# Patient Record
Sex: MALE | Race: WHITE | Employment: OTHER | URBAN - METROPOLITAN AREA
[De-identification: names, ages, dates, MRNs, and addresses within clinical notes are randomized per-mention and may not be internally consistent; named-entity substitution may affect disease eponyms.]

---

## 2017-01-23 ENCOUNTER — MYC REFILL (OUTPATIENT)
Dept: FAMILY MEDICINE | Facility: CLINIC | Age: 82
End: 2017-01-23

## 2017-01-23 DIAGNOSIS — E78.5 HYPERLIPIDEMIA LDL GOAL <100: ICD-10-CM

## 2017-01-24 RX ORDER — SIMVASTATIN 20 MG
20 TABLET ORAL AT BEDTIME
Qty: 90 TABLET | Refills: 0 | Status: SHIPPED | OUTPATIENT
Start: 2017-01-24 | End: 2017-04-20

## 2017-01-24 NOTE — TELEPHONE ENCOUNTER
SIMVASTATIN  Last Written Prescription Date: 12/28/2016  Last Fill Quantity: 30, # refills: 0  Last Office Visit with [facility], [facility] or [facility] prescribing provider: 8/24/2016       CHOL      136   8/18/2015  HDL       53   8/18/2015  LDL       46   8/18/2015  TRIG      186   8/18/2015  CHOLHDLRATIO      2.6   8/18/2015

## 2017-01-24 NOTE — TELEPHONE ENCOUNTER
Message from MyChart:  Original authorizing provider: MD Ivan Novoa would like a refill of the following medications:  simvastatin (ZOCOR) 20 MG tablet [Evaristo Magaña MD]    Preferred pharmacy: [facility] [address] [place] [address] [id number] AT [facility] [address]    Comment:  Dr Magaña Requesting a 90 day supply of Simvastatin 20 mg Tablets. Thank you, Shahriar Gomez

## 2017-01-25 DIAGNOSIS — M48.061 LUMBAR SPINAL STENOSIS: Primary | ICD-10-CM

## 2017-01-25 NOTE — TELEPHONE ENCOUNTER
Controlled Substance Refill Request for Hydrocodone-acetaminopheg n (norco)7.5-325 mg Problem List Complete:  Yes   checked in past 6 months?  Yes 11/28/16       Medication(s): norco 7.5/325.    Maximum quantity per month: 120  Clinic visit frequency required: Q 4 months     Controlled substance agreement on file: 9/3/14    Pain Clinic evaluation in the past: Yes       Date/Location:  spine clinic    DIRE Total Score(s):  No flowsheet data found.    Last [facility] website verification: 11/28/16    HYDROCODONE-ACETAMINOPHEN ( [place]) 7.5-325 Last Written Prescription Date:  12/29/16  Last Fill Quantity: 120,   # refills: 0  Last Office Visit with [facility] or  [facility] prescribing provider: 9/13/16  Future Office visit:       Routing refill request to provider for review/approval because:  Drug not on the [facility] or [facility] refill protocol or controlled substance

## 2017-01-26 RX ORDER — HYDROCODONE BITARTRATE AND ACETAMINOPHEN 7.5; 325 MG/1; MG/1
1 TABLET ORAL EVERY 6 HOURS PRN
Qty: 120 TABLET | Refills: 0 | Status: SHIPPED | OUTPATIENT
Start: 2017-01-26 | End: 2017-02-27

## 2017-01-26 NOTE — TELEPHONE ENCOUNTER
Patient was notified that script was ready for  at [facility].

## 2017-02-09 ENCOUNTER — MYC REFILL (OUTPATIENT)
Dept: FAMILY MEDICINE | Facility: CLINIC | Age: 82
End: 2017-02-09

## 2017-02-09 DIAGNOSIS — F11.90 CHRONIC NARCOTIC USE: ICD-10-CM

## 2017-02-09 DIAGNOSIS — K59.09 CHRONIC CONSTIPATION: ICD-10-CM

## 2017-02-14 RX ORDER — SENNOSIDES 8.6 MG
1 TABLET ORAL 2 TIMES DAILY
Qty: 180 EACH | Refills: 0 | Status: ON HOLD | OUTPATIENT
Start: 2017-02-14 | End: 2017-06-02

## 2017-02-14 NOTE — TELEPHONE ENCOUNTER
Message from MyChart:  Original authorizing provider: Evaristo Magaña MD    Ivan Gomez would like a refill of the following medications:  sennosides (SENOKOT) 8.6 MG tablet [Evaristo Magaña MD]    Preferred pharmacy: [facility] [id number] [place] [address] [id number] [facility] [place] [facility] [address]    Comment:

## 2017-02-14 NOTE — TELEPHONE ENCOUNTER
sennosides (SENOKOT) 8.6 MG tablet    Last Written Prescription Date: 6/2/2016  Last Fill Quantity: 180,  # refills: 3   Last Office Visit with [facility] prescribing provider: 9/13/2016                                           Prescription approved per [facility] Refill Protocol.

## 2017-02-27 ENCOUNTER — MYC REFILL (OUTPATIENT)
Dept: FAMILY MEDICINE | Facility: CLINIC | Age: 82
End: 2017-02-27

## 2017-02-27 DIAGNOSIS — M48.061 LUMBAR SPINAL STENOSIS: ICD-10-CM

## 2017-02-28 RX ORDER — HYDROCODONE BITARTRATE AND ACETAMINOPHEN 7.5; 325 MG/1; MG/1
1 TABLET ORAL EVERY 6 HOURS PRN
Qty: 120 TABLET | Refills: 0 | Status: SHIPPED | OUTPATIENT
Start: 2017-02-28 | End: 2017-03-08

## 2017-02-28 NOTE — TELEPHONE ENCOUNTER
Controlled Substance Refill Request for NORCO  Problem List Complete:  Yes  Per Dr. Bell Note Oct 2013:You don't want to use a long acting narcotic for pain. At this time, you are using #120 hydro/apap 7.5/325 monthly./   I told you today and you agreed to follow up to see Dr. Evaristo Magaña in about 2 months before the present refills of narcotic are due to be refilled   I also told you that it would be my recommendation that you then be given an increased monthly prescription of #150 per month to better control the pain.   You do not want any further surgery on your back at this time so want to focus on better pain control.     Patient is followed by ANTHONY Murphy for ongoing prescription of pain medication. All refills should be approved by this provider, or covering partner.     Medication(s): norco 7.5/325.   Maximum quantity per month: 120  Clinic visit frequency required: Q 4 months      Controlled substance agreement on file: 9/3/14     Pain Clinic evaluation in the past: Yes  Date/Location: spine clinic     DIRE Total Score(s):  No flowsheet data found.     Last [facility] website verification: 11/28/16  [email or web address]    Routing refill request to provider for review/approval because:  Drug not on the [facility], [facility] or  Health refill protocol or controlled substance    Charmaine Estrada RN - [employer]riage [employer]

## 2017-02-28 NOTE — TELEPHONE ENCOUNTER
Message from [place]:  Original authorizing provider: MD Ivan Shepard would like a refill of the following medications:  HYDROcodone-acetaminophen (NORCO) 7.5-325 MG per tablet [Jay Yoon MD]    Preferred pharmacy: Other - Prescription pickup at [facility]    Comment:  Dr Magaña: I would appreciate a new prescription for Hydrocodone-Acetaminophen 7.5-325 tablets with pickup at [facility] after approval. Thank you, Shahriar Gomez

## 2017-03-08 ENCOUNTER — ONCOLOGY VISIT (OUTPATIENT)
Dept: ONCOLOGY | Facility: CLINIC | Age: 82
End: 2017-03-08
Attending: UROLOGY
Payer: MEDICARE

## 2017-03-08 VITALS
OXYGEN SATURATION: 95 % | WEIGHT: 166 LBS | BODY MASS INDEX: 26.06 KG/M2 | TEMPERATURE: 97.8 F | RESPIRATION RATE: 16 BRPM | HEIGHT: 67 IN | HEART RATE: 83 BPM | DIASTOLIC BLOOD PRESSURE: 77 MMHG | SYSTOLIC BLOOD PRESSURE: 148 MMHG

## 2017-03-08 DIAGNOSIS — C67.4 MALIGNANT NEOPLASM OF POSTERIOR WALL OF URINARY BLADDER (H): Primary | Chronic | ICD-10-CM

## 2017-03-08 PROCEDURE — 99213 OFFICE O/P EST LOW 20 MIN: CPT | Performed by: UROLOGY

## 2017-03-08 PROCEDURE — 99211 OFF/OP EST MAY X REQ PHY/QHP: CPT

## 2017-03-08 ASSESSMENT — PAIN SCALES - GENERAL: PAINLEVEL: MODERATE PAIN (4)

## 2017-03-08 NOTE — PATIENT INSTRUCTIONS
Return to see me 6-9 months    Renal US at [facility]     UA/Fish today  -will drop off specimen at [facility]

## 2017-03-08 NOTE — NURSING NOTE
"Ivan Gomez is a 89 year old male who presents for:  Chief Complaint   Patient presents with     Oncology Clinic Visit     Follow up        Initial Vitals:  /77  Pulse 83  Temp 97.8  F (36.6  C) (Tympanic)  Resp 16  Ht 1.702 m (5' 7.01\")  Wt 75.3 kg (166 lb)  SpO2 95%  BMI 25.99 kg/m2 Estimated body mass index is 25.99 kg/(m^2) as calculated from the following:    Height as of this encounter: 1.702 m (5' 7.01\").    Weight as of this encounter: 75.3 kg (166 lb).. Body surface area is 1.89 meters squared. BP completed using cuff size: regular  Moderate Pain (4) No LMP for male patient. Allergies and medications reviewed.     Medications: Medication refills not needed today.  Pharmacy name entered into [employer]: [facility] [address] [place] [address] [address] [facility] [address]    Comments: Follow up    8 minutes for nursing intake (face to face time)   Meghana Malik CMA     DISCHARGE PLAN:  Next appointments: See patient instruction section  Departure Mode: Ambulatory  Accompanied by: wife  0 minutes for nursing discharge (face to face time)   Meghana Malik CMA            "

## 2017-03-08 NOTE — PROGRESS NOTES
"89 year old male with a history of urothelial carcinoma here for follow up     Had a complicated post operative course after original TURBT(Trans Urethral Resection of Bladder Tumor)  including the need for a nephrostomy tube, this was internalized for a double J.  And a similar reaction slow recovery after TURBT    Here for follow up cysto   Only got one cycle of Mytomycin-C  Did not complete the remaining courses because he did not like having the cath placed    Date of Initial Diagnosis: 2015  Stage of Initial Diagnosis: Ta, but large volume with a couple of tumors >3 cms near right UO requiring neph tube and stent  Grade at Initial Diagnosis: Low with focal high grade  Site of First Diagnosis: bladder  Any Recurrence? 2016 low grade Ta  Intravesical Treatments: TURBT(  TURBT(   Date of Last Cysto:   Date of Last Upper Tract Imagin/15/15  Patient was pretreated with antibiotics.    Patient is a 89 year old  male   Vitals: Blood pressure 148/77, pulse 83, temperature 97.8  F (36.6  C), temperature source Tympanic, resp. rate 16, height 1.702 m (5' 7.01\"), weight 75.3 kg (166 lb), SpO2 95 %.  General Appearance Adult: Alert, no acute distress, oriented    Having some right side flank pain      A/P History of recurrent low grade bladder cancer Ta, possible T1    Patient is resistant to any further treatment, certainly any radical treatment like cystectomy, chemotherapy, radiation etc.  Even resistant to any further TURBT(Trans Urethral Resection of Bladder Tumor)     After discussion we will have him follow up in 6 months with a urine cytology    He wants to refuse all subsequent TURBTs unless he is terribly symptomatic, so will not do any cystos    Will get renal US for his flank pain    Dennis Hilton MD  Department of Urology Staff  [facility]            "

## 2017-03-08 NOTE — MR AVS SNAPSHOT
After Visit Summary   3/8/2017    Ivan Gomez    MRN: [id number]           Patient Information     Date Of Birth          10/12/1926        Visit Information        Provider Department      3/8/2017 1:00 PM Dennis Hilton MD [facility] Oncology [facility]      Today's Diagnoses     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     -  1      Care Instructions        Return to see me 6-9 months    Renal US at [facility]     UA/Fish today  -will drop off specimen at [facility]         Follow-ups after your visit        Your next 10 appointments already scheduled     Sep 13, 2017  1:00 PM CDT   Return Visit with Dennis Hilton MD   [facility] ([facility])    [facility]  [address] [address]  [facility] [address]  [facility] [id number] [phone]              Future tests that were ordered for you today     Open Future Orders        Priority Expected Expires Ordered    Cytology non gyn Routine 3/8/2017 4/8/2017 3/8/2017            Who to contact     If you have questions or need follow up information about today's clinic visit or your schedule please contact [facility] directly at [phone].  Normal or non-critical lab and imaging results will be communicated to you by MyChart, letter or phone within 4 business days after the clinic has received the results. If you do not hear from us within 7 days, please contact the clinic through [employer] or phone. If you have a critical or abnormal lab result, we will notify you by phone as soon as possible.  Submit refill requests through Glass or call your pharmacy and they will forward the refill request to us. Please allow 3 business days for your refill to be completed.          Additional Information About Your Visit        [employer] MIND Information     Gl[employer] gives you  "secure access to your electronic health record. If you see a primary care provider, you can also send messages to your care team and make appointments. If you have questions, please call your primary care clinic.  If you do not have a primary care provider, please call [phone] and they will assist you.        Care EveryWhere ID     This is your Care EveryWhere ID. This could be used by other organizations to access your [place] medical records  [id number]        Your Vitals Were     Pulse Temperature Respirations Height Pulse Oximetry BMI (Body Mass Index)    83 97.8  F (36.6  C) (Tympanic) 16 1.702 m (5' 7.01\") 95% 25.99 kg/m2       Blood Pressure from Last 3 Encounters:   03/08/17 148/77   09/16/16 143/77   09/13/16 120/60    Weight from Last 3 Encounters:   03/08/17 75.3 kg (166 lb)   09/16/16 73.2 kg (161 lb 6.4 oz)   09/13/16 75.8 kg (167 lb)              We Performed the Following     US Renal Complete          Today's Medication Changes          These changes are accurate as of: 3/8/17  1:38 PM.  If you have any questions, ask your nurse or doctor.               These medicines have changed or have updated prescriptions.        Dose/Directions    HYDROcodone-acetaminophen 7.5-325 MG per tablet   Commonly known as:  NORCO   This may have changed:  Another medication with the same name was removed. Continue taking this medication, and follow the directions you see here.   Used for:  Chronic narcotic dependence (H), Bladder tumor        Dose:  1 tablet   Take 1 tablet by mouth every 6 hours as needed for moderate to severe pain   Quantity:  20 tablet   Refills:  0         Stop taking these medicines if you haven't already. Please contact your care team if you have questions.     furosemide 20 MG tablet   Commonly known as:  LASIX           hydrochlorothiazide 25 MG tablet   Commonly known as:  HYDRODIURIL                    Primary Care Provider Office Phone # Fax #    Evaristo Magaña MD " [phone]       [facility] [address]  [facility] [address]        Thank you!     Thank you for choosing [facility]  for your care. Our goal is always to provide you with excellent care. Hearing back from our patients is one way we can continue to improve our services. Please take a few minutes to complete the written survey that you may receive in the mail after your visit with us. Thank you!             Your Updated Medication List - Protect others around you: Learn how to safely use, store and throw away your medicines at www.disposemymeds.org.          This list is accurate as of: 3/8/17  1:38 PM.  Always use your most recent med list.                   Brand Name Dispense Instructions for use    amLODIPine 5 MG tablet    NORVASC    90 tablet    TAKE 1 TABLET BY MOUTH EVERY DAY       aspirin 81 MG tablet     30 tablet    Take 1 tablet (81 mg) by mouth once a week Takes 1 tab every Saturday       colchicine 0.6 MG tablet    COLCRYS    30 tablet    Take 2 tablets at the first sign of flare, take 1 additional tablet one hour later.       finasteride 5 MG tablet    PROSCAR    90 tablet    TAKE 1 TABLET BY MOUTH EVERY DAY       gabapentin 300 MG capsule    NEURONTIN    270 capsule    TAKE 1 CAPSULE BY MOUTH THREE TIMES DAILY       HYDROcodone-acetaminophen 7.5-325 MG per tablet    NORCO    20 tablet    Take 1 tablet by mouth every 6 hours as needed for moderate to severe pain       metoprolol 25 MG 24 hr tablet    TOPROL-XL    90 tablet    TAKE 1 TABLET BY MOUTH DAILY       OMEGA-3 FISH OIL PO      Take 1 g by mouth daily       sennosides 8.6 MG tablet    SENOKOT    180 each    Take 1 tablet by mouth 2 times daily       simvastatin 20 MG tablet    ZOCOR    90 tablet    Take 1 tablet (20 mg) by mouth At Bedtime       tamsulosin 0.4 MG capsule    FLOMAX    90 capsule    Take 1 capsule (0.4 mg) by mouth daily       vitamin D 2000 UNITS Caps      Take 1  capsule by mouth every morning

## 2017-03-09 ENCOUNTER — HOSPITAL ENCOUNTER (OUTPATIENT)
Dept: LAB | Facility: CLINIC | Age: 82
End: 2017-03-09
Attending: UROLOGY
Payer: MEDICARE

## 2017-03-09 ENCOUNTER — HOSPITAL ENCOUNTER (OUTPATIENT)
Dept: ULTRASOUND IMAGING | Facility: CLINIC | Age: 82
Discharge: HOME | End: 2017-03-09
Attending: UROLOGY | Admitting: UROLOGY
Payer: MEDICARE

## 2017-03-09 DIAGNOSIS — C67.4 MALIGNANT NEOPLASM OF POSTERIOR WALL OF URINARY BLADDER (H): Chronic | ICD-10-CM

## 2017-03-09 PROCEDURE — 88120 CYTP URNE 3-5 PROBES EA SPEC: CPT | Performed by: UROLOGY

## 2017-03-09 PROCEDURE — 88112 CYTOPATH CELL ENHANCE TECH: CPT | Performed by: UROLOGY

## 2017-03-09 PROCEDURE — 76770 US EXAM ABDO BACK WALL COMP: CPT

## 2017-03-09 PROCEDURE — 40000485: Performed by: UROLOGY

## 2017-03-09 PROCEDURE — 88112 CYTOPATH CELL ENHANCE TECH: CPT | Mod: 26 | Performed by: UROLOGY

## 2017-03-16 LAB — COPATH REPORT: NORMAL

## 2017-03-17 LAB — COPATH REPORT: NORMAL

## 2017-03-22 ENCOUNTER — TELEPHONE (OUTPATIENT)
Dept: FAMILY MEDICINE | Facility: CLINIC | Age: 82
End: 2017-03-22

## 2017-03-22 NOTE — TELEPHONE ENCOUNTER
Patient called reporting right ankle pain since this morning. Has Hx of gout. Took colchicine (COLCRYS) 0.6 MG tablet this morning as directed. Pain is described as sharp rating  9/10 and unrelieved with medication. Pt asking if he should continue taking or stop taking colchicine (COLCRYS) 0.6 MG tablet.  Advised he schedule appt with provider for further evaluation and tx. Appt was scheduled 03/23/2017. Advised pt take medication as directed until appt unless further directed by provider.   Please advise if agree he take colchicine (COLCRYS) 0.6 MG tablet before appt or any other any directions? No need to call pt if ok with writers plan.

## 2017-03-22 NOTE — TELEPHONE ENCOUNTER
Reason for call: Patient reporting a symptom    Symptom or request: Gout Flare Up     Duration (how long have symptoms been present):     Have you been treated for this before? Yes    Additional comments: Patient calling with questions about taking medication for a gout flare up. He states that the pain is very intense and after taking then medication the symptoms have gotten worse.    Phone Number patient can be reached at:  Home number on file [phone] (home)    Best Time:  Anytime    Can we leave a detailed message on this number:  YES    Call taken on 3/22/2017 at 1:53 PM by Louie German

## 2017-03-23 ENCOUNTER — OFFICE VISIT (OUTPATIENT)
Dept: FAMILY MEDICINE | Facility: CLINIC | Age: 82
End: 2017-03-23
Payer: MEDICARE

## 2017-03-23 VITALS
SYSTOLIC BLOOD PRESSURE: 150 MMHG | WEIGHT: 168 LBS | OXYGEN SATURATION: 96 % | HEART RATE: 72 BPM | BODY MASS INDEX: 26.37 KG/M2 | TEMPERATURE: 98.4 F | HEIGHT: 67 IN | RESPIRATION RATE: 14 BRPM | DIASTOLIC BLOOD PRESSURE: 70 MMHG

## 2017-03-23 DIAGNOSIS — M10.071 ACUTE IDIOPATHIC GOUT OF RIGHT FOOT: Primary | ICD-10-CM

## 2017-03-23 DIAGNOSIS — M25.571 PAIN IN JOINT INVOLVING ANKLE AND FOOT, RIGHT: ICD-10-CM

## 2017-03-23 DIAGNOSIS — M48.061 LUMBAR SPINAL STENOSIS: ICD-10-CM

## 2017-03-23 LAB — ERYTHROCYTE [SEDIMENTATION RATE] IN BLOOD BY WESTERGREN METHOD: 6 MM/H (ref 0–20)

## 2017-03-23 PROCEDURE — 99213 OFFICE O/P EST LOW 20 MIN: CPT | Performed by: FAMILY MEDICINE

## 2017-03-23 PROCEDURE — 36415 COLL VENOUS BLD VENIPUNCTURE: CPT | Performed by: FAMILY MEDICINE

## 2017-03-23 PROCEDURE — 85652 RBC SED RATE AUTOMATED: CPT | Performed by: FAMILY MEDICINE

## 2017-03-23 PROCEDURE — 84550 ASSAY OF BLOOD/URIC ACID: CPT | Performed by: FAMILY MEDICINE

## 2017-03-23 RX ORDER — PREDNISONE 10 MG/1
10 TABLET ORAL DAILY
Qty: 7 TABLET | Refills: 0 | Status: SHIPPED | OUTPATIENT
Start: 2017-03-23 | End: 2017-05-09

## 2017-03-23 RX ORDER — HYDROCODONE BITARTRATE AND ACETAMINOPHEN 7.5; 325 MG/1; MG/1
1 TABLET ORAL EVERY 6 HOURS PRN
Qty: 120 TABLET | Refills: 0 | Status: SHIPPED | OUTPATIENT
Start: 2017-03-28 | End: 2017-04-26

## 2017-03-23 RX ORDER — COLCHICINE 0.6 MG/1
TABLET ORAL
Qty: 30 TABLET | Refills: 0 | Status: ON HOLD | OUTPATIENT
Start: 2017-03-23 | End: 2017-10-13

## 2017-03-23 ASSESSMENT — ANXIETY QUESTIONNAIRES
6. BECOMING EASILY ANNOYED OR IRRITABLE: NOT AT ALL
5. BEING SO RESTLESS THAT IT IS HARD TO SIT STILL: NOT AT ALL
7. FEELING AFRAID AS IF SOMETHING AWFUL MIGHT HAPPEN: NOT AT ALL
IF YOU CHECKED OFF ANY PROBLEMS ON THIS QUESTIONNAIRE, HOW DIFFICULT HAVE THESE PROBLEMS MADE IT FOR YOU TO DO YOUR WORK, TAKE CARE OF THINGS AT HOME, OR GET ALONG WITH OTHER PEOPLE: NOT DIFFICULT AT ALL
1. FEELING NERVOUS, ANXIOUS, OR ON EDGE: NOT AT ALL
2. NOT BEING ABLE TO STOP OR CONTROL WORRYING: NOT AT ALL
GAD7 TOTAL SCORE: 0
3. WORRYING TOO MUCH ABOUT DIFFERENT THINGS: NOT AT ALL

## 2017-03-23 ASSESSMENT — PATIENT HEALTH QUESTIONNAIRE - PHQ9: 5. POOR APPETITE OR OVEREATING: NOT AT ALL

## 2017-03-23 NOTE — PROGRESS NOTES
"  SUBJECTIVE:                                                    Ivan Gomez is a 89 year old male who presents to clinic today for the following health issues:      Pt here with his wife  Gout  Duration: Yesterday  Description   Location: foot - right ankle  Joint Swelling: YES  Redness: YES  Pain intensity:  moderate  Accompanying signs and symptoms: None  History  Previous history of gout: YES   Trauma to the area: no   Precipitating factors:   Alcohol usage: none  Diuretic use: no   Recent illness: no   Therapies tried and outcome: On medication, Colchcine---causes loose stools   Pt had old Rx left from 2 years ago, he took 2 first dose then 1 tab later.  Pain has improved        Problem list and histories reviewed & adjusted, as indicated.  Additional history: as documented    Labs reviewed in EPIC    Reviewed and updated as needed this visit by clinical staff  Tobacco  Allergies  Meds  Med Hx  Surg Hx  Fam Hx  Soc Hx      Reviewed and updated as needed this visit by Provider         ROS:  CONSTITUTIONAL:NEGATIVE for fever, chills, change in weight  MUSCULOSKELETAL: POSITIVE  for arthralgias Rt ankle and Hx gout    OBJECTIVE:                                                    /70 (BP Location: Left leg, Patient Position: Chair, Cuff Size: Adult Regular)  Pulse 72  Temp 98.4  F (36.9  C) (Tympanic)  Resp 14  Ht 5' 7\" (1.702 m)  Wt 168 lb (76.2 kg)  SpO2 96%  BMI 26.31 kg/m2  Body mass index is 26.31 kg/(m^2).  GENERAL APPEARANCE: healthy, alert and no distress  MS: extremities normal- no gross deformities noted  ORTHO: Ankle Exam:   Knee:not done  Lower leg:not done    ANKLE  Inspection:Swelling:diffuse   Tender:lateral malleolus  Non-tender:medial malleolus  Range of Motion:dorsiflexion:  full, painful, plantarflexion:  full, painful, inversion:  full, eversion:  full  Strength:dorsiflexion:  5/5, plantarflexion:  5/5, inversion: 5/5, eversion:5/5  Special tests:negative anterior drawer, " negative talar tilt, negative valgus stress, negative forced external rotation/eversion  Stance: nomal          Diagnostic test results:  Lab: see below, results pending     ASSESSMENT/PLAN:                                                        ICD-10-CM    1. Acute idiopathic gout of right foot M10.071 ESR: Erythrocyte sedimentation rate     Uric acid     colchicine (COLCRYS) 0.6 MG tablet     predniSONE (DELTASONE) 10 MG tablet   2. Pain in joint involving ankle and foot, right M25.571    3. Lumbar spinal stenosis M48.06 HYDROcodone-acetaminophen (NORCO) 7.5-325 MG per tablet       Follow up with Provider - as needed  If uric acid level has gone up will need to discuss starting Allopurinol   Patient Instructions   Ice packs to ankle as needed      Jay Yoon MD  [facility]

## 2017-03-23 NOTE — NURSING NOTE
"Chief Complaint   Patient presents with     Musculoskeletal Problem     Right ankle pain/swelling       Initial /70 (BP Location: Left leg, Patient Position: Chair, Cuff Size: Adult Regular)  Pulse 72  Temp 98.4  F (36.9  C) (Tympanic)  Resp 14  Ht 5' 7\" (1.702 m)  Wt 168 lb (76.2 kg)  SpO2 96%  BMI 26.31 kg/m2 Estimated body mass index is 26.31 kg/(m^2) as calculated from the following:    Height as of this encounter: 5' 7\" (1.702 m).    Weight as of this encounter: 168 lb (76.2 kg).  Medication Reconciliation: complete     Betsy Wang LPN  "

## 2017-03-23 NOTE — MR AVS SNAPSHOT
After Visit Summary   3/23/2017    Ivan Gomez    MRN: [id number]           Patient Information     Date Of Birth          10/12/1926        Visit Information        Provider Department      3/23/2017 3:00 PM Jay Yoon MD [facility]        Today's Diagnoses     Acute idiopathic gout of right foot    -  1    Pain in joint involving ankle and foot, right        Lumbar spinal stenosis          Care Instructions    Ice packs to ankle as needed        Follow-ups after your visit        Your next 10 appointments already scheduled     Sep 13, 2017  1:00 PM CDT   Return Visit with Dennis Hilton MD   [facility] ([facility])    [facility]  [address]  [facility] [address]  [facility] [address] [phone]              Who to contact     If you have questions or need follow up information about today's clinic visit or your schedule please contact [facility] directly at [phone].  Normal or non-critical lab and imaging results will be communicated to you by Biocepthart, letter or phone within 4 business days after the clinic has received the results. If you do not hear from us within 7 days, please contact the clinic through Wattaget or phone. If you have a critical or abnormal lab result, we will notify you by phone as soon as possible.  Submit refill requests through LiB or call your pharmacy and they will forward the refill request to us. Please allow 3 business days for your refill to be completed.          Additional Information About Your Visit        Biocepthart Information     LiB gives you secure access to your electronic health record. If you see a primary care provider, you can also send messages to your care team and make appointments. If you have questions, please call your primary care clinic.  If you do not have a primary care provider, please call  "[phone] and they will assist you.        Care EveryWhere ID     This is your Care EveryWhere ID. This could be used by other organizations to access your [place] medical records  [id number]        Your Vitals Were     Pulse Temperature Respirations Height Pulse Oximetry BMI (Body Mass Index)    72 98.4  F (36.9  C) (Tympanic) 14 5' 7\" (1.702 m) 96% 26.31 kg/m2       Blood Pressure from Last 3 Encounters:   03/23/17 150/70   03/08/17 148/77   09/16/16 143/77    Weight from Last 3 Encounters:   03/23/17 168 lb (76.2 kg)   03/08/17 166 lb (75.3 kg)   09/16/16 161 lb 6.4 oz (73.2 kg)              We Performed the Following     ESR: Erythrocyte sedimentation rate     Uric acid          Today's Medication Changes          These changes are accurate as of: 3/23/17  3:26 PM.  If you have any questions, ask your nurse or doctor.               Start taking these medicines.        Dose/Directions    HYDROcodone-acetaminophen 7.5-325 MG per tablet   Commonly known as:  NORCO   Used for:  Lumbar spinal stenosis   Started by:  Jay Yoon MD        Dose:  1 tablet   Start taking on:  3/28/2017   Take 1 tablet by mouth every 6 hours as needed for moderate to severe pain   Quantity:  120 tablet   Refills:  0       predniSONE 10 MG tablet   Commonly known as:  DELTASONE   Used for:  Acute idiopathic gout of right foot   Started by:  Jay Yoon MD        Dose:  10 mg   Take 1 tablet (10 mg) by mouth daily   Quantity:  7 tablet   Refills:  0            Where to get your medicines      These medications were sent to [id number]  [address] [address] - [address] AT [facility] [address] [address]     Phone:  [phone]     colchicine 0.6 MG tablet    predniSONE 10 MG tablet         Some of these will need a paper prescription and others can be bought over the counter.  Ask your nurse if you have questions.     Bring a paper prescription for each of these medications "     HYDROcodone-acetaminophen 7.5-325 MG per tablet                Primary Care Provider Office Phone # Fax #    Evaristo Magaña [phone]       [facility] [address]  [facility] [address]        Thank you!     Thank you for choosing [facility]  for your care. Our goal is always to provide you with excellent care. Hearing back from our patients is one way we can continue to improve our services. Please take a few minutes to complete the written survey that you may receive in the mail after your visit with us. Thank you!             Your Updated Medication List - Protect others around you: Learn how to safely use, store and throw away your medicines at www.disposemymeds.org.          This list is accurate as of: 3/23/17  3:26 PM.  Always use your most recent med list.                   Brand Name Dispense Instructions for use    amLODIPine 5 MG tablet    NORVASC    90 tablet    TAKE 1 TABLET BY MOUTH EVERY DAY       aspirin 81 MG tablet     30 tablet    Take 1 tablet (81 mg) by mouth once a week Takes 1 tab every Saturday       colchicine 0.6 MG tablet    COLCRYS    30 tablet    Take 2 tablets at the first sign of flare, take 1 additional tablet one hour later.       finasteride 5 MG tablet    PROSCAR    90 tablet    TAKE 1 TABLET BY MOUTH EVERY DAY       gabapentin 300 MG capsule    NEURONTIN    270 capsule    TAKE 1 CAPSULE BY MOUTH THREE TIMES DAILY       HYDROcodone-acetaminophen 7.5-325 MG per tablet   Start taking on:  3/28/2017    NORCO    120 tablet    Take 1 tablet by mouth every 6 hours as needed for moderate to severe pain       metoprolol 25 MG 24 hr tablet    TOPROL-XL    90 tablet    TAKE 1 TABLET BY MOUTH DAILY       OMEGA-3 FISH OIL PO      Take 1 g by mouth daily       predniSONE 10 MG tablet    DELTASONE    7 tablet    Take 1 tablet (10 mg) by mouth daily       sennosides 8.6 MG tablet    SENOKOT    180 each    Take 1  tablet by mouth 2 times daily       simvastatin 20 MG tablet    ZOCOR    90 tablet    Take 1 tablet (20 mg) by mouth At Bedtime       tamsulosin 0.4 MG capsule    FLOMAX    90 capsule    Take 1 capsule (0.4 mg) by mouth daily       vitamin D 2000 UNITS Caps      Take 1 capsule by mouth every morning

## 2017-03-24 LAB — URATE SERPL-MCNC: 6.2 MG/DL (ref 3.5–7.2)

## 2017-03-24 ASSESSMENT — PATIENT HEALTH QUESTIONNAIRE - PHQ9: SUM OF ALL RESPONSES TO PHQ QUESTIONS 1-9: 4

## 2017-03-24 ASSESSMENT — ANXIETY QUESTIONNAIRES: GAD7 TOTAL SCORE: 0

## 2017-04-12 ENCOUNTER — TELEPHONE (OUTPATIENT)
Dept: ONCOLOGY | Facility: CLINIC | Age: 82
End: 2017-04-12

## 2017-04-12 NOTE — TELEPHONE ENCOUNTER
Pt Called stating he had an ultrasound on 3/9/17 and would like someone to call him with those results.      Nicolasa Wilson  CMA

## 2017-04-13 NOTE — TELEPHONE ENCOUNTER
Called patient and gave him US results. Explained Simple Cyst on Right side was slightly larger.  Patient reports having more pain on that side and states pain med help some.  Suggested seeing [facility] for possible assistance with pain control measures.  Patient in agreement, so will pass on info to MD and wait for response.  Patient to call with any additional concerns or questions.  Patient verbalized understanding.  Marlon Gorman, RN, BSN, OCN  [facility]  Patient Care Coordinator

## 2017-04-14 ENCOUNTER — CARE COORDINATION (OUTPATIENT)
Dept: ONCOLOGY | Facility: CLINIC | Age: 82
End: 2017-04-14

## 2017-04-14 DIAGNOSIS — C67.9 BLADDER CANCER (H): Primary | ICD-10-CM

## 2017-04-14 DIAGNOSIS — R10.9 FLANK PAIN: ICD-10-CM

## 2017-04-14 NOTE — PROGRESS NOTES
Called Dr. Hilton to follow up with him regarding patient's renal ultrasound.  His renal ultrasound shows bilateral cysts, with the one on the right being larger.  Pt is complaining or right sided flank pain that is somewhat controlled by [place].  Dr. Hilton said that he doubts the right sided flank pain is being caused by the cysts.  He recommends palliative care for pain management.  No further recommendations at this time.  Will put in order for palliative care and have our schedulers call patient.  Called patient and let him know that plan.  Pt is interested in setting up a palliative care appointment for pain control.  No further questions or concerns at this time.

## 2017-04-20 DIAGNOSIS — E78.5 HYPERLIPIDEMIA LDL GOAL <100: ICD-10-CM

## 2017-04-20 RX ORDER — SIMVASTATIN 20 MG
TABLET ORAL
Qty: 90 TABLET | Refills: 1 | Status: SHIPPED | OUTPATIENT
Start: 2017-04-20 | End: 2017-11-21

## 2017-04-20 NOTE — TELEPHONE ENCOUNTER
Simvastatin 20 mg     Last Written Prescription Date: 1/24/17  Last Fill Quantity: 90, # refills: 0  Last Office Visit with [facility] or [facility] prescribing provider: 3/23/17       Lab Results   Component Value Date    CHOL 136 08/18/2015     Lab Results   Component Value Date    HDL 53 08/18/2015     Lab Results   Component Value Date    LDL 46 08/18/2015     Lab Results   Component Value Date    TRIG 186 08/18/2015     Lab Results   Component Value Date    CHOLHDLRATIO 2.6 08/18/2015

## 2017-04-26 ENCOUNTER — MYC REFILL (OUTPATIENT)
Dept: FAMILY MEDICINE | Facility: CLINIC | Age: 82
End: 2017-04-26

## 2017-04-26 DIAGNOSIS — M48.061 LUMBAR SPINAL STENOSIS: ICD-10-CM

## 2017-04-27 RX ORDER — HYDROCODONE BITARTRATE AND ACETAMINOPHEN 7.5; 325 MG/1; MG/1
1 TABLET ORAL EVERY 6 HOURS PRN
Qty: 120 TABLET | Refills: 0 | Status: SHIPPED | OUTPATIENT
Start: 2017-04-27 | End: 2017-05-29

## 2017-04-27 NOTE — TELEPHONE ENCOUNTER
Message from MD.Voicet:  Original authorizing provider: MD Ivan Shepard would like a refill of the following medications:  HYDROcodone-acetaminophen (NORCO) 7.5-325 MG per tablet [Jay Yoon MD]    Preferred pharmacy: Other - [facility]    Comment:  Dr Magaña 04/26/2017 I would appreciate a new prescription for Hydrocodone/Acetaminophen 7.5-325 and upon approval with pickup at [facility]. Thank you, Shahriar Gomez .

## 2017-04-27 NOTE — TELEPHONE ENCOUNTER
Controlled Substance Refill Request for HYDROcodone-acetaminophen (NORCO) 7.5-325 MG per tablet  Problem List Complete:  Yes  Per Dr. Bell Note Oct 2013:You don't want to use a long acting narcotic for pain. At this time, you are using #120 hydro/apap 7.5/325 monthly./   I told you today and you agreed to follow up to see Dr. Evaristo Magaña in about 2 months before the present refills of narcotic are due to be refilled   I also told you that it would be my recommendation that you then be given an increased monthly prescription of #150 per month to better control the pain.   You do not want any further surgery on your back at this time so want to focus on better pain control.    Patient is followed by ANTHONY Murphy for ongoing prescription of pain medication.  All refills should be approved by this provider, or covering partner.    Medication(s): norco 7.5/325.   Maximum quantity per month: 120  Clinic visit frequency required: Q 4 months     Controlled substance agreement on file: 9/3/14    Pain Clinic evaluation in the past: Yes       Date/Location:  spine clinic    DIRE Total Score(s):  No flowsheet data found.    Last [facility] website verification: 11/28/16     checked in past 6 months?  Yes 11/28/2016      Last Written Prescription Date: 03/28/2017  Last Fill Quantity: 120,  # refills: 0   Last Office Visit with [facility], P or [facility] prescribing provider: 03/23/2017

## 2017-05-09 ENCOUNTER — OFFICE VISIT (OUTPATIENT)
Dept: FAMILY MEDICINE | Facility: CLINIC | Age: 82
End: 2017-05-09
Payer: MEDICARE

## 2017-05-09 VITALS
RESPIRATION RATE: 16 BRPM | DIASTOLIC BLOOD PRESSURE: 70 MMHG | BODY MASS INDEX: 25.74 KG/M2 | SYSTOLIC BLOOD PRESSURE: 146 MMHG | HEIGHT: 67 IN | HEART RATE: 64 BPM | WEIGHT: 164 LBS | TEMPERATURE: 98 F

## 2017-05-09 DIAGNOSIS — F11.20 CHRONIC NARCOTIC DEPENDENCE (H): Chronic | ICD-10-CM

## 2017-05-09 DIAGNOSIS — K59.03 DRUG-INDUCED CONSTIPATION: ICD-10-CM

## 2017-05-09 DIAGNOSIS — M48.061 LUMBAR SPINAL STENOSIS: Primary | ICD-10-CM

## 2017-05-09 DIAGNOSIS — K41.90 FEMORAL HERNIA OF RIGHT SIDE: ICD-10-CM

## 2017-05-09 PROCEDURE — 99214 OFFICE O/P EST MOD 30 MIN: CPT | Performed by: FAMILY MEDICINE

## 2017-05-09 NOTE — MR AVS SNAPSHOT
After Visit Summary   5/9/2017    Ivan Gomez    MRN: [id number]           Patient Information     Date Of Birth          10/12/1926        Visit Information        Provider Department      5/9/2017 1:30 PM Evaristo Magaña MD [facility]        Today's Diagnoses     Lumbar spinal stenosis    -  1    Femoral hernia of right side        Chronic narcotic dependence (HCC)        Drug-induced constipation          Care Instructions    We will get a fresh MRI of his lumbar spine done.  I referred him to the spine surgeons.  He is getting to the point where he can hardly walk.  His last MRI was a number of years ago.    His femoral hernia on the right was easily reduced with him lying in the supine position.  When he did get up from that it did stay reduced.  We did talk about the fact that it starts to bother him more we will have to send him to discuss with one of the general surgeons repairing the hernia.    With respect to his narcotic use and constipation secondary to that, he will add Benefiber 1 tablespoon daily.  He will increase that every for 5 days until he has regular, soft and no straining stools.    Further plan will be after review of his tests.        Follow-ups after your visit        Additional Services     SPINE SURGERY REFERRAL       Please choose Medical Spine Specialist (unless patient was seen by a Medical Spine Specialist within the past 6 months).  Surgical Evaluation is advised if the patient presents with one or more of the following red flags:     **Cauda Equina Syndrome  **Evidence of Spinal Tumor  **Fracture  **Infection  **Loss of Bowel or Bladder Control  **Sudden or Progressive Weakness  **Any other documented emergent neurological condition resulting from a Lumbar Spinal Condition.    You have been referred to: Spine Lumbar: Spine Surgeon: [facility]: [facility] [phone]    [email or web address]    Please be aware that coverage of these services is subject to the terms and limitations of your health insurance plan.  Call member services at your health plan with any benefit or coverage questions.      Please bring the following to your appointment:    **Any x-rays, CTs or MRIs which have been performed.  Contact the facility where they were done to arrange for  prior to your scheduled appointment.    **List of current medications   **This referral request   **Any documents/labs given to you regarding this referral                  Your next 10 appointments already scheduled     Sep 13, 2017  1:00 PM CDT   Return Visit with Dennis Hilton MD   [facility] ([facility])    [facility]  [address]  [address]  [facility] [id number] [phone]              Future tests that were ordered for you today     Open Future Orders        Priority Expected Expires Ordered    MR Lumbar Spine w/o Contrast Routine  5/9/2018 5/9/2017            Who to contact     If you have questions or need follow up information about today's clinic visit or your schedule please contact [facility] directly at [phone].  Normal or non-critical lab and imaging results will be communicated to you by MyChart, letter or phone within 4 business days after the clinic has received the results. If you do not hear from us within 7 days, please contact the clinic through BigTeamshart or phone. If you have a critical or abnormal lab result, we will notify you by phone as soon as possible.  Submit refill requests through [employer] or call your pharmacy and they will forward the refill request to us. Please allow 3 business days for your refill to be completed.          Additional Information About Your Visit        [employer] Information     [employer] gives you secure access to  "your electronic health record. If you see a primary care provider, you can also send messages to your care team and make appointments. If you have questions, please call your primary care clinic.  If you do not have a primary care provider, please call [phone] and they will assist you.        Care EveryWhere ID     This is your Care EveryWhere ID. This could be used by other organizations to access your [place] medical records  [id number]        Your Vitals Were     Pulse Temperature Respirations Height BMI (Body Mass Index)       64 98  F (36.7  C) (Tympanic) 16 5' 7\" (1.702 m) 25.69 kg/m2        Blood Pressure from Last 3 Encounters:   05/09/17 146/70   03/23/17 150/70   03/08/17 148/77    Weight from Last 3 Encounters:   05/09/17 164 lb (74.4 kg)   03/23/17 168 lb (76.2 kg)   03/08/17 166 lb (75.3 kg)              We Performed the Following     SPINE SURGERY REFERRAL        Primary Care Provider Office Phone # Fax #    Evaristo Magaña [phone]       [facility] [address] [facility] [address]        Thank you!     Thank you for choosing [facility]  for your care. Our goal is always to provide you with excellent care. Hearing back from our patients is one way we can continue to improve our services. Please take a few minutes to complete the written survey that you may receive in the mail after your visit with us. Thank you!             Your Updated Medication List - Protect others around you: Learn how to safely use, store and throw away your medicines at www.disposemymeds.org.          This list is accurate as of: 5/9/17  2:12 PM.  Always use your most recent med list.                   Brand Name Dispense Instructions for use    amLODIPine 5 MG tablet    NORVASC    90 tablet    TAKE 1 TABLET BY MOUTH EVERY DAY       aspirin 81 MG tablet     30 tablet    Take 1 tablet (81 mg) by mouth once a week Takes 1 tab every Saturday       " colchicine 0.6 MG tablet    COLCRYS    30 tablet    Take 2 tablets at the first sign of flare, take 1 additional tablet one hour later.       finasteride 5 MG tablet    PROSCAR    90 tablet    TAKE 1 TABLET BY MOUTH EVERY DAY       gabapentin 300 MG capsule    NEURONTIN    270 capsule    TAKE 1 CAPSULE BY MOUTH THREE TIMES DAILY       HYDROcodone-acetaminophen 7.5-325 MG per tablet    NORCO    120 tablet    Take 1 tablet by mouth every 6 hours as needed for moderate to severe pain       metoprolol 25 MG 24 hr tablet    TOPROL-XL    90 tablet    TAKE 1 TABLET BY MOUTH DAILY       OMEGA-3 FISH OIL PO      Take 1 g by mouth daily       sennosides 8.6 MG tablet    SENOKOT    180 each    Take 1 tablet by mouth 2 times daily       simvastatin 20 MG tablet    ZOCOR    90 tablet    TAKE 1 TABLET BY MOUTH AT BEDTIME       tamsulosin 0.4 MG capsule    FLOMAX    90 capsule    Take 1 capsule (0.4 mg) by mouth daily       vitamin D 2000 UNITS Caps      Take 1 capsule by mouth every morning

## 2017-05-09 NOTE — NURSING NOTE
"Chief Complaint   Patient presents with     Groin Swelling       Initial /70  Pulse 64  Temp 98  F (36.7  C) (Tympanic)  Resp 16  Ht 5' 7\" (1.702 m)  Wt 164 lb (74.4 kg)  BMI 25.69 kg/m2 Estimated body mass index is 25.69 kg/(m^2) as calculated from the following:    Height as of this encounter: 5' 7\" (1.702 m).    Weight as of this encounter: 164 lb (74.4 kg).  Medication Reconciliation: complete     Yara Jim CMA      "

## 2017-05-09 NOTE — PATIENT INSTRUCTIONS
We will get a fresh MRI of his lumbar spine done.  I referred him to the spine surgeons.  He is getting to the point where he can hardly walk.  His last MRI was a number of years ago.    His femoral hernia on the right was easily reduced with him lying in the supine position.  When he did get up from that it did stay reduced.  We did talk about the fact that it starts to bother him more we will have to send him to discuss with one of the general surgeons repairing the hernia.    With respect to his narcotic use and constipation secondary to that, he will add Benefiber 1 tablespoon daily.  He will increase that every for 5 days until he has regular, soft and no straining stools.    Further plan will be after review of his tests.

## 2017-05-09 NOTE — PROGRESS NOTES
SUBJECTIVE:                                                    Ivan Gomez is a 89 year old male who presents to clinic today for the following health issues:      Genitourinary symptoms      Duration: 4 days    Description:  Groin swelling on rt side    Intensity:  moderate    Accompanying signs and symptoms (fever/discharge/nausea/vomiting/back or abdominal pain):  No pain just a lump    History (frequent UTI's/kidney stones/prostate problems): history of bladder cancer  Sexually active: no     Precipitating or alleviating factors: None    Therapies tried and outcome: none           Back Pain Follow Up       Description:   Location of pain:  bilateral  Character of pain: constant  Pain radiation: Does not radiate  Since last visit, pain is:  unchanged  New numbness or weakness in legs, not attributed to pain:  no     Intensity: moderate to severe    History:   Pain interferes with job: Not applicable  Therapies tried without relief: opioids  Therapies tried with relief: opioids        Accompanying Signs & Symptoms:  Risk of Fracture:  None  Risk of Cauda Equina:  None  Risk of Infection:  None  Risk of Cancer:  None           Problem list and histories reviewed & adjusted, as indicated.  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      BPH (benign prostatic hypertrophy)     Bladder tumor     Benign essential hypertension     Hyperlipidemia LDL goal <100     Acute idiopathic gout of right foot      Femoral hernia of right side     Drug-induced constipation     Past Surgical History:   Procedure Laterality Date     BACK SURGERY       BIOPSY      face, skin cancer     BIOPSY  8/2016    shoulder lesion     CYSTOSCOPY, TRANSURETHRAL RESECTION (TUR) PROSTATE, COMBINED N/A 8/21/2015    Procedure: COMBINED CYSTOSCOPY, TRANSURETHRAL RESECTION (TUR) PROSTATE;  Surgeon: Dennis Hilton MD;  Location: [facility] OR     CYSTOSCOPY, TRANSURETHRAL RESECTION (TUR) TUMOR BLADDER, COMBINED N/A 9/2/2016    Procedure: COMBINED CYSTOSCOPY, TRANSURETHRAL RESECTION (TUR) TUMOR BLADDER;  Surgeon: Dennis Hilton MD;  Location: [facility] OR     ORTHOPEDIC SURGERY      lumbar and cervical surgery, Sep, 2008, knee surgery       Social History   Substance Use Topics     Smoking status: Never Smoker     Smokeless tobacco: Never Used     Alcohol use No      Comment: doesnt use anymore     Family History   Problem Relation Age of Onset     CANCER Son 64     leukemia ? due to agent orange     Family History Negative Son      DIABETES No family hx of      Coronary Artery Disease No family hx of      Hypertension No family hx of      Hyperlipidemia No family hx of      CEREBROVASCULAR DISEASE No family hx of      Breast Cancer No family hx of      Colon Cancer No family hx of      Prostate Cancer No family hx of      Other Cancer No family hx of      Depression No family hx of      Anxiety Disorder No family hx of      MENTAL ILLNESS No family hx of      Substance Abuse No family hx of      Anesthesia Reaction No family hx of      Asthma No family hx of      OSTEOPOROSIS No family hx of      Genetic Disorder No family hx of      Thyroid Disease No family hx of      Obesity No family hx of      Unknown/Adopted No family hx of            Reviewed and updated as needed this visit by clinical staff  Tobacco  Allergies  Med Hx  Surg Hx  Fam Hx  Soc Hx      Reviewed and updated as needed this visit by Provider      "    ROS:  CONSTITUTIONAL:NEGATIVE for fever, chills, change in weight  GI: POSITIVE for constipation related to his chronic narcotic use  MUSCULOSKELETAL: POSITIVE  for back pain   NEURO: NEGATIVE for weakness, dizziness or paresthesias    OBJECTIVE:                                                    /70  Pulse 64  Temp 98  F (36.7  C) (Tympanic)  Resp 16  Ht 5' 7\" (1.702 m)  Wt 164 lb (74.4 kg)  BMI 25.69 kg/m2  Body mass index is 25.69 kg/(m^2).  GENERAL APPEARANCE: healthy, alert and moderate distress  ABDOMEN: soft, non-tender   (male): hernia , femoral, on the right that was easily reduced with the patient supine.  MS: extremities normal- no gross deformities noted  ORTHO: patient moves extraordinarily slowly due to back pain         ASSESSMENT/PLAN:                                                        ICD-10-CM    1. Lumbar spinal stenosis M48.06    2. Femoral hernia of right side K41.90    3. Chronic narcotic dependence (HCC) F11.20    4. Drug-induced constipation K59.03        Patient Instructions   We will get a fresh MRI of his lumbar spine done.  I referred him to the spine surgeons.  He is getting to the point where he can hardly walk.  His last MRI was a number of years ago.    His femoral hernia on the right was easily reduced with him lying in the supine position.  When he did get up from that it did stay reduced.  We did talk about the fact that it starts to bother him more we will have to send him to discuss with one of the general surgeons repairing the hernia.    With respect to his narcotic use and constipation secondary to that, he will add Benefiber 1 tablespoon daily.  He will increase that every for 5 days until he has regular, soft and no straining stools.    Further plan will be after review of his tests.      Evaristo Magaña MD  [facility]    "

## 2017-05-11 ENCOUNTER — TRANSFERRED RECORDS (OUTPATIENT)
Dept: HEALTH INFORMATION MANAGEMENT | Facility: CLINIC | Age: 82
End: 2017-05-11

## 2017-05-22 ENCOUNTER — OFFICE VISIT (OUTPATIENT)
Dept: NEUROSURGERY | Facility: CLINIC | Age: 82
End: 2017-05-22
Attending: NURSE PRACTITIONER
Payer: MEDICARE

## 2017-05-22 VITALS
TEMPERATURE: 96.7 F | HEART RATE: 73 BPM | WEIGHT: 168 LBS | DIASTOLIC BLOOD PRESSURE: 76 MMHG | SYSTOLIC BLOOD PRESSURE: 169 MMHG | HEIGHT: 67 IN | OXYGEN SATURATION: 94 % | BODY MASS INDEX: 26.37 KG/M2

## 2017-05-22 DIAGNOSIS — M51.369 LUMBAR DEGENERATIVE DISC DISEASE: Primary | ICD-10-CM

## 2017-05-22 PROCEDURE — 99211 OFF/OP EST MAY X REQ PHY/QHP: CPT | Performed by: NURSE PRACTITIONER

## 2017-05-22 PROCEDURE — 99203 OFFICE O/P NEW LOW 30 MIN: CPT | Performed by: NURSE PRACTITIONER

## 2017-05-22 RX ORDER — GUAR GUM
1 PACKET (EA) ORAL DAILY
COMMUNITY
End: 2018-03-02

## 2017-05-22 NOTE — PATIENT INSTRUCTIONS
Schedule lumbar epidural steroid injection (someone will contact you)  Please contact the clinic if pain persists at [phone].

## 2017-05-22 NOTE — PROGRESS NOTES
"Dr. Sukhwinder Chan  [facility]  Neurosurgery Clinic Visit      CC: Low back pain    Primary care Provider: Evaristo Magaña      Reason For Visit:   I was asked by Dr. Magaña to consult on the patient for low back and BLE.      HPI: Ivan Gomez is a 89 year old male with a long-standing history of low back pain. His history is significant for previous L3-S1 decompression with Dr Lindo in 2008 as well as C4-5 ACDF.  He states his low back pain did not improve following surgery.   He states it is a constant dull pain, \"I tolerate it.\"  He states the pain radiates along the posterior BLE, \"It moves a lot.\"  He states he has gout and attributes any foot pain to his gout.  His low back pain is more prominent when compared to his leg pain and he states it is aggravated with walking, standing, bending and lifting.  He does take hydrocodone up to 4 tabs/day and he states this manages his pain.  He denies changes to bowel or bladder, has h/o bladder cancer with surgery in past 2 years.  He has had previous injections but none since his surgery in 2008.     Pain at its worst 10  Pain right now:  5    Past Medical History:   Diagnosis Date     Arthritis     Spinal Stenosis     Hemorrhoids      Hypercholesteremia      Hypertension      Malignant neoplasm (H)     skin CA, Basal cell     Other chronic pain      Renal disease        Past Medical History reviewed with patient during visit.    Past Surgical History:   Procedure Laterality Date     BACK SURGERY       BIOPSY      face, skin cancer     BIOPSY  8/2016    shoulder lesion     CYSTOSCOPY, TRANSURETHRAL RESECTION (TUR) PROSTATE, COMBINED N/A 8/21/2015    Procedure: COMBINED CYSTOSCOPY, TRANSURETHRAL RESECTION (TUR) PROSTATE;  Surgeon: Dennis Hilton MD;  Location: [facility] OR     CYSTOSCOPY, TRANSURETHRAL RESECTION (TUR) TUMOR BLADDER, COMBINED N/A 9/2/2016    Procedure: COMBINED CYSTOSCOPY, TRANSURETHRAL RESECTION (TUR) TUMOR BLADDER;  " Surgeon: Dennis Hilton MD;  Location: [facility] OR     ORTHOPEDIC SURGERY      lumbar and cervical surgery, Sep, 2008, knee surgery     Past Surgical History reviewed with patient during visit.    Current Outpatient Prescriptions   Medication     fiber modular (NUTRISOURCE FIBER) packet     HYDROcodone-acetaminophen (NORCO) 7.5-325 MG per tablet     simvastatin (ZOCOR) 20 MG tablet     colchicine (COLCRYS) 0.6 MG tablet     finasteride (PROSCAR) 5 MG tablet     metoprolol (TOPROL-XL) 25 MG 24 hr tablet     amLODIPine (NORVASC) 5 MG tablet     tamsulosin (FLOMAX) 0.4 MG 24 hr capsule     Omega-3 Fatty Acids (OMEGA-3 FISH OIL PO)     gabapentin (NEURONTIN) 300 MG capsule     aspirin 81 MG tablet     Cholecalciferol (VITAMIN D) 2000 UNIT CAPS     sennosides (SENOKOT) 8.6 MG tablet     No current facility-administered medications for this visit.        Allergies   Allergen Reactions     Celebrex [Celecoxib] Hives     Penicillins Hives       Social History     Social History     Marital status: Single     Spouse name: N/A     Number of children: N/A     Years of education: N/A     Social History Main Topics     Smoking status: Former Smoker     Smokeless tobacco: Never Used     Alcohol use No      Comment: doesnt use anymore     Drug use: No     Sexual activity: No     Other Topics Concern     Parent/Sibling W/ Cabg, Mi Or Angioplasty Before 65f 55m? No     Social History Narrative       Family History   Problem Relation Age of Onset     CANCER Son 64     leukemia ? due to agent orange     Family History Negative Son      DIABETES No family hx of      Coronary Artery Disease No family hx of      Hypertension No family hx of      Hyperlipidemia No family hx of      CEREBROVASCULAR DISEASE No family hx of      Breast Cancer No family hx of      Colon Cancer No family hx of      Prostate Cancer No family hx of      Other Cancer No family hx of      Depression No family hx of      Anxiety Disorder No family hx of       "MENTAL ILLNESS No family hx of      Substance Abuse No family hx of      Anesthesia Reaction No family hx of      Asthma No family hx of      OSTEOPOROSIS No family hx of      Genetic Disorder No family hx of      Thyroid Disease No family hx of      Obesity No family hx of      Unknown/Adopted No family hx of          ROS: 10 point ROS neg other than the symptoms noted above in the HPI.    Vital Signs: /76 (BP Location: Left arm, Cuff Size: Adult Regular)  Pulse 73  Temp 96.7  F (35.9  C) (Oral)  Ht 5' 7\" (1.702 m)  Wt 168 lb (76.2 kg)  SpO2 94%  BMI 26.31 kg/m2    Examination:  Constitutional:  Alert, well nourished, NAD.  HEENT: Normocephalic, atraumatic.   Pulm:  Without shortness of breath   CV:  No pitting edema of BLE.    Neurological:  Awake  Alert  Oriented x 3  Speech clear  Cranial nerves II - XII intact    Motor exam   Shoulder Abduction:  Right:  5/5   Left:  5/5  Biceps:                      Right:  5/5   Left:  5/5  Triceps:                     Right:  5/5   Left:  5/5  Wrist Extensors:       Right:  5/5   Left:  5/5  Wrist Flexors:           Right:  5/5   Left:  5/5  Intrinsics:                   Right:  5/5   Left:  5/5  Hip Flexor:                Right: 5/5  Left:  5/5  Hip Adductor:             Right:  5/5  Left:  5/5  Hip Abductor:             Right:  5/5  Left:  5/5  Gastroc Soleus:        Right:  5/5  Left:  5/5  Tib/Ant:                      Right:  5/5  Left:  5/5  EHL:                          Right:  5/5  Left:  5/5     Sensation normal to bilateral upper and lower extremities  DTRs 2+ symmetrical  Clonus negative    Gait: Able to stand from a seated position, slowly.  Has walker, but also ambulates independently.     Cervical examination reveals good range of motion.  No tenderness to palpation of the cervical spine or paraspinous muscles bilaterally.    Lumbar examination reveals tenderness of the spine midline and bilateral paraspinous muscles.  Limited ROM.  Straight leg " "raise is negative bilaterally.      Imaging: Lumbar MRI 5/11/17:  Multilevel foraminal stenosis greates on the right at L5-S1 and L4-5, L3-4 and on the left at L3-4.    Assessment/Plan:   Lumbar DDD with multilevel stensosis  Lumbar Radiculopathy    Ivan Gomez is a 89 year old male with a long-standing history of low back pain. His history is significant for previous L3-S1 decompression with Dr Lindo in 2008 as well as C4-5 ACDF.  He states his low back pain did not improve following surgery.   He states it is a constant dull pain, \"I tolerate it.\"  He states the pain radiates along the posterior BLE, \"It moves a lot.\"  He states he has gout and attributes any foot pain to his gout.  His low back pain is more prominent when compared to his leg pain.  We reviewed MRI results today and discussed findings of multilevel stenosis.  We are not recommending surgery at this time considering the risks significantly outweigh benefit. We discussed PT, however, the patient is not interested.  He would like to trial a lumbar KETURAH which was ordered. He will contact us with any further questions or increased pain.    Patient Instructions   Schedule lumbar epidural steroid injection (someone will contact you)  Please contact the clinic if pain persists at [phone].        [facility]  [facility]  [address] [address]    Tel [phone]  Pager [phone]    "

## 2017-05-22 NOTE — MR AVS SNAPSHOT
After Visit Summary   5/22/2017    Ivan Gomez    MRN: [id number]           Patient Information     Date Of Birth          10/12/1926        Visit Information        Provider Department      5/22/2017 12:50 PM Neha Jade NP [facility]        Today's Diagnoses     Lumbar degenerative disc disease    -  1      Care Instructions    Schedule lumbar epidural steroid injection (someone will contact you)  Please contact the clinic if pain persists at [phone].          Follow-ups after your visit        Your next 10 appointments already scheduled     Sep 13, 2017  1:00 PM CDT   Return Visit with Dennis Hilton MD   [facility]    [facility]  [address] [address]  [facility] [address]  [facility] [id number] [phone]              Future tests that were ordered for you today     Open Future Orders        Priority Expected Expires Ordered    XR Lumbar Translaminar Inj Incl Imaging Routine 5/22/2017 5/22/2018 5/22/2017            Who to contact     If you have questions or need follow up information about today's clinic visit or your schedule please contact [facility] directly at [phone].  Normal or non-critical lab and imaging results will be communicated to you by MyChart, letter or phone within 4 business days after the clinic has received the results. If you do not hear from us within 7 days, please contact the clinic through [employer] or phone. If you have a critical or abnormal lab result, we will notify you by phone as soon as possible.  Submit refill requests through [employer] or call your pharmacy and they will forward the refill request to us. Please allow 3 business days for your refill to be completed.          Additional Information About Your Visit        MyChart Information     [employer] gives you secure access to your electronic health record. If you  "see a primary care provider, you can also send messages to your care team and make appointments. If you have questions, please call your primary care clinic.  If you do not have a primary care provider, please call [phone] and they will assist you.        Care EveryWhere ID     This is your Care EveryWhere ID. This could be used by other organizations to access your [place] medical records  [id number]        Your Vitals Were     Pulse Temperature Height Pulse Oximetry BMI (Body Mass Index)       73 96.7  F (35.9  C) (Oral) 5' 7\" (1.702 m) 94% 26.31 kg/m2        Blood Pressure from Last 3 Encounters:   05/22/17 169/76   05/09/17 146/70   03/23/17 150/70    Weight from Last 3 Encounters:   05/22/17 168 lb (76.2 kg)   05/09/17 164 lb (74.4 kg)   03/23/17 168 lb (76.2 kg)               Primary Care Provider Office Phone # Fax #    Evaristo Magaña [phone]       [facility] [address] [facility] [address]        Thank you!     Thank you for choosing [facility]  for your care. Our goal is always to provide you with excellent care. Hearing back from our patients is one way we can continue to improve our services. Please take a few minutes to complete the written survey that you may receive in the mail after your visit with us. Thank you!             Your Updated Medication List - Protect others around you: Learn how to safely use, store and throw away your medicines at www.disposemymeds.org.          This list is accurate as of: 5/22/17  1:41 PM.  Always use your most recent med list.                   Brand Name Dispense Instructions for use    amLODIPine 5 MG tablet    NORVASC    90 tablet    TAKE 1 TABLET BY MOUTH EVERY DAY       aspirin 81 MG tablet     30 tablet    Take 1 tablet (81 mg) by mouth once a week Takes 1 tab every Saturday       colchicine 0.6 MG tablet    COLCRYS    30 tablet    Take 2 tablets at the first sign of flare, " take 1 additional tablet one hour later.       fiber modular packet      1 packet daily       finasteride 5 MG tablet    PROSCAR    90 tablet    TAKE 1 TABLET BY MOUTH EVERY DAY       gabapentin 300 MG capsule    NEURONTIN    270 capsule    TAKE 1 CAPSULE BY MOUTH THREE TIMES DAILY       HYDROcodone-acetaminophen 7.5-325 MG per tablet    NORCO    120 tablet    Take 1 tablet by mouth every 6 hours as needed for moderate to severe pain       metoprolol 25 MG 24 hr tablet    TOPROL-XL    90 tablet    TAKE 1 TABLET BY MOUTH DAILY       OMEGA-3 FISH OIL PO      Take 1 g by mouth daily       sennosides 8.6 MG tablet    SENOKOT    180 each    Take 1 tablet by mouth 2 times daily       simvastatin 20 MG tablet    ZOCOR    90 tablet    TAKE 1 TABLET BY MOUTH AT BEDTIME       tamsulosin 0.4 MG capsule    FLOMAX    90 capsule    Take 1 capsule (0.4 mg) by mouth daily       vitamin D 2000 UNITS Caps      Take 1 capsule by mouth every morning

## 2017-05-22 NOTE — NURSING NOTE
"Ivan Gomez is a 89 year old male who presents for:  Chief Complaint   Patient presents with     Neurologic Problem     referral from Dr. Magaña for lumbar spinal stenosis        Initial Vitals:  There were no vitals taken for this visit. Estimated body mass index is 25.69 kg/(m^2) as calculated from the following:    Height as of 5/9/17: 5' 7\" (1.702 m).    Weight as of 5/9/17: 164 lb (74.4 kg).. There is no height or weight on file to calculate BSA. BP completed using cuff size: regular  Data Unavailable    Do you feel safe in your environment?  Yes  Do you need any refills today? No    Nursing Comments: referral from Dr. Magaña for lumbar spinal stenosis.  Patient rates his pain today as 5 on medication.  intermittent bilateral leg pain, cold feet, sharp and tingling in back      5 min. nursing intake time  Malia Harding CMA, AAS      Discharge plan:   See providers dictation   2 min. nursing discharge time  Malia Harding CMA, AAS       "

## 2017-05-29 ENCOUNTER — MYC REFILL (OUTPATIENT)
Dept: FAMILY MEDICINE | Facility: CLINIC | Age: 82
End: 2017-05-29

## 2017-05-29 DIAGNOSIS — M48.061 LUMBAR SPINAL STENOSIS: ICD-10-CM

## 2017-05-29 DIAGNOSIS — G89.4 CHRONIC PAIN SYNDROME: ICD-10-CM

## 2017-05-30 NOTE — TELEPHONE ENCOUNTER
Message from [employer]:  Original authorizing provider: MD Ivan Shepard would like a refill of the following medications:  HYDROcodone-acetaminophen (NORCO) 7.5-325 MG per tablet [Jay Yoon MD]    Preferred pharmacy: Other - [facility]     Comment:  Dr Magaña: May 29, 2017 I would like a new prescription for Hydrocodone-Acetaminophen 7.5-325mg per tablet. When approved [place] [facility] [place] Thank you, Ivan Gomez

## 2017-05-31 RX ORDER — HYDROCODONE BITARTRATE AND ACETAMINOPHEN 7.5; 325 MG/1; MG/1
1 TABLET ORAL EVERY 6 HOURS PRN
Qty: 120 TABLET | Refills: 0 | Status: SHIPPED | OUTPATIENT
Start: 2017-05-31 | End: 2017-06-26

## 2017-06-02 ENCOUNTER — HOSPITAL ENCOUNTER (OUTPATIENT)
Dept: GENERAL RADIOLOGY | Facility: CLINIC | Age: 82
End: 2017-06-02
Attending: NURSE PRACTITIONER | Admitting: COLON & RECTAL SURGERY
Payer: MEDICARE

## 2017-06-02 ENCOUNTER — HOSPITAL ENCOUNTER (OUTPATIENT)
Facility: CLINIC | Age: 82
Discharge: HOME | End: 2017-06-02
Admitting: PHYSICIAN ASSISTANT
Payer: MEDICARE

## 2017-06-02 VITALS
HEIGHT: 67 IN | SYSTOLIC BLOOD PRESSURE: 133 MMHG | HEART RATE: 65 BPM | TEMPERATURE: 98 F | RESPIRATION RATE: 16 BRPM | OXYGEN SATURATION: 93 % | BODY MASS INDEX: 25.9 KG/M2 | DIASTOLIC BLOOD PRESSURE: 61 MMHG | WEIGHT: 165 LBS

## 2017-06-02 DIAGNOSIS — M51.369 LUMBAR DEGENERATIVE DISC DISEASE: ICD-10-CM

## 2017-06-02 PROCEDURE — 40000863 ZZH STATISTIC RADIOLOGY XRAY, US, CT, MAR, NM

## 2017-06-02 PROCEDURE — 25500064 ZZH RX 255 OP 636: Performed by: PHYSICIAN ASSISTANT

## 2017-06-02 PROCEDURE — 25000125 ZZHC RX 250: Performed by: PHYSICIAN ASSISTANT

## 2017-06-02 PROCEDURE — 25000128 H RX IP 250 OP 636: Performed by: PHYSICIAN ASSISTANT

## 2017-06-02 PROCEDURE — 62323 NJX INTERLAMINAR LMBR/SAC: CPT

## 2017-06-02 RX ORDER — IOPAMIDOL 408 MG/ML
10 INJECTION, SOLUTION INTRATHECAL ONCE
Status: COMPLETED | OUTPATIENT
Start: 2017-06-02 | End: 2017-06-02

## 2017-06-02 RX ORDER — LIDOCAINE HYDROCHLORIDE 10 MG/ML
30 INJECTION, SOLUTION EPIDURAL; INFILTRATION; INTRACAUDAL; PERINEURAL ONCE
Status: COMPLETED | OUTPATIENT
Start: 2017-06-02 | End: 2017-06-02

## 2017-06-02 RX ORDER — DEXAMETHASONE SODIUM PHOSPHATE 10 MG/ML
20 INJECTION, SOLUTION INTRAMUSCULAR; INTRAVENOUS ONCE
Status: COMPLETED | OUTPATIENT
Start: 2017-06-02 | End: 2017-06-02

## 2017-06-02 RX ORDER — LIDOCAINE HYDROCHLORIDE 10 MG/ML
30 INJECTION, SOLUTION EPIDURAL; INFILTRATION; INTRACAUDAL; PERINEURAL ONCE
Status: DISCONTINUED | OUTPATIENT
Start: 2017-06-02 | End: 2017-06-02

## 2017-06-02 RX ORDER — DEXAMETHASONE SODIUM PHOSPHATE 10 MG/ML
10 INJECTION, SOLUTION INTRAMUSCULAR; INTRAVENOUS ONCE
Status: DISCONTINUED | OUTPATIENT
Start: 2017-06-02 | End: 2017-06-02

## 2017-06-02 RX ORDER — IOPAMIDOL 408 MG/ML
10 INJECTION, SOLUTION INTRATHECAL ONCE
Status: DISCONTINUED | OUTPATIENT
Start: 2017-06-02 | End: 2017-06-02

## 2017-06-02 RX ADMIN — LIDOCAINE HYDROCHLORIDE 3 ML: 10 INJECTION, SOLUTION EPIDURAL; INFILTRATION; INTRACAUDAL; PERINEURAL at 14:52

## 2017-06-02 RX ADMIN — DEXAMETHASONE SODIUM PHOSPHATE 20 MG: 10 INJECTION, SOLUTION INTRAMUSCULAR; INTRAVENOUS at 14:54

## 2017-06-02 RX ADMIN — IOPAMIDOL 5 ML: 408 INJECTION, SOLUTION INTRATHECAL at 14:53

## 2017-06-02 NOTE — IP AVS SNAPSHOT
[place] [address]    [place] [address]    Phone:  [phone]                                       After Visit Summary   6/2/2017    Ivan Gomez    MRN: [id number]           After Visit Summary Signature Page     I have received my discharge instructions, and my questions have been answered. I have discussed any challenges I see with this plan with the nurse or doctor.    ..........................................................................................................................................  Patient/Patient Representative Signature      ..........................................................................................................................................  Patient Representative Print Name and Relationship to Patient    ..................................................               ................................................  Date                                            Time    ..........................................................................................................................................  Reviewed by Signature/Title    ...................................................              ..............................................  Date                                                            Time

## 2017-06-02 NOTE — PROGRESS NOTES
"A/Ox4.  States \"back a little sore\".  States is not new since procedure.  Denies need for analgesic.  Denies SOB.  Tolerating sandwich and fluids.  Voided without difficulty.  Ambulated SBA with walker to BR without difficulty.  Epidural site with bandaid CDI.  Denies N/T.  VSS.  Pleasant, cooperative.  D/C instructions reviewed with pt per FRANCISCO Kowalski.  Questions/concerns answered.  Pt d/c'd via walker/ambulation.  States will be ambulating to car and driving self home.  Driving home was approved per Dr. العراقي, per Meghana, FRANCISCO.  Belongings sent.  Wife present.  "

## 2017-06-02 NOTE — PROGRESS NOTES
Care Suites Arrival    Reason for Visit: epidural injection  Arrival interventions:none    Pt resting in bed/recyliner, denies additional needs at this time, call light in reach.  Waiting for procedure.

## 2017-06-02 NOTE — IP AVS SNAPSHOT
MRN:[id number]                      After Visit Summary   6/2/2017    Ivan Gomez    MRN: [id number]           Visit Information        Department      6/2/2017 12:38 PM [facility]          Review of your medicines      UNREVIEWED medicines. 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   Quantity:  30 tablet   Refills:  0       fiber modular packet        Dose:  1 packet   1 packet daily   Refills:  0       finasteride 5 MG tablet   Commonly known as:  PROSCAR   Used for:  Benign enlargement of prostate        TAKE 1 TABLET BY MOUTH EVERY DAY   Quantity:  90 tablet   Refills:  3       gabapentin 300 MG capsule   Commonly known as:  NEURONTIN   Used for:  Lumbar spinal stenosis        TAKE 1 CAPSULE BY MOUTH THREE TIMES DAILY   Quantity:  270 capsule   Refills:  1       HYDROcodone-acetaminophen 7.5-325 MG per tablet   Commonly known as:  NORCO   Used for:  Lumbar spinal stenosis        Dose:  1 tablet   Take 1 tablet by mouth every 6 hours as needed for moderate to severe pain   Quantity:  120 tablet   Refills:  0       metoprolol 25 MG 24 hr tablet   Commonly known as:  TOPROL-XL   Used for:  Benign essential hypertension        TAKE 1 TABLET BY MOUTH DAILY   Quantity:  90 tablet   Refills:  3       OMEGA-3 FISH OIL PO        Dose:  1 g   Take  1 g by mouth daily   Refills:  0       simvastatin 20 MG tablet   Commonly known as:  ZOCOR   Used for:  Hyperlipidemia LDL goal <100        TAKE 1 TABLET BY MOUTH AT BEDTIME   Quantity:  90 tablet   Refills:  1       tamsulosin 0.4 MG capsule   Commonly known as:  FLOMAX   Used for:  BPH (benign prostatic hyperplasia)        Dose:  0.4 mg   Take 1 capsule (0.4 mg) by mouth daily   Quantity:  90 capsule   Refills:  3       vitamin D 2000 UNITS Caps        Dose:  1 capsule   Take 1 capsule by mouth every morning   Refills:  0                Protect others around you: Learn how to safely use, store and throw away your medicines at www.disposemymeds.org.         Follow-ups after your visit        Your next 10 appointments already scheduled     Sep 13, 2017  1:00 PM CDT   Return Visit with Dennis Hilton MD   [facility] ([facility])    [facility]  [address]  [address]  [facility] [address]   [phone]               Care Instructions        Further instructions from your care team       Steroid Injection Discharge Instructions     After you go home:      You may resume your normal diet.    Care of Puncture Site:      If you have a bandaid on your puncture site, you may remove it the next morning    You may shower tomorrow    No bath tubs, whirlpools or swimming for at least 3 days     Activity:      You may go back to normal activity in 24 hours    You should let pain be your guide as to the extent of your activities    Maintain any activity limitations as ordered by your provider    Do NOT drive a vehicle until tomorrow    Medicines:      You may resume all medications      Resume your Aspirin tomorrow at your regular dose    For minor pain, you may take Acetaminophen (Tylenol) or Ibuprofen (Advil)    Pain:       You may experience increased or different pain over the next 24-48 hours    For the next 48 hrs - you may use ice packs for  "discomfort     Call your primary care doctor if:      You have severe pain that does not improve with pain medication    You have chills or a fever greater than 101 F (38 C)    The site is red, swollen, hot or tender    New problems with your bowel or bladder    Any questions or concerns.    For Your Information:      A steroid was injected to help decrease swelling and may help to reduce pain. It may take up to 7-10 days to obtain full results.    Some patients will get lasting relief from a single injection. Others may require up to 3 injections to get results. If you have more than one steroid injection, they should be given 2 weeks apart.    Side effects of your steroid injection are mild and will go away in 2-3 days  - Insomnia  - Heartburn  - Flushed face  - Water retention  - Increased appetite  - Increased blood sugar      If you have questions call:        [facility] Dept @ [phone]               Additional Information About Your Visit        [employer] Information     [employer] gives you secure access to your electronic health record. If you see a primary care provider, you can also send messages to your care team and make appointments. If you have questions, please call your primary care clinic.  If you do not have a primary care provider, please call [phone] and they will assist you.        Care EveryWhere ID     This is your Care EveryWhere ID. This could be used by other organizations to access your [facility] medical records  [id number]        Your Vitals Were     Blood Pressure Pulse Temperature Respirations Height Weight    154/94 (BP Location: Left arm) 94 98.4  F (36.9  C) (Oral) 16 1.702 m (5' 7\") 74.8 kg (165 lb)    Pulse Oximetry BMI (Body Mass Index)                93% 25.84 kg/m2           Primary Care Provider Office Phone # Fax #    Evaristo Magaña [phone]      Thank you!     Thank you for choosing [facility] for your care. Our goal is always " to provide you with excellent care. Hearing back from our patients is one way we can continue to improve our services. Please take a few minutes to complete the written survey that you may receive in the mail after you visit with us. Thank you!             Medication List: This is a list of all your medications and when to take them. Check marks below indicate your daily home schedule. Keep this list as a reference.      Medications           Morning Afternoon Evening Bedtime As Needed    amLODIPine 5 MG tablet   Commonly known as:  NORVASC   TAKE 1 TABLET BY MOUTH EVERY DAY                                aspirin 81 MG tablet   Take 1 tablet (81 mg) by mouth once a week Takes 1 tab every Saturday                                colchicine 0.6 MG tablet   Commonly known as:  COLCRYS   Take 2 tablets at the first sign of flare, take 1 additional tablet one hour later.                                fiber modular packet   1 packet daily                                finasteride 5 MG tablet   Commonly known as:  PROSCAR   TAKE 1 TABLET BY MOUTH EVERY DAY                                gabapentin 300 MG capsule   Commonly known as:  NEURONTIN   TAKE 1 CAPSULE BY MOUTH THREE TIMES DAILY                                HYDROcodone-acetaminophen 7.5-325 MG per tablet   Commonly known as:  NORCO   Take 1 tablet by mouth every 6 hours as needed for moderate to severe pain                                metoprolol 25 MG 24 hr tablet   Commonly known as:  TOPROL-XL   TAKE 1 TABLET BY MOUTH DAILY                                OMEGA-3 FISH OIL PO   Take 1 g by mouth daily                                simvastatin 20 MG tablet   Commonly known as:  ZOCOR   TAKE 1 TABLET BY MOUTH AT BEDTIME                                tamsulosin 0.4 MG capsule   Commonly known as:  FLOMAX   Take 1 capsule (0.4 mg) by mouth daily                                vitamin D 2000 UNITS Caps   Take 1 capsule by mouth every morning

## 2017-06-02 NOTE — PROGRESS NOTES
Dr. الرعاقي made aware that pt has no  and intends to drive home.  Pt decided to have injection with no lidocaine so he is able to drive home. Dr. العراقي gave order that then pt may drive.

## 2017-06-02 NOTE — DISCHARGE INSTRUCTIONS
Steroid Injection Discharge Instructions     After you go home:      You may resume your normal diet.    Care of Puncture Site:      If you have a bandaid on your puncture site, you may remove it the next morning    You may shower tomorrow    No bath tubs, whirlpools or swimming for at least 3 days     Activity:      You may go back to normal activity in 24 hours    You should let pain be your guide as to the extent of your activities    Maintain any activity limitations as ordered by your provider    Do NOT drive a vehicle until tomorrow    Medicines:      You may resume all medications      Resume your Aspirin tomorrow at your regular dose    For minor pain, you may take Acetaminophen (Tylenol) or Ibuprofen (Advil)    Pain:       You may experience increased or different pain over the next 24-48 hours    For the next 48 hrs - you may use ice packs for discomfort     Call your primary care doctor if:      You have severe pain that does not improve with pain medication    You have chills or a fever greater than 101 F (38 C)    The site is red, swollen, hot or tender    New problems with your bowel or bladder    Any questions or concerns.    For Your Information:      A steroid was injected to help decrease swelling and may help to reduce pain. It may take up to 7-10 days to obtain full results.    Some patients will get lasting relief from a single injection. Others may require up to 3 injections to get results. If you have more than one steroid injection, they should be given 2 weeks apart.    Side effects of your steroid injection are mild and will go away in 2-3 days  - Insomnia  - Heartburn  - Flushed face  - Water retention  - Increased appetite  - Increased blood sugar      If you have questions call:        [facility] @ [phone]

## 2017-06-02 NOTE — PROGRESS NOTES
Reviewed D/C instructions with pt and wife prior to injection.  All questions answered and pt appears to accept and understand.  Pt aware of plan of care to withhold lidocaine mixed with steroid so that he is able to drive himself home today.

## 2017-06-21 ENCOUNTER — TELEPHONE (OUTPATIENT)
Dept: FAMILY MEDICINE | Facility: CLINIC | Age: 82
End: 2017-06-21

## 2017-06-21 DIAGNOSIS — K41.90 FEMORAL HERNIA OF RIGHT SIDE: Primary | ICD-10-CM

## 2017-06-21 NOTE — TELEPHONE ENCOUNTER
Reason for Call: Request for an order or referral:    Order or referral being requested: Referral for surgeon    Date needed: as soon as possible    Has the patient been seen by the PCP for this problem? YES    Additional comments: Pt saw Dr Magaña and was dx with hernia.  Told to call back if symptoms  worsened.  They have been worse since yesterday and he would like a referral now.    Phone number Patient can be reached at:  Home number on file [phone] (home)    Best Time:  any    Can we leave a detailed message on this number?  YES    Call taken on 6/21/2017 at 3:51 PM by KEYLA MAYERS

## 2017-06-22 NOTE — TELEPHONE ENCOUNTER
Left detailed voice message informing patient referral was sent by provider. [facility] Melinda Christopher [phone] phone number left on message.

## 2017-06-26 ENCOUNTER — MYC REFILL (OUTPATIENT)
Dept: FAMILY MEDICINE | Facility: CLINIC | Age: 82
End: 2017-06-26

## 2017-06-26 DIAGNOSIS — M48.061 LUMBAR SPINAL STENOSIS: ICD-10-CM

## 2017-06-26 DIAGNOSIS — G89.4 CHRONIC PAIN SYNDROME: ICD-10-CM

## 2017-06-26 NOTE — TELEPHONE ENCOUNTER
Message from [employer]:  Original authorizing provider: MD Ivan Novoa would like a refill of the following medications:  HYDROcodone-acetaminophen (NORCO) 7.5-325 MG per tablet [Evaristo Magaañ MD]    Preferred pharmacy: Other - [facility] @ [address]    Comment:  Dr Magaña 06/26/2017 I would appreciate a new prescription for Hydrocodone-Acetaminophen 7.5-325 T. Upon Approval please mail to [facility] @[address] ,[place] [address]. I understand they will notify me when it arrives. Thank you, Shahriar Gomez

## 2017-06-27 RX ORDER — HYDROCODONE BITARTRATE AND ACETAMINOPHEN 7.5; 325 MG/1; MG/1
1 TABLET ORAL EVERY 6 HOURS PRN
Qty: 120 TABLET | Refills: 0 | Status: SHIPPED | OUTPATIENT
Start: 2017-06-27 | End: 2017-07-25

## 2017-07-25 DIAGNOSIS — M48.061 LUMBAR SPINAL STENOSIS: ICD-10-CM

## 2017-07-25 RX ORDER — HYDROCODONE BITARTRATE AND ACETAMINOPHEN 7.5; 325 MG/1; MG/1
1 TABLET ORAL EVERY 6 HOURS PRN
Qty: 120 TABLET | Refills: 0 | Status: SHIPPED | OUTPATIENT
Start: 2017-07-27 | End: 2017-07-25

## 2017-07-25 RX ORDER — HYDROCODONE BITARTRATE AND ACETAMINOPHEN 7.5; 325 MG/1; MG/1
1 TABLET ORAL EVERY 6 HOURS PRN
Qty: 120 TABLET | Refills: 0 | Status: SHIPPED | OUTPATIENT
Start: 2017-08-26 | End: 2017-07-25

## 2017-07-25 RX ORDER — HYDROCODONE BITARTRATE AND ACETAMINOPHEN 7.5; 325 MG/1; MG/1
1 TABLET ORAL EVERY 6 HOURS PRN
Qty: 120 TABLET | Refills: 0 | Status: SHIPPED | OUTPATIENT
Start: 2017-09-25 | End: 2017-10-19

## 2017-08-19 DIAGNOSIS — N40.0 BPH (BENIGN PROSTATIC HYPERPLASIA): ICD-10-CM

## 2017-08-21 RX ORDER — TAMSULOSIN HYDROCHLORIDE 0.4 MG/1
CAPSULE ORAL
Qty: 90 CAPSULE | Refills: 0 | OUTPATIENT
Start: 2017-08-21

## 2017-08-24 ENCOUNTER — MYC REFILL (OUTPATIENT)
Dept: ONCOLOGY | Facility: CLINIC | Age: 82
End: 2017-08-24

## 2017-08-24 DIAGNOSIS — N40.0 BPH (BENIGN PROSTATIC HYPERPLASIA): ICD-10-CM

## 2017-08-24 RX ORDER — TAMSULOSIN HYDROCHLORIDE 0.4 MG/1
0.4 CAPSULE ORAL DAILY
Qty: 90 CAPSULE | Refills: 0 | Status: SHIPPED | OUTPATIENT
Start: 2017-08-24 | End: 2017-12-01

## 2017-08-24 NOTE — TELEPHONE ENCOUNTER
Message from MyChart:  Original authorizing provider: MD Ivan Rowan would like a refill of the following medications:  tamsulosin (FLOMAX) 0.4 MG 24 hr capsule [Dennis Hilton MD]    Preferred pharmacy: [facility] [id number] [place] [address] [address] AT [facility] [address]    Comment:

## 2017-09-19 ENCOUNTER — ALLIED HEALTH/NURSE VISIT (OUTPATIENT)
Dept: NURSING | Facility: CLINIC | Age: 82
End: 2017-09-19
Payer: MEDICARE

## 2017-09-19 DIAGNOSIS — Z23 NEED FOR PROPHYLACTIC VACCINATION AND INOCULATION AGAINST INFLUENZA: Primary | ICD-10-CM

## 2017-09-19 PROCEDURE — 90662 IIV NO PRSV INCREASED AG IM: CPT

## 2017-09-19 PROCEDURE — 99207 ZZC NO CHARGE NURSE ONLY: CPT

## 2017-09-19 PROCEDURE — G0008 ADMIN INFLUENZA VIRUS VAC: HCPCS

## 2017-09-19 NOTE — PROGRESS NOTES
Injectable Influenza Immunization Documentation    1.  Is the person to be vaccinated sick today?   No    2. Does the person to be vaccinated have an allergy to a component   of the vaccine?   No    3. Has the person to be vaccinated ever had a serious reaction   to influenza vaccine in the past?   No    4. Has the person to be vaccinated ever had Guillain-Barré syndrome?   No    Form completed by Radha Fleming CMA

## 2017-09-19 NOTE — MR AVS SNAPSHOT
After Visit Summary   9/19/2017    Ivan Gomez    MRN: [id number]           Patient Information     Date Of Birth          10/12/1926        Visit Information        Provider Department      9/19/2017 2:30 PM [facility]        Today's Diagnoses     Need for prophylactic vaccination and inoculation against influenza    -  1       Follow-ups after your visit        Your next 10 appointments already scheduled     Sep 27, 2017  1:30 PM CDT   Return Visit with Dennis Hilton MD   [facility] ([facility])    [facility]  [address]  [address]  [facility] [id number] [phone]              Who to contact     If you have questions or need follow up information about today's clinic visit or your schedule please contact [facility] directly at [phone].  Normal or non-critical lab and imaging results will be communicated to you by MyChart, letter or phone within 4 business days after the clinic has received the results. If you do not hear from us within 7 days, please contact the clinic through MyChart or phone. If you have a critical or abnormal lab result, we will notify you by phone as soon as possible.  Submit refill requests through [employer] or call your pharmacy and they will forward the refill request to us. Please allow 3 business days for your refill to be completed.          Additional Information About Your Visit        MyChart Information     [employer] gives you secure access to your electronic health record. If you see a primary care provider, you can also send messages to your care team and make appointments. If you have questions, please call your primary care clinic.  If you do not have a primary care provider, please call [phone] and they will assist you.        Care EveryWhere ID     This is your Care EveryWhere ID. This could be used by  other organizations to access your [facility] medical records  [id number]         Blood Pressure from Last 3 Encounters:   06/02/17 133/61   05/22/17 169/76   05/09/17 146/70    Weight from Last 3 Encounters:   06/02/17 165 lb (74.8 kg)   05/22/17 168 lb (76.2 kg)   05/09/17 164 lb (74.4 kg)              We Performed the Following     ADMIN INFLUENZA (For MEDICARE Patients ONLY) []     FLU VACCINE, INCREASED ANTIGEN, PRESV FREE, AGE 65+ [34871]        Primary Care Provider Office Phone # Fax #    Evaristo Magaña [phone]       [address] [facility] [address]        Equal Access to Services     FARZANEH WILDER : Hadii ramez zarate hadasho Soomaali, waaxda luqadaha, qaybta kaalmada adeegyada, perri cruz . So [facility] [phone].    ATENCIÓN: Si habla español, tiene a rubio disposición servicios gratuitos de asistencia lingüística. Llame al [phone].    We comply with applicable federal civil rights laws and [place] laws. We do not discriminate on the basis of race, color, national origin, age, disability sex, sexual orientation or gender identity.            Thank you!     Thank you for choosing [facility]  for your care. Our goal is always to provide you with excellent care. Hearing back from our patients is one way we can continue to improve our services. Please take a few minutes to complete the written survey that you may receive in the mail after your visit with us. Thank you!             Your Updated Medication List - Protect others around you: Learn how to safely use, store and throw away your medicines at www.disposemymeds.org.          This list is accurate as of: 9/19/17  2:33 PM.  Always use your most recent med list.                   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    Acute idiopathic gout of right foot       fiber modular packet      1 packet daily        finasteride 5 MG tablet    PROSCAR    90 tablet    TAKE 1 TABLET BY MOUTH EVERY DAY    Benign enlargement of prostate       gabapentin 300 MG capsule    NEURONTIN    270 capsule    TAKE 1 CAPSULE BY MOUTH THREE TIMES DAILY    Lumbar spinal stenosis       HYDROcodone-acetaminophen 7.5-325 MG per tablet   Start taking on:  9/25/2017    NORCO    120 tablet    Take 1 tablet by mouth every 6 hours as needed for moderate to severe pain    Lumbar spinal stenosis       metoprolol 25 MG 24 hr tablet    TOPROL-XL    90 tablet    TAKE 1 TABLET BY MOUTH DAILY    Benign essential hypertension       OMEGA-3 FISH OIL PO      Take 1 g by mouth daily        simvastatin 20 MG tablet    ZOCOR    90 tablet    TAKE 1 TABLET BY MOUTH AT BEDTIME    Hyperlipidemia LDL goal <100       tamsulosin 0.4 MG capsule    FLOMAX    90 capsule    Take 1 capsule (0.4 mg) by mouth daily    BPH (benign prostatic hyperplasia)       vitamin D 2000 UNITS Caps      Take 1 capsule by mouth every morning

## 2017-09-27 ENCOUNTER — ONCOLOGY VISIT (OUTPATIENT)
Dept: ONCOLOGY | Facility: CLINIC | Age: 82
End: 2017-09-27
Attending: UROLOGY
Payer: MEDICARE

## 2017-09-27 VITALS
OXYGEN SATURATION: 94 % | RESPIRATION RATE: 16 BRPM | SYSTOLIC BLOOD PRESSURE: 160 MMHG | DIASTOLIC BLOOD PRESSURE: 77 MMHG | HEIGHT: 67 IN | WEIGHT: 163.38 LBS | HEART RATE: 73 BPM | BODY MASS INDEX: 25.64 KG/M2 | TEMPERATURE: 97.9 F

## 2017-09-27 DIAGNOSIS — C67.4 MALIGNANT NEOPLASM OF POSTERIOR WALL OF URINARY BLADDER (H): Primary | ICD-10-CM

## 2017-09-27 PROCEDURE — 99211 OFF/OP EST MAY X REQ PHY/QHP: CPT

## 2017-09-27 PROCEDURE — 99213 OFFICE O/P EST LOW 20 MIN: CPT | Performed by: UROLOGY

## 2017-09-27 ASSESSMENT — PAIN SCALES - GENERAL: PAINLEVEL: NO PAIN (0)

## 2017-09-27 NOTE — MR AVS SNAPSHOT
After Visit Summary   9/27/2017    Ivan Gomez    MRN: [id number]           Patient Information     Date Of Birth          10/12/1926        Visit Information        Provider Department      9/27/2017 1:30 PM Dennis Hilton MD [facility]        Care Instructions        Follow up appointment with Dr. Hilton in 1 year Scheduled  [place] STEARNS given to patient            Follow-ups after your visit        Your next 10 appointments already scheduled     Sep 26, 2018  1:30 PM CDT   Return Visit with Dennis Hilton MD   [facility] ([facility])    [facility]  [address]  [facility] [address] [phone]              Who to contact     If you have questions or need follow up information about today's clinic visit or your schedule please contact [facility] directly at [phone].  Normal or non-critical lab and imaging results will be communicated to you by MyChart, letter or phone within 4 business days after the clinic has received the results. If you do not hear from us within 7 days, please contact the clinic through [employer] or phone. If you have a critical or abnormal lab result, we will notify you by phone as soon as possible.  Submit refill requests through [employer] or call your pharmacy and they will forward the refill request to us. Please allow 3 business days for your refill to be completed.          Additional Information About Your Visit        MyChart Information     [employer] gives you secure access to your electronic health record. If you see a primary care provider, you can also send messages to your care team and make appointments. If you have questions, please call your primary care clinic.  If you do not have a primary care provider, please call [phone] and they will assist you.        Care EveryWhere ID     This is your Care  "EveryWhere ID. This could be used by other organizations to access your [place] medical records  [id number]        Your Vitals Were     Pulse Temperature Respirations Height Pulse Oximetry BMI (Body Mass Index)    73 97.9  F (36.6  C) (Oral) 16 1.702 m (5' 7\") 94% 25.59 kg/m2       Blood Pressure from Last 3 Encounters:   09/27/17 160/77   06/02/17 133/61   05/22/17 169/76    Weight from Last 3 Encounters:   09/27/17 74.1 kg (163 lb 6 oz)   06/02/17 74.8 kg (165 lb)   05/22/17 76.2 kg (168 lb)              Today, you had the following     No orders found for display       Primary Care Provider Office Phone # Fax #    Evaristo Magaña [phone] [address] [facility] [address]        Equal Access to Services     FARZANEH WILDER : Hadii aad ku hadasho Soomaali, waaxda luqadaha, qaybta kaalmada adeegyada, perri cruz . So [facility] [phone].    ATENCIÓN: Si gladysla espanant, tiene a rubio disposición servicios gratuitos de asistencia lingüística. Llame al [phone].    We comply with applicable federal civil rights laws and [place] laws. We do not discriminate on the basis of race, color, national origin, age, disability sex, sexual orientation or gender identity.            Thank you!     Thank you for choosing [facility]  for your care. Our goal is always to provide you with excellent care. Hearing back from our patients is one way we can continue to improve our services. Please take a few minutes to complete the written survey that you may receive in the mail after your visit with us. Thank you!             Your Updated Medication List - Protect others around you: Learn how to safely use, store and throw away your medicines at www.disposemymeds.org.          This list is accurate as of: 9/27/17  2:05 PM.  Always use your most recent med list.                   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    Acute idiopathic gout of right foot       fiber modular packet      1 packet daily        finasteride 5 MG tablet    PROSCAR    90 tablet    TAKE 1 TABLET BY MOUTH EVERY DAY    Benign enlargement of prostate       gabapentin 300 MG capsule    NEURONTIN    270 capsule    TAKE 1 CAPSULE BY MOUTH THREE TIMES DAILY    Lumbar spinal stenosis       HYDROcodone-acetaminophen 7.5-325 MG per tablet    NORCO    120 tablet    Take 1 tablet by mouth every 6 hours as needed for moderate to severe pain    Lumbar spinal stenosis       metoprolol 25 MG 24 hr tablet    TOPROL-XL    90 tablet    TAKE 1 TABLET BY MOUTH DAILY    Benign essential hypertension       OMEGA-3 FISH OIL PO      Take 1 g by mouth daily        simvastatin 20 MG tablet    ZOCOR    90 tablet    TAKE 1 TABLET BY MOUTH AT BEDTIME    Hyperlipidemia LDL goal <100       tamsulosin 0.4 MG capsule    FLOMAX    90 capsule    Take 1 capsule (0.4 mg) by mouth daily    BPH (benign prostatic hyperplasia)       vitamin D 2000 UNITS Caps      Take 1 capsule by mouth every morning

## 2017-09-27 NOTE — NURSING NOTE
"Oncology Rooming Note    September 27, 2017 1:29 PM   Ivan Gomez is a 89 year old male who presents for:    Chief Complaint   Patient presents with     Oncology Clinic Visit     Follow up     Initial Vitals: /77  Pulse 73  Temp 97.9  F (36.6  C) (Oral)  Resp 16  Ht 1.702 m (5' 7\")  Wt 74.1 kg (163 lb 6 oz)  SpO2 94%  BMI 25.59 kg/m2 Estimated body mass index is 25.59 kg/(m^2) as calculated from the following:    Height as of this encounter: 1.702 m (5' 7\").    Weight as of this encounter: 74.1 kg (163 lb 6 oz). Body surface area is 1.87 meters squared.  No Pain (0) Comment: Data Unavailable   No LMP for male patient.  Allergies reviewed: Yes  Medications reviewed: Yes    Medications: Medication refills not needed today.  Pharmacy [facility] entered into [employer]: [facility] [address] [place] [address] [address] AT [facility]    Clinical concerns: Follow up     8 minutes for nursing intake (face to face time)     Meghana Malik CMA     DISCHARGE PLAN:  Next appointments: See patient instruction section  Departure Mode: Ambulatory  Accompanied by: wife  0 minutes for nursing discharge (face to face time)   Meghana Malik CMA                  "

## 2017-10-03 DIAGNOSIS — I10 HYPERTENSION GOAL BP (BLOOD PRESSURE) < 140/90: ICD-10-CM

## 2017-10-04 RX ORDER — AMLODIPINE BESYLATE 5 MG/1
TABLET ORAL
Qty: 90 TABLET | Refills: 1 | Status: ON HOLD | OUTPATIENT
Start: 2017-10-04 | End: 2017-10-13

## 2017-10-04 NOTE — PROGRESS NOTES
"89 year old male with a history of low urothelial carcinoma here for follow up     Had a complicated post operative course after original TURBT(Trans Urethral Resection of Bladder Tumor)  including the need for a nephrostomy tube, this was internalized for a double J.  Had a recurrence and another TURBT(Trans Urethral Resection of Bladder Tumor) a year later and did better, but still had quite a slow recovery.    We are now not planning any further interventions unless he becomes symptomatic.    Date of Initial Diagnosis: 2015  Stage of Initial Diagnosis: Ta, but large volume with a couple of tumors >3 cms near right UO requiring neph tube and stent  Grade at Initial Diagnosis: Low with focal high grade  Site of First Diagnosis: bladder  Any Recurrence? 2016 low grade Ta  Intravesical Treatments: TURBT( ) TURBT( )  Date of Last Cysto:   Date of Last Upper Tract Imagin/15/15  Patient was pretreated with antibiotics.    Lab Results   Component Value Date    CR 1.13 2016    CR 1.50 2016    CR 1.20 2016    CR 1.30 2016    CR 1.40 2015    CR 1.69 2015    CR 1.70 2015    CR 1.71 2015    CR 1.95 2015    CR 2.13 2015         Patient is a 89 year old  male   Vitals: Blood pressure 160/77, pulse 73, temperature 97.9  F (36.6  C), temperature source Oral, resp. rate 16, height 1.702 m (5' 7\"), weight 74.1 kg (163 lb 6 oz), SpO2 94 %.  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  Even resistant to any further TURBT(Trans Urethral Resection of Bladder Tumor)     After discussion we will have him follow up in 12 months unless symptoms such as flank pain, hematuria, clot " retention etc.    He wants to refuse all subsequent TURBTs unless he is terribly symptomatic, so will not do any cystos        Dennis Hilton MD  Department of Urology Staff  [facility]

## 2017-10-04 NOTE — TELEPHONE ENCOUNTER
AMLODIPINE BESYLATE 5MG TABLETS      Last Written Prescription Date: 10/19/16  Last Fill Quantity: 90, # refills: 3  Last Office Visit with G, P or [facility] prescribing provider: 05/22/17       Potassium   Date Value Ref Range Status   09/16/2016 3.7 3.4 - 5.3 mmol/L Final     Creatinine   Date Value Ref Range Status   09/16/2016 1.13 0.66 - 1.25 mg/dL Final     BP Readings from Last 3 Encounters:   09/27/17 160/77   06/02/17 133/61   05/22/17 169/76

## 2017-10-10 ENCOUNTER — HOSPITAL ENCOUNTER (INPATIENT)
Facility: CLINIC | Age: 82
LOS: 2 days | Discharge: ANOTHER HOSPITAL | DRG: 306 | End: 2017-10-13
Attending: EMERGENCY MEDICINE | Admitting: INTERNAL MEDICINE
Payer: MEDICARE

## 2017-10-10 ENCOUNTER — APPOINTMENT (OUTPATIENT)
Dept: GENERAL RADIOLOGY | Facility: CLINIC | Age: 82
DRG: 306 | End: 2017-10-10
Attending: EMERGENCY MEDICINE
Payer: MEDICARE

## 2017-10-10 ENCOUNTER — OFFICE VISIT (OUTPATIENT)
Dept: FAMILY MEDICINE | Facility: CLINIC | Age: 82
End: 2017-10-10
Payer: MEDICARE

## 2017-10-10 VITALS
WEIGHT: 165 LBS | DIASTOLIC BLOOD PRESSURE: 64 MMHG | HEART RATE: 72 BPM | BODY MASS INDEX: 25.84 KG/M2 | RESPIRATION RATE: 16 BRPM | SYSTOLIC BLOOD PRESSURE: 120 MMHG | TEMPERATURE: 98.4 F

## 2017-10-10 DIAGNOSIS — K21.9 GASTROESOPHAGEAL REFLUX DISEASE, ESOPHAGITIS PRESENCE NOT SPECIFIED: Primary | ICD-10-CM

## 2017-10-10 DIAGNOSIS — I50.9 ACUTE CONGESTIVE HEART FAILURE, UNSPECIFIED CONGESTIVE HEART FAILURE TYPE: ICD-10-CM

## 2017-10-10 DIAGNOSIS — M48.061 SPINAL STENOSIS OF LUMBAR REGION, UNSPECIFIED WHETHER NEUROGENIC CLAUDICATION PRESENT: ICD-10-CM

## 2017-10-10 DIAGNOSIS — G89.4 CHRONIC PAIN SYNDROME: ICD-10-CM

## 2017-10-10 LAB — INTERPRETATION ECG - MUSE: NORMAL

## 2017-10-10 PROCEDURE — 25000125 ZZHC RX 250

## 2017-10-10 PROCEDURE — 87040 BLOOD CULTURE FOR BACTERIA: CPT | Performed by: EMERGENCY MEDICINE

## 2017-10-10 PROCEDURE — 80048 BASIC METABOLIC PNL TOTAL CA: CPT | Performed by: EMERGENCY MEDICINE

## 2017-10-10 PROCEDURE — 36415 COLL VENOUS BLD VENIPUNCTURE: CPT

## 2017-10-10 PROCEDURE — 94640 AIRWAY INHALATION TREATMENT: CPT

## 2017-10-10 PROCEDURE — 99214 OFFICE O/P EST MOD 30 MIN: CPT | Performed by: FAMILY MEDICINE

## 2017-10-10 PROCEDURE — 84145 PROCALCITONIN (PCT): CPT | Performed by: EMERGENCY MEDICINE

## 2017-10-10 PROCEDURE — 83880 ASSAY OF NATRIURETIC PEPTIDE: CPT | Performed by: EMERGENCY MEDICINE

## 2017-10-10 PROCEDURE — 83605 ASSAY OF LACTIC ACID: CPT | Performed by: EMERGENCY MEDICINE

## 2017-10-10 PROCEDURE — 96374 THER/PROPH/DIAG INJ IV PUSH: CPT

## 2017-10-10 PROCEDURE — 93005 ELECTROCARDIOGRAM TRACING: CPT

## 2017-10-10 PROCEDURE — 84484 ASSAY OF TROPONIN QUANT: CPT | Performed by: EMERGENCY MEDICINE

## 2017-10-10 PROCEDURE — 99285 EMERGENCY DEPT VISIT HI MDM: CPT | Mod: 25

## 2017-10-10 PROCEDURE — 83735 ASSAY OF MAGNESIUM: CPT | Performed by: EMERGENCY MEDICINE

## 2017-10-10 PROCEDURE — 85025 COMPLETE CBC W/AUTO DIFF WBC: CPT | Performed by: EMERGENCY MEDICINE

## 2017-10-10 PROCEDURE — 82805 BLOOD GASES W/O2 SATURATION: CPT | Performed by: EMERGENCY MEDICINE

## 2017-10-10 PROCEDURE — 71020 XR CHEST 2 VW: CPT

## 2017-10-10 RX ORDER — PANTOPRAZOLE SODIUM 40 MG/1
40 TABLET, DELAYED RELEASE ORAL DAILY
Qty: 30 TABLET | Refills: 1 | Status: SHIPPED | OUTPATIENT
Start: 2017-10-10 | End: 2017-10-24

## 2017-10-10 RX ORDER — IPRATROPIUM BROMIDE AND ALBUTEROL SULFATE 2.5; .5 MG/3ML; MG/3ML
SOLUTION RESPIRATORY (INHALATION)
Status: COMPLETED
Start: 2017-10-10 | End: 2017-10-10

## 2017-10-10 RX ORDER — IPRATROPIUM BROMIDE AND ALBUTEROL SULFATE 2.5; .5 MG/3ML; MG/3ML
3 SOLUTION RESPIRATORY (INHALATION) ONCE
Status: COMPLETED | OUTPATIENT
Start: 2017-10-10 | End: 2017-10-10

## 2017-10-10 RX ADMIN — IPRATROPIUM BROMIDE AND ALBUTEROL SULFATE 3 ML: 2.5; .5 SOLUTION RESPIRATORY (INHALATION) at 23:46

## 2017-10-10 RX ADMIN — IPRATROPIUM BROMIDE AND ALBUTEROL SULFATE 3 ML: .5; 3 SOLUTION RESPIRATORY (INHALATION) at 23:46

## 2017-10-10 ASSESSMENT — ENCOUNTER SYMPTOMS
FEVER: 1
SHORTNESS OF BREATH: 1
DIAPHORESIS: 0
CHILLS: 1
COUGH: 1

## 2017-10-10 NOTE — PATIENT INSTRUCTIONS
"  GERD (Adult)    The esophagus is a tube that carries food from the mouth to the stomach. A valve at the lower end of the esophagus prevents stomach acid from flowing upward. When this valve doesn't work properly, stomach contents may repeatedly flow back up (reflux) into the esophagus. This is called gastroesophageal reflux disease (GERD). GERD can irritate the esophagus. It can cause problems with swallowing or breathing. In severe cases, GERD can cause recurrent pneumonia or other serious problems.  Symptoms of reflux include burning, pressure or sharp pain in the upper abdomen or mid to lower chest. The pain can spread to the neck, back, or shoulder. There may be belching, an acid taste in the back of the throat, chronic cough, or sore throat or hoarseness. GERD symptoms often occur during the day after a big meal. They can also occur at night when lying down.   Home care  Lifestyle changes can help reduce symptoms. If needed, medicines may be prescribed. Symptoms often improve with treatment, but if treatment is stopped, the symptoms often return after a few months. So most persons with GERD will need to continue treatment.  Lifestyle changes    Limit or avoid fatty, fried, and spicy foods, as well as coffee, chocolate, mint, and foods with high acid content such as tomatoes and citrus fruit and juices (orange, grapefruit, lemon).    Don t eat large meals, especially at night. Frequent, smaller meals are best. Do not lie down right after eating. And don t eat anything 3 hours before going to bed.    Avoid drinking alcohol and smoking. As much as possible, stay away from second hand smoke.    If you are overweight, losing weight will reduce symptoms.     Avoid wearing tight clothing around your stomach area.    If your symptoms occur during sleep, use a foam wedge to elevate your upper body (not just your head.) Or, place 4\" blocks under the head of your bed.  Medicines  If needed, medicines can help relieve " the symptoms of GERD and prevent damage to the esophagus. Discuss a medicine plan with your healthcare provider. This may include one or more of the following medicines:    Antacids to help neutralize the normal acids in your stomach.    Acid blockers (H2 blockers) to decrease acid production.    Acid inhibitors (PPIs) to decrease acid production in a different way than the blockers. They may work better, but can take a little longer to take effect.  Take an antacid 30-60 minutes after eating and at bedtime, but not at the same time as an acid blocker.  Try not to take medicines such as ibuprofen and aspirin. If you are taking aspirin for your heart or other medical reasons, talk to your healthcare provider about stopping it.  Follow-up care  Follow up with your healthcare provider or as advised by our staff.  When to seek medical advice  Call your healthcare provider if any of the following occur:    Stomach pain gets worse or moves to the lower right abdomen (appendix area)    Chest pain appears or gets worse, or spreads to the back, neck, shoulder, or arm    Frequent vomiting (can t keep down liquids)    Blood in the stool or vomit (red or black in color)    Feeling weak or dizzy    Fever of 100.4 F (38 C) or higher, or as directed by your healthcare provider  Date Last Reviewed: 6/23/2015 2000-2017 [employer] [address] [place] [address], [place] [address]. All rights reserved. This information is not intended as a substitute for professional medical care. Always follow your healthcare professional's instructions.

## 2017-10-10 NOTE — MR AVS SNAPSHOT
After Visit Summary   10/10/2017    Ivan Gomez    MRN: [id number]           Patient Information     Date Of Birth          10/12/1926        Visit Information        Provider Department      10/10/2017 9:45 AM Evaristo Magaña MD [facility]        Today's Diagnoses     Gastroesophageal reflux disease, esophagitis presence not specified    -  1      Care Instructions      GERD (Adult)    The esophagus is a tube that carries food from the mouth to the stomach. A valve at the lower end of the esophagus prevents stomach acid from flowing upward. When this valve doesn't work properly, stomach contents may repeatedly flow back up (reflux) into the esophagus. This is called gastroesophageal reflux disease (GERD). GERD can irritate the esophagus. It can cause problems with swallowing or breathing. In severe cases, GERD can cause recurrent pneumonia or other serious problems.  Symptoms of reflux include burning, pressure or sharp pain in the upper abdomen or mid to lower chest. The pain can spread to the neck, back, or shoulder. There may be belching, an acid taste in the back of the throat, chronic cough, or sore throat or hoarseness. GERD symptoms often occur during the day after a big meal. They can also occur at night when lying down.   Home care  Lifestyle changes can help reduce symptoms. If needed, medicines may be prescribed. Symptoms often improve with treatment, but if treatment is stopped, the symptoms often return after a few months. So most persons with GERD will need to continue treatment.  Lifestyle changes    Limit or avoid fatty, fried, and spicy foods, as well as coffee, chocolate, mint, and foods with high acid content such as tomatoes and citrus fruit and juices (orange, grapefruit, lemon).    Don t eat large meals, especially at night. Frequent, smaller meals are best. Do not lie down right after eating. And don t eat anything 3 hours before going  "to bed.    Avoid drinking alcohol and smoking. As much as possible, stay away from second hand smoke.    If you are overweight, losing weight will reduce symptoms.     Avoid wearing tight clothing around your stomach area.    If your symptoms occur during sleep, use a foam wedge to elevate your upper body (not just your head.) Or, place 4\" blocks under the head of your bed.  Medicines  If needed, medicines can help relieve the symptoms of GERD and prevent damage to the esophagus. Discuss a medicine plan with your healthcare provider. This may include one or more of the following medicines:    Antacids to help neutralize the normal acids in your stomach.    Acid blockers (H2 blockers) to decrease acid production.    Acid inhibitors (PPIs) to decrease acid production in a different way than the blockers. They may work better, but can take a little longer to take effect.  Take an antacid 30-60 minutes after eating and at bedtime, but not at the same time as an acid blocker.  Try not to take medicines such as ibuprofen and aspirin. If you are taking aspirin for your heart or other medical reasons, talk to your healthcare provider about stopping it.  Follow-up care  Follow up with your healthcare provider or as advised by our staff.  When to seek medical advice  Call your healthcare provider if any of the following occur:    Stomach pain gets worse or moves to the lower right abdomen (appendix area)    Chest pain appears or gets worse, or spreads to the back, neck, shoulder, or arm    Frequent vomiting (can t keep down liquids)    Blood in the stool or vomit (red or black in color)    Feeling weak or dizzy    Fever of 100.4 F (38 C) or higher, or as directed by your healthcare provider  Date Last Reviewed: 6/23/2015 2000-2017 [employer] [address] [place] [address]. All rights reserved. This information is not intended as a substitute for professional medical care. Always follow your " healthcare professional's instructions.                Follow-ups after your visit        Your next 10 appointments already scheduled     Sep 26, 2018  1:30 PM CDT   Return Visit with Dennis Hilton MD   [facility] ([facility])    [facility]  [address]  [address]  [facility] [id number] [phone]              Who to contact     If you have questions or need follow up information about today's clinic visit or your schedule please contact [facility] directly at [phone].  Normal or non-critical lab and imaging results will be communicated to you by [employer], letter or phone within 4 business days after the clinic has received the results. If you do not hear from us within 7 days, please contact the clinic through [employer] or phone. If you have a critical or abnormal lab result, we will notify you by phone as soon as possible.  Submit refill requests through [employer] or call your pharmacy and they will forward the refill request to us. Please allow 3 business days for your refill to be completed.          Additional Information About Your Visit        MyChart Information     [employer] gives you secure access to your electronic health record. If you see a primary care provider, you can also send messages to your care team and make appointments. If you have questions, please call your primary care clinic.  If you do not have a primary care provider, please call [phone] and they will assist you.        Care EveryWhere ID     This is your Care EveryWhere ID. This could be used by other organizations to access your [place] medical records  [id number]        Your Vitals Were     Pulse Temperature Respirations BMI (Body Mass Index)          72 98.4  F (36.9  C) (Tympanic) 16 25.84 kg/m2         Blood Pressure from Last 3 Encounters:   10/10/17 120/64   09/27/17 160/77   06/02/17 133/61    Weight from Last 3  Encounters:   10/10/17 165 lb (74.8 kg)   09/27/17 163 lb 6 oz (74.1 kg)   06/02/17 165 lb (74.8 kg)              Today, you had the following     No orders found for display         Today's Medication Changes          These changes are accurate as of: 10/10/17 10:15 AM.  If you have any questions, ask your nurse or doctor.               Start taking these medicines.        Dose/Directions    pantoprazole 40 MG EC tablet   Commonly known as:  PROTONIX   Used for:  Gastroesophageal reflux disease, esophagitis presence not specified   Started by:  Evaristo Magaña MD        Dose:  40 mg   Take 1 tablet (40 mg) by mouth daily Take 30-60 minutes before a meal.   Quantity:  30 tablet   Refills:  1            Where to get your medicines      These medications were sent to [facility] [address] [place] [address], [place] [address] [address] AT [facility] [address]  [facility] [place] [address]     Phone:  [phone]     pantoprazole 40 MG EC tablet                Primary Care Provider Office Phone # Fax #    Evaristo Magaña [phone] [address] [facility] [address]        Equal Access to Services     FARZANEH WILDER AH: Hadii ramez ku hadasho Soomaali, waaxda luqadaha, qaybta kaalmada adeegyada, waxay brianin haycarltonn parul escobedo. So [facility] [phone].    ATENCIÓN: Si habla español, tiene a rubio disposición servicios gratuitos de asistencia lingüística. Llame al [phone].    We comply with applicable federal civil rights laws and [place] laws. We do not discriminate on the basis of race, color, national origin, age, disability, sex, sexual orientation, or gender identity.            Thank you!     Thank you for choosing [facility]  for your care. Our goal is always to provide you with excellent care. Hearing back from our patients is one way we can continue to improve our services. Please take a few minutes to complete the written  survey that you may receive in the mail after your visit with us. Thank you!             Your Updated Medication List - Protect others around you: Learn how to safely use, store and throw away your medicines at www.disposemymeds.org.          This list is accurate as of: 10/10/17 10:15 AM.  Always use your most recent med list.                   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    Acute idiopathic gout of right foot       fiber modular packet      1 packet daily        finasteride 5 MG tablet    PROSCAR    90 tablet    TAKE 1 TABLET BY MOUTH EVERY DAY    Benign enlargement of prostate       gabapentin 300 MG capsule    NEURONTIN    270 capsule    TAKE 1 CAPSULE BY MOUTH THREE TIMES DAILY    Lumbar spinal stenosis       HYDROcodone-acetaminophen 7.5-325 MG per tablet    NORCO    120 tablet    Take 1 tablet by mouth every 6 hours as needed for moderate to severe pain    Lumbar spinal stenosis       metoprolol 25 MG 24 hr tablet    TOPROL-XL    90 tablet    TAKE 1 TABLET BY MOUTH DAILY    Benign essential hypertension       OMEGA-3 FISH OIL PO      Take 1 g by mouth daily        pantoprazole 40 MG EC tablet    PROTONIX    30 tablet    Take 1 tablet (40 mg) by mouth daily Take 30-60 minutes before a meal.    Gastroesophageal reflux disease, esophagitis presence not specified       simvastatin 20 MG tablet    ZOCOR    90 tablet    TAKE 1 TABLET BY MOUTH AT BEDTIME    Hyperlipidemia LDL goal <100       tamsulosin 0.4 MG capsule    FLOMAX    90 capsule     Take 1 capsule (0.4 mg) by mouth daily    BPH (benign prostatic hyperplasia)       vitamin D 2000 UNITS Caps      Take 1 capsule by mouth every morning

## 2017-10-10 NOTE — NURSING NOTE
"Chief Complaint   Patient presents with     Throat Problem     swallowing     Abdominal Pain     epigastric       Initial /64  Pulse 72  Temp 98.4  F (36.9  C) (Tympanic)  Resp 16  Wt 165 lb (74.8 kg)  BMI 25.84 kg/m2 Estimated body mass index is 25.84 kg/(m^2) as calculated from the following:    Height as of 9/27/17: 5' 7\" (1.702 m).    Weight as of this encounter: 165 lb (74.8 kg).  Medication Reconciliation: complete     Yara Jim CMA      "

## 2017-10-10 NOTE — PROGRESS NOTES
SUBJECTIVE:   Ivan Gomez is a 89 year old male who presents to clinic today for the following health issues:      Abdominal Pain      Duration: off and on past month    Description (location/character/radiation): epigastric       Associated flank pain: None    Intensity:  mild    Accompanying signs and symptoms:        Fever/Chills: no        Gas/Bloating: YES       Nausea/vomitting: YES- nausea       Diarrhea: no        Dysuria or Hematuria: no     History (previous similar pain/trauma/previous testing): feels like phlegm is stuck in throat while lying down so having trouble sleeping    Precipitating or alleviating factors:       Pain worse with eating/BM/urination: no       Pain relieved by BM: no     Therapies tried and outcome: drinking water, sleeping upright    LMP:  not applicable            Problem list and histories reviewed & adjusted, as indicated.  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      BPH (benign prostatic hypertrophy)     Bladder tumor     Benign essential hypertension     Hyperlipidemia LDL goal <100     Acute idiopathic gout of right foot     Femoral hernia of right side     Drug-induced constipation     Past Surgical History:   Procedure Laterality Date     BACK SURGERY       BIOPSY      face, skin cancer     BIOPSY  8/2016    shoulder lesion     CYSTOSCOPY, TRANSURETHRAL RESECTION (TUR) PROSTATE, COMBINED N/A 8/21/2015    Procedure: COMBINED CYSTOSCOPY, TRANSURETHRAL  RESECTION (TUR) PROSTATE;  Surgeon: Dennis Hilton MD;  Location: [facility] OR     CYSTOSCOPY, TRANSURETHRAL RESECTION (TUR) TUMOR BLADDER, COMBINED N/A 9/2/2016    Procedure: COMBINED CYSTOSCOPY, TRANSURETHRAL RESECTION (TUR) TUMOR BLADDER;  Surgeon: Dennis Hilton MD;  Location: [facility] OR     ORTHOPEDIC SURGERY      lumbar and cervical surgery, Sep, 2008, knee surgery       Social History   Substance Use Topics     Smoking status: Former Smoker     Smokeless tobacco: Never Used     Alcohol use No      Comment: doesnt use anymore     Family History   Problem Relation Age of Onset     CANCER Son 64     leukemia ? due to agent orange     Family History Negative Son      DIABETES No family hx of      Coronary Artery Disease No family hx of      Hypertension No family hx of      Hyperlipidemia No family hx of      CEREBROVASCULAR DISEASE No family hx of      Breast Cancer No family hx of      Colon Cancer No family hx of      Prostate Cancer No family hx of      Other Cancer No family hx of      Depression No family hx of      Anxiety Disorder No family hx of      MENTAL ILLNESS No family hx of      Substance Abuse No family hx of      Anesthesia Reaction No family hx of      Asthma No family hx of      OSTEOPOROSIS No family hx of      Genetic Disorder No family hx of      Thyroid Disease No family hx of      Obesity No family hx of      Unknown/Adopted No family hx of              Reviewed and updated as needed this visit by clinical staffMeds       Reviewed and updated as needed this visit by Provider         ROS:  Constitutional, HEENT, cardiovascular, pulmonary, gi and gu systems are negative, except as otherwise noted.  CONSTITUTIONAL:NEGATIVE for fever, chills, change in weight  GI: POSITIVE for dyspepsia, heartburn or reflux and nausea  MUSCULOSKELETAL: POSITIVE  for back pain       OBJECTIVE:                                                    /64  Pulse 72  Temp 98.4  F (36.9  C) (Tympanic)   Resp 16  Wt 165 lb (74.8 kg)  BMI 25.84 kg/m2  Body mass index is 25.84 kg/(m^2).  GENERAL APPEARANCE: healthy, alert and no distress  ABDOMEN: bowel sounds normal, no hepatosplenomegaly or masses and tender in the midepigastrium  NEURO: mentation intact, speech normal and gait abnormal as the patient walks with a rolling walker         ASSESSMENT/PLAN:                                                        ICD-10-CM    1. Gastroesophageal reflux disease, esophagitis presence not specified K21.9 pantoprazole (PROTONIX) 40 MG EC tablet       Patient Instructions     GERD (Adult)    The esophagus is a tube that carries food from the mouth to the stomach. A valve at the lower end of the esophagus prevents stomach acid from flowing upward. When this valve doesn't work properly, stomach contents may repeatedly flow back up (reflux) into the esophagus. This is called gastroesophageal reflux disease (GERD). GERD can irritate the esophagus. It can cause problems with swallowing or breathing. In severe cases, GERD can cause recurrent pneumonia or other serious problems.  Symptoms of reflux include burning, pressure or sharp pain in the upper abdomen or mid to lower chest. The pain can spread to the neck, back, or shoulder. There may be belching, an acid taste in the back of the throat, chronic cough, or sore throat or hoarseness. GERD symptoms often occur during the day after a big meal. They can also occur at night when lying down.   Home care  Lifestyle changes can help reduce symptoms. If needed, medicines may be prescribed. Symptoms often improve with treatment, but if treatment is stopped, the symptoms often return after a few months. So most persons with GERD will need to continue treatment.  Lifestyle changes    Limit or avoid fatty, fried, and spicy foods, as well as coffee, chocolate, mint, and foods with high acid content such as tomatoes and citrus fruit and juices (orange, grapefruit, lemon).    Don t eat  "large meals, especially at night. Frequent, smaller meals are best. Do not lie down right after eating. And don t eat anything 3 hours before going to bed.    Avoid drinking alcohol and smoking. As much as possible, stay away from second hand smoke.    If you are overweight, losing weight will reduce symptoms.     Avoid wearing tight clothing around your stomach area.    If your symptoms occur during sleep, use a foam wedge to elevate your upper body (not just your head.) Or, place 4\" blocks under the head of your bed.  Medicines  If needed, medicines can help relieve the symptoms of GERD and prevent damage to the esophagus. Discuss a medicine plan with your healthcare provider. This may include one or more of the following medicines:    Antacids to help neutralize the normal acids in your stomach.    Acid blockers (H2 blockers) to decrease acid production.    Acid inhibitors (PPIs) to decrease acid production in a different way than the blockers. They may work better, but can take a little longer to take effect.  Take an antacid 30-60 minutes after eating and at bedtime, but not at the same time as an acid blocker.  Try not to take medicines such as ibuprofen and aspirin. If you are taking aspirin for your heart or other medical reasons, talk to your healthcare provider about stopping it.  Follow-up care  Follow up with your healthcare provider or as advised by our staff.  When to seek medical advice  Call your healthcare provider if any of the following occur:    Stomach pain gets worse or moves to the lower right abdomen (appendix area)    Chest pain appears or gets worse, or spreads to the back, neck, shoulder, or arm    Frequent vomiting (can t keep down liquids)    Blood in the stool or vomit (red or black in color)    Feeling weak or dizzy    Fever of 100.4 F (38 C) or higher, or as directed by your healthcare provider  Date Last Reviewed: 6/23/2015 2000-2017 [employer]. [address], [place] [address]. All rights reserved. This information is not intended as a substitute for professional medical care. Always follow your healthcare professional's instructions.         I will place the patient on Protonix 40 mg daily.  He will follow-up if not improving.  Information about reflux given as noted above.  Dietary discretion discussed.  No overeating and no eating within 2 hours of going to bed.  Patient will continue with his pain medications.  He is certain that his 81 mg aspirin is enteric-coated so he will continue that.  He will also continue with his pain medications for his spinal stenosis.    Evaristo Magaña MD  [facility]

## 2017-10-11 ENCOUNTER — APPOINTMENT (OUTPATIENT)
Dept: CARDIOLOGY | Facility: CLINIC | Age: 82
DRG: 306 | End: 2017-10-11
Attending: INTERNAL MEDICINE
Payer: MEDICARE

## 2017-10-11 PROBLEM — J96.01 ACUTE RESPIRATORY FAILURE WITH HYPOXIA (H): Status: ACTIVE | Noted: 2017-10-11

## 2017-10-11 LAB
ANION GAP SERPL CALCULATED.3IONS-SCNC: 8 MMOL/L (ref 3–14)
ANION GAP SERPL CALCULATED.3IONS-SCNC: 9 MMOL/L (ref 3–14)
BASE DEFICIT BLDA-SCNC: 3.7 MMOL/L
BASE DEFICIT BLDV-SCNC: 1.4 MMOL/L
BASOPHILS # BLD AUTO: 0 10E9/L (ref 0–0.2)
BASOPHILS # BLD AUTO: 0 10E9/L (ref 0–0.2)
BASOPHILS NFR BLD AUTO: 0.1 %
BASOPHILS NFR BLD AUTO: 0.2 %
BUN SERPL-MCNC: 24 MG/DL (ref 7–30)
BUN SERPL-MCNC: 25 MG/DL (ref 7–30)
CALCIUM SERPL-MCNC: 8.6 MG/DL (ref 8.5–10.1)
CALCIUM SERPL-MCNC: 9 MG/DL (ref 8.5–10.1)
CHLORIDE SERPL-SCNC: 106 MMOL/L (ref 94–109)
CHLORIDE SERPL-SCNC: 107 MMOL/L (ref 94–109)
CO2 SERPL-SCNC: 23 MMOL/L (ref 20–32)
CO2 SERPL-SCNC: 25 MMOL/L (ref 20–32)
CREAT SERPL-MCNC: 1.38 MG/DL (ref 0.66–1.25)
CREAT SERPL-MCNC: 1.42 MG/DL (ref 0.66–1.25)
DIFFERENTIAL METHOD BLD: ABNORMAL
DIFFERENTIAL METHOD BLD: ABNORMAL
EOSINOPHIL # BLD AUTO: 0 10E9/L (ref 0–0.7)
EOSINOPHIL # BLD AUTO: 0 10E9/L (ref 0–0.7)
EOSINOPHIL NFR BLD AUTO: 0 %
EOSINOPHIL NFR BLD AUTO: 0.1 %
ERYTHROCYTE [DISTWIDTH] IN BLOOD BY AUTOMATED COUNT: 13.5 % (ref 10–15)
ERYTHROCYTE [DISTWIDTH] IN BLOOD BY AUTOMATED COUNT: 13.5 % (ref 10–15)
GFR SERPL CREATININE-BSD FRML MDRD: 47 ML/MIN/1.7M2
GFR SERPL CREATININE-BSD FRML MDRD: 48 ML/MIN/1.7M2
GLUCOSE SERPL-MCNC: 131 MG/DL (ref 70–99)
GLUCOSE SERPL-MCNC: 137 MG/DL (ref 70–99)
HCO3 BLD-SCNC: 20 MMOL/L (ref 21–28)
HCO3 BLDV-SCNC: 25 MMOL/L (ref 21–28)
HCT VFR BLD AUTO: 38.2 % (ref 40–53)
HCT VFR BLD AUTO: 42.9 % (ref 40–53)
HGB BLD-MCNC: 13.2 G/DL (ref 13.3–17.7)
HGB BLD-MCNC: 14.8 G/DL (ref 13.3–17.7)
IMM GRANULOCYTES # BLD: 0 10E9/L (ref 0–0.4)
IMM GRANULOCYTES # BLD: 0 10E9/L (ref 0–0.4)
IMM GRANULOCYTES NFR BLD: 0.3 %
IMM GRANULOCYTES NFR BLD: 0.3 %
LACTATE BLD-SCNC: 2.7 MMOL/L (ref 0.7–2)
LYMPHOCYTES # BLD AUTO: 0.3 10E9/L (ref 0.8–5.3)
LYMPHOCYTES # BLD AUTO: 1.1 10E9/L (ref 0.8–5.3)
LYMPHOCYTES NFR BLD AUTO: 3.7 %
LYMPHOCYTES NFR BLD AUTO: 9.8 %
MAGNESIUM SERPL-MCNC: 2.3 MG/DL (ref 1.6–2.3)
MCH RBC QN AUTO: 32 PG (ref 26.5–33)
MCH RBC QN AUTO: 32 PG (ref 26.5–33)
MCHC RBC AUTO-ENTMCNC: 34.5 G/DL (ref 31.5–36.5)
MCHC RBC AUTO-ENTMCNC: 34.6 G/DL (ref 31.5–36.5)
MCV RBC AUTO: 93 FL (ref 78–100)
MCV RBC AUTO: 93 FL (ref 78–100)
MONOCYTES # BLD AUTO: 0.7 10E9/L (ref 0–1.3)
MONOCYTES # BLD AUTO: 0.9 10E9/L (ref 0–1.3)
MONOCYTES NFR BLD AUTO: 7.7 %
MONOCYTES NFR BLD AUTO: 7.8 %
NEUTROPHILS # BLD AUTO: 8.1 10E9/L (ref 1.6–8.3)
NEUTROPHILS # BLD AUTO: 9.3 10E9/L (ref 1.6–8.3)
NEUTROPHILS NFR BLD AUTO: 81.9 %
NEUTROPHILS NFR BLD AUTO: 88.1 %
NRBC # BLD AUTO: 0 10*3/UL
NRBC # BLD AUTO: 0 10*3/UL
NRBC BLD AUTO-RTO: 0 /100
NRBC BLD AUTO-RTO: 0 /100
NT-PROBNP SERPL-MCNC: 9149 PG/ML (ref 0–1800)
O2/TOTAL GAS SETTING VFR VENT: 60 %
OXYHGB MFR BLD: 94 % (ref 92–100)
OXYHGB MFR BLDV: 30 %
PCO2 BLD: 34 MM HG (ref 35–45)
PCO2 BLDV: 44 MM HG (ref 40–50)
PH BLD: 7.39 PH (ref 7.35–7.45)
PH BLDV: 7.35 PH (ref 7.32–7.43)
PLATELET # BLD AUTO: 63 10E9/L (ref 150–450)
PLATELET # BLD AUTO: 74 10E9/L (ref 150–450)
PO2 BLD: 78 MM HG (ref 80–105)
PO2 BLDV: 21 MM HG (ref 25–47)
POTASSIUM SERPL-SCNC: 3.6 MMOL/L (ref 3.4–5.3)
POTASSIUM SERPL-SCNC: 3.9 MMOL/L (ref 3.4–5.3)
PROCALCITONIN SERPL-MCNC: <0.05 NG/ML
RBC # BLD AUTO: 4.12 10E12/L (ref 4.4–5.9)
RBC # BLD AUTO: 4.63 10E12/L (ref 4.4–5.9)
SODIUM SERPL-SCNC: 139 MMOL/L (ref 133–144)
SODIUM SERPL-SCNC: 139 MMOL/L (ref 133–144)
TROPONIN I SERPL-MCNC: 0.03 UG/L (ref 0–0.04)
TROPONIN I SERPL-MCNC: 0.05 UG/L (ref 0–0.04)
TROPONIN I SERPL-MCNC: 0.06 UG/L (ref 0–0.04)
WBC # BLD AUTO: 11.3 10E9/L (ref 4–11)
WBC # BLD AUTO: 9.2 10E9/L (ref 4–11)

## 2017-10-11 PROCEDURE — 96374 THER/PROPH/DIAG INJ IV PUSH: CPT

## 2017-10-11 PROCEDURE — 85025 COMPLETE CBC W/AUTO DIFF WBC: CPT | Performed by: INTERNAL MEDICINE

## 2017-10-11 PROCEDURE — 84484 ASSAY OF TROPONIN QUANT: CPT | Performed by: INTERNAL MEDICINE

## 2017-10-11 PROCEDURE — 25000132 ZZH RX MED GY IP 250 OP 250 PS 637: Mod: GY | Performed by: HOSPITALIST

## 2017-10-11 PROCEDURE — 40000275 ZZH STATISTIC RCP TIME EA 10 MIN

## 2017-10-11 PROCEDURE — 25000125 ZZHC RX 250: Performed by: INTERNAL MEDICINE

## 2017-10-11 PROCEDURE — 99223 1ST HOSP IP/OBS HIGH 75: CPT | Mod: AI | Performed by: INTERNAL MEDICINE

## 2017-10-11 PROCEDURE — 94660 CPAP INITIATION&MGMT: CPT

## 2017-10-11 PROCEDURE — 25000128 H RX IP 250 OP 636: Performed by: INTERNAL MEDICINE

## 2017-10-11 PROCEDURE — 80048 BASIC METABOLIC PNL TOTAL CA: CPT | Performed by: INTERNAL MEDICINE

## 2017-10-11 PROCEDURE — 25000128 H RX IP 250 OP 636: Performed by: EMERGENCY MEDICINE

## 2017-10-11 PROCEDURE — 87040 BLOOD CULTURE FOR BACTERIA: CPT | Performed by: EMERGENCY MEDICINE

## 2017-10-11 PROCEDURE — 40000886 ZZH STATISTIC STEP DOWN HRS DAY

## 2017-10-11 PROCEDURE — 99223 1ST HOSP IP/OBS HIGH 75: CPT | Performed by: INTERNAL MEDICINE

## 2017-10-11 PROCEDURE — 21400002 ZZH R&B CCU CICU CRITICAL

## 2017-10-11 PROCEDURE — 93306 TTE W/DOPPLER COMPLETE: CPT | Mod: 26 | Performed by: INTERNAL MEDICINE

## 2017-10-11 PROCEDURE — 25000128 H RX IP 250 OP 636: Performed by: HOSPITALIST

## 2017-10-11 PROCEDURE — S0028 INJECTION, FAMOTIDINE, 20 MG: HCPCS | Performed by: INTERNAL MEDICINE

## 2017-10-11 PROCEDURE — 40000884 ZZH STATISTIC STEP DOWN HRS NIGHT

## 2017-10-11 PROCEDURE — 99207 ZZC APP CREDIT; MD BILLING SHARED VISIT: CPT | Performed by: HOSPITALIST

## 2017-10-11 PROCEDURE — 36415 COLL VENOUS BLD VENIPUNCTURE: CPT | Performed by: INTERNAL MEDICINE

## 2017-10-11 PROCEDURE — A9270 NON-COVERED ITEM OR SERVICE: HCPCS | Mod: GY | Performed by: HOSPITALIST

## 2017-10-11 PROCEDURE — 93306 TTE W/DOPPLER COMPLETE: CPT

## 2017-10-11 PROCEDURE — 82805 BLOOD GASES W/O2 SATURATION: CPT | Performed by: INTERNAL MEDICINE

## 2017-10-11 RX ORDER — POTASSIUM CHLORIDE 29.8 MG/ML
20 INJECTION INTRAVENOUS
Status: DISCONTINUED | OUTPATIENT
Start: 2017-10-11 | End: 2017-10-13 | Stop reason: HOSPADM

## 2017-10-11 RX ORDER — ONDANSETRON 4 MG/1
4 TABLET, ORALLY DISINTEGRATING ORAL EVERY 6 HOURS PRN
Status: DISCONTINUED | OUTPATIENT
Start: 2017-10-11 | End: 2017-10-13 | Stop reason: HOSPADM

## 2017-10-11 RX ORDER — FUROSEMIDE 10 MG/ML
INJECTION INTRAMUSCULAR; INTRAVENOUS
Status: DISPENSED
Start: 2017-10-11 | End: 2017-10-12

## 2017-10-11 RX ORDER — NITROGLYCERIN 0.4 MG/1
0.4 TABLET SUBLINGUAL EVERY 5 MIN PRN
Status: DISCONTINUED | OUTPATIENT
Start: 2017-10-11 | End: 2017-10-11

## 2017-10-11 RX ORDER — LIDOCAINE 40 MG/G
CREAM TOPICAL
Status: DISCONTINUED | OUTPATIENT
Start: 2017-10-11 | End: 2017-10-11

## 2017-10-11 RX ORDER — FUROSEMIDE 10 MG/ML
10 INJECTION INTRAMUSCULAR; INTRAVENOUS ONCE
Status: COMPLETED | OUTPATIENT
Start: 2017-10-11 | End: 2017-10-11

## 2017-10-11 RX ORDER — ACETAMINOPHEN 325 MG/1
650 TABLET ORAL EVERY 4 HOURS PRN
Status: DISCONTINUED | OUTPATIENT
Start: 2017-10-11 | End: 2017-10-13 | Stop reason: HOSPADM

## 2017-10-11 RX ORDER — POTASSIUM CL/LIDO/0.9 % NACL 10MEQ/0.1L
10 INTRAVENOUS SOLUTION, PIGGYBACK (ML) INTRAVENOUS
Status: DISCONTINUED | OUTPATIENT
Start: 2017-10-11 | End: 2017-10-13 | Stop reason: HOSPADM

## 2017-10-11 RX ORDER — ACETAMINOPHEN 650 MG/1
650 SUPPOSITORY RECTAL EVERY 4 HOURS PRN
Status: DISCONTINUED | OUTPATIENT
Start: 2017-10-11 | End: 2017-10-13 | Stop reason: HOSPADM

## 2017-10-11 RX ORDER — FUROSEMIDE 10 MG/ML
40 INJECTION INTRAMUSCULAR; INTRAVENOUS ONCE
Status: COMPLETED | OUTPATIENT
Start: 2017-10-11 | End: 2017-10-11

## 2017-10-11 RX ORDER — POLYETHYLENE GLYCOL 3350 17 G/17G
17 POWDER, FOR SOLUTION ORAL DAILY PRN
Status: DISCONTINUED | OUTPATIENT
Start: 2017-10-11 | End: 2017-10-13 | Stop reason: HOSPADM

## 2017-10-11 RX ORDER — TAMSULOSIN HYDROCHLORIDE 0.4 MG/1
0.4 CAPSULE ORAL DAILY
Status: DISCONTINUED | OUTPATIENT
Start: 2017-10-11 | End: 2017-10-13 | Stop reason: HOSPADM

## 2017-10-11 RX ORDER — POTASSIUM CHLORIDE 1.5 G/1.58G
20-40 POWDER, FOR SOLUTION ORAL
Status: DISCONTINUED | OUTPATIENT
Start: 2017-10-11 | End: 2017-10-13 | Stop reason: HOSPADM

## 2017-10-11 RX ORDER — ONDANSETRON 2 MG/ML
4 INJECTION INTRAMUSCULAR; INTRAVENOUS EVERY 6 HOURS PRN
Status: DISCONTINUED | OUTPATIENT
Start: 2017-10-11 | End: 2017-10-13 | Stop reason: HOSPADM

## 2017-10-11 RX ORDER — CALCIUM CARBONATE 500 MG/1
500-1000 TABLET, CHEWABLE ORAL
Status: DISCONTINUED | OUTPATIENT
Start: 2017-10-11 | End: 2017-10-13 | Stop reason: HOSPADM

## 2017-10-11 RX ORDER — AMOXICILLIN 250 MG
1-2 CAPSULE ORAL 2 TIMES DAILY PRN
Status: DISCONTINUED | OUTPATIENT
Start: 2017-10-11 | End: 2017-10-13 | Stop reason: HOSPADM

## 2017-10-11 RX ORDER — HYDROCODONE BITARTRATE AND ACETAMINOPHEN 7.5; 325 MG/1; MG/1
1 TABLET ORAL EVERY 6 HOURS PRN
Status: DISCONTINUED | OUTPATIENT
Start: 2017-10-11 | End: 2017-10-13 | Stop reason: HOSPADM

## 2017-10-11 RX ORDER — NALOXONE HYDROCHLORIDE 0.4 MG/ML
.1-.4 INJECTION, SOLUTION INTRAMUSCULAR; INTRAVENOUS; SUBCUTANEOUS
Status: DISCONTINUED | OUTPATIENT
Start: 2017-10-11 | End: 2017-10-13 | Stop reason: HOSPADM

## 2017-10-11 RX ORDER — MAGNESIUM SULFATE HEPTAHYDRATE 40 MG/ML
4 INJECTION, SOLUTION INTRAVENOUS EVERY 4 HOURS PRN
Status: DISCONTINUED | OUTPATIENT
Start: 2017-10-11 | End: 2017-10-13 | Stop reason: HOSPADM

## 2017-10-11 RX ORDER — GABAPENTIN 300 MG/1
300 CAPSULE ORAL 3 TIMES DAILY
Status: DISCONTINUED | OUTPATIENT
Start: 2017-10-11 | End: 2017-10-13 | Stop reason: HOSPADM

## 2017-10-11 RX ORDER — POTASSIUM CHLORIDE 1500 MG/1
20-40 TABLET, EXTENDED RELEASE ORAL
Status: DISCONTINUED | OUTPATIENT
Start: 2017-10-11 | End: 2017-10-13 | Stop reason: HOSPADM

## 2017-10-11 RX ORDER — POTASSIUM CHLORIDE 7.45 MG/ML
10 INJECTION INTRAVENOUS
Status: DISCONTINUED | OUTPATIENT
Start: 2017-10-11 | End: 2017-10-13 | Stop reason: HOSPADM

## 2017-10-11 RX ORDER — FINASTERIDE 5 MG/1
5 TABLET, FILM COATED ORAL DAILY
Status: DISCONTINUED | OUTPATIENT
Start: 2017-10-11 | End: 2017-10-13 | Stop reason: HOSPADM

## 2017-10-11 RX ADMIN — FINASTERIDE 5 MG: 5 TABLET, FILM COATED ORAL at 15:37

## 2017-10-11 RX ADMIN — GABAPENTIN 300 MG: 300 CAPSULE ORAL at 23:15

## 2017-10-11 RX ADMIN — FUROSEMIDE 40 MG: 10 INJECTION, SOLUTION INTRAVENOUS at 08:17

## 2017-10-11 RX ADMIN — TAMSULOSIN HYDROCHLORIDE 0.4 MG: 0.4 CAPSULE ORAL at 13:43

## 2017-10-11 RX ADMIN — GABAPENTIN 300 MG: 300 CAPSULE ORAL at 15:37

## 2017-10-11 RX ADMIN — HYDROCODONE BITARTRATE AND ACETAMINOPHEN 1 TABLET: 7.5; 325 TABLET ORAL at 18:30

## 2017-10-11 RX ADMIN — FUROSEMIDE 10 MG: 10 INJECTION, SOLUTION INTRAVENOUS at 18:37

## 2017-10-11 RX ADMIN — HYDROCODONE BITARTRATE AND ACETAMINOPHEN 1 TABLET: 7.5; 325 TABLET ORAL at 10:50

## 2017-10-11 RX ADMIN — FUROSEMIDE 40 MG: 10 INJECTION, SOLUTION INTRAVENOUS at 00:37

## 2017-10-11 RX ADMIN — FAMOTIDINE 20 MG: 10 INJECTION, SOLUTION INTRAVENOUS at 05:46

## 2017-10-11 NOTE — CONSULTS
[facility]    Cardiology Consultation     Date of Admission:  10/10/2017  Date of Consult (When I saw the patient): 10/11/17    Assessment & Plan   Ivan Gomez is a 89 year old male who was admitted on 10/10/2017 with abdominal pain, dyspnea and orthopnea and was found to be hypoxic without improvement on high flow oxygen requiring BiPap. Most recent echocardiogram was in 2012 that showed LVEF 60-65% without significant WMA or valvular abnormalities. Patient's wife see's Dr. Yao, and he requests to follow up with him as well.     1) Dyspnea and orthopnea: CXR showed likely pulmonary edema, but infectious and inflammatory process was also considered. Troponin: flat at 0.06-0.54.   -Received IV Lasix 40 mg x 2 doses  -Net negative 1.3L and down 3 lbs since admission  -Echo pending    2) Hypertension: [PTA: Amlodipine 5 mg daily, Metoprolol XL 25 mg daily  -PO antihypertensives on hold as is on BiPap. Received IV Metoprolol    3) Hyperlipidemia [PTA: Simvastatin 20 mg daily]    4) CKD, Stage 3: Creatine (1.1-1.5 at baseline).   -1.38->1.42    5) History of bladder cancer: s/p TURP x 2, nephrostomy tube and 1 round of chemotherapy    Active Problems:    Acute respiratory failure with hypoxia (H)      MECHELLE ADEN, FRANK, CNP    Code Status    Full Code    Reason for Consult   Reason for consult: Heart failure exacerbation    Primary Care Physician   Evaristo Magaña    Chief Complaint   Shortness of breath    History is obtained from the patient and electronic health record    History of Present Illness   Ivan Gomez is a 89 year old male who presents with dyspnea and orthopnea and was found to be hypoxic at 76% on RA in the ED he was unable to improve O2Sats on high low O2 and was placed on BiPap. Creatine was was 9,000 and mild leukocytosis. CXR shows probable diffuse pulmonary edema and treated with IV Lasix. He has a past medical history of hypertension, dyslipidemia, CKD  Stage 3, BPH, thrombocytopenia, gout, right femoral hernia, lumbar Stenosis and knee pain on chronic narcotics. He also has a history of bladder cancer and has undergone resection and reconstruction x 2, nephrostomy tube placement and internalization and a single round of chemotherapy (did not wish to purse further rounds).      Past Medical History   I have reviewed this patient's medical history and updated it with pertinent information if needed.   Past Medical History:   Diagnosis Date     Arthritis     Spinal Stenosis     Hemorrhoids      Hypercholesteremia      Hypertension      Malignant neoplasm (H)     skin CA, Basal cell     Other chronic pain      Renal disease        Past Surgical History   I have reviewed this patient's surgical history and updated it with pertinent information if needed.  Past Surgical History:   Procedure Laterality Date     BACK SURGERY       BIOPSY      face, skin cancer     BIOPSY  8/2016    shoulder lesion     CYSTOSCOPY, TRANSURETHRAL RESECTION (TUR) PROSTATE, COMBINED N/A 8/21/2015    Procedure: COMBINED CYSTOSCOPY, TRANSURETHRAL RESECTION (TUR) PROSTATE;  Surgeon: Dennis Hilton MD;  Location:  OR     CYSTOSCOPY, TRANSURETHRAL RESECTION (TUR) TUMOR BLADDER, COMBINED N/A 9/2/2016    Procedure: COMBINED CYSTOSCOPY, TRANSURETHRAL RESECTION (TUR) TUMOR BLADDER;  Surgeon: Dennis Hilton MD;  Location:  OR     ORTHOPEDIC SURGERY      lumbar and cervical surgery, Sep, 2008, knee surgery       Prior to Admission Medications   Prior to Admission Medications   Prescriptions Last Dose Informant Patient Reported? Taking?   Cholecalciferol (VITAMIN D) 2000 UNIT CAPS 10/10/2017 at am Self Yes Yes   Sig: Take 1 capsule by mouth every morning    HYDROcodone-acetaminophen (NORCO) 7.5-325 MG per tablet 10/10/2017 at Unknown time Self No Yes   Sig: Take 1 tablet by mouth every 6 hours as needed for moderate to severe pain   Omega-3 Fatty Acids (OMEGA-3 FISH OIL PO)  10/10/2017 at am Self Yes Yes   Sig: Take 1 g by mouth daily   amLODIPine (NORVASC) 5 MG tablet 10/10/2017 at am Self No Yes   Sig: TAKE 1 TABLET BY MOUTH EVERY DAY   aspirin 81 MG tablet 10/7/2017 at am Self No Yes   Sig: Take 1 tablet (81 mg) by mouth once a week Takes 1 tab every Saturday   colchicine (COLCRYS) 0.6 MG tablet prn Self No Yes   Sig: Take 2 tablets at the first sign of flare, take 1 additional tablet one hour later.   fiber modular (NUTRISOURCE FIBER) packet 10/10/2017 at am Self Yes Yes   Si packet daily   finasteride (PROSCAR) 5 MG tablet 10/10/2017 at am Self No Yes   Sig: TAKE 1 TABLET BY MOUTH EVERY DAY   gabapentin (NEURONTIN) 300 MG capsule 10/10/2017 at x2 Self No Yes   Sig: TAKE 1 CAPSULE BY MOUTH THREE TIMES DAILY   metoprolol (TOPROL-XL) 25 MG 24 hr tablet 10/10/2017 at hs Self No Yes   Sig: TAKE 1 TABLET BY MOUTH DAILY   pantoprazole (PROTONIX) 40 MG EC tablet 10/10/2017 at 1200 Self No Yes   Sig: Take 1 tablet (40 mg) by mouth daily Take 30-60 minutes before a meal.   simvastatin (ZOCOR) 20 MG tablet 10/10/2017 at hs Self No Yes   Sig: TAKE 1 TABLET BY MOUTH AT BEDTIME   tamsulosin (FLOMAX) 0.4 MG capsule 10/10/2017 at am Self No Yes   Sig: Take 1 capsule (0.4 mg) by mouth daily      Facility-Administered Medications: None     Allergies   Allergies   Allergen Reactions     Celebrex [Celecoxib] Hives     Penicillins Hives       Social History   I have reviewed this patient's social history and updated it with pertinent information if needed. Ivan Gomez  reports that he has quit smoking. He has never used smokeless tobacco. He reports that he does not drink alcohol or use illicit drugs.    Family History   I have reviewed this patient's family history and updated it with pertinent information if needed.   Family History   Problem Relation Age of Onset     CANCER Son 64     leukemia ? due to agent orange     Family History Negative Son      DIABETES No family hx of      Coronary  Artery Disease No family hx of      Hypertension No family hx of      Hyperlipidemia No family hx of      CEREBROVASCULAR DISEASE No family hx of      Breast Cancer No family hx of      Colon Cancer No family hx of      Prostate Cancer No family hx of      Other Cancer No family hx of      Depression No family hx of      Anxiety Disorder No family hx of      MENTAL ILLNESS No family hx of      Substance Abuse No family hx of      Anesthesia Reaction No family hx of      Asthma No family hx of      OSTEOPOROSIS No family hx of      Genetic Disorder No family hx of      Thyroid Disease No family hx of      Obesity No family hx of      Unknown/Adopted No family hx of        Review of Systems   The 10 point Review of Systems is negative other than noted in the HPI or here.     Physical Exam   Temp: 99.2  F (37.3  C) Temp src: Oral BP: 118/62 Pulse: 104 Heart Rate: 75 Resp: 23 SpO2: 91 % O2 Device: Oxymizer cannula Oxygen Delivery: 5 LPM  Vital Signs with Ranges  Temp:  [97.4  F (36.3  C)-99.2  F (37.3  C)] 99.2  F (37.3  C)  Pulse:  [104] 104  Heart Rate:  [] 75  Resp:  [12-32] 23  BP: ()/(51-88) 118/62  SpO2:  [76 %-99 %] 91 %  162 lbs 14.72 oz    Constitutional:   NAD   Skin:   Warm and dry   Head:   Nontraumatic   Neck:   elevated JVP   Lungs:   scattered wheezes   Cardiovascular:   regular rate and rhythm and normal S1 and S2   Abdomen:   Benign   Extremities and Back:   No edema   Neurological:   Grossly nonfocal

## 2017-10-11 NOTE — ED PROVIDER NOTES
History     Chief Complaint:  Shortness of breath    HPI   Ivan Gomez is a 89 year old male who presents with shortness of breath. The patient states that he has been experienced intermittent lower chest, upper abdominal pain with some associated shortness of breath. He was seen by his primary doctor this evening and diagnosed with GERD and given a prescription for protonix. He reports that they did not address his associated shortness of breath. This evening his shortness of breath worsened and he had difficulty breathing, so he called 911. The patient was given 1 neb en route via EMS. He notes that he has had a cough this evening but that this is new. He has felt febrile and had chills but did not record any temperatures and has reported experiencing this for the past month. He denies having any recent weight gain or loss or any night sweats. The patient does not take any blood thinners. Of note the patient does have a history of bladder cancer.    Cardiac/PE/DVT Risk Factors:  History of hypertension - Yes  History of hyperlipidemia - Yes  History of diabetes - No  History of smoking - Yes  Personal history of PE/DVT - No  Family history of PE/DVT - No  Family history of heart complications - No  Recent travel - No  Recent surgery - No  Other immobilizations - No  Cancer - Yes     Allergies:  Celebrex  Penicillins    Medications:    Protonix  Amlodipine  Flomax  Gabapentin  Simvastatin  Finasteride  Colchicine  Metoprolol    Past Medical History:    Arthritis  Hemorrhoids  Hypercholesterolemia  Hypertension  Malignant neoplasm  Chronic pain  Renal disease    Past Surgical History:    Back surgery  Biopsy  Cystoscopy  Cystoscopy, transurethral resection  Lumbar and cervical surgery    Family History:    Cancer    Social History:  The patient was accompanied to the ED by his wife.  Smoking Status: Former  Smokeless Tobacco: No  Alcohol Use: No  Marital Status:  Single [1]    Review of Systems  "  Constitutional: Positive for chills and fever. Negative for diaphoresis.   Respiratory: Positive for cough and shortness of breath.    All other systems reviewed and are negative.    Physical Exam   Vitals:  Patient Vitals for the past 24 hrs:   BP Temp Temp src Pulse Heart Rate Resp SpO2 Height Weight   10/11/17 0100 125/62 - - - 83 17 93 % - -   10/11/17 0045 121/61 - - - 89 18 95 % - -   10/11/17 0030 129/62 - - - 86 18 95 % - -   10/11/17 0015 132/64 - - - 94 25 96 % - -   10/11/17 0010 - - - - - - 98 % - -   10/11/17 0005 137/73 - - - - - 90 % - -   10/10/17 2340 137/88 98.4  F (36.9  C) Oral - 103 24 (!) 87 % - -   10/10/17 2334 144/74 - - 104 - 24 (!) 76 % 1.702 m (5' 7\") 74.8 kg (165 lb)      Physical Exam  GENERAL: well developed, pleasant  HEAD: atraumatic  EYES: pupils reactive, extraocular muscles intact, conjunctivae normal  ENT:  mucus membranes moist  NECK:  trachea midline, normal range of motion  RESPIRATORY: no tachypnea. Wheezing throughout. Course sounds in the bases, right worse than left  CVS: normal S1/S2, no murmurs, intact distal pulses  ABDOMEN: soft, nontender, nondistention  MUSCULOSKELETAL: no deformities  SKIN: warm and dry, no acute rashes or ulceration  NEURO: GCS 15, cranial nerves intact, alert and oriented x3  PSYCH:  Mood/affect normal     Emergency Department Course     ECG:  ECG taken at 2337, ECG read at 2338  Sinus tachycardia with occasional premature ventricular complexes and fusion complexes. Septal infarct, age undetermined. Abnormal ECG  Rate 104 bpm. CA interval 186. QRS duration 108. QT/QTc 358/470. P-R-T axes 29, 9, 76.     Imaging:  Radiology findings were communicated with the patient who voiced understanding of the findings.  XR chest 2 views:  IMPRESSION:   1. Moderate to markedly increased interstitial opacities in both  lungs. These are most likely related to interstitial pulmonary edema;  however, an infectious or inflammatory process could have a " similar  appearance.  2. Hyperinflated lungs, suggestive of chronic obstructive pulmonary  Disease.  Reading per radiology.     Laboratory:  Laboratory findings were communicated with the patient who voiced understanding of the findings.  Blood culture: pending  CBC: WNC: 11.3(H), PLT: 74(L), Neutrophil: 9.3(H), o/w WNL (HGB 14.8)   BMP: Glucose: 131(H), Creatinine: 1.38H), GFR: 48(L), o/w WNL    Troponin (Collected 2345): 0.032   BNP: 9149 (H)  Procalcitonin: <0.05  Lactic acid: 2.7(H)  Blood culture: pending  Blood gas arterial: pH: 7.39, PCO2: 34(L), PO2: 78(L), FIO2: 60, Oxyhemoglobin: 94, Base deficit: 3.7   Blood gas venous 2345: pH: 7.35, PCO2: 44, PO2: 21(L), Bicarbonate: 25, Oxyhemoglobin: 30, Base deficit: 1.4  Magnesium: 2.3    Interventions:  2346 Duoneb, 3 mL, nebulization    0037 Lasix 40 mg IV    Emergency Department Course:  Nursing notes and vitals reviewed.  I performed an exam of the patient as documented above.   IV was inserted and blood was drawn for laboratory testing, results above.  The patient was sent for a XR while in the emergency department, results above.      I discussed the treatment plan with the patient. They expressed understanding of this plan and consented to admission. I discussed the patient with Dr. Verde, who will admit the patient to a monitored bed for further evaluation and treatment.     I personally reviewed the laboratory results with the Patient and answered all related questions prior to admission.    Impression & Plan      Medical Decision Making:  Ivan Gomez is a 89 year old male who presents to the emergency department today with shortness of breath and hypoxia. He was given nebs en route and has some wheezing bilaterally. Patient notes epigastric symptoms for the past two to three weeks with some cough. Certainly pneumonia, CHF, acute coronary syndrome, PE, malignancy, and others are considered. Chest x ray shops interstitial edema. BNP is elevated in the  9000 range. ECG did not show any ischemic changes. Troponin is measurable but not significantly elevated. Patient is given IV lasix. He was initially on oxygen and transferred over to [facility]. He appears to be stable. I spoke to the hospitalist about admission.  Diagnosis 1. Acute CHF  Plan: we will admit with diuresis and ongoing pulmonary support    Diagnosis:    ICD-10-CM    1. Acute congestive heart failure, unspecified congestive heart failure type (H) I50.9 Procalcitonin        Disposition:   Admitted    CMS Diagnoses: None     Scribe Disclosure:  Dilshad RAYA, am serving as a scribe at 11:34 PM on 10/10/2017 to document services personally performed by Meng Hicks MD, based on my observations and the provider's statements to me.    EMERGENCY DEPARTMENT       Meng Hicks MD  10/11/17 0195

## 2017-10-11 NOTE — ED NOTES
Bed: ED26  Expected date: 10/10/17  Expected time: 11:10 PM  Means of arrival: Ambulance  Comments:  Candi DEL REAL 89M SOB

## 2017-10-11 NOTE — PROGRESS NOTES
Pt was placed on BIPAP 12/5 60% for hypoxia and SOB. SpO2 of 85% on 15L oxymask. Gel pad and mepilex was placed under the Bipap mask. Will continue to follow.  10/11/2017  Bronwyn Cintron

## 2017-10-11 NOTE — PHARMACY-ADMISSION MEDICATION HISTORY
Admission medication history interview status for the 10/10/2017  admission is complete. See EPIC admission navigator for prior to admission medications     Medication history source reliability:Good    Actions taken by pharmacist (provider contacted, etc): interview with patient.     Additional medication history information not noted on PTA med list : 1st dose of protonix was yesterday.      Medication reconciliation/reorder completed by provider prior to medication history? No    Time spent in this activity: 15 min    Prior to Admission medications    Medication Sig Last Dose Taking? Auth Provider   pantoprazole (PROTONIX) 40 MG EC tablet Take 1 tablet (40 mg) by mouth daily Take 30-60 minutes before a meal. 10/10/2017 at 1200 Yes Evaristo Maagña MD   amLODIPine (NORVASC) 5 MG tablet TAKE 1 TABLET BY MOUTH EVERY DAY 10/10/2017 at am Yes Evaristo Magaña MD   tamsulosin (FLOMAX) 0.4 MG capsule Take 1 capsule (0.4 mg) by mouth daily 10/10/2017 at am Yes Dennis Hilton MD   HYDROcodone-acetaminophen (NORCO) 7.5-325 MG per tablet Take 1 tablet by mouth every 6 hours as needed for moderate to severe pain 10/10/2017 at Unknown time Yes Evaristo Magaña MD   gabapentin (NEURONTIN) 300 MG capsule TAKE 1 CAPSULE BY MOUTH THREE TIMES DAILY 10/10/2017 at x2 Yes Evaristo Magaña MD   fiber modular (NUTRISOURCE FIBER) packet 1 packet daily 10/10/2017 at am Yes Reported, Patient   simvastatin (ZOCOR) 20 MG tablet TAKE 1 TABLET BY MOUTH AT BEDTIME 10/10/2017 at hs Yes Evaristo Magaña MD   colchicine (COLCRYS) 0.6 MG tablet Take 2 tablets at the first sign of flare, take 1 additional tablet one hour later. prn Yes Jay Yoon MD   finasteride (PROSCAR) 5 MG tablet TAKE 1 TABLET BY MOUTH EVERY DAY 10/10/2017 at am Yes Kiana Mcginnis MD   metoprolol (TOPROL-XL) 25 MG 24 hr tablet TAKE 1 TABLET BY MOUTH DAILY 10/10/2017 at hs Yes Evaristo Magaña MD   Omega-3 Fatty  Acids (OMEGA-3 FISH OIL PO) Take 1 g by mouth daily 10/10/2017 at am Yes Reported, Patient   aspirin 81 MG tablet Take 1 tablet (81 mg) by mouth once a week Takes 1 tab every Saturday 10/7/2017 at am Yes Evaristo King MD   Cholecalciferol (VITAMIN D) 2000 UNIT CAPS Take 1 capsule by mouth every morning  10/10/2017 at am Yes Reported, Patient

## 2017-10-11 NOTE — PROGRESS NOTES
Brief update afternoon rounds seen and examined with Dr. 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     We discussed his goals of care and he says that, since CPR or intubation would be unlikely to meaningfully prolong his life at this point, he wants to be DNR DNI going forward. This is ordered.    Otherwise we plan to consider procedures such as mitral valve clip (surgery not being a viable option for him at this point in his life), pending further testing such as Lexiscan, IFEANYI, or catheterization.

## 2017-10-11 NOTE — PLAN OF CARE
Problem: Patient Care Overview  Goal: Plan of Care/Patient Progress Review  Outcome: No Change  A/O. VSS on bi-pap. C/o epigastric discomfort 3-4/10 improved with repositioning. Tele SR. LS coarse/crackles diminished in bases. BLE trace edema. NPO. Adequate UOP.

## 2017-10-11 NOTE — H&P
[facility]    History and Physical  Hospitalist       Date of Admission:  10/10/2017    Assessment & Plan   Ivan Gomez is a 89 year old male who presents with dyspnea and abdominal discomfort.    He has a PMH of Hypertension, Dyslipidemia, CKD Stage 3, BPH, Thrombocytopenia, Gout, Right femoral hernia, Lumbar Stenosis and knee pain on chronic narcotics. He also has a history of bladder cancer and has undergone TURBT twice, nephrostomy tube placement and internalization and a single session of chemotherapy. He says he did not like it and does not want any further chemotherapy or major surgical interventions, and would only revisit further therapy for the cancer if he had significant symptom burden. He lives at home with his wife.     Earlier today he was evaluated by his PCP for about one month of on and off upper abdominal discomfort. He was given a trial of protonix for possible GERD and discharged home. However according to patient he also has had some chest discomfort and dyspnea during this time and did not feel it was addressed (Unclear if it was actually discussed). When he got home he felt worse and called 911.    He reports dyspnea which is worse when he lays flat and has been sleeping sitting up as a result. He says it feels like something caught in his throat. He has occasional cough which is chronic but is not producing phlegm. He denies lower extremity swelling or weight gain. His description of the chest pain is more nebulous and his main concern is the breathing.    In the ED he was hypoxic to 76% on room air and only improved to 87% on 15 liters prompting initiation of BIPAP with improvement to the 90's. Initial labs showed a creatinine better than baseline at 1.13, BNP of 9k and a mild leukocytosis of 11k. His initial troponin was negative. His CXR shows probable diffuse pulmonary edema. He was given a single dose of lasix 40mg IV and admitted to medicine.    Acute Hypoxic  Respiratory Failure  - Admit to [facility] on BIPAP, wean as tolerated  - Last echo was 2012 and is unremarkable, mild valvulopathy only  - Denies LE edema but he does have it  - Orthopnea, BNP and pulmonary edema concerning for new onset CHF. May have had silent MI in recent past  - Telemetry, trend troponin  - Strict I's and O's, daily weights, K>4 and Mg>2  - Check complete echo, cardiology consultation  - Will plan additional dose of lasix in AM, further dosing per primary team  - NPO while on BIPAP, resume home meds when able  - I do not believe this is an infectious process at present, he was cultured in the ED but I would not initiate antibiotics    Stage 3 CKD  - Creatinine at or better than baseline (ranges from 1.1 - 1.5 on outpatient labs)    Hypertension  - Hold PTA Norvasc, Toprol-XL while on BIPAP  - PRN IV Metoprolol    Dyslipidemia  - On BIPAP, Hold PTA Zocor    BPH  History of Bladder Cancer  - Hold Flomax, Finasteride until off BIPAP  - No further intervention for bladder CA at present    Lumbar Stenosis, Knee Pain, Chronic Pain Syndrome  - Resume oral narcotic analgesia when off BIPAP      DVT Prophylaxis: Pneumatic Compression Devices given chronic thrombocytopenia  Code Status: Full Code    Disposition: Expected discharge in 3-5 days once hypoxia resolved    Sean Verde III, MD    Primary Care Physician   Evaristo Magaña    Chief Complaint   dyspnea    History is obtained from the patient, ED physician and review of records    History of Present Illness   Ivan Gomez is a 89 year old male who presents with dyspnea and abdominal discomfort.    He has a PMH of Hypertension, Dyslipidemia, CKD Stage 3, BPH, Thrombocytopenia, Gout, Right femoral hernia, Lumbar Stenosis and knee pain on chronic narcotics. He also has a history of bladder cancer and has undergone TURBT twice, nephrostomy tube placement and internalization and a single session of chemotherapy. He says he did not like it and  does not want any further chemotherapy or major surgical interventions, and would only revisit further therapy for the cancer if he had significant symptom burden. He lives at home with his wife.     Earlier today he was evaluated by his PCP for about one month of on and off upper abdominal discomfort. He was given a trial of protonix for possible GERD and discharged home. However according to patient he also has had some chest discomfort and dyspnea during this time and did not feel it was addressed (Unclear if it was actually discussed). When he got home he felt worse and called 911.    He reports dyspnea which is worse when he lays flat and has been sleeping sitting up as a result. He says it feels like something caught in his throat. He has occasional cough which is chronic but is not producing phlegm. He denies lower extremity swelling or weight gain. His description of the chest pain is more nebulous and his main concern is the breathing.    In the ED he was hypoxic to 76% on room air and only improved to 87% on 15 liters prompting initiation of BIPAP with improvement to the 90's. Initial labs showed a creatinine better than baseline at 1.13, BNP of 9k and a mild leukocytosis of 11k. His initial troponin was negative. His CXR shows probable diffuse pulmonary edema. He was given a single dose of lasix 40mg IV and admitted to medicine.    Past Medical History    I have reviewed this patient's medical history and updated it with pertinent information if needed.   Past Medical History:   Diagnosis Date     Arthritis     Spinal Stenosis     Hemorrhoids      Hypercholesteremia      Hypertension      Malignant neoplasm (H)     skin CA, Basal cell     Other chronic pain      Renal disease        Past Surgical History   I have reviewed this patient's surgical history and updated it with pertinent information if needed.  Past Surgical History:   Procedure Laterality Date     BACK SURGERY       BIOPSY      face, skin  cancer     BIOPSY  2016    shoulder lesion     CYSTOSCOPY, TRANSURETHRAL RESECTION (TUR) PROSTATE, COMBINED N/A 2015    Procedure: COMBINED CYSTOSCOPY, TRANSURETHRAL RESECTION (TUR) PROSTATE;  Surgeon: Dennis Hilton MD;  Location: [facility] OR     CYSTOSCOPY, TRANSURETHRAL RESECTION (TUR) TUMOR BLADDER, COMBINED N/A 2016    Procedure: COMBINED CYSTOSCOPY, TRANSURETHRAL RESECTION (TUR) TUMOR BLADDER;  Surgeon: Dennis Hilton MD;  Location: [facility] OR     ORTHOPEDIC SURGERY      lumbar and cervical surgery, Sep, 2008, knee surgery       Prior to Admission Medications   Prior to Admission Medications   Prescriptions Last Dose Informant Patient Reported? Taking?   Cholecalciferol (VITAMIN D) 2000 UNIT CAPS   Yes No   Sig: Take 1 capsule by mouth every morning    HYDROcodone-acetaminophen (NORCO) 7.5-325 MG per tablet   No No   Sig: Take 1 tablet by mouth every 6 hours as needed for moderate to severe pain   Omega-3 Fatty Acids (OMEGA-3 FISH OIL PO)   Yes No   Sig: Take 1 g by mouth daily   amLODIPine (NORVASC) 5 MG tablet   No No   Sig: TAKE 1 TABLET BY MOUTH EVERY DAY   aspirin 81 MG tablet   No No   Sig: Take 1 tablet (81 mg) by mouth once a week Takes 1 tab every Saturday   colchicine (COLCRYS) 0.6 MG tablet   No No   Sig: Take 2 tablets at the first sign of flare, take 1 additional tablet one hour later.   fiber modular (NUTRISOURCE FIBER) packet   Yes No   Si packet daily   finasteride (PROSCAR) 5 MG tablet   No No   Sig: TAKE 1 TABLET BY MOUTH EVERY DAY   gabapentin (NEURONTIN) 300 MG capsule   No No   Sig: TAKE 1 CAPSULE BY MOUTH THREE TIMES DAILY   metoprolol (TOPROL-XL) 25 MG 24 hr tablet   No No   Sig: TAKE 1 TABLET BY MOUTH DAILY   pantoprazole (PROTONIX) 40 MG EC tablet   No No   Sig: Take 1 tablet (40 mg) by mouth daily Take 30-60 minutes before a meal.   simvastatin (ZOCOR) 20 MG tablet   No No   Sig: TAKE 1 TABLET BY MOUTH AT BEDTIME   tamsulosin (FLOMAX) 0.4 MG capsule   No No    Sig: Take 1 capsule (0.4 mg) by mouth daily      Facility-Administered Medications: None     Allergies   Allergies   Allergen Reactions     Celebrex [Celecoxib] Hives     Penicillins Hives       Social History   I have reviewed this patient's social history and updated it with pertinent information if needed. Ivan Gomez  reports that he has quit smoking. He has never used smokeless tobacco. He reports that he does not drink alcohol or use illicit drugs.    Family History   I have reviewed this patient's family history and updated it with pertinent information if needed.   Family History   Problem Relation Age of Onset     CANCER Son 64     leukemia ? due to agent orange     Family History Negative Son      DIABETES No family hx of      Coronary Artery Disease No family hx of      Hypertension No family hx of      Hyperlipidemia No family hx of      CEREBROVASCULAR DISEASE No family hx of      Breast Cancer No family hx of      Colon Cancer No family hx of      Prostate Cancer No family hx of      Other Cancer No family hx of      Depression No family hx of      Anxiety Disorder No family hx of      MENTAL ILLNESS No family hx of      Substance Abuse No family hx of      Anesthesia Reaction No family hx of      Asthma No family hx of      OSTEOPOROSIS No family hx of      Genetic Disorder No family hx of      Thyroid Disease No family hx of      Obesity No family hx of      Unknown/Adopted No family hx of        Review of Systems   The 10 point Review of Systems is negative other than noted in the HPI or here.   Does have some subjective fever/chills over months    Physical Exam   Temp: 98.4  F (36.9  C) Temp src: Oral BP: 125/62 Pulse: 104 Heart Rate: 83 Resp: 17 SpO2: 93 % O2 Device: BiPAP/CPAP Oxygen Delivery: 15 LPM  Vital Signs with Ranges  Temp:  [98.4  F (36.9  C)] 98.4  F (36.9  C)  Pulse:  [] 104  Heart Rate:  [] 83  Resp:  [16-25] 17  BP: (120-144)/(61-88) 125/62  SpO2:  [76 %-98 %] 93  %  165 lbs 0 oz    Constitutional: NAD. Comfortable, well-developed   Eyes: Anicteric, no conjunctival injection  ENT: Atraumatic, membranes moist   Neck: Supple, trachea midline  Respiratory: No wheeze or crackles. Breathing comfortably on BIPAP  Cardiovascular: S1S2. Bilateral symmetric LE edema, 1+, significant indentation from socks  GI: Abdomen soft, non-tender  Skin: Warm, pink, dry  Neurologic: Alert and oriented. CN II - XII grossly intact  Psychiatric: Pleasant, cooperative    Data   Data reviewed today:  I personally reviewed no images or EKG's today.    Recent Labs  Lab 10/10/17  2345   WBC 11.3*   HGB 14.8   MCV 93   PLT 74*      POTASSIUM 3.6   CHLORIDE 106   CO2 25   BUN 24   CR 1.38*   ANIONGAP 8   GIUSEPPE 9.0   *   TROPI 0.032       Imaging:  Recent Results (from the past 24 hour(s))   XR Chest 2 Views    Narrative    CHEST 2 VIEWS  10/11/2017 12:01 AM     HISTORY: Cough, shortness of breath, and hypoxia.    COMPARISON: 8/23/2015.    FINDINGS: Hyperinflated lungs. Moderate to markedly increased  interstitial opacities in both lungs. Borderline cardiomegaly. Fusion  hardware in the lower cervical spine.      Impression    IMPRESSION:   1. Moderate to markedly increased interstitial opacities in both  lungs. These are most likely related to interstitial pulmonary edema;  however, an infectious or inflammatory process could have a similar  appearance.  2. Hyperinflated lungs, suggestive of chronic obstructive pulmonary  disease.    LUCAS BRANDT MD

## 2017-10-11 NOTE — PROGRESS NOTES
Hospitalist Progress Note brief update; seen and examined; feels a lot better. Breathing more easily. Has some minimal non-reproducible pain in the epigastrium. Will try off BIPAP this morning now that things seem to be better stabilized. Overall, we remain concerned for acute pulmonary edema possibly due to ACS or another cause of acute heart failure. He will get a dose of IV Lasix this morning and Cardiology is consulted. TTE is pending.

## 2017-10-11 NOTE — ED NOTES
"[facility]  ED Nurse Handoff Report    ED Chief complaint: Shortness of Breath (SOB worsening over the last 2 days, most noticably tonight. no hx of pulmonary problems.)      ED Diagnosis:   Final diagnoses:   Acute congestive heart failure, unspecified congestive heart failure type (H)       Code Status: Full Code    Allergies:   Allergies   Allergen Reactions     Celebrex [Celecoxib] Hives     Penicillins Hives       Activity level - Baseline/Home:  Independent    Activity Level - Current:   Stand with Assist     Needed?: No    Isolation: No  Infection: Not Applicable    Bariatric?: No    Vital Signs:   Vitals:    10/10/17 2340 10/11/17 0005 10/11/17 0010 10/11/17 0015   BP: 137/88 137/73  132/64   Pulse:       Resp: 24   25   Temp: 98.4  F (36.9  C)      TempSrc: Oral      SpO2: (!) 87% 90% 98% 96%   Weight:       Height:           Cardiac Rhythm: ,   Cardiac  Cardiac Rhythm: Sinus tachycardia    Pain level:      Is this patient confused?: No    Patient Report: Initial Complaint: Shortness of Breath  Focused Assessment: SOB over the last 2 nights, worsening tonight. Pt states that his chest began to feel \"congested\" and states that he was having a hard time taking a deep breath. Pt arrived to ED with RA o2 sats of 76%. Pt brought up to 88% with 15L oxymask, then placed on bi-pap and responding well. O2 sats now 94-97%. Pt denies chest pain at this time.   Tests Performed: chest x-ray, labs.  Abnormal Results: BNP 9149, Lactic 2.7, WBC 11.3  Treatments provided: IV lasix 40mg, bi-pap, duoneb x1    Family Comments: Wife at bedside.    OBS brochure/video discussed/provided to patient: N/A    ED Medications:   Medications   ipratropium - albuterol 0.5 mg/2.5 mg/3 mL (DUONEB) neb solution 3 mL (3 mLs Nebulization Given 10/10/17 2346)   furosemide (LASIX) injection 40 mg (40 mg Intravenous Given 10/11/17 0037)       Drips infusing?:  No      ED NURSE PHONE NUMBER: *[id number]         "

## 2017-10-12 ENCOUNTER — APPOINTMENT (OUTPATIENT)
Dept: NUCLEAR MEDICINE | Facility: CLINIC | Age: 82
DRG: 306 | End: 2017-10-12
Attending: INTERNAL MEDICINE
Payer: MEDICARE

## 2017-10-12 ENCOUNTER — APPOINTMENT (OUTPATIENT)
Dept: CARDIOLOGY | Facility: CLINIC | Age: 82
DRG: 306 | End: 2017-10-12
Attending: INTERNAL MEDICINE
Payer: MEDICARE

## 2017-10-12 LAB
ANION GAP SERPL CALCULATED.3IONS-SCNC: 9 MMOL/L (ref 3–14)
BASOPHILS # BLD AUTO: 0 10E9/L (ref 0–0.2)
BASOPHILS NFR BLD AUTO: 0.1 %
BUN SERPL-MCNC: 32 MG/DL (ref 7–30)
CALCIUM SERPL-MCNC: 8.9 MG/DL (ref 8.5–10.1)
CHLORIDE SERPL-SCNC: 106 MMOL/L (ref 94–109)
CHOLEST SERPL-MCNC: 108 MG/DL
CO2 SERPL-SCNC: 25 MMOL/L (ref 20–32)
CREAT SERPL-MCNC: 1.34 MG/DL (ref 0.66–1.25)
DIFFERENTIAL METHOD BLD: ABNORMAL
EOSINOPHIL # BLD AUTO: 0 10E9/L (ref 0–0.7)
EOSINOPHIL NFR BLD AUTO: 0 %
ERYTHROCYTE [DISTWIDTH] IN BLOOD BY AUTOMATED COUNT: 13.5 % (ref 10–15)
GFR SERPL CREATININE-BSD FRML MDRD: 50 ML/MIN/1.7M2
GLUCOSE SERPL-MCNC: 117 MG/DL (ref 70–99)
HCT VFR BLD AUTO: 37.2 % (ref 40–53)
HDLC SERPL-MCNC: 63 MG/DL
HGB BLD-MCNC: 12.6 G/DL (ref 13.3–17.7)
IMM GRANULOCYTES # BLD: 0 10E9/L (ref 0–0.4)
IMM GRANULOCYTES NFR BLD: 0.1 %
LDLC SERPL CALC-MCNC: 29 MG/DL
LYMPHOCYTES # BLD AUTO: 0.7 10E9/L (ref 0.8–5.3)
LYMPHOCYTES NFR BLD AUTO: 7.3 %
MCH RBC QN AUTO: 31.4 PG (ref 26.5–33)
MCHC RBC AUTO-ENTMCNC: 33.9 G/DL (ref 31.5–36.5)
MCV RBC AUTO: 93 FL (ref 78–100)
MONOCYTES # BLD AUTO: 0.7 10E9/L (ref 0–1.3)
MONOCYTES NFR BLD AUTO: 7.6 %
NEUTROPHILS # BLD AUTO: 7.6 10E9/L (ref 1.6–8.3)
NEUTROPHILS NFR BLD AUTO: 84.9 %
NONHDLC SERPL-MCNC: 45 MG/DL
NRBC # BLD AUTO: 0 10*3/UL
NRBC BLD AUTO-RTO: 0 /100
PLATELET # BLD AUTO: 68 10E9/L (ref 150–450)
POTASSIUM SERPL-SCNC: 3.8 MMOL/L (ref 3.4–5.3)
RBC # BLD AUTO: 4.01 10E12/L (ref 4.4–5.9)
SODIUM SERPL-SCNC: 140 MMOL/L (ref 133–144)
TRIGL SERPL-MCNC: 81 MG/DL
WBC # BLD AUTO: 8.9 10E9/L (ref 4–11)

## 2017-10-12 PROCEDURE — 93017 CV STRESS TEST TRACING ONLY: CPT

## 2017-10-12 PROCEDURE — 25000128 H RX IP 250 OP 636: Performed by: INTERNAL MEDICINE

## 2017-10-12 PROCEDURE — 93010 ELECTROCARDIOGRAM REPORT: CPT | Performed by: INTERNAL MEDICINE

## 2017-10-12 PROCEDURE — 78452 HT MUSCLE IMAGE SPECT MULT: CPT

## 2017-10-12 PROCEDURE — 80048 BASIC METABOLIC PNL TOTAL CA: CPT | Performed by: HOSPITALIST

## 2017-10-12 PROCEDURE — 78452 HT MUSCLE IMAGE SPECT MULT: CPT | Mod: 26 | Performed by: INTERNAL MEDICINE

## 2017-10-12 PROCEDURE — 21400002 ZZH R&B CCU CICU CRITICAL

## 2017-10-12 PROCEDURE — 25000132 ZZH RX MED GY IP 250 OP 250 PS 637: Mod: GY | Performed by: INTERNAL MEDICINE

## 2017-10-12 PROCEDURE — A9502 TC99M TETROFOSMIN: HCPCS | Performed by: INTERNAL MEDICINE

## 2017-10-12 PROCEDURE — 34300033 ZZH RX 343: Performed by: INTERNAL MEDICINE

## 2017-10-12 PROCEDURE — A9270 NON-COVERED ITEM OR SERVICE: HCPCS | Mod: GY | Performed by: INTERNAL MEDICINE

## 2017-10-12 PROCEDURE — 25000132 ZZH RX MED GY IP 250 OP 250 PS 637: Mod: GY | Performed by: NURSE PRACTITIONER

## 2017-10-12 PROCEDURE — A9270 NON-COVERED ITEM OR SERVICE: HCPCS | Mod: GY | Performed by: NURSE PRACTITIONER

## 2017-10-12 PROCEDURE — 40000863 ZZH STATISTIC RADIOLOGY XRAY, US, CT, MAR, NM

## 2017-10-12 PROCEDURE — 93016 CV STRESS TEST SUPVJ ONLY: CPT | Performed by: INTERNAL MEDICINE

## 2017-10-12 PROCEDURE — 85025 COMPLETE CBC W/AUTO DIFF WBC: CPT | Performed by: HOSPITALIST

## 2017-10-12 PROCEDURE — 93005 ELECTROCARDIOGRAM TRACING: CPT

## 2017-10-12 PROCEDURE — 99233 SBSQ HOSP IP/OBS HIGH 50: CPT | Performed by: HOSPITALIST

## 2017-10-12 PROCEDURE — 99233 SBSQ HOSP IP/OBS HIGH 50: CPT | Mod: 25 | Performed by: INTERNAL MEDICINE

## 2017-10-12 PROCEDURE — 93018 CV STRESS TEST I&R ONLY: CPT | Performed by: INTERNAL MEDICINE

## 2017-10-12 PROCEDURE — 25000128 H RX IP 250 OP 636: Performed by: HOSPITALIST

## 2017-10-12 PROCEDURE — 80061 LIPID PANEL: CPT | Performed by: HOSPITALIST

## 2017-10-12 PROCEDURE — 25000132 ZZH RX MED GY IP 250 OP 250 PS 637: Mod: GY | Performed by: HOSPITALIST

## 2017-10-12 PROCEDURE — 36415 COLL VENOUS BLD VENIPUNCTURE: CPT | Performed by: HOSPITALIST

## 2017-10-12 PROCEDURE — 40000275 ZZH STATISTIC RCP TIME EA 10 MIN

## 2017-10-12 PROCEDURE — A9270 NON-COVERED ITEM OR SERVICE: HCPCS | Mod: GY | Performed by: HOSPITALIST

## 2017-10-12 RX ORDER — METOPROLOL SUCCINATE 25 MG/1
25 TABLET, EXTENDED RELEASE ORAL DAILY
Status: DISCONTINUED | OUTPATIENT
Start: 2017-10-12 | End: 2017-10-13 | Stop reason: HOSPADM

## 2017-10-12 RX ORDER — ALBUTEROL SULFATE 90 UG/1
2 AEROSOL, METERED RESPIRATORY (INHALATION) EVERY 5 MIN PRN
Status: DISCONTINUED | OUTPATIENT
Start: 2017-10-12 | End: 2017-10-12

## 2017-10-12 RX ORDER — FUROSEMIDE 10 MG/ML
20 INJECTION INTRAMUSCULAR; INTRAVENOUS ONCE
Status: COMPLETED | OUTPATIENT
Start: 2017-10-12 | End: 2017-10-12

## 2017-10-12 RX ORDER — ACYCLOVIR 200 MG/1
0-1 CAPSULE ORAL
Status: DISCONTINUED | OUTPATIENT
Start: 2017-10-12 | End: 2017-10-12

## 2017-10-12 RX ORDER — FUROSEMIDE 10 MG/ML
20 INJECTION INTRAMUSCULAR; INTRAVENOUS
Status: DISCONTINUED | OUTPATIENT
Start: 2017-10-13 | End: 2017-10-13 | Stop reason: HOSPADM

## 2017-10-12 RX ORDER — GUAR GUM
1 PACKET (EA) ORAL DAILY
Status: DISCONTINUED | OUTPATIENT
Start: 2017-10-12 | End: 2017-10-13 | Stop reason: HOSPADM

## 2017-10-12 RX ORDER — REGADENOSON 0.08 MG/ML
0.4 INJECTION, SOLUTION INTRAVENOUS ONCE
Status: COMPLETED | OUTPATIENT
Start: 2017-10-12 | End: 2017-10-12

## 2017-10-12 RX ORDER — AMINOPHYLLINE 25 MG/ML
50-100 INJECTION, SOLUTION INTRAVENOUS
Status: DISCONTINUED | OUTPATIENT
Start: 2017-10-12 | End: 2017-10-12

## 2017-10-12 RX ADMIN — GABAPENTIN 300 MG: 300 CAPSULE ORAL at 23:48

## 2017-10-12 RX ADMIN — HYDROCODONE BITARTRATE AND ACETAMINOPHEN 1 TABLET: 7.5; 325 TABLET ORAL at 16:46

## 2017-10-12 RX ADMIN — HYDROCODONE BITARTRATE AND ACETAMINOPHEN 1 TABLET: 7.5; 325 TABLET ORAL at 10:15

## 2017-10-12 RX ADMIN — GABAPENTIN 300 MG: 300 CAPSULE ORAL at 08:58

## 2017-10-12 RX ADMIN — METOPROLOL SUCCINATE 25 MG: 25 TABLET, EXTENDED RELEASE ORAL at 14:45

## 2017-10-12 RX ADMIN — FUROSEMIDE 20 MG: 10 INJECTION, SOLUTION INTRAVENOUS at 18:39

## 2017-10-12 RX ADMIN — Medication 1 PACKET: at 17:32

## 2017-10-12 RX ADMIN — GABAPENTIN 300 MG: 300 CAPSULE ORAL at 16:46

## 2017-10-12 RX ADMIN — TETROFOSMIN 10.5 MCI.: 1.38 INJECTION, POWDER, LYOPHILIZED, FOR SOLUTION INTRAVENOUS at 08:00

## 2017-10-12 RX ADMIN — FINASTERIDE 5 MG: 5 TABLET, FILM COATED ORAL at 08:58

## 2017-10-12 RX ADMIN — TAMSULOSIN HYDROCHLORIDE 0.4 MG: 0.4 CAPSULE ORAL at 08:58

## 2017-10-12 RX ADMIN — ONDANSETRON 4 MG: 2 SOLUTION INTRAMUSCULAR; INTRAVENOUS at 16:31

## 2017-10-12 RX ADMIN — OMEPRAZOLE 20 MG: 20 CAPSULE, DELAYED RELEASE ORAL at 14:45

## 2017-10-12 RX ADMIN — HYDROCODONE BITARTRATE AND ACETAMINOPHEN 1 TABLET: 7.5; 325 TABLET ORAL at 23:55

## 2017-10-12 RX ADMIN — TETROFOSMIN 27.6 MCI.: 1.38 INJECTION, POWDER, LYOPHILIZED, FOR SOLUTION INTRAVENOUS at 13:50

## 2017-10-12 RX ADMIN — REGADENOSON 0.4 MG: 0.08 INJECTION, SOLUTION INTRAVENOUS at 13:45

## 2017-10-12 RX ADMIN — HYDROCODONE BITARTRATE AND ACETAMINOPHEN 1 TABLET: 7.5; 325 TABLET ORAL at 02:23

## 2017-10-12 NOTE — PLAN OF CARE
"Problem: Patient Care Overview  Goal: Plan of Care/Patient Progress Review  Outcome: No Change  VSS, c/o \"sacral\" pain (pts baseline), relieved somewhat with po pain meds.  A&OX4.  Up with 1.  In chair all shift \"easier to breath\".  Lungs diminished, crackles in bases.  Using IS to 750.  Voiding in urinal.  NPO all shift for stress test.       "

## 2017-10-12 NOTE — PROGRESS NOTES
[facility]    Hospitalist Progress Note    Date of Service (when I saw the patient): 10/12/2017    Assessment & Plan   Mr. Gomez is a markedly pleasant 89 year old gentleman former smoker with prior recurrent urothelial carcinoma as well as CKD Stage 3, chronic thrombocytopenia, hypertension, dyslipidemia, and chronic degenerative arthritis who was admitted for acute hypoxemic respiratory failure and found to have acute diastolic CHF exacerbation due to progressive, severe acute on chronic mitral regurgitation.    1) Acute diastolic CHF exacerbation due to progressive, severe acute on chronic mitral regurgitation causing acute pulmonary edema and myonecrosis: Mr. Gomez has developed progressively more severe dyspnea and orthopnea. A dobutamine stress echo in 2012 showed mild to moderate MR. He has also had mild epigastric pain. He is not known to have CAD. On this admission Troponins have peaked at 0.06. EKG shows NSR with ectopy. CXR showed bilateral pulmonary edema. Labs have been notable for mild leukocytosis, CKD and chronic thrombocytopenia. Blood cultures show no growth to date.       On admission he was in marked respiratory distress and required BIPAP and received several doses of IV Lasix with significant improvement. TTE showed a possible ruptured mitral valve chord with severe MR. Cardiology was consulted    Overall we suspect acute on chronic MR. He certainly could have had a precipitating ischemic event. He is a poor surgical candidate.    -- Lexiscan today    -- Consideration for IFEANYI and/or catheterization, baering in mind multiple co-morbidities and ZAK that has developed since admission    -- He could possibly be a candidate for a mitral clip    -- He now wishes to be DNR DNI    -- Pulmonary edema seems to have resolved; we are holding further Lasix for now    -- Norvasc and Toprol held at admission; Metoprolol started today at low dose    -- He continues statin    -- He has had  episodes of ectopy and palpitations; we continue telemetry    2) ZAK on CKD 3: After admission Creatinine narda to 1.4 from 1; likely pre-renal. Improved today to 1.3.    -- Closely monitor    3) Mr. Gomez was diagnosed in the past with low grade urothelial carcinoma and had TURBT with nephrostomy tube which was internalized; followed by recurrence and other TURBT. He now declines any further treatments at this point for his cancer unless he develops severe symptoms.    -- He continues Flomax and Proscar    4) Thrombocytopenia: Chronic, seems stable but we must monitor closely. Around 60 k presently.    5) Chronic degenerative arthritis: He continues Neurontin for chronic neuropathic pain as well as prn Norco    DVT Prophylaxis: Pneumatic Compression Devices  Code Status: DNR/DNI    Disposition: Expected discharge in several days.    Josr Harrell MD    Interval History   Had to sleep in a chair overnight due to dyspnea. This morning he says he continues to feel better than at admission. He has intermittent left sided chest pain now that seems to constitute palpitations. This as well as dyspnea are both improved with low dose of narcotics. No other new complaints at this time.    -Data reviewed today: I reviewed all new labs and imaging results over the last 24 hours. I personally reviewed the TTE image(s) showing severe MR (images reviewed with Dr. Means).    Physical Exam   Temp: 99.1  F (37.3  C) Temp src: Oral BP: 117/64 Pulse: 88 Heart Rate: 84 Resp: 22 SpO2: 93 % O2 Device: Oxymizer cannula Oxygen Delivery: 8 LPM  Vitals:    10/10/17 2334 10/11/17 0700 10/12/17 0628   Weight: 74.8 kg (165 lb) 73.9 kg (162 lb 14.7 oz) 70.6 kg (155 lb 10.3 oz)     Vital Signs with Ranges  Temp:  [98.5  F (36.9  C)-99.2  F (37.3  C)] 99.1  F (37.3  C)  Pulse:  [78-88] 88  Heart Rate:  [] 84  Resp:  [20-32] 22  BP: (102-126)/(57-71) 117/64  SpO2:  [88 %-99 %] 93 %  I/O last 3 completed shifts:  In: -   Out:  1275 [Urine:1275]    Constitutional: Alert and oriented to person, place and time; no apparent distress  HEENT: normocephalic moist mucus membranes  Respiratory: lungs clear to auscultation bilaterally this morning  Cardiovascular: Irregular S1 S2 and several loud murmurs on auscultation  GI: abdomen soft non tender non distended bowel sounds positive  Skin: no rash, good turgor  Musculoskeletal: no clubbing, cyanosis or edema  Neuro: EOMI; moves all four extremities  Psych: Appropriate affect, insight and judgment      Medications        fiber modular  1 packet Oral Daily     famotidine  20 mg Intravenous Q24H     finasteride  5 mg Oral Daily     gabapentin  300 mg Oral TID     tamsulosin  0.4 mg Oral Daily       Data     Recent Labs  Lab 10/12/17  0729 10/11/17  1007 10/11/17  0410 10/10/17  2345   WBC 8.9  --  9.2 11.3*   HGB 12.6*  --  13.2* 14.8   MCV 93  --  93 93   PLT 68*  --  63* 74*     --  139 139   POTASSIUM 3.8  --  3.9 3.6   CHLORIDE 106  --  107 106   CO2 25  --  23 25   BUN 32*  --  25 24   CR 1.34*  --  1.42* 1.38*   ANIONGAP 9  --  9 8   GIUSEPPE 8.9  --  8.6 9.0   *  --  137* 131*   TROPI  --  0.054* 0.060* 0.032       No results found for this or any previous visit (from the past 24 hour(s)).

## 2017-10-12 NOTE — PROGRESS NOTES
IV Lexiscan given per protocol. o2 sats low to start. VSS. After injection. Pt states some abdominal pain rated at 4/10.   1412 Pt states abd. Pain is subsiding. Report called to Sj SINGH on floor. Pt to transfer back to room on cart.

## 2017-10-12 NOTE — PROGRESS NOTES
Brief note, full note to follow. I called Mr. Gomez's partner and his daughter this morning at his request and updated them regarding new findings of severe MR, his likely non-candidacy for major heart surgery, his request to be DNR DNI, and our tentative plans today for further cardiac evaluation less invasively with a Lexiscan. I briefly discussed risks and benefits of the Lexiscan. His partner's daughter Nicolasa expressed understanding and agreement with this plan of care and they will come up this afternoon to discuss things further.    Josr Harrell

## 2017-10-12 NOTE — PLAN OF CARE
Problem: Patient Care Overview  Goal: Plan of Care/Patient Progress Review  Outcome: No Change  VSS.Pt. Alert and oriented, up with SBA. Pt did not sleep much throughout the night, he sat up in chair to facilitate ease of breathing. Continues on oximyzer at 8LPM. Tele strip at 0330 showed A-fib, EKG shortly after read NS with PAC's (MD aware). Norco given x1  And heat packs for lower back pain. Pt has remained NPO since midnight for stress test this AM

## 2017-10-12 NOTE — PROGRESS NOTES
[facility]    Cardiology Progress Note    Date of Service: 10/12/2017     Assessment & Plan   Ivan Gomez is a 89 year old male with known MVP,Hypertension, Dyslipidemia, CKD Stage 3, BPH, Thrombocytopenia, Gout, Right femoral hernia, Lumbar Stenosis and knee pain on chronic narcotics. He also has a history of bladder cancer and has undergone TURBT twice, nephrostomy tube placement and internalization and a single session of chemotherapy. He says he did not like it and does not want any further chemotherapy or major surgical interventions     He was admitted on 10/10/2017 with abdominal pain, dyspnea and orthopnea over one month; found to be hypoxic without improvement on high flow oxygen requiring BiPap.      Patient's wife see's Dr. Yao, and he requests to follow up with him as well    1) CHF with preserved EF and progression of MR  Moderate MR on echo 2012-known prolapse  This has progressed with possible cord rupture. Ischemic workup in progress with resting Lexiscan pictures completed. Depending an ischemic workup he will be evaluated for mitraclip  At increased risk for contrast nephropathy should coronary and gram need to be done due to chronic kidney disease/age    CXR showed likely pulmonary edema, but infectious and inflammatory process was also considered. Troponin: flat at 0.06-0.54.   -Received IV Lasix 40 mg x 2 doses and 10mg X1--none scheduled now  Creat up to 1.41--down to 1.34 today  co2 up at 32 (25)  -wt down 10 pounds  I/O -2 liters  Appears euvolemic    2) Hypertension: [PTA: Amlodipine 5 mg daily, Metoprolol XL 25 mg daily)  -initially on hold to due Bi-PAP  Restart metoprolol low dose    3) Hyperlipidemia [PTA: Simvastatin 20 mg daily]  Check LDL  Amlodipine 5mg pta and simvastatin---may benefit from switch to lipitor--lower risk combo    4) CKD, Stage 3: Creatine (1.1-1.5 at baseline).   -1.38->1.42->1.34    5) History of bladder cancer: s/p TURP x 2,  nephrostomy tube and 1 round of chemotherapy    6) question of pafib on tele--EKG showed SR with PAC's--I do not appreciate afib    Shannon Mcintyre, MSN, APRN, CNP  [facility]    Interval History   Less sob  On oximizer, not bipap  Review of Systems:  The Review of Systems is negative other than noted in the HPI    Physical Exam   Temp: 98.3  F (36.8  C) Temp src: Oral BP: 114/66 Pulse: 91 Heart Rate: 83 Resp: 16 SpO2: 95 % O2 Device: Oxymizer cannula Oxygen Delivery: 8 LPM  Vitals:    10/10/17 2334 10/11/17 0700 10/12/17 0628   Weight: 74.8 kg (165 lb) 73.9 kg (162 lb 14.7 oz) 70.6 kg (155 lb 10.3 oz)     Vital Signs with Ranges  Temp:  [98.3  F (36.8  C)-99.1  F (37.3  C)] 98.3  F (36.8  C)  Pulse:  [78-91] 91  Heart Rate:  [78-84] 83  Resp:  [16-31] 16  BP: (102-126)/(57-71) 114/66  SpO2:  [89 %-98 %] 95 %  I/O last 3 completed shifts:  In: -   Out: 1275 [Urine:1275]    Constitutional     alert and oriented, in no acute distress.     Skin     warm and dry to touch    ENT     no pallor or cyanosis    Neck    Supple, JVP normal--difficult to assess    Chest     no tenderness to palpation     Lungs  clear to auscultation     Cardiac  regular rhythm, S1 normal, S2 normal, No S3 or S4, III/VI pansystolic murmur apex to axilla, no rubs    Abdomen     abdomen soft, bowel sounds normoactive, no hepatosplenomegaly    Extremities and Back     no clubbing, cyanosis. No edema observed.        Neurological     no gross motor deficits noted, affect appropriate, oriented to time, person and place.        Medications        fiber modular  1 packet Oral Daily     technetium Tc 99m tetrofosmin 2UD study  3-42 mCi Intravenous every 2 hours     omeprazole  20 mg Oral QAM AC     famotidine  20 mg Intravenous Q24H     finasteride  5 mg Oral Daily     gabapentin  300 mg Oral TID     tamsulosin  0.4 mg Oral Daily          Data:     ROUTINE IP LABS (Last four results)  BMP  Recent Labs  Lab 10/12/17  0729 10/11/17  0415  10/10/17  2345    139 139   POTASSIUM 3.8 3.9 3.6   CHLORIDE 106 107 106   GIUSEPPE 8.9 8.6 9.0   CO2 25 23 25   BUN 32* 25 24   CR 1.34* 1.42* 1.38*   * 137* 131*     CHOLESTEROL/HEPATICNo lab results found in last 7 days.  CBC  Recent Labs  Lab 10/12/17  0729 10/11/17  0410 10/10/17  2345   WBC 8.9 9.2 11.3*   RBC 4.01* 4.12* 4.63   HGB 12.6* 13.2* 14.8   HCT 37.2* 38.2* 42.9   MCV 93 93 93   MCH 31.4 32.0 32.0   MCHC 33.9 34.6 34.5   RDW 13.5 13.5 13.5   PLT 68* 63* 74*     TROP:   Recent Labs  Lab 10/11/17  1007 10/11/17  0410 10/10/17  2345   TROPI 0.054* 0.060* 0.032      BNP:    Recent Labs  Lab 10/10/17  2345   NTBNPI 9149*     INRNo lab results found in last 7 days.  TSH   Date Value Ref Range Status   08/04/2016 1.14 0.40 - 5.00 mU/L Final       EKG results:  Reviewed if available     Imaging:  No results found for this or any previous visit (from the past 24 hour(s)).  Telemetry:    Sinus with pac's

## 2017-10-13 ENCOUNTER — TELEPHONE (OUTPATIENT)
Dept: CARDIOLOGY | Facility: CLINIC | Age: 82
End: 2017-10-13

## 2017-10-13 ENCOUNTER — HOSPITAL ENCOUNTER (INPATIENT)
Facility: CLINIC | Age: 82
LOS: 5 days | Discharge: NURSING, REHAB OR LONG-TERM CARE | DRG: 228 | End: 2017-10-18
Attending: INTERNAL MEDICINE | Admitting: INTERNAL MEDICINE
Payer: MEDICARE

## 2017-10-13 ENCOUNTER — APPOINTMENT (OUTPATIENT)
Dept: SPEECH THERAPY | Facility: CLINIC | Age: 82
DRG: 306 | End: 2017-10-13
Attending: HOSPITALIST
Payer: MEDICARE

## 2017-10-13 ENCOUNTER — APPOINTMENT (OUTPATIENT)
Dept: GENERAL RADIOLOGY | Facility: CLINIC | Age: 82
DRG: 306 | End: 2017-10-13
Attending: HOSPITALIST
Payer: MEDICARE

## 2017-10-13 VITALS
HEIGHT: 67 IN | DIASTOLIC BLOOD PRESSURE: 66 MMHG | SYSTOLIC BLOOD PRESSURE: 111 MMHG | WEIGHT: 155.2 LBS | RESPIRATION RATE: 18 BRPM | OXYGEN SATURATION: 91 % | TEMPERATURE: 98 F | BODY MASS INDEX: 24.36 KG/M2 | HEART RATE: 72 BPM

## 2017-10-13 DIAGNOSIS — I50.9 ACUTE ON CHRONIC HEART FAILURE, UNSPECIFIED HEART FAILURE TYPE (H): ICD-10-CM

## 2017-10-13 DIAGNOSIS — Z98.890 S/P MITRAL VALVE REPAIR: Primary | ICD-10-CM

## 2017-10-13 DIAGNOSIS — I34.0 MITRAL REGURGITATION: Primary | ICD-10-CM

## 2017-10-13 DIAGNOSIS — I25.10 CORONARY ARTERY DISEASE INVOLVING NATIVE CORONARY ARTERY OF NATIVE HEART WITHOUT ANGINA PECTORIS: ICD-10-CM

## 2017-10-13 DIAGNOSIS — I34.0 MITRAL VALVE INSUFFICIENCY, UNSPECIFIED ETIOLOGY: ICD-10-CM

## 2017-10-13 DIAGNOSIS — I34.0 MITRAL REGURGITATION: ICD-10-CM

## 2017-10-13 DIAGNOSIS — I34.0 MITRAL VALVE INSUFFICIENCY, UNSPECIFIED ETIOLOGY: Primary | ICD-10-CM

## 2017-10-13 LAB
ANION GAP SERPL CALCULATED.3IONS-SCNC: 9 MMOL/L (ref 3–14)
BASOPHILS # BLD AUTO: 0 10E9/L (ref 0–0.2)
BASOPHILS NFR BLD AUTO: 0.1 %
BUN SERPL-MCNC: 50 MG/DL (ref 7–30)
CALCIUM SERPL-MCNC: 9 MG/DL (ref 8.5–10.1)
CHLORIDE SERPL-SCNC: 105 MMOL/L (ref 94–109)
CO2 SERPL-SCNC: 26 MMOL/L (ref 20–32)
CREAT SERPL-MCNC: 1.23 MG/DL (ref 0.66–1.25)
DIFFERENTIAL METHOD BLD: ABNORMAL
EOSINOPHIL # BLD AUTO: 0 10E9/L (ref 0–0.7)
EOSINOPHIL NFR BLD AUTO: 0.2 %
ERYTHROCYTE [DISTWIDTH] IN BLOOD BY AUTOMATED COUNT: 13.4 % (ref 10–15)
GFR SERPL CREATININE-BSD FRML MDRD: 55 ML/MIN/1.7M2
GLUCOSE SERPL-MCNC: 115 MG/DL (ref 70–99)
HCT VFR BLD AUTO: 37.9 % (ref 40–53)
HGB BLD-MCNC: 12.7 G/DL (ref 13.3–17.7)
IMM GRANULOCYTES # BLD: 0 10E9/L (ref 0–0.4)
IMM GRANULOCYTES NFR BLD: 0.2 %
LYMPHOCYTES # BLD AUTO: 0.7 10E9/L (ref 0.8–5.3)
LYMPHOCYTES NFR BLD AUTO: 6.5 %
MCH RBC QN AUTO: 31.6 PG (ref 26.5–33)
MCHC RBC AUTO-ENTMCNC: 33.5 G/DL (ref 31.5–36.5)
MCV RBC AUTO: 94 FL (ref 78–100)
MONOCYTES # BLD AUTO: 0.8 10E9/L (ref 0–1.3)
MONOCYTES NFR BLD AUTO: 7.5 %
NEUTROPHILS # BLD AUTO: 8.8 10E9/L (ref 1.6–8.3)
NEUTROPHILS NFR BLD AUTO: 85.5 %
NRBC # BLD AUTO: 0 10*3/UL
NRBC BLD AUTO-RTO: 0 /100
PLATELET # BLD AUTO: 86 10E9/L (ref 150–450)
POTASSIUM SERPL-SCNC: 4 MMOL/L (ref 3.4–5.3)
RBC # BLD AUTO: 4.02 10E12/L (ref 4.4–5.9)
SODIUM SERPL-SCNC: 140 MMOL/L (ref 133–144)
WBC # BLD AUTO: 10.3 10E9/L (ref 4–11)

## 2017-10-13 PROCEDURE — 25000128 H RX IP 250 OP 636: Performed by: INTERNAL MEDICINE

## 2017-10-13 PROCEDURE — 80048 BASIC METABOLIC PNL TOTAL CA: CPT | Performed by: HOSPITALIST

## 2017-10-13 PROCEDURE — A9270 NON-COVERED ITEM OR SERVICE: HCPCS | Mod: GY | Performed by: INTERNAL MEDICINE

## 2017-10-13 PROCEDURE — 99223 1ST HOSP IP/OBS HIGH 75: CPT | Performed by: INTERNAL MEDICINE

## 2017-10-13 PROCEDURE — 25000132 ZZH RX MED GY IP 250 OP 250 PS 637: Mod: GY | Performed by: HOSPITALIST

## 2017-10-13 PROCEDURE — A9270 NON-COVERED ITEM OR SERVICE: HCPCS | Mod: GY | Performed by: HOSPITALIST

## 2017-10-13 PROCEDURE — S0028 INJECTION, FAMOTIDINE, 20 MG: HCPCS | Performed by: INTERNAL MEDICINE

## 2017-10-13 PROCEDURE — 99239 HOSP IP/OBS DSCHRG MGMT >30: CPT | Performed by: HOSPITALIST

## 2017-10-13 PROCEDURE — 25000132 ZZH RX MED GY IP 250 OP 250 PS 637: Mod: GY | Performed by: INTERNAL MEDICINE

## 2017-10-13 PROCEDURE — 85025 COMPLETE CBC W/AUTO DIFF WBC: CPT | Performed by: HOSPITALIST

## 2017-10-13 PROCEDURE — 25000125 ZZHC RX 250: Performed by: INTERNAL MEDICINE

## 2017-10-13 PROCEDURE — 40000275 ZZH STATISTIC RCP TIME EA 10 MIN

## 2017-10-13 PROCEDURE — A9270 NON-COVERED ITEM OR SERVICE: HCPCS | Mod: GY | Performed by: NURSE PRACTITIONER

## 2017-10-13 PROCEDURE — 99233 SBSQ HOSP IP/OBS HIGH 50: CPT | Performed by: INTERNAL MEDICINE

## 2017-10-13 PROCEDURE — 40000225 ZZH STATISTIC SLP WARD VISIT: Performed by: SPEECH-LANGUAGE PATHOLOGIST

## 2017-10-13 PROCEDURE — 21400006 ZZH R&B CCU INTERMEDIATE UMMC

## 2017-10-13 PROCEDURE — 92526 ORAL FUNCTION THERAPY: CPT | Mod: GN | Performed by: SPEECH-LANGUAGE PATHOLOGIST

## 2017-10-13 PROCEDURE — 71020 XR CHEST 2 VW: CPT

## 2017-10-13 PROCEDURE — 36415 COLL VENOUS BLD VENIPUNCTURE: CPT | Performed by: HOSPITALIST

## 2017-10-13 PROCEDURE — 92610 EVALUATE SWALLOWING FUNCTION: CPT | Mod: GN | Performed by: SPEECH-LANGUAGE PATHOLOGIST

## 2017-10-13 PROCEDURE — 25000132 ZZH RX MED GY IP 250 OP 250 PS 637: Mod: GY | Performed by: NURSE PRACTITIONER

## 2017-10-13 RX ORDER — GABAPENTIN 300 MG/1
300 CAPSULE ORAL 3 TIMES DAILY
Status: DISCONTINUED | OUTPATIENT
Start: 2017-10-14 | End: 2017-10-18 | Stop reason: HOSPADM

## 2017-10-13 RX ORDER — SODIUM CHLORIDE 9 MG/ML
INJECTION, SOLUTION INTRAVENOUS CONTINUOUS
Status: CANCELLED | OUTPATIENT
Start: 2017-10-13

## 2017-10-13 RX ORDER — HEPARIN SODIUM 5000 [USP'U]/.5ML
5000 INJECTION, SOLUTION INTRAVENOUS; SUBCUTANEOUS EVERY 12 HOURS
Status: DISCONTINUED | OUTPATIENT
Start: 2017-10-14 | End: 2017-10-18 | Stop reason: HOSPADM

## 2017-10-13 RX ORDER — FUROSEMIDE 10 MG/ML
20 INJECTION INTRAMUSCULAR; INTRAVENOUS ONCE
Status: COMPLETED | OUTPATIENT
Start: 2017-10-13 | End: 2017-10-13

## 2017-10-13 RX ORDER — AMOXICILLIN 250 MG
1-2 CAPSULE ORAL 2 TIMES DAILY
Status: DISCONTINUED | OUTPATIENT
Start: 2017-10-13 | End: 2017-10-18 | Stop reason: HOSPADM

## 2017-10-13 RX ORDER — NALOXONE HYDROCHLORIDE 0.4 MG/ML
.1-.4 INJECTION, SOLUTION INTRAMUSCULAR; INTRAVENOUS; SUBCUTANEOUS
Status: DISCONTINUED | OUTPATIENT
Start: 2017-10-13 | End: 2017-10-16

## 2017-10-13 RX ORDER — LIDOCAINE 40 MG/G
CREAM TOPICAL
Status: DISCONTINUED | OUTPATIENT
Start: 2017-10-13 | End: 2017-10-15

## 2017-10-13 RX ORDER — METOPROLOL SUCCINATE 25 MG/1
25 TABLET, EXTENDED RELEASE ORAL DAILY
Status: DISCONTINUED | OUTPATIENT
Start: 2017-10-14 | End: 2017-10-18 | Stop reason: HOSPADM

## 2017-10-13 RX ORDER — SIMVASTATIN 20 MG
20 TABLET ORAL AT BEDTIME
Status: DISCONTINUED | OUTPATIENT
Start: 2017-10-13 | End: 2017-10-18 | Stop reason: HOSPADM

## 2017-10-13 RX ORDER — CLINDAMYCIN PHOSPHATE 900 MG/50ML
900 INJECTION, SOLUTION INTRAVENOUS
Status: CANCELLED | OUTPATIENT
Start: 2017-10-13

## 2017-10-13 RX ORDER — FUROSEMIDE 10 MG/ML
20 INJECTION INTRAMUSCULAR; INTRAVENOUS
Qty: 60 ML | Status: ON HOLD | DISCHARGE
Start: 2017-10-13 | End: 2017-10-18

## 2017-10-13 RX ORDER — FINASTERIDE 5 MG/1
5 TABLET, FILM COATED ORAL DAILY
Status: DISCONTINUED | OUTPATIENT
Start: 2017-10-14 | End: 2017-10-18 | Stop reason: HOSPADM

## 2017-10-13 RX ORDER — PROCHLORPERAZINE 25 MG
12.5 SUPPOSITORY, RECTAL RECTAL EVERY 12 HOURS PRN
Status: DISCONTINUED | OUTPATIENT
Start: 2017-10-13 | End: 2017-10-18 | Stop reason: HOSPADM

## 2017-10-13 RX ORDER — PANTOPRAZOLE SODIUM 40 MG/1
40 TABLET, DELAYED RELEASE ORAL DAILY
Status: DISCONTINUED | OUTPATIENT
Start: 2017-10-14 | End: 2017-10-18 | Stop reason: HOSPADM

## 2017-10-13 RX ORDER — LIDOCAINE 40 MG/G
CREAM TOPICAL
Status: CANCELLED | OUTPATIENT
Start: 2017-10-13

## 2017-10-13 RX ORDER — PROCHLORPERAZINE MALEATE 5 MG
5 TABLET ORAL EVERY 6 HOURS PRN
Status: DISCONTINUED | OUTPATIENT
Start: 2017-10-13 | End: 2017-10-18 | Stop reason: HOSPADM

## 2017-10-13 RX ORDER — AMOXICILLIN 250 MG
2 CAPSULE ORAL 2 TIMES DAILY
Status: DISCONTINUED | OUTPATIENT
Start: 2017-10-13 | End: 2017-10-13 | Stop reason: HOSPADM

## 2017-10-13 RX ORDER — TAMSULOSIN HYDROCHLORIDE 0.4 MG/1
0.4 CAPSULE ORAL DAILY
Status: DISCONTINUED | OUTPATIENT
Start: 2017-10-14 | End: 2017-10-18 | Stop reason: HOSPADM

## 2017-10-13 RX ORDER — BISACODYL 10 MG
10 SUPPOSITORY, RECTAL RECTAL DAILY PRN
Status: DISCONTINUED | OUTPATIENT
Start: 2017-10-13 | End: 2017-10-13 | Stop reason: HOSPADM

## 2017-10-13 RX ORDER — BISACODYL 10 MG
10 SUPPOSITORY, RECTAL RECTAL DAILY PRN
Status: DISCONTINUED | OUTPATIENT
Start: 2017-10-13 | End: 2017-10-18 | Stop reason: HOSPADM

## 2017-10-13 RX ORDER — POLYETHYLENE GLYCOL 3350 17 G/17G
17 POWDER, FOR SOLUTION ORAL DAILY PRN
Status: DISCONTINUED | OUTPATIENT
Start: 2017-10-13 | End: 2017-10-18 | Stop reason: HOSPADM

## 2017-10-13 RX ORDER — FUROSEMIDE 10 MG/ML
20 INJECTION INTRAMUSCULAR; INTRAVENOUS ONCE
Status: COMPLETED | OUTPATIENT
Start: 2017-10-13 | End: 2017-10-14

## 2017-10-13 RX ORDER — ASPIRIN 325 MG
325 TABLET ORAL ONCE
Status: CANCELLED | OUTPATIENT
Start: 2017-10-13

## 2017-10-13 RX ORDER — HYDROCODONE BITARTRATE AND ACETAMINOPHEN 7.5; 325 MG/1; MG/1
1 TABLET ORAL EVERY 6 HOURS PRN
Status: DISCONTINUED | OUTPATIENT
Start: 2017-10-13 | End: 2017-10-18 | Stop reason: HOSPADM

## 2017-10-13 RX ORDER — ONDANSETRON 2 MG/ML
4 INJECTION INTRAMUSCULAR; INTRAVENOUS EVERY 6 HOURS PRN
Status: DISCONTINUED | OUTPATIENT
Start: 2017-10-13 | End: 2017-10-18 | Stop reason: HOSPADM

## 2017-10-13 RX ORDER — ONDANSETRON 4 MG/1
4 TABLET, ORALLY DISINTEGRATING ORAL EVERY 6 HOURS PRN
Status: DISCONTINUED | OUTPATIENT
Start: 2017-10-13 | End: 2017-10-18 | Stop reason: HOSPADM

## 2017-10-13 RX ORDER — SIMVASTATIN 20 MG
20 TABLET ORAL AT BEDTIME
Status: DISCONTINUED | OUTPATIENT
Start: 2017-10-13 | End: 2017-10-13 | Stop reason: HOSPADM

## 2017-10-13 RX ADMIN — OMEPRAZOLE 20 MG: 20 CAPSULE, DELAYED RELEASE ORAL at 09:17

## 2017-10-13 RX ADMIN — TAMSULOSIN HYDROCHLORIDE 0.4 MG: 0.4 CAPSULE ORAL at 09:18

## 2017-10-13 RX ADMIN — GABAPENTIN 300 MG: 300 CAPSULE ORAL at 09:17

## 2017-10-13 RX ADMIN — HYDROCODONE BITARTRATE AND ACETAMINOPHEN 1 TABLET: 7.5; 325 TABLET ORAL at 20:58

## 2017-10-13 RX ADMIN — FINASTERIDE 5 MG: 5 TABLET, FILM COATED ORAL at 09:16

## 2017-10-13 RX ADMIN — HYDROCODONE BITARTRATE AND ACETAMINOPHEN 1 TABLET: 7.5; 325 TABLET ORAL at 06:11

## 2017-10-13 RX ADMIN — HYDROCODONE BITARTRATE AND ACETAMINOPHEN 1 TABLET: 7.5; 325 TABLET ORAL at 13:02

## 2017-10-13 RX ADMIN — FUROSEMIDE 20 MG: 10 INJECTION, SOLUTION INTRAVENOUS at 17:02

## 2017-10-13 RX ADMIN — GABAPENTIN 300 MG: 300 CAPSULE ORAL at 17:02

## 2017-10-13 RX ADMIN — FUROSEMIDE 20 MG: 10 INJECTION, SOLUTION INTRAVENOUS at 09:18

## 2017-10-13 RX ADMIN — POTASSIUM CHLORIDE 20 MEQ: 1500 TABLET, EXTENDED RELEASE ORAL at 17:02

## 2017-10-13 RX ADMIN — FUROSEMIDE 20 MG: 10 INJECTION, SOLUTION INTRAVENOUS at 13:02

## 2017-10-13 RX ADMIN — METOPROLOL SUCCINATE 25 MG: 25 TABLET, EXTENDED RELEASE ORAL at 09:18

## 2017-10-13 RX ADMIN — FAMOTIDINE 20 MG: 10 INJECTION, SOLUTION INTRAVENOUS at 05:22

## 2017-10-13 NOTE — IP AVS SNAPSHOT
"    UNIT [address] [facility]: [phone]                                              INTERAGENCY TRANSFER FORM - PHYSICIAN ORDERS   10/13/2017                    Hospital Admission Date: 10/13/2017  MALLORIE NOVA   : 10/12/1926  Sex: Male        Attending Provider: Tevin Meza MD     Allergies:  Celebrex [Celecoxib], Penicillins    Infection:  MRSA   Service:  CARDIOLOGY    Ht:  1.702 m (5' 7\")   Wt:  67.3 kg (148 lb 5.9 oz)   Admission Wt:  70.6 kg (155 lb 9.6 oz)    BMI:  23.24 kg/m 2   BSA:  1.78 m 2            Patient PCP Information     Provider PCP Type    Evaristo Magaña MD General      ED Clinical Impression     Diagnosis Description Comment Added By Time Added    Mitral valve insufficiency, unspecified etiology [I34.0] Mitral valve insufficiency, unspecified etiology [I34.0]  Karel Boles RN 10/15/2017 12:04 PM    Mitral regurgitation [I34.0] Mitral regurgitation [I34.0]  Epic, User[employer] 10/16/2017  7:45 AM    S/P mitral valve repair [Z98.890] S/P mitral valve repair [Z98.890]  Mirela Coleman, APRN CNP 10/18/2017 11:52 AM    Acute on chronic heart failure, unspecified heart failure type (H) [I50.9] Acute on chronic heart failure, unspecified heart failure type (H) [I50.9]  Mirela Coleman, APRN CNP 10/18/2017 12:00 PM    Coronary artery disease involving native coronary artery of native heart without angina pectoris [I25.10] Coronary artery disease involving native coronary artery of native heart without angina pectoris [I25.10]  Mirela Coleman, APRN CNP 10/18/2017 12:01 PM      Hospital Problems as of 10/18/2017              Priority Class Noted POA    Heart failure (H) Medium  10/13/2017 Yes    Mitral regurgitation Medium  10/16/2017 Yes      Non-Hospital Problems as of 10/18/2017              Priority Class Noted    Arthritis Medium  Unknown    Lumbar spinal stenosis Medium  2012    Chronic narcotic dependence (HCC) Medium  10/10/2013    Thrombocytopenia (H) Medium  " 7/28/2014    CKD (chronic kidney disease) stage 3, GFR 30-59 ml/min Medium  7/28/2014    Knee pain Medium  9/3/2014    Advanced directives, counseling/discussion Medium  12/23/2014    Glucose intolerance (impaired glucose tolerance) Medium  8/18/2015    Chronic pain syndrome Medium  9/24/2015    Mitral valvular regurgitation -aortic sclerosis - systolic murmur Medium  9/29/2015    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  Medium  10/26/2015    BPH (benign prostatic hypertrophy) Medium  10/26/2015    Bladder tumor Medium  9/2/2016    Benign essential hypertension Medium  9/14/2016    Hyperlipidemia LDL goal <100 Medium  11/30/2016    Acute idiopathic gout of right foot Medium  3/23/2017    Femoral hernia of right side Medium  5/9/2017    Drug-induced constipation Medium  5/9/2017    Acute respiratory failure with hypoxia (H) Medium  10/11/2017      Code Status History     Date Active Date Inactive Code Status Order ID Comments User Context    10/13/2017  3:31 PM 10/13/2017 10:32 PM DNR/DNI [id number]  Josr Harrell MD Outpatient    10/11/2017  5:47 PM 10/13/2017  3:31 PM DNR/DNI [id number]  Josr Harrell MD Inpatient    10/11/2017  2:00 AM 10/11/2017  5:47 PM Full Code [id number]  Sean Verde III, MD Inpatient    9/2/2016  1:25 PM 9/8/2016  3:25 PM Full Code [id number]  Irina Brown MD Inpatient    8/22/2015  8:30 AM 9/2/2016  1:25 PM Full Code [id number]  Evaristo King MD Outpatient    8/21/2015  2:36 PM 8/22/2015  8:30 AM Full Code [id number]  Magen Solis MD Inpatient    8/21/2015  2:36 PM 8/21/2015  2:36 PM Full Code [id number]  Dennis Hilton MD Inpatient    1/4/2012  4:13 PM 8/21/2015  2:36 PM Full Code [id number]  Cecil Iqbal MD Outpatient    1/3/2012  1:49 PM 1/4/2012  4:13 PM Full Code [id number]  Cecil Iqbal MD Inpatient         Medication Review      START taking        Dose /  Directions Comments    aspirin 81 MG EC tablet   Used for:  S/P mitral valve repair        Dose:  81 mg   Take 1 tablet (81 mg) by mouth daily   Refills:  0        furosemide 20 MG tablet   Commonly known as:  LASIX   Replaces:  furosemide 10 MG/ML injection        Dose:  20 mg   Take 1 tablet (20 mg) by mouth 2 times daily   Quantity:  30 tablet   Refills:  0        hydrALAZINE 25 MG tablet   Commonly known as:  APRESOLINE        Dose:  37.5 mg   Take 1.5 tablets (37.5 mg) by mouth every 8 hours   Quantity:  60 tablet   Refills:  0        isosorbide dinitrate 20 MG tablet   Commonly known as:  ISORDIL   Used for:  Coronary artery disease involving native coronary artery of native heart without angina pectoris        Dose:  20 mg   Take 1 tablet (20 mg) by mouth 3 times daily   Refills:  0        order for DME        O2 by nasal cannula continuous 3 L/min to maintain O2 saturations > 92%   Quantity:  1 Can   Refills:  1          CONTINUE these medications which have NOT CHANGED        Dose / Directions Comments    fiber modular packet        Dose:  1 packet   1 packet daily   Refills:  0        finasteride 5 MG tablet   Commonly known as:  PROSCAR   Used for:  Benign enlargement of prostate        TAKE 1 TABLET BY MOUTH EVERY DAY   Quantity:  90 tablet   Refills:  3        gabapentin 300 MG capsule   Commonly known as:  NEURONTIN   Used for:  Lumbar spinal stenosis        TAKE 1 CAPSULE BY MOUTH THREE TIMES DAILY   Quantity:  270 capsule   Refills:  1        HYDROcodone-acetaminophen 7.5-325 MG per tablet   Commonly known as:  NORCO   Used for:  Lumbar spinal stenosis        Dose:  1 tablet   Take 1 tablet by mouth every 6 hours as needed for moderate to severe pain   Quantity:  120 tablet   Refills:  0        metoprolol 25 MG 24 hr tablet   Commonly known as:  TOPROL-XL   Used for:  Benign essential hypertension        TAKE 1 TABLET BY MOUTH DAILY   Quantity:  90 tablet   Refills:  3        pantoprazole 40 MG EC  tablet   Commonly known as:  PROTONIX   Used for:  Gastroesophageal reflux disease, esophagitis presence not specified        Dose:  40 mg   Take 1 tablet (40 mg) by mouth daily Take 30-60 minutes before a meal.   Quantity:  30 tablet   Refills:  1        simvastatin 20 MG tablet   Commonly known as:  ZOCOR   Used for:  Hyperlipidemia LDL goal <100        TAKE 1 TABLET BY MOUTH AT BEDTIME   Quantity:  90 tablet   Refills:  1        tamsulosin 0.4 MG capsule   Commonly known as:  FLOMAX   Used for:  BPH (benign prostatic hyperplasia)        Dose:  0.4 mg   Take 1 capsule (0.4 mg) by mouth daily   Quantity:  90 capsule   Refills:  0          STOP taking     furosemide 10 MG/ML injection   Commonly known as:  LASIX   Replaced by:  furosemide 20 MG tablet                   Summary of Visit     Reason for your hospital stay       Ivan Gomez is a 89 y.o. male with a history of heart failure with preserved EF, CKD III, bladder cancer s/p TURBT x2, GERD, DJD, HTN, and HLD. The patient presented to the hospital for consideration of Mitral-Clip procedure in the setting of acute hypoxic respiratory failure secondary to new MR and HFpEF. The patient underwent successful MitraClip procedure with significant reduction in mitral regurgitation.             After Care     Activity - Up ad marcy           Advance Diet as Tolerated       Follow this diet upon discharge: Regular       Daily weights       Call Provider for weight gain of more than 2 pounds per day or 5 pounds per week.       Fall precautions           General info for SNF       Length of Stay Estimate: Short Term Care: Estimated # of Days <30  Condition at Discharge: Improving/Stable  Level of care: Skilled   Rehabilitation Potential: Good  Admission H&P remains valid and up-to-date: Yes  Recent Chemotherapy: N/A  Use Nursing Home Standing Orders: Yes       Intake and output       Every shift       Mantoux instructions       Give two-step Mantoux (PPD) Per Facility  Policy Yes       Wound care (specify)       Site: Incision sites.  Instructions:  Monitor for any s/s infection.             Procedures     Oxygen - Nasal cannula       O2 as needed by nasal cannula to keep O2 sats 92% or greater.             Referrals     Occupational Therapy Adult Consult       Evaluate and treat as clinically indicated.    Reason:  Deconditioning/Frailty.       Physical Therapy Adult Consult       Evaluate and treat as clinically indicated.    Reason:  Deconditioning/Frailty.             Other Orders     Contact Isolation                 Supplies     Pneumatic Compression Device        Bilateral calf. Remove 30 mins BID.             Your next 10 appointments already scheduled     Sep 26, 2018  1:30 PM CDT   Return Visit with Dennis Hilton MD   [facility] ([facility])    [facility]  [address]  [address]  [facility] [address] [phone]              Follow-Up Appointment Instructions     Future Labs/Procedures    Follow Up and recommended labs and tests     Comments:    Follow up with Nursing home physician.  Follow-up labs/tests as needed.      Follow-Up Appointment Instructions     Follow Up and recommended labs and tests       Follow up with Nursing home physician.  Follow-up labs/tests as needed.             Statement of Approval     Ordered          10/18/17 1304  I have reviewed and agree with all the recommendations and orders detailed in this document.  EFFECTIVE NOW     Approved and electronically signed by:  Mirela Coleman APRN CNP

## 2017-10-13 NOTE — PLAN OF CARE
Problem: Patient Care Overview  Goal: Plan of Care/Patient Progress Review  Outcome: Improving  A/Ox4, AVSS. 10-15L oxymizer canula present to maintain sats above 90%. tolerating mod cho diet, A-1 with walker. LS diminished with crackles in lower bases. BS hypoactive, passing flatus, no BM. abd distended, activity encouraged. Pulses +1, CMS intact. Pain controlled with norco. meplex to coccyx as preventative measure. Plan for [facility] to [facility] the [facility].

## 2017-10-13 NOTE — PROGRESS NOTES
Date: 10/13/2017    Time of Call: 2:42 PM     Diagnosis:  Severe mitral regurgitation     [ TORB ] Ordering provider: Eber Monroe MD  Order:   MitraClip pre procedure orders  IFEANYI, interventional     Order received by: Kelsey Forbes RN     Follow-up/additional notes:   Orders entered for MitraClip on Monday Oct. 16.

## 2017-10-13 NOTE — IP AVS SNAPSHOT
MRN:[id number]                      After Visit Summary   10/13/2017    Ivan Gomez    MRN: [id number]           Thank you!     Thank you for choosing [facility] for your care. Our goal is always to provide you with excellent care. Hearing back from our patients is one way we can continue to improve our services. Please take a few minutes to complete the written survey that you may receive in the mail after you visit with us. Thank you!        Patient Information     Date Of Birth          10/12/1926        About your hospital stay     You were admitted on:  October 13, 2017 You last received care in the:  [address] [facility]    You were discharged on:  October 18, 2017        Reason for your hospital stay       Ivan Gomez is a 89 y.o. male with a history of heart failure with preserved EF, CKD III, bladder cancer s/p TURBT x2, GERD, DJD, HTN, and HLD. The patient presented to the hospital for consideration of Mitral-Clip procedure in the setting of acute hypoxic respiratory failure secondary to new MR and HFpEF. The patient underwent successful MitraClip procedure with significant reduction in mitral regurgitation.                  Who to Call     For medical emergencies, please call 911.  For non-urgent questions about your medical care, please call your primary care provider or clinic, [phone]  For questions related to your surgery, please call your surgery clinic        Attending Provider     Provider Specialty    Tevin Meaz MD Cardiology       Primary Care Provider Office Phone # Fax #    Evaristo Magaña [phone]      After Care Instructions     Activity - Up ad marcy           Advance Diet as Tolerated       Follow this diet upon discharge: Regular            Daily weights       Call Provider for weight gain of more than 2 pounds per day or 5 pounds per week.            Fall precautions           General info for SNF       Length of Stay  Estimate: Short Term Care: Estimated # of Days <30  Condition at Discharge: Improving/Stable  Level of care: Skilled   Rehabilitation Potential: Good  Admission H&P remains valid and up-to-date: Yes  Recent Chemotherapy: N/A  Use Nursing Home Standing Orders: Yes            Intake and output       Every shift            Mantoux instructions       Give two-step Mantoux (PPD) Per Facility Policy Yes            Oxygen - Nasal cannula       O2 as needed by nasal cannula to keep O2 sats 92% or greater.            Wound care (specify)       Site: Incision sites.  Instructions:  Monitor for any s/s infection.                  Follow-up Appointments     Follow Up and recommended labs and tests       Follow up with Nursing home physician.  Follow-up labs/tests as needed.                  Your next 10 appointments already scheduled     Sep 26, 2018  1:30 PM CDT   Return Visit with Dennis Hilton MD   [facility]    [facility]  [address]  [address]  [facility] [address]   [phone]              Additional Services     Occupational Therapy Adult Consult       Evaluate and treat as clinically indicated.    Reason:  Deconditioning/Frailty.            Physical Therapy Adult Consult       Evaluate and treat as clinically indicated.    Reason:  Deconditioning/Frailty.                  Future tests that were ordered for you     Contact Isolation           Pneumatic Compression Device        Bilateral calf. Remove 30 mins BID.                  Pending Results     No orders found from 10/11/2017 to 10/14/2017.            Statement of Approval     Ordered          10/18/17 1304  I have reviewed and agree with all the recommendations and orders detailed in this document.  EFFECTIVE NOW     Approved and electronically signed by:  Mirela Coleman APRN CNP             Admission Information     Date & Time Provider Department Dept. Phone     "10/13/2017 Tevin Meza MD [address] [facility] [phone]      Your Vitals Were     Blood Pressure Temperature Respirations Height Weight Pulse Oximetry    111/62 (BP Location: Left arm) 98.1  F (36.7  C) (Oral) 16 1.702 m (5' 7\") 67.3 kg (148 lb 5.9 oz) 92%    BMI (Body Mass Index)                   23.24 kg/m2           [employer] Information     [employer] gives you secure access to your electronic health record. If you see a primary care provider, you can also send messages to your care team and make appointments. If you have questions, please call your primary care clinic.  If you do not have a primary care provider, please call [phone] and they will assist you.        Care EveryWhere ID     This is your Care EveryWhere ID. This could be used by other organizations to access your [place] medical records  [id number]        Equal Access to Services     FARZANEH WILDER : Marga Urbina, brett dobson, abiel amador, perri escobedo. So [facility] [phone].    ATENCIÓN: Si habla español, tiene a rubio disposición servicios gratuitos de asistencia lingüística. Llame al [phone].    We comply with applicable federal civil rights laws and [place] laws. We do not discriminate on the basis of race, color, national origin, age, disability, sex, sexual orientation, or gender identity.               Review of your medicines      START taking        Dose / Directions    aspirin 81 MG EC tablet   Used for:  S/P mitral valve repair        Dose:  81 mg   Take 1 tablet (81 mg) by mouth daily   Refills:  0       furosemide 20 MG tablet   Commonly known as:  LASIX   Replaces:  furosemide 10 MG/ML injection        Dose:  20 mg   Take 1 tablet (20 mg) by mouth 2 times daily   Quantity:  30 tablet   Refills:  0       hydrALAZINE 25 MG tablet   Commonly known as:  APRESOLINE        Dose:  37.5 mg   Take 1.5 tablets (37.5 mg) by mouth every 8 hours   Quantity:  60 " tablet   Refills:  0       isosorbide dinitrate 20 MG tablet   Commonly known as:  ISORDIL   Used for:  Coronary artery disease involving native coronary artery of native heart without angina pectoris        Dose:  20 mg   Take 1 tablet (20 mg) by mouth 3 times daily   Refills:  0       order for DME        O2 by nasal cannula continuous 3 L/min to maintain O2 saturations > 92%   Quantity:  1 Can   Refills:  1         CONTINUE these medicines which have NOT CHANGED        Dose / Directions    fiber modular packet        Dose:  1 packet   1 packet daily   Refills:  0       finasteride 5 MG tablet   Commonly known as:  PROSCAR   Used for:  Benign enlargement of prostate        TAKE 1 TABLET BY MOUTH EVERY DAY   Quantity:  90 tablet   Refills:  3       gabapentin 300 MG capsule   Commonly known as:  NEURONTIN   Used for:  Lumbar spinal stenosis        TAKE 1 CAPSULE BY MOUTH THREE TIMES DAILY   Quantity:  270 capsule   Refills:  1       HYDROcodone-acetaminophen 7.5-325 MG per tablet   Commonly known as:  NORCO   Used for:  Lumbar spinal stenosis        Dose:  1 tablet   Take 1 tablet by mouth every 6 hours as needed for moderate to severe pain   Quantity:  120 tablet   Refills:  0       metoprolol 25 MG 24 hr tablet   Commonly known as:  TOPROL-XL   Used for:  Benign essential hypertension        TAKE 1 TABLET BY MOUTH DAILY   Quantity:  90 tablet   Refills:  3       pantoprazole 40 MG EC tablet   Commonly known as:  PROTONIX   Used for:  Gastroesophageal reflux disease, esophagitis presence not specified        Dose:  40 mg   Take 1 tablet (40 mg) by mouth daily Take 30-60 minutes before a meal.   Quantity:  30 tablet   Refills:  1       simvastatin 20 MG tablet   Commonly known as:  ZOCOR   Used for:  Hyperlipidemia LDL goal <100        TAKE 1 TABLET BY MOUTH AT BEDTIME   Quantity:  90 tablet   Refills:  1       tamsulosin 0.4 MG capsule   Commonly known as:  FLOMAX   Used for:  BPH (benign prostatic hyperplasia)         Dose:  0.4 mg   Take 1 capsule (0.4 mg) by mouth daily   Quantity:  90 capsule   Refills:  0         STOP taking     furosemide 10 MG/ML injection   Commonly known as:  LASIX   Replaced by:  furosemide 20 MG tablet                Where to get your medicines      Some of these will need a paper prescription and others can be bought over the counter. Ask your nurse if you have questions.     Bring a paper prescription for each of these medications     order for DME       You don't need a prescription for these medications     aspirin 81 MG EC tablet    furosemide 20 MG tablet    hydrALAZINE 25 MG tablet    isosorbide dinitrate 20 MG tablet                Protect others around you: Learn how to safely use, store and throw away your medicines at www.disposemymeds.org.             Medication List: This is a list of all your medications and when to take them. Check marks below indicate your daily home schedule. Keep this list as a reference.      Medications           Morning Afternoon Evening Bedtime As Needed    aspirin 81 MG EC tablet   Take 1 tablet (81 mg) by mouth daily   Last time this was given:  81 mg on 10/18/2017  7:57 AM                                fiber modular packet   1 packet daily                                finasteride 5 MG tablet   Commonly known as:  PROSCAR   TAKE 1 TABLET BY MOUTH EVERY DAY   Last time this was given:  5 mg on 10/18/2017  9:18 AM                                furosemide 20 MG tablet   Commonly known as:  LASIX   Take 1 tablet (20 mg) by mouth 2 times daily   Last time this was given:  20 mg on 10/18/2017  7:56 AM                                gabapentin 300 MG capsule   Commonly known as:  NEURONTIN   TAKE 1 CAPSULE BY MOUTH THREE TIMES DAILY   Last time this was given:  300 mg on 10/18/2017  2:07 PM                                hydrALAZINE 25 MG tablet   Commonly known as:  APRESOLINE   Take 1.5 tablets (37.5 mg) by mouth every 8 hours   Last time this was given:  37.5  mg on 10/18/2017  7:57 AM                                HYDROcodone-acetaminophen 7.5-325 MG per tablet   Commonly known as:  NORCO   Take 1 tablet by mouth every 6 hours as needed for moderate to severe pain   Last time this was given:  1 tablet on 10/18/2017  2:50 PM                                isosorbide dinitrate 20 MG tablet   Commonly known as:  ISORDIL   Take 1 tablet (20 mg) by mouth 3 times daily   Last time this was given:  20 mg on 10/18/2017  2:07 PM                                metoprolol 25 MG 24 hr tablet   Commonly known as:  TOPROL-XL   TAKE 1 TABLET BY MOUTH DAILY   Last time this was given:  25 mg on 10/18/2017  7:58 AM                                order for DME   O2 by nasal cannula continuous 3 L/min to maintain O2 saturations > 92%                                pantoprazole 40 MG EC tablet   Commonly known as:  PROTONIX   Take 1 tablet (40 mg) by mouth daily Take 30-60 minutes before a meal.   Last time this was given:  40 mg on 10/18/2017  7:58 AM                                simvastatin 20 MG tablet   Commonly known as:  ZOCOR   TAKE 1 TABLET BY MOUTH AT BEDTIME   Last time this was given:  20 mg on 10/17/2017  8:11 PM                                tamsulosin 0.4 MG capsule   Commonly known as:  FLOMAX   Take 1 capsule (0.4 mg) by mouth daily   Last time this was given:  0.4 mg on 10/18/2017  7:57 AM

## 2017-10-13 NOTE — PROGRESS NOTES
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 Admitted with CHF. Net negative 2.6L with continued pulmonary congestion and prerenal azotemia.    Atypical epigastric discomfort more likely GI in etiology than ischemic. Discomfort improved with food/worse with empty stomach rather than exertional however also had similar discomfort with lexiscan (which can also cause GI distress, however). Prilosec started. Checking hemoccult with increasing BUN/normalizing Cr though most likely on the basis of prerenal azotemia.    Nuc stress with small anteroapical ischemia- would manage medically.  On beta-blocker, could add statin. No aspirin dt poss GI etiol of discomfort.    Plan likely mitraclip- discussed with Dr. Monroe. Consideration would be for transfer to [place] for possible procedure on Monday. Will d/w Mechelle Morales. Discussed with patient.                Discomfort improved with food/worse with empty stomach rather than exertional however also had similar discomfort with lexiscan (which can also  "cause GI distress, however). Prilosec started.    He is still requiring oxygen. +orthopnea. Net -~2L. Cr stable but with increasing prerenal pattern (BUN 32).  Discussed IFEANYI/mitraclip at length with patient and family who verbalized understanding and would like to pursue this option           Review of Systems:   The Review of Systems is negative other than noted in the HPI.           Medications:       senna-docusate  2 tablet Oral BID     fiber modular  1 packet Oral Daily     omeprazole  20 mg Oral QAM AC     metoprolol  25 mg Oral Daily     furosemide  20 mg Intravenous BID     famotidine  20 mg Intravenous Q24H     finasteride  5 mg Oral Daily     gabapentin  300 mg Oral TID     tamsulosin  0.4 mg Oral Daily             Physical Exam:   Most recent vitals:   Blood pressure 115/65, pulse 72, temperature 98.3  F (36.8  C), temperature source Oral, resp. rate 16, height 1.702 m (5' 7\"), weight 70.4 kg (155 lb 3.3 oz), SpO2 94 %.      Vital Sign Ranges  Temperature Temp  Av.3  F (36.8  C)  Min: 97.8  F (36.6  C)  Max: 98.9  F (37.2  C)   Blood pressure Systolic (24hrs), Av , Min:104 , Max:127        Diastolic (24hrs), Av, Min:55, Max:72      Pulse Pulse  Av  Min: 72  Max: 102   Respirations Resp  Av.8  Min: 16  Max: 24   Pulse oximetry SpO2  Av.8 %  Min: 85 %  Max: 97 %     Last 4 weights:  Wt Readings from Last 4 Encounters:   10/13/17 70.4 kg (155 lb 3.3 oz)   10/10/17 74.8 kg (165 lb)   17 74.1 kg (163 lb 6 oz)   17 74.8 kg (165 lb)         I&O:    Intake/Output Summary (Last 24 hours) at 10/13/17 1119  Last data filed at 10/13/17 1011   Gross per 24 hour   Intake              360 ml   Output              925 ml   Net             -565 ml       Telemetry:    Nsr, PACs    Physical Exam:  Constitutional:   Frail, fatigued, no apparent distress   Skin:   normal, no bruising or bleeding, no lesions and no jaundice   Head:   Normocephalic, atramatic   Eyes:   pupils equal, " round and reactive to light, extra ocular muscles intact, sclera clear, conjunctiva normal   Neck:   Supple, symmetrical, trachea midline, no adenopathy, thyroid symmetric, not enlarged and no tenderness, skin normal and +JVD   Chest:   Normal Symmetry and no tenderness   Lungs:   Coarse rales bilat bases-1/3   Cardiovascular:   S1, S2 regular with 3/6 HSM apex and 3/6 ESM base   Abdomen:   Normal, normal bowel sounds and non-tender   Hematologic/Lymphatic:   not performed    Extremities and Back:   symmetric, no curvature, no joint swelling or tenderness, no cyanosis or clubbing and trace Edema   Neurological:   Awake, alert, oriented to name, place and time.  .  No gross or focal neurologic abnormalities  Moving all extremities   Genitourinary:   not performed                  Data:   Lab Results   Component Value Date    WBC 10.3 10/13/2017    HGB 12.7 (L) 10/13/2017    HCT 37.9 (L) 10/13/2017    PLT 86 (L) 10/13/2017    ALT 27 08/04/2016    AST 20 08/04/2016     10/13/2017    BUN 50 (H) 10/13/2017    CO2 26 10/13/2017    TSH 1.14 08/04/2016    INR 1.11 08/24/2015     No results found for: CKTOTAL, CKMB  Lab Results   Component Value Date    HDL 63 10/12/2017    LDL 29 10/12/2017    CHOL 108 10/12/2017    TRIG 81 10/12/2017        EKG results:      Imaging:      Imaging:         Assessment and Plan:     89 yo man with severe MR on basis of chordal rupture in setting of longstanding prolapse as outlined above. Congestive failure and high O2 requirements. Net 2.6L out, stable Cr/rising BUN.  Will repeat furosemide 20 x 1 IV now.    Small defect on nuc stress- on beta-blockade, no aspirin dt poss gastritis/PUD and no statin with baseline LDL in 20's (h/o bladder CA reportedly stable).     Had discussed mitraclip with family and patient who would like to proceed with evaluation/procedure. D/w Dr. Monroe. Unlikely to be able to wait to do as outpatient.     Contacted family- phone number for Nicolasa scruggs  [step?]daughter is [phone]. (Partner Jerri has dementia). They agree with plan. Note secondary decisionmaker after partner is nephew in [place] however patient is able to make his own decisions.      Attestation:    Total time: 30 minutes/ 25 spent in coordination of care and counselling.         Meghana Mckenna MD 10/13/2017  11:19 AM

## 2017-10-13 NOTE — PROGRESS NOTES
"   10/13/17 1427   General Information   Onset Date 10/11/17   Start of Care Date 10/13/17   Referring Physician Josr Harrell MD    Patient Profile Review/OT: Additional Occupational Profile Info See Profile for full history and prior level of function   Patient/Family Goals Statement To eat/drink   Swallowing Evaluation Bedside swallow evaluation   Behaviorial Observations WFL (within functional limits)   Mode of current nutrition NPO   Respiratory Status O2 Supply   Type of O2 supply Nasal cannula   Comments \"Mr. Gomez is a markedly pleasant 89 year old gentleman former smoker with prior recurrent urothelial carcinoma as well as CKD Stage 3, chronic thrombocytopenia, hypertension, dyslipidemia, and chronic degenerative arthritis who was admitted for acute hypoxemic respiratory failure and found to have acute diastolic CHF exacerbation due to progressive, severe acute on chronic mitral regurgitation.\" Pt currently on 15 LPM NC with stats in low 90s.    Clinical Swallow Evaluation   Oral Musculature generally intact   Structural Abnormalities none present   Dentition present and adequate   Mucosal Quality dry;sticky   Mandibular Strength and Mobility intact   Oral Labial Strength and Mobility WFL   Lingual Strength and Mobility WFL   Velar Elevation intact   Buccal Strength and Mobility intact   Laryngeal Function Cough;Swallow;Voicing initiated;Dry swallow palpated   Oral Musculature Comments WFL   General Therapy Interventions   Planned Therapy Interventions Dysphagia Treatment   Dysphagia treatment Instruction of safe swallow strategies   Swallow Eval: Clinical Impressions   Skilled Criteria for Therapy Intervention Skilled criteria met.  Treatment indicated.   Functional Assessment Scale (FAS) 6   Treatment Diagnosis Minimal dysphagia   Diet texture recommendations Regular diet;Thin liquids   Recommended Feeding/Eating Techniques alternate between small bites and sips of food/liquid;check mouth " frequently for oral residue/pocketing;hard swallow w/ each bite or sip;maintain upright posture during/after eating for 30 mins;small sips/bites   Therapy Frequency (1 session)   Predicted Duration of Therapy Intervention (days/wks) 1 day   Anticipated Discharge Disposition home w/ assist   Risks and Benefits of Treatment have been explained. Yes   Patient, family and/or staff in agreement with Plan of Care Yes   Clinical Impression Comments SLP: Pt presents with minimal dysphagia on bedside swallow evaluation characterized by prolonged oral management and mild residue with regular textures. Oral motor exam WFL with adequate strength and ROM. Pt tolerated thin liquids via straw with possible premature spillage with cough x1 noted. Pt reported initial swallow was difficult due to dry mouth and being NPO today. No difficulty noted on subsequent swallows. Mild oral deficits with regular cracker improving with cues for alternating solids and liquids. Pt and family educated on recommendations: continued regular textures with thin liquids, straws ok; pt upright for all intake, small bites and sips, slow rate, alternate solids and liquids. Education provided on overt s/sx of aspiration and self-monitoring. Pt in agreement with no further ST at this time. Please re-consult if changes or difficulty noted.    Total Evaluation Time   Total Evaluation Time (Minutes) 15

## 2017-10-13 NOTE — IP AVS SNAPSHOT
"` `     [address] [facility]: [phone]                                              INTERAGENCY TRANSFER FORM - NURSING   10/13/2017                    Hospital Admission Date: 10/13/2017  MALLORIE NOVA   : 10/12/1926  Sex: Male        Attending Provider: Tevin Meza MD     Allergies:  Celebrex [Celecoxib], Penicillins    Infection:  MRSA   Service:  CARDIOLOGY    Ht:  1.702 m (5' 7\")   Wt:  67.3 kg (148 lb 5.9 oz)   Admission Wt:  70.6 kg (155 lb 9.6 oz)    BMI:  23.24 kg/m 2   BSA:  1.78 m 2            Patient PCP Information     Provider PCP Type    Evaristo Magaña MD General      Current Code Status     Date Active Code Status Order ID Comments User Context       10/13/2017 10:32 PM DNR/DNI [id number]  Bobby Fuchs MD Inpatient       Code Status History     Date Active Date Inactive Code Status Order ID Comments User Context    10/13/2017  3:31 PM 10/13/2017 10:32 PM DNR/DNI [id number]  Josr Harrell MD Outpatient    10/11/2017  5:47 PM 10/13/2017  3:31 PM DNR/DNI [id number]  Josr Harrell MD Inpatient    10/11/2017  2:00 AM 10/11/2017  5:47 PM Full Code [id number]  Sean Verde III, MD Inpatient    2016  1:25 PM 2016  3:25 PM Full Code [id number]  Irina Brown MD Inpatient    2015  8:30 AM 2016  1:25 PM Full Code [id number]  Evaristo King MD Outpatient    2015  2:36 PM 2015  8:30 AM Full Code [id number]  Magen Solis MD Inpatient    2015  2:36 PM 2015  2:36 PM Full Code [id number]  Dennis Hilton MD Inpatient    2012  4:13 PM 2015  2:36 PM Full Code [id number]  Cecil Iqbal MD Outpatient    1/3/2012  1:49 PM 2012  4:13 PM Full Code [id number]  Cecil Iqbal MD Inpatient      Advance Directives        Does patient have a scanned Advance Directive/ACP document in EPIC?           Yes        Hospital Problems as of 10/18/2017              Priority Class Noted POA    Heart " failure (H) Medium  10/13/2017 Yes    Mitral regurgitation Medium  10/16/2017 Yes      Non-Hospital Problems as of 10/18/2017              Priority Class Noted    Arthritis Medium  Unknown    Lumbar spinal stenosis Medium  1/9/2012    Chronic narcotic dependence (HCC) Medium  10/10/2013    Thrombocytopenia (H) Medium  7/28/2014    CKD (chronic kidney disease) stage 3, GFR 30-59 ml/min Medium  7/28/2014    Knee pain Medium  9/3/2014    Advanced directives, counseling/discussion Medium  12/23/2014    Glucose intolerance (impaired glucose tolerance) Medium  8/18/2015    Chronic pain syndrome Medium  9/24/2015    Mitral valvular regurgitation -aortic sclerosis - systolic murmur Medium  9/29/2015    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  Medium  10/26/2015    BPH (benign prostatic hypertrophy) Medium  10/26/2015    Bladder tumor Medium  9/2/2016    Benign essential hypertension Medium  9/14/2016    Hyperlipidemia LDL goal <100 Medium  11/30/2016    Acute idiopathic gout of right foot Medium  3/23/2017    Femoral hernia of right side Medium  5/9/2017    Drug-induced constipation Medium  5/9/2017    Acute respiratory failure with hypoxia (H) Medium  10/11/2017      Immunizations     Name Date      Influenza (High Dose) 3 valent vaccine 09/19/17     Influenza (High Dose) 3 valent vaccine 09/13/16     Influenza (High Dose) 3 valent vaccine 09/29/15     Influenza (High Dose) 3 valent vaccine 10/11/14     Pneumococcal (PCV 13) 08/18/15     Pneumococcal 23 valent 06/13/13     TDAP Vaccine (Adacel) 06/13/13          END      ASSESSMENT     Discharge Profile Flowsheet     DISCHARGE NEEDS ASSESSMENT     Inspection of bony prominences  Full 10/18/17 0838    Equipment Currently Used at Home  -- (4WW) 10/18/17 1139   Inspection under devices  Full 10/18/17 0256    Transportation Available  car;family or friend will provide 10/18/17 1139   Skin WDL  ex 10/18/17 0855  "   # of Referrals Placed by [facility]  Homecare 08/25/15 1459   Skin Temperature  warm 10/18/17 0256    Equipment Used at Home  other (see comments) (carvalho catheter) 08/28/15 1259   Skin Moisture  dry 10/18/17 0256    GASTROINTESTINAL (ADULT,PEDIATRIC,OB)     Skin Integrity  body piercing(s);incision(s);scar(s);scab(s);skin tear(s);drain/device(s) 10/18/17 0838    GI WDL  ex 10/18/17 0838   SAFETY      Last Bowel Movement  10/15/17 10/18/17 0838   Safety WDL  WDL 10/18/17 0838    Passing flatus  yes 10/18/17 0838   Safety Factors  bed in low position;wheels locked;call light in reach;upper side rails raised x 2;ID band on 10/18/17 0838    COMMUNICATION ASSESSMENT     Safety Equipment  suction regulator;suction equipment 10/18/17 0256    Patient's communication style  spoken language (English or Bilingual) 10/10/17 2332   All Alarms  alarm(s) activated and audible 10/17/17 1111    SKIN                        Assessment WDL (Within Defined Limits) Definitions           Safety WDL     Effective: 09/28/15    Row Information: <b>WDL Definition:</b> Bed in low position, wheels locked; call light in reach; upper side rails up x 2; ID band on<br> <font color=\"gray\"><i>Item=AS safety wdl>>List=AS safety wdl>>Version=F14</i></font>      Skin WDL     Effective: 09/28/15    Row Information: <b>WDL Definition:</b> Warm; dry; intact; elastic; without discoloration; pressure points without redness<br> <font color=\"gray\"><i>Item=AS skin wdl>>List=AS skin wdl>>Version=F14</i></font>      Vitals     Vital Signs Flowsheet     VITAL SIGNS     HEIGHT AND WEIGHT      Temp  98.1  F (36.7  C) 10/18/17 1108   Height  1.702 m (5' 7\") 10/13/17 2245    Temp src  Oral 10/18/17 1108   Height Method  Stated 10/13/17 2245    Resp  16 10/18/17 1108   Weight  67.3 kg (148 lb 5.9 oz) 10/18/17 0141    Heart Rate  92 10/18/17 1108   Weight Method  Bed scale 10/16/17 0554    Pulse/Heart Rate Source  Monitor 10/15/17 5741   BSA (Calculated - sq m)  1.83 " 10/13/17 2245    BP  111/62 10/18/17 1108   BMI (Calculated)  24.42 10/13/17 2245    BP Location  Left arm 10/18/17 1108   POSITIONING      Patient Position  Lying 10/16/17 1415   Body Position  supine, head elevated 10/18/17 0838    OXYGEN THERAPY     Head of Bed (HOB)  HOB at 30-45 degrees 10/18/17 0838    SpO2  92 % 10/18/17 1108   Chair  Upright in chair 10/17/17 1111    O2 Device  Nasal cannula with humidification 10/18/17 1108   Positioning/Transfer Devices  pillows;in use 10/17/17 0537    FiO2 (%)  40 % 10/16/17 1748   DAILY CARE      Oxygen Delivery  3 LPM 10/18/17 0115   Activity Management  activity encouraged 10/18/17 0838    PAIN/COMFORT     Activity Assistance Provided  assistance, 1 person 10/18/17 0838    Patient Currently in Pain  yes 10/18/17 0814   ECG      Preferred Pain Scale  CAPA (Clinically Aligned Pain Assessment) ([facility] Adults Only) 10/18/17 0814   Equipment  electrodes changed 10/15/17 2356    Pain Location  Head 10/18/17 0814   ECG Rhythm  Sinus rhythm 10/18/17 0115    Pain Orientation  Lower 10/18/17 0115   Ectopy  PAC 10/17/17 1624    Pain Descriptors  Headache 10/18/17 0814   Ectopy Frequency  Occasional 10/17/17 1624    Pain Management Interventions  analgesia administered 10/18/17 0814   Lead Monitored  Lead II;V 1 10/17/17 1624    Pain Intervention(s)  Medication (See eMAR) 10/18/17 0814   JAVAD COMA SCALE      Response to Interventions  Decrease in pain 10/17/17 0140   Best Eye Response  4-->(E4) spontaneous 10/17/17 1625    CLINICALLY ALIGNED PAIN ASSESSMENT (CAPA) ([facility] ADULTS ONLY)     Best Motor Response  6-->(M6) obeys commands 10/17/17 1625    Comfort  negligible pain 10/18/17 0306   Best Verbal Response  5-->(V5) oriented 10/17/17 1625    Change in Pain  getting worse 10/18/17 0115   Javad Coma Scale Score  15 10/17/17 1625    Pain Control  partially effective 10/17/17 2029   [REMOVED] LEFT GROIN INTERVENTIONAL PROCEDURE ACCESS       Functioning  can do most things, but pain gets in the way of some 10/17/17 2029   Site Assessment  WDL 10/18/17 0246    Sleep  normal sleep 10/16/17 0633   Hemostasis management  Unchanged 10/17/17 1624    PAIN ASSESSMENT/NONVERBAL ICU (ADULT)     Femoral Bruit  not present 10/17/17 1624    Presence of Pain  No nonverbal indicators of pain present 10/16/17 1507   CMS Left Extremity  WDL 10/18/17 0246    Assessment Indicator  PRN assessment 10/16/17 1507   Dorsalis Pulse - Left Leg  Normal 10/18/17 0246    CRITICAL-CARE PAIN OBSERVATION TOOL (CPOT)     Posterior Tibial Pulse - Left Leg  Normal 10/18/17 0246    Facial Expression  0 10/16/17 1748   [REMOVED] RIGHT GROIN INTERVENTIONAL PROCEDURE ACCESS      Body Movements  0 10/16/17 1748   Site Assessment  WDL 10/18/17 0246    Compliance w/ventilator (intubated patients)  0 10/16/17 1748   Hemostasis management  Unchanged 10/17/17 1624    Vocalization (extubated patients)  Intubated 10/16/17 1748   Femoral Bruit  not present 10/17/17 1624    Muscle Tension  0 10/16/17 1748   CMS Right Extremity  WDL 10/18/17 0246    Total  0 10/16/17 1748   Dorsalis Pulse - Right Leg  Normal 10/18/17 0246    ANALGESIA SIDE EFFECTS MONITORING     Posterior Tibial Pulse - Right Leg  Normal 10/18/17 0246    Side Effects Monitoring: Respiratory Quality  R 10/18/17 0306   RESPIRATORY MONITORING      Side Effects Monitoring: Respiratory Depth  N 10/18/17 0306   Respiratory Monitoring (EtCO2)  0 mmHg 10/17/17 0133    Side Effects Monitoring: Sedation Level  S 10/18/17 0306                 Patient Lines/Drains/Airways Status    Active LINES/DRAINS/AIRWAYS     Name: Placement date: Placement time: Site: Days: Last dressing change:    Urethral Catheter Coude 16 fr 08/24/15   1146   Coude   786     Wound 09/02/16 Left;Upper Back Other (comment) 09/02/16   1604   Back   410     Incision/Surgical Site 09/02/16 Penis 09/02/16   1016    411             Patient Lines/Drains/Airways Status    Active  PICC/CVC     Name: Placement date: Placement time: Site: Days: Additional Info Last dressing change:    PICC Double Lumen 10/14/17 Right Basilic 10/14/17   1513   Basilic   3 External Cath Length (cm): 1 cm   10/14/17 1513 (94.52 hrs)         Size (Fr): 5 Fr            Orientation: Right            Extremity Circumference (cm): 27 cm            Catheter Brand: Bard            Dressing Intervention: Chlorhexidine patch;Transparent;Securing device;New dressing            Description: Non - valved (open ended);Power PICC            Total Catheter Length (cm) Trimmed: 40 cm            Site Prep: Chlorhexidine;Alcohol            Local Anesthetic: Injectable            Inserted by: ARTSI FlynnN, RN [facility]            Insertion attempts with ultrasound: 1            Patient Tolerance: Tolerated well            Placement Verification: Blood Return;Xray            Difficulty with threading line: No            Tip location: SVC            Full barrier precautions done: Yes            Consent Signed: Yes            Time Out performed: Yes            Lot #: [id number]            Use for : Access. Hima, 4E RN, notified that PICC is okay to use.               Intake/Output Detail Report     Date Intake     Output   Net    Shift P.O. I.V. IV Piggyback Total Urine Blood Total       Day 10/17/17 0000 - 10/17/17 0659 120 80 -- 200 1050 -- 1050 -850    Fatemeh 10/17/17 0700 - 10/17/17 1459 400 29.17 -- 429.17 275 -- 275 154.17    Noc 10/17/17 1500 - 10/17/17 2359 500 -- -- 500 150 -- 150 350    Day 10/18/17 0000 - 10/18/17 0659 -- -- -- -- -- -- -- 0    Fatemeh 10/18/17 0700 - 10/18/17 1459 -- -- -- -- -- -- -- 0      Last Void/BM       Most Recent Value    Urine Occurrence 1 at 10/18/2017 0100    Stool Occurrence 1 at 10/18/2017 0100      Case Management/Discharge Planning     Case Management/Discharge Planning Flowsheet     REFERRAL INFORMATION     FINAL RESOURCES      Arrived From  home 08/25/15 1459   Equipment Currently Used at Home  --  (4WW) 10/18/17 1139    # of Referrals Placed by [facility]  Homecare 08/25/15 1459   MH/BH CAREGIVER      Primary Care Clinic Name  [facility] 08/25/15 1459   Filed Complexity Screen Score  5 10/17/17 1627    Primary Care MD Name  lilibeth 08/25/15 1459   ABUSE RISK SCREEN      LIVING ENVIRONMENT     QUESTION TO PATIENT:  Has a member of your family or a partner(now or in the past) intimidated, hurt, manipulated, or controlled you in any way?  no 10/13/17 2218    Lives With  significant other 10/18/17 1139   QUESTION TO PATIENT: Do you feel safe going back to the place where you are living?  yes 10/13/17 2218    Living Arrangements  condominium (Third floor, elevator, 3 steps to get indoors ) 10/18/17 1139   OBSERVATION: Is there reason to believe there has been maltreatment of a vulnerable adult (ie. Physical/Sexual/Emotional abuse, self neglect, lack of adequate food, shelter, medical care, or financial exploitation)?  no 10/13/17 2218    COPING/STRESS     (R) MENTAL HEALTH SUICIDE RISK      Major Change/Loss/Stressor  none 10/13/17 2218   Are you depressed or being treated for depression?  No 10/13/17 2218    DISCHARGE PLANNING     HOMICIDE RISK      Transportation Available  car;family or friend will provide 10/18/17 1139   Feels Like Hurting Others  no 10/13/17 2218    Equipment Used at Home  other (see comments) (carvalho catheter) 08/28/15 1256

## 2017-10-13 NOTE — LETTER
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]  Admit Date/Time: 10/13/2017  9:43 PM  Discharge Date: 10/18/17  Payor Source: Payor: MEDICARE / Plan: MEDICARE / Product Type: Medicare /     Readmission Assessment Measure (HILLARY) Risk Score/category: Elevated    Reason for Communication Hand-off Referral: Fragility  Other Discharge to TCU    Discharge Plan:  Sharif Longo  PH: [phone]  F: [phone]     Concern for non-adherence with plan of care:   Y/N N  Discharge Needs Assessment:  Needs       Most Recent Value    Equipment Currently Used at Home -- [4WW]    Transportation Available car, family or friend will provide          Already enrolled in Tele-monitoring program and name of program:  Unknown  Follow-up specialty is recommended: Yes    Follow-up plan:  Future Appointments  Date Time Provider Department Center   9/26/2018 1:30 PM Weight, Dennis Loera MD [facility]       Any outstanding tests or procedures:    Procedures     Future Labs/Procedures    Oxygen - Nasal cannula     Comments:    O2 as needed by nasal cannula to keep O2 sats 92% or greater.          Referrals     Future Labs/Procedures    Occupational Therapy Adult Consult     Comments:    Evaluate and treat as clinically indicated.    Reason:  Deconditioning/Frailty.    Physical Therapy Adult Consult     Comments:    Evaluate and treat as clinically indicated.    Reason:  Deconditioning/Frailty.        Supplies     Future Labs/Procedures    Pneumatic Compression Device      Comments:    Bilateral calf. Remove 30 mins BID.          Key Recommendations:  None for today.    YOAV Fox, APSW  [address] Unit   Phone:  [phone]  Pager: [phone]  Unit: [phone]      AVS/Discharge Summary is the source of truth; this is a helpful guide for improved communication of patient story

## 2017-10-13 NOTE — IP AVS SNAPSHOT
` `     [address] [facility] BANK: [phone]            Medication Administration Report for Ivan Gomez as of 10/18/17 1343   Legend:    Given Hold Not Given Due Canceled Entry Other Actions    Time Time (Time) Time  Time-Action       Inactive    Active    Linked        Medications 10/12/17 10/13/17 10/14/17 10/15/17 10/16/17 10/17/17 10/18/17    aspirin EC EC tablet 81 mg  Dose: 81 mg Freq: DAILY Route: PO  Start: 10/17/17 0800   Admin Instructions: Starting tomorrow.          0906 (81 mg)-Given        0757 (81 mg)-Given           bisacodyl (DULCOLAX) Suppository 10 mg  Dose: 10 mg Freq: DAILY PRN Route: RE  PRN Reason: constipation  Start: 10/13/17 2232   Admin Instructions: Hold for loose stools.  This is the third step of a three step constipation treatment protocol.         0731-Auto Hold       1007-Unhold       1008-Auto Hold       1531-Unhold             finasteride (PROSCAR) tablet 5 mg  Dose: 5 mg Freq: DAILY Route: PO  Start: 10/14/17 0800   Admin Instructions: *Do not handle tablets if you are pregnant*       0808 (5 mg)-Given        1015 (5 mg)-Given        0731-Auto Hold       0800-Automatically Held       1007-Unhold       1008-Auto Hold       1531-Unhold        0907 (5 mg)-Given        0918 (5 mg)-Given           furosemide (LASIX) tablet 20 mg  Dose: 20 mg Freq: 2 TIMES DAILY. Route: PO  Start: 10/17/17 1600         1622 (20 mg)-Given        0756 (20 mg)-Given       [ ] 1600           gabapentin (NEURONTIN) capsule 300 mg  Dose: 300 mg Freq: 3 TIMES DAILY Route: PO  Start: 10/14/17 0800      0807 (300 mg)-Given       1329 (300 mg)-Given       2025 (300 mg)-Given        0843 (300 mg)-Given       1425 (300 mg)-Given       2023 (300 mg)-Given        0731-Auto Hold       0800-Automatically Held       1007-Unhold       1008-Auto Hold       1400-Automatically Held       1531-Unhold       2052 (300 mg)-Given        0905 (300 mg)-Given       1502 (300 mg)-Given       2010 (300 mg)-Given        0750  (300 mg)-Given       [ ] 1400       [ ] 2000           heparin lock flush 10 UNIT/ML injection 2-5 mL  Dose: 2-5 mL Freq: ONCE PRN Route: IK  PRN Reason: line flush  PRN Comment: for locking each dormant lumen with line placement  Start: 10/14/17 1223   Admin Instructions: May repeat x 1         0731-Auto Hold       1007-Unhold       1008-Auto Hold       1531-Unhold             heparin sodium PF injection 5,000 Units  Dose: 5,000 Units Freq: EVERY 12 HOURS Route: SC  Start: 10/14/17 0900   Admin Instructions: High concentration HEParin. Not for line flush or cath care.       0917 (5,000 Units)-Given       2025 (5,000 Units)-Given        0844 (5,000 Units)-Given       2033 (5,000 Units)-Given        0731-Auto Hold       0800-Automatically Held       1007-Unhold       1008-Auto Hold       1531-Unhold       2053 (5,000 Units)-Given        0907 (5,000 Units)-Given       2009 (5,000 Units)-Given        0804 (5,000 Units)-Given       [ ] 2000           HOLD:  Metformin and metformin containing medications if patient received IV contrast  Freq: HOLD Route: XX  Start: 10/16/17 1533   End: 10/18/17 1532   Admin Instructions: Hold metformin (GLUCOPHAGE, GLUMETZA, FORTAMET, RIOMET) and metformin containing medications: alogliptin/metformin (KAZANO), glipizide/metformin (METAGLIP), glyburide/metformin (GLUCOVANCE), rosiglitazone/metformin (AVANDAMET), dapagliflozin/metformin (XIGDUO XR), sitagliptin/metformin (JANUMET, JANUMET XR), linagliptin/metformin (JENTADUETO), repaglinide/metformin (PRANDIMET), saxagliptin/metformin (KOMBIGLYZE XR), canagliflozin/metformin (INVOKAMET), and pioglitazone/metformin (ACTOPLUS MET, ACTOPLUS MET XR) on day of the procedure and for 48 hours after IV contrast given. If patient was on one of these medications pre-procedure,  continue to HOLD for 48 hours post-procedure.           1532-Med Discontinued       hydrALAZINE (APRESOLINE) half-tab 37.5 mg  Dose: 37.5 mg Freq: EVERY 8 HOURS SCHEDULED  "Route: PO  Start: 10/17/17 1400         1459 (37.5 mg)-Given        0116 (37.5 mg)-Given       0757 (37.5 mg)-Given       [ ] 1600           HYDROcodone-acetaminophen (NORCO) 7.5-325 MG per tablet 1 tablet  Dose: 1 tablet Freq: EVERY 6 HOURS PRN Route: PO  PRN Reason: moderate to severe pain  Start: 10/13/17 2234   Admin Instructions: Maximum acetaminophen dose from all sources= 75 mg/kg/day not to exceed 4 grams       0443 (1 tablet)-Given       1123 (1 tablet)-Given       1746 (1 tablet)-Given       2356 (1 tablet)-Given        1425 (1 tablet)-Given       2118 (1 tablet)-Given        0501 (1 tablet)-Given       0731-Auto Hold       1007-Unhold       1008-Auto Hold       1531-Unhold       1820 (1 tablet)-Given       2353 (1 tablet)-Given        1019 (1 tablet)-Given       1458 (1 tablet)-Given       2021 (1 tablet)-Given        0138 (1 tablet)-Given       0757 (1 tablet)-Given           isosorbide dinitrate (ISORDIL) tablet 20 mg  Dose: 20 mg Freq: 3 TIMES DAILY Route: PO  Start: 10/16/17 1845   Admin Instructions: Recommended to take on empty stomach.         2053 (20 mg)-Given        0905 (20 mg)-Given       1502 (20 mg)-Given       2011 (20 mg)-Given        0757 (20 mg)-Given       [ ] 1400       [ ] 2000           lidocaine (LMX4) kit  Freq: EVERY 1 HOUR PRN Route: Top  PRN Reason: pain  PRN Comment: with VAD insertion or accessing implanted port.  Start: 10/16/17 1533   Admin Instructions: Do NOT give if patient has a history of allergy to any local anesthetic or any \"roland\" product.   Apply 30 minutes prior to VAD insertion or port access. MAX Dose: 2.5 g (  of 5 g tube)               lidocaine 1 % 1 mL  Dose: 1 mL Freq: EVERY 1 HOUR PRN Route: OTHER  PRN Comment: mild pain with VAD insertion or accessing implanted port  Start: 10/16/17 1533   Admin Instructions: Do NOT give if patient has a history of allergy to any local anesthetic or any \"roland\" product. MAX dose 1 mL subcutaneous OR intradermal in divided " doses.               magnesium sulfate 2 g in NS intermittent infusion ([facility]ar[employer] or [facility]mp[facility])  Dose: 2 g Freq: DAILY PRN Route: IV  PRN Reason: magnesium supplementation  Start: 10/14/17 1224   Admin Instructions: For Serum Mg++ 1.6 - 2 mg/dL  Give 2 g and recheck magnesium level next AM.         0731-Auto Hold       1007-Unhold       1008-Auto Hold       1531-Unhold             magnesium sulfate 4 g in 100 mL sterile water (premade)  Dose: 4 g Freq: EVERY 4 HOURS PRN Route: IV  PRN Reason: magnesium supplementation  Start: 10/14/17 1224   Admin Instructions: For serum Mg++ less than 1.6 mg/dL  Give 4 g and recheck magnesium level 2 hours after dose, and next AM.         0731-Auto Hold       1007-Unhold       1008-Auto Hold       1531-Unhold             metoprolol (TOPROL-XL) 24 hr tablet 25 mg  Dose: 25 mg Freq: DAILY Route: PO  Start: 10/14/17 0800   Admin Instructions: DO NOT CRUSH. Tablet may be split in half along score line.       0807 (25 mg)-Given        0844 (25 mg)-Given        0731-Auto Hold       0800-Automatically Held       1007-Unhold       1008-Auto Hold       1531-Unhold       1820 (25 mg)-Given [C]        0904 (25 mg)-Given        0758 (25 mg)-Given           nitroGLYcerin (NITROSTAT) sublingual tablet 0.4 mg  Dose: 0.4 mg Freq: EVERY 5 MIN PRN Route: SL  PRN Reason: chest pain  Start: 10/16/17 1533   Admin Instructions: Administer every 5 minutes as needed. Maximum 3 doses in 15 minutes. Notify provider if no relief after 3 doses.  3 doses per episode               ondansetron (ZOFRAN-ODT) ODT tab 4 mg  Dose: 4 mg Freq: EVERY 6 HOURS PRN Route: PO  PRN Reasons: nausea,vomiting  Start: 10/13/17 2232   Admin Instructions: This is Step 1 of nausea and vomiting management.  If nausea not resolved in 15 minutes, go to Step 2 prochlorperazine (COMPAZINE). Do not push through foil backing. Peel back foil and gently remove. Place on tongue immediately. Administration with liquid unnecessary          0731-Auto Hold       1007-Unhold       1008-Auto Hold       1531-Unhold            Or  ondansetron (ZOFRAN) injection 4 mg  Dose: 4 mg Freq: EVERY 6 HOURS PRN Route: IV  PRN Reasons: nausea,vomiting  Start: 10/13/17 2232   Admin Instructions: This is Step 1 of nausea and vomiting management.  If nausea not resolved in 15 minutes, go to Step 2 prochlorperazine (COMPAZINE).  Irritant.         0731-Auto Hold       1007-Unhold       1008-Auto Hold       1531-Unhold             pantoprazole (PROTONIX) EC tablet 40 mg  Dose: 40 mg Freq: DAILY Route: PO  Start: 10/14/17 0800   Admin Instructions: DO NOT CRUSH.       0807 (40 mg)-Given        0844 (40 mg)-Given        0731-Auto Hold       0800-Automatically Held       1007-Unhold       1008-Auto Hold       1531-Unhold        0906 (40 mg)-Given        0758 (40 mg)-Given           polyethylene glycol (MIRALAX/GLYCOLAX) Packet 17 g  Dose: 17 g Freq: DAILY PRN Route: PO  PRN Reason: constipation  Start: 10/13/17 2232   Admin Instructions: Give in 8oz of  water, juice, or soda. Hold for loose stools.  This is the second step of a three step constipation treatment protocol.  1 Packet = 17 grams. Mixed prescribed dose in 8 ounces of water. Follow with 8 oz. of water.         0731-Auto Hold       1007-Unhold       1008-Auto Hold       1531-Unhold         0756 (17 g)-Given           potassium chloride (KLOR-CON) Packet 20-40 mEq  Dose: 20-40 mEq Freq: EVERY 2 HOURS PRN Route: ORAL OR FEED  PRN Reason: potassium supplementation  Start: 10/14/17 1224   Admin Instructions: Use if unable to tolerate tablets.    If Serum K+ 3.4-4.0, dose = 20 mEq x1. Recheck K+ level the next AM.  If Serum K+ 3.0-3.3, dose = 60 mEq po total dose (40 mEq x 1 followed in 2 hours by 20 mEq X1). Recheck K+ level 4 hours after dose and the next AM.  If Serum K+ 2.5-2.9, dose = 80 mEq po total dose (40 mEq Q2H x2). Recheck K+ level 4 hours after dose and the next AM.  If Serum K+ less than 2.5, See IV  order.  Dissolve packet contents in 4-8 ounces of cold water or juice.         0731-Auto Hold       1007-Unhold       1008-Auto Hold       1531-Unhold         1254 (20 mEq)-Given           potassium chloride 10 mEq in 100 mL intermittent infusion  Dose: 10 mEq Freq: EVERY 1 HOUR PRN Route: IV  PRN Reason: potassium supplementation  Last Dose: 10 mEq (10/16/17 1719)  Start: 10/14/17 1224   Admin Instructions: Infuse via PERIPHERAL LINE or CENTRAL LINE. Use for central line replacement if patient weight less than 65 kg, if patient is on TPN with high potassium content or if unit does not stock 20 mEq bags.  If Serum K+ 3.4-4.0, dose = 10 mEq/hr x2 doses. Recheck K+ level the next AM.  If Serum K+ 3.0-3.3, dose = 10 mEq/hr x4 doses (40 mEq IV total dose). Recheck K+ level 2 hours after dose and the next AM.  If Serum K+ less than 3.0, dose = 10 mEq/hr x6 doses (60 mEq IV total dose). Recheck K+ level 2 hours after dose and the next AM.        2313 (10 mEq)-New Bag [C]        0017 (10 mEq)-New Bag       0731-Auto Hold       1007-Unhold       1008-Auto Hold       1531-Unhold       1618 (10 mEq)-New Bag       1719 (10 mEq)-New Bag             potassium chloride 10 mEq in 100 mL intermittent infusion with 10 mg lidocaine  Dose: 10 mEq Freq: EVERY 1 HOUR PRN Route: IV  PRN Reason: potassium supplementation  Start: 10/14/17 1224   Admin Instructions: Infuse via PERIPHERAL LINE. Use potassium with lidocaine for pain with peripheral administration.  If Serum K+ 3.4-4.0, dose = 10 mEq/hr x2 doses. Recheck K+ level the next AM.  If Serum K+ 3.0-3.3, dose = 10 mEq/hr x4 doses (40 mEq IV total dose). Recheck K+ level 2 hours after dose and the next AM.  If Serum K+ less than 3.0, dose = 10 mEq/hr x6 doses (60 mEq IV total dose). Recheck K+ level 2 hours after dose and the next AM.         0731-Auto Hold       1007-Unhold       1008-Auto Hold       1531-Unhold             potassium chloride SA (K-DUR/KLOR-CON M) CR tablet 20-40  mEq  Dose: 20-40 mEq Freq: EVERY 2 HOURS PRN Route: PO  PRN Reason: potassium supplementation  Start: 10/14/17 1224   Admin Instructions: Use if able to take PO.   If Serum K+ 3.4-4.0, dose = 20 mEq x1. Recheck K+ level the next AM.  If Serum K+ 3.0-3.3, dose = 60 mEq po total dose (40 mEq x1 followed in 2 hours by 20 mEq x1). Recheck K+ level 4 hours after dose and the next AM.  If Serum K+ 2.5-2.9, dose = 80 mEq po total dose (40 mEq Q2H x2). Recheck K+ level 4 hours after dose and the next AM.  If Serum K+ less than 2.5, See IV order.  DO NOT CRUSH.       1243 (20 mEq)-Given       2356 (20 mEq)-Given [C]        0619 (20 mEq)-Given [C]       1121 (20 mEq)-Given       1746 (20 mEq)-Given       2248 (20 mEq)-Given [C]        0731-Auto Hold       1007-Unhold       1008-Auto Hold       1531-Unhold       2353 (20 mEq)-Given             prochlorperazine (COMPAZINE) injection 5 mg  Dose: 5 mg Freq: EVERY 6 HOURS PRN Route: IV  PRN Reasons: nausea,vomiting  Start: 10/13/17 2232   Admin Instructions: This is Step 2 of nausea and vomiting management.   If nausea not resolved in 15 minutes, give metoclopramide (REGLAN) if ordered (step 3 of nausea and vomiting management)         0731-Auto Hold       1007-Unhold       1008-Auto Hold       1531-Unhold            Or  prochlorperazine (COMPAZINE) tablet 5 mg  Dose: 5 mg Freq: EVERY 6 HOURS PRN Route: PO  PRN Reason: vomiting  Start: 10/13/17 2232   Admin Instructions: This is Step 2 of nausea and vomiting management.   If nausea not resolved in 15 minutes, give metoclopramide (REGLAN) if ordered (step 3 of nausea and vomiting management)         0731-Auto Hold       1007-Unhold       1008-Auto Hold       1531-Unhold            Or  prochlorperazine (COMPAZINE) Suppository 12.5 mg  Dose: 12.5 mg Freq: EVERY 12 HOURS PRN Route: RE  PRN Reasons: nausea,vomiting  Start: 10/13/17 2232   Admin Instructions: This is Step 2 of nausea and vomiting management.   If nausea not resolved in  15 minutes, give metoclopramide (REGLAN) if ordered (step 3 of nausea and vomiting management)         0731-Auto Hold       1007-Unhold       1008-Auto Hold       1531-Unhold             senna-docusate (SENOKOT-S;PERICOLACE) 8.6-50 MG per tablet 1-2 tablet  Dose: 1-2 tablet Freq: 2 TIMES DAILY Route: PO  Start: 10/13/17 2245   Admin Instructions: Start with 1 tablet PO BID, If no bowel movement in 24 hours, increase to 2 tablets PO BID.  Hold for loose stools.       0057 (2 tablet)-Given       0807 (2 tablet)-Given       2025 (1 tablet)-Given        0844 (2 tablet)-Given       2023 (2 tablet)-Given        0731-Auto Hold       0800-Automatically Held       1007-Unhold       1008-Auto Hold       1531-Unhold       2052 (1 tablet)-Given        0904 (2 tablet)-Given       2012 (1 tablet)-Given        0757 (1 tablet)-Given       [ ] 2000           simvastatin (ZOCOR) tablet 20 mg  Dose: 20 mg Freq: AT BEDTIME Route: PO  Start: 10/13/17 2245      0057 (20 mg)-Given       2051 (20 mg)-Given        2039 (20 mg)-Given        0731-Auto Hold       1007-Unhold       1008-Auto Hold       1531-Unhold       2052 (20 mg)-Given        2011 (20 mg)-Given        [ ] 2000           sodium chloride (PF) 0.9% PF flush 3 mL  Dose: 3 mL Freq: EVERY 8 HOURS Route: IK  Start: 10/17/17 0000   Admin Instructions: And Q1H PRN, to lock peripheral IV dormant line.         (2322)-Not Given        0907 (3 mL)-Given       1503 (3 mL)-Given        0116 (3 mL)-Given       0758 (3 mL)-Given       [ ] 1600           sodium chloride (PF) 0.9% PF flush 3 mL  Dose: 3 mL Freq: EVERY 1 HOUR PRN Route: IK  PRN Reason: line flush  Start: 10/16/17 1533   Admin Instructions: for peripheral IV flush post IV meds               tamsulosin (FLOMAX) capsule 0.4 mg  Dose: 0.4 mg Freq: DAILY Route: PO  Start: 10/14/17 0800   Admin Instructions: Administer 30 minutes after the same meal each day.  Capsules should be swallowed whole; do not crush chew or open.       0983  (0.4 mg)-Given        0844 (0.4 mg)-Given        0731-Auto Hold       0800-Automatically Held       1007-Unhold       1008-Auto Hold       1531-Unhold        0906 (0.4 mg)-Given        0757 (0.4 mg)-Given          Completed Medications  Medications 10/12/17 10/13/17 10/14/17 10/15/17 10/16/17 10/17/17 10/18/17         Dose: 325 mg Freq: ONCE Route: PO  Start: 10/16/17 0500   End: 10/16/17 0502   Admin Instructions: Administer prior to the procedure.         0502 (325 mg)-Given               Dose: 900 mg Freq: PRE-OP/PRE-PROCEDURE Route: IV  Indications of Use: PERIOPERATIVE PHARMACOPROPHYLAXIS  Start: 10/16/17 0000   End: 10/16/17 0915   Admin Instructions: Give within 1 hour PRIOR to procedure in pre-procedure area or in Cath Lab for inpatients.         0815 (900 mg)-Given               Dose: 25 mL Freq: ONCE Route: IV  Start: 10/16/17 1200   End: 10/16/17 1200        1200 (25 mL)-Given               Dose: 20 g Freq: ONCE Route: PO  Start: 10/15/17 1500   End: 10/15/17 1507       1507 (20 g)-Given                Dose: 20 mEq Freq: ONCE Route: IV  Start: 10/15/17 2315   End: 10/16/17 0559   Admin Instructions: Infuse through Central Line over 1 hour.         0559 (20 mEq)-Given [C]            Discontinued Medications  Medications 10/12/17 10/13/17 10/14/17 10/15/17 10/16/17 10/17/17 10/18/17         Dose: 300 mL Freq: ONCE PRN Route: IV  PRN Reason: other  PRN Comment: for symptomatic hypotension with femoral sheath removal  Start: 10/16/17 1533   End: 10/16/17 2322        2322-Med Discontinued           Rate: 50 mL/hr Freq: CONTINUOUS Route: IV  Start: 10/15/17 1215   End: 10/15/17 2158   Admin Instructions: Prior to cath lab procedure.        2158-Med Discontinued            Dose: 325 mg Freq: EVERY 24 HOURS Route: PO  Start: 10/16/17 0500   End: 10/16/17 1600        (0556)-Not Given [C]       0731-Auto Hold       1007-Unhold       1008-Auto Hold       1531-Unhold       1600-Med Discontinued           Dose: 0.5 mg  Freq: EVERY 5 MIN PRN Route: IV  PRN Reason: other  PRN Comment: symptomatic bradycardia associated with FEMORAL sheath pulls  Start: 10/16/17 1533   End: 10/16/17 2322   Admin Instructions: May repeat x1.         2322-Med Discontinued           Rate: 6.1-61.1 mL/hr Freq: CONTINUOUS Route: IV  Start: 10/16/17 0730   End: 10/18/17 0738   Admin Instructions: For range orders: start at lowest dose ordered. Titrate by 0.02 to 0.05 mcg/kg/min every 5 minutes to keep MAP greater than 65 mmHg.  Notify prescriber if higher doses are required to achieve blood pressure goals.  Protect from light. Vesicant.                         0738-Med Discontinued         Dose: 25-50 mcg Freq: EVERY 15 MIN PRN Route: IV  PRN Reason: other  PRN Comment: severe pain related to pulling the FEMORAL sheath out ONLY  Start: 10/16/17 1533   End: 10/16/17 2322   Admin Instructions: May repeat x1 after 15 minutes. For a Max of 2 doses.         2322-Med Discontinued           Dose: 0.2 mg Freq: EVERY 1 MIN PRN Route: IV  PRN Reason: benzodiazepine reversal  Start: 10/16/17 1533   End: 10/16/17 2322   Admin Instructions: May repeat x 3.  Notify provider [Notify Interventional Fellow/Cardiologist ([facility])  Irritant.         2322-Med Discontinued           Rate: 10 mL/hr Freq: CONTINUOUS Route: IV  Last Dose: Stopped (10/17/17 0855)  Start: 10/14/17 1830   End: 10/17/17 0750      2017 (5 mg/hr)-New Bag       2100 (5 mg/hr)-Rate/Dose Verify       2200 (5 mg/hr)-Rate/Dose Verify       2300 (5 mg/hr)-Rate/Dose Verify        0000 (5 mg/hr)-Rate/Dose Verify       0100 (5 mg/hr)-Rate/Dose Verify       0200 (5 mg/hr)-Rate/Dose Verify       0300 (5 mg/hr)-Rate/Dose Verify       0400 (5 mg/hr)-Rate/Dose Verify       0500 (5 mg/hr)-Rate/Dose Verify       0600 (5 mg/hr)-Rate/Dose Verify       0700 (5 mg/hr)-Rate/Dose Verify       0800 (7.5 mg/hr)-Rate/Dose Change       0900 (7.5 mg/hr)-Rate/Dose Verify       1000 (7.5 mg/hr)-Rate/Dose Verify       1100 (7.5  mg/hr)-Rate/Dose Verify       1145 (7.5 mg/hr)-New Bag       1200 (7.5 mg/hr)-Rate/Dose Verify       1300 (7.5 mg/hr)-Rate/Dose Verify       1400 (7.5 mg/hr)-Rate/Dose Verify       1454 (10 mg/hr)-Rate/Dose Change       1500 (10 mg/hr)-Rate/Dose Verify       1600 (10 mg/hr)-Rate/Dose Verify       1700 (10 mg/hr)-Rate/Dose Verify       1800 (10 mg/hr)-Rate/Dose Verify       1900 (10 mg/hr)-Rate/Dose Verify       2000 (10 mg/hr)-Rate/Dose Verify       2100 (10 mg/hr)-Rate/Dose Verify       2200 (10 mg/hr)-Rate/Dose Verify       2300 (10 mg/hr)-Rate/Dose Verify       2329 (10 mg/hr)-New Bag        0000 (10 mg/hr)-Rate/Dose Verify       0100 (10 mg/hr)-Rate/Dose Verify       0200 (10 mg/hr)-Rate/Dose Verify       0300 (10 mg/hr)-Rate/Dose Verify       0400 (10 mg/hr)-Rate/Dose Verify       0500 (10 mg/hr)-Rate/Dose Verify       0600 (10 mg/hr)-Rate/Dose Verify       0700 (10 mg/hr)-Rate/Dose Verify       1605 (10 mg/hr)-New Bag       1700 (10 mg/hr)-Rate/Dose Verify       1800 (10 mg/hr)-Rate/Dose Verify       1900 (10 mg/hr)-Rate/Dose Verify       2000 (10 mg/hr)-Rate/Dose Verify       2100 (10 mg/hr)-Rate/Dose Verify       2200 (10 mg/hr)-Rate/Dose Verify       2300 (10 mg/hr)-Rate/Dose Verify        0000 (10 mg/hr)-Rate/Dose Verify       0004 (10 mg/hr)-New Bag       0100 (10 mg/hr)-Rate/Dose Verify       0200 (10 mg/hr)-Rate/Dose Verify       0300 (10 mg/hr)-Rate/Dose Verify       0400 (10 mg/hr)-Rate/Dose Verify       0500 (10 mg/hr)-Rate/Dose Verify       0600 (10 mg/hr)-Rate/Dose Verify       0700 (10 mg/hr)-Rate/Dose Verify       0750-Med Discontinued  0855-Stopped              Freq: CONTINUOUS PRN Route: XX  Start: 10/16/17 0800   End: 10/16/17 1007   Admin Instructions: IF diabetic, Give   of usual dose of LONG ACTING insulin AM of IFEANYI procedure.           1007-Med Discontinued           Freq: HOLD Route: XX  Start: 10/16/17 0800   End: 10/16/17 1007   Admin Instructions: IF diabetic, Hold prior to IFEANYI  "procedure.         1007-Med Discontinued           Freq: HOLD Route: XX  Start: 10/16/17 0800   End: 10/16/17 1007   Admin Instructions: IF diabetic, Hold prior to IFEANYI procedure.         1007-Med Discontinued           Freq: HOLD Route: XX  Start: 10/16/17 0800   End: 10/16/17 1007   Admin Instructions: IF diabetic, Hold prior to IFEANYI procedure.         1007-Med Discontinued           Dose: 25 mg Freq: EVERY 8 HOURS SCHEDULED Route: PO  Start: 10/16/17 1845   End: 10/17/17 1024        1901 (25 mg)-Given               0653 (25 mg)-Given       1024-Med Discontinued          Freq: EVERY 1 HOUR PRN Route: Top  PRN Reason: pain  PRN Comment: with VAD insertion or accessing implanted port.  Start: 10/15/17 1204   End: 10/16/17 1007   Admin Instructions: Do NOT give if patient has a history of allergy to any local anesthetic or any \"roland\" product.   Apply 30 minutes prior to VAD insertion or port access. MAX Dose: 2.5 g (  of 5 g tube)         1007-Med Discontinued           Dose: 1 mL Freq: EVERY 1 HOUR PRN Route: OTHER  PRN Comment: mild pain with VAD insertion or accessing implanted port  Start: 10/15/17 1204   End: 10/16/17 1007   Admin Instructions: Do NOT give if patient has a history of allergy to any local anesthetic or any \"roland\" product. MAX dose 1 mL subcutaneous OR intradermal in divided doses.         1007-Med Discontinued           Freq: CONTINUOUS PRN Route: XX  PRN Comment: May take oral meds with a sip of water, the morning of IFEANYI procedure.  Start: 10/16/17 0800   End: 10/16/17 1007        1007-Med Discontinued           Dose: 0.5-1 mg Freq: EVERY 5 MIN PRN Route: IV  PRN Reason: other  PRN Comment: anxiety associated with pulling the FEMORAL sheath out ONLY  Start: 10/16/17 1533   End: 10/16/17 2322   Admin Instructions: May repeat x1 after 5 minutes, if needed. For a MAX of 2 doses.         2322-Med Discontinued           Dose: 0.1-0.4 mg Freq: EVERY 2 MIN PRN Route: IV  PRN Reason: opioid " reversal  Start: 10/16/17 1533   End: 10/16/17 1552   Admin Instructions: For respiratory rate LESS than or EQUAL to 8.  Partial reversal dose:  0.1 mg titrated q 2 minutes for Analgesia Side Effects Monitoring Sedation Level of 3 (frequently drowsy, arousable, drifts to sleep during conversation).Full reversal dose:  0.4 mg bolus for Analgesia Side Effects Monitoring Sedation Level of 4 (somnolent, minimal or no response to stimulation).         1552-Med Discontinued           Dose: 0.1-0.4 mg Freq: EVERY 2 MIN PRN Route: IV  PRN Reason: opioid reversal  Start: 10/13/17 2228   End: 10/16/17 1552   Admin Instructions: For respiratory rate LESS than or EQUAL to 8.  Partial reversal dose:  0.1 mg titrated q 2 minutes for Analgesia Side Effects Monitoring Sedation Level of 3 (frequently drowsy, arousable, drifts to sleep during conversation).Full reversal dose:  0.4 mg bolus for Analgesia Side Effects Monitoring Sedation Level of 4 (somnolent, minimal or no response to stimulation).         0731-Auto Hold       1007-Unhold       1008-Auto Hold       1531-Unhold       1552-Med Discontinued           Dose: 0.2-0.4 mg Freq: EVERY 2 MIN PRN Route: IV  PRN Reasons: opioid reversal,other  PRN Comment: respiratory depression  Start: 10/16/17 1533   End: 10/16/17 2322   Admin Instructions: Notify provider [Notify Interventional Fellow/Cardiologist ([facility]).  For respiratory rate less than or equal to 8.  Partial reversal dose:  0.2 mg titrated q 2 minutes for Sedation Level of 3 (frequently drowsy, arousable, drifts to sleep during conversation).Full reversal dose:  0.4 mg bolus for Sedation Level of 4 (somnolent, minimal or no response to stimulation).         2322-Med Discontinued           Rate: 2.6-52.4 mL/hr Freq: CONTINUOUS Route: IV  Last Dose: Stopped (10/16/17 1800)  Start: 10/14/17 1600   End: 10/18/17 0797   Admin Instructions: For range orders: start at lowest dose ordered. Titrate by 0.25 to 0.5 mcg/kg/min every 5  minutes to keep MAP 65. Ok to titrate up.  Conc: 0.4 mg/mL. Protect from light. Irritant.       1606 (0.25 mcg/kg/min)-New Bag       1645 (0.5 mcg/kg/min)-Rate/Dose Change       1700 (0.5 mcg/kg/min)-Rate/Dose Verify       1800 (0.75 mcg/kg/min)-Rate/Dose Change       1900 (0.75 mcg/kg/min)-Rate/Dose Verify       2000 (0.75 mcg/kg/min)-Rate/Dose Verify       2045 (1 mcg/kg/min)-Rate/Dose Change       2100 (1 mcg/kg/min)-Rate/Dose Verify       2200 (1 mcg/kg/min)-Rate/Dose Verify       2300 (1 mcg/kg/min)-Rate/Dose Verify        0000 (1 mcg/kg/min)-Rate/Dose Verify       0015 (0.75 mcg/kg/min)-Rate/Dose Change       0030 (0.5 mcg/kg/min)-Rate/Dose Change       0045 (0.25 mcg/kg/min)-Rate/Dose Change       0100 (0.25 mcg/kg/min)-Rate/Dose Verify       0115-Stopped       0615 (0.25 mcg/kg/min)-Restarted       0630 (0.5 mcg/kg/min)-Rate/Dose Change       0700 (0.5 mcg/kg/min)-Rate/Dose Verify       0715 (0.25 mcg/kg/min)-Rate/Dose Change       0800 (0.25 mcg/kg/min)-Rate/Dose Verify       0900 (0.25 mcg/kg/min)-Rate/Dose Verify       1000 (0.25 mcg/kg/min)-Rate/Dose Verify       1100 (0.25 mcg/kg/min)-Rate/Dose Verify       1124 (0.25 mcg/kg/min)-New Bag       1200 (0.25 mcg/kg/min)-Rate/Dose Verify       1300 (0.25 mcg/kg/min)-Rate/Dose Verify       1400 (0.25 mcg/kg/min)-Rate/Dose Verify       1500 (0.25 mcg/kg/min)-Rate/Dose Verify       1600 (0.25 mcg/kg/min)-Rate/Dose Verify       1700 (0.25 mcg/kg/min)-Rate/Dose Verify       1800 (0.5 mcg/kg/min)-Rate/Dose Change       1900 (0.5 mcg/kg/min)-Rate/Dose Verify       2000 (0.5 mcg/kg/min)-Rate/Dose Verify       2100 (0.5 mcg/kg/min)-Rate/Dose Verify       2200 (0.25 mcg/kg/min)-Rate/Dose Change       2300 (0.25 mcg/kg/min)-Rate/Dose Verify       2315 (0.5 mcg/kg/min)-Rate/Dose Change        0000 (0.5 mcg/kg/min)-Rate/Dose Verify       0045 (0.25 mcg/kg/min)-Rate/Dose Change       0100 (0.25 mcg/kg/min)-Rate/Dose Verify       0200 (0.25 mcg/kg/min)-Rate/Dose Verify        0300 (0.25 mcg/kg/min)-Rate/Dose Verify       0400 (0.25 mcg/kg/min)-Rate/Dose Verify       0500 (0.5 mcg/kg/min)-Rate/Dose Change       0545 (0.25 mcg/kg/min)-Rate/Dose Change       0600 (0.25 mcg/kg/min)-Rate/Dose Verify       0700 (0.25 mcg/kg/min)-Rate/Dose Verify       1625 (0.25 mcg/kg/min)-New Bag       1700 (0.25 mcg/kg/min)-Rate/Dose Verify       1800-Stopped                0738-Med Discontinued         Rate: 1.9-25.5 mL/hr Freq: CONTINUOUS Route: IV  Last Dose: Stopped (10/16/17 1536)  Start: 10/16/17 0730   End: 10/18/17 0738   Admin Instructions: For range orders: start at lowest dose ordered. Titrate by 0.03 to 0.1 mcg/kg/min every 5 minutes to keep MAP greater than 65 mmHg.  Notify prescriber if higher doses are required to achieve blood pressure goals.  Protect from light. Vesicant.         0758 (0.06 mcg/kg/min)-New Bag       0834 (0.08 mcg/kg/min)-Rate/Dose Change       0842 (0.04 mcg/kg/min)-Rate/Dose Change       1112-Stopped       1130 (0.04 mcg/kg/min)-Restarted       1144-Stopped       1224 (0.03 mcg/kg/min)-Restarted       1241 (0.02 mcg/kg/min)-Rate/Dose Change       1244-Stopped       1300 (0.02 mcg/kg/min)-Restarted       1536-Stopped                0738-Med Discontinued         Dose: 3 mL Freq: EVERY 8 HOURS Route: IV  Start: 10/15/17 1400   End: 10/16/17 1007   Admin Instructions: And Q1H PRN, to lock peripheral IV dormant line.        (1452)-Not Given       2201 (3 mL)-Given        0401 (3 mL)-Given       1007-Med Discontinued           Dose: 3 mL Freq: EVERY 1 HOUR PRN Route: IV  PRN Reason: line flush  PRN Comment: for peripheral IV flush post IV meds  Start: 10/15/17 1204   End: 10/16/17 1007        [address] [place] [address] Discontinued      Medications 10/12/17 10/13/17 10/14/17 10/15/17 10/16/17 10/17/17 10/18/17

## 2017-10-13 NOTE — DISCHARGE SUMMARY
[facility]    Discharge Summary  Hospitalist    Date of Admission:  10/10/2017  Date of Discharge:  10/13/2017  Discharging Provider: Josr Harrell MD  Date of Service (when I saw the patient): 10/13/17    Discharge Diagnoses   1) Acute diastolic CHF exacerbation due to progressive, severe acute on chronic mitral regurgitation causing acute pulmonary edema and myonecrosis  2) ZAK on CKD 3  3) Bladder cancer  4) Thrombocytopenia  5) Chronic degenerative arthritis  6) GERD    History of Present Illness   Mr. Gomez is a markedly pleasant 89 year old gentleman former smoker with prior recurrent urothelial carcinoma as well as CKD Stage 3, chronic thrombocytopenia, hypertension, dyslipidemia, and chronic degenerative arthritis who was admitted for acute hypoxemic respiratory failure and found to have acute diastolic CHF exacerbation due to progressive, severe acute on chronic mitral regurgitation. Please see the H and P for complete details of the initial presentation.     Hospital Course   Ivan Gomez was admitted on 10/10/2017.  The following problems were addressed during his hospitalization:    1) Acute diastolic CHF exacerbation due to progressive, severe acute on chronic mitral regurgitation causing acute pulmonary edema and myonecrosis: Mr. Gomez has developed progressively more severe dyspnea and orthopnea. He has also had mild epigastric pain. He is not known to have CAD. A dobutamine stress echo in 2012 had shown mild to moderate MR.  On this admission, Troponins have peaked at 0.06. EKG shows NSR with ectopy. CXR showed bilateral pulmonary edema. Labs have been notable for mild leukocytosis, CKD and chronic thrombocytopenia. Blood cultures show no growth to date.       On admission he was initially in marked respiratory distress and required BIPAP and received several doses of IV Lasix with significant improvement. TTE showed a possible ruptured mitral valve chord with severe  [facility] was consulted      Overall we suspect acute on chronic MR. He certainly could have had a precipitating ischemic event. Cardiovascular Surgery was consulted. He is a poor surgical candidate overall due to age and medical co-morbidities. He may be a candidate for mitral valve clip, though he remains dyspneic and orthopneic and thus would require further optimization if possible prior to such a procedure.    -- Lexiscan on 10/12 was done for evaluation of ischemia and showed a small area of inducible ischemia in the anterior wall    -- Pulmonary edema seems to have resolved, but he remains on an oxymizer and last night had a lot of orthopnea. We are diuresing him as per Cardiology guidance, very cautiously in the setting of elevated Creatinine. Last night, he got 20 mg IV Lasix and today a repeat CXR shows unchanged bilateral opacifications. He is currently ordered to receive 20 mg IV Lasix BID.    -- Consideration for IFEANYI and/or catheterization today, but we have been unable to do this procedure safely as he can not lay flat. We are therefore attempting to transfer him to the [facility] where he can get a IFEANYI on Monday, 10/16/2017, under general anesthesia.    -- Norvasc and Toprol held at admission; Toprol now resumed, Norvasc continues to be held for slightly low ongoing blood pressures    -- Statin resumed (Zocor 20 mg QHS)    -- Aspirin held at discretion of Cardiology due to ongoing epigastric discomfort    -- He has had episodes of ectopy and palpitations without clear evidence for arrhythmia thus far; we continue telemetry    -- He now wishes to be DNR DNI     -- Extensive care discussions have been held with him, his wife, and his partner and they agree with this careful plan of care      2) ZAK on CKD 3: After admission Creatinine narda to 1.4 from 1; likely pre-renal. Improved since then to 1.2.    -- Closely monitor at least daily as we diurese     3) Bladder cancer: Mr. Gomez was diagnosed in  "the past with low grade urothelial carcinoma and had TURBT with nephrostomy tube which was internalized; followed by recurrence and other TURBT. He now declines any further treatments at this point for his cancer unless he develops severe symptoms.    -- He continues Flomax and Proscar     4) Thrombocytopenia: Chronic, seems stable but we must monitor closely. Around 60 k presently.     5) Chronic degenerative arthritis: He continues Neurontin for chronic neuropathic pain as well as prn Norco; we will increase his bowel regimen today     6) GERD: IV PPI and Famotidine ordered; speech pathology consulted to evaluate for possible aspiration and modified diet ordered    Josr Harrell MD    Pending Results   These results will be followed up by [facility] Inpatient team    Unresulted Labs Ordered in the Past 30 Days of this Admission     Date and Time Order Name Status Description    10/10/2017 2340 Blood culture Preliminary     10/10/2017 2340 Blood culture Preliminary           Code Status   DNR / DNI       Primary Care Physician   Evaristo Magaña    Blood pressure 117/66, pulse 72, temperature 97.6  F (36.4  C), temperature source Oral, resp. rate 16, height 1.702 m (5' 7\"), weight 70.4 kg (155 lb 3.3 oz), SpO2 91 %.    Constitutional: Alert and oriented to person, place and time; no apparent distress  HEENT: normocephalic moist mucus membranes  Respiratory: lungs remain clear to auscultation bilaterally this morning  Cardiovascular: Irregular S1 S2 and several loud murmurs on auscultation  GI: abdomen soft non tender non distended bowel sounds positive  Skin: no rash, good turgor  Musculoskeletal: no clubbing, cyanosis or edema  Neuro: EOMI; moves all four extremities  Psych: Appropriate affect, insight and judgment    Discharge Disposition   Discharged to [facility] Inpatient [facility]  Condition at discharge: Serious    Consultations This Hospital Stay   CARDIOLOGY IP CONSULT  SPEECH LANGUAGE PATH ADULT IP " CONSULT  CARDIOTHORACIC SURGERY IP CONSULT    Time Spent on this Encounter   I, Josr Harrell, personally saw the patient today and spent greater than 30 minutes discharging this patient.    Discharge Orders     General info for SNF   Length of Stay Estimate: Short Term Care: Estimated # of Days <30  Condition at Discharge: Stable  Level of care:Hospital  Admission H&P remains valid and up-to-date: Yes  Recent Chemotherapy: N/A     Reason for your hospital stay   For evaluation of severe mitral regurgitation.     Follow Up and recommended labs and tests   Follow up with Cardiology on the Inpatient chávez at the [facility] - Up with nursing assistance     DNR/DNI     Oxygen - Nasal cannula   8-15 Lpm by oxymizer to keep O2 sats 88% or greater.     Advance Diet as Tolerated   Follow this diet upon discharge: Orders Placed This Encounter     Moderate Consistent CHO Diet       Discharge Medications   Current Discharge Medication List      START taking these medications    Details   furosemide (LASIX) 10 MG/ML injection Inject 2 mLs (20 mg) into the vein 2 times daily  Qty: 60 mL    Associated Diagnoses: Acute congestive heart failure, unspecified congestive heart failure type (H)         CONTINUE these medications which have NOT CHANGED    Details   pantoprazole (PROTONIX) 40 MG EC tablet Take 1 tablet (40 mg) by mouth daily Take 30-60 minutes before a meal.  Qty: 30 tablet, Refills: 1    Associated Diagnoses: Gastroesophageal reflux disease, esophagitis presence not specified      tamsulosin (FLOMAX) 0.4 MG capsule Take 1 capsule (0.4 mg) by mouth daily  Qty: 90 capsule, Refills: 0    Associated Diagnoses: BPH (benign prostatic hyperplasia)      HYDROcodone-acetaminophen (NORCO) 7.5-325 MG per tablet Take 1 tablet by mouth every 6 hours as needed for moderate to severe pain  Qty: 120 tablet, Refills: 0    Associated Diagnoses: Lumbar spinal stenosis      gabapentin (NEURONTIN) 300 MG capsule TAKE 1  CAPSULE BY MOUTH THREE TIMES DAILY  Qty: 270 capsule, Refills: 1    Associated Diagnoses: Lumbar spinal stenosis      fiber modular (NUTRISOURCE FIBER) packet 1 packet daily      simvastatin (ZOCOR) 20 MG tablet TAKE 1 TABLET BY MOUTH AT BEDTIME  Qty: 90 tablet, Refills: 1    Associated Diagnoses: Hyperlipidemia LDL goal <100      finasteride (PROSCAR) 5 MG tablet TAKE 1 TABLET BY MOUTH EVERY DAY  Qty: 90 tablet, Refills: 3    Associated Diagnoses: Benign enlargement of prostate      metoprolol (TOPROL-XL) 25 MG 24 hr tablet TAKE 1 TABLET BY MOUTH DAILY  Qty: 90 tablet, Refills: 3    Associated Diagnoses: Benign essential hypertension         STOP taking these medications       amLODIPine (NORVASC) 5 MG tablet Comments:   Reason for Stopping:         colchicine (COLCRYS) 0.6 MG tablet Comments:   Reason for Stopping:         Omega-3 Fatty Acids (OMEGA-3 FISH OIL PO) Comments:   Reason for Stopping:         aspirin 81 MG tablet Comments:   Reason for Stopping:         Cholecalciferol (VITAMIN D) 2000 UNIT CAPS Comments:   Reason for Stopping:             Allergies   Allergies   Allergen Reactions     Celebrex [Celecoxib] Hives     Penicillins Hives     Data   Most Recent 3 CBC's:  Recent Labs   Lab Test  10/13/17   0740  10/12/17   0729  10/11/17   0410   WBC  10.3  8.9  9.2   HGB  12.7*  12.6*  13.2*   MCV  94  93  93   PLT  86*  68*  63*      Most Recent 3 BMP's:  Recent Labs   Lab Test  10/13/17   0740  10/12/17   0729  10/11/17   0410   NA  140  140  139   POTASSIUM  4.0  3.8  3.9   CHLORIDE  105  106  107   CO2  26  25  23   BUN  50*  32*  25   CR  1.23  1.34*  1.42*   ANIONGAP  9  9  9   GIUSEPPE  9.0  8.9  8.6   GLC  115*  117*  137*     Most Recent 2 LFT's:  Recent Labs   Lab Test  08/04/16   1502  03/22/16   0958   AST  20  18   ALT  27  25   ALKPHOS  76  75   BILITOTAL  0.8  0.8     Most Recent INR's and Anticoagulation Dosing History:  Anticoagulation Dose History     Recent Dosing and Labs Latest Ref Rng &  Units 8/24/2015    INR 0.86 - 1.14 1.11        Most Recent 3 Troponin's:  Recent Labs   Lab Test  10/11/17   1007  10/11/17   0410  10/10/17   2345   TROPI  0.054*  0.060*  0.032     Most Recent Cholesterol Panel:  Recent Labs   Lab Test  10/12/17   0729   CHOL  108   LDL  29   HDL  63   TRIG  81     Most Recent 6 Bacteria Isolates From Any Culture (See EPIC Reports for Culture Details):  Recent Labs   Lab Test  10/11/17   0011  10/10/17   2345  11/13/15   1218  08/15/15   1310  08/15/15   1039  03/23/15   1523   CULT  No growth after 2 days  No growth after 2 days  No growth after 3 days  No growth  No growth  No Beta Streptococcus isolated     Most Recent TSH, T4 and A1c Labs:  Recent Labs   Lab Test  08/04/16   1502   08/18/15   1146   TSH  1.14   < >   --    A1C   --    --   5.3    < > = values in this interval not displayed.     Results for orders placed or performed during the hospital encounter of 10/10/17   XR Chest 2 Views    Narrative    CHEST 2 VIEWS  10/11/2017 12:01 AM     HISTORY: Cough, shortness of breath, and hypoxia.    COMPARISON: 8/23/2015.    FINDINGS: Hyperinflated lungs. Moderate to markedly increased  interstitial opacities in both lungs. Borderline cardiomegaly. Fusion  hardware in the lower cervical spine.      Impression    IMPRESSION:   1. Moderate to markedly increased interstitial opacities in both  lungs. These are most likely related to interstitial pulmonary edema;  however, an infectious or inflammatory process could have a similar  appearance.  2. Hyperinflated lungs, suggestive of chronic obstructive pulmonary  disease.    LUCAS BRANDT MD   NM Lexiscan stress test (nuc card)    Narrative    GATED MYOCARDIAL PERFUSION SCINTIGRAPHY WITH INTRAVENOUS PHARMACOLOGIC  VASODILATATION LEXISCAN -ONE DAY STUDY     10/12/2017 3:29 PM  MALLORIE PRYOR CROOKER  89 years  Male  10/12/1926.    Indication/Clinical History: Troponin elevation    Impression  1.  Myocardial perfusion imaging using single  isotope technique  demonstrated a small reversible anteroapical defect consistent with a  small area of ischemia.   2. Gated images demonstrated normal wall motion.  The left ventricular  systolic function is normal.    Procedure  Pharmacologic stress testing was performed with Lexiscan at a rate of  0.08 mg/ml rapid bolus injection, for 15 seconds, 0.4 mg/5ml  intravenously. Low-level exercise was not performed along with the  vasodilator infusion.  The heart rate was 98 at baseline and narda to  108 beats per minute during the Lexiscan infusion. The rest blood  pressure was 119/72 mmHg and was 112/67 mm Hg during Lexiscan  infusion. The patient experienced chest pressure and shortness of  breath  during the test.    Myocardial perfusion imaging was performed at rest, approximately 45  minutes after the injection intravenously of 10.5 mCi of Tc-99m  Myoview. At peak pharmacologic effect, 10-20 seconds after Lexiscan,   the patient was injected intravenously with 27.6 mCi of  Tc-99m  Myoview. The post-stress tomographic imaging was performed  approximately 60 minutes after stress.      EKG Findings  The resting EKG demonstrated sinus rhythm with multiple APCs and  nonspecific ST segment changes. The stress EKG demonstrated no  significant ST segment changes.    Tomographic Findings  Overall, the study quality is adequate . On the stress images, there  is a small anteroapical defect with a mild reduction in radiotracer  uptake. On the rest images, reversibility was seen in the small  anteroapical defect . Gated images demonstrated normal wall motion.  The left ventricular ejection fraction was calculated to be normal.  TID was absent.    BLANE GRACIA MD   XR Chest 2 Views    Narrative    CHEST TWO VIEWS   10/13/2017 8:44 AM     HISTORY: Congestive heart failure and hypoxia with dyspnea.    COMPARISON: 10/11/2017.     FINDINGS. Cardiac and mediastinal silhouettes are within normal  limits. No change in bilateral  interstitial and airspace opacities.  Tiny bilateral pleural effusions are mildly increased when compared to  the prior study. No pneumothorax.      Impression    IMPRESSION: No change in diffuse mixed interstitial and airspace  opacities throughout both lungs.

## 2017-10-13 NOTE — TELEPHONE ENCOUNTER
Inpatient with severe mitral regurgitation - visited with the patient - explained what a mitaclip was and the patient is agreeable to have the procedure in the near future.    In review with the Structural heart team and the rounding cardiologist it is preferred that this patient get transferred to the [facility] and have a mitraclip procedure on Monday 10/16/2017.  Orders placed in epic for the patient to have a IFEANYI today - spoke with the echo supervisor who acknowledged that they would be able to get this done today.  Orders placed for a cardiothoracic surgeon to see the patient for assessment of mitral valve repair vs. Mitaclip. Also placed orders for the mitaclip procedure to be scheduled.  Orders for the actual procedure to be placed by the other structural heart care coordinator.. No other tasks to be done at this time. Sherrill Iyer

## 2017-10-13 NOTE — PLAN OF CARE
Problem: Patient Care Overview  Goal: Plan of Care/Patient Progress Review  Outcome: No Change  A&O.  VSS, on 15 L oxymizer mask.  On Bi-Pap half of the shift.  LS diminished with fine crackles on LL. NPO ex meds and ice chips for IFEANYI, no schedule yet.  Urinates in urinal.  BS active, passing gas.  C/O abd discomfort.  Pain managed with [facility].  Tele SDR with PAC and PVC.  Continue to monitor.

## 2017-10-13 NOTE — IP AVS SNAPSHOT
` `     UNIT [address] [facility]: [phone]                 INTERAGENCY TRANSFER FORM - NOTES (H&P, Discharge Summary, Consults, Procedures, Therapies)   10/13/2017                    Hospital Admission Date: 10/13/2017  IVAN NOVA   : 10/12/1926  Sex: Male        Patient PCP Information     Provider PCP Type    Evaristo Magaña MD General         History & Physicals      H&P by Bobby Fuchs MD at 10/13/2017 10:38 PM     Author:  Bobby Fuchs MD Service:  Cardiology Author Type:  Resident    Filed:  10/14/2017  3:33 AM Date of Service:  10/13/2017 10:38 PM Creation Time:  10/13/2017 10:38 PM    Status:  Cosign Needed :  Bobby Fuchs MD (Resident)    Cosign Required:  Yes               [facility]    H & P  Cardiology       Patient Name: Ivan Nova   Date of Admission: 10/13/2017            Summary:     Ivan Nova is a 89 y.o. Male with a history of HFpEF, CKD III, bladder cancer s/p TURBT x2, GERD, DJD, HTN, and HLD. He is a transfer from [facility] for evaluation of Mitral-Clip procedure in the setting of acute hypoxic respiratory failure 2/2 new MR and HFpEF.           Assessment and Plan:     #Acute Hypoxemic Respiratory Failure  #Acute on Chronic HFpEF  #Severe 4+ Mitral Regurgitation 2/2 flail posterior leaflet  Echo on 10/11/17: EF >70%; hyperdynamic; severe (4+) MR; torn mitral valve chordae tendinae with flail posterior MV leaflet; grade III diastolic dysfunction. The MR is new since stress Echo in .   Lexiscan on 10/12/17: small area of anterior inducible ischemia.   Nt-BNP elevated to 9149.  No 02 at home. Needing increasing O2 requirements; up to 15L O2 on admission. Likely secondary to pulmonary edema and decreased CO from severe mitral regurgitation. Had been diuresing at [facility]. Chest Xray from 10/13 with small bilateral pleural effusions and evidence of interstitial pulmonary edema.   -Gave 20mg IV furosemide on admission  -No diuretics  ordered for AM; primary team to address tomorrow  -Telemetry  -O2 as needed; BiPAP as next step; patient is DNI  -BMP in AM  -Electrolyte goal: K >4, Mg >2  -Plan for IFEANYI on 10/16  -patient is on schedule for mitral clip on 10/16    #New Area of Inducible Ischemia (anteroapical)  Troponins peaked at [facility] to 0.06. ECG with NSR. Found on Lexiscan from 10/12/17. ASA held on admission to [facility] due to epigastric pain.  -Plan to restart ASA 81mg in AM; he will likely need this post mitral clip  -Unclear what plan is regarding catheterization    CKD III  Baseline Cr ~1.2-1.3. Increased to 1.42 on 10/11, but has normalized. His BUN however, has been increasing with diuresis; 50 on 10/13. No melena nor history of GI bleed.   -Monitor BMP    Bladder Cancer s/p TURBT x2  Low grade urothelial cell carcinoma. No longer wants treatment. Palliative approach.   -Continue PTA tamsulosin and finasteride    HTN    BP soft on admission.   -Will hold home amlodipine    HLD  -Continue PTA simvastatin      Code Status: DNR/DNI  FEN: Cardiac Diet; Sodium restriction  PPx: Padua score : 8; heparin ppx  Dispo: General Medicine    Bobby Fuchs MD  Internal Medicine Resident, PGY2  [phone]           HPI:     Ivan Gomez is a 89 y.o. Male with a history of HFpEF, CKD III, bladder cancer s/p TURBT x2, GERD, DJD, HTN, and HLD.     The patient initially started feeling a pressure in the middle of his chest and/or upper abdomen 4 weeks prior to admission. He was assessed by his PCP who placed him on a PPI. The discomfort did not get better. Then he developed shortness of breath later that evening that included orthopnea, so he called 911. He was subsequently taken to [facility].     At [facility] the patient was found to be hypoxic to 76% in the ED, so he placed on BiPAP. He was subsequently diuresed. During his evaluation, he was found to have new severe mitral regurgitation that was a likely contributor to his symptoms. He was  evaluated by cardiothoracic surgery there, and it was decided that he was not a surgical candidate. There was a plan to undergo IFEANYI, but he was too tenuous to undergo the procedure without general anesthesia. [facility].     On 10/13, when I met the patient he is comfortable in his room on 15L O2. He denies shortness of breath at the current time. He sits up in bed, because he says laying down flat makes his breathing worse. He has not noticed and chest pain today. He denies any recent weight gain. He has not noticed any increased swelling in his legs. No fevers, cough. He has not been up walking around because that exacerbates the dyspnea.           Past Medical History:     Past Medical History:   Diagnosis Date     Arthritis     Spinal Stenosis     Former smoker      Hemorrhoids      Hypercholesteremia      Hypertension      Malignant neoplasm (H)     skin CA, Basal cell     Other chronic pain      Renal disease               Past Surgical History:        Past Surgical History:   Procedure Laterality Date     BACK SURGERY       BIOPSY      face, skin cancer     BIOPSY  8/2016    shoulder lesion     CYSTOSCOPY, TRANSURETHRAL RESECTION (TUR) PROSTATE, COMBINED N/A 8/21/2015    Procedure: COMBINED CYSTOSCOPY, TRANSURETHRAL RESECTION (TUR) PROSTATE;  Surgeon: Dennis Hilton MD;  Location:  OR     CYSTOSCOPY, TRANSURETHRAL RESECTION (TUR) TUMOR BLADDER, COMBINED N/A 9/2/2016    Procedure: COMBINED CYSTOSCOPY, TRANSURETHRAL RESECTION (TUR) TUMOR BLADDER;  Surgeon: Dennis Hliton MD;  Location:  OR     ORTHOPEDIC SURGERY      lumbar and cervical surgery, Sep, 2008, knee surgery              Social History:     Social History   Substance Use Topics     Smoking status: Former Smoker     Smokeless tobacco: Never Used     Alcohol use No      Comment: doesnt use anymore              Family History:     Family History   Problem Relation Age of Onset      CANCER Son 64     leukemia ? due to agent orange     Family History Negative Son      DIABETES No family hx of      Coronary Artery Disease No family hx of      Hypertension No family hx of      Hyperlipidemia No family hx of      CEREBROVASCULAR DISEASE No family hx of      Breast Cancer No family hx of      Colon Cancer No family hx of      Prostate Cancer No family hx of      Other Cancer No family hx of      Depression No family hx of      Anxiety Disorder No family hx of      MENTAL ILLNESS No family hx of      Substance Abuse No family hx of      Anesthesia Reaction No family hx of      Asthma No family hx of      OSTEOPOROSIS No family hx of      Genetic Disorder No family hx of      Thyroid Disease No family hx of      Obesity No family hx of      Unknown/Adopted No family hx of               Immunizations:     Immunization History   Administered Date(s) Administered     Influenza (High Dose) 3 valent vaccine 10/11/2014, 09/29/2015, 09/13/2016, 09/19/2017     Pneumococcal (PCV 13) 08/18/2015     Pneumococcal 23 valent 06/13/2013     TDAP Vaccine (Adacel) 06/13/2013              Allergies:     Allergies   Allergen Reactions     Celebrex [Celecoxib] Hives     Penicillins Hives              Medications:       sodium chloride (PF)  3 mL Intracatheter Q8H     senna-docusate  1-2 tablet Oral BID     [START ON 10/14/2017] finasteride  5 mg Oral Daily     [START ON 10/14/2017] gabapentin  300 mg Oral TID     [START ON 10/14/2017] metoprolol  25 mg Oral Daily     [START ON 10/14/2017] pantoprazole  40 mg Oral Daily     simvastatin  20 mg Oral At Bedtime     [START ON 10/14/2017] tamsulosin  0.4 mg Oral Daily              Review of Systems:     10 point Review of Systems is negative except as noted in the HPI          Physical Exam:     There were no vitals taken for this visit.    General: Sitting up in bed. Alert and oriented completely. No acute distress   HEENT: Oxymizer on. PERRL. Senilus arcus. EOM intact.  Sclera non-icteric. Oral mucosa tacky.  Neck/Thyroid: Trachea midline. No lymphadenopathy  Resp: Inspiratory crackles throughout. No wheezing.   CV: Regular rate and rhythm. Grade 3/6 holosystolic murmur heard best at the apex.    Abdominal: Soft, nondistended. Nontender to palpation. No peritoneal signs. BS+  MSK: Frail appearing.   Extremities: Radial, and pedal pulses present and bilaterally equal. Trace LE edema.   Neurological: CN's II-XII grossly intact. A&O completely            Data:     Results for orders placed or performed during the hospital encounter of 10/10/17 (from the past 24 hour(s))   Basic metabolic panel   Result Value Ref Range    Sodium 140 133 - 144 mmol/L    Potassium 4.0 3.4 - 5.3 mmol/L    Chloride 105 94 - 109 mmol/L    Carbon Dioxide 26 20 - 32 mmol/L    Anion Gap 9 3 - 14 mmol/L    Glucose 115 (H) 70 - 99 mg/dL    Urea Nitrogen 50 (H) 7 - 30 mg/dL    Creatinine 1.23 0.66 - 1.25 mg/dL    GFR Estimate 55 (L) >60 mL/min/1.7m2    GFR Estimate If Black 67 >60 mL/min/1.7m2    Calcium 9.0 8.5 - 10.1 mg/dL   CBC with platelets differential   Result Value Ref Range    WBC 10.3 4.0 - 11.0 10e9/L    RBC Count 4.02 (L) 4.4 - 5.9 10e12/L    Hemoglobin 12.7 (L) 13.3 - 17.7 g/dL    Hematocrit 37.9 (L) 40.0 - 53.0 %    MCV 94 78 - 100 fl    MCH 31.6 26.5 - 33.0 pg    MCHC 33.5 31.5 - 36.5 g/dL    RDW 13.4 10.0 - 15.0 %    Platelet Count 86 (L) 150 - 450 10e9/L    Diff Method Automated Method     % Neutrophils 85.5 %    % Lymphocytes 6.5 %    % Monocytes 7.5 %    % Eosinophils 0.2 %    % Basophils 0.1 %    % Immature Granulocytes 0.2 %    Nucleated RBCs 0 0 /100    Absolute Neutrophil 8.8 (H) 1.6 - 8.3 10e9/L    Absolute Lymphocytes 0.7 (L) 0.8 - 5.3 10e9/L    Absolute Monocytes 0.8 0.0 - 1.3 10e9/L    Absolute Eosinophils 0.0 0.0 - 0.7 10e9/L    Absolute Basophils 0.0 0.0 - 0.2 10e9/L    Abs Immature Granulocytes 0.0 0 - 0.4 10e9/L    Absolute Nucleated RBC 0.0    XR Chest 2 Views    Narrative    CHEST  TWO VIEWS   10/13/2017 8:44 AM     HISTORY: Congestive heart failure and hypoxia with dyspnea.    COMPARISON: 10/11/2017.     FINDINGS. Cardiac and mediastinal silhouettes are within normal  limits. No change in bilateral interstitial and airspace opacities.  Tiny bilateral pleural effusions are mildly increased when compared to  the prior study. No pneumothorax.      Impression    IMPRESSION: No change in diffuse mixed interstitial and airspace  opacities throughout both lungs.    HELEN HUMPHREYS DO   Cardiothoracic Surgery IP Consult: Patient to be seen: Routine - within 24 hours; mitral valve; Consultant may enter orders: Yes    Narrative    Michel Galloway MD     10/13/2017  4:46 PM  Cardiothoracic Surgery Consult    89M with symptomatic acute on chronic mitral regurgitation.   Patient feels improved since arrival . He is comfortable at   present, sitting in a chair, on supplemental oxygen. No chest   pain.   The patient has a history of bladder cancer, s/p TURBT x2,   nephrostomy, and chemotherapy He does not want further   chemotherapy or major surgery. He is DNR, DNI.    PMH: CHF, MR, ZAK, CKD stage 3, bladder cancer, thrombocytopenia,   arthritis, GERD, HTN, HLD, basal cell skin cancer    PSH: urinary procedures as above, skin biopsies    Allergies: penicillin, celebrex    SH: former smoker, no EtOH    FH: son- cancer possible leukemia    Home Meds: protonix, flomax, norco, neurontin, zocor, proscar,   metoprolol XL, norvasc, colchicine, ASA 81mg<   vitamins/supplements    ROS: all other systems negative    PE  T 98, HR 84, /67, sat 91% on 15LPM  70kg, 170cm, BMI 24  NAD  Unlabored respirations  NSR  Creatinine 1.23, platelets 95383, Hb 12.7  Lexiscan: small reversible anteroapical defect consistent with   ischemia  ECHO: EF greater than 70%, severe 4+ MR with flail posterior   leaflet from torn chordae, grade III advanced diastolic   dysfunction  Mild MR on 2012 ECHO    A/P 89M with multiple  medical comorbidities, who is DNR/DNI, with   acute on chronic severe 4+ mitral regurgitation secondary to a   flail posterior leaflet.    Agree with cardiology assessment that the patient is poor   surgical candidate. He would be high risk for a surgical mitral   valve repair or replacement. Furthermore the patient is   uninterested in surgery.   Agree with plan for transfer and MitraClip    Patient seen with Dr. Nelly Galloway[EM1.1]                 Revision History        User Key Date/Time User Provider Type Action    > [N/A] 10/14/2017  3:33 AM Bobby Fuchs MD Resident Sign     EM1.1 10/14/2017 12:00 AM Bobby Fuchs MD Resident Sign                  Discharge Summaries     No notes of this type exist for this encounter.         Consult Notes      Consults by Lizy Cordoba MSW at 10/18/2017 11:28 AM     Author:  Lizy Cordoba MSW Service:  Social Work Author Type:      Filed:  10/18/2017 12:23 PM Date of Service:  10/18/2017 11:28 AM Creation Time:  10/18/2017 11:22 AM    Status:  Addendum :  Lizy Cordoba MSW ()     Consult Orders:    1. Social Work IP Consult [[id number]] ordered by Tevin Meza MD at 10/17/17 1923                Social Work: Assessment with Discharge Plan    Patient Name:  Ivan Gomez  :  10/12/1926  Age:  89 year old  MRN:  [id number]  Risk/Complexity Score:  Filed Complexity Screen Score: 5  Completed assessment with:  Pt    Presenting Information   Reason for Referral:  Discharge plan  Date of Intake:  2017  Referral Source:  Chart Review  Decision Maker:  Pt when able   Alternate Decision Maker:  Jerri Gomez, Pt's SO[LH1.1], Addendum: SO has advanced dementia.  NOK policy would be daughter.[LH1.2]  Health Care Directive:  Copy in Chart  Living Situation:  Assisted Living:  Senior apartments.  Previous Functional Status:  Independent  Patient and family understanding of  hospitalization:  Pt states he wants to be in rehab with his SO at [facility].  Pt does not want to be in another facility.  Cultural/Language/Spiritual Considerations: Pt identifies as Bahai  Adjustment to Illness:  Pt stating he is agreeable to TCU and wanting to know if he can share a room with his SO Jerri.    Physical Health  Reason for Admission:    1. Mitral valve insufficiency, unspecified etiology    2. Mitral regurgitation      Services Needed/Recommended:  TCU    Mental Health/Chemical Dependency  Diagnosis:  None reported  Support/Services in Place:  None  Services Needed/Recommended:  None    Support System  Significant relationship at present time:  ZACH Guthrie - currently at [facility] for a hip fracture sustained Sunday.  Pt states he would like to be in a room with her if possible.  Family of origin is available for support:  Pt did not disclose any other family.  Other support available:  Yes through apartment complex.  Gaps in support system:  Pt stating he will only do rehab at [facility] to stay close to his SO/partner.  Patient is caregiver to:  None[LH1.1]  - Addendum - SO has advanced dementia and pt has been a caregiver for her.[LH1.2]    Provider Information   Primary Care Physician:  Evaristo Magaña   [phone]   Clinic:  [address] [place] [id number]      :  None    Financial   Income Source:  Retired  Financial Concerns:  None reported  Insurance:    Payor/Plan Subscriber Name Rel Member # Group #   MEDICARE - MEDICARE MALLORIE NOVA  [id number]       ATT[employer] CLAIMS, [address]   COMMERCIAL - AAR MALLORIE NOVA  [id number]       [facility] CLAIM DIV, [address]       Discharge Plan   Patient and family discharge goal:  TCU   Provided education on discharge plan:  YES  Patient agreeable to discharge plan:  YES  A list of Medicare Certified Facilities was provided to the patient and/or family to encourage patient choice. Patient's choices for  facility are:    [facility]  PH: [phone]  F: [phone]    Will NH provide Skilled rehabilitation or complex medical:  YES  General information regarding anticipated insurance coverage and possible out of pocket cost was discussed. Patient and patient's family are aware patient may incur the cost of transportation to the facility, pending insurance payment: YES  Barriers to discharge:  Medical stability, bed availability    Discharge Recommendations   Anticipated Disposition:  Facility:  TCU  Transportation Needs:  Medical:  Wheelchair  Name of Transportation Company and Phone: [employer] PH: [phone].    Additional comments   SW met with pt to introduce self and role in consult.  Pt states that he would like to be at [facility] so that he can be with his SO Jerri.  Per pt, Jerri broke her hip on Sunday and pt states he has been very worried about her health and well being.  Pt reports that he spends all of his time with her and being away this long has been difficult.  SW stated they would send a referral to [facility] and would ask about pt's ability to be in the same room as his SO.  SW following for discharge planning to TCU.    YOAV Fox APSW  6C Unit   Phone: [phone]  Pager: [phone]  Unit: [phone][LH1.1]          Revision History        User Key Date/Time User Provider Type Action    > LH1.2 10/18/2017 12:23 PM Lizy Cordoba MSW  Addend     LH1.1 10/18/2017 11:28 AM Lizy Cordoba MSW  Sign                     Progress Notes - Physician (Notes from 10/15/17 through 10/18/17)      Progress Notes by Lizy Cordoba MSW at 10/18/2017 12:37 PM     Author:  Lizy Cordoba MSW Service:  Social Work Author Type:      Filed:  10/18/2017 12:55 PM Date of Service:  10/18/2017 12:37 PM Creation Time:  10/18/2017 12:37 PM    Status:  Addendum :  Lizy Cordoba MSW ()          Social Work Services Discharge Note      Patient Name:  Ivan Gomez     Anticipated Discharge Date:  10/18/17    Discharge Disposition:   TCU:  [facility]  PH: [phone]  F: [phone]    Following MD:  Evaristo Magaña  [address] / [facility] [address]     Pre-Admission Screening (PAS) online form has been completed.  The Level of Care (LOC) is:  Determined  Confirmation Code is:  [id number]  Patient/caregiver informed of referral to [facility] for Pre-Admission Screening for skilled nursing facility (SNF) placement and to expect a phone call post discharge from SNF.     Additional Services/Equipment Arranged:    - [facility]PH: [phone]) wheelchair ride scheduled for 1430.  Pt and family are aware and in agreement that insurance may not cover wheelchair ride: YES[LH1.1]  - Oxygen: O2 ordered from [facility] [phone] for transport.  Ordered 3-4L by nasal cannula.[LH1.2]     Patient / Family response to discharge plan:  Pt in agreement with the plan.[LH1.1]  Pt states he does not have family to contact as they live in [place] and he does not have their phone numbers.  Jerri' daughter Nicolasa has been visiting and is aware of discharge plan from [facility] and [facility].[LH1.3]     Persons notified of above discharge plan:  Pt, bedside RN, CSI team, PCP, SNF admissions.    Staff Discharge Instructions:  Please fax discharge orders and signed hard scripts for any controlled substances.  Please print a packet and send with patient.     CTS Handoff completed:  YES    Medicare Notice of Rights provided to the patient/family:  YES    YOAV Fox APSW  6C Unit   Phone: [phone]  Pager: [phone]  Unit: [phone][LH1.1]           Revision History        User Key Date/Time User Provider Type Action    > LH1.3 10/18/2017 12:55 PM Lizy Cordoba MSW  Addend     LH1.2 10/18/2017 12:52 PM  "Lizy Cordoba MSW  Addend     LH1.1 10/18/2017 12:38 PM Lizy Cordoba MSW  Sign            Progress Notes by Koko Escobar, PT at 10/17/2017  4:00 PM     Author:  Koko Escobar, PT Service:  (none) Author Type:  Physical Therapist    Filed:  10/17/2017  6:59 PM Date of Service:  10/17/2017  4:00 PM Creation Time:  10/17/2017  6:55 PM    Status:  Addendum :  Koko Escobar, PT (Physical Therapist)          10/17/17 1600   Quick Adds   Type of Visit Initial PT Evaluation   Living Environment   Lives With significant other   Living Arrangements condominium   Number of Stairs to Enter Home (elevator, lives on 3rd floor)   Number of Stairs Within Home 1  (sunken living room)   Transportation Available car;family or friend will provide   Living Environment Comment Patient lives with his partner of 14 years, Jerri in [place]. Patient has one son who lives in [place] whom he does not regularly keep in touch with. Patient's oldest son passed away \"in the war\".[AS1.1] Patient's partner[AS1.2] Jerri fell this past Sunday[AS1.1],[AS1.2] broke her hip requiring surgery, is currently at [facility]. Jerri' adoptive daughter Nicolasa lives in [place][AS1.1] and has been involved in[AS1.2] both the patient and[AS1.1] Jerri's[AS1.2] cares. Patient is hoping he and Jerri will be able to discharge to the same rehab[AS1.1] as he understands they both won't be able to return home right away[AS1.2]. Patient[AS1.1] reports that Jerri[AS1.2] has \"memory problems\" and \"they think she has Parkinson's\". Patient states he currently drives[AS1.1] and has no issues. (O[AS1.2]f note, patient takes hydrocodone every 4-6 hours for his back pain 2/2 spinal stenosis, but states \"it doesn't affect me\" while driving).   Self-Care   Usual Activity Tolerance moderate   Current Activity Tolerance fair   Equipment Currently Used at Home (4WW)   Functional Level Prior   Ambulation 1-->assistive " equipment   Transferring 1-->assistive equipment   Toileting 1-->assistive equipment  (grab bars)   Bathing 0-->independent   Dressing 0-->independent   Eating 0-->independent   Communication 0-->understands/communicates without difficulty   Swallowing 0-->swallows foods/liquids without difficulty   Cognition 0 - no cognition issues reported   Fall history within last six months no   Which of the above functional risks had a recent onset or change? ambulation;transferring   Prior Functional Level Comment Patient reports he was previously independent with mobility, more recently has been using 4WW for ambulation. Reports he was completing ADLs independently, does not own shower bench, denies any recent falls. Patient does med set up for both him and his partner, no cognition issues reported. Patient has groceries delivered from [employer]   General Information   Onset of Illness/Injury or Date of Surgery - Date 10/13/17   Referring Physician Toan Cobian MD   Patient/Family Goals Statement Ultimately patient would like to return home, but first he would like to be able to go to rehab at the same place as his partner Jerri.   Pertinent History of Current Problem (include personal factors and/or comorbidities that impact the POC) Mr. Ivan Gomez is a 89-year old male with a PMHx of CKD 3, HFpEF, and hypertension who originally presented to [facility] for evaluation of acute hypoxic respiratory failure and volume overload.  He was transferred to [facility] for evaluation for MitraClip after he was found to have severe mitral valve regurgitation due to a flail posterior leaflet. Patient is now POD1 transcatheter MVR with MitralClip. PMH also significant for spinal stenosis for which he has had surgery in the past and pain currently being treated with Norco.   Precautions/Limitations fall precautions;oxygen therapy device and L/min  (3L NC)   Heart Disease Risk Factors Lack of physical activity;Medical  "history;Gender;Age   General Observations Patient greeted up in chair, agreeable to PT, RN ok'd session.   General Info Comments Activity: Up with Assist   Cognitive Status Examination   Orientation orientation to person, place and time   Level of Consciousness alert   Follows Commands and Answers Questions 100% of the time   Personal Safety and Judgment at risk behaviors demonstrated   Pain Assessment   Patient Currently in Pain Yes, see Vital Sign flowsheet  (chronic back pain 2/2 spinal stenosis)   Integumentary/Edema   Integumentary/Edema Comments Bilateral groin sites, not assessed during eval, nursing following for cares   Posture    Posture Forward head position;Kyphosis   Range of Motion (ROM)   ROM Comment BLE WFL   Strength   Strength Comments BLE WFL, generalized weakness and deconditioning   Bed Mobility   Bed Mobility Comments sit>supine with min A at LEs   Transfer Skills   Transfer Comments sit<>stand with min A   Gait   Gait Comments Ambulates with AD, kyphotic posture, forward head posture, decreased nataliya and step length   Balance   Balance Comments Decreased standing static and dynamic balance   Sensory Examination   Sensory Perception Comments Patient states \"sometimes\" he has numbness in fingers, denied any sensory changes in LEs.   General Therapy Interventions   Planned Therapy Interventions bed mobility training;balance training;gait training;strengthening;transfer training;risk factor education;home program guidelines;progressive activity/exercise   Clinical Impression   Criteria for Skilled Therapeutic Intervention yes, treatment indicated   PT Diagnosis impaired functional mobility   Influenced by the following impairments decreased strength, balance, and activity tolerance   Functional limitations due to impairments decreased safety and independence with bed mobility, transfers, and ambulation in order to return to [facility]   Clinical Presentation Stable/Uncomplicated   Clinical " "Presentation Rationale PMH and comorbidities, change in functional status from baseline, new cardiac procedure, clinical judgement   Clinical Decision Making (Complexity) Low complexity   Therapy Frequency` 5 times/week   Predicted Duration of Therapy Intervention (days/wks) 1 week   Anticipated Discharge Disposition Transitional Care Facility   Risk & Benefits of therapy have been explained Yes   Patient, Family & other staff in agreement with plan of care Yes   [employer]-PAC TM \"6 Clicks\"   2016, Trustees of [facility], under license to [employer].  All rights reserved.   6 Clicks Short Forms Basic Mobility Inpatient Short Form   [facility] AM-PAC  \"6 Clicks\" V.2 Basic Mobility Inpatient Short Form   1. Turning from your back to your side while in a flat bed without using bedrails? 4 - None   2. Moving from lying on your back to sitting on the side of a flat bed without using bedrails? 3 - A Little   3. Moving to and from a bed to a chair (including a wheelchair)? 3 - A Little   4. Standing up from a chair using your arms (e.g., wheelchair, or bedside chair)? 3 - A Little   5. To walk in hospital room? 3 - A Little   6. Climbing 3-5 steps with a railing? 3 - A Little   Basic Mobility Raw Score (Score out of 24.Lower scores equate to lower levels of function) 19   Total Evaluation Time   Total Evaluation Time (Minutes) 8[AS1.1]        Revision History        User Key Date/Time User Provider Type Action    > [N/A] 10/17/2017  6:59 PM Koko Escobar, PT Physical Therapist Addend     AS1.2 10/17/2017  6:56 PM Koko Escobar, PT Physical Therapist Sign     AS1.1 10/17/2017  6:55 PM Koko Escobar, PT Physical Therapist             Progress Notes by Yane Walter MSW at 10/17/2017 11:43 AM     Author:  Yane Walter MSW Service:  (none) Author Type:      Filed:  10/17/2017 11:45 AM Date of Service:  10/17/2017 11:43 AM Creation Time:  10/17/2017 11:43 AM    Status:  " "Signed :  Yane Waltre MSW ()         Social Work Services Progress Note    Hospital Day: 5  Collaborated with:  Chart review    Data:  Pt is a 89-year-old male transferred to [facility] for evaluation for MitraClip after found to have severe mitral valve regurgitation d/t a flail posterior leaflet.     Intervention:  SW consult placed for \"discharge planning.\" PT/OT consults ordered but have not yet evaluated pt. SW to follow and discuss dc planning with pt after therapy recommendations are in.     Assessment:  Awaiting PT/OT evals    Plan:    Anticipated Disposition:  TBD    Barriers to d/c plan:  Medical stability, PT/OT evaluations    Follow Up:  SW to follow for discharge planning    NATASHA Darby, [facility]  5A Unit   Pager [phone]  Phone [phone][LP1.1]           Revision History        User Key Date/Time User Provider Type Action    > LP1.1 10/17/2017 11:45 AM Yane Walter MSW  Sign            Progress Notes by Gus Anaya MD at 10/17/2017 10:24 AM     Author:  Gus Anaya MD Service:  Cardiology Author Type:  Fellow    Filed:  10/17/2017 10:29 AM Date of Service:  10/17/2017 10:24 AM Creation Time:  10/17/2017 10:24 AM    Status:  Signed :  Gus Anaya MD (Fellow)         CARDIOLOGY PROGRESS NOTE    SUBJECTIVE:  - No acute events overnight  - Feels that dyspnea is resolving. No chest pain or pre-syncope.  - Net neg 2.6L last 24 hrs  - SCr slightly higher than yesterday (1.50 from 1.33)    ROS otherwise negative.    OBJECTIVE:  Vital signs:  MAP from 78-94  HR 80s-100s  RR 17   SpO2 98 on 4L/min  T 97.8 (Tm 98.4)  Vitals:    10/15/17 0200 10/16/17 0200 10/17/17 0600   Weight: 66.6 kg (146 lb 13.2 oz) 67.9 kg (149 lb 11.1 oz) 62 kg (136 lb 11 oz)[UM1.1]       Intake/Output Summary (Last 24 hours) at 10/17/17 1026  Last data filed at 10/17/17 0855   Gross per 24 hour   Intake           668.02 ml   Output             " 3625 ml   Net         -2956.98 ml[UM1.2]       PHYSICAL EXAM:  Gen: Looks well, in good spirits  HEENT: MMM, NC at 4L/min  Resp: No signs of resp distress, chest ausc with fine insp creps diffusely  CVS: JVP to 8 cm, S1+S2 without added sounds or murmurs  Abdo: ND, S, NT, +BS  Extremities: Warm, well-perfused, no edema  Neuro: GCS 15/15, AAOx3    Lab Test  10/16/17   0402   HGB  11.4* (12.1)   HCT  34.4* (37.2)   WBC  6.7 (7.2)   MCV  94   MCH  31.0   MCHC  33.1   RDW  13.2   PLT  116* (108)   NA  140 (141)   POTASSIUM  3.7 (3.7)   CHLORIDE  102 (104)   CO2  28 (29)   BUN  55* (62)   CR  1.33* (1.38)    GLC  117*   GIUSEPPE  8.3*   ALBUMIN   --    BILITOTAL   --    ALKPHOS   --    AST   --    ALT   --    ABG 0400: 7.51/38/81/30 on 5L/min with SpO2 95%   INR 1.15     Micro:  10/10 BCx left arm: NG x 5d  10/11 BCx left arm: NG x 5d     Imaging: No new imaging today     Cardiac meds: ASA 81 mg q24h, metoprolol succinate 25 mg q24h, simvastatin 20 mg q24h, Hydral 25 q8H, Isordil 20 q8H   Non-cardiac meds: Finasteride, gabapentin 300 mg TID, pantoprazole 40 mg q24h, senna-docusate, tamsulosin 0.4 mg q24h   Drips: Furosemide at 10    ASSESSMENT/PLAN:  Mr. Ivan Gomez is a 89-year old male with a PMHx of CKD 3, HFpEF, and hypertension who originally presented to [facility] for evaluation of acute hypoxic respiratory failure and volume overload.  He was transferred to [facility] for evaluation for MitraClip after he was found to have severe mitral valve regurgitation due to a flail posterior leaflet.      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; hypoxic respiratory failure; Hx of CKD 3; Hx of HTN  acute-on-chronic diastolic heart failure/HFpEF   - MitraClip 10/16   - Holding Lasix inf for now, given I/Os for the last 24 hrs, slight bump in SCr and no significant hypervolemia on exam   - Uptitrating PO afterload reduction (Isordil and Hydral)                  - Dispo   - Transferring to  today   -  PT/OT consult   - Will likely need TCU given significant other recently had a hip fracture and there is no one to take care of him at home     Chronic/inactive issues:  - Bladder cancer s/p TURBT: Continue tamsulosin and finasteride   - Chronic thrombocytopenia, normocytic anemia: monitor for signs of bleeding  - Dyslipidemia: Continue simvastatin (lipid panel 10/12: T cholesterol 108, HDL 63, LDL 29, Tg 81)    Seen and staffed with Dr. Reed.    Gus Anaya MD  Cardiovascular Disease Fellow  Pager: [phone][UM1.1]                   Revision History        User Key Date/Time User Provider Type Action    > UM1.2 10/17/2017 10:29 AM Gus Anaya MD Fellow Sign     UM1.1 10/17/2017 10:24 AM Gus Anaya MD Fellow             Progress Notes by Halie Weldon RN at 10/16/2017 12:57 PM     Author:  Halie Weldon RN Service:  (none) Author Type:  Registered Nurse    Filed:  10/16/2017 12:57 PM Date of Service:  10/16/2017 12:57 PM Creation Time:  10/16/2017 12:57 PM    Status:  Signed :  Halie Weldon RN (Registered Nurse)         Gave report to Dottie Fleming CRNA. Transferred pt care.[KP1.1]      Revision History        User Key Date/Time User Provider Type Action    > KP1.1 10/16/2017 12:57 PM Halie Weldon RN Registered Nurse Sign            Progress Notes by Halie Weldon RN at 10/16/2017 12:36 PM     Author:  Halie Weldon RN Service:  (none) Author Type:  Registered Nurse    Filed:  10/16/2017 12:37 PM Date of Service:  10/16/2017 12:36 PM Creation Time:  10/16/2017 12:36 PM    Status:  Signed :  Halie Weldon, RN (Registered Nurse)         Attempted to call family, no answer at cell phone [phone] and no family in waiting room .[KP1.1]     Revision History        User Key Date/Time User Provider Type Action    > KP1.1 10/16/2017 12:37 PM Halie Weldon, RN Registered Nurse Sign            Progress Notes by Halie Weldon RN at 10/16/2017  12:20 PM     Author:  Halie Weldon RN Service:  (none) Author Type:  Registered Nurse    Filed:  10/16/2017 12:20 PM Date of Service:  10/16/2017 12:20 PM Creation Time:  10/16/2017 12:20 PM    Status:  Signed :  Halie Weldon RN (Registered Nurse)         Dr Cobian at bedside, checking on patient.[KP1.1]     Revision History        User Key Date/Time User Provider Type Action    > KP1.1 10/16/2017 12:20 PM Halie Weldon RN Registered Nurse Sign            Progress Notes by Halie Weldon RN at 10/16/2017 12:00 PM     Author:  Halie Weldon RN Service:  (none) Author Type:  Registered Nurse    Filed:  10/16/2017 12:11 PM Date of Service:  10/16/2017 12:00 PM Creation Time:  10/16/2017 12:10 PM    Status:  Signed :  Halie Weldon RN (Registered Nurse)         Dr temple at bedside. The plan is to keep pt intubated in PACU and then send pt back to PCU 4E. Will continue to monitor and assess.[KP1.1]      Revision History        User Key Date/Time User Provider Type Action    > KP1.1 10/16/2017 12:11 PM Halie Weldon RN Registered Nurse Sign            Progress Notes by Toan Cobian MD at 10/15/2017  4:21 PM     Author:  Toan Cobian MD Service:  Cardiology Author Type:  Fellow    Filed:  10/15/2017  4:25 PM Date of Service:  10/15/2017  4:21 PM Creation Time:  10/15/2017  4:21 PM    Status:  Signed :  Toan Cobian MD (Fellow)         Brief Cardiology Note    Met with Mr Gomez to go over procedure. He had just gotten back from long walk with nursing staff around unit.  We were unable to reach significant other by phone.  Answered all questions and clarified that he'd be ok with intubation for procedure tomorrow and shocking for any iatrogenic VT/VF.  He still does not want any sort of open chest intervention, and we agreed that if he were to need emergency surgery, we would stop.[GF1.1]    Toan Cobian[GF1.2]  Cardiology fellow  Pager  [phone][GF1.1]     Revision History        User Key Date/Time User Provider Type Action    > GF1.2 10/15/2017  4:25 PM Toan Cobian MD Fellow Sign     GF1.1 10/15/2017  4:21 PM Toan Cobian MD Fellow                      Procedure Notes      Procedures by Eber Monroe MD at 10/16/2017 11:26 AM     Author:  Eber Monroe MD Service:  Cardiology Author Type:  Physician    Filed:  10/18/2017 12:23 PM Date of Service:  10/16/2017 11:26 AM Creation Time:  10/16/2017 11:25 AM    Status:  Signed :  Eber Monroe MD (Physician)     Pre-procedure Diagnoses:    1. Severe mitral regurgitation [I34.0]           Procedures:    1. PERCUTANEOUS MITRAL VALVE REPAIR [[id number] (Custom)]               MITRACLIP PROCEDURE REPORT:     PROCEDURES PERFORMED:   1. Successful transcatheter mitral valve repair with clip x2 using the MitraClip device.  2. Veinotomy closure.    PHYSICIANS:  1. Attending Interventional Cardiology Staff: Eber Monroe MD and Arely Reed MD  2. 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    DESCRIPTION:  1. Consent obtained with discussion of risks.  All questions were answered.  2. Sterile prep and procedure per OR protocol.  3. Location: right and left common femoral vein.  4. Access: Local anesthetic with lidocaine.  A standard (18 g)needle with ultrasound guidance was used to establish vascular access using a modified Seldinger technique.  5. Sheath:  24Fr MitraClip delivery system sheath in the RCFV  6. Catheters: 4Fr pigtail, SL-1.  7. Estimated blood loss of <5 mL.  8. See below for procedure  details.    MEDICATIONS:  1. Contrast 25 mL IV.  2. Sedation per anesthesia.  3. Heparin administered to achieve a goal ACT > 300 sec per anesthesia.[GF1.1]     PROCEDURE[GF1.2] SUMMARY:  1. Access was obtained in the right and left common femoral vein.  The venous site used for MitraClip delivery was pre-closed with two Perclose Proglide percutaneous suture devices.   2. Access to the left atrium was achieved using a standard transseptal puncture technique with fluoroscopic and transesophageal echocardiographic guidance using a [facility]  RF transseptal guiding sheath and needle and with cautery.  A pigtail wire was advanced into the left atrium and secured in place. Following transseptal puncture intravenous heparin was administered to achieve and maintain an activated clotting time (ACT) of >300.   3. Pre-dilation of the femoral access site was performed with ascending size dilators to allow for insertion of a 24 Burmese steerable transvenous sheath.  The delivery sheath was advanced across the interatrium septum without difficulty.  The left atrial pressure was measured as 18/47/19 mmHg (a,v,mean). A pigtail catheter was positioned in the left atrium for continuous LA pressure monitoring.  4. The MitraClip Clip Delivery System was advanced into the left atrium.  Under multiplane IFEANYI guidance, the MitraClip was oriented appropriately over the mitral valve. The clip was then opened and the arms were positioned perpendicularly to the leaflets using the en face 3D IFEANYI projection. Once properly oriented, the clip was advanced to the left ventricle, and the clip delivery system was then pulled back and the leaflets were grasped by dropping the grippers. After confirmation of adequate grasping of the leaflets, the arms were closed and reduction of mitral regurgitation was assessed and the possibility of iatrogenic mitral stenosis was evaluated. Once an adequate reduction in mitral regurgitation was confirmed  with evaluation of gradients and direct echocardiographic visualization, the clip was successfully deployed.  The clip was placed in the medial portion of A2-P2.  LA pressure was reduced significantly to 16/14/12 and there appeared to be still severe MR with partial systolic flow reversal in the pulmonary veins. The trans-mitral valve gradient was low at 3.7mmHg.  It was decided to place a 2nd clip in the A2-P2 position.  The V wave went away and LA pressure was significantly improved.[GF1.1]  The second clip was placed just lateral to the initial clip.  There was adequate tissue grasp and further reduction in MR to moderate.[GH1.1] Final left atrial pressure was measured as 12/11/9 mmHg (a,v,mean).    5. The delivery system and sheath were removed and optimal hemostasis was achieved with deployment of the Perclose sutures.   6. Mitral regurgitation was confirmed to be moderate following the procedure and the mean gradient was 5 mmHg and there was no evidence of significant valve stenosis.     NOTABLE EVENTS:  1. None    COMPLICATIONS:  1. None    SUMMARY:    1. Severe symptomatic mitral regurgitation secondary to mitral valve degenerative disease in a poor surgical candidate.  2. Successful transcatheter repair of the mitral valve with the MitraClip device using a total of 2 clips.  3. Mitral regurgitation severity improved to moderate.  4. Right common femoral veinotomy closed with a suture closure device and manual pressure was applied over left femoral vein..    PLAN:    1. Aspirin 81 mg po daily lifelong.  2. Bedrest per protocol (6 hours).  3. Return to the primary inpatient team for further evaluation and management.  4. Echocardiogram tomorrow.       The attending interventional cardiologists, Keyla Monroe and Derek, were present and participated in the entire procedure.[GF1.1]      Toan Cobian[GF1.3]  Cardiology fellow  Pager [phone][GF1.1]         Revision History        User Key Date/Time User Provider  Type Action    > GH1.1 10/18/2017 12:23 PM Eber Monroe MD Physician Sign     GF1.2 10/16/2017  5:45 PM Toan Cobian MD Fellow Sign     GF1.3 10/16/2017 11:44 AM Toan Cobian MD Fellow Sign     GF1.1 10/16/2017 11:25 AM Toan Cobian MD Fellow             Procedures by Gama Peres MD at 10/14/2017  8:02 PM     Author:  Gama Peres MD Service:  Cardiology Author Type:  Fellow    Filed:  10/14/2017  8:02 PM Date of Service:  10/14/2017  8:02 PM Creation Time:  10/14/2017  7:58 PM    Status:  Cosign Needed :  Gama Peres MD (Fellow)    Cosign Required:  Yes         Pre-procedure Diagnoses:    1. Non-rheumatic mitral regurgitation [I34.0]           Post-procedure Diagnoses:    1. Non-rheumatic mitral regurgitation [I34.0]           Procedures:    1. ARTERIAL LINE CATHETER [[id number] (Custom)]               Procedure Note    PROCEDURES PERFORMED:   1. Radial arterial line placement     PHYSICIANS:  1. Attending Cardiology Staff: Dr. MILDRED Meza MD  2. Cardiology Fellow: Gama Peres [facility]  3. Resident: Dr. FAUSTINO Quevedo MD, PhD    Consent obtained with discussion of risks.  All questions were answered. Sterile prep and procedure.    INDICATION:  Ivan Gomez is a 89 year old male with known severe MR due to flail mitral valve regurgitation who presented for medical optimization prior to consideration for MitraClip placement.     DESCRIPTION:  1. Location: Right radial artery   2. Access: Local anesthetic with lidocaine.  A micropuncture (21 g) needle with ultrasound guidance was used to establish vascular access using a modified Seldinger technique.  3. Catheters: Standard arterial line catheter   4. Estimated blood loss of 20 mL.    MEDICATIONS:  1. Local anesthesia with lidocaine.     COMPLICATIONS:  1. None.    PLAN:   1. Continue diuresis and hemodynamic optimization per plan.     Gama Peres [facility]   General Cardiology Fellow  Pager [phone][RK1.1]             Revision History      "   User Key Date/Time User Provider Type Action    > RK1.1 10/14/2017  8:02 PM Gama Peres MD Fellow Sign                     Progress Notes - Therapies (Notes from 10/15/17 through 10/18/17)      Progress Notes by Koko Escobar, PT at 10/17/2017  4:00 PM     Author:  Koko Escobar PT Service:  (none) Author Type:  Physical Therapist    Filed:  10/17/2017  6:59 PM Date of Service:  10/17/2017  4:00 PM Creation Time:  10/17/2017  6:55 PM    Status:  Addendum :  Koko Escobar, PT (Physical Therapist)          10/17/17 1600   Quick Adds   Type of Visit Initial PT Evaluation   Living Environment   Lives With significant other   Living Arrangements condominium   Number of Stairs to Enter Home (elevator, lives on 3rd floor)   Number of Stairs Within Home 1  (sunken living room)   Transportation Available car;family or friend will provide   Living Environment Comment Patient lives with his partner of 14 years, Jerri in [place]. Patient has one son who lives in [place] whom he does not regularly keep in touch with. Patient's oldest son passed away \"in the war\".[AS1.1] Patient's partner[AS1.2] Jerri fell this past Sunday[AS1.1],[AS1.2] broke her hip requiring surgery, is currently at [facility]. Jerri' adoptive daughter Nicolasa lives in [place][AS1.1] and has been involved in[AS1.2] both the patient and[AS1.1] Jerri's[AS1.2] cares. Patient is hoping he and Jerri will be able to discharge to the same rehab[AS1.1] as he understands they both won't be able to return home right away[AS1.2]. Patient[AS1.1] reports that Jerri[AS1.2] has \"memory problems\" and \"they think she has Parkinson's\". Patient states he currently drives[AS1.1] and has no issues. (O[AS1.2]f note, patient takes hydrocodone every 4-6 hours for his back pain 2/2 spinal stenosis, but states \"it doesn't affect me\" while driving).   Self-Care   Usual Activity Tolerance moderate   Current Activity Tolerance fair   Equipment Currently " Used at Home (4WW)   Functional Level Prior   Ambulation 1-->assistive equipment   Transferring 1-->assistive equipment   Toileting 1-->assistive equipment  (grab bars)   Bathing 0-->independent   Dressing 0-->independent   Eating 0-->independent   Communication 0-->understands/communicates without difficulty   Swallowing 0-->swallows foods/liquids without difficulty   Cognition 0 - no cognition issues reported   Fall history within last six months no   Which of the above functional risks had a recent onset or change? ambulation;transferring   Prior Functional Level Comment Patient reports he was previously independent with mobility, more recently has been using 4WW for ambulation. Reports he was completing ADLs independently, does not own shower bench, denies any recent falls. Patient does med set up for both him and his partner, no cognition issues reported. Patient has groceries delivered from [employer].   General Information   Onset of Illness/Injury or Date of Surgery - Date 10/13/17   Referring Physician Toan Cobian MD   Patient/Family Goals Statement Ultimately patient would like to return home, but first he would like to be able to go to rehab at the same place as his partner Jerri.   Pertinent History of Current Problem (include personal factors and/or comorbidities that impact the POC) Mr. Ivan Gomez is a 89-year old male with a PMHx of CKD 3, HFpEF, and hypertension who originally presented to [facility] for evaluation of acute hypoxic respiratory failure and volume overload.  He was transferred to [facility] for evaluation for MitraClip after he was found to have severe mitral valve regurgitation due to a flail posterior leaflet. Patient is now POD1 transcatheter MVR with MitralClip. PMH also significant for spinal stenosis for which he has had surgery in the past and pain currently being treated with Norco.   Precautions/Limitations fall precautions;oxygen therapy device and L/min  (3L NC)  "  Heart Disease Risk Factors Lack of physical activity;Medical history;Gender;Age   General Observations Patient greeted up in chair, agreeable to PT, RN ok'd session.   General Info Comments Activity: Up with Assist   Cognitive Status Examination   Orientation orientation to person, place and time   Level of Consciousness alert   Follows Commands and Answers Questions 100% of the time   Personal Safety and Judgment at risk behaviors demonstrated   Pain Assessment   Patient Currently in Pain Yes, see Vital Sign flowsheet  (chronic back pain 2/2 spinal stenosis)   Integumentary/Edema   Integumentary/Edema Comments Bilateral groin sites, not assessed during eval, nursing following for cares   Posture    Posture Forward head position;Kyphosis   Range of Motion (ROM)   ROM Comment BLE WFL   Strength   Strength Comments BLE WFL, generalized weakness and deconditioning   Bed Mobility   Bed Mobility Comments sit>supine with min A at LEs   Transfer Skills   Transfer Comments sit<>stand with min A   Gait   Gait Comments Ambulates with AD, kyphotic posture, forward head posture, decreased nataliya and step length   Balance   Balance Comments Decreased standing static and dynamic balance   Sensory Examination   Sensory Perception Comments Patient states \"sometimes\" he has numbness in fingers, denied any sensory changes in LEs.   General Therapy Interventions   Planned Therapy Interventions bed mobility training;balance training;gait training;strengthening;transfer training;risk factor education;home program guidelines;progressive activity/exercise   Clinical Impression   Criteria for Skilled Therapeutic Intervention yes, treatment indicated   PT Diagnosis impaired functional mobility   Influenced by the following impairments decreased strength, balance, and activity tolerance   Functional limitations due to impairments decreased safety and independence with bed mobility, transfers, and ambulation in order to return to PLOF " "  Clinical Presentation Stable/Uncomplicated   Clinical Presentation Rationale PMH and comorbidities, change in functional status from baseline, new cardiac procedure, clinical judgement   Clinical Decision Making (Complexity) Low complexity   Therapy Frequency` 5 times/week   Predicted Duration of Therapy Intervention (days/wks) 1 week   Anticipated Discharge Disposition Transitional Care Facility   Risk & Benefits of therapy have been explained Yes   Patient, Family & other staff in agreement with plan of care Yes   [facility] AM-[facility] TM \"6 Clicks\"   2016, Trustees of [facility], under license to [employer].  All rights reserved.   6 Clicks Short Forms Basic Mobility Inpatient Short Form   [facility] AM-[facility]  \"6 Clicks\" V.2 Basic Mobility Inpatient Short Form   1. Turning from your back to your side while in a flat bed without using bedrails? 4 - None   2. Moving from lying on your back to sitting on the side of a flat bed without using bedrails? 3 - A Little   3. Moving to and from a bed to a chair (including a wheelchair)? 3 - A Little   4. Standing up from a chair using your arms (e.g., wheelchair, or bedside chair)? 3 - A Little   5. To walk in hospital room? 3 - A Little   6. Climbing 3-5 steps with a railing? 3 - A Little   Basic Mobility Raw Score (Score out of 24.Lower scores equate to lower levels of function) 19   Total Evaluation Time   Total Evaluation Time (Minutes) 8[AS1.1]        Revision History        User Key Date/Time User Provider Type Action    > [N/A] 10/17/2017  6:59 PM Koko Escobar, PT Physical Therapist Addend     AS1.2 10/17/2017  6:56 PM Koko Escobar, PT Physical Therapist Sign     AS1.1 10/17/2017  6:55 PM Koko Escobar, PT Physical Therapist             "

## 2017-10-13 NOTE — PROGRESS NOTES
Patient was scheduled for a IFEANYI today - after review of the patients status the patient would not be able to tolerate conscious sedation for a IFEANYI and would need general anesthesia.  Reviewed with the patient who requested this test to wait until Monday with the procedure.  Spoke with Dr. Monroe who was ok with this plan and instructed me to get the patient transferred to the [facility]. IFEANYI order canceled. Patients NPO status lifted - spoke with the patients nurse Jennifer who agreed to change the orders for his diet.  Working on transfer at this time. No other tasks to be done at this time. Sherrill Iyer

## 2017-10-13 NOTE — PROGRESS NOTES
[facility]    Hospitalist Progress Note    Date of Service (when I saw the patient): 10/13/2017    Assessment & Plan   Mr. Gomez is a markedly pleasant 89 year old gentleman former smoker with prior recurrent urothelial carcinoma as well as CKD Stage 3, chronic thrombocytopenia, hypertension, dyslipidemia, and chronic degenerative arthritis who was admitted for acute hypoxemic respiratory failure and found to have acute diastolic CHF exacerbation due to progressive, severe acute on chronic mitral regurgitation.    1) Acute diastolic CHF exacerbation due to progressive, severe acute on chronic mitral regurgitation causing acute pulmonary edema and myonecrosis: Mr. Gomez has developed progressively more severe dyspnea and orthopnea. He has also had mild epigastric pain. He is not known to have CAD. A dobutamine stress echo in 2012 had shown mild to moderate MR.  On this admission, Troponins have peaked at 0.06. EKG shows NSR with ectopy. CXR showed bilateral pulmonary edema. Labs have been notable for mild leukocytosis, CKD and chronic thrombocytopenia. Blood cultures show no growth to date.       On admission he was initially in marked respiratory distress and required BIPAP and received several doses of IV Lasix with significant improvement. TTE showed a possible ruptured mitral valve chord with severe MR. Cardiology was consulted    Overall we suspect acute on chronic MR. He certainly could have had a precipitating ischemic event. He is a poor surgical candidate overall due to age and medical co-morbidities. He may be a candidate for mitral valve clip, though he remains dyspneic and orthopneic and thus would require further optimization if possible prior to such a procedure.    -- Lexiscan on 10/12 was done for evaluation of ischemia and showed a small area of inducible ischemia in the anterior wall    -- Consideration for IFEANYI and/or catheterization today, bearing in mind that he hs had  multiple co-morbidities as well as ZAK that has developed since admission    -- Pulmonary edema seems to have resolved, but he remains on an oxymizer and last night had a lot of orthopnea. We are diuresing him as per Cardiology guidance, very cautiously in the setting of elevated Creatinine. Last night, he got 20 mg IV Lasix and today a repeat CXR is pending prior to any procedures. He is currently ordered to receive 20 mg IV Lasix BID.    -- Norvasc and Toprol held at admission; Metoprolol started 10/12 at low dose; this is converted today to 25 mg PO Toprol XL.    -- Statin held as per Cardiology    -- He has had episodes of ectopy and palpitations; we continue telemetry    -- He now wishes to be DNR DNI     -- Extensive care discussions have been held with him, his wife, and his partner and they agree with this careful plan of care     2) ZAK on CKD 3: After admission Creatinine narda to 1.4 from 1; likely pre-renal. Improved since then to 1.2.    -- Closely monitor    3) Bladder cancer: Mr. Gomez was diagnosed in the past with low grade urothelial carcinoma and had TURBT with nephrostomy tube which was internalized; followed by recurrence and other TURBT. He now declines any further treatments at this point for his cancer unless he develops severe symptoms.    -- He continues Flomax and Proscar    4) Thrombocytopenia: Chronic, seems stable but we must monitor closely. Around 60 k presently.    5) Chronic degenerative arthritis: He continues Neurontin for chronic neuropathic pain as well as prn Norco; we will increase his bowel regimen today    6) GERD: IV PPI and Famotidine ordered; speech pathology consulted to evaluate for possible aspiration    DVT Prophylaxis: Pneumatic Compression Devices  Code Status: DNR/DNI    Disposition: Expected discharge in several days.    Josr Harrell MD    Interval History   Had to sleep in a chair overnight again. He is having ongoing constipation. Overall condition not  much changed from yesterday.    -Data reviewed today: I reviewed all new labs and imaging results over the last 24 hours. I personally reviewed the chest x-ray image(s) showing pending.    Physical Exam   Temp: 98.3  F (36.8  C) Temp src: Oral BP: 115/65 Pulse: 72 Heart Rate: 79 Resp: 16 SpO2: 94 % O2 Device: Oxymizer cannula Oxygen Delivery: 15 LPM  Vitals:    10/11/17 0700 10/12/17 0628 10/13/17 0611   Weight: 73.9 kg (162 lb 14.7 oz) 70.6 kg (155 lb 10.3 oz) 70.4 kg (155 lb 3.3 oz)     Vital Signs with Ranges  Temp:  [97.8  F (36.6  C)-98.9  F (37.2  C)] 98.3  F (36.8  C)  Pulse:  [] 72  Heart Rate:  [77-93] 79  Resp:  [16-24] 16  BP: (104-127)/(55-72) 115/65  FiO2 (%):  [60 %] 60 %  SpO2:  [85 %-98 %] 94 %  I/O last 3 completed shifts:  In: 360 [P.O.:360]  Out: 975 [Urine:975]    Constitutional: Alert and oriented to person, place and time; no apparent distress  HEENT: normocephalic moist mucus membranes  Respiratory: lungs remain clear to auscultation bilaterally this morning  Cardiovascular: Irregular S1 S2 and several loud murmurs on auscultation  GI: abdomen soft non tender non distended bowel sounds positive  Skin: no rash, good turgor  Musculoskeletal: no clubbing, cyanosis or edema  Neuro: EOMI; moves all four extremities  Psych: Appropriate affect, insight and judgment      Medications        fiber modular  1 packet Oral Daily     omeprazole  20 mg Oral QAM AC     metoprolol  25 mg Oral Daily     furosemide  20 mg Intravenous BID     famotidine  20 mg Intravenous Q24H     finasteride  5 mg Oral Daily     gabapentin  300 mg Oral TID     tamsulosin  0.4 mg Oral Daily       Data     Recent Labs  Lab 10/13/17  0740 10/12/17  0729 10/11/17  1007 10/11/17  0410 10/10/17  2345   WBC 10.3 8.9  --  9.2 11.3*   HGB 12.7* 12.6*  --  13.2* 14.8   MCV 94 93  --  93 93   PLT 86* 68*  --  63* 74*    140  --  139 139   POTASSIUM 4.0 3.8  --  3.9 3.6   CHLORIDE 105 106  --  107 106   CO2 26 25  --  23 25    BUN 50* 32*  --  25 24   CR 1.23 1.34*  --  1.42* 1.38*   ANIONGAP 9 9  --  9 8   GIUSEPPE 9.0 8.9  --  8.6 9.0   * 117*  --  137* 131*   TROPI  --   --  0.054* 0.060* 0.032       Recent Results (from the past 24 hour(s))   NM Lexiscan stress test (nuc card)    Narrative    GATED MYOCARDIAL PERFUSION SCINTIGRAPHY WITH INTRAVENOUS PHARMACOLOGIC  VASODILATATION LEXISCAN -ONE DAY STUDY     10/12/2017 3:29 PM  MALLORIE NOVA  89 years  Male  10/12/1926.    Indication/Clinical History: Troponin elevation    Impression  1.  Myocardial perfusion imaging using single isotope technique  demonstrated a small reversible anteroapical defect consistent with a  small area of ischemia.   2. Gated images demonstrated normal wall motion.  The left ventricular  systolic function is normal.    Procedure  Pharmacologic stress testing was performed with Lexiscan at a rate of  0.08 mg/ml rapid bolus injection, for 15 seconds, 0.4 mg/5ml  intravenously. Low-level exercise was not performed along with the  vasodilator infusion.  The heart rate was 98 at baseline and narda to  108 beats per minute during the Lexiscan infusion. The rest blood  pressure was 119/72 mmHg and was 112/67 mm Hg during Lexiscan  infusion. The patient experienced chest pressure and shortness of  breath  during the test.    Myocardial perfusion imaging was performed at rest, approximately 45  minutes after the injection intravenously of 10.5 mCi of Tc-99m  Myoview. At peak pharmacologic effect, 10-20 seconds after Lexiscan,   the patient was injected intravenously with 27.6 mCi of  Tc-99m  Myoview. The post-stress tomographic imaging was performed  approximately 60 minutes after stress.      EKG Findings  The resting EKG demonstrated sinus rhythm with multiple APCs and  nonspecific ST segment changes. The stress EKG demonstrated no  significant ST segment changes.    Tomographic Findings  Overall, the study quality is adequate . On the stress images, there  is a  small anteroapical defect with a mild reduction in radiotracer  uptake. On the rest images, reversibility was seen in the small  anteroapical defect . Gated images demonstrated normal wall motion.  The left ventricular ejection fraction was calculated to be normal.  TID was absent.    BLANE GRACIA MD

## 2017-10-13 NOTE — CONSULTS
Cardiothoracic Surgery Consult    89M with symptomatic acute on chronic mitral regurgitation. Patient feels improved since arrival . He is comfortable at present, sitting in a chair, on supplemental oxygen. No chest pain.   The patient has a history of bladder cancer, s/p TURBT x2, nephrostomy, and chemotherapy He does not want further chemotherapy or major surgery. He is DNR, DNI.    PMH: CHF, MR, ZAK, CKD stage 3, bladder cancer, thrombocytopenia, arthritis, GERD, HTN, HLD, basal cell skin cancer    PSH: urinary procedures as above, skin biopsies    Allergies: penicillin, celebrex    SH: former smoker, no EtOH    FH: son- cancer possible leukemia    Home Meds: protonix, flomax, norco, neurontin, zocor, proscar, metoprolol XL, norvasc, colchicine, ASA 81mg< vitamins/supplements    ROS: all other systems negative    PE  T 98, HR 84, /67, sat 91% on 15LPM  70kg, 170cm, BMI 24  NAD  Unlabored respirations  NSR  Creatinine 1.23, platelets 23192, Hb 12.7  Lexiscan: small reversible anteroapical defect consistent with ischemia  ECHO: EF greater than 70%, severe 4+ MR with flail posterior leaflet from torn chordae, grade III advanced diastolic dysfunction  Mild MR on 2012 ECHO    A/P 89M with multiple medical comorbidities, who is DNR/DNI, with acute on chronic severe 4+ mitral regurgitation secondary to a flail posterior leaflet.    Agree with cardiology assessment that the patient is poor surgical candidate. He would be high risk for a surgical mitral valve repair or replacement. Furthermore the patient is uninterested in surgery.   Agree with plan for transfer and MitraClip    Patient seen with Dr. Nelly Galloway

## 2017-10-13 NOTE — PROGRESS NOTES
Awaiting bed at [facility] for patient transfer - patient transfer form completed - Dr. Mckenna to give report to accepting MD - Dr. Meza.  Cardiothoracic surgery consult completed this afternoon. Patient is scheduled for mitraclip procedure on Monday 10/16/2017 at 7:30 am.  No other tasks to be done at this time. Sherrill Iyer

## 2017-10-13 NOTE — PLAN OF CARE
Problem: Patient Care Overview  Goal: Plan of Care/Patient Progress Review  SLP-  Pt still NPO for IFEANYI today, nurse reports it is not scheduled yet.  Pt to remain NPO.  Will check back this pm or tomorrow am.

## 2017-10-13 NOTE — IP AVS SNAPSHOT
"          [address] [facility] BANK: [phone]                                              INTERAGENCY TRANSFER FORM - LAB / IMAGING / EKG / EMG RESULTS   10/13/2017                    Hospital Admission Date: 10/13/2017  MALLORIE NOVA   : 10/12/1926  Sex: Male        Attending Provider: Tevin Meza MD     Allergies:  Celebrex [Celecoxib], Penicillins    Infection:  MRSA   Service:  CARDIOLOGY    Ht:  1.702 m (5' 7\")   Wt:  67.3 kg (148 lb 5.9 oz)   Admission Wt:  70.6 kg (155 lb 9.6 oz)    BMI:  23.24 kg/m 2   BSA:  1.78 m 2            Patient PCP Information     Provider PCP Type    Evaristo Magaña MD General         Lab Results - 3 Days      Comprehensive metabolic panel [[id number]] (Abnormal)  Resulted: 10/18/17 08, Result status: Final result    Ordering provider: Toan Cobian MD  10/17/17 2201 Resulting lab: [facility]    Specimen Information    Type Source Collected On   Blood  10/18/17 0721          Components       Value Reference Range Flag Lab   Sodium 138 133 - 144 mmol/L  51   Potassium 4.0 3.4 - 5.3 mmol/L  51   Chloride 98 94 - 109 mmol/L  51   Carbon Dioxide 33 20 - 32 mmol/L H 51   Anion Gap 8 3 - 14 mmol/L  51   Glucose 116 70 - 99 mg/dL H 51   Urea Nitrogen 50 7 - 30 mg/dL H 51   Creatinine 1.54 0.66 - 1.25 mg/dL H 51   GFR Estimate 43 >60 mL/min/1.7m2 L 51   Comment:  Non  GFR Calc   GFR Estimate If Black 51 >60 mL/min/1.7m2 L 51   Comment:  African American GFR Calc   Calcium 8.9 8.5 - 10.1 mg/dL  51   Bilirubin Total 1.1 0.2 - 1.3 mg/dL  51   Albumin 2.9 3.4 - 5.0 g/dL L 51   Protein Total 6.4 6.8 - 8.8 g/dL L 51   Alkaline Phosphatase 60 40 - 150 U/L  51   ALT 16 0 - 70 U/L  51   AST 14 0 - 45 U/L  51            Referral sensitivity [[id number]] (Abnormal)  Resulted: 10/17/17 2119, Result status: Final result    Ordering provider: Tevin Meza MD  10/15/17 1012 Resulting lab: [facility] LABORAT[facility]    Specimen Information    Type Source Collected On   Nares  10/15/17 1012          Components       Value Reference Range Flag Lab   Specimen Description Nares      Culture Micro --  A 225   Result:         Light growth  Methicillin resistant Staphylococcus aureus (MRSA)  This isolate is presumed to be clindamycin resistant based on detection of inducible   clindamycin resistance. Erythromycin and clindamycin are resistant, therefore, they are   not recommended for use.              Magnesium [[id number]] (Abnormal)  Resulted: 10/17/17 0421, Result status: Final result    Ordering provider: Toan Cobian MD  10/16/17 2200 Resulting lab: [facility]    Specimen Information    Type Source Collected On   Blood  10/17/17 0335          Components       Value Reference Range Flag Lab   Magnesium 2.4 1.6 - 2.3 mg/dL H 51            Phosphorus [[id number]]  Resulted: 10/17/17 0421, Result status: Final result    Ordering provider: Toan Cobian MD  10/16/17 2200 Resulting lab: [facility]    Specimen Information    Type Source Collected On   Blood  10/17/17 0335          Components       Value Reference Range Flag Lab   Phosphorus 4.3 2.5 - 4.5 mg/dL  51            Comprehensive metabolic panel [[id number]] (Abnormal)  Resulted: 10/17/17 0421, Result status: Final result    Ordering provider: Toan Cobian MD  10/16/17 2200 Resulting lab: [facility]    Specimen Information    Type Source Collected On   Blood  10/17/17 0335          Components       Value Reference Range Flag Lab   Sodium 142 133 - 144 mmol/L  51   Potassium 4.3 3.4 - 5.3 mmol/L  51   Chloride 101 94 - 109 mmol/L  51   Carbon Dioxide 30 20 - 32 mmol/L  51   Anion Gap 11 3 - 14 mmol/L  51   Glucose 112 70 - 99 mg/dL H 51   Urea Nitrogen 50 7 - 30 mg/dL H 51   Creatinine 1.50 0.66 - 1.25 mg/dL H 51   GFR Estimate 44 >60 mL/min/1.7m2 L 51    Comment:  Non  GFR Calc   GFR Estimate If Black 53 >60 mL/min/1.7m2 L 51   Comment:  African American GFR Calc   Calcium 8.7 8.5 - 10.1 mg/dL  51   Bilirubin Total 1.4 0.2 - 1.3 mg/dL H 51   Albumin 3.0 3.4 - 5.0 g/dL L 51   Protein Total 6.9 6.8 - 8.8 g/dL  51   Alkaline Phosphatase 62 40 - 150 U/L  51   ALT 18 0 - 70 U/L  51   AST 19 0 - 45 U/L  51            CBC with platelets differential [[id number]] (Abnormal)  Resulted: 10/17/17 0358, Result status: Final result    Ordering provider: Toan Cobian MD  10/16/17 6371 Resulting lab: [facility]    Specimen Information    Type Source Collected On   Blood  10/17/17 0335          Components       Value Reference Range Flag Lab   WBC 10.4 4.0 - 11.0 10e9/L  51   RBC Count 3.96 4.4 - 5.9 10e12/L L 51   Hemoglobin 12.2 13.3 - 17.7 g/dL L 51   Hematocrit 38.3 40.0 - 53.0 % L 51   MCV 97 78 - 100 fl  51   MCH 30.8 26.5 - 33.0 pg  51   MCHC 31.9 31.5 - 36.5 g/dL  51   RDW 13.3 10.0 - 15.0 %  51   Platelet Count 114 150 - 450 10e9/L L 51   Diff Method Automated Method   51   % Neutrophils 81.1 %  51   % Lymphocytes 7.3 %  51   % Monocytes 10.5 %  51   % Eosinophils 0.8 %  51   % Basophils 0.1 %  51   % Immature Granulocytes 0.2 %  51   Nucleated RBCs 0 0 /100  51   Absolute Neutrophil 8.4 1.6 - 8.3 10e9/L H 51   Absolute Lymphocytes 0.8 0.8 - 5.3 10e9/L  51   Absolute Monocytes 1.1 0.0 - 1.3 10e9/L  51   Absolute Eosinophils 0.1 0.0 - 0.7 10e9/L  51   Absolute Basophils 0.0 0.0 - 0.2 10e9/L  51   Abs Immature Granulocytes 0.0 0 - 0.4 10e9/L  51   Absolute Nucleated RBC 0.0   51            Potassium [[id number]]  Resulted: 10/16/17 2300, Result status: Final result    Ordering provider: Gama Peres MD  10/16/17 2222 Resulting lab: [facility]    Specimen Information    Type Source Collected On   Blood  10/16/17 2222          Components       Value Reference Range Flag Lab   Potassium 3.8  3.4 - 5.3 mmol/L  51            Magnesium [[id number]]  Resulted: 10/16/17 2300, Result status: Final result    Ordering provider: Gama Peres MD  10/16/17 2222 Resulting lab: [facility]    Specimen Information    Type Source Collected On   Blood  10/16/17 2222          Components       Value Reference Range Flag Lab   Magnesium 2.2 1.6 - 2.3 mg/dL  51            Blood gas arterial with oxyhemoglobin (1200) [[id number]] (Abnormal)  Resulted: 10/16/17 1639, Result status: Final result    Ordering provider: Tevin Meza MD  10/16/17 1627 Resulting lab: [facility]    Specimen Information    Type Source Collected On   Blood  10/16/17 1627          Components       Value Reference Range Flag Lab   pH Arterial 7.48 7.35 - 7.45 pH H 51   pCO2 Arterial 42 35 - 45 mm Hg  51   pO2 Arterial 84 80 - 105 mm Hg  51   Bicarbonate Arterial 31 21 - 28 mmol/L H 51   FIO2 40.0   51   Oxyhemoglobin Arterial 94 92 - 100 %  51   Base Excess Art 6.8 mmol/L  51   Comment:  Abnormal Result, Ref range: -9.0 to 1.8            Phosphorus [[id number]]  Resulted: 10/16/17 1308, Result status: Final result    Ordering provider: Toan Cobian MD  10/16/17 1206 Resulting lab: [facility]    Specimen Information    Type Source Collected On   Blood  10/16/17 1215          Components       Value Reference Range Flag Lab   Phosphorus 3.5 2.5 - 4.5 mg/dL  51            CK total [[id number]]  Resulted: 10/16/17 1308, Result status: Final result    Ordering provider: Toan Cobian MD  10/16/17 1206 Resulting lab: [facility]    Specimen Information    Type Source Collected On   Blood  10/16/17 1215          Components       Value Reference Range Flag Lab   CK Total 58 30 - 300 U/L  51            Magnesium [[id number]]  Resulted: 10/16/17 1308, Result status: Final result    Ordering provider: Toan Cobian MD   10/16/17 1206 Resulting lab: [facility]    Specimen Information    Type Source Collected On   Blood  10/16/17 1215          Components       Value Reference Range Flag Lab   Magnesium 2.2 1.6 - 2.3 mg/dL  51            Comprehensive metabolic panel [[id number]] (Abnormal)  Resulted: 10/16/17 1308, Result status: Final result    Ordering provider: Toan Cobian MD  10/16/17 1206 Resulting lab: [facility]    Specimen Information    Type Source Collected On   Blood  10/16/17 1215          Components       Value Reference Range Flag Lab   Sodium 143 133 - 144 mmol/L  51   Potassium 3.5 3.4 - 5.3 mmol/L  51   Chloride 105 94 - 109 mmol/L  51   Carbon Dioxide 29 20 - 32 mmol/L  51   Anion Gap 9 3 - 14 mmol/L  51   Glucose 117 70 - 99 mg/dL H 51   Urea Nitrogen 50 7 - 30 mg/dL H 51   Creatinine 1.36 0.66 - 1.25 mg/dL H 51   GFR Estimate 49 >60 mL/min/1.7m2 L 51   Comment:  Non  GFR Calc   GFR Estimate If Black 59 >60 mL/min/1.7m2 L 51   Comment:  African American GFR Calc   Calcium 8.2 8.5 - 10.1 mg/dL L 51   Bilirubin Total 1.2 0.2 - 1.3 mg/dL  51   Albumin 2.8 3.4 - 5.0 g/dL L 51   Protein Total 6.3 6.8 - 8.8 g/dL L 51   Alkaline Phosphatase 56 40 - 150 U/L  51   ALT 15 0 - 70 U/L  51   AST 14 0 - 45 U/L  51            Blood gas arterial and oxyhgb [[id number]] (Abnormal)  Resulted: 10/16/17 1243, Result status: Final result    Ordering provider: Toan Cobian MD  10/16/17 1206 Resulting lab: [facility]    Specimen Information    Type Source Collected On   Blood  10/16/17 1215          Components       Value Reference Range Flag Lab   pH Arterial 7.43 7.35 - 7.45 pH  51   pCO2 Arterial 45 35 - 45 mm Hg  51   pO2 Arterial 85 80 - 105 mm Hg  51   Bicarbonate Arterial 30 21 - 28 mmol/L H 51   FIO2 60.0   51   Oxyhemoglobin Arterial 95 92 - 100 %  51   Base Excess Art 4.6 mmol/L  51   Comment:  Abnormal  Result, Ref range: -9.0 to 1.8            CBC with platelets [[id number]] (Abnormal)  Resulted: 10/16/17 1242, Result status: Final result    Ordering provider: Toan Cobian MD  10/16/17 1206 Resulting lab: [facility]    Specimen Information    Type Source Collected On   Blood  10/16/17 1215          Components       Value Reference Range Flag Lab   WBC 6.3 4.0 - 11.0 10e9/L  51   RBC Count 3.56 4.4 - 5.9 10e12/L L 51   Hemoglobin 11.1 13.3 - 17.7 g/dL L 51   Hematocrit 34.2 40.0 - 53.0 % L 51   MCV 96 78 - 100 fl  51   MCH 31.2 26.5 - 33.0 pg  51   MCHC 32.5 31.5 - 36.5 g/dL  51   RDW 13.2 10.0 - 15.0 %  51   Platelet Count 97 150 - 450 10e9/L L 51            Arterial Panel [[id number]] (Abnormal)  Resulted: 10/16/17 1048, Result status: Final result    Ordering provider: Tevin Meza MD  10/16/17 1037 Resulting lab: [facility]    Specimen Information    Type Source Collected On     10/16/17 1037          Components       Value Reference Range Flag Lab   pH Arterial 7.44 7.35 - 7.45 pH  51   pCO2 Arterial 41 35 - 45 mm Hg  51   pO2 Arterial 142 80 - 105 mm Hg H 51   Bicarbonate Arterial 28 21 - 28 mmol/L  51   Base Excess Art 3.2 mmol/L  51   Comment:  Abnormal Result, Ref range: -9.0 to 1.8   FIO2 55   51   Sodium 141 133 - 144 mmol/L  51   Potassium 3.6 3.4 - 5.3 mmol/L  51   Hemoglobin 11.5 13.3 - 17.7 g/dL L 51   Glucose 123 70 - 99 mg/dL H 51   Calcium Ionized Whole Blood 4.5 4.4 - 5.2 mg/dL  51            Magnesium [[id number]]  Resulted: 10/16/17 0516, Result status: Final result    Ordering provider: Gama Peres MD  10/16/17 0345 Resulting lab: [facility]    Specimen Information    Type Source Collected On   Blood  10/16/17 0402          Components       Value Reference Range Flag Lab   Magnesium 2.3 1.6 - 2.3 mg/dL  51            Phosphorus (AM Draw) [[id number]]  Resulted: 10/16/17 0516, Result  status: Final result    Ordering provider: Ernie Quevedo MD  10/16/17 0346 Resulting lab: [facility]    Specimen Information    Type Source Collected On   Blood  10/16/17 0402          Components       Value Reference Range Flag Lab   Phosphorus 2.7 2.5 - 4.5 mg/dL  51            Basic metabolic panel (AM Draw) [[id number]] (Abnormal)  Resulted: 10/16/17 0516, Result status: Final result    Ordering provider: Ernie Quevedo MD  10/16/17 0346 Resulting lab: [facility]    Specimen Information    Type Source Collected On   Blood  10/16/17 0402          Components       Value Reference Range Flag Lab   Sodium 140 133 - 144 mmol/L  51   Potassium 3.7 3.4 - 5.3 mmol/L  51   Chloride 102 94 - 109 mmol/L  51   Carbon Dioxide 28 20 - 32 mmol/L  51   Anion Gap 10 3 - 14 mmol/L  51   Glucose 117 70 - 99 mg/dL H 51   Urea Nitrogen 55 7 - 30 mg/dL H 51   Creatinine 1.33 0.66 - 1.25 mg/dL H 51   GFR Estimate 50 >60 mL/min/1.7m2 L 51   Comment:  Non  GFR Calc   GFR Estimate If Black 61 >60 mL/min/1.7m2  51   Comment:  African American GFR Calc   Calcium 8.3 8.5 - 10.1 mg/dL L 51            INR (AM Draw) [[id number]] (Abnormal)  Resulted: 10/16/17 0508, Result status: Final result    Ordering provider: Ernie Quevedo MD  10/16/17 0346 Resulting lab: [facility]    Specimen Information    Type Source Collected On   Blood  10/16/17 0402          Components       Value Reference Range Flag Lab   INR 1.15 0.86 - 1.14 H 51            CBC (AM Draw) [[id number]] (Abnormal)  Resulted: 10/16/17 0503, Result status: Final result    Ordering provider: Ernie Quevedo MD  10/16/17 0346 Resulting lab: [facility]    Specimen Information    Type Source Collected On   Blood  10/16/17 0402          Components       Value Reference Range Flag Lab   WBC 6.7 4.0  - 11.0 10e9/L  51   RBC Count 3.68 4.4 - 5.9 10e12/L L 51   Hemoglobin 11.4 13.3 - 17.7 g/dL L 51   Hematocrit 34.4 40.0 - 53.0 % L 51   MCV 94 78 - 100 fl  51   MCH 31.0 26.5 - 33.0 pg  51   MCHC 33.1 31.5 - 36.5 g/dL  51   RDW 13.2 10.0 - 15.0 %  51   Platelet Count 116 150 - 450 10e9/L L 51            Blood gas arterial with oxyhemoglobin (PCU Collect TBD) [[id number]] (Abnormal)  Resulted: 10/16/17 0439, Result status: Final result    Ordering provider: Erine Quevedo MD  10/16/17 0346 Resulting lab: [facility]    Specimen Information    Type Source Collected On   Blood  10/16/17 0423          Components       Value Reference Range Flag Lab   pH Arterial 7.51 7.35 - 7.45 pH H [facility]   pCO2 Arterial 38 35 - 45 mm Hg  [facility]   pO2 Arterial 81 80 - 105 mm Hg  [facility]   Bicarbonate Arterial 30 21 - 28 mmol/L H [facility]   FIO2 5L   51   Oxyhemoglobin Arterial 95 92 - 100 %  [facility]   Base Excess Art 6.7 mmol/L  [facility]   Comment:  Abnormal Result, Ref range: -9.0 to 1.8            Potassium [[id number]]  Resulted: 10/15/17 2150, Result status: Final result    Ordering provider: Gama Peres MD  10/15/17 2112 Resulting lab: [facility]    Specimen Information    Type Source Collected On   Blood  10/15/17 2114          Components       Value Reference Range Flag Lab   Potassium 3.6 3.4 - 5.3 mmol/L  51            Magnesium [[id number]]  Resulted: 10/15/17 2150, Result status: Final result    Ordering provider: Gama Peres MD  10/15/17 2112 Resulting lab: [facility]    Specimen Information    Type Source Collected On   Blood  10/15/17 2114          Components       Value Reference Range Flag Lab   Magnesium 2.2 1.6 - 2.3 mg/dL  51            Phosphorus [[id number]]  Resulted: 10/15/17 2150, Result status: Final result    Ordering provider: Tevin Meza MD  10/15/17 9765 Resulting lab: [facility]  [facility]    Specimen Information    Type Source Collected On   Blood  10/15/17 2114          Components       Value Reference Range Flag Lab   Phosphorus 2.9 2.5 - 4.5 mg/dL  51            Potassium [[id number]]  Resulted: 10/15/17 1728, Result status: Final result    Ordering provider: Gama Peres MD  10/15/17 1644 Resulting lab: [facility]    Specimen Information    Type Source Collected On   Blood  10/15/17 1645          Components       Value Reference Range Flag Lab   Potassium 3.6 3.4 - 5.3 mmol/L  51            Magnesium [[id number]]  Resulted: 10/15/17 1728, Result status: Final result    Ordering provider: Gama Peres MD  10/15/17 1644 Resulting lab: [facility]    Specimen Information    Type Source Collected On   Blood  10/15/17 1645          Components       Value Reference Range Flag Lab   Magnesium 2.2 1.6 - 2.3 mg/dL  51            Methicillin Resistant Staph Aureus PCR [[id number]] (Abnormal)  Resulted: 10/15/17 1306, Result status: Edited Result - FINAL    Ordering provider: Tevin Meza MD  10/15/17 1010 Resulting lab: [facility]    Specimen Information    Type Source Collected On   Nares  10/15/17 1012          Components       Value Reference Range Flag Lab   Specimen Description Nares   51   S Aur Meth Resis PCR Positive NEG^Negative A 75   Comment:         MRSA Positive: SA Positive  MRSA and Staphylococcus aureus target DNA   detected, 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 A positive test does   not necessarily indicate the presence of viable organisms. It is,however,   presumptive for the presence of MRSA or SA. FDA approved assay performed using   [employer] GeneXpert(R) real-time PCR.  Critical Value/Significant Value called to and read back by  CANDELARIO HUMPHREYS RN (4E) ON 10.15.17 @1:03PM BY BRET.              Magnesium (AM Draw) [[id number]] (Abnormal)  Resulted:  10/15/17 1103, Result status: Final result    Ordering provider: Tevin Meza MD  10/15/17 1016 Resulting lab: [facility]    Specimen Information    Type Source Collected On   Blood  10/15/17 1013          Components       Value Reference Range Flag Lab   Magnesium 2.4 1.6 - 2.3 mg/dL H 51            Phosphorus (AM Draw) [[id number]]  Resulted: 10/15/17 1103, Result status: Final result    Ordering provider: Tevin Meza MD  10/15/17 1016 Resulting lab: [facility]    Specimen Information    Type Source Collected On   Blood  10/15/17 1013          Components       Value Reference Range Flag Lab   Phosphorus 2.6 2.5 - 4.5 mg/dL  51            Potassium whole blood (AM Draw) [[id number]]  Resulted: 10/15/17 1032, Result status: Final result    Ordering provider: Tevin Meza MD  10/15/17 1016 Resulting lab: [facility]    Specimen Information    Type Source Collected On   Blood  10/15/17 1013          Components       Value Reference Range Flag Lab   Potassium 3.6 3.4 - 5.3 mmol/L  51            Phosphorus [[id number]]  Resulted: 10/15/17 0600, Result status: Final result    Ordering provider: Gama Peres MD  10/15/17 0420 Resulting lab: [facility]    Specimen Information    Type Source Collected On     10/15/17 0420          Components       Value Reference Range Flag Lab   Phosphorus 3.4 2.5 - 4.5 mg/dL  51            Basic metabolic panel (AM Draw) [[id number]] (Abnormal)  Resulted: 10/15/17 0450, Result status: Final result    Ordering provider: Gama Peres MD  10/14/17 2200 Resulting lab: [facility]    Specimen Information    Type Source Collected On   Blood  10/15/17 0420          Components       Value Reference Range Flag Lab   Sodium 141 133 - 144 mmol/L  51   Potassium 3.7 3.4 - 5.3 mmol/L  51   Chloride 104 94 - 109 mmol/L  51    Carbon Dioxide 29 20 - 32 mmol/L  51   Anion Gap 8 3 - 14 mmol/L  51   Glucose 126 70 - 99 mg/dL H 51   Urea Nitrogen 62 7 - 30 mg/dL H 51   Creatinine 1.38 0.66 - 1.25 mg/dL H 51   GFR Estimate 48 >60 mL/min/1.7m2 L 51   Comment:  Non  GFR Calc   GFR Estimate If Black 58 >60 mL/min/1.7m2 L 51   Comment:  African American GFR Calc   Calcium 8.6 8.5 - 10.1 mg/dL  51            Magnesium [[id number]] (Abnormal)  Resulted: 10/15/17 0450, Result status: Final result    Ordering provider: Gama Peres MD  10/15/17 0306 Resulting lab: [facility]    Specimen Information    Type Source Collected On   Blood  10/15/17 0420          Components       Value Reference Range Flag Lab   Magnesium 2.4 1.6 - 2.3 mg/dL H 51            INR [[id number]] (Abnormal)  Resulted: 10/15/17 0439, Result status: Final result    Ordering provider: Toan Cobian MD  10/14/17 2300 Resulting lab: [facility]    Specimen Information    Type Source Collected On   Blood  10/15/17 0420          Components       Value Reference Range Flag Lab   INR 1.15 0.86 - 1.14 H 51            CBC Differential (AM Draw) [[id number]] (Abnormal)  Resulted: 10/15/17 0430, Result status: Final result    Ordering provider: Gama Peres MD  10/14/17 2200 Resulting lab: [facility]    Specimen Information    Type Source Collected On   Blood  10/15/17 0420          Components       Value Reference Range Flag Lab   WBC 7.2 4.0 - 11.0 10e9/L  51   RBC Count 3.87 4.4 - 5.9 10e12/L L 51   Hemoglobin 12.1 13.3 - 17.7 g/dL L 51   Hematocrit 37.2 40.0 - 53.0 % L 51   MCV 96 78 - 100 fl  51   MCH 31.3 26.5 - 33.0 pg  51   MCHC 32.5 31.5 - 36.5 g/dL  51   RDW 13.3 10.0 - 15.0 %  51   Platelet Count 108 150 - 450 10e9/L L 51   Diff Method Automated Method   51   % Neutrophils 80.2 %  51   % Lymphocytes 9.9 %  51   % Monocytes 8.9 %  51   % Eosinophils 0.6 %  51   %  Basophils 0.1 %  51   % Immature Granulocytes 0.3 %  51   Nucleated RBCs 0 0 /100  51   Absolute Neutrophil 5.8 1.6 - 8.3 10e9/L  51   Absolute Lymphocytes 0.7 0.8 - 5.3 10e9/L L 51   Absolute Monocytes 0.6 0.0 - 1.3 10e9/L  51   Absolute Eosinophils 0.0 0.0 - 0.7 10e9/L  51   Absolute Basophils 0.0 0.0 - 0.2 10e9/L  51   Abs Immature Granulocytes 0.0 0 - 0.4 10e9/L  51   Absolute Nucleated RBC 0.0   51            Blood gas arterial with oxyhemoglobin (AM Draw) [[id number]] (Abnormal)  Resulted: 10/15/17 0428, Result status: Final result    Ordering provider: Ernie Quevedo MD  10/15/17 0307 Resulting lab: [facility]    Specimen Information    Type Source Collected On   Blood  10/15/17 0348          Components       Value Reference Range Flag Lab   pH Arterial 7.46 7.35 - 7.45 pH H 51   pCO2 Arterial 41 35 - 45 mm Hg  51   pO2 Arterial 78 80 - 105 mm Hg L 51   Bicarbonate Arterial 30 21 - 28 mmol/L H 51   FIO2 12L   51   Oxyhemoglobin Arterial 94 92 - 100 %  51   Base Excess Art 5.2 mmol/L  51   Comment:  Abnormal Result, Ref range: -9.0 to 1.8            Testing Performed By     Lab - Abbreviation Name Director Address Valid Date Range    22 - [facility] Unknown [address] [place] [address] 05/08/15 1057 - Present    51 - Unknown [facility] Unknown [address] [address] 12/31/14 1010 - Present    75 - Unknown [facility] Unknown [address] [address] 01/15/15 1019 - Present    225 - Unknown [facility] Unknown [address] [address] 12/19/14 0954 - Present            Unresulted Labs (24h ago through future)    Start       Ordered    10/17/17 0400  CBC with platelets differential  DAILY,   Routine     Comments:  Last Lab Result: Hemoglobin (g/dL)       Date                     Value                 " 10/16/2017               11.5 (L)         ----------    10/16/17 1533    10/17/17 0400  Comprehensive metabolic panel  DAILY,   Routine      10/16/17 1533    10/17/17 0400  Magnesium  DAILY,   Routine      10/16/17 1533    10/17/17 0400  Phosphorus  DAILY,   Routine      10/16/17 1533    10/17/17 0400  Basic metabolic panel  EVERY 12 HOURS,   Timed      10/16/17 2252    Unscheduled  Potassium  (Potassium Replacement - \"High\" - Replacement for all levels less than 4.1 mmol/L - UU,UR,UA,RH,SH,PH,WY )  CONDITIONAL (SPECIFY),   Routine     Comments:  Obtain Potassium Level for these conditions:  *IF no potassium result within 24 hrs before initiation of order set, draw potassium level with next lab collect.    *2 HOURS AFTER last IV potassium replacement dose and 4 hours after an oral replacement dose when potassium replacement given for level less than 3.4.  *Next morning after potassium dose.     Repeat Potassium Replacement if necessary.    10/14/17 1224    Unscheduled  Magnesium  (Magnesium Replacement - Adult - \"High\" - Replacement for all levels less than or equal to 2 mg/dL)  CONDITIONAL (SPECIFY),   Routine     Comments:  Obtain Magnesium Level for these conditions:  *IF no magnesium result within 24 hrs before initiation of order set, draw magnesium level with next lab collect.    *2 HOURS AFTER last magnesium replacement dose when magnesium replacement given for level less than 1.6  *Next morning after magnesium dose.     Repeat Magnesium Replacement if necessary.    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 Right sided PICC tip  projects over the SVC. Partially visualized cervical spinal hardware.  The heart size is within normal limits. The vasculature is mildly  prominent. Improved diffuse bilateral mixed airspace and interstitial  opacities. Blunting of both costophrenic angles is stable to slightly  improved on the right. No new pulmonary opacities.      Impression:       Impression:   1. Improved bilateral opacities with mild residual interstitial  opacities, likely improved pulmonary edema.  2. Small bilateral effusions, slightly improved on the right.  3. Mild central pulmonary vascular congestion.    I have personally reviewed the examination and initial interpretation  and I agree with the findings.    ALBA ROJAS MD      Testing Performed By     Lab - Abbreviation Name Director Address Valid Date Range    104 - Rad Rslts RADIOLOGY RESULTS Unknown Unknown 05 1553 - Present               ECG/EMG Results      ECHO IFEANYI Interventional [[id number]]  Resulted: 10/16/17 1311, Result status: In process    Ordering provider: Eber Monroe MD  10/16/17 0707 Performed: 10/16/17 1311 - 10/16/17 1311    Resulting lab: RADIANT             Echocardiogram [[id number]]  Resulted: 10/17/17 0839, Result status: Edited Result - FINAL    Ordering provider: Booker Cobian MD  10/16/17 1533 Resulted by: Isabel Stehpens MD    Performed: 10/17/17 0948 - 10/17/17 0948 Resulting lab: RADIOLOGY RESULTS    Narrative:       [id number]  [id number]  [id number]  [id number]^DANIELLE^BOOKER^           [facility]  Echocardiography Laboratory  [address] [place] [id number]     Name: MALLORIE NOVA  MRN: [id number]  : 10/12/1926  Study Date: 10/17/2017 08:39 AM  Age: 89 yrs  Gender: Male  Patient Location: [facility]  Reason For Study: Aortic Valve Replacement  Ordering Physician:  BOOKER SANTOS  Referring Physician: FRANCINE MCGEE  Performed By: Asael Wilkerson RDCS     BSA: 1.8 m2  Height: 67 in  Weight: 149 lb  BP: 120/83 mmHg  _____________________________________________________________________________  __        Procedure  Complete Portable Echo Adult.  _____________________________________________________________________________  __        Interpretation Summary  Normal biventricular function.  S/p trans-catheter [id number] mitral valve repair with residual moderate  mitral regurgitation that is wall hugging and anteriorly directed. RV is 39  mL.  The inferior vena cava was normal in size with preserved respiratory  variability.  No pericardial effusion is present.     _____________________________________________________________________________  __        Left Ventricle  Left ventricular function is normal.The EF is 55-60%.     Right Ventricle  Global right ventricular function is normal.     Atria  Interatrial septum shows a predominantly L to R shunt. Severe left atrial  enlargement is present.     Mitral Valve  S/p trans-catheter [id number] mitral valve repair with 2 mitraclipis in  place. There is residual moderate mitral regurgitation that is wall hugging  and anteriorly directed. RV is 39 mL. The mean gradient across the mitral  valve is 8 mmHg at a HR of 85 bpm.        Vessels  The inferior vena cava was normal in size with preserved respiratory  variability.     Pericardium  No pericardial effusion is present.     Compared to Previous Study  This study was compared with the study from 10.16.17, MR is the same, MV  gradient is higher .  _____________________________________________________________________________  __     MMode/2D Measurements & Calculations  LA Volume (BP): 159.7 ml        Doppler Measurements & Calculations  MV max P.8 mmHg  MV mean P.5 mmHg  MV V2 VTI: 45.3 cm  TV max P.3 mmHg  TR max reina: 284.3 cm/sec  TR max P.3 mmHg         _____________________________________________________________________________  __        Report approved by: Duncan Shetty 10/17/2017 12:36 PM       1    Type Source Collected On     10/17/17 0839          View Image (below)              Encounter-Level Documents:     There are no encounter-level documents.      Order-Level Documents:     There are no order-level documents.

## 2017-10-13 NOTE — PLAN OF CARE
Problem: Patient Care Overview  Goal: Plan of Care/Patient Progress Review  Discharge Planner SLP   Patient plan for discharge: Transfer to  for procedure  Current status: SLP: Pt presents with minimal dysphagia on bedside swallow evaluation characterized by prolonged oral management and mild residue with regular textures. Oral motor exam WFL with adequate strength and ROM. Pt tolerated thin liquids via straw with possible premature spillage with cough x1 noted. Pt reported initial swallow was difficult due to dry mouth and being NPO today. No difficulty noted on subsequent swallows. Mild oral deficits with regular cracker improving with cues for alternating solids and liquids. Pt and family educated on recommendations: continued regular textures with thin liquids, straws ok; pt upright for all intake, small bites and sips, slow rate, alternate solids and liquids. Education provided on overt s/sx of aspiration and self-monitoring. Pt in agreement with no further ST at this time. Please re-consult if changes or difficulty noted.   Barriers to return to prior living situation: N/A  Recommendations for discharge: Pending procedure/medical recommendations  Rationale for recommendations: No further ST recommended at this time       Entered by: Linda Montgomery 10/13/2017 2:31 PM

## 2017-10-13 NOTE — PROGRESS NOTES
Plan to transfer to [facility]; all orders done and discharge summary completed.

## 2017-10-14 ENCOUNTER — ANESTHESIA EVENT (OUTPATIENT)
Dept: SURGERY | Facility: CLINIC | Age: 82
DRG: 228 | End: 2017-10-14
Payer: MEDICARE

## 2017-10-14 ENCOUNTER — APPOINTMENT (OUTPATIENT)
Dept: GENERAL RADIOLOGY | Facility: CLINIC | Age: 82
DRG: 228 | End: 2017-10-14
Attending: STUDENT IN AN ORGANIZED HEALTH CARE EDUCATION/TRAINING PROGRAM
Payer: MEDICARE

## 2017-10-14 LAB
ANION GAP SERPL CALCULATED.3IONS-SCNC: 8 MMOL/L (ref 3–14)
ANION GAP SERPL CALCULATED.3IONS-SCNC: 8 MMOL/L (ref 3–14)
BASE EXCESS BLDA CALC-SCNC: 5 MMOL/L
BUN SERPL-MCNC: 61 MG/DL (ref 7–30)
BUN SERPL-MCNC: 62 MG/DL (ref 7–30)
CALCIUM SERPL-MCNC: 8.6 MG/DL (ref 8.5–10.1)
CALCIUM SERPL-MCNC: 8.9 MG/DL (ref 8.5–10.1)
CHLORIDE SERPL-SCNC: 101 MMOL/L (ref 94–109)
CHLORIDE SERPL-SCNC: 103 MMOL/L (ref 94–109)
CO2 SERPL-SCNC: 29 MMOL/L (ref 20–32)
CO2 SERPL-SCNC: 29 MMOL/L (ref 20–32)
CREAT SERPL-MCNC: 1.33 MG/DL (ref 0.66–1.25)
CREAT SERPL-MCNC: 1.5 MG/DL (ref 0.66–1.25)
ERYTHROCYTE [DISTWIDTH] IN BLOOD BY AUTOMATED COUNT: 13.5 % (ref 10–15)
GFR SERPL CREATININE-BSD FRML MDRD: 44 ML/MIN/1.7M2
GFR SERPL CREATININE-BSD FRML MDRD: 50 ML/MIN/1.7M2
GLUCOSE SERPL-MCNC: 117 MG/DL (ref 70–99)
GLUCOSE SERPL-MCNC: 130 MG/DL (ref 70–99)
HCO3 BLD-SCNC: 29 MMOL/L (ref 21–28)
HCT VFR BLD AUTO: 41.5 % (ref 40–53)
HGB BLD-MCNC: 13.3 G/DL (ref 13.3–17.7)
INR PPP: 1.17 (ref 0.86–1.14)
MAGNESIUM SERPL-MCNC: 2.3 MG/DL (ref 1.6–2.3)
MCH RBC QN AUTO: 31.3 PG (ref 26.5–33)
MCHC RBC AUTO-ENTMCNC: 32 G/DL (ref 31.5–36.5)
MCV RBC AUTO: 98 FL (ref 78–100)
O2/TOTAL GAS SETTING VFR VENT: ABNORMAL %
OXYHGB MFR BLD: 93 % (ref 92–100)
PCO2 BLD: 42 MM HG (ref 35–45)
PH BLD: 7.46 PH (ref 7.35–7.45)
PLATELET # BLD AUTO: 111 10E9/L (ref 150–450)
PO2 BLD: 77 MM HG (ref 80–105)
POTASSIUM SERPL-SCNC: 3.6 MMOL/L (ref 3.4–5.3)
POTASSIUM SERPL-SCNC: 3.7 MMOL/L (ref 3.4–5.3)
RBC # BLD AUTO: 4.25 10E12/L (ref 4.4–5.9)
SODIUM SERPL-SCNC: 138 MMOL/L (ref 133–144)
SODIUM SERPL-SCNC: 140 MMOL/L (ref 133–144)
WBC # BLD AUTO: 10.4 10E9/L (ref 4–11)

## 2017-10-14 PROCEDURE — 25000128 H RX IP 250 OP 636: Performed by: INTERNAL MEDICINE

## 2017-10-14 PROCEDURE — 85610 PROTHROMBIN TIME: CPT | Performed by: INTERNAL MEDICINE

## 2017-10-14 PROCEDURE — 80048 BASIC METABOLIC PNL TOTAL CA: CPT | Performed by: STUDENT IN AN ORGANIZED HEALTH CARE EDUCATION/TRAINING PROGRAM

## 2017-10-14 PROCEDURE — 27210195 ZZH KIT POWER PICC DOUBLE LUMEN

## 2017-10-14 PROCEDURE — 25000132 ZZH RX MED GY IP 250 OP 250 PS 637: Mod: GY | Performed by: STUDENT IN AN ORGANIZED HEALTH CARE EDUCATION/TRAINING PROGRAM

## 2017-10-14 PROCEDURE — 84100 ASSAY OF PHOSPHORUS: CPT | Performed by: STUDENT IN AN ORGANIZED HEALTH CARE EDUCATION/TRAINING PROGRAM

## 2017-10-14 PROCEDURE — 27210577 ZZ H INTRODUCER MICRO SET

## 2017-10-14 PROCEDURE — 25000125 ZZHC RX 250: Performed by: STUDENT IN AN ORGANIZED HEALTH CARE EDUCATION/TRAINING PROGRAM

## 2017-10-14 PROCEDURE — 40000986 XR CHEST PORT 1 VW

## 2017-10-14 PROCEDURE — 80048 BASIC METABOLIC PNL TOTAL CA: CPT | Performed by: INTERNAL MEDICINE

## 2017-10-14 PROCEDURE — 20000004 ZZH R&B ICU UMMC

## 2017-10-14 PROCEDURE — 36415 COLL VENOUS BLD VENIPUNCTURE: CPT | Performed by: INTERNAL MEDICINE

## 2017-10-14 PROCEDURE — 85027 COMPLETE CBC AUTOMATED: CPT | Performed by: INTERNAL MEDICINE

## 2017-10-14 PROCEDURE — 25000132 ZZH RX MED GY IP 250 OP 250 PS 637: Mod: GY

## 2017-10-14 PROCEDURE — 36569 INSJ PICC 5 YR+ W/O IMAGING: CPT

## 2017-10-14 PROCEDURE — A9270 NON-COVERED ITEM OR SERVICE: HCPCS | Mod: GY | Performed by: INTERNAL MEDICINE

## 2017-10-14 PROCEDURE — 25000132 ZZH RX MED GY IP 250 OP 250 PS 637: Mod: GY | Performed by: INTERNAL MEDICINE

## 2017-10-14 PROCEDURE — A9270 NON-COVERED ITEM OR SERVICE: HCPCS | Mod: GY

## 2017-10-14 PROCEDURE — 99233 SBSQ HOSP IP/OBS HIGH 50: CPT | Mod: 25 | Performed by: INTERNAL MEDICINE

## 2017-10-14 PROCEDURE — 82805 BLOOD GASES W/O2 SATURATION: CPT | Performed by: STUDENT IN AN ORGANIZED HEALTH CARE EDUCATION/TRAINING PROGRAM

## 2017-10-14 PROCEDURE — A9270 NON-COVERED ITEM OR SERVICE: HCPCS | Mod: GY | Performed by: STUDENT IN AN ORGANIZED HEALTH CARE EDUCATION/TRAINING PROGRAM

## 2017-10-14 PROCEDURE — 83735 ASSAY OF MAGNESIUM: CPT | Performed by: STUDENT IN AN ORGANIZED HEALTH CARE EDUCATION/TRAINING PROGRAM

## 2017-10-14 PROCEDURE — 25000128 H RX IP 250 OP 636: Performed by: STUDENT IN AN ORGANIZED HEALTH CARE EDUCATION/TRAINING PROGRAM

## 2017-10-14 RX ORDER — ASPIRIN 325 MG
325 TABLET ORAL EVERY 24 HOURS
Status: DISCONTINUED | OUTPATIENT
Start: 2017-10-16 | End: 2017-10-16

## 2017-10-14 RX ORDER — POTASSIUM CHLORIDE 7.45 MG/ML
10 INJECTION INTRAVENOUS
Status: DISCONTINUED | OUTPATIENT
Start: 2017-10-14 | End: 2017-10-18 | Stop reason: HOSPADM

## 2017-10-14 RX ORDER — FUROSEMIDE 10 MG/ML
60 INJECTION INTRAMUSCULAR; INTRAVENOUS ONCE
Status: COMPLETED | OUTPATIENT
Start: 2017-10-14 | End: 2017-10-14

## 2017-10-14 RX ORDER — MAGNESIUM SULFATE HEPTAHYDRATE 40 MG/ML
4 INJECTION, SOLUTION INTRAVENOUS EVERY 4 HOURS PRN
Status: DISCONTINUED | OUTPATIENT
Start: 2017-10-14 | End: 2017-10-18 | Stop reason: HOSPADM

## 2017-10-14 RX ORDER — NITROGLYCERIN 0.4 MG/1
TABLET SUBLINGUAL
Status: COMPLETED
Start: 2017-10-14 | End: 2017-10-14

## 2017-10-14 RX ORDER — HEPARIN SODIUM,PORCINE 10 UNIT/ML
2-5 VIAL (ML) INTRAVENOUS
Status: DISCONTINUED | OUTPATIENT
Start: 2017-10-14 | End: 2017-10-18 | Stop reason: HOSPADM

## 2017-10-14 RX ORDER — POTASSIUM CHLORIDE 750 MG/1
20-40 TABLET, EXTENDED RELEASE ORAL
Status: DISCONTINUED | OUTPATIENT
Start: 2017-10-14 | End: 2017-10-18 | Stop reason: HOSPADM

## 2017-10-14 RX ORDER — FUROSEMIDE 10 MG/ML
80 INJECTION INTRAMUSCULAR; INTRAVENOUS ONCE
Status: COMPLETED | OUTPATIENT
Start: 2017-10-14 | End: 2017-10-14

## 2017-10-14 RX ORDER — LIDOCAINE 40 MG/G
CREAM TOPICAL
Status: DISCONTINUED | OUTPATIENT
Start: 2017-10-14 | End: 2017-10-15

## 2017-10-14 RX ORDER — NITROGLYCERIN 0.4 MG/1
0.4 TABLET SUBLINGUAL ONCE
Status: DISCONTINUED | OUTPATIENT
Start: 2017-10-14 | End: 2017-10-14

## 2017-10-14 RX ORDER — POTASSIUM CL/LIDO/0.9 % NACL 10MEQ/0.1L
10 INTRAVENOUS SOLUTION, PIGGYBACK (ML) INTRAVENOUS
Status: DISCONTINUED | OUTPATIENT
Start: 2017-10-14 | End: 2017-10-18 | Stop reason: HOSPADM

## 2017-10-14 RX ORDER — POTASSIUM CHLORIDE 1.5 G/1.58G
20-40 POWDER, FOR SOLUTION ORAL
Status: DISCONTINUED | OUTPATIENT
Start: 2017-10-14 | End: 2017-10-18 | Stop reason: HOSPADM

## 2017-10-14 RX ORDER — POTASSIUM CHLORIDE 29.8 MG/ML
20 INJECTION INTRAVENOUS
Status: DISCONTINUED | OUTPATIENT
Start: 2017-10-14 | End: 2017-10-14 | Stop reason: ALTCHOICE

## 2017-10-14 RX ADMIN — GABAPENTIN 300 MG: 300 CAPSULE ORAL at 08:07

## 2017-10-14 RX ADMIN — SENNOSIDES AND DOCUSATE SODIUM 1 TABLET: 8.6; 5 TABLET ORAL at 20:25

## 2017-10-14 RX ADMIN — SIMVASTATIN 20 MG: 20 TABLET, FILM COATED ORAL at 00:57

## 2017-10-14 RX ADMIN — SODIUM NITROPRUSSIDE 0.25 MCG/KG/MIN: 25 INJECTION, SOLUTION, CONCENTRATE INTRAVENOUS at 16:06

## 2017-10-14 RX ADMIN — NITROGLYCERIN 0.4 MG: 0.4 TABLET SUBLINGUAL at 07:33

## 2017-10-14 RX ADMIN — HEPARIN SODIUM 5000 UNITS: 5000 INJECTION, SOLUTION INTRAVENOUS; SUBCUTANEOUS at 09:17

## 2017-10-14 RX ADMIN — TAMSULOSIN HYDROCHLORIDE 0.4 MG: 0.4 CAPSULE ORAL at 08:07

## 2017-10-14 RX ADMIN — GABAPENTIN 300 MG: 300 CAPSULE ORAL at 20:25

## 2017-10-14 RX ADMIN — SIMVASTATIN 20 MG: 20 TABLET, FILM COATED ORAL at 20:51

## 2017-10-14 RX ADMIN — HEPARIN SODIUM 5000 UNITS: 5000 INJECTION, SOLUTION INTRAVENOUS; SUBCUTANEOUS at 20:25

## 2017-10-14 RX ADMIN — FUROSEMIDE 20 MG: 10 INJECTION, SOLUTION INTRAVENOUS at 00:57

## 2017-10-14 RX ADMIN — FUROSEMIDE 60 MG: 10 INJECTION, SOLUTION INTRAVENOUS at 07:49

## 2017-10-14 RX ADMIN — FUROSEMIDE 80 MG: 10 INJECTION, SOLUTION INTRAVENOUS at 20:13

## 2017-10-14 RX ADMIN — LIDOCAINE HYDROCHLORIDE 2 ML: 10 INJECTION, SOLUTION INFILTRATION; PERINEURAL at 15:13

## 2017-10-14 RX ADMIN — FINASTERIDE 5 MG: 5 TABLET, FILM COATED ORAL at 08:08

## 2017-10-14 RX ADMIN — SENNOSIDES AND DOCUSATE SODIUM 2 TABLET: 8.6; 5 TABLET ORAL at 08:07

## 2017-10-14 RX ADMIN — SENNOSIDES AND DOCUSATE SODIUM 2 TABLET: 8.6; 5 TABLET ORAL at 00:57

## 2017-10-14 RX ADMIN — HYDROCODONE BITARTRATE AND ACETAMINOPHEN 1 TABLET: 7.5; 325 TABLET ORAL at 04:43

## 2017-10-14 RX ADMIN — FUROSEMIDE 5 MG/HR: 10 INJECTION, SOLUTION INTRAVENOUS at 20:17

## 2017-10-14 RX ADMIN — POTASSIUM CHLORIDE 20 MEQ: 750 TABLET, EXTENDED RELEASE ORAL at 23:56

## 2017-10-14 RX ADMIN — POTASSIUM CHLORIDE 20 MEQ: 750 TABLET, EXTENDED RELEASE ORAL at 12:43

## 2017-10-14 RX ADMIN — HYDROCODONE BITARTRATE AND ACETAMINOPHEN 1 TABLET: 7.5; 325 TABLET ORAL at 23:56

## 2017-10-14 RX ADMIN — GABAPENTIN 300 MG: 300 CAPSULE ORAL at 13:29

## 2017-10-14 RX ADMIN — METOPROLOL SUCCINATE 25 MG: 25 TABLET, EXTENDED RELEASE ORAL at 08:07

## 2017-10-14 RX ADMIN — PANTOPRAZOLE SODIUM 40 MG: 40 TABLET, DELAYED RELEASE ORAL at 08:07

## 2017-10-14 RX ADMIN — NITROGLYCERIN 0.4 MG: 0.4 TABLET SUBLINGUAL at 07:48

## 2017-10-14 RX ADMIN — HYDROCODONE BITARTRATE AND ACETAMINOPHEN 1 TABLET: 7.5; 325 TABLET ORAL at 11:23

## 2017-10-14 RX ADMIN — HYDROCODONE BITARTRATE AND ACETAMINOPHEN 1 TABLET: 7.5; 325 TABLET ORAL at 17:46

## 2017-10-14 ASSESSMENT — PAIN DESCRIPTION - DESCRIPTORS
DESCRIPTORS: ACHING
DESCRIPTORS: ACHING;SORE

## 2017-10-14 NOTE — PLAN OF CARE
Problem: Patient Care Overview  Goal: Plan of Care/Patient Progress Review  Pt transferred to the [facility] this evening by EMS at 2100. Oxygen level is 90-91% on 15L.

## 2017-10-14 NOTE — PROGRESS NOTES
CARDIOLOGY PROGRESS NOTE    SUBJECTIVE:  Feels that dyspnea is stable compared to yesterday.  Had an acute rise in dyspnea and drop in SpO2 this morning prompting more IV furosemide administration. No chest pain, pre-syncope, or syncope.     IV furosemide 60 mg IV x1 administered with little response. Hence transferred to the CCU for nitroprusside and furosemide administration via IV infusion.    ROS otherwise negative.    OBJECTIVE:  Vital signs:  /70 (Range 111-117/66-85)  HR 80-90  RR 16 (16-18)  SpO2 90-98% on 13-15 L/min   T 98.1 (Tm 36.7)  Vitals:    10/13/17 2239 10/14/17 0630   Weight: 70.6 kg (155 lb 9.6 oz) 69.8 kg (153 lb 14.4 oz)     I/O: +90 mL thus far   Intake: 540 mL intake  Output:  450 mL output     PHYSICAL EXAM:  Gen: Mild respiratory distress  HEENT: MMM, no carotid bruits  Resp: Tachypneic with ACM usage, chest ausc with coarse crackles diffusely  CVS: JVP to the angle of the mandible, no thrillsor heaves, pulse regular, S1+S2+3/6 PSM loudest at apex but heard all over the precordium an in axilla  Abdo: ND, S, NT, +BS  Extremities: Warm, well-perfused, mild edema   Neuro: GCS 15/15, AAOx3, pleasant male     Lab Test  10/13/17   0740   HGB  12.7*   HCT  37.9*   WBC  10.3   MCV  94   MCH  31.6   MCHC  33.5   RDW  13.4   PLT  86*   NA  140   POTASSIUM  4.0   CHLORIDE  105   CO2  26   BUN  50*   CR  1.23   GLC  115*   GIUSEPPE  9.0   ALBUMIN   --    BILITOTAL   --    ALKPHOS   --    AST   --    ALT   --        Micro:  none    Imaging:  10/13 CXR: significant alveolar infiltrates, vascular cephalization, and small pleural effusions bilaterally     Cardiac meds: Metoprolol succinate 25 mg q24h, simvastatin 20 mg q24h  Non-cardiac meds: Gabapentin 300 mg q24h, pantoprazole 40 mg q24h, senna-docusate, tamsulosin 0.4 mg q24h     ASSESSMENT/PLAN:  Mr. Ivan Gomez is a 89-year old male with a PMHx of CKD 3, HFpEF, and hypertension who originally presented to [facility] for evaluation of acute  hypoxic respiratory failure and volume overload.  He was transferred to [facility] for evaluation for MitraClip after he was found to have severe mitral valve regurgitation due to a flail posterior leaflet.     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; hypoxic respiratory failure; Hx of CKD 3; Hx of HTN  acute-on-chronic diastolic heart failure/HFpEF   - Nitroprusside infusion with target MAp 65-75   - Furosemide infusion 5 mg/hr titrated upwards at 2.5 mg/hr increments to achieve output of at least -2L daily and amelioration of oxygenation requirement    - Target SpO2 >90    - IFEANYI once volume status and respiratory status stabilized (ideally on 10/16)    - Dyslipidemia   - Continue simvastatin for now    - Lipid panel 10/15 AM     - Code status   DNR/DNI based on OP provider's notes but will be reversed for the procedures    Chronic/inactive issues:  - Bladder cancer s/p TURBT: Continue tamsulosin and finasteride       Seen and staffed with Dr. Meza.    Gama Peres  Cardiology Fellow  Pager [phone]            I have seen and examined the patient and agree with the finding and plan.       Tevin Meza MD  Cardiology-[facility]  [phone]

## 2017-10-14 NOTE — PROGRESS NOTES
Brief Cardiology Note    We were made aware of Mr Patricia's pending transfer to [facility] for possible MitraClip Monday 10/16/17.  TTE and chart reviewed.    Placed orders for:  Labs including Type and screen and INR tomorrow am  IFEANYI for Monday  Hibicleanse showers tomorrow evening and Monday am  NPO after midnight tomorrow  Full aspirin Monday am    Toan Cobian  Cardiology fellow  Pager [phone]

## 2017-10-14 NOTE — PLAN OF CARE
Problem: Patient Care Overview  Goal: Plan of Care/Patient Progress Review  Transfer  Transferred to: unit 4E at 13:00   Via: wheel chair  Reason for transfer: Will Start on Nitroprusside drip and get a PICC. Pt inappropriate for 6C- worsening patient condition. Patient is on 15 liters of Oxygen via an oxyplus mask with oxygen saturations 90-94%  Except with activity or drinking/eatting his saturations drop to 84%.  Family: Aware of transfer. His step daughter Nicolasa was called and is aware of his transfer  Belongings: Sent with pt  Chart: Sent with pt  Medications: Meds from bin sent with pt  Report called to:  Hima charge RN on unit 4E

## 2017-10-14 NOTE — PROGRESS NOTES
DANNY  D: Charge nurse paged SW for transport.  I: DANNY spoke with Charge Cande, who says pt is transferring to the [facility] , unit 6C, with transport needed ASAP with ALS.  DANNY called [facility] and spoke with Silas.  Gave info as above, but Silas [facility], will call station [phone] to discuss with pt nurse exactly what type of equipment pt needs.  A: Deferred.  P: DANNY will assist if needed, charge nurse Cande will page.  Jennifer HERNÁNDEZ

## 2017-10-14 NOTE — PLAN OF CARE
Problem: Patient Care Overview  Goal: Plan of Care/Patient Progress Review  Patient is alert and oriented x 4.  Arrived to 4E from floor to have PICC placed and Nipride gtt started.  Oxiplus mask @ 13 liters.  Norco for pain management.  Lasix bolus and gtt ordered, MD placing A-line at this time.  Continue to monitor.

## 2017-10-14 NOTE — PLAN OF CARE
Problem: Goal/Outcome  Goal: Goal Outcome Summary  Outcome: No Change  Pt here for mitral valve clipping possibly Monday AM. 15LPM oxymizer cannula. AOVSS. A&O x4. Pleasant and able to make needs known. Calls appropriately. Chronic back pain treated with 7.5/325 Norco. SR/Stach. L PIV saline locked. Slept well between cares. No other issues noted overnight. Will continue to monitor and address as needed.

## 2017-10-15 LAB
ABO + RH BLD: NORMAL
ABO + RH BLD: NORMAL
ANION GAP SERPL CALCULATED.3IONS-SCNC: 8 MMOL/L (ref 3–14)
BASE EXCESS BLDA CALC-SCNC: 5.2 MMOL/L
BASOPHILS # BLD AUTO: 0 10E9/L (ref 0–0.2)
BASOPHILS NFR BLD AUTO: 0.1 %
BLD GP AB SCN SERPL QL: NORMAL
BLOOD BANK CMNT PATIENT-IMP: NORMAL
BUN SERPL-MCNC: 62 MG/DL (ref 7–30)
CALCIUM SERPL-MCNC: 8.6 MG/DL (ref 8.5–10.1)
CHLORIDE SERPL-SCNC: 104 MMOL/L (ref 94–109)
CO2 SERPL-SCNC: 29 MMOL/L (ref 20–32)
CREAT SERPL-MCNC: 1.38 MG/DL (ref 0.66–1.25)
DIFFERENTIAL METHOD BLD: ABNORMAL
EOSINOPHIL # BLD AUTO: 0 10E9/L (ref 0–0.7)
EOSINOPHIL NFR BLD AUTO: 0.6 %
ERYTHROCYTE [DISTWIDTH] IN BLOOD BY AUTOMATED COUNT: 13.3 % (ref 10–15)
GFR SERPL CREATININE-BSD FRML MDRD: 48 ML/MIN/1.7M2
GLUCOSE SERPL-MCNC: 126 MG/DL (ref 70–99)
HCO3 BLD-SCNC: 30 MMOL/L (ref 21–28)
HCT VFR BLD AUTO: 37.2 % (ref 40–53)
HGB BLD-MCNC: 12.1 G/DL (ref 13.3–17.7)
IMM GRANULOCYTES # BLD: 0 10E9/L (ref 0–0.4)
IMM GRANULOCYTES NFR BLD: 0.3 %
INR PPP: 1.15 (ref 0.86–1.14)
LYMPHOCYTES # BLD AUTO: 0.7 10E9/L (ref 0.8–5.3)
LYMPHOCYTES NFR BLD AUTO: 9.9 %
MAGNESIUM SERPL-MCNC: 2.2 MG/DL (ref 1.6–2.3)
MAGNESIUM SERPL-MCNC: 2.2 MG/DL (ref 1.6–2.3)
MAGNESIUM SERPL-MCNC: 2.4 MG/DL (ref 1.6–2.3)
MAGNESIUM SERPL-MCNC: 2.4 MG/DL (ref 1.6–2.3)
MCH RBC QN AUTO: 31.3 PG (ref 26.5–33)
MCHC RBC AUTO-ENTMCNC: 32.5 G/DL (ref 31.5–36.5)
MCV RBC AUTO: 96 FL (ref 78–100)
MONOCYTES # BLD AUTO: 0.6 10E9/L (ref 0–1.3)
MONOCYTES NFR BLD AUTO: 8.9 %
MRSA DNA SPEC QL NAA+PROBE: POSITIVE
NEUTROPHILS # BLD AUTO: 5.8 10E9/L (ref 1.6–8.3)
NEUTROPHILS NFR BLD AUTO: 80.2 %
NRBC # BLD AUTO: 0 10*3/UL
NRBC BLD AUTO-RTO: 0 /100
O2/TOTAL GAS SETTING VFR VENT: ABNORMAL %
OXYHGB MFR BLD: 94 % (ref 92–100)
PCO2 BLD: 41 MM HG (ref 35–45)
PH BLD: 7.46 PH (ref 7.35–7.45)
PHOSPHATE SERPL-MCNC: 2.6 MG/DL (ref 2.5–4.5)
PHOSPHATE SERPL-MCNC: 2.9 MG/DL (ref 2.5–4.5)
PHOSPHATE SERPL-MCNC: 3.1 MG/DL (ref 2.5–4.5)
PHOSPHATE SERPL-MCNC: 3.4 MG/DL (ref 2.5–4.5)
PLATELET # BLD AUTO: 108 10E9/L (ref 150–450)
PO2 BLD: 78 MM HG (ref 80–105)
POTASSIUM BLD-SCNC: 3.6 MMOL/L (ref 3.4–5.3)
POTASSIUM SERPL-SCNC: 3.6 MMOL/L (ref 3.4–5.3)
POTASSIUM SERPL-SCNC: 3.6 MMOL/L (ref 3.4–5.3)
POTASSIUM SERPL-SCNC: 3.7 MMOL/L (ref 3.4–5.3)
RBC # BLD AUTO: 3.87 10E12/L (ref 4.4–5.9)
SODIUM SERPL-SCNC: 141 MMOL/L (ref 133–144)
SPECIMEN EXP DATE BLD: NORMAL
SPECIMEN SOURCE: ABNORMAL
WBC # BLD AUTO: 7.2 10E9/L (ref 4–11)

## 2017-10-15 PROCEDURE — A9270 NON-COVERED ITEM OR SERVICE: HCPCS | Mod: GY | Performed by: INTERNAL MEDICINE

## 2017-10-15 PROCEDURE — 84132 ASSAY OF SERUM POTASSIUM: CPT | Performed by: INTERNAL MEDICINE

## 2017-10-15 PROCEDURE — 40000275 ZZH STATISTIC RCP TIME EA 10 MIN

## 2017-10-15 PROCEDURE — 86850 RBC ANTIBODY SCREEN: CPT | Performed by: STUDENT IN AN ORGANIZED HEALTH CARE EDUCATION/TRAINING PROGRAM

## 2017-10-15 PROCEDURE — 87641 MR-STAPH DNA AMP PROBE: CPT | Performed by: INTERNAL MEDICINE

## 2017-10-15 PROCEDURE — 84100 ASSAY OF PHOSPHORUS: CPT | Performed by: STUDENT IN AN ORGANIZED HEALTH CARE EDUCATION/TRAINING PROGRAM

## 2017-10-15 PROCEDURE — 25000132 ZZH RX MED GY IP 250 OP 250 PS 637: Mod: GY | Performed by: INTERNAL MEDICINE

## 2017-10-15 PROCEDURE — 84100 ASSAY OF PHOSPHORUS: CPT | Performed by: INTERNAL MEDICINE

## 2017-10-15 PROCEDURE — 25000132 ZZH RX MED GY IP 250 OP 250 PS 637: Mod: GY | Performed by: STUDENT IN AN ORGANIZED HEALTH CARE EDUCATION/TRAINING PROGRAM

## 2017-10-15 PROCEDURE — 83735 ASSAY OF MAGNESIUM: CPT | Performed by: INTERNAL MEDICINE

## 2017-10-15 PROCEDURE — 40000014 ZZH STATISTIC ARTERIAL MONITORING DAILY

## 2017-10-15 PROCEDURE — 80048 BASIC METABOLIC PNL TOTAL CA: CPT | Performed by: STUDENT IN AN ORGANIZED HEALTH CARE EDUCATION/TRAINING PROGRAM

## 2017-10-15 PROCEDURE — 86850 RBC ANTIBODY SCREEN: CPT | Performed by: INTERNAL MEDICINE

## 2017-10-15 PROCEDURE — 87640 STAPH A DNA AMP PROBE: CPT | Performed by: INTERNAL MEDICINE

## 2017-10-15 PROCEDURE — 25000128 H RX IP 250 OP 636: Performed by: STUDENT IN AN ORGANIZED HEALTH CARE EDUCATION/TRAINING PROGRAM

## 2017-10-15 PROCEDURE — 86900 BLOOD TYPING SEROLOGIC ABO: CPT | Performed by: STUDENT IN AN ORGANIZED HEALTH CARE EDUCATION/TRAINING PROGRAM

## 2017-10-15 PROCEDURE — 25000128 H RX IP 250 OP 636: Performed by: INTERNAL MEDICINE

## 2017-10-15 PROCEDURE — 20000004 ZZH R&B ICU UMMC

## 2017-10-15 PROCEDURE — 87186 SC STD MICRODIL/AGAR DIL: CPT | Performed by: INTERNAL MEDICINE

## 2017-10-15 PROCEDURE — 83735 ASSAY OF MAGNESIUM: CPT | Performed by: STUDENT IN AN ORGANIZED HEALTH CARE EDUCATION/TRAINING PROGRAM

## 2017-10-15 PROCEDURE — 85025 COMPLETE CBC W/AUTO DIFF WBC: CPT | Performed by: STUDENT IN AN ORGANIZED HEALTH CARE EDUCATION/TRAINING PROGRAM

## 2017-10-15 PROCEDURE — 99233 SBSQ HOSP IP/OBS HIGH 50: CPT | Mod: GC | Performed by: INTERNAL MEDICINE

## 2017-10-15 PROCEDURE — 86900 BLOOD TYPING SEROLOGIC ABO: CPT | Performed by: INTERNAL MEDICINE

## 2017-10-15 PROCEDURE — 85610 PROTHROMBIN TIME: CPT | Performed by: STUDENT IN AN ORGANIZED HEALTH CARE EDUCATION/TRAINING PROGRAM

## 2017-10-15 PROCEDURE — 86901 BLOOD TYPING SEROLOGIC RH(D): CPT | Performed by: STUDENT IN AN ORGANIZED HEALTH CARE EDUCATION/TRAINING PROGRAM

## 2017-10-15 PROCEDURE — A9270 NON-COVERED ITEM OR SERVICE: HCPCS | Mod: GY | Performed by: STUDENT IN AN ORGANIZED HEALTH CARE EDUCATION/TRAINING PROGRAM

## 2017-10-15 PROCEDURE — 86901 BLOOD TYPING SEROLOGIC RH(D): CPT | Performed by: INTERNAL MEDICINE

## 2017-10-15 PROCEDURE — 82805 BLOOD GASES W/O2 SATURATION: CPT | Performed by: STUDENT IN AN ORGANIZED HEALTH CARE EDUCATION/TRAINING PROGRAM

## 2017-10-15 RX ORDER — CLINDAMYCIN PHOSPHATE 900 MG/50ML
900 INJECTION, SOLUTION INTRAVENOUS
Status: COMPLETED | OUTPATIENT
Start: 2017-10-16 | End: 2017-10-16

## 2017-10-15 RX ORDER — LIDOCAINE 40 MG/G
CREAM TOPICAL
Status: DISCONTINUED | OUTPATIENT
Start: 2017-10-15 | End: 2017-10-16 | Stop reason: HOSPADM

## 2017-10-15 RX ORDER — POTASSIUM CHLORIDE 14.9 MG/ML
20 INJECTION INTRAVENOUS ONCE
Status: COMPLETED | OUTPATIENT
Start: 2017-10-15 | End: 2017-10-16

## 2017-10-15 RX ORDER — LACTULOSE 10 G/15ML
20 SOLUTION ORAL ONCE
Status: COMPLETED | OUTPATIENT
Start: 2017-10-15 | End: 2017-10-15

## 2017-10-15 RX ORDER — ASPIRIN 325 MG
325 TABLET ORAL ONCE
Status: COMPLETED | OUTPATIENT
Start: 2017-10-16 | End: 2017-10-16

## 2017-10-15 RX ORDER — SODIUM CHLORIDE 9 MG/ML
INJECTION, SOLUTION INTRAVENOUS CONTINUOUS
Status: DISCONTINUED | OUTPATIENT
Start: 2017-10-15 | End: 2017-10-15

## 2017-10-15 RX ADMIN — PANTOPRAZOLE SODIUM 40 MG: 40 TABLET, DELAYED RELEASE ORAL at 08:44

## 2017-10-15 RX ADMIN — HEPARIN SODIUM 5000 UNITS: 5000 INJECTION, SOLUTION INTRAVENOUS; SUBCUTANEOUS at 08:44

## 2017-10-15 RX ADMIN — HYDROCODONE BITARTRATE AND ACETAMINOPHEN 1 TABLET: 7.5; 325 TABLET ORAL at 14:25

## 2017-10-15 RX ADMIN — SENNOSIDES AND DOCUSATE SODIUM 2 TABLET: 8.6; 5 TABLET ORAL at 20:23

## 2017-10-15 RX ADMIN — TAMSULOSIN HYDROCHLORIDE 0.4 MG: 0.4 CAPSULE ORAL at 08:44

## 2017-10-15 RX ADMIN — GABAPENTIN 300 MG: 300 CAPSULE ORAL at 14:25

## 2017-10-15 RX ADMIN — FUROSEMIDE 7.5 MG/HR: 10 INJECTION, SOLUTION INTRAVENOUS at 11:45

## 2017-10-15 RX ADMIN — FINASTERIDE 5 MG: 5 TABLET, FILM COATED ORAL at 10:15

## 2017-10-15 RX ADMIN — POTASSIUM CHLORIDE 20 MEQ: 750 TABLET, EXTENDED RELEASE ORAL at 11:21

## 2017-10-15 RX ADMIN — SIMVASTATIN 20 MG: 20 TABLET, FILM COATED ORAL at 20:39

## 2017-10-15 RX ADMIN — POTASSIUM CHLORIDE 20 MEQ: 750 TABLET, EXTENDED RELEASE ORAL at 17:46

## 2017-10-15 RX ADMIN — SENNOSIDES AND DOCUSATE SODIUM 2 TABLET: 8.6; 5 TABLET ORAL at 08:44

## 2017-10-15 RX ADMIN — POTASSIUM CHLORIDE 20 MEQ: 750 TABLET, EXTENDED RELEASE ORAL at 22:48

## 2017-10-15 RX ADMIN — LACTULOSE 20 G: 20 SOLUTION ORAL at 15:07

## 2017-10-15 RX ADMIN — POTASSIUM CHLORIDE 10 MEQ: 10 INJECTION, SOLUTION INTRAVENOUS at 23:13

## 2017-10-15 RX ADMIN — GABAPENTIN 300 MG: 300 CAPSULE ORAL at 08:43

## 2017-10-15 RX ADMIN — POTASSIUM CHLORIDE 20 MEQ: 750 TABLET, EXTENDED RELEASE ORAL at 06:19

## 2017-10-15 RX ADMIN — HEPARIN SODIUM 5000 UNITS: 5000 INJECTION, SOLUTION INTRAVENOUS; SUBCUTANEOUS at 20:33

## 2017-10-15 RX ADMIN — METOPROLOL SUCCINATE 25 MG: 25 TABLET, EXTENDED RELEASE ORAL at 08:44

## 2017-10-15 RX ADMIN — FUROSEMIDE 10 MG/HR: 10 INJECTION, SOLUTION INTRAVENOUS at 23:29

## 2017-10-15 RX ADMIN — SODIUM NITROPRUSSIDE 0.25 MCG/KG/MIN: 25 INJECTION, SOLUTION, CONCENTRATE INTRAVENOUS at 11:24

## 2017-10-15 RX ADMIN — HYDROCODONE BITARTRATE AND ACETAMINOPHEN 1 TABLET: 7.5; 325 TABLET ORAL at 21:18

## 2017-10-15 RX ADMIN — GABAPENTIN 300 MG: 300 CAPSULE ORAL at 20:23

## 2017-10-15 ASSESSMENT — PAIN DESCRIPTION - DESCRIPTORS
DESCRIPTORS: ACHING;SORE
DESCRIPTORS: ACHING;SORE

## 2017-10-15 NOTE — PROCEDURES
Procedure Note    PROCEDURES PERFORMED:   1. Radial arterial line placement     PHYSICIANS:  1. Attending Cardiology Staff: Dr. MILDRED Meza MD  2. Cardiology Fellow: Edwina Morales  3. Resident: Dr. FAUSTINO Quevedo MD, PhD    Consent obtained with discussion of risks.  All questions were answered. Sterile prep and procedure.    INDICATION:  Ivan Gomez is a 89 year old male with known severe MR due to flail mitral valve regurgitation who presented for medical optimization prior to consideration for MitraClip placement.     DESCRIPTION:  1. Location: Right radial artery   2. Access: Local anesthetic with lidocaine.  A micropuncture (21 g) needle with ultrasound guidance was used to establish vascular access using a modified Seldinger technique.  3. Catheters: Standard arterial line catheter   4. Estimated blood loss of 20 mL.    MEDICATIONS:  1. Local anesthesia with lidocaine.     COMPLICATIONS:  1. None.    PLAN:   1. Continue diuresis and hemodynamic optimization per plan.     Edwina Morales   General Cardiology Fellow  Pager [phone]

## 2017-10-15 NOTE — PROGRESS NOTES
Brief Cardiology Note    Met with Mr Gomez to go over procedure. He had just gotten back from long walk with nursing staff around unit.  We were unable to reach significant other by phone.  Answered all questions and clarified that he'd be ok with intubation for procedure tomorrow and shocking for any iatrogenic VT/VF.  He still does not want any sort of open chest intervention, and we agreed that if he were to need emergency surgery, we would stop.    Toan Cobian  Cardiology fellow  Pager [phone]

## 2017-10-15 NOTE — PLAN OF CARE
Problem: Patient Care Overview  Goal: Plan of Care/Patient Progress Review  Alert and oriented x 4.  Up with minimal assist.  Lasix increased to 10 and Nipride 0.25-0.5.  Lactulose given x 1 with small results.  Potassium replaced x 2.  Continue to monitor.

## 2017-10-15 NOTE — PLAN OF CARE
Problem: Goal/Outcome  Goal: Goal Outcome Summary  Outcome: Improving  D/I: Nipride Gtt titrated to keep MAP between 65 - 75. Nipride as high as 1 mcg/kg/min when Pt awake and off when Pt sleeping soundly to keep MAP in goal range. A-line placed @ start of shift. At start of shift Lasix bolus 80 mg IV and Gtt started @ 5 mg/hr. Potassium levels replaced twice this shift per ordered replacement protocol. Pt refusing Velasquez and standing @ bedside Q 1 - 2 hour with assistance to void in urinal. Crackles in upper lobes before lasix started now has clear lung sounds in upper lobes. Pt continues to have episodes of frequent harsh nonproductive coughing. Pt continues on 12 Liters  Oxi-Plus Mask. Pt refuses to wear nasal cannula when eating or taking pills.  Bed Alarm put on as Pt is a fall risk and does not call to stand @ bedside to use urinal. Pt complaining of constipation this AM and refused Norco this AM for his back pain because he believes it has caused his constipation. Pt will take ordered senna this AM. Pt turned side to side in bed to help relieve his chronic back pain.      A/P: Keep MAP between 65 -75 using Nipride Gtt as ordered. Continue lasix gtt and recheck K+ level at least every 6 hours to replace potassium levels to ordered protocol levels. Pt scheduled for Rhona Clip surgery tomorrow Monday 10/16/2017.

## 2017-10-15 NOTE — PROGRESS NOTES
[facility] Cardiology Progress Note           Assessment and Plan:     Mr. Ivan Gomez is a 89-year old male with a PMHx of CKD 3, HFpEF, and hypertension who originally presented to [facility] for evaluation of acute hypoxic respiratory failure and volume overload.  He was transferred to [facility] for evaluation for MitraClip after he was found to have severe mitral valve regurgitation due to a flail posterior leaflet.      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 Currently on 7.5.                         - Target SpO2 >90                         - IFEANYI once volume status and respiratory status stabilized (ideally on 10/16) - ordered for 10/16.                         - Type and screen, AM INR.      - Dyslipidemia                        - Continue simvastatin for now                         - Lipid panel 10/15 AM      - Code status                        DNR/DNI based on OP provider's notes but will be reversed for the procedures     Chronic/inactive issues:  - Bladder cancer s/p TURBT: Continue tamsulosin and finasteride     Nakul Parks MD   Cardiology Fellow  [phone]    Patient was seen by and the above plan discussed with Dr. Meza.     Subjective:     Patient denies current chest pain, dyspnea on exertion, orthopnea, PND, lightheadedness, or palpitations. Diuresing well, was net negative 1 L yesterday.           Review of Systems:   A comprehensive review of systems was performed and found to be negative except as described in this note          Medications:     Current Facility-Administered Medications   Medication      "nitroPRUsside (NIPRIDE) 50 mg, sodium thiosulfate 500 mg in D5W 125 mL IV infusion (conc: 0.4mg/mL)     [START ON 10/16/2017] aspirin tablet 325 mg     lidocaine (LMX4) kit     heparin lock flush 10 UNIT/ML injection 2-5 mL     potassium chloride SA (K-DUR/KLOR-CON M) CR tablet 20-40 mEq     potassium chloride (KLOR-CON) Packet 20-40 mEq     potassium chloride 10 mEq in 100 mL intermittent infusion     potassium chloride 10 mEq in 100 mL intermittent infusion with 10 mg lidocaine     magnesium sulfate 2 g in NS intermittent infusion (PharMEDium or FV Cmpd)     magnesium sulfate 4 g in 100 mL sterile water (premade)     furosemide (LASIX) 100 mg in NaCl 0.9 % 100 mL infusion     naloxone (NARCAN) injection 0.1-0.4 mg     lidocaine 1 % 1 mL     lidocaine (LMX4) kit     sodium chloride (PF) 0.9% PF flush 3 mL     sodium chloride (PF) 0.9% PF flush 3 mL     senna-docusate (SENOKOT-S;PERICOLACE) 8.6-50 MG per tablet 1-2 tablet     polyethylene glycol (MIRALAX/GLYCOLAX) Packet 17 g     bisacodyl (DULCOLAX) Suppository 10 mg     ondansetron (ZOFRAN-ODT) ODT tab 4 mg    Or     ondansetron (ZOFRAN) injection 4 mg     prochlorperazine (COMPAZINE) injection 5 mg    Or     prochlorperazine (COMPAZINE) tablet 5 mg    Or     prochlorperazine (COMPAZINE) Suppository 12.5 mg     finasteride (PROSCAR) tablet 5 mg     gabapentin (NEURONTIN) capsule 300 mg     HYDROcodone-acetaminophen (NORCO) 7.5-325 MG per tablet 1 tablet     metoprolol (TOPROL-XL) 24 hr tablet 25 mg     pantoprazole (PROTONIX) EC tablet 40 mg     simvastatin (ZOCOR) tablet 20 mg     tamsulosin (FLOMAX) capsule 0.4 mg     heparin sodium PF injection 5,000 Units               Objective:     Vital signs:  Temp: 97.7  F (36.5  C) Temp src: Oral BP: (!) 85/62   Heart Rate: 84 Resp: 20 SpO2: 96 % O2 Device: Oxi Plus Oxygen Delivery: 12 LPM Height: 170.2 cm (5' 7\") Weight: 66.6 kg (146 lb 13.2 oz) (Weight is DOWN 3.2 kg from yesterday.)  Estimated body mass index is 23 " "kg/(m^2) as calculated from the following:    Height as of this encounter: 1.702 m (5' 7\").    Weight as of this encounter: 66.6 kg (146 lb 13.2 oz).    Gen: Mild respiratory distress  HEENT: MMM, no carotid bruits  Resp: Tachypneic with ACM usage, chest ausc with coarse crackles diffusely  CVS: JVP to the angle of the mandible, no thrillsor heaves, pulse regular, S1+S2+3/6 PSM loudest at apex but heard all over the precordium an in axilla  Abdo: ND, S, NT, +BS  Extremities: Warm, well-perfused, mild edema   Neuro: GCS 15/15, AAOx3, pleasant male           Data:   All laboratory data reviewed       All cardiac studies reviewed  All imaging studies reviewed by me.          I have seen and examined the patient and agree with the finding and plan.       Tevin Meza MD  Cardiology-[facility]  [phone]    "

## 2017-10-16 ENCOUNTER — HOSPITAL ENCOUNTER (INPATIENT)
Dept: CARDIOLOGY | Facility: CLINIC | Age: 82
DRG: 228 | End: 2017-10-16
Attending: INTERNAL MEDICINE | Admitting: INTERNAL MEDICINE
Payer: MEDICARE

## 2017-10-16 ENCOUNTER — APPOINTMENT (OUTPATIENT)
Dept: CARDIOLOGY | Facility: CLINIC | Age: 82
DRG: 228 | End: 2017-10-16
Attending: INTERNAL MEDICINE
Payer: MEDICARE

## 2017-10-16 ENCOUNTER — ANESTHESIA (OUTPATIENT)
Dept: SURGERY | Facility: CLINIC | Age: 82
DRG: 228 | End: 2017-10-16
Payer: MEDICARE

## 2017-10-16 DIAGNOSIS — I34.0 MITRAL VALVE INSUFFICIENCY, UNSPECIFIED ETIOLOGY: ICD-10-CM

## 2017-10-16 LAB
ALBUMIN SERPL-MCNC: 2.8 G/DL (ref 3.4–5)
ALP SERPL-CCNC: 56 U/L (ref 40–150)
ALT SERPL W P-5'-P-CCNC: 15 U/L (ref 0–70)
ANION GAP SERPL CALCULATED.3IONS-SCNC: 10 MMOL/L (ref 3–14)
ANION GAP SERPL CALCULATED.3IONS-SCNC: 9 MMOL/L (ref 3–14)
AST SERPL W P-5'-P-CCNC: 14 U/L (ref 0–45)
BASE EXCESS BLDA CALC-SCNC: 3.2 MMOL/L
BASE EXCESS BLDA CALC-SCNC: 4.6 MMOL/L
BASE EXCESS BLDA CALC-SCNC: 6.7 MMOL/L
BASE EXCESS BLDA CALC-SCNC: 6.8 MMOL/L
BILIRUB SERPL-MCNC: 1.2 MG/DL (ref 0.2–1.3)
BUN SERPL-MCNC: 50 MG/DL (ref 7–30)
BUN SERPL-MCNC: 55 MG/DL (ref 7–30)
CA-I BLD-MCNC: 4.5 MG/DL (ref 4.4–5.2)
CALCIUM SERPL-MCNC: 8.2 MG/DL (ref 8.5–10.1)
CALCIUM SERPL-MCNC: 8.3 MG/DL (ref 8.5–10.1)
CHLORIDE SERPL-SCNC: 102 MMOL/L (ref 94–109)
CHLORIDE SERPL-SCNC: 105 MMOL/L (ref 94–109)
CK SERPL-CCNC: 58 U/L (ref 30–300)
CO2 SERPL-SCNC: 28 MMOL/L (ref 20–32)
CO2 SERPL-SCNC: 29 MMOL/L (ref 20–32)
CREAT SERPL-MCNC: 1.33 MG/DL (ref 0.66–1.25)
CREAT SERPL-MCNC: 1.36 MG/DL (ref 0.66–1.25)
ERYTHROCYTE [DISTWIDTH] IN BLOOD BY AUTOMATED COUNT: 13.2 % (ref 10–15)
ERYTHROCYTE [DISTWIDTH] IN BLOOD BY AUTOMATED COUNT: 13.2 % (ref 10–15)
GFR SERPL CREATININE-BSD FRML MDRD: 49 ML/MIN/1.7M2
GFR SERPL CREATININE-BSD FRML MDRD: 50 ML/MIN/1.7M2
GLUCOSE BLD-MCNC: 123 MG/DL (ref 70–99)
GLUCOSE SERPL-MCNC: 117 MG/DL (ref 70–99)
GLUCOSE SERPL-MCNC: 117 MG/DL (ref 70–99)
HCO3 BLD-SCNC: 28 MMOL/L (ref 21–28)
HCO3 BLD-SCNC: 30 MMOL/L (ref 21–28)
HCO3 BLD-SCNC: 30 MMOL/L (ref 21–28)
HCO3 BLD-SCNC: 31 MMOL/L (ref 21–28)
HCT VFR BLD AUTO: 34.2 % (ref 40–53)
HCT VFR BLD AUTO: 34.4 % (ref 40–53)
HGB BLD-MCNC: 11.1 G/DL (ref 13.3–17.7)
HGB BLD-MCNC: 11.4 G/DL (ref 13.3–17.7)
HGB BLD-MCNC: 11.5 G/DL (ref 13.3–17.7)
INR PPP: 1.15 (ref 0.86–1.14)
KCT BLD-ACNC: 140 SEC (ref 105–167)
KCT BLD-ACNC: 213 SEC (ref 105–167)
KCT BLD-ACNC: 290 SEC (ref 105–167)
KCT BLD-ACNC: 294 SEC (ref 105–167)
KCT BLD-ACNC: 298 SEC (ref 105–167)
KCT BLD-ACNC: 306 SEC (ref 105–167)
KCT BLD-ACNC: 322 SEC (ref 105–167)
KCT BLD-ACNC: 399 SEC (ref 105–167)
MAGNESIUM SERPL-MCNC: 2.2 MG/DL (ref 1.6–2.3)
MAGNESIUM SERPL-MCNC: 2.2 MG/DL (ref 1.6–2.3)
MAGNESIUM SERPL-MCNC: 2.3 MG/DL (ref 1.6–2.3)
MCH RBC QN AUTO: 31 PG (ref 26.5–33)
MCH RBC QN AUTO: 31.2 PG (ref 26.5–33)
MCHC RBC AUTO-ENTMCNC: 32.5 G/DL (ref 31.5–36.5)
MCHC RBC AUTO-ENTMCNC: 33.1 G/DL (ref 31.5–36.5)
MCV RBC AUTO: 94 FL (ref 78–100)
MCV RBC AUTO: 96 FL (ref 78–100)
O2/TOTAL GAS SETTING VFR VENT: 40 %
O2/TOTAL GAS SETTING VFR VENT: 55 %
O2/TOTAL GAS SETTING VFR VENT: 60 %
O2/TOTAL GAS SETTING VFR VENT: ABNORMAL %
OXYHGB MFR BLD: 94 % (ref 92–100)
OXYHGB MFR BLD: 95 % (ref 92–100)
OXYHGB MFR BLD: 95 % (ref 92–100)
PCO2 BLD: 38 MM HG (ref 35–45)
PCO2 BLD: 41 MM HG (ref 35–45)
PCO2 BLD: 42 MM HG (ref 35–45)
PCO2 BLD: 45 MM HG (ref 35–45)
PH BLD: 7.43 PH (ref 7.35–7.45)
PH BLD: 7.44 PH (ref 7.35–7.45)
PH BLD: 7.48 PH (ref 7.35–7.45)
PH BLD: 7.51 PH (ref 7.35–7.45)
PHOSPHATE SERPL-MCNC: 2.7 MG/DL (ref 2.5–4.5)
PHOSPHATE SERPL-MCNC: 3.5 MG/DL (ref 2.5–4.5)
PLATELET # BLD AUTO: 116 10E9/L (ref 150–450)
PLATELET # BLD AUTO: 97 10E9/L (ref 150–450)
PO2 BLD: 142 MM HG (ref 80–105)
PO2 BLD: 81 MM HG (ref 80–105)
PO2 BLD: 84 MM HG (ref 80–105)
PO2 BLD: 85 MM HG (ref 80–105)
POTASSIUM BLD-SCNC: 3.6 MMOL/L (ref 3.4–5.3)
POTASSIUM SERPL-SCNC: 3.5 MMOL/L (ref 3.4–5.3)
POTASSIUM SERPL-SCNC: 3.7 MMOL/L (ref 3.4–5.3)
POTASSIUM SERPL-SCNC: 3.8 MMOL/L (ref 3.4–5.3)
PROT SERPL-MCNC: 6.3 G/DL (ref 6.8–8.8)
RBC # BLD AUTO: 3.56 10E12/L (ref 4.4–5.9)
RBC # BLD AUTO: 3.68 10E12/L (ref 4.4–5.9)
SODIUM BLD-SCNC: 141 MMOL/L (ref 133–144)
SODIUM SERPL-SCNC: 140 MMOL/L (ref 133–144)
SODIUM SERPL-SCNC: 143 MMOL/L (ref 133–144)
WBC # BLD AUTO: 6.3 10E9/L (ref 4–11)
WBC # BLD AUTO: 6.7 10E9/L (ref 4–11)

## 2017-10-16 PROCEDURE — 99232 SBSQ HOSP IP/OBS MODERATE 35: CPT | Mod: GC | Performed by: INTERNAL MEDICINE

## 2017-10-16 PROCEDURE — 84132 ASSAY OF SERUM POTASSIUM: CPT | Performed by: INTERNAL MEDICINE

## 2017-10-16 PROCEDURE — 25000125 ZZHC RX 250: Performed by: INTERNAL MEDICINE

## 2017-10-16 PROCEDURE — 25000125 ZZHC RX 250: Performed by: NURSE ANESTHETIST, CERTIFIED REGISTERED

## 2017-10-16 PROCEDURE — 80048 BASIC METABOLIC PNL TOTAL CA: CPT | Performed by: INTERNAL MEDICINE

## 2017-10-16 PROCEDURE — 25000128 H RX IP 250 OP 636: Performed by: INTERNAL MEDICINE

## 2017-10-16 PROCEDURE — C1769 GUIDE WIRE: HCPCS

## 2017-10-16 PROCEDURE — 93005 ELECTROCARDIOGRAM TRACING: CPT

## 2017-10-16 PROCEDURE — 33418 REPAIR TCAT MITRAL VALVE: CPT | Mod: 62 | Performed by: INTERNAL MEDICINE

## 2017-10-16 PROCEDURE — 83735 ASSAY OF MAGNESIUM: CPT | Performed by: INTERNAL MEDICINE

## 2017-10-16 PROCEDURE — 93355 ECHO TRANSESOPHAGEAL (TEE): CPT | Mod: GC | Performed by: INTERNAL MEDICINE

## 2017-10-16 PROCEDURE — 71000016 ZZH RECOVERY PHASE 1 LEVEL 3 FIRST HR: Performed by: INTERNAL MEDICINE

## 2017-10-16 PROCEDURE — 84100 ASSAY OF PHOSPHORUS: CPT | Performed by: INTERNAL MEDICINE

## 2017-10-16 PROCEDURE — 82805 BLOOD GASES W/O2 SATURATION: CPT | Performed by: INTERNAL MEDICINE

## 2017-10-16 PROCEDURE — 27211089 ZZH KIT ACIST INJECTOR CR3

## 2017-10-16 PROCEDURE — 25000132 ZZH RX MED GY IP 250 OP 250 PS 637: Mod: GY | Performed by: INTERNAL MEDICINE

## 2017-10-16 PROCEDURE — 40000275 ZZH STATISTIC RCP TIME EA 10 MIN

## 2017-10-16 PROCEDURE — 27810378 ZZH KIT, MITRACLIP & DELIVERY SYSTEM

## 2017-10-16 PROCEDURE — 27210787 ZZH MANIFOLD CR2

## 2017-10-16 PROCEDURE — C1894 INTRO/SHEATH, NON-LASER: HCPCS

## 2017-10-16 PROCEDURE — 02UG3JZ SUPPLEMENT MITRAL VALVE WITH SYNTHETIC SUBSTITUTE, PERCUTANEOUS APPROACH: ICD-10-PCS | Performed by: INTERNAL MEDICINE

## 2017-10-16 PROCEDURE — 25000565 ZZH ISOFLURANE, EA 15 MIN: Performed by: INTERNAL MEDICINE

## 2017-10-16 PROCEDURE — 27210742 ZZH CATH CR1

## 2017-10-16 PROCEDURE — A9270 NON-COVERED ITEM OR SERVICE: HCPCS | Mod: GY | Performed by: INTERNAL MEDICINE

## 2017-10-16 PROCEDURE — 85610 PROTHROMBIN TIME: CPT | Performed by: INTERNAL MEDICINE

## 2017-10-16 PROCEDURE — 37000008 ZZH ANESTHESIA TECHNICAL FEE, 1ST 30 MIN: Performed by: INTERNAL MEDICINE

## 2017-10-16 PROCEDURE — 25000128 H RX IP 250 OP 636: Performed by: STUDENT IN AN ORGANIZED HEALTH CARE EDUCATION/TRAINING PROGRAM

## 2017-10-16 PROCEDURE — 82330 ASSAY OF CALCIUM: CPT | Performed by: INTERNAL MEDICINE

## 2017-10-16 PROCEDURE — 93355 ECHO TRANSESOPHAGEAL (TEE): CPT

## 2017-10-16 PROCEDURE — 27210989 ZZH ACCESS EP PROC CR11

## 2017-10-16 PROCEDURE — 94002 VENT MGMT INPAT INIT DAY: CPT

## 2017-10-16 PROCEDURE — 76376 3D RENDER W/INTRP POSTPROCES: CPT | Mod: 26 | Performed by: INTERNAL MEDICINE

## 2017-10-16 PROCEDURE — 25000128 H RX IP 250 OP 636: Performed by: NURSE ANESTHETIST, CERTIFIED REGISTERED

## 2017-10-16 PROCEDURE — 82805 BLOOD GASES W/O2 SATURATION: CPT | Performed by: STUDENT IN AN ORGANIZED HEALTH CARE EDUCATION/TRAINING PROGRAM

## 2017-10-16 PROCEDURE — 27210946 ZZH KIT HC TOTES DISP CR8

## 2017-10-16 PROCEDURE — 33418 REPAIR TCAT MITRAL VALVE: CPT | Mod: Q0

## 2017-10-16 PROCEDURE — 84295 ASSAY OF SERUM SODIUM: CPT | Performed by: INTERNAL MEDICINE

## 2017-10-16 PROCEDURE — 85027 COMPLETE CBC AUTOMATED: CPT | Performed by: INTERNAL MEDICINE

## 2017-10-16 PROCEDURE — 82803 BLOOD GASES ANY COMBINATION: CPT | Performed by: INTERNAL MEDICINE

## 2017-10-16 PROCEDURE — C1760 CLOSURE DEV, VASC: HCPCS

## 2017-10-16 PROCEDURE — S0077 INJECTION, CLINDAMYCIN PHOSP: HCPCS | Performed by: INTERNAL MEDICINE

## 2017-10-16 PROCEDURE — A9270 NON-COVERED ITEM OR SERVICE: HCPCS | Mod: GY | Performed by: STUDENT IN AN ORGANIZED HEALTH CARE EDUCATION/TRAINING PROGRAM

## 2017-10-16 PROCEDURE — 40000281 ZZH STATISTIC TRANSPORT TIME EA 15 MIN

## 2017-10-16 PROCEDURE — 20000004 ZZH R&B ICU UMMC

## 2017-10-16 PROCEDURE — 82947 ASSAY GLUCOSE BLOOD QUANT: CPT | Performed by: INTERNAL MEDICINE

## 2017-10-16 PROCEDURE — 25000132 ZZH RX MED GY IP 250 OP 250 PS 637: Mod: GY | Performed by: STUDENT IN AN ORGANIZED HEALTH CARE EDUCATION/TRAINING PROGRAM

## 2017-10-16 PROCEDURE — 80053 COMPREHEN METABOLIC PANEL: CPT | Performed by: INTERNAL MEDICINE

## 2017-10-16 PROCEDURE — 85347 COAGULATION TIME ACTIVATED: CPT

## 2017-10-16 PROCEDURE — 40000922 ZZH STATISTIC RCP TIME PACU VENT EA 10 MIN

## 2017-10-16 PROCEDURE — 37000009 ZZH ANESTHESIA TECHNICAL FEE, EACH ADDTL 15 MIN: Performed by: INTERNAL MEDICINE

## 2017-10-16 PROCEDURE — 82550 ASSAY OF CK (CPK): CPT | Performed by: INTERNAL MEDICINE

## 2017-10-16 PROCEDURE — 93010 ELECTROCARDIOGRAM REPORT: CPT | Performed by: INTERNAL MEDICINE

## 2017-10-16 PROCEDURE — 40000014 ZZH STATISTIC ARTERIAL MONITORING DAILY

## 2017-10-16 PROCEDURE — 27211157 ZZH NEEDLE BRF TRANSEPTAL CR15

## 2017-10-16 RX ORDER — ASPIRIN 81 MG/1
81 TABLET ORAL DAILY
Status: DISCONTINUED | OUTPATIENT
Start: 2017-10-17 | End: 2017-10-18 | Stop reason: HOSPADM

## 2017-10-16 RX ORDER — FUROSEMIDE 10 MG/ML
INJECTION INTRAMUSCULAR; INTRAVENOUS PRN
Status: DISCONTINUED | OUTPATIENT
Start: 2017-10-16 | End: 2017-10-16

## 2017-10-16 RX ORDER — HYDRALAZINE HYDROCHLORIDE 25 MG/1
25 TABLET, FILM COATED ORAL EVERY 8 HOURS SCHEDULED
Status: DISCONTINUED | OUTPATIENT
Start: 2017-10-16 | End: 2017-10-17

## 2017-10-16 RX ORDER — HEPARIN SODIUM 1000 [USP'U]/ML
INJECTION, SOLUTION INTRAVENOUS; SUBCUTANEOUS PRN
Status: DISCONTINUED | OUTPATIENT
Start: 2017-10-16 | End: 2017-10-16

## 2017-10-16 RX ORDER — NALOXONE HYDROCHLORIDE 0.4 MG/ML
.1-.4 INJECTION, SOLUTION INTRAMUSCULAR; INTRAVENOUS; SUBCUTANEOUS
Status: DISCONTINUED | OUTPATIENT
Start: 2017-10-16 | End: 2017-10-16

## 2017-10-16 RX ORDER — ATROPINE SULFATE 0.1 MG/ML
0.5 INJECTION INTRAVENOUS EVERY 5 MIN PRN
Status: ACTIVE | OUTPATIENT
Start: 2017-10-16 | End: 2017-10-16

## 2017-10-16 RX ORDER — NITROGLYCERIN 0.4 MG/1
0.4 TABLET SUBLINGUAL EVERY 5 MIN PRN
Status: DISCONTINUED | OUTPATIENT
Start: 2017-10-16 | End: 2017-10-18 | Stop reason: HOSPADM

## 2017-10-16 RX ORDER — NALOXONE HYDROCHLORIDE 0.4 MG/ML
.2-.4 INJECTION, SOLUTION INTRAMUSCULAR; INTRAVENOUS; SUBCUTANEOUS
Status: ACTIVE | OUTPATIENT
Start: 2017-10-16 | End: 2017-10-16

## 2017-10-16 RX ORDER — SODIUM CHLORIDE 9 MG/ML
INJECTION, SOLUTION INTRAVENOUS CONTINUOUS PRN
Status: DISCONTINUED | OUTPATIENT
Start: 2017-10-16 | End: 2017-10-16

## 2017-10-16 RX ORDER — LIDOCAINE 40 MG/G
CREAM TOPICAL
Status: DISCONTINUED | OUTPATIENT
Start: 2017-10-16 | End: 2017-10-18 | Stop reason: HOSPADM

## 2017-10-16 RX ORDER — ETOMIDATE 2 MG/ML
INJECTION INTRAVENOUS PRN
Status: DISCONTINUED | OUTPATIENT
Start: 2017-10-16 | End: 2017-10-16

## 2017-10-16 RX ORDER — FENTANYL CITRATE 50 UG/ML
INJECTION, SOLUTION INTRAMUSCULAR; INTRAVENOUS PRN
Status: DISCONTINUED | OUTPATIENT
Start: 2017-10-16 | End: 2017-10-16

## 2017-10-16 RX ORDER — PROPOFOL 10 MG/ML
INJECTION, EMULSION INTRAVENOUS CONTINUOUS PRN
Status: DISCONTINUED | OUTPATIENT
Start: 2017-10-16 | End: 2017-10-16

## 2017-10-16 RX ORDER — LIDOCAINE HYDROCHLORIDE 20 MG/ML
INJECTION, SOLUTION INFILTRATION; PERINEURAL PRN
Status: DISCONTINUED | OUTPATIENT
Start: 2017-10-16 | End: 2017-10-16

## 2017-10-16 RX ORDER — FENTANYL CITRATE 50 UG/ML
25-50 INJECTION, SOLUTION INTRAMUSCULAR; INTRAVENOUS
Status: ACTIVE | OUTPATIENT
Start: 2017-10-16 | End: 2017-10-16

## 2017-10-16 RX ORDER — PROTAMINE SULFATE 10 MG/ML
INJECTION, SOLUTION INTRAVENOUS PRN
Status: DISCONTINUED | OUTPATIENT
Start: 2017-10-16 | End: 2017-10-16

## 2017-10-16 RX ORDER — ISOSORBIDE DINITRATE 20 MG/1
20 TABLET ORAL 3 TIMES DAILY
Status: DISCONTINUED | OUTPATIENT
Start: 2017-10-16 | End: 2017-10-18 | Stop reason: HOSPADM

## 2017-10-16 RX ORDER — SODIUM CHLORIDE, SODIUM LACTATE, POTASSIUM CHLORIDE, CALCIUM CHLORIDE 600; 310; 30; 20 MG/100ML; MG/100ML; MG/100ML; MG/100ML
INJECTION, SOLUTION INTRAVENOUS CONTINUOUS PRN
Status: DISCONTINUED | OUTPATIENT
Start: 2017-10-16 | End: 2017-10-16

## 2017-10-16 RX ORDER — FLUMAZENIL 0.1 MG/ML
0.2 INJECTION, SOLUTION INTRAVENOUS
Status: ACTIVE | OUTPATIENT
Start: 2017-10-16 | End: 2017-10-16

## 2017-10-16 RX ORDER — IOPAMIDOL 755 MG/ML
25 INJECTION, SOLUTION INTRAVASCULAR ONCE
Status: COMPLETED | OUTPATIENT
Start: 2017-10-16 | End: 2017-10-16

## 2017-10-16 RX ADMIN — HEPARIN SODIUM 5000 UNITS: 1000 INJECTION, SOLUTION INTRAVENOUS; SUBCUTANEOUS at 08:44

## 2017-10-16 RX ADMIN — PROTAMINE SULFATE 50 MG: 10 INJECTION, SOLUTION INTRAVENOUS at 11:13

## 2017-10-16 RX ADMIN — ASPIRIN 325 MG ORAL TABLET 325 MG: 325 PILL ORAL at 05:02

## 2017-10-16 RX ADMIN — HEPARIN SODIUM 2500 UNITS: 1000 INJECTION, SOLUTION INTRAVENOUS; SUBCUTANEOUS at 10:25

## 2017-10-16 RX ADMIN — POTASSIUM CHLORIDE 20 MEQ: 200 INJECTION, SOLUTION INTRAVENOUS at 05:59

## 2017-10-16 RX ADMIN — HYDRALAZINE HYDROCHLORIDE 25 MG: 25 TABLET ORAL at 19:01

## 2017-10-16 RX ADMIN — HEPARIN SODIUM 2000 UNITS: 1000 INJECTION, SOLUTION INTRAVENOUS; SUBCUTANEOUS at 09:37

## 2017-10-16 RX ADMIN — FUROSEMIDE 40 MG: 10 INJECTION, SOLUTION INTRAVENOUS at 10:27

## 2017-10-16 RX ADMIN — NOREPINEPHRINE BITARTRATE 6.4 MCG: 1 INJECTION INTRAVENOUS at 08:03

## 2017-10-16 RX ADMIN — GABAPENTIN 300 MG: 300 CAPSULE ORAL at 20:52

## 2017-10-16 RX ADMIN — METOPROLOL SUCCINATE 25 MG: 25 TABLET, EXTENDED RELEASE ORAL at 18:20

## 2017-10-16 RX ADMIN — SENNOSIDES AND DOCUSATE SODIUM 1 TABLET: 8.6; 5 TABLET ORAL at 20:52

## 2017-10-16 RX ADMIN — POTASSIUM CHLORIDE 10 MEQ: 10 INJECTION, SOLUTION INTRAVENOUS at 16:18

## 2017-10-16 RX ADMIN — CLINDAMYCIN PHOSPHATE 900 MG: 18 INJECTION, SOLUTION INTRAVENOUS at 08:15

## 2017-10-16 RX ADMIN — HEPARIN SODIUM 5000 UNITS: 5000 INJECTION, SOLUTION INTRAVENOUS; SUBCUTANEOUS at 20:53

## 2017-10-16 RX ADMIN — LIDOCAINE HYDROCHLORIDE 100 MG: 20 INJECTION, SOLUTION INFILTRATION; PERINEURAL at 07:56

## 2017-10-16 RX ADMIN — HEPARIN SODIUM 5000 UNITS: 1000 INJECTION, SOLUTION INTRAVENOUS; SUBCUTANEOUS at 09:22

## 2017-10-16 RX ADMIN — FUROSEMIDE 10 MG/HR: 10 INJECTION, SOLUTION INTRAVENOUS at 16:05

## 2017-10-16 RX ADMIN — SODIUM CHLORIDE: 9 INJECTION, SOLUTION INTRAVENOUS at 08:45

## 2017-10-16 RX ADMIN — NOREPINEPHRINE BITARTRATE 6.4 MCG: 1 INJECTION INTRAVENOUS at 08:06

## 2017-10-16 RX ADMIN — HYDROCODONE BITARTRATE AND ACETAMINOPHEN 1 TABLET: 7.5; 325 TABLET ORAL at 23:53

## 2017-10-16 RX ADMIN — ETOMIDATE 12 MG: 2 INJECTION INTRAVENOUS at 07:56

## 2017-10-16 RX ADMIN — HYDROCODONE BITARTRATE AND ACETAMINOPHEN 1 TABLET: 7.5; 325 TABLET ORAL at 05:01

## 2017-10-16 RX ADMIN — ISOSORBIDE DINITRATE 20 MG: 10 TABLET ORAL at 20:53

## 2017-10-16 RX ADMIN — SODIUM NITROPRUSSIDE 0.25 MCG/KG/MIN: 25 INJECTION, SOLUTION, CONCENTRATE INTRAVENOUS at 16:25

## 2017-10-16 RX ADMIN — ROCURONIUM BROMIDE 30 MG: 10 INJECTION INTRAVENOUS at 08:41

## 2017-10-16 RX ADMIN — SODIUM CHLORIDE: 9 INJECTION, SOLUTION INTRAVENOUS at 07:30

## 2017-10-16 RX ADMIN — ROCURONIUM BROMIDE 20 MG: 10 INJECTION INTRAVENOUS at 10:50

## 2017-10-16 RX ADMIN — POTASSIUM CHLORIDE 20 MEQ: 750 TABLET, EXTENDED RELEASE ORAL at 23:53

## 2017-10-16 RX ADMIN — POTASSIUM CHLORIDE 10 MEQ: 10 INJECTION, SOLUTION INTRAVENOUS at 17:19

## 2017-10-16 RX ADMIN — NOREPINEPHRINE BITARTRATE 0.06 MCG/KG/MIN: 1 INJECTION INTRAVENOUS at 07:58

## 2017-10-16 RX ADMIN — MIDAZOLAM HYDROCHLORIDE 2 MG: 1 INJECTION, SOLUTION INTRAMUSCULAR; INTRAVENOUS at 07:56

## 2017-10-16 RX ADMIN — EPINEPHRINE 20 MCG: 1 INJECTION INTRAMUSCULAR; INTRAVENOUS; SUBCUTANEOUS at 07:56

## 2017-10-16 RX ADMIN — FENTANYL CITRATE 100 MCG: 50 INJECTION, SOLUTION INTRAMUSCULAR; INTRAVENOUS at 07:56

## 2017-10-16 RX ADMIN — SODIUM CHLORIDE, POTASSIUM CHLORIDE, SODIUM LACTATE AND CALCIUM CHLORIDE: 600; 310; 30; 20 INJECTION, SOLUTION INTRAVENOUS at 08:05

## 2017-10-16 RX ADMIN — ROCURONIUM BROMIDE 20 MG: 10 INJECTION INTRAVENOUS at 09:50

## 2017-10-16 RX ADMIN — IOPAMIDOL 25 ML: 755 INJECTION, SOLUTION INTRAVASCULAR at 12:00

## 2017-10-16 RX ADMIN — ROCURONIUM BROMIDE 50 MG: 10 INJECTION INTRAVENOUS at 07:56

## 2017-10-16 RX ADMIN — NOREPINEPHRINE BITARTRATE 6.4 MCG: 1 INJECTION INTRAVENOUS at 07:57

## 2017-10-16 RX ADMIN — EPINEPHRINE 10 MCG: 1 INJECTION INTRAMUSCULAR; INTRAVENOUS; SUBCUTANEOUS at 09:26

## 2017-10-16 RX ADMIN — NOREPINEPHRINE BITARTRATE 6.4 MCG: 1 INJECTION INTRAVENOUS at 07:56

## 2017-10-16 RX ADMIN — POTASSIUM CHLORIDE 10 MEQ: 10 INJECTION, SOLUTION INTRAVENOUS at 00:17

## 2017-10-16 RX ADMIN — HYDROCODONE BITARTRATE AND ACETAMINOPHEN 1 TABLET: 7.5; 325 TABLET ORAL at 18:20

## 2017-10-16 RX ADMIN — SIMVASTATIN 20 MG: 20 TABLET, FILM COATED ORAL at 20:52

## 2017-10-16 RX ADMIN — PROPOFOL 100 MCG/KG/MIN: 10 INJECTION, EMULSION INTRAVENOUS at 11:20

## 2017-10-16 ASSESSMENT — PAIN DESCRIPTION - DESCRIPTORS
DESCRIPTORS: ACHING;SORE
DESCRIPTORS: ACHING

## 2017-10-16 NOTE — PROGRESS NOTES
Dr temple at bedside. The plan is to keep pt intubated in PACU and then send pt back to PCU 4E. Will continue to monitor and assess.

## 2017-10-16 NOTE — ANESTHESIA PREPROCEDURE EVALUATION
Anesthesia Plan      History & Physical Review  History and physical reviewed and following examination; no interval change.    ASA Status:  4 .    NPO Status:  > 8 hours    Plan for General with Intravenous induction. Maintenance will be Balanced.           Postoperative Care      Consents             Allergies   Allergen Reactions     Celebrex [Celecoxib] Hives     Penicillins Hives     Prescription Medications as of 10/16/2017             furosemide (LASIX) 10 MG/ML injection Inject 2 mLs (20 mg) into the vein 2 times daily    pantoprazole (PROTONIX) 40 MG EC tablet Take 1 tablet (40 mg) by mouth daily Take 30-60 minutes before a meal.    tamsulosin (FLOMAX) 0.4 MG capsule Take 1 capsule (0.4 mg) by mouth daily    HYDROcodone-acetaminophen (NORCO) 7.5-325 MG per tablet Take 1 tablet by mouth every 6 hours as needed for moderate to severe pain    gabapentin (NEURONTIN) 300 MG capsule TAKE 1 CAPSULE BY MOUTH THREE TIMES DAILY    fiber modular (NUTRISOURCE FIBER) packet 1 packet daily    simvastatin (ZOCOR) 20 MG tablet TAKE 1 TABLET BY MOUTH AT BEDTIME    finasteride (PROSCAR) 5 MG tablet TAKE 1 TABLET BY MOUTH EVERY DAY    metoprolol (TOPROL-XL) 25 MG 24 hr tablet TAKE 1 TABLET BY MOUTH DAILY      Facility Administered Medications as of 10/16/2017             EPINEPHrine (ADRENALIN) 5 mg in NaCl 0.9 % 250 mL infusion Inject 2.037-20.37 mcg/min into the vein continuous    norepinephrine (LEVOPHED) 16 mg in D5W 250 mL infusion Inject 2.037-27.16 mcg/min into the vein continuous    iopamidol (ISOVUE-370) solution 25 mL Inject 25 mLs into the vein once    norepinephrine (LEVOPHED) drip 16 mg (Discontinued) 16 mg continuous prn    0.9% sodium chloride infusion (Discontinued) continuous prn    fentaNYL (PF) (SUBLIMAZE) injection (Discontinued) Inject into the vein as needed for moderate to severe pain    midazolam (VERSED) injection (Discontinued) Inject into the vein as needed for anxiety     lidocaine injection 2% (MDV) (Discontinued) as needed    heparin (porcine) injection (Discontinued) as needed for line flush    rocuronium (ZEMURON) injection (Discontinued) Inject into the vein as needed    etomidate (AMIDATE) injection (Discontinued) as needed    EPINEPHrine 100 mcg/10 mL (1:100,000) in NS (Pharmacy Prepared for OR) (Discontinued) 100 mcg continuous prn    lactated ringers infusion (Discontinued) Inject into the vein continuous prn    furosemide (LASIX) injection (Discontinued) as needed    protamine injection (Discontinued) as needed    propofol (DIPRIVAN) infusion (Discontinued) Inject into the vein continuous prn    aspirin tablet 325 mg Take 1 tablet (325 mg) by mouth once    clindamycin (CLEOCIN) infusion 900 mg Inject 50 mLs (900 mg) into the vein Pre-Op/Pre-procedure x 1 dose    lactulose (CHRONULAC) solution 20 g Take 30 mLs (20 g) by mouth once    potassium chloride 20 mEq in 100 mL NON-STANDARD CONC intermittent infusion Inject 100 mLs (20 mEq) into the vein once    lidocaine (LMX4) kit (Discontinued) Apply topically every hour as needed for pain (with VAD insertion or accessing implanted port.)    lidocaine 1 % 1 mL (Discontinued) 1 mL by Other route every hour as needed (mild pain with VAD insertion or accessing implanted port)    0.9% sodium chloride infusion (Discontinued) Inject into the vein continuous    Give   of usual dose of LONG ACTING insulin AM of procedure IF diabetic (Discontinued) continuous prn    HOLD: Insulin - RAPID/SHORT acting AM of procedure IF diabetic (Discontinued) HOLD    HOLD: Insulin - REGULAR AM of procedure IF diabetic (Discontinued) HOLD    HOLD: Oral hypoglycemics AM of procedure IF diabetic (Discontinued) HOLD    May take oral meds with a sip of water, the morning of IFEANYI procedure. (Discontinued) continuous prn (May take oral meds with a sip of water, the morning of IFEANYI procedure.)    sodium chloride (PF) 0.9% PF flush 3 mL (Discontinued) Inject 3 mLs  into the vein every hour as needed for line flush (for peripheral IV flush post IV meds)    sodium chloride (PF) 0.9% PF flush 3 mL (Discontinued) Inject 3 mLs into the vein every 8 hours    nitroPRUsside (NIPRIDE) 50 mg, sodium thiosulfate 500 mg in D5W 125 mL IV infusion (conc: 0.4mg/mL) Inject 17. mcg/min into the vein continuous    aspirin tablet 325 mg (Auto Hold) ((Auto Hold) since 10/16/2017 10:08 AM) Take 1 tablet (325 mg) by mouth every 24 hours    heparin lock flush 10 UNIT/ML injection 2-5 mL (Auto Hold) ((Auto Hold) since 10/16/2017 10:08 AM) 2-5 mLs by Intracatheter route once as needed for line flush (for locking each dormant lumen with line placement)    potassium chloride SA (K-DUR/KLOR-CON M) CR tablet 20-40 mEq (Auto Hold) ((Auto Hold) since 10/16/2017 10:08 AM) Take 2-4 tablets (20-40 mEq) by mouth every 2 hours as needed for potassium supplementation    potassium chloride (KLOR-CON) Packet 20-40 mEq (Auto Hold) ((Auto Hold) since 10/16/2017 10:08 AM) 20-40 mEq by Oral or Feeding Tube route every 2 hours as needed for potassium supplementation    potassium chloride 10 mEq in 100 mL intermittent infusion (Auto Hold) ((Auto Hold) since 10/16/2017 10:08 AM) Inject 100 mLs (10 mEq) into the vein every hour as needed for potassium supplementation    potassium chloride 10 mEq in 100 mL intermittent infusion with 10 mg lidocaine (Auto Hold) ((Auto Hold) since 10/16/2017 10:08 AM) Inject 100 mLs (10 mEq) into the vein every hour as needed for potassium supplementation    magnesium sulfate 2 g in NS intermittent infusion (PharMEDium or FV Cmpd) (Auto Hold) ((Auto Hold) since 10/16/2017 10:08 AM) Inject 50 mLs (2 g) into the vein daily as needed for magnesium supplementation    magnesium sulfate 4 g in 100 mL sterile water (premade) (Auto Hold) ((Auto Hold) since 10/16/2017 10:08 AM) Inject 100 mLs (4 g) into the vein every 4 hours as needed for magnesium supplementation    furosemide (LASIX) 100 mg  "in NaCl 0.9 % 100 mL infusion Inject 10 mg/hr into the vein continuous    naloxone (NARCAN) injection 0.1-0.4 mg (Auto Hold) ((Auto Hold) since 10/16/2017 10:08 AM) Inject 0.25-1 mLs (0.1-0.4 mg) into the vein every 2 minutes as needed for opioid reversal    senna-docusate (SENOKOT-S;PERICOLACE) 8.6-50 MG per tablet 1-2 tablet (Auto Hold) ((Auto Hold) since 10/16/2017 10:08 AM) Take 1-2 tablets by mouth 2 times daily    polyethylene glycol (MIRALAX/GLYCOLAX) Packet 17 g (Auto Hold) ((Auto Hold) since 10/16/2017 10:08 AM) Take 17 g by mouth daily as needed for constipation    bisacodyl (DULCOLAX) Suppository 10 mg (Auto Hold) ((Auto Hold) since 10/16/2017 10:08 AM) Place 1 suppository (10 mg) rectally daily as needed for constipation    ondansetron (ZOFRAN-ODT) ODT tab 4 mg (Auto Hold) ((Auto Hold) since 10/16/2017 10:08 AM) Take 1 tablet (4 mg) by mouth every 6 hours as needed for nausea or vomiting    Linked Group 1:  \"Or\" Linked Group Details     ondansetron (ZOFRAN) injection 4 mg (Auto Hold) ((Auto Hold) since 10/16/2017 10:08 AM) Inject 2 mLs (4 mg) into the vein every 6 hours as needed for nausea or vomiting    Linked Group 1:  \"Or\" Linked Group Details     prochlorperazine (COMPAZINE) injection 5 mg (Auto Hold) ((Auto Hold) since 10/16/2017 10:08 AM) Inject 1 mL (5 mg) into the vein every 6 hours as needed for nausea or vomiting    Linked Group 2:  \"Or\" Linked Group Details     prochlorperazine (COMPAZINE) tablet 5 mg (Auto Hold) ((Auto Hold) since 10/16/2017 10:08 AM) Take 1 tablet (5 mg) by mouth every 6 hours as needed for vomiting    Linked Group 2:  \"Or\" Linked Group Details     prochlorperazine (COMPAZINE) Suppository 12.5 mg (Auto Hold) ((Auto Hold) since 10/16/2017 10:08 AM) Place 0.5 suppositories (12.5 mg) rectally every 12 hours as needed for nausea or vomiting    Linked Group 2:  \"Or\" Linked Group Details     finasteride (PROSCAR) tablet 5 mg (Auto Hold) ((Auto Hold) since 10/16/2017 10:08 AM) " Take 1 tablet (5 mg) by mouth daily    gabapentin (NEURONTIN) capsule 300 mg (Auto Hold) ((Auto Hold) since 10/16/2017 10:08 AM) Take 1 capsule (300 mg) by mouth 3 times daily    HYDROcodone-acetaminophen (NORCO) 7.5-325 MG per tablet 1 tablet (Auto Hold) ((Auto Hold) since 10/16/2017 10:08 AM) Take 1 tablet by mouth every 6 hours as needed for moderate to severe pain    metoprolol (TOPROL-XL) 24 hr tablet 25 mg (Auto Hold) ((Auto Hold) since 10/16/2017 10:08 AM) Take 1 tablet (25 mg) by mouth daily    pantoprazole (PROTONIX) EC tablet 40 mg (Auto Hold) ((Auto Hold) since 10/16/2017 10:08 AM) Take 1 tablet (40 mg) by mouth daily    simvastatin (ZOCOR) tablet 20 mg (Auto Hold) ((Auto Hold) since 10/16/2017 10:08 AM) Take 1 tablet (20 mg) by mouth At Bedtime    tamsulosin (FLOMAX) capsule 0.4 mg (Auto Hold) ((Auto Hold) since 10/16/2017 10:08 AM) Take 1 capsule (0.4 mg) by mouth daily    heparin sodium PF injection 5,000 Units (Auto Hold) ((Auto Hold) since 10/16/2017 10:08 AM) Inject 0.5 mLs (5,000 Units) Subcutaneous every 12 hours        Recent Results (from the past 4320 hour(s))   Echocardiogram Complete    Narrative    [id number]  [id number]  [id number]  [id number]^SUSANNA BRUNO^NICK^[facility]  Echocardiography Laboratory  [address] [place], [place] [id number]        Name: MALLORIE NOVA  MRN: [id number]  : 10/12/1926  Study Date: 10/11/2017 02:23 PM  Age: 89 yrs  Gender: Male  Patient Location: [facility]  Reason For Study: Dyspnea  Ordering Physician: NICK MULLINS III  Referring Physician: Evaristo Magaña  Performed By: Johnnie Garcia RDCS     BSA: 1.9 m2  Height: 67 in  Weight: 165 lb  HR: 73  BP: 118/62 mmHg  _____________________________________________________________________________  __        Procedure  Complete Portable Echo Adult.  _____________________________________________________________________________  __        Interpretation Summary     The left ventricle is  normal in size.  Hyperdynamic left ventricular function  The visual ejection fraction is estimated at >70%.  There is severe (4+) mitral regurgitation.  Torn mitral valve chordae tendinae with flail posterior MV leaflet.  Grade III or advanced diastolic dysfunction.     In 2012, the patient had mild MR on a stress echo. Severe MR is new.  _____________________________________________________________________________  __        Left Ventricle  The left ventricle is normal in size. There is normal left ventricular wall  thickness. Hyperdynamic left ventricular function. The visual ejection  fraction is estimated at >70%. Grade III or advanced diastolic dysfunction. E  by E prime ratio is greater than 15, that likely suggests increased left  ventricular filling pressures. No regional wall motion abnormalities noted.     Right Ventricle  The right ventricle is normal in structure, function and size.     Atria  The left atrium is severely dilated. Right atrial size is normal. Left to  right atrial shunt, trivial. Doppler suggests left to right interatrial shunt.     Mitral Valve  There is mild mitral annular calcification. Torn mitral valve chordae tendinae  with flail posterior MV leaflet. There is severe (4+) mitral regurgitation.        Tricuspid Valve  The tricuspid valve is normal in structure and function. There is mild (1+)  tricuspid regurgitation. 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     Aortic Valve  There is moderate trileaflet aortic sclerosis. No aortic regurgitation is  present. No hemodynamically significant valvular aortic stenosis.     Pulmonic Valve  The pulmonic valve is not well seen, but is grossly normal. There is trace  pulmonic valvular regurgitation.     Vessels  Normal size aorta. The IVC is dilated and fails to change with respiration,  suggesting elevated central venous pressure.     Pericardium  There is no pericardial  effusion.        Rhythm  Sinus rhythm was noted.  _____________________________________________________________________________  __  MMode/2D Measurements & Calculations  IVSd: 1.5 cm     LVIDd: 4.9 cm  LVIDs: 2.2 cm  LVPWd: 1.0 cm  FS: 55.1 %  EDV(Teich): 112.6 ml  ESV(Teich): 16.2 ml  LV mass(C)d: 240.7 grams  LV mass(C)dI: 129.2 grams/m2  Ao root diam: 3.9 cm  LA dimension: 4.7 cm  asc Aorta Diam: 4.0 cm  LA/Ao: 1.2  LVOT diam: 2.4 cm  LVOT area: 4.5 cm2  LA Volume (BP): 93.4 ml  LA Volume Index (BP): 50.2 ml/m2           Doppler Measurements & Calculations  MV E max reina: 197.0 cm/sec  MV A max reina: 78.8 cm/sec  MV E/A: 2.5  MV dec time: 0.16 sec  MR ERO: 1.4 cm2  MR volume: 151.6 ml  Lateral E/e': 27.9  Med E to E': 32.4  Medial E/e': 32.4           _____________________________________________________________________________  __           Report approved by: Duncan Hook 10/11/2017 03:21 PM        PAST MEDICAL HISTORY:   Past Medical History:   Diagnosis Date     Arthritis     Spinal Stenosis     Former smoker      Hemorrhoids      Hypercholesteremia      Hypertension      Malignant neoplasm (H)     skin CA, Basal cell     Other chronic pain      Renal disease        PAST SURGICAL HISTORY:   Past Surgical History:   Procedure Laterality Date     BACK SURGERY       BIOPSY      face, skin cancer     BIOPSY  8/2016    shoulder lesion     CYSTOSCOPY, TRANSURETHRAL RESECTION (TUR) PROSTATE, COMBINED N/A 8/21/2015    Procedure: COMBINED CYSTOSCOPY, TRANSURETHRAL RESECTION (TUR) PROSTATE;  Surgeon: Dennis Hilton MD;  Location: [facility] OR     CYSTOSCOPY, TRANSURETHRAL RESECTION (TUR) TUMOR BLADDER, COMBINED N/A 9/2/2016    Procedure: COMBINED CYSTOSCOPY, TRANSURETHRAL RESECTION (TUR) TUMOR BLADDER;  Surgeon: Dennis Hilton MD;  Location:  OR     ORTHOPEDIC SURGERY      lumbar and cervical surgery, Sep, 2008, knee surgery     PICC INSERTION Right 10/14/2017    5fr DL Bard PICC, 40cm (1cm  external) in the R basilic vein w/ tip in the mid SVC.       FAMILY HISTORY:   Family History   Problem Relation Age of Onset     CANCER Son 64     leukemia ? due to agent orange     Family History Negative Son      DIABETES No family hx of      Coronary Artery Disease No family hx of      Hypertension No family hx of      Hyperlipidemia No family hx of      CEREBROVASCULAR DISEASE No family hx of      Breast Cancer No family hx of      Colon Cancer No family hx of      Prostate Cancer No family hx of      Other Cancer No family hx of      Depression No family hx of      Anxiety Disorder No family hx of      MENTAL ILLNESS No family hx of      Substance Abuse No family hx of      Anesthesia Reaction No family hx of      Asthma No family hx of      OSTEOPOROSIS No family hx of      Genetic Disorder No family hx of      Thyroid Disease No family hx of      Obesity No family hx of      Unknown/Adopted No family hx of        SOCIAL HISTORY:   Social History   Substance Use Topics     Smoking status: Former Smoker     Smokeless tobacco: Never Used     Alcohol use No      Comment: doesnt use anymore     Lab Results   Component Value Date    WBC 6.3 10/16/2017    HGB 11.1 (L) 10/16/2017    HCT 34.2 (L) 10/16/2017    PLT 97 (L) 10/16/2017    CHOL 108 10/12/2017    TRIG 81 10/12/2017    HDL 63 10/12/2017    ALT 15 10/16/2017    AST 14 10/16/2017     10/16/2017    BUN 50 (H) 10/16/2017    CO2 29 10/16/2017    TSH 1.14 08/04/2016    PSA 1.39 08/15/2015    INR 1.15 (H) 10/16/2017     Prescriptions Prior to Admission   Medication Sig Dispense Refill Last Dose     furosemide (LASIX) 10 MG/ML injection Inject 2 mLs (20 mg) into the vein 2 times daily 60 mL       pantoprazole (PROTONIX) 40 MG EC tablet Take 1 tablet (40 mg) by mouth daily Take 30-60 minutes before a meal. 30 tablet 1 10/10/2017 at 1200     tamsulosin (FLOMAX) 0.4 MG capsule Take 1 capsule (0.4 mg) by mouth daily 90 capsule 0 10/10/2017 at am      HYDROcodone-acetaminophen (NORCO) 7.5-325 MG per tablet Take 1 tablet by mouth every 6 hours as needed for moderate to severe pain 120 tablet 0 10/10/2017 at Unknown time     gabapentin (NEURONTIN) 300 MG capsule TAKE 1 CAPSULE BY MOUTH THREE TIMES DAILY 270 capsule 1 10/10/2017 at x2     fiber modular (NUTRISOURCE FIBER) packet 1 packet daily   10/10/2017 at am     simvastatin (ZOCOR) 20 MG tablet TAKE 1 TABLET BY MOUTH AT BEDTIME 90 tablet 1 10/10/2017 at hs     finasteride (PROSCAR) 5 MG tablet TAKE 1 TABLET BY MOUTH EVERY DAY 90 tablet 3 10/10/2017 at am     metoprolol (TOPROL-XL) 25 MG 24 hr tablet TAKE 1 TABLET BY MOUTH DAILY 90 tablet 3 10/10/2017 at hs                   .

## 2017-10-16 NOTE — ANESTHESIA PROCEDURE NOTES
IFEANYI Probe Insertion Note  Probe Inserted by: WELLINGTON PEDRO   Insertion method: IFEANYI probe easily inserted   Bite block used:   Yes      Probe type:  Adult 3D   Insertion complications: No complications.      Billing for IFEANYI report is being done by Cardiology

## 2017-10-16 NOTE — PROCEDURES
MITRACLIP PROCEDURE REPORT:     PROCEDURES PERFORMED:   1. Successful transcatheter mitral valve repair with clip x2 using the MitraClip device.  2. Veinotomy closure.    PHYSICIANS:  1. Attending Interventional Cardiology Staff: Eber Monroe MD and Arely Reed MD  2. 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    DESCRIPTION:  1. Consent obtained with discussion of risks.  All questions were answered.  2. Sterile prep and procedure per OR protocol.  3. Location: right and left common femoral vein.  4. Access: Local anesthetic with lidocaine.  A standard (18 g)needle with ultrasound guidance was used to establish vascular access using a modified Seldinger technique.  5. Sheath:  24Fr MitraClip delivery system sheath in the RCFV  6. Catheters: 4Fr pigtail, SL-1.  7. Estimated blood loss of <5 mL.  8. See below for procedure details.    MEDICATIONS:  1. Contrast 25 mL IV.  2. Sedation per anesthesia.  3. Heparin administered to achieve a goal ACT > 300 sec per anesthesia.     PROCEDURE SUMMARY:  1. Access was obtained in the right and left common femoral vein.  The venous site used for MitraClip delivery was pre-closed with two Perclose Proglide percutaneous suture devices.   2. Access to the left atrium was achieved using a standard transseptal puncture technique with fluoroscopic and transesophageal echocardiographic guidance using a [facility]  RF transseptal guiding sheath and needle and with cautery.  A pigtail wire was advanced into the left atrium and secured in place. Following transseptal puncture intravenous heparin was administered to achieve and  maintain an activated clotting time (ACT) of >300.   3. Pre-dilation of the femoral access site was performed with ascending size dilators to allow for insertion of a 24 Chinese steerable transvenous sheath.  The delivery sheath was advanced across the interatrium septum without difficulty.  The left atrial pressure was measured as 18/47/19 mmHg (a,v,mean). A pigtail catheter was positioned in the left atrium for continuous LA pressure monitoring.  4. The MitraClip Clip Delivery System was advanced into the left atrium.  Under multiplane IFEANYI guidance, the MitraClip was oriented appropriately over the mitral valve. The clip was then opened and the arms were positioned perpendicularly to the leaflets using the en face 3D IFEANYI projection. Once properly oriented, the clip was advanced to the left ventricle, and the clip delivery system was then pulled back and the leaflets were grasped by dropping the grippers. After confirmation of adequate grasping of the leaflets, the arms were closed and reduction of mitral regurgitation was assessed and the possibility of iatrogenic mitral stenosis was evaluated. Once an adequate reduction in mitral regurgitation was confirmed with evaluation of gradients and direct echocardiographic visualization, the clip was successfully deployed.  The clip was placed in the medial portion of A2-P2.  LA pressure was reduced significantly to 16/14/12 and there appeared to be still severe MR with partial systolic flow reversal in the pulmonary veins. The trans-mitral valve gradient was low at 3.7mmHg.  It was decided to place a 2nd clip in the A2-P2 position.  The V wave went away and LA pressure was significantly improved.  The second clip was placed just lateral to the initial clip.  There was adequate tissue grasp and further reduction in MR to moderate. Final left atrial pressure was measured as 12/11/9 mmHg (a,v,mean).    5. The delivery system and sheath were removed and optimal hemostasis was  achieved with deployment of the Perclose sutures.   6. Mitral regurgitation was confirmed to be moderate following the procedure and the mean gradient was 5 mmHg and there was no evidence of significant valve stenosis.     NOTABLE EVENTS:  1. None    COMPLICATIONS:  1. None    SUMMARY:    1. Severe symptomatic mitral regurgitation secondary to mitral valve degenerative disease in a poor surgical candidate.  2. Successful transcatheter repair of the mitral valve with the MitraClip device using a total of 2 clips.  3. Mitral regurgitation severity improved to moderate.  4. Right common femoral veinotomy closed with a suture closure device and manual pressure was applied over left femoral vein..    PLAN:    1. Aspirin 81 mg po daily lifelong.  2. Bedrest per protocol (6 hours).  3. Return to the primary inpatient team for further evaluation and management.  4. Echocardiogram tomorrow.       The attending interventional cardiologists, Keyla Monroe and Derek, were present and participated in the entire procedure.      Toan Cobian  Cardiology fellow  Pager [phone]

## 2017-10-16 NOTE — ANESTHESIA POSTPROCEDURE EVALUATION
Patient: Ivan PRYOR Crooker    Procedure(s):  Percutaneous MitraClip  - Wound Class: I-Clean    Diagnosis:Mitral Regurge   Diagnosis Additional Information: No value filed.    Anesthesia Type:  No value filed.    Note:  Anesthesia Post Evaluation    Patient location during evaluation: ICU  Patient participation: Unable to participate in evaluation secondary to underlying medical condition  Level of consciousness: obtunded/minimal responses  Pain management: unable to assess  Airway patency: patent  Cardiovascular status: acceptable  Respiratory status: acceptable and ETT  Hydration status: acceptable  PONV: unable to assess     Anesthetic complications: None          Last vitals:  Vitals:    10/16/17 1430 10/16/17 1445 10/16/17 1500   BP: 125/75 (!) 114/95 99/71   Resp: 16 16 16   Temp:      SpO2: 100% 100% 99%         Electronically Signed By: Neville Molina MD  October 16, 2017  3:27 PM

## 2017-10-16 NOTE — PROGRESS NOTES
CARDIOLOGY PROGRESS NOTE    SUBJECTIVE:  Feels that dyspnea is improving. No chest pain or pre-syncope.    ROS otherwise negative.    OBJECTIVE:  Vital signs:  MAP from 61-75  HR   RR 17 (9-40)  SpO2 98 () on 5L/min  T 97.8 (Tm 98.4)  Vitals:    10/14/17 0630 10/15/17 0200 10/16/17 0200   Weight: 69.8 kg (153 lb 14.4 oz) 66.6 kg (146 lb 13.2 oz) 67.9 kg (149 lb 11.1 oz)     I/O: -1619 mL   Intake: 1570 mL (1200 PO, 370 IV)  Output: 3190 urine     PHYSICAL EXAM:  Gen: Looks well, in good spirits  HEENT: MMM, NC at 5L/min  Resp: No signs of resp distress, chest ausc with fine insp creps diffusely  CVS: JVP to 12 cm, S1+S2 without added sounds or murmurs  Abdo: ND, S, NT, +BS  Extremities: Warm, well-perfused, no edema  Neuro: GCS 15/15, AAOx3    Lab Test  10/16/17   0402   HGB  11.4* (12.1)   HCT  34.4* (37.2)   WBC  6.7 (7.2)   MCV  94   MCH  31.0   MCHC  33.1   RDW  13.2   PLT  116* (108)   NA  140 (141)   POTASSIUM  3.7 (3.7)   CHLORIDE  102 (104)   CO2  28 (29)   BUN  55* (62)   CR  1.33* (1.38)    GLC  117*   GIUSEPPE  8.3*   ALBUMIN   --    BILITOTAL   --    ALKPHOS   --    AST   --    ALT   --    ABG 0400: 7.51/38/81/30 on 5L/min with SpO2 95%   INR 1.15     Micro:  10/10 BCx left arm: NG x 5d  10/11 BCx left arm: NG x 5d     Imaging: No new imaging today     Cardiac meds:  mg q24h, metoprolol succinate 25 mg q24h, simvastatin 20 mg q24h   Non-cardiac meds: Finasteride, gabapentin 300 mg TID, pantoprazole 40 mg q24h, senna-docusate, tamsulosin 0.4 mg q24h   Drips: Furosemide at 10, nitroprusside at 0.25     ASSESSMENT/PLAN:  Mr. Ivan Gomez is a 89-year old male with a PMHx of CKD 3, HFpEF, and hypertension who originally presented to [facility] for evaluation of acute hypoxic respiratory failure and volume overload.  He was transferred to [facility] for evaluation for MitraClip after he was found to have severe mitral valve regurgitation due to a flail posterior leaflet.      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 Reed.    Gama Peres  Cardiology Fellow  Pager [phone]        DOS 10/16/17    Patient examined, chart reviewed and the above reflects our joint assessment and plans.      Arely Reed MD

## 2017-10-16 NOTE — ANESTHESIA CARE TRANSFER NOTE
Patient: Ivan PRYOR Crooker    Procedure(s):  Percutaneous MitraClip  - Wound Class: I-Clean    Diagnosis: Mitral Regurge   Diagnosis Additional Information: No value filed.    Anesthesia Type:   No value filed.     Note:  Airway :ETT  Patient transferred to:PACU  Comments: Pt placed on vent in PACU, uneventful transport to PACU and transfer of care. Pt appears to be comfortable, VSS, and on vent with propofol vent.Handoff Report: Identifed the Patient, Identified the Reponsible Provider, Reviewed the pertinent medical history, Discussed the surgical course, Reviewed Intra-OP anesthesia mangement and issues during anesthesia, Set expectations for post-procedure period and Allowed opportunity for questions and acknowledgement of understanding      Vitals: (Last set prior to Anesthesia Care Transfer)    CRNA VITALS  10/16/2017 1102 - 10/16/2017 1147      10/16/2017             EKG: Sinus rhythm;PVC's                Electronically Signed By: FRANK Paul CRNA  October 16, 2017  11:47 AM

## 2017-10-16 NOTE — PLAN OF CARE
Problem: Patient Care Overview  Goal: Plan of Care/Patient Progress Review  Outcome: No Change  D/I: Lasix Gtt continues @ 10 mg/hr. Voiding about 250 ml every 2 hours in urinal. Nipride Gtt currently @0.5 mcg/kg/min to keep MAP between 65 -75. Pt NPO since midnight. Shampoo and Hibiclens isaura cloth pre-op bat done last evening @ HS . Repeat isaura cloth bath done @ 0600 this AM.  Potassium level replaced per protocol twice with an extra 20 mEq given @ midnight since protocol amount not raising K+ level up to goal levels. Norco given x 2 this shift.      A/P: Pt 1st case OR for percutaneous mitral clip. MD and anesthesia here presently ( @ 4330 ) getting ready to take pt directly to OR.

## 2017-10-17 ENCOUNTER — APPOINTMENT (OUTPATIENT)
Dept: PHYSICAL THERAPY | Facility: CLINIC | Age: 82
DRG: 228 | End: 2017-10-17
Payer: MEDICARE

## 2017-10-17 ENCOUNTER — APPOINTMENT (OUTPATIENT)
Dept: CARDIOLOGY | Facility: CLINIC | Age: 82
DRG: 228 | End: 2017-10-17
Attending: INTERNAL MEDICINE
Payer: MEDICARE

## 2017-10-17 ENCOUNTER — APPOINTMENT (OUTPATIENT)
Dept: GENERAL RADIOLOGY | Facility: CLINIC | Age: 82
DRG: 228 | End: 2017-10-17
Attending: INTERNAL MEDICINE
Payer: MEDICARE

## 2017-10-17 LAB
ALBUMIN SERPL-MCNC: 3 G/DL (ref 3.4–5)
ALP SERPL-CCNC: 62 U/L (ref 40–150)
ALT SERPL W P-5'-P-CCNC: 18 U/L (ref 0–70)
ANION GAP SERPL CALCULATED.3IONS-SCNC: 11 MMOL/L (ref 3–14)
AST SERPL W P-5'-P-CCNC: 19 U/L (ref 0–45)
BACTERIA SPEC CULT: ABNORMAL
BACTERIA SPEC CULT: NO GROWTH
BACTERIA SPEC CULT: NO GROWTH
BASOPHILS # BLD AUTO: 0 10E9/L (ref 0–0.2)
BASOPHILS NFR BLD AUTO: 0.1 %
BILIRUB SERPL-MCNC: 1.4 MG/DL (ref 0.2–1.3)
BUN SERPL-MCNC: 50 MG/DL (ref 7–30)
CALCIUM SERPL-MCNC: 8.7 MG/DL (ref 8.5–10.1)
CHLORIDE SERPL-SCNC: 101 MMOL/L (ref 94–109)
CO2 SERPL-SCNC: 30 MMOL/L (ref 20–32)
CREAT SERPL-MCNC: 1.5 MG/DL (ref 0.66–1.25)
DIFFERENTIAL METHOD BLD: ABNORMAL
EOSINOPHIL # BLD AUTO: 0.1 10E9/L (ref 0–0.7)
EOSINOPHIL NFR BLD AUTO: 0.8 %
ERYTHROCYTE [DISTWIDTH] IN BLOOD BY AUTOMATED COUNT: 13.3 % (ref 10–15)
GFR SERPL CREATININE-BSD FRML MDRD: 44 ML/MIN/1.7M2
GLUCOSE SERPL-MCNC: 112 MG/DL (ref 70–99)
HCT VFR BLD AUTO: 38.3 % (ref 40–53)
HGB BLD-MCNC: 12.2 G/DL (ref 13.3–17.7)
IMM GRANULOCYTES # BLD: 0 10E9/L (ref 0–0.4)
IMM GRANULOCYTES NFR BLD: 0.2 %
INTERPRETATION ECG - MUSE: NORMAL
INTERPRETATION ECG - MUSE: NORMAL
LYMPHOCYTES # BLD AUTO: 0.8 10E9/L (ref 0.8–5.3)
LYMPHOCYTES NFR BLD AUTO: 7.3 %
Lab: NORMAL
Lab: NORMAL
MAGNESIUM SERPL-MCNC: 2.4 MG/DL (ref 1.6–2.3)
MCH RBC QN AUTO: 30.8 PG (ref 26.5–33)
MCHC RBC AUTO-ENTMCNC: 31.9 G/DL (ref 31.5–36.5)
MCV RBC AUTO: 97 FL (ref 78–100)
MONOCYTES # BLD AUTO: 1.1 10E9/L (ref 0–1.3)
MONOCYTES NFR BLD AUTO: 10.5 %
NEUTROPHILS # BLD AUTO: 8.4 10E9/L (ref 1.6–8.3)
NEUTROPHILS NFR BLD AUTO: 81.1 %
NRBC # BLD AUTO: 0 10*3/UL
NRBC BLD AUTO-RTO: 0 /100
PHOSPHATE SERPL-MCNC: 4.3 MG/DL (ref 2.5–4.5)
PLATELET # BLD AUTO: 114 10E9/L (ref 150–450)
POTASSIUM SERPL-SCNC: 4.3 MMOL/L (ref 3.4–5.3)
PROT SERPL-MCNC: 6.9 G/DL (ref 6.8–8.8)
RBC # BLD AUTO: 3.96 10E12/L (ref 4.4–5.9)
SODIUM SERPL-SCNC: 142 MMOL/L (ref 133–144)
SPECIMEN SOURCE: ABNORMAL
SPECIMEN SOURCE: NORMAL
SPECIMEN SOURCE: NORMAL
WBC # BLD AUTO: 10.4 10E9/L (ref 4–11)

## 2017-10-17 PROCEDURE — 25000132 ZZH RX MED GY IP 250 OP 250 PS 637: Mod: GY | Performed by: INTERNAL MEDICINE

## 2017-10-17 PROCEDURE — 93010 ELECTROCARDIOGRAM REPORT: CPT | Performed by: INTERNAL MEDICINE

## 2017-10-17 PROCEDURE — 25000128 H RX IP 250 OP 636: Performed by: INTERNAL MEDICINE

## 2017-10-17 PROCEDURE — 85025 COMPLETE CBC W/AUTO DIFF WBC: CPT | Performed by: INTERNAL MEDICINE

## 2017-10-17 PROCEDURE — 97530 THERAPEUTIC ACTIVITIES: CPT | Mod: GP

## 2017-10-17 PROCEDURE — 93005 ELECTROCARDIOGRAM TRACING: CPT

## 2017-10-17 PROCEDURE — 25000132 ZZH RX MED GY IP 250 OP 250 PS 637: Mod: GY

## 2017-10-17 PROCEDURE — 97116 GAIT TRAINING THERAPY: CPT | Mod: GP

## 2017-10-17 PROCEDURE — 80053 COMPREHEN METABOLIC PANEL: CPT | Performed by: INTERNAL MEDICINE

## 2017-10-17 PROCEDURE — A9270 NON-COVERED ITEM OR SERVICE: HCPCS | Mod: GY | Performed by: INTERNAL MEDICINE

## 2017-10-17 PROCEDURE — 25000132 ZZH RX MED GY IP 250 OP 250 PS 637: Mod: GY | Performed by: STUDENT IN AN ORGANIZED HEALTH CARE EDUCATION/TRAINING PROGRAM

## 2017-10-17 PROCEDURE — 97161 PT EVAL LOW COMPLEX 20 MIN: CPT | Mod: GP

## 2017-10-17 PROCEDURE — 84100 ASSAY OF PHOSPHORUS: CPT | Performed by: INTERNAL MEDICINE

## 2017-10-17 PROCEDURE — A9270 NON-COVERED ITEM OR SERVICE: HCPCS | Mod: GY | Performed by: STUDENT IN AN ORGANIZED HEALTH CARE EDUCATION/TRAINING PROGRAM

## 2017-10-17 PROCEDURE — 93306 TTE W/DOPPLER COMPLETE: CPT | Mod: 26 | Performed by: INTERNAL MEDICINE

## 2017-10-17 PROCEDURE — A9270 NON-COVERED ITEM OR SERVICE: HCPCS | Mod: GY

## 2017-10-17 PROCEDURE — 21400006 ZZH R&B CCU INTERMEDIATE UMMC

## 2017-10-17 PROCEDURE — 71010 XR CHEST PORT 1 VW: CPT

## 2017-10-17 PROCEDURE — 40000193 ZZH STATISTIC PT WARD VISIT

## 2017-10-17 PROCEDURE — 93306 TTE W/DOPPLER COMPLETE: CPT

## 2017-10-17 PROCEDURE — 83735 ASSAY OF MAGNESIUM: CPT | Performed by: INTERNAL MEDICINE

## 2017-10-17 RX ORDER — FUROSEMIDE 20 MG
20 TABLET ORAL
Status: DISCONTINUED | OUTPATIENT
Start: 2017-10-17 | End: 2017-10-18 | Stop reason: HOSPADM

## 2017-10-17 RX ADMIN — ISOSORBIDE DINITRATE 20 MG: 10 TABLET ORAL at 15:02

## 2017-10-17 RX ADMIN — ISOSORBIDE DINITRATE 20 MG: 10 TABLET ORAL at 09:05

## 2017-10-17 RX ADMIN — GABAPENTIN 300 MG: 300 CAPSULE ORAL at 15:02

## 2017-10-17 RX ADMIN — FUROSEMIDE 10 MG/HR: 10 INJECTION, SOLUTION INTRAVENOUS at 00:04

## 2017-10-17 RX ADMIN — HYDRALAZINE HYDROCHLORIDE 25 MG: 25 TABLET ORAL at 06:53

## 2017-10-17 RX ADMIN — ASPIRIN 81 MG: 81 TABLET, COATED ORAL at 09:06

## 2017-10-17 RX ADMIN — SENNOSIDES AND DOCUSATE SODIUM 1 TABLET: 8.6; 5 TABLET ORAL at 20:12

## 2017-10-17 RX ADMIN — FINASTERIDE 5 MG: 5 TABLET, FILM COATED ORAL at 09:07

## 2017-10-17 RX ADMIN — HYDROCODONE BITARTRATE AND ACETAMINOPHEN 1 TABLET: 7.5; 325 TABLET ORAL at 14:58

## 2017-10-17 RX ADMIN — SENNOSIDES AND DOCUSATE SODIUM 2 TABLET: 8.6; 5 TABLET ORAL at 09:04

## 2017-10-17 RX ADMIN — HYDROCODONE BITARTRATE AND ACETAMINOPHEN 1 TABLET: 7.5; 325 TABLET ORAL at 20:21

## 2017-10-17 RX ADMIN — ISOSORBIDE DINITRATE 20 MG: 10 TABLET ORAL at 20:11

## 2017-10-17 RX ADMIN — TAMSULOSIN HYDROCHLORIDE 0.4 MG: 0.4 CAPSULE ORAL at 09:06

## 2017-10-17 RX ADMIN — Medication 37.5 MG: at 14:59

## 2017-10-17 RX ADMIN — METOPROLOL SUCCINATE 25 MG: 25 TABLET, EXTENDED RELEASE ORAL at 09:04

## 2017-10-17 RX ADMIN — SIMVASTATIN 20 MG: 20 TABLET, FILM COATED ORAL at 20:11

## 2017-10-17 RX ADMIN — HYDROCODONE BITARTRATE AND ACETAMINOPHEN 1 TABLET: 7.5; 325 TABLET ORAL at 10:19

## 2017-10-17 RX ADMIN — PANTOPRAZOLE SODIUM 40 MG: 40 TABLET, DELAYED RELEASE ORAL at 09:06

## 2017-10-17 RX ADMIN — HEPARIN SODIUM 5000 UNITS: 5000 INJECTION, SOLUTION INTRAVENOUS; SUBCUTANEOUS at 20:09

## 2017-10-17 RX ADMIN — FUROSEMIDE 20 MG: 20 TABLET ORAL at 16:22

## 2017-10-17 RX ADMIN — GABAPENTIN 300 MG: 300 CAPSULE ORAL at 09:05

## 2017-10-17 RX ADMIN — HEPARIN SODIUM 5000 UNITS: 5000 INJECTION, SOLUTION INTRAVENOUS; SUBCUTANEOUS at 09:07

## 2017-10-17 RX ADMIN — GABAPENTIN 300 MG: 300 CAPSULE ORAL at 20:10

## 2017-10-17 ASSESSMENT — PAIN DESCRIPTION - DESCRIPTORS
DESCRIPTORS: ACHING

## 2017-10-17 NOTE — PROGRESS NOTES
" 10/17/17 1600   Quick Adds   Type of Visit Initial PT Evaluation   Living Environment   Lives With significant other   Living Arrangements condominium   Number of Stairs to Enter Home (elevator, lives on 3rd floor)   Number of Stairs Within Home 1  (sunken living room)   Transportation Available car;family or friend will provide   Living Environment Comment Patient lives with his partner of 14 years, Jerri in [place]. Patient has one son who lives in [place] whom he does not regularly keep in touch with. Patient's oldest son passed away \"in the war\". Patient's partner Jerri fell this past Sunday, broke her hip requiring surgery, is currently at [facility]. Jerri' adoptive daughter Nicolasa lives in [place] and has been involved in both the patient and Jerri's cares. Patient is hoping he and Jerri will be able to discharge to the same rehab as he understands they both won't be able to return home right away. Patient reports that Jerri has \"memory problems\" and \"they think she has Parkinson's\". Patient states he currently drives and has no issues. (Of note, patient takes hydrocodone every 4-6 hours for his back pain 2/2 spinal stenosis, but states \"it doesn't affect me\" while driving).   Self-Care   Usual Activity Tolerance moderate   Current Activity Tolerance fair   Equipment Currently Used at Home (4WW)   Functional Level Prior   Ambulation 1-->assistive equipment   Transferring 1-->assistive equipment   Toileting 1-->assistive equipment  (grab bars)   Bathing 0-->independent   Dressing 0-->independent   Eating 0-->independent   Communication 0-->understands/communicates without difficulty   Swallowing 0-->swallows foods/liquids without difficulty   Cognition 0 - no cognition issues reported   Fall history within last six months no   Which of the above functional risks had a recent onset or change? ambulation;transferring   Prior Functional Level Comment Patient reports he was previously " independent with mobility, more recently has been using 4WW for ambulation. Reports he was completing ADLs independently, does not own shower bench, denies any recent falls. Patient does med set up for both him and his partner, no cognition issues reported. Patient has groceries delivered from [employer].   General Information   Onset of Illness/Injury or Date of Surgery - Date 10/13/17   Referring Physician Toan Cobian MD   Patient/Family Goals Statement Ultimately patient would like to return home, but first he would like to be able to go to rehab at the same place as his partner Jerri.   Pertinent History of Current Problem (include personal factors and/or comorbidities that impact the POC) Mr. Ivan Gomez is a 89-year old male with a PMHx of CKD 3, HFpEF, and hypertension who originally presented to [facility] for evaluation of acute hypoxic respiratory failure and volume overload.  He was transferred to [facility] for evaluation for MitraClip after he was found to have severe mitral valve regurgitation due to a flail posterior leaflet. Patient is now POD1 transcatheter MVR with MitralClip. PMH also significant for spinal stenosis for which he has had surgery in the past and pain currently being treated with Norco.   Precautions/Limitations fall precautions;oxygen therapy device and L/min  (3L NC)   Heart Disease Risk Factors Lack of physical activity;Medical history;Gender;Age   General Observations Patient greeted up in chair, agreeable to PT, RN ok'd session.   General Info Comments Activity: Up with Assist   Cognitive Status Examination   Orientation orientation to person, place and time   Level of Consciousness alert   Follows Commands and Answers Questions 100% of the time   Personal Safety and Judgment at risk behaviors demonstrated   Pain Assessment   Patient Currently in Pain Yes, see Vital Sign flowsheet  (chronic back pain 2/2 spinal stenosis)   Integumentary/Edema   Integumentary/Edema  "Comments Bilateral groin sites, not assessed during eval, nursing following for cares   Posture    Posture Forward head position;Kyphosis   Range of Motion (ROM)   ROM Comment BLE WFL   Strength   Strength Comments BLE WFL, generalized weakness and deconditioning   Bed Mobility   Bed Mobility Comments sit>supine with min A at LEs   Transfer Skills   Transfer Comments sit<>stand with min A   Gait   Gait Comments Ambulates with AD, kyphotic posture, forward head posture, decreased nataliya and step length   Balance   Balance Comments Decreased standing static and dynamic balance   Sensory Examination   Sensory Perception Comments Patient states \"sometimes\" he has numbness in fingers, denied any sensory changes in LEs.   General Therapy Interventions   Planned Therapy Interventions bed mobility training;balance training;gait training;strengthening;transfer training;risk factor education;home program guidelines;progressive activity/exercise   Clinical Impression   Criteria for Skilled Therapeutic Intervention yes, treatment indicated   PT Diagnosis impaired functional mobility   Influenced by the following impairments decreased strength, balance, and activity tolerance   Functional limitations due to impairments decreased safety and independence with bed mobility, transfers, and ambulation in order to return to OF   Clinical Presentation Stable/Uncomplicated   Clinical Presentation Rationale PMH and comorbidities, change in functional status from baseline, new cardiac procedure, clinical judgement   Clinical Decision Making (Complexity) Low complexity   Therapy Frequency` 5 times/week   Predicted Duration of Therapy Intervention (days/wks) 1 week   Anticipated Discharge Disposition Transitional Care Facility   Risk & Benefits of therapy have been explained Yes   Patient, Family & other staff in agreement with plan of care Yes   [facility] AM-PAC TM \"6 Clicks\"   2016, Trustees of [facility], under license " "to [employer].  All rights reserved.   6 Clicks Short Forms Basic Mobility Inpatient Short Form   [facility] AM-PAC  \"6 Clicks\" V.2 Basic Mobility Inpatient Short Form   1. Turning from your back to your side while in a flat bed without using bedrails? 4 - None   2. Moving from lying on your back to sitting on the side of a flat bed without using bedrails? 3 - A Little   3. Moving to and from a bed to a chair (including a wheelchair)? 3 - A Little   4. Standing up from a chair using your arms (e.g., wheelchair, or bedside chair)? 3 - A Little   5. To walk in hospital room? 3 - A Little   6. Climbing 3-5 steps with a railing? 3 - A Little   Basic Mobility Raw Score (Score out of 24.Lower scores equate to lower levels of function) 19   Total Evaluation Time   Total Evaluation Time (Minutes) 8     "

## 2017-10-17 NOTE — PROGRESS NOTES
"Social Work Services Progress Note    Hospital Day: 5  Collaborated with:  Chart review    Data:  Pt is a 89-year-old male transferred to [facility] for evaluation for MitraClip after found to have severe mitral valve regurgitation d/t a flail posterior leaflet.     Intervention:  SW consult placed for \"discharge planning.\" PT/OT consults ordered but have not yet evaluated pt. SW to follow and discuss dc planning with pt after therapy recommendations are in.     Assessment:  Awaiting PT/OT evals    Plan:    Anticipated Disposition:  TBD    Barriers to d/c plan:  Medical stability, PT/OT evaluations    Follow Up:  SW to follow for discharge planning    NATASHA Darby, LGSW  5A Unit   Pager [phone]  Phone [phone]        "

## 2017-10-17 NOTE — PROGRESS NOTES
CARDIOLOGY PROGRESS NOTE    SUBJECTIVE:  - No acute events overnight  - Feels that dyspnea is resolving. No chest pain or pre-syncope.  - Net neg 2.6L last 24 hrs  - SCr slightly higher than yesterday (1.50 from 1.33)    ROS otherwise negative.    OBJECTIVE:  Vital signs:  MAP from 78-94  HR 80s-100s  RR 17   SpO2 98 on 4L/min  T 97.8 (Tm 98.4)  Vitals:    10/15/17 0200 10/16/17 0200 10/17/17 0600   Weight: 66.6 kg (146 lb 13.2 oz) 67.9 kg (149 lb 11.1 oz) 62 kg (136 lb 11 oz)       Intake/Output Summary (Last 24 hours) at 10/17/17 1026  Last data filed at 10/17/17 0855   Gross per 24 hour   Intake           668.02 ml   Output             3625 ml   Net         -2956.98 ml       PHYSICAL EXAM:  Gen: Looks well, in good spirits  HEENT: MMM, NC at 4L/min  Resp: No signs of resp distress, chest ausc with fine insp creps diffusely  CVS: JVP to 8 cm, S1+S2 without added sounds or murmurs  Abdo: ND, S, NT, +BS  Extremities: Warm, well-perfused, no edema  Neuro: GCS 15/15, AAOx3    Lab Test  10/16/17   0402   HGB  11.4* (12.1)   HCT  34.4* (37.2)   WBC  6.7 (7.2)   MCV  94   MCH  31.0   MCHC  33.1   RDW  13.2   PLT  116* (108)   NA  140 (141)   POTASSIUM  3.7 (3.7)   CHLORIDE  102 (104)   CO2  28 (29)   BUN  55* (62)   CR  1.33* (1.38)    GLC  117*   GIUSEPPE  8.3*   ALBUMIN   --    BILITOTAL   --    ALKPHOS   --    AST   --    ALT   --    ABG 0400: 7.51/38/81/30 on 5L/min with SpO2 95%   INR 1.15     Micro:  10/10 BCx left arm: NG x 5d  10/11 BCx left arm: NG x 5d     Imaging: No new imaging today     Cardiac meds: ASA 81 mg q24h, metoprolol succinate 25 mg q24h, simvastatin 20 mg q24h, Hydral 25 q8H, Isordil 20 q8H   Non-cardiac meds: Finasteride, gabapentin 300 mg TID, pantoprazole 40 mg q24h, senna-docusate, tamsulosin 0.4 mg q24h   Drips: Furosemide at 10    ASSESSMENT/PLAN:  Mr. Ivan Gomez is a 89-year old male with a PMHx of CKD 3, HFpEF, and hypertension who originally presented to [facility] for evaluation  of acute hypoxic respiratory failure and volume overload.  He was transferred to [facility] for evaluation for MitraClip after he was found to have severe mitral valve regurgitation due to a flail posterior leaflet.      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; hypoxic respiratory failure; Hx of CKD 3; Hx of HTN  acute-on-chronic diastolic heart failure/HFpEF   - MitraClip 10/16   - Holding Lasix inf for now, given I/Os for the last 24 hrs, slight bump in SCr and no significant hypervolemia on exam   - Uptitrating PO afterload reduction (Isordil and Hydral)                  - Dispo   - Transferring to  today   - PT/OT consult   - Will likely need TCU given significant other recently had a hip fracture and there is no one to take care of him at home     Chronic/inactive issues:  - Bladder cancer s/p TURBT: Continue tamsulosin and finasteride   - Chronic thrombocytopenia, normocytic anemia: monitor for signs of bleeding  - Dyslipidemia: Continue simvastatin (lipid panel 10/12: T cholesterol 108, HDL 63, LDL 29, Tg 81)    Seen and staffed with Dr. Reed.    Gus Anaya MD  Cardiovascular Disease Fellow  Pager: [phone]

## 2017-10-17 NOTE — PLAN OF CARE
Problem: Patient Care Overview  Goal: Plan of Care/Patient Progress Review  Patient returned to unit from PACU at 1530.  Extubated at 1645.  Alert and oriented x 4.  Palpable pulses. Bi-lateral groin sites soft.  No new drainage from R groin site.  Lasix gtt restarted.  Nipride off, oral meds started.  Continue to monitor.

## 2017-10-17 NOTE — PLAN OF CARE
Problem: Goal/Outcome  Goal: Goal Outcome Summary  Outcome: Improving  S/P pod #1 from mitul-clip procedure. Maps in the 80s, ST with occasional PACs. No Pain other than chronic back pain for which the patient gets [place]. Bilateral groin sites flat/covered/dry/intact. A febrile. Patient ready for next level of care. Two bags of belongings sent with patient. Report called to FRANCISCO Min on 4E

## 2017-10-17 NOTE — PLAN OF CARE
Problem: Patient Care Overview  Goal: Individualization & Mutuality  Outcome: Improving  Norco x1 for pain. K replaced x1. Up to chair this AM. Groin sites unchanged. Sinus rhythm. 4lpm O2. Briefly nauseous. Good UOP. Lasix 10mg/hr gtt.      Ovi Amanda RN 10/17/17 5:43 AM     Hours of cares [phone]

## 2017-10-18 ENCOUNTER — APPOINTMENT (OUTPATIENT)
Dept: OCCUPATIONAL THERAPY | Facility: CLINIC | Age: 82
DRG: 228 | End: 2017-10-18
Attending: INTERNAL MEDICINE
Payer: MEDICARE

## 2017-10-18 VITALS
HEIGHT: 67 IN | DIASTOLIC BLOOD PRESSURE: 62 MMHG | WEIGHT: 148.37 LBS | OXYGEN SATURATION: 92 % | BODY MASS INDEX: 23.29 KG/M2 | TEMPERATURE: 98.1 F | RESPIRATION RATE: 16 BRPM | SYSTOLIC BLOOD PRESSURE: 111 MMHG

## 2017-10-18 LAB
ALBUMIN SERPL-MCNC: 2.9 G/DL (ref 3.4–5)
ALP SERPL-CCNC: 60 U/L (ref 40–150)
ALT SERPL W P-5'-P-CCNC: 16 U/L (ref 0–70)
ANION GAP SERPL CALCULATED.3IONS-SCNC: 8 MMOL/L (ref 3–14)
AST SERPL W P-5'-P-CCNC: 14 U/L (ref 0–45)
BILIRUB SERPL-MCNC: 1.1 MG/DL (ref 0.2–1.3)
BUN SERPL-MCNC: 50 MG/DL (ref 7–30)
CALCIUM SERPL-MCNC: 8.9 MG/DL (ref 8.5–10.1)
CHLORIDE SERPL-SCNC: 98 MMOL/L (ref 94–109)
CO2 SERPL-SCNC: 33 MMOL/L (ref 20–32)
CREAT SERPL-MCNC: 1.54 MG/DL (ref 0.66–1.25)
GFR SERPL CREATININE-BSD FRML MDRD: 43 ML/MIN/1.7M2
GLUCOSE SERPL-MCNC: 116 MG/DL (ref 70–99)
POTASSIUM SERPL-SCNC: 4 MMOL/L (ref 3.4–5.3)
PROT SERPL-MCNC: 6.4 G/DL (ref 6.8–8.8)
SODIUM SERPL-SCNC: 138 MMOL/L (ref 133–144)

## 2017-10-18 PROCEDURE — 36415 COLL VENOUS BLD VENIPUNCTURE: CPT | Performed by: INTERNAL MEDICINE

## 2017-10-18 PROCEDURE — A9270 NON-COVERED ITEM OR SERVICE: HCPCS | Mod: GY | Performed by: STUDENT IN AN ORGANIZED HEALTH CARE EDUCATION/TRAINING PROGRAM

## 2017-10-18 PROCEDURE — 25000132 ZZH RX MED GY IP 250 OP 250 PS 637: Mod: GY | Performed by: INTERNAL MEDICINE

## 2017-10-18 PROCEDURE — A9270 NON-COVERED ITEM OR SERVICE: HCPCS | Mod: GY

## 2017-10-18 PROCEDURE — 40000133 ZZH STATISTIC OT WARD VISIT

## 2017-10-18 PROCEDURE — 25000132 ZZH RX MED GY IP 250 OP 250 PS 637: Mod: GY | Performed by: STUDENT IN AN ORGANIZED HEALTH CARE EDUCATION/TRAINING PROGRAM

## 2017-10-18 PROCEDURE — A9270 NON-COVERED ITEM OR SERVICE: HCPCS | Mod: GY | Performed by: INTERNAL MEDICINE

## 2017-10-18 PROCEDURE — 97535 SELF CARE MNGMENT TRAINING: CPT | Mod: GO

## 2017-10-18 PROCEDURE — 40000802 ZZH SITE CHECK

## 2017-10-18 PROCEDURE — 80053 COMPREHEN METABOLIC PANEL: CPT | Performed by: INTERNAL MEDICINE

## 2017-10-18 PROCEDURE — 99238 HOSP IP/OBS DSCHRG MGMT 30/<: CPT | Performed by: INTERNAL MEDICINE

## 2017-10-18 PROCEDURE — 97530 THERAPEUTIC ACTIVITIES: CPT | Mod: GO

## 2017-10-18 PROCEDURE — 25000128 H RX IP 250 OP 636: Performed by: INTERNAL MEDICINE

## 2017-10-18 PROCEDURE — 25000132 ZZH RX MED GY IP 250 OP 250 PS 637: Mod: GY

## 2017-10-18 PROCEDURE — 97165 OT EVAL LOW COMPLEX 30 MIN: CPT | Mod: GO

## 2017-10-18 RX ORDER — FUROSEMIDE 20 MG
20 TABLET ORAL
Qty: 30 TABLET | DISCHARGE
Start: 2017-10-18 | End: 2017-11-29

## 2017-10-18 RX ORDER — ISOSORBIDE DINITRATE 20 MG/1
20 TABLET ORAL 3 TIMES DAILY
DISCHARGE
Start: 2017-10-18 | End: 2017-11-17

## 2017-10-18 RX ORDER — HYDRALAZINE HYDROCHLORIDE 25 MG/1
37.5 TABLET, FILM COATED ORAL EVERY 8 HOURS
Qty: 60 TABLET | DISCHARGE
Start: 2017-10-18 | End: 2017-11-29

## 2017-10-18 RX ORDER — HYDROCODONE BITARTRATE AND ACETAMINOPHEN 5; 325 MG/1; MG/1
1 TABLET ORAL EVERY 6 HOURS PRN
Qty: 20 TABLET | Refills: 0 | Status: SHIPPED | OUTPATIENT
Start: 2017-10-18 | End: 2017-10-24

## 2017-10-18 RX ADMIN — HYDROCODONE BITARTRATE AND ACETAMINOPHEN 1 TABLET: 7.5; 325 TABLET ORAL at 01:38

## 2017-10-18 RX ADMIN — HYDROCODONE BITARTRATE AND ACETAMINOPHEN 1 TABLET: 7.5; 325 TABLET ORAL at 14:50

## 2017-10-18 RX ADMIN — ISOSORBIDE DINITRATE 20 MG: 10 TABLET ORAL at 14:07

## 2017-10-18 RX ADMIN — HEPARIN SODIUM 5000 UNITS: 5000 INJECTION, SOLUTION INTRAVENOUS; SUBCUTANEOUS at 08:04

## 2017-10-18 RX ADMIN — HYDROCODONE BITARTRATE AND ACETAMINOPHEN 1 TABLET: 7.5; 325 TABLET ORAL at 07:57

## 2017-10-18 RX ADMIN — GABAPENTIN 300 MG: 300 CAPSULE ORAL at 07:57

## 2017-10-18 RX ADMIN — ISOSORBIDE DINITRATE 20 MG: 10 TABLET ORAL at 07:57

## 2017-10-18 RX ADMIN — Medication 37.5 MG: at 07:57

## 2017-10-18 RX ADMIN — Medication 37.5 MG: at 01:16

## 2017-10-18 RX ADMIN — POLYETHYLENE GLYCOL 3350 17 G: 17 POWDER, FOR SOLUTION ORAL at 07:56

## 2017-10-18 RX ADMIN — FUROSEMIDE 20 MG: 20 TABLET ORAL at 07:56

## 2017-10-18 RX ADMIN — GABAPENTIN 300 MG: 300 CAPSULE ORAL at 14:07

## 2017-10-18 RX ADMIN — POTASSIUM CHLORIDE 20 MEQ: 1.5 POWDER, FOR SOLUTION ORAL at 12:54

## 2017-10-18 RX ADMIN — FINASTERIDE 5 MG: 5 TABLET, FILM COATED ORAL at 09:18

## 2017-10-18 RX ADMIN — SENNOSIDES AND DOCUSATE SODIUM 1 TABLET: 8.6; 5 TABLET ORAL at 07:57

## 2017-10-18 RX ADMIN — ASPIRIN 81 MG: 81 TABLET, COATED ORAL at 07:57

## 2017-10-18 RX ADMIN — METOPROLOL SUCCINATE 25 MG: 25 TABLET, EXTENDED RELEASE ORAL at 07:58

## 2017-10-18 RX ADMIN — PANTOPRAZOLE SODIUM 40 MG: 40 TABLET, DELAYED RELEASE ORAL at 07:58

## 2017-10-18 RX ADMIN — TAMSULOSIN HYDROCHLORIDE 0.4 MG: 0.4 CAPSULE ORAL at 07:57

## 2017-10-18 ASSESSMENT — PAIN DESCRIPTION - DESCRIPTORS
DESCRIPTORS: HEADACHE
DESCRIPTORS: DISCOMFORT;DULL

## 2017-10-18 ASSESSMENT — ACTIVITIES OF DAILY LIVING (ADL): PREVIOUS_RESPONSIBILITIES: MEAL PREP;LAUNDRY;MEDICATION MANAGEMENT;FINANCES;DRIVING

## 2017-10-18 NOTE — PLAN OF CARE
Problem: Patient Care Overview  Goal: Plan of Care/Patient Progress Review  Discharge Planner OT   Patient plan for discharge: TCU  Current status: Pt CGA sit<>stand, in home distances pt ambulates close CGA ~10, Pt ambulated in hallway ~165' with 4WW CGA, needing one seated rest break 2/2 SpO2% dropping to 84%, pt recovered to 94% within 10 seconds after TH educated pt on PLB. Pt able to use pathfinding skills to navigate way back to room CGA, pt tolerated standing at sink ~6 minutes to complete g/h and oral cares CGA. Seated in chair pt CGA with VCs to don/doff bilateral socks, using figure 4 technique.  Pt on 3L of O2 via nc throughout entire session.  Barriers to return to prior living situation: decreased activity tolerance, insight into deficits, incisional precautions, instability   Recommendations for discharge: TCU (if possible same one as partner)  Rationale for recommendations: Continued therapy to facilitate IND and safety with I/ADLs and return to PLOF.        Entered by: Anette Rosales 10/18/2017 12:00 PM   Occupational Therapy Discharge Summary    Reason for therapy discharge:    Discharged to transitional care facility.    Progress towards therapy goal(s). See goals on Care Plan in [facility] electronic health record for goal details.  Goals partially met.  Barriers to achieving goals:   discharge from facility.    Therapy recommendation(s):    Continued therapy is recommended.  Rationale/Recommendations:  TCU - for increased IND and safety in I/ADLs .

## 2017-10-18 NOTE — PLAN OF CARE
Problem: Patient Care Overview  Goal: Plan of Care/Patient Progress Review  Outcome: Adequate for Discharge Date Met:  10/18/17  Pt A/O, VSS on 3L NC. C/o headache, managed w/Norco x1. PICC removed prior to discharge. Bilat groin sites soft, WNL. Primapore dressings changed. Denies nausea, tolerating low fat/low NA diet. Up Ax1. No bm this shift, given miralax PRN this AM. Plan for discharge this afternoon to [facility] in wheelchair w/transport. Belongings packed up and sent w/patient.         Awaiting transport.

## 2017-10-18 NOTE — CONSULTS
Social Work: Assessment with Discharge Plan    Patient Name:  Ivan Gomez  :  10/12/1926  Age:  89 year old  MRN:  [id number]  Risk/Complexity Score:  Filed Complexity Screen Score: 5  Completed assessment with:  Pt    Presenting Information   Reason for Referral:  Discharge plan  Date of Intake:  2017  Referral Source:  Chart Review  Decision Maker:  Pt when able   Alternate Decision Maker:  Jerri Olson Melinda Gomez, Pt's SO, Addendum: SO has advanced dementia.  NOK policy would be daughter.  Health Care Directive:  Copy in Chart  Living Situation:  Assisted Living:  Senior apartments.  Previous Functional Status:  Independent  Patient and family understanding of hospitalization:  Pt states he wants to be in rehab with his SO at [facility].  Pt does not want to be in another facility.  Cultural/Language/Spiritual Considerations: Pt identifies as Shinto  Adjustment to Illness:  Pt stating he is agreeable to TCU and wanting to know if he can share a room with his SO Jerri.    Physical Health  Reason for Admission:    1. Mitral valve insufficiency, unspecified etiology    2. Mitral regurgitation      Services Needed/Recommended:  TCU    Mental Health/Chemical Dependency  Diagnosis:  None reported  Support/Services in Place:  None  Services Needed/Recommended:  None    Support System  Significant relationship at present time:  ZACH Guthrie - currently at [facility] for a hip fracture sustained .  Pt states he would like to be in a room with her if possible.  Family of origin is available for support:  Pt did not disclose any other family.  Other support available:  Yes through apartment complex.  Gaps in support system:  Pt stating he will only do rehab at [facility] to stay close to his SO/partner.  Patient is caregiver to:  None  - Addendum - SO has advanced dementia and pt has been a caregiver for her.    Provider Information   Primary Care Physician:  Evaristo Magaña   [phone]    Clinic:  [address] [facility] [address]      :  None    Financial   Income Source:  Retired  Financial Concerns:  None reported  Insurance:    Payor/Plan Subscriber Name Rel Member # Group #   MEDICARE - MEDICARE [place]IE [place]  [id number]       ATTN CLAIMS, [address]   COMMERCIAL - AARP [place]IE [place]  [id number]       [facility] CLAIM DIV, [address]       Discharge Plan   Patient and family discharge goal:  TCU   Provided education on discharge plan:  YES  Patient agreeable to discharge plan:  YES  A list of Medicare Certified Facilities was provided to the patient and/or family to encourage patient choice. Patient's choices for facility are:    [facility]  PH: [phone]  F: [phone]    Will NH provide Skilled rehabilitation or complex medical:  YES  General information regarding anticipated insurance coverage and possible out of pocket cost was discussed. Patient and patient's family are aware patient may incur the cost of transportation to the facility, pending insurance payment: YES  Barriers to discharge:  Medical stability, bed availability    Discharge Recommendations   Anticipated Disposition:  Facility:  TCU  Transportation Needs:  Medical:  Wheelchair  Name of Transportation Company and Phone: [employer] PH: [phone].    Additional comments   SW met with pt to introduce self and role in consult.  Pt states that he would like to be at [place] so that he can be with his SO Jerri.  Per pt, Jerri broke her hip on Sunday and pt states he has been very worried about her health and well being.  Pt reports that he spends all of his time with her and being away this long has been difficult.  SW stated they would send a referral to [facility] [place] and would ask about pt's ability to be in the same room as his SO.  SW following for discharge planning to TCU.    YOAV Fox, KELSEYW  6C Unit   Phone: [phone]  Pager: [phone]  Unit:  [phone]

## 2017-10-18 NOTE — PLAN OF CARE
Problem: Patient Care Overview  Goal: Plan of Care/Patient Progress Review  Physical Therapy Discharge Summary     Reason for therapy discharge:    Discharged to transitional care facility.     Progress towards therapy goal(s). See goals on Care Plan in [facility] electronic health record for goal details.  Goals partially met.  Barriers to achieving goals:   discharge from facility.     Therapy recommendation(s):    Continued therapy is recommended.  Rationale/Recommendations:  To progress strength, balance, and activity tolerance for increased safety and independence with functional mobility..

## 2017-10-18 NOTE — PLAN OF CARE
Problem: Cardiac Catheterization (Diagnostic/Interventional) (Adult)  Goal: Signs and Symptoms of Listed Potential Problems Will be Absent, Minimized or Managed (Cardiac Catheterization)  Signs and symptoms of listed potential problems will be absent, minimized or managed by discharge/transition of care (reference Cardiac Catheterization (Diagnostic/Interventional) (Adult) CPG).  Outcome: Improving  S/P Mitraclip repair 10/16  D/History of CRF, bladder CA-TURP, GERD, HTN, rt inguinal hernia. Was admitted on the 14th for SOB, HTN-diuresed and Nipride drip. Surgery 10/16. Takes Norco for chronic back pain. Bilateral groin dressings, Eating okay, no BM post op yet. Voids small amounts q 2 hours. Wife reportedly in at [facility] for broken hip on Sunday  I/settled by others upon arrival. Consult for SW to look at TCU's, and probably they would like to be at the same TCU. So, she can begin to coordinate this for them.   I/told him that I sent request for SW to look at places for both of you. Saw MD on unit and informed him about the wife's hospitalization, and that I requested placement for both of them. I told him that our d/c planner will be talking to you about places for you and your wife.   D/He smiled and told me that sounded good  P/monitor for changes, keep team updated

## 2017-10-18 NOTE — PLAN OF CARE
Problem: Goal/Outcome  Goal: Goal Outcome Summary  PT 4E: PT Eval completed and treatment initiated. Patient s/p MitraClip RepairPatient requires min A for bed mobility and transfers, ambulated ~150x2 while pushing w/c with 1 seated rest. Ambulates with FWW at baseline, kyphotic and forward head posture due to spinal stenosis and previous back surgery, has chronic back pain for which he regularly takes Norco. VSS throughout, SpO2>90% on 3-4L NC, MAP 80s, HR 90s-100s.     Discharge Planner PT   Patient plan for discharge: Would like to go to rehab facility at same place as patient's partner Jerri who broke hip requiring surgery after a fall on Sunday. Jerri is currently at [facility]  Current status: See above.  Barriers to return to prior living situation: Limited support as patient's partner currently in hospital and will require rehab stay, below baseline level of function, generalized deconditioning and weakness. See living environment and PLOF sections in PT Evaluation on 10/17 for more details.  Recommendations for discharge: TCU  Rationale for recommendations: To progress strength, balance, and activity tolerance for increased safety and independence with functional mobility.

## 2017-10-18 NOTE — PLAN OF CARE
Problem: Goal/Outcome  Goal: Goal Outcome Summary  Outcome: No Change  Patient is alert and oriented X4 c/o back pain and took Norco PRN X1 PO with relief.  S/P pod #2 from mitral-clip procedure and bilateral groin sites flat/covered/dry/intact.  SR/ST with occasional PACs.  Up with assist X1 to the bathroom to the bathroom and voided X2, passing flatus but not BM (getting senna and miralax).  Patient ready for next level of care when available.

## 2017-10-18 NOTE — PROGRESS NOTES
Social Work Services Discharge Note      Patient Name:  Ivan Gomez     Anticipated Discharge Date:  10/18/17    Discharge Disposition:   TCU:  [facility]  PH: [phone]  F: [phone]    Following MD:  Evaristo Magaña  [address] [facility] [address]     Pre-Admission Screening (PAS) online form has been completed.  The Level of Care (LOC) is:  Determined  Confirmation Code is:  [id number]  Patient/caregiver informed of referral to [facility] for Pre-Admission Screening for skilled nursing facility (SNF) placement and to expect a phone call post discharge from SNF.     Additional Services/Equipment Arranged:    - Transportation: [employer] (PH: [phone]) wheelchair ride scheduled for 1430.  Pt and family are aware and in agreement that insurance may not cover wheelchair ride: YES  - Oxygen: O2 ordered from [facility] [phone] for transport.  Ordered 3-4L by nasal cannula.     Patient / Family response to discharge plan:  Pt in agreement with the plan.  Pt states he does not have family to contact as they live in [place] and he does not have their phone numbers.  Jerri' daughter Nicolasa has been visiting and is aware of discharge plan from [facility] and [facility].     Persons notified of above discharge plan:  Pt, bedside RN, CSI team, PCP, SNF admissions.    Staff Discharge Instructions:  Please fax discharge orders and signed hard scripts for any controlled substances.  Please print a packet and send with patient.     CTS Handoff completed:  YES    Medicare Notice of Rights provided to the patient/family:  YES    YOAV Fox, KELSEYW  6C Unit   Phone: [phone]  Pager: [phone]  Unit: [phone]

## 2017-10-19 ENCOUNTER — NURSING HOME VISIT (OUTPATIENT)
Dept: GERIATRICS | Facility: CLINIC | Age: 82
End: 2017-10-19
Payer: MEDICARE

## 2017-10-19 ENCOUNTER — CARE COORDINATION (OUTPATIENT)
Dept: CARE COORDINATION | Facility: CLINIC | Age: 82
End: 2017-10-19

## 2017-10-19 VITALS
BODY MASS INDEX: 23.92 KG/M2 | HEIGHT: 67 IN | TEMPERATURE: 98.4 F | WEIGHT: 152.4 LBS | DIASTOLIC BLOOD PRESSURE: 70 MMHG | OXYGEN SATURATION: 95 % | SYSTOLIC BLOOD PRESSURE: 123 MMHG | RESPIRATION RATE: 18 BRPM | HEART RATE: 90 BPM

## 2017-10-19 DIAGNOSIS — I34.0 MITRAL VALVE INSUFFICIENCY, UNSPECIFIED ETIOLOGY: Primary | Chronic | ICD-10-CM

## 2017-10-19 DIAGNOSIS — I10 BENIGN ESSENTIAL HYPERTENSION: ICD-10-CM

## 2017-10-19 DIAGNOSIS — N18.30 CKD (CHRONIC KIDNEY DISEASE) STAGE 3, GFR 30-59 ML/MIN (H): ICD-10-CM

## 2017-10-19 DIAGNOSIS — E78.2 MIXED HYPERLIPIDEMIA: ICD-10-CM

## 2017-10-19 DIAGNOSIS — R53.81 PHYSICAL DECONDITIONING: ICD-10-CM

## 2017-10-19 DIAGNOSIS — C67.9 MALIGNANT NEOPLASM OF URINARY BLADDER, UNSPECIFIED SITE (H): ICD-10-CM

## 2017-10-19 DIAGNOSIS — I25.10 CORONARY ARTERY DISEASE INVOLVING NATIVE HEART WITHOUT ANGINA PECTORIS, UNSPECIFIED VESSEL OR LESION TYPE: ICD-10-CM

## 2017-10-19 DIAGNOSIS — Z98.890 S/P MITRAL VALVE REPAIR: ICD-10-CM

## 2017-10-19 DIAGNOSIS — K59.00 CONSTIPATION, UNSPECIFIED CONSTIPATION TYPE: ICD-10-CM

## 2017-10-19 DIAGNOSIS — I50.33 ACUTE ON CHRONIC DIASTOLIC HEART FAILURE (H): ICD-10-CM

## 2017-10-19 DIAGNOSIS — G89.4 CHRONIC PAIN SYNDROME: ICD-10-CM

## 2017-10-19 DIAGNOSIS — N40.0 BENIGN PROSTATIC HYPERPLASIA WITHOUT LOWER URINARY TRACT SYMPTOMS: ICD-10-CM

## 2017-10-19 DIAGNOSIS — Z98.890 S/P MITRAL VALVE REPAIR: Primary | ICD-10-CM

## 2017-10-19 DIAGNOSIS — Z71.89 ADVANCED DIRECTIVES, COUNSELING/DISCUSSION: ICD-10-CM

## 2017-10-19 DIAGNOSIS — D69.6 THROMBOCYTOPENIA (H): ICD-10-CM

## 2017-10-19 DIAGNOSIS — M48.061 SPINAL STENOSIS OF LUMBAR REGION, UNSPECIFIED WHETHER NEUROGENIC CLAUDICATION PRESENT: ICD-10-CM

## 2017-10-19 LAB — INTERPRETATION ECG - MUSE: NORMAL

## 2017-10-19 PROCEDURE — 99309 SBSQ NF CARE MODERATE MDM 30: CPT | Performed by: NURSE PRACTITIONER

## 2017-10-19 NOTE — DISCHARGE SUMMARY
"    [facility]  Discharge Summary     Ivan Gomez MRN# [id number]   YOB: 1926 Age: 89 year old       Admission Date: 10/13/2017  Discharge Date: 10/18/2017    Discharge Diagnoses:  1. Severe mitral regurgitation due to flail posterior leaflet  2. Decompensated HFpEF (C, III)  3. Hypoxemic respiratory failure  4. Acute kidney injury     Imaging:  Transthoracic Echocardiogram 10/17/2017:  Normal biventricular function.  S/p trans-catheter [id number] mitral valve repair with residual moderate mitral regurgitation that is wall hugging and anteriorly directed. RV is 39 mL.  The inferior vena cava was normal in size with preserved respiratory variability.  No pericardial effusion is present.    Other imaging studies:  Intra-operative trans-esophageal echocardiogram 10/16/2017     Procedures:  1. Bedside right radial artery arterial line placement 10/14/2017  2. MitraClip placement 10/16/17     Consults:  None    HPI (adapted from admission H&P)  \"Ivan Gomez is a 89 y.o. Male with a history of HFpEF, CKD III, bladder cancer s/p TURBT x2, GERD, DJD, HTN, and HLD.      The patient initially started feeling a pressure in the middle of his chest and/or upper abdomen 4 weeks prior to admission. He was assessed by his PCP who placed him on a PPI. The discomfort did not get better. Then he developed shortness of breath later that evening that included orthopnea, so he called 911. He was subsequently taken to [facility].      At [facility] the patient was found to be hypoxic to 76% in the ED, so he placed on BiPAP. He was subsequently diuresed. During his evaluation, he was found to have new severe mitral regurgitation that was a likely contributor to his symptoms. He was evaluated by cardiothoracic surgery there, and it was decided that he was not a surgical candidate. There was a plan to undergo IFEANYI, but he was too tenuous to undergo the procedure without general " "anesthesia. He was therefore transferred to the [facility] for further cares.      On 10/13, when I met the patient he is comfortable in his room on 15L O2. He denies shortness of breath at the current time. He sits up in bed, because he says laying down flat makes his breathing worse. He has not noticed and chest pain today. He denies any recent weight gain. He has not noticed any increased swelling in his legs. No fevers, cough. He has not been up walking around because that exacerbates the dyspnea.\"    Brief Hospital Course  Severe mitral valve regurgitation due to flail posterior valve leaflet with resultant decompensated HFpEF, ZAK, and hypoxemic respiratory failure  Mr. Gomez [facility] on 10/13/2017 for evaluation of semi-urgent MitraClip placement.  He was initially medically optimized with IV diuresis and IV nitroprusside infusion. He had roughly 3L of diuresis prior to MitraClip placement on 10/16/2017.  This led to a marked improvement in his respiratory failure and renal function. The procedure was uncomplicated and notable for a decrease in the left atrial pressure from 18/47/19 (a, v, mean) to 12/11/9 after two clips had been placed on the A2-P2 leaflets.  Intra-procedural transesophageal echocardiography showed that the MR had decreased to moderate severity.     Mr. Gomez's post-operative course was notable for a transition to PO afterload reduction with hydralazine and ISDN, as below.  The intention is to start lisinopril therapy as an outpatient as his renal function stabilizes from the acute insult of decompensated HFpEF.  He will also be discharged with regular diuretic therapy.  No further therapeutic anticoagulation is required for the MitraClip. Renal function noted to improve over the course of the admission with Cr 1.34 on the day of discharge. Mr. Gomez required small doses of potassium supplementation in the midst of large volume diuresis.  Total net diuresis for " admission was 5.1 L. Discharge weight 148 lbs.     Discharge Information  Discharge diet: Low fat, low salt (less than 2000 mg of salt daily)  Discharge activity:  Activity as tolerated  Disposition:  Discharged to nursing home    Medication Changes  Addition of ASA 81 mg q24h, ISDN 20 mg TID, hydralazine 37.5 mg TID, and furosemide 20 mg BID     Discharge Medications  Discharge Medication List as of 10/18/2017  3:14 PM      START taking these medications    Details   aspirin EC 81 MG EC tablet Take 1 tablet (81 mg) by mouth daily, Transitional      isosorbide dinitrate (ISORDIL) 20 MG tablet Take 1 tablet (20 mg) by mouth 3 times daily, Transitional      hydrALAZINE (APRESOLINE) 25 MG tablet Take 1.5 tablets (37.5 mg) by mouth every 8 hours, Disp-60 tablet, Transitional      furosemide (LASIX) 20 MG tablet Take 1 tablet (20 mg) by mouth 2 times daily, Disp-30 tablet, Transitional      order for DME O2 by nasal cannula continuous 3 L/min to maintain O2 saturations > 92%Disp-1 Can, R-1, Local Print         CONTINUE these medications which have NOT CHANGED    Details   pantoprazole (PROTONIX) 40 MG EC tablet Take 1 tablet (40 mg) by mouth daily Take 30-60 minutes before a meal., Disp-30 tablet, R-1, E-Prescribe      tamsulosin (FLOMAX) 0.4 MG capsule Take 1 capsule (0.4 mg) by mouth daily, Disp-90 capsule, R-0, E-Prescribe      HYDROcodone-acetaminophen (NORCO) 7.5-325 MG per tablet Take 1 tablet by mouth every 6 hours as needed for moderate to severe pain, Disp-120 tablet, R-0, Local Print      gabapentin (NEURONTIN) 300 MG capsule TAKE 1 CAPSULE BY MOUTH THREE TIMES DAILY, Disp-270 capsule, R-1, E-Prescribe      fiber modular (NUTRISOURCE FIBER) packet 1 packet daily, Historical      simvastatin (ZOCOR) 20 MG tablet TAKE 1 TABLET BY MOUTH AT BEDTIME, Disp-90 tablet, R-1, E-Prescribe      finasteride (PROSCAR) 5 MG tablet TAKE 1 TABLET BY MOUTH EVERY DAY, Disp-90 tablet, R-3, E-Prescribe      metoprolol (TOPROL-XL)  25 MG 24 hr tablet TAKE 1 TABLET BY MOUTH DAILY, Disp-90 tablet, R-3, E-Prescribe         STOP taking these medications       furosemide (LASIX) 10 MG/ML injection Comments:   Reason for Stopping:               Discharge Follow-up  Follow-up with Structural Heart Team in one month's time with TTE  Follow-up with BMP in one week    Code Status  FULL     CC  Patient Care Team:  Evaristo Magaña MD as PCP - General (Family Practice)  Lida, Dennis Loera MD as MD (Urology)  Evaristo Magaña MD as Referring Physician (Family Practice)  Cecil Iqbal MD as PCP (Internal Medicine)  FRANCINE MCGEE      10/18/17    Patient examined, chart reviewed and the above reflects our joint assessment and plans.  30 minutes spent on discharge planning, documentation and education.    Arely Reed MD

## 2017-10-19 NOTE — PROGRESS NOTES
Patient has clinic visit within 24-48 hours of Discharge so no post DC follow up call is needed            Date: 10/19/2017 Status: Arrived   Time: 11:30 AM Length: 30       ADIN:     Provider: Ayse Tanner APRN CNP Department: [facility]   Special Needs:   Comments:     Referral Number:   Referral Status:     Enc Form Number: [id number] CSN: [id number]       Arrival Time: 8:24 AM     Made On:  Checked In:   10/19/2017 8:13 AM  10/19/2017 8:24 AM By:  By:   STEVE RIZZO [[id number]] (ES)  STEVE RIZZO [[id number]] (ES)

## 2017-10-19 NOTE — NURSING NOTE
Called patient's daughter, Nicolasa to see how patient is doing.  He is doing well.  Went to [facility].  Will place orders for 1 month f/u.

## 2017-10-19 NOTE — PROGRESS NOTES
CARDIOLOGY PROGRESS NOTE    SUBJECTIVE:  Feels well. Less dyspnea. Ambulating. No chest pain or bleeding.     ROS otherwise negative.    OBJECTIVE:  Vital signs:  Temp: 98.1  F (36.7  C) Temp  Min: 98.1  F (36.7  C)  Max: 98.1  F (36.7  C)  Resp: 16 Resp  Min: 16  Max: 16  SpO2: 92 % SpO2  Min: 92 %  Max: 96 %  Heart Rate: 92 Heart Rate  Min: 80  Max: 94  BP: 111/62 Systolic (24hrs), Av , Min:111 , Max:117   Diastolic (24hrs), Av, Min:62, Max:76    Tele with intermittent sinus tach    I/O: -345 mL   Intake: 1129 mL (1020 PO, 109 IV)  Output: 1475 ml UO     PHYSICAL EXAM:  Gen: Looks well  HEENT: MMM, NC   Resp: No signs of resp distress, chest ausc with fine insp creps bilaterally  CVS: JVP to approx 10 cm, S1+S2 with soft 3/6 PSM noted  Abdo: ND, S, NT, +BS  Extremities: Warm, well-perfused, bilateral venous access sites with minor bruising but without thrill or bruit. Good DP/PT/popliteal pulses bilaterally  Neuro: GCS 15/15, AAOx3     Recent Labs   Lab Test  10/18/17   0721  10/17/17   0335   HGB   --   12.2*   HCT   --   38.3*   WBC   --   10.4   MCV   --   97   MCH   --   30.8   MCHC   --   31.9   RDW   --   13.3   PLT   --   114*   NA  138  142   POTASSIUM  4.0  4.3   CHLORIDE  98  101   CO2  33*  30   BUN  50*  50*   CR  1.54*  1.50*   GLC  116*  112*   GIUSEPPE  8.9  8.7   ALBUMIN  2.9*  3.0*   BILITOTAL  1.1  1.4*   ALKPHOS  60  62   AST  14  19   ALT  16  18       Micro:    Imaging:  10/17/17 TTE:      Cardiac meds: ASA 81 q24h, ISDN 20 mg TID, hydarlazsine 37.5 mg q24h, furosemide 20 mg BID, metoprolol succinate 25 mg q24h, simvastatin 20 mg q24h  Non-cardiac meds: tamsulosin  Drips:    ASSESSMENT/PLAN:  Mr. Ivan Gomez is a 89-year old male with a PMHx of CKD 3, HFpEF, and hypertension who originally presented to [facility] for evaluation of acute hypoxic respiratory failure and volume overload.  He was transferred to [facility] for evaluation for MitraClip after he was found to have severe  mitral valve regurgitation due to a flail posterior leaflet.       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; hypoxic respiratory failure; Hx of CKD 3; Hx of HTN  acute-on-chronic diastolic heart failure/HFpEF   - Continue ASA 81 q24h, ISDN 20 mg TID, hydralazine 37.5 mg q24h, furosemide 20 mg BID, metoprolol succinate 25 mg q24h, simvastatin 20 mg q24h   - D/c to TCU on 10/18         Chronic/inactive issues:  - Bladder cancer s/p TURBT: Continue tamsulosin and finasteride   - Chronic thrombocytopenia, normocytic anemia: monitor for signs of bleeding  - Dyslipidemia: Continue simvastatin (lipid panel 10/12: T cholesterol 108, HDL 63, LDL 29, Tg 81)       Seen and staffed with Dr. Reed.    Gama Peres  Cardiology Fellow  Pager [phone]        DOS 10/18/17    Patient examined, chart reviewed and the above reflects our joint assessment and plans.      Arely Reed MD

## 2017-10-19 NOTE — PROGRESS NOTES
[facility]  PRIMARY CARE PROVIDER AND CLINIC:  GómezEvaristo Josr [address] [facility] [address]  Chief Complaint   Patient presents with     Hospital F/U       HPI:    Ivan Gomez is a 89 year old  (10/12/1926),admitted to the [facility] TCU from [facility].  Hospital stay 10/13/2017 through 10/18/2017.  Admitted to this facility for  rehab, medical management and nursing care.  HPI information obtained from: facility chart records.         HPI - hosp course per hosp record:    Mr. Ivan Gomez is a 89-year old male with a PMHx of CKD 3, HFpEF, and hypertension who originally presented to [facility] for evaluation of acute hypoxic respiratory failure and volume overload.  He was transferred to [facility] for evaluation for MitraClip after he was found to have severe mitral valve regurgitation due to a flail posterior leaflet.       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; hypoxic respiratory failure; Hx of CKD 3; Hx of HTN  acute-on-chronic diastolic heart failure/HFpEF                        - MitraClip 10/16                        - Holding Lasix inf for now, given I/Os for the last 24 hrs, slight bump in SCr and no significant hypervolemia on exam                        - Uptitrating PO afterload reduction (Isordil and Hydral)                   - Dispo                        - Transferring to  today                        - PT/OT consult                        - Will likely need TCU given significant other recently had a hip fracture and there is no one to take care of him at home      Chronic/inactive issues:  - Bladder cancer s/p TURBT: Continue tamsulosin and finasteride   - Chronic thrombocytopenia, normocytic anemia: monitor for signs of bleeding  - Dyslipidemia: Continue simvastatin (lipid panel 10/12: T cholesterol 108, HDL 63, LDL 29, Tg 81)    ----------------------    Today is found sitting up in  "room on bed. Is alert, calm, NAD- easily and intermittently tearful (per family moreso postop). Reports pain to lower lumbar area and states this is chronic. States has been on Norco for some time and it is helpful in relieving pain. Denies chest pain, SOB, dizziness. States is using IS \"once in a while.\" Reports constipation, no BM \"since surgery.\" States is passing gas, is eating and denies abd pain, n/v. Per therapist has been on and off O2, mostly off this a.m. And O2 sats have stayed > 90%    POLST discussed and completed with patient in presence of his partner Jerri and her daughter Nicolasa. Wishes for DNR/DNI, but full treatment.     CODE STATUS/ADVANCE DIRECTIVES DISCUSSION:   DNR / DNI  Patient's living condition: lives with significant other in a condominium    ALLERGIES:Celebrex [celecoxib] and Penicillins  PAST MEDICAL HISTORY:  has a past medical history of Arthritis; Former smoker; Hemorrhoids; Hypercholesteremia; Hypertension; Malignant neoplasm (H); Other chronic pain; and Renal disease. He also has no past medical history of Chronic infection; History of blood transfusion; Malignant hyperthermia; Numbness and tingling; PONV (postoperative nausea and vomiting); Sleep apnea; or Thrombosis of leg.  PAST SURGICAL HISTORY:  has a past surgical history that includes back surgery; orthopedic surgery; Cystoscopy, transurethral resection (TUR) prostate, combined (N/A, 8/21/2015); biopsy; biopsy (8/2016); Cystoscopy, transurethral resection (TUR) tumor bladder, combined (N/A, 9/2/2016); picc insertion (Right, 10/14/2017); and Percutaneous Mitral Valve Repair (N/A, 10/16/2017).  FAMILY HISTORY: family history includes CANCER (age of onset: 64) in his son; Family History Negative in his son. There is no history of DIABETES, Coronary Artery Disease, Hypertension, Hyperlipidemia, CEREBROVASCULAR DISEASE, Breast Cancer, Colon Cancer, Prostate Cancer, Other Cancer, Depression, Anxiety Disorder, MENTAL ILLNESS, " "Substance Abuse, Anesthesia Reaction, Asthma, OSTEOPOROSIS, Genetic Disorder, Thyroid Disease, Obesity, or Unknown/Adopted.  SOCIAL HISTORY:  reports that he has quit smoking. He has never used smokeless tobacco. He reports that he does not drink alcohol or use illicit drugs.    Post Discharge Medication Reconciliation Status: discharge medications reconciled and changed, per note/orders (see AVS).  Current Outpatient Prescriptions   Medication Sig Dispense Refill     aspirin EC 81 MG EC tablet Take 1 tablet (81 mg) by mouth daily       isosorbide dinitrate (ISORDIL) 20 MG tablet Take 1 tablet (20 mg) by mouth 3 times daily       hydrALAZINE (APRESOLINE) 25 MG tablet Take 1.5 tablets (37.5 mg) by mouth every 8 hours 60 tablet      furosemide (LASIX) 20 MG tablet Take 1 tablet (20 mg) by mouth 2 times daily 30 tablet      order for DME O2 by nasal cannula continuous 3 L/min to maintain O2 saturations > 92% 1 Can 1     HYDROcodone-acetaminophen (NORCO) 5-325 MG per tablet Take 1 tablet by mouth every 6 hours as needed for moderate to severe pain 20 tablet 0     pantoprazole (PROTONIX) 40 MG EC tablet Take 1 tablet (40 mg) by mouth daily Take 30-60 minutes before a meal. 30 tablet 1     tamsulosin (FLOMAX) 0.4 MG capsule Take 1 capsule (0.4 mg) by mouth daily 90 capsule 0     gabapentin (NEURONTIN) 300 MG capsule TAKE 1 CAPSULE BY MOUTH THREE TIMES DAILY 270 capsule 1     simvastatin (ZOCOR) 20 MG tablet TAKE 1 TABLET BY MOUTH AT BEDTIME 90 tablet 1     finasteride (PROSCAR) 5 MG tablet TAKE 1 TABLET BY MOUTH EVERY DAY 90 tablet 3     metoprolol (TOPROL-XL) 25 MG 24 hr tablet TAKE 1 TABLET BY MOUTH DAILY 90 tablet 3     fiber modular (NUTRISOURCE FIBER) packet 1 packet daily         ROS:  4 point ROS including Respiratory, CV, GI and , other than that noted in the HPI,  is negative    Exam:  /70  Pulse 90  Temp 98.4  F (36.9  C)  Resp 18  Ht 5' 7\" (1.702 m)  Wt 152 lb 6.4 oz (69.1 kg)  SpO2 95%  BMI " 23.87 kg/m2    GENERAL APPEARANCE: Alert, in no distress   ENT: Mouth and posterior oropharynx normal, moist mucous membranes   EYES: EOM, conjunctivae, lids, pupils and irises normal   NECK: No adenopathy,masses or thyromegaly   RESP: respiratory effort and palpation of chest normal, Lung sounds decreased throughout  CV: Palpation and auscultation of heart done , regular rate and rhythm, no murmur, rub, or gallop, peripheral edema - none noted  ABDOMEN: normal bowel sounds, soft, nontender, no hepatosplenomegaly or other masses   : palpation of bladder WNL   M/S: Gait and station normal   Digits and nails normal - [place]  SKIN: Inspection of skin and subcutaneous tissue baseline, Palpation of skin and subcutaneous tissue baseline- (patient states groin site clear and healing/writer did not assess at this time)  NEURO: Cranial nerves 2-12 are normal tested and grossly at patient's baseline, Examination of sensation by touch normal   PSYCH: oriented X 3, affect and mood normal             BP Range: 105-123/55-80     Lab/Diagnostic data:  CBC RESULTS:   Recent Labs   Lab Test  10/17/17   0335  10/16/17   1215   WBC  10.4  6.3   RBC  3.96*  3.56*   HGB  12.2*  11.1*   HCT  38.3*  34.2*   MCV  97  96   MCH  30.8  31.2   MCHC  31.9  32.5   RDW  13.3  13.2   PLT  114*  97*       Last Basic Metabolic Panel:  Recent Labs   Lab Test  10/18/17   0721  10/17/17   0335   NA  138  142   POTASSIUM  4.0  4.3   CHLORIDE  98  101   GIUSEPPE  8.9  8.7   CO2  33*  30   BUN  50*  50*   CR  1.54*  1.50*   GLC  116*  112*       Liver Function Studies -   Recent Labs   Lab Test  10/18/17   0721  10/17/17   0335   PROTTOTAL  6.4*  6.9   ALBUMIN  2.9*  3.0*   BILITOTAL  1.1  1.4*   ALKPHOS  60  62   AST  14  19   ALT  16  18       TSH   Date Value Ref Range Status   08/04/2016 1.14 0.40 - 5.00 mU/L Final   03/22/2016 1.77 0.40 - 5.00 mU/L Final     Lab Results   Component Value Date    INR 1.15 10/16/2017    INR 1.15 10/15/2017    INR 1.17  10/14/2017    INR 1.11 08/24/2015       ASSESSMENT/PLAN:  Mitral valve insufficiency, unspecified etiology  S/P mitral valve repair (mitraclip 10/16 per LEOBARDO Monroe)  - observe groin site daily until healed  - PT/OT, IS, wean O2  - VS per unit protocol  - follow daily weights- per patient and family- dry weight approx 155-150 and is in this range now. BMP repeat 10/20  - f/w Dr. Monroe in one month as directed      Acute on chronic diastolic heart failure (H)  - resolving, weaning O2 without issue, no edema noted  - continue lasix for now but follow daily weights and BMP closely    CKD (chronic kidney disease) stage 3, GFR 30-59 ml/min  - baseline Cr 3 - 1.5- in range, BUN up- lasix held inpatient then resumed prior to d/c- ? intent  - weights from 152 - 156 today with decreased LS- so will continue lasix, follow weights but recheck BMP 10/20    Coronary artery disease involving native heart without angina pectoris, unspecified vessel or lesion type  Mixed hyperlipidemia  GATED MYOCARDIAL PERFUSION SCINTIGRAPHY WITH INTRAVENOUS PHARMACOLOGIC  VASODILATATION LEXISCAN -ONE DAY STUDY      10/12/2017 3:29 PM  MALLORIE NOVA  89 years  Male  10/12/1926.     Indication/Clinical History: Troponin elevation     Impression  1.  Myocardial perfusion imaging using single isotope technique  demonstrated a small reversible anteroapical defect consistent with a  small area of ischemia.   2. Gated images demonstrated normal wall motion.  The left ventricular  systolic function is normal.  -------------    - no active s/s   - continue on ASA, Simva, Toprol XL, new on hydralazine and Imdur  - VS per unit protocol  - f/w cardiology as directed      Benign essential hypertension  - limited data, currently controlled as above  - continue on previously outlined antihypertensives with hold parameters.     Malignant neoplasm of urinary bladder, unspecified site (H)  Benign prostatic hyperplasia without lower urinary tract symptoms  - s/p  TURBT  - continue tamsulosin and finasteride  - monitor voiding pattern    Thrombocytopenia (H)  - chronic, stable as above  - observe for s/s bleeding  - f/w Dr. Hilton as directed/scheduled    Spinal stenosis of lumbar region, unspecified whether neurogenic claudication present  Chronic pain syndrome  - chronic- stable  - continues on chronic Norco and gabapentin per home regimen  - PT/OT, position for comfort.     Constipation, unspecified constipation type  -postop  - Dulcolax supp UT x 1 tonight if no BM today  - add Senna S 1 BID and 1 BID prn  - encourage adequate intake/hydration  - follow clinically    Physical deconditioning  - 2/2 above  - lives with partner  in condo  - PT/OT  -ongoing discharge planning, SW follow and care conferences per unit protocol      Advanced care planning  - POLST completed as above per conversation with patient, his partner and her daughter Nicolasa.         Electronically signed by:  FRANK Contreras CNP

## 2017-10-19 NOTE — PROGRESS NOTES
Clinic Care Coordination Contact  Care Coordination Transition Communication    Referral Source: CTS    Clinical Data: Patient was hospitalized at [facility] from 10/10/17 to 10/18/17 with diagnosis of respiratory failure due to volume overload.  Patient originally presented to [facility] ED and was transferred to [facility] fir MitraClip due to sever mitral valve regurgitation due to flail posterior leaflet. Patient has MitraClip on 10/16/17.  Patient has medical history of bladder cancer.     Transition to Facility:              Facility Name: [facility]              Contact name and phone number/fax: [facility] at [phone]    Plan: RN Care Coordinator will await notification from facility staff informing RN Care Coordinator of patient's discharge plans/needs. RN Care Coordinator will review chart and outreach to facility staff every 4 weeks and as needed.     Suad Gutierrez RN, CCM - Care Coordinator     10/19/2017    8:25 AM  [phone]

## 2017-10-20 ENCOUNTER — HOSPITAL LABORATORY (OUTPATIENT)
Dept: OTHER | Facility: CLINIC | Age: 82
End: 2017-10-20

## 2017-10-20 ENCOUNTER — NURSING HOME VISIT (OUTPATIENT)
Dept: GERIATRICS | Facility: CLINIC | Age: 82
End: 2017-10-20
Payer: MEDICARE

## 2017-10-20 VITALS
DIASTOLIC BLOOD PRESSURE: 57 MMHG | SYSTOLIC BLOOD PRESSURE: 123 MMHG | WEIGHT: 156 LBS | HEART RATE: 92 BPM | OXYGEN SATURATION: 91 % | HEIGHT: 67 IN | BODY MASS INDEX: 24.48 KG/M2 | RESPIRATION RATE: 18 BRPM | TEMPERATURE: 97.7 F

## 2017-10-20 DIAGNOSIS — N18.30 CKD (CHRONIC KIDNEY DISEASE) STAGE 3, GFR 30-59 ML/MIN (H): ICD-10-CM

## 2017-10-20 DIAGNOSIS — R53.81 PHYSICAL DECONDITIONING: Primary | ICD-10-CM

## 2017-10-20 DIAGNOSIS — I34.0 MITRAL VALVE INSUFFICIENCY, UNSPECIFIED ETIOLOGY: ICD-10-CM

## 2017-10-20 DIAGNOSIS — I50.32 CHRONIC DIASTOLIC CONGESTIVE HEART FAILURE (H): ICD-10-CM

## 2017-10-20 DIAGNOSIS — G89.4 CHRONIC PAIN SYNDROME: ICD-10-CM

## 2017-10-20 DIAGNOSIS — I10 BENIGN ESSENTIAL HYPERTENSION: ICD-10-CM

## 2017-10-20 LAB
ANION GAP SERPL CALCULATED.3IONS-SCNC: 8 MMOL/L (ref 3–14)
BUN SERPL-MCNC: 41 MG/DL (ref 7–30)
CALCIUM SERPL-MCNC: 8.9 MG/DL (ref 8.5–10.1)
CHLORIDE SERPL-SCNC: 97 MMOL/L (ref 94–109)
CO2 SERPL-SCNC: 32 MMOL/L (ref 20–32)
CREAT SERPL-MCNC: 1.34 MG/DL (ref 0.66–1.25)
GFR SERPL CREATININE-BSD FRML MDRD: 50 ML/MIN/1.7M2
GLUCOSE SERPL-MCNC: 80 MG/DL (ref 70–99)
POTASSIUM SERPL-SCNC: 3.4 MMOL/L (ref 3.4–5.3)
SODIUM SERPL-SCNC: 137 MMOL/L (ref 133–144)

## 2017-10-20 PROCEDURE — 99306 1ST NF CARE HIGH MDM 50: CPT | Performed by: INTERNAL MEDICINE

## 2017-10-20 PROCEDURE — 99207 ZZC CDG-CORRECTLY CODED, REVIEWED AND AGREE: CPT | Performed by: INTERNAL MEDICINE

## 2017-10-20 RX ORDER — AMOXICILLIN 250 MG
1 CAPSULE ORAL 2 TIMES DAILY
COMMUNITY
End: 2018-01-05

## 2017-10-20 NOTE — PROGRESS NOTES
PRIMARY CARE PROVIDER AND CLINIC RESPONSIBLE:  Evaristo Magaña, [address] / [facility] [address]        ADMISSION HISTORY AND PHYSICAL EXAMINATION     Chief Complaint   Patient presents with     Fatigue         HISTORY OF PRESENT ILLNESS:  89 year old male, (10/12/1926), admitted to the [facility] for continuation of medical care and rehab.    Ivan Gomez is a 89 year old male with history of CKD 3, chronic diastolic heart failure, HFpEF, hypertension,bladder cancer s/p TUPB who was admitted at [facility] from 10/10/17 to 10/13/17 due to hypoxia with O2 sat 76% and dyspnea and was diagnosed acute diastolic CHF with acute pulmonary edema with acute hypoxia secondary to acute mitral regurgitation. He was started BPAP and diuresis and was transferred to [facility] on 10/13/17 and admitted through 10/18/17 for evaluation pf MitraClip placement. He was initially medically optimized with IV diuresis and IV nitroprusside infusion. He had roughly 3L of diuresis prior to MitraClip placement on 10/16/2017. This led to a marked improvement in his respiratory failure and renal function. The procedure was uncomplicated and notable for a decrease in the left atrial pressure from 18/47/19 to 12/11/9 after two clips had been placed on the A2-P2 leaflets.  Intra-procedural transesophageal echocardiography showed that the MR had decreased to moderate severity. post-operative course was ok,ay and notable for a transition to PO afterload reduction with hydralazine and nitrate and intention to resume Lisinopril as outpatient. No further therapeutic anticoagulation is required for the MitraClip. He feels much better, dyspnea decreased, able to get out of bed to bathroom without dyspnea. Patient denies chest pain, dyspnea, abdominal pain, n&v, fever, chills, dysuria, leg pain or swelling. The rest of ROS is unremarkable. Patient is seen and examined by me with VALENTIN Jurado NP. Please see VALENTIN  Adrien's admit noted dated 10/19/17 for details of admission, past medical history, family history, allergies, medication list, social history and other details pertinent with this admission. Hospital admission and dc summary reviewed.    Past Medical History:   Diagnosis Date     Arthritis     Spinal Stenosis     Former smoker      Hemorrhoids      Hypercholesteremia      Hypertension      Malignant neoplasm (H)     skin CA, Basal cell     Other chronic pain      Renal disease        Past Surgical History:   Procedure Laterality Date     BACK SURGERY       BIOPSY      face, skin cancer     BIOPSY  8/2016    shoulder lesion     CYSTOSCOPY, TRANSURETHRAL RESECTION (TUR) PROSTATE, COMBINED N/A 8/21/2015    Procedure: COMBINED CYSTOSCOPY, TRANSURETHRAL RESECTION (TUR) PROSTATE;  Surgeon: Dennis Hilton MD;  Location: [facility] OR     CYSTOSCOPY, TRANSURETHRAL RESECTION (TUR) TUMOR BLADDER, COMBINED N/A 9/2/2016    Procedure: COMBINED CYSTOSCOPY, TRANSURETHRAL RESECTION (TUR) TUMOR BLADDER;  Surgeon: Dennis Hilton MD;  Location: [facility] OR     ORTHOPEDIC SURGERY      lumbar and cervical surgery, Sep, 2008, knee surgery     PERCUTANEOUS MITRAL VALVE REPAIR N/A 10/16/2017    Procedure: PERCUTANEOUS MITRAL VALVE REPAIR ANESTHESIA;  Percutaneous MitraClip ;  Surgeon: Eber Monroe MD;  Location: [facility] OR     PICC INSERTION Right 10/14/2017    5fr DL Bard PICC, 40cm (1cm external) in the R basilic vein w/ tip in the mid SVC.       Current Outpatient Prescriptions   Medication Sig     senna-docusate (SENOKOT-S;PERICOLACE) 8.6-50 MG per tablet Take 1 tablet by mouth 2 times daily And 1 tablet by mouth two times daily as needed.     aspirin EC 81 MG EC tablet Take 1 tablet (81 mg) by mouth daily     isosorbide dinitrate (ISORDIL) 20 MG tablet Take 1 tablet (20 mg) by mouth 3 times daily     hydrALAZINE (APRESOLINE) 25 MG tablet Take 1.5 tablets (37.5 mg) by mouth every 8 hours     furosemide (LASIX) 20 MG  "tablet Take 1 tablet (20 mg) by mouth 2 times daily     order for DME O2 by nasal cannula continuous 3 L/min to maintain O2 saturations > 92%     HYDROcodone-acetaminophen (NORCO) 5-325 MG per tablet Take 1 tablet by mouth every 6 hours as needed for moderate to severe pain     pantoprazole (PROTONIX) 40 MG EC tablet Take 1 tablet (40 mg) by mouth daily Take 30-60 minutes before a meal.     tamsulosin (FLOMAX) 0.4 MG capsule Take 1 capsule (0.4 mg) by mouth daily     gabapentin (NEURONTIN) 300 MG capsule TAKE 1 CAPSULE BY MOUTH THREE TIMES DAILY     fiber modular (NUTRISOURCE FIBER) packet 1 packet daily     simvastatin (ZOCOR) 20 MG tablet TAKE 1 TABLET BY MOUTH AT BEDTIME     finasteride (PROSCAR) 5 MG tablet TAKE 1 TABLET BY MOUTH EVERY DAY     metoprolol (TOPROL-XL) 25 MG 24 hr tablet TAKE 1 TABLET BY MOUTH DAILY     No current facility-administered medications for this visit.        Allergies   Allergen Reactions     Celebrex [Celecoxib] Hives     Penicillins Hives       Social History     Social History     Marital status: Single     Spouse name: N/A     Number of children: N/A     Years of education: N/A     Occupational History     Not on file.     Social History Main Topics     Smoking status: Former Smoker     Smokeless tobacco: Never Used     Alcohol use No      Comment: doesnt use anymore     Drug use: No     Sexual activity: No     Other Topics Concern     Parent/Sibling W/ Cabg, Mi Or Angioplasty Before 65f 55m? No     Social History Narrative          Information reviewed:  Medications, vital signs, orders, nursing notes, problem list, hospital information.     ROS: All 10 point review of system completed, those pertinent positive, please see H&P, the remaining ROS is negative.    /57  Pulse 92  Temp 97.7  F (36.5  C)  Resp 18  Ht 5' 7\" (1.702 m)  Wt 156 lb (70.8 kg)  SpO2 91%  BMI 24.43 kg/m2    PHYSICAL EXAMINATION:   GENERAL:  No acute distress.  SKIN:  Dry and warm.  There is no rash " at area of skin examined.  HEENT:  Head without trauma.  Pupils round, reactive. Exam of conjunctiva and lids are normal. Sclera without icterus. There is no oral thrush.  NECK:  Supple. No jugular venous distension.  CHEST: No reproducible chest tenderness.   LUNGS:  Normal respiratory effort. Lungs reveal decreased breath sounds at bases. No rales or wheezes.  HEART:  Regular rate and rhythm.  No murmur, gallops or rubs auscultated.  ABDOMEN:  Soft, bowel sounds positive.  There is no tenderness or guarding.   EXTREMITIES: trace edema.   NEUROLOGIC:  Alert and oriented x3.  Moving all ext. Gait not tested.  PSYCH: Recent and remote memory is normal. Mood and affect are normal.    Lab/Diagnostic data:  Reviewed    CBC Lab Results (Last 5 results in 365 days)       WBC RBC Hgb Hct MCV MCH MCHC RDW Plt Neut # Lymph # Eos # Baso # Immat. Gran #   10/17/17 0335 10.4 3.96 12.2 38.3 97 30.8 31.9 13.3 114 8.4 0.8 0.1 0.0 0.0   10/16/17 1215 6.3 3.56 11.1 34.2 96 31.2 32.5 13.2 97 -- -- -- -- --   10/16/17 1037 -- -- 11.5 -- -- -- -- -- -- -- -- -- -- --   10/16/17 0402 6.7 3.68 11.4 34.4 94 31.0 33.1 13.2 116 -- -- -- -- --   10/15/17 0420 7.2 3.87 12.1 37.2 96 31.3 32.5 13.3 108 5.8 0.7 0.0 0.0 0.0   CMP Labs (Last 5 results in 365 days)       Na+ K+ Cl CO2 ANION GAP Glc BUN Creat GFR GFR-Black Calcium Mg ALT AST A1C TSH LDL HIV   10/18/17 0721 138 4.0 98 33 8 116 50 1.54 43 51 8.9 -- 16 14 -- -- -- --   10/17/17 0335 142 4.3 101 30 11 112 50 1.50 44 53 8.7 -- 18 19 -- -- -- --   10/17/17 0335 -- -- -- -- -- -- -- -- -- -- -- 2.4 -- -- -- -- -- --   10/16/17 2222 -- 3.8 -- -- -- -- -- -- -- -- -- -- -- -- -- -- -- --   10/16/17 2222 -- -- -- -- -- -- -- -- -- -- -- 2.2 -- -- -- --       Results for orders placed or performed during the hospital encounter of 10/13/17   XR Chest Port 1 View    Narrative    Exam:  XR CHEST PORT 1 VW, 10/14/2017 4:14 PM    History: RN placed PICC - verify tip placement    Comparison:  Chest  x-ray 10/13/2017    Findings:  Single portable radiograph of the chest. Right arm PICC tip  terminates in the mid to low SVC. Cardiac silhouette is within normal  limits. The vasculature is indistinct. Diffuse bilateral interstitial  opacities are not significantly changed. Blunting of both costophrenic  angles, not significantly changed. No new pulmonary opacities. Upper  abdomen unremarkable.      Impression    Impression:  New right arm PICC tip terminates in the mid to low SVC.  Stable bilateral mixed interstitial/airspace opacities.    I have personally reviewed the examination and initial interpretation  and I agree with the findings.    RAIZA KHAN MD   XR Chest Port 1 View    Narrative    XR CHEST PORT 1 VW 10/17/2017 2:10 AM    History: POD #1 S/P Mitral valve repair (Mitraclip) - Degeneration of  Mitral Valve    Comparison: 10/14/2017    Findings: Single AP view of the chest, upright. Right sided PICC tip  projects over the SVC. Partially visualized cervical spinal hardware.  The heart size is within normal limits. The vasculature is mildly  prominent. Improved diffuse bilateral mixed airspace and interstitial  opacities. Blunting of both costophrenic angles is stable to slightly  improved on the right. No new pulmonary opacities.      Impression    Impression:   1. Improved bilateral opacities with mild residual interstitial  opacities, likely improved pulmonary edema.  2. Small bilateral effusions, slightly improved on the right.  3. Mild central pulmonary vascular congestion.    I have personally reviewed the examination and initial interpretation  and I agree with the findings.    ALBA ROJAS MD     ECHO IFEANYI interventional procedure 10/16/17  Interpretation Summary  PROCEDURE  Intraprocedural IFEANYI for monitoring of trans-catheter [id number] mitral valve  repair (TMVR). Extensive 3D image acquisition, reconstruction, and real-time  interpretation.      ASSESSMENT / PLAN:     Physical  deconditioning  Plan: PT/OT with increase independence, self-care, strength and endurance and other cares that help return to home/facility of origin, fall precautions. Care conference with patient and family for the progress of rehab and disposition issues will be discussed as planned. Rehab evaluation and other evaluations including CPT are at rehab logs, to be reviewed separately.  Fall risk assessment as well as cognitive evaluation will be formed during rehab stay if indicated.    Chronic diastolic congestive heart failure (H) an dMitral valve insufficiency, unspecified etiology  Plan: continue current medications lasix, metoprolol, hydralazine, Isordil and possibly lisinopril at the clinic, monitor I&O and daily weighs and labs if indicated. Wean O2 as able.    Benign essential hypertension  Plan: Continue metoprolol and hydralazine    CKD (chronic kidney disease) stage 3, GFR 30-59 ml/min  Plan: Continue to monitor BMP.    Chronic pain syndrome  Plan: Continue current Norco and gabapentin. Pain controlled.    Other problems with same care. Primary care doctor and other specialists to address those chronic problems in next clinic appointment to be scheduled upon discharge from the TCU.      Total time spent with patient visit was 35 min including patient visit, review of past records, patients counseling and coordinating care.        Jamison Santos MD

## 2017-10-23 ENCOUNTER — HOSPITAL LABORATORY (OUTPATIENT)
Dept: OTHER | Facility: CLINIC | Age: 82
End: 2017-10-23

## 2017-10-23 LAB
ANION GAP SERPL CALCULATED.3IONS-SCNC: 7 MMOL/L (ref 3–14)
BUN SERPL-MCNC: 29 MG/DL (ref 7–30)
CALCIUM SERPL-MCNC: 8.5 MG/DL (ref 8.5–10.1)
CHLORIDE SERPL-SCNC: 102 MMOL/L (ref 94–109)
CO2 SERPL-SCNC: 29 MMOL/L (ref 20–32)
CREAT SERPL-MCNC: 1.32 MG/DL (ref 0.66–1.25)
ERYTHROCYTE [DISTWIDTH] IN BLOOD BY AUTOMATED COUNT: 14 % (ref 10–15)
GFR SERPL CREATININE-BSD FRML MDRD: 51 ML/MIN/1.7M2
GLUCOSE SERPL-MCNC: 134 MG/DL (ref 70–99)
HCT VFR BLD AUTO: 34.4 % (ref 40–53)
HGB BLD-MCNC: 11.4 G/DL (ref 13.3–17.7)
MCH RBC QN AUTO: 31.6 PG (ref 26.5–33)
MCHC RBC AUTO-ENTMCNC: 33.1 G/DL (ref 31.5–36.5)
MCV RBC AUTO: 95 FL (ref 78–100)
PLATELET # BLD AUTO: 133 10E9/L (ref 150–450)
POTASSIUM SERPL-SCNC: 3.5 MMOL/L (ref 3.4–5.3)
RBC # BLD AUTO: 3.61 10E12/L (ref 4.4–5.9)
SODIUM SERPL-SCNC: 138 MMOL/L (ref 133–144)
WBC # BLD AUTO: 7.9 10E9/L (ref 4–11)

## 2017-10-24 ENCOUNTER — NURSING HOME VISIT (OUTPATIENT)
Dept: GERIATRICS | Facility: CLINIC | Age: 82
End: 2017-10-24
Payer: MEDICARE

## 2017-10-24 VITALS
SYSTOLIC BLOOD PRESSURE: 129 MMHG | TEMPERATURE: 97.1 F | RESPIRATION RATE: 18 BRPM | OXYGEN SATURATION: 98 % | HEIGHT: 67 IN | WEIGHT: 150 LBS | DIASTOLIC BLOOD PRESSURE: 83 MMHG | BODY MASS INDEX: 23.54 KG/M2 | HEART RATE: 98 BPM

## 2017-10-24 DIAGNOSIS — Z98.890 S/P MITRAL VALVE CLIP IMPLANTATION: ICD-10-CM

## 2017-10-24 DIAGNOSIS — Z95.818 S/P MITRAL VALVE CLIP IMPLANTATION: ICD-10-CM

## 2017-10-24 DIAGNOSIS — K21.9 GASTROESOPHAGEAL REFLUX DISEASE, ESOPHAGITIS PRESENCE NOT SPECIFIED: ICD-10-CM

## 2017-10-24 DIAGNOSIS — I34.0 NON-RHEUMATIC MITRAL REGURGITATION: ICD-10-CM

## 2017-10-24 DIAGNOSIS — I34.0 MITRAL VALVE INSUFFICIENCY, UNSPECIFIED ETIOLOGY: ICD-10-CM

## 2017-10-24 DIAGNOSIS — Z98.890 S/P MITRAL VALVE REPAIR: ICD-10-CM

## 2017-10-24 DIAGNOSIS — I50.30 DIASTOLIC HEART FAILURE, UNSPECIFIED HEART FAILURE CHRONICITY: ICD-10-CM

## 2017-10-24 DIAGNOSIS — I10 BENIGN ESSENTIAL HYPERTENSION: ICD-10-CM

## 2017-10-24 DIAGNOSIS — M48.061 SPINAL STENOSIS OF LUMBAR REGION, UNSPECIFIED WHETHER NEUROGENIC CLAUDICATION PRESENT: ICD-10-CM

## 2017-10-24 DIAGNOSIS — N18.30 CKD (CHRONIC KIDNEY DISEASE) STAGE 3, GFR 30-59 ML/MIN (H): ICD-10-CM

## 2017-10-24 DIAGNOSIS — I25.10 CORONARY ARTERY DISEASE INVOLVING NATIVE CORONARY ARTERY OF NATIVE HEART WITHOUT ANGINA PECTORIS: ICD-10-CM

## 2017-10-24 DIAGNOSIS — C67.4 MALIGNANT NEOPLASM OF POSTERIOR WALL OF URINARY BLADDER (H): Primary | Chronic | ICD-10-CM

## 2017-10-24 PROCEDURE — 99309 SBSQ NF CARE MODERATE MDM 30: CPT | Performed by: NURSE PRACTITIONER

## 2017-10-24 RX ORDER — HYDROCODONE BITARTRATE AND ACETAMINOPHEN 7.5; 325 MG/1; MG/1
1 TABLET ORAL EVERY 6 HOURS PRN
Qty: 18 TABLET | Refills: 0
Start: 2017-10-24 | End: 2018-01-24

## 2017-10-24 RX ORDER — PANTOPRAZOLE SODIUM 20 MG/1
20 TABLET, DELAYED RELEASE ORAL DAILY
Refills: 0
Start: 2017-10-24 | End: 2017-10-27

## 2017-10-24 NOTE — PROGRESS NOTES
[place] [facility]    Chief Complaint   Patient presents with     RECHECK       HPI:    Ivan Gomez is a 89 year old  (10/12/1926), who is being seen today for an episodic care visit at [place] on [facility] TCU.  HPI information obtained from: facility chart records, facility staff, patient report and [facility] chart review.Today's concern is:  S/P mitral valve clip implantation  Non-rheumatic mitral regurgitation  Patient transferred to TCU following hospital stay due to hypoxic respiratory failure and volume overload. He was found to have severd mitral valve regurgitation due to flail posterior leaflet. He was transferred to [facility] for Pamela clip, which was done on 10/16.   Post operatively was signficant for starting after load reduction with hydralazine and imdur.   He has been working with physical therapy in TCU. He is walking back and forth to therapy gym with now shortness of breath.   Diastolic heart failure, unspecified heart failure chronicity (H)  Prior to pamela clip repair he was treated for acute hypoxic respiratory failure and volume overload. He was diuresed about 5.1L with IV lasix.   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 Now seeking palliative treatment.     CKD (chronic kidney disease) stage 3, GFR 30-59 ml/min  Baseline creat 1.2-1.3   Creat did bump with IV lasix.   Creat upon transfer to TCU 1.34    Benign essential hypertension  BP's: 129/83, 120/86, 118/65    Spinal stenosis of lumbar region, unspecified whether neurogenic claudication present  Patient reports ongoing pain in lower back. He has been taking norco 7.5/325 q 6 h prn, usually at night. He does walk with a walker.            ALLERGIES: Celebrex [celecoxib] and Penicillins  Past Medical, Surgical, Family and Social History reviewed and updated in [facility].    Current  Outpatient Prescriptions   Medication Sig Dispense Refill     senna-docusate (SENOKOT-S;PERICOLACE) 8.6-50 MG per tablet Take 1 tablet by mouth 2 times daily And 1 tablet by mouth two times daily as needed.       aspirin EC 81 MG EC tablet Take 1 tablet (81 mg) by mouth daily       isosorbide dinitrate (ISORDIL) 20 MG tablet Take 1 tablet (20 mg) by mouth 3 times daily       hydrALAZINE (APRESOLINE) 25 MG tablet Take 1.5 tablets (37.5 mg) by mouth every 8 hours 60 tablet      furosemide (LASIX) 20 MG tablet Take 1 tablet (20 mg) by mouth 2 times daily 30 tablet      order for DME O2 by nasal cannula continuous 3 L/min to maintain O2 saturations > 92% 1 Can 1     HYDROcodone-acetaminophen (NORCO) 5-325 MG per tablet Take 1 tablet by mouth every 6 hours as needed for moderate to severe pain 20 tablet 0     pantoprazole (PROTONIX) 40 MG EC tablet Take 1 tablet (40 mg) by mouth daily Take 30-60 minutes before a meal. 30 tablet 1     tamsulosin (FLOMAX) 0.4 MG capsule Take 1 capsule (0.4 mg) by mouth daily 90 capsule 0     gabapentin (NEURONTIN) 300 MG capsule TAKE 1 CAPSULE BY MOUTH THREE TIMES DAILY 270 capsule 1     fiber modular (NUTRISOURCE FIBER) packet 1 packet daily       simvastatin (ZOCOR) 20 MG tablet TAKE 1 TABLET BY MOUTH AT BEDTIME 90 tablet 1     finasteride (PROSCAR) 5 MG tablet TAKE 1 TABLET BY MOUTH EVERY DAY 90 tablet 3     metoprolol (TOPROL-XL) 25 MG 24 hr tablet TAKE 1 TABLET BY MOUTH DAILY 90 tablet 3     Medications reviewed:  Medications reconciled to facility chart and changes were made to reflect current medications as identified as above med list. Below are the changes that were made:   Medications stopped since last EPIC medication reconciliation:   There are no discontinued medications.    Medications started since last [facility] medication reconciliation:  No orders of the defined types were placed in this encounter.        REVIEW OF SYSTEMS:  4 point ROS including Respiratory, CV, GI and ,  "other than that noted in the HPI,  is negative    Physical Exam:  /83  Pulse 98  Temp 97.1  F (36.2  C)  Resp 18  Ht 5' 7\" (1.702 m)  Wt 150 lb (68 kg)  SpO2 98%  BMI 23.49 kg/m2  GENERAL APPEARANCE:  Alert, in no distress  ENT:  Mouth and posterior oropharynx normal, moist mucous membranes, hearing acuity adequate   EYES:  EOM, conjunctivae, lids, pupils and irises normal  NECK:  No adenopathy,masses or thyromegaly  RESP:  respiratory effort and palpation of chest normal, no respiratory distress, Lung sounds clear  CV:  Palpation and auscultation of heart done , rate and rhythm reg, no murmur, no rub or gallop, Edema none  ABDOMEN:  normal bowel sounds, soft, nontender, no hepatosplenomegaly or other masses  M/S:   Gait and station steady with walker, Digits and nails nl  SKIN:  Inspection/Palpation of skin and subcutaneous tissue  No rash  NEURO: 2-12 in normal limits and at patient's baseline  PSYCH:  insight and judgement, memory intact , affect and mood normal      Recent Labs:    CBC RESULTS:   Recent Labs   Lab Test  10/23/17   0600  10/17/17   0335   WBC  7.9  10.4   RBC  3.61*  3.96*   HGB  11.4*  12.2*   HCT  34.4*  38.3*   MCV  95  97   MCH  31.6  30.8   MCHC  33.1  31.9   RDW  14.0  13.3   PLT  133*  114*       Last Basic Metabolic Panel:  Recent Labs   Lab Test  10/23/17   0600  10/20/17   1210   NA  138  137   POTASSIUM  3.5  3.4   CHLORIDE  102  97   GIUSEPPE  8.5  8.9   CO2  29  32   BUN  29  41*   CR  1.32*  1.34*   GLC  134*  80       Liver Function Studies -   Recent Labs   Lab Test  10/18/17   0721  10/17/17   0335   PROTTOTAL  6.4*  6.9   ALBUMIN  2.9*  3.0*   BILITOTAL  1.1  1.4*   ALKPHOS  60  62   AST  14  19   ALT  16  18       TSH   Date Value Ref Range Status   08/04/2016 1.14 0.40 - 5.00 mU/L Final   03/22/2016 1.77 0.40 - 5.00 mU/L Final       Lab Results   Component Value Date    A1C 5.3 08/18/2015       Assessment/Plan:      (Z98.890,  Z95.818) S/P mitral valve clip " implantation  (I34.0) Non-rheumatic mitral regurgitation  Comment: patient transferred to TCU after mitul clip procedure. Patient tolerated procedure well. Post op period was significant for starting after load reducing meds, which he seems to be tolerating.   Plan: follow up with cardiology as planned    (I50.30) Diastolic heart failure, unspecified heart failure chronicity (H)  Comment: prior to procedure patient was in hypoxic respiratory failure with volume overload. He was diuresed about 5l during hospital stay. wts in TCU are stable at about 151-152lbs  Plan: continue daily wts. 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   Plan: no change    (N18.3) CKD (chronic kidney disease) stage 3, GFR 30-59 ml/min  Comment: creat did bump during hospital stay due to diuresis with lasix. Now back at baseline  Plan: follow BMP, avoid nephrotoxines    (I10) Benign essential hypertension  Comment: adequate control on new meds.   Plan: monitor BPs    (M48.061) Spinal stenosis of lumbar region, unspecified whether neurogenic claudication present  Comment: chronic pain. Would like his former dose of norco restored   Plan: norco 7.5/325 q 6 h prn        Electronically signed by  FRANK Menchaca CNP

## 2017-10-25 ENCOUNTER — NURSING HOME VISIT (OUTPATIENT)
Dept: GERIATRICS | Facility: CLINIC | Age: 82
End: 2017-10-25
Payer: MEDICARE

## 2017-10-25 VITALS
RESPIRATION RATE: 17 BRPM | SYSTOLIC BLOOD PRESSURE: 122 MMHG | OXYGEN SATURATION: 96 % | TEMPERATURE: 97.8 F | DIASTOLIC BLOOD PRESSURE: 75 MMHG | WEIGHT: 150 LBS | BODY MASS INDEX: 23.54 KG/M2 | HEART RATE: 95 BPM | HEIGHT: 67 IN

## 2017-10-25 DIAGNOSIS — H04.123 DRY EYES: ICD-10-CM

## 2017-10-25 DIAGNOSIS — C67.4 MALIGNANT NEOPLASM OF POSTERIOR WALL OF URINARY BLADDER (H): Chronic | ICD-10-CM

## 2017-10-25 DIAGNOSIS — I50.30 DIASTOLIC CONGESTIVE HEART FAILURE, UNSPECIFIED CONGESTIVE HEART FAILURE CHRONICITY: ICD-10-CM

## 2017-10-25 DIAGNOSIS — N18.30 CKD (CHRONIC KIDNEY DISEASE) STAGE 3, GFR 30-59 ML/MIN (H): ICD-10-CM

## 2017-10-25 DIAGNOSIS — M48.061 SPINAL STENOSIS OF LUMBAR REGION, UNSPECIFIED WHETHER NEUROGENIC CLAUDICATION PRESENT: ICD-10-CM

## 2017-10-25 DIAGNOSIS — K41.90 FEMORAL HERNIA OF RIGHT SIDE: ICD-10-CM

## 2017-10-25 DIAGNOSIS — I34.0 NON-RHEUMATIC MITRAL REGURGITATION: ICD-10-CM

## 2017-10-25 DIAGNOSIS — I10 BENIGN ESSENTIAL HYPERTENSION: ICD-10-CM

## 2017-10-25 DIAGNOSIS — Z98.890 S/P MITRAL VALVE CLIP IMPLANTATION: Primary | ICD-10-CM

## 2017-10-25 DIAGNOSIS — Z95.818 S/P MITRAL VALVE CLIP IMPLANTATION: Primary | ICD-10-CM

## 2017-10-25 PROCEDURE — 99309 SBSQ NF CARE MODERATE MDM 30: CPT | Performed by: NURSE PRACTITIONER

## 2017-10-25 NOTE — PROGRESS NOTES
[place] [facility]    Chief Complaint   Patient presents with     RECHECK       HPI:    Ivan Gomez is a 89 year old  (10/12/1926), who is being seen today for an episodic care visit at [place] on [facility] T[facility].  HPI information obtained from: facility chart records, facility staff, patient report and [facility] chart review.Today's concern is:  S/P mitral valve clip implantation  Non-rheumatic mitral regurgitation  Patient transferred to TCU following hospital stay due to hypoxic respiratory failure and volume overload. He was found to have severd mitral valve regurgitation due to flail posterior leaflet. He was transferred to [facility] for Pamela clip, which was done on 10/16.   Post operatively was signficant for starting after load reduction with hydralazine and imdur.   He has been working with physical therapy in TCU. He is walking back and forth to therapy gym with now shortness of breath.   Diastolic heart failure, unspecified heart failure chronicity (H)  Prior to pamela clip repair he was treated for acute hypoxic respiratory failure and volume overload. He was diuresed about 5.1L with IV lasix.   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 Now seeking palliative treatment.     CKD (chronic kidney disease) stage 3, GFR 30-59 ml/min  Baseline creat 1.2-1.3   Creat did bump with IV lasix.   Creat upon transfer to TCU 1.34    Benign essential hypertension  BP's: 122/ 75, 127/68, 127/68    Spinal stenosis of lumbar region, unspecified whether neurogenic claudication present  Patient reports ongoing pain in lower back. He has been taking norco 7.5/325 q 6 h prn, usually at night. He does walk with a walker.      Femoral hernia- he reports hernia is a bit painful. He plans of meeting with a surgeon once things settle down medically    Dry eyes- patient reports  left eye is sore. He uses systane eye drops at home.          ALLERGIES: Celebrex [celecoxib] and Penicillins  Past Medical, Surgical, Family and Social History reviewed and updated in [facility].    Current Outpatient Prescriptions   Medication Sig Dispense Refill     Skin Protectants, Misc. (EUCERIN) cream Apply topically At Bedtime Apply to hands       pantoprazole (PROTONIX) 20 MG EC tablet Take 1 tablet (20 mg) by mouth daily Take 30-60 minutes before a meal.  0     HYDROcodone-acetaminophen (NORCO) 7.5-325 MG per tablet Take 1 tablet by mouth every 6 hours as needed for pain maximum 4 tablet(s) per day 18 tablet 0     senna-docusate (SENOKOT-S;PERICOLACE) 8.6-50 MG per tablet Take 1 tablet by mouth 2 times daily And 1 tablet by mouth two times daily as needed.       aspirin EC 81 MG EC tablet Take 1 tablet (81 mg) by mouth daily       isosorbide dinitrate (ISORDIL) 20 MG tablet Take 1 tablet (20 mg) by mouth 3 times daily       hydrALAZINE (APRESOLINE) 25 MG tablet Take 1.5 tablets (37.5 mg) by mouth every 8 hours 60 tablet      furosemide (LASIX) 20 MG tablet Take 1 tablet (20 mg) by mouth 2 times daily 30 tablet      order for DME O2 by nasal cannula continuous 3 L/min to maintain O2 saturations > 92% 1 Can 1     tamsulosin (FLOMAX) 0.4 MG capsule Take 1 capsule (0.4 mg) by mouth daily 90 capsule 0     gabapentin (NEURONTIN) 300 MG capsule TAKE 1 CAPSULE BY MOUTH THREE TIMES DAILY 270 capsule 1     fiber modular (NUTRISOURCE FIBER) packet 1 packet daily       simvastatin (ZOCOR) 20 MG tablet TAKE 1 TABLET BY MOUTH AT BEDTIME 90 tablet 1     finasteride (PROSCAR) 5 MG tablet TAKE 1 TABLET BY MOUTH EVERY DAY 90 tablet 3     metoprolol (TOPROL-XL) 25 MG 24 hr tablet TAKE 1 TABLET BY MOUTH DAILY 90 tablet 3     Medications reviewed:  Medications reconciled to facility chart and changes were made to reflect current medications as identified as above med list. Below are the changes that were made:   Medications stopped  "since last [facility] medication reconciliation:   There are no discontinued medications.    Medications started since last [facility] medication reconciliation:  No orders of the defined types were placed in this encounter.        REVIEW OF SYSTEMS:  4 point ROS including Respiratory, CV, GI and , other than that noted in the HPI,  is negative    Physical Exam:  /75  Pulse 95  Temp 97.8  F (36.6  C)  Resp 17  Ht 5' 7\" (1.702 m)  Wt 150 lb (68 kg)  SpO2 96%  BMI 23.49 kg/m2  GENERAL APPEARANCE:  Alert, in no distress  ENT:  Mouth and posterior oropharynx normal, moist mucous membranes, hearing acuity adequate   EYES:  EOM, conjunctivae, lids, pupils and irises normal  RESP:  respiratory effort and palpation of chest normal, no respiratory distress  CV:   Edema none  ABDOMEN:  normal bowel sounds, soft, nontender, large bulge at suprapubic area.   M/S:   Gait and station steady with walker, Digits and nails nl  SKIN:  Inspection/Palpation of skin and subcutaneous tissue  No rash  NEURO: 2-12 in normal limits and at patient's baseline  PSYCH:  insight and judgement, memory intact , affect and mood normal      Recent Labs:    CBC RESULTS:   Recent Labs   Lab Test  10/23/17   0600  10/17/17   0335   WBC  7.9  10.4   RBC  3.61*  3.96*   HGB  11.4*  12.2*   HCT  34.4*  38.3*   MCV  95  97   MCH  31.6  30.8   MCHC  33.1  31.9   RDW  14.0  13.3   PLT  133*  114*       Last Basic Metabolic Panel:  Recent Labs   Lab Test  10/23/17   0600  10/20/17   1210   NA  138  137   POTASSIUM  3.5  3.4   CHLORIDE  102  97   GIUSEPPE  8.5  8.9   CO2  29  32   BUN  29  41*   CR  1.32*  1.34*   GLC  134*  80       Liver Function Studies -   Recent Labs   Lab Test  10/18/17   0721  10/17/17   0335   PROTTOTAL  6.4*  6.9   ALBUMIN  2.9*  3.0*   BILITOTAL  1.1  1.4*   ALKPHOS  60  62   AST  14  19   ALT  16  18       Assessment/Plan:      (Z98.890,  Z95.818) S/P mitral valve clip implantation  (I34.0) Non-rheumatic mitral regurgitation  Comment: " patient transferred to TCU after mitul clip procedure. Patient tolerated procedure well. Post op period was significant for starting after load reducing meds, which he seems to be tolerating.   Plan: follow up with cardiology as planned    (I50.30) Diastolic heart failure, unspecified heart failure chronicity (H)  Comment: prior to procedure patient was in hypoxic respiratory failure with volume overload. He was diuresed about 5l during hospital stay. wts in TCU are stable at about 151-152lbs  Plan: continue daily wts. 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   Plan: no change    (N18.3) CKD (chronic kidney disease) stage 3, GFR 30-59 ml/min  Comment: creat did bump during hospital stay due to diuresis with lasix. Now back at baseline  Plan: follow BMP, avoid nephrotoxins    (I10) Benign essential hypertension  Comment: adequate control on new meds.   Plan: monitor BPs    (M48.061) Spinal stenosis of lumbar region, unspecified whether neurogenic claudication present  Comment: chronic pain. Would like his former dose of norco restored   Plan: norco 7.5/325 q 6 h prn    Dry eye  Comment: eyes are not red. No drainage. Vision is at baseline.   Plan: artificial tears prn    Femoral hernia  Comment: present for past 3 or 4 mos. Plans on meeting with surgeon when recovered from present cardiac issue  Plan: as above    Electronically signed by  FRANK Menchaca CNP

## 2017-10-26 ENCOUNTER — NURSING HOME VISIT (OUTPATIENT)
Dept: GERIATRICS | Facility: CLINIC | Age: 82
End: 2017-10-26
Payer: MEDICARE

## 2017-10-26 VITALS
TEMPERATURE: 97.7 F | RESPIRATION RATE: 17 BRPM | SYSTOLIC BLOOD PRESSURE: 132 MMHG | HEART RATE: 95 BPM | HEIGHT: 67 IN | BODY MASS INDEX: 23.54 KG/M2 | DIASTOLIC BLOOD PRESSURE: 77 MMHG | WEIGHT: 150 LBS | OXYGEN SATURATION: 96 %

## 2017-10-26 DIAGNOSIS — M48.061 SPINAL STENOSIS OF LUMBAR REGION, UNSPECIFIED WHETHER NEUROGENIC CLAUDICATION PRESENT: ICD-10-CM

## 2017-10-26 DIAGNOSIS — Z95.818 S/P MITRAL VALVE CLIP IMPLANTATION: ICD-10-CM

## 2017-10-26 DIAGNOSIS — Z98.890 S/P MITRAL VALVE CLIP IMPLANTATION: ICD-10-CM

## 2017-10-26 DIAGNOSIS — K41.90 FEMORAL HERNIA OF RIGHT SIDE: ICD-10-CM

## 2017-10-26 DIAGNOSIS — I50.30 DIASTOLIC HEART FAILURE, UNSPECIFIED HEART FAILURE CHRONICITY: ICD-10-CM

## 2017-10-26 DIAGNOSIS — I34.0 NON-RHEUMATIC MITRAL REGURGITATION: ICD-10-CM

## 2017-10-26 DIAGNOSIS — C67.4 MALIGNANT NEOPLASM OF POSTERIOR WALL OF URINARY BLADDER (H): Chronic | ICD-10-CM

## 2017-10-26 DIAGNOSIS — N18.30 CKD (CHRONIC KIDNEY DISEASE) STAGE 3, GFR 30-59 ML/MIN (H): ICD-10-CM

## 2017-10-26 DIAGNOSIS — R10.13 ABDOMINAL PAIN, EPIGASTRIC: Primary | ICD-10-CM

## 2017-10-26 DIAGNOSIS — I10 BENIGN ESSENTIAL HYPERTENSION: ICD-10-CM

## 2017-10-26 PROCEDURE — 99310 SBSQ NF CARE HIGH MDM 45: CPT | Performed by: NURSE PRACTITIONER

## 2017-10-26 NOTE — PROGRESS NOTES
[place] [facility]    Chief Complaint   Patient presents with     RECHECK       HPI:    Ivan Gomez is a 89 year old  (10/12/1926), who is being seen today for an episodic care visit at [place] on [facility] TCU.  HPI information obtained from: facility chart records, facility staff, patient report and [facility] chart review.Today's concern is:  Abdominal pain- patient reports severe epigastric pain this morning after taking a pill (hydralazine) on empty stomach. Pain was similar to that experienced two weeks ago. At that time he was prescribed protonix. The next day he went to ED and was treated for HF and eventually had a pamela clip.   Upon exam he reports having several bowel movements this am. He did take protonix and norco and now pain has subsided. He is not short of breath. He does not endorse CP or back pain.   Abd xray done one year ago showed signs of gastroenteritis.   S/P mitral valve clip implantation  Non-rheumatic mitral regurgitation  Patient transferred to TCU following hospital stay due to hypoxic respiratory failure and volume overload. He was found to have severd mitral valve regurgitation due to flail posterior leaflet. He was transferred to [facility] for Pamela clip, which was done on 10/16.   Post operatively was signficant for starting after load reduction with hydralazine and imdur.   He has been working with physical therapy in TCU. He has been walking back and forth to therapy gym with now shortness of breath.   Diastolic heart failure, unspecified heart failure chronicity (H)  Prior to pamela clip repair he was treated for acute hypoxic respiratory failure and volume overload. He was diuresed about 5.1L with IV lasix.   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 Now seeking palliative treatment.      CKD (chronic kidney disease) stage 3, GFR 30-59 ml/min  Baseline creat 1.2-1.3   Creat did bump with IV lasix.   Creat upon transfer to TCU 1.34    Benign essential hypertension  BP's: 132/77, 136/80, 125/69    Spinal stenosis of lumbar region, unspecified whether neurogenic claudication present  Patient reports ongoing pain in lower back. He has been taking norco 7.5/325 q 6 h prn, usually at night. He does walk with a walker.      Femoral hernia- he reports hernia is a bit painful. He plans of meeting with a surgeon once things settle down medically. Today he reports he would like to meet with surgeon soon.         ALLERGIES: Celebrex [celecoxib] and Penicillins  Past Medical, Surgical, Family and Social History reviewed and updated in [facility].    Current Outpatient Prescriptions   Medication Sig Dispense Refill     hypromellose (ARTIFICIAL TEARS) 0.4 % SOLN ophthalmic solution Place 2 drops Into the left eye 4 times daily       Skin Protectants, Misc. (EUCERIN) cream Apply topically At Bedtime Apply to hands       pantoprazole (PROTONIX) 20 MG EC tablet Take 1 tablet (20 mg) by mouth daily Take 30-60 minutes before a meal.  0     HYDROcodone-acetaminophen (NORCO) 7.5-325 MG per tablet Take 1 tablet by mouth every 6 hours as needed for pain maximum 4 tablet(s) per day 18 tablet 0     senna-docusate (SENOKOT-S;PERICOLACE) 8.6-50 MG per tablet Take 1 tablet by mouth 2 times daily And 1 tablet by mouth two times daily as needed.       aspirin EC 81 MG EC tablet Take 1 tablet (81 mg) by mouth daily       isosorbide dinitrate (ISORDIL) 20 MG tablet Take 1 tablet (20 mg) by mouth 3 times daily       hydrALAZINE (APRESOLINE) 25 MG tablet Take 1.5 tablets (37.5 mg) by mouth every 8 hours 60 tablet      furosemide (LASIX) 20 MG tablet Take 1 tablet (20 mg) by mouth 2 times daily 30 tablet      order for DME O2 by nasal cannula continuous 3 L/min to maintain O2 saturations > 92% 1 Can 1     tamsulosin (FLOMAX) 0.4 MG  "capsule Take 1 capsule (0.4 mg) by mouth daily 90 capsule 0     gabapentin (NEURONTIN) 300 MG capsule TAKE 1 CAPSULE BY MOUTH THREE TIMES DAILY 270 capsule 1     fiber modular (NUTRISOURCE FIBER) packet 1 packet daily       simvastatin (ZOCOR) 20 MG tablet TAKE 1 TABLET BY MOUTH AT BEDTIME 90 tablet 1     finasteride (PROSCAR) 5 MG tablet TAKE 1 TABLET BY MOUTH EVERY DAY 90 tablet 3     metoprolol (TOPROL-XL) 25 MG 24 hr tablet TAKE 1 TABLET BY MOUTH DAILY 90 tablet 3     Medications reviewed:  Medications reconciled to facility chart and changes were made to reflect current medications as identified as above med list. Below are the changes that were made:   Medications stopped since last EPIC medication reconciliation:   There are no discontinued medications.    Medications started since last [facility] medication reconciliation:  No orders of the defined types were placed in this encounter.        REVIEW OF SYSTEMS:  4 point ROS including Respiratory, CV, GI and , other than that noted in the HPI,  is negative    Physical Exam:  /77  Pulse 95  Temp 97.7  F (36.5  C)  Resp 17  Ht 5' 7\" (1.702 m)  Wt 150 lb (68 kg)  SpO2 96%  BMI 23.49 kg/m2  GENERAL APPEARANCE:  Alert, in no distress  ENT:  Mouth and posterior oropharynx normal, moist mucous membranes, hearing acuity adequate   EYES:  EOM, conjunctivae, lids, pupils and irises normal  RESP:  respiratory effort and palpation of chest normal, no respiratory distress, lungs sound clear  CV:  HRR,  Edema none  ABDOMEN:  normal bowel sounds, soft, nontender, large bulge at suprapubic area.   M/S:   Gait and station steady with walker, Digits and nails nl  SKIN:  Inspection/Palpation of skin and subcutaneous tissue  No rash  NEURO: 2-12 in normal limits and at patient's baseline  PSYCH:  insight and judgement, memory intact , affect and mood normal      Recent Labs:    CBC RESULTS:   Recent Labs   Lab Test  10/23/17   0600  10/17/17   0335   WBC  7.9  10.4   RBC "  3.61*  3.96*   HGB  11.4*  12.2*   HCT  34.4*  38.3*   MCV  95  97   MCH  31.6  30.8   MCHC  33.1  31.9   RDW  14.0  13.3   PLT  133*  114*       Last Basic Metabolic Panel:  Recent Labs   Lab Test  10/23/17   0600  10/20/17   1210   NA  138  137   POTASSIUM  3.5  3.4   CHLORIDE  102  97   GIUSEPPE  8.5  8.9   CO2  29  32   BUN  29  41*   CR  1.32*  1.34*   GLC  134*  80       Liver Function Studies -   Recent Labs   Lab Test  10/18/17   0721  10/17/17   0335   PROTTOTAL  6.4*  6.9   ALBUMIN  2.9*  3.0*   BILITOTAL  1.1  1.4*   ALKPHOS  60  62   AST  14  19   ALT  16  18       TSH   Date Value Ref Range Status   08/04/2016 1.14 0.40 - 5.00 mU/L Final   03/22/2016 1.77 0.40 - 5.00 mU/L Final       Lab Results   Component Value Date    A1C 5.3 08/18/2015         Assessment/Plan:  Abdominal pain  Comment; per patient similar to pain he has had before. Gastritis/ obstructon vs hernia vs cardiac.   Plan:continue protonix.  abd flat plat, CBC w Diff, BMP. Appointment with surgeon to discuss repair of hernia.  IF pain returns may need to send to ED for evaluation. We did discuss seeing a gastroenterologist but he wasn't very interested in that.   Follow up: abd flat plate showed some stool in colon otherwise normal.  He is feeling better and had some lunch.     (Z98.890,  Z95.818) S/P mitral valve clip implantation  (I34.0) Non-rheumatic mitral regurgitation  Comment: patient transferred to TCU after mitul clip procedure. Patient tolerated procedure well. Post op period was significant for starting after load reducing meds, which he seems to be tolerating.   Plan: follow up with cardiology as planned- one month    (I50.30) Diastolic heart failure, unspecified heart failure chronicity (H)  Comment: prior to procedure patient was in hypoxic respiratory failure with volume overload. He was diuresed about 5l during hospital stay. wts in TCU are stable at about 151-152lbs  Plan: continue daily wts. Continue lasix  (C67.4) Malignant  neoplasm of posterior wall of urinary bladder (H) s/po resection and bladder reconstruction x 2   (primary encounter diagnosis)  Comment: palliative treatment.   Plan: no change    (N18.3) CKD (chronic kidney disease) stage 3, GFR 30-59 ml/min  Comment: creat did bump during hospital stay due to diuresis with lasix. Now back at baseline  Plan: follow BMP, avoid nephrotoxins    (I10) Benign essential hypertension  Comment: adequate control on new meds.   Plan: monitor BPs    (M48.061) Spinal stenosis of lumbar region, unspecified whether neurogenic claudication present  Comment: chronic pain. Would like his former dose of norco restored   Plan: norco 7.5/325 q 6 h prn    Femoral hernia  Comment: present for past 3 or 4 mos. Plans on meeting with surgeon when recovered from present cardiac issue  Plan: as above    Electronically signed by  FRANK Menchaca CNP

## 2017-10-27 ENCOUNTER — HOSPITAL LABORATORY (OUTPATIENT)
Dept: OTHER | Facility: CLINIC | Age: 82
End: 2017-10-27

## 2017-10-27 ENCOUNTER — NURSING HOME VISIT (OUTPATIENT)
Dept: GERIATRICS | Facility: CLINIC | Age: 82
End: 2017-10-27
Payer: MEDICARE

## 2017-10-27 VITALS
WEIGHT: 149.8 LBS | BODY MASS INDEX: 23.51 KG/M2 | HEART RATE: 91 BPM | DIASTOLIC BLOOD PRESSURE: 78 MMHG | HEIGHT: 67 IN | RESPIRATION RATE: 16 BRPM | OXYGEN SATURATION: 94 % | SYSTOLIC BLOOD PRESSURE: 125 MMHG | TEMPERATURE: 97.5 F

## 2017-10-27 DIAGNOSIS — I34.0 NON-RHEUMATIC MITRAL REGURGITATION: ICD-10-CM

## 2017-10-27 DIAGNOSIS — M48.061 SPINAL STENOSIS OF LUMBAR REGION, UNSPECIFIED WHETHER NEUROGENIC CLAUDICATION PRESENT: ICD-10-CM

## 2017-10-27 DIAGNOSIS — E87.6 HYPOKALEMIA: ICD-10-CM

## 2017-10-27 DIAGNOSIS — I50.30 DIASTOLIC HEART FAILURE, UNSPECIFIED HEART FAILURE CHRONICITY: ICD-10-CM

## 2017-10-27 DIAGNOSIS — I10 BENIGN ESSENTIAL HYPERTENSION: ICD-10-CM

## 2017-10-27 DIAGNOSIS — R10.13 ABDOMINAL PAIN, EPIGASTRIC: Primary | ICD-10-CM

## 2017-10-27 DIAGNOSIS — C67.4 MALIGNANT NEOPLASM OF POSTERIOR WALL OF URINARY BLADDER (H): Chronic | ICD-10-CM

## 2017-10-27 DIAGNOSIS — Z95.818 S/P MITRAL VALVE CLIP IMPLANTATION: ICD-10-CM

## 2017-10-27 DIAGNOSIS — K41.90 FEMORAL HERNIA OF RIGHT SIDE: ICD-10-CM

## 2017-10-27 DIAGNOSIS — N18.30 CKD (CHRONIC KIDNEY DISEASE) STAGE 3, GFR 30-59 ML/MIN (H): ICD-10-CM

## 2017-10-27 DIAGNOSIS — Z98.890 S/P MITRAL VALVE CLIP IMPLANTATION: ICD-10-CM

## 2017-10-27 LAB
ANION GAP SERPL CALCULATED.3IONS-SCNC: 7 MMOL/L (ref 3–14)
BASOPHILS # BLD AUTO: 0 10E9/L (ref 0–0.2)
BASOPHILS NFR BLD AUTO: 0.3 %
BUN SERPL-MCNC: 22 MG/DL (ref 7–30)
CALCIUM SERPL-MCNC: 8.7 MG/DL (ref 8.5–10.1)
CHLORIDE SERPL-SCNC: 102 MMOL/L (ref 94–109)
CO2 SERPL-SCNC: 28 MMOL/L (ref 20–32)
CREAT SERPL-MCNC: 1.47 MG/DL (ref 0.66–1.25)
DIFFERENTIAL METHOD BLD: ABNORMAL
EOSINOPHIL # BLD AUTO: 0.1 10E9/L (ref 0–0.7)
EOSINOPHIL NFR BLD AUTO: 2.4 %
ERYTHROCYTE [DISTWIDTH] IN BLOOD BY AUTOMATED COUNT: 14.4 % (ref 10–15)
GFR SERPL CREATININE-BSD FRML MDRD: 45 ML/MIN/1.7M2
GLUCOSE SERPL-MCNC: 97 MG/DL (ref 70–99)
HCT VFR BLD AUTO: 34.5 % (ref 40–53)
HGB BLD-MCNC: 11.4 G/DL (ref 13.3–17.7)
IMM GRANULOCYTES # BLD: 0 10E9/L (ref 0–0.4)
IMM GRANULOCYTES NFR BLD: 0.3 %
LYMPHOCYTES # BLD AUTO: 0.8 10E9/L (ref 0.8–5.3)
LYMPHOCYTES NFR BLD AUTO: 13.8 %
MCH RBC QN AUTO: 31.8 PG (ref 26.5–33)
MCHC RBC AUTO-ENTMCNC: 33 G/DL (ref 31.5–36.5)
MCV RBC AUTO: 96 FL (ref 78–100)
MONOCYTES # BLD AUTO: 0.3 10E9/L (ref 0–1.3)
MONOCYTES NFR BLD AUTO: 5.2 %
NEUTROPHILS # BLD AUTO: 4.6 10E9/L (ref 1.6–8.3)
NEUTROPHILS NFR BLD AUTO: 78 %
NRBC # BLD AUTO: 0 10*3/UL
NRBC BLD AUTO-RTO: 0 /100
PLATELET # BLD AUTO: 113 10E9/L (ref 150–450)
POTASSIUM SERPL-SCNC: 3.3 MMOL/L (ref 3.4–5.3)
RBC # BLD AUTO: 3.58 10E12/L (ref 4.4–5.9)
SODIUM SERPL-SCNC: 137 MMOL/L (ref 133–144)
WBC # BLD AUTO: 5.9 10E9/L (ref 4–11)

## 2017-10-27 PROCEDURE — 99309 SBSQ NF CARE MODERATE MDM 30: CPT | Performed by: NURSE PRACTITIONER

## 2017-10-27 NOTE — PROGRESS NOTES
[facility]    Chief Complaint   Patient presents with     RECHECK       HPI:    Ivan Gomez is a 89 year old  (10/12/1926), who is being seen today for an episodic care visit at [facility].  HPI information obtained from: facility chart records, facility staff, patient report and [facility] chart review.Today's concern is:  Abdominal pain-Yesterday patient reported severe epigastric pain after taking a pill (hydralazine) on empty stomach. Pain was similar to that experienced two weeks ago. At that time he was prescribed protonix. The next day he went to ED and was treated for HF and eventually had a pamela clip.   Upon exam he reported having several bowel movements this am. He did take protonix and norco and  pain subsided. He was not short of breath. He did not endorse CP or back pain.   Abd xray done one year ago showed signs of gastroenteritis.   Today no further report of abd pain.   Hypokalemia. K 3.3.He is on lasix.   S/P mitral valve clip implantation  Non-rheumatic mitral regurgitation  Patient transferred to TCU following hospital stay due to hypoxic respiratory failure and volume overload. He was found to have severd mitral valve regurgitation due to flail posterior leaflet. He was transferred to [facility] for Pamela clip, which was done on 10/16.   Post operatively was signficant for starting after load reduction with hydralazine and imdur.   He has been working with physical therapy in TCU. He has been walking back and forth to therapy gym with now shortness of breath.   Diastolic heart failure, unspecified heart failure chronicity (H)  Prior to pamela clip repair he was treated for acute hypoxic respiratory failure and volume overload. He was diuresed about 5.1L with IV lasix.   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 Now seeking palliative treatment.     CKD (chronic kidney disease) stage 3, GFR 30-59 ml/min  Baseline creat 1.2-1.3   Creat did bump with IV lasix.   Creat upon transfer to TCU 1.34  Creat 1.47 today  Benign essential hypertension  BP's: 125/78, 125/72, 114/81    Spinal stenosis of lumbar region, unspecified whether neurogenic claudication present  Patient reports ongoing pain in lower back. He has been taking norco 7.5/325 q 6 h prn, usually at night. He does walk with a walker.      Femoral hernia- he reports hernia is a bit painful. He plans of meeting with a surgeon once things settle down medically. Today he reports he would like to meet with surgeon soon.         ALLERGIES: Celebrex [celecoxib] and Penicillins  Past Medical, Surgical, Family and Social History reviewed and updated in [facility].    Current Outpatient Prescriptions   Medication Sig Dispense Refill     PANTOPRAZOLE SODIUM PO Take 40 mg by mouth daily Take 1/2hr -1hr before meal       hypromellose (ARTIFICIAL TEARS) 0.4 % SOLN ophthalmic solution Place 2 drops Into the left eye 4 times daily       Skin Protectants, Misc. (EUCERIN) cream Apply topically At Bedtime Apply to hands       HYDROcodone-acetaminophen (NORCO) 7.5-325 MG per tablet Take 1 tablet by mouth every 6 hours as needed for pain maximum 4 tablet(s) per day 18 tablet 0     senna-docusate (SENOKOT-S;PERICOLACE) 8.6-50 MG per tablet Take 1 tablet by mouth 2 times daily And 1 tablet by mouth two times daily as needed.       aspirin EC 81 MG EC tablet Take 1 tablet (81 mg) by mouth daily       isosorbide dinitrate (ISORDIL) 20 MG tablet Take 1 tablet (20 mg) by mouth 3 times daily       hydrALAZINE (APRESOLINE) 25 MG tablet Take 1.5 tablets (37.5 mg) by mouth every 8 hours 60 tablet      furosemide (LASIX) 20 MG tablet Take 1 tablet (20 mg) by mouth 2 times daily 30 tablet      order for DME O2 by nasal cannula continuous 3 L/min to maintain O2 saturations >  "92% 1 Can 1     tamsulosin (FLOMAX) 0.4 MG capsule Take 1 capsule (0.4 mg) by mouth daily 90 capsule 0     gabapentin (NEURONTIN) 300 MG capsule TAKE 1 CAPSULE BY MOUTH THREE TIMES DAILY 270 capsule 1     fiber modular (NUTRISOURCE FIBER) packet 1 packet daily       simvastatin (ZOCOR) 20 MG tablet TAKE 1 TABLET BY MOUTH AT BEDTIME 90 tablet 1     finasteride (PROSCAR) 5 MG tablet TAKE 1 TABLET BY MOUTH EVERY DAY 90 tablet 3     metoprolol (TOPROL-XL) 25 MG 24 hr tablet TAKE 1 TABLET BY MOUTH DAILY 90 tablet 3     Medications reviewed:  Medications reconciled to facility chart and changes were made to reflect current medications as identified as above med list. Below are the changes that were made:   Medications stopped since last EPIC medication reconciliation:   There are no discontinued medications.    Medications started since last [facility] medication reconciliation:  No orders of the defined types were placed in this encounter.        REVIEW OF SYSTEMS:  4 point ROS including Respiratory, CV, GI and , other than that noted in the HPI,  is negative    Physical Exam:  /78  Pulse 91  Temp 97.5  F (36.4  C)  Resp 16  Ht 5' 7\" (1.702 m)  Wt 149 lb 12.8 oz (67.9 kg)  SpO2 94%  BMI 23.46 kg/m2  GENERAL APPEARANCE:  Alert, in no distress  ENT:  Mouth and posterior oropharynx normal, moist mucous membranes, hearing acuity adequate   EYES:  EOM, conjunctivae, lids, pupils and irises normal  RESP:  respiratory effort and palpation of chest normal, no respiratory distress,  CV:   Edema none  ABDOMEN:  normal bowel sounds, soft, nontender, large bulge at suprapubic area.   M/S:   Gait and station steady with walker, Digits and nails nl  SKIN:  Inspection/Palpation of skin and subcutaneous tissue  No rash  NEURO: 2-12 in normal limits and at patient's baseline  PSYCH:  insight and judgement, memory intact , affect and mood normal      Recent Labs:    CBC RESULTS:   Recent Labs   Lab Test  10/27/17   0905  " 10/23/17   0600   WBC  5.9  7.9   RBC  3.58*  3.61*   HGB  11.4*  11.4*   HCT  34.5*  34.4*   MCV  96  95   MCH  31.8  31.6   MCHC  33.0  33.1   RDW  14.4  14.0   PLT  113*  133*       Last Basic Metabolic Panel:  Recent Labs   Lab Test  10/27/17   0905  10/23/17   0600   NA  137  138   POTASSIUM  3.3*  3.5   CHLORIDE  102  102   GIUSEPPE  8.7  8.5   CO2  28  29   BUN  22  29   CR  1.47*  1.32*   GLC  97  134*       Liver Function Studies -   Recent Labs   Lab Test  10/18/17   0721  10/17/17   0335   PROTTOTAL  6.4*  6.9   ALBUMIN  2.9*  3.0*   BILITOTAL  1.1  1.4*   ALKPHOS  60  62   AST  14  19   ALT  16  18       Assessment/Plan:  Abdominal pain  Comment; no further complaints of pain.   Plan:continue protonix.  If pain returns may need to send to ED for evaluation. We did discuss seeing a gastroenterologist but he wasn't very interested in that.     Hypokalemia  Comment: on daily lasix  Plan: kcl 20meq po q day for three days, BMP 10/30    (Z98.890,  Z95.818) S/P mitral valve clip implantation  (I34.0) Non-rheumatic mitral regurgitation  Comment: patient transferred to TCU after mitul clip procedure. Patient tolerated procedure well. Post op period was significant for starting after load reducing meds, which he seems to be tolerating.   Plan: follow up with cardiology as planned- one month    (I50.30) Diastolic heart failure, unspecified heart failure chronicity (H)  Comment: prior to procedure patient was in hypoxic respiratory failure with volume overload. He was diuresed about 5l during hospital stay. wts in TCU are stable at about 151-152lbs  Plan: continue daily wts. 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   Plan: no change    (N18.3) CKD (chronic kidney disease) stage 3, GFR 30-59 ml/min  Comment: creat did bump during hospital stay due to diuresis with lasix.  Now back at baseline  Plan: follow BMP, avoid nephrotoxins    (I10) Benign essential hypertension  Comment: adequate control on new meds.   Plan: monitor BPs    (M48.061) Spinal stenosis of lumbar region, unspecified whether neurogenic claudication present  Comment: chronic pain. Would like his former dose of norco restored   Plan: norco 7.5/325 q 6 h prn    Femoral hernia  Comment: present for past 3 or 4 mos. Plans on meeting with surgeon when recovered from present cardiac issue  Plan: appointment made with Dr. Holbrook    Electronically signed by  FRANK Menchaca CNP

## 2017-10-30 ENCOUNTER — HOSPITAL LABORATORY (OUTPATIENT)
Dept: OTHER | Facility: CLINIC | Age: 82
End: 2017-10-30

## 2017-10-30 LAB
ANION GAP SERPL CALCULATED.3IONS-SCNC: 7 MMOL/L (ref 3–14)
BUN SERPL-MCNC: 23 MG/DL (ref 7–30)
CALCIUM SERPL-MCNC: 8.7 MG/DL (ref 8.5–10.1)
CHLORIDE SERPL-SCNC: 103 MMOL/L (ref 94–109)
CO2 SERPL-SCNC: 29 MMOL/L (ref 20–32)
CREAT SERPL-MCNC: 1.36 MG/DL (ref 0.66–1.25)
GFR SERPL CREATININE-BSD FRML MDRD: 49 ML/MIN/1.7M2
GLUCOSE SERPL-MCNC: 104 MG/DL (ref 70–99)
POTASSIUM SERPL-SCNC: 3.4 MMOL/L (ref 3.4–5.3)
SODIUM SERPL-SCNC: 139 MMOL/L (ref 133–144)

## 2017-11-01 ENCOUNTER — DISCHARGE SUMMARY NURSING HOME (OUTPATIENT)
Dept: GERIATRICS | Facility: CLINIC | Age: 82
End: 2017-11-01
Payer: MEDICARE

## 2017-11-01 VITALS
OXYGEN SATURATION: 96 % | RESPIRATION RATE: 17 BRPM | DIASTOLIC BLOOD PRESSURE: 84 MMHG | HEART RATE: 97 BPM | SYSTOLIC BLOOD PRESSURE: 145 MMHG | TEMPERATURE: 97.8 F

## 2017-11-01 DIAGNOSIS — Z95.818 S/P MITRAL VALVE CLIP IMPLANTATION: Primary | ICD-10-CM

## 2017-11-01 DIAGNOSIS — N18.30 CKD (CHRONIC KIDNEY DISEASE) STAGE 3, GFR 30-59 ML/MIN (H): ICD-10-CM

## 2017-11-01 DIAGNOSIS — M48.061 SPINAL STENOSIS OF LUMBAR REGION, UNSPECIFIED WHETHER NEUROGENIC CLAUDICATION PRESENT: ICD-10-CM

## 2017-11-01 DIAGNOSIS — I50.30 DIASTOLIC HEART FAILURE, UNSPECIFIED HEART FAILURE CHRONICITY: ICD-10-CM

## 2017-11-01 DIAGNOSIS — C67.4 MALIGNANT NEOPLASM OF POSTERIOR WALL OF URINARY BLADDER (H): Chronic | ICD-10-CM

## 2017-11-01 DIAGNOSIS — K41.90 FEMORAL HERNIA WITHOUT OBSTRUCTION OR GANGRENE, RECURRENCE NOT SPECIFIED, UNSPECIFIED LATERALITY: ICD-10-CM

## 2017-11-01 DIAGNOSIS — I10 BENIGN ESSENTIAL HYPERTENSION: ICD-10-CM

## 2017-11-01 DIAGNOSIS — Z98.890 S/P MITRAL VALVE CLIP IMPLANTATION: Primary | ICD-10-CM

## 2017-11-01 DIAGNOSIS — I34.0 NON-RHEUMATIC MITRAL REGURGITATION: ICD-10-CM

## 2017-11-01 PROCEDURE — 99316 NF DSCHRG MGMT 30 MIN+: CPT | Performed by: NURSE PRACTITIONER

## 2017-11-01 NOTE — PROGRESS NOTES
[facility] DISCHARGE SUMMARY    PATIENT'S NAME: Ivan Gomez  YOB: 1926  MEDICAL RECORD NUMBER:  [id number]    PRIMARY CARE PROVIDER AND CLINIC RESPONSIBLE AFTER TRANSFER: Evaristo Magaña [address] / [facility] [address]     CODE STATUS/ADVANCE DIRECTIVES DISCUSSION:   DNR only       Allergies   Allergen Reactions     Celebrex [Celecoxib] Hives     Penicillins Hives       TRANSFERRING PROVIDERS: FRANK Menchaca CNP, Jamison Santos MD  DATE OF SNF ADMISSION:  October / 18 / 2017  DATE OF SNF (anticipated) DISCHARGE: November / 03 / 2017  DISCHARGE DISPOSITION: [facility] Provider   Nursing Facility: [facility] stay 10/13/2017 to 10/18/2017.     Condition on Discharge:  Improving.  Function:  Needs some assist with ADLs  Cognitive Scores: not available    Equipment: walker    DISCHARGE DIAGNOSIS:   1. Abdominal pain, epigastric    2. Hypokalemia    3. S/P mitral valve clip implantation    4. Non-rheumatic mitral regurgitation    5. Diastolic heart failure, unspecified heart failure chronicity (H)    6. 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 CKD (chronic kidney disease) stage 3, GFR 30-59 ml/min    8. Benign essential hypertension    9. Spinal stenosis of lumbar region, unspecified whether neurogenic claudication present    10. Femoral hernia without obstruction or gangrene, recurrence not specified, unspecified laterality        HPI Nursing Facility Course:  HPI information obtained from: facility chart records, facility staff, patient report and [facility] chart review.    S/P mitral valve clip implantation  Non-rheumatic mitral regurgitation  Patient transferred to TCU following hospital stay due to hypoxic respiratory failure and volume overload. He was found to have severe mitral valve regurgitation due to flail posterior  leaflet. He was transferred to [facility] for Pamela clip, which was done on 10/16.   Post operatively was signficant for starting after load reduction with hydralazine and imdur.   He has been working with physical therapy in TCU. He has been walking back and forth to therapy gym with no shortness of breath.   Diastolic heart failure, unspecified heart failure chronicity (H)  Prior to pamela clip repair he was treated for acute hypoxic respiratory failure and volume overload. He was diuresed about 5.1L with IV lasix.   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 Now seeking palliative treatment.   CKD (chronic kidney disease) stage 3, GFR 30-59 ml/min  Baseline creat 1.2-1.3   Creat did bump with IV lasix.   Creat upon transfer to TCU 1.34  Creat 1.47 10/27  Benign essential hypertension  BP's: 145/84, 131/71, 140/78  Spinal stenosis of lumbar region, unspecified whether neurogenic claudication present  Patient reports ongoing pain in lower back. He has been taking norco 7.5/325 q 6 h prn, usually at night. He does walk with a walker.   Femoral hernia  He reports hernia is a bit painful. He plans of meeting with a surgeon once things settle down medically. Today he reports he would like to meet with surgeon soon.     PAST MEDICAL HISTORY:  has a past medical history of Arthritis; Former smoker; Hemorrhoids; Hypercholesteremia; Hypertension; Malignant neoplasm (H); Other chronic pain; and Renal disease. He also has no past medical history of Chronic infection; History of blood transfusion; Malignant hyperthermia; Numbness and tingling; PONV (postoperative nausea and vomiting); Sleep apnea; or Thrombosis of leg.    DISCHARGE MEDICATIONS:  Current Outpatient Prescriptions   Medication Sig Dispense Refill     PANTOPRAZOLE SODIUM PO Take 40 mg by  mouth daily Take 1/2hr -1hr before meal       hypromellose (ARTIFICIAL TEARS) 0.4 % SOLN ophthalmic solution Place 2 drops Into the left eye 4 times daily       Skin Protectants, Misc. (EUCERIN) cream Apply topically At Bedtime Apply to hands       HYDROcodone-acetaminophen (NORCO) 7.5-325 MG per tablet Take 1 tablet by mouth every 6 hours as needed for pain maximum 4 tablet(s) per day 18 tablet 0     senna-docusate (SENOKOT-S;PERICOLACE) 8.6-50 MG per tablet Take 1 tablet by mouth 2 times daily And 1 tablet by mouth two times daily as needed.       aspirin EC 81 MG EC tablet Take 1 tablet (81 mg) by mouth daily       isosorbide dinitrate (ISORDIL) 20 MG tablet Take 1 tablet (20 mg) by mouth 3 times daily       hydrALAZINE (APRESOLINE) 25 MG tablet Take 1.5 tablets (37.5 mg) by mouth every 8 hours 60 tablet      furosemide (LASIX) 20 MG tablet Take 1 tablet (20 mg) by mouth 2 times daily 30 tablet      order for DME O2 by nasal cannula continuous 3 L/min to maintain O2 saturations > 92% 1 Can 1     tamsulosin (FLOMAX) 0.4 MG capsule Take 1 capsule (0.4 mg) by mouth daily 90 capsule 0     gabapentin (NEURONTIN) 300 MG capsule TAKE 1 CAPSULE BY MOUTH THREE TIMES DAILY 270 capsule 1     fiber modular (NUTRISOURCE FIBER) packet 1 packet daily       simvastatin (ZOCOR) 20 MG tablet TAKE 1 TABLET BY MOUTH AT BEDTIME 90 tablet 1     finasteride (PROSCAR) 5 MG tablet TAKE 1 TABLET BY MOUTH EVERY DAY 90 tablet 3     metoprolol (TOPROL-XL) 25 MG 24 hr tablet TAKE 1 TABLET BY MOUTH DAILY 90 tablet 3       MEDICATION CHANGES/RATIONALE:   10/19/17 senna s I po BID and BID prn  10/24/17 increase norco 7.5/325 I po QID prn  10/27/17 KCL 20 meq q day for 3 d  Controlled medications sent with patient: Medication: norco 7.5/325 , 20 tabs given to patient at the time of discharge to take home     ROS:    4 point ROS including Respiratory, CV, GI and , other than that noted in the HPI,  is negative    Physical Exam:   Vitals: BP  145/84  Pulse 97  Temp 97.8  F (36.6  C)  Resp 17  SpO2 96%  BMI= There is no height or weight on file to calculate BMI.    GENERAL APPEARANCE:  Alert, in no distress  ENT:  Mouth and posterior oropharynx normal, moist mucous membranes, hearing acuity adequate   EYES:  EOM, conjunctivae, lids, pupils and irises normal  RESP:  respiratory effort and palpation of chest normal, no respiratory distress,  CV:   Edema none  ABDOMEN:  normal bowel sounds, soft, nontender, large bulge at suprapubic area.   M/S:   Gait and station steady with walker, Digits and nails nl  SKIN:  Inspection/Palpation of skin and subcutaneous tissue  No rash  NEURO: 2-12 in normal limits and at patient's baseline  PSYCH:  insight and judgement, memory intact , affect and mood normal    DISCHARGE PLAN:  Occupational Therapy, Physical Therapy, Registered Nurse and From:  [facility]  Patient instructed to follow-up with:  PCP in 7-10 days.       Current [facility] scheduled appointments:  Future Appointments  Date Time Provider Department Center   11/8/2017 10:00 AM Joseluis Kuhn MD SHSUR SXSH   11/15/2017 12:30 PM CHAVIRA LAB SUL[facility] PSA CLIN   11/15/2017 1:00 PM [id number] SHCV[facility] CVIMG   11/17/2017 2:30 [facility]   9/26/2018 1:30 PM Dennis Hilton MD [facility] RID       MTM referral needed and placed by this provider: No    Pending labs: none  SNF labs   CBC RESULTS:   Recent Labs   Lab Test  10/27/17   0905  10/23/17   0600   WBC  5.9  7.9   RBC  3.58*  3.61*   HGB  11.4*  11.4*   HCT  34.5*  34.4*   MCV  96  95   MCH  31.8  31.6   MCHC  33.0  33.1   RDW  14.4  14.0   PLT  113*  133*       Last Basic Metabolic Panel:  Recent Labs   Lab Test  10/30/17   0720  10/27/17   0905   NA  139  137   POTASSIUM  3.4  3.3*   CHLORIDE  103  102   GIUSEPPE  8.7  8.7   CO2  29  28   BUN  23  22   CR  1.36*  1.47*   GLC  104*  97       Liver Function Studies -   Recent Labs   Lab Test   10/18/17   0721  10/17/17   0335   PROTTOTAL  6.4*  6.9   ALBUMIN  2.9*  3.0*   BILITOTAL  1.1  1.4*   ALKPHOS  60  62   AST  14  19   ALT  16  18     Discharge Treatments:none    TOTAL DISCHARGE TIME:   Greater than 30 minutes  Electronically signed by:  FRANK Menchaca CNP

## 2017-11-05 ENCOUNTER — DOCUMENTATION ONLY (OUTPATIENT)
Dept: CARE COORDINATION | Facility: CLINIC | Age: 82
End: 2017-11-05

## 2017-11-06 ENCOUNTER — TELEPHONE (OUTPATIENT)
Dept: FAMILY MEDICINE | Facility: CLINIC | Age: 82
End: 2017-11-06

## 2017-11-06 ENCOUNTER — CARE COORDINATION (OUTPATIENT)
Dept: CARE COORDINATION | Facility: CLINIC | Age: 82
End: 2017-11-06

## 2017-11-06 NOTE — PROGRESS NOTES
[facility] now requests orders and shares plan of care/discharge summaries for some patients through [employer].  Please REPLY TO THIS MESSAGE in order to give authorization for orders when needed.  This is considered a verbal order, you will still receive a faxed copy of orders for signature.  Thank you for your assistance in improving collaboration for our patients.    ORDER    Requesting SOC orders for home care.   Skilled Nursing 3 week x 1, 2 week x 1, 1 week x 2, 3 prn visits for medication management, fall prevention, CP assessment, pain management.     PT 1 day 1 to eval and treat for fall prevention, and strengthening.   OT 1 day 1 to eval and treat for home safety.     Thank you,   Jennifer Blanc, RN  [phone]

## 2017-11-06 NOTE — LETTER
Health Care Home - Access Care Plan    About Me  Patient Name:  Ivan Gomez    YOB: 1926  Age:                            89 year old   Jenny MRN:         [id number] Telephone Information:     Home Phone [phone]   Mobile [phone]       Address:    [address] [place] [address] Email address:  [email or web address]      Emergency Contact(s)  Name Relationship Lgl Grd Work Phone Home Phone Mobile Phone   1. BERNICE RODRIGUEZ Significant ot*   [phone]    2. RAIZA Daughter   [phone]              Health Maintenance:      My Access Plan  Medical Emergency 916   Questions or concerns during clinic hours Primary Clinic Line, I will call the clinic directly: Primary Clinic: [facility] [phone]   24 Hour Appointment Line [phone] or  [phone])  (toll free)   24 Hour Nurse Line [phone] (toll free)   Questions or concerns outside clinic hours 24 Hour Appointment Line, I will call the after-hours on-call line:   [facility] [phone] or [phone] ([phone]) (toll-free)   Preferred Urgent Care     Preferred Hospital Preferred Hospital: [facility]  [phone]   Preferred Pharmacy [facility] [place] [address] [address] [place] [facility]     Behavioral Health Crisis Line The National Suicide Prevention Lifeline at 1-643.897.6497 or 911     My Care Team Members  Patient Care Team       Relationship Specialty Notifications Start End    Evaristo Magaña MD PCP - General Family Practice  6/6/13     Phone: [phone] Fax: [phone]         [address] [facility] [address]    Lida, Dennis Loera MD MD Urology  9/3/15     Phone: [phone] Fax: [phone]         [address] [facility] [address]    Cecil Iqbal MD PCP Internal Medicine  9/15/15     Phone: [phone] Fax: [phone]         [facility]  [address] [address]    Suad Gutierrez, RN Clinic Care Coordinator  Admissions 10/19/17     Phone: [phone] Fax: [phone]        Eber Monroe MD MD Cardiology  10/19/17     Phone: [phone] Fax: [phone] [address] [place] [address]        My Medical and Care Information  Problem List   Patient Active Problem List   Diagnosis     Arthritis     Lumbar spinal stenosis     Chronic narcotic dependence (HCC)     Thrombocytopenia (H)     CKD (chronic kidney disease) stage 3, GFR 30-59 ml/min     Knee pain     Advanced directives, counseling/discussion     Glucose intolerance (impaired glucose tolerance)     Chronic pain syndrome     Mitral valvular regurgitation -aortic sclerosis - systolic murmur     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

## 2017-11-06 NOTE — TELEPHONE ENCOUNTER
Requesting SOC orders for home care.   Skilled Nursing 3 week x 1, 2 week x 1, 1 week x 2, 3 prn visits for medication management, fall prevention, CP assessment, pain management.      PT 1 day 1 to eval and treat for fall prevention, and strengthening.   OT 1 day 1 to eval and treat for home safety.      Thank you,   Jennifer Blanc, RN  [phone]    Verbal okay given for the above orders  Felisha Garcia RN  11/06/17  2:42 PM

## 2017-11-06 NOTE — PROGRESS NOTES
Clinic Care Coordination Contact  Care Coordination Communication    Referral Source: CTS    Clinical Data: Patient was hospitalized at [facility] from 10/13/17 to 10/18/17 with diagnosis of mitral valve clip implantation.     Home Care Contact:              Home Care Agency: [facility]              Contact name () and phone number: Jennifer Blanc [phone]              Care Coordination contacted home care: Yes              Anticipated start of care date: 11/5/17    Patient Contact:               Introduced self and role of care coordination.               Discharge instructions were reviewed with patient/caregiver.               Do you have any questions about your medications? No.  He states the home care nurse told him they would bring a medications box for him to make it easier to keep track of his meds               Follow up appointment is scheduled for 11/13/17.              Provided 24 Hour Nurse Line and/or 24 Hour Appointment Scheduling: Yes              Home care has contacted patient: Yes              Patient questions/concerns: Patient states that his significant other, Jerri, feel and broke her hip while he was in the hospital. She is still [facility]. He is concerned about her recovery. He states he is doing ok at home on his own. He is able to get around the condo without his walker but uses it when he goes out. Patient is still driving. He feels he is doing well.     Patient has a hernia that has been bothering him. He has an appointment with the surgeon on 11/8/17. He states he wants to know what all the repair will involve so he can make a decision of when and if he is going to have this fixed. He is a little concerned with all of his recent health conditions plus Jerri's problems.     Plan: RN Care Coordinator will await notification from home care staff informing RN Care Coordinator of patients discharge plans/needs. RN Care Coordinator will review chart and  outreach to home care every 4 weeks and as needed.      Suad Gutierrez RN, CCM - Care Coordinator     11/6/2017    11:03 AM  [phone]

## 2017-11-08 ENCOUNTER — OFFICE VISIT (OUTPATIENT)
Dept: SURGERY | Facility: CLINIC | Age: 82
End: 2017-11-08
Payer: MEDICARE

## 2017-11-08 VITALS
HEART RATE: 100 BPM | HEIGHT: 67 IN | SYSTOLIC BLOOD PRESSURE: 120 MMHG | DIASTOLIC BLOOD PRESSURE: 60 MMHG | WEIGHT: 149 LBS | BODY MASS INDEX: 23.39 KG/M2 | OXYGEN SATURATION: 92 %

## 2017-11-08 DIAGNOSIS — K41.90 FEMORAL HERNIA OF RIGHT SIDE: Primary | ICD-10-CM

## 2017-11-08 PROCEDURE — 99204 OFFICE O/P NEW MOD 45 MIN: CPT | Performed by: SURGERY

## 2017-11-08 NOTE — MR AVS SNAPSHOT
After Visit Summary   11/8/2017    Ivan Gomez    MRN: [id number]           Patient Information     Date Of Birth          10/12/1926        Visit Information        Provider Department      11/8/2017 10:00 AM Joseluis Kuhn MD [facility]       Follow-ups after your visit        Your next 10 appointments already scheduled     Nov 13, 2017  1:00 PM CST   [facility] with Evaristo [facility]aña MD   [facility]    [address]  [place] [id number] [phone]           Bring a current list of meds and any records pertaining to this visit. For Physicals, please bring immunization records and any forms needing to be filled out. Please arrive 10 minutes early to complete paperwork.            Nov 15, 2017 12:30 PM CST   LAB with [facility]   [facility] [place] ([facility])    [address]  [place] [address] [phone]           Please do not eat 10-12 hours before your appointment if you are coming in fasting for labs on lipids, cholesterol, or glucose (sugar). This does not apply to pregnant women. Water, hot tea and black coffee (with nothing added) are okay. Do not drink other fluids, diet soda or chew gum.            Nov 15, 2017  1:00 PM CST   Ech Complete with [id number]   [facility] CV Echocardiography ([facility] at [facility])    [address] [place] [address]  [address]  [place] [address] [phone]           1. Please bring or wear a comfortable two-piece outfit. 2. You may eat, drink and take your normal medicines. 3. For any questions that cannot be answered, please contact the ordering physician            Nov 17, 2017  2:30 PM CST   Return Visit with Duane Brown PA-C   [facility]    "[place] ([facility])    [address]  [place] [address]   [phone]            Sep 26, 2018  1:30 PM CDT   Return Visit with Dennis Hilton MD   [facility] ([facility])    [facility]  [address]  [place] [address]   [phone]              Who to contact     If you have questions or need follow up information about today's clinic visit or your schedule please contact [facility] directly at [phone].  Normal or non-critical lab and imaging results will be communicated to you by [employer], letter or phone within 4 business days after the clinic has received the results. If you do not hear from us within 7 days, please contact the clinic through E[employer] or phone. If you have a critical or abnormal lab result, we will notify you by phone as soon as possible.  Submit refill requests through [employer] or call your pharmacy and they will forward the refill request to us. Please allow 3 business days for your refill to be completed.          Additional Information About Your Visit        Echometrixhart Information     [employer] gives you secure access to your electronic health record. If you see a primary care provider, you can also send messages to your care team and make appointments. If you have questions, please call your primary care clinic.  If you do not have a primary care provider, please call [phone] and they will assist you.        Care EveryWhere ID     This is your Care EveryWhere ID. This could be used by other organizations to access your [place] medical records  [id number]        Your Vitals Were     Pulse Height Pulse Oximetry BMI (Body Mass Index)          100 5' 7\" (1.702 m) 92% 23.34 kg/m2         Blood Pressure from Last 3 Encounters:   11/08/17 120/60   11/01/17 145/84   10/27/17 125/78    Weight from Last 3 Encounters:   11/08/17 149 lb (67.6 kg)   10/27/17 149 lb " 12.8 oz (67.9 kg)   10/26/17 150 lb (68 kg)              Today, you had the following     No orders found for display       Primary Care Provider Office Phone # Fax #    Evaristo Magaña [phone] [address]  [facility] [address]        Equal Access to Services     [facility]LAY[facility] : Hadii aad ku hadasho Soomaali, waaxda luqadaha, qaybta kaalmada adeegyada, waxay idiin hayaan adeeg kharash la'aan . So [facility] [phone].    ATENCIÓN: Si habla español, tiene a rubio disposición servicios gratuitos de asistencia lingüística. Llame al [phone].    We comply with applicable federal civil rights laws and [place] laws. We do not discriminate on the basis of race, color, national origin, age, disability, sex, sexual orientation, or gender identity.            Thank you!     Thank you for choosing [facility]  for your care. Our goal is always to provide you with excellent care. Hearing back from our patients is one way we can continue to improve our services. Please take a few minutes to complete the written survey that you may receive in the mail after your visit with us. Thank you!             Your Updated Medication List - Protect others around you: Learn how to safely use, store and throw away your medicines at www.disposemymeds.org.          This list is accurate as of: 11/8/17 10:32 AM.  Always use your most recent med list.                   Brand Name Dispense Instructions for use Diagnosis    aspirin 81 MG EC tablet      Take 1 tablet (81 mg) by mouth daily    S/P mitral valve repair       eucerin cream      Apply topically At Bedtime Apply to hands        fiber modular packet      1 packet daily        finasteride 5 MG tablet    PROSCAR    90 tablet    TAKE 1 TABLET BY MOUTH EVERY DAY    Benign enlargement of prostate       furosemide 20 MG tablet    LASIX    30 tablet    Take 1 tablet (20 mg) by mouth 2 times daily    Acute on chronic heart failure, unspecified  heart failure type (H)       gabapentin 300 MG capsule    NEURONTIN    270 capsule    TAKE 1 CAPSULE BY MOUTH THREE TIMES DAILY    Lumbar spinal stenosis       hydrALAZINE 25 MG tablet    APRESOLINE    60 tablet    Take 1.5 tablets (37.5 mg) by mouth every 8 hours    Acute on chronic heart failure, unspecified heart failure type (H)       HYDROcodone-acetaminophen 7.5-325 MG per tablet    NORCO    18 tablet    Take 1 tablet by mouth every 6 hours as needed for pain maximum 4 tablet(s) per day    Spinal stenosis of lumbar region, unspecified whether neurogenic claudication present       hypromellose 0.4 % Soln ophthalmic solution    ARTIFICIAL TEARS     Place 2 drops Into the left eye 4 times daily        isosorbide dinitrate 20 MG tablet    ISORDIL     Take 1 tablet (20 mg) by mouth 3 times daily    Acute on chronic heart failure, unspecified heart failure type (H), Coronary artery disease involving native coronary artery of native heart without angina pectoris       metoprolol 25 MG 24 hr tablet    TOPROL-XL    90 tablet    TAKE 1 TABLET BY MOUTH DAILY    Benign essential hypertension       order for DME     1 Can    O2 by nasal cannula continuous 3 L/min to maintain O2 saturations > 92%    Acute on chronic heart failure, unspecified heart failure type (H)       PANTOPRAZOLE SODIUM PO      Take 40 mg by mouth daily Take 1/2hr -1hr before meal        senna-docusate 8.6-50 MG per tablet    SENOKOT-S;PERICOLACE     Take 1 tablet by mouth 2 times daily And 1 tablet by mouth two times daily as needed.        simvastatin 20 MG tablet    ZOCOR    90 tablet    TAKE 1 TABLET BY MOUTH AT BEDTIME    Hyperlipidemia LDL goal <100       tamsulosin 0.4 MG capsule    FLOMAX    90 capsule    Take 1 capsule (0.4 mg) by mouth daily    BPH (benign prostatic hyperplasia)

## 2017-11-09 NOTE — PROGRESS NOTES
Surgery Consultation, [facility], BELLO Kuhn MD    Ivan Gomez MRN# [id number]   YOB: 1926 Age: 89 year old     PCP:  Evaristo Magaña [phone]    Chief Complaint:  Inguinal hernia, right    History of Present Illness:  Ivan Gomez is a 89 year old male who presented with a an intermittent bulge near the right groin. The bulge comes and goes but has gotten larger over time. It is uncomfortable when the patient is engaging in exertional activity.  Has never had evidence of incarceration but it is tender when the patient is straining to have a bowel movement.  The patient is here to discuss possible surgical repair of the right inguinal hernia.    PMH:  Ivan Gomez  has a past medical history of Arthritis; Former smoker; Hemorrhoids; Hypercholesteremia; Hypertension; Malignant neoplasm (H); Other chronic pain; and Renal disease.  PSH:  Ivan Gomez  has a past surgical history that includes back surgery; orthopedic surgery; Cystoscopy, transurethral resection (TUR) prostate, combined (N/A, 8/21/2015); biopsy; biopsy (8/2016); Cystoscopy, transurethral resection (TUR) tumor bladder, combined (N/A, 9/2/2016); picc insertion (Right, 10/14/2017); and Percutaneous Mitral Valve Repair (N/A, 10/16/2017).    Home medications and allergies reviewed.    Social History:  Ivan Gomez  reports that he has quit smoking. He has never used smokeless tobacco. He reports that he does not drink alcohol or use illicit drugs.  Family History:  Ivan Gomez family history includes CANCER (age of onset: 64) in his son; Family History Negative in his son. There is no history of DIABETES, Coronary Artery Disease, Hypertension, Hyperlipidemia, CEREBROVASCULAR DISEASE, Breast Cancer, Colon Cancer, Prostate Cancer, Other Cancer, Depression, Anxiety Disorder, MENTAL ILLNESS, Substance Abuse, Anesthesia Reaction, Asthma, OSTEOPOROSIS, Genetic Disorder, Thyroid  "Disease, Obesity, or Unknown/Adopted.    ROS:  The 10 point Review of Systems is negative other than noted in the HPI.    Physical Exam:  Blood pressure 120/60, pulse 100, height 1.702 m (5' 7\"), weight 67.6 kg (149 lb), SpO2 92 %.  149 lbs 0 oz  Elderly lucid gentleman in no distress.  Pupils equal round and reactive to light.   No cervical lymphadenopathy or thyromegaly.   Lung fields clear, breathing comfortably.   Heart normal sinus rhythm.  No murmurs rubs or gallops.  Abdomen soft, nontender, nondistended.  Large bulge in the right groin extending into the scrotum.  Not fully reducible.       Assessment/plan:  Pleasant healthy young male with what appears to be an inguinal or femoral hernia. I explained that these are usually not a cause for small bowel incarceration or obstruction, but do become larger over time. They can certainly be uncomfortable and repair is warranted. I recommended an open inguinal hernia repair with mesh. This would normally be done under a general anesthetic or MAC. Risks of surgery were explained to the patient, including hernia recurrence, wound infection, or mesh removal.  Patient was going to notify the office when they were ready to schedule surgery.    Chuck Kuhn M.D.  [facility]  [phone]    Please route or send letter to:  Primary Care Provider (PCP) and Referring Provider    "

## 2017-11-13 ENCOUNTER — OFFICE VISIT (OUTPATIENT)
Dept: FAMILY MEDICINE | Facility: CLINIC | Age: 82
End: 2017-11-13
Payer: MEDICARE

## 2017-11-13 VITALS
DIASTOLIC BLOOD PRESSURE: 70 MMHG | RESPIRATION RATE: 16 BRPM | HEIGHT: 67 IN | HEART RATE: 72 BPM | BODY MASS INDEX: 24.96 KG/M2 | TEMPERATURE: 98 F | WEIGHT: 159 LBS | SYSTOLIC BLOOD PRESSURE: 120 MMHG

## 2017-11-13 DIAGNOSIS — I49.9 IRREGULAR HEART BEAT: Primary | ICD-10-CM

## 2017-11-13 PROCEDURE — 93005 ELECTROCARDIOGRAM TRACING: CPT | Performed by: FAMILY MEDICINE

## 2017-11-13 PROCEDURE — 99495 TRANSJ CARE MGMT MOD F2F 14D: CPT | Performed by: FAMILY MEDICINE

## 2017-11-13 NOTE — PROGRESS NOTES
SUBJECTIVE:   Ivan Gomez is a 89 year old male who presents to clinic today for the following health issues:          Hospital Follow-up Visit:    Hospital/Nursing Home/IP Rehab Facility: [facility]  Date of Admission: 10/10/17 then to [facility] 10/13/17  Date of Discharge: 11/3/17  Reason(s) for Admission: CHF            Problems taking medications regularly:  None       Medication changes since discharge: None       Problems adhering to non-medication therapy:  None      Summary of hospitalization:  [facility] discharge summary reviewed  Diagnostic Tests/Treatments reviewed.  Follow up needed: none  Other Healthcare Providers Involved in Patient s Care:         Specialist appointment - cardiology  Update since discharge: improved.     Post Discharge Medication Reconciliation: done  Plan of care communicated with patient     Coding guidelines for this visit:  Type of Medical   Decision Making Face-to-Face Visit       within 7 Days of discharge Face-to-Face Visit        within 14 days of discharge   Moderate Complexity [id number]   High Complexity [id number]                  Problem list and histories reviewed & adjusted, as indicated.  Additional history: the patient has early seen general surgery about his hernia and possible repair.  It does cause him some discomfort from time to time.  He has an appointment with cardiology coming up and will discuss the risk of having his hernia surgery with cardiology at that time.  He is currently not having any symptoms related to breathing or chest pain.    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      BPH (benign prostatic hypertrophy)     Bladder tumor     Benign essential hypertension     Hyperlipidemia LDL goal <100     Acute idiopathic gout of right foot     Femoral hernia of right side     Drug-induced constipation     Acute respiratory failure with hypoxia (H)     Heart failure (H)     Mitral regurgitation     S/P mitral valve clip implantation     Dry eyes     Abdominal pain, epigastric     Hypokalemia     Past Surgical History:   Procedure Laterality Date     BACK SURGERY       BIOPSY      face, skin cancer     BIOPSY  8/2016    shoulder lesion     CYSTOSCOPY, TRANSURETHRAL RESECTION (TUR) PROSTATE, COMBINED N/A 8/21/2015    Procedure: COMBINED CYSTOSCOPY, TRANSURETHRAL RESECTION (TUR) PROSTATE;  Surgeon: Dennis Hilton MD;  Location: [facility] OR     CYSTOSCOPY, TRANSURETHRAL RESECTION (TUR) TUMOR BLADDER, COMBINED N/A 9/2/2016    Procedure: COMBINED CYSTOSCOPY, TRANSURETHRAL RESECTION (TUR) TUMOR BLADDER;  Surgeon: Dnenis Hilton MD;  Location: [facility] OR     ORTHOPEDIC SURGERY      lumbar and cervical surgery, Sep, 2008, knee surgery     PERCUTANEOUS MITRAL VALVE REPAIR N/A 10/16/2017    Procedure: PERCUTANEOUS MITRAL VALVE REPAIR ANESTHESIA;  Percutaneous MitraClip ;  Surgeon: Eber Monroe MD;  Location: [facility] OR     PICC INSERTION Right 10/14/2017    5fr DL Bard PICC, 40cm (1cm external) in the R basilic vein w/ tip in the mid SVC.       Social History   Substance Use Topics     Smoking status: Former Smoker     Smokeless tobacco: Never Used     Alcohol use No      Comment: doesnt use anymore     Family History   Problem Relation Age of Onset     CANCER Son 64     leukemia ? due to agent orange     Family History Negative Son      DIABETES No family hx of      Coronary Artery Disease No family hx of      Hypertension No family hx of      Hyperlipidemia No  "family hx of      CEREBROVASCULAR DISEASE No family hx of      Breast Cancer No family hx of      Colon Cancer No family hx of      Prostate Cancer No family hx of      Other Cancer No family hx of      Depression No family hx of      Anxiety Disorder No family hx of      MENTAL ILLNESS No family hx of      Substance Abuse No family hx of      Anesthesia Reaction No family hx of      Asthma No family hx of      OSTEOPOROSIS No family hx of      Genetic Disorder No family hx of      Thyroid Disease No family hx of      Obesity No family hx of      Unknown/Adopted No family hx of              Reviewed and updated as needed this visit by clinical staff  Tobacco  Allergies  Meds  Med Hx  Surg Hx  Fam Hx  Soc Hx      Reviewed and updated as needed this visit by Provider         ROS:  Constitutional, neuro, ENT, endocrine, pulmonary, cardiac, gastrointestinal, genitourinary, musculoskeletal, integument and psychiatric systems are negative, except as otherwise noted.      OBJECTIVE:                                                    /70  Pulse 72  Temp 98  F (36.7  C) (Tympanic)  Resp 16  Ht 5' 7\" (1.702 m)  Wt 159 lb (72.1 kg)  BMI 24.9 kg/m2  Body mass index is 24.9 kg/(m^2).  GENERAL APPEARANCE: healthy, alert and no distress  RESP: lungs clear to auscultation - no rales, rhonchi or wheezes  CV: normal S1 S2, no S3 or S4, grade 2/6 systolic murmur heard best over the LLSB and irregular rhythm  MS: pitting 1+ lower extremity edema bilaterally         ASSESSMENT/PLAN:                                                        ICD-10-CM    1. Irregular heart beat I49.9 EKG 12-lead complete w/read - Clinics       Patient Instructions   The patient is ambulatory around his condo without difficulty.  His [facility].  Right now the patient is leaning towards getting his hernia fixed in January.  Will request clearance by cardiology.  He has an " appointment with cardiology this week. He is going to go back to his condo and count the number of pain pills he has left.  He has one full bottle which would be a month but then he also got sent home with some from [facility].  Will try and sync up his medications so that they'll get refills at the appropriate times.      Evaristo Magaña MD  [facility]

## 2017-11-13 NOTE — NURSING NOTE
"Chief Complaint   Patient presents with     Hospital F/U       Initial /70  Pulse 72  Temp 98  F (36.7  C) (Tympanic)  Resp 16  Ht 5' 7\" (1.702 m)  Wt 159 lb (72.1 kg)  BMI 24.9 kg/m2 Estimated body mass index is 24.9 kg/(m^2) as calculated from the following:    Height as of this encounter: 5' 7\" (1.702 m).    Weight as of this encounter: 159 lb (72.1 kg).  Medication Reconciliation: complete     Yara Jim CMA      "

## 2017-11-13 NOTE — PATIENT INSTRUCTIONS
The patient is ambulatory around his condo without difficulty.  His wife, Jerri, is still in [facility] after having a hip fracture repaired.  Right now the patient is leaning towards getting his hernia fixed in January.  Will request clearance by cardiology.  He has an appointment with cardiology this week. He is going to go back to his condo and count the number of pain pills he has left.  He has one full bottle which would be a month but then he also got sent home with some from TCU.  Will try and sync up his medications so that they'll get refills at the appropriate times.

## 2017-11-13 NOTE — MR AVS SNAPSHOT
After Visit Summary   11/13/2017    Ivan Gomez    MRN: [id number]           Patient Information     Date Of Birth          10/12/1926        Visit Information        Provider Department      11/13/2017 1:00 PM Evaristo Magaña MD [facility]        Today's Diagnoses     Irregular heart beat    -  1      Care Instructions    The patient is ambulatory around his condo without difficulty.  His [facility], Jerri, [facility].  Right now the patient is leaning towards getting his hernia fixed in January.  Will request clearance by cardiology.  He has an appointment with cardiology this week. He is going to go back to his condo and count the number of pain pills he has left.  He has one full bottle which would be a month but then he also got sent home with some from [facility].  Will try and sync up his medications so that they'll get refills at the appropriate times.          Follow-ups after your visit        Your next 10 appointments already scheduled     Nov 15, 2017 12:30 PM CST   LAB with [facility]   [facility] [place] ([facility])    [address] [place] [address] [address]  [facility] [id number] [phone]           Please do not eat 10-12 hours before your appointment if you are coming in fasting for labs on lipids, cholesterol, or glucose (sugar). This does not apply to pregnant women. Water, hot tea and black coffee (with nothing added) are okay. Do not drink other fluids, diet soda or chew gum.            Nov 15, 2017  1:00 PM CST   Ech Complete with [id number]   [facility] CV Echocardiography ([facility]ovascular [facility] at [facility])    [address] [place] [address]  [address] [place] [id number] [phone]           1. Please bring or wear a comfortable two-piece outfit. 2. You may eat, drink and take your normal medicines. 3. For any questions that cannot be  answered, please contact the ordering physician            Nov 17, 2017  2:30 PM CST   Return Visit with Duane Brown PA-C   [facility] ([facility])    [address]  [place] [id number] [phone]            Jan 09, 2018  1:00 PM CST   [facility] Same Day Surgery with Joseluis Kuhn MD   [facility] ([facility])    [facility] ([facility])   [phone]            Jan 09, 2018   Procedure with Joseluis Kuhn MD   [facility] PeriOP Services (--)    [address]  [place] [id number] [phone]            Sep 26, 2018  1:30 PM CDT   Return Visit with Dennis Hilton MD   [facility] ([facility])    [facility]  [address]  [facility] [id number] [phone]              Who to contact     If you have questions or need follow up information about today's clinic visit or your schedule please contact [facility] directly at [phone].  Normal or non-critical lab and imaging results will be communicated to you by M[employer]onhart, letter or phone within 4 business days after the clinic has received the results. If you do not hear from us within 7 days, please contact the clinic through Mobeonhart or phone. If you have a critical or abnormal lab result, we will notify you by phone as soon as possible.  Submit refill requests through [employer] or call your pharmacy and they will forward the refill request to us. Please allow 3 business days for your refill to be completed.          Additional Information About Your Visit        [employer] Information     [employer] gives you secure access to your electronic health record. If you see a primary care provider, you can also send messages to your care team and make  "appointments. If you have questions, please call your primary care clinic.  If you do not have a primary care provider, please call [phone] and they will assist you.        Care EveryWhere ID     This is your Care EveryWhere ID. This could be used by other organizations to access your [facility] medical records  [id number]        Your Vitals Were     Pulse Temperature Respirations Height BMI (Body Mass Index)       72 98  F (36.7  C) (Tympanic) 16 5' 7\" (1.702 m) 24.9 kg/m2        Blood Pressure from Last 3 Encounters:   11/13/17 120/70   11/08/17 120/60   11/01/17 145/84    Weight from Last 3 Encounters:   11/13/17 159 lb (72.1 kg)   11/08/17 149 lb (67.6 kg)   10/27/17 149 lb 12.8 oz (67.9 kg)              We Performed the Following     EKG 12-lead complete w/read - Clinics        Primary Care Provider Office Phone # Fax #    Evaristo Magaña [phone]       [address] [facility] [address]        Equal Access to Services     [facility]: Hadii aad ku hadasho Soomaali, waaxda luqadaha, qaybta kaalmada adeegyada, waxay patricia haycarltonn parul cruz . So [facility] [phone].    ATENCIÓN: Si habla español, tiene a rubio disposición servicios gratuitos de asistencia lingüística. Llame al [phone].    We comply with applicable federal civil rights laws and [place] laws. We do not discriminate on the basis of race, color, national origin, age, disability, sex, sexual orientation, or gender identity.            Thank you!     Thank you for choosing [facility]  for your care. Our goal is always to provide you with excellent care. Hearing back from our patients is one way we can continue to improve our services. Please take a few minutes to complete the written survey that you may receive in the mail after your visit with us. Thank you!             Your Updated Medication List - Protect others around you: Learn how to safely use, store and throw " away your medicines at www.disposemymeds.org.          This list is accurate as of: 11/13/17  5:31 PM.  Always use your most recent med list.                   Brand Name Dispense Instructions for use Diagnosis    aspirin 81 MG EC tablet      Take 1 tablet (81 mg) by mouth daily    S/P mitral valve repair       eucerin cream      Apply topically At Bedtime Apply to hands        fiber modular packet      1 packet daily        finasteride 5 MG tablet    PROSCAR    90 tablet    TAKE 1 TABLET BY MOUTH EVERY DAY    Benign enlargement of prostate       furosemide 20 MG tablet    LASIX    30 tablet    Take 1 tablet (20 mg) by mouth 2 times daily    Acute on chronic heart failure, unspecified heart failure type (H)       gabapentin 300 MG capsule    NEURONTIN    270 capsule    TAKE 1 CAPSULE BY MOUTH THREE TIMES DAILY    Lumbar spinal stenosis       hydrALAZINE 25 MG tablet    APRESOLINE    60 tablet    Take 1.5 tablets (37.5 mg) by mouth every 8 hours    Acute on chronic heart failure, unspecified heart failure type (H)       HYDROcodone-acetaminophen 7.5-325 MG per tablet    NORCO    18 tablet    Take 1 tablet by mouth every 6 hours as needed for pain maximum 4 tablet(s) per day    Spinal stenosis of lumbar region, unspecified whether neurogenic claudication present       hypromellose 0.4 % Soln ophthalmic solution    ARTIFICIAL TEARS     Place 2 drops Into the left eye 4 times daily        isosorbide dinitrate 20 MG tablet    ISORDIL     Take 1 tablet (20 mg) by mouth 3 times daily    Acute on chronic heart failure, unspecified heart failure type (H), Coronary artery disease involving native coronary artery of native heart without angina pectoris       metoprolol 25 MG 24 hr tablet    TOPROL-XL    90 tablet    TAKE 1 TABLET BY MOUTH DAILY    Benign essential hypertension       order for DME     1 Can    O2 by nasal cannula continuous 3 L/min to maintain O2 saturations > 92%    Acute on chronic heart failure, unspecified  heart failure type (H)       PANTOPRAZOLE SODIUM PO      Take 40 mg by mouth daily Take 1/2hr -1hr before meal        senna-docusate 8.6-50 MG per tablet    SENOKOT-S;PERICOLACE     Take 1 tablet by mouth 2 times daily And 1 tablet by mouth two times daily as needed.        simvastatin 20 MG tablet    ZOCOR    90 tablet    TAKE 1 TABLET BY MOUTH AT BEDTIME    Hyperlipidemia LDL goal <100       tamsulosin 0.4 MG capsule    FLOMAX    90 capsule    Take 1 capsule (0.4 mg) by mouth daily    BPH (benign prostatic hyperplasia)

## 2017-11-15 ENCOUNTER — HOSPITAL ENCOUNTER (OUTPATIENT)
Dept: CARDIOLOGY | Facility: CLINIC | Age: 82
Discharge: HOME | End: 2017-11-15
Attending: INTERNAL MEDICINE | Admitting: INTERNAL MEDICINE
Payer: MEDICARE

## 2017-11-15 DIAGNOSIS — Z98.890 S/P MITRAL VALVE REPAIR: ICD-10-CM

## 2017-11-15 LAB
ANION GAP SERPL CALCULATED.3IONS-SCNC: 11.3 MMOL/L (ref 6–17)
BUN SERPL-MCNC: 16 MG/DL (ref 7–30)
CALCIUM SERPL-MCNC: 9.6 MG/DL (ref 8.5–10.5)
CHLORIDE SERPL-SCNC: 103 MMOL/L (ref 98–107)
CO2 SERPL-SCNC: 28 MMOL/L (ref 23–29)
CREAT SERPL-MCNC: 1.41 MG/DL (ref 0.7–1.3)
ERYTHROCYTE [DISTWIDTH] IN BLOOD BY AUTOMATED COUNT: 15.6 % (ref 10–15)
GFR SERPL CREATININE-BSD FRML MDRD: 47 ML/MIN/1.7M2
GLUCOSE SERPL-MCNC: 97 MG/DL (ref 70–105)
HCT VFR BLD AUTO: 37.1 % (ref 40–53)
HGB BLD-MCNC: 11.8 G/DL (ref 13.3–17.7)
MCH RBC QN AUTO: 31.4 PG (ref 26.5–33)
MCHC RBC AUTO-ENTMCNC: 31.8 G/DL (ref 31.5–36.5)
MCV RBC AUTO: 99 FL (ref 78–100)
PLATELET # BLD AUTO: 120 10E9/L (ref 150–450)
POTASSIUM SERPL-SCNC: 3.3 MMOL/L (ref 3.5–5.1)
RBC # BLD AUTO: 3.76 10E12/L (ref 4.4–5.9)
SODIUM SERPL-SCNC: 139 MMOL/L (ref 136–145)
WBC # BLD AUTO: 7.6 10E9/L (ref 4–11)

## 2017-11-15 PROCEDURE — 93308 TTE F-UP OR LMTD: CPT

## 2017-11-15 PROCEDURE — 36415 COLL VENOUS BLD VENIPUNCTURE: CPT | Mod: 24 | Performed by: INTERNAL MEDICINE

## 2017-11-15 PROCEDURE — 93321 DOPPLER ECHO F-UP/LMTD STD: CPT | Mod: 26 | Performed by: INTERNAL MEDICINE

## 2017-11-15 PROCEDURE — 85027 COMPLETE CBC AUTOMATED: CPT | Performed by: INTERNAL MEDICINE

## 2017-11-15 PROCEDURE — 93308 TTE F-UP OR LMTD: CPT | Mod: 26 | Performed by: INTERNAL MEDICINE

## 2017-11-15 PROCEDURE — 80048 BASIC METABOLIC PNL TOTAL CA: CPT | Performed by: INTERNAL MEDICINE

## 2017-11-15 PROCEDURE — 93325 DOPPLER ECHO COLOR FLOW MAPG: CPT | Mod: 26 | Performed by: INTERNAL MEDICINE

## 2017-11-17 ENCOUNTER — OFFICE VISIT (OUTPATIENT)
Dept: CARDIOLOGY | Facility: CLINIC | Age: 82
End: 2017-11-17
Attending: INTERNAL MEDICINE
Payer: MEDICARE

## 2017-11-17 VITALS
SYSTOLIC BLOOD PRESSURE: 121 MMHG | WEIGHT: 158 LBS | HEIGHT: 67 IN | DIASTOLIC BLOOD PRESSURE: 82 MMHG | BODY MASS INDEX: 24.8 KG/M2 | HEART RATE: 88 BPM

## 2017-11-17 DIAGNOSIS — I50.32 CHRONIC DIASTOLIC HEART FAILURE (H): ICD-10-CM

## 2017-11-17 DIAGNOSIS — Z95.818 S/P MITRAL VALVE CLIP IMPLANTATION: Primary | ICD-10-CM

## 2017-11-17 DIAGNOSIS — E78.5 HYPERLIPIDEMIA LDL GOAL <100: ICD-10-CM

## 2017-11-17 DIAGNOSIS — Z98.890 S/P MITRAL VALVE CLIP IMPLANTATION: Primary | ICD-10-CM

## 2017-11-17 DIAGNOSIS — Z98.890 S/P MITRAL VALVE REPAIR: ICD-10-CM

## 2017-11-17 DIAGNOSIS — I10 BENIGN ESSENTIAL HYPERTENSION: ICD-10-CM

## 2017-11-17 DIAGNOSIS — I25.10 CORONARY ARTERY DISEASE INVOLVING NATIVE CORONARY ARTERY OF NATIVE HEART WITHOUT ANGINA PECTORIS: ICD-10-CM

## 2017-11-17 PROCEDURE — 93000 ELECTROCARDIOGRAM COMPLETE: CPT | Performed by: PHYSICIAN ASSISTANT

## 2017-11-17 PROCEDURE — 99214 OFFICE O/P EST MOD 30 MIN: CPT | Performed by: PHYSICIAN ASSISTANT

## 2017-11-17 RX ORDER — POTASSIUM CHLORIDE 750 MG/1
TABLET, EXTENDED RELEASE ORAL
Qty: 90 TABLET | Refills: 3 | Status: SHIPPED | OUTPATIENT
Start: 2017-11-17 | End: 2018-12-21

## 2017-11-17 RX ORDER — ISOSORBIDE DINITRATE 20 MG/1
20 TABLET ORAL 3 TIMES DAILY
Qty: 90 TABLET | Refills: 3 | Status: SHIPPED | OUTPATIENT
Start: 2017-11-17 | End: 2017-11-29

## 2017-11-17 NOTE — PROGRESS NOTES
History of Present Illness:   This is a very pleasant 89-year-old gentleman who presents today for 1 month post-mitul clip follow-up.  He was admitted early October with one month history of dyspnea on exertion which became acutely worse 2 days prior to his admission.  He had an echocardiogram performed which showed severe (4+) mitral regurgitation with ruptured mitral valve cord.  He was transferred to [facility] for consideration mitul clip.  He underwent mitul clip procedure on 10/16, receiving 2 clips.  His postprocedure echocardiogram showed moderate mitral regurgitation with a gradient of 5 mmHg.  He was started on beta blocker, hydralazine, and Imdur.  He was recommended to continue with aspirin.  He was IV diuresis and discharge to TCU with furosemide 20 mg b.i.d.  Of note he also underwent Lexiscan nuclear stress study which showed apical ischemia.  Medical management was recommended.    His cardiac history includes chronic diastolic heart failure, hypertension, hyperlipidemia, CK D (stage III), GERD, chronic thrombocytopenia, and history of bladder cancer (TURBT ×2).    He returns to clinic today stating that his been back in his condo for the past one week.  He feels much better.  He denies any shortness of breath, PND, orthopnea,, distention, peripheral edema, or significant weight gain.  He checks his weight daily and infiltrate between 122 pounds but no greater.  He continues to have slight swelling around the ankles.  Significantly better compared to the time when he was admitted.  His spouse unfortunately continues to reside at [facility].  She sustained a fall during this admission and at a hip fracture.    He denies any bleeding issues with aspirin.  He continues to take his first MI 2 times daily.  He tells me that he takes his second tablet later in the evening and he stopped most of the night urinating.  He has no other concerns or questions at this time.    Physical examination:  General-NAD  Neck-no  JVD in upright position  Respiratory-clear to auscultation bilaterally  Abdomen-soft nontender  Extremities-trace to 1+ pitting edema lower extremities bilaterally with left greater than right    Assessment and plan:  This is a delightful 89-year-old gentleman who presents to structural heart clinic for one month post mitral clip follow-up.  He was admitted in October due to progressive shortness of breath and was found to have severe mitral regurgitation with ruptured mitral valve cord.  He was IV diuresis and was transferred to [facility] for mitral clip.  He underwent this procedure on 10/16/17 without any complications.  He received 2 clips in the mitral position.  Post procedure IFEANYI showed moderate mitral regurgitation with mean gradient of 5 mmHg.  The following day repeat echocardiogram showed gradient to be 8 mmHg.  He was IV diuresis with marked improvement in his symptoms.  He was started on beta blocker, hydralazine, nitrates, and aspirin.  He was discharge to TCU.    He appears to be doing much better from a cardiac standpoint.  He denies any shortness of breath, chest discomfort, palpitations, or any other symptoms to suggest decompensated diastolic heart failure.  He continues to take his medications without any interruptions.  His hemoglobin is stable.  His platelet count is stable as well.  His basic metabolic panel shows stable kidney function but he has borderline hypokalemia.  He is not on any potassium supplements.  I instructed him to take one 10 mEq daily.  I would like him to come back in 1 to 2 weeks for repeat basic metabolic panel.    The patient would like to follow-up with either Dr. Monroe or Dr. Yao.  We will arrange him to see one of these cardiologists in the next one to 2 months with repeat laboratory work.    His NYHA class is consistent with I.    He will follow-up in structural her clinic in 1 year with repeat echocardiogram.    I reiterated the importance of low sodium diet as  well as importance of checking his weights daily.  He seems eager to comply with these recommendations.    I provided her clinic contact information should he have any questions or concerns.    Thank you for allowing me to participate in the care of this patient today.       This note was completed in part using Dragon voice recognition software. Although reviewed after completion, some word and grammatical errors may occur.    Orders this Visit:  Orders Placed This Encounter   Procedures     Basic metabolic panel     Follow-Up with Cardiologist     Orders Placed This Encounter   Medications     isosorbide dinitrate (ISORDIL) 20 MG tablet     Sig: Take 1 tablet (20 mg) by mouth 3 times daily     Dispense:  90 tablet     Refill:  3     potassium chloride SA (K-DUR/KLOR-CON M) 10 MEQ CR tablet     Sig: Take half tablet one time daily.     Dispense:  90 tablet     Refill:  3     Medications Discontinued During This Encounter   Medication Reason     isosorbide dinitrate (ISORDIL) 20 MG tablet Reorder         Encounter Diagnoses   Name Primary?     S/P mitral valve repair      Benign essential hypertension      Hyperlipidemia LDL goal <100      S/P mitral valve clip implantation Yes     Chronic diastolic heart failure (H)      Coronary artery disease involving native coronary artery of native heart without angina pectoris        CURRENT MEDICATIONS:  Current Outpatient Prescriptions   Medication Sig Dispense Refill     isosorbide dinitrate (ISORDIL) 20 MG tablet Take 1 tablet (20 mg) by mouth 3 times daily 90 tablet 3     potassium chloride SA (K-DUR/KLOR-CON M) 10 MEQ CR tablet Take half tablet one time daily. 90 tablet 3     PANTOPRAZOLE SODIUM PO Take 40 mg by mouth daily Take 1/2hr -1hr before meal       hypromellose (ARTIFICIAL TEARS) 0.4 % SOLN ophthalmic solution Place 2 drops Into the left eye 4 times daily       senna-docusate (SENOKOT-S;PERICOLACE) 8.6-50 MG per tablet Take 1 tablet by mouth 2 times daily And 1  tablet by mouth two times daily as needed.       aspirin EC 81 MG EC tablet Take 1 tablet (81 mg) by mouth daily       hydrALAZINE (APRESOLINE) 25 MG tablet Take 1.5 tablets (37.5 mg) by mouth every 8 hours 60 tablet      furosemide (LASIX) 20 MG tablet Take 1 tablet (20 mg) by mouth 2 times daily 30 tablet      tamsulosin (FLOMAX) 0.4 MG capsule Take 1 capsule (0.4 mg) by mouth daily 90 capsule 0     gabapentin (NEURONTIN) 300 MG capsule TAKE 1 CAPSULE BY MOUTH THREE TIMES DAILY 270 capsule 1     fiber modular (NUTRISOURCE FIBER) packet 1 packet daily       simvastatin (ZOCOR) 20 MG tablet TAKE 1 TABLET BY MOUTH AT BEDTIME 90 tablet 1     finasteride (PROSCAR) 5 MG tablet TAKE 1 TABLET BY MOUTH EVERY DAY 90 tablet 3     metoprolol (TOPROL-XL) 25 MG 24 hr tablet TAKE 1 TABLET BY MOUTH DAILY 90 tablet 3     Skin Protectants, Misc. (EUCERIN) cream Apply topically At Bedtime Apply to hands       HYDROcodone-acetaminophen (NORCO) 7.5-325 MG per tablet Take 1 tablet by mouth every 6 hours as needed for pain maximum 4 tablet(s) per day 18 tablet 0     order for DME O2 by nasal cannula continuous 3 L/min to maintain O2 saturations > 92% 1 Can 1     [DISCONTINUED] isosorbide dinitrate (ISORDIL) 20 MG tablet Take 1 tablet (20 mg) by mouth 3 times daily         ALLERGIES     Allergies   Allergen Reactions     Celebrex [Celecoxib] Hives     Penicillins Hives       PAST MEDICAL HISTORY:  Past Medical History:   Diagnosis Date     Arthritis     Spinal Stenosis     Former smoker      Hemorrhoids      Hypercholesteremia      Hypertension      Malignant neoplasm (H)     skin CA, Basal cell     Other chronic pain      Renal disease        PAST SURGICAL HISTORY:  Past Surgical History:   Procedure Laterality Date     BACK SURGERY       BIOPSY      face, skin cancer     BIOPSY  8/2016    shoulder lesion     CYSTOSCOPY, TRANSURETHRAL RESECTION (TUR) PROSTATE, COMBINED N/A 8/21/2015    Procedure: COMBINED CYSTOSCOPY, TRANSURETHRAL  RESECTION (TUR) PROSTATE;  Surgeon: Dennis Hilton MD;  Location: [facility] OR     CYSTOSCOPY, TRANSURETHRAL RESECTION (TUR) TUMOR BLADDER, COMBINED N/A 9/2/2016    Procedure: COMBINED CYSTOSCOPY, TRANSURETHRAL RESECTION (TUR) TUMOR BLADDER;  Surgeon: Dennis Hilton MD;  Location: [facility] OR     ORTHOPEDIC SURGERY      lumbar and cervical surgery, Sep, 2008, knee surgery     PERCUTANEOUS MITRAL VALVE REPAIR N/A 10/16/2017    Procedure: PERCUTANEOUS MITRAL VALVE REPAIR ANESTHESIA;  Percutaneous MitraClip ;  Surgeon: Eber Monroe MD;  Location: [facility] OR     PICC INSERTION Right 10/14/2017    5fr DL Bard PICC, 40cm (1cm external) in the R basilic vein w/ tip in the mid SVC.       FAMILY HISTORY:  Family History   Problem Relation Age of Onset     CANCER Son 64     leukemia ? due to agent orange     Family History Negative Son      DIABETES No family hx of      Coronary Artery Disease No family hx of      Hypertension No family hx of      Hyperlipidemia No family hx of      CEREBROVASCULAR DISEASE No family hx of      Breast Cancer No family hx of      Colon Cancer No family hx of      Prostate Cancer No family hx of      Other Cancer No family hx of      Depression No family hx of      Anxiety Disorder No family hx of      MENTAL ILLNESS No family hx of      Substance Abuse No family hx of      Anesthesia Reaction No family hx of      Asthma No family hx of      OSTEOPOROSIS No family hx of      Genetic Disorder No family hx of      Thyroid Disease No family hx of      Obesity No family hx of      Unknown/Adopted No family hx of        SOCIAL HISTORY:  Social History     Social History     Marital status: Single     Spouse name: N/A     Number of children: N/A     Years of education: N/A     Social History Main Topics     Smoking status: Former Smoker     Smokeless tobacco: Never Used     Alcohol use No      Comment: doesnt use anymore     Drug use: No     Sexual activity: No     Other Topics Concern  "    Parent/Sibling W/ Cabg, Mi Or Angioplasty Before 65f 55m? No     Social History Narrative       Review of Systems:  Skin:  Negative     Eyes:  Negative    ENT:  Negative    Respiratory:  Negative    Cardiovascular:  Negative    Gastroenterology: Negative    Genitourinary:  not assessed    Musculoskeletal:  Positive for arthritis  Neurologic:  Negative    Psychiatric:  Negative    Heme/Lymph/Imm:  Negative    Endocrine:  Negative      Physical Exam:  Vitals: /82  Pulse 88  Ht 1.702 m (5' 7\")  Wt 71.7 kg (158 lb)  BMI 24.75 kg/m2   Please refer to dictation for physical exam    Recent Lab Results:  LIPID RESULTS:  Lab Results   Component Value Date    CHOL 108 10/12/2017    HDL 63 10/12/2017    LDL 29 10/12/2017    TRIG 81 10/12/2017    CHOLHDLRATIO 2.6 08/18/2015       LIVER ENZYME RESULTS:  Lab Results   Component Value Date    AST 14 10/18/2017    ALT 16 10/18/2017       CBC RESULTS:  Lab Results   Component Value Date    WBC 7.6 11/15/2017    RBC 3.76 (L) 11/15/2017    HGB 11.8 (L) 11/15/2017    HCT 37.1 (L) 11/15/2017    MCV 99 11/15/2017    MCH 31.4 11/15/2017    MCHC 31.8 11/15/2017    RDW 15.6 (H) 11/15/2017     (L) 11/15/2017       BMP RESULTS:  Lab Results   Component Value Date     11/15/2017    POTASSIUM 3.3 (L) 11/15/2017    CHLORIDE 103 11/15/2017    CO2 28 11/15/2017    ANIONGAP 11.3 11/15/2017    GLC 97 11/15/2017    BUN 16 11/15/2017    CR 1.41 (H) 11/15/2017    GFRESTIMATED 47 (L) 11/15/2017    GFRESTBLACK 57 (L) 11/15/2017    GIUSEPPE 9.6 11/15/2017        A1C RESULTS:  Lab Results   Component Value Date    A1C 5.3 08/18/2015       INR RESULTS:  Lab Results   Component Value Date    INR 1.15 (H) 10/16/2017    INR 1.15 (H) 10/15/2017           Duane Brown PA-C   November 17, 2017     "

## 2017-11-17 NOTE — MR AVS SNAPSHOT
After Visit Summary   11/17/2017    Ivan Gomez    MRN: [id number]           Patient Information     Date Of Birth          10/12/1926        Visit Information        Provider Department      11/17/2017 2:30 PM Duane Brown PA-C [facility]   [place]        Today's Diagnoses     S/P mitral valve clip implantation    -  1    S/P mitral valve repair        Benign essential hypertension        Hyperlipidemia LDL goal <100        Chronic diastolic heart failure (H)        Coronary artery disease involving native coronary artery of native heart without angina pectoris          Care Instructions    CARDIOLOGY CLINIC INSTRUCTIONS:   -- Your lab shows that your potassium is a bit low. I recommend taking potassium tablet.   -- Continue with Furosemide. Move your second tablet to earlier time in the afternoon.   -- Come back for repeat lab in 2 weeks.   -- Follow up with Dr. Yao or Dr. Monroe in 1-2 month with repeat lab work.     IMPORTANT PHONE NUMBERS:  Cardiology Scheduling~[phone]  Cardiology Clinic Nurse Mechelle ~[phone]  Cardiology Clinic Nurse Sherrill ~[phone]                Follow-ups after your visit        Additional Services     Follow-Up with Cardiologist                 Your next 10 appointments already scheduled     Jan 09, 2018   Procedure with Joseluis Kuhn MD   [facility] (--)    [address]  [facility] [address]   [phone]            Jan 09, 2018  1:00 PM CST   [facility] Same Day Surgery with Joseluis Kuhn MD   [facility] Scheduling ([facility])    [facility] Scheduling ([facility])   [phone]            Sep 26, 2018  1:30 PM CDT   Return Visit with Dennis Hilton MD   [facility]    [facility] [address]  [address]  [facility] [address]   [phone]              Future tests that were ordered for you today     Open Future Orders        Priority Expected Expires Ordered    Basic metabolic panel Routine 12/17/2017 11/17/2018 11/17/2017    Follow-Up with Cardiologist Routine 12/17/2017 11/17/2018 11/17/2017            Who to contact     If you have questions or need follow up information about today's clinic visit or your schedule please contact [facility] directly at [phone].  Normal or non-critical lab and imaging results will be communicated to you by [employer], letter or phone within 4 business days after the clinic has received the results. If you do not hear from us within 7 days, please contact the clinic through [employer] or phone. If you have a critical or abnormal lab result, we will notify you by phone as soon as possible.  Submit refill requests through [employer] or call your pharmacy and they will forward the refill request to us. Please allow 3 business days for your refill to be completed.          Additional Information About Your Visit        [employer] Information     [employer] gives you secure access to your electronic health record. If you see a primary care provider, you can also send messages to your care team and make appointments. If you have questions, please call your primary care clinic.  If you do not have a primary care provider, please call [phone] and they will assist you.        Care EveryWhere ID     This is your Care EveryWhere ID. This could be used by other organizations to access your [place] medical records  [id number]        Your Vitals Were     Pulse Height BMI (Body Mass Index)             88 1.702 m (5' 7\") 24.75 kg/m2          Blood Pressure from Last 3 Encounters:   11/17/17 121/82   11/13/17 120/70   11/08/17 120/60    Weight from Last 3 Encounters:   11/17/17 71.7 kg (158 lb)   11/13/17 72.1 kg (159 lb)   11/08/17 67.6 kg (149 lb)    "           We Performed the Following     EKG 12-lead complete w/read - Clinics (to be scheduled)     Follow-Up with Cardiac Advanced Practice Provider          Today's Medication Changes          These changes are accurate as of: 11/17/17  2:54 PM.  If you have any questions, ask your nurse or doctor.               Start taking these medicines.        Dose/Directions    potassium chloride SA 10 MEQ CR tablet   Commonly known as:  K-DUR/KLOR-CON M   Used for:  Chronic diastolic heart failure (H)   Started by:  Duane Brown PA-C        Take half tablet one time daily.   Quantity:  90 tablet   Refills:  3            Where to get your medicines      These medications were sent to [facility], [place] - [address] [facility] [address]  [address], [place] [address]     Phone:  [phone]     isosorbide dinitrate 20 MG tablet    potassium chloride SA 10 MEQ CR tablet                Primary Care Provider Office Phone # Fax #    Evaristo Magaña [phone] [address]  [facility] [address]        Equal Access to Services     [facility] Hospital[facility] : Hadii aad ku hadasho Soomaali, waaxda luqadaha, qaybta kaalmada adeegyada, perri cruz . So [facility] [phone].    ATENCIÓN: Si habla español, tiene a rubio disposición servicios gratbudos de asistencia lingüística. [facility] [phone].    We comply with applicable federal civil rights laws and [place] laws. We do not discriminate on the basis of race, color, national origin, age, disability, sex, sexual orientation, or gender identity.            Thank you!     Thank you for choosing [facility]  for your care. Our goal is always to provide you with excellent care. Hearing back from our patients is one way we can continue to improve our services. Please take a few minutes to complete the written survey that you may receive in the  mail after your visit with us. Thank you!             Your Updated Medication List - Protect others around you: Learn how to safely use, store and throw away your medicines at www.disposemymeds.org.          This list is accurate as of: 11/17/17  2:54 PM.  Always use your most recent med list.                   Brand Name Dispense Instructions for use Diagnosis    aspirin 81 MG EC tablet      Take 1 tablet (81 mg) by mouth daily    S/P mitral valve repair       eucerin cream      Apply topically At Bedtime Apply to hands        fiber modular packet      1 packet daily        finasteride 5 MG tablet    PROSCAR    90 tablet    TAKE 1 TABLET BY MOUTH EVERY DAY    Benign enlargement of prostate       furosemide 20 MG tablet    LASIX    30 tablet    Take 1 tablet (20 mg) by mouth 2 times daily    Acute on chronic heart failure, unspecified heart failure type (H)       gabapentin 300 MG capsule    NEURONTIN    270 capsule    TAKE 1 CAPSULE BY MOUTH THREE TIMES DAILY    Lumbar spinal stenosis       hydrALAZINE 25 MG tablet    APRESOLINE    60 tablet    Take 1.5 tablets (37.5 mg) by mouth every 8 hours    Acute on chronic heart failure, unspecified heart failure type (H)       HYDROcodone-acetaminophen 7.5-325 MG per tablet    NORCO    18 tablet    Take 1 tablet by mouth every 6 hours as needed for pain maximum 4 tablet(s) per day    Spinal stenosis of lumbar region, unspecified whether neurogenic claudication present       hypromellose 0.4 % Soln ophthalmic solution    ARTIFICIAL TEARS     Place 2 drops Into the left eye 4 times daily        isosorbide dinitrate 20 MG tablet    ISORDIL    90 tablet    Take 1 tablet (20 mg) by mouth 3 times daily    Chronic diastolic heart failure (H), Coronary artery disease involving native coronary artery of native heart without angina pectoris       metoprolol 25 MG 24 hr tablet    TOPROL-XL    90 tablet    TAKE 1 TABLET BY MOUTH DAILY    Benign essential hypertension       order for  DME     1 Can    O2 by nasal cannula continuous 3 L/min to maintain O2 saturations > 92%    Acute on chronic heart failure, unspecified heart failure type (H)       PANTOPRAZOLE SODIUM PO      Take 40 mg by mouth daily Take 1/2hr -1hr before meal        potassium chloride SA 10 MEQ CR tablet    K-DUR/KLOR-CON M    90 tablet    Take half tablet one time daily.    Chronic diastolic heart failure (H)       senna-docusate 8.6-50 MG per tablet    SENOKOT-S;PERICOLACE     Take 1 tablet by mouth 2 times daily And 1 tablet by mouth two times daily as needed.        simvastatin 20 MG tablet    ZOCOR    90 tablet    TAKE 1 TABLET BY MOUTH AT BEDTIME    Hyperlipidemia LDL goal <100       tamsulosin 0.4 MG capsule    FLOMAX    90 capsule    Take 1 capsule (0.4 mg) by mouth daily    BPH (benign prostatic hyperplasia)

## 2017-11-17 NOTE — LETTER
11/17/2017    Evaristo Magaña MD  [address]  [facility] [address]    RE: Ivan Gomez       Dear Colleague,    I had the pleasure of seeing Ivan PRYOR Patricia in the [facility].    History of Present Illness:   This is a very pleasant 89-year-old gentleman who presents today for 1 month post-mitul clip follow-up.  He was admitted early October with one month history of dyspnea on exertion which became acutely worse 2 days prior to his admission.  He had an echocardiogram performed which showed severe (4+) mitral regurgitation with ruptured mitral valve cord.  He was transferred to [facility] for consideration mitul clip.  He underwent mitul clip procedure on 10/16, receiving 2 clips.  His postprocedure echocardiogram showed moderate mitral regurgitation with a gradient of 5 mmHg.  He was started on beta blocker, hydralazine, and Imdur.  He was recommended to continue with aspirin.  He was IV diuresis and discharge to TCU with furosemide 20 mg b.i.d.  Of note he also underwent Lexiscan nuclear stress study which showed apical ischemia.  Medical management was recommended.    His cardiac history includes chronic diastolic heart failure, hypertension, hyperlipidemia, CK D (stage III), GERD, chronic thrombocytopenia, and history of bladder cancer (TURBT ×2).    He returns to clinic today stating that his been back in his condo for the past one week.  He feels much better.  He denies any shortness of breath, PND, orthopnea,, distention, peripheral edema, or significant weight gain.  He checks his weight daily and infiltrate between 122 pounds but no greater.  He continues to have slight swelling around the ankles.  Significantly better compared to the time when he was admitted.  His spouse unfortunately continues to reside at [facility].  She sustained a fall during this admission and at a hip fracture.    He denies any bleeding issues with aspirin.  He continues to take his  first MI 2 times daily.  He tells me that he takes his second tablet later in the evening and he stopped most of the night urinating.  He has no other concerns or questions at this time.    Physical examination:  General-NAD  Neck-no JVD in upright position  Respiratory-clear to auscultation bilaterally  Abdomen-soft nontender  Extremities-trace to 1+ pitting edema lower extremities bilaterally with left greater than right    Assessment and plan:  This is a delightful 89-year-old gentleman who presents to structural heart clinic for one month post mitral clip follow-up.  He was admitted in October due to progressive shortness of breath and was found to have severe mitral regurgitation with ruptured mitral valve cord.  He was IV diuresis and was transferred to [facility] for mitral clip.  He underwent this procedure on 10/16/17 without any complications.  He received 2 clips in the mitral position.  Post procedure IFEANYI showed moderate mitral regurgitation with mean gradient of 5 mmHg.  The following day repeat echocardiogram showed gradient to be 8 mmHg.  He was IV diuresis with marked improvement in his symptoms.  He was started on beta blocker, hydralazine, nitrates, and aspirin.  He was discharge to TCU.    He appears to be doing much better from a cardiac standpoint.  He denies any shortness of breath, chest discomfort, palpitations, or any other symptoms to suggest decompensated diastolic heart failure.  He continues to take his medications without any interruptions.  His hemoglobin is stable.  His platelet count is stable as well.  His basic metabolic panel shows stable kidney function but he has borderline hypokalemia.  He is not on any potassium supplements.  I instructed him to take one 10 mEq daily.  I would like him to come back in 1 to 2 weeks for repeat basic metabolic panel.    The patient would like to follow-up with either Dr. Monroe or Dr. Yao.  We will arrange him to see one of these cardiologists  in the next one to 2 months with repeat laboratory work.    He will follow-up in structural her clinic in 1 year with repeat echocardiogram.    I reiterated the importance of low sodium diet as well as importance of checking his weights daily.  He seems eager to comply with these recommendations.    I provided her clinic contact information should he have any questions or concerns.    Thank you for allowing me to participate in the care of this patient today.       This note was completed in part using Dragon voice recognition software. Although reviewed after completion, some word and grammatical errors may occur.    Orders this Visit:  Orders Placed This Encounter   Procedures     Basic metabolic panel     Follow-Up with Cardiologist     Orders Placed This Encounter   Medications     isosorbide dinitrate (ISORDIL) 20 MG tablet     Sig: Take 1 tablet (20 mg) by mouth 3 times daily     Dispense:  90 tablet     Refill:  3     potassium chloride SA (K-DUR/KLOR-CON M) 10 MEQ CR tablet     Sig: Take half tablet one time daily.     Dispense:  90 tablet     Refill:  3     Medications Discontinued During This Encounter   Medication Reason     isosorbide dinitrate (ISORDIL) 20 MG tablet Reorder         Encounter Diagnoses   Name Primary?     S/P mitral valve repair      Benign essential hypertension      Hyperlipidemia LDL goal <100      S/P mitral valve clip implantation Yes     Chronic diastolic heart failure (H)      Coronary artery disease involving native coronary artery of native heart without angina pectoris        CURRENT MEDICATIONS:  Current Outpatient Prescriptions   Medication Sig Dispense Refill     isosorbide dinitrate (ISORDIL) 20 MG tablet Take 1 tablet (20 mg) by mouth 3 times daily 90 tablet 3     potassium chloride SA (K-DUR/KLOR-CON M) 10 MEQ CR tablet Take half tablet one time daily. 90 tablet 3     PANTOPRAZOLE SODIUM PO Take 40 mg by mouth daily Take 1/2hr -1hr before meal       hypromellose  (ARTIFICIAL TEARS) 0.4 % SOLN ophthalmic solution Place 2 drops Into the left eye 4 times daily       senna-docusate (SENOKOT-S;PERICOLACE) 8.6-50 MG per tablet Take 1 tablet by mouth 2 times daily And 1 tablet by mouth two times daily as needed.       aspirin EC 81 MG EC tablet Take 1 tablet (81 mg) by mouth daily       hydrALAZINE (APRESOLINE) 25 MG tablet Take 1.5 tablets (37.5 mg) by mouth every 8 hours 60 tablet      furosemide (LASIX) 20 MG tablet Take 1 tablet (20 mg) by mouth 2 times daily 30 tablet      tamsulosin (FLOMAX) 0.4 MG capsule Take 1 capsule (0.4 mg) by mouth daily 90 capsule 0     gabapentin (NEURONTIN) 300 MG capsule TAKE 1 CAPSULE BY MOUTH THREE TIMES DAILY 270 capsule 1     fiber modular (NUTRISOURCE FIBER) packet 1 packet daily       simvastatin (ZOCOR) 20 MG tablet TAKE 1 TABLET BY MOUTH AT BEDTIME 90 tablet 1     finasteride (PROSCAR) 5 MG tablet TAKE 1 TABLET BY MOUTH EVERY DAY 90 tablet 3     metoprolol (TOPROL-XL) 25 MG 24 hr tablet TAKE 1 TABLET BY MOUTH DAILY 90 tablet 3     Skin Protectants, Misc. (EUCERIN) cream Apply topically At Bedtime Apply to hands       HYDROcodone-acetaminophen (NORCO) 7.5-325 MG per tablet Take 1 tablet by mouth every 6 hours as needed for pain maximum 4 tablet(s) per day 18 tablet 0     order for DME O2 by nasal cannula continuous 3 L/min to maintain O2 saturations > 92% 1 Can 1     [DISCONTINUED] isosorbide dinitrate (ISORDIL) 20 MG tablet Take 1 tablet (20 mg) by mouth 3 times daily         ALLERGIES     Allergies   Allergen Reactions     Celebrex [Celecoxib] Hives     Penicillins Hives       PAST MEDICAL HISTORY:  Past Medical History:   Diagnosis Date     Arthritis     Spinal Stenosis     Former smoker      Hemorrhoids      Hypercholesteremia      Hypertension      Malignant neoplasm (H)     skin CA, Basal cell     Other chronic pain      Renal disease        PAST SURGICAL HISTORY:  Past Surgical History:   Procedure Laterality Date     BACK SURGERY        BIOPSY      face, skin cancer     BIOPSY  8/2016    shoulder lesion     CYSTOSCOPY, TRANSURETHRAL RESECTION (TUR) PROSTATE, COMBINED N/A 8/21/2015    Procedure: COMBINED CYSTOSCOPY, TRANSURETHRAL RESECTION (TUR) PROSTATE;  Surgeon: Dennis Hilton MD;  Location: [facility] OR     CYSTOSCOPY, TRANSURETHRAL RESECTION (TUR) TUMOR BLADDER, COMBINED N/A 9/2/2016    Procedure: COMBINED CYSTOSCOPY, TRANSURETHRAL RESECTION (TUR) TUMOR BLADDER;  Surgeon: Dennis Hilton MD;  Location: [facility] OR     ORTHOPEDIC SURGERY      lumbar and cervical surgery, Sep, 2008, knee surgery     PERCUTANEOUS MITRAL VALVE REPAIR N/A 10/16/2017    Procedure: PERCUTANEOUS MITRAL VALVE REPAIR ANESTHESIA;  Percutaneous MitraClip ;  Surgeon: Eber Monroe MD;  Location: [facility] OR     PICC INSERTION Right 10/14/2017    5fr DL Bard PICC, 40cm (1cm external) in the R basilic vein w/ tip in the mid SVC.       FAMILY HISTORY:  Family History   Problem Relation Age of Onset     CANCER Son 64     leukemia ? due to agent orange     Family History Negative Son      DIABETES No family hx of      Coronary Artery Disease No family hx of      Hypertension No family hx of      Hyperlipidemia No family hx of      CEREBROVASCULAR DISEASE No family hx of      Breast Cancer No family hx of      Colon Cancer No family hx of      Prostate Cancer No family hx of      Other Cancer No family hx of      Depression No family hx of      Anxiety Disorder No family hx of      MENTAL ILLNESS No family hx of      Substance Abuse No family hx of      Anesthesia Reaction No family hx of      Asthma No family hx of      OSTEOPOROSIS No family hx of      Genetic Disorder No family hx of      Thyroid Disease No family hx of      Obesity No family hx of      Unknown/Adopted No family hx of        SOCIAL HISTORY:  Social History     Social History     Marital status: Single     Spouse name: N/A     Number of children: N/A     Years of education: N/A     Social History  "Main Topics     Smoking status: Former Smoker     Smokeless tobacco: Never Used     Alcohol use No      Comment: doesnt use anymore     Drug use: No     Sexual activity: No     Other Topics Concern     Parent/Sibling W/ Cabg, Mi Or Angioplasty Before 65f 55m? No     Social History Narrative       Review of Systems:  Skin:  Negative     Eyes:  Negative    ENT:  Negative    Respiratory:  Negative    Cardiovascular:  Negative    Gastroenterology: Negative    Genitourinary:  not assessed    Musculoskeletal:  Positive for arthritis  Neurologic:  Negative    Psychiatric:  Negative    Heme/Lymph/Imm:  Negative    Endocrine:  Negative      Physical Exam:  Vitals: /82  Pulse 88  Ht 1.702 m (5' 7\")  Wt 71.7 kg (158 lb)  BMI 24.75 kg/m2   Please refer to dictation for physical exam    Recent Lab Results:  LIPID RESULTS:  Lab Results   Component Value Date    CHOL 108 10/12/2017    HDL 63 10/12/2017    LDL 29 10/12/2017    TRIG 81 10/12/2017    CHOLHDLRATIO 2.6 08/18/2015       LIVER ENZYME RESULTS:  Lab Results   Component Value Date    AST 14 10/18/2017    ALT 16 10/18/2017       CBC RESULTS:  Lab Results   Component Value Date    WBC 7.6 11/15/2017    RBC 3.76 (L) 11/15/2017    HGB 11.8 (L) 11/15/2017    HCT 37.1 (L) 11/15/2017    MCV 99 11/15/2017    MCH 31.4 11/15/2017    MCHC 31.8 11/15/2017    RDW 15.6 (H) 11/15/2017     (L) 11/15/2017       BMP RESULTS:  Lab Results   Component Value Date     11/15/2017    POTASSIUM 3.3 (L) 11/15/2017    CHLORIDE 103 11/15/2017    CO2 28 11/15/2017    ANIONGAP 11.3 11/15/2017    GLC 97 11/15/2017    BUN 16 11/15/2017    CR 1.41 (H) 11/15/2017    GFRESTIMATED 47 (L) 11/15/2017    GFRESTBLACK 57 (L) 11/15/2017    GIUSEPPE 9.6 11/15/2017        A1C RESULTS:  Lab Results   Component Value Date    A1C 5.3 08/18/2015       INR RESULTS:  Lab Results   Component Value Date    INR 1.15 (H) 10/16/2017    INR 1.15 (H) 10/15/2017     Thank you for allowing me to participate in the " care of your patient.    Sincerely,     Duane Brown PA-C     [facility]

## 2017-11-17 NOTE — PATIENT INSTRUCTIONS
CARDIOLOGY CLINIC INSTRUCTIONS:   -- Your lab shows that your potassium is a bit low. I recommend taking potassium tablet.   -- Continue with Furosemide. Move your second tablet to earlier time in the afternoon.   -- Come back for repeat lab in 1-2 weeks.   -- Follow up with Dr. Yao or Dr. Monroe in 1-2 month with repeat lab work.     IMPORTANT PHONE NUMBERS:  Cardiology Scheduling~[phone]  Cardiology Clinic Nurse Mechelle ~[phone]  Cardiology Clinic Nurse Sherrill ~[phone]

## 2017-11-18 ENCOUNTER — HOSPITAL ENCOUNTER (EMERGENCY)
Facility: CLINIC | Age: 82
Discharge: HOME | End: 2017-11-18
Attending: EMERGENCY MEDICINE | Admitting: EMERGENCY MEDICINE
Payer: MEDICARE

## 2017-11-18 VITALS
BODY MASS INDEX: 23.34 KG/M2 | RESPIRATION RATE: 16 BRPM | HEART RATE: 82 BPM | TEMPERATURE: 97.7 F | HEIGHT: 68 IN | WEIGHT: 154 LBS | OXYGEN SATURATION: 92 % | DIASTOLIC BLOOD PRESSURE: 67 MMHG | SYSTOLIC BLOOD PRESSURE: 116 MMHG

## 2017-11-18 DIAGNOSIS — N18.9 CHRONIC RENAL FAILURE, UNSPECIFIED CKD STAGE: ICD-10-CM

## 2017-11-18 DIAGNOSIS — R10.13 ABDOMINAL PAIN, EPIGASTRIC: ICD-10-CM

## 2017-11-18 LAB
ALBUMIN SERPL-MCNC: 3 G/DL (ref 3.4–5)
ALP SERPL-CCNC: 61 U/L (ref 40–150)
ALT SERPL W P-5'-P-CCNC: 17 U/L (ref 0–70)
ANION GAP SERPL CALCULATED.3IONS-SCNC: 4 MMOL/L (ref 3–14)
AST SERPL W P-5'-P-CCNC: 13 U/L (ref 0–45)
BASOPHILS # BLD AUTO: 0 10E9/L (ref 0–0.2)
BASOPHILS NFR BLD AUTO: 0.2 %
BILIRUB SERPL-MCNC: 0.5 MG/DL (ref 0.2–1.3)
BUN SERPL-MCNC: 15 MG/DL (ref 7–30)
CALCIUM SERPL-MCNC: 8.5 MG/DL (ref 8.5–10.1)
CHLORIDE SERPL-SCNC: 107 MMOL/L (ref 94–109)
CO2 SERPL-SCNC: 33 MMOL/L (ref 20–32)
CREAT SERPL-MCNC: 1.28 MG/DL (ref 0.66–1.25)
DIFFERENTIAL METHOD BLD: ABNORMAL
EOSINOPHIL # BLD AUTO: 0 10E9/L (ref 0–0.7)
EOSINOPHIL NFR BLD AUTO: 0.4 %
ERYTHROCYTE [DISTWIDTH] IN BLOOD BY AUTOMATED COUNT: 15.7 % (ref 10–15)
GFR SERPL CREATININE-BSD FRML MDRD: 53 ML/MIN/1.7M2
GLUCOSE SERPL-MCNC: 134 MG/DL (ref 70–99)
HCT VFR BLD AUTO: 33.4 % (ref 40–53)
HGB BLD-MCNC: 10.6 G/DL (ref 13.3–17.7)
IMM GRANULOCYTES # BLD: 0 10E9/L (ref 0–0.4)
IMM GRANULOCYTES NFR BLD: 0 %
LIPASE SERPL-CCNC: 100 U/L (ref 73–393)
LYMPHOCYTES # BLD AUTO: 0.5 10E9/L (ref 0.8–5.3)
LYMPHOCYTES NFR BLD AUTO: 9 %
MCH RBC QN AUTO: 31.4 PG (ref 26.5–33)
MCHC RBC AUTO-ENTMCNC: 31.7 G/DL (ref 31.5–36.5)
MCV RBC AUTO: 99 FL (ref 78–100)
MONOCYTES # BLD AUTO: 0.3 10E9/L (ref 0–1.3)
MONOCYTES NFR BLD AUTO: 6 %
NEUTROPHILS # BLD AUTO: 4.7 10E9/L (ref 1.6–8.3)
NEUTROPHILS NFR BLD AUTO: 84.4 %
NRBC # BLD AUTO: 0 10*3/UL
NRBC BLD AUTO-RTO: 0 /100
PLATELET # BLD AUTO: 90 10E9/L (ref 150–450)
POTASSIUM SERPL-SCNC: 3.5 MMOL/L (ref 3.4–5.3)
PROT SERPL-MCNC: 6.1 G/DL (ref 6.8–8.8)
RBC # BLD AUTO: 3.38 10E12/L (ref 4.4–5.9)
SODIUM SERPL-SCNC: 144 MMOL/L (ref 133–144)
WBC # BLD AUTO: 5.5 10E9/L (ref 4–11)

## 2017-11-18 PROCEDURE — A9270 NON-COVERED ITEM OR SERVICE: HCPCS | Mod: GY | Performed by: EMERGENCY MEDICINE

## 2017-11-18 PROCEDURE — 83690 ASSAY OF LIPASE: CPT | Performed by: EMERGENCY MEDICINE

## 2017-11-18 PROCEDURE — 85025 COMPLETE CBC W/AUTO DIFF WBC: CPT | Performed by: EMERGENCY MEDICINE

## 2017-11-18 PROCEDURE — 80053 COMPREHEN METABOLIC PANEL: CPT | Performed by: EMERGENCY MEDICINE

## 2017-11-18 PROCEDURE — 25000132 ZZH RX MED GY IP 250 OP 250 PS 637: Mod: GY | Performed by: EMERGENCY MEDICINE

## 2017-11-18 PROCEDURE — 99283 EMERGENCY DEPT VISIT LOW MDM: CPT

## 2017-11-18 PROCEDURE — 25000125 ZZHC RX 250: Performed by: EMERGENCY MEDICINE

## 2017-11-18 RX ADMIN — LIDOCAINE HYDROCHLORIDE 15 ML: 20 SOLUTION ORAL; TOPICAL at 11:23

## 2017-11-18 ASSESSMENT — ENCOUNTER SYMPTOMS
VOMITING: 0
FEVER: 0
DIARRHEA: 1
BLOOD IN STOOL: 0
NAUSEA: 0
CONSTIPATION: 0
ABDOMINAL PAIN: 1

## 2017-11-18 NOTE — ED AVS SNAPSHOT
Emergency Department    [address] [facility] [address]    Phone:  [phone]    Fax:  [phone]                                       Ivan Gomez   MRN: [id number]    Department:   Emergency Department   Date of Visit:  11/18/2017           Patient Information     Date Of Birth          10/12/1926        Your diagnoses for this visit were:     Abdominal pain, epigastric     Chronic renal failure, unspecified CKD stage        You were seen by Odette Borja MD.      Follow-up Information     Schedule an appointment as soon as possible for a visit with Evaristo Magaña MD.    Specialty:  Family Practice    Contact information:    [address]  [facility] [id number] [phone]          Discharge Instructions       Push fluids, increase your Protonix to 2 times a day until your stomach pain gets better, liquid Maalox 3-4 times a day as needed. Recheck in the clinic on Monday or Tuesday if you continue to have pain. If you get worse before then with fevers and worsening pain, especially if you are vomiting and week, return to the emergency room.        Discharge References/Attachments     GASTRITIS OR ULCER (NO ANTIBIOTIC TREATMENT) (ENGLISH)      Future Appointments        Provider Department Dept Phone Center    12/1/2017 11:00 AM DANA Mancuso P Heart Lab [facility] [place] [phone] [facility] PSA CLIN    1/9/2018 1:00 PM Joseluis Kuhn MD, MD [facility] Scheduling [phone] [facility]    2/23/2018 1:45 PM Lance Yao MD, MD [facility] [phone] [facility] PSA CLIN    9/26/2018 1:30 PM Dennis Hilton MD [facility] [phone] [facility]      24 Hour Appointment Hotline       To make an appointment at any [facility], call [phone] ([phone]). If you don't have a family doctor or clinic, we will help you find  one. [facility] are conveniently located to serve the needs of you and your family.             Review of your medicines      Our records show that you are taking the medicines listed below. If these are incorrect, please call your family doctor or clinic.        Dose / Directions Last dose taken    aspirin 81 MG EC tablet   Dose:  81 mg        Take 1 tablet (81 mg) by mouth daily   Refills:  0        eucerin cream        Apply topically At Bedtime Apply to hands   Refills:  0        fiber modular packet   Dose:  1 packet        1 packet daily   Refills:  0        finasteride 5 MG tablet   Commonly known as:  PROSCAR   Quantity:  90 tablet        TAKE 1 TABLET BY MOUTH EVERY DAY   Refills:  3        furosemide 20 MG tablet   Commonly known as:  LASIX   Dose:  20 mg   Quantity:  30 tablet        Take 1 tablet (20 mg) by mouth 2 times daily   Refills:  0        gabapentin 300 MG capsule   Commonly known as:  NEURONTIN   Quantity:  270 capsule        TAKE 1 CAPSULE BY MOUTH THREE TIMES DAILY   Refills:  1        hydrALAZINE 25 MG tablet   Commonly known as:  APRESOLINE   Dose:  37.5 mg   Quantity:  60 tablet        Take 1.5 tablets (37.5 mg) by mouth every 8 hours   Refills:  0        HYDROcodone-acetaminophen 7.5-325 MG per tablet   Commonly known as:  NORCO   Dose:  1 tablet   Quantity:  18 tablet        Take 1 tablet by mouth every 6 hours as needed for pain maximum 4 tablet(s) per day   Refills:  0        hypromellose 0.4 % Soln ophthalmic solution   Commonly known as:  ARTIFICIAL TEARS   Dose:  2 drop        Place 2 drops Into the left eye 4 times daily   Refills:  0        isosorbide dinitrate 20 MG tablet   Commonly known as:  ISORDIL   Dose:  20 mg   Quantity:  90 tablet        Take 1 tablet (20 mg) by mouth 3 times daily   Refills:  3        metoprolol 25 MG 24 hr tablet   Commonly known as:  TOPROL-XL   Quantity:  90 tablet        TAKE 1 TABLET BY MOUTH DAILY   Refills:  3        order for DME    Quantity:  1 Can        O2 by nasal cannula continuous 3 L/min to maintain O2 saturations > 92%   Refills:  1        PANTOPRAZOLE SODIUM PO   Dose:  40 mg        Take 40 mg by mouth daily Take 1/2hr -1hr before meal   Refills:  0        potassium chloride SA 10 MEQ CR tablet   Commonly known as:  K-DUR/KLOR-CON M   Quantity:  90 tablet        Take half tablet one time daily.   Refills:  3        senna-docusate 8.6-50 MG per tablet   Commonly known as:  SENOKOT-S;PERICOLACE   Dose:  1 tablet        Take 1 tablet by mouth 2 times daily And 1 tablet by mouth two times daily as needed.   Refills:  0        simvastatin 20 MG tablet   Commonly known as:  ZOCOR   Quantity:  90 tablet        TAKE 1 TABLET BY MOUTH AT BEDTIME   Refills:  1        tamsulosin 0.4 MG capsule   Commonly known as:  FLOMAX   Dose:  0.4 mg   Quantity:  90 capsule        Take 1 capsule (0.4 mg) by mouth daily   Refills:  0                Procedures and tests performed during your visit     Assess heart rate    CBC with platelets + differential    Comprehensive metabolic panel    Lipase      Orders Needing Specimen Collection     None      Pending Results     No orders found from 11/16/2017 to 11/19/2017.            Pending Culture Results     No orders found from 11/16/2017 to 11/19/2017.            Pending Results Instructions     If you had any lab results that were not finalized at the time of your Discharge, you can call the ED Lab Result RN at [phone]. You will be contacted by this team for any positive Lab results or changes in treatment. The nurses are available 7 days a week from 10A to 6:30P.  You can leave a message 24 hours per day and they will return your call.        Test Results From Your Hospital Stay        11/18/2017  1:23 PM      Component Results     Component Value Ref Range & Units Status    WBC 5.5 4.0 - 11.0 10e9/L Final    RBC Count 3.38 (L) 4.4 - 5.9 10e12/L Final    Hemoglobin 10.6 (L) 13.3 - 17.7 g/dL Final     Hematocrit 33.4 (L) 40.0 - 53.0 % Final    MCV 99 78 - 100 fl Final    MCH 31.4 26.5 - 33.0 pg Final    MCHC 31.7 31.5 - 36.5 g/dL Final    RDW 15.7 (H) 10.0 - 15.0 % Final    Platelet Count 90 (L) 150 - 450 10e9/L Final    Diff Method Automated Method  Final    % Neutrophils 84.4 % Final    % Lymphocytes 9.0 % Final    % Monocytes 6.0 % Final    % Eosinophils 0.4 % Final    % Basophils 0.2 % Final    % Immature Granulocytes 0.0 % Final    Nucleated RBCs 0 0 /100 Final    Absolute Neutrophil 4.7 1.6 - 8.3 10e9/L Final    Absolute Lymphocytes 0.5 (L) 0.8 - 5.3 10e9/L Final    Absolute Monocytes 0.3 0.0 - 1.3 10e9/L Final    Absolute Eosinophils 0.0 0.0 - 0.7 10e9/L Final    Absolute Basophils 0.0 0.0 - 0.2 10e9/L Final    Abs Immature Granulocytes 0.0 0 - 0.4 10e9/L Final    Absolute Nucleated RBC 0.0  Final         11/18/2017  1:58 PM      Component Results     Component Value Ref Range & Units Status    Lipase 100 73 - 393 U/L Final         11/18/2017  1:58 PM      Component Results     Component Value Ref Range & Units Status    Sodium 144 133 - 144 mmol/L Final    Potassium 3.5 3.4 - 5.3 mmol/L Final    Chloride 107 94 - 109 mmol/L Final    Carbon Dioxide 33 (H) 20 - 32 mmol/L Final    Anion Gap 4 3 - 14 mmol/L Final    Glucose 134 (H) 70 - 99 mg/dL Final    Urea Nitrogen 15 7 - 30 mg/dL Final    Creatinine 1.28 (H) 0.66 - 1.25 mg/dL Final    GFR Estimate 53 (L) >60 mL/min/1.7m2 Final    Non  GFR Calc    GFR Estimate If Black 64 >60 mL/min/1.7m2 Final    African American GFR Calc    Calcium 8.5 8.5 - 10.1 mg/dL Final    Bilirubin Total 0.5 0.2 - 1.3 mg/dL Final    Albumin 3.0 (L) 3.4 - 5.0 g/dL Final    Protein Total 6.1 (L) 6.8 - 8.8 g/dL Final    Alkaline Phosphatase 61 40 - 150 U/L Final    ALT 17 0 - 70 U/L Final    AST 13 0 - 45 U/L Final                Clinical Quality Measure: Blood Pressure Screening     Your blood pressure was checked while you were in the emergency department today. The  last reading we obtained was  BP: 116/67 . Please read the guidelines below about what these numbers mean and what you should do about them.  If your systolic blood pressure (the top number) is less than 120 and your diastolic blood pressure (the bottom number) is less than 80, then your blood pressure is normal. There is nothing more that you need to do about it.  If your systolic blood pressure (the top number) is 120-139 or your diastolic blood pressure (the bottom number) is 80-89, your blood pressure may be higher than it should be. You should have your blood pressure rechecked within a year by a primary care provider.  If your systolic blood pressure (the top number) is 140 or greater or your diastolic blood pressure (the bottom number) is 90 or greater, you may have high blood pressure. High blood pressure is treatable, but if left untreated over time it can put you at risk for heart attack, stroke, or kidney failure. You should have your blood pressure rechecked by a primary care provider within the next 4 weeks.  If your provider in the emergency department today gave you specific instructions to follow-up with your doctor or provider even sooner than that, you should follow that instruction and not wait for up to 4 weeks for your follow-up visit.        Thank you for choosing [facility]       Thank you for choosing [facility] for your care. Our goal is always to provide you with excellent care. Hearing back from our patients is one way we can continue to improve our services. Please take a few minutes to complete the written survey that you may receive in the mail after you visit with us. Thank you!        [facility] Information     [facility] gives you secure access to your electronic health record. If you see a primary care provider, you can also send messages to your care team and make appointments. If you have questions, please call your primary care clinic.  If you do not have a primary care provider, please  call [phone] and they will assist you.        Care EveryWhere ID     This is your Care EveryWhere ID. This could be used by other organizations to access your [place] medical records  [id number]        Equal Access to Services     FARZANEH WILDER : Marga Urbina, walia dobson, qaarianna kaalmakeesha amador, perri escobedo. So [facility] [phone].    ATENCIÓN: Si habla español, tiene a rubio disposición servicios gratuitos de asistencia lingüística. Llame al [phone].    We comply with applicable federal civil rights laws and [place] laws. We do not discriminate on the basis of race, color, national origin, age, disability, sex, sexual orientation, or gender identity.            After Visit Summary       This is your record. Keep this with you and show to your community pharmacist(s) and doctor(s) at your next visit.

## 2017-11-18 NOTE — DISCHARGE INSTRUCTIONS
Push fluids, increase your Protonix to 2 times a day until your stomach pain gets better, liquid Maalox 3-4 times a day as needed. Recheck in the clinic on Monday or Tuesday if you continue to have pain. If you get worse before then with fevers and worsening pain, especially if you are vomiting and week, return to the emergency room.

## 2017-11-18 NOTE — ED PROVIDER NOTES
History     Chief Complaint:  Abdominal Pain     HPI   Ivan Gomez is a 89 year old male who presents with abdominal pain. The patient reports that this morning he developed epigastric abdominal pain. He states that he does have a history of GERD and takes Protonix for this. The patient states that he took his morning dose of this medication but did not have improvement in his symptoms. This prompted him to come to the ED for evaluation. Here, the patient notes that he did have mild improvement in his pain after eating. He additionally endorses frequent loose stools but denies melena or hematochezia. Mr. Gomez also denies chest pain, nausea, or vomiting as well as a fever or any significant diet changes.    Allergies:  Celebrex  Penicillins     Medications:    Isordil  Potassium Chloride  Pantoprazole  Artificial Tears  Eucerin  Norco  Senna-Docusate  Aspirin  Hydralazine  Lasix  Flomax  Gabapentin  Fiber  Simvastatin  Proscar  Metoprolol     Past Medical History:    Arthritis  Hypokalemia  Dry eyes  Mitral regurgitation  Heart Failure  Acute respiratory failure with hypoxia  Drug-induced constipation  Femoral hernia   Gout of right foot  Bladder tumor  BPH  Thrombocytopenia  Former smoker  Hemorrhoids  Hypercholesteremia  Hypertension  Malignant neoplasm  Chronic Pain  Renal disease    Past Surgical History:    Back Surgery   Facial biopsy  Shoulder lesion biopsy  TURP  Percutaneous mitral valve repair  PICC Insertion     Family History:    The patient denies any relevant family history.     Social History:  The patient is . He presents from a TCU. The patient reports former tobacco use and denies alcohol use.     Review of Systems   Constitutional: Negative for fever.   Gastrointestinal: Positive for abdominal pain (epigastrium) and diarrhea. Negative for blood in stool, constipation, nausea and vomiting.   All other systems reviewed and are negative.    Physical Exam   First Vitals:  BP: (!)  "163/102  Pulse: 113  Temp: 97.7  F (36.5  C)  Resp: 16  Height: 173.7 cm (5' 8.4\")  Weight: 69.9 kg (154 lb)  SpO2: 95 %    Physical Exam  Nursing note and vitals reviewed.  Constitutional:  Appears well-developed and well-nourished, comfortable.   Cardiovascular:  Normal rate, regular rhythm.      Intact distal pulses.       Grade 2-3 harsh, systolic murmur  Pulmonary/Chest:  Effort normal and breath sounds normal. No respiratory distress.     No wheezes. No rales. No chest wall tenderness. No stridor.   Abdominal:   Soft. No distension and no mass.      Mild, diffuse, generalized abdominal tenderness, maximum in the epigastrium.      No rebound and no guarding. No flank pain.  Musculoskeletal:  Normal range of motion.      No tenderness. Trace pretibial edema bilaterally.                                      Neck supple, no midline cervical tenderness.   Neurological:   Alert and oriented to person, place, and year.      No cranial nerve deficit.      Exhibits normal muscle tone. Coordination normal.      GCS eye subscore is 4. GCS verbal subscore is 5.      GCS motor subscore is 6.   Skin:    Skin is warm and dry. No rash noted. No diaphoresis.      No erythema. No pallor.   Psychiatric:   Behavior is normal. Judgment and thought content normal.     Emergency Department Course   Laboratory:  CBC: HGB 10.6 (low), PLT 90 (low), o/w WNL (WBC 5.5)  Lipase: 100  CMP: Glucose 134 (high), Creatinine 1.28 (high), GFR 53 (low), CO2 33 (high), Albumin 3.0 (low), Protein total 6.1 (low), o/w WNL.    Interventions:  GI Cocktail 30 mL PO    Emergency Department Course:  Nursing notes and vitals reviewed. I performed an exam of the patient as documented above.   11:23 The patient was given a GI cocktail PO, dosage as noted above.    12:58 Recheck. Will proceed with lab work.   14:03 Recheck. Discussed lab results with the patient. Reviewed plans for discharge.   Findings and plan explained to the Patient. Patient discharged " home with instructions regarding supportive care, medications, and reasons to return. The importance of close follow-up was reviewed.     Impression & Plan    Medical Decision Making:  Patient comes in with some epigastric pain. History of gastritis and he said this feels the same. He was given a GI cocktail and his symptoms improved significantly. He still has some mild diffuse tenderness in his abdomen without rebound or guarding. His abdomen is soft. White count is normal. He has chronic low hemoglobin and platelets. He has chronic renal failure, which is stable. I don't feel he needs a CT or further workup at this time. He's had some loose stools recently, and I suspect he may have a virus. I'm going to have him increase his Protonix and use a liquid antacid as needed. If he is not improving by early in the week, he needs recheck. He should come back sooner if he gets worse.      Diagnosis:    ICD-10-CM    1. Abdominal pain, epigastric R10.13    2. Chronic renal failure, unspecified CKD stage N18.9        Disposition:  Push fluids, increase your Protonix to 2 times a day until your stomach pain gets better, liquid Maalox 3-4 times a day as needed. Recheck in the clinic on Monday or Tuesday if you continue to have pain. If you get worse before then with fevers and worsening pain, especially if you are vomiting and week, return to the emergency room.      I, Benji Miller, am serving as a scribe at 11:13 AM on 11/18/2017 to document services personally performed by Odette Borja MD, based on my observations and the provider's statements to me.        Odette Borja MD  11/18/17 [id number]

## 2017-11-18 NOTE — ED AVS SNAPSHOT
Emergency Department    [address] [place] [address]    Phone:  [phone]    Fax:  [phone]                                       Ivan Gomez   MRN: [id number]    Department:   Emergency Department   Date of Visit:  11/18/2017           After Visit Summary Signature Page     I have received my discharge instructions, and my questions have been answered. I have discussed any challenges I see with this plan with the nurse or doctor.    ..........................................................................................................................................  Patient/Patient Representative Signature      ..........................................................................................................................................  Patient Representative Print Name and Relationship to Patient    ..................................................               ................................................  Date                                            Time    ..........................................................................................................................................  Reviewed by Signature/Title    ...................................................              ..............................................  Date                                                            Time

## 2017-11-20 ENCOUNTER — CARE COORDINATION (OUTPATIENT)
Dept: CARE COORDINATION | Facility: CLINIC | Age: 82
End: 2017-11-20

## 2017-11-21 ENCOUNTER — MYC REFILL (OUTPATIENT)
Dept: FAMILY MEDICINE | Facility: CLINIC | Age: 82
End: 2017-11-21

## 2017-11-21 DIAGNOSIS — E78.5 HYPERLIPIDEMIA LDL GOAL <100: ICD-10-CM

## 2017-11-21 NOTE — TELEPHONE ENCOUNTER
[facility]:  Original authorizing provider: MD Shahriar Novoa would like a refill of the following medications:  simvastatin (ZOCOR) 20 MG tablet [Evaristo Magaña MD]    Preferred pharmacy: [facility] [address] [place] [address] [id number] AT [facility] [address]    Comment:  Please order for90 days. Thank you SOHAIL Gomez    Medication renewals requested in this message routed to other providers:  furosemide (LASIX) 20 MG tablet [Mirela Coleman, APRN CNP]

## 2017-11-22 RX ORDER — SIMVASTATIN 20 MG
20 TABLET ORAL AT BEDTIME
Qty: 90 TABLET | Refills: 2 | Status: SHIPPED | OUTPATIENT
Start: 2017-11-22 | End: 2018-07-16

## 2017-11-22 NOTE — TELEPHONE ENCOUNTER
Last OV 11/13/17.  Prescription approved per [facility] Refill Protocol.    Requested Prescriptions   Pending Prescriptions Disp Refills     simvastatin (ZOCOR) 20 MG tablet 90 tablet 1     Sig: Take 1 tablet (20 mg) by mouth At Bedtime    [facility] Protocol Passed    11/21/2017  2:53 PM       Passed - LDL on file in past 12 months    Recent Labs   Lab Test  10/12/17   0729   LDL  29            Passed - No abnormal creatine kinase in past 12 months    No lab results found.         Passed - Recent or future visit with authorizing provider    Patient had office visit in the last year or has a visit in the next 30 days with authorizing provider.  See chart review.              Passed - Patient is age 18 or older

## 2017-11-29 ENCOUNTER — CARE COORDINATION (OUTPATIENT)
Dept: CARE COORDINATION | Facility: CLINIC | Age: 82
End: 2017-11-29

## 2017-11-29 NOTE — PROGRESS NOTES
"Clinic Care Coordination Contact  Care Team Conversations    Patient calling clinic care coordinator.     Patient is requesting a PCP appointment as he has not followed up with PCP following his ED visit.     Patient states he is having diarrhea. He states he \"has an urge almost hourly and when he goes it is only mucous.\"  He states his stomach is better but still bothers him off and on. He states he is not eating much. His weight is down to 150 pounds.  We discussed diet at length.  He states he is afraid to go out of his home because he worries about an accident.    His significant other is [facility] at [place] on [address]. He states they came to the house yesterday for a home safety evaluation. They made recommendations of grab bars and rails. He states they are planning to have Jerri come home the middle of next week. He states she will need to use a walker (fractured hip).  He has a lot of things going on right now and is a bit anxious.     Patient has [facility] still seeing him. He states he doesn't think they are doing much other than checking his blood pressure.      Patient is having groceries delivered to the house today.     Made appointment for patient to see PCP on Monday 12/4/17.     Clinic care coordinator will continue to follow.    Suad Gutierrez RN, CCM - Care Coordinator     11/29/2017    8:59 AM  [phone]       "

## 2017-11-29 NOTE — LETTER
[facility]  Complex Care Plan  About Me  Patient Name:  Ivan Gomez    YOB: 1926  Age:   89 year old   [place] MRN: [id number] Telephone Information:     Home Phone [phone]   Mobile [phone]       Address:    [facility] [address] [place] [address] Email address:  [email or web address]      Emergency Contact(s)  Name Relationship Lgl Grd Work Phone Home Phone Mobile Phone   1. BERNICE RODRIGUEZ Significant ot*   [phone]    2. RAIZA Daughter   [phone]            Primary language:  English     needed? No   [place] [facility]:  [phone] op. 1  Other communication barriers: No  Preferred Method of Communication:  Phone  Current living arrangement: I live in a private home with spouse  Mobility Status/ Medical Equipment: Independent w/Device  Other information to know about me:    Health Maintenance  Health Maintenance Reviewed:      My Access Plan  Medical Emergency 911   Primary Clinic Line [facility] [phone]   24 Hour Appointment Line [phone] or  [phone]) (toll-free)   24 Hour Nurse Line [phone] (toll-free)   Preferred Urgent Care     Preferred Hospital [facility]  [phone]   Preferred Pharmacy [facility] [address] [place] [address] [facility] PHELPS AT [facility] [address]     Behavioral Health Crisis Line The National Suicide Prevention Lifeline at 1-785.214.3760 or 911     My Care Team Members  Patient Care Team       Relationship Specialty Notifications Start End    Evaristo Magaña MD PCP - General Family Practice  6/6/13     Phone: [phone] Fax: [phone]         [address] [facility] [address]    Lida, Dennis Loera MD MD Urology  9/3/15     Phone: [phone] Fax: [phone]         [address] [facility] [address]    Cecil Iqbal MD PCP Internal Medicine  9/15/15     Phone: [phone]  Fax: [phone]         [address] [address]    Suad Gutierrez, RN Clinic Care Coordinator  Admissions 10/19/17     Phone: [phone] Fax: [phone]        Eber Monroe MD MD Cardiology  10/19/17     Phone: [phone] Fax: [phone] [address] [place] [address]         My Care Plans  Self Management and Treatment Plan  Goals and (Comments)  Goal #1: I want to feel safe to leave my house.       10% of goal reached    Action Plans on File:  none  Advance Care Plans/Directives Type:   Type Advanced Care Plans/Directives: Advanced Directive - On File    My Medical and Care Information  Problem List   Patient Active Problem List   Diagnosis     Arthritis     Lumbar spinal stenosis     Chronic narcotic dependence (HCC)     Thrombocytopenia (H)     CKD (chronic kidney disease) stage 3, GFR 30-59 ml/min     Knee pain     Advanced directives, counseling/discussion     Glucose intolerance (impaired glucose tolerance)     Chronic pain syndrome     Mitral valvular regurgitation -aortic sclerosis - systolic murmur     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    Care Coordination Start Date:  11/6/17   Frequency of Care Coordination: Monthly   Form Last Updated: 11/29/2017

## 2017-11-30 ENCOUNTER — CARE COORDINATION (OUTPATIENT)
Dept: CARDIOLOGY | Facility: CLINIC | Age: 82
End: 2017-11-30

## 2017-11-30 DIAGNOSIS — R00.2 PALPITATIONS: Primary | ICD-10-CM

## 2017-11-30 NOTE — PROGRESS NOTES
Received call from FRANCISCO Duran w/ [facility] stating that when she listened t pt's heart today it was irregular. Pt denies any symptoms of lightheadedness or dizziness. Discussed with Duane Brown PA-C, recommends we get a 24 hour Holter monitor & see Dr. Yao next week. Called pt with recommendations. Orders placed & pt transferred to scheduling to arrange. [place] RN

## 2017-12-04 ENCOUNTER — OFFICE VISIT (OUTPATIENT)
Dept: FAMILY MEDICINE | Facility: CLINIC | Age: 82
End: 2017-12-04
Payer: MEDICARE

## 2017-12-04 ENCOUNTER — TELEPHONE (OUTPATIENT)
Dept: FAMILY MEDICINE | Facility: CLINIC | Age: 82
End: 2017-12-04

## 2017-12-04 ENCOUNTER — CARE COORDINATION (OUTPATIENT)
Dept: CARE COORDINATION | Facility: CLINIC | Age: 82
End: 2017-12-04

## 2017-12-04 VITALS
TEMPERATURE: 98.5 F | HEART RATE: 88 BPM | SYSTOLIC BLOOD PRESSURE: 120 MMHG | DIASTOLIC BLOOD PRESSURE: 60 MMHG | RESPIRATION RATE: 16 BRPM | WEIGHT: 154 LBS | BODY MASS INDEX: 23.14 KG/M2

## 2017-12-04 DIAGNOSIS — R10.13 ABDOMINAL PAIN, EPIGASTRIC: ICD-10-CM

## 2017-12-04 DIAGNOSIS — K58.0 IRRITABLE BOWEL SYNDROME WITH DIARRHEA: Primary | ICD-10-CM

## 2017-12-04 DIAGNOSIS — K41.90 FEMORAL HERNIA OF RIGHT SIDE: ICD-10-CM

## 2017-12-04 PROCEDURE — 99214 OFFICE O/P EST MOD 30 MIN: CPT | Performed by: FAMILY MEDICINE

## 2017-12-04 NOTE — TELEPHONE ENCOUNTER
FYI only to Dr. Evaristo Magaña :  verbal ok given on behalf of Dr. Evaristo Magaña for skilled nursing 1x4 for med set up and education, diab teaching.

## 2017-12-04 NOTE — PROGRESS NOTES
Clinic Care Coordination Contact  Care Team Conversations    Met with patient face to face in PCP office.     Patient states that he saw PCP and he told him to use immodium for his diarrhea. He states everything else looks good. Patient has been afraid to leave his home because of uncontrolled diarrhea. He states it is better today.     He states that Jerri, his significant other, is coming home from the TCU tomorrow. He is getting everything ready for her. He needed to put up some grab bars in the bathroom.  He states he has not been able to go see her due to his diarrhea.  He is glad that she is coming home. They both will have [facility].     Clinic care coordinator will continue to follow.     Suad Gutierrez RN, CCM - Care Coordinator     12/4/2017    2:06 PM  [phone]

## 2017-12-04 NOTE — NURSING NOTE
"Chief Complaint   Patient presents with     Diarrhea     Gastrointestinal Problem       Initial /60  Pulse 88  Temp 98.5  F (36.9  C) (Tympanic)  Resp 16  Wt 154 lb (69.9 kg)  BMI 23.14 kg/m2 Estimated body mass index is 23.14 kg/(m^2) as calculated from the following:    Height as of 11/18/17: 5' 8.4\" (1.737 m).    Weight as of this encounter: 154 lb (69.9 kg).  Medication Reconciliation: complete     Yara Jim CMA      "

## 2017-12-04 NOTE — PROGRESS NOTES
SUBJECTIVE:   Ivan Gomez is a 89 year old male who presents to clinic today for the following health issues:      Diarrhea      Duration: 1 month    Description:       Consistency of stool: watery, loose and mucousy       Blood in stool: no        Number of loose stools past 24 hours: na    Intensity:  moderate    Accompanying signs and symptoms:       Fever: no        Nausea/vomitting: no        Abdominal pain: no        Weight loss: yes     History (recent antibiotics or travel/ill contacts/med changes/testing done):     Precipitating or alleviating factors: None    Therapies tried and outcome: none            Problem list and histories reviewed & adjusted, as indicated.  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      BPH (benign prostatic hypertrophy)     Bladder tumor     Benign essential hypertension     Hyperlipidemia LDL goal <100     Acute idiopathic gout of right foot     Femoral hernia of right side     Drug-induced constipation     Acute respiratory failure with hypoxia (H)     Heart failure (H)     Mitral regurgitation     S/P mitral valve clip implantation     Dry eyes     Abdominal pain, epigastric     Hypokalemia     Past Surgical History:   Procedure Laterality Date     BACK SURGERY       BIOPSY      face, skin cancer     BIOPSY  8/2016    shoulder lesion     CYSTOSCOPY, TRANSURETHRAL RESECTION (TUR) PROSTATE, COMBINED N/A 8/21/2015    Procedure: COMBINED CYSTOSCOPY, TRANSURETHRAL  RESECTION (TUR) PROSTATE;  Surgeon: Dennis Hilton MD;  Location: [facility] OR     CYSTOSCOPY, TRANSURETHRAL RESECTION (TUR) TUMOR BLADDER, COMBINED N/A 9/2/2016    Procedure: COMBINED CYSTOSCOPY, TRANSURETHRAL RESECTION (TUR) TUMOR BLADDER;  Surgeon: Dennis Hilton MD;  Location: [facility] OR     ORTHOPEDIC SURGERY      lumbar and cervical surgery, Sep, 2008, knee surgery     PERCUTANEOUS MITRAL VALVE REPAIR N/A 10/16/2017    Procedure: PERCUTANEOUS MITRAL VALVE REPAIR ANESTHESIA;  Percutaneous MitraClip ;  Surgeon: Eber Monroe MD;  Location: [facility] OR     PICC INSERTION Right 10/14/2017    5fr DL Bard PICC, 40cm (1cm external) in the R basilic vein w/ tip in the mid SVC.       Social History   Substance Use Topics     Smoking status: Former Smoker     Smokeless tobacco: Never Used     Alcohol use No      Comment: doesnt use anymore     Family History   Problem Relation Age of Onset     CANCER Son 64     leukemia ? due to agent orange     Family History Negative Son      DIABETES No family hx of      Coronary Artery Disease No family hx of      Hypertension No family hx of      Hyperlipidemia No family hx of      CEREBROVASCULAR DISEASE No family hx of      Breast Cancer No family hx of      Colon Cancer No family hx of      Prostate Cancer No family hx of      Other Cancer No family hx of      Depression No family hx of      Anxiety Disorder No family hx of      MENTAL ILLNESS No family hx of      Substance Abuse No family hx of      Anesthesia Reaction No family hx of      Asthma No family hx of      OSTEOPOROSIS No family hx of      Genetic Disorder No family hx of      Thyroid Disease No family hx of      Obesity No family hx of      Unknown/Adopted No family hx of              Reviewed and updated as needed this visit by clinical staffTobacco  Allergies  Meds  Med Hx  Surg Hx  Fam Hx  Soc Hx      Reviewed and updated as needed this visit by Provider         ROS:  Constitutional, neuro,  ENT, endocrine, pulmonary, cardiac, gastrointestinal, genitourinary, musculoskeletal, integument and psychiatric systems are negative, except as otherwise noted.      OBJECTIVE:                                                    /60  Pulse 88  Temp 98.5  F (36.9  C) (Tympanic)  Resp 16  Wt 154 lb (69.9 kg)  BMI 23.14 kg/m2  Body mass index is 23.14 kg/(m^2).  GENERAL APPEARANCE: healthy, alert and no distress  ABDOMEN: soft, nontender, without hepatosplenomegaly or masses and bowel sounds normal         ASSESSMENT/PLAN:                                                        ICD-10-CM    1. Irritable bowel syndrome with diarrhea K58.0     and urgency   2. Abdominal pain, epigastric R10.13    3. Femoral hernia of right side K41.90        Patient Instructions   The patient is mostly worried about his urgency and losing control of his bowels.  He has had a lot more gas of late.  His wife is coming home from the hospital/TCU tomorrow and he does not want to be incapacitated and assisting with her cares.  He has had some diarrhea.  Mostly it is the rectal urgency.  He has not had a lot of discomfort from his hernia on the right side.  He is debating whether or not he should get that repaired which is scheduled for 1/9/18.  We had a discussion about the decision of whether or not he has that done, is his decision depending on his discomfort.  At present time it is not a hernia that is going to cause him any acute injuries that we know of although he has been counseled on the signs and symptoms of medical urgency.  Medications are as noted.  I did stress to good start taking Imodium on a daily to see if that would decrease his urgency.  His diet is pretty well rounded.  He will contact us within 1 week if this is successful in terms of treating his urgency.  If not we will have to come up with a secondary plan.  He is already increased his fiber intake.      Evaristo Magaña MD  [facility]  [facility]

## 2017-12-06 NOTE — PATIENT INSTRUCTIONS
The patient is mostly worried about his urgency and losing control of his bowels.  He has had a lot more gas of late.  His wife is coming home from the hospital/TCU tomorrow and he does not want to be incapacitated and assisting with her cares.  He has had some diarrhea.  Mostly it is the rectal urgency.  He has not had a lot of discomfort from his hernia on the right side.  He is debating whether or not he should get that repaired which is scheduled for 1/9/18.  We had a discussion about the decision of whether or not he has that done, is his decision depending on his discomfort.  At present time it is not a hernia that is going to cause him any acute injuries that we know of although he has been counseled on the signs and symptoms of medical urgency.  Medications are as noted.  I did stress to good start taking Imodium on a daily to see if that would decrease his urgency.  His diet is pretty well rounded.  He will contact us within 1 week if this is successful in terms of treating his urgency.  If not we will have to come up with a secondary plan.  He is already increased his fiber intake.

## 2017-12-07 ENCOUNTER — TELEPHONE (OUTPATIENT)
Dept: FAMILY MEDICINE | Facility: CLINIC | Age: 82
End: 2017-12-07

## 2017-12-07 NOTE — TELEPHONE ENCOUNTER
Reason for Call:  Form, our goal is to have forms completed with 72 hours, however, some forms may require a visit or additional information.    Type of letter, form or note:  medical    Who is the form from?: Home care    Where did the form come from: form was faxed in    What clinic location was the form placed at?: [facility]    Where the form was placed: 's Box: Evaristo Magaña MD    What number is listed as a contact on the form?: [phone]       Additional comments: [facility] SN 1 w 4, 3 as needed    Call taken on 12/7/2017 at 3:14 PM by Alexandrea Yun

## 2017-12-11 DIAGNOSIS — I10 HYPERTENSION GOAL BP (BLOOD PRESSURE) < 140/90: Chronic | ICD-10-CM

## 2017-12-12 ENCOUNTER — TELEPHONE (OUTPATIENT)
Dept: FAMILY MEDICINE | Facility: CLINIC | Age: 82
End: 2017-12-12

## 2017-12-12 DIAGNOSIS — Z53.9 DIAGNOSIS NOT YET DEFINED: Primary | ICD-10-CM

## 2017-12-12 PROCEDURE — G0180 MD CERTIFICATION HHA PATIENT: HCPCS | Performed by: FAMILY MEDICINE

## 2017-12-12 RX ORDER — METOPROLOL SUCCINATE 25 MG/1
TABLET, EXTENDED RELEASE ORAL
Qty: 90 TABLET | Refills: 3 | Status: SHIPPED | OUTPATIENT
Start: 2017-12-12 | End: 2017-12-21

## 2017-12-12 NOTE — TELEPHONE ENCOUNTER
OV 12-4-17  Requested Prescriptions   Pending Prescriptions Disp Refills     metoprolol (TOPROL-XL) 25 MG 24 hr tablet [Pharmacy Med Name: METOPROLOL ER SUCCINATE 25MG TABS] 90 tablet 0     Sig: TAKE 1 TABLET BY MOUTH DAILY    Beta-Blockers Protocol Passed    12/11/2017  8:11 AM       Passed - Blood pressure under 140/90    BP Readings from Last 3 Encounters:   12/04/17 120/60   11/18/17 116/67   11/17/17 121/82                Passed - Patient is age 6 or older       Passed - Recent or future visit with authorizing provider's specialty    Patient had office visit in the last year or has a visit in the next 30 days with authorizing provider.  See chart review.               Prescription approved per [facility] Refill Protocol.

## 2017-12-18 ENCOUNTER — HOSPITAL ENCOUNTER (OUTPATIENT)
Dept: CARDIOLOGY | Facility: CLINIC | Age: 82
Discharge: HOME | End: 2017-12-18
Attending: PHYSICIAN ASSISTANT | Admitting: PHYSICIAN ASSISTANT
Payer: MEDICARE

## 2017-12-18 DIAGNOSIS — Z98.890 S/P MITRAL VALVE CLIP IMPLANTATION: ICD-10-CM

## 2017-12-18 DIAGNOSIS — I10 BENIGN ESSENTIAL HYPERTENSION: ICD-10-CM

## 2017-12-18 DIAGNOSIS — Z95.818 S/P MITRAL VALVE CLIP IMPLANTATION: ICD-10-CM

## 2017-12-18 DIAGNOSIS — R00.2 PALPITATIONS: ICD-10-CM

## 2017-12-18 DIAGNOSIS — E78.5 HYPERLIPIDEMIA LDL GOAL <100: ICD-10-CM

## 2017-12-18 DIAGNOSIS — Z98.890 S/P MITRAL VALVE REPAIR: ICD-10-CM

## 2017-12-18 DIAGNOSIS — I25.10 CORONARY ARTERY DISEASE INVOLVING NATIVE CORONARY ARTERY OF NATIVE HEART WITHOUT ANGINA PECTORIS: ICD-10-CM

## 2017-12-18 DIAGNOSIS — I50.32 CHRONIC DIASTOLIC HEART FAILURE (H): ICD-10-CM

## 2017-12-18 LAB
ANION GAP SERPL CALCULATED.3IONS-SCNC: 10.6 MMOL/L (ref 6–17)
BUN SERPL-MCNC: 23 MG/DL (ref 7–30)
CALCIUM SERPL-MCNC: 9.3 MG/DL (ref 8.5–10.5)
CHLORIDE SERPL-SCNC: 104 MMOL/L (ref 98–107)
CO2 SERPL-SCNC: 31 MMOL/L (ref 23–29)
CREAT SERPL-MCNC: 1.75 MG/DL (ref 0.7–1.3)
GFR SERPL CREATININE-BSD FRML MDRD: 37 ML/MIN/1.7M2
GLUCOSE SERPL-MCNC: 134 MG/DL (ref 70–105)
POTASSIUM SERPL-SCNC: 3.6 MMOL/L (ref 3.5–5.1)
SODIUM SERPL-SCNC: 142 MMOL/L (ref 136–145)

## 2017-12-18 PROCEDURE — 80048 BASIC METABOLIC PNL TOTAL CA: CPT | Performed by: PHYSICIAN ASSISTANT

## 2017-12-18 PROCEDURE — 93225 XTRNL ECG REC<48 HRS REC: CPT | Performed by: PHYSICIAN ASSISTANT

## 2017-12-18 PROCEDURE — 93227 XTRNL ECG REC<48 HR R&I: CPT | Performed by: INTERNAL MEDICINE

## 2017-12-18 PROCEDURE — 36415 COLL VENOUS BLD VENIPUNCTURE: CPT | Performed by: PHYSICIAN ASSISTANT

## 2017-12-21 ENCOUNTER — OFFICE VISIT (OUTPATIENT)
Dept: CARDIOLOGY | Facility: CLINIC | Age: 82
End: 2017-12-21
Payer: MEDICARE

## 2017-12-21 VITALS
HEIGHT: 67 IN | BODY MASS INDEX: 23.7 KG/M2 | HEART RATE: 107 BPM | WEIGHT: 151 LBS | SYSTOLIC BLOOD PRESSURE: 134 MMHG | DIASTOLIC BLOOD PRESSURE: 70 MMHG

## 2017-12-21 DIAGNOSIS — I34.0 MITRAL VALVE INSUFFICIENCY, UNSPECIFIED ETIOLOGY: Primary | ICD-10-CM

## 2017-12-21 DIAGNOSIS — Z95.818 S/P MITRAL VALVE CLIP IMPLANTATION: ICD-10-CM

## 2017-12-21 DIAGNOSIS — I50.9 ACUTE ON CHRONIC HEART FAILURE, UNSPECIFIED HEART FAILURE TYPE (H): ICD-10-CM

## 2017-12-21 DIAGNOSIS — I50.32 CHRONIC DIASTOLIC HEART FAILURE (H): ICD-10-CM

## 2017-12-21 DIAGNOSIS — I25.10 CORONARY ARTERY DISEASE INVOLVING NATIVE CORONARY ARTERY OF NATIVE HEART WITHOUT ANGINA PECTORIS: ICD-10-CM

## 2017-12-21 DIAGNOSIS — I10 HYPERTENSION GOAL BP (BLOOD PRESSURE) < 140/90: Chronic | ICD-10-CM

## 2017-12-21 DIAGNOSIS — Z98.890 S/P MITRAL VALVE CLIP IMPLANTATION: ICD-10-CM

## 2017-12-21 DIAGNOSIS — I10 BENIGN ESSENTIAL HYPERTENSION: ICD-10-CM

## 2017-12-21 DIAGNOSIS — R00.2 PALPITATIONS: ICD-10-CM

## 2017-12-21 PROCEDURE — 99214 OFFICE O/P EST MOD 30 MIN: CPT | Mod: 24 | Performed by: INTERNAL MEDICINE

## 2017-12-21 RX ORDER — FUROSEMIDE 40 MG
60 TABLET ORAL DAILY
Qty: 135 TABLET | Refills: 3 | Status: SHIPPED | OUTPATIENT
Start: 2017-12-21 | End: 2018-01-05 | Stop reason: DRUGHIGH

## 2017-12-21 RX ORDER — METOPROLOL SUCCINATE 50 MG/1
50 TABLET, EXTENDED RELEASE ORAL DAILY
Qty: 90 TABLET | Refills: 3 | Status: SHIPPED | OUTPATIENT
Start: 2017-12-21 | End: 2018-12-14

## 2017-12-21 RX ORDER — METOPROLOL SUCCINATE 25 MG/1
50 TABLET, EXTENDED RELEASE ORAL DAILY
Qty: 90 TABLET | Refills: 3 | Status: SHIPPED | OUTPATIENT
Start: 2017-12-21 | End: 2017-12-21

## 2017-12-21 NOTE — MR AVS SNAPSHOT
After Visit Summary   12/21/2017    Ivan Gomez    MRN: [id number]           Patient Information     Date Of Birth          10/12/1926        Visit Information        Provider Department      12/21/2017 11:15 AM Lance Yao MD [facility]        Today's Diagnoses     Mitral valve insufficiency, unspecified etiology    -  1    Benign essential hypertension        S/P mitral valve clip implantation        Coronary artery disease involving native coronary artery of native heart without angina pectoris        Palpitations        Hypertension goal BP (blood pressure) < 140/90        Acute on chronic heart failure, unspecified heart failure type (H)        Chronic diastolic heart failure (H)           Follow-ups after your visit        Additional Services     Follow-Up with Cardiac Advanced Practice Provider           Follow-Up with Cardiologist                 Your next 10 appointments already scheduled     Jan 05, 2018  2:30 PM CST   LAB with [facility]   [facility] [place] ([facility])    [address]  [place] [address]   [phone]           Please do not eat 10-12 hours before your appointment if you are coming in fasting for labs on lipids, cholesterol, or glucose (sugar). This does not apply to pregnant women. Water, hot tea and black coffee (with nothing added) are okay. Do not drink other fluids, diet soda or chew gum.            Jan 05, 2018  3:30 PM CST   Return Visit with Duane Brown PA-C   [facility] ([facility])    [address]  [place] [address]   [phone]            Jan 09, 2018   Procedure with Joseluis Kuhn MD   [facility] (--)    [address]  [place] [address]   [phone]            Jan 09, 2018  1:00 PM CST   [facility]  [facility] Same Day Surgery with Joseluis Kuhn MD   [facility] ([facility])    [facility] ([facility])   [phone]            Feb 23, 2018  1:45 PM CST   Return Visit with Lance Yao MD   [facility]   [place] ([facility])    [address]  [place] [address]   [phone]            Sep 26, 2018  1:30 PM CDT   Return Visit with Dennis Hilton MD   [facility] ([facility])    [facility]  [address]  [facility] [address]   [phone]              Future tests that were ordered for you today     Open Future Orders        Priority Expected Expires Ordered    Basic metabolic panel Routine 3/21/2018 12/21/2018 12/21/2017    Echocardiogram Routine 3/21/2018 12/21/2018 12/21/2017    Follow-Up with Cardiologist Routine 3/21/2018 12/21/2018 12/21/2017    Basic metabolic panel Routine 1/4/2018 12/21/2018 12/21/2017    Follow-Up with Cardiac Advanced Practice Provider Routine 1/4/2018 12/21/2018 12/21/2017            Who to contact     If you have questions or need follow up information about today's clinic visit or your schedule please contact [facility]   [place] directly at [phone].  Normal or non-critical lab and imaging results will be communicated to you by MyChart, letter or phone within 4 business days after the clinic has received the results. If you do not hear from us within 7 days, please contact the clinic through MyChart or phone. If you have a critical or abnormal lab result, we will notify you by phone as soon as possible.  Submit refill requests through [employer] (acquired by [employer] in 2014) or call your pharmacy and they will forward the refill request to us. Please allow 3 business days for your refill to be completed.          Additional Information About  "Your Visit        [email or web address] Information     [employer] gives you secure access to your electronic health record. If you see a primary care provider, you can also send messages to your care team and make appointments. If you have questions, please call your primary care clinic.  If you do not have a primary care provider, please call [phone] and they will assist you.        Care EveryWhere ID     This is your Care EveryWhere ID. This could be used by other organizations to access your [facility] medical records  [id number]        Your Vitals Were     Pulse Height BMI (Body Mass Index)             107 1.702 m (5' 7\") 23.65 kg/m2          Blood Pressure from Last 3 Encounters:   12/21/17 134/70   12/04/17 120/60   11/18/17 116/67    Weight from Last 3 Encounters:   12/21/17 68.5 kg (151 lb)   12/04/17 69.9 kg (154 lb)   11/18/17 69.9 kg (154 lb)                 Today's Medication Changes          These changes are accurate as of: 12/21/17 11:53 AM.  If you have any questions, ask your nurse or doctor.               These medicines have changed or have updated prescriptions.        Dose/Directions    furosemide 40 MG tablet   Commonly known as:  LASIX   This may have changed:  See the new instructions.   Used for:  Acute on chronic heart failure, unspecified heart failure type (H)   Changed by:  Lance Yao MD        Dose:  60 mg   Take 1.5 tablets (60 mg) by mouth daily   Quantity:  135 tablet   Refills:  3       * metoprolol 25 MG 24 hr tablet   Commonly known as:  TOPROL-XL   This may have changed:  See the new instructions.   Used for:  Benign essential hypertension   Changed by:  Lance Yao MD        Dose:  50 mg   Take 2 tablets (50 mg) by mouth daily   Quantity:  90 tablet   Refills:  3       * metoprolol 50 MG 24 hr tablet   Commonly known as:  TOPROL XL   This may have changed:  See the new instructions.   Used for:  Hypertension goal BP (blood pressure) < 140/90   Changed by:  " Lance Yao MD        Dose:  50 mg   Take 1 tablet (50 mg) by mouth daily   Quantity:  90 tablet   Refills:  3       * Notice:  This list has 2 medication(s) that are the same as other medications prescribed for you. Read the directions carefully, and ask your doctor or other care provider to review them with you.         Where to get your medicines      These medications were sent to [facility] [id number] [place] [address] [address] [facility]  [address]  [facility] [place] [address]     Phone:  [phone]     furosemide 40 MG tablet    metoprolol 25 MG 24 hr tablet    metoprolol 50 MG 24 hr tablet                Primary Care Provider Office Phone # Fax #    Evaristo Magaña [phone] [address]  [facility] [address]        Equal Access to Services     [facility]: Hadii aad ku hadasho Soomaali, waaxda luqadaha, qaybta kaalmada adeegyada, waxay idiin hayaan parul cruz . So [facility] [phone].    ATENCIÓN: Si habla español, tiene a rubio disposición servicios gratuitos de asistencia lingüística. [place] al [phone].    We comply with applicable federal civil rights laws and [place] laws. We do not discriminate on the basis of race, color, national origin, age, disability, sex, sexual orientation, or gender identity.            Thank you!     Thank you for choosing [facility]  for your care. Our goal is always to provide you with excellent care. Hearing back from our patients is one way we can continue to improve our services. Please take a few minutes to complete the written survey that you may receive in the mail after your visit with us. Thank you!             Your Updated Medication List - Protect others around you: Learn how to safely use, store and throw away your medicines at www.disposemymeds.org.          This list is accurate as of: 12/21/17 11:53 AM.  Always use your most  recent med list.                   Brand Name Dispense Instructions for use Diagnosis    aspirin 81 MG EC tablet      Take 1 tablet (81 mg) by mouth daily    S/P mitral valve repair       eucerin cream      Apply topically At Bedtime Apply to hands        fiber modular packet      1 packet daily        finasteride 5 MG tablet    PROSCAR    90 tablet    TAKE 1 TABLET BY MOUTH EVERY DAY    Benign enlargement of prostate       furosemide 40 MG tablet    LASIX    135 tablet    Take 1.5 tablets (60 mg) by mouth daily    Acute on chronic heart failure, unspecified heart failure type (H)       gabapentin 300 MG capsule    NEURONTIN    270 capsule    TAKE 1 CAPSULE BY MOUTH THREE TIMES DAILY    Spinal stenosis of lumbar region with neurogenic claudication       hydrALAZINE 25 MG tablet    APRESOLINE    135 tablet    TAKE 1.5 TABLETS BY MOUTH EVERY 8 HOURS AS DIRECTED. HOLD IF SYSTOLIC BLOOD PRESSURE IS LESS THAN 100    Acute on chronic heart failure, unspecified heart failure type (H)       HYDROcodone-acetaminophen 7.5-325 MG per tablet    NORCO    18 tablet    Take 1 tablet by mouth every 6 hours as needed for pain maximum 4 tablet(s) per day    Spinal stenosis of lumbar region, unspecified whether neurogenic claudication present       hypromellose 0.4 % Soln ophthalmic solution    ARTIFICIAL TEARS     Place 2 drops Into the left eye 4 times daily        isosorbide dinitrate 20 MG tablet    ISORDIL    270 tablet    TAKE 1 TABLET BY MOUTH THREE TIMES DAILY    Chronic diastolic heart failure (H), Coronary artery disease involving native coronary artery of native heart without angina pectoris       * metoprolol 25 MG 24 hr tablet    TOPROL-XL    90 tablet    Take 2 tablets (50 mg) by mouth daily    Benign essential hypertension       * metoprolol 50 MG 24 hr tablet    TOPROL XL    90 tablet    Take 1 tablet (50 mg) by mouth daily    Hypertension goal BP (blood pressure) < 140/90       order for DME     1 Can    O2 by nasal  cannula continuous 3 L/min to maintain O2 saturations > 92%    Acute on chronic heart failure, unspecified heart failure type (H)       pantoprazole 40 MG EC tablet    PROTONIX    90 tablet    TAKE 1 TABLET BY MOUTH DAILY 30 MINUTES TO 1 HOUR BEFORE A MEAL    Abdominal pain, epigastric       potassium chloride SA 10 MEQ CR tablet    K-DUR/KLOR-CON M    90 tablet    Take half tablet one time daily.    Chronic diastolic heart failure (H)       senna-docusate 8.6-50 MG per tablet    SENOKOT-S;PERICOLACE     Take 1 tablet by mouth 2 times daily And 1 tablet by mouth two times daily as needed.        simvastatin 20 MG tablet    ZOCOR    90 tablet    Take 1 tablet (20 mg) by mouth At Bedtime    Hyperlipidemia LDL goal <100       tamsulosin 0.4 MG capsule    FLOMAX    90 capsule    TAKE ONE CAPSULE BY MOUTH DAILY    Benign prostatic hyperplasia without lower urinary tract symptoms       * Notice:  This list has 2 medication(s) that are the same as other medications prescribed for you. Read the directions carefully, and ask your doctor or other care provider to review them with you.

## 2017-12-21 NOTE — LETTER
12/21/2017      Evaristo Magaña MD  [address]  [facility] [address]      RE: Ivan Gomez       Dear Colleague,    I had the pleasure of seeing Ivan Gomez in the [facility].    HISTORY OF PRESENT ILLNESS:  It was my pleasure to see your patient, Ivan Gomez, who I have not seen before.  I have taken care of his wife for many years.  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  Eventually he had successful transcatheter mitral valve clip using the MitraClip device.  This was in 10/13/2017 and was performed by Dr. Monroe.        The patient was last seen by Dr. Duane Ledezma PA-C 11/17 and the patient had significantly improved.  He did have a Holter monitor performed which I am reviewing today.  The Holter monitor showed that the patient is in sinus rhythm with a wandering atrial pacemaker.  He has very frequent PACs with 32,692 PACs in a 24-hour period, and this is a 26.62% burden of PACs.  He had 2225 runs, the longest run was 35 beats.  The patient was asymptomatic.        Today, he is predominantly in sinus rhythm, but then gets short runs of SVT.  The patient's main complaint is that he feels tired.  He does hot have near as much energy as he had in the past.  His weight is not up.  He is at 151 pounds.  He was 158 pounds on 11/17.  However, as you will see on the physical examination, his jugular venous pulse is raised and he does have peripheral edema and he has some crackles at his bases.  His renal function also is not as good as it was previously.  This was when the BMP was drawn on the 12/18, 3 days ago.  His creatinine has risen to 1.75 and BUN is 23.  He is not complaining of any chest pains or chest pressure.      IMPRESSION:   1.  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  The patient's symptoms are significantly better than in October before he had the clip placed, but he does complain of tiredness.  He has evidence of volume overload with peripheral edema and raised jugular venous pulse and some crackles at his bases.  So it is likely that congestive heart failure is the source of his tiredness.   2.  Frequent PACs and runs of supraventricular tachycardia.   3.  Normotensive.   4.  Renal insufficiency, possibly due to congestive heart failure.      PLAN:  I will increase his furosemide from 40 mg per day to 60 mg per day.  I will also increase his metoprolol from 25 mg per day to 50 mg per day.  I will have the patient return in 2 weeks' time with a basic metabolic profile.  I am going to see the patient myself in 3 months' time.  I will repeat his echo at that stage.  I may repeat the echo earlier if his symptoms have not improved or if he has not responded to the diuretics.      It has been my pleasure to be involved in the care of this very nice patient.      Outpatient Encounter Prescriptions as of 12/21/2017   Medication Sig Dispense Refill     metoprolol (TOPROL XL) 50 MG 24 hr tablet Take 1 tablet (50 mg) by mouth daily 90 tablet 3     furosemide (LASIX) 40 MG tablet Take 1.5 tablets (60 mg) by mouth daily 135 tablet 3     tamsulosin (FLOMAX) 0.4 MG capsule TAKE ONE CAPSULE BY MOUTH DAILY 90 capsule 3     pantoprazole (PROTONIX) 40 MG EC tablet TAKE 1 TABLET BY MOUTH DAILY 30 MINUTES TO 1 HOUR BEFORE A MEAL 90 tablet 0     hydrALAZINE (APRESOLINE) 25 MG tablet TAKE 1.5 TABLETS BY MOUTH EVERY 8 HOURS AS DIRECTED. HOLD IF SYSTOLIC BLOOD PRESSURE IS LESS THAN 100 135 tablet 3     isosorbide dinitrate (ISORDIL) 20 MG tablet TAKE 1 TABLET BY MOUTH THREE TIMES DAILY 270 tablet 2     simvastatin (ZOCOR) 20 MG tablet Take 1 tablet (20 mg) by mouth At Bedtime 90 tablet 2     gabapentin (NEURONTIN) 300 MG capsule TAKE 1 CAPSULE BY MOUTH THREE TIMES DAILY 270 capsule 3      potassium chloride SA (K-DUR/KLOR-CON M) 10 MEQ CR tablet Take half tablet one time daily. 90 tablet 3     hypromellose (ARTIFICIAL TEARS) 0.4 % SOLN ophthalmic solution Place 2 drops Into the left eye 4 times daily       Skin Protectants, Misc. (EUCERIN) cream Apply topically At Bedtime Apply to hands       HYDROcodone-acetaminophen (NORCO) 7.5-325 MG per tablet Take 1 tablet by mouth every 6 hours as needed for pain maximum 4 tablet(s) per day 18 tablet 0     senna-docusate (SENOKOT-S;PERICOLACE) 8.6-50 MG per tablet Take 1 tablet by mouth 2 times daily And 1 tablet by mouth two times daily as needed.       aspirin EC 81 MG EC tablet Take 1 tablet (81 mg) by mouth daily       order for DME O2 by nasal cannula continuous 3 L/min to maintain O2 saturations > 92% 1 Can 1     fiber modular (NUTRISOURCE FIBER) packet 1 packet daily       finasteride (PROSCAR) 5 MG tablet TAKE 1 TABLET BY MOUTH EVERY DAY 90 tablet 3     [DISCONTINUED] metoprolol (TOPROL-XL) 25 MG 24 hr tablet Take 2 tablets (50 mg) by mouth daily 90 tablet 3     [DISCONTINUED] metoprolol (TOPROL-XL) 25 MG 24 hr tablet TAKE 1 TABLET BY MOUTH DAILY 90 tablet 3     [DISCONTINUED] furosemide (LASIX) 20 MG tablet TAKE 1 TABLET BY MOUTH TWICE DAILY 180 tablet 2     [DISCONTINUED] metoprolol (TOPROL-XL) 25 MG 24 hr tablet TAKE 1 TABLET BY MOUTH DAILY 90 tablet 3     No facility-administered encounter medications on file as of 12/21/2017.        Again, thank you for allowing me to participate in the care of your patient.      Sincerely,    Lance Yao MD    [facility]

## 2017-12-21 NOTE — PROGRESS NOTES
HISTORY OF PRESENT ILLNESS:  It was my pleasure to see your patient, Ivan Gomez, who I have not seen before.  I have taken care of his wife for many years.  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  Eventually he had successful transcatheter mitral valve clip using the MitraClip device.  This was in 10/13/2017 and was performed by Dr. Monroe.        The patient was last seen by Dr. Duane Ledezma PA-C 11/17 and the patient had significantly improved.  He did have a Holter monitor performed which I am reviewing today.  The Holter monitor showed that the patient is in sinus rhythm with a wandering atrial pacemaker.  He has very frequent PACs with 32,692 PACs in a 24-hour period, and this is a 26.62% burden of PACs.  He had 2225 runs, the longest run was 35 beats.  The patient was asymptomatic.        Today, he is predominantly in sinus rhythm, but then gets short runs of SVT.  The patient's main complaint is that he feels tired.  He does hot have near as much energy as he had in the past.  His weight is not up.  He is at 151 pounds.  He was 158 pounds on 11/17.  However, as you will see on the physical examination, his jugular venous pulse is raised and he does have peripheral edema and he has some crackles at his bases.  His renal function also is not as good as it was previously.  This was when the BMP was drawn on the 12/18, 3 days ago.  His creatinine has risen to 1.75 and BUN is 23.  He is not complaining of any chest pains or chest pressure.      IMPRESSION:   1.  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  The patient's symptoms are significantly better than in October before he had the clip placed, but he does complain of tiredness.  He has evidence of volume overload with peripheral edema and raised jugular venous pulse  and some crackles at his bases.  So it is likely that congestive heart failure is the source of his tiredness.   2.  Frequent PACs and runs of supraventricular tachycardia.   3.  Normotensive.   4.  Renal insufficiency, possibly due to congestive heart failure.      PLAN:  I will increase his furosemide from 40 mg per day to 60 mg per day.  I will also increase his metoprolol from 25 mg per day to 50 mg per day.  I will have the patient return in 2 weeks' time with a basic metabolic profile.  I am going to see the patient myself in 3 months' time.  I will repeat his echo at that stage.  I may repeat the echo earlier if his symptoms have not improved or if he has not responded to the diuretics.      It has been my pleasure to be involved in the care of this very nice patient.      cc:   Evaristo Magaña MD    [facility]   [address] [place] [address]         SERENITY CORONA MD, [facility]             D: 2017 11:57   T: 2017 12:30   MT: [id number]      Name:     MALLORIE NOVA   MRN:      [id number]        Account:      [id number]   :      10/12/1926           Service Date: 2017      Document: [id number]

## 2017-12-21 NOTE — PROGRESS NOTES
HPI and Plan:   See dictation    Orders Placed This Encounter   Procedures     Basic metabolic panel     Basic metabolic panel     Follow-Up with Cardiac Advanced Practice Provider     Follow-Up with Cardiologist     Echocardiogram       Orders Placed This Encounter   Medications     DISCONTD: metoprolol (TOPROL-XL) 25 MG 24 hr tablet     Sig: Take 2 tablets (50 mg) by mouth daily     Dispense:  90 tablet     Refill:  3     metoprolol (TOPROL XL) 50 MG 24 hr tablet     Sig: Take 1 tablet (50 mg) by mouth daily     Dispense:  90 tablet     Refill:  3     This is the correct prescription ; ignore the previous prescription     furosemide (LASIX) 40 MG tablet     Sig: Take 1.5 tablets (60 mg) by mouth daily     Dispense:  135 tablet     Refill:  3       Medications Discontinued During This Encounter   Medication Reason     metoprolol (TOPROL-XL) 25 MG 24 hr tablet Reorder     metoprolol (TOPROL-XL) 25 MG 24 hr tablet Reorder     metoprolol (TOPROL-XL) 25 MG 24 hr tablet      furosemide (LASIX) 20 MG tablet      metoprolol (TOPROL-XL) 25 MG 24 hr tablet          Encounter Diagnoses   Name Primary?     Benign essential hypertension      Mitral valve insufficiency, unspecified etiology Yes     S/P mitral valve clip implantation      Coronary artery disease involving native coronary artery of native heart without angina pectoris      Palpitations      Hypertension goal BP (blood pressure) < 140/90      Acute on chronic heart failure, unspecified heart failure type (H)      Chronic diastolic heart failure (H)        CURRENT MEDICATIONS:  Current Outpatient Prescriptions   Medication Sig Dispense Refill     metoprolol (TOPROL XL) 50 MG 24 hr tablet Take 1 tablet (50 mg) by mouth daily 90 tablet 3     furosemide (LASIX) 40 MG tablet Take 1.5 tablets (60 mg) by mouth daily 135 tablet 3     tamsulosin (FLOMAX) 0.4 MG capsule TAKE ONE CAPSULE BY MOUTH DAILY 90 capsule 3     pantoprazole (PROTONIX) 40 MG EC tablet TAKE 1 TABLET BY  MOUTH DAILY 30 MINUTES TO 1 HOUR BEFORE A MEAL 90 tablet 0     hydrALAZINE (APRESOLINE) 25 MG tablet TAKE 1.5 TABLETS BY MOUTH EVERY 8 HOURS AS DIRECTED. HOLD IF SYSTOLIC BLOOD PRESSURE IS LESS THAN 100 135 tablet 3     isosorbide dinitrate (ISORDIL) 20 MG tablet TAKE 1 TABLET BY MOUTH THREE TIMES DAILY 270 tablet 2     simvastatin (ZOCOR) 20 MG tablet Take 1 tablet (20 mg) by mouth At Bedtime 90 tablet 2     gabapentin (NEURONTIN) 300 MG capsule TAKE 1 CAPSULE BY MOUTH THREE TIMES DAILY 270 capsule 3     potassium chloride SA (K-DUR/KLOR-CON M) 10 MEQ CR tablet Take half tablet one time daily. 90 tablet 3     hypromellose (ARTIFICIAL TEARS) 0.4 % SOLN ophthalmic solution Place 2 drops Into the left eye 4 times daily       Skin Protectants, Misc. (EUCERIN) cream Apply topically At Bedtime Apply to hands       HYDROcodone-acetaminophen (NORCO) 7.5-325 MG per tablet Take 1 tablet by mouth every 6 hours as needed for pain maximum 4 tablet(s) per day 18 tablet 0     senna-docusate (SENOKOT-S;PERICOLACE) 8.6-50 MG per tablet Take 1 tablet by mouth 2 times daily And 1 tablet by mouth two times daily as needed.       aspirin EC 81 MG EC tablet Take 1 tablet (81 mg) by mouth daily       order for DME O2 by nasal cannula continuous 3 L/min to maintain O2 saturations > 92% 1 Can 1     fiber modular (NUTRISOURCE FIBER) packet 1 packet daily       finasteride (PROSCAR) 5 MG tablet TAKE 1 TABLET BY MOUTH EVERY DAY 90 tablet 3     [DISCONTINUED] metoprolol (TOPROL-XL) 25 MG 24 hr tablet Take 2 tablets (50 mg) by mouth daily 90 tablet 3     [DISCONTINUED] metoprolol (TOPROL-XL) 25 MG 24 hr tablet TAKE 1 TABLET BY MOUTH DAILY 90 tablet 3     [DISCONTINUED] furosemide (LASIX) 20 MG tablet TAKE 1 TABLET BY MOUTH TWICE DAILY 180 tablet 2     [DISCONTINUED] metoprolol (TOPROL-XL) 25 MG 24 hr tablet TAKE 1 TABLET BY MOUTH DAILY 90 tablet 3       ALLERGIES     Allergies   Allergen Reactions     Celebrex [Celecoxib] Hives     Penicillins  Hives       PAST MEDICAL HISTORY:  Past Medical History:   Diagnosis Date     Arthritis     Spinal Stenosis     Former smoker      Hemorrhoids      Hypercholesteremia      Hypertension      Malignant neoplasm (H)     skin CA, Basal cell     Other chronic pain      Renal disease        PAST SURGICAL HISTORY:  Past Surgical History:   Procedure Laterality Date     BACK SURGERY       BIOPSY      face, skin cancer     BIOPSY  8/2016    shoulder lesion     CYSTOSCOPY, TRANSURETHRAL RESECTION (TUR) PROSTATE, COMBINED N/A 8/21/2015    Procedure: COMBINED CYSTOSCOPY, TRANSURETHRAL RESECTION (TUR) PROSTATE;  Surgeon: Dennis Hilton MD;  Location: [facility] OR     CYSTOSCOPY, TRANSURETHRAL RESECTION (TUR) TUMOR BLADDER, COMBINED N/A 9/2/2016    Procedure: COMBINED CYSTOSCOPY, TRANSURETHRAL RESECTION (TUR) TUMOR BLADDER;  Surgeon: Dennis Hilton MD;  Location: [facility] OR     ORTHOPEDIC SURGERY      lumbar and cervical surgery, Sep, 2008, knee surgery     PERCUTANEOUS MITRAL VALVE REPAIR N/A 10/16/2017    Procedure: PERCUTANEOUS MITRAL VALVE REPAIR ANESTHESIA;  Percutaneous MitraClip ;  Surgeon: Eber Monroe MD;  Location: [facility] OR     PICC INSERTION Right 10/14/2017    5fr DL Bard PICC, 40cm (1cm external) in the R basilic vein w/ tip in the mid SVC.       FAMILY HISTORY:  Family History   Problem Relation Age of Onset     CANCER Son 64     leukemia ? due to agent orange     Family History Negative Son      DIABETES No family hx of      Coronary Artery Disease No family hx of      Hypertension No family hx of      Hyperlipidemia No family hx of      CEREBROVASCULAR DISEASE No family hx of      Breast Cancer No family hx of      Colon Cancer No family hx of      Prostate Cancer No family hx of      Other Cancer No family hx of      Depression No family hx of      Anxiety Disorder No family hx of      MENTAL ILLNESS No family hx of      Substance Abuse No family hx of      Anesthesia Reaction No family hx of   "    Asthma No family hx of      OSTEOPOROSIS No family hx of      Genetic Disorder No family hx of      Thyroid Disease No family hx of      Obesity No family hx of      Unknown/Adopted No family hx of        SOCIAL HISTORY:  Social History     Social History     Marital status: Single     Spouse name: N/A     Number of children: N/A     Years of education: N/A     Social History Main Topics     Smoking status: Former Smoker     Smokeless tobacco: Never Used     Alcohol use No      Comment: doesnt use anymore     Drug use: No     Sexual activity: No     Other Topics Concern     Parent/Sibling W/ Cabg, Mi Or Angioplasty Before 65f 55m? No     Social History Narrative       Review of Systems:  Skin:  Negative       Eyes:  Negative      ENT:  Negative      Respiratory:  Positive for dyspnea on exertion     Cardiovascular:  Negative      Gastroenterology: Negative      Genitourinary:  not assessed      Musculoskeletal:  Positive for arthritis    Neurologic:  Negative      Psychiatric:  Negative      Heme/Lymph/Imm:  Negative      Endocrine:  Negative        Physical Exam:  Vitals: /70  Pulse 107  Ht 1.702 m (5' 7\")  Wt 68.5 kg (151 lb)  BMI 23.65 kg/m2    Constitutional:  cooperative, alert and oriented, well developed, well nourished, in no acute distress thin      Skin:  warm and dry to the touch, no apparent skin lesions or masses noted          Head:  normocephalic        Eyes:  pupils equal and round        Lymph:No Cervical lymphadenopathy present     ENT:  no pallor or cyanosis, dentition good        Neck:  carotid pulses are full and equal bilaterally JVP elevated;JVP 8-10      Respiratory:    basal rales       Cardiac: regular rhythm;normal S1 and S2 frequent premature beats     systolic murmur;grade 3                                                 GI:  not assessed this visit        Extremities and Muscular Skeletal:  no deformities, clubbing, cyanosis, erythema observed   bilateral LE " edema;1+;pitting          Neurological:  no gross motor deficits;affect appropriate        Psych:  Alert and Oriented x 3        CC  Evaristo Magaña MD  [address] [place] [address]

## 2018-01-03 ENCOUNTER — CARE COORDINATION (OUTPATIENT)
Dept: CARE COORDINATION | Facility: CLINIC | Age: 83
End: 2018-01-03

## 2018-01-03 NOTE — PROGRESS NOTES
"Clinic Care Coordination Contact  OUTREACH    Referral Information:  Referral Source: CTS  Reason for Contact: Routine follow up   Care Conference: No     Universal Utilization:   ED Visits in last year: 2  Hospital visits in last year: 3  Last PCP appointment: 11/13/17  Missed Appointments: 0  Concerns: Frail  Multiple Providers or Specialists: yes    Clinical Concerns:    Current Medical Concerns: Per note from home care , [facility], patient was discharged from home care services on 12/28/17.    Patient states he is doing \"ok\". He says his inquinal hernia is aching today and he has GERD that does not want to go away today. Patient states he wants to cancel his hernia repair that is scheduled for 1/9/18. He states he called and left a message with Dr. Kuhn's office yesterday but has not heard back from them. He states that Dr. Chidi Blackburn, cardiology, told him that he should not have the hernia repair.  He is requesting clinic care coordinator call and verify that the surgery is cancelled per his request.     Patient states that he and significant other, Jerri, are doing ok. Her daughter is coming from [place] this weekend to help Jerri shower. Jerri still has home care services as well.      Current Behavioral Concerns: Denies concerns      Clinical Pathway Name: None      Medication Management:  Patient manages his medications.  He states he is on too many pills, but he does take them as directed.       Functional Status:  Mobility Status: Independent w/Device     Transportation: Drives        Psychosocial:  Current living arrangement:: I live in a private home with spouse    Both patient and significant other are frail and vulnerable. Patient is alert and orientated.      Resources and Interventions:  Current Resources: None        Advanced Care Plans/Directives on file:: Yes        Goals:   Goal 1 Statement: I want to feel safe to leave my house.   Goal 1 Supportive Steps: I will see my PCP " for advice and medications for diarrhea  Goal 1 Progression Percent: 50%  Goal 1 Progression Date: 01/03/18      Barriers: general weakness   Strengths: Very determined to maintain his independence    Patient/Caregiver understanding: Expresses understanding    Frequency of Care Coordination: Monthly        Plan: Clinic care coordinator called Dr. Holbrook' scheduling ([phone]) and spoke with Veda. Requested cancellation for patient.  Clinic care coordinator will continue to follow with patient. He has a cardiology appointment on 1/5/18.    Suad Gutierrez RN, [facility] - Care Coordinator     1/3/2018    11:32 AM  [phone]

## 2018-01-05 ENCOUNTER — OFFICE VISIT (OUTPATIENT)
Dept: CARDIOLOGY | Facility: CLINIC | Age: 83
End: 2018-01-05
Attending: INTERNAL MEDICINE
Payer: MEDICARE

## 2018-01-05 VITALS
SYSTOLIC BLOOD PRESSURE: 124 MMHG | HEART RATE: 90 BPM | HEIGHT: 67 IN | DIASTOLIC BLOOD PRESSURE: 65 MMHG | WEIGHT: 145.2 LBS | BODY MASS INDEX: 22.79 KG/M2

## 2018-01-05 DIAGNOSIS — I50.32 CHRONIC DIASTOLIC HEART FAILURE (H): Primary | ICD-10-CM

## 2018-01-05 DIAGNOSIS — I10 HYPERTENSION GOAL BP (BLOOD PRESSURE) < 140/90: Chronic | ICD-10-CM

## 2018-01-05 DIAGNOSIS — Z95.818 S/P MITRAL VALVE CLIP IMPLANTATION: ICD-10-CM

## 2018-01-05 DIAGNOSIS — I10 BENIGN ESSENTIAL HYPERTENSION: ICD-10-CM

## 2018-01-05 DIAGNOSIS — Z98.890 S/P MITRAL VALVE CLIP IMPLANTATION: ICD-10-CM

## 2018-01-05 DIAGNOSIS — I34.0 MITRAL VALVE INSUFFICIENCY, UNSPECIFIED ETIOLOGY: ICD-10-CM

## 2018-01-05 DIAGNOSIS — I50.32 CHRONIC DIASTOLIC HEART FAILURE (H): ICD-10-CM

## 2018-01-05 DIAGNOSIS — R00.2 PALPITATIONS: ICD-10-CM

## 2018-01-05 DIAGNOSIS — I25.10 CORONARY ARTERY DISEASE INVOLVING NATIVE CORONARY ARTERY OF NATIVE HEART WITHOUT ANGINA PECTORIS: ICD-10-CM

## 2018-01-05 LAB
ANION GAP SERPL CALCULATED.3IONS-SCNC: 10.9 MMOL/L (ref 6–17)
BUN SERPL-MCNC: 29 MG/DL (ref 7–30)
CALCIUM SERPL-MCNC: 9.7 MG/DL (ref 8.5–10.5)
CHLORIDE SERPL-SCNC: 100 MMOL/L (ref 98–107)
CO2 SERPL-SCNC: 33 MMOL/L (ref 23–29)
CREAT SERPL-MCNC: 1.73 MG/DL (ref 0.7–1.3)
GFR SERPL CREATININE-BSD FRML MDRD: 37 ML/MIN/1.7M2
GLUCOSE SERPL-MCNC: 111 MG/DL (ref 70–105)
POTASSIUM SERPL-SCNC: 3.9 MMOL/L (ref 3.5–5.1)
SODIUM SERPL-SCNC: 140 MMOL/L (ref 136–145)

## 2018-01-05 PROCEDURE — 80048 BASIC METABOLIC PNL TOTAL CA: CPT | Performed by: INTERNAL MEDICINE

## 2018-01-05 PROCEDURE — 36415 COLL VENOUS BLD VENIPUNCTURE: CPT | Performed by: INTERNAL MEDICINE

## 2018-01-05 PROCEDURE — 99214 OFFICE O/P EST MOD 30 MIN: CPT | Performed by: PHYSICIAN ASSISTANT

## 2018-01-05 RX ORDER — FUROSEMIDE 20 MG
40 TABLET ORAL DAILY
COMMUNITY
End: 2018-12-21

## 2018-01-05 NOTE — PATIENT INSTRUCTIONS
Today's Plan:   1) Schedule heart ultrasound to be done in the next 2 weeks. We will have you do lab at the time you get your heart ultrasound.   2) Check your weight daily. I will have my nurse call you in 1-2 weeks to assess this.   3) Continue with current dose of Lasix for now.     If you have questions or concerns please call my nurse at [phone].     Scheduling phone number: [phone]  Reminder: Please bring in all current medications, over the counter supplements and vitamin bottles to your next appointment.    It was a pleasure seeing you today!     Duane Brown  1/5/2018

## 2018-01-05 NOTE — MR AVS SNAPSHOT
After Visit Summary   1/5/2018    Ivan Gomez    MRN: [id number]           Patient Information     Date Of Birth          10/12/1926        Visit Information        Provider Department      1/5/2018 3:30 PM Duane Brown PA-C [facility]        Today's Diagnoses     Chronic diastolic heart failure (H)    -  1    Benign essential hypertension        Mitral valve insufficiency, unspecified etiology        S/P mitral valve clip implantation        Coronary artery disease involving native coronary artery of native heart without angina pectoris        Palpitations        Hypertension goal BP (blood pressure) < 140/90          Care Instructions    Today's Plan:   1) Schedule heart ultrasound to be done in the next 2 weeks. We will have you do lab at the time you get your heart ultrasound.   2) Check your weight daily. I will have my nurse call you in 1-2 weeks to assess this.   3) Continue with current dose of Lasix for now.     If you have questions or concerns please call my nurse at [phone].     Scheduling phone number: [phone]  Reminder: Please bring in all current medications, over the counter supplements and vitamin bottles to your next appointment.    It was a pleasure seeing you today!     Duane Brown  1/5/2018              Follow-ups after your visit        Your next 10 appointments already scheduled     Feb 23, 2018  1:45 PM CST   Return Visit with Lance Yao MD   [facility] ([facility])    [address] [place] [address]   [phone]            Sep 26, 2018  1:30 PM CDT   Return Visit with Dennis Hilton MD   [facility] ([facility])    [facility]  [address]  [address]  [facility] [address]   [phone]              Future tests that were ordered for you today   "   Open Future Orders        Priority Expected Expires Ordered    TSH Routine 1/22/2018 1/5/2019 1/5/2018    Comprehensive metabolic panel Routine 1/22/2018 1/5/2019 1/5/2018    CBC with platelets Routine 1/22/2018 1/5/2019 1/5/2018    N terminal pro BNP outpatient Routine 1/22/2018 1/5/2019 1/5/2018    Echocardiogram Routine 1/22/2018 1/5/2019 1/5/2018            Who to contact     If you have questions or need follow up information about today's clinic visit or your schedule please contact [facility] directly at [phone].  Normal or non-critical lab and imaging results will be communicated to you by MyChart, letter or phone within 4 business days after the clinic has received the results. If you do not hear from us within 7 days, please contact the clinic through [employer] or phone. If you have a critical or abnormal lab result, we will notify you by phone as soon as possible.  Submit refill requests through [employer] or call your pharmacy and they will forward the refill request to us. Please allow 3 business days for your refill to be completed.          Additional Information About Your Visit        MyChart Information     [employer] gives you secure access to your electronic health record. If you see a primary care provider, you can also send messages to your care team and make appointments. If you have questions, please call your primary care clinic.  If you do not have a primary care provider, please call [phone] and they will assist you.        Care EveryWhere ID     This is your Care EveryWhere ID. This could be used by other organizations to access your [place] medical records  [id number]        Your Vitals Were     Pulse Height BMI (Body Mass Index)             90 1.702 m (5' 7\") 22.74 kg/m2          Blood Pressure from Last 3 Encounters:   01/05/18 124/65   12/21/17 134/70   12/04/17 120/60    Weight from Last 3 Encounters:   01/05/18 65.9 kg (145 lb 3.2 oz) "   12/21/17 68.5 kg (151 lb)   12/04/17 69.9 kg (154 lb)              We Performed the Following     Follow-Up with Cardiac Advanced Practice Provider          Today's Medication Changes          These changes are accurate as of: 1/5/18  4:13 PM.  If you have any questions, ask your nurse or doctor.               These medicines have changed or have updated prescriptions.        Dose/Directions    furosemide 20 MG tablet   Commonly known as:  LASIX   This may have changed:  Another medication with the same name was removed. Continue taking this medication, and follow the directions you see here.   Changed by:  Duane Brown PA-C        Dose:  60 mg   Take 60 mg by mouth daily   Refills:  0       pantoprazole 40 MG EC tablet   Commonly known as:  PROTONIX   This may have changed:  See the new instructions.   Used for:  Abdominal pain, epigastric        TAKE 1 TABLET BY MOUTH DAILY 30 MINUTES TO 1 HOUR BEFORE A MEAL   Quantity:  90 tablet   Refills:  0                Primary Care Provider Office Phone # Fax #    Evaristo Magaña [phone] [address] [facility] [address]        Equal Access to Services     [facility]: Hadii ramez ku hadasho Soomaali, waaxda luqadaha, qaybta kaalmada adeluanne, perri cruz . So [facility] [phone].    ATENCIÓN: Si habla español, tiene a rubio disposición servicios gratuitos de asistencia lingüística. Ralph al [phone].    We comply with applicable federal civil rights laws and [place] laws. We do not discriminate on the basis of race, color, national origin, age, disability, sex, sexual orientation, or gender identity.            Thank you!     Thank you for choosing [facility]  for your care. Our goal is always to provide you with excellent care. Hearing back from our patients is one way we can continue to improve our services. Please take a few minutes to complete the  written survey that you may receive in the mail after your visit with us. Thank you!             Your Updated Medication List - Protect others around you: Learn how to safely use, store and throw away your medicines at [email or web address].          This list is accurate as of: 1/5/18  4:13 PM.  Always use your most recent med list.                   Brand Name Dispense Instructions for use Diagnosis    aspirin 81 MG EC tablet      Take 1 tablet (81 mg) by mouth daily    S/P mitral valve repair       eucerin cream      Apply topically At Bedtime Apply to hands        fiber modular packet      1 packet daily        finasteride 5 MG tablet    PROSCAR    90 tablet    Take 1 tablet (5 mg) by mouth daily    Benign enlargement of prostate       furosemide 20 MG tablet    LASIX     Take 60 mg by mouth daily        gabapentin 300 MG capsule    NEURONTIN    270 capsule    TAKE 1 CAPSULE BY MOUTH THREE TIMES DAILY    Spinal stenosis of lumbar region with neurogenic claudication       hydrALAZINE 25 MG tablet    APRESOLINE    135 tablet    TAKE 1.5 TABLETS BY MOUTH EVERY 8 HOURS AS DIRECTED. HOLD IF SYSTOLIC BLOOD PRESSURE IS LESS THAN 100    Acute on chronic heart failure, unspecified heart failure type (H)       HYDROcodone-acetaminophen 7.5-325 MG per tablet    NORCO    18 tablet    Take 1 tablet by mouth every 6 hours as needed for pain maximum 4 tablet(s) per day    Spinal stenosis of lumbar region, unspecified whether neurogenic claudication present       hypromellose 0.4 % Soln ophthalmic solution    ARTIFICIAL TEARS     Place 2 drops Into the left eye 4 times daily        isosorbide dinitrate 20 MG tablet    ISORDIL    270 tablet    TAKE 1 TABLET BY MOUTH THREE TIMES DAILY    Chronic diastolic heart failure (H), Coronary artery disease involving native coronary artery of native heart without angina pectoris       metoprolol 50 MG 24 hr tablet    TOPROL XL    90 tablet    Take 1 tablet (50 mg) by mouth daily     Hypertension goal BP (blood pressure) < 140/90       pantoprazole 40 MG EC tablet    PROTONIX    90 tablet    TAKE 1 TABLET BY MOUTH DAILY 30 MINUTES TO 1 HOUR BEFORE A MEAL    Abdominal pain, epigastric       potassium chloride SA 10 MEQ CR tablet    K-DUR/KLOR-CON M    90 tablet    Take half tablet one time daily.    Chronic diastolic heart failure (H)       simvastatin 20 MG tablet    ZOCOR    90 tablet    Take 1 tablet (20 mg) by mouth At Bedtime    Hyperlipidemia LDL goal <100       tamsulosin 0.4 MG capsule    FLOMAX    90 capsule    TAKE ONE CAPSULE BY MOUTH DAILY    Benign prostatic hyperplasia without lower urinary tract symptoms

## 2018-01-05 NOTE — LETTER
1/5/2018    Evaristo Magaña MD  [address]  [facility] [address]    RE: Ivan Gomez       Dear Colleague,    I had the pleasure of seeing Ivan KONSTANTIN Gomez in the [facility].    History of Present Illness:   This is a very pleasant 89-year-old gentleman who presents to cardiology clinic today for 2 week follow-up.     He was admitted earlier this fall with acute heart failure and was found to have severe mitral regurgitation. 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 He was transferred to [facility] for urgent Mitraclip procedure.     His past medical history includes HFpEF, HLD, frequent PAC's and intermittent episodes of SVT, CKD, and anemia.     He returns to clinic with his spouse and daughter today. Despite the increase in Furosemide and Metoprolol he continues to have fatigue. His SOB is about the same. His peripheral edema is better. He denies PND or orthopnea. He believes his weight has come down but not sure as he does not check his weight daily. He is frustrated that he does not have his energy back. Overall he feels that medication changes have not made a difference. Compared to his admission he feels better but does not think he is at baseline.     He denies lightheadedness, dizziness, or palpitations.     His daughter worries that patient is not eating enough. He states that he does not have much appetite.     He is not aware of his anemia.      Physical Examination:   Gen- NAD, frail, thin  Neck- JVD in upright position  Resp- CTA, fili  Card- Regular rhythm with frequent premature beats, there is 3/6 SHIRLEY along LSB   Abd- Soft, nontender  Ext- Trace edema fili     Assessment and Plan:   This is a very pleasant 89-year-old gentleman who presents to cardiology clinic today for 2 week follow-up.     1) HFpEF- Weight is down by 6 Ibs since 2 weeks ago. With the exception of JVD, he appears fairly euvolemic. We will continue with Furosemide 60 mg daily at this  time as his kidney function is stable. I asked him to check his weight daily. I will have my nurse team contact him next week to find out what his weight is.     2) Mitral Regurgitation (S/p Mitraclip 2 months ago)- Given he continues to have fatigue and no significant improvement in symptomatology with recent medication changes, we will obtain repeat echocardiogram.     3) Fatigue- I think this is multifactorial (age, deconditioning, poor appetite, recent admission, MR, and anemia). I would like to recheck his HGB and TSH. We will also re-check CMP (albumin to assess for malnutrition). Increase in diuretic did not improve his symptoms. His weight is down. On exam he appears to be stable. He continues to have JVD in upright position. Since his kidney function is stable, I will continue with current Lasix dose.     4) Frequent PAC's/Intemittent episodes of SVT- On exam he had frequent PAC's. His pulse is on the higher end today (90 bpm). He states that sometimes he decreases his BB dose to 25 mg if his pulse is below 60. Given this I will hold off on further increasing his BB.     As stated above, we'll touch base again next week and later when I receive his echocardiogram results.     I will also discuss his case with Dr. Yao.     Thank you for allowing me to participate in the care of this pleasant patient today.        This note was completed in part using Dragon voice recognition software. Although reviewed after completion, some word and grammatical errors may occur.    Orders this Visit:  Orders Placed This Encounter   Procedures     Comprehensive metabolic panel     CBC with platelets     N terminal pro BNP outpatient     TSH     Echocardiogram     Orders Placed This Encounter   Medications     furosemide (LASIX) 20 MG tablet     Sig: Take 60 mg by mouth daily     Medications Discontinued During This Encounter   Medication Reason     furosemide (LASIX) 40 MG tablet Dose adjustment     order for DME  Stopped by Patient     senna-docusate (SENOKOT-S;PERICOLACE) 8.6-50 MG per tablet Stopped by Patient         Encounter Diagnoses   Name Primary?     Benign essential hypertension      Mitral valve insufficiency, unspecified etiology      S/P mitral valve clip implantation      Coronary artery disease involving native coronary artery of native heart without angina pectoris      Palpitations      Hypertension goal BP (blood pressure) < 140/90      Chronic diastolic heart failure (H) Yes       CURRENT MEDICATIONS:  Current Outpatient Prescriptions   Medication Sig Dispense Refill     furosemide (LASIX) 20 MG tablet Take 60 mg by mouth daily       finasteride (PROSCAR) 5 MG tablet Take 1 tablet (5 mg) by mouth daily 90 tablet 3     metoprolol (TOPROL XL) 50 MG 24 hr tablet Take 1 tablet (50 mg) by mouth daily 90 tablet 3     tamsulosin (FLOMAX) 0.4 MG capsule TAKE ONE CAPSULE BY MOUTH DAILY 90 capsule 3     pantoprazole (PROTONIX) 40 MG EC tablet TAKE 1 TABLET BY MOUTH DAILY 30 MINUTES TO 1 HOUR BEFORE A MEAL (Patient taking differently: , 2 times daily) 90 tablet 0     hydrALAZINE (APRESOLINE) 25 MG tablet TAKE 1.5 TABLETS BY MOUTH EVERY 8 HOURS AS DIRECTED. HOLD IF SYSTOLIC BLOOD PRESSURE IS LESS THAN 100 135 tablet 3     isosorbide dinitrate (ISORDIL) 20 MG tablet TAKE 1 TABLET BY MOUTH THREE TIMES DAILY 270 tablet 2     simvastatin (ZOCOR) 20 MG tablet Take 1 tablet (20 mg) by mouth At Bedtime 90 tablet 2     gabapentin (NEURONTIN) 300 MG capsule TAKE 1 CAPSULE BY MOUTH THREE TIMES DAILY 270 capsule 3     potassium chloride SA (K-DUR/KLOR-CON M) 10 MEQ CR tablet Take half tablet one time daily. 90 tablet 3     hypromellose (ARTIFICIAL TEARS) 0.4 % SOLN ophthalmic solution Place 2 drops Into the left eye 4 times daily       Skin Protectants, Misc. (EUCERIN) cream Apply topically At Bedtime Apply to hands       HYDROcodone-acetaminophen (NORCO) 7.5-325 MG per tablet Take 1 tablet by mouth every 6 hours as needed for  pain maximum 4 tablet(s) per day 18 tablet 0     aspirin EC 81 MG EC tablet Take 1 tablet (81 mg) by mouth daily       fiber modular (NUTRISOURCE FIBER) packet 1 packet daily       [DISCONTINUED] furosemide (LASIX) 40 MG tablet Take 1.5 tablets (60 mg) by mouth daily 135 tablet 3       ALLERGIES     Allergies   Allergen Reactions     Celebrex [Celecoxib] Hives     Penicillins Hives       PAST MEDICAL HISTORY:  Past Medical History:   Diagnosis Date     Arthritis     Spinal Stenosis     Former smoker      Hemorrhoids      Hypercholesteremia      Hypertension      Malignant neoplasm (H)     skin CA, Basal cell     Other chronic pain      Renal disease        PAST SURGICAL HISTORY:  Past Surgical History:   Procedure Laterality Date     BACK SURGERY       BIOPSY      face, skin cancer     BIOPSY  8/2016    shoulder lesion     CYSTOSCOPY, TRANSURETHRAL RESECTION (TUR) PROSTATE, COMBINED N/A 8/21/2015    Procedure: COMBINED CYSTOSCOPY, TRANSURETHRAL RESECTION (TUR) PROSTATE;  Surgeon: Dennis Hilton MD;  Location:  OR     CYSTOSCOPY, TRANSURETHRAL RESECTION (TUR) TUMOR BLADDER, COMBINED N/A 9/2/2016    Procedure: COMBINED CYSTOSCOPY, TRANSURETHRAL RESECTION (TUR) TUMOR BLADDER;  Surgeon: Dennis Hilton MD;  Location: [facility] OR     ORTHOPEDIC SURGERY      lumbar and cervical surgery, Sep, 2008, knee surgery     PERCUTANEOUS MITRAL VALVE REPAIR N/A 10/16/2017    Procedure: PERCUTANEOUS MITRAL VALVE REPAIR ANESTHESIA;  Percutaneous MitraClip ;  Surgeon: Eber Monroe MD;  Location: [facility] OR     PICC INSERTION Right 10/14/2017    5fr DL Bard PICC, 40cm (1cm external) in the R basilic vein w/ tip in the mid SVC.       FAMILY HISTORY:  Family History   Problem Relation Age of Onset     CANCER Son 64     leukemia ? due to agent orange     Family History Negative Son      DIABETES No family hx of      Coronary Artery Disease No family hx of      Hypertension No family hx of      Hyperlipidemia No family  "hx of      CEREBROVASCULAR DISEASE No family hx of      Breast Cancer No family hx of      Colon Cancer No family hx of      Prostate Cancer No family hx of      Other Cancer No family hx of      Depression No family hx of      Anxiety Disorder No family hx of      MENTAL ILLNESS No family hx of      Substance Abuse No family hx of      Anesthesia Reaction No family hx of      Asthma No family hx of      OSTEOPOROSIS No family hx of      Genetic Disorder No family hx of      Thyroid Disease No family hx of      Obesity No family hx of      Unknown/Adopted No family hx of        SOCIAL HISTORY:  Social History     Social History     Marital status: Single     Spouse name: N/A     Number of children: N/A     Years of education: N/A     Social History Main Topics     Smoking status: Former Smoker     Smokeless tobacco: Never Used     Alcohol use No      Comment: doesnt use anymore     Drug use: No     Sexual activity: No     Other Topics Concern     Parent/Sibling W/ Cabg, Mi Or Angioplasty Before 65f 55m? No     Social History Narrative       Review of Systems:  Skin:  Positive for bruising   Eyes:  Negative    ENT:  Positive for    Respiratory:  Positive for dyspnea on exertion  Cardiovascular:  Negative;palpitations;syncope or near-syncope;cyanosis;lightheadedness;dizziness;edema Positive for  Gastroenterology: Negative    Genitourinary:  Positive for urinary frequency;prostate problem  Musculoskeletal:  Positive for arthritis  Neurologic:  Negative    Psychiatric:  Negative    Heme/Lymph/Imm:  Positive for weight loss;easy bruising  Endocrine:  Negative      Physical Exam:  Vitals: /65 (BP Location: Left arm, Cuff Size: Adult Regular)  Pulse 90  Ht 1.702 m (5' 7\")  Wt 65.9 kg (145 lb 3.2 oz)  BMI 22.74 kg/m2   Please refer to dictation for physical exam    Recent Lab Results:  LIPID RESULTS:  Lab Results   Component Value Date    CHOL 108 10/12/2017    HDL 63 10/12/2017    LDL 29 10/12/2017    TRIG 81 " 10/12/2017    CHOLHDLRATIO 2.6 08/18/2015       LIVER ENZYME RESULTS:  Lab Results   Component Value Date    AST 13 11/18/2017    ALT 17 11/18/2017       CBC RESULTS:  Lab Results   Component Value Date    WBC 5.5 11/18/2017    RBC 3.38 (L) 11/18/2017    HGB 10.6 (L) 11/18/2017    HCT 33.4 (L) 11/18/2017    MCV 99 11/18/2017    MCH 31.4 11/18/2017    MCHC 31.7 11/18/2017    RDW 15.7 (H) 11/18/2017    PLT 90 (L) 11/18/2017       BMP RESULTS:  Lab Results   Component Value Date     01/05/2018    POTASSIUM 3.9 01/05/2018    CHLORIDE 100 01/05/2018    CO2 33 (H) 01/05/2018    ANIONGAP 10.9 01/05/2018     (H) 01/05/2018    BUN 29 01/05/2018    CR 1.73 (H) 01/05/2018    GFRESTIMATED 37 (L) 01/05/2018    GFRESTBLACK 45 (L) 01/05/2018    GIUSEPPE 9.7 01/05/2018        A1C RESULTS:  Lab Results   Component Value Date    A1C 5.3 08/18/2015       INR RESULTS:  Lab Results   Component Value Date    INR 1.15 (H) 10/16/2017    INR 1.15 (H) 10/15/2017     Thank you for allowing me to participate in the care of your patient.    Sincerely,     Duane Brown PA-C     [facility]

## 2018-01-05 NOTE — PROGRESS NOTES
History of Present Illness:   This is a very pleasant 89-year-old gentleman who presents to cardiology clinic today for 2 week follow-up.     He was admitted earlier this fall with acute heart failure and was found to have severe mitral regurgitation. 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 He was transferred to [facility] for urgent Mitraclip procedure.     His past medical history includes HFpEF, HLD, frequent PAC's and intermittent episodes of SVT, CKD, and anemia.     He returns to clinic with his spouse and daughter today. Despite the increase in Furosemide and Metoprolol he continues to have fatigue. His SOB is about the same. His peripheral edema is better. He denies PND or orthopnea. He believes his weight has come down but not sure as he does not check his weight daily. He is frustrated that he does not have his energy back. Overall he feels that medication changes have not made a difference. Compared to his admission he feels better but does not think he is at baseline.     He denies lightheadedness, dizziness, or palpitations.     His daughter worries that patient is not eating enough. He states that he does not have much appetite.     He is not aware of his anemia.      Physical Examination:   Gen- NAD, frail, thin  Neck- JVD in upright position  Resp- CTA, fili  Card- Regular rhythm with frequent premature beats, there is 3/6 SHIRLEY along LSB   Abd- Soft, nontender  Ext- Trace edema fili     Assessment and Plan:   This is a very pleasant 89-year-old gentleman who presents to cardiology clinic today for 2 week follow-up.     1) HFpEF- Weight is down by 6 Ibs since 2 weeks ago. With the exception of JVD, he appears fairly euvolemic. We will continue with Furosemide 60 mg daily at this time as his kidney function is stable. I asked him to check his weight daily. I will have my nurse team contact him next week to find out what his weight is.     2) Mitral Regurgitation (S/p Mitraclip 2 months ago)- Given he  continues to have fatigue and no significant improvement in symptomatology with recent medication changes, we will obtain repeat echocardiogram.     3) Fatigue- I think this is multifactorial (age, deconditioning, poor appetite, recent admission, MR, and anemia). I would like to recheck his HGB and TSH. We will also re-check CMP (albumin to assess for malnutrition). Increase in diuretic did not improve his symptoms. His weight is down. On exam he appears to be stable. He continues to have JVD in upright position. Since his kidney function is stable, I will continue with current Lasix dose.     4) Frequent PAC's/Intemittent episodes of SVT- On exam he had frequent PAC's. His pulse is on the higher end today (90 bpm). His BB was increased to 50 mg but he tells me that he sometimes decreases his BB dose to 25 mg when his pulse is below 60. He denies lightheadedness or nearsyncope when his HR is in the 60's. I recommended we further increase his BB to control his rates better but he was not interested in this.       I will also discuss his case with Dr. Yao.     Thank you for allowing me to participate in the care of this pleasant patient today.        This note was completed in part using Dragon voice recognition software. Although reviewed after completion, some word and grammatical errors may occur.    Orders this Visit:  Orders Placed This Encounter   Procedures     Comprehensive metabolic panel     CBC with platelets     N terminal pro BNP outpatient     TSH     Echocardiogram     Orders Placed This Encounter   Medications     furosemide (LASIX) 20 MG tablet     Sig: Take 60 mg by mouth daily     Medications Discontinued During This Encounter   Medication Reason     furosemide (LASIX) 40 MG tablet Dose adjustment     order for DME Stopped by Patient     senna-docusate (SENOKOT-S;PERICOLACE) 8.6-50 MG per tablet Stopped by Patient         Encounter Diagnoses   Name Primary?     Benign essential hypertension       Mitral valve insufficiency, unspecified etiology      S/P mitral valve clip implantation      Coronary artery disease involving native coronary artery of native heart without angina pectoris      Palpitations      Hypertension goal BP (blood pressure) < 140/90      Chronic diastolic heart failure (H) Yes       CURRENT MEDICATIONS:  Current Outpatient Prescriptions   Medication Sig Dispense Refill     furosemide (LASIX) 20 MG tablet Take 60 mg by mouth daily       finasteride (PROSCAR) 5 MG tablet Take 1 tablet (5 mg) by mouth daily 90 tablet 3     metoprolol (TOPROL XL) 50 MG 24 hr tablet Take 1 tablet (50 mg) by mouth daily 90 tablet 3     tamsulosin (FLOMAX) 0.4 MG capsule TAKE ONE CAPSULE BY MOUTH DAILY 90 capsule 3     pantoprazole (PROTONIX) 40 MG EC tablet TAKE 1 TABLET BY MOUTH DAILY 30 MINUTES TO 1 HOUR BEFORE A MEAL (Patient taking differently: , 2 times daily) 90 tablet 0     hydrALAZINE (APRESOLINE) 25 MG tablet TAKE 1.5 TABLETS BY MOUTH EVERY 8 HOURS AS DIRECTED. HOLD IF SYSTOLIC BLOOD PRESSURE IS LESS THAN 100 135 tablet 3     isosorbide dinitrate (ISORDIL) 20 MG tablet TAKE 1 TABLET BY MOUTH THREE TIMES DAILY 270 tablet 2     simvastatin (ZOCOR) 20 MG tablet Take 1 tablet (20 mg) by mouth At Bedtime 90 tablet 2     gabapentin (NEURONTIN) 300 MG capsule TAKE 1 CAPSULE BY MOUTH THREE TIMES DAILY 270 capsule 3     potassium chloride SA (K-DUR/KLOR-CON M) 10 MEQ CR tablet Take half tablet one time daily. 90 tablet 3     hypromellose (ARTIFICIAL TEARS) 0.4 % SOLN ophthalmic solution Place 2 drops Into the left eye 4 times daily       Skin Protectants, Misc. (EUCERIN) cream Apply topically At Bedtime Apply to hands       HYDROcodone-acetaminophen (NORCO) 7.5-325 MG per tablet Take 1 tablet by mouth every 6 hours as needed for pain maximum 4 tablet(s) per day 18 tablet 0     aspirin EC 81 MG EC tablet Take 1 tablet (81 mg) by mouth daily       fiber modular (NUTRISOURCE FIBER) packet 1 packet daily        [DISCONTINUED] furosemide (LASIX) 40 MG tablet Take 1.5 tablets (60 mg) by mouth daily 135 tablet 3       ALLERGIES     Allergies   Allergen Reactions     Celebrex [Celecoxib] Hives     Penicillins Hives       PAST MEDICAL HISTORY:  Past Medical History:   Diagnosis Date     Arthritis     Spinal Stenosis     Former smoker      Hemorrhoids      Hypercholesteremia      Hypertension      Malignant neoplasm (H)     skin CA, Basal cell     Other chronic pain      Renal disease        PAST SURGICAL HISTORY:  Past Surgical History:   Procedure Laterality Date     BACK SURGERY       BIOPSY      face, skin cancer     BIOPSY  8/2016    shoulder lesion     CYSTOSCOPY, TRANSURETHRAL RESECTION (TUR) PROSTATE, COMBINED N/A 8/21/2015    Procedure: COMBINED CYSTOSCOPY, TRANSURETHRAL RESECTION (TUR) PROSTATE;  Surgeon: Dennis Hilton MD;  Location: [facility] OR     CYSTOSCOPY, TRANSURETHRAL RESECTION (TUR) TUMOR BLADDER, COMBINED N/A 9/2/2016    Procedure: COMBINED CYSTOSCOPY, TRANSURETHRAL RESECTION (TUR) TUMOR BLADDER;  Surgeon: Dennis Hilton MD;  Location: [facility] OR     ORTHOPEDIC SURGERY      lumbar and cervical surgery, Sep, 2008, knee surgery     PERCUTANEOUS MITRAL VALVE REPAIR N/A 10/16/2017    Procedure: PERCUTANEOUS MITRAL VALVE REPAIR ANESTHESIA;  Percutaneous MitraClip ;  Surgeon: Eber Monroe MD;  Location: [facility] OR     PICC INSERTION Right 10/14/2017    5fr DL Bard PICC, 40cm (1cm external) in the R basilic vein w/ tip in the mid SVC.       FAMILY HISTORY:  Family History   Problem Relation Age of Onset     CANCER Son 64     leukemia ? due to agent orange     Family History Negative Son      DIABETES No family hx of      Coronary Artery Disease No family hx of      Hypertension No family hx of      Hyperlipidemia No family hx of      CEREBROVASCULAR DISEASE No family hx of      Breast Cancer No family hx of      Colon Cancer No family hx of      Prostate Cancer No family hx of      Other Cancer No  "family hx of      Depression No family hx of      Anxiety Disorder No family hx of      MENTAL ILLNESS No family hx of      Substance Abuse No family hx of      Anesthesia Reaction No family hx of      Asthma No family hx of      OSTEOPOROSIS No family hx of      Genetic Disorder No family hx of      Thyroid Disease No family hx of      Obesity No family hx of      Unknown/Adopted No family hx of        SOCIAL HISTORY:  Social History     Social History     Marital status: Single     Spouse name: N/A     Number of children: N/A     Years of education: N/A     Social History Main Topics     Smoking status: Former Smoker     Smokeless tobacco: Never Used     Alcohol use No      Comment: doesnt use anymore     Drug use: No     Sexual activity: No     Other Topics Concern     Parent/Sibling W/ Cabg, Mi Or Angioplasty Before 65f 55m? No     Social History Narrative       Review of Systems:  Skin:  Positive for bruising   Eyes:  Negative    ENT:  Positive for    Respiratory:  Positive for dyspnea on exertion  Cardiovascular:  Negative;palpitations;syncope or near-syncope;cyanosis;lightheadedness;dizziness;edema Positive for  Gastroenterology: Negative    Genitourinary:  Positive for urinary frequency;prostate problem  Musculoskeletal:  Positive for arthritis  Neurologic:  Negative    Psychiatric:  Negative    Heme/Lymph/Imm:  Positive for weight loss;easy bruising  Endocrine:  Negative      Physical Exam:  Vitals: /65 (BP Location: Left arm, Cuff Size: Adult Regular)  Pulse 90  Ht 1.702 m (5' 7\")  Wt 65.9 kg (145 lb 3.2 oz)  BMI 22.74 kg/m2   Please refer to dictation for physical exam    Recent Lab Results:  LIPID RESULTS:  Lab Results   Component Value Date    CHOL 108 10/12/2017    HDL 63 10/12/2017    LDL 29 10/12/2017    TRIG 81 10/12/2017    CHOLHDLRATIO 2.6 08/18/2015       LIVER ENZYME RESULTS:  Lab Results   Component Value Date    AST 13 11/18/2017    ALT 17 11/18/2017       CBC RESULTS:  Lab Results "   Component Value Date    WBC 5.5 11/18/2017    RBC 3.38 (L) 11/18/2017    HGB 10.6 (L) 11/18/2017    HCT 33.4 (L) 11/18/2017    MCV 99 11/18/2017    MCH 31.4 11/18/2017    MCHC 31.7 11/18/2017    RDW 15.7 (H) 11/18/2017    PLT 90 (L) 11/18/2017       BMP RESULTS:  Lab Results   Component Value Date     01/05/2018    POTASSIUM 3.9 01/05/2018    CHLORIDE 100 01/05/2018    CO2 33 (H) 01/05/2018    ANIONGAP 10.9 01/05/2018     (H) 01/05/2018    BUN 29 01/05/2018    CR 1.73 (H) 01/05/2018    GFRESTIMATED 37 (L) 01/05/2018    GFRESTBLACK 45 (L) 01/05/2018    GIUSEPPE 9.7 01/05/2018        A1C RESULTS:  Lab Results   Component Value Date    A1C 5.3 08/18/2015       INR RESULTS:  Lab Results   Component Value Date    INR 1.15 (H) 10/16/2017    INR 1.15 (H) 10/15/2017           Duane Brown PA-C   January 5, 2018

## 2018-01-17 ENCOUNTER — HOSPITAL ENCOUNTER (EMERGENCY)
Facility: CLINIC | Age: 83
Discharge: HOME | End: 2018-01-17
Attending: EMERGENCY MEDICINE | Admitting: EMERGENCY MEDICINE
Payer: MEDICARE

## 2018-01-17 VITALS
TEMPERATURE: 98.5 F | DIASTOLIC BLOOD PRESSURE: 73 MMHG | SYSTOLIC BLOOD PRESSURE: 138 MMHG | OXYGEN SATURATION: 90 % | BODY MASS INDEX: 22.13 KG/M2 | RESPIRATION RATE: 16 BRPM | HEIGHT: 67 IN | WEIGHT: 141 LBS | HEART RATE: 61 BPM

## 2018-01-17 DIAGNOSIS — K40.30 INCARCERATED RIGHT INGUINAL HERNIA: ICD-10-CM

## 2018-01-17 LAB
BASOPHILS # BLD AUTO: 0 10E9/L (ref 0–0.2)
BASOPHILS NFR BLD AUTO: 0.6 %
DIFFERENTIAL METHOD BLD: ABNORMAL
EOSINOPHIL # BLD AUTO: 0.1 10E9/L (ref 0–0.7)
EOSINOPHIL NFR BLD AUTO: 1.8 %
ERYTHROCYTE [DISTWIDTH] IN BLOOD BY AUTOMATED COUNT: 15.6 % (ref 10–15)
HCT VFR BLD AUTO: 35.1 % (ref 40–53)
HGB BLD-MCNC: 11 G/DL (ref 13.3–17.7)
IMM GRANULOCYTES # BLD: 0 10E9/L (ref 0–0.4)
IMM GRANULOCYTES NFR BLD: 0.4 %
LACTATE BLD-SCNC: 1.4 MMOL/L (ref 0.7–2)
LYMPHOCYTES # BLD AUTO: 1.3 10E9/L (ref 0.8–5.3)
LYMPHOCYTES NFR BLD AUTO: 25.7 %
MCH RBC QN AUTO: 29.2 PG (ref 26.5–33)
MCHC RBC AUTO-ENTMCNC: 31.3 G/DL (ref 31.5–36.5)
MCV RBC AUTO: 93 FL (ref 78–100)
MONOCYTES # BLD AUTO: 0.4 10E9/L (ref 0–1.3)
MONOCYTES NFR BLD AUTO: 7.5 %
NEUTROPHILS # BLD AUTO: 3.2 10E9/L (ref 1.6–8.3)
NEUTROPHILS NFR BLD AUTO: 64 %
NRBC # BLD AUTO: 0 10*3/UL
NRBC BLD AUTO-RTO: 0 /100
PLATELET # BLD AUTO: 126 10E9/L (ref 150–450)
RBC # BLD AUTO: 3.77 10E12/L (ref 4.4–5.9)
WBC # BLD AUTO: 5.1 10E9/L (ref 4–11)

## 2018-01-17 PROCEDURE — 99283 EMERGENCY DEPT VISIT LOW MDM: CPT

## 2018-01-17 PROCEDURE — 83605 ASSAY OF LACTIC ACID: CPT | Performed by: EMERGENCY MEDICINE

## 2018-01-17 PROCEDURE — 85025 COMPLETE CBC W/AUTO DIFF WBC: CPT | Performed by: EMERGENCY MEDICINE

## 2018-01-17 PROCEDURE — 25000128 H RX IP 250 OP 636: Performed by: EMERGENCY MEDICINE

## 2018-01-17 ASSESSMENT — ENCOUNTER SYMPTOMS
NAUSEA: 0
ABDOMINAL PAIN: 1
VOMITING: 0
DIARRHEA: 0

## 2018-01-17 NOTE — ED AVS SNAPSHOT
Emergency Department    [address] [place] [address]    Phone:  [phone]    Fax:  [phone]                                       Ivan Gomez   MRN: [id number]    Department:   Emergency Department   Date of Visit:  1/17/2018           After Visit Summary Signature Page     I have received my discharge instructions, and my questions have been answered. I have discussed any challenges I see with this plan with the nurse or doctor.    ..........................................................................................................................................  Patient/Patient Representative Signature      ..........................................................................................................................................  Patient Representative Print Name and Relationship to Patient    ..................................................               ................................................  Date                                            Time    ..........................................................................................................................................  Reviewed by Signature/Title    ...................................................              ..............................................  Date                                                            Time

## 2018-01-17 NOTE — ED AVS SNAPSHOT
Emergency Department    [address] [facility] [address]    Phone:  [phone]    Fax:  [phone]                                       Ivan Gomez   MRN: [id number]    Department:   Emergency Department   Date of Visit:  1/17/2018           Patient Information     Date Of Birth          10/12/1926        Your diagnoses for this visit were:     Incarcerated right inguinal hernia        You were seen by Oly La MD.      Follow-up Information     Follow up with Joseluis Kuhn MD.    Specialty:  Surgery    Why:  for follow-up    Contact information:    [address]  [facility] [id number] [phone]          Follow up with  Emergency Department.    Specialty:  EMERGENCY MEDICINE    Why:  As needed, if pain worsens again and you cannot push your hernia back in.    Contact information:    [address] [facility] [address] [phone]        Discharge Instructions       Try to push your hernia back in if it pops out again.    Try using a Hernia belt from [employer]    Discharge References/Attachments     HERNIA (ADULT) (ENGLISH)      Future Appointments        Provider Department Dept Phone Center    1/22/2018 1:40 PM [facility] Heart Lab [facility] PHYSICIANS HEART AT [place] [phone] [facility] PSA CLIN    1/22/2018 2:00 PM [facility] HEART [facility] ECHO ROOM 3 [facility] CV Echocardiography [phone] CVIMG    2/23/2018 1:45 PM Lance Yao MD, MD [facility] Heart Care   [place] [phone] [facility] PSA CLIN    9/26/2018 1:30 PM Dennis Hilton MD [facility] Cancer Care [phone] [place] RID      24 Hour Appointment Hotline       To make an appointment at any [facility], call [phone] ([phone]). If you don't have a family doctor or clinic, we will help you find one. [facility] are conveniently located to serve the needs of you and your  family.             Review of your medicines      Our records show that you are taking the medicines listed below. If these are incorrect, please call your family doctor or clinic.        Dose / Directions Last dose taken    aspirin 81 MG EC tablet   Dose:  81 mg        Take 1 tablet (81 mg) by mouth daily   Refills:  0        eucerin cream        Apply topically At Bedtime Apply to hands   Refills:  0        fiber modular packet   Dose:  1 packet        1 packet daily   Refills:  0        finasteride 5 MG tablet   Commonly known as:  PROSCAR   Dose:  1 tablet   Quantity:  90 tablet        Take 1 tablet (5 mg) by mouth daily   Refills:  3        furosemide 20 MG tablet   Commonly known as:  LASIX   Dose:  60 mg        Take 60 mg by mouth daily   Refills:  0        gabapentin 300 MG capsule   Commonly known as:  NEURONTIN   Quantity:  270 capsule        TAKE 1 CAPSULE BY MOUTH THREE TIMES DAILY   Refills:  3        hydrALAZINE 25 MG tablet   Commonly known as:  APRESOLINE   Quantity:  135 tablet        TAKE 1.5 TABLETS BY MOUTH EVERY 8 HOURS AS DIRECTED. HOLD IF SYSTOLIC BLOOD PRESSURE IS LESS THAN 100   Refills:  3        HYDROcodone-acetaminophen 7.5-325 MG per tablet   Commonly known as:  NORCO   Dose:  1 tablet   Quantity:  18 tablet        Take 1 tablet by mouth every 6 hours as needed for pain maximum 4 tablet(s) per day   Refills:  0        hypromellose 0.4 % Soln ophthalmic solution   Commonly known as:  ARTIFICIAL TEARS   Dose:  2 drop        Place 2 drops Into the left eye 4 times daily   Refills:  0        isosorbide dinitrate 20 MG tablet   Commonly known as:  ISORDIL   Quantity:  270 tablet        TAKE 1 TABLET BY MOUTH THREE TIMES DAILY   Refills:  2        metoprolol succinate 50 MG 24 hr tablet   Commonly known as:  TOPROL XL   Dose:  50 mg   Quantity:  90 tablet        Take 1 tablet (50 mg) by mouth daily   Refills:  3        pantoprazole 40 MG EC tablet   Commonly known as:  PROTONIX   Quantity:  90  tablet        TAKE 1 TABLET BY MOUTH DAILY 30 MINUTES TO 1 HOUR BEFORE A MEAL   Refills:  0        potassium chloride SA 10 MEQ CR tablet   Commonly known as:  K-DUR/KLOR-CON M   Quantity:  90 tablet        Take half tablet one time daily.   Refills:  3        simvastatin 20 MG tablet   Commonly known as:  ZOCOR   Dose:  20 mg   Quantity:  90 tablet        Take 1 tablet (20 mg) by mouth At Bedtime   Refills:  2        tamsulosin 0.4 MG capsule   Commonly known as:  FLOMAX   Quantity:  90 capsule        TAKE ONE CAPSULE BY MOUTH DAILY   Refills:  3                Procedures and tests performed during your visit     CBC with platelets + differential    Lactic acid      Orders Needing Specimen Collection     None      Pending Results     No orders found from 1/15/2018 to 1/18/2018.            Pending Culture Results     No orders found from 1/15/2018 to 1/18/2018.            Pending Results Instructions     If you had any lab results that were not finalized at the time of your Discharge, you can call the ED Lab Result RN at [phone]. You will be contacted by this team for any positive Lab results or changes in treatment. The nurses are available 7 days a week from 10A to 6:30P.  You can leave a message 24 hours per day and they will return your call.        Test Results From Your Hospital Stay        1/17/2018  7:28 PM      Component Results     Component Value Ref Range & Units Status    WBC 5.1 4.0 - 11.0 10e9/L Final    RBC Count 3.77 (L) 4.4 - 5.9 10e12/L Final    Hemoglobin 11.0 (L) 13.3 - 17.7 g/dL Final    Hematocrit 35.1 (L) 40.0 - 53.0 % Final    MCV 93 78 - 100 fl Final    MCH 29.2 26.5 - 33.0 pg Final    MCHC 31.3 (L) 31.5 - 36.5 g/dL Final    RDW 15.6 (H) 10.0 - 15.0 % Final    Platelet Count 126 (L) 150 - 450 10e9/L Final    Diff Method Automated Method  Final    % Neutrophils 64.0 % Final    % Lymphocytes 25.7 % Final    % Monocytes 7.5 % Final    % Eosinophils 1.8 % Final    % Basophils 0.6 % Final     % Immature Granulocytes 0.4 % Final    Nucleated RBCs 0 0 /100 Final    Absolute Neutrophil 3.2 1.6 - 8.3 10e9/L Final    Absolute Lymphocytes 1.3 0.8 - 5.3 10e9/L Final    Absolute Monocytes 0.4 0.0 - 1.3 10e9/L Final    Absolute Eosinophils 0.1 0.0 - 0.7 10e9/L Final    Absolute Basophils 0.0 0.0 - 0.2 10e9/L Final    Abs Immature Granulocytes 0.0 0 - 0.4 10e9/L Final    Absolute Nucleated RBC 0.0  Final         1/17/2018  7:37 PM      Component Results     Component Value Ref Range & Units Status    Lactic Acid 1.4 0.7 - 2.0 mmol/L Final                Clinical Quality Measure: Blood Pressure Screening     Your blood pressure was checked while you were in the emergency department today. The last reading we obtained was  BP: 138/73 . Please read the guidelines below about what these numbers mean and what you should do about them.  If your systolic blood pressure (the top number) is less than 120 and your diastolic blood pressure (the bottom number) is less than 80, then your blood pressure is normal. There is nothing more that you need to do about it.  If your systolic blood pressure (the top number) is 120-139 or your diastolic blood pressure (the bottom number) is 80-89, your blood pressure may be higher than it should be. You should have your blood pressure rechecked within a year by a primary care provider.  If your systolic blood pressure (the top number) is 140 or greater or your diastolic blood pressure (the bottom number) is 90 or greater, you may have high blood pressure. High blood pressure is treatable, but if left untreated over time it can put you at risk for heart attack, stroke, or kidney failure. You should have your blood pressure rechecked by a primary care provider within the next 4 weeks.  If your provider in the emergency department today gave you specific instructions to follow-up with your doctor or provider even sooner than that, you should follow that instruction and not wait for up to  4 weeks for your follow-up visit.        Thank you for choosing [facility]       Thank you for choosing [facility] for your care. Our goal is always to provide you with excellent care. Hearing back from our patients is one way we can continue to improve our services. Please take a few minutes to complete the written survey that you may receive in the mail after you visit with us. Thank you!        [facility] Information     [facility] gives you secure access to your electronic health record. If you see a primary care provider, you can also send messages to your care team and make appointments. If you have questions, please call your primary care clinic.  If you do not have a primary care provider, please call [phone] and they will assist you.        Care EveryWhere ID     This is your Care EveryWhere ID. This could be used by other organizations to access your [facility] medical records  [id number]        Equal Access to Services     FARZANEH WILDER : Marga Urbina, brett dobson, perri wilkes. So [facility] [phone].    ATENCIÓN: Si habla español, tiene a rubio disposición servicios gratuitos de asistencia lingüística. Ralph al [phone].    We comply with applicable federal civil rights laws and [place] laws. We do not discriminate on the basis of race, color, national origin, age, disability, sex, sexual orientation, or gender identity.            After Visit Summary       This is your record. Keep this with you and show to your community pharmacist(s) and doctor(s) at your next visit.

## 2018-01-18 ENCOUNTER — CARE COORDINATION (OUTPATIENT)
Dept: CARE COORDINATION | Facility: CLINIC | Age: 83
End: 2018-01-18

## 2018-01-18 DIAGNOSIS — R10.13 ABDOMINAL PAIN, EPIGASTRIC: ICD-10-CM

## 2018-01-18 NOTE — PROGRESS NOTES
"Clinic Care Coordination Contact      Patient was seen in [facility] ED on 1/17/18 for an incarcerated inguinal hernia. ED physician was able to reduce the hernia and recommended that patient use a hernia belt.      It was recommended that patient have the hernia repaired but cardiology did not recommend patient have surgery.      Patient states he is still very sore today, but hernia is in place.  Nicolasa, step daughter, is going to the pharmacy to get him the hernia belt. He states he \"feels kind of blah\" today. Tired with a poor appetite  He states he has appointments on Monday and he is \"kind of blah about going to his appointments as well.\"  Clinic care coordinator encouraged him to attend the appointments (labs and echo) on Monday.  He agrees to do so.     Patient will call clinic care coordinator if he has questions or concerns. Clinic care coordinator will continue to follow.    Suad Gutierrez RN, CCM - Care Coordinator     1/18/2018    1:33 PM  [phone]        "

## 2018-01-18 NOTE — ED PROVIDER NOTES
"  History     Chief Complaint:  Hernia Pain     HPI   Ivan Gomez is a 89 year old male, with a history of hypertension, hyperlipidemia, and renal disease, who presents with hernia pain via EMS. En route, the patient was given 1.4 mg of Dilaudid. The patient states that he had an inguinal hernia that presented in May 2017 that his primary doctor had evaluated. He states that he has intermittent pain over the last few months, but this morning developed severe pain with his inguinal hernia, prompting his ED visit. Here, the patient states that he had followed with is cardiologist, Dr. Blackburn, for the upcoming hernia repair. The patient states that his cardiologist had told him that he should not undergo the surgery due to the state of his heart. While here, the patient denies vomiting or diarrhea.       Allergies:  Celebrex [Celecoxib]  Penicillins      Medications:    Lasix  Proscar  Toprol  Flomax  Protonix  Apresoline  Isordil  Zocor  Neurontin  Aspirin    Past Medical History:    Arthritis  Hemorrhoids  Hyperlipidemia  Hypertension  Malignant neoplasm  Renal disease    Past Surgical History:    Back surgery  Biopsy  Cystoscopy, transurethral resection, prostate, combined   Cystoscopy, transurethral resection, tumor bladder, combined  Orthopedic surgery  Percutaneous mitral valve repair  PICC insertion    Family History:    Leukemia    Social History:  Presents with his wife via EMS  Former Smoker   Negative for alcohol use.   Marital Status:  Single [1]     Review of Systems   Gastrointestinal: Positive for abdominal pain. Negative for diarrhea, nausea and vomiting.   All other systems reviewed and are negative.      Physical Exam   First Vitals:  BP: 138/73  Height: 170.2 cm (5' 7\")  Weight: 64 kg (141 lb)  SpO2: 90 %    Physical Exam  Nursing note and vitals reviewed.  Constitutional:  Appears well-developed and well-nourished.   HENT:   Head:    Atraumatic.   Mouth/Throat:   Oropharynx is clear and moist. " No oropharyngeal exudate.   Eyes:    Pupils are equal, round, and reactive to light.   Neck:    Normal range of motion. Neck supple.      No tracheal deviation present. No thyromegaly present.   Cardiovascular:  Normal rate, regular rhythm, no murmur   Pulmonary/Chest: Breath sounds are clear and equal without wheezes or crackles.  Abdominal:   Tender palpable incarcerated Right inguinal hernia, which I was able to easily reduce.  No overlying skin discoloration.  Bowel sounds are normal. Exhibits no distension and      no mass. There is no tenderness.      There is no rebound and no guarding.   Musculoskeletal:  Exhibits no edema.   Lymphadenopathy:  No cervical adenopathy.   Neurological:   Alert and oriented to person, place, and time.   Skin:    Skin is warm and dry. No rash noted. No pallor.        Emergency Department Course     Laboratory:  CBC: WBC: 5.1, HGB: 11.0 (L), PLT: 126 (L)  Lactic acid: 1.4    Interventions:   1902 0.9% Sodium Chloride Bolus, 1L, IV     Emergency Department Course:  Nursing notes and vitals reviewed.     1829  I performed an exam of the patient as documented above.     IV inserted. Blood drawn. This was sent to the lab for further testing, results above.    2011 I consulted with general surgery regarding the patient's history and presentation here in the emergency department.     2040 I rechecked the patient and discussed the results of his workup thus far.     Findings and plan explained to the Patient. Patient discharged home with instructions regarding supportive care, medications, and reasons to return. The importance of close follow-up was reviewed.     I personally reviewed the laboratory results with the Patient and answered all related questions prior to discharge.       Impression & Plan      Medical Decision Making:  Ivan Gomez is a 89 year old male who I found to have the patient have an incarcerated right inguinal hernia which I was able to easily reduce. There  were no signs of ischemic bowel changes since his pain improved and resolved, and he has a normal lactic acid and white blood count. The patient was determined to be too poor of a surgical candidate and his cardiologist, Dr. Chidi Blackburn, did not recommend he has the surgery. I discussed the risk and benefits with the patient and he does not want to take on the risk of potential death during the surgery and does not want elective surgical repair at this time. I spoke with Dr. Perez to make general surgery aware that the patient was here. I taught the patient to push his hernia back in if it pops out again and he was told to return as needed for recurrent pain and follow up with his surgeon. He as told he could get a hernia belt at [facility] and try using that.     Diagnosis:    ICD-10-CM   1. Incarcerated right inguinal hernia K40.30       Disposition:  discharged to home    ILexii, am serving as a scribe on 1/17/2018 at 6:29 PM to personally document services performed by Oly La MD based on my observations and the provider's statements to me.      Lexii Nguyen  1/17/2018    EMERGENCY DEPARTMENT       Oly La MD  01/17/18 [id number]

## 2018-01-19 RX ORDER — PANTOPRAZOLE SODIUM 40 MG/1
TABLET, DELAYED RELEASE ORAL
Qty: 90 TABLET | Refills: 1 | Status: SHIPPED | OUTPATIENT
Start: 2018-01-19 | End: 2018-01-26

## 2018-01-19 NOTE — TELEPHONE ENCOUNTER
"Requested Prescriptions   Pending Prescriptions Disp Refills     pantoprazole (PROTONIX) 40 MG EC tablet [Pharmacy Med Name: PANTOPRAZOLE 40MG TABLETS] 90 tablet 0     Sig: TAKE 1 TABLET BY MOUTH DAILY 30 MINUTES TO 1 HOUR BEFORE A MEAL    PPI Protocol Passed    1/18/2018  2:37 PM       Passed - Not on Clopidogrel (unless Pantoprazole ordered)       Passed - No diagnosis of osteoporosis on record       Passed - Recent or future visit with authorizing provider's specialty    Patient had office visit in the last year or has a visit in the next 30 days with authorizing provider.  See \"Patient Info\" tab in inbasket, or \"Choose Columns\" in Meds & Orders section of the refill encounter.            Passed - Patient is age 18 or older        Prescription approved per [facility] Refill Protocol.    "

## 2018-01-24 ENCOUNTER — MYC REFILL (OUTPATIENT)
Dept: GERIATRICS | Facility: CLINIC | Age: 83
End: 2018-01-24

## 2018-01-24 DIAGNOSIS — M48.061 SPINAL STENOSIS OF LUMBAR REGION, UNSPECIFIED WHETHER NEUROGENIC CLAUDICATION PRESENT: ICD-10-CM

## 2018-01-26 ENCOUNTER — CARE COORDINATION (OUTPATIENT)
Dept: CARE COORDINATION | Facility: CLINIC | Age: 83
End: 2018-01-26

## 2018-01-26 ENCOUNTER — MYC REFILL (OUTPATIENT)
Dept: FAMILY MEDICINE | Facility: CLINIC | Age: 83
End: 2018-01-26

## 2018-01-26 ENCOUNTER — HOSPITAL ENCOUNTER (OUTPATIENT)
Dept: CARDIOLOGY | Facility: CLINIC | Age: 83
Discharge: HOME | End: 2018-01-26
Attending: PHYSICIAN ASSISTANT | Admitting: PHYSICIAN ASSISTANT
Payer: MEDICARE

## 2018-01-26 DIAGNOSIS — I25.10 CORONARY ARTERY DISEASE INVOLVING NATIVE CORONARY ARTERY OF NATIVE HEART WITHOUT ANGINA PECTORIS: ICD-10-CM

## 2018-01-26 DIAGNOSIS — Z95.818 S/P MITRAL VALVE CLIP IMPLANTATION: ICD-10-CM

## 2018-01-26 DIAGNOSIS — R00.2 PALPITATIONS: ICD-10-CM

## 2018-01-26 DIAGNOSIS — I50.32 CHRONIC DIASTOLIC HEART FAILURE (H): ICD-10-CM

## 2018-01-26 DIAGNOSIS — I34.0 MITRAL VALVE INSUFFICIENCY, UNSPECIFIED ETIOLOGY: ICD-10-CM

## 2018-01-26 DIAGNOSIS — R10.13 ABDOMINAL PAIN, EPIGASTRIC: ICD-10-CM

## 2018-01-26 DIAGNOSIS — I10 BENIGN ESSENTIAL HYPERTENSION: ICD-10-CM

## 2018-01-26 DIAGNOSIS — I10 HYPERTENSION GOAL BP (BLOOD PRESSURE) < 140/90: Chronic | ICD-10-CM

## 2018-01-26 DIAGNOSIS — Z98.890 S/P MITRAL VALVE CLIP IMPLANTATION: ICD-10-CM

## 2018-01-26 LAB
ALBUMIN SERPL-MCNC: 3.4 G/DL (ref 3.4–5)
ALP SERPL-CCNC: 79 U/L (ref 40–150)
ALT SERPL W P-5'-P-CCNC: 19 U/L (ref 0–70)
ANION GAP SERPL CALCULATED.3IONS-SCNC: 7 MMOL/L (ref 3–14)
AST SERPL W P-5'-P-CCNC: 17 U/L (ref 0–45)
BILIRUB SERPL-MCNC: 0.7 MG/DL (ref 0.2–1.3)
BUN SERPL-MCNC: 27 MG/DL (ref 7–30)
CALCIUM SERPL-MCNC: 8.5 MG/DL (ref 8.5–10.1)
CHLORIDE SERPL-SCNC: 102 MMOL/L (ref 94–109)
CO2 SERPL-SCNC: 30 MMOL/L (ref 20–32)
CREAT SERPL-MCNC: 1.56 MG/DL (ref 0.66–1.25)
ERYTHROCYTE [DISTWIDTH] IN BLOOD BY AUTOMATED COUNT: 16.6 % (ref 10–15)
GFR SERPL CREATININE-BSD FRML MDRD: 42 ML/MIN/1.7M2
GLUCOSE SERPL-MCNC: 114 MG/DL (ref 70–99)
HCT VFR BLD AUTO: 34 % (ref 40–53)
HGB BLD-MCNC: 10.5 G/DL (ref 13.3–17.7)
MCH RBC QN AUTO: 29.2 PG (ref 26.5–33)
MCHC RBC AUTO-ENTMCNC: 30.9 G/DL (ref 31.5–36.5)
MCV RBC AUTO: 94 FL (ref 78–100)
NT-PROBNP SERPL-MCNC: 3845 PG/ML (ref 0–450)
PLATELET # BLD AUTO: 90 10E9/L (ref 150–450)
POTASSIUM SERPL-SCNC: 3.7 MMOL/L (ref 3.4–5.3)
PROT SERPL-MCNC: 7.3 G/DL (ref 6.8–8.8)
RBC # BLD AUTO: 3.6 10E12/L (ref 4.4–5.9)
SODIUM SERPL-SCNC: 139 MMOL/L (ref 133–144)
TSH SERPL DL<=0.005 MIU/L-ACNC: 1.04 MU/L (ref 0.4–4)
WBC # BLD AUTO: 5.3 10E9/L (ref 4–11)

## 2018-01-26 PROCEDURE — 85027 COMPLETE CBC AUTOMATED: CPT | Performed by: PHYSICIAN ASSISTANT

## 2018-01-26 PROCEDURE — 36415 COLL VENOUS BLD VENIPUNCTURE: CPT | Performed by: PHYSICIAN ASSISTANT

## 2018-01-26 PROCEDURE — 80053 COMPREHEN METABOLIC PANEL: CPT | Performed by: PHYSICIAN ASSISTANT

## 2018-01-26 PROCEDURE — 93308 TTE F-UP OR LMTD: CPT

## 2018-01-26 PROCEDURE — 93325 DOPPLER ECHO COLOR FLOW MAPG: CPT | Mod: 26 | Performed by: INTERNAL MEDICINE

## 2018-01-26 PROCEDURE — 93321 DOPPLER ECHO F-UP/LMTD STD: CPT | Mod: 26 | Performed by: INTERNAL MEDICINE

## 2018-01-26 PROCEDURE — 93308 TTE F-UP OR LMTD: CPT | Mod: 26 | Performed by: INTERNAL MEDICINE

## 2018-01-26 PROCEDURE — 84443 ASSAY THYROID STIM HORMONE: CPT | Performed by: PHYSICIAN ASSISTANT

## 2018-01-26 PROCEDURE — 83880 ASSAY OF NATRIURETIC PEPTIDE: CPT | Performed by: PHYSICIAN ASSISTANT

## 2018-01-26 RX ORDER — HYDROCODONE BITARTRATE AND ACETAMINOPHEN 7.5; 325 MG/1; MG/1
1 TABLET ORAL EVERY 6 HOURS PRN
Qty: 18 TABLET | Refills: 0 | Status: SHIPPED | OUTPATIENT
Start: 2018-01-26 | End: 2018-01-29

## 2018-01-26 NOTE — TELEPHONE ENCOUNTER
Message from MyChart:  Original authorizing provider: MD Shahriar Novoa would like a refill of the following medications:  pantoprazole (PROTONIX) 40 MG EC tablet [Evaristo Magaña MD]    Preferred pharmacy: [facility] [address] [facility] [address] AT [facility] [address]    Comment:  Dr Magaña Please note that in Emergencym [facility] Department report by Dr Odette Borja M.D on visit of 11/18/2017. Discharge instructions increase Pantoprazole 40 mg to two tmes daily each morning and evening. I would appreciate a prescription for 90 x2. Thank you, Shahriar Gomez    Requested Prescriptions   Pending Prescriptions Disp Refills     pantoprazole (PROTONIX) 40 MG EC tablet 90 tablet 1    There is no refill protocol information for this order

## 2018-01-26 NOTE — TELEPHONE ENCOUNTER
HYDROcodone-acetaminophen (NORCO) 7.5-325 MG per tablet  Last Written Prescription Date:  10/24/17  Last Fill Quantity: 18,   # refills: 0  Last Office Visit: 12/4/17  Future Office visit:    Next 5 appointments (look out 90 days)     Feb 23, 2018  1:45 PM CST   Return Visit with Lance Yao MD   [facility] ([facility])    [address] [place] [id number] [phone]                   Routing refill request to provider for review/approval because:  Drug not on the [facility], [facility] or [facility] refill protocol or controlled substance    Controlled Substance Refill Request for HYDROcodone-acetaminophen (NORCO) 7.5-325 MG per tablet  Problem List Complete:  Yes  Per Dr. Bell Note Oct 2013:You don't want to use a long acting narcotic for pain. At this time, you are using #120 hydro/apap 7.5/325 monthly./   I told you today and you agreed to follow up to see Dr. Evaristo Magaña in about 2 months before the present refills of narcotic are due to be refilled   I also told you that it would be my recommendation that you then be given an increased monthly prescription of #150 per month to better control the pain.   You do not want any further surgery on your back at this time so want to focus on better pain control.    Patient is followed by ANTHONY Murphy for ongoing prescription of pain medication.  All refills should be approved by this provider, or covering partner.    Medication(s): norco 7.5/325.   Maximum quantity per month: 120  Clinic visit frequency required: Q 4 months     Controlled substance agreement on file: 9/3/14    Pain Clinic evaluation in the past: Yes       Date/Location:  spine clinic    DIRE Total Score(s):  No flowsheet data found.    Last [facility] website verification: 5/30/17  [email or web address]     checked in past 6 months?  Yes 1/26/18 concerns, worth review. Multiple providers

## 2018-01-26 NOTE — PROGRESS NOTES
Clinic Care Coordination Contact  Care Team Conversations    Patient calling clinic care coordinator.  Patient states he needs a refill of his Hydrocondone acetaminophen.  He sent in a request several days ago and has had no response. He states he is getting low. He takes four tablets per day.     Request routed to Triage.     Suad Gutierrez RN, CCM - Care Coordinator     1/26/2018    1:17 PM  [phone]

## 2018-01-26 NOTE — TELEPHONE ENCOUNTER
Patient's daughter in law is calling to see if she can  his prescription while she is in town from [facility] today because her parents are home bound.

## 2018-01-29 ENCOUNTER — MYC REFILL (OUTPATIENT)
Dept: FAMILY MEDICINE | Facility: CLINIC | Age: 83
End: 2018-01-29

## 2018-01-29 DIAGNOSIS — M48.061 SPINAL STENOSIS OF LUMBAR REGION, UNSPECIFIED WHETHER NEUROGENIC CLAUDICATION PRESENT: ICD-10-CM

## 2018-01-29 DIAGNOSIS — G89.4 CHRONIC PAIN SYNDROME: ICD-10-CM

## 2018-01-29 RX ORDER — HYDROCODONE BITARTRATE AND ACETAMINOPHEN 7.5; 325 MG/1; MG/1
1 TABLET ORAL EVERY 6 HOURS PRN
Qty: 120 TABLET | Refills: 0 | Status: SHIPPED | OUTPATIENT
Start: 2018-01-29 | End: 2018-03-07

## 2018-01-29 RX ORDER — PANTOPRAZOLE SODIUM 40 MG/1
40 TABLET, DELAYED RELEASE ORAL
Qty: 180 TABLET | Refills: 1 | Status: SHIPPED | OUTPATIENT
Start: 2018-01-29 | End: 2018-07-16

## 2018-01-29 NOTE — TELEPHONE ENCOUNTER
Controlled Substance Refill Request for HYDROcodone-acetaminophen (NORCO) 7.5-325 MG per tablet  Problem List Complete:  Yes    Per Dr. Bell Note Oct 2013:You don't want to use a long acting narcotic for pain. At this time, you are using #120 hydro/apap 7.5/325 monthly./   I told you today and you agreed to follow up to see Dr. Evaristo Magaña in about 2 months before the present refills of narcotic are due to be refilled   I also told you that it would be my recommendation that you then be given an increased monthly prescription of #150 per month to better control the pain.   You do not want any further surgery on your back at this time so want to focus on better pain control.  Patient is followed by ANTHONY Murphy for ongoing prescription of pain medication.  All refills should be approved by this provider, or covering partner.    Medication(s): norco 7.5/325.   Maximum quantity per month: 120  Clinic visit frequency required: Q 4 months     Controlled substance agreement on file: 9/3/14    Pain Clinic evaluation in the past: Yes       Date/Location:  spine clinic    DIRE Total Score(s):  No flowsheet data found.    Last [facility] website verification: : 5/30/17  [email or web address]     checked in past 6 months?  No, route to RN     [place] reviewed:  reviewed- provider to review.     [place] profile:  [email or web address]

## 2018-01-29 NOTE — TELEPHONE ENCOUNTER
Dr. Magaña asked me to call Don to see if he wants us to mail this Rx to his pharmacy for him. Left . Also sent [employer] message.

## 2018-01-29 NOTE — TELEPHONE ENCOUNTER
Patient wants prescription mailed to his pharmacy     checked in past 6 months?  Yes 1-29-18 provider to review

## 2018-02-07 ENCOUNTER — CARE COORDINATION (OUTPATIENT)
Dept: CARE COORDINATION | Facility: CLINIC | Age: 83
End: 2018-02-07

## 2018-02-07 NOTE — PROGRESS NOTES
Clinic Care Coordination Contact  OUTREACH    Referral Information:  Referral Source: CTS  Reason for Contact: Routine follow up  Care Conference: No     Universal Utilization:   ED Visits in last year: 3  Hospital visits in last year: 3  Last PCP appointment: 11/13/17  Missed Appointments: 0  Concerns: Frail  Multiple Providers or Specialists: yes    Clinical Concerns:    Current Medical Concerns: Patient states her and significant other are doing well. He states he finds that they go to bed earlier and are sleeping later (to 9 am).  He states he does not want to be out in the cold and snow if he doesn't need to.    Patient denies any trouble with hernia. He states he sees Dr. Mora, cardiologist, in two weeks.  He denied patient for hernia surgery in the past as a poor surgical risk.       Current Behavioral Concerns: Denies concerns        Clinical Pathway Name: None      Medication Management:  Patient is independent in medication management     Functional Status:  Mobility Status: Independent w/Device     Transportation: Drives      Psychosocial:  Current living arrangement:: I live in a private home with spouse    Spouses daughter, Nicolasa comes once or twice a week to help them.  She lives in [place].      Resources and Interventions:  Current Resources: None - home care is finished        Advanced Care Plans/Directives on file:: Yes        Goals:   Goal 1 Statement: I want to feel safe to leave my house.   Goal 1 Supportive Steps: I will see my PCP for advice and medications for diarrhea  Goal 1 Progression Percent: 50%  Goal 1 Progression Date: 01/03/18      Barriers: Age  Strengths: Is knowledgeable    Patient/Caregiver understanding: Expresses understanding    Frequency of Care Coordination: Monthly        Plan: Clinic care coordinator will continue to follow.     Suad Gutierrez RN, CCM - Care Coordinator     2/7/2018    10:50 AM  [phone]

## 2018-02-08 ENCOUNTER — TELEPHONE (OUTPATIENT)
Dept: CARDIOLOGY | Facility: CLINIC | Age: 83
End: 2018-02-08

## 2018-02-08 NOTE — TELEPHONE ENCOUNTER
I discussed Don's recent labs and also echocardiogram from last month. His BNP is lower and his SOB is better since the increase in furosemide. He checks his BP and HR almost daily and states that his SBP ranges between 100-120's and his HR is 60-70 bpm. He denies palpitations, lightheadedness, dizziness, nearsyncope, or syncope. 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     His main concern is weight loss. He states that he continues to lose weight and this is frustrating for him. He claims that he eats 3 meals a day but when I ask for examples of things he eats he tells me he has a banana for breakfast. It appears that he snacks throughout the day and does not eat full meals. His CMP shows low albumin suggesting malnutrition. We discussed how he can consume more calories and also protein.     His echocardiogram shows similar finding. He has moderate to moderately severe MR (+3) with mean gradient of 9.2 mmHg (HR was in the 90's). He claims that his HR at home is around 70 and sometimes dips down to the 60's. He denies palpitations. We decided to continue him on current dose of metoprolol. I instructed him to not decrease metoprolol on his own and he understood.     His HGB and Plt continues to be low. No bleeding issues. If it falls below 9, this will need to be worked up by his PCP. Looks like he has normocytic normochromic anemia.     He is encouraged to contact our clinic with any questions or concerns.     He has previously arranged follow up with Dr. Yao.     Duane Brown PA-C   2/8/2018  Pager: [phone]

## 2018-02-15 ENCOUNTER — TELEPHONE (OUTPATIENT)
Dept: CARDIOLOGY | Facility: CLINIC | Age: 83
End: 2018-02-15

## 2018-02-15 DIAGNOSIS — I50.9 CHF (CONGESTIVE HEART FAILURE) (H): Primary | ICD-10-CM

## 2018-02-15 NOTE — TELEPHONE ENCOUNTER
Pt calling in he is unable to make 2/23/18 OV with [facility]. Pt says he is doing well, but continues to loose weight and now down to 138 LB. Offered Pt numerous other appointments that he is unable to come to he needs a friday time slot.  Pt is on lasix 60 mg at present last CMP in January 2018. Will message Team nurse for assistance.  Pt did take a 3/2/18 visit ramon Zepeda. ANDRE Molina RN

## 2018-03-02 ENCOUNTER — OFFICE VISIT (OUTPATIENT)
Dept: CARDIOLOGY | Facility: CLINIC | Age: 83
End: 2018-03-02
Payer: MEDICARE

## 2018-03-02 VITALS
BODY MASS INDEX: 22.6 KG/M2 | WEIGHT: 144 LBS | HEIGHT: 67 IN | HEART RATE: 82 BPM | SYSTOLIC BLOOD PRESSURE: 135 MMHG | DIASTOLIC BLOOD PRESSURE: 69 MMHG

## 2018-03-02 DIAGNOSIS — I34.0 MITRAL VALVE INSUFFICIENCY, UNSPECIFIED ETIOLOGY: ICD-10-CM

## 2018-03-02 DIAGNOSIS — I10 BENIGN ESSENTIAL HYPERTENSION: ICD-10-CM

## 2018-03-02 DIAGNOSIS — E78.5 HYPERLIPIDEMIA LDL GOAL <100: ICD-10-CM

## 2018-03-02 DIAGNOSIS — R31.9 HEMATURIA, UNSPECIFIED TYPE: ICD-10-CM

## 2018-03-02 DIAGNOSIS — Z95.818 S/P MITRAL VALVE CLIP IMPLANTATION: ICD-10-CM

## 2018-03-02 DIAGNOSIS — Z98.890 S/P MITRAL VALVE CLIP IMPLANTATION: ICD-10-CM

## 2018-03-02 DIAGNOSIS — I50.9 CHF (CONGESTIVE HEART FAILURE) (H): ICD-10-CM

## 2018-03-02 DIAGNOSIS — I50.22 CHRONIC SYSTOLIC CONGESTIVE HEART FAILURE (H): Primary | ICD-10-CM

## 2018-03-02 LAB
ANION GAP SERPL CALCULATED.3IONS-SCNC: 12.6 MMOL/L (ref 6–17)
BUN SERPL-MCNC: 24 MG/DL (ref 7–30)
CALCIUM SERPL-MCNC: 9.3 MG/DL (ref 8.5–10.5)
CHLORIDE SERPL-SCNC: 100 MMOL/L (ref 98–107)
CO2 SERPL-SCNC: 30 MMOL/L (ref 23–29)
CREAT SERPL-MCNC: 1.7 MG/DL (ref 0.7–1.3)
GFR SERPL CREATININE-BSD FRML MDRD: 38 ML/MIN/1.7M2
GLUCOSE SERPL-MCNC: 106 MG/DL (ref 70–105)
POTASSIUM SERPL-SCNC: 3.6 MMOL/L (ref 3.5–5.1)
SODIUM SERPL-SCNC: 139 MMOL/L (ref 136–145)

## 2018-03-02 PROCEDURE — 36415 COLL VENOUS BLD VENIPUNCTURE: CPT | Performed by: PHYSICIAN ASSISTANT

## 2018-03-02 PROCEDURE — 80048 BASIC METABOLIC PNL TOTAL CA: CPT | Performed by: PHYSICIAN ASSISTANT

## 2018-03-02 PROCEDURE — 99214 OFFICE O/P EST MOD 30 MIN: CPT | Performed by: PHYSICIAN ASSISTANT

## 2018-03-02 RX ORDER — HYDRALAZINE HYDROCHLORIDE 25 MG/1
25 TABLET, FILM COATED ORAL 3 TIMES DAILY
Qty: 135 TABLET | Refills: 3 | COMMUNITY
Start: 2018-03-02 | End: 2018-04-25

## 2018-03-02 NOTE — LETTER
3/2/2018    Evaristo Magaña MD  [address]  [facility] [address]    RE: Ivan Gomez       Dear Colleague,    I had the pleasure of seeing Ivan Gomez in the [facility].    Primary Cardiologist: Dr. Yao    History of Present Illness:   This is a very pleasant 89-year-old gentleman who presents to cardiology clinic today for routine follow-up.    He was admitted earlier this fall with acute heart failure and was found to have severe mitral regurgitation. 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 He was transferred to [facility] for urgent Mitraclip procedure.      His past medical history includes HFpEF, HLD, frequent PAC's and intermittent episodes of SVT, CKD, and anemia.     He presents to clinic today feeling frustrated about his medical condition.  He feels that he is taking a lot of medications.  He is also urinating a lot throughout the day and this is very bothersome for him.  He denies any chest discomfort, shortness of breath, orthopnea, PND, lightheadedness, or dizziness.  He states that he tries to eat throughout the day but he continues to lose weight.  He also has noticed blood clots in his urine and does have history of bladder cancer.  He has not discussed this with his primary care provider or his urologist.    Assessment and plan:  This is a pleasant 89-year-old gentleman who presents to cardiology clinic for routine follow-up.    1) HFpEF- weight continues to decline. No overt signs of hf exacerbation on exam. In order to improve his QOL, we will decrease furosemide to 40 mg daily. He is however instructed to pay close attention to symptoms of SOB, PND, orthopnea, peripheral edema, or dizziness.     2) MR- Most recent echo shows no new changes. He unfortunately continues to have mod-severe MR. Will continue with medical therapy. We will decrease his hydralazine to 25 mg TID as cutting pills in half is very difficult for him. We will  continue to monitor his BP.     3) Hematuria- I instructed him to get in touch with his PCP or urologist regarding this as he does have history of bladder CA. He verbalized understanding of this.     Thank you for allowing me to participate in the care of this pleasant patient today.        This note was completed in part using Dragon voice recognition software. Although reviewed after completion, some word and grammatical errors may occur.    Orders this Visit:  No orders of the defined types were placed in this encounter.    Orders Placed This Encounter   Medications     hydrALAZINE (APRESOLINE) 25 MG tablet     Sig: Take 1 tablet (25 mg) by mouth 3 times daily     Dispense:  135 tablet     Refill:  3     Medications Discontinued During This Encounter   Medication Reason     fiber modular (NUTRISOURCE FIBER) packet Stopped by Patient     hydrALAZINE (APRESOLINE) 25 MG tablet Reorder         Encounter Diagnoses   Name Primary?     Chronic systolic congestive heart failure (H) Yes     Benign essential hypertension      Mitral valve insufficiency, unspecified etiology      S/P mitral valve clip implantation      Hyperlipidemia LDL goal <100        CURRENT MEDICATIONS:  Current Outpatient Prescriptions   Medication Sig Dispense Refill     hydrALAZINE (APRESOLINE) 25 MG tablet Take 1 tablet (25 mg) by mouth 3 times daily 135 tablet 3     pantoprazole (PROTONIX) 40 MG EC tablet Take 1 tablet (40 mg) by mouth 2 times daily 180 tablet 1     HYDROcodone-acetaminophen (NORCO) 7.5-325 MG per tablet Take 1 tablet by mouth every 6 hours as needed for pain maximum 4 tablet(s) per day 120 tablet 0     furosemide (LASIX) 20 MG tablet Take 40 mg by mouth daily       finasteride (PROSCAR) 5 MG tablet Take 1 tablet (5 mg) by mouth daily 90 tablet 3     metoprolol (TOPROL XL) 50 MG 24 hr tablet Take 1 tablet (50 mg) by mouth daily 90 tablet 3     tamsulosin (FLOMAX) 0.4 MG capsule TAKE ONE CAPSULE BY MOUTH DAILY 90 capsule 3      isosorbide dinitrate (ISORDIL) 20 MG tablet TAKE 1 TABLET BY MOUTH THREE TIMES DAILY 270 tablet 2     simvastatin (ZOCOR) 20 MG tablet Take 1 tablet (20 mg) by mouth At Bedtime 90 tablet 2     gabapentin (NEURONTIN) 300 MG capsule TAKE 1 CAPSULE BY MOUTH THREE TIMES DAILY 270 capsule 3     potassium chloride SA (K-DUR/KLOR-CON M) 10 MEQ CR tablet Take half tablet one time daily. 90 tablet 3     hypromellose (ARTIFICIAL TEARS) 0.4 % SOLN ophthalmic solution Place 2 drops Into the left eye 4 times daily       Skin Protectants, Misc. (EUCERIN) cream Apply topically At Bedtime Apply to hands       aspirin EC 81 MG EC tablet Take 1 tablet (81 mg) by mouth daily       [DISCONTINUED] hydrALAZINE (APRESOLINE) 25 MG tablet TAKE 1.5 TABLETS BY MOUTH EVERY 8 HOURS AS DIRECTED. HOLD IF SYSTOLIC BLOOD PRESSURE IS LESS THAN 100 135 tablet 3       ALLERGIES     Allergies   Allergen Reactions     Celebrex [Celecoxib] Hives     Penicillins Hives       PAST MEDICAL HISTORY:  Past Medical History:   Diagnosis Date     Arthritis     Spinal Stenosis     Former smoker      Hemorrhoids      Hypercholesteremia      Hypertension      Malignant neoplasm (H)     skin CA, Basal cell     Other chronic pain      Renal disease        PAST SURGICAL HISTORY:  Past Surgical History:   Procedure Laterality Date     BACK SURGERY       BIOPSY      face, skin cancer     BIOPSY  8/2016    shoulder lesion     CYSTOSCOPY, TRANSURETHRAL RESECTION (TUR) PROSTATE, COMBINED N/A 8/21/2015    Procedure: COMBINED CYSTOSCOPY, TRANSURETHRAL RESECTION (TUR) PROSTATE;  Surgeon: Dennis Hilton MD;  Location:  OR     CYSTOSCOPY, TRANSURETHRAL RESECTION (TUR) TUMOR BLADDER, COMBINED N/A 9/2/2016    Procedure: COMBINED CYSTOSCOPY, TRANSURETHRAL RESECTION (TUR) TUMOR BLADDER;  Surgeon: Dennis Hilton MD;  Location:  OR     ORTHOPEDIC SURGERY      lumbar and cervical surgery, Sep, 2008, knee surgery     PERCUTANEOUS MITRAL VALVE REPAIR N/A  10/16/2017    Procedure: PERCUTANEOUS MITRAL VALVE REPAIR ANESTHESIA;  Percutaneous MitraClip ;  Surgeon: Eber Monroe MD;  Location: [facility] OR     PICC INSERTION Right 10/14/2017    5fr DL Bard PICC, 40cm (1cm external) in the R basilic vein w/ tip in the mid SVC.       FAMILY HISTORY:  Family History   Problem Relation Age of Onset     CANCER Son 64     leukemia ? due to agent orange     Family History Negative Son      DIABETES No family hx of      Coronary Artery Disease No family hx of      Hypertension No family hx of      Hyperlipidemia No family hx of      CEREBROVASCULAR DISEASE No family hx of      Breast Cancer No family hx of      Colon Cancer No family hx of      Prostate Cancer No family hx of      Other Cancer No family hx of      Depression No family hx of      Anxiety Disorder No family hx of      MENTAL ILLNESS No family hx of      Substance Abuse No family hx of      Anesthesia Reaction No family hx of      Asthma No family hx of      OSTEOPOROSIS No family hx of      Genetic Disorder No family hx of      Thyroid Disease No family hx of      Obesity No family hx of      Unknown/Adopted No family hx of        SOCIAL HISTORY:  Social History     Social History     Marital status: Single     Spouse name: N/A     Number of children: N/A     Years of education: N/A     Social History Main Topics     Smoking status: Former Smoker     Smokeless tobacco: Never Used     Alcohol use No      Comment: doesnt use anymore     Drug use: No     Sexual activity: No     Other Topics Concern     Parent/Sibling W/ Cabg, Mi Or Angioplasty Before 65f 55m? No     Social History Narrative       Review of Systems:  Skin:  Positive for bruising   Eyes:  Negative    ENT:  Positive for    Respiratory:  Positive for dyspnea on exertion  Cardiovascular:    Positive for;fatigue  Gastroenterology: Negative    Genitourinary:  Positive for urinary frequency;prostate problem  Musculoskeletal:  Positive for  "arthritis  Neurologic:  Positive for numbness or tingling of hands;numbness or tingling of feet  Psychiatric:  Negative    Heme/Lymph/Imm:  Positive for weight loss;easy bruising  Endocrine:  Negative      Physical Exam:  Vitals: /69  Pulse 82  Ht 1.702 m (5' 7.01\")  Wt 65.3 kg (144 lb)  BMI 22.55 kg/m2     GEN:  NAD, thin, frail  NECK: No JVD  C/V:  Regular rate and rhythm, no murmur, rub or gallop.  RESP: Clear to auscultation bilaterally without wheezing, rales, or rhonchi.  GI: Abdomen soft, nontender, nondistended. No HSM appreciated.   EXTREM: Trace LE edema.   NEURO: Alert and oriented, cooperative. No obvious focal deficits.   PSYCH: Normal affect.  SKIN: Warm and dry.       Recent Lab Results:  LIPID RESULTS:  Lab Results   Component Value Date    CHOL 108 10/12/2017    HDL 63 10/12/2017    LDL 29 10/12/2017    TRIG 81 10/12/2017    CHOLHDLRATIO 2.6 08/18/2015       LIVER ENZYME RESULTS:  Lab Results   Component Value Date    AST 17 01/26/2018    ALT 19 01/26/2018       CBC RESULTS:  Lab Results   Component Value Date    WBC 5.3 01/26/2018    RBC 3.60 (L) 01/26/2018    HGB 10.5 (L) 01/26/2018    HCT 34.0 (L) 01/26/2018    MCV 94 01/26/2018    MCH 29.2 01/26/2018    MCHC 30.9 (L) 01/26/2018    RDW 16.6 (H) 01/26/2018    PLT 90 (L) 01/26/2018       BMP RESULTS:  Lab Results   Component Value Date     03/02/2018    POTASSIUM 3.6 03/02/2018    CHLORIDE 100 03/02/2018    CO2 30 (H) 03/02/2018    ANIONGAP 12.6 03/02/2018     (H) 03/02/2018    BUN 24 03/02/2018    CR 1.70 (H) 03/02/2018    GFRESTIMATED 38 (L) 03/02/2018    GFRESTBLACK 46 (L) 03/02/2018    GIUSEPPE 9.3 03/02/2018        A1C RESULTS:  Lab Results   Component Value Date    A1C 5.3 08/18/2015       INR RESULTS:  Lab Results   Component Value Date    INR 1.15 (H) 10/16/2017    INR 1.15 (H) 10/15/2017       Thank you for allowing me to participate in the care of your patient.    Sincerely,     GELY Stark " [facility]

## 2018-03-02 NOTE — PROGRESS NOTES
Primary Cardiologist: Dr. Yao    History of Present Illness:   This is a very pleasant 89-year-old gentleman who presents to cardiology clinic today for routine follow-up.    He was admitted earlier this fall with acute heart failure and was found to have severe mitral regurgitation. 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 He was transferred to [facility] for urgent Mitraclip procedure.      His past medical history includes HFpEF, HLD, frequent PAC's and intermittent episodes of SVT, CKD, and anemia.     He presents to clinic today feeling frustrated about his medical condition.  He feels that he is taking a lot of medications.  He is also urinating a lot throughout the day and this is very bothersome for him.  He denies any chest discomfort, shortness of breath, orthopnea, PND, lightheadedness, or dizziness.  He states that he tries to eat throughout the day but he continues to lose weight.  He also has noticed blood clots in his urine and does have history of bladder cancer.  He has not discussed this with his primary care provider or his urologist.    Assessment and plan:  This is a pleasant 89-year-old gentleman who presents to cardiology clinic for routine follow-up.    1) HFpEF- weight continues to decline. No overt signs of hf exacerbation on exam. In order to improve his QOL, we will decrease furosemide to 40 mg daily. He is however instructed to pay close attention to symptoms of SOB, PND, orthopnea, peripheral edema, or dizziness.     2) MR- Most recent echo shows no new changes. He unfortunately continues to have mod-severe MR. Will continue with medical therapy. We will decrease his hydralazine to 25 mg TID as cutting pills in half is very difficult for him. We will continue to monitor his BP.     3) Hematuria- I instructed him to get in touch with his PCP or urologist regarding this as he does have history of bladder CA. He verbalized understanding of this.     Thank you for allowing me to  participate in the care of this pleasant patient today.        This note was completed in part using Dragon voice recognition software. Although reviewed after completion, some word and grammatical errors may occur.    Orders this Visit:  No orders of the defined types were placed in this encounter.    Orders Placed This Encounter   Medications     hydrALAZINE (APRESOLINE) 25 MG tablet     Sig: Take 1 tablet (25 mg) by mouth 3 times daily     Dispense:  135 tablet     Refill:  3     Medications Discontinued During This Encounter   Medication Reason     fiber modular (NUTRISOURCE FIBER) packet Stopped by Patient     hydrALAZINE (APRESOLINE) 25 MG tablet Reorder         Encounter Diagnoses   Name Primary?     Chronic systolic congestive heart failure (H) Yes     Benign essential hypertension      Mitral valve insufficiency, unspecified etiology      S/P mitral valve clip implantation      Hyperlipidemia LDL goal <100        CURRENT MEDICATIONS:  Current Outpatient Prescriptions   Medication Sig Dispense Refill     hydrALAZINE (APRESOLINE) 25 MG tablet Take 1 tablet (25 mg) by mouth 3 times daily 135 tablet 3     pantoprazole (PROTONIX) 40 MG EC tablet Take 1 tablet (40 mg) by mouth 2 times daily 180 tablet 1     HYDROcodone-acetaminophen (NORCO) 7.5-325 MG per tablet Take 1 tablet by mouth every 6 hours as needed for pain maximum 4 tablet(s) per day 120 tablet 0     furosemide (LASIX) 20 MG tablet Take 40 mg by mouth daily       finasteride (PROSCAR) 5 MG tablet Take 1 tablet (5 mg) by mouth daily 90 tablet 3     metoprolol (TOPROL XL) 50 MG 24 hr tablet Take 1 tablet (50 mg) by mouth daily 90 tablet 3     tamsulosin (FLOMAX) 0.4 MG capsule TAKE ONE CAPSULE BY MOUTH DAILY 90 capsule 3     isosorbide dinitrate (ISORDIL) 20 MG tablet TAKE 1 TABLET BY MOUTH THREE TIMES DAILY 270 tablet 2     simvastatin (ZOCOR) 20 MG tablet Take 1 tablet (20 mg) by mouth At Bedtime 90 tablet 2     gabapentin (NEURONTIN) 300 MG capsule  TAKE 1 CAPSULE BY MOUTH THREE TIMES DAILY 270 capsule 3     potassium chloride SA (K-DUR/KLOR-CON M) 10 MEQ CR tablet Take half tablet one time daily. 90 tablet 3     hypromellose (ARTIFICIAL TEARS) 0.4 % SOLN ophthalmic solution Place 2 drops Into the left eye 4 times daily       Skin Protectants, Misc. (EUCERIN) cream Apply topically At Bedtime Apply to hands       aspirin EC 81 MG EC tablet Take 1 tablet (81 mg) by mouth daily       [DISCONTINUED] hydrALAZINE (APRESOLINE) 25 MG tablet TAKE 1.5 TABLETS BY MOUTH EVERY 8 HOURS AS DIRECTED. HOLD IF SYSTOLIC BLOOD PRESSURE IS LESS THAN 100 135 tablet 3       ALLERGIES     Allergies   Allergen Reactions     Celebrex [Celecoxib] Hives     Penicillins Hives       PAST MEDICAL HISTORY:  Past Medical History:   Diagnosis Date     Arthritis     Spinal Stenosis     Former smoker      Hemorrhoids      Hypercholesteremia      Hypertension      Malignant neoplasm (H)     skin CA, Basal cell     Other chronic pain      Renal disease        PAST SURGICAL HISTORY:  Past Surgical History:   Procedure Laterality Date     BACK SURGERY       BIOPSY      face, skin cancer     BIOPSY  8/2016    shoulder lesion     CYSTOSCOPY, TRANSURETHRAL RESECTION (TUR) PROSTATE, COMBINED N/A 8/21/2015    Procedure: COMBINED CYSTOSCOPY, TRANSURETHRAL RESECTION (TUR) PROSTATE;  Surgeon: Dennis Hilton MD;  Location:  OR     CYSTOSCOPY, TRANSURETHRAL RESECTION (TUR) TUMOR BLADDER, COMBINED N/A 9/2/2016    Procedure: COMBINED CYSTOSCOPY, TRANSURETHRAL RESECTION (TUR) TUMOR BLADDER;  Surgeon: Dennis Hilton MD;  Location:  OR     ORTHOPEDIC SURGERY      lumbar and cervical surgery, Sep, 2008, knee surgery     PERCUTANEOUS MITRAL VALVE REPAIR N/A 10/16/2017    Procedure: PERCUTANEOUS MITRAL VALVE REPAIR ANESTHESIA;  Percutaneous MitraClip ;  Surgeon: Eber Monroe MD;  Location: [facility] OR     PICC INSERTION Right 10/14/2017    5fr DL Bard PICC, 40cm (1cm external) in the R  basilic vein w/ tip in the mid SVC.       FAMILY HISTORY:  Family History   Problem Relation Age of Onset     CANCER Son 64     leukemia ? due to agent orange     Family History Negative Son      DIABETES No family hx of      Coronary Artery Disease No family hx of      Hypertension No family hx of      Hyperlipidemia No family hx of      CEREBROVASCULAR DISEASE No family hx of      Breast Cancer No family hx of      Colon Cancer No family hx of      Prostate Cancer No family hx of      Other Cancer No family hx of      Depression No family hx of      Anxiety Disorder No family hx of      MENTAL ILLNESS No family hx of      Substance Abuse No family hx of      Anesthesia Reaction No family hx of      Asthma No family hx of      OSTEOPOROSIS No family hx of      Genetic Disorder No family hx of      Thyroid Disease No family hx of      Obesity No family hx of      Unknown/Adopted No family hx of        SOCIAL HISTORY:  Social History     Social History     Marital status: Single     Spouse name: N/A     Number of children: N/A     Years of education: N/A     Social History Main Topics     Smoking status: Former Smoker     Smokeless tobacco: Never Used     Alcohol use No      Comment: doesnt use anymore     Drug use: No     Sexual activity: No     Other Topics Concern     Parent/Sibling W/ Cabg, Mi Or Angioplasty Before 65f 55m? No     Social History Narrative       Review of Systems:  Skin:  Positive for bruising   Eyes:  Negative    ENT:  Positive for    Respiratory:  Positive for dyspnea on exertion  Cardiovascular:    Positive for;fatigue  Gastroenterology: Negative    Genitourinary:  Positive for urinary frequency;prostate problem  Musculoskeletal:  Positive for arthritis  Neurologic:  Positive for numbness or tingling of hands;numbness or tingling of feet  Psychiatric:  Negative    Heme/Lymph/Imm:  Positive for weight loss;easy bruising  Endocrine:  Negative      Physical Exam:  Vitals: /69  Pulse 82  Ht  "1.702 m (5' 7.01\")  Wt 65.3 kg (144 lb)  BMI 22.55 kg/m2     GEN:  NAD, thin, frail  NECK: No JVD  C/V:  Regular rate and rhythm, no murmur, rub or gallop.  RESP: Clear to auscultation bilaterally without wheezing, rales, or rhonchi.  GI: Abdomen soft, nontender, nondistended. No HSM appreciated.   EXTREM: Trace LE edema.   NEURO: Alert and oriented, cooperative. No obvious focal deficits.   PSYCH: Normal affect.  SKIN: Warm and dry.       Recent Lab Results:  LIPID RESULTS:  Lab Results   Component Value Date    CHOL 108 10/12/2017    HDL 63 10/12/2017    LDL 29 10/12/2017    TRIG 81 10/12/2017    CHOLHDLRATIO 2.6 08/18/2015       LIVER ENZYME RESULTS:  Lab Results   Component Value Date    AST 17 01/26/2018    ALT 19 01/26/2018       CBC RESULTS:  Lab Results   Component Value Date    WBC 5.3 01/26/2018    RBC 3.60 (L) 01/26/2018    HGB 10.5 (L) 01/26/2018    HCT 34.0 (L) 01/26/2018    MCV 94 01/26/2018    MCH 29.2 01/26/2018    MCHC 30.9 (L) 01/26/2018    RDW 16.6 (H) 01/26/2018    PLT 90 (L) 01/26/2018       BMP RESULTS:  Lab Results   Component Value Date     03/02/2018    POTASSIUM 3.6 03/02/2018    CHLORIDE 100 03/02/2018    CO2 30 (H) 03/02/2018    ANIONGAP 12.6 03/02/2018     (H) 03/02/2018    BUN 24 03/02/2018    CR 1.70 (H) 03/02/2018    GFRESTIMATED 38 (L) 03/02/2018    GFRESTBLACK 46 (L) 03/02/2018    GIUSEPPE 9.3 03/02/2018        A1C RESULTS:  Lab Results   Component Value Date    A1C 5.3 08/18/2015       INR RESULTS:  Lab Results   Component Value Date    INR 1.15 (H) 10/16/2017    INR 1.15 (H) 10/15/2017           Duane Brown PA-C   March 2, 2018     "

## 2018-03-02 NOTE — PATIENT INSTRUCTIONS
Today's Plan:   1) We can decrease Furosemide (Lasix) to one tablet (40 mg).   2) Decrease Hydralazine - one tablet (25 mg) three times daily.   3) Hopefully these medications will improve your quality of life.    If you have questions or concerns please call my nurse team at [phone].     Scheduling phone number: [phone]  Reminder: Please bring in all current medications, over the counter supplements and vitamin bottles to your next appointment.    It was a pleasure seeing you today!     Duane Brown  3/2/2018

## 2018-03-02 NOTE — MR AVS SNAPSHOT
After Visit Summary   3/2/2018    Ivan Gomez    MRN: [id number]           Patient Information     Date Of Birth          10/12/1926        Visit Information        Provider Department      3/2/2018 3:30 PM Duane Brown PA-C [facility]        Today's Diagnoses     Chronic systolic congestive heart failure (H)    -  1    Benign essential hypertension        Mitral valve insufficiency, unspecified etiology        S/P mitral valve clip implantation        Hyperlipidemia LDL goal <100          Care Instructions    Today's Plan:   1) We can decrease Furosemide (Lasix) to one tablet (40 mg).   2) Decrease Hydralazine - one tablet (25 mg) three times daily.   3) Hopefully these medications will improve your quality of life.    If you have questions or concerns please call my nurse team at [phone].     Scheduling phone number: [phone]  Reminder: Please bring in all current medications, over the counter supplements and vitamin bottles to your next appointment.    It was a pleasure seeing you today!     Duane Brown  3/2/2018              Follow-ups after your visit        Your next 10 appointments already scheduled     May 03, 2018  1:45 PM CDT   Return Visit with Lance Yao MD   [facility] ([facility])    [address] [place] [address] [address]  [facility] [id number] [phone]            Sep 26, 2018  1:30 PM CDT   Return Visit with Dennis Hilton MD   [facility] ([facility])    [facility]  [address]  [facility] [address]  [facility] [id number] [phone]              Who to contact     If you have questions or need follow up information about today's clinic visit or your schedule please contact [facility] directly at [phone].  Normal or non-critical lab and imaging  "results will be communicated to you by MyChart, letter or phone within 4 business days after the clinic has received the results. If you do not hear from us within 7 days, please contact the clinic through [facility] or phone. If you have a critical or abnormal lab result, we will notify you by phone as soon as possible.  Submit refill requests through [facility] or call your pharmacy and they will forward the refill request to us. Please allow 3 business days for your refill to be completed.          Additional Information About Your Visit        [facility] Information     [facility] gives you secure access to your electronic health record. If you see a primary care provider, you can also send messages to your care team and make appointments. If you have questions, please call your primary care clinic.  If you do not have a primary care provider, please call [phone] and they will assist you.        Care EveryWhere ID     This is your Care EveryWhere ID. This could be used by other organizations to access your [facility] medical records  [id number]        Your Vitals Were     Pulse Height BMI (Body Mass Index)             82 1.702 m (5' 7.01\") 22.55 kg/m2          Blood Pressure from Last 3 Encounters:   03/02/18 135/69   01/17/18 138/73   01/05/18 124/65    Weight from Last 3 Encounters:   03/02/18 65.3 kg (144 lb)   01/17/18 64 kg (141 lb)   01/05/18 65.9 kg (145 lb 3.2 oz)              Today, you had the following     No orders found for display         Today's Medication Changes          These changes are accurate as of 3/2/18  3:44 PM.  If you have any questions, ask your nurse or doctor.               These medicines have changed or have updated prescriptions.        Dose/Directions    hydrALAZINE 25 MG tablet   Commonly known as:  APRESOLINE   This may have changed:  See the new instructions.   Changed by:  Duane Brown PA-C        Dose:  25 mg   Take 1 tablet (25 mg) by mouth 3 times daily   Quantity:  " 135 tablet   Refills:  3                Primary Care Provider Office Phone # Fax #    Evaristo Magaña [phone] [address]  [facility] [address]        Equal Access to Services     FARZANEH WILDER : Hadii aad ku hadyaihro Soomaali, waaxda luqadaha, qaybta kaalmada adeegyada, waxlashawn khaliln adesadia campbell ladaniela escobedo. So [facility] [phone].    ATENCIÓN: Si habla español, tiene a rubio disposición servicios gratuitos de asistencia lingüística. Llame al [phone].    We comply with applicable federal civil rights laws and [place] laws. We do not discriminate on the basis of race, color, national origin, age, disability, sex, sexual orientation, or gender identity.            Thank you!     Thank you for choosing [facility]  for your care. Our goal is always to provide you with excellent care. Hearing back from our patients is one way we can continue to improve our services. Please take a few minutes to complete the written survey that you may receive in the mail after your visit with us. Thank you!             Your Updated Medication List - Protect others around you: Learn how to safely use, store and throw away your medicines at www.disposemymeds.org.          This list is accurate as of 3/2/18  3:44 PM.  Always use your most recent med list.                   Brand Name Dispense Instructions for use Diagnosis    aspirin 81 MG EC tablet      Take 1 tablet (81 mg) by mouth daily    S/P mitral valve repair       eucerin cream      Apply topically At Bedtime Apply to hands        finasteride 5 MG tablet    PROSCAR    90 tablet    Take 1 tablet (5 mg) by mouth daily    Benign enlargement of prostate       furosemide 20 MG tablet    LASIX     Take 40 mg by mouth daily        gabapentin 300 MG capsule    NEURONTIN    270 capsule    TAKE 1 CAPSULE BY MOUTH THREE TIMES DAILY    Spinal stenosis of lumbar region with neurogenic claudication        hydrALAZINE 25 MG tablet    APRESOLINE    135 tablet    Take 1 tablet (25 mg) by mouth 3 times daily        HYDROcodone-acetaminophen 7.5-325 MG per tablet    NORCO    120 tablet    Take 1 tablet by mouth every 6 hours as needed for pain maximum 4 tablet(s) per day    Spinal stenosis of lumbar region, unspecified whether neurogenic claudication present       hypromellose 0.4 % Soln ophthalmic solution    ARTIFICIAL TEARS     Place 2 drops Into the left eye 4 times daily        isosorbide dinitrate 20 MG tablet    ISORDIL    270 tablet    TAKE 1 TABLET BY MOUTH THREE TIMES DAILY    Chronic diastolic heart failure (H), Coronary artery disease involving native coronary artery of native heart without angina pectoris       metoprolol succinate 50 MG 24 hr tablet    TOPROL XL    90 tablet    Take 1 tablet (50 mg) by mouth daily    Hypertension goal BP (blood pressure) < 140/90       pantoprazole 40 MG EC tablet    PROTONIX    180 tablet    Take 1 tablet (40 mg) by mouth 2 times daily    Abdominal pain, epigastric       potassium chloride SA 10 MEQ CR tablet    K-DUR/KLOR-CON M    90 tablet    Take half tablet one time daily.    Chronic diastolic heart failure (H)       simvastatin 20 MG tablet    ZOCOR    90 tablet    Take 1 tablet (20 mg) by mouth At Bedtime    Hyperlipidemia LDL goal <100       tamsulosin 0.4 MG capsule    FLOMAX    90 capsule    TAKE ONE CAPSULE BY MOUTH DAILY    Benign prostatic hyperplasia without lower urinary tract symptoms

## 2018-03-07 ENCOUNTER — MYC REFILL (OUTPATIENT)
Dept: FAMILY MEDICINE | Facility: CLINIC | Age: 83
End: 2018-03-07

## 2018-03-07 DIAGNOSIS — M48.061 SPINAL STENOSIS OF LUMBAR REGION, UNSPECIFIED WHETHER NEUROGENIC CLAUDICATION PRESENT: ICD-10-CM

## 2018-03-07 NOTE — TELEPHONE ENCOUNTER
Controlled Substance Refill Request for Norco    Last office visit:  12/4/2017  Last refill  1-29-18  #120    Problem List Complete:  Yes    Per Dr. Bell Note Oct 2013:You don't want to use a long acting narcotic for pain. At this time, you are using #120 hydro/apap 7.5/325 monthly./   I told you today and you agreed to follow up to see Dr. Evaristo Magaña in about 2 months before the present refills of narcotic are due to be refilled   I also told you that it would be my recommendation that you then be given an increased monthly prescription of #150 per month to better control the pain.   You do not want any further surgery on your back at this time so want to focus on better pain control.    Patient is followed by ANTHONY Murphy for ongoing prescription of pain medication.  All refills should be approved by this provider, or covering partner.    Medication(s): norco 7.5/325.   Maximum quantity per month: 120  Clinic visit frequency required: Q 4 months     Controlled substance agreement on file: 9/3/14    Pain Clinic evaluation in the past: Yes       Date/Location:  spine clinic    DIRE Total Score(s):  No flowsheet data found.    Last [facility] website verification: 1-28-18 provider to review

## 2018-03-08 RX ORDER — HYDROCODONE BITARTRATE AND ACETAMINOPHEN 7.5; 325 MG/1; MG/1
1 TABLET ORAL EVERY 6 HOURS PRN
Qty: 120 TABLET | Refills: 0 | Status: SHIPPED | OUTPATIENT
Start: 2018-03-08 | End: 2018-04-25

## 2018-03-08 NOTE — TELEPHONE ENCOUNTER
Left message on voice mail for patient stating that script was ready to p/u at our [facility] location.    Betsy Wang LPN

## 2018-04-03 ENCOUNTER — PATIENT OUTREACH (OUTPATIENT)
Dept: CARE COORDINATION | Facility: CLINIC | Age: 83
End: 2018-04-03

## 2018-04-03 NOTE — PROGRESS NOTES
Clinic Care Coordination Contact    Situation: Patient chart reviewed by care coordinator.    Background: Patient has significant Mitral valve regurgitation even after mitraclip procedure.      Assessment: Patient was seen by cardiology on 3/2/18. Patient stated he was tired of taking so many medications and complained that he was going to the bathroom so frequently.      Plan/Recommendations: Patient has cardiology follow up visit scheduled for 5/3/18.      Will close to clinic care coordination at this time. Re-open as necessary.     Suad Gutierrez RN, CCM - Care Coordinator     4/3/2018    2:52 PM  [phone]

## 2018-04-24 DIAGNOSIS — I50.9 ACUTE ON CHRONIC HEART FAILURE, UNSPECIFIED HEART FAILURE TYPE (H): ICD-10-CM

## 2018-04-25 ENCOUNTER — TELEPHONE (OUTPATIENT)
Dept: FAMILY MEDICINE | Facility: CLINIC | Age: 83
End: 2018-04-25

## 2018-04-25 ENCOUNTER — OFFICE VISIT (OUTPATIENT)
Dept: FAMILY MEDICINE | Facility: CLINIC | Age: 83
End: 2018-04-25
Payer: MEDICARE

## 2018-04-25 VITALS
TEMPERATURE: 97 F | HEART RATE: 88 BPM | BODY MASS INDEX: 22.55 KG/M2 | WEIGHT: 144 LBS | DIASTOLIC BLOOD PRESSURE: 74 MMHG | SYSTOLIC BLOOD PRESSURE: 120 MMHG | RESPIRATION RATE: 16 BRPM

## 2018-04-25 DIAGNOSIS — M48.061 SPINAL STENOSIS OF LUMBAR REGION, UNSPECIFIED WHETHER NEUROGENIC CLAUDICATION PRESENT: ICD-10-CM

## 2018-04-25 PROCEDURE — 99214 OFFICE O/P EST MOD 30 MIN: CPT | Performed by: FAMILY MEDICINE

## 2018-04-25 RX ORDER — HYDROCODONE BITARTRATE AND ACETAMINOPHEN 7.5; 325 MG/1; MG/1
1 TABLET ORAL EVERY 6 HOURS PRN
Qty: 120 TABLET | Refills: 0 | Status: SHIPPED | OUTPATIENT
Start: 2018-04-25 | End: 2018-05-24

## 2018-04-25 ASSESSMENT — ANXIETY QUESTIONNAIRES
6. BECOMING EASILY ANNOYED OR IRRITABLE: NOT AT ALL
3. WORRYING TOO MUCH ABOUT DIFFERENT THINGS: SEVERAL DAYS
1. FEELING NERVOUS, ANXIOUS, OR ON EDGE: NOT AT ALL
5. BEING SO RESTLESS THAT IT IS HARD TO SIT STILL: NOT AT ALL
2. NOT BEING ABLE TO STOP OR CONTROL WORRYING: NOT AT ALL
7. FEELING AFRAID AS IF SOMETHING AWFUL MIGHT HAPPEN: NOT AT ALL
GAD7 TOTAL SCORE: 1
IF YOU CHECKED OFF ANY PROBLEMS ON THIS QUESTIONNAIRE, HOW DIFFICULT HAVE THESE PROBLEMS MADE IT FOR YOU TO DO YOUR WORK, TAKE CARE OF THINGS AT HOME, OR GET ALONG WITH OTHER PEOPLE: NOT DIFFICULT AT ALL

## 2018-04-25 ASSESSMENT — PATIENT HEALTH QUESTIONNAIRE - PHQ9: 5. POOR APPETITE OR OVEREATING: NOT AT ALL

## 2018-04-25 NOTE — TELEPHONE ENCOUNTER
Nicolasa Love step daughter called expressing her concern about patient driving alone and with passengers. At times driving too fast and taking hydrocodone. Patient's low weight is also of concern to Nicolasa. Patient has an OV today with Dr. Magaña and would like the doctor to address this issue. She is willing to take pt to a driving assessment if needed.

## 2018-04-25 NOTE — PROGRESS NOTES
SUBJECTIVE:   Ivan Gomez is a 89 year old male who presents to clinic today for the following health issues:      Back Pain Follow Up      Description:   Location of pain:  center  Character of pain: sharp and dull ache  Pain radiation: Does not radiate  Since last visit, pain is:  unchanged  New numbness or weakness in legs, not attributed to pain:  no     Intensity: moderate    History:   Pain interferes with job: no  Therapies tried without relief: none  Therapies tried with relief: opioids           Accompanying Signs & Symptoms:  Risk of Fracture:  None  Risk of Cauda Equina:  None  Risk of Infection:  None  Risk of Cancer:  None        Amount of exercise or physical activity: None    Problems taking medications regularly: No    Medication side effects: none    Diet: low salt and low fat/cholesterol            Problem list and histories reviewed & adjusted, as indicated.  Additional history: The patient continues to have issues with a right inguinal hernia.  He has been to his cardiologist and they have said no surgery.  He is not having any discomfort from the hernia.  He is also having issues with his wife, Jerri, who is now down in [place] visiting her daughter.  There is a assisted living spot that the rest of the family would like to have he and his wife look at down in [place].  A couple of his stepdaughters were here to visit on vacation recently and have called into question whether or not Ivan should be continuing to drive.  He thinks he should be able to they are wondering if that is a good idea.    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      BPH (benign prostatic hypertrophy)     Bladder tumor     Benign essential hypertension     Hyperlipidemia LDL goal <100     Acute idiopathic gout of right foot     Femoral hernia of right side     Drug-induced constipation     Acute respiratory failure with hypoxia (H)     Heart failure (H)     Mitral regurgitation     S/P mitral valve clip implantation     Dry eyes     Abdominal pain, epigastric     Hypokalemia     Past Surgical History:   Procedure Laterality Date     BACK SURGERY       BIOPSY      face, skin cancer     BIOPSY  8/2016    shoulder lesion     CYSTOSCOPY, TRANSURETHRAL RESECTION (TUR) PROSTATE, COMBINED N/A 8/21/2015    Procedure: COMBINED CYSTOSCOPY, TRANSURETHRAL RESECTION (TUR) PROSTATE;  Surgeon: Dennis Hilton MD;  Location: [facility] OR     CYSTOSCOPY, TRANSURETHRAL RESECTION (TUR) TUMOR BLADDER, COMBINED N/A 9/2/2016    Procedure: COMBINED CYSTOSCOPY, TRANSURETHRAL RESECTION (TUR) TUMOR BLADDER;  Surgeon: Dennis Hilton MD;  Location: [facility] OR     ORTHOPEDIC SURGERY      lumbar and cervical surgery, Sep, 2008, knee surgery     PERCUTANEOUS MITRAL VALVE REPAIR N/A 10/16/2017    Procedure: PERCUTANEOUS MITRAL VALVE REPAIR ANESTHESIA;  Percutaneous MitraClip ;  Surgeon: Eber Monroe MD;  Location: [facility] OR     PICC INSERTION Right 10/14/2017    5fr DL Bard PICC, 40cm (1cm external) in the R basilic vein w/ tip in the mid SVC.       Social History   Substance Use Topics     Smoking status: Former Smoker     Smokeless tobacco: Never Used     Alcohol use No      Comment: doesnt use anymore     Family History   Problem Relation Age of Onset     CANCER Son 64     leukemia ? due to agent orange     Family History Negative Son            Reviewed and updated as needed this visit by clinical staff  Tobacco  Allergies  Meds  Med Hx  Surg Hx   Fam Hx  Soc Hx      Reviewed and updated as needed this visit by Provider         ROS:  Constitutional, HEENT, cardiovascular, pulmonary, gi and gu systems are negative, except as otherwise noted.    OBJECTIVE:                                                    /74 (Cuff Size: Adult Regular)  Pulse 88  Temp 97  F (36.1  C) (Tympanic)  Resp 16  Wt 144 lb (65.3 kg)  BMI 22.55 kg/m2  Body mass index is 22.55 kg/(m^2).  GENERAL APPEARANCE: healthy, alert and no distress  PSYCH: mentation appears normal, affect normal/bright and patient appearance--Well-groomed       ASSESSMENT/PLAN:                                                        ICD-10-CM    1. Spinal stenosis of lumbar region, unspecified whether neurogenic claudication present M48.061 HYDROcodone-acetaminophen (NORCO) 7.5-325 MG per tablet       Patient Instructions   I had a discussion with the patient about driving capabilities.  He clearly is completely with that in terms of mentation.  However, I did suggest that since I have not had any opportunity to ride with him in a car I have no idea how good his driving ability is.  We discussed going to [employer] [facility] to get his driving skills evaluated.  That would give us an objective opinion of his driving abilities.  I did refill his Vardaman today.  I do not think he needs to be seen every 3 months as this is a very chronic use of a medication that is not increased or had any problems with over the past 7-8 years.  We will figure other creative ways to get him his medication that do not require him to come to the office.  Total length of the visit was 30 minutes.      Evaristo Magaña MD  [facility]

## 2018-04-25 NOTE — MR AVS SNAPSHOT
After Visit Summary   4/25/2018    Ivan Gomez    MRN: [id number]           Patient Information     Date Of Birth          10/12/1926        Visit Information        Provider Department      4/25/2018 4:15 PM Evaristo Magaña MD [facility]        Today's Diagnoses     Spinal stenosis of lumbar region, unspecified whether neurogenic claudication present          Care Instructions    I had a discussion with the patient about driving capabilities.  He clearly is completely with that in terms of mentation.  However, I did suggest that since I have not had any opportunity to ride with him in a car I have no idea how good his driving ability is.  We discussed going to [facility] to get his driving skills evaluated.  That would give us an objective opinion of his driving abilities.  I did refill his [place] today.  I do not think he needs to be seen every 3 months as this is a very chronic use of a medication that is not increased or had any problems with over the past 7-8 years.  We will figure other creative ways to get him his medication that do not require him to come to the office.  Total length of the visit was 30 minutes.          Follow-ups after your visit        Your next 10 appointments already scheduled     May 03, 2018  1:45 PM CDT   Return Visit with Lance Yao MD   [facility] ([facility])    [address] [place] [id number] [phone] OPT 2            Sep 26, 2018  1:30 PM CDT   Return Visit with Dennis Hilton MD   [facility] ([facility])    [facility]  [address] [facility] [address]  [facility] [address]   [phone]              Who to contact     If you have questions or need follow up information about today's clinic visit or your schedule please contact [facility]  [place] directly at [phone].  Normal or non-critical lab and imaging results will be communicated to you by MyChart, letter or phone within 4 business days after the clinic has received the results. If you do not hear from us within 7 days, please contact the clinic through [employer] or phone. If you have a critical or abnormal lab result, we will notify you by phone as soon as possible.  Submit refill requests through [employer] or call your pharmacy and they will forward the refill request to us. Please allow 3 business days for your refill to be completed.          Additional Information About Your Visit        [employer] Information     [employer] gives you secure access to your electronic health record. If you see a primary care provider, you can also send messages to your care team and make appointments. If you have questions, please call your primary care clinic.  If you do not have a primary care provider, please call [phone] and they will assist you.        Care EveryWhere ID     This is your Care EveryWhere ID. This could be used by other organizations to access your [place] medical records  [id number]        Your Vitals Were     Pulse Temperature Respirations BMI (Body Mass Index)          88 97  F (36.1  C) (Tympanic) 16 22.55 kg/m2         Blood Pressure from Last 3 Encounters:   04/25/18 120/74   03/02/18 135/69   01/17/18 138/73    Weight from Last 3 Encounters:   04/25/18 144 lb (65.3 kg)   03/02/18 144 lb (65.3 kg)   01/17/18 141 lb (64 kg)              Today, you had the following     No orders found for display         Where to get your medicines      Some of these will need a paper prescription and others can be bought over the counter.  Ask your nurse if you have questions.     Bring a paper prescription for each of these medications     HYDROcodone-acetaminophen 7.5-325 MG per tablet         Information about OPIOIDS     PRESCRIPTION OPIOIDS: WHAT YOU NEED TO KNOW   You have a prescription for  an opioid (narcotic) pain medicine. Opioids can cause addiction. If you have a history of chemical dependency of any type, you are at a higher risk of becoming addicted to opioids. Only take this medicine after all other options have been tried. Take it for as short a time and as few doses as possible.     Do not:    Drive. If you drive while taking these medicines, you could be arrested for driving under the influence (DUI).    Operate heavy machinery    Do any other dangerous activities while taking these medicines.     Drink any alcohol while taking these medicines.      Take with any other medicines that contain acetaminophen. Read all labels carefully. Look for the word  acetaminophen  or  Tylenol.  Ask your pharmacist if you have questions or are unsure.    Store your pills in a secure place, locked if possible. We will not replace any lost or stolen medicine. If you don t finish your medicine, please throw away (dispose) as directed by your pharmacist. The [place] Pollution Control Agency has more information about safe disposal: https://www.pca.[facility].[place].us/living-green/managing-unwanted-medications    All opioids tend to cause constipation. Drink plenty of water and eat foods that have a lot of fiber, such as fruits, vegetables, prune juice, apple juice and high-fiber cereal. Take a laxative (Miralax, milk of magnesia, Colace, Senna) if you don t move your bowels at least every other day.          Primary Care Provider Office Phone # Fax #    Evaristo Magaña [phone] [address] [facility] [address]        Equal Access to Services     FARZANEH WILDER AH: Hadii aad ku hadasho Soomaali, waaxda luqadaha, qaybta kaalmada adeegyada, perri cruz . So [facility] [phone].    ATENCIÓN: Si habla español, tiene a rubio disposición servicios gratuitos de asistencia lingüística. Llame al [phone].    We comply with applicable federal civil rights laws and  [place] laws. We do not discriminate on the basis of race, color, national origin, age, disability, sex, sexual orientation, or gender identity.            Thank you!     Thank you for choosing [facility]  for your care. Our goal is always to provide you with excellent care. Hearing back from our patients is one way we can continue to improve our services. Please take a few minutes to complete the written survey that you may receive in the mail after your visit with us. Thank you!             Your Updated Medication List - Protect others around you: Learn how to safely use, store and throw away your medicines at www.disposemymeds.org.          This list is accurate as of 4/25/18  5:45 PM.  Always use your most recent med list.                   Brand Name Dispense Instructions for use Diagnosis    aspirin 81 MG EC tablet      Take 1 tablet (81 mg) by mouth daily    S/P mitral valve repair       eucerin cream      Apply topically At Bedtime Apply to hands        finasteride 5 MG tablet    PROSCAR    90 tablet    Take 1 tablet (5 mg) by mouth daily    Benign enlargement of prostate       furosemide 20 MG tablet    LASIX     Take 40 mg by mouth daily        gabapentin 300 MG capsule    NEURONTIN    270 capsule    TAKE 1 CAPSULE BY MOUTH THREE TIMES DAILY    Spinal stenosis of lumbar region with neurogenic claudication       hydrALAZINE 25 MG tablet    APRESOLINE    135 tablet    TAKE ONE AND ONE-HALF TABLETS BY MOUTH EVERY 8 HOURS AS DIRECTED. HOLD IF SYSTOLIC BLOOD PRESSURE IS LESS THAN 100    Acute on chronic heart failure, unspecified heart failure type (H)       HYDROcodone-acetaminophen 7.5-325 MG per tablet    NORCO    120 tablet    Take 1 tablet by mouth every 6 hours as needed for pain maximum 4 tablet(s) per day    Spinal stenosis of lumbar region, unspecified whether neurogenic claudication present       hypromellose 0.4 % Soln ophthalmic solution    ARTIFICIAL TEARS     Place  2 drops Into the left eye 4 times daily        isosorbide dinitrate 20 MG tablet    ISORDIL    270 tablet    TAKE 1 TABLET BY MOUTH THREE TIMES DAILY    Chronic diastolic heart failure (H), Coronary artery disease involving native coronary artery of native heart without angina pectoris       metoprolol succinate 50 MG 24 hr tablet    TOPROL XL    90 tablet    Take 1 tablet (50 mg) by mouth daily    Hypertension goal BP (blood pressure) < 140/90       pantoprazole 40 MG EC tablet    PROTONIX    180 tablet    Take 1 tablet (40 mg) by mouth 2 times daily    Abdominal pain, epigastric       potassium chloride SA 10 MEQ CR tablet    K-DUR/KLOR-CON M    90 tablet    Take half tablet one time daily.    Chronic diastolic heart failure (H)       simvastatin 20 MG tablet    ZOCOR    90 tablet    Take 1 tablet (20 mg) by mouth At Bedtime    Hyperlipidemia LDL goal <100       tamsulosin 0.4 MG capsule    FLOMAX    90 capsule    TAKE ONE CAPSULE BY MOUTH DAILY    Benign prostatic hyperplasia without lower urinary tract symptoms

## 2018-04-25 NOTE — PATIENT INSTRUCTIONS
I had a discussion with the patient about driving capabilities.  He clearly is completely with that in terms of mentation.  However, I did suggest that since I have not had any opportunity to ride with him in a car I have no idea how good his driving ability is.  We discussed going to [facility] to get his driving skills evaluated.  That would give us an objective opinion of his driving abilities.  I did refill his [place] today.  I do not think he needs to be seen every 3 months as this is a very chronic use of a medication that is not increased or had any problems with over the past 7-8 years.  We will figure other creative ways to get him his medication that do not require him to come to the office.  Total length of the visit was 30 minutes.

## 2018-04-25 NOTE — NURSING NOTE
"Chief Complaint   Patient presents with     Back Pain       Initial /74 (Cuff Size: Adult Regular)  Pulse 88  Temp 97  F (36.1  C) (Tympanic)  Resp 16  Wt 144 lb (65.3 kg)  BMI 22.55 kg/m2 Estimated body mass index is 22.55 kg/(m^2) as calculated from the following:    Height as of 3/2/18: 5' 7.01\" (1.702 m).    Weight as of this encounter: 144 lb (65.3 kg).  Medication Reconciliation: complete     Yara Jim CMA      "

## 2018-04-26 DIAGNOSIS — I50.9 ACUTE ON CHRONIC HEART FAILURE, UNSPECIFIED HEART FAILURE TYPE (H): ICD-10-CM

## 2018-04-26 RX ORDER — HYDRALAZINE HYDROCHLORIDE 25 MG/1
TABLET, FILM COATED ORAL
Qty: 135 TABLET | Refills: 0 | Status: SHIPPED | OUTPATIENT
Start: 2018-04-26 | End: 2018-04-26

## 2018-04-26 ASSESSMENT — ANXIETY QUESTIONNAIRES: GAD7 TOTAL SCORE: 1

## 2018-04-26 ASSESSMENT — PATIENT HEALTH QUESTIONNAIRE - PHQ9: SUM OF ALL RESPONSES TO PHQ QUESTIONS 1-9: 1

## 2018-04-26 NOTE — TELEPHONE ENCOUNTER
Requested Prescriptions   Pending Prescriptions Disp Refills     hydrALAZINE (APRESOLINE) 25 MG tablet [Pharmacy Med Name: HYDRALAZINE 25MG TABLETS (ORANGE)]      Last Written Prescription Date:  3/27/18  Last Fill Quantity: 135,   # refills: 0  Last Office Visit: 4/25/18 [place]  Future Office visit:    Next 5 appointments (look out 90 days)     May 03, 2018  1:45 PM CDT   Return Visit with Lance Yao MD   [facility] ([facility])    [address] [place] [id number] [phone] OPT 2                   Routing refill request to provider for review/approval because:  Drug not on the [facility], [facility] or [facility] refill protocol or controlled substance   135 tablet 0     Sig: TAKE ONE AND ONE-HALF TABLETS BY MOUTH EVERY 8 HOURS AS DIRECTED. HOLD IF SYSTOLIC BLOOD PRESSURE IS LESS THAN 100    There is no refill protocol information for this order

## 2018-04-26 NOTE — TELEPHONE ENCOUNTER
hydrALAZINE (APRESOLINE) 25 MG tablet 135 tablet 0 4/26/2018  No      Sig: TAKE ONE AND ONE-HALF TABLETS BY MOUTH EVERY 8 HOURS AS DIRECTED. HOLD IF SYSTOLIC BLOOD PRESSURE IS LESS THAN 100     Class: E-Prescribe     Order: [id number]     E-Prescribing Status: Receipt confirmed by pharmacy (4/26/2018 10:04 AM CDT)     [facility] changed this to 1 tablet three times daily (every 8 hours).     The patient would also like a 90 day supply as well.     Routing to provider to see if okay to change directions, and if a 90 day supply is okay to send.

## 2018-04-27 RX ORDER — HYDRALAZINE HYDROCHLORIDE 25 MG/1
25 TABLET, FILM COATED ORAL 3 TIMES DAILY
Qty: 270 TABLET | Refills: 1 | Status: SHIPPED | OUTPATIENT
Start: 2018-04-27 | End: 2019-07-15

## 2018-05-03 ENCOUNTER — OFFICE VISIT (OUTPATIENT)
Dept: CARDIOLOGY | Facility: CLINIC | Age: 83
End: 2018-05-03
Attending: PHYSICIAN ASSISTANT
Payer: MEDICARE

## 2018-05-03 VITALS
BODY MASS INDEX: 22.76 KG/M2 | HEART RATE: 94 BPM | WEIGHT: 145 LBS | HEIGHT: 67 IN | SYSTOLIC BLOOD PRESSURE: 126 MMHG | DIASTOLIC BLOOD PRESSURE: 62 MMHG

## 2018-05-03 DIAGNOSIS — Z98.890 S/P MITRAL VALVE CLIP IMPLANTATION: ICD-10-CM

## 2018-05-03 DIAGNOSIS — I10 BENIGN ESSENTIAL HYPERTENSION: ICD-10-CM

## 2018-05-03 DIAGNOSIS — Z98.890 S/P MITRAL VALVE REPAIR: ICD-10-CM

## 2018-05-03 DIAGNOSIS — I25.10 CORONARY ARTERY DISEASE INVOLVING NATIVE CORONARY ARTERY OF NATIVE HEART WITHOUT ANGINA PECTORIS: ICD-10-CM

## 2018-05-03 DIAGNOSIS — I50.32 CHRONIC DIASTOLIC HEART FAILURE (H): ICD-10-CM

## 2018-05-03 DIAGNOSIS — I34.0 MITRAL VALVE INSUFFICIENCY, UNSPECIFIED ETIOLOGY: Primary | ICD-10-CM

## 2018-05-03 DIAGNOSIS — Z95.818 S/P MITRAL VALVE CLIP IMPLANTATION: ICD-10-CM

## 2018-05-03 DIAGNOSIS — E78.5 HYPERLIPIDEMIA LDL GOAL <100: ICD-10-CM

## 2018-05-03 PROCEDURE — 99214 OFFICE O/P EST MOD 30 MIN: CPT | Performed by: INTERNAL MEDICINE

## 2018-05-03 NOTE — LETTER
5/3/2018    Evaristo Magaña MD  [address]  [facility] [address]    RE: Ivan Gomez       Dear Colleague,    I had the pleasure of seeing Ivan Gomez in the [facility].    HPI and Plan:   See dictation    Orders Placed This Encounter   Procedures     Basic metabolic panel     Lipid Profile     ALT     Follow-Up with Cardiologist     Echocardiogram       No orders of the defined types were placed in this encounter.      There are no discontinued medications.      Encounter Diagnoses   Name Primary?     S/P mitral valve repair      Benign essential hypertension      Hyperlipidemia LDL goal <100      S/P mitral valve clip implantation      Chronic diastolic heart failure (H)      Coronary artery disease involving native coronary artery of native heart without angina pectoris      Mitral valve insufficiency, unspecified etiology Yes       CURRENT MEDICATIONS:  Current Outpatient Prescriptions   Medication Sig Dispense Refill     aspirin EC 81 MG EC tablet Take 1 tablet (81 mg) by mouth daily       finasteride (PROSCAR) 5 MG tablet Take 1 tablet (5 mg) by mouth daily 90 tablet 3     furosemide (LASIX) 20 MG tablet Take 40 mg by mouth daily       gabapentin (NEURONTIN) 300 MG capsule TAKE 1 CAPSULE BY MOUTH THREE TIMES DAILY 270 capsule 3     hydrALAZINE (APRESOLINE) 25 MG tablet Take 1 tablet (25 mg) by mouth 3 times daily 270 tablet 1     HYDROcodone-acetaminophen (NORCO) 7.5-325 MG per tablet Take 1 tablet by mouth every 6 hours as needed for pain maximum 4 tablet(s) per day 120 tablet 0     hypromellose (ARTIFICIAL TEARS) 0.4 % SOLN ophthalmic solution Place 2 drops Into the left eye 4 times daily       isosorbide dinitrate (ISORDIL) 20 MG tablet TAKE 1 TABLET BY MOUTH THREE TIMES DAILY 270 tablet 2     metoprolol (TOPROL XL) 50 MG 24 hr tablet Take 1 tablet (50 mg) by mouth daily 90 tablet 3     pantoprazole (PROTONIX) 40 MG EC tablet Take 1 tablet (40 mg) by  mouth 2 times daily 180 tablet 1     potassium chloride SA (K-DUR/KLOR-CON M) 10 MEQ CR tablet Take half tablet one time daily. 90 tablet 3     simvastatin (ZOCOR) 20 MG tablet Take 1 tablet (20 mg) by mouth At Bedtime 90 tablet 2     Skin Protectants, Misc. (EUCERIN) cream Apply topically At Bedtime Apply to hands       tamsulosin (FLOMAX) 0.4 MG capsule TAKE ONE CAPSULE BY MOUTH DAILY 90 capsule 3       ALLERGIES     Allergies   Allergen Reactions     Celebrex [Celecoxib] Hives     Penicillins Hives       PAST MEDICAL HISTORY:  Past Medical History:   Diagnosis Date     Arthritis     Spinal Stenosis     Former smoker      Hemorrhoids      Hypercholesteremia      Hypertension      Malignant neoplasm (H)     skin CA, Basal cell     Other chronic pain      Renal disease        PAST SURGICAL HISTORY:  Past Surgical History:   Procedure Laterality Date     BACK SURGERY       BIOPSY      face, skin cancer     BIOPSY  8/2016    shoulder lesion     CYSTOSCOPY, TRANSURETHRAL RESECTION (TUR) PROSTATE, COMBINED N/A 8/21/2015    Procedure: COMBINED CYSTOSCOPY, TRANSURETHRAL RESECTION (TUR) PROSTATE;  Surgeon: Dennis Hilton MD;  Location: [facility] OR     CYSTOSCOPY, TRANSURETHRAL RESECTION (TUR) TUMOR BLADDER, COMBINED N/A 9/2/2016    Procedure: COMBINED CYSTOSCOPY, TRANSURETHRAL RESECTION (TUR) TUMOR BLADDER;  Surgeon: Dennis Hilton MD;  Location: [facility] OR     ORTHOPEDIC SURGERY      lumbar and cervical surgery, Sep, 2008, knee surgery     PERCUTANEOUS MITRAL VALVE REPAIR N/A 10/16/2017    Procedure: PERCUTANEOUS MITRAL VALVE REPAIR ANESTHESIA;  Percutaneous MitraClip ;  Surgeon: Eber Monroe MD;  Location: [facility] OR     PICC INSERTION Right 10/14/2017    5fr DL Bard PICC, 40cm (1cm external) in the R basilic vein w/ tip in the mid SVC.       FAMILY HISTORY:  Family History   Problem Relation Age of Onset     CANCER Son 64     leukemia ? due to agent orange     Family History Negative Son        SOCIAL  "HISTORY:  Social History     Social History     Marital status: Single     Spouse name: N/A     Number of children: N/A     Years of education: N/A     Social History Main Topics     Smoking status: Former Smoker     Smokeless tobacco: Never Used     Alcohol use No      Comment: doesnt use anymore     Drug use: No     Sexual activity: No     Other Topics Concern     Parent/Sibling W/ Cabg, Mi Or Angioplasty Before 65f 55m? No     Social History Narrative       Review of Systems:  Skin:  not assessed       Eyes:  Negative      ENT:  not assessed      Respiratory:  Positive for dyspnea on exertion     Cardiovascular:    Positive for;fatigue    Gastroenterology: Negative      Genitourinary:  Positive for urinary frequency;prostate problem    Musculoskeletal:  Positive for arthritis    Neurologic:  Positive for numbness or tingling of feet    Psychiatric:  Negative      Heme/Lymph/Imm:  Positive for easy bruising    Endocrine:  Negative        Physical Exam:  Vitals: /62  Pulse 94  Ht 1.702 m (5' 7\")  Wt 65.8 kg (145 lb)  BMI 22.71 kg/m2    Constitutional:  cooperative, alert and oriented, well developed, well nourished, in no acute distress thin      Skin:  warm and dry to the touch, no apparent skin lesions or masses noted          Head:  normocephalic        Eyes:  pupils equal and round        Lymph:No Cervical lymphadenopathy present     ENT:  no pallor or cyanosis, dentition good        Neck:  carotid pulses are full and equal bilaterally JVP elevated;JVP 8-10      Respiratory:    basal rales       Cardiac: regular rhythm;normal S1 and S2 frequent premature beats     systolic murmur;grade 3                                                 GI:  not assessed this visit        Extremities and Muscular Skeletal:  no deformities, clubbing, cyanosis, erythema observed   bilateral LE edema;1+;pitting          Neurological:  no gross motor deficits;affect appropriate        Psych:  Alert and Oriented x 3  "       CC  Duane Brown PA-C  [address] [place] [address]                Thank you for allowing me to participate in the care of your patient.      Sincerely,     Lance Yao MD, MD     [facility]    cc:   Duane Brown PA-C  [address] [place] [address]

## 2018-05-03 NOTE — PROGRESS NOTES
HPI and Plan:   See dictation    Orders Placed This Encounter   Procedures     Basic metabolic panel     Lipid Profile     ALT     Follow-Up with Cardiologist     Echocardiogram       No orders of the defined types were placed in this encounter.      There are no discontinued medications.      Encounter Diagnoses   Name Primary?     S/P mitral valve repair      Benign essential hypertension      Hyperlipidemia LDL goal <100      S/P mitral valve clip implantation      Chronic diastolic heart failure (H)      Coronary artery disease involving native coronary artery of native heart without angina pectoris      Mitral valve insufficiency, unspecified etiology Yes       CURRENT MEDICATIONS:  Current Outpatient Prescriptions   Medication Sig Dispense Refill     aspirin EC 81 MG EC tablet Take 1 tablet (81 mg) by mouth daily       finasteride (PROSCAR) 5 MG tablet Take 1 tablet (5 mg) by mouth daily 90 tablet 3     furosemide (LASIX) 20 MG tablet Take 40 mg by mouth daily       gabapentin (NEURONTIN) 300 MG capsule TAKE 1 CAPSULE BY MOUTH THREE TIMES DAILY 270 capsule 3     hydrALAZINE (APRESOLINE) 25 MG tablet Take 1 tablet (25 mg) by mouth 3 times daily 270 tablet 1     HYDROcodone-acetaminophen (NORCO) 7.5-325 MG per tablet Take 1 tablet by mouth every 6 hours as needed for pain maximum 4 tablet(s) per day 120 tablet 0     hypromellose (ARTIFICIAL TEARS) 0.4 % SOLN ophthalmic solution Place 2 drops Into the left eye 4 times daily       isosorbide dinitrate (ISORDIL) 20 MG tablet TAKE 1 TABLET BY MOUTH THREE TIMES DAILY 270 tablet 2     metoprolol (TOPROL XL) 50 MG 24 hr tablet Take 1 tablet (50 mg) by mouth daily 90 tablet 3     pantoprazole (PROTONIX) 40 MG EC tablet Take 1 tablet (40 mg) by mouth 2 times daily 180 tablet 1     potassium chloride SA (K-DUR/KLOR-CON M) 10 MEQ CR tablet Take half tablet one time daily. 90 tablet 3     simvastatin (ZOCOR) 20 MG tablet Take 1 tablet (20 mg) by mouth At Bedtime 90 tablet 2      Skin Protectants, Misc. (EUCERIN) cream Apply topically At Bedtime Apply to hands       tamsulosin (FLOMAX) 0.4 MG capsule TAKE ONE CAPSULE BY MOUTH DAILY 90 capsule 3       ALLERGIES     Allergies   Allergen Reactions     Celebrex [Celecoxib] Hives     Penicillins Hives       PAST MEDICAL HISTORY:  Past Medical History:   Diagnosis Date     Arthritis     Spinal Stenosis     Former smoker      Hemorrhoids      Hypercholesteremia      Hypertension      Malignant neoplasm (H)     skin CA, Basal cell     Other chronic pain      Renal disease        PAST SURGICAL HISTORY:  Past Surgical History:   Procedure Laterality Date     BACK SURGERY       BIOPSY      face, skin cancer     BIOPSY  8/2016    shoulder lesion     CYSTOSCOPY, TRANSURETHRAL RESECTION (TUR) PROSTATE, COMBINED N/A 8/21/2015    Procedure: COMBINED CYSTOSCOPY, TRANSURETHRAL RESECTION (TUR) PROSTATE;  Surgeon: Dennis Hilton MD;  Location: [facility] OR     CYSTOSCOPY, TRANSURETHRAL RESECTION (TUR) TUMOR BLADDER, COMBINED N/A 9/2/2016    Procedure: COMBINED CYSTOSCOPY, TRANSURETHRAL RESECTION (TUR) TUMOR BLADDER;  Surgeon: Dennis Hilton MD;  Location: [facility] OR     ORTHOPEDIC SURGERY      lumbar and cervical surgery, Sep, 2008, knee surgery     PERCUTANEOUS MITRAL VALVE REPAIR N/A 10/16/2017    Procedure: PERCUTANEOUS MITRAL VALVE REPAIR ANESTHESIA;  Percutaneous MitraClip ;  Surgeon: Eber Monroe MD;  Location: [facility] OR     PICC INSERTION Right 10/14/2017    5fr DL Bard PICC, 40cm (1cm external) in the R basilic vein w/ tip in the mid SVC.       FAMILY HISTORY:  Family History   Problem Relation Age of Onset     CANCER Son 64     leukemia ? due to agent orange     Family History Negative Son        SOCIAL HISTORY:  Social History     Social History     Marital status: Single     Spouse name: N/A     Number of children: N/A     Years of education: N/A     Social History Main Topics     Smoking status: Former Smoker     Smokeless  "tobacco: Never Used     Alcohol use No      Comment: doesnt use anymore     Drug use: No     Sexual activity: No     Other Topics Concern     Parent/Sibling W/ Cabg, Mi Or Angioplasty Before 65f 55m? No     Social History Narrative       Review of Systems:  Skin:  not assessed       Eyes:  Negative      ENT:  not assessed      Respiratory:  Positive for dyspnea on exertion     Cardiovascular:    Positive for;fatigue    Gastroenterology: Negative      Genitourinary:  Positive for urinary frequency;prostate problem    Musculoskeletal:  Positive for arthritis    Neurologic:  Positive for numbness or tingling of feet    Psychiatric:  Negative      Heme/Lymph/Imm:  Positive for easy bruising    Endocrine:  Negative        Physical Exam:  Vitals: /62  Pulse 94  Ht 1.702 m (5' 7\")  Wt 65.8 kg (145 lb)  BMI 22.71 kg/m2    Constitutional:  cooperative, alert and oriented, well developed, well nourished, in no acute distress thin      Skin:  warm and dry to the touch, no apparent skin lesions or masses noted          Head:  normocephalic        Eyes:  pupils equal and round        Lymph:No Cervical lymphadenopathy present     ENT:  no pallor or cyanosis, dentition good        Neck:  carotid pulses are full and equal bilaterally JVP elevated;JVP 8-10      Respiratory:    basal rales       Cardiac: regular rhythm;normal S1 and S2 frequent premature beats     systolic murmur;grade 3                                                 GI:  not assessed this visit        Extremities and Muscular Skeletal:  no deformities, clubbing, cyanosis, erythema observed   bilateral LE edema;1+;pitting          Neurological:  no gross motor deficits;affect appropriate        Psych:  Alert and Oriented x 3        CC  Duane Brown PA-C  [address] [place] [address]              "

## 2018-05-03 NOTE — LETTER
5/3/2018      Evaristo Magaña MD  [address]  [facility] [address]      RE: Ivan Gomez       Dear Colleague,    I had the pleasure of seeing Ivan Gomez in the [facility].    Service Date: 05/03/2018      REFERRING PHYSICIAN:  Dr. Evaristo Magaña.      HISTORY OF PRESENT ILLNESS:  It is my pleasure to see your patient, Ivan Gomez, in followup.  He is a very pleasant 89-year-old patient who had severe symptomatic mitral regurgitation.  On the 13th of October he had a MitraClip device placed for the mitral regurgitation.  I was not involved in his care, nor did I see him in the hospital, but he followed up with me because I had been taking care of his partner for many years.  He was last seen by Duane Brown on the 2nd of March of this year.  His major complaints at that time were urinary problems.  He was having hematuria.  He was also having urinary frequency.  His dose of Lasix was reduced from 40 mg twice a day to 40 mg per day.  He was due to see the urologist at that time.  His dose of hydralazine was also reduced down to 25 mg 3 times a day.      With that background in mind, he appears to be doing relatively well, but again his main symptoms are urological.  He takes 40 mg of Lasix in the morning time, but he is still having urinary frequency by noon and even later into the afternoon.  This would be highly unusual for a single dose of 40 mg of Lasix early in the morning.  I do note that he is on tamsulosin, and he is also on finasteride.  This sounds very much like prostatism to me.  So I have advised him possibly to get advice from the urologist about some possible treatments for this.  He also had a past history of bladder cancer as well.  His blood pressure is well controlled here today at 126/62, his pulse rate is 94 beats per minute and regular with occasional extrasystoles.  He was asking me about driving because some of his family thinks  he should not be driving.  I have asked him to discuss that with you, and I believe he has already discussed that with you.  Certainly I do not want to get involved in that noncardiac issue.      IMPRESSION:   1.  Severe mitral regurgitation, status post MitraClip with the most recent echocardiogram in January showing 2 to 3+ mitral regurgitation.  He also has moderate pulmonary hypertension.   2.  Even with a modest dose of furosemide, he is having a lot of urinary frequency, more than one would expect with this dose of furosemide and later in the day than one would expect.  I suspect that this is related to his bladder issues or prostate issues.   3.  Normotensive.   4.  Excellent lipid profile in October.   5.  Renal insufficiency.      PLAN:  I will have the patient return to see me in 3 months' time.  At that stage I will repeat his echocardiogram.  I will repeat his basic metabolic profile.  I look forward to seeing the patient again, and as always, he has been told to contact me if he has any questions or any concerns.      cc:   [place] [address]         SERENITY CORONA MD, [facility]             D: 2018   T: 2018   MT: LEANDRA      Name:     MALLORIE NOVA   MRN:      [id number]        Account:      [id number]   :      10/12/1926           Service Date: 2018      Document: [id number]           Outpatient Encounter Prescriptions as of 5/3/2018   Medication Sig Dispense Refill     aspirin EC 81 MG EC tablet Take 1 tablet (81 mg) by mouth daily       finasteride (PROSCAR) 5 MG tablet Take 1 tablet (5 mg) by mouth daily 90 tablet 3     furosemide (LASIX) 20 MG tablet Take 40 mg by mouth daily       gabapentin (NEURONTIN) 300 MG capsule TAKE 1 CAPSULE BY MOUTH THREE TIMES DAILY 270 capsule 3     hydrALAZINE (APRESOLINE) 25 MG tablet Take 1 tablet (25 mg) by mouth 3 times daily 270 tablet 1      HYDROcodone-acetaminophen (NORCO) 7.5-325 MG per tablet Take 1 tablet by mouth every 6 hours as needed for pain maximum 4 tablet(s) per day 120 tablet 0     hypromellose (ARTIFICIAL TEARS) 0.4 % SOLN ophthalmic solution Place 2 drops Into the left eye 4 times daily       isosorbide dinitrate (ISORDIL) 20 MG tablet TAKE 1 TABLET BY MOUTH THREE TIMES DAILY 270 tablet 2     metoprolol (TOPROL XL) 50 MG 24 hr tablet Take 1 tablet (50 mg) by mouth daily 90 tablet 3     pantoprazole (PROTONIX) 40 MG EC tablet Take 1 tablet (40 mg) by mouth 2 times daily 180 tablet 1     potassium chloride SA (K-DUR/KLOR-CON M) 10 MEQ CR tablet Take half tablet one time daily. 90 tablet 3     simvastatin (ZOCOR) 20 MG tablet Take 1 tablet (20 mg) by mouth At Bedtime 90 tablet 2     Skin Protectants, Misc. (EUCERIN) cream Apply topically At Bedtime Apply to hands       tamsulosin (FLOMAX) 0.4 MG capsule TAKE ONE CAPSULE BY MOUTH DAILY 90 capsule 3     No facility-administered encounter medications on file as of 5/3/2018.        Again, thank you for allowing me to participate in the care of your patient.      Sincerely,    Lance Yao MD, MD     [facility]

## 2018-05-03 NOTE — MR AVS SNAPSHOT
After Visit Summary   5/3/2018    Ivan Gomez    MRN: [id number]           Patient Information     Date Of Birth          10/12/1926        Visit Information        Provider Department      5/3/2018 1:45 PM Lance Yao MD [facility]        Today's Diagnoses     Mitral valve insufficiency, unspecified etiology    -  1    S/P mitral valve repair        Benign essential hypertension        Hyperlipidemia LDL goal <100        S/P mitral valve clip implantation        Chronic diastolic heart failure (H)        Coronary artery disease involving native coronary artery of native heart without angina pectoris           Follow-ups after your visit        Additional Services     Follow-Up with Cardiologist                 Your next 10 appointments already scheduled     Sep 26, 2018  1:30 PM CDT   Return Visit with Dennis Hilton MD   [facility] ([facility])    [facility]  [address]  [facility] [address]  [facility] [id number] [phone]              Future tests that were ordered for you today     Open Future Orders        Priority Expected Expires Ordered    Basic metabolic panel Routine 8/1/2018 5/3/2019 5/3/2018    Lipid Profile Routine 8/1/2018 5/3/2019 5/3/2018    ALT Routine 8/1/2018 5/3/2019 5/3/2018    Echocardiogram Routine 8/1/2018 5/3/2019 5/3/2018    Follow-Up with Cardiologist Routine 8/1/2018 5/3/2019 5/3/2018            Who to contact     If you have questions or need follow up information about today's clinic visit or your schedule please contact [facility] directly at [phone].  Normal or non-critical lab and imaging results will be communicated to you by MyChart, letter or phone within 4 business days after the clinic has received the results. If you do not hear from us within 7 days, please contact the clinic through  "MyChart or phone. If you have a critical or abnormal lab result, we will notify you by phone as soon as possible.  Submit refill requests through [employer] or call your pharmacy and they will forward the refill request to us. Please allow 3 business days for your refill to be completed.          Additional Information About Your Visit        [employer] Information     [employer] gives you secure access to your electronic health record. If you see a primary care provider, you can also send messages to your care team and make appointments. If you have questions, please call your primary care clinic.  If you do not have a primary care provider, please call [phone] and they will assist you.        Care EveryWhere ID     This is your Care EveryWhere ID. This could be used by other organizations to access your [facility] medical records  [id number]        Your Vitals Were     Pulse Height BMI (Body Mass Index)             94 1.702 m (5' 7\") 22.71 kg/m2          Blood Pressure from Last 3 Encounters:   05/03/18 126/62   04/25/18 120/74   03/02/18 135/69    Weight from Last 3 Encounters:   05/03/18 65.8 kg (145 lb)   04/25/18 65.3 kg (144 lb)   03/02/18 65.3 kg (144 lb)              We Performed the Following     Follow-Up with Cardiologist        Primary Care Provider Office Phone # Fax #    Evaristo Magaña [phone] [address] [facility] [address]        Equal Access to Services     [facility]: Hadii aad ku hadasho Soomaali, waaxda luqadaha, qaybta kaalmada adeegyada, waxay patricia cruz . So [facility] [phone].    ATENCIÓN: Si habla español, tiene a rubio disposición servicios gratuitos de asistencia lingüística. Llame al [phone].    We comply with applicable federal civil rights laws and [place] laws. We do not discriminate on the basis of race, color, national origin, age, disability, sex, sexual orientation, or gender identity.            Thank you!     " Thank you for choosing [facility]  for your care. Our goal is always to provide you with excellent care. Hearing back from our patients is one way we can continue to improve our services. Please take a few minutes to complete the written survey that you may receive in the mail after your visit with us. Thank you!             Your Updated Medication List - Protect others around you: Learn how to safely use, store and throw away your medicines at www.disposemymeds.org.          This list is accurate as of 5/3/18  2:01 PM.  Always use your most recent med list.                   Brand Name Dispense Instructions for use Diagnosis    aspirin 81 MG EC tablet      Take 1 tablet (81 mg) by mouth daily    S/P mitral valve repair       eucerin cream      Apply topically At Bedtime Apply to hands        finasteride 5 MG tablet    PROSCAR    90 tablet    Take 1 tablet (5 mg) by mouth daily    Benign enlargement of prostate       furosemide 20 MG tablet    LASIX     Take 40 mg by mouth daily        gabapentin 300 MG capsule    NEURONTIN    270 capsule    TAKE 1 CAPSULE BY MOUTH THREE TIMES DAILY    Spinal stenosis of lumbar region with neurogenic claudication       hydrALAZINE 25 MG tablet    APRESOLINE    270 tablet    Take 1 tablet (25 mg) by mouth 3 times daily    Acute on chronic heart failure, unspecified heart failure type (H)       HYDROcodone-acetaminophen 7.5-325 MG per tablet    NORCO    120 tablet    Take 1 tablet by mouth every 6 hours as needed for pain maximum 4 tablet(s) per day    Spinal stenosis of lumbar region, unspecified whether neurogenic claudication present       hypromellose 0.4 % Soln ophthalmic solution    ARTIFICIAL TEARS     Place 2 drops Into the left eye 4 times daily        isosorbide dinitrate 20 MG tablet    ISORDIL    270 tablet    TAKE 1 TABLET BY MOUTH THREE TIMES DAILY    Chronic diastolic heart failure (H), Coronary artery disease involving native  coronary artery of native heart without angina pectoris       metoprolol succinate 50 MG 24 hr tablet    TOPROL XL    90 tablet    Take 1 tablet (50 mg) by mouth daily    Hypertension goal BP (blood pressure) < 140/90       pantoprazole 40 MG EC tablet    PROTONIX    180 tablet    Take 1 tablet (40 mg) by mouth 2 times daily    Abdominal pain, epigastric       potassium chloride SA 10 MEQ CR tablet    K-DUR/KLOR-CON M    90 tablet    Take half tablet one time daily.    Chronic diastolic heart failure (H)       simvastatin 20 MG tablet    ZOCOR    90 tablet    Take 1 tablet (20 mg) by mouth At Bedtime    Hyperlipidemia LDL goal <100       tamsulosin 0.4 MG capsule    FLOMAX    90 capsule    TAKE ONE CAPSULE BY MOUTH DAILY    Benign prostatic hyperplasia without lower urinary tract symptoms

## 2018-05-24 ENCOUNTER — MYC REFILL (OUTPATIENT)
Dept: FAMILY MEDICINE | Facility: CLINIC | Age: 83
End: 2018-05-24

## 2018-05-24 DIAGNOSIS — M48.061 SPINAL STENOSIS OF LUMBAR REGION, UNSPECIFIED WHETHER NEUROGENIC CLAUDICATION PRESENT: ICD-10-CM

## 2018-05-24 NOTE — TELEPHONE ENCOUNTER
Requested Prescriptions   Pending Prescriptions Disp Refills     HYDROcodone-acetaminophen (NORCO) 7.5-325 MG per tablet      Last Written Prescription Date:  4/25/18  Last Fill Quantity: 120,   # refills: 0  Last Office Visit: 4/25/18 Gómez  Future Office visit:    Next 5 appointments (look out 90 days)     May 30, 2018  2:30 PM CDT   Return Visit with Dennis Hilton MD   [facility] ([facility])    [facility]  [address]  [facility] [address]   [phone]            Aug 09, 2018 12:45 PM CDT   Return Visit with Lance Yao MD   [facility] ([facility])    [address] [place] [id number] [phone] OPT 2                   Routing refill request to provider for review/approval because:  Drug not on the [facility], [facility] or [facility] refill protocol or controlled substance   120 tablet 0     Sig: Take 1 tablet by mouth every 6 hours as needed for pain maximum 4 tablet(s) per day    There is no refill protocol information for this order

## 2018-05-24 NOTE — TELEPHONE ENCOUNTER
Controlled Substance Refill Request for Hydrocodone-acetaminophen (NORCO) 7.5-325 Problem List Complete:  Yes   checked in past 6 months?  Yes 5/24/18 NO CONCERNS

## 2018-05-24 NOTE — TELEPHONE ENCOUNTER
Message from [employer]:  Original authorizing provider: MD Shahriar Novoa would like a refill of the following medications:  HYDROcodone-acetaminophen (NORCO) 7.5-325 MG per tablet [Evaristo Magaña MD]    Preferred pharmacy: [facility] [address] [place] [address] [address] AT [facility] OF [address]    Comment:  Dr Magaña: I would appreciate a new prescription for Hydrocodone/Acetaminopen 7.5-325 tabs (120). Please advise as when ready for pick/up. Thank you, Shahriar Gomez  or  05/24/2018

## 2018-05-25 RX ORDER — HYDROCODONE BITARTRATE AND ACETAMINOPHEN 7.5; 325 MG/1; MG/1
1 TABLET ORAL EVERY 6 HOURS PRN
Qty: 120 TABLET | Refills: 0 | Status: SHIPPED | OUTPATIENT
Start: 2018-05-25 | End: 2018-06-11

## 2018-05-30 ENCOUNTER — ONCOLOGY VISIT (OUTPATIENT)
Dept: ONCOLOGY | Facility: CLINIC | Age: 83
End: 2018-05-30
Attending: UROLOGY
Payer: MEDICARE

## 2018-05-30 VITALS
DIASTOLIC BLOOD PRESSURE: 74 MMHG | OXYGEN SATURATION: 96 % | SYSTOLIC BLOOD PRESSURE: 137 MMHG | TEMPERATURE: 98 F | RESPIRATION RATE: 16 BRPM | HEIGHT: 67 IN | WEIGHT: 142 LBS | HEART RATE: 73 BPM | BODY MASS INDEX: 22.29 KG/M2

## 2018-05-30 DIAGNOSIS — C67.4 MALIGNANT NEOPLASM OF POSTERIOR WALL OF URINARY BLADDER (H): Primary | ICD-10-CM

## 2018-05-30 PROCEDURE — G0463 HOSPITAL OUTPT CLINIC VISIT: HCPCS

## 2018-05-30 PROCEDURE — 99213 OFFICE O/P EST LOW 20 MIN: CPT | Performed by: UROLOGY

## 2018-05-30 ASSESSMENT — PAIN SCALES - GENERAL: PAINLEVEL: NO PAIN (0)

## 2018-05-30 NOTE — LETTER
"    2018         RE: Ivan Gomez  [address]  [facility] [address]        Dear Colleague,    Thank you for referring your patient, Ivan Gomez, to the [facility]. Please see a copy of my visit note below.    89 year old male with a history of low urothelial carcinoma here for follow up     Had a complicated post operative course after original TURBT(Trans Urethral Resection of Bladder Tumor)  including the need for a nephrostomy tube, this was internalized for a double J.  Had a recurrence and another TURBT(Trans Urethral Resection of Bladder Tumor) a year later and did better, but still had quite a slow recovery.    We are now not planning any further interventions unless he becomes symptomatic.    Date of Initial Diagnosis: 2015  Stage of Initial Diagnosis: Ta, but large volume with a couple of tumors >3 cms near right UO requiring neph tube and stent  Grade at Initial Diagnosis: Low with focal high grade  Site of First Diagnosis: bladder  Any Recurrence? 2016 low grade Ta  Intravesical Treatments: TURBT( ) TURBT( )  Date of Last Cysto:   Date of Last Upper Tract Imagin/15/15  Patient was pretreated with antibiotics.    Lab Results   Component Value Date    CR 1.13 2016    CR 1.50 2016    CR 1.20 2016    CR 1.30 2016    CR 1.40 2015    CR 1.69 2015    CR 1.70 2015    CR 1.71 2015    CR 1.95 2015    CR 2.13 2015         Patient is a 89 year old  male   Vitals: Blood pressure 137/74, pulse 73, temperature 98  F (36.7  C), temperature source Tympanic, resp. rate 16, height 1.702 m (5' 7\"), weight 64.4 kg (142 lb), SpO2 96 %.  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  Even resistant to any further TURBT(Trans Urethral Resection of Bladder Tumor)     He wants to refuse all subsequent TURBTs unless he is terribly symptomatic, so will not do any cystos    He is moving to near  and I recommended he go to [facility].  I gave him some names.    He may become symptomatic in the near future with his growing UROTHELIAL CARCINOMA.        Dennis Hilton MD  Department of Urology [facility]              Again, thank you for allowing me to participate in the care of your patient.        Sincerely,        Dennis Hilton MD

## 2018-05-30 NOTE — PATIENT INSTRUCTIONS
[place] out of [place].  Will be following up with Dr. Phillip Edouard or Artie Taylor ([facility].) Pt took copies of path reports and last two notes with him

## 2018-05-30 NOTE — NURSING NOTE
"Oncology Rooming Note    May 30, 2018 2:35 PM   Ivan Gomez is a 89 year old male who presents for:    Chief Complaint   Patient presents with     Oncology Clinic Visit     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,      Initial Vitals: /74  Pulse 73  Temp 98  F (36.7  C) (Tympanic)  Resp 16  Ht 1.702 m (5' 7\")  Wt 64.4 kg (142 lb)  SpO2 96%  BMI 22.24 kg/m2 Estimated body mass index is 22.24 kg/(m^2) as calculated from the following:    Height as of this encounter: 1.702 m (5' 7\").    Weight as of this encounter: 64.4 kg (142 lb). Body surface area is 1.74 meters squared.  No Pain (0) Comment: Data Unavailable   No LMP for male patient.  Allergies reviewed: Yes  Medications reviewed: Yes    Medications: Medication refills not needed today.  Pharmacy name entered into [employer]: [facility] [address] [place] [address] [address]O [address] AT [facility] OF [address]    Clinical concerns: follow up     8 minutes for nursing intake (face to face time)     Meghana Malik CMA     DISCHARGE PLAN:  Next appointments: See patient instruction section  Departure Mode: Ambulatory  Accompanied by: self  0 minutes for nursing discharge (face to face time)   Meghana Malik CMA                  "

## 2018-06-01 NOTE — PROGRESS NOTES
"89 year old male with a history of low urothelial carcinoma here for follow up     Had a complicated post operative course after original TURBT(Trans Urethral Resection of Bladder Tumor)  including the need for a nephrostomy tube, this was internalized for a double J.  Had a recurrence and another TURBT(Trans Urethral Resection of Bladder Tumor) a year later and did better, but still had quite a slow recovery.    We are now not planning any further interventions unless he becomes symptomatic.    Date of Initial Diagnosis: 2015  Stage of Initial Diagnosis: Ta, but large volume with a couple of tumors >3 cms near right UO requiring neph tube and stent  Grade at Initial Diagnosis: Low with focal high grade  Site of First Diagnosis: bladder  Any Recurrence? 2016 low grade Ta  Intravesical Treatments: TURBT( ) TURBT( )  Date of Last Cysto:   Date of Last Upper Tract Imagin/15/15  Patient was pretreated with antibiotics.    Lab Results   Component Value Date    CR 1.13 2016    CR 1.50 2016    CR 1.20 2016    CR 1.30 2016    CR 1.40 2015    CR 1.69 2015    CR 1.70 2015    CR 1.71 2015    CR 1.95 2015    CR 2.13 2015         Patient is a 89 year old  male   Vitals: Blood pressure 137/74, pulse 73, temperature 98  F (36.7  C), temperature source Tympanic, resp. rate 16, height 1.702 m (5' 7\"), weight 64.4 kg (142 lb), SpO2 96 %.  General Appearance Adult: Alert, no acute distress, oriented    States that he feels great and gets some blood occasionally, more frequently of recent    A/P History of recurrent low grade bladder cancer Ta, possible T1    Patient is resistant to any further treatment, certainly any radical treatment like cystectomy, chemotherapy, radiation etc.  Even resistant to any further TURBT(Trans Urethral Resection of Bladder Tumor)     He wants to refuse all subsequent TURBTs unless he is terribly symptomatic, so will not do " any cystos    He is moving to near  and I recommended he go to [facility].  I gave him some names.    He may become symptomatic in the near future with his growing UROTHELIAL CARCINOMA.        Dennis Hilton MD  Department of Urology Staff  [facility]

## 2018-06-11 ENCOUNTER — OFFICE VISIT (OUTPATIENT)
Dept: FAMILY MEDICINE | Facility: CLINIC | Age: 83
End: 2018-06-11
Payer: MEDICARE

## 2018-06-11 VITALS
TEMPERATURE: 97 F | BODY MASS INDEX: 22.55 KG/M2 | HEART RATE: 88 BPM | DIASTOLIC BLOOD PRESSURE: 70 MMHG | WEIGHT: 144 LBS | RESPIRATION RATE: 16 BRPM | SYSTOLIC BLOOD PRESSURE: 128 MMHG

## 2018-06-11 DIAGNOSIS — D69.6 THROMBOCYTOPENIA (H): ICD-10-CM

## 2018-06-11 DIAGNOSIS — F11.20 CHRONIC NARCOTIC DEPENDENCE (H): Primary | Chronic | ICD-10-CM

## 2018-06-11 DIAGNOSIS — I10 BENIGN ESSENTIAL HYPERTENSION: ICD-10-CM

## 2018-06-11 DIAGNOSIS — N18.30 CKD (CHRONIC KIDNEY DISEASE) STAGE 3, GFR 30-59 ML/MIN (H): ICD-10-CM

## 2018-06-11 DIAGNOSIS — M48.061 SPINAL STENOSIS OF LUMBAR REGION, UNSPECIFIED WHETHER NEUROGENIC CLAUDICATION PRESENT: ICD-10-CM

## 2018-06-11 PROCEDURE — 99214 OFFICE O/P EST MOD 30 MIN: CPT | Performed by: FAMILY MEDICINE

## 2018-06-11 RX ORDER — HYDROCODONE BITARTRATE AND ACETAMINOPHEN 7.5; 325 MG/1; MG/1
1 TABLET ORAL EVERY 6 HOURS PRN
Qty: 120 TABLET | Refills: 0 | Status: ON HOLD | OUTPATIENT
Start: 2018-06-24 | End: 2019-05-28

## 2018-06-11 NOTE — MR AVS SNAPSHOT
After Visit Summary   6/11/2018    Ivan Gomez    MRN: [id number]           Patient Information     Date Of Birth          10/12/1926        Visit Information        Provider Department      6/11/2018 3:15 PM Evaristo Magaña MD [facility]        Today's Diagnoses     Chronic narcotic dependence (HCC)    -  1    Spinal stenosis of lumbar region, unspecified whether neurogenic claudication present        Thrombocytopenia (H)        CKD (chronic kidney disease) stage 3, GFR 30-59 ml/min        Benign essential hypertension          Care Instructions    The patient is moving to [place] to live with his older brother and nephew.  His partner, Jerri, is now down in the [place] where her family is.  She is in assisted living.  He will be leaving next week for [place].  I did predate a pain medication prescription for him with instructions for the pharmacy to go ahead and fill it early so he is not stuck with I what his pain medication when he gets to [place].  He has the names of a couple of urologists at [facility] that he will connect with.  I suggested he could ask 1 of them who a good general practitioner would be at [facility].  He will let us know what his decision is on general practitioners so that I can send information to them.  Total time of the visit today was 25 minutes over half in counseling on his upcoming move.  The move is pretty hard as he and Jerri were together for 15 years.          Follow-ups after your visit        Your next 10 appointments already scheduled     Aug 02, 2018 10:30 AM CDT   LAB with CHAVIRA LAB   [facility] PHYSICIANS [facility] AT [place] ([facility])    [address] [place] [id number] [phone]           Please do not eat 10-12 hours before your appointment if you are coming in fasting for labs on lipids, cholesterol, or glucose (sugar). This does not apply to pregnant women.  Water, hot tea and black coffee (with nothing added) are okay. Do not drink other fluids, diet soda or chew gum.            Aug 02, 2018 11:00 AM CDT   Ech Complete with [id number]   [facility] CV Echocardiography (Cardiovascular Imaging at [facility])    [address]  [place] [address] [phone]           1. Please bring or wear a comfortable two-piece outfit. 2. You may eat, drink and take your normal medicines. 3. For any questions that cannot be answered, please contact the ordering physician            Aug 09, 2018 12:45 PM CDT   Return Visit with Lance Yao MD   [facility] ([facility])    [address]  [facility] [id number] [phone] OPT 2            Sep 26, 2018  1:30 PM CDT   Return Visit with Dennis Hilton MD   [facility] ([facility])    [facility]  [address]  [address]  [facility] [phone] [phone]              Who to contact     If you have questions or need follow up information about today's clinic visit or your schedule please contact [facility] directly at [phone].  Normal or non-critical lab and imaging results will be communicated to you by Moreboatshart, letter or phone within 4 business days after the clinic has received the results. If you do not hear from us within 7 days, please contact the clinic through MyChart or phone. If you have a critical or abnormal lab result, we will notify you by phone as soon as possible.  Submit refill requests through [employer] or call your pharmacy and they will forward the refill request to us. Please allow 3 business days for your refill to be completed.          Additional Information About Your Visit        [employer] Information     [employer] gives you secure access to your electronic health record. If you see a  primary care provider, you can also send messages to your care team and make appointments. If you have questions, please call your primary care clinic.  If you do not have a primary care provider, please call [phone] and they will assist you.        Care EveryWhere ID     This is your Care EveryWhere ID. This could be used by other organizations to access your [facility] medical records  [id number]        Your Vitals Were     Pulse Temperature Respirations BMI (Body Mass Index)          88 97  F (36.1  C) (Tympanic) 16 22.55 kg/m2         Blood Pressure from Last 3 Encounters:   06/11/18 128/70   05/30/18 137/74   05/03/18 126/62    Weight from Last 3 Encounters:   06/11/18 144 lb (65.3 kg)   05/30/18 142 lb (64.4 kg)   05/03/18 145 lb (65.8 kg)              Today, you had the following     No orders found for display         Today's Medication Changes          These changes are accurate as of 6/11/18 11:59 PM.  If you have any questions, ask your nurse or doctor.               These medicines have changed or have updated prescriptions.        Dose/Directions    HYDROcodone-acetaminophen 7.5-325 MG per tablet   Commonly known as:  NORCO   This may have changed:  These instructions start on 6/24/2018. If you are unsure what to do until then, ask your doctor or other care provider.   Used for:  Spinal stenosis of lumbar region, unspecified whether neurogenic claudication present   Changed by:  Evaristo Magaña MD        Dose:  1 tablet   Start taking on:  6/24/2018   Take 1 tablet by mouth every 6 hours as needed for pain maximum 4 tablet(s) per day   Quantity:  120 tablet   Refills:  0            Where to get your medicines      Some of these will need a paper prescription and others can be bought over the counter.  Ask your nurse if you have questions.     Bring a paper prescription for each of these medications     HYDROcodone-acetaminophen 7.5-325 MG per tablet               Information about OPIOIDS      PRESCRIPTION OPIOIDS: WHAT YOU NEED TO KNOW   We gave you an opioid (narcotic) pain medicine. It is important to manage your pain, but opioids are not always the best choice. You should first try all the other options your care team gave you. Take this medicine for as short a time (and as few doses) as possible.     These medicines have risks:    DO NOT drive when on new or higher doses of pain medicine. These medicines can affect your alertness and reaction times, and you could be arrested for driving under the influence (DUI). If you need to use opioids long-term, talk to your care team about driving.    DO NOT operate heave machinery    DO NOT do any other dangerous activities while taking these medicines.     DO NOT drink any alcohol while taking these medicines.      If the opioid prescribed includes acetaminophen, DO NOT take with any other medicines that contain acetaminophen. Read all labels carefully. Look for the word  acetaminophen  or  Tylenol.  Ask your pharmacist if you have questions or are unsure.    You can get addicted to pain medicines, especially if you have a history of addiction (chemical, alcohol or substance dependence). Talk to your care team about ways to reduce this risk.    Store your pills in a secure place, locked if possible. We will not replace any lost or stolen medicine. If you don t finish your medicine, please throw away (dispose) as directed by your pharmacist. The [place] Pollution Control Agency has more information about safe disposal: https://www.pca.[facility].mn.us/living-green/managing-unwanted-medications.     All opioids tend to cause constipation. Drink plenty of water and eat foods that have a lot of fiber, such as fruits, vegetables, prune juice, apple juice and high-fiber cereal. Take a laxative (Miralax, milk of magnesia, Colace, Senna) if you don t move your bowels at least every other day.          Primary Care Provider Office Phone # Fax #    Evaristo Ovalles  MD Gómez [phone]       [address] [facility] [address]        Equal Access to Services     FARZANEH WILDER : Hadii ramez zarate scottiealber Solibiaali, wamaryda luqmoustapha, qaybta kaalmada perry, perri brianadrian campbell laGodwinvan escobedo. So [facility] [phone].    ATENCIÓN: Si habla español, tiene a rubio disposición servicios gratuitos de asistencia lingüística. Llame al [phone].    We comply with applicable federal civil rights laws and [place] laws. We do not discriminate on the basis of race, color, national origin, age, disability, sex, sexual orientation, or gender identity.            Thank you!     Thank you for choosing [facility]  for your care. Our goal is always to provide you with excellent care. Hearing back from our patients is one way we can continue to improve our services. Please take a few minutes to complete the written survey that you may receive in the mail after your visit with us. Thank you!             Your Updated Medication List - Protect others around you: Learn how to safely use, store and throw away your medicines at www.disposemymeds.org.          This list is accurate as of 6/11/18 11:59 PM.  Always use your most recent med list.                   Brand Name Dispense Instructions for use Diagnosis    aspirin 81 MG EC tablet      Take 1 tablet (81 mg) by mouth daily    S/P mitral valve repair       eucerin cream      Apply topically At Bedtime Apply to hands        finasteride 5 MG tablet    PROSCAR    90 tablet    Take 1 tablet (5 mg) by mouth daily    Benign enlargement of prostate       furosemide 20 MG tablet    LASIX     Take 40 mg by mouth daily        gabapentin 300 MG capsule    NEURONTIN    270 capsule    TAKE 1 CAPSULE BY MOUTH THREE TIMES DAILY    Spinal stenosis of lumbar region with neurogenic claudication       hydrALAZINE 25 MG tablet    APRESOLINE    270 tablet    Take 1 tablet (25 mg) by mouth 3 times daily    Acute on  chronic heart failure, unspecified heart failure type (H)       HYDROcodone-acetaminophen 7.5-325 MG per tablet   Start taking on:  6/24/2018    NORCO    120 tablet    Take 1 tablet by mouth every 6 hours as needed for pain maximum 4 tablet(s) per day    Spinal stenosis of lumbar region, unspecified whether neurogenic claudication present       hypromellose 0.4 % Soln ophthalmic solution    ARTIFICIAL TEARS     Place 2 drops Into the left eye 4 times daily        isosorbide dinitrate 20 MG tablet    ISORDIL    270 tablet    TAKE 1 TABLET BY MOUTH THREE TIMES DAILY    Chronic diastolic heart failure (H), Coronary artery disease involving native coronary artery of native heart without angina pectoris       metoprolol succinate 50 MG 24 hr tablet    TOPROL XL    90 tablet    Take 1 tablet (50 mg) by mouth daily    Hypertension goal BP (blood pressure) < 140/90       pantoprazole 40 MG EC tablet    PROTONIX    180 tablet    Take 1 tablet (40 mg) by mouth 2 times daily    Abdominal pain, epigastric       potassium chloride SA 10 MEQ CR tablet    K-DUR/KLOR-CON M    90 tablet    Take half tablet one time daily.    Chronic diastolic heart failure (H)       simvastatin 20 MG tablet    ZOCOR    90 tablet    Take 1 tablet (20 mg) by mouth At Bedtime    Hyperlipidemia LDL goal <100       tamsulosin 0.4 MG capsule    FLOMAX    90 capsule    TAKE ONE CAPSULE BY MOUTH DAILY    Benign prostatic hyperplasia without lower urinary tract symptoms

## 2018-06-11 NOTE — PROGRESS NOTES
SUBJECTIVE:   Ivan Gomez is a 89 year old male who presents to clinic today for the following health issues:      Consult      Duration: today    Description (location/character/radiation): pt moving to [place] next Tuesday to live with brother    Intensity:  moderate    Accompanying signs and symptoms: go over medications    History (similar episodes/previous evaluation): None    Precipitating or alleviating factors: None    Therapies tried and outcome: None       Back Pain Follow Up      Description:   Location of pain:  bilateral  Character of pain: gnawing  Pain radiation: radiates into the right buttocks and radiates into the left buttocks  Since last visit, pain is:  unchanged  New numbness or weakness in legs, not attributed to pain:  no     Intensity: moderate    History:   Pain interferes with job: Not applicable  Therapies tried without relief: Heat   therapies tried with relief: opioids           Accompanying Signs & Symptoms:  Risk of Fracture:  None  Risk of Cauda Equina:  None  Risk of Infection:  None  Risk of Cancer:  None        Problem list and histories reviewed & adjusted, as indicated.  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      BPH (benign prostatic hypertrophy)     Bladder tumor     Benign essential hypertension     Hyperlipidemia LDL goal <100     Acute idiopathic gout of right foot     Femoral hernia of right side     Drug-induced constipation     Acute respiratory  failure with hypoxia (H)     Heart failure (H)     Mitral regurgitation     S/P mitral valve clip implantation     Dry eyes     Abdominal pain, epigastric     Hypokalemia     Past Surgical History:   Procedure Laterality Date     BACK SURGERY       BIOPSY      face, skin cancer     BIOPSY  8/2016    shoulder lesion     CYSTOSCOPY, TRANSURETHRAL RESECTION (TUR) PROSTATE, COMBINED N/A 8/21/2015    Procedure: COMBINED CYSTOSCOPY, TRANSURETHRAL RESECTION (TUR) PROSTATE;  Surgeon: Dennis Hilton MD;  Location: [facility] OR     CYSTOSCOPY, TRANSURETHRAL RESECTION (TUR) TUMOR BLADDER, COMBINED N/A 9/2/2016    Procedure: COMBINED CYSTOSCOPY, TRANSURETHRAL RESECTION (TUR) TUMOR BLADDER;  Surgeon: Dennis Hilton MD;  Location: [facility] OR     ORTHOPEDIC SURGERY      lumbar and cervical surgery, Sep, 2008, knee surgery     PERCUTANEOUS MITRAL VALVE REPAIR N/A 10/16/2017    Procedure: PERCUTANEOUS MITRAL VALVE REPAIR ANESTHESIA;  Percutaneous MitraClip ;  Surgeon: Eber Monroe MD;  Location: [facility] OR     PICC INSERTION Right 10/14/2017    5fr DL Bard PICC, 40cm (1cm external) in the R basilic vein w/ tip in the mid SVC.       Social History   Substance Use Topics     Smoking status: Former Smoker     Smokeless tobacco: Never Used     Alcohol use No      Comment: doesnt use anymore     Family History   Problem Relation Age of Onset     CANCER Son 64     leukemia ? due to agent orange     Family History Negative Son            Reviewed and updated as needed this visit by clinical staff  Tobacco  Allergies  Meds  Med Hx  Surg Hx  Fam Hx  Soc Hx      Reviewed and updated as needed this visit by Provider         ROS:  Constitutional, HEENT, cardiovascular, pulmonary, gi and gu systems are negative, except as otherwise noted.    OBJECTIVE:                                                    /70 (Cuff Size: Adult Regular)  Pulse 88  Temp 97  F (36.1  C) (Tympanic)  Resp 16  Wt 144 lb (65.3 kg)  BMI  22.55 kg/m2  Body mass index is 22.55 kg/(m^2).  GENERAL APPEARANCE: healthy, alert and no distress         ASSESSMENT/PLAN:                                                        ICD-10-CM    1. Chronic narcotic dependence (HCC) F11.20    2. Spinal stenosis of lumbar region, unspecified whether neurogenic claudication present M48.061 HYDROcodone-acetaminophen (NORCO) 7.5-325 MG per tablet   3. Thrombocytopenia (H) D69.6    4. CKD (chronic kidney disease) stage 3, GFR 30-59 ml/min N18.3    5. Benign essential hypertension I10        Patient Instructions   The patient is moving to [place] to live with his older brother and nephew.  His partner, Jerri, is now down in the [place] where her family is.  She is in assisted living.  He will be leaving next week for [place].  I did predate a pain medication prescription for him with instructions for the pharmacy to go ahead and fill it early so he is not stuck with I what his pain medication when he gets to [place].  He has the names of a couple of urologists at [facility] that he will connect with.  I suggested he could ask 1 of them who a good general practitioner would be at [facility].  He will let us know what his decision is on general practitioners so that I can send information to them.  Total time of the visit today was 25 minutes over half in counseling on his upcoming move.  The move is pretty hard as he and Jerri were together for 15 years.      Evaristo Magaña MD  [facility]

## 2018-06-12 NOTE — PATIENT INSTRUCTIONS
The patient is moving to [place] to live with his older brother and nephew.  His partner, Jerri, is now down in the [place] where her family is.  She is in assisted living.  He will be leaving next week for [place].  I did predate a pain medication prescription for him with instructions for the pharmacy to go ahead and fill it early so he is not stuck with I what his pain medication when he gets to [place].  He has the names of a couple of urologists at [facility] that he will connect with.  I suggested he could ask 1 of them who a good general practitioner would be at [facility].  He will let us know what his decision is on general practitioners so that I can send information to them.  Total time of the visit today was 25 minutes over half in counseling on his upcoming move.  The move is pretty hard as he and Jerri were together for 15 years.

## 2018-07-16 DIAGNOSIS — R10.13 ABDOMINAL PAIN, EPIGASTRIC: ICD-10-CM

## 2018-07-16 DIAGNOSIS — E78.5 HYPERLIPIDEMIA LDL GOAL <100: ICD-10-CM

## 2018-07-16 NOTE — PROGRESS NOTES
"Clinic Care Coordination Contact  OUTREACH    Referral Information:  Referral Source: CTS  Reason for Contact: Follow up after ED visit.   Care Conference: No     Universal Utilization:   ED Visits in last year: 2  Hospital visits in last year: 3  Last PCP appointment: 11/13/17        Multiple Providers or Specialists: yes    Clinical Concerns:    Current Medical Concerns: Patient states I went to the ED on Saturday (11/18/17) because I was having trouble with my GERD. He states his medicine was not working.  Patient states they told him to increase his probiotic and to take maalox.  He has been doing this and the pain is gone. He states he is now having diarrhea and has had a \"couple of accidents over the weekend.\"       He states he has a conference meeting with the TCU for Jerri today. They will be discussing when she can come back home.     He states home care nurse will see him this morning at 11 am. Encouraged him to discuss his diarrhea with home care nurse.     Current Behavioral Concerns: Concerned about bowels      Education Provided to patient: Diet for diarrhea     Clinical Pathway Name: None      Medication Management:  Patient manages his medications with assistance from home care     Functional Status:  Mobility Status: Independent w/Device     Transportation: Drives      Psychosocial:  Current living arrangement:: I live in a private home with spouse    Significant other is in [facility] due to fractured hip.      Resources and Interventions:  Current Resources: Home Care;        Patient/Caregiver understanding: Patient expresses understanding    Frequency of Care Coordination: Monthly        Plan: Clinic care coordinator will continue to follow.    Suad Gutierrez RN, CCM - Care Coordinator     11/20/2017    11:05 AM  [phone]  "
2017

## 2018-07-17 RX ORDER — SIMVASTATIN 20 MG
20 TABLET ORAL AT BEDTIME
Qty: 90 TABLET | Refills: 0 | Status: SHIPPED | OUTPATIENT
Start: 2018-07-17 | End: 2018-10-24

## 2018-07-17 RX ORDER — PANTOPRAZOLE SODIUM 40 MG/1
40 TABLET, DELAYED RELEASE ORAL
Qty: 180 TABLET | Refills: 3 | Status: ON HOLD | OUTPATIENT
Start: 2018-07-17 | End: 2019-05-21

## 2018-07-17 NOTE — TELEPHONE ENCOUNTER
Prescription approved per [facility] Refill Protocol.  Lesley Betancourt RN- Triage [employer]

## 2018-07-17 NOTE — TELEPHONE ENCOUNTER
"Requested Prescriptions   Pending Prescriptions Disp Refills     pantoprazole (PROTONIX) 40 MG EC tablet 180 tablet 1      Last Written Prescription Date:  1/29/18  Last Fill Quantity: 180,  # refills: 1   Last office visit: 6/11/2018 with prescribing provider:     Future Office Visit:   Next 5 appointments (look out 90 days)     Aug 09, 2018 12:45 PM CDT   Return Visit with Lance Yao MD   [facility] ([facility])    [address] [place] [id number] [phone] OPT 2            Sep 26, 2018  1:30 PM CDT   Return Visit with Dennis Hilton MD   [facility] ([facility])    [facility]  [address]  [facility] [id number] [phone]                  Sig: Take 1 tablet (40 mg) by mouth 2 times daily    PPI Protocol Passed    7/17/2018  9:21 AM       Passed - Not on Clopidogrel (unless Pantoprazole ordered)       Passed - No diagnosis of osteoporosis on record       Passed - Recent (12 mo) or future (30 days) visit within the authorizing provider's specialty    Patient had office visit in the last 12 months or has a visit in the next 30 days with authorizing provider or within the authorizing provider's specialty.  See \"Patient Info\" tab in inbasket, or \"Choose Columns\" in Meds & Orders section of the refill encounter.           Passed - Patient is age 18 or older        simvastatin (ZOCOR) 20 MG tablet 90 tablet 2      Last Written Prescription Date:  11/22/17  Last Fill Quantity: 90,  # refills: 2   Last office visit: 6/11/2018 with prescribing provider:     Future Office Visit:   Next 5 appointments (look out 90 days)     Aug 09, 2018 12:45 PM CDT   Return Visit with Lance Yao MD   [facility] ([facility])    [address] [place] [id number] [phone] OPT 2    " "        Sep 26, 2018  1:30 PM CDT   Return Visit with Dennis Hilton MD   [facility] ([facility])    [facility]  [address]  [facility] [id number] [phone]                  Sig: Take 1 tablet (20 mg) by mouth At Bedtime    Statins Protocol Passed    7/17/2018  9:21 AM       Passed - LDL on file in past 12 months    Recent Labs   Lab Test  10/12/17   0729   LDL  29            Passed - No abnormal creatine kinase in past 12 months    Recent Labs   Lab Test  10/16/17   1215   CKT  58               Passed - Recent (12 mo) or future (30 days) visit within the authorizing provider's specialty    Patient had office visit in the last 12 months or has a visit in the next 30 days with authorizing provider or within the authorizing provider's specialty.  See \"Patient Info\" tab in inbasket, or \"Choose Columns\" in Meds & Orders section of the refill encounter.           Passed - Patient is age 18 or older          "

## 2018-07-17 NOTE — TELEPHONE ENCOUNTER
"Requested Prescriptions   Pending Prescriptions Disp Refills     pantoprazole (PROTONIX) 40 MG EC tablet  Last Written Prescription Date:  1/29/2018  Last Fill Quantity: 180 tablet,  # refills: 1   Last Office Visit  6/11/2018        with  [facility], [facility] or [facility] prescribing provider:     Future Office Visit:    Next 5 appointments (look out 90 days)     Aug 09, 2018 12:45 PM CDT   Return Visit with Lance Yao MD   [facility] ([facility])    [address]  [place] [facility] [address]   [phone] OPT 2            Sep 26, 2018  1:30 PM CDT   Return Visit with Dennis Hilton MD   [facility] ([facility])    [facility]  [address]  [address]  [facility] [address]   [phone]                180 tablet 1     Sig: Take 1 tablet (40 mg) by mouth 2 times daily    PPI Protocol Passed    7/17/2018  9:31 AM       Passed - Not on Clopidogrel (unless Pantoprazole ordered)       Passed - No diagnosis of osteoporosis on record       Passed - Recent (12 mo) or future (30 days) visit within the authorizing provider's specialty    Patient had office visit in the last 12 months or has a visit in the next 30 days with authorizing provider or within the authorizing provider's specialty.  See \"Patient Info\" tab in inbasket, or \"Choose Columns\" in Meds & Orders section of the refill encounter.           Passed - Patient is age 18 or older              simvastatin (ZOCOR) 20 MG tablet  Last Written Prescription Date:  11/22/2017  Last Fill Quantity: 90 tablet,  # refills: 2   Last Office Visit  6/11/2018        with  [facility], [facility] or  [facility] prescribing provider:     Future Office Visit:    Next 5 appointments (look out 90 days)     Aug 09, 2018 12:45 PM CDT   Return Visit with Lance Yao MD   [facility] ([facility])    [address]  [place] [address]   [phone] OPT 2            Sep 26, 2018  1:30 PM CDT   Return Visit with Dennis Hilton MD   [facility] ([facility])    [facility]  [address]  [address]  [place] [address]   [phone]                90 tablet 2     Sig: Take 1 tablet (20 mg) by mouth At Bedtime    Statins Protocol Passed    7/17/2018  9:31 AM       Passed - LDL on file in past 12 months    Recent Labs   Lab Test  10/12/17   0729   LDL  29          Passed - No abnormal creatine kinase in past 12 months    Recent Labs   Lab Test  10/16/17   1215   CKT  58           Passed - Recent (12 mo) or future (30 days) visit within the authorizing provider's specialty    Patient had office visit in the last 12 months or has a visit in the next 30 days with authorizing provider or within the authorizing provider's specialty.  See \"Patient Info\" tab in inbasket, or \"Choose Columns\" in Meds & Orders section of the refill encounter.           Passed - Patient is age 18 or older          "

## 2018-07-30 ENCOUNTER — TELEPHONE (OUTPATIENT)
Dept: CARDIOLOGY | Facility: CLINIC | Age: 83
End: 2018-07-30

## 2018-07-30 NOTE — TELEPHONE ENCOUNTER
----- Message from Mile Thompson sent at 7/30/2018  1:10 PM CDT -----  Regarding: RICARDA   pt called to cancel all future appts as he has moved to [place]

## 2018-08-13 DIAGNOSIS — E78.5 HYPERLIPIDEMIA LDL GOAL <100: ICD-10-CM

## 2018-08-13 NOTE — TELEPHONE ENCOUNTER
"Requested Prescriptions   Pending Prescriptions Disp Refills     simvastatin (ZOCOR) 20 MG tablet [Pharmacy Med Name: SIMVASTATIN 20MG TABLETS]  Last Written Prescription Date:  7/17/2018  Last Fill Quantity: 90,  # refills: 0   Last office visit: 6/11/2018 with prescribing provider:  Dr Magaña   Future Office Visit:     90 tablet 0     Sig: TAKE 1 TABLET(20 MG) BY MOUTH AT BEDTIME    Statins Protocol Passed    8/13/2018  3:40 AM       Passed - LDL on file in past 12 months    Recent Labs   Lab Test  10/12/17   0729   LDL  29            Passed - No abnormal creatine kinase in past 12 months    Recent Labs   Lab Test  10/16/17   1215   CKT  58               Passed - Recent (12 mo) or future (30 days) visit within the authorizing provider's specialty    Patient had office visit in the last 12 months or has a visit in the next 30 days with authorizing provider or within the authorizing provider's specialty.  See \"Patient Info\" tab in inbasket, or \"Choose Columns\" in Meds & Orders section of the refill encounter.           Passed - Patient is age 18 or older          "

## 2018-08-15 RX ORDER — SIMVASTATIN 20 MG
TABLET ORAL
Qty: 90 TABLET | Refills: 1 | Status: SHIPPED | OUTPATIENT
Start: 2018-08-15 | End: 2019-02-05

## 2018-08-18 DIAGNOSIS — I50.9 ACUTE ON CHRONIC HEART FAILURE, UNSPECIFIED HEART FAILURE TYPE (H): ICD-10-CM

## 2018-08-18 NOTE — TELEPHONE ENCOUNTER
"Requested Prescriptions   Pending Prescriptions Disp Refills     hydrALAZINE (APRESOLINE) 25 MG tablet [Pharmacy Med Name: HYDRALAZINE 25MG TABLETS (ORANGE)]  Last Written Prescription Date:  04/27/2018  Last Fill Quantity: 270,  # refills: 1   Last office visit: 6/11/2018 with prescribing provider:  HAIR   Future Office Visit:     135 tablet 0     Sig: TAKE ONE AND ONE-HALF TABLETS BY MOUTH EVERY 8 HOURS AS DIRECTED. HOLD IF SYSTOLIC BLOOD PRESSURE IS LESS THAN 100    Vasodilators Passed    8/18/2018  3:38 AM       Passed - Most recent BP less than 140/90 on record    BP Readings from Last 3 Encounters:   06/11/18 128/70   05/30/18 137/74   05/03/18 126/62                Passed - Most recent encounter is not a hospital encounter. Patient has recent (12 mos) or future (1 mos) visit with authorizing provider's specialty    Patient's most recent encounter is NOT a hospital encounter and has had an office visit in the last 12 months or has a visit in the next 30 days with authorizing provider or within the authorizing provider's specialty.      See \"Patient Info\" tab in inbasket, or \"Choose Columns\" in Meds & Orders section of the refill encounter.      If most recent encounter is a hospital encounter AND the patient does NOT have an appointment scheduled with the authorizing provider or authorizing provider's specialty within the next 30 days, forward refill to authorizing provider for medication review.         Passed - Patient is of age 18 years or older          "

## 2018-08-21 RX ORDER — HYDRALAZINE HYDROCHLORIDE 25 MG/1
TABLET, FILM COATED ORAL
Qty: 135 TABLET | Refills: 1 | Status: SHIPPED | OUTPATIENT
Start: 2018-08-21 | End: 2018-10-15

## 2018-09-14 ENCOUNTER — TELEPHONE (OUTPATIENT)
Dept: ONCOLOGY | Facility: CLINIC | Age: 83
End: 2018-09-14

## 2018-09-14 NOTE — TELEPHONE ENCOUNTER
Patient called and is requesting MD call him about his urology issues.  Would not discuss with writer specific questions.  Informed patient would route message to MD and his RN.  Patient vebalized understanding.  Marlon Gorman RN, BSN, OCN  [facility]  Patient Care Coordinator  '

## 2018-10-15 DIAGNOSIS — I50.9 ACUTE ON CHRONIC HEART FAILURE, UNSPECIFIED HEART FAILURE TYPE (H): ICD-10-CM

## 2018-10-15 NOTE — TELEPHONE ENCOUNTER
"Requested Prescriptions   Pending Prescriptions Disp Refills     hydrALAZINE (APRESOLINE) 25 MG tablet [Pharmacy Med Name: HYDRALAZINE  25MG TABLETS(ORANGE)]  Last Written Prescription Date:  8/21/18  Last Fill Quantity: 135,  # refills: 1   Last office visit: 6/11/2018 with prescribing provider:  Gómez   Future Office Visit:   Next 5 appointments (look out 90 days)     Oct 24, 2018 12:30 PM CDT   Return Visit with Dennis Hilton MD   [facility] ([facility])    [facility]  [address]  [address]  [facility] [address]   [phone]                  135 tablet 0     Sig: TAKE ONE AND ONE-HALF TABLETS BY MOUTH EVERY 8 HOURS AS DIRECTED. HOLD IF SYSTOLIC BLOOD PRESSURE IS LESS THAN 100    Vasodilators Passed    10/15/2018  6:28 AM       Passed - Most recent BP less than 140/90 on record    BP Readings from Last 3 Encounters:   06/11/18 128/70   05/30/18 137/74   05/03/18 126/62                Passed - Most recent encounter is not a hospital encounter. Patient has recent (12 mos) or future (1 mos) visit with authorizing provider's specialty    Patient's most recent encounter is NOT a hospital encounter and has had an office visit in the last 12 months or has a visit in the next 30 days with authorizing provider or within the authorizing provider's specialty.      See \"Patient Info\" tab in inbasket, or \"Choose Columns\" in Meds & Orders section of the refill encounter.      If most recent encounter is a hospital encounter AND the patient does NOT have an appointment scheduled with the authorizing provider or authorizing provider's specialty within the next 30 days, forward refill to authorizing provider for medication review.         Passed - Patient is of age 18 years or older          "

## 2018-10-16 RX ORDER — HYDRALAZINE HYDROCHLORIDE 25 MG/1
TABLET, FILM COATED ORAL
Qty: 135 TABLET | Refills: 1 | Status: SHIPPED | OUTPATIENT
Start: 2018-10-16 | End: 2018-10-29

## 2018-10-16 NOTE — TELEPHONE ENCOUNTER
Prescription approved per [facility] Refill Protocol.  Lesley Betancourt RN- Triage [employer]

## 2018-10-24 ENCOUNTER — ONCOLOGY VISIT (OUTPATIENT)
Dept: ONCOLOGY | Facility: CLINIC | Age: 83
End: 2018-10-24
Attending: UROLOGY
Payer: MEDICARE

## 2018-10-24 ENCOUNTER — HOSPITAL ENCOUNTER (OUTPATIENT)
Facility: CLINIC | Age: 83
Setting detail: SPECIMEN
Discharge: HOME | End: 2018-10-24
Attending: UROLOGY | Admitting: UROLOGY
Payer: MEDICARE

## 2018-10-24 VITALS
WEIGHT: 145.4 LBS | DIASTOLIC BLOOD PRESSURE: 77 MMHG | BODY MASS INDEX: 22.82 KG/M2 | HEIGHT: 67 IN | TEMPERATURE: 98.9 F | SYSTOLIC BLOOD PRESSURE: 140 MMHG | HEART RATE: 83 BPM | RESPIRATION RATE: 14 BRPM | OXYGEN SATURATION: 95 %

## 2018-10-24 DIAGNOSIS — C67.8 MALIGNANT NEOPLASM OF OVERLAPPING SITES OF BLADDER (H): Primary | ICD-10-CM

## 2018-10-24 LAB
ABO + RH BLD: NORMAL
ABO + RH BLD: NORMAL
BLD GP AB SCN SERPL QL: NORMAL
BLOOD BANK CMNT PATIENT-IMP: NORMAL
SPECIMEN EXP DATE BLD: NORMAL

## 2018-10-24 PROCEDURE — 99213 OFFICE O/P EST LOW 20 MIN: CPT | Performed by: UROLOGY

## 2018-10-24 PROCEDURE — 86850 RBC ANTIBODY SCREEN: CPT | Performed by: UROLOGY

## 2018-10-24 PROCEDURE — G0463 HOSPITAL OUTPT CLINIC VISIT: HCPCS

## 2018-10-24 PROCEDURE — 86900 BLOOD TYPING SEROLOGIC ABO: CPT | Performed by: UROLOGY

## 2018-10-24 PROCEDURE — 86901 BLOOD TYPING SEROLOGIC RH(D): CPT | Performed by: UROLOGY

## 2018-10-24 RX ORDER — CEFAZOLIN SODIUM 1 G
1 VIAL (EA) INJECTION SEE ADMIN INSTRUCTIONS
Status: CANCELLED | OUTPATIENT
Start: 2018-10-24

## 2018-10-24 ASSESSMENT — PAIN SCALES - GENERAL: PAINLEVEL: NO PAIN (0)

## 2018-10-24 NOTE — NURSING NOTE
Surgery education complete with patient for TURBT. See education section for further details.     FRANCISCO Stokes

## 2018-10-24 NOTE — LETTER
"    10/24/2018         RE: Ivan Gomez  [address] [address]        Dear Colleague,    Thank you for referring your patient, Ivan Gomez, to the [facility]. Please see a copy of my visit note below.    89 year old male with a history of low urothelial carcinoma here for follow up     Had a complicated post operative course after original TURBT(Trans Urethral Resection of Bladder Tumor)  including the need for a nephrostomy tube, this was internalized for a double J.  Had a recurrence and another TURBT(Trans Urethral Resection of Bladder Tumor) a year later and did better, but still had quite a slow recovery.    We are now not planning any further interventions unless he becomes symptomatic.    Date of Initial Diagnosis: 2015  Stage of Initial Diagnosis: Ta, but large volume with a couple of tumors >3 cms near right UO requiring neph tube and stent  Grade at Initial Diagnosis: Low with focal high grade  Site of First Diagnosis: bladder  Any Recurrence? 2016 low grade Ta, now symptomatic recurrence  Intravesical Treatments: TURBT( ) TURBT( )  Date of Last Cysto:   Date of Last Upper Tract Imagin/15/15  Patient was pretreated with antibiotics.    Lab Results   Component Value Date    CR 1.70 2018    CR 1.56 2018    CR 1.73 2018    CR 1.75 2017    CR 1.28 2017    CR 1.41 11/15/2017    CR 1.36 10/30/2017    CR 1.47 10/27/2017    CR 1.32 10/23/2017    CR 1.34 10/20/2017       Recent Echo 2018 EF 70%, moderate left ventrical hypertrophy will scan in result    EKG sinus rhythmn    CT scan shows no mets, no fat stranding, 4 cm tumor    Patient is a 89 year old  male   Vitals: Blood pressure 140/77, pulse 83, temperature 98.9  F (37.2  C), temperature source Tympanic, resp. rate 14, height 1.702 m (5' 7\"), weight 66 kg (145 lb 6.4 oz), SpO2 95 %.  General Appearance Adult: Alert, no acute distress, oriented    States " that he feels great and gets some blood occasionally, more frequently of recent    A/P History of recurrent low grade bladder cancer Ta    Patient is resistant to any further treatment, certainly any radical treatment like cystectomy, chemotherapy, radiation etc.  Even resistant to any further TURBT(Trans Urethral Resection of Bladder Tumor)  However, he is not bleeding nearly every day and passing clots    Also CT scan shows ~4-5 cm tumor    Will schedule TURBT(Trans Urethral Resection of Bladder Tumor), likely will need several day hospital stay, may need carvalho post op.  Last two TURBTs was in hospital for 4-6 days.    Dennis Hilton MD  Department of Urology Staff  [facility]              Again, thank you for allowing me to participate in the care of your patient.        Sincerely,        Dennis Hilton MD

## 2018-10-24 NOTE — NURSING NOTE
"Oncology Rooming Note    October 24, 2018 12:24 PM   Ivan Gomez is a 89 year old male who presents for:    Chief Complaint   Patient presents with     Oncology Clinic Visit     bladder cancer     Initial Vitals: /77  Pulse 83  Temp 98.9  F (37.2  C) (Tympanic)  Resp 14  Ht 1.702 m (5' 7\")  Wt 66 kg (145 lb 6.4 oz)  SpO2 95%  BMI 22.77 kg/m2 Estimated body mass index is 22.77 kg/(m^2) as calculated from the following:    Height as of this encounter: 1.702 m (5' 7\").    Weight as of this encounter: 66 kg (145 lb 6.4 oz). Body surface area is 1.77 meters squared.  No Pain (0) Comment: Data Unavailable   No LMP for male patient.  Allergies reviewed: Yes  Medications reviewed: Yes    Medications: Medication refills not needed today.  Pharmacy name entered into [employer]:    [facility] [id number] [facility] [address] AT [facility] [address] [facility] [place] [place] - [address] AT [id number] [place], [place] - [address] AT [facility] [address]    Clinical concerns: f/u     8 minutes for nursing intake (face to face time)     Ivelisse Wyatt CMA            DISCHARGE PLAN:  Next appointments: See patient instruction section  Departure Mode: Ambulatory  Accompanied by: self  0 minutes for nursing discharge (face to face time)   Ivelisse Wyatt CMA      "

## 2018-10-24 NOTE — MR AVS SNAPSHOT
After Visit Summary   10/24/2018    Ivan Gomez    MRN: [id number]           Patient Information     Date Of Birth          10/12/1926        Visit Information        Provider Department      10/24/2018 12:30 PM Dennis Hilton MD [facility] On[facility]      Today's Diagnoses     Malignant neoplasm of overlapping sites of bladder (H)    -  1      Care Instructions    TURBT surgery education today. - Done. Lauren    Schedule TURBT at [facility]. Message will be sent to [facility] surgery scheduler.     Schedule pre-op with primary in [place] before surgery     No need to schedule any future appointments at this time.  Haven SAUCEDA  AVS given to patient            Follow-ups after your visit        Your next 10 appointments already scheduled     Nov 08, 2018 12:40 PM CST   (Arrive by 12:25 PM)   Return Visit with Dennis Hilton MD   [facility] ([facility])    [address] [place] [address] [phone]              Who to contact     If you have questions or need follow up information about today's clinic visit or your schedule please contact [facility] directly at [phone].  Normal or non-critical lab and imaging results will be communicated to you by MyChart, letter or phone within 4 business days after the clinic has received the results. If you do not hear from us within 7 days, please contact the clinic through MyChart or phone. If you have a critical or abnormal lab result, we will notify you by phone as soon as possible.  Submit refill requests through [employer] or call your pharmacy and they will forward the refill request to us. Please allow 3 business days for your refill to be completed.          Additional Information About Your Visit        [employer] Information     [employer] gives you secure access to your electronic health  "record. If you see a primary care provider, you can also send messages to your care team and make appointments. If you have questions, please call your primary care clinic.  If you do not have a primary care provider, please call [phone] and they will assist you.        Care EveryWhere ID     This is your Care EveryWhere ID. This could be used by other organizations to access your [place] medical records  [id number]        Your Vitals Were     Pulse Temperature Respirations Height Pulse Oximetry BMI (Body Mass Index)    83 98.9  F (37.2  C) (Tympanic) 14 1.702 m (5' 7\") 95% 22.77 kg/m2       Blood Pressure from Last 3 Encounters:   10/24/18 140/77   06/11/18 128/70   05/30/18 137/74    Weight from Last 3 Encounters:   10/24/18 66 kg (145 lb 6.4 oz)   06/11/18 65.3 kg (144 lb)   05/30/18 64.4 kg (142 lb)              We Performed the Following     ABO/Rh type and screen     Sera-Operative Worksheet  (Urology General)        Primary Care Provider Office Phone # Fax #    Evaristo Magaña [phone]       [address] [facility] [address]        Equal Access to Services     FARZANEH WILDER : Hadii ramez ku hadasho Soomaali, waaxda luqadaha, qaybta kaalmada adeegyada, waxay idiin haycarltonn parul escobedo. So [facility] [phone].    ATENCIÓN: Si habla español, tiene a rubio disposición servicios gratuitos de asistencia lingüística. Llame al [phone].    We comply with applicable federal civil rights laws and [place] laws. We do not discriminate on the basis of race, color, national origin, age, disability, sex, sexual orientation, or gender identity.            Thank you!     Thank you for choosing [facility]  for your care. Our goal is always to provide you with excellent care. Hearing back from our patients is one way we can continue to improve our services. Please take a few minutes to complete the written survey that you may receive in the mail after your " visit with us. Thank you!             Your Updated Medication List - Protect others around you: Learn how to safely use, store and throw away your medicines at www.disposemymeds.org.          This list is accurate as of 10/24/18  2:06 PM.  Always use your most recent med list.                   Brand Name Dispense Instructions for use Diagnosis    aspirin 81 MG EC tablet      Take 1 tablet (81 mg) by mouth daily    S/P mitral valve repair       eucerin cream      Apply topically At Bedtime Apply to hands        finasteride 5 MG tablet    PROSCAR    90 tablet    Take 1 tablet (5 mg) by mouth daily    Benign enlargement of prostate       furosemide 20 MG tablet    LASIX     Take 40 mg by mouth daily        gabapentin 300 MG capsule    NEURONTIN    270 capsule    TAKE 1 CAPSULE BY MOUTH THREE TIMES DAILY    Spinal stenosis of lumbar region with neurogenic claudication       * hydrALAZINE 25 MG tablet    APRESOLINE    270 tablet    Take 1 tablet (25 mg) by mouth 3 times daily    Acute on chronic heart failure, unspecified heart failure type (H)       * hydrALAZINE 25 MG tablet    APRESOLINE    135 tablet    TAKE ONE AND ONE-HALF TABLETS BY MOUTH EVERY 8 HOURS AS DIRECTED. HOLD IF SYSTOLIC BLOOD PRESSURE IS LESS THAN 100    Acute on chronic heart failure, unspecified heart failure type (H)       HYDROcodone-acetaminophen 7.5-325 MG per tablet    NORCO    120 tablet    Take 1 tablet by mouth every 6 hours as needed for pain maximum 4 tablet(s) per day    Spinal stenosis of lumbar region, unspecified whether neurogenic claudication present       hypromellose 0.4 % Soln ophthalmic solution    ARTIFICIAL TEARS     Place 2 drops Into the left eye 4 times daily        isosorbide dinitrate 20 MG tablet    ISORDIL    270 tablet    TAKE 1 TABLET BY MOUTH THREE TIMES DAILY    Chronic diastolic heart failure (H), Coronary artery disease involving native coronary artery of native heart without angina pectoris       metoprolol  succinate 50 MG 24 hr tablet    TOPROL XL    90 tablet    Take 1 tablet (50 mg) by mouth daily    Hypertension goal BP (blood pressure) < 140/90       pantoprazole 40 MG EC tablet    PROTONIX    180 tablet    Take 1 tablet (40 mg) by mouth 2 times daily    Abdominal pain, epigastric       potassium chloride SA 10 MEQ CR tablet    K-DUR/KLOR-CON M    90 tablet    Take half tablet one time daily.    Chronic diastolic heart failure (H)       simvastatin 20 MG tablet    ZOCOR    90 tablet    TAKE 1 TABLET(20 MG) BY MOUTH AT BEDTIME    Hyperlipidemia LDL goal <100       tamsulosin 0.4 MG capsule    FLOMAX    90 capsule    TAKE ONE CAPSULE BY MOUTH DAILY    Benign prostatic hyperplasia without lower urinary tract symptoms       * Notice:  This list has 2 medication(s) that are the same as other medications prescribed for you. Read the directions carefully, and ask your doctor or other care provider to review them with you.

## 2018-10-24 NOTE — PROGRESS NOTES
"89 year old male with a history of low urothelial carcinoma here for follow up     Had a complicated post operative course after original TURBT(Trans Urethral Resection of Bladder Tumor)  including the need for a nephrostomy tube, this was internalized for a double J.  Had a recurrence and another TURBT(Trans Urethral Resection of Bladder Tumor) a year later and did better, but still had quite a slow recovery.    We are now not planning any further interventions unless he becomes symptomatic.    Date of Initial Diagnosis: 2015  Stage of Initial Diagnosis: Ta, but large volume with a couple of tumors >3 cms near right UO requiring neph tube and stent  Grade at Initial Diagnosis: Low with focal high grade  Site of First Diagnosis: bladder  Any Recurrence? 2016 low grade Ta, now symptomatic recurrence  Intravesical Treatments: TURBT( ) TURBT( )  Date of Last Cysto:   Date of Last Upper Tract Imagin/15/15  Patient was pretreated with antibiotics.    Lab Results   Component Value Date    CR 1.70 2018    CR 1.56 2018    CR 1.73 2018    CR 1.75 2017    CR 1.28 2017    CR 1.41 11/15/2017    CR 1.36 10/30/2017    CR 1.47 10/27/2017    CR 1.32 10/23/2017    CR 1.34 10/20/2017       Recent Echo 2018 EF 70%, moderate left ventrical hypertrophy will scan in result    EKG sinus rhythmn    CT scan shows no mets, no fat stranding, 4 cm tumor    Patient is a 89 year old  male   Vitals: Blood pressure 140/77, pulse 83, temperature 98.9  F (37.2  C), temperature source Tympanic, resp. rate 14, height 1.702 m (5' 7\"), weight 66 kg (145 lb 6.4 oz), SpO2 95 %.  General Appearance Adult: Alert, no acute distress, oriented    States that he feels great and gets some blood occasionally, more frequently of recent    A/P History of recurrent low grade bladder cancer Ta    Patient is resistant to any further treatment, certainly any radical treatment like cystectomy, chemotherapy, " radiation etc.  Even resistant to any further TURBT(Trans Urethral Resection of Bladder Tumor)  However, he is not bleeding nearly every day and passing clots    Also CT scan shows ~4-5 cm tumor    Will schedule TURBT(Trans Urethral Resection of Bladder Tumor), likely will need several day hospital stay, may need carvalho post op.  Last two TURBTs was in hospital for 4-6 days.    Dennis Hilton MD  Department of Urology Staff  [facility]

## 2018-10-24 NOTE — PATIENT INSTRUCTIONS
TURBT surgery education today. - Done. Lauren    Schedule TURBT at [facility]. Message will be sent to [facility] surgery scheduler.     Schedule pre-op with primary in [place] before surgery     No need to schedule any future appointments at this time.  Haven STEARNS given to patient

## 2018-10-29 ENCOUNTER — OFFICE VISIT (OUTPATIENT)
Dept: FAMILY MEDICINE | Facility: CLINIC | Age: 83
End: 2018-10-29
Payer: MEDICARE

## 2018-10-29 VITALS
WEIGHT: 150 LBS | DIASTOLIC BLOOD PRESSURE: 74 MMHG | OXYGEN SATURATION: 95 % | SYSTOLIC BLOOD PRESSURE: 136 MMHG | BODY MASS INDEX: 23.49 KG/M2 | TEMPERATURE: 98 F | HEART RATE: 85 BPM

## 2018-10-29 DIAGNOSIS — K41.90 FEMORAL HERNIA OF RIGHT SIDE: ICD-10-CM

## 2018-10-29 DIAGNOSIS — M48.061 SPINAL STENOSIS OF LUMBAR REGION, UNSPECIFIED WHETHER NEUROGENIC CLAUDICATION PRESENT: ICD-10-CM

## 2018-10-29 DIAGNOSIS — D49.4 BLADDER TUMOR: ICD-10-CM

## 2018-10-29 DIAGNOSIS — N18.30 CKD (CHRONIC KIDNEY DISEASE) STAGE 3, GFR 30-59 ML/MIN (H): ICD-10-CM

## 2018-10-29 DIAGNOSIS — I10 BENIGN ESSENTIAL HYPERTENSION: ICD-10-CM

## 2018-10-29 DIAGNOSIS — Z98.890 S/P MITRAL VALVE CLIP IMPLANTATION: ICD-10-CM

## 2018-10-29 DIAGNOSIS — Z95.818 S/P MITRAL VALVE CLIP IMPLANTATION: ICD-10-CM

## 2018-10-29 DIAGNOSIS — R10.13 ABDOMINAL PAIN, EPIGASTRIC: ICD-10-CM

## 2018-10-29 DIAGNOSIS — E78.5 HYPERLIPIDEMIA LDL GOAL <100: ICD-10-CM

## 2018-10-29 DIAGNOSIS — D69.6 THROMBOCYTOPENIA (H): ICD-10-CM

## 2018-10-29 DIAGNOSIS — I34.0 NON-RHEUMATIC MITRAL REGURGITATION: ICD-10-CM

## 2018-10-29 DIAGNOSIS — Z01.818 PREOP GENERAL PHYSICAL EXAM: Primary | ICD-10-CM

## 2018-10-29 LAB
BASOPHILS # BLD AUTO: 0 10E9/L (ref 0–0.2)
BASOPHILS NFR BLD AUTO: 0.5 %
DIFFERENTIAL METHOD BLD: ABNORMAL
EOSINOPHIL # BLD AUTO: 0.1 10E9/L (ref 0–0.7)
EOSINOPHIL NFR BLD AUTO: 1.6 %
ERYTHROCYTE [DISTWIDTH] IN BLOOD BY AUTOMATED COUNT: 16.4 % (ref 10–15)
HCT VFR BLD AUTO: 32.6 % (ref 40–53)
HGB BLD-MCNC: 9.7 G/DL (ref 13.3–17.7)
LYMPHOCYTES # BLD AUTO: 0.8 10E9/L (ref 0.8–5.3)
LYMPHOCYTES NFR BLD AUTO: 18.3 %
MCH RBC QN AUTO: 27.3 PG (ref 26.5–33)
MCHC RBC AUTO-ENTMCNC: 29.8 G/DL (ref 31.5–36.5)
MCV RBC AUTO: 92 FL (ref 78–100)
MONOCYTES # BLD AUTO: 0.4 10E9/L (ref 0–1.3)
MONOCYTES NFR BLD AUTO: 8.5 %
NEUTROPHILS # BLD AUTO: 3.1 10E9/L (ref 1.6–8.3)
NEUTROPHILS NFR BLD AUTO: 71.1 %
PLATELET # BLD AUTO: 91 10E9/L (ref 150–450)
RBC # BLD AUTO: 3.55 10E12/L (ref 4.4–5.9)
WBC # BLD AUTO: 4.4 10E9/L (ref 4–11)

## 2018-10-29 PROCEDURE — 99214 OFFICE O/P EST MOD 30 MIN: CPT | Performed by: FAMILY MEDICINE

## 2018-10-29 PROCEDURE — 84520 ASSAY OF UREA NITROGEN: CPT | Performed by: FAMILY MEDICINE

## 2018-10-29 PROCEDURE — 82565 ASSAY OF CREATININE: CPT | Performed by: FAMILY MEDICINE

## 2018-10-29 PROCEDURE — 85025 COMPLETE CBC W/AUTO DIFF WBC: CPT | Performed by: FAMILY MEDICINE

## 2018-10-29 PROCEDURE — 36415 COLL VENOUS BLD VENIPUNCTURE: CPT | Performed by: FAMILY MEDICINE

## 2018-10-29 PROCEDURE — 80051 ELECTROLYTE PANEL: CPT | Performed by: FAMILY MEDICINE

## 2018-10-29 NOTE — PROGRESS NOTES
[facility]  [address]  [facility] [address]  [phone]  Dept: [phone]    PRE-OP EVALUATION:  Today's date: 10/29/2018    Ivan Gomez (: 10/12/1926) presents for pre-operative evaluation assessment as requested by Dr. Dennis Hilton.  He requires evaluation and anesthesia risk assessment prior to undergoing surgery/procedure for treatment of turbt .     Proposed Surgery/ Procedure: TRANSURETHRAL RESECTION OF THE BLADDER TUMOR  Date of Surgery/ Procedure: 18  Time of Surgery/ Procedure: 2P  Hospital/Surgical Facility: [facility]  Fax number for surgical facility: N/A  Primary Physician: Evaristo Magaña  Type of Anesthesia Anticipated: to be determined    Patient has a Health Care Directive or Living Will:  YES    1. NO - Do you have a history of heart attack, stroke, stent, bypass or surgery on an artery in the head, neck, heart or legs?   2. NO - Do you ever have any pain or discomfort in your chest?  3. NO - Do you have a history of  Heart Failure?  4. NO - Are you troubled by shortness of breath when: walking on the level, up a slight hill or at night?  5. NO - Do you currently have a cold, bronchitis or other respiratory infection?  6. NO - Do you have a cough, shortness of breath or wheezing?  7. YES - Do you sometimes get pains in the calves of your legs when you walk? Periodically and treated with gabapentin and a walker when outside  8. NO - Do you or anyone in your family have previous history of blood clots?  9. NO - Do you or does anyone in your family have a serious bleeding problem such as prolonged bleeding following surgeries or cuts?  10. YES - Have you ever had problems with anemia or been told to take iron pills? Slight anemia  11. NO - Have you had any abnormal blood loss such as black, tarry or bloody stools, or abnormal vaginal bleeding?  12. NO - Have you ever had a blood transfusion?  13. NO -  Have you or any of your relatives ever had problems with anesthesia?  14. NO - Do you have sleep apnea, excessive snoring or daytime drowsiness?  15. NO - Do you have any prosthetic heart valves?  16. NO - Do you have prosthetic joints?  17. NO - Is there any chance that you may be pregnant?      HPI:     HPI related to upcoming procedure: recurrent bladder tumor      See problem list for active medical problems.  Problems all longstanding and stable, except as noted/documented.  See ROS for pertinent symptoms related to these conditions.                                                                                                                                                          .    MEDICAL HISTORY:     Patient Active Problem List    Diagnosis Date Noted     Hypokalemia 10/27/2017     Priority: Medium     Abdominal pain, epigastric 10/26/2017     Priority: Medium     Dry eyes 10/25/2017     Priority: Medium     S/P mitral valve clip implantation 10/24/2017     Priority: Medium     Mitral regurgitation 10/16/2017     Priority: Medium     Heart failure (H) 10/13/2017     Priority: Medium     Acute respiratory failure with hypoxia (H) 10/11/2017     Priority: Medium     Femoral hernia of right side 05/09/2017     Priority: Medium     Drug-induced constipation 05/09/2017     Priority: Medium     Acute idiopathic gout of right foot 03/23/2017     Priority: Medium     Hyperlipidemia LDL goal <100 11/30/2016     Priority: Medium     Benign essential hypertension 09/14/2016     Priority: Medium     Bladder tumor 09/02/2016     Priority: Medium     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 Bell Note Oct 2013:You don't want to use a long acting narcotic for pain. At this time, you are using #120 hydro/apap 7.5/325 monthly./   I told you today and you agreed to follow up to see Dr. Evaristo Magaña in about 2 months before the present refills of narcotic are due to be refilled   I also told you that it would be my recommendation that you then be given an increased monthly prescription of #150 per month to better control the pain.   You do not want any further surgery on your back at this time so want to focus on better pain control.    Patient is followed by ANTHONY Murphy for ongoing prescription of pain medication.  All refills should be approved by this provider, or covering partner.    Medication(s): norco 7.5/325.   Maximum quantity per month: 120  Clinic visit frequency required: Q 4 months     Controlled substance agreement on file: 9/3/14    Pain Clinic evaluation in the past: Yes       Date/Location:  spine clinic    DIRE Total Score(s):  No flowsheet data found.    Last [place] website verification: 1-28-18 provider to review       Glucose intolerance (impaired glucose tolerance) 08/18/2015     Priority: Medium     Advanced directives, counseling/discussion 12/23/2014     Priority: Medium     Advance Care Planning:   ACP Review and Resources Provided:  Reviewed chart for advance care plan.  Ivan Gomez has no plan or code status on file. Discussed available resources and provided with information.   Added by Mirela Shepard on 12/23/2014             Knee pain 09/03/2014     Priority: Medium     Thrombocytopenia (H) 07/28/2014     Priority: Medium     CKD (chronic kidney disease) stage 3, GFR 30-59 ml/min (H) 07/28/2014     Priority: Medium     Chronic narcotic dependence (HCC) 10/10/2013     Priority: Medium     Lumbar spinal stenosis 01/09/2012     Priority: Medium     Arthritis      Priority: Medium     Spinal Stenosis        Past Medical History:   Diagnosis Date     Arthritis      Spinal Stenosis     Former smoker      Hemorrhoids      Hypercholesteremia      Hypertension      Malignant neoplasm (H)     skin CA, Basal cell     Other chronic pain      Renal disease      Past Surgical History:   Procedure Laterality Date     BACK SURGERY       BIOPSY      face, skin cancer     BIOPSY  8/2016    shoulder lesion     CYSTOSCOPY, TRANSURETHRAL RESECTION (TUR) PROSTATE, COMBINED N/A 8/21/2015    Procedure: COMBINED CYSTOSCOPY, TRANSURETHRAL RESECTION (TUR) PROSTATE;  Surgeon: Dennis Hilton MD;  Location: [facility] OR     CYSTOSCOPY, TRANSURETHRAL RESECTION (TUR) TUMOR BLADDER, COMBINED N/A 9/2/2016    Procedure: COMBINED CYSTOSCOPY, TRANSURETHRAL RESECTION (TUR) TUMOR BLADDER;  Surgeon: Dennis Hilton MD;  Location: [facility] OR     ORTHOPEDIC SURGERY      lumbar and cervical surgery, Sep, 2008, knee surgery     PERCUTANEOUS MITRAL VALVE REPAIR N/A 10/16/2017    Procedure: PERCUTANEOUS MITRAL VALVE REPAIR ANESTHESIA;  Percutaneous MitraClip ;  Surgeon: Eber Monroe MD;  Location: [facility] OR     PICC INSERTION Right 10/14/2017    5fr DL Bard PICC, 40cm (1cm external) in the R basilic vein w/ tip in the mid SVC.     Current Outpatient Prescriptions   Medication Sig Dispense Refill     aspirin EC 81 MG EC tablet Take 1 tablet (81 mg) by mouth daily       finasteride (PROSCAR) 5 MG tablet Take 1 tablet (5 mg) by mouth daily 90 tablet 3     furosemide (LASIX) 20 MG tablet Take 40 mg by mouth daily       gabapentin (NEURONTIN) 300 MG capsule TAKE 1 CAPSULE BY MOUTH THREE TIMES DAILY 270 capsule 3     hydrALAZINE (APRESOLINE) 25 MG tablet Take 1 tablet (25 mg) by mouth 3 times daily 270 tablet 1     HYDROcodone-acetaminophen (NORCO) 7.5-325 MG per tablet Take 1 tablet by mouth every 6 hours as needed for pain maximum 4 tablet(s) per day 120 tablet 0     isosorbide dinitrate (ISORDIL) 20 MG tablet TAKE 1 TABLET BY MOUTH THREE TIMES DAILY 270 tablet 2     metoprolol (TOPROL XL) 50 MG 24 hr  tablet Take 1 tablet (50 mg) by mouth daily 90 tablet 3     pantoprazole (PROTONIX) 40 MG EC tablet Take 1 tablet (40 mg) by mouth 2 times daily 180 tablet 3     potassium chloride SA (K-DUR/KLOR-CON M) 10 MEQ CR tablet Take half tablet one time daily. 90 tablet 3     simvastatin (ZOCOR) 20 MG tablet TAKE 1 TABLET(20 MG) BY MOUTH AT BEDTIME 90 tablet 1     Skin Protectants, Misc. (EUCERIN) cream Apply topically At Bedtime Apply to hands       tamsulosin (FLOMAX) 0.4 MG capsule TAKE ONE CAPSULE BY MOUTH DAILY 90 capsule 3     hypromellose (ARTIFICIAL TEARS) 0.4 % SOLN ophthalmic solution Place 2 drops Into the left eye 4 times daily       [DISCONTINUED] hydrALAZINE (APRESOLINE) 25 MG tablet TAKE ONE AND ONE-HALF TABLETS BY MOUTH EVERY 8 HOURS AS DIRECTED. HOLD IF SYSTOLIC BLOOD PRESSURE IS LESS THAN 100 (Patient not taking: Reported on 10/29/2018) 135 tablet 1     OTC products: None, except as noted above    Allergies   Allergen Reactions     Celebrex [Celecoxib] Hives     Penicillins Hives      Latex Allergy: NO    Social History   Substance Use Topics     Smoking status: Former Smoker     Smokeless tobacco: Never Used     Alcohol use No      Comment: doesnt use anymore     History   Drug Use No       REVIEW OF SYSTEMS:   CONSTITUTIONAL: NEGATIVE for fever, chills, change in weight  INTEGUMENTARY/SKIN: NEGATIVE for worrisome rashes, moles or lesions  EYES: NEGATIVE for vision changes or irritation  ENT/MOUTH: NEGATIVE for ear, mouth and throat problems  RESP: NEGATIVE for significant cough or SOB  BREAST: NEGATIVE for masses, tenderness or discharge  CV: NEGATIVE for chest pain, palpitations or peripheral edema  GI: NEGATIVE for nausea, abdominal pain, heartburn, or change in bowel habits   male :positive for hematuria  MUSCULOSKELETAL: NEGATIVE for significant arthralgias or myalgia  NEURO: NEGATIVE for weakness, dizziness or paresthesias  ENDOCRINE: NEGATIVE for temperature intolerance, skin/hair changes  HEME:  NEGATIVE for bleeding problems  PSYCHIATRIC: NEGATIVE for changes in mood or affect    EXAM:   /74 (Cuff Size: Adult Regular)  Pulse 85  Temp 98  F (36.7  C) (Tympanic)  Wt 150 lb (68 kg)  SpO2 95%  BMI 23.49 kg/m2    GENERAL APPEARANCE: healthy, alert and no distress     EYES: EOMI,  PERRL     HENT: ear canals and TM's normal and nose and mouth without ulcers or lesions     NECK: no adenopathy, no asymmetry, masses, or scars and thyroid normal to palpation     RESP: lungs clear to auscultation - no rales, rhonchi or wheezes     CV: regular rates and rhythm, normal S1 S2, no S3 or S4 and no murmur, click or rub     ABDOMEN:  soft, nontender, no HSM or masses and bowel sounds normal     MS: extremities normal- no gross deformities noted, no evidence of inflammation in joints, FROM in all extremities.     SKIN: no suspicious lesions or rashes     NEURO: Normal strength and tone, sensory exam grossly normal, mentation intact and speech normal     PSYCH: mentation appears normal. and affect normal/bright     LYMPHATICS: No cervical adenopathy    DIAGNOSTICS:       Recent Labs   Lab Test  03/02/18   1412  01/26/18   1431  01/17/18   1815   10/16/17   0402   10/15/17   0420   08/18/15   1146   HGB   --   10.5*  11.0*   < >  11.4*   --   12.1*   < >   --    PLT   --   90*  126*   < >  116*   --   108*   < >   --    INR   --    --    --    --   1.15*   --   1.15*   < >   --    NA  139  139   --    < >  140   --   141   < >   --    POTASSIUM  3.6  3.7   --    < >  3.7   < >  3.7   < >   --    CR  1.70*  1.56*   --    < >  1.33*   --   1.38*   < >   --    A1C   --    --    --    --    --    --    --    --   5.3    < > = values in this interval not displayed.        IMPRESSION:   Reason for surgery/procedure: recurrent bladder tumor    The proposed surgical procedure is considered LOW risk.    REVISED CARDIAC RISK INDEX  The patient has the following serious cardiovascular risks for perioperative complications such  as (MI, PE, VFib and 3  AV Block):  No serious cardiac risks  INTERPRETATION: 0 risks: Class I (very low risk - 0.4% complication rate)    The patient has the following additional risks for perioperative complications:  No identified additional risks      ICD-10-CM    1. Preop general physical exam Z01.818 CBC with platelets differential     Electrolyte panel (Na, K, Cl, CO2, Anion gap)     Creatinine     Urea nitrogen   2. Bladder tumor D49.4    3. Benign essential hypertension I10    4. Hyperlipidemia LDL goal <100 E78.5    5. Femoral hernia of right side K41.90    6. Non-rheumatic mitral regurgitation I34.0    7. S/P mitral valve clip implantation Z98.890     Z95.818    8. Abdominal pain, epigastric R10.13    9. Thrombocytopenia (H) D69.6 CBC with platelets differential   10. CKD (chronic kidney disease) stage 3, GFR 30-59 ml/min (H) N18.3 Creatinine     Urea nitrogen   11. Spinal stenosis of lumbar region, unspecified whether neurogenic claudication present M48.061        RECOMMENDATIONS:     --Consult hospital rounder / IM to assist post-op medical management    --Patient is to take all scheduled medications on the day of surgery EXCEPT for modifications listed below.    APPROVAL GIVEN to proceed with proposed procedure, without further diagnostic evaluation       Signed Electronically by: Evaristo Magaña MD    Copy of this evaluation report is provided to requesting physician.    [facility] Preop Guidelines    Revised Cardiac Risk Index

## 2018-10-30 ENCOUNTER — ANESTHESIA EVENT (OUTPATIENT)
Dept: SURGERY | Facility: CLINIC | Age: 83
DRG: 669 | End: 2018-10-30
Payer: MEDICARE

## 2018-10-30 LAB
ANION GAP SERPL CALCULATED.3IONS-SCNC: 12 MMOL/L (ref 3–14)
BUN SERPL-MCNC: 40 MG/DL (ref 7–30)
CHLORIDE SERPL-SCNC: 107 MMOL/L (ref 94–109)
CO2 SERPL-SCNC: 26 MMOL/L (ref 20–32)
CREAT SERPL-MCNC: 1.57 MG/DL (ref 0.66–1.25)
GFR SERPL CREATININE-BSD FRML MDRD: 42 ML/MIN/1.7M2
POTASSIUM SERPL-SCNC: 3.8 MMOL/L (ref 3.4–5.3)
SODIUM SERPL-SCNC: 145 MMOL/L (ref 133–144)

## 2018-11-01 NOTE — H&P (VIEW-ONLY)
[facility]  [address]  [place] [address]  [phone]  Dept: [phone]    PRE-OP EVALUATION:  Today's date: 10/29/2018    Ivan Gomez (: 10/12/1926) presents for pre-operative evaluation assessment as requested by Dr. Dennis Hilton.  He requires evaluation and anesthesia risk assessment prior to undergoing surgery/procedure for treatment of turbt .     Proposed Surgery/ Procedure: TRANSURETHRAL RESECTION OF THE BLADDER TUMOR  Date of Surgery/ Procedure: 18  Time of Surgery/ Procedure: 2P  Hospital/Surgical Facility: [facility]  Fax number for surgical facility: N/A  Primary Physician: Evaristo Magaña  Type of Anesthesia Anticipated: to be determined    Patient has a Health Care Directive or Living Will:  YES    1. NO - Do you have a history of heart attack, stroke, stent, bypass or surgery on an artery in the head, neck, heart or legs?   2. NO - Do you ever have any pain or discomfort in your chest?  3. NO - Do you have a history of  Heart Failure?  4. NO - Are you troubled by shortness of breath when: walking on the level, up a slight hill or at night?  5. NO - Do you currently have a cold, bronchitis or other respiratory infection?  6. NO - Do you have a cough, shortness of breath or wheezing?  7. YES - Do you sometimes get pains in the calves of your legs when you walk? Periodically and treated with gabapentin and a walker when outside  8. NO - Do you or anyone in your family have previous history of blood clots?  9. NO - Do you or does anyone in your family have a serious bleeding problem such as prolonged bleeding following surgeries or cuts?  10. YES - Have you ever had problems with anemia or been told to take iron pills? Slight anemia  11. NO - Have you had any abnormal blood loss such as black, tarry or bloody stools, or abnormal vaginal bleeding?  12. NO - Have you ever had a blood transfusion?  13. NO -  Have you or any of your relatives ever had problems with anesthesia?  14. NO - Do you have sleep apnea, excessive snoring or daytime drowsiness?  15. NO - Do you have any prosthetic heart valves?  16. NO - Do you have prosthetic joints?  17. NO - Is there any chance that you may be pregnant?      HPI:     HPI related to upcoming procedure: recurrent bladder tumor      See problem list for active medical problems.  Problems all longstanding and stable, except as noted/documented.  See ROS for pertinent symptoms related to these conditions.                                                                                                                                                          .    MEDICAL HISTORY:     Patient Active Problem List    Diagnosis Date Noted     Hypokalemia 10/27/2017     Priority: Medium     Abdominal pain, epigastric 10/26/2017     Priority: Medium     Dry eyes 10/25/2017     Priority: Medium     S/P mitral valve clip implantation 10/24/2017     Priority: Medium     Mitral regurgitation 10/16/2017     Priority: Medium     Heart failure (H) 10/13/2017     Priority: Medium     Acute respiratory failure with hypoxia (H) 10/11/2017     Priority: Medium     Femoral hernia of right side 05/09/2017     Priority: Medium     Drug-induced constipation 05/09/2017     Priority: Medium     Acute idiopathic gout of right foot 03/23/2017     Priority: Medium     Hyperlipidemia LDL goal <100 11/30/2016     Priority: Medium     Benign essential hypertension 09/14/2016     Priority: Medium     Bladder tumor 09/02/2016     Priority: Medium     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 Bell Note Oct 2013:You don't want to use a long acting narcotic for pain. At this time, you are using #120 hydro/apap 7.5/325 monthly./   I told you today and you agreed to follow up to see Dr. Evaristo Magaña in about 2 months before the present refills of narcotic are due to be refilled   I also told you that it would be my recommendation that you then be given an increased monthly prescription of #150 per month to better control the pain.   You do not want any further surgery on your back at this time so want to focus on better pain control.    Patient is followed by ANTHONY Murphy for ongoing prescription of pain medication.  All refills should be approved by this provider, or covering partner.    Medication(s): norco 7.5/325.   Maximum quantity per month: 120  Clinic visit frequency required: Q 4 months     Controlled substance agreement on file: 9/3/14    Pain Clinic evaluation in the past: Yes       Date/Location:  spine clinic    DIRE Total Score(s):  No flowsheet data found.    Last [place] website verification: 1-28-18 provider to review       Glucose intolerance (impaired glucose tolerance) 08/18/2015     Priority: Medium     Advanced directives, counseling/discussion 12/23/2014     Priority: Medium     Advance Care Planning:   ACP Review and Resources Provided:  Reviewed chart for advance care plan.  Ivan Gomez has no plan or code status on file. Discussed available resources and provided with information.   Added by Mirela Shepard on 12/23/2014             Knee pain 09/03/2014     Priority: Medium     Thrombocytopenia (H) 07/28/2014     Priority: Medium     CKD (chronic kidney disease) stage 3, GFR 30-59 ml/min (H) 07/28/2014     Priority: Medium     Chronic narcotic dependence (HCC) 10/10/2013     Priority: Medium     Lumbar spinal stenosis 01/09/2012     Priority: Medium     Arthritis      Priority: Medium     Spinal Stenosis        Past Medical History:   Diagnosis Date     Arthritis      Spinal Stenosis     Former smoker      Hemorrhoids      Hypercholesteremia      Hypertension      Malignant neoplasm (H)     skin CA, Basal cell     Other chronic pain      Renal disease      Past Surgical History:   Procedure Laterality Date     BACK SURGERY       BIOPSY      face, skin cancer     BIOPSY  8/2016    shoulder lesion     CYSTOSCOPY, TRANSURETHRAL RESECTION (TUR) PROSTATE, COMBINED N/A 8/21/2015    Procedure: COMBINED CYSTOSCOPY, TRANSURETHRAL RESECTION (TUR) PROSTATE;  Surgeon: Dennis Hilton MD;  Location: [facility] OR     CYSTOSCOPY, TRANSURETHRAL RESECTION (TUR) TUMOR BLADDER, COMBINED N/A 9/2/2016    Procedure: COMBINED CYSTOSCOPY, TRANSURETHRAL RESECTION (TUR) TUMOR BLADDER;  Surgeon: Dennis Hilton MD;  Location: [facility] OR     ORTHOPEDIC SURGERY      lumbar and cervical surgery, Sep, 2008, knee surgery     PERCUTANEOUS MITRAL VALVE REPAIR N/A 10/16/2017    Procedure: PERCUTANEOUS MITRAL VALVE REPAIR ANESTHESIA;  Percutaneous MitraClip ;  Surgeon: Eber Monroe MD;  Location: [facility] OR     PICC INSERTION Right 10/14/2017    5fr DL Bard PICC, 40cm (1cm external) in the R basilic vein w/ tip in the mid SVC.     Current Outpatient Prescriptions   Medication Sig Dispense Refill     aspirin EC 81 MG EC tablet Take 1 tablet (81 mg) by mouth daily       finasteride (PROSCAR) 5 MG tablet Take 1 tablet (5 mg) by mouth daily 90 tablet 3     furosemide (LASIX) 20 MG tablet Take 40 mg by mouth daily       gabapentin (NEURONTIN) 300 MG capsule TAKE 1 CAPSULE BY MOUTH THREE TIMES DAILY 270 capsule 3     hydrALAZINE (APRESOLINE) 25 MG tablet Take 1 tablet (25 mg) by mouth 3 times daily 270 tablet 1     HYDROcodone-acetaminophen (NORCO) 7.5-325 MG per tablet Take 1 tablet by mouth every 6 hours as needed for pain maximum 4 tablet(s) per day 120 tablet 0     isosorbide dinitrate (ISORDIL) 20 MG tablet TAKE 1 TABLET BY MOUTH THREE TIMES DAILY 270 tablet 2     metoprolol (TOPROL XL) 50 MG 24 hr  tablet Take 1 tablet (50 mg) by mouth daily 90 tablet 3     pantoprazole (PROTONIX) 40 MG EC tablet Take 1 tablet (40 mg) by mouth 2 times daily 180 tablet 3     potassium chloride SA (K-DUR/KLOR-CON M) 10 MEQ CR tablet Take half tablet one time daily. 90 tablet 3     simvastatin (ZOCOR) 20 MG tablet TAKE 1 TABLET(20 MG) BY MOUTH AT BEDTIME 90 tablet 1     Skin Protectants, Misc. (EUCERIN) cream Apply topically At Bedtime Apply to hands       tamsulosin (FLOMAX) 0.4 MG capsule TAKE ONE CAPSULE BY MOUTH DAILY 90 capsule 3     hypromellose (ARTIFICIAL TEARS) 0.4 % SOLN ophthalmic solution Place 2 drops Into the left eye 4 times daily       [DISCONTINUED] hydrALAZINE (APRESOLINE) 25 MG tablet TAKE ONE AND ONE-HALF TABLETS BY MOUTH EVERY 8 HOURS AS DIRECTED. HOLD IF SYSTOLIC BLOOD PRESSURE IS LESS THAN 100 (Patient not taking: Reported on 10/29/2018) 135 tablet 1     OTC products: None, except as noted above    Allergies   Allergen Reactions     Celebrex [Celecoxib] Hives     Penicillins Hives      Latex Allergy: NO    Social History   Substance Use Topics     Smoking status: Former Smoker     Smokeless tobacco: Never Used     Alcohol use No      Comment: doesnt use anymore     History   Drug Use No       REVIEW OF SYSTEMS:   CONSTITUTIONAL: NEGATIVE for fever, chills, change in weight  INTEGUMENTARY/SKIN: NEGATIVE for worrisome rashes, moles or lesions  EYES: NEGATIVE for vision changes or irritation  ENT/MOUTH: NEGATIVE for ear, mouth and throat problems  RESP: NEGATIVE for significant cough or SOB  BREAST: NEGATIVE for masses, tenderness or discharge  CV: NEGATIVE for chest pain, palpitations or peripheral edema  GI: NEGATIVE for nausea, abdominal pain, heartburn, or change in bowel habits   male :positive for hematuria  MUSCULOSKELETAL: NEGATIVE for significant arthralgias or myalgia  NEURO: NEGATIVE for weakness, dizziness or paresthesias  ENDOCRINE: NEGATIVE for temperature intolerance, skin/hair changes  HEME:  NEGATIVE for bleeding problems  PSYCHIATRIC: NEGATIVE for changes in mood or affect    EXAM:   /74 (Cuff Size: Adult Regular)  Pulse 85  Temp 98  F (36.7  C) (Tympanic)  Wt 150 lb (68 kg)  SpO2 95%  BMI 23.49 kg/m2    GENERAL APPEARANCE: healthy, alert and no distress     EYES: EOMI,  PERRL     HENT: ear canals and TM's normal and nose and mouth without ulcers or lesions     NECK: no adenopathy, no asymmetry, masses, or scars and thyroid normal to palpation     RESP: lungs clear to auscultation - no rales, rhonchi or wheezes     CV: regular rates and rhythm, normal S1 S2, no S3 or S4 and no murmur, click or rub     ABDOMEN:  soft, nontender, no HSM or masses and bowel sounds normal     MS: extremities normal- no gross deformities noted, no evidence of inflammation in joints, FROM in all extremities.     SKIN: no suspicious lesions or rashes     NEURO: Normal strength and tone, sensory exam grossly normal, mentation intact and speech normal     PSYCH: mentation appears normal. and affect normal/bright     LYMPHATICS: No cervical adenopathy    DIAGNOSTICS:       Recent Labs   Lab Test  03/02/18   1412  01/26/18   1431  01/17/18   1815   10/16/17   0402   10/15/17   0420   08/18/15   1146   HGB   --   10.5*  11.0*   < >  11.4*   --   12.1*   < >   --    PLT   --   90*  126*   < >  116*   --   108*   < >   --    INR   --    --    --    --   1.15*   --   1.15*   < >   --    NA  139  139   --    < >  140   --   141   < >   --    POTASSIUM  3.6  3.7   --    < >  3.7   < >  3.7   < >   --    CR  1.70*  1.56*   --    < >  1.33*   --   1.38*   < >   --    A1C   --    --    --    --    --    --    --    --   5.3    < > = values in this interval not displayed.        IMPRESSION:   Reason for surgery/procedure: recurrent bladder tumor    The proposed surgical procedure is considered LOW risk.    REVISED CARDIAC RISK INDEX  The patient has the following serious cardiovascular risks for perioperative complications such  as (MI, PE, VFib and 3  AV Block):  No serious cardiac risks  INTERPRETATION: 0 risks: Class I (very low risk - 0.4% complication rate)    The patient has the following additional risks for perioperative complications:  No identified additional risks      ICD-10-CM    1. Preop general physical exam Z01.818 CBC with platelets differential     Electrolyte panel (Na, K, Cl, CO2, Anion gap)     Creatinine     Urea nitrogen   2. Bladder tumor D49.4    3. Benign essential hypertension I10    4. Hyperlipidemia LDL goal <100 E78.5    5. Femoral hernia of right side K41.90    6. Non-rheumatic mitral regurgitation I34.0    7. S/P mitral valve clip implantation Z98.890     Z95.818    8. Abdominal pain, epigastric R10.13    9. Thrombocytopenia (H) D69.6 CBC with platelets differential   10. CKD (chronic kidney disease) stage 3, GFR 30-59 ml/min (H) N18.3 Creatinine     Urea nitrogen   11. Spinal stenosis of lumbar region, unspecified whether neurogenic claudication present M48.061        RECOMMENDATIONS:     --Consult hospital rounder / IM to assist post-op medical management    --Patient is to take all scheduled medications on the day of surgery EXCEPT for modifications listed below.    APPROVAL GIVEN to proceed with proposed procedure, without further diagnostic evaluation       Signed Electronically by: Evaristo Magaña MD    Copy of this evaluation report is provided to requesting physician.    [facility] Preop Guidelines    Revised Cardiac Risk Index

## 2018-11-01 NOTE — PLAN OF CARE
"Called Dr. DUDLEY Hilton\"s at [phone] and spoke with Vane. Message left regarding wording of consent to be addressed.  OR staff states can not perform cysview instillation. Stated will inform Dr. Hilton.  [id number]  Dr. Arias office called and informed staff that pt is alone visiting from [place] and has no one to stay with him after surgery x 24 hours-  He is staying in a hotel.  Message left and asked for Dr. Hilton's office to return a call as to what the plan will be for this pt's care.  "

## 2018-11-02 ENCOUNTER — ANESTHESIA (OUTPATIENT)
Dept: SURGERY | Facility: CLINIC | Age: 83
DRG: 669 | End: 2018-11-02
Payer: MEDICARE

## 2018-11-02 ENCOUNTER — SURGERY (OUTPATIENT)
Age: 83
End: 2018-11-02
Payer: MEDICARE

## 2018-11-02 ENCOUNTER — HOSPITAL ENCOUNTER (INPATIENT)
Facility: CLINIC | Age: 83
LOS: 2 days | Discharge: HOME | DRG: 669 | End: 2018-11-05
Attending: UROLOGY | Admitting: INTERNAL MEDICINE
Payer: MEDICARE

## 2018-11-02 LAB
ANION GAP SERPL CALCULATED.3IONS-SCNC: 10 MMOL/L (ref 3–14)
BUN SERPL-MCNC: 33 MG/DL (ref 7–30)
CALCIUM SERPL-MCNC: 8.1 MG/DL (ref 8.5–10.1)
CHLORIDE SERPL-SCNC: 108 MMOL/L (ref 94–109)
CO2 SERPL-SCNC: 25 MMOL/L (ref 20–32)
CREAT SERPL-MCNC: 1.3 MG/DL (ref 0.66–1.25)
ERYTHROCYTE [DISTWIDTH] IN BLOOD BY AUTOMATED COUNT: 16.3 % (ref 10–15)
GFR SERPL CREATININE-BSD FRML MDRD: 52 ML/MIN/1.7M2
GLUCOSE SERPL-MCNC: 130 MG/DL (ref 70–99)
HCT VFR BLD AUTO: 31.6 % (ref 40–53)
HGB BLD-MCNC: 9.4 G/DL (ref 13.3–17.7)
INTERPRETATION ECG - MUSE: NORMAL
MCH RBC QN AUTO: 26.9 PG (ref 26.5–33)
MCHC RBC AUTO-ENTMCNC: 29.7 G/DL (ref 31.5–36.5)
MCV RBC AUTO: 91 FL (ref 78–100)
PLATELET # BLD AUTO: 83 10E9/L (ref 150–450)
POTASSIUM SERPL-SCNC: 3.8 MMOL/L (ref 3.4–5.3)
RBC # BLD AUTO: 3.49 10E12/L (ref 4.4–5.9)
SODIUM SERPL-SCNC: 143 MMOL/L (ref 133–144)
WBC # BLD AUTO: 4.1 10E9/L (ref 4–11)

## 2018-11-02 PROCEDURE — 88307 TISSUE EXAM BY PATHOLOGIST: CPT | Performed by: UROLOGY

## 2018-11-02 PROCEDURE — 25000566 ZZH SEVOFLURANE, EA 15 MIN: Performed by: UROLOGY

## 2018-11-02 PROCEDURE — 25000128 H RX IP 250 OP 636: Performed by: NURSE ANESTHETIST, CERTIFIED REGISTERED

## 2018-11-02 PROCEDURE — 88307 TISSUE EXAM BY PATHOLOGIST: CPT | Mod: 26 | Performed by: UROLOGY

## 2018-11-02 PROCEDURE — 25000132 ZZH RX MED GY IP 250 OP 250 PS 637: Mod: GY | Performed by: STUDENT IN AN ORGANIZED HEALTH CARE EDUCATION/TRAINING PROGRAM

## 2018-11-02 PROCEDURE — 85027 COMPLETE CBC AUTOMATED: CPT | Performed by: STUDENT IN AN ORGANIZED HEALTH CARE EDUCATION/TRAINING PROGRAM

## 2018-11-02 PROCEDURE — 93010 ELECTROCARDIOGRAM REPORT: CPT | Performed by: INTERNAL MEDICINE

## 2018-11-02 PROCEDURE — 36000056 ZZH SURGERY LEVEL 3 1ST 30 MIN: Performed by: UROLOGY

## 2018-11-02 PROCEDURE — 25000128 H RX IP 250 OP 636: Performed by: STUDENT IN AN ORGANIZED HEALTH CARE EDUCATION/TRAINING PROGRAM

## 2018-11-02 PROCEDURE — 52240 CYSTOSCOPY AND TREATMENT: CPT | Mod: GC | Performed by: UROLOGY

## 2018-11-02 PROCEDURE — 25000128 H RX IP 250 OP 636: Performed by: ANESTHESIOLOGY

## 2018-11-02 PROCEDURE — 37000009 ZZH ANESTHESIA TECHNICAL FEE, EACH ADDTL 15 MIN: Performed by: UROLOGY

## 2018-11-02 PROCEDURE — 80048 BASIC METABOLIC PNL TOTAL CA: CPT | Performed by: STUDENT IN AN ORGANIZED HEALTH CARE EDUCATION/TRAINING PROGRAM

## 2018-11-02 PROCEDURE — 40000936 ZZH STATISTIC OUTPATIENT (NON-OBS) NIGHT

## 2018-11-02 PROCEDURE — 40000935 ZZH STATISTIC OUTPATIENT (NON-OBS) EVE

## 2018-11-02 PROCEDURE — 40000306 ZZH STATISTIC PRE PROC ASSESS II: Performed by: UROLOGY

## 2018-11-02 PROCEDURE — A9270 NON-COVERED ITEM OR SERVICE: HCPCS | Mod: GY | Performed by: STUDENT IN AN ORGANIZED HEALTH CARE EDUCATION/TRAINING PROGRAM

## 2018-11-02 PROCEDURE — 71000014 ZZH RECOVERY PHASE 1 LEVEL 2 FIRST HR: Performed by: UROLOGY

## 2018-11-02 PROCEDURE — 27210794 ZZH OR GENERAL SUPPLY STERILE: Performed by: UROLOGY

## 2018-11-02 PROCEDURE — 25000125 ZZHC RX 250: Performed by: NURSE ANESTHETIST, CERTIFIED REGISTERED

## 2018-11-02 PROCEDURE — 25000128 H RX IP 250 OP 636: Performed by: UROLOGY

## 2018-11-02 PROCEDURE — 0TBB8ZZ EXCISION OF BLADDER, VIA NATURAL OR ARTIFICIAL OPENING ENDOSCOPIC: ICD-10-PCS | Performed by: UROLOGY

## 2018-11-02 PROCEDURE — 25800025 ZZH RX 258: Performed by: STUDENT IN AN ORGANIZED HEALTH CARE EDUCATION/TRAINING PROGRAM

## 2018-11-02 PROCEDURE — 25800025 ZZH RX 258: Performed by: UROLOGY

## 2018-11-02 PROCEDURE — 71000015 ZZH RECOVERY PHASE 1 LEVEL 2 EA ADDTL HR: Performed by: UROLOGY

## 2018-11-02 PROCEDURE — 36000058 ZZH SURGERY LEVEL 3 EA 15 ADDTL MIN: Performed by: UROLOGY

## 2018-11-02 PROCEDURE — 37000008 ZZH ANESTHESIA TECHNICAL FEE, 1ST 30 MIN: Performed by: UROLOGY

## 2018-11-02 PROCEDURE — 25000128 H RX IP 250 OP 636: Performed by: PHYSICIAN ASSISTANT

## 2018-11-02 PROCEDURE — 36415 COLL VENOUS BLD VENIPUNCTURE: CPT | Performed by: STUDENT IN AN ORGANIZED HEALTH CARE EDUCATION/TRAINING PROGRAM

## 2018-11-02 RX ORDER — FENTANYL CITRATE 50 UG/ML
INJECTION, SOLUTION INTRAMUSCULAR; INTRAVENOUS PRN
Status: DISCONTINUED | OUTPATIENT
Start: 2018-11-02 | End: 2018-11-02

## 2018-11-02 RX ORDER — TAMSULOSIN HYDROCHLORIDE 0.4 MG/1
0.4 CAPSULE ORAL DAILY
Status: DISCONTINUED | OUTPATIENT
Start: 2018-11-02 | End: 2018-11-05 | Stop reason: HOSPADM

## 2018-11-02 RX ORDER — ISOSORBIDE DINITRATE 20 MG/1
20 TABLET ORAL 3 TIMES DAILY
Status: DISCONTINUED | OUTPATIENT
Start: 2018-11-02 | End: 2018-11-05 | Stop reason: HOSPADM

## 2018-11-02 RX ORDER — HYDRALAZINE HYDROCHLORIDE 25 MG/1
25 TABLET, FILM COATED ORAL 3 TIMES DAILY
Status: DISCONTINUED | OUTPATIENT
Start: 2018-11-02 | End: 2018-11-04

## 2018-11-02 RX ORDER — GABAPENTIN 300 MG/1
300 CAPSULE ORAL 3 TIMES DAILY
Status: DISCONTINUED | OUTPATIENT
Start: 2018-11-02 | End: 2018-11-05 | Stop reason: HOSPADM

## 2018-11-02 RX ORDER — CEFAZOLIN SODIUM 1 G/3ML
1 INJECTION, POWDER, FOR SOLUTION INTRAMUSCULAR; INTRAVENOUS SEE ADMIN INSTRUCTIONS
Status: DISCONTINUED | OUTPATIENT
Start: 2018-11-02 | End: 2018-11-02 | Stop reason: HOSPADM

## 2018-11-02 RX ORDER — NEOSTIGMINE METHYLSULFATE 1 MG/ML
VIAL (ML) INJECTION PRN
Status: DISCONTINUED | OUTPATIENT
Start: 2018-11-02 | End: 2018-11-02

## 2018-11-02 RX ORDER — SODIUM CHLORIDE, SODIUM LACTATE, POTASSIUM CHLORIDE, CALCIUM CHLORIDE 600; 310; 30; 20 MG/100ML; MG/100ML; MG/100ML; MG/100ML
INJECTION, SOLUTION INTRAVENOUS CONTINUOUS
Status: DISCONTINUED | OUTPATIENT
Start: 2018-11-02 | End: 2018-11-02 | Stop reason: HOSPADM

## 2018-11-02 RX ORDER — LABETALOL HYDROCHLORIDE 5 MG/ML
10 INJECTION, SOLUTION INTRAVENOUS
Status: DISCONTINUED | OUTPATIENT
Start: 2018-11-02 | End: 2018-11-02 | Stop reason: HOSPADM

## 2018-11-02 RX ORDER — FINASTERIDE 5 MG/1
5 TABLET, FILM COATED ORAL DAILY
Status: DISCONTINUED | OUTPATIENT
Start: 2018-11-02 | End: 2018-11-05 | Stop reason: HOSPADM

## 2018-11-02 RX ORDER — MEPERIDINE HYDROCHLORIDE 25 MG/ML
12.5 INJECTION INTRAMUSCULAR; INTRAVENOUS; SUBCUTANEOUS
Status: DISCONTINUED | OUTPATIENT
Start: 2018-11-02 | End: 2018-11-02 | Stop reason: HOSPADM

## 2018-11-02 RX ORDER — PROCHLORPERAZINE MALEATE 5 MG
5 TABLET ORAL EVERY 6 HOURS PRN
Status: DISCONTINUED | OUTPATIENT
Start: 2018-11-02 | End: 2018-11-05 | Stop reason: HOSPADM

## 2018-11-02 RX ORDER — ONDANSETRON 4 MG/1
4 TABLET, ORALLY DISINTEGRATING ORAL EVERY 6 HOURS PRN
Status: DISCONTINUED | OUTPATIENT
Start: 2018-11-02 | End: 2018-11-05 | Stop reason: HOSPADM

## 2018-11-02 RX ORDER — PROPOFOL 10 MG/ML
INJECTION, EMULSION INTRAVENOUS PRN
Status: DISCONTINUED | OUTPATIENT
Start: 2018-11-02 | End: 2018-11-02

## 2018-11-02 RX ORDER — LIDOCAINE HYDROCHLORIDE 10 MG/ML
INJECTION, SOLUTION INFILTRATION; PERINEURAL PRN
Status: DISCONTINUED | OUTPATIENT
Start: 2018-11-02 | End: 2018-11-02

## 2018-11-02 RX ORDER — SIMVASTATIN 20 MG
20 TABLET ORAL AT BEDTIME
Status: DISCONTINUED | OUTPATIENT
Start: 2018-11-02 | End: 2018-11-05 | Stop reason: HOSPADM

## 2018-11-02 RX ORDER — ONDANSETRON 4 MG/1
4 TABLET, ORALLY DISINTEGRATING ORAL EVERY 30 MIN PRN
Status: DISCONTINUED | OUTPATIENT
Start: 2018-11-02 | End: 2018-11-02 | Stop reason: HOSPADM

## 2018-11-02 RX ORDER — LIDOCAINE 40 MG/G
CREAM TOPICAL
Status: DISCONTINUED | OUTPATIENT
Start: 2018-11-02 | End: 2018-11-05 | Stop reason: HOSPADM

## 2018-11-02 RX ORDER — METOPROLOL SUCCINATE 50 MG/1
50 TABLET, EXTENDED RELEASE ORAL DAILY
Status: DISCONTINUED | OUTPATIENT
Start: 2018-11-02 | End: 2018-11-04

## 2018-11-02 RX ORDER — METOCLOPRAMIDE HYDROCHLORIDE 5 MG/ML
5 INJECTION INTRAMUSCULAR; INTRAVENOUS EVERY 6 HOURS PRN
Status: DISCONTINUED | OUTPATIENT
Start: 2018-11-02 | End: 2018-11-05 | Stop reason: HOSPADM

## 2018-11-02 RX ORDER — HYDROCODONE BITARTRATE AND ACETAMINOPHEN 7.5; 325 MG/1; MG/1
1 TABLET ORAL EVERY 6 HOURS PRN
Status: DISCONTINUED | OUTPATIENT
Start: 2018-11-02 | End: 2018-11-05 | Stop reason: HOSPADM

## 2018-11-02 RX ORDER — DEXAMETHASONE SODIUM PHOSPHATE 4 MG/ML
INJECTION, SOLUTION INTRA-ARTICULAR; INTRALESIONAL; INTRAMUSCULAR; INTRAVENOUS; SOFT TISSUE PRN
Status: DISCONTINUED | OUTPATIENT
Start: 2018-11-02 | End: 2018-11-02

## 2018-11-02 RX ORDER — ONDANSETRON 2 MG/ML
INJECTION INTRAMUSCULAR; INTRAVENOUS PRN
Status: DISCONTINUED | OUTPATIENT
Start: 2018-11-02 | End: 2018-11-02

## 2018-11-02 RX ORDER — ONDANSETRON 2 MG/ML
4 INJECTION INTRAMUSCULAR; INTRAVENOUS EVERY 6 HOURS PRN
Status: DISCONTINUED | OUTPATIENT
Start: 2018-11-02 | End: 2018-11-05 | Stop reason: HOSPADM

## 2018-11-02 RX ORDER — HYDROMORPHONE HYDROCHLORIDE 1 MG/ML
.3-.5 INJECTION, SOLUTION INTRAMUSCULAR; INTRAVENOUS; SUBCUTANEOUS EVERY 10 MIN PRN
Status: DISCONTINUED | OUTPATIENT
Start: 2018-11-02 | End: 2018-11-02 | Stop reason: HOSPADM

## 2018-11-02 RX ORDER — FENTANYL CITRATE 50 UG/ML
25-50 INJECTION, SOLUTION INTRAMUSCULAR; INTRAVENOUS
Status: DISCONTINUED | OUTPATIENT
Start: 2018-11-02 | End: 2018-11-02 | Stop reason: HOSPADM

## 2018-11-02 RX ORDER — LIDOCAINE 40 MG/G
CREAM TOPICAL
Status: DISCONTINUED | OUTPATIENT
Start: 2018-11-02 | End: 2018-11-02 | Stop reason: HOSPADM

## 2018-11-02 RX ORDER — CEFAZOLIN SODIUM 2 G/100ML
2 INJECTION, SOLUTION INTRAVENOUS
Status: COMPLETED | OUTPATIENT
Start: 2018-11-02 | End: 2018-11-02

## 2018-11-02 RX ORDER — METOPROLOL TARTRATE 1 MG/ML
INJECTION, SOLUTION INTRAVENOUS PRN
Status: DISCONTINUED | OUTPATIENT
Start: 2018-11-02 | End: 2018-11-02

## 2018-11-02 RX ORDER — SODIUM CHLORIDE 9 MG/ML
INJECTION, SOLUTION INTRAVENOUS CONTINUOUS
Status: DISCONTINUED | OUTPATIENT
Start: 2018-11-02 | End: 2018-11-03

## 2018-11-02 RX ORDER — GLYCOPYRROLATE 0.2 MG/ML
INJECTION, SOLUTION INTRAMUSCULAR; INTRAVENOUS PRN
Status: DISCONTINUED | OUTPATIENT
Start: 2018-11-02 | End: 2018-11-02

## 2018-11-02 RX ORDER — NALOXONE HYDROCHLORIDE 0.4 MG/ML
.1-.4 INJECTION, SOLUTION INTRAMUSCULAR; INTRAVENOUS; SUBCUTANEOUS
Status: DISCONTINUED | OUTPATIENT
Start: 2018-11-02 | End: 2018-11-05 | Stop reason: HOSPADM

## 2018-11-02 RX ORDER — ONDANSETRON 2 MG/ML
4 INJECTION INTRAMUSCULAR; INTRAVENOUS EVERY 30 MIN PRN
Status: DISCONTINUED | OUTPATIENT
Start: 2018-11-02 | End: 2018-11-02 | Stop reason: HOSPADM

## 2018-11-02 RX ORDER — PANTOPRAZOLE SODIUM 40 MG/1
40 TABLET, DELAYED RELEASE ORAL
Status: DISCONTINUED | OUTPATIENT
Start: 2018-11-02 | End: 2018-11-05 | Stop reason: HOSPADM

## 2018-11-02 RX ORDER — NALOXONE HYDROCHLORIDE 0.4 MG/ML
.1-.4 INJECTION, SOLUTION INTRAMUSCULAR; INTRAVENOUS; SUBCUTANEOUS
Status: DISCONTINUED | OUTPATIENT
Start: 2018-11-02 | End: 2018-11-02 | Stop reason: HOSPADM

## 2018-11-02 RX ADMIN — GABAPENTIN 300 MG: 300 CAPSULE ORAL at 20:32

## 2018-11-02 RX ADMIN — ONDANSETRON 4 MG: 2 INJECTION INTRAMUSCULAR; INTRAVENOUS at 15:46

## 2018-11-02 RX ADMIN — PROCHLORPERAZINE EDISYLATE 5 MG: 5 INJECTION INTRAMUSCULAR; INTRAVENOUS at 17:36

## 2018-11-02 RX ADMIN — SIMVASTATIN 20 MG: 20 TABLET, FILM COATED ORAL at 21:35

## 2018-11-02 RX ADMIN — METOPROLOL SUCCINATE 50 MG: 50 TABLET, EXTENDED RELEASE ORAL at 20:32

## 2018-11-02 RX ADMIN — SODIUM CHLORIDE, POTASSIUM CHLORIDE, SODIUM LACTATE AND CALCIUM CHLORIDE: 600; 310; 30; 20 INJECTION, SOLUTION INTRAVENOUS at 16:30

## 2018-11-02 RX ADMIN — TAMSULOSIN HYDROCHLORIDE 0.4 MG: 0.4 CAPSULE ORAL at 20:32

## 2018-11-02 RX ADMIN — PROPOFOL 100 MG: 10 INJECTION, EMULSION INTRAVENOUS at 15:42

## 2018-11-02 RX ADMIN — PROPOFOL 30 MG: 10 INJECTION, EMULSION INTRAVENOUS at 16:01

## 2018-11-02 RX ADMIN — FENTANYL CITRATE 25 MCG: 50 INJECTION, SOLUTION INTRAMUSCULAR; INTRAVENOUS at 18:04

## 2018-11-02 RX ADMIN — GLYCOPYRROLATE 0.6 MG: 0.2 INJECTION, SOLUTION INTRAMUSCULAR; INTRAVENOUS at 16:58

## 2018-11-02 RX ADMIN — FENTANYL CITRATE 25 MCG: 50 INJECTION, SOLUTION INTRAMUSCULAR; INTRAVENOUS at 17:51

## 2018-11-02 RX ADMIN — HYDRALAZINE HYDROCHLORIDE 25 MG: 25 TABLET ORAL at 20:32

## 2018-11-02 RX ADMIN — FENTANYL CITRATE 25 MCG: 50 INJECTION, SOLUTION INTRAMUSCULAR; INTRAVENOUS at 17:27

## 2018-11-02 RX ADMIN — SODIUM CHLORIDE, POTASSIUM CHLORIDE, SODIUM LACTATE AND CALCIUM CHLORIDE: 600; 310; 30; 20 INJECTION, SOLUTION INTRAVENOUS at 15:33

## 2018-11-02 RX ADMIN — FINASTERIDE 5 MG: 5 TABLET, FILM COATED ORAL at 20:32

## 2018-11-02 RX ADMIN — CEFAZOLIN SODIUM 2 G: 2 INJECTION, SOLUTION INTRAVENOUS at 15:33

## 2018-11-02 RX ADMIN — HYDROCODONE BITARTRATE AND ACETAMINOPHEN 1 TABLET: 7.5; 325 TABLET ORAL at 21:34

## 2018-11-02 RX ADMIN — Medication 4 MG: at 16:58

## 2018-11-02 RX ADMIN — SODIUM CHLORIDE 3000 ML: 900 IRRIGANT IRRIGATION at 19:41

## 2018-11-02 RX ADMIN — PANTOPRAZOLE SODIUM 40 MG: 40 TABLET, DELAYED RELEASE ORAL at 20:32

## 2018-11-02 RX ADMIN — ROCURONIUM BROMIDE 20 MG: 10 INJECTION INTRAVENOUS at 16:17

## 2018-11-02 RX ADMIN — LIDOCAINE HYDROCHLORIDE 30 MG: 10 INJECTION, SOLUTION INFILTRATION; PERINEURAL at 15:42

## 2018-11-02 RX ADMIN — METOCLOPRAMIDE 5 MG: 5 INJECTION, SOLUTION INTRAMUSCULAR; INTRAVENOUS at 20:07

## 2018-11-02 RX ADMIN — ISOSORBIDE DINITRATE 20 MG: 20 TABLET ORAL at 21:35

## 2018-11-02 RX ADMIN — WATER 11000 ML: 100 IRRIGANT IRRIGATION at 17:00

## 2018-11-02 RX ADMIN — SODIUM CHLORIDE: 9 INJECTION, SOLUTION INTRAVENOUS at 19:40

## 2018-11-02 RX ADMIN — METOPROLOL TARTRATE 2 MG: 5 INJECTION INTRAVENOUS at 17:06

## 2018-11-02 RX ADMIN — ONDANSETRON 4 MG: 2 INJECTION INTRAMUSCULAR; INTRAVENOUS at 17:18

## 2018-11-02 RX ADMIN — FENTANYL CITRATE 50 MCG: 50 INJECTION, SOLUTION INTRAMUSCULAR; INTRAVENOUS at 15:38

## 2018-11-02 RX ADMIN — DEXAMETHASONE SODIUM PHOSPHATE 4 MG: 4 INJECTION, SOLUTION INTRA-ARTICULAR; INTRALESIONAL; INTRAMUSCULAR; INTRAVENOUS; SOFT TISSUE at 15:44

## 2018-11-02 RX ADMIN — ROCURONIUM BROMIDE 20 MG: 10 INJECTION INTRAVENOUS at 16:08

## 2018-11-02 NOTE — IP AVS SNAPSHOT
[facility] Observation Department    [address]    [place] [facility] [address]    Phone:  [phone]                                       After Visit Summary   11/2/2018    Ivan Gomez    MRN: [id number]           After Visit Summary Signature Page     I have received my discharge instructions, and my questions have been answered. I have discussed any challenges I see with this plan with the nurse or doctor.    ..........................................................................................................................................  Patient/Patient Representative Signature      ..........................................................................................................................................  Patient Representative Print Name and Relationship to Patient    ..................................................               ................................................  Date                                   Time    ..........................................................................................................................................  Reviewed by Signature/Title    ...................................................              ..............................................  Date                                               Time          22EPIC Rev 08/18

## 2018-11-02 NOTE — ANESTHESIA POSTPROCEDURE EVALUATION
Patient: Ivan Gomez    Procedure(s):  COMBINED CYSTOSCOPY, TRANSURETHRAL RESECTION (TUR) TUMOR BLADDER    Diagnosis:hematuria  Diagnosis Additional Information: Pre-operative diagnosis:                                   hematuria  Post-operative diagnosis                                   Same + bladder tumor  Procedure:                   Procedure(s):  COMBINED CYSTOSCOPY, TRANSURETHRAL RESECTION (TUR) TUMO, R BLADDER    Anesthesia Type:  General, LMA    Note:  Anesthesia Post Evaluation    Patient location during evaluation: PACU  Patient participation: Able to fully participate in evaluation  Level of consciousness: awake  Pain management: adequate  Airway patency: patent  Cardiovascular status: acceptable  Respiratory status: acceptable  Hydration status: acceptable  PONV: controlled     Anesthetic complications: None          Last vitals:  Vitals:    11/02/18 1745 11/02/18 1800 11/02/18 1804   BP: (!) 121/93 131/80    Resp: 12 11    Temp:      SpO2: 93% 91% 91%         Electronically Signed By: Doug Kim MD  November 2, 2018  6:15 PM

## 2018-11-02 NOTE — BRIEF OP NOTE
[facility]    Brief Operative Note    Pre-operative diagnosis:  hematuria  Post-operative diagnosis  Same + bladder tumor  Procedure: Procedure(s):  COMBINED CYSTOSCOPY, TRANSURETHRAL RESECTION (TUR) TUMOR BLADDER  Surgeon: Surgeon(s) and Role:     * Dennis Hilton MD - Primary     * Jeison Blackburn MD - Assistant   Anesthesia:  General   Estimated blood loss: Less than 50 ml  Drains:   20-Guine[place] 3-way Velasquez catheter with 20 mL in balloon   Specimens:   ID Type Source Tests Collected by Time Destination   A : BLADDER TUMOR Tissue Bladder SURGICAL PATHOLOGY EXAM Dennis Hilton MD 11/2/2018  4:56 PM      Findings:   Large bladder tumor along right lateral wall resected.  Small frondular tumor along left trigone and lateral to left UO..  Complications:  None.  Implants:  None.

## 2018-11-02 NOTE — ANESTHESIA PREPROCEDURE EVALUATION
PAC NOTE:       ANESTHESIA PRE EVALUATION:  Anesthesia Evaluation     . Pt has had prior anesthetic. Type: General    No history of anesthetic complications          ROS/MED HX    ENT/Pulmonary:  - neg pulmonary ROS     Neurologic:  - neg neurologic ROS     Cardiovascular:     (+) hypertension----. : . . . :. . Previous cardiac testing Echodate:1/18results:Interpretation Summary     Several mitral valve clips noted. There is still some prolapse of the  posterior leaflet. Very eccentric jet of MR wraps around the back of the  dialted LA, and into a PV.  There is moderate to moderately severe (3+) mitral regurgitation. Difficult to  grade given eccentric jet.  The left atrium is severely dilated.  Hyperdynamic left ventricular function  Moderate (46-55mmHg) pulmonary hypertension is present.  Compared to prior study, there is no significant change.date: results: date: results: date: results:          METS/Exercise Tolerance:     Hematologic: Comments: Lab Test        10/29/18     01/26/18     01/17/18      --          10/16/17     10/15/17     10/14/17                       1456          1431          1815           --           0402          0420          1018          WBC          4.4          5.3          5.1            < >        6.7          7.2          10.4          HGB          9.7*         10.5*        11.0*          < >        11.4*        12.1*        13.3          MCV          92           94           93             < >        94           96           98            PLT          91*          90*          126*           < >        116*         108*         111*          INR           --           --           --           --          1.15*        1.15*        1.17*          < > = values in this interval not displayed.                  Lab Test        10/29/18     03/02/18     01/26/18     01/05/18                       1456          1412          1431          1424          NA           145*         139           139          140           POTASSIUM    3.8          3.6          3.7          3.9           CHLORIDE     107          100          102          100           CO2          26           30*          30           33*           BUN          40*          24           27           29            CR           1.57*        1.70*        1.56*        1.73*         ANIONGAP     12           12.6         7            10.9          GIUSEPPE           --          9.3          8.5          9.7           GLC           --          106*         114*         111*                  Musculoskeletal:  - neg musculoskeletal ROS       GI/Hepatic:  - neg GI/hepatic ROS       Renal/Genitourinary:     (+) chronic renal disease, type: CRI, Nephrolithiasis ,       Endo:  - neg endo ROS       Psychiatric:  - neg psychiatric ROS       Infectious Disease:  - neg infectious disease ROS       Malignancy:         Other:    (+) H/O Chronic Pain,                   Physical Exam  Normal systems: cardiovascular, pulmonary and dental    Airway   Mallampati: II  TM distance: >3 FB  Neck ROM: full    Dental     Cardiovascular       Pulmonary              Anesthesia Plan      History & Physical Review  History and physical reviewed and following examination; no interval change.    ASA Status:  4 .    NPO Status:  > 8 hours    Plan for General and LMA with Intravenous induction. Maintenance will be Balanced.    PONV prophylaxis:  Ondansetron (or other 5HT-3) and Dexamethasone or Solumedrol       Postoperative Care  Postoperative pain management:  IV analgesics and Oral pain medications.      Consents  Anesthetic plan, risks, benefits and alternatives discussed with:  Patient..                            .

## 2018-11-02 NOTE — ANESTHESIA CARE TRANSFER NOTE
Patient: Ivan Gomez    Procedure(s):  COMBINED CYSTOSCOPY, TRANSURETHRAL RESECTION (TUR) TUMOR BLADDER    Diagnosis: hematuria  Diagnosis Additional Information: No value filed.    Anesthesia Type:   General, LMA     Note:  Airway :Face Mask  Patient transferred to:PACU  Handoff Report: Identifed the Patient, Identified the Reponsible Provider, Reviewed the pertinent medical history, Discussed the surgical course, Reviewed Intra-OP anesthesia mangement and issues during anesthesia, Set expectations for post-procedure period and Allowed opportunity for questions and acknowledgement of understanding      Vitals: (Last set prior to Anesthesia Care Transfer)    CRNA VITALS  11/2/2018 1645 - 11/2/2018 1716      11/2/2018             NIBP: 147/85    Pulse: 86    NIBP Mean: 105    SpO2: 90 %                Electronically Signed By: FRANK Guillaume CRNA  November 2, 2018  5:16 PM

## 2018-11-02 NOTE — IP AVS SNAPSHOT
MRN:[id number]                      After Visit Summary   11/2/2018    Ivan Gomez    MRN: [id number]           Thank you!     Thank you for choosing [facility] for your care. Our goal is always to provide you with excellent care. Hearing back from our patients is one way we can continue to improve our services. Please take a few minutes to complete the written survey that you may receive in the mail after you visit. If you would like to speak to someone directly about your visit please contact Patient Relations at [phone]. Thank you!          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  For non-urgent questions about your medical care, please call your primary care provider or clinic, [phone]  For questions related to your surgery, please call your surgery clinic        Attending Provider     Provider Specialty    Lida, Dennis Loera MD Urology    Rowdy Spencer,  Internal Medicine       Primary Care Provider Office Phone # Fax #    Evaristo Magaña [phone]      After Care Instructions     Discharge Instructions       Diet:  - Regular    Activity:   - No strenuous exercise for 4 weeks.   - No lifting, pushing, pulling more than 15 pounds for 4 weeks.   - Do not strain with bowel movements.   - Do not drive until you can press the brake pedal quickly and fully without pain.   - Do not operate a motor vehicle while taking narcotic pain medications.     Medications:   - PAIN: Continue your home narcotic regimen.  Narcotics will  cause sleepiness and constipation and can become addictive, therefore it is best to stop or reduce them as soon as you can.  Any left over narcotics should be disposed of with an Authorized  for unneeded medications.  Contact your [facility]'s or [facility] [employer]'s household trash and recycling service to learn about medication disposal options and guidelines for your area.  If you decide to store this medication at home it should be kept in a locked cabinet to prevent access to children or visitors.     To reduce your narcotic use, take both Tylenol (acetaminophen 625mg) and ibuprofen (600mg), alternating between these medications every 3 hours.   Do not take more than 4,000mg of Tylenol (acetaminophen/ APAP) from all sources in any 24 hour period since this can cause liver damage.  Do not take more than 2400mg of ibuprofen in any 24 hour period since this can cause kidney damage. Never drive, operate machinery or drink alcoholic beverages while you are taking narcotic pain medications.      - Narcotics can make you constipated. Take over the counter fiber (metamucil or benefiber) and stool softeners (miralax, docusate or senna) while using narcotic pain medications, but stop if you develop diarrhea.   - No driving or operating machinery while taking narcotic pain medications    Post-TURBT Instructions:   - You may shower 24 hours after surgery   - Drink 2-3L of water a day to keep your urine clear to light pink in color. Some blood in your urine can be expected but should clear with reducing your activity and increasing your water consumption  - Call or return sooner than your regularly scheduled visit if you develop any of the following:  Fever (greater than 101.3F), uncontrolled pain, uncontrolled nausea or vomiting, thickening red urine, large clots or inability to urinate.      Follow-Up:   - Call your primary care provider to touch base regarding your recent procedure and admission.    - Follow up with  "Dr. Hilton as scheduled.    Phone numbers:  - Monday through Friday 8am to 4:30pm: call [phone].    - Nights or weekends, call [phone] and ask the  to page [facility] urology on call.   - For emergencies, always call 801                  Follow-up Appointments     Follow-up and recommended labs and tests        Follow up with Dr. Hilton as scheduled                  Your next 10 appointments already scheduled     Nov 08, 2018 12:40 PM CST   (Arrive by 12:25 PM)   Return Visit with Dennis Hilton MD   [facility] ([facility])    [address]  4th Floor  [facility] [address] [phone]                         Pending Results     Date and Time Order Name Status Description    11/2/2018 1657 Surgical pathology exam In process             Statement of Approval     Ordered          11/05/18 1227  I have reviewed and agree with all the recommendations and orders detailed in this document.  EFFECTIVE NOW     Approved and electronically signed by:  Sergio Knox MD             Admission Information     Date & Time Provider Department Dept. Phone    11/2/2018 Rowdy Spencer, DO [facility] Observation Department [phone]      Your Vitals Were     Blood Pressure Temperature Respirations Height Weight Pulse Oximetry    125/66 (BP Location: Right arm) 97.2  F (36.2  C) (Oral) 20 1.702 m (5' 7\") 69.6 kg (153 lb 6.4 oz) 92%    BMI (Body Mass Index)                   24.03 kg/m2           [employer] Information     [employer] gives you secure access to your electronic health record. If you see a primary care provider, you can also send messages to your care team and make appointments. If you have questions, please call your primary care clinic.  If you do not have a primary care provider, please call [phone] and they will assist you.        Care EveryWhere ID     This is your Care EveryWhere " ID. This could be used by other organizations to access your [facility] medical records  [id number]        Equal Access to Services     FARZANEH WILDER : Marga Urbina, wamaryda bronson, chrissieleticia salasmukundkeesha amador, perri plunketttraciesun escobedoLucie So [facility] [phone].    ATENCIÓN: Si habla español, tiene a rubio disposición servicios gratuitos de asistencia lingüística. Llame al [phone].    We comply with applicable federal civil rights laws and [place] laws. We do not discriminate on the basis of race, color, national origin, age, disability, sex, sexual orientation, or gender identity.               Review of your medicines      CONTINUE these medicines which have NOT CHANGED        Dose / Directions    aspirin 81 MG EC tablet   Used for:  S/P mitral valve repair        Dose:  81 mg   Take 1 tablet (81 mg) by mouth daily   Refills:  0       eucerin cream        Apply topically At Bedtime Apply to hands   Refills:  0       finasteride 5 MG tablet   Commonly known as:  PROSCAR   Used for:  Benign enlargement of prostate        Dose:  1 tablet   Take 1 tablet (5 mg) by mouth daily   Quantity:  90 tablet   Refills:  3       furosemide 20 MG tablet   Commonly known as:  LASIX        Dose:  40 mg   Take 40 mg by mouth daily   Refills:  0       gabapentin 300 MG capsule   Commonly known as:  NEURONTIN   Used for:  Spinal stenosis of lumbar region with neurogenic claudication        TAKE 1 CAPSULE BY MOUTH THREE TIMES DAILY   Quantity:  270 capsule   Refills:  3       hydrALAZINE 25 MG tablet   Commonly known as:  APRESOLINE   Used for:  Acute on chronic heart failure, unspecified heart failure type (H)        Dose:  25 mg   Take 1 tablet (25 mg) by mouth 3 times daily   Quantity:  270 tablet   Refills:  1       HYDROcodone-acetaminophen 7.5-325 MG per tablet   Commonly known as:  NORCO   Used for:  Spinal stenosis of lumbar region, unspecified whether neurogenic claudication present        Dose:   1 tablet   Take 1 tablet by mouth every 6 hours as needed for pain maximum 4 tablet(s) per day   Quantity:  120 tablet   Refills:  0       hypromellose 0.4 % Soln ophthalmic solution   Commonly known as:  ARTIFICIAL TEARS        Dose:  2 drop   Place 2 drops Into the left eye 4 times daily   Refills:  0       isosorbide dinitrate 20 MG tablet   Commonly known as:  ISORDIL   Used for:  Chronic diastolic heart failure (H), Coronary artery disease involving native coronary artery of native heart without angina pectoris        TAKE 1 TABLET BY MOUTH THREE TIMES DAILY   Quantity:  270 tablet   Refills:  2       metoprolol succinate 50 MG 24 hr tablet   Commonly known as:  TOPROL XL   Used for:  Hypertension goal BP (blood pressure) < 140/90        Dose:  50 mg   Take 1 tablet (50 mg) by mouth daily   Quantity:  90 tablet   Refills:  3       pantoprazole 40 MG EC tablet   Commonly known as:  PROTONIX   Used for:  Abdominal pain, epigastric        Dose:  40 mg   Take 1 tablet (40 mg) by mouth 2 times daily   Quantity:  180 tablet   Refills:  3       potassium chloride SA 10 MEQ CR tablet   Commonly known as:  K-DUR/KLOR-CON M   Used for:  Chronic diastolic heart failure (H)        Take half tablet one time daily.   Quantity:  90 tablet   Refills:  3       simvastatin 20 MG tablet   Commonly known as:  ZOCOR   Used for:  Hyperlipidemia LDL goal <100        TAKE 1 TABLET(20 MG) BY MOUTH AT BEDTIME   Quantity:  90 tablet   Refills:  1       tamsulosin 0.4 MG capsule   Commonly known as:  FLOMAX   Used for:  Benign prostatic hyperplasia without lower urinary tract symptoms        TAKE ONE CAPSULE BY MOUTH DAILY   Quantity:  90 capsule   Refills:  3                Protect others around you: Learn how to safely use, store and throw away your medicines at www.disposemymeds.org.             Medication List: This is a list of all your medications and when to take them. Check marks below indicate your daily home schedule. Keep this  list as a reference.      Medications           Morning Afternoon Evening Bedtime As Needed    aspirin 81 MG EC tablet   Take 1 tablet (81 mg) by mouth daily                                eucerin cream   Apply topically At Bedtime Apply to hands                                finasteride 5 MG tablet   Commonly known as:  PROSCAR   Take 1 tablet (5 mg) by mouth daily   Last time this was given:  5 mg on 11/5/2018  8:32 AM                                furosemide 20 MG tablet   Commonly known as:  LASIX   Take 40 mg by mouth daily   Last time this was given:  40 mg on 11/3/2018 12:54 PM                                gabapentin 300 MG capsule   Commonly known as:  NEURONTIN   TAKE 1 CAPSULE BY MOUTH THREE TIMES DAILY   Last time this was given:  300 mg on 11/5/2018  2:55 PM                                hydrALAZINE 25 MG tablet   Commonly known as:  APRESOLINE   Take 1 tablet (25 mg) by mouth 3 times daily   Last time this was given:  25 mg on 11/2/2018  8:32 PM                                HYDROcodone-acetaminophen 7.5-325 MG per tablet   Commonly known as:  NORCO   Take 1 tablet by mouth every 6 hours as needed for pain maximum 4 tablet(s) per day   Last time this was given:  1 tablet on 11/5/2018 10:36 AM                                hypromellose 0.4 % Soln ophthalmic solution   Commonly known as:  ARTIFICIAL TEARS   Place 2 drops Into the left eye 4 times daily                                isosorbide dinitrate 20 MG tablet   Commonly known as:  ISORDIL   TAKE 1 TABLET BY MOUTH THREE TIMES DAILY   Last time this was given:  20 mg on 11/5/2018  2:55 PM                                metoprolol succinate 50 MG 24 hr tablet   Commonly known as:  TOPROL XL   Take 1 tablet (50 mg) by mouth daily   Last time this was given:  50 mg on 11/2/2018  8:32 PM                                pantoprazole 40 MG EC tablet   Commonly known as:  PROTONIX   Take 1 tablet (40 mg) by mouth 2 times daily   Last time this was  given:  40 mg on 11/5/2018  6:54 AM                                potassium chloride SA 10 MEQ CR tablet   Commonly known as:  K-DUR/KLOR-CON M   Take half tablet one time daily.                                simvastatin 20 MG tablet   Commonly known as:  ZOCOR   TAKE 1 TABLET(20 MG) BY MOUTH AT BEDTIME   Last time this was given:  20 mg on 11/4/2018 10:30 PM                                tamsulosin 0.4 MG capsule   Commonly known as:  FLOMAX   TAKE ONE CAPSULE BY MOUTH DAILY   Last time this was given:  0.4 mg on 11/5/2018  8:31 AM

## 2018-11-03 ENCOUNTER — APPOINTMENT (OUTPATIENT)
Dept: GENERAL RADIOLOGY | Facility: CLINIC | Age: 83
DRG: 669 | End: 2018-11-03
Attending: PHYSICIAN ASSISTANT
Payer: MEDICARE

## 2018-11-03 PROBLEM — R09.02 HYPOXIA: Status: ACTIVE | Noted: 2018-11-03

## 2018-11-03 LAB
ANION GAP SERPL CALCULATED.3IONS-SCNC: 8 MMOL/L (ref 3–14)
BUN SERPL-MCNC: 34 MG/DL (ref 7–30)
CALCIUM SERPL-MCNC: 8.1 MG/DL (ref 8.5–10.1)
CHLORIDE SERPL-SCNC: 109 MMOL/L (ref 94–109)
CO2 SERPL-SCNC: 27 MMOL/L (ref 20–32)
CREAT SERPL-MCNC: 1.4 MG/DL (ref 0.66–1.25)
ERYTHROCYTE [DISTWIDTH] IN BLOOD BY AUTOMATED COUNT: 16.2 % (ref 10–15)
GFR SERPL CREATININE-BSD FRML MDRD: 47 ML/MIN/1.7M2
GLUCOSE SERPL-MCNC: 118 MG/DL (ref 70–99)
HCT VFR BLD AUTO: 31.2 % (ref 40–53)
HGB BLD-MCNC: 9.1 G/DL (ref 13.3–17.7)
MCH RBC QN AUTO: 26.8 PG (ref 26.5–33)
MCHC RBC AUTO-ENTMCNC: 29.2 G/DL (ref 31.5–36.5)
MCV RBC AUTO: 92 FL (ref 78–100)
PLATELET # BLD AUTO: 80 10E9/L (ref 150–450)
POTASSIUM SERPL-SCNC: 4.3 MMOL/L (ref 3.4–5.3)
RBC # BLD AUTO: 3.39 10E12/L (ref 4.4–5.9)
SODIUM SERPL-SCNC: 144 MMOL/L (ref 133–144)
WBC # BLD AUTO: 4.3 10E9/L (ref 4–11)

## 2018-11-03 PROCEDURE — 25000132 ZZH RX MED GY IP 250 OP 250 PS 637: Mod: GY | Performed by: PHYSICIAN ASSISTANT

## 2018-11-03 PROCEDURE — 36415 COLL VENOUS BLD VENIPUNCTURE: CPT | Performed by: STUDENT IN AN ORGANIZED HEALTH CARE EDUCATION/TRAINING PROGRAM

## 2018-11-03 PROCEDURE — A9270 NON-COVERED ITEM OR SERVICE: HCPCS | Mod: GY | Performed by: PHYSICIAN ASSISTANT

## 2018-11-03 PROCEDURE — 85027 COMPLETE CBC AUTOMATED: CPT | Performed by: STUDENT IN AN ORGANIZED HEALTH CARE EDUCATION/TRAINING PROGRAM

## 2018-11-03 PROCEDURE — 40000934 ZZH STATISTIC OUTPATIENT (NON-OBS) DAY

## 2018-11-03 PROCEDURE — 71046 X-RAY EXAM CHEST 2 VIEWS: CPT

## 2018-11-03 PROCEDURE — 99207 ZZC CDG-CODE CATEGORY CHANGED: CPT | Performed by: PHYSICIAN ASSISTANT

## 2018-11-03 PROCEDURE — 12000000 ZZH R&B MED SURG/OB

## 2018-11-03 PROCEDURE — 80048 BASIC METABOLIC PNL TOTAL CA: CPT | Performed by: STUDENT IN AN ORGANIZED HEALTH CARE EDUCATION/TRAINING PROGRAM

## 2018-11-03 PROCEDURE — 99233 SBSQ HOSP IP/OBS HIGH 50: CPT | Performed by: PHYSICIAN ASSISTANT

## 2018-11-03 PROCEDURE — A9270 NON-COVERED ITEM OR SERVICE: HCPCS | Mod: GY | Performed by: STUDENT IN AN ORGANIZED HEALTH CARE EDUCATION/TRAINING PROGRAM

## 2018-11-03 PROCEDURE — 25000132 ZZH RX MED GY IP 250 OP 250 PS 637: Mod: GY | Performed by: STUDENT IN AN ORGANIZED HEALTH CARE EDUCATION/TRAINING PROGRAM

## 2018-11-03 RX ORDER — FUROSEMIDE 40 MG
40 TABLET ORAL DAILY
Status: DISCONTINUED | OUTPATIENT
Start: 2018-11-03 | End: 2018-11-04

## 2018-11-03 RX ADMIN — ISOSORBIDE DINITRATE 20 MG: 20 TABLET ORAL at 20:35

## 2018-11-03 RX ADMIN — TAMSULOSIN HYDROCHLORIDE 0.4 MG: 0.4 CAPSULE ORAL at 08:32

## 2018-11-03 RX ADMIN — GABAPENTIN 300 MG: 300 CAPSULE ORAL at 20:34

## 2018-11-03 RX ADMIN — SIMVASTATIN 20 MG: 20 TABLET, FILM COATED ORAL at 22:03

## 2018-11-03 RX ADMIN — HYDROCODONE BITARTRATE AND ACETAMINOPHEN 1 TABLET: 7.5; 325 TABLET ORAL at 23:23

## 2018-11-03 RX ADMIN — PANTOPRAZOLE SODIUM 40 MG: 40 TABLET, DELAYED RELEASE ORAL at 08:31

## 2018-11-03 RX ADMIN — GABAPENTIN 300 MG: 300 CAPSULE ORAL at 14:31

## 2018-11-03 RX ADMIN — GABAPENTIN 300 MG: 300 CAPSULE ORAL at 08:31

## 2018-11-03 RX ADMIN — FUROSEMIDE 40 MG: 40 TABLET ORAL at 12:54

## 2018-11-03 RX ADMIN — PANTOPRAZOLE SODIUM 40 MG: 40 TABLET, DELAYED RELEASE ORAL at 17:08

## 2018-11-03 RX ADMIN — ISOSORBIDE DINITRATE 20 MG: 20 TABLET ORAL at 14:31

## 2018-11-03 RX ADMIN — ISOSORBIDE DINITRATE 20 MG: 20 TABLET ORAL at 08:31

## 2018-11-03 RX ADMIN — HYDROCODONE BITARTRATE AND ACETAMINOPHEN 1 TABLET: 7.5; 325 TABLET ORAL at 04:41

## 2018-11-03 RX ADMIN — FINASTERIDE 5 MG: 5 TABLET, FILM COATED ORAL at 08:31

## 2018-11-03 NOTE — PHARMACY-ADMISSION MEDICATION HISTORY
Admission medication history interview status for this patient is complete. See [facility] admission navigator for allergy information, prior to admission medications and immunization status.     PTA meds completed by pre-admitting nurse Halie Soto and reviewed by pharmacy       Prior to Admission medications    Medication Sig Last Dose Taking? Auth Provider   aspirin EC 81 MG EC tablet Take 1 tablet (81 mg) by mouth daily  Yes Mirela Coleman APRN CNP   finasteride (PROSCAR) 5 MG tablet Take 1 tablet (5 mg) by mouth daily 11/1/2018 at Unknown time Yes Evaristo Magaña MD   furosemide (LASIX) 20 MG tablet Take 40 mg by mouth daily 11/1/2018 at Unknown time Yes Reported, Patient   gabapentin (NEURONTIN) 300 MG capsule TAKE 1 CAPSULE BY MOUTH THREE TIMES DAILY 11/1/2018 at Unknown time Yes Evaristo Magaña MD   hydrALAZINE (APRESOLINE) 25 MG tablet Take 1 tablet (25 mg) by mouth 3 times daily 11/2/2018 at Unknown time Yes Evaristo Magaña MD   HYDROcodone-acetaminophen (NORCO) 7.5-325 MG per tablet Take 1 tablet by mouth every 6 hours as needed for pain maximum 4 tablet(s) per day 11/1/2018 at Unknown time Yes Evaristo Magaña MD   isosorbide dinitrate (ISORDIL) 20 MG tablet TAKE 1 TABLET BY MOUTH THREE TIMES DAILY 11/2/2018 at Unknown time Yes Evaristo Magaña MD   metoprolol (TOPROL XL) 50 MG 24 hr tablet Take 1 tablet (50 mg) by mouth daily 11/1/2018 at Unknown time Yes Lance Yao MD   pantoprazole (PROTONIX) 40 MG EC tablet Take 1 tablet (40 mg) by mouth 2 times daily 11/2/2018 at Unknown time Yes Evaristo Magaña MD   potassium chloride SA (K-DUR/KLOR-CON M) 10 MEQ CR tablet Take half tablet one time daily. 11/1/2018 at Unknown time Yes Duane Brown PA-C   simvastatin (ZOCOR) 20 MG tablet TAKE 1 TABLET(20 MG) BY MOUTH AT BEDTIME 11/1/2018 at Unknown time Yes Evaristo Magaña MD   tamsulosin (FLOMAX) 0.4 MG capsule TAKE ONE CAPSULE BY MOUTH  DAILY 11/1/2018 at Unknown time Yes Evaristo Magaña MD   hypromellose (ARTIFICIAL TEARS) 0.4 % SOLN ophthalmic solution Place 2 drops Into the left eye 4 times daily   Reported, Patient   Skin Protectants, Misc. (EUCERIN) cream Apply topically At Bedtime Apply to hands   Reported, Patient

## 2018-11-03 NOTE — PLAN OF CARE
Problem: Patient Care Overview  Goal: Plan of Care/Patient Progress Review  PRIMARY DIAGNOSIS: CBI  OUTPATIENT/OBSERVATION GOALS TO BE MET BEFORE DISCHARGE:  1. Stable vital signs Yes  2. Tolerating diet:clear liquid diet  3. Pain controlled with oral pain medications:  Yes  4. Positive bowel sounds:  hypoactive  5. Voiding without difficulty:  carvalho in place  6. Able to ambulate:  has not been out  7. Provider specific discharge goals met:  No    Vitals are Temp: 97.1  F (36.2  C) Temp src: Axillary BP: 167/89   Heart Rate: 75 Resp: 14 SpO2: 94 %.    Patient is Alert and Oriented x4. They have not been out of bed yet but moves with Gait Belt and Walker.  Patient is on Contact precuations for MRSA.  Pt is a Clear Liquid diet and complaining of 5/10 pain in their back.  Norco given for pain.  Patient has Normal Saline 0.9% running at 50 mL per hour.  Patient has a CBI running at a medium rate with pale pink color with no clots present.    Discharge Planner Nurse   Safe discharge environment identified: Yes  Barriers to discharge: Yes          Please review provider order for any additional goals.   Nurse to notify provider when observation goals have been met and patient is ready for discharge.

## 2018-11-03 NOTE — PLAN OF CARE
Problem: Patient Care Overview  Goal: Plan of Care/Patient Progress Review  PRIMARY DIAGNOSIS: CBI  OUTPATIENT/OBSERVATION GOALS TO BE MET BEFORE DISCHARGE:  1. Stable vital signs Yes  2. Tolerating diet:clear liquid diet  3. Pain controlled with oral pain medications:  Yes  4. Positive bowel sounds:  hypoactive  5. Voiding without difficulty:  carvalho in place  6. Able to ambulate:  has not been out  7. Provider specific discharge goals met:  No     Vitals are Temp: 97.4  F (36.3  C) Temp src: Oral BP: 100/51   Heart Rate: 70 Resp: 14 SpO2: 95 %  Patient is Alert and Oriented x4. They are assist of 1 with gait belt and walker for long distances.  Patient is on Contact precuations for MRSA.  Pt is a Full Liquid diet and complaining of 5/10 pain in their back.  Norco given for pain.  Patient has Normal Saline 0.9% running at 50 mL per hour.  Patient has a CBI running at a moderate rate with pale pink color with no clots present.  Patient has a hospitalist consult for commodities.     Discharge Planner Nurse   Safe discharge environment identified: Yes  Barriers to discharge: Yes          Please review provider order for any additional goals.   Nurse to notify provider when observation goals have been met and patient is ready for discharge.

## 2018-11-03 NOTE — PLAN OF CARE
Problem: Patient Care Overview  Goal: Plan of Care/Patient Progress Review  Outcome: No Change  Patient is alert and oriented x4. Patient denies pain at this time. Velasquez was removed earlier today before evening shift. Patient voided 100 with a post void of 30 and again another 100 for a post void of 46 around 1700. IV in R hand began bleeding and causing patient pain, IV was removed. New IV placed in L hand. Echo planned for tomorrow.

## 2018-11-03 NOTE — PROGRESS NOTES
"/59 (BP Location: Left arm)  Temp 98.7  F (37.1  C) (Oral)  Resp 20  Ht 1.702 m (5' 7\")  Wt 64.4 kg (141 lb 14.4 oz)  SpO2 93%  BMI 22.22 kg/m2    Urine clear today after TUR prostate without significant irrigation.  We will discontinue Velasquez catheter today for trial of voiding.  He will be staying in the hospital because of other medical issues for the time being.    "

## 2018-11-03 NOTE — PLAN OF CARE
Problem: Patient Care Overview  Goal: Plan of Care/Patient Progress Review  Outcome: No Change  PRIMARY DIAGNOSIS: CBI  OUTPATIENT/OBSERVATION GOALS TO BE MET BEFORE DISCHARGE:  1. Stable vital signs: Yes on 2L  2. Tolerating diet: Full liquid diet ordered  3. Pain controlled with oral pain medications:  Yes  4. Positive bowel sounds:  Yes - has not passed gas yet  5. Voiding without difficulty: Velasquez/CBI in place; running at slow rate  6. Able to ambulate: Yes with SBA & Walker  7. Provider specific discharge goals met:  No      VSS on 2L. Did not tolerate wean to RA. Capnography in place. Reports 5/10 back pain, declines intervention at this time. Agreeable to calling for pain meds if needed. Denies nausea. BS active x 4. Has not passed gas. Ordered full liquid breakfast. CBI running at slow rate - output clear, yellow.       Discharge Planner Nurse   Safe discharge environment identified: Yes  Barriers to discharge: Yes - wean O2, advance diet      Please review provider order for any additional goals.   Nurse to notify provider when observation goals have been met and patient is ready for discharge.

## 2018-11-03 NOTE — PROGRESS NOTES
ROOM # 223    Living Situation (if not independent, order SW consult):lives with brother and nephew in [place]   Facility name:  :  [facility] [phone]         Activity level at baseline: Ind with walker for long distance  Activity level on admit: A1 with walker      Patient registered to observation; given Patient Bill of Rights; given the opportunity to ask questions about observation status and their plan of care.  Patient has been oriented to the observation room, bathroom and call light is in place.    Discussed discharge goals and expectations with patient/family.

## 2018-11-03 NOTE — PROGRESS NOTES
"[facility]  Hospitalist Progress Note  Shena Valdes PA-C, GELY 11/03/2018    Reason for Stay (Diagnosis): hypoxia, medical management          Assessment and Plan:      Summary of Stay: Ivan Gomez is a 89 year old male with a past medical history significant for neuropathy, anemia, MR s/p repair, HLD, HTN, gout, CKD, thrombocytopenia, chronic pain and chronic diastolic heart failure who was admitted on 11/2/2018 by Urology for treatment of his bladder cancer. We have been asked to consult on this patient for chronic medical management.    Problem List:   1. POD#1 s/p cystoscopy, transurethral resection of bladder tumor.   2. Hypoxia - patient has been unable to ween off of his oxygen since surgery. Currently sat-ing in the mid 90s on 2L oxygen. CXR ordered today and shows some mild fluid overload. He has 1+ BLE edema. I discontinued his IVF and restarted his home Lasix 40mg PO. Will monitor for now. May need an additional IV dose. Continue to encourage IS. Continue to monitor. Last ECHO was 1/2018 that showed normal EF of 65-70% with hyperdynamic LV function and normal LV wall motion, 3+ MR, moderate pulmonary HTN. Will repeat ECHO in AM.  3. HTN - resume PTA hydralazine, metoprolol  4. HLD - resume PTA Zocor  5. Chronic pain - resume PTA gabapentin  6. GERD - resume PTA Protonix   DVT Prophylaxis: Low Risk/Ambulatory with no VTE prophylaxis indicated  Code Status: Full Code  Discharge Dispo: to home  Estimated Disch Date / # of Days until Disch: 1-2 days        Interval History (Subjective):      Feeling ok. Worried about going home. Lives in [place] and is staying in [place] in a hotel for now.                   Physical Exam:      Last Vital Signs:  /64 (BP Location: Left arm)  Temp 98.6  F (37  C) (Oral)  Resp 20  Ht 1.702 m (5' 7\")  Wt 64.4 kg (141 lb 14.4 oz)  SpO2 93%  BMI 22.22 kg/m2    I/O last 3 completed shifts:  In: 2000 [I.V.:2000]  Out: -1495 " [Blood:5]    Constitutional: Awake, alert, cooperative, no apparent distress   Respiratory: Clear to auscultation bilaterally, no crackles or wheezing   Cardiovascular: Regular rate and rhythm, normal S1 and S2, and murmur noted   Abdomen: Normal bowel sounds, soft, non-distended, non-tender   Skin: No rashes, no cyanosis, dry to touch   Neuro: Alert and oriented x3, no weakness, numbness, memory loss   Extremities: 1+ edema, normal range of motion   Other(s):        All other systems: Negative          Medications:      All current medications were reviewed with changes reflected in problem list.         Data:      All new lab and imaging data was reviewed.   Labs:  No results for input(s): PH, PHARTERIAL, PO2, [id number], SAT, PCO2, HCO3, BASEEXCESS, DOMENICA, BEB in the last 168 hours.    Invalid input(s): [id number]  No results for input(s): NTBNPI, NTBNP in the last 168 hours.    Recent Labs  Lab 11/03/18 0631   WBC 4.3   HGB 9.1*   HCT 31.2*   MCV 92   PLT 80*       Recent Labs  Lab 11/03/18  0631 11/02/18  1743 10/29/18  1456   WBC 4.3 4.1 4.4   HGB 9.1* 9.4* 9.7*   HCT 31.2* 31.6* 32.6*   MCV 92 91 92   PLT 80* 83* 91*     No results for input(s): CKT in the last 168 hours.    Invalid input(s): CK, CK TOTAL  No results for input(s): DD in the last 168 hours.    Recent Labs  Lab 11/03/18  0631 11/02/18  1743 10/29/18  1456    143 145*   POTASSIUM 4.3 3.8 3.8   CHLORIDE 109 108 107   CO2 27 25 26   * 130*  --      No results for input(s): SED, CRP in the last 168 hours.  No results for input(s): INR in the last 168 hours.  No results for input(s): LIPASE in the last 168 hours.  No results for input(s): CHOL, HDL, LDL, TRIG, CHOLHDLRATIO in the last 168 hours.    Recent Labs  Lab 11/03/18  0631 11/02/18  1743 10/29/18  1456   PLT 80* 83* 91*     No results for input(s): TROPONIN, TROPI, TROPR in the last 168 hours.    Invalid input(s): TROP, TROPONINIES  No results for input(s): TSH in the last 168  hours.  No results for input(s): COLOR, APPEARANCE, URINEGLC, URINEBILI, URINEKETONE, SG, UBLD, URINEPH, PROTEIN, UROBILINOGEN, NITRITE, LEUKEST, RBCU, WBCU in the last 168 hours.   Imaging:   Recent Results (from the past 48 hour(s))   XR Chest 2 Views    Narrative    CHEST TWO VIEWS  11/3/2018 11:54 AM     HISTORY: hypoxia;     COMPARISON: 10/17/2017 chest x-ray      Impression    IMPRESSION:   1. Cardiomegaly, mild vascular congestion.  2. Very mild airspace opacities in the lungs bilaterally, nonspecific  but favor pulmonary edema.  3. Bilateral pleural effusions.  4. 2 similar-appearing 1.6 x 0.4 cm foreign bodies project over the  lower left cardiac silhouette redemonstrated. Clinical correlation.    DANY LOPEZ MD

## 2018-11-03 NOTE — PROGRESS NOTES
"Patient seen and examined.  Chart reviewed.  Case discussed with Shena Valdes PA-C.  Please see her note from today for details.    He is having some pain in groin.    Exam:  VSS:  BP 98/54 (BP Location: Left arm)  Temp 97.7  F (36.5  C) (Oral)  Resp 20  Ht 1.702 m (5' 7\")  Wt 64.4 kg (141 lb 14.4 oz)  SpO2 94%  BMI 22.22 kg/m2  Gen:  NAD, A&Ox3.  Eyes:  PERRL, sclera anicteric.  OP:  MMM, no lesions.  Neck:  Supple.  CV:  Fairly regular with occasional premature beat, +1/6 murmur.  Lung:  CTA b/l, normal effort.  Ab:  +BS, soft.  Skin:  Warm, dry to touch.  No rash.  Ext:  Mild non pitting edema LE b/l.      A/P:  89 yo male s/p TURP.  TURP.  Pain meds PRN.  Urology following.  Hypoxia.  Likely due to mild fluid overload.  Restart Lasix.  "

## 2018-11-03 NOTE — PLAN OF CARE
Problem: Patient Care Overview  Goal: Plan of Care/Patient Progress Review  PRIMARY DIAGNOSIS: CBI  OUTPATIENT/OBSERVATION GOALS TO BE MET BEFORE DISCHARGE:  1. Stable vital signs Yes  2. Tolerating diet:clear liquid diet  3. Pain controlled with oral pain medications:  Yes  4. Positive bowel sounds:  hypoactive  5. Voiding without difficulty:  carvalho in place  6. Able to ambulate:  has not been out  7. Provider specific discharge goals met:  No      Vitals are Temp: 97.6  F (36.4  C) Temp src: Oral BP: 117/57   Heart Rate: 73 Resp: 20 SpO2: 94 %  Patient is Alert and Oriented x4. They are assist of 1 with gait belt and walker for long distances.  Patient is on Contact precuations for MRSA.  Pt is a Full Liquid diet and complaining of 5/10 pain in their back.  Norco given for pain. Patient is denying nausea.  Patient has Normal Saline 0.9% running at 50 mL per hour.  Patient has a CBI running at a moderate slow rate withpale color with no clots present.  Patient has a hospitalist consult for commodities.      Discharge Planner Nurse   Safe discharge environment identified: Yes  Barriers to discharge: Yes     Please review provider order for any additional goals.   Nurse to notify provider when observation goals have been met and patient is ready for discharge.

## 2018-11-04 ENCOUNTER — APPOINTMENT (OUTPATIENT)
Dept: ULTRASOUND IMAGING | Facility: CLINIC | Age: 83
DRG: 669 | End: 2018-11-04
Attending: HOSPITALIST
Payer: MEDICARE

## 2018-11-04 ENCOUNTER — APPOINTMENT (OUTPATIENT)
Dept: CARDIOLOGY | Facility: CLINIC | Age: 83
DRG: 669 | End: 2018-11-04
Attending: PHYSICIAN ASSISTANT
Payer: MEDICARE

## 2018-11-04 LAB
ANION GAP SERPL CALCULATED.3IONS-SCNC: 6 MMOL/L (ref 3–14)
BUN SERPL-MCNC: 42 MG/DL (ref 7–30)
CALCIUM SERPL-MCNC: 8.2 MG/DL (ref 8.5–10.1)
CHLORIDE SERPL-SCNC: 107 MMOL/L (ref 94–109)
CO2 SERPL-SCNC: 28 MMOL/L (ref 20–32)
CREAT SERPL-MCNC: 1.62 MG/DL (ref 0.66–1.25)
ERYTHROCYTE [DISTWIDTH] IN BLOOD BY AUTOMATED COUNT: 16.3 % (ref 10–15)
GFR SERPL CREATININE-BSD FRML MDRD: 40 ML/MIN/1.7M2
GLUCOSE SERPL-MCNC: 91 MG/DL (ref 70–99)
HCT VFR BLD AUTO: 28.6 % (ref 40–53)
HGB BLD-MCNC: 8.7 G/DL (ref 13.3–17.7)
MCH RBC QN AUTO: 27.4 PG (ref 26.5–33)
MCHC RBC AUTO-ENTMCNC: 30.4 G/DL (ref 31.5–36.5)
MCV RBC AUTO: 90 FL (ref 78–100)
PLATELET # BLD AUTO: 74 10E9/L (ref 150–450)
POTASSIUM SERPL-SCNC: 4.1 MMOL/L (ref 3.4–5.3)
RBC # BLD AUTO: 3.17 10E12/L (ref 4.4–5.9)
SODIUM SERPL-SCNC: 141 MMOL/L (ref 133–144)
WBC # BLD AUTO: 4.5 10E9/L (ref 4–11)

## 2018-11-04 PROCEDURE — 85027 COMPLETE CBC AUTOMATED: CPT | Performed by: STUDENT IN AN ORGANIZED HEALTH CARE EDUCATION/TRAINING PROGRAM

## 2018-11-04 PROCEDURE — A9270 NON-COVERED ITEM OR SERVICE: HCPCS | Mod: GY | Performed by: STUDENT IN AN ORGANIZED HEALTH CARE EDUCATION/TRAINING PROGRAM

## 2018-11-04 PROCEDURE — A9270 NON-COVERED ITEM OR SERVICE: HCPCS | Mod: GY | Performed by: INTERNAL MEDICINE

## 2018-11-04 PROCEDURE — 25000132 ZZH RX MED GY IP 250 OP 250 PS 637: Mod: GY | Performed by: INTERNAL MEDICINE

## 2018-11-04 PROCEDURE — 80048 BASIC METABOLIC PNL TOTAL CA: CPT | Performed by: STUDENT IN AN ORGANIZED HEALTH CARE EDUCATION/TRAINING PROGRAM

## 2018-11-04 PROCEDURE — 93306 TTE W/DOPPLER COMPLETE: CPT

## 2018-11-04 PROCEDURE — 36415 COLL VENOUS BLD VENIPUNCTURE: CPT | Performed by: STUDENT IN AN ORGANIZED HEALTH CARE EDUCATION/TRAINING PROGRAM

## 2018-11-04 PROCEDURE — 12000000 ZZH R&B MED SURG/OB

## 2018-11-04 PROCEDURE — 93306 TTE W/DOPPLER COMPLETE: CPT | Mod: 26 | Performed by: INTERNAL MEDICINE

## 2018-11-04 PROCEDURE — 25000132 ZZH RX MED GY IP 250 OP 250 PS 637: Mod: GY | Performed by: STUDENT IN AN ORGANIZED HEALTH CARE EDUCATION/TRAINING PROGRAM

## 2018-11-04 PROCEDURE — 93971 EXTREMITY STUDY: CPT | Mod: LT

## 2018-11-04 PROCEDURE — 25000128 H RX IP 250 OP 636: Performed by: HOSPITALIST

## 2018-11-04 RX ORDER — FUROSEMIDE 10 MG/ML
40 INJECTION INTRAMUSCULAR; INTRAVENOUS ONCE
Status: COMPLETED | OUTPATIENT
Start: 2018-11-04 | End: 2018-11-04

## 2018-11-04 RX ORDER — ANALGESIC BALM 1.74; 4.06 G/29G; G/29G
OINTMENT TOPICAL EVERY 6 HOURS PRN
Status: DISCONTINUED | OUTPATIENT
Start: 2018-11-04 | End: 2018-11-04 | Stop reason: CLARIF

## 2018-11-04 RX ORDER — FENTANYL CITRATE 50 UG/ML
25-50 INJECTION, SOLUTION INTRAMUSCULAR; INTRAVENOUS
Status: DISCONTINUED | OUTPATIENT
Start: 2018-11-04 | End: 2018-11-04 | Stop reason: CLARIF

## 2018-11-04 RX ADMIN — PANTOPRAZOLE SODIUM 40 MG: 40 TABLET, DELAYED RELEASE ORAL at 16:14

## 2018-11-04 RX ADMIN — HYDROCODONE BITARTRATE AND ACETAMINOPHEN 1 TABLET: 7.5; 325 TABLET ORAL at 16:16

## 2018-11-04 RX ADMIN — SIMVASTATIN 20 MG: 20 TABLET, FILM COATED ORAL at 22:30

## 2018-11-04 RX ADMIN — GABAPENTIN 300 MG: 300 CAPSULE ORAL at 20:24

## 2018-11-04 RX ADMIN — ISOSORBIDE DINITRATE 20 MG: 20 TABLET ORAL at 20:24

## 2018-11-04 RX ADMIN — HYDROCODONE BITARTRATE AND ACETAMINOPHEN 1 TABLET: 7.5; 325 TABLET ORAL at 04:49

## 2018-11-04 RX ADMIN — Medication: at 17:08

## 2018-11-04 RX ADMIN — TAMSULOSIN HYDROCHLORIDE 0.4 MG: 0.4 CAPSULE ORAL at 08:33

## 2018-11-04 RX ADMIN — FUROSEMIDE 40 MG: 10 INJECTION, SOLUTION INTRAMUSCULAR; INTRAVENOUS at 10:49

## 2018-11-04 RX ADMIN — FINASTERIDE 5 MG: 5 TABLET, FILM COATED ORAL at 08:32

## 2018-11-04 RX ADMIN — PANTOPRAZOLE SODIUM 40 MG: 40 TABLET, DELAYED RELEASE ORAL at 06:48

## 2018-11-04 RX ADMIN — HYDROCODONE BITARTRATE AND ACETAMINOPHEN 1 TABLET: 7.5; 325 TABLET ORAL at 22:31

## 2018-11-04 RX ADMIN — ISOSORBIDE DINITRATE 20 MG: 20 TABLET ORAL at 08:32

## 2018-11-04 RX ADMIN — ISOSORBIDE DINITRATE 20 MG: 20 TABLET ORAL at 15:34

## 2018-11-04 RX ADMIN — GABAPENTIN 300 MG: 300 CAPSULE ORAL at 15:34

## 2018-11-04 RX ADMIN — HYDROCODONE BITARTRATE AND ACETAMINOPHEN 1 TABLET: 7.5; 325 TABLET ORAL at 10:48

## 2018-11-04 RX ADMIN — GABAPENTIN 300 MG: 300 CAPSULE ORAL at 08:33

## 2018-11-04 NOTE — PLAN OF CARE
Problem: Patient Care Overview  Goal: Plan of Care/Patient Progress Review  Vitals are Temp: 98.1  F (36.7  C) Temp src: Oral BP: 104/51   Heart Rate: 66 Resp: 16 SpO2: 94 %.    Patient is Alert and Oriented x4. They are 1 Assist with Gait Belt.  Patient is on Contact precuations for MRSA.  Pt is a Regular diet and complaining of 8/10 pain in their back.  norco given for pain.  Patient is Saline locked.  Patient has a new murmer present.  Plan of care is a echocardiogram this morning.

## 2018-11-04 NOTE — PLAN OF CARE
Problem: Patient Care Overview  Goal: Discharge Needs Assessment  PRIMARY DIAGNOSIS: P/o bladder tumor removal  OUTPATIENT/OBSERVATION GOALS TO BE MET BEFORE DISCHARGE:  1. ADLs back to baseline: Yes    2. Activity and level of assistance: Up with standby assistance.    3. Pain status: Improved-controlled with oral pain medications.    4. Return to near baseline physical activity: No     Discharge Planner Nurse   Safe discharge environment identified: Yes  Barriers to discharge: Yes       Entered by: Ignacia Spencer 11/04/2018 3:04 PM     Please review provider order for any additional goals.   Nurse to notify provider when observation goals have been met and patient is ready for discharge.    Pt is alert and oriented. Desatting to 89-90 on room air, encouraging IS, IV Lasix given this am. Pt is urinationg frequently, states burning with urination from TUR procedure. Velasuqez was removed yesterday. Up SBA. BPs have been soft. BP meds discontinued. Pt had some cramping in LLE this afternoon, LLE US negative for DVT. BenGay and calf massage ordered. ECHo is resulted. Pt takes Norco Q6 for chronic back pain.

## 2018-11-04 NOTE — OP NOTE
OPERATIVE REPORT  11/02/2018    PREOPERATIVE DIAGNOSIS:    1. Bladder tumor  2. Hematuria    POSTOPERATIVE DIAGNOSIS:    1. Bladder tumor  2. Hematuria    PROCEDURE PERFORMED:  1. Transurethral resection of bladder tumor > 5 cm in size    SURGEON:   Ron iHlton MD   RESIDENT:   Jeison Blackburn MD (Urology Resident)  FINDINGS:   Approximately 5 cm bladder tumor.  ANESTHESIA:  General.   IV FLUIDS:   Per anesthesia  BLOOD LOSS:  < 50 mL.   SPECIMENS:     ID Type Source Tests Collected by Time Destination   A : BLADDER TUMOR Tissue Bladder SURGICAL PATHOLOGY EXAM Weight, Dennis Loera MD 11/2/2018  4:56 PM      DRAINS:   A 20-Argentine 3-way catheter with 20 mL in balloon  INDICATIONS:  Ivan Gomez is a(n) 89 year old male with with a history of low-grade bladder cancer.  He is elected to defer surveillance and only wishes to undergo intervention when he has symptomatology (such as voiding symptoms or hematuria).  After discussion of the risks, benefits and alternatives of the procedure, the patient agreed to proceed with the above listed procedure(s).     DESCRIPTION OF PROCEDURE: After verifying informed consent, the patient was taken to the operating room. Adequate anesthesia was induced and the patient was placed in the dorsal lithotomy position where they were prepped and draped in the standard sterile fashion. A timeout was performed to verify correct patient and procedure. Pneumo boots and perioperative antibiotics were in place before the procedure commenced. We began the procedure by inserting a 22-Argentine rigid cystoscope into a well-lubricated urethra. The anterior urethra was unremarkable.  Upon gaining entrance into the bladder, full cystoscopy was performed.  We are able to visualize the left ureteral orifice.  There was low-grade appearing frondular bladder tumor surrounding the left ureteral orifice.  Along the right trigone and right lateral wall, there was a large, greater than 5 cm bladder  tumor.  The cystoscope was removed.    We then introduced the 26-Vietnamese monopolar resectoscope. The tumor was resected to the normal bladder contour.  Muscle fibers were visible at the bottom of the resection bed.  During resection along the right lateral wall, the patient did have an obturator reflex.  A deeper cut was taken due to the reflex but there was no noted perforation.   Once we had completed our dissection, we used the Ellik evacuator to remove the tumor chips. We then reintroduced the resectoscope to ensure meticulous hemostasis of the resection bed.  This was done with a rollerball.  After completion of the resection on the right side, we were able to see the right ureteric orifice.  It was uninjured.  We then turned our attention to the left side.    We used the rollerball to fulgurate all visible tumor taking care to leave a margin around the left ureteral orifice which did appear to be involved.  As this case was being done for symptomatology and not with an attempt to cure, we felt that further resection around the left ureteral orifice would not be in the best interest of the patient.  The bladder was then viewed under no flow and low pressure and hemostasis had been achieved.  The resectoscope was removed.    A 20-Vietnamese 3 catheter was placed, and 20 mL was instilled into the balloon. The catheter was hooked up to continuous irrigation which was clear;  the patient was awoken from anesthesia. They were then transferred to the recovery room in stable condition.     POSTOPERATIVE PLAN:   -Following recovery in PACU, the patient will be admitted to the wards.  We will wean continuous bladder irrigation as able.  His catheter can likely be removed prior to discharge.    --    Nikko Blackburn MD  Urology Chief Resident

## 2018-11-04 NOTE — PROGRESS NOTES
[facility]    Hospitalist Progress Note    Date of Service (when I saw the patient): 11/04/2018    Assessment & Plan   Ivan Gomez is a 89 year old male with neuropathy, anemia, MR s/p repair, HLP, HTN, gout, ckd, thrombocytopenia, chronic pain. Diastolic HF  who was admitted on 11/2/2018 by urology for cystoscopy/transurethral resection of bladder tumor. We are consulted for post-op hypoxia    Post-op hypoxia    - CXR and physical exam consistent with acute CHF (has known diastolic dysfunction)    - received home lasix dose. This am will give lasix 40mg IV x1    - ECHO pending    - cont to try and wean oxygen off    S/p cystoscopy and transurethral resection of bladder tumor    - urology following    - carvalho already discontinued    Has new left calf pain    - will assess with LLE US    - otherwise likely muscle cramping    HTN    - holding metoprolol and hydralazine due to lower BPs    HLP    - cont home meds    Chronic anemia and thrombocytopenia    - at baseline     Called step-daughter and updated her      DVT Prophylaxis: Ambulate every shift. Also has thrombocytopenia  Code Status: Full Code    Disposition: Expected discharge in unclear    Sergio Knox    Interval History   Patient seen multiple times. No chest pain or sob. Eating well. This am was fine with no complaints. Now has left calf pain/tightness    -Data reviewed today: I reviewed all new labs and imaging results over the last 24 hours. I personally reviewed no images or EKG's today.    Physical Exam   Temp: 97.6  F (36.4  C) Temp src: Oral BP: 128/72   Heart Rate: 79 Resp: 16 SpO2: 94 % O2 Device: None (Room air) Oxygen Delivery: 2 LPM  Vitals:    11/02/18 1249 11/02/18 1919   Weight: 67.6 kg (149 lb) 64.4 kg (141 lb 14.4 oz)     Vital Signs with Ranges  Temp:  [97.6  F (36.4  C)-98.2  F (36.8  C)] 97.6  F (36.4  C)  Heart Rate:  [66-79] 79  Resp:  [16-20] 16  BP: ()/(51-73) 128/72  SpO2:  [89 %-94 %] 94 %  I/O last 3  completed shifts:  In: 240 [P.O.:240]  Out: 900 [Urine:900]    Constitutional: Awake, alert, cooperative, no apparent distress   Respiratory: Has some basilar crackles bilat. Decreased BS r base   Cardiovascular: Regular rate and rhythm, normal S1 and S2, and no murmur noted   Abdomen: Normal bowel sounds, soft, non-distended, non-tender   Skin: No rashes, no cyanosis, dry to touch   Neuro: Alert and oriented x3, no weakness, numbness, memory loss   Extremities: No edema, normal range of motion   Other(s):        All other systems: Negative     Medications     sodium chloride 0.9% (bag) Stopped (11/03/18 1254)       finasteride  5 mg Oral Daily     gabapentin  300 mg Oral TID     isosorbide dinitrate  20 mg Oral TID     pantoprazole  40 mg Oral BID AC     simvastatin  20 mg Oral At Bedtime     sodium chloride (PF)  3 mL Intracatheter Q8H     tamsulosin  0.4 mg Oral Daily       Data     Recent Labs  Lab 11/04/18  0610 11/03/18  0631 11/02/18  1743   WBC 4.5 4.3 4.1   HGB 8.7* 9.1* 9.4*   MCV 90 92 91   PLT 74* 80* 83*    144 143   POTASSIUM 4.1 4.3 3.8   CHLORIDE 107 109 108   CO2 28 27 25   BUN 42* 34* 33*   CR 1.62* 1.40* 1.30*   ANIONGAP 6 8 10   GIUSEPPE 8.2* 8.1* 8.1*   GLC 91 118* 130*       No results found for this or any previous visit (from the past 24 hour(s)).

## 2018-11-04 NOTE — PLAN OF CARE
Problem: Patient Care Overview  Goal: Plan of Care/Patient Progress Review  Outcome: No Change  Patient resting comfortably. Vital signs stable. Alert/orineted. Voiding without difficulty. Up with assist of 1 and walker. Continues to need 1 LPM.

## 2018-11-05 VITALS
DIASTOLIC BLOOD PRESSURE: 64 MMHG | TEMPERATURE: 97.5 F | OXYGEN SATURATION: 93 % | SYSTOLIC BLOOD PRESSURE: 128 MMHG | HEIGHT: 67 IN | RESPIRATION RATE: 18 BRPM | BODY MASS INDEX: 24.08 KG/M2 | WEIGHT: 153.4 LBS

## 2018-11-05 LAB
ANION GAP SERPL CALCULATED.3IONS-SCNC: 5 MMOL/L (ref 3–14)
BASOPHILS # BLD AUTO: 0 10E9/L (ref 0–0.2)
BASOPHILS NFR BLD AUTO: 0.2 %
BUN SERPL-MCNC: 45 MG/DL (ref 7–30)
CALCIUM SERPL-MCNC: 8 MG/DL (ref 8.5–10.1)
CHLORIDE SERPL-SCNC: 106 MMOL/L (ref 94–109)
CO2 SERPL-SCNC: 30 MMOL/L (ref 20–32)
CREAT SERPL-MCNC: 1.66 MG/DL (ref 0.66–1.25)
DIFFERENTIAL METHOD BLD: ABNORMAL
EOSINOPHIL # BLD AUTO: 0.1 10E9/L (ref 0–0.7)
EOSINOPHIL NFR BLD AUTO: 1.2 %
ERYTHROCYTE [DISTWIDTH] IN BLOOD BY AUTOMATED COUNT: 16.1 % (ref 10–15)
GFR SERPL CREATININE-BSD FRML MDRD: 39 ML/MIN/1.7M2
GLUCOSE SERPL-MCNC: 89 MG/DL (ref 70–99)
HCT VFR BLD AUTO: 29.9 % (ref 40–53)
HGB BLD-MCNC: 8.8 G/DL (ref 13.3–17.7)
IMM GRANULOCYTES # BLD: 0 10E9/L (ref 0–0.4)
IMM GRANULOCYTES NFR BLD: 0.5 %
LYMPHOCYTES # BLD AUTO: 0.8 10E9/L (ref 0.8–5.3)
LYMPHOCYTES NFR BLD AUTO: 18.8 %
MCH RBC QN AUTO: 26.7 PG (ref 26.5–33)
MCHC RBC AUTO-ENTMCNC: 29.4 G/DL (ref 31.5–36.5)
MCV RBC AUTO: 91 FL (ref 78–100)
MONOCYTES # BLD AUTO: 0.4 10E9/L (ref 0–1.3)
MONOCYTES NFR BLD AUTO: 8.9 %
NEUTROPHILS # BLD AUTO: 2.9 10E9/L (ref 1.6–8.3)
NEUTROPHILS NFR BLD AUTO: 70.4 %
NRBC # BLD AUTO: 0 10*3/UL
NRBC BLD AUTO-RTO: 0 /100
PLATELET # BLD AUTO: 73 10E9/L (ref 150–450)
POTASSIUM SERPL-SCNC: 3.9 MMOL/L (ref 3.4–5.3)
RBC # BLD AUTO: 3.3 10E12/L (ref 4.4–5.9)
SODIUM SERPL-SCNC: 141 MMOL/L (ref 133–144)
WBC # BLD AUTO: 4.1 10E9/L (ref 4–11)

## 2018-11-05 PROCEDURE — 25000132 ZZH RX MED GY IP 250 OP 250 PS 637: Mod: GY | Performed by: STUDENT IN AN ORGANIZED HEALTH CARE EDUCATION/TRAINING PROGRAM

## 2018-11-05 PROCEDURE — 99239 HOSP IP/OBS DSCHRG MGMT >30: CPT | Performed by: HOSPITALIST

## 2018-11-05 PROCEDURE — 36415 COLL VENOUS BLD VENIPUNCTURE: CPT | Performed by: HOSPITALIST

## 2018-11-05 PROCEDURE — 85025 COMPLETE CBC W/AUTO DIFF WBC: CPT | Performed by: HOSPITALIST

## 2018-11-05 PROCEDURE — 80048 BASIC METABOLIC PNL TOTAL CA: CPT | Performed by: HOSPITALIST

## 2018-11-05 PROCEDURE — A9270 NON-COVERED ITEM OR SERVICE: HCPCS | Mod: GY | Performed by: STUDENT IN AN ORGANIZED HEALTH CARE EDUCATION/TRAINING PROGRAM

## 2018-11-05 RX ADMIN — HYDROCODONE BITARTRATE AND ACETAMINOPHEN 1 TABLET: 7.5; 325 TABLET ORAL at 16:20

## 2018-11-05 RX ADMIN — GABAPENTIN 300 MG: 300 CAPSULE ORAL at 08:32

## 2018-11-05 RX ADMIN — PANTOPRAZOLE SODIUM 40 MG: 40 TABLET, DELAYED RELEASE ORAL at 06:54

## 2018-11-05 RX ADMIN — FINASTERIDE 5 MG: 5 TABLET, FILM COATED ORAL at 08:32

## 2018-11-05 RX ADMIN — GABAPENTIN 300 MG: 300 CAPSULE ORAL at 14:55

## 2018-11-05 RX ADMIN — ISOSORBIDE DINITRATE 20 MG: 20 TABLET ORAL at 14:55

## 2018-11-05 RX ADMIN — TAMSULOSIN HYDROCHLORIDE 0.4 MG: 0.4 CAPSULE ORAL at 08:31

## 2018-11-05 RX ADMIN — HYDROCODONE BITARTRATE AND ACETAMINOPHEN 1 TABLET: 7.5; 325 TABLET ORAL at 04:32

## 2018-11-05 RX ADMIN — ISOSORBIDE DINITRATE 20 MG: 20 TABLET ORAL at 08:31

## 2018-11-05 RX ADMIN — HYDROCODONE BITARTRATE AND ACETAMINOPHEN 1 TABLET: 7.5; 325 TABLET ORAL at 10:36

## 2018-11-05 RX ADMIN — Medication: at 04:33

## 2018-11-05 RX ADMIN — PANTOPRAZOLE SODIUM 40 MG: 40 TABLET, DELAYED RELEASE ORAL at 16:20

## 2018-11-05 ASSESSMENT — PAIN DESCRIPTION - DESCRIPTORS
DESCRIPTORS: ACHING
DESCRIPTORS: ACHING

## 2018-11-05 NOTE — DISCHARGE SUMMARY
[facility]  Discharge Summary  Name: Ivan Gomez    MRN: [id number]  YOB: 1926    Age: 89 year old  Date of Discharge:  11/5/2018  Date of Admission: 11/2/2018  Primary Care Provider: Evaristo Magaña  Discharge Physician:  Sergio Knox MD  Discharging Service:  Hospitalist  Date of Service (when I saw the patient): 11/05/18    Discharge Diagnosis:  S/p Transurethral resection of bladder tumor > 5 cm in size, post operative hypoxia likely due to mild diastolic chf     Other Diagnosis:  neuropathy, anemia, MR s/p repair, HLP, HTN, gout, ckd, thrombocytopenia, chronic pain. Diastolic HF       Discharge Disposition:  Discharged to home     Allergies:  Allergies   Allergen Reactions     Celebrex [Celecoxib] Hives     Penicillins Hives        Discharge Medications:   Current Discharge Medication List      CONTINUE these medications which have NOT CHANGED    Details   aspirin EC 81 MG EC tablet Take 1 tablet (81 mg) by mouth daily    Associated Diagnoses: S/P mitral valve repair      finasteride (PROSCAR) 5 MG tablet Take 1 tablet (5 mg) by mouth daily  Qty: 90 tablet, Refills: 3    Associated Diagnoses: Benign enlargement of prostate      furosemide (LASIX) 20 MG tablet Take 40 mg by mouth daily      gabapentin (NEURONTIN) 300 MG capsule TAKE 1 CAPSULE BY MOUTH THREE TIMES DAILY  Qty: 270 capsule, Refills: 3    Associated Diagnoses: Spinal stenosis of lumbar region with neurogenic claudication      hydrALAZINE (APRESOLINE) 25 MG tablet Take 1 tablet (25 mg) by mouth 3 times daily  Qty: 270 tablet, Refills: 1    Associated Diagnoses: Acute on chronic heart failure, unspecified heart failure type (H)      HYDROcodone-acetaminophen (NORCO) 7.5-325 MG per tablet Take 1 tablet by mouth every 6 hours as needed for pain maximum 4 tablet(s) per day  Qty: 120 tablet, Refills: 0    Associated Diagnoses: Spinal stenosis of lumbar region, unspecified whether neurogenic claudication present     "  isosorbide dinitrate (ISORDIL) 20 MG tablet TAKE 1 TABLET BY MOUTH THREE TIMES DAILY  Qty: 270 tablet, Refills: 2    Associated Diagnoses: Chronic diastolic heart failure (H); Coronary artery disease involving native coronary artery of native heart without angina pectoris      metoprolol (TOPROL XL) 50 MG 24 hr tablet Take 1 tablet (50 mg) by mouth daily  Qty: 90 tablet, Refills: 3    Comments: This is the correct prescription ; ignore the previous prescription  Associated Diagnoses: Hypertension goal BP (blood pressure) < 140/90      pantoprazole (PROTONIX) 40 MG EC tablet Take 1 tablet (40 mg) by mouth 2 times daily  Qty: 180 tablet, Refills: 3    Associated Diagnoses: Abdominal pain, epigastric      potassium chloride SA (K-DUR/KLOR-CON M) 10 MEQ CR tablet Take half tablet one time daily.  Qty: 90 tablet, Refills: 3    Associated Diagnoses: Chronic diastolic heart failure (H)      simvastatin (ZOCOR) 20 MG tablet TAKE 1 TABLET(20 MG) BY MOUTH AT BEDTIME  Qty: 90 tablet, Refills: 1    Associated Diagnoses: Hyperlipidemia LDL goal <100      tamsulosin (FLOMAX) 0.4 MG capsule TAKE ONE CAPSULE BY MOUTH DAILY  Qty: 90 capsule, Refills: 3    Associated Diagnoses: Benign prostatic hyperplasia without lower urinary tract symptoms      hypromellose (ARTIFICIAL TEARS) 0.4 % SOLN ophthalmic solution Place 2 drops Into the left eye 4 times daily      Skin Protectants, Misc. (EUCERIN) cream Apply topically At Bedtime Apply to hands              Condition on Discharge:  Discharge condition: Stable   Discharge vitals: Blood pressure 125/66, temperature 97.2  F (36.2  C), temperature source Oral, resp. rate 20, height 1.702 m (5' 7\"), weight 69.6 kg (153 lb 6.4 oz), SpO2 92 %.   Code status on discharge: Full Code     History of Illness:  See detailed admission note for full details.    Admitted by Urology on their service. Please see Urology note    Significant Physical Exam Findings:  Today patient is in chair. No chest " pain/sob. States legs feel tight  s1s2 rrr, lungs clear, abdo +BS    Procedures:  Transurethral resection of bladder tumor > 5 cm in size,       Imaging:  Results for orders placed or performed during the hospital encounter of 11/02/18   XR Chest 2 Views    Narrative    CHEST TWO VIEWS  11/3/2018 11:54 AM     HISTORY: hypoxia;     COMPARISON: 10/17/2017 chest x-ray      Impression    IMPRESSION:   1. Cardiomegaly, mild vascular congestion.  2. Very mild airspace opacities in the lungs bilaterally, nonspecific  but favor pulmonary edema.  3. Bilateral pleural effusions.  4. 2 similar-appearing 1.6 x 0.4 cm foreign bodies project over the  lower left cardiac silhouette redemonstrated. Clinical correlation.    DANY LOPEZ MD   US Lower Extremity Venous Duplex Left    Narrative    VENOUS ULTRASOUND LEFT LEG  11/4/2018 1:31 PM     HISTORY: Calf pain, rule out deep venous thrombosis.    COMPARISON: None.    FINDINGS:  Examination of the deep veins with graded compression and  color flow Doppler with spectral wave form analysis shows no evidence  of thrombus in the common femoral vein, femoral vein, popliteal vein  or calf veins.  There is no venous insufficiency.      Impression    IMPRESSION: No evidence of deep venous thrombosis.    WILLIAM GREGG MD        Consultations:  Admitted by urology, medicine consult.     Significant Lab Results:    Recent Labs  Lab 11/05/18  0639 11/04/18  0610 11/03/18  0631   WBC 4.1 4.5 4.3   HGB 8.8* 8.7* 9.1*   HCT 29.9* 28.6* 31.2*   MCV 91 90 92   PLT 73* 74* 80*          Lab Results   Component Value Date     11/05/2018     11/04/2018     11/03/2018    Lab Results   Component Value Date    CHLORIDE 106 11/05/2018    CHLORIDE 107 11/04/2018    CHLORIDE 109 11/03/2018    Lab Results   Component Value Date    BUN 45 11/05/2018    BUN 42 11/04/2018    BUN 34 11/03/2018      Lab Results   Component Value Date    POTASSIUM 3.9 11/05/2018    POTASSIUM 4.1 11/04/2018     "POTASSIUM 4.3 2018    Lab Results   Component Value Date    CO2 30 2018    CO2 28 2018    CO2 27 2018    Lab Results   Component Value Date    CR 1.66 2018    CR 1.62 2018    CR 1.40 2018           Pending Results:    Unresulted Labs Ordered in the Past 30 Days of this Admission     Date and Time Order Name Status Description    2018 1657 Surgical pathology exam In process            Discharge Instructions and Follow-Up:   Discharge diet:   Active Diet Order      Advance Diet as Tolerated: Regular Diet Adult   Discharge activity: Activity as tolerated   Discharge follow-up: Follow up with primary care provider in 7 days   Outpatient therapy: None    Home Care agency: None    Other instructions: Follow-up with Dr. Hilton as previously scheduled in 3 days     Hospital Course:  Patient was admitted by Urology for Transurethral resection of bladder tumor > 5 cm in size. Post-operatively medicine was consulted for some hypoxia. It was felt patient was in mild HF. He was diuresed and weaned off oxygen. Urology stopped seeing patient 2 days ago. Patient has follow-up with Dr. Hilton in 3 days. Today patient has been resistant to discharge. When I try to identify his concerns/barriers he states the followin. He needs a rental care and the company cannot get him a car until tomorrow. When offered a tax, he states \"no. I'm not doing that\" and then asked our care coordinator for a limo. 2. He states his legs feel tired. He already ambulated in the halls (multiple laps) with his walker independently. Also when offered assistance in bathing stated \"I can do it myself\" and was able to bathe independently just fine. When I asked if he felt weak and offered a PT eval/possible TCU stay, he stated \"I'm not doing that\". 3. He states he is constipation, but then stated he would not try prune juice or any other medications for constipation. 4. I ask if he needs a visiting nurse to visit " "him at his hotel. He states \"no. Why would I want that?\". Patient is difficult. I explained I was not trying to rush him out of the hospital but I explained we could not keep him just to wait for his rental car company to provide a car. His discharge orders have already been completed by Urology. I will have our care coordinator help him get home.      Total time spent in face to face contact with the patient and coordinating discharge was:  40 Minutes.    Sergio Knox MD  Pager: [phone]  "

## 2018-11-05 NOTE — PLAN OF CARE
Problem: Patient Care Overview  Goal: Plan of Care/Patient Progress Review  Outcome: Improving  PRIMARY DIAGNOSIS: P/o bladder tumor removal  OUTPATIENT/OBSERVATION GOALS TO BE MET BEFORE DISCHARGE:  1. ADLs back to baseline: Yes     3. Activity and level of assistance: Up  In chair, independently ambulating to the Bathroom prn  4. Pain status: Improved-controlled with oral pain medications.     4. Return to near baseline physical activity: No    Temp: 97.5  F (36.4  C) Temp src: Oral BP: 129/63   Heart Rate: 71 Resp: 24 SpO2: 91 % O2 Device: None (Room air)     VSS. BP improved.  Anti hypertensive meds held during earlier shift.   Lung sounds clear, acceptable sats on room air.  Patient c/o of chronic low back pain,  Improved after Norco po given.  Patient continues to urinate frequently after lasix IV given for diuresis on previous shift.  Mitesh Capps applied to bilateral calves with some improvement per patient.  Plan:  Continue to provide supportive cares.      Discharge Planner Nurse   Safe discharge environment identified: Yes  Barriers to discharge: Yes       Entered by: Karen Lesch 11/04/2018 18:00 PM  Please review provider order for any additional goals.   Nurse to notify provider when observation goals have been met and patient is ready for discharge.

## 2018-11-05 NOTE — PLAN OF CARE
Problem: Patient Care Overview  Goal: Plan of Care/Patient Progress Review  PRIMARY DIAGNOSIS: P/o bladder tumor removal  OUTPATIENT/OBSERVATION GOALS TO BE MET BEFORE DISCHARGE:  1. ADLs back to baseline: Yes      3. Activity and level of assistance: Up  In chair, independently ambulating to the Bathroom prn  4. Pain status: Improved-controlled with oral pain medications.      4. Return to near baseline physical activity: No     Temp: 97.9  F (36.6  C) Temp src: Oral BP: 108/60   Heart Rate: 67 Resp: 20 SpO2: 91 % O2 Device: None (Room air)     BP stable.  LS clear, sats remain adequate.  Improved pain control with Norco given per order.  Patient states that discomfort has improved since  Administration of BenGay to bilateral calves.  Plan:  Continue to provide supportive cares.      Discharge Planner Nurse   Safe discharge environment identified: Yes  Barriers to discharge: Yes       Entered by: Karen Lesch 11/04/2018 21:30  PM  Please review provider order for any additional goals.   Nurse to notify provider when observation goals have been met and patient is ready for discharge.

## 2018-11-05 NOTE — PLAN OF CARE
"Problem: Patient Care Overview  Goal: Plan of Care/Patient Progress Review  Outcome: Improving  Pt is alert and orientated. Able to make needs known. Patient up independently in the room. Refusing a bed alarm or assistance. Patient voiding with no concerns. Tolerating reg diet and PO medications. Patient wanted to be set up in the shower. Refused assistance in helping. Patient will be discharged but stated \"i dont feel strong enough\" but patient ambulated in the hallway multiple times. Pain to LLE 4/10 treated with norco. Pt will [facility] to [facility].       "

## 2018-11-05 NOTE — PLAN OF CARE
Problem: Patient Care Overview  Goal: Plan of Care/Patient Progress Review  Outcome: Therapy, progress toward functional goals as expected  Orientation: Alert and oriented x4  VSS. 92% on RA. Afebrile.    LS: clear and equal bilaterally.   GI: Passing gas. no BM. Denies N/V.   : Adequate urine output. Clear yellow. Urinary frequency secondary to lasix.   Skin: abrasions and bruises throughout. Skin otherwise intact  Activity: Independent in room. Pt slept comfortably throughout shift.   Pain: 9/10. Chronic back pain. Well controlled with oral pain medication. Norco x1. Bengay applied to bilateral lower calves with massage for leg cramps.   Plan: Continue with current cares.

## 2018-11-05 NOTE — CONSULTS
"CTS consulted for discharge planning as pt resides in [place] w/ his brother and nephew and is staying in a hotel while in [place]. Pt reporting his step-daughter as his only support in [place] and this is very limited. Provider concerns are transportation to hotel upon discharge and to appointment W/ Urology on 11/8.     Met w/ pt at bedside and discussed this. Pt reports that he had a rental car but he returned it while he was hospitalized. Pt expressing some frustration w/ discussion of discharging today as he would like \"more time\" to \"gather (his) things\" and to coordinate a new rental car. Informed pt that if he does not have rental car in place, he could take a taxi back to his hotel and CTS could assist w/ this. Pt reports that generally he prefers a very comfortable vehicle like a Truzip, informed pt that if he would like CTS assistance w/ this, it would be arranged via medical transport or cab. Pt expresses understanding and would like CTS assistance w/ arranging a cab on discharge. Pt reports he has his own money to pay for this.     Offered resources for transport to his 11/8 appointment, pt declines stating he already has transport information and he will arrange this himself. He references using limousines, Lyft and rental cars. Pt seems knowledgeable of different transport resources. Pt also reports that he is staying in a suite from 10/18-11/15, he has a full kitchen and groceries as well as takeout options available. He denies having any other discharge needs.     Pt agreeable to use: [employer] ordered for him upon discharge [phone]. He is staying at [facility] in [address]. Writer or staff can call and arrange this at time of discharge.       Update 1330: Met w/ pt again, he is interested in  transport as they would bring both he and his belongings to his room. Reviewed out of pocket cost for [facility] transport, $70 for base rate and $4.50 per mile to the " destination. Pt expressed understanding and is agreeable to this. Transport arranged for 1730. Provider, bedside RN and charge RN aware.     CM will continue to follow patient for any additional discharge needs.     Mariana Riojas RN BSN CTS   [phone]  Care Transitions Team  [facility]

## 2018-11-05 NOTE — PROGRESS NOTES
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 Please contact Infection Prevention with any questions/concerns at *[phone].    Monica Swan, ICP

## 2018-11-06 ENCOUNTER — TELEPHONE (OUTPATIENT)
Dept: FAMILY MEDICINE | Facility: CLINIC | Age: 83
End: 2018-11-06

## 2018-11-06 LAB — COPATH REPORT: NORMAL

## 2018-11-06 NOTE — PLAN OF CARE
Problem: Patient Care Overview  Goal: Plan of Care/Patient Progress Review  Outcome: Adequate for Discharge Date Met: 11/05/18  Patient's After Visit Summary was reviewed with patient   Patient verbalized understanding of After Visit Summary, recommended follow up and was given an opportunity to ask questions.   Discharge medications sent home with patient/family: YES,  Patient taken to Main Pharmacy medication pickup and billing   Discharged with [facility] Transportation to [facility] hotel.

## 2018-11-06 NOTE — TELEPHONE ENCOUNTER
"ED/Discharge Protocol    \"Hi, my name is Dorothea Bass, a registered nurse, and I am calling on behalf of Dr. mcelroy's office at [place].  I am calling to follow up and see how things are going for you after your recent visit.\"    \"I see that you were in the (ER/UC/IP) on 11/5/2018.    How are you doing now that you are home?\" good.  Staying at a hotel.    Is patient experiencing symptoms that may require a hospital visit?  no    Discharge Instructions    \"Let's review your discharge instructions.  What is/are the follow-up recommendations?  Pt. Response: no questions    \"Were you instructed to make a follow-up appointment?\"  Pt. Response: Yes.  Has appointment been made?   Yes      \"When you see the provider, I would recommend that you bring your discharge instructions with you.    Medications    \"How many new medications are you on since your hospitalization/ED visit?\"    0-1  \"How many of your current medicines changed (dose, timing, name, etc.) while you were in the hospital/ED visit?\"   0-1  \"Do you have questions about your medications?\"   No  \"Were you newly diagnosed with heart failure, COPD, diabetes or did you have a heart attack?\"   No  For patients on insulin: \"Did you start on insulin in the hospital or did you have your insulin dose changed?\"   No    Medication reconciliation completed? Yes    Was MTM referral placed (*Make sure to put transitions as reason for referral)?   No    Call Summary    \"Do you have any questions or concerns about your condition or care plan at the moment?\"    No  Triage nurse advice given: Keep appt with Dr Hilton    Patient was in ER 1X in the past year (assess appropriateness of ER visits.)      \"If you have questions or things don't continue to improve, we encourage you contact us through the main clinic number,  [phone].  Even if the clinic is not open, triage nurses are available 24/7 to help you.     We would like you to know that our clinic has extended " "hours (provide information).  We also have urgent care (provide details on closest location and hours/contact info)\"      \"Thank you for your time and take care!\"      "

## 2018-11-08 ENCOUNTER — OFFICE VISIT (OUTPATIENT)
Dept: UROLOGY | Facility: CLINIC | Age: 83
End: 2018-11-08
Payer: MEDICARE

## 2018-11-08 VITALS
WEIGHT: 156.5 LBS | SYSTOLIC BLOOD PRESSURE: 131 MMHG | DIASTOLIC BLOOD PRESSURE: 76 MMHG | OXYGEN SATURATION: 95 % | BODY MASS INDEX: 24.51 KG/M2 | HEART RATE: 76 BPM

## 2018-11-08 DIAGNOSIS — C67.8 MALIGNANT NEOPLASM OF OVERLAPPING SITES OF BLADDER (H): Primary | ICD-10-CM

## 2018-11-08 ASSESSMENT — PAIN SCALES - GENERAL: PAINLEVEL: NO PAIN (0)

## 2018-11-08 NOTE — PROGRESS NOTES
89 year old male with a history of low urothelial carcinoma here for follow up     Had a complicated post operative course after original TURBT(Trans Urethral Resection of Bladder Tumor)  including the need for a nephrostomy tube, this was internalized for a double J.  Had a recurrence and another TURBT(Trans Urethral Resection of Bladder Tumor) a year later and did better, but still had quite a slow recovery. More recently he had a repeat TURBT on 18 and returns to clinic to review pathology.    Surgical Pathology 18:  FINAL DIAGNOSIS:   Bladder, transurethral resection of tumor-   - High-grade, invasive papillary urothelial (transitional cell) carcinoma.   - Tumor invades lamina propria with uninvolved muscularis propria.   - Urothelial (transitional cell) carcinoma in-situ: Not identified.   - Lymph/vascular invasion by tumor: Not identified.   - Sampling includes: Urothelium, lamina propria, and muscularis propria.     Date of Initial Diagnosis: 2015  Stage of Initial Diagnosis: Ta, but large volume with a couple of tumors >3 cms near right UO requiring neph tube and stent  Grade at Initial Diagnosis: Low with focal high grade  Site of First Diagnosis: bladder  Any Recurrence? 2016 low grade Ta, 10/2018 symptomatic recurrence T1 high grade  Intravesical Treatments: TURBT( ), TURBT( ), TURBT (18)  Date of Last Cysto: 18  Date of Last Upper Tract Imagin/15/15, Renal US 3/9/17  Patient was pretreated with antibiotics.    Lab Results   Component Value Date    CR 1.66 2018    CR 1.62 2018    CR 1.40 2018    CR 1.30 2018    CR 1.57 10/29/2018    CR 1.70 2018    CR 1.56 2018    CR 1.73 2018    CR 1.75 2017    CR 1.28 2017       Recent Echo 2018 EF 70%, moderate left ventrical hypertrophy will scan in result    EKG sinus rhythmn    CT scan shows no mets, no fat stranding, 4 cm tumor    Patient is a 89 year old male    Vitals: Blood pressure 131/76, pulse 76, weight 71 kg (156 lb 8 oz), SpO2 95 %.  General Appearance Adult: Alert, no acute distress, oriented    States that he feels great and gets some blood occasionally, more frequently of recent    A/P History of recurrent low grade bladder cancer Ta, now with HGT1 recurrence     Patient continues to be resistant to any further treatment, certainly any radical treatment like cystectomy, chemotherapy, radiation etc. Given the upstaging of his disease, discussed the option of BCG.    We discussed that BCG and Mytomycin-C are sometimes used to reduce the risk of progression and recurrence and have found favor with larger volume and or multifocal and recurrent tumors.    At this time he would like to hold off on further intervention for his bladder cancer. He will not pursue BCG, Mytomycin-C, Cx etc.  Therefore will just observe and wait for symptoms or recurrence of hematuria.      Scribe Disclosure:   I,Aris Swain, am serving as a scribe; to document services personally performed by Dennis Hilton MD based on data collection and the provider's statements to me.     Dennis Hilton MD  Department of Urology Staff  [facility]    Attestation:  This patient was seen and evaluated by me, with a scribe taking notes.  I have reviewed the note above and agree.  The physical exam and or any procedures were performed by me and the pertinant details are outlined below.     Patient is a 89 year old  male   Vitals: Blood pressure 131/76, pulse 76, weight 71 kg (156 lb 8 oz), SpO2 95 %.  General Appearance Adult: Alert, no acute distress, oriented    A/P History of recurrent low grade bladder cancer Ta, now with HGT1 recurrence     Patient continues to be resistant to any further treatment, certainly any radical treatment like cystectomy, chemotherapy, radiation etc. Given the upstaging of his disease, discussed the option of BCG.    We discussed that BCG and  Mytomycin-C are sometimes used to reduce the risk of progression and recurrence and have found favor with larger volume and or multifocal and recurrent tumors.    At this time he would like to hold off on further intervention for his bladder cancer. He will not pursue BCG, Mytomycin-C, Cx etc.  Therefore will just observe and wait for symptoms or recurrence of hematuria.    Dennis Hilton MD  Department of Urology  [facility]

## 2018-11-08 NOTE — MR AVS SNAPSHOT
After Visit Summary   11/8/2018    Ivan Gomez    MRN: [id number]           Patient Information     Date Of Birth          10/12/1926        Visit Information        Provider Department      11/8/2018 12:40 PM Weight, Dennis Loera MD [facility] and [facility] for Prostate and Urologic Cancers        Care Instructions    Follow-up as needed with Dr Hilton          Follow-ups after your visit        Who to contact     Please call your clinic at [phone] to:    Ask questions about your health    Make or cancel appointments    Discuss your medicines    Learn about your test results    Speak to your doctor            Additional Information About Your Visit        Casentrichart Information     [email or web address] gives you secure access to your electronic health record. If you see a primary care provider, you can also send messages to your care team and make appointments. If you have questions, please call your primary care clinic.  If you do not have a primary care provider, please call [phone] and they will assist you.      [email or web address] is an electronic gateway that provides easy, online access to your medical records. With [email or web address], you can request a clinic appointment, read your test results, renew a prescription or communicate with your care team.     To access your existing account, please contact your [facility] or call [phone] for assistance.        Care EveryWhere ID     This is your Care EveryWhere ID. This could be used by other organizations to access your [place] medical records  [id number]        Your Vitals Were     Pulse Pulse Oximetry BMI (Body Mass Index)             76 95% 24.51 kg/m2          Blood Pressure from Last 3 Encounters:   11/08/18 131/76   11/05/18 128/64   10/29/18 136/74    Weight from Last 3 Encounters:   11/08/18 71 kg (156 lb 8 oz)   11/05/18 69.6 kg (153 lb 6.4 oz)   10/29/18 68 kg (150 lb)              Today, you had the following     No  orders found for display       Primary Care Provider Office Phone # Fax #    Evaristo Magaña [phone] [address]  [facility] [address]        Equal Access to Services     FARZANEH WILDER : Hadpaddy ramez ku scottieo Solibiaali, waaxda luqadaha, qaybta kaalmada adeegyada, perri khaliln parul campbell anthony escobedo. So [facility] [phone].    ATENCIÓN: Si habla español, tiene a rubio disposición servicios gratuitos de asistencia lingüística. Llame al [phone].    We comply with applicable federal civil rights laws and [place] laws. We do not discriminate on the basis of race, color, national origin, age, disability, sex, sexual orientation, or gender identity.            Thank you!     Thank you for choosing [facility] AND [facility] FOR PROSTATE AND UROLOGIC CANCERS  for your care. Our goal is always to provide you with excellent care. Hearing back from our patients is one way we can continue to improve our services. Please take a few minutes to complete the written survey that you may receive in the mail after your visit with us. Thank you!             Your Updated Medication List - Protect others around you: Learn how to safely use, store and throw away your medicines at www.disposemymeds.org.          This list is accurate as of 11/8/18  2:21 PM.  Always use your most recent med list.                   Brand Name Dispense Instructions for use Diagnosis    aspirin 81 MG EC tablet      Take 1 tablet (81 mg) by mouth daily    S/P mitral valve repair       eucerin cream      Apply topically At Bedtime Apply to hands        finasteride 5 MG tablet    PROSCAR    90 tablet    Take 1 tablet (5 mg) by mouth daily    Benign enlargement of prostate       furosemide 20 MG tablet    LASIX     Take 40 mg by mouth daily        gabapentin 300 MG capsule    NEURONTIN    270 capsule    TAKE 1 CAPSULE BY MOUTH THREE TIMES DAILY    Spinal stenosis of lumbar region with neurogenic claudication        hydrALAZINE 25 MG tablet    APRESOLINE    270 tablet    Take 1 tablet (25 mg) by mouth 3 times daily    Acute on chronic heart failure, unspecified heart failure type (H)       HYDROcodone-acetaminophen 7.5-325 MG per tablet    NORCO    120 tablet    Take 1 tablet by mouth every 6 hours as needed for pain maximum 4 tablet(s) per day    Spinal stenosis of lumbar region, unspecified whether neurogenic claudication present       hypromellose 0.4 % Soln ophthalmic solution    ARTIFICIAL TEARS     Place 2 drops Into the left eye 4 times daily        isosorbide dinitrate 20 MG tablet    ISORDIL    270 tablet    TAKE 1 TABLET BY MOUTH THREE TIMES DAILY    Chronic diastolic heart failure (H), Coronary artery disease involving native coronary artery of native heart without angina pectoris       metoprolol succinate 50 MG 24 hr tablet    TOPROL XL    90 tablet    Take 1 tablet (50 mg) by mouth daily    Hypertension goal BP (blood pressure) < 140/90       pantoprazole 40 MG EC tablet    PROTONIX    180 tablet    Take 1 tablet (40 mg) by mouth 2 times daily    Abdominal pain, epigastric       potassium chloride SA 10 MEQ CR tablet    K-DUR/KLOR-CON M    90 tablet    Take half tablet one time daily.    Chronic diastolic heart failure (H)       simvastatin 20 MG tablet    ZOCOR    90 tablet    TAKE 1 TABLET(20 MG) BY MOUTH AT BEDTIME    Hyperlipidemia LDL goal <100       tamsulosin 0.4 MG capsule    FLOMAX    90 capsule    TAKE ONE CAPSULE BY MOUTH DAILY    Benign prostatic hyperplasia without lower urinary tract symptoms

## 2018-11-08 NOTE — LETTER
2018       RE: Ivan Gomez  [address] [address]     Dear Colleague,    Thank you for referring your patient, Ivan Gomez, to the [facility] UROLOGY AND INST FOR PROSTATE AND UROLOGIC CANCERS at [facility]. Please see a copy of my visit note below.    89 year old male with a history of low urothelial carcinoma here for follow up     Had a complicated post operative course after original TURBT(Trans Urethral Resection of Bladder Tumor)  including the need for a nephrostomy tube, this was internalized for a double J.  Had a recurrence and another TURBT(Trans Urethral Resection of Bladder Tumor) a year later and did better, but still had quite a slow recovery. More recently he had a repeat TURBT on 18 and returns to clinic to review pathology.    Surgical Pathology 18:  FINAL DIAGNOSIS:   Bladder, transurethral resection of tumor-   - High-grade, invasive papillary urothelial (transitional cell) carcinoma.   - Tumor invades lamina propria with uninvolved muscularis propria.   - Urothelial (transitional cell) carcinoma in-situ: Not identified.   - Lymph/vascular invasion by tumor: Not identified.   - Sampling includes: Urothelium, lamina propria, and muscularis propria.     Date of Initial Diagnosis: 2015  Stage of Initial Diagnosis: Ta, but large volume with a couple of tumors >3 cms near right UO requiring neph tube and stent  Grade at Initial Diagnosis: Low with focal high grade  Site of First Diagnosis: bladder  Any Recurrence? 2016 low grade Ta, 10/2018 symptomatic recurrence T1 high grade  Intravesical Treatments: TURBT( ), TURBT( ), TURBT (18)  Date of Last Cysto: 18  Date of Last Upper Tract Imagin/15/15, Renal US 3/9/17  Patient was pretreated with antibiotics.    Lab Results   Component Value Date    CR 1.66 2018    CR 1.62 2018    CR 1.40 2018    CR 1.30 2018    CR 1.57 10/29/2018     CR 1.70 03/02/2018    CR 1.56 01/26/2018    CR 1.73 01/05/2018    CR 1.75 12/18/2017    CR 1.28 11/18/2017       Recent Echo 9/25/2018 EF 70%, moderate left ventrical hypertrophy will scan in result    EKG sinus rhythmn    CT scan shows no mets, no fat stranding, 4 cm tumor    Patient is a 89 year old male   Vitals: Blood pressure 131/76, pulse 76, weight 71 kg (156 lb 8 oz), SpO2 95 %.  General Appearance Adult: Alert, no acute distress, oriented    States that he feels great and gets some blood occasionally, more frequently of recent    A/P History of recurrent low grade bladder cancer Ta, now with HGT1 recurrence     Patient continues to be resistant to any further treatment, certainly any radical treatment like cystectomy, chemotherapy, radiation etc. Given the upstaging of his disease, discussed the option of BCG.    We discussed that BCG and Mytomycin-C are sometimes used to reduce the risk of progression and recurrence and have found favor with larger volume and or multifocal and recurrent tumors.    At this time he would like to hold off on further intervention for his bladder cancer. He will not pursue BCG, Mytomycin-C, Cx etc.  Therefore will just observe and wait for symptoms or recurrence of hematuria.      Scribe Disclosure:   I,Aris Swain, am serving as a scribe; to document services personally performed by Dennis Hilton MD based on data collection and the provider's statements to me.     Dennis Hilton MD  Department of Urology Staff  [facility]    Attestation:  This patient was seen and evaluated by me, with a scribe taking notes.  I have reviewed the note above and agree.  The physical exam and or any procedures were performed by me and the pertinant details are outlined below.     Patient is a 89 year old  male   Vitals: Blood pressure 131/76, pulse 76, weight 71 kg (156 lb 8 oz), SpO2 95 %.  General Appearance Adult: Alert, no acute distress, oriented    A/P  History of recurrent low grade bladder cancer Ta, now with HGT1 recurrence     Patient continues to be resistant to any further treatment, certainly any radical treatment like cystectomy, chemotherapy, radiation etc. Given the upstaging of his disease, discussed the option of BCG.    We discussed that BCG and Mytomycin-C are sometimes used to reduce the risk of progression and recurrence and have found favor with larger volume and or multifocal and recurrent tumors.    At this time he would like to hold off on further intervention for his bladder cancer. He will not pursue BCG, Mytomycin-C, Cx etc.  Therefore will just observe and wait for symptoms or recurrence of hematuria.      Dennis Hilton MD  Department of Urology  [facility]

## 2018-11-27 DIAGNOSIS — N40.0 BENIGN ENLARGEMENT OF PROSTATE: ICD-10-CM

## 2018-11-27 DIAGNOSIS — M48.062 SPINAL STENOSIS OF LUMBAR REGION WITH NEUROGENIC CLAUDICATION: ICD-10-CM

## 2018-11-27 DIAGNOSIS — N40.0 BENIGN PROSTATIC HYPERPLASIA WITHOUT LOWER URINARY TRACT SYMPTOMS: ICD-10-CM

## 2018-11-28 RX ORDER — FINASTERIDE 5 MG/1
TABLET, FILM COATED ORAL
Qty: 90 TABLET | Refills: 3 | Status: SHIPPED | OUTPATIENT
Start: 2018-11-28

## 2018-11-28 RX ORDER — GABAPENTIN 300 MG/1
CAPSULE ORAL
Qty: 270 CAPSULE | Refills: 0 | Status: SHIPPED | OUTPATIENT
Start: 2018-11-28 | End: 2019-01-01

## 2018-11-28 RX ORDER — TAMSULOSIN HYDROCHLORIDE 0.4 MG/1
CAPSULE ORAL
Qty: 90 CAPSULE | Refills: 3 | Status: SHIPPED | OUTPATIENT
Start: 2018-11-28 | End: 2019-01-01

## 2018-11-28 NOTE — TELEPHONE ENCOUNTER
"Requested Prescriptions   Pending Prescriptions Disp Refills     gabapentin (NEURONTIN) 300 MG capsule [Pharmacy Med Name: GABAPENTIN 300MG CAPSULES]    Last Written Prescription Date:  11/22/2017  Last Fill Quantity: 270,   # refills: 3  Last Office Visit: 10/29/2018  Future Office visit:       Routing refill request to provider for review/approval because:  Drug not on the [facility] refill protocol or controlled substance   270 capsule 0     Sig: TAKE 1 CAPSULE BY MOUTH THREE TIMES DAILY    There is no refill protocol information for this order        tamsulosin (FLOMAX) 0.4 MG capsule [Pharmacy Med Name: TAMSULOSIN 0.4MG CAPSULES]  Last Written Prescription Date:  12/06/2017  Last Fill Quantity: 90,  # refills: 3   Last office visit: 10/29/2018 with prescribing provider:  HAIR   Future Office Visit:     90 capsule 0     Sig: TAKE ONE CAPSULE BY MOUTH DAILY    Alpha Blockers Passed    11/27/2018  5:43 PM       Passed - Blood pressure under 140/90 in past 12 months    BP Readings from Last 3 Encounters:   11/08/18 131/76   11/05/18 128/64   10/29/18 136/74                Passed - Recent (12 mo) or future (30 days) visit within the authorizing provider's specialty    Patient had office visit in the last 12 months or has a visit in the next 30 days with authorizing provider or within the authorizing provider's specialty.  See \"Patient Info\" tab in inbasket, or \"Choose Columns\" in Meds & Orders section of the refill encounter.             Passed - Patient does not have Tadalafil, Vardenafil, or Sildenafil on their medication list       Passed - Patient is 18 years of age or older        finasteride (PROSCAR) 5 MG tablet [Pharmacy Med Name: FINASTERIDE 5MG TABLETS]  Last Written Prescription Date:  12/27/2017  Last Fill Quantity: 90,  # refills: 3   Last office visit: 10/29/2018 with prescribing provider:  HAIR    Future Office Visit:     90 tablet 0     Sig: TAKE 1 TABLET(5 MG) BY MOUTH DAILY    " "Alpha Blockers Passed    11/27/2018  5:43 PM       Passed - Blood pressure under 140/90 in past 12 months    BP Readings from Last 3 Encounters:   11/08/18 131/76   11/05/18 128/64   10/29/18 136/74                Passed - Recent (12 mo) or future (30 days) visit within the authorizing provider's specialty    Patient had office visit in the last 12 months or has a visit in the next 30 days with authorizing provider or within the authorizing provider's specialty.  See \"Patient Info\" tab in inbasket, or \"Choose Columns\" in Meds & Orders section of the refill encounter.             Passed - Patient does not have Tadalafil, Vardenafil, or Sildenafil on their medication list       Passed - Patient is 18 years of age or older          "

## 2018-11-28 NOTE — TELEPHONE ENCOUNTER
Routing refill request to provider for review/approval because:  Drug not on the [facility] refill protocol         Prescription approved per [facility] Refill Protocol.  tamsulosin (FLOMAX) 0.4 MG capsule  finasteride (PROSCAR) 5 MG tablet

## 2018-12-13 DIAGNOSIS — I50.9 ACUTE ON CHRONIC HEART FAILURE, UNSPECIFIED HEART FAILURE TYPE (H): ICD-10-CM

## 2018-12-13 RX ORDER — HYDRALAZINE HYDROCHLORIDE 25 MG/1
TABLET, FILM COATED ORAL
Qty: 135 TABLET | Refills: 3 | Status: ON HOLD | OUTPATIENT
Start: 2018-12-13 | End: 2019-05-21

## 2018-12-13 NOTE — TELEPHONE ENCOUNTER
"Requested Prescriptions   Pending Prescriptions Disp Refills     hydrALAZINE (APRESOLINE) 25 MG tablet [Pharmacy Med Name: HYDRALAZINE  25MG TABLETS(ORANGE)]  Last Written Prescription Date:  4/27/2018  Last Fill Quantity: 270 tablet,  # refills: 1   Last office visit: 10/29/2018 with prescribing provider:  Gómez   Future Office Visit:     135 tablet 0     Sig: TAKE ONE AND ONE-HALF TABLETS BY MOUTH EVERY 8 HOURS AS DIRECTED. HOLD IF SYSTOLIC BLOOD PRESSURE IS LESS THAN 100    Vasodilators Passed - 12/13/2018  6:12 AM       Passed - Most recent BP less than 140/90 on record    BP Readings from Last 3 Encounters:   11/08/18 131/76   11/05/18 128/64   10/29/18 136/74                Passed - Most recent encounter is not a hospital encounter. Patient has recent (12 mos) or future (1 mos) visit with authorizing provider's specialty    Patient's most recent encounter is NOT a hospital encounter and has had an office visit in the last 12 months or has a visit in the next 30 days with authorizing provider or within the authorizing provider's specialty.      See \"Patient Info\" tab in inbasket, or \"Choose Columns\" in Meds & Orders section of the refill encounter.      If most recent encounter is a hospital encounter AND the patient does NOT have an appointment scheduled with the authorizing provider or authorizing provider's specialty within the next 30 days, forward refill to authorizing provider for medication review.         Passed - Patient is of age 18 years or older           "

## 2018-12-14 DIAGNOSIS — I10 HYPERTENSION GOAL BP (BLOOD PRESSURE) < 140/90: Chronic | ICD-10-CM

## 2018-12-14 RX ORDER — METOPROLOL SUCCINATE 50 MG/1
50 TABLET, EXTENDED RELEASE ORAL DAILY
Qty: 90 TABLET | Refills: 1 | Status: ON HOLD | OUTPATIENT
Start: 2018-12-14 | End: 2019-05-21

## 2018-12-21 DIAGNOSIS — I50.32 CHRONIC DIASTOLIC HEART FAILURE (H): ICD-10-CM

## 2018-12-21 RX ORDER — FUROSEMIDE 20 MG
40 TABLET ORAL DAILY
Qty: 180 TABLET | Refills: 0 | Status: ON HOLD | OUTPATIENT
Start: 2018-12-21 | End: 2019-05-28

## 2018-12-21 RX ORDER — POTASSIUM CHLORIDE 750 MG/1
TABLET, EXTENDED RELEASE ORAL
Qty: 45 TABLET | Refills: 0 | Status: ON HOLD | OUTPATIENT
Start: 2018-12-21 | End: 2019-05-21

## 2019-01-01 ENCOUNTER — HOSPITAL LABORATORY (OUTPATIENT)
Dept: OTHER | Facility: CLINIC | Age: 84
End: 2019-01-01

## 2019-01-01 ENCOUNTER — TELEPHONE (OUTPATIENT)
Dept: FAMILY MEDICINE | Facility: CLINIC | Age: 84
End: 2019-01-01

## 2019-01-01 ENCOUNTER — NURSING HOME VISIT (OUTPATIENT)
Dept: GERIATRICS | Facility: CLINIC | Age: 84
End: 2019-01-01
Payer: MEDICARE

## 2019-01-01 ENCOUNTER — PATIENT OUTREACH (OUTPATIENT)
Dept: CARE COORDINATION | Facility: CLINIC | Age: 84
End: 2019-01-01

## 2019-01-01 ENCOUNTER — CARE COORDINATION (OUTPATIENT)
Dept: CARDIOLOGY | Facility: CLINIC | Age: 84
End: 2019-01-01

## 2019-01-01 ENCOUNTER — OFFICE VISIT (OUTPATIENT)
Dept: CARDIOLOGY | Facility: CLINIC | Age: 84
End: 2019-01-01
Attending: PHYSICIAN ASSISTANT
Payer: MEDICARE

## 2019-01-01 ENCOUNTER — MEDICAL CORRESPONDENCE (OUTPATIENT)
Dept: HEALTH INFORMATION MANAGEMENT | Facility: CLINIC | Age: 84
End: 2019-01-01

## 2019-01-01 ENCOUNTER — HOSPITAL ENCOUNTER (OUTPATIENT)
Dept: CARDIOLOGY | Facility: CLINIC | Age: 84
Discharge: HOME | End: 2019-12-09
Attending: INTERNAL MEDICINE | Admitting: INTERNAL MEDICINE
Payer: MEDICARE

## 2019-01-01 ENCOUNTER — OFFICE VISIT (OUTPATIENT)
Dept: PALLIATIVE MEDICINE | Facility: CLINIC | Age: 84
End: 2019-01-01
Attending: NURSE PRACTITIONER
Payer: MEDICARE

## 2019-01-01 ENCOUNTER — DOCUMENTATION ONLY (OUTPATIENT)
Dept: OTHER | Facility: CLINIC | Age: 84
End: 2019-01-01

## 2019-01-01 ENCOUNTER — OFFICE VISIT (OUTPATIENT)
Dept: FAMILY MEDICINE | Facility: CLINIC | Age: 84
End: 2019-01-01
Payer: MEDICARE

## 2019-01-01 ENCOUNTER — TELEPHONE (OUTPATIENT)
Dept: CARDIOLOGY | Facility: CLINIC | Age: 84
End: 2019-01-01

## 2019-01-01 ENCOUNTER — DISCHARGE SUMMARY NURSING HOME (OUTPATIENT)
Dept: GERIATRICS | Facility: CLINIC | Age: 84
End: 2019-01-01
Payer: MEDICARE

## 2019-01-01 ENCOUNTER — OFFICE VISIT (OUTPATIENT)
Dept: CARDIOLOGY | Facility: CLINIC | Age: 84
End: 2019-01-01
Attending: NURSE PRACTITIONER
Payer: MEDICARE

## 2019-01-01 ENCOUNTER — HEALTH MAINTENANCE LETTER (OUTPATIENT)
Age: 84
End: 2019-01-01

## 2019-01-01 VITALS
SYSTOLIC BLOOD PRESSURE: 120 MMHG | DIASTOLIC BLOOD PRESSURE: 66 MMHG | HEIGHT: 68 IN | BODY MASS INDEX: 19.7 KG/M2 | HEART RATE: 78 BPM | OXYGEN SATURATION: 98 % | TEMPERATURE: 97.8 F | WEIGHT: 130 LBS | RESPIRATION RATE: 16 BRPM

## 2019-01-01 VITALS
HEART RATE: 68 BPM | WEIGHT: 127.9 LBS | SYSTOLIC BLOOD PRESSURE: 114 MMHG | OXYGEN SATURATION: 95 % | HEIGHT: 68 IN | DIASTOLIC BLOOD PRESSURE: 64 MMHG | BODY MASS INDEX: 19.38 KG/M2

## 2019-01-01 VITALS
BODY MASS INDEX: 19.93 KG/M2 | HEIGHT: 68 IN | DIASTOLIC BLOOD PRESSURE: 69 MMHG | RESPIRATION RATE: 18 BRPM | SYSTOLIC BLOOD PRESSURE: 114 MMHG | TEMPERATURE: 97.8 F | HEART RATE: 81 BPM | OXYGEN SATURATION: 97 % | WEIGHT: 131.5 LBS

## 2019-01-01 VITALS
OXYGEN SATURATION: 93 % | SYSTOLIC BLOOD PRESSURE: 121 MMHG | TEMPERATURE: 97.4 F | WEIGHT: 138.4 LBS | HEIGHT: 68 IN | DIASTOLIC BLOOD PRESSURE: 76 MMHG | HEART RATE: 68 BPM | RESPIRATION RATE: 18 BRPM | BODY MASS INDEX: 20.98 KG/M2

## 2019-01-01 VITALS
BODY MASS INDEX: 19.29 KG/M2 | HEART RATE: 72 BPM | WEIGHT: 127.3 LBS | RESPIRATION RATE: 18 BRPM | SYSTOLIC BLOOD PRESSURE: 114 MMHG | TEMPERATURE: 97.5 F | HEIGHT: 68 IN | DIASTOLIC BLOOD PRESSURE: 67 MMHG | OXYGEN SATURATION: 99 %

## 2019-01-01 VITALS
TEMPERATURE: 97.2 F | BODY MASS INDEX: 19.37 KG/M2 | WEIGHT: 127.8 LBS | OXYGEN SATURATION: 98 % | HEART RATE: 69 BPM | SYSTOLIC BLOOD PRESSURE: 106 MMHG | RESPIRATION RATE: 18 BRPM | HEIGHT: 68 IN | DIASTOLIC BLOOD PRESSURE: 65 MMHG

## 2019-01-01 VITALS
WEIGHT: 127.4 LBS | BODY MASS INDEX: 19.31 KG/M2 | SYSTOLIC BLOOD PRESSURE: 137 MMHG | HEART RATE: 77 BPM | DIASTOLIC BLOOD PRESSURE: 83 MMHG | HEIGHT: 68 IN

## 2019-01-01 VITALS
HEIGHT: 68 IN | OXYGEN SATURATION: 96 % | HEART RATE: 76 BPM | DIASTOLIC BLOOD PRESSURE: 78 MMHG | HEART RATE: 66 BPM | HEIGHT: 68 IN | BODY MASS INDEX: 19.85 KG/M2 | WEIGHT: 131 LBS | OXYGEN SATURATION: 96 % | BODY MASS INDEX: 21.22 KG/M2 | WEIGHT: 140 LBS | SYSTOLIC BLOOD PRESSURE: 114 MMHG | DIASTOLIC BLOOD PRESSURE: 70 MMHG | SYSTOLIC BLOOD PRESSURE: 128 MMHG

## 2019-01-01 VITALS
TEMPERATURE: 98.2 F | OXYGEN SATURATION: 97 % | HEIGHT: 68 IN | RESPIRATION RATE: 18 BRPM | WEIGHT: 126.2 LBS | BODY MASS INDEX: 19.13 KG/M2 | SYSTOLIC BLOOD PRESSURE: 103 MMHG | DIASTOLIC BLOOD PRESSURE: 65 MMHG | HEART RATE: 76 BPM

## 2019-01-01 VITALS
SYSTOLIC BLOOD PRESSURE: 104 MMHG | BODY MASS INDEX: 19.2 KG/M2 | HEART RATE: 71 BPM | DIASTOLIC BLOOD PRESSURE: 65 MMHG | OXYGEN SATURATION: 97 % | WEIGHT: 126.7 LBS | HEIGHT: 68 IN | TEMPERATURE: 98.1 F | RESPIRATION RATE: 18 BRPM

## 2019-01-01 VITALS
WEIGHT: 133 LBS | RESPIRATION RATE: 20 BRPM | HEIGHT: 68 IN | HEART RATE: 68 BPM | SYSTOLIC BLOOD PRESSURE: 119 MMHG | OXYGEN SATURATION: 98 % | TEMPERATURE: 98.5 F | BODY MASS INDEX: 20.16 KG/M2 | DIASTOLIC BLOOD PRESSURE: 76 MMHG

## 2019-01-01 VITALS
SYSTOLIC BLOOD PRESSURE: 108 MMHG | HEIGHT: 68 IN | BODY MASS INDEX: 19.73 KG/M2 | TEMPERATURE: 97.8 F | DIASTOLIC BLOOD PRESSURE: 68 MMHG | WEIGHT: 130.2 LBS | HEART RATE: 71 BPM | RESPIRATION RATE: 18 BRPM | OXYGEN SATURATION: 95 %

## 2019-01-01 DIAGNOSIS — F11.90 CHRONIC, CONTINUOUS USE OF OPIOIDS: ICD-10-CM

## 2019-01-01 DIAGNOSIS — I50.32 CHRONIC DIASTOLIC CONGESTIVE HEART FAILURE (H): Primary | ICD-10-CM

## 2019-01-01 DIAGNOSIS — I50.812 CHRONIC RIGHT-SIDED HEART FAILURE (H): Primary | ICD-10-CM

## 2019-01-01 DIAGNOSIS — N18.30 CKD (CHRONIC KIDNEY DISEASE) STAGE 3, GFR 30-59 ML/MIN (H): ICD-10-CM

## 2019-01-01 DIAGNOSIS — Z59.9 FINANCIAL PROBLEMS: ICD-10-CM

## 2019-01-01 DIAGNOSIS — I48.0 PAROXYSMAL ATRIAL FIBRILLATION (H): ICD-10-CM

## 2019-01-01 DIAGNOSIS — R53.81 PHYSICAL DECONDITIONING: ICD-10-CM

## 2019-01-01 DIAGNOSIS — N32.89 BLADDER MASS: ICD-10-CM

## 2019-01-01 DIAGNOSIS — R35.0 BENIGN PROSTATIC HYPERPLASIA WITH URINARY FREQUENCY: ICD-10-CM

## 2019-01-01 DIAGNOSIS — R51.9 FRONTAL HEADACHE: ICD-10-CM

## 2019-01-01 DIAGNOSIS — M48.061 SPINAL STENOSIS OF LUMBAR REGION, UNSPECIFIED WHETHER NEUROGENIC CLAUDICATION PRESENT: ICD-10-CM

## 2019-01-01 DIAGNOSIS — R63.4 WEIGHT LOSS: ICD-10-CM

## 2019-01-01 DIAGNOSIS — R60.0 BILATERAL LEG EDEMA: ICD-10-CM

## 2019-01-01 DIAGNOSIS — I50.812 CHRONIC RIGHT-SIDED HEART FAILURE (H): ICD-10-CM

## 2019-01-01 DIAGNOSIS — E87.6 HYPOKALEMIA: Primary | ICD-10-CM

## 2019-01-01 DIAGNOSIS — I10 BENIGN ESSENTIAL HYPERTENSION: ICD-10-CM

## 2019-01-01 DIAGNOSIS — I50.32 CHRONIC HEART FAILURE WITH PRESERVED EJECTION FRACTION (H): ICD-10-CM

## 2019-01-01 DIAGNOSIS — E78.5 HYPERLIPIDEMIA LDL GOAL <100: ICD-10-CM

## 2019-01-01 DIAGNOSIS — Z86.19 H/O CLOSTRIDIUM DIFFICILE INFECTION: ICD-10-CM

## 2019-01-01 DIAGNOSIS — N40.1 BENIGN PROSTATIC HYPERPLASIA WITH URINARY FREQUENCY: ICD-10-CM

## 2019-01-01 DIAGNOSIS — G89.4 CHRONIC PAIN SYNDROME: ICD-10-CM

## 2019-01-01 DIAGNOSIS — I50.23 ACUTE ON CHRONIC SYSTOLIC HEART FAILURE (H): ICD-10-CM

## 2019-01-01 DIAGNOSIS — K59.03 DRUG-INDUCED CONSTIPATION: Primary | ICD-10-CM

## 2019-01-01 DIAGNOSIS — M79.671 HEEL PAIN, BILATERAL: Primary | ICD-10-CM

## 2019-01-01 DIAGNOSIS — I34.0 MITRAL VALVE INSUFFICIENCY, UNSPECIFIED ETIOLOGY: ICD-10-CM

## 2019-01-01 DIAGNOSIS — Z79.899 POLYPHARMACY: ICD-10-CM

## 2019-01-01 DIAGNOSIS — F11.90 CHRONIC, CONTINUOUS USE OF OPIOIDS: Primary | ICD-10-CM

## 2019-01-01 DIAGNOSIS — Z95.818 S/P MITRAL VALVE CLIP IMPLANTATION: ICD-10-CM

## 2019-01-01 DIAGNOSIS — I50.32 CHRONIC DIASTOLIC CONGESTIVE HEART FAILURE (H): ICD-10-CM

## 2019-01-01 DIAGNOSIS — Z98.890 S/P MITRAL VALVE CLIP IMPLANTATION: ICD-10-CM

## 2019-01-01 DIAGNOSIS — R31.0 GROSS HEMATURIA: ICD-10-CM

## 2019-01-01 DIAGNOSIS — D63.8 ANEMIA IN OTHER CHRONIC DISEASES CLASSIFIED ELSEWHERE: ICD-10-CM

## 2019-01-01 DIAGNOSIS — H54.3 IMPAIRED VISION IN BOTH EYES: ICD-10-CM

## 2019-01-01 DIAGNOSIS — I34.0 MITRAL VALVE INSUFFICIENCY, UNSPECIFIED ETIOLOGY: Chronic | ICD-10-CM

## 2019-01-01 DIAGNOSIS — Z53.9 DIAGNOSIS NOT YET DEFINED: Primary | ICD-10-CM

## 2019-01-01 DIAGNOSIS — R31.0 GROSS HEMATURIA: Primary | ICD-10-CM

## 2019-01-01 DIAGNOSIS — M79.671 HEEL PAIN, BILATERAL: ICD-10-CM

## 2019-01-01 DIAGNOSIS — I27.20 PULMONARY HYPERTENSION (H): ICD-10-CM

## 2019-01-01 DIAGNOSIS — I07.1 TRICUSPID VALVE INSUFFICIENCY, UNSPECIFIED ETIOLOGY: ICD-10-CM

## 2019-01-01 DIAGNOSIS — Z85.51 PERSONAL HISTORY OF MALIGNANT NEOPLASM OF BLADDER: ICD-10-CM

## 2019-01-01 DIAGNOSIS — D69.6 THROMBOCYTOPENIA (H): ICD-10-CM

## 2019-01-01 DIAGNOSIS — M79.672 HEEL PAIN, BILATERAL: Primary | ICD-10-CM

## 2019-01-01 DIAGNOSIS — N40.0 BENIGN PROSTATIC HYPERPLASIA WITHOUT LOWER URINARY TRACT SYMPTOMS: ICD-10-CM

## 2019-01-01 DIAGNOSIS — R00.2 PALPITATIONS: ICD-10-CM

## 2019-01-01 DIAGNOSIS — M48.061 SPINAL STENOSIS OF LUMBAR REGION, UNSPECIFIED WHETHER NEUROGENIC CLAUDICATION PRESENT: Primary | ICD-10-CM

## 2019-01-01 DIAGNOSIS — R13.12 OROPHARYNGEAL DYSPHAGIA: ICD-10-CM

## 2019-01-01 DIAGNOSIS — G89.4 CHRONIC PAIN SYNDROME: Primary | ICD-10-CM

## 2019-01-01 DIAGNOSIS — M79.672 HEEL PAIN, BILATERAL: ICD-10-CM

## 2019-01-01 LAB
ALBUMIN UR-MCNC: 100 MG/DL
AMORPH CRY #/AREA URNS HPF: ABNORMAL /HPF
ANION GAP SERPL CALCULATED.3IONS-SCNC: 14.1 MMOL/L (ref 6–17)
ANION GAP SERPL CALCULATED.3IONS-SCNC: 4 MMOL/L (ref 3–14)
ANION GAP SERPL CALCULATED.3IONS-SCNC: 5 MMOL/L (ref 3–14)
ANION GAP SERPL CALCULATED.3IONS-SCNC: 6 MMOL/L (ref 3–14)
ANION GAP SERPL CALCULATED.3IONS-SCNC: 6 MMOL/L (ref 3–14)
ANION GAP SERPL CALCULATED.3IONS-SCNC: 6 MMOL/L (ref 6–17)
ANION GAP SERPL CALCULATED.3IONS-SCNC: 8 MMOL/L (ref 3–14)
APPEARANCE UR: ABNORMAL
BACTERIA #/AREA URNS HPF: ABNORMAL /HPF
BACTERIA SPEC CULT: NO GROWTH
BACTERIA SPEC CULT: NORMAL
BACTERIA SPEC CULT: NORMAL
BASOPHILS # BLD AUTO: 0 10E9/L (ref 0–0.2)
BASOPHILS NFR BLD AUTO: 0.6 %
BILIRUB UR QL STRIP: NEGATIVE
BUN SERPL-MCNC: 35 MG/DL (ref 7–30)
BUN SERPL-MCNC: 37 MG/DL (ref 7–30)
BUN SERPL-MCNC: 38 MG/DL (ref 7–30)
BUN SERPL-MCNC: 42 MG/DL (ref 7–30)
BUN SERPL-MCNC: 42 MG/DL (ref 7–30)
BUN SERPL-MCNC: 43 MG/DL (ref 7–30)
BUN SERPL-MCNC: 43 MG/DL (ref 7–30)
BUN SERPL-MCNC: 44 MG/DL (ref 7–30)
BUN SERPL-MCNC: 46 MG/DL (ref 7–30)
CALCIUM SERPL-MCNC: 8.5 MG/DL (ref 8.5–10.1)
CALCIUM SERPL-MCNC: 8.6 MG/DL (ref 8.5–10.1)
CALCIUM SERPL-MCNC: 8.7 MG/DL (ref 8.5–10.1)
CALCIUM SERPL-MCNC: 8.7 MG/DL (ref 8.5–10.1)
CALCIUM SERPL-MCNC: 8.8 MG/DL (ref 8.5–10.1)
CALCIUM SERPL-MCNC: 8.9 MG/DL (ref 8.5–10.1)
CALCIUM SERPL-MCNC: 9 MG/DL (ref 8.5–10.1)
CALCIUM SERPL-MCNC: 9 MG/DL (ref 8.5–10.5)
CALCIUM SERPL-MCNC: 9.6 MG/DL (ref 8.5–10.5)
CHLORIDE SERPL-SCNC: 101 MMOL/L (ref 98–107)
CHLORIDE SERPL-SCNC: 101 MMOL/L (ref 98–107)
CHLORIDE SERPL-SCNC: 103 MMOL/L (ref 94–109)
CHLORIDE SERPL-SCNC: 104 MMOL/L (ref 94–109)
CHLORIDE SERPL-SCNC: 104 MMOL/L (ref 94–109)
CHLORIDE SERPL-SCNC: 97 MMOL/L (ref 94–109)
CHLORIDE SERPL-SCNC: 99 MMOL/L (ref 94–109)
CO2 SERPL-SCNC: 30 MMOL/L (ref 20–32)
CO2 SERPL-SCNC: 30 MMOL/L (ref 23–29)
CO2 SERPL-SCNC: 32 MMOL/L (ref 20–32)
CO2 SERPL-SCNC: 33 MMOL/L (ref 20–32)
CO2 SERPL-SCNC: 35 MMOL/L (ref 20–32)
CO2 SERPL-SCNC: 35 MMOL/L (ref 23–29)
COLOR UR AUTO: ABNORMAL
CREAT SERPL-MCNC: 1.33 MG/DL (ref 0.66–1.25)
CREAT SERPL-MCNC: 1.36 MG/DL (ref 0.66–1.25)
CREAT SERPL-MCNC: 1.37 MG/DL (ref 0.66–1.25)
CREAT SERPL-MCNC: 1.4 MG/DL (ref 0.66–1.25)
CREAT SERPL-MCNC: 1.41 MG/DL (ref 0.66–1.25)
CREAT SERPL-MCNC: 1.42 MG/DL (ref 0.66–1.25)
CREAT SERPL-MCNC: 1.51 MG/DL (ref 0.66–1.25)
CREAT SERPL-MCNC: 1.52 MG/DL (ref 0.7–1.3)
CREAT SERPL-MCNC: 1.68 MG/DL (ref 0.7–1.3)
DIFFERENTIAL METHOD BLD: ABNORMAL
EOSINOPHIL # BLD AUTO: 0.1 10E9/L (ref 0–0.7)
EOSINOPHIL NFR BLD AUTO: 2.8 %
ERYTHROCYTE [DISTWIDTH] IN BLOOD BY AUTOMATED COUNT: 16.7 % (ref 10–15)
ERYTHROCYTE [DISTWIDTH] IN BLOOD BY AUTOMATED COUNT: 17.4 % (ref 10–15)
GFR SERPL CREATININE-BSD FRML MDRD: 38 ML/MIN/{1.73_M2}
GFR SERPL CREATININE-BSD FRML MDRD: 39 ML/MIN/{1.73_M2}
GFR SERPL CREATININE-BSD FRML MDRD: 42 ML/MIN/{1.73_M2}
GFR SERPL CREATININE-BSD FRML MDRD: 43 ML/MIN/{1.73_M2}
GFR SERPL CREATININE-BSD FRML MDRD: 44 ML/MIN/{1.73_M2}
GFR SERPL CREATININE-BSD FRML MDRD: 45 ML/MIN/{1.73_M2}
GFR SERPL CREATININE-BSD FRML MDRD: 46 ML/MIN/{1.73_M2}
GLUCOSE SERPL-MCNC: 134 MG/DL (ref 70–105)
GLUCOSE SERPL-MCNC: 137 MG/DL (ref 70–105)
GLUCOSE SERPL-MCNC: 54 MG/DL (ref 70–99)
GLUCOSE SERPL-MCNC: 63 MG/DL (ref 70–99)
GLUCOSE SERPL-MCNC: 66 MG/DL (ref 70–99)
GLUCOSE SERPL-MCNC: 68 MG/DL (ref 70–99)
GLUCOSE SERPL-MCNC: 74 MG/DL (ref 70–99)
GLUCOSE SERPL-MCNC: 76 MG/DL (ref 70–99)
GLUCOSE SERPL-MCNC: 77 MG/DL (ref 70–99)
GLUCOSE UR STRIP-MCNC: NEGATIVE MG/DL
HCT VFR BLD AUTO: 32.4 % (ref 40–53)
HCT VFR BLD AUTO: 35 % (ref 40–53)
HGB BLD-MCNC: 10 G/DL (ref 13.3–17.7)
HGB BLD-MCNC: 11 G/DL (ref 13.3–17.7)
HGB UR QL STRIP: ABNORMAL
IMM GRANULOCYTES # BLD: 0 10E9/L (ref 0–0.4)
IMM GRANULOCYTES NFR BLD: 0.3 %
KETONES UR STRIP-MCNC: NEGATIVE MG/DL
LEUKOCYTE ESTERASE UR QL STRIP: NEGATIVE
LYMPHOCYTES # BLD AUTO: 0.6 10E9/L (ref 0.8–5.3)
LYMPHOCYTES NFR BLD AUTO: 19.5 %
Lab: NORMAL
Lab: NORMAL
MCH RBC QN AUTO: 29.7 PG (ref 26.5–33)
MCH RBC QN AUTO: 30.7 PG (ref 26.5–33)
MCHC RBC AUTO-ENTMCNC: 30.9 G/DL (ref 31.5–36.5)
MCHC RBC AUTO-ENTMCNC: 31.4 G/DL (ref 31.5–36.5)
MCV RBC AUTO: 96 FL (ref 78–100)
MCV RBC AUTO: 98 FL (ref 78–100)
MONOCYTES # BLD AUTO: 0.2 10E9/L (ref 0–1.3)
MONOCYTES NFR BLD AUTO: 6.3 %
NEUTROPHILS # BLD AUTO: 2.2 10E9/L (ref 1.6–8.3)
NEUTROPHILS NFR BLD AUTO: 70.5 %
NITRATE UR QL: NEGATIVE
NRBC # BLD AUTO: 0 10*3/UL
NRBC BLD AUTO-RTO: 0 /100
NT-PROBNP SERPL-MCNC: 5904 PG/ML (ref 0–450)
NT-PROBNP SERPL-MCNC: 6283 PG/ML (ref 0–450)
NT-PROBNP SERPL-MCNC: ABNORMAL PG/ML (ref 0–450)
PH UR STRIP: 6 PH (ref 5–7)
PLATELET # BLD AUTO: 75 10E9/L (ref 150–450)
PLATELET # BLD AUTO: 84 10E9/L (ref 150–450)
POTASSIUM SERPL-SCNC: 3.3 MMOL/L (ref 3.4–5.3)
POTASSIUM SERPL-SCNC: 3.4 MMOL/L (ref 3.4–5.3)
POTASSIUM SERPL-SCNC: 3.4 MMOL/L (ref 3.4–5.3)
POTASSIUM SERPL-SCNC: 3.7 MMOL/L (ref 3.4–5.3)
POTASSIUM SERPL-SCNC: 3.7 MMOL/L (ref 3.4–5.3)
POTASSIUM SERPL-SCNC: 3.8 MMOL/L (ref 3.4–5.3)
POTASSIUM SERPL-SCNC: 3.9 MMOL/L (ref 3.4–5.3)
POTASSIUM SERPL-SCNC: 4 MMOL/L (ref 3.5–5.1)
POTASSIUM SERPL-SCNC: 5.1 MMOL/L (ref 3.5–5.1)
RBC # BLD AUTO: 3.37 10E12/L (ref 4.4–5.9)
RBC # BLD AUTO: 3.58 10E12/L (ref 4.4–5.9)
RBC #/AREA URNS AUTO: >182 /HPF (ref 0–2)
SODIUM SERPL-SCNC: 135 MMOL/L (ref 133–144)
SODIUM SERPL-SCNC: 138 MMOL/L (ref 133–144)
SODIUM SERPL-SCNC: 138 MMOL/L (ref 136–145)
SODIUM SERPL-SCNC: 140 MMOL/L (ref 133–144)
SODIUM SERPL-SCNC: 140 MMOL/L (ref 136–145)
SODIUM SERPL-SCNC: 141 MMOL/L (ref 133–144)
SODIUM SERPL-SCNC: 141 MMOL/L (ref 133–144)
SODIUM SERPL-SCNC: 142 MMOL/L (ref 133–144)
SODIUM SERPL-SCNC: 142 MMOL/L (ref 133–144)
SOURCE: ABNORMAL
SP GR UR STRIP: 1.01 (ref 1–1.03)
SPECIMEN SOURCE: NORMAL
SPECIMEN SOURCE: NORMAL
TRANS CELLS #/AREA URNS HPF: 8 /HPF (ref 0–1)
UROBILINOGEN UR STRIP-MCNC: NORMAL MG/DL (ref 0–2)
WBC # BLD AUTO: 3.2 10E9/L (ref 4–11)
WBC # BLD AUTO: 5.2 10E9/L (ref 4–11)
WBC #/AREA URNS AUTO: >182 /HPF (ref 0–5)

## 2019-01-01 PROCEDURE — 99309 SBSQ NF CARE MODERATE MDM 30: CPT | Performed by: NURSE PRACTITIONER

## 2019-01-01 PROCEDURE — 83880 ASSAY OF NATRIURETIC PEPTIDE: CPT | Performed by: PHYSICIAN ASSISTANT

## 2019-01-01 PROCEDURE — 0298T ZIO PATCH HOLTER ADULT PEDIATRIC GREATER THAN 48 HRS: CPT | Performed by: INTERNAL MEDICINE

## 2019-01-01 PROCEDURE — 99316 NF DSCHRG MGMT 30 MIN+: CPT | Performed by: NURSE PRACTITIONER

## 2019-01-01 PROCEDURE — 80048 BASIC METABOLIC PNL TOTAL CA: CPT | Performed by: PHYSICIAN ASSISTANT

## 2019-01-01 PROCEDURE — 36415 COLL VENOUS BLD VENIPUNCTURE: CPT | Performed by: PHYSICIAN ASSISTANT

## 2019-01-01 PROCEDURE — 99214 OFFICE O/P EST MOD 30 MIN: CPT | Performed by: INTERNAL MEDICINE

## 2019-01-01 PROCEDURE — 0296T ZIO PATCH HOLTER ADULT PEDIATRIC GREATER THAN 48 HRS: CPT

## 2019-01-01 PROCEDURE — G0180 MD CERTIFICATION HHA PATIENT: HCPCS | Performed by: FAMILY MEDICINE

## 2019-01-01 PROCEDURE — 83880 ASSAY OF NATRIURETIC PEPTIDE: CPT | Performed by: INTERNAL MEDICINE

## 2019-01-01 PROCEDURE — 99204 OFFICE O/P NEW MOD 45 MIN: CPT | Performed by: INTERNAL MEDICINE

## 2019-01-01 PROCEDURE — 99214 OFFICE O/P EST MOD 30 MIN: CPT | Performed by: NURSE PRACTITIONER

## 2019-01-01 PROCEDURE — 36415 COLL VENOUS BLD VENIPUNCTURE: CPT | Performed by: INTERNAL MEDICINE

## 2019-01-01 PROCEDURE — 80048 BASIC METABOLIC PNL TOTAL CA: CPT | Performed by: INTERNAL MEDICINE

## 2019-01-01 PROCEDURE — 99214 OFFICE O/P EST MOD 30 MIN: CPT | Performed by: FAMILY MEDICINE

## 2019-01-01 RX ORDER — SALIVA STIMULANT COMB. NO.3
1 SPRAY, NON-AEROSOL (ML) MUCOUS MEMBRANE 3 TIMES DAILY
COMMUNITY
End: 2019-01-01

## 2019-01-01 RX ORDER — HYDROCODONE BITARTRATE AND ACETAMINOPHEN 7.5; 325 MG/1; MG/1
TABLET ORAL
Qty: 60 TABLET | Refills: 0 | Status: SHIPPED | OUTPATIENT
Start: 2019-01-01 | End: 2019-01-01

## 2019-01-01 RX ORDER — CARBOXYMETHYLCELLULOSE SODIUM 5 MG/ML
2 SOLUTION/ DROPS OPHTHALMIC 2 TIMES DAILY
COMMUNITY

## 2019-01-01 RX ORDER — POTASSIUM CHLORIDE 750 MG/1
20 TABLET, EXTENDED RELEASE ORAL DAILY
Qty: 180 TABLET | Refills: 3 | Status: ON HOLD | OUTPATIENT
Start: 2019-01-01 | End: 2020-01-01

## 2019-01-01 RX ORDER — TAMSULOSIN HYDROCHLORIDE 0.4 MG/1
0.4 CAPSULE ORAL DAILY
Qty: 90 CAPSULE | Refills: 3 | Status: SHIPPED | OUTPATIENT
Start: 2019-01-01 | End: 2020-01-01

## 2019-01-01 RX ORDER — GABAPENTIN 300 MG/1
300 CAPSULE ORAL AT BEDTIME
Qty: 90 CAPSULE | Refills: 3 | Status: SHIPPED | OUTPATIENT
Start: 2019-01-01 | End: 2020-01-01

## 2019-01-01 RX ORDER — HYDROCODONE BITARTRATE AND ACETAMINOPHEN 7.5; 325 MG/1; MG/1
TABLET ORAL
Qty: 60 TABLET | Refills: 0 | OUTPATIENT
Start: 2019-01-01

## 2019-01-01 RX ORDER — SIMVASTATIN 20 MG
TABLET ORAL
Qty: 30 TABLET | Refills: 0 | Status: SHIPPED | OUTPATIENT
Start: 2019-01-01 | End: 2020-01-01

## 2019-01-01 RX ORDER — HYDROCODONE BITARTRATE AND ACETAMINOPHEN 7.5; 325 MG/1; MG/1
1 TABLET ORAL EVERY 6 HOURS PRN
Qty: 60 TABLET | Refills: 0 | Status: SHIPPED | OUTPATIENT
Start: 2019-01-01 | End: 2019-01-01

## 2019-01-01 RX ORDER — POTASSIUM CHLORIDE 750 MG/1
20 TABLET, EXTENDED RELEASE ORAL DAILY
COMMUNITY
End: 2019-01-01

## 2019-01-01 RX ORDER — SENNA AND DOCUSATE SODIUM 50; 8.6 MG/1; MG/1
1 TABLET, FILM COATED ORAL AT BEDTIME
Qty: 100 TABLET | Refills: 3 | Status: SHIPPED | OUTPATIENT
Start: 2019-01-01 | End: 2020-01-01

## 2019-01-01 RX ORDER — TORSEMIDE 20 MG/1
20 TABLET ORAL DAILY
Qty: 90 TABLET | Refills: 1 | Status: ON HOLD | OUTPATIENT
Start: 2019-01-01 | End: 2020-01-01

## 2019-01-01 SDOH — ECONOMIC STABILITY - INCOME SECURITY: PROBLEM RELATED TO HOUSING AND ECONOMIC CIRCUMSTANCES, UNSPECIFIED: Z59.9

## 2019-01-01 ASSESSMENT — MIFFLIN-ST. JEOR
SCORE: 1227.78
SCORE: 1218.71
SCORE: 1209.18
SCORE: 1204.65
SCORE: 1252.28
SCORE: 1191.94
SCORE: 1215.08
SCORE: 1204.2
SCORE: 1197.38
SCORE: 1199.21
SCORE: 1220.98
SCORE: 1259.54
SCORE: 1201.93

## 2019-01-01 ASSESSMENT — PATIENT HEALTH QUESTIONNAIRE - PHQ9
5. POOR APPETITE OR OVEREATING: SEVERAL DAYS
SUM OF ALL RESPONSES TO PHQ QUESTIONS 1-9: 3

## 2019-01-01 ASSESSMENT — ANXIETY QUESTIONNAIRES
6. BECOMING EASILY ANNOYED OR IRRITABLE: SEVERAL DAYS
GAD7 TOTAL SCORE: 5
5. BEING SO RESTLESS THAT IT IS HARD TO SIT STILL: NOT AT ALL
3. WORRYING TOO MUCH ABOUT DIFFERENT THINGS: SEVERAL DAYS
2. NOT BEING ABLE TO STOP OR CONTROL WORRYING: SEVERAL DAYS
GAD7 TOTAL SCORE: 5
7. FEELING AFRAID AS IF SOMETHING AWFUL MIGHT HAPPEN: NOT AT ALL
1. FEELING NERVOUS, ANXIOUS, OR ON EDGE: SEVERAL DAYS

## 2019-02-15 ENCOUNTER — TELEPHONE (OUTPATIENT)
Dept: CARDIOLOGY | Facility: CLINIC | Age: 84
End: 2019-02-15

## 2019-02-15 DIAGNOSIS — I25.10 CORONARY ARTERY DISEASE INVOLVING NATIVE CORONARY ARTERY OF NATIVE HEART WITHOUT ANGINA PECTORIS: ICD-10-CM

## 2019-02-15 DIAGNOSIS — I50.32 CHRONIC DIASTOLIC HEART FAILURE (H): ICD-10-CM

## 2019-02-15 RX ORDER — ISOSORBIDE DINITRATE 20 MG/1
20 TABLET ORAL 3 TIMES DAILY
Qty: 270 TABLET | Refills: 1 | Status: ON HOLD | OUTPATIENT
Start: 2019-02-15 | End: 2019-05-21

## 2019-02-15 RX ORDER — ISOSORBIDE DINITRATE 20 MG/1
TABLET ORAL
Qty: 270 TABLET | Refills: 2 | Status: CANCELLED | OUTPATIENT
Start: 2019-02-15

## 2019-03-11 DIAGNOSIS — E78.5 HYPERLIPIDEMIA LDL GOAL <100: ICD-10-CM

## 2019-03-12 NOTE — TELEPHONE ENCOUNTER
"SIMVASTATIN 20MG TABLETS  Last Written Prescription Date:  02/06/2019  Last Fill Quantity: 30,  # refills: 0   Last office visit: 10/29/2018 with prescribing provider:  10/29/2018   Future Office Visit:    Requested Prescriptions   Pending Prescriptions Disp Refills     simvastatin (ZOCOR) 20 MG tablet [Pharmacy Med Name: SIMVASTATIN 20MG TABLETS] 30 tablet 0     Sig: TAKE ONE TABLET BY MOUTH AT BEDTIME    Statins Protocol Failed - 3/11/2019  8:56 PM       Failed - LDL on file in past 12 months    Recent Labs   Lab Test 10/12/17  0729   LDL 29            Passed - No abnormal creatine kinase in past 12 months    Recent Labs   Lab Test 10/16/17  1215   CKT 58               Passed - Recent (12 mo) or future (30 days) visit within the authorizing provider's specialty    Patient had office visit in the last 12 months or has a visit in the next 30 days with authorizing provider or within the authorizing provider's specialty.  See \"Patient Info\" tab in inbasket, or \"Choose Columns\" in Meds & Orders section of the refill encounter.             Passed - Medication is active on med list       Passed - Patient is age 18 or older          "

## 2019-03-13 RX ORDER — SIMVASTATIN 20 MG
TABLET ORAL
Qty: 30 TABLET | Refills: 0 | Status: SHIPPED | OUTPATIENT
Start: 2019-03-13 | End: 2019-01-01

## 2019-05-21 ENCOUNTER — TRANSFERRED RECORDS (OUTPATIENT)
Dept: HEALTH INFORMATION MANAGEMENT | Facility: CLINIC | Age: 84
End: 2019-05-21

## 2019-05-21 ENCOUNTER — HOSPITAL ENCOUNTER (INPATIENT)
Facility: CLINIC | Age: 84
LOS: 7 days | Discharge: NURSING, REHAB OR LONG-TERM CARE | DRG: 417 | End: 2019-05-28
Attending: INTERNAL MEDICINE | Admitting: INTERNAL MEDICINE
Payer: MEDICARE

## 2019-05-21 DIAGNOSIS — A04.72 C. DIFFICILE COLITIS: Primary | ICD-10-CM

## 2019-05-21 DIAGNOSIS — I50.32 CHRONIC DIASTOLIC HEART FAILURE (H): ICD-10-CM

## 2019-05-21 DIAGNOSIS — M48.061 SPINAL STENOSIS OF LUMBAR REGION, UNSPECIFIED WHETHER NEUROGENIC CLAUDICATION PRESENT: ICD-10-CM

## 2019-05-21 PROBLEM — K81.0 ACUTE CHOLECYSTITIS: Status: ACTIVE | Noted: 2019-05-21

## 2019-05-21 PROBLEM — N32.89 BLADDER MASS: Status: ACTIVE | Noted: 2019-05-21

## 2019-05-21 PROBLEM — R15.9 FECAL INCONTINENCE: Status: ACTIVE | Noted: 2019-05-21

## 2019-05-21 LAB
ALBUMIN SERPL-MCNC: 2.7 G/DL (ref 3.4–5)
ALP SERPL-CCNC: 326 U/L (ref 40–150)
ALT SERPL W P-5'-P-CCNC: 72 U/L (ref 0–70)
ALT SERPL-CCNC: 81 IU/L (ref 0–41)
ANION GAP SERPL CALCULATED.3IONS-SCNC: 8 MMOL/L (ref 3–14)
AST SERPL W P-5'-P-CCNC: 32 U/L (ref 0–45)
AST SERPL-CCNC: 44 IU/L (ref 5–34)
BASOPHILS # BLD AUTO: 0 10E9/L (ref 0–0.2)
BASOPHILS NFR BLD AUTO: 0 %
BILIRUB DIRECT SERPL-MCNC: 2.2 MG/DL (ref 0–0.2)
BILIRUB SERPL-MCNC: 3.1 MG/DL (ref 0.2–1.3)
BUN SERPL-MCNC: 47 MG/DL (ref 7–30)
CALCIUM SERPL-MCNC: 8.3 MG/DL (ref 8.5–10.1)
CHLORIDE SERPL-SCNC: 116 MMOL/L (ref 94–109)
CO2 SERPL-SCNC: 22 MMOL/L (ref 20–32)
CREAT SERPL-MCNC: 1.53 MG/DL (ref 0.66–1.25)
CREAT SERPL-MCNC: 1.7 MG/DL (ref 0.5–1.3)
DIFFERENTIAL METHOD BLD: ABNORMAL
EOSINOPHIL # BLD AUTO: 0 10E9/L (ref 0–0.7)
EOSINOPHIL NFR BLD AUTO: 0 %
ERYTHROCYTE [DISTWIDTH] IN BLOOD BY AUTOMATED COUNT: 18.3 % (ref 10–15)
GFR SERPL CREATININE-BSD FRML MDRD: 38 ML/MIN/1.73M2
GFR SERPL CREATININE-BSD FRML MDRD: 39 ML/MIN/{1.73_M2}
GLUCOSE SERPL-MCNC: 81 MG/DL (ref 70–99)
GLUCOSE SERPL-MCNC: 83 MG/DL (ref 70–100)
HCT VFR BLD AUTO: 34.6 % (ref 40–53)
HGB BLD-MCNC: 10.7 G/DL (ref 13.3–17.7)
IMM GRANULOCYTES # BLD: 0 10E9/L (ref 0–0.4)
IMM GRANULOCYTES NFR BLD: 0.4 %
LYMPHOCYTES # BLD AUTO: 0.3 10E9/L (ref 0.8–5.3)
LYMPHOCYTES NFR BLD AUTO: 11.8 %
MCH RBC QN AUTO: 29 PG (ref 26.5–33)
MCHC RBC AUTO-ENTMCNC: 30.9 G/DL (ref 31.5–36.5)
MCV RBC AUTO: 94 FL (ref 78–100)
MONOCYTES # BLD AUTO: 0.2 10E9/L (ref 0–1.3)
MONOCYTES NFR BLD AUTO: 6.9 %
NEUTROPHILS # BLD AUTO: 2.1 10E9/L (ref 1.6–8.3)
NEUTROPHILS NFR BLD AUTO: 80.9 %
NRBC # BLD AUTO: 0 10*3/UL
NRBC BLD AUTO-RTO: 0 /100
PHOSPHATE SERPL-MCNC: 2.9 MG/DL (ref 2.5–4.5)
PLATELET # BLD AUTO: 62 10E9/L (ref 150–450)
POTASSIUM SERPL-SCNC: 4.4 MMOL/L (ref 3.4–5.3)
POTASSIUM SERPL-SCNC: 4.6 MMOL/L (ref 3.5–5.1)
PROT SERPL-MCNC: 6.2 G/DL (ref 6.8–8.8)
RBC # BLD AUTO: 3.69 10E12/L (ref 4.4–5.9)
SODIUM SERPL-SCNC: 146 MMOL/L (ref 133–144)
WBC # BLD AUTO: 2.6 10E9/L (ref 4–11)

## 2019-05-21 PROCEDURE — 80069 RENAL FUNCTION PANEL: CPT | Performed by: INTERNAL MEDICINE

## 2019-05-21 PROCEDURE — 84075 ASSAY ALKALINE PHOSPHATASE: CPT | Performed by: INTERNAL MEDICINE

## 2019-05-21 PROCEDURE — 82248 BILIRUBIN DIRECT: CPT | Performed by: INTERNAL MEDICINE

## 2019-05-21 PROCEDURE — A9270 NON-COVERED ITEM OR SERVICE: HCPCS | Performed by: INTERNAL MEDICINE

## 2019-05-21 PROCEDURE — 84460 ALANINE AMINO (ALT) (SGPT): CPT | Performed by: INTERNAL MEDICINE

## 2019-05-21 PROCEDURE — 25000128 H RX IP 250 OP 636: Performed by: INTERNAL MEDICINE

## 2019-05-21 PROCEDURE — 85025 COMPLETE CBC W/AUTO DIFF WBC: CPT | Performed by: INTERNAL MEDICINE

## 2019-05-21 PROCEDURE — 84155 ASSAY OF PROTEIN SERUM: CPT | Performed by: INTERNAL MEDICINE

## 2019-05-21 PROCEDURE — 99223 1ST HOSP IP/OBS HIGH 75: CPT | Mod: AI | Performed by: INTERNAL MEDICINE

## 2019-05-21 PROCEDURE — 36415 COLL VENOUS BLD VENIPUNCTURE: CPT | Performed by: INTERNAL MEDICINE

## 2019-05-21 PROCEDURE — 82247 BILIRUBIN TOTAL: CPT | Performed by: INTERNAL MEDICINE

## 2019-05-21 PROCEDURE — 84450 TRANSFERASE (AST) (SGOT): CPT | Performed by: INTERNAL MEDICINE

## 2019-05-21 PROCEDURE — 12000000 ZZH R&B MED SURG/OB

## 2019-05-21 PROCEDURE — 25000132 ZZH RX MED GY IP 250 OP 250 PS 637: Performed by: INTERNAL MEDICINE

## 2019-05-21 RX ORDER — MORPHINE SULFATE 2 MG/ML
2-4 INJECTION, SOLUTION INTRAMUSCULAR; INTRAVENOUS
Status: DISCONTINUED | OUTPATIENT
Start: 2019-05-21 | End: 2019-05-22

## 2019-05-21 RX ORDER — NALOXONE HYDROCHLORIDE 0.4 MG/ML
.1-.4 INJECTION, SOLUTION INTRAMUSCULAR; INTRAVENOUS; SUBCUTANEOUS
Status: DISCONTINUED | OUTPATIENT
Start: 2019-05-21 | End: 2019-05-28 | Stop reason: HOSPADM

## 2019-05-21 RX ORDER — GABAPENTIN 300 MG/1
300 CAPSULE ORAL 3 TIMES DAILY
Status: DISCONTINUED | OUTPATIENT
Start: 2019-05-21 | End: 2019-05-28 | Stop reason: HOSPADM

## 2019-05-21 RX ORDER — TAMSULOSIN HYDROCHLORIDE 0.4 MG/1
0.4 CAPSULE ORAL DAILY
Status: DISCONTINUED | OUTPATIENT
Start: 2019-05-22 | End: 2019-05-28 | Stop reason: HOSPADM

## 2019-05-21 RX ORDER — FUROSEMIDE 40 MG
40 TABLET ORAL DAILY
Status: DISCONTINUED | OUTPATIENT
Start: 2019-05-22 | End: 2019-05-22

## 2019-05-21 RX ORDER — CIPROFLOXACIN 2 MG/ML
400 INJECTION, SOLUTION INTRAVENOUS EVERY 24 HOURS
Status: DISCONTINUED | OUTPATIENT
Start: 2019-05-22 | End: 2019-05-22

## 2019-05-21 RX ORDER — ONDANSETRON 4 MG/1
4 TABLET, ORALLY DISINTEGRATING ORAL EVERY 6 HOURS PRN
Status: DISCONTINUED | OUTPATIENT
Start: 2019-05-21 | End: 2019-05-28 | Stop reason: HOSPADM

## 2019-05-21 RX ORDER — ISOSORBIDE DINITRATE 20 MG/1
20 TABLET ORAL
Status: DISCONTINUED | OUTPATIENT
Start: 2019-05-22 | End: 2019-05-28 | Stop reason: HOSPADM

## 2019-05-21 RX ORDER — PROCHLORPERAZINE MALEATE 5 MG
5 TABLET ORAL EVERY 6 HOURS PRN
Status: DISCONTINUED | OUTPATIENT
Start: 2019-05-21 | End: 2019-05-28 | Stop reason: HOSPADM

## 2019-05-21 RX ORDER — METOCLOPRAMIDE 5 MG/1
5 TABLET ORAL EVERY 6 HOURS PRN
Status: DISCONTINUED | OUTPATIENT
Start: 2019-05-21 | End: 2019-05-28 | Stop reason: HOSPADM

## 2019-05-21 RX ORDER — PANTOPRAZOLE SODIUM 40 MG/1
40 TABLET, DELAYED RELEASE ORAL DAILY
Status: DISCONTINUED | OUTPATIENT
Start: 2019-05-22 | End: 2019-05-28 | Stop reason: HOSPADM

## 2019-05-21 RX ORDER — POTASSIUM CHLORIDE 7.45 MG/ML
10 INJECTION INTRAVENOUS
Status: DISCONTINUED | OUTPATIENT
Start: 2019-05-21 | End: 2019-05-22

## 2019-05-21 RX ORDER — POTASSIUM CHLORIDE 29.8 MG/ML
20 INJECTION INTRAVENOUS
Status: DISCONTINUED | OUTPATIENT
Start: 2019-05-21 | End: 2019-05-22

## 2019-05-21 RX ORDER — POTASSIUM CL/LIDO/0.9 % NACL 10MEQ/0.1L
10 INTRAVENOUS SOLUTION, PIGGYBACK (ML) INTRAVENOUS
Status: DISCONTINUED | OUTPATIENT
Start: 2019-05-21 | End: 2019-05-22

## 2019-05-21 RX ORDER — POTASSIUM CHLORIDE 1.5 G/1.58G
20-40 POWDER, FOR SOLUTION ORAL
Status: DISCONTINUED | OUTPATIENT
Start: 2019-05-21 | End: 2019-05-22

## 2019-05-21 RX ORDER — SODIUM CHLORIDE 9 MG/ML
INJECTION, SOLUTION INTRAVENOUS CONTINUOUS
Status: DISCONTINUED | OUTPATIENT
Start: 2019-05-21 | End: 2019-05-22

## 2019-05-21 RX ORDER — ACETAMINOPHEN 325 MG/1
650 TABLET ORAL EVERY 4 HOURS PRN
Status: DISCONTINUED | OUTPATIENT
Start: 2019-05-21 | End: 2019-05-28 | Stop reason: HOSPADM

## 2019-05-21 RX ORDER — PANTOPRAZOLE SODIUM 40 MG/1
40 TABLET, DELAYED RELEASE ORAL DAILY
COMMUNITY
End: 2019-01-01

## 2019-05-21 RX ORDER — ONDANSETRON 2 MG/ML
4 INJECTION INTRAMUSCULAR; INTRAVENOUS EVERY 6 HOURS PRN
Status: DISCONTINUED | OUTPATIENT
Start: 2019-05-21 | End: 2019-05-28 | Stop reason: HOSPADM

## 2019-05-21 RX ORDER — POTASSIUM CHLORIDE 1500 MG/1
20-40 TABLET, EXTENDED RELEASE ORAL
Status: DISCONTINUED | OUTPATIENT
Start: 2019-05-21 | End: 2019-05-22

## 2019-05-21 RX ORDER — METOCLOPRAMIDE HYDROCHLORIDE 5 MG/ML
5 INJECTION INTRAMUSCULAR; INTRAVENOUS EVERY 6 HOURS PRN
Status: DISCONTINUED | OUTPATIENT
Start: 2019-05-21 | End: 2019-05-28 | Stop reason: HOSPADM

## 2019-05-21 RX ORDER — HYDRALAZINE HYDROCHLORIDE 25 MG/1
25 TABLET, FILM COATED ORAL 3 TIMES DAILY
Status: DISCONTINUED | OUTPATIENT
Start: 2019-05-21 | End: 2019-05-28 | Stop reason: HOSPADM

## 2019-05-21 RX ORDER — CIPROFLOXACIN 2 MG/ML
400 INJECTION, SOLUTION INTRAVENOUS EVERY 12 HOURS
Status: DISCONTINUED | OUTPATIENT
Start: 2019-05-21 | End: 2019-05-21

## 2019-05-21 RX ORDER — POTASSIUM CHLORIDE 750 MG/1
10 TABLET, EXTENDED RELEASE ORAL DAILY
Status: ON HOLD | COMMUNITY
End: 2019-07-13

## 2019-05-21 RX ORDER — CARVEDILOL 6.25 MG/1
6.25 TABLET ORAL 2 TIMES DAILY WITH MEALS
COMMUNITY
End: 2019-06-22

## 2019-05-21 RX ORDER — ISOSORBIDE DINITRATE 20 MG/1
20 TABLET ORAL 2 TIMES DAILY
Status: ON HOLD | COMMUNITY
End: 2019-07-13

## 2019-05-21 RX ORDER — FINASTERIDE 5 MG/1
5 TABLET, FILM COATED ORAL DAILY
Status: DISCONTINUED | OUTPATIENT
Start: 2019-05-22 | End: 2019-05-28 | Stop reason: HOSPADM

## 2019-05-21 RX ORDER — SIMVASTATIN 20 MG
20 TABLET ORAL AT BEDTIME
Status: DISCONTINUED | OUTPATIENT
Start: 2019-05-21 | End: 2019-05-28 | Stop reason: HOSPADM

## 2019-05-21 RX ORDER — PROCHLORPERAZINE 25 MG
12.5 SUPPOSITORY, RECTAL RECTAL EVERY 12 HOURS PRN
Status: DISCONTINUED | OUTPATIENT
Start: 2019-05-21 | End: 2019-05-28 | Stop reason: HOSPADM

## 2019-05-21 RX ORDER — MAGNESIUM SULFATE HEPTAHYDRATE 40 MG/ML
4 INJECTION, SOLUTION INTRAVENOUS EVERY 4 HOURS PRN
Status: DISCONTINUED | OUTPATIENT
Start: 2019-05-21 | End: 2019-05-22

## 2019-05-21 RX ORDER — OXYCODONE HYDROCHLORIDE 5 MG/1
5-10 TABLET ORAL
Status: DISCONTINUED | OUTPATIENT
Start: 2019-05-21 | End: 2019-05-28 | Stop reason: HOSPADM

## 2019-05-21 RX ORDER — METOPROLOL SUCCINATE 50 MG/1
50 TABLET, EXTENDED RELEASE ORAL DAILY
Status: DISCONTINUED | OUTPATIENT
Start: 2019-05-21 | End: 2019-05-21

## 2019-05-21 RX ADMIN — GABAPENTIN 300 MG: 300 CAPSULE ORAL at 21:36

## 2019-05-21 RX ADMIN — METRONIDAZOLE 500 MG: 500 INJECTION, SOLUTION INTRAVENOUS at 22:40

## 2019-05-21 RX ADMIN — SIMVASTATIN 20 MG: 20 TABLET, FILM COATED ORAL at 21:36

## 2019-05-21 RX ADMIN — HYDRALAZINE HYDROCHLORIDE 25 MG: 25 TABLET ORAL at 21:37

## 2019-05-21 RX ADMIN — DEXTRAN 70, AND HYPROMELLOSE 2910 2 DROP: 1; 3 SOLUTION/ DROPS OPHTHALMIC at 22:40

## 2019-05-21 RX ADMIN — OXYCODONE HYDROCHLORIDE 5 MG: 5 TABLET ORAL at 21:36

## 2019-05-21 ASSESSMENT — ACTIVITIES OF DAILY LIVING (ADL): ADLS_ACUITY_SCORE: 15

## 2019-05-21 ASSESSMENT — MIFFLIN-ST. JEOR: SCORE: 1267.5

## 2019-05-22 ENCOUNTER — ANESTHESIA EVENT (OUTPATIENT)
Dept: SURGERY | Facility: CLINIC | Age: 84
DRG: 417 | End: 2019-05-22
Payer: MEDICARE

## 2019-05-22 ENCOUNTER — APPOINTMENT (OUTPATIENT)
Dept: GENERAL RADIOLOGY | Facility: CLINIC | Age: 84
DRG: 417 | End: 2019-05-22
Attending: INTERNAL MEDICINE
Payer: MEDICARE

## 2019-05-22 ENCOUNTER — ANESTHESIA (OUTPATIENT)
Dept: SURGERY | Facility: CLINIC | Age: 84
DRG: 417 | End: 2019-05-22
Payer: MEDICARE

## 2019-05-22 LAB
ALBUMIN SERPL-MCNC: 2.6 G/DL (ref 3.4–5)
ALP SERPL-CCNC: 298 U/L (ref 40–150)
ALT SERPL W P-5'-P-CCNC: 58 U/L (ref 0–70)
ANION GAP SERPL CALCULATED.3IONS-SCNC: 8 MMOL/L (ref 3–14)
AST SERPL W P-5'-P-CCNC: 23 U/L (ref 0–45)
BASE DEFICIT BLDA-SCNC: 10.3 MMOL/L
BASE DEFICIT BLDA-SCNC: 9.1 MMOL/L
BASOPHILS # BLD AUTO: 0 10E9/L (ref 0–0.2)
BASOPHILS NFR BLD AUTO: 0.3 %
BILIRUB SERPL-MCNC: 2.3 MG/DL (ref 0.2–1.3)
BUN SERPL-MCNC: 49 MG/DL (ref 7–30)
CALCIUM SERPL-MCNC: 8.4 MG/DL (ref 8.5–10.1)
CHLORIDE SERPL-SCNC: 117 MMOL/L (ref 94–109)
CO2 SERPL-SCNC: 22 MMOL/L (ref 20–32)
CREAT SERPL-MCNC: 1.53 MG/DL (ref 0.66–1.25)
DIFFERENTIAL METHOD BLD: ABNORMAL
EOSINOPHIL # BLD AUTO: 0 10E9/L (ref 0–0.7)
EOSINOPHIL NFR BLD AUTO: 0.3 %
ERYTHROCYTE [DISTWIDTH] IN BLOOD BY AUTOMATED COUNT: 18.5 % (ref 10–15)
GFR SERPL CREATININE-BSD FRML MDRD: 39 ML/MIN/{1.73_M2}
GLUCOSE SERPL-MCNC: 83 MG/DL (ref 70–99)
HCO3 BLD-SCNC: 17 MMOL/L (ref 21–28)
HCO3 BLD-SCNC: 18 MMOL/L (ref 21–28)
HCT VFR BLD AUTO: 35.7 % (ref 40–53)
HGB BLD-MCNC: 10.9 G/DL (ref 13.3–17.7)
IMM GRANULOCYTES # BLD: 0 10E9/L (ref 0–0.4)
IMM GRANULOCYTES NFR BLD: 0.3 %
LYMPHOCYTES # BLD AUTO: 0.4 10E9/L (ref 0.8–5.3)
LYMPHOCYTES NFR BLD AUTO: 11.2 %
MAGNESIUM SERPL-MCNC: 1.8 MG/DL (ref 1.6–2.3)
MCH RBC QN AUTO: 29.1 PG (ref 26.5–33)
MCHC RBC AUTO-ENTMCNC: 30.5 G/DL (ref 31.5–36.5)
MCV RBC AUTO: 95 FL (ref 78–100)
MONOCYTES # BLD AUTO: 0.3 10E9/L (ref 0–1.3)
MONOCYTES NFR BLD AUTO: 8.3 %
NEUTROPHILS # BLD AUTO: 2.5 10E9/L (ref 1.6–8.3)
NEUTROPHILS NFR BLD AUTO: 79.6 %
NRBC # BLD AUTO: 0 10*3/UL
NRBC BLD AUTO-RTO: 0 /100
OXYHGB MFR BLD: 96 % (ref 92–100)
OXYHGB MFR BLD: 97 % (ref 92–100)
PCO2 BLD: 39 MM HG (ref 35–45)
PCO2 BLD: 49 MM HG (ref 35–45)
PH BLD: 7.18 PH (ref 7.35–7.45)
PH BLD: 7.26 PH (ref 7.35–7.45)
PHOSPHATE SERPL-MCNC: 2.9 MG/DL (ref 2.5–4.5)
PLATELET # BLD AUTO: 75 10E9/L (ref 150–450)
PO2 BLD: 123 MM HG (ref 80–105)
PO2 BLD: 126 MM HG (ref 80–105)
POTASSIUM SERPL-SCNC: 4.5 MMOL/L (ref 3.4–5.3)
PROT SERPL-MCNC: 6 G/DL (ref 6.8–8.8)
RBC # BLD AUTO: 3.75 10E12/L (ref 4.4–5.9)
SODIUM SERPL-SCNC: 147 MMOL/L (ref 133–144)
WBC # BLD AUTO: 3.1 10E9/L (ref 4–11)

## 2019-05-22 PROCEDURE — 71000013 ZZH RECOVERY PHASE 1 LEVEL 1 EA ADDTL HR: Performed by: SURGERY

## 2019-05-22 PROCEDURE — 40000275 ZZH STATISTIC RCP TIME EA 10 MIN

## 2019-05-22 PROCEDURE — 88304 TISSUE EXAM BY PATHOLOGIST: CPT | Mod: 26 | Performed by: SURGERY

## 2019-05-22 PROCEDURE — 25800030 ZZH RX IP 258 OP 636: Performed by: NURSE ANESTHETIST, CERTIFIED REGISTERED

## 2019-05-22 PROCEDURE — A9270 NON-COVERED ITEM OR SERVICE: HCPCS | Performed by: INTERNAL MEDICINE

## 2019-05-22 PROCEDURE — 25000566 ZZH SEVOFLURANE, EA 15 MIN: Performed by: SURGERY

## 2019-05-22 PROCEDURE — 25800030 ZZH RX IP 258 OP 636: Performed by: INTERNAL MEDICINE

## 2019-05-22 PROCEDURE — 99221 1ST HOSP IP/OBS SF/LOW 40: CPT | Performed by: UROLOGY

## 2019-05-22 PROCEDURE — 36415 COLL VENOUS BLD VENIPUNCTURE: CPT | Performed by: INTERNAL MEDICINE

## 2019-05-22 PROCEDURE — 25800029 ZZH RX IP 258 OP 250: Performed by: INTERNAL MEDICINE

## 2019-05-22 PROCEDURE — 47563 LAPARO CHOLECYSTECTOMY/GRAPH: CPT | Performed by: SURGERY

## 2019-05-22 PROCEDURE — 25000132 ZZH RX MED GY IP 250 OP 250 PS 637: Performed by: INTERNAL MEDICINE

## 2019-05-22 PROCEDURE — 47563 LAPARO CHOLECYSTECTOMY/GRAPH: CPT | Mod: AS | Performed by: PHYSICIAN ASSISTANT

## 2019-05-22 PROCEDURE — 21000003 ZZH R&B HEART CARE OVERFLOW

## 2019-05-22 PROCEDURE — 99233 SBSQ HOSP IP/OBS HIGH 50: CPT | Performed by: INTERNAL MEDICINE

## 2019-05-22 PROCEDURE — 25000128 H RX IP 250 OP 636: Performed by: PHYSICIAN ASSISTANT

## 2019-05-22 PROCEDURE — 36000093 ZZH SURGERY LEVEL 4 1ST 30 MIN: Performed by: SURGERY

## 2019-05-22 PROCEDURE — 80053 COMPREHEN METABOLIC PANEL: CPT | Performed by: INTERNAL MEDICINE

## 2019-05-22 PROCEDURE — 83735 ASSAY OF MAGNESIUM: CPT | Performed by: INTERNAL MEDICINE

## 2019-05-22 PROCEDURE — 40000170 ZZH STATISTIC PRE-PROCEDURE ASSESSMENT II: Performed by: SURGERY

## 2019-05-22 PROCEDURE — 25000128 H RX IP 250 OP 636: Performed by: INTERNAL MEDICINE

## 2019-05-22 PROCEDURE — 0FT44ZZ RESECTION OF GALLBLADDER, PERCUTANEOUS ENDOSCOPIC APPROACH: ICD-10-PCS | Performed by: SURGERY

## 2019-05-22 PROCEDURE — 25000125 ZZHC RX 250: Performed by: SURGERY

## 2019-05-22 PROCEDURE — 88304 TISSUE EXAM BY PATHOLOGIST: CPT | Performed by: SURGERY

## 2019-05-22 PROCEDURE — 27210338 ZZH CIRCUIT HUMID FACE/TRACH MSK

## 2019-05-22 PROCEDURE — 27210339 ZZH CIRCUIT HUMIDITY W/CPAP BIP

## 2019-05-22 PROCEDURE — 25000125 ZZHC RX 250: Performed by: NURSE ANESTHETIST, CERTIFIED REGISTERED

## 2019-05-22 PROCEDURE — 0FC94ZZ EXTIRPATION OF MATTER FROM COMMON BILE DUCT, PERCUTANEOUS ENDOSCOPIC APPROACH: ICD-10-PCS | Performed by: SURGERY

## 2019-05-22 PROCEDURE — 36000063 ZZH SURGERY LEVEL 4 EA 15 ADDTL MIN: Performed by: SURGERY

## 2019-05-22 PROCEDURE — 25000131 ZZH RX MED GY IP 250 OP 636 PS 637: Performed by: INTERNAL MEDICINE

## 2019-05-22 PROCEDURE — 99221 1ST HOSP IP/OBS SF/LOW 40: CPT | Mod: 57 | Performed by: SURGERY

## 2019-05-22 PROCEDURE — 12000000 ZZH R&B MED SURG/OB

## 2019-05-22 PROCEDURE — 85025 COMPLETE CBC W/AUTO DIFF WBC: CPT | Performed by: INTERNAL MEDICINE

## 2019-05-22 PROCEDURE — 37000009 ZZH ANESTHESIA TECHNICAL FEE, EACH ADDTL 15 MIN: Performed by: SURGERY

## 2019-05-22 PROCEDURE — 25000128 H RX IP 250 OP 636: Performed by: NURSE ANESTHETIST, CERTIFIED REGISTERED

## 2019-05-22 PROCEDURE — 40000277 XR SURGERY CARM FLUORO LESS THAN 5 MIN W STILLS

## 2019-05-22 PROCEDURE — 94660 CPAP INITIATION&MGMT: CPT

## 2019-05-22 PROCEDURE — 25000128 H RX IP 250 OP 636: Performed by: SURGERY

## 2019-05-22 PROCEDURE — 27210794 ZZH OR GENERAL SUPPLY STERILE: Performed by: SURGERY

## 2019-05-22 PROCEDURE — 37000008 ZZH ANESTHESIA TECHNICAL FEE, 1ST 30 MIN: Performed by: SURGERY

## 2019-05-22 PROCEDURE — 71000012 ZZH RECOVERY PHASE 1 LEVEL 1 FIRST HR: Performed by: SURGERY

## 2019-05-22 PROCEDURE — 84100 ASSAY OF PHOSPHORUS: CPT | Performed by: INTERNAL MEDICINE

## 2019-05-22 PROCEDURE — 36600 WITHDRAWAL OF ARTERIAL BLOOD: CPT

## 2019-05-22 PROCEDURE — 82805 BLOOD GASES W/O2 SATURATION: CPT | Performed by: ANESTHESIOLOGY

## 2019-05-22 RX ORDER — GLYCOPYRROLATE 0.2 MG/ML
INJECTION, SOLUTION INTRAMUSCULAR; INTRAVENOUS PRN
Status: DISCONTINUED | OUTPATIENT
Start: 2019-05-22 | End: 2019-05-22

## 2019-05-22 RX ORDER — LIDOCAINE 40 MG/G
CREAM TOPICAL
Status: DISCONTINUED | OUTPATIENT
Start: 2019-05-22 | End: 2019-05-23

## 2019-05-22 RX ORDER — NITROGLYCERIN 0.4 MG/1
0.4 TABLET SUBLINGUAL EVERY 5 MIN PRN
Status: DISCONTINUED | OUTPATIENT
Start: 2019-05-22 | End: 2019-05-23

## 2019-05-22 RX ORDER — LIDOCAINE HYDROCHLORIDE 20 MG/ML
INJECTION, SOLUTION INFILTRATION; PERINEURAL PRN
Status: DISCONTINUED | OUTPATIENT
Start: 2019-05-22 | End: 2019-05-22

## 2019-05-22 RX ORDER — IOPAMIDOL 612 MG/ML
INJECTION, SOLUTION INTRATHECAL PRN
Status: DISCONTINUED | OUTPATIENT
Start: 2019-05-22 | End: 2019-05-22

## 2019-05-22 RX ORDER — DEXAMETHASONE SODIUM PHOSPHATE 4 MG/ML
4 INJECTION, SOLUTION INTRA-ARTICULAR; INTRALESIONAL; INTRAMUSCULAR; INTRAVENOUS; SOFT TISSUE
Status: DISCONTINUED | OUTPATIENT
Start: 2019-05-22 | End: 2019-05-22

## 2019-05-22 RX ORDER — SODIUM CHLORIDE, SODIUM LACTATE, POTASSIUM CHLORIDE, CALCIUM CHLORIDE 600; 310; 30; 20 MG/100ML; MG/100ML; MG/100ML; MG/100ML
INJECTION, SOLUTION INTRAVENOUS CONTINUOUS PRN
Status: DISCONTINUED | OUTPATIENT
Start: 2019-05-22 | End: 2019-05-22

## 2019-05-22 RX ORDER — NEOSTIGMINE METHYLSULFATE 1 MG/ML
VIAL (ML) INJECTION PRN
Status: DISCONTINUED | OUTPATIENT
Start: 2019-05-22 | End: 2019-05-22

## 2019-05-22 RX ORDER — DEXAMETHASONE SODIUM PHOSPHATE 4 MG/ML
INJECTION, SOLUTION INTRA-ARTICULAR; INTRALESIONAL; INTRAMUSCULAR; INTRAVENOUS; SOFT TISSUE PRN
Status: DISCONTINUED | OUTPATIENT
Start: 2019-05-22 | End: 2019-05-22

## 2019-05-22 RX ORDER — POTASSIUM CHLORIDE 750 MG/1
10 TABLET, EXTENDED RELEASE ORAL DAILY
Status: DISCONTINUED | OUTPATIENT
Start: 2019-05-22 | End: 2019-05-22

## 2019-05-22 RX ORDER — ONDANSETRON 4 MG/1
4 TABLET, ORALLY DISINTEGRATING ORAL EVERY 30 MIN PRN
Status: DISCONTINUED | OUTPATIENT
Start: 2019-05-22 | End: 2019-05-22

## 2019-05-22 RX ORDER — HYDRALAZINE HYDROCHLORIDE 20 MG/ML
2.5-5 INJECTION INTRAMUSCULAR; INTRAVENOUS EVERY 10 MIN PRN
Status: DISCONTINUED | OUTPATIENT
Start: 2019-05-22 | End: 2019-05-22

## 2019-05-22 RX ORDER — ONDANSETRON 2 MG/ML
4 INJECTION INTRAMUSCULAR; INTRAVENOUS EVERY 30 MIN PRN
Status: DISCONTINUED | OUTPATIENT
Start: 2019-05-22 | End: 2019-05-22

## 2019-05-22 RX ORDER — CLINDAMYCIN PHOSPHATE 900 MG/50ML
900 INJECTION, SOLUTION INTRAVENOUS
Status: COMPLETED | OUTPATIENT
Start: 2019-05-22 | End: 2019-05-22

## 2019-05-22 RX ORDER — SODIUM CHLORIDE, SODIUM LACTATE, POTASSIUM CHLORIDE, CALCIUM CHLORIDE 600; 310; 30; 20 MG/100ML; MG/100ML; MG/100ML; MG/100ML
INJECTION, SOLUTION INTRAVENOUS CONTINUOUS
Status: DISCONTINUED | OUTPATIENT
Start: 2019-05-22 | End: 2019-05-22

## 2019-05-22 RX ORDER — CARVEDILOL 3.12 MG/1
6.25 TABLET ORAL 2 TIMES DAILY WITH MEALS
Status: DISCONTINUED | OUTPATIENT
Start: 2019-05-22 | End: 2019-05-28 | Stop reason: HOSPADM

## 2019-05-22 RX ORDER — LIDOCAINE 40 MG/G
CREAM TOPICAL
Status: CANCELLED | OUTPATIENT
Start: 2019-05-22

## 2019-05-22 RX ORDER — NALOXONE HYDROCHLORIDE 0.4 MG/ML
.1-.4 INJECTION, SOLUTION INTRAMUSCULAR; INTRAVENOUS; SUBCUTANEOUS
Status: DISCONTINUED | OUTPATIENT
Start: 2019-05-22 | End: 2019-05-22

## 2019-05-22 RX ORDER — CIPROFLOXACIN 2 MG/ML
400 INJECTION, SOLUTION INTRAVENOUS EVERY 24 HOURS
Status: COMPLETED | OUTPATIENT
Start: 2019-05-22 | End: 2019-05-22

## 2019-05-22 RX ORDER — ONDANSETRON 2 MG/ML
INJECTION INTRAMUSCULAR; INTRAVENOUS PRN
Status: DISCONTINUED | OUTPATIENT
Start: 2019-05-22 | End: 2019-05-22

## 2019-05-22 RX ORDER — MAGNESIUM HYDROXIDE 1200 MG/15ML
LIQUID ORAL PRN
Status: DISCONTINUED | OUTPATIENT
Start: 2019-05-22 | End: 2019-05-22

## 2019-05-22 RX ORDER — FENTANYL CITRATE 50 UG/ML
25-50 INJECTION, SOLUTION INTRAMUSCULAR; INTRAVENOUS
Status: DISCONTINUED | OUTPATIENT
Start: 2019-05-22 | End: 2019-05-22

## 2019-05-22 RX ORDER — FENTANYL CITRATE 50 UG/ML
INJECTION, SOLUTION INTRAMUSCULAR; INTRAVENOUS PRN
Status: DISCONTINUED | OUTPATIENT
Start: 2019-05-22 | End: 2019-05-22

## 2019-05-22 RX ORDER — HYDROMORPHONE HYDROCHLORIDE 1 MG/ML
0.2 INJECTION, SOLUTION INTRAMUSCULAR; INTRAVENOUS; SUBCUTANEOUS
Status: DISCONTINUED | OUTPATIENT
Start: 2019-05-22 | End: 2019-05-28 | Stop reason: HOSPADM

## 2019-05-22 RX ORDER — INDOMETHACIN 50 MG/1
100 SUPPOSITORY RECTAL
Status: CANCELLED | OUTPATIENT
Start: 2019-05-22

## 2019-05-22 RX ORDER — SODIUM CHLORIDE 450 MG/100ML
INJECTION, SOLUTION INTRAVENOUS CONTINUOUS
Status: DISCONTINUED | OUTPATIENT
Start: 2019-05-22 | End: 2019-05-23

## 2019-05-22 RX ORDER — LABETALOL 20 MG/4 ML (5 MG/ML) INTRAVENOUS SYRINGE
10
Status: DISCONTINUED | OUTPATIENT
Start: 2019-05-22 | End: 2019-05-22

## 2019-05-22 RX ORDER — HYDROMORPHONE HYDROCHLORIDE 1 MG/ML
.3-.5 INJECTION, SOLUTION INTRAMUSCULAR; INTRAVENOUS; SUBCUTANEOUS EVERY 5 MIN PRN
Status: DISCONTINUED | OUTPATIENT
Start: 2019-05-22 | End: 2019-05-22

## 2019-05-22 RX ORDER — PROPOFOL 10 MG/ML
INJECTION, EMULSION INTRAVENOUS PRN
Status: DISCONTINUED | OUTPATIENT
Start: 2019-05-22 | End: 2019-05-22

## 2019-05-22 RX ADMIN — LIDOCAINE HYDROCHLORIDE 80 MG: 20 INJECTION, SOLUTION INFILTRATION; PERINEURAL at 14:15

## 2019-05-22 RX ADMIN — TAMSULOSIN HYDROCHLORIDE 0.4 MG: 0.4 CAPSULE ORAL at 08:50

## 2019-05-22 RX ADMIN — FENTANYL CITRATE 25 MCG: 50 INJECTION, SOLUTION INTRAMUSCULAR; INTRAVENOUS at 14:53

## 2019-05-22 RX ADMIN — GABAPENTIN 300 MG: 300 CAPSULE ORAL at 08:50

## 2019-05-22 RX ADMIN — METRONIDAZOLE 500 MG: 500 INJECTION, SOLUTION INTRAVENOUS at 00:38

## 2019-05-22 RX ADMIN — CLINDAMYCIN PHOSPHATE 900 MG: 18 INJECTION, SOLUTION INTRAVENOUS at 14:29

## 2019-05-22 RX ADMIN — SODIUM CHLORIDE: 4.5 INJECTION, SOLUTION INTRAVENOUS at 20:04

## 2019-05-22 RX ADMIN — NEOSTIGMINE METHYLSULFATE 3 MG: 1 INJECTION, SOLUTION INTRAVENOUS at 15:26

## 2019-05-22 RX ADMIN — FINASTERIDE 5 MG: 5 TABLET, FILM COATED ORAL at 08:50

## 2019-05-22 RX ADMIN — ONDANSETRON 4 MG: 4 TABLET, ORALLY DISINTEGRATING ORAL at 05:42

## 2019-05-22 RX ADMIN — PHENYLEPHRINE HYDROCHLORIDE 100 MCG: 10 INJECTION INTRAVENOUS at 14:23

## 2019-05-22 RX ADMIN — FENTANYL CITRATE 25 MCG: 50 INJECTION, SOLUTION INTRAMUSCULAR; INTRAVENOUS at 14:49

## 2019-05-22 RX ADMIN — SODIUM CHLORIDE: 9 INJECTION, SOLUTION INTRAVENOUS at 02:15

## 2019-05-22 RX ADMIN — ISOSORBIDE DINITRATE 20 MG: 20 TABLET ORAL at 08:50

## 2019-05-22 RX ADMIN — SODIUM CHLORIDE, POTASSIUM CHLORIDE, SODIUM LACTATE AND CALCIUM CHLORIDE: 600; 310; 30; 20 INJECTION, SOLUTION INTRAVENOUS at 15:34

## 2019-05-22 RX ADMIN — SODIUM CHLORIDE, POTASSIUM CHLORIDE, SODIUM LACTATE AND CALCIUM CHLORIDE: 600; 310; 30; 20 INJECTION, SOLUTION INTRAVENOUS at 14:05

## 2019-05-22 RX ADMIN — FENTANYL CITRATE 25 MCG: 50 INJECTION, SOLUTION INTRAMUSCULAR; INTRAVENOUS at 14:38

## 2019-05-22 RX ADMIN — METRONIDAZOLE 500 MG: 500 INJECTION, SOLUTION INTRAVENOUS at 08:50

## 2019-05-22 RX ADMIN — ROCURONIUM BROMIDE 35 MG: 10 INJECTION INTRAVENOUS at 14:15

## 2019-05-22 RX ADMIN — FUROSEMIDE 40 MG: 40 TABLET ORAL at 08:50

## 2019-05-22 RX ADMIN — DEXTRAN 70, AND HYPROMELLOSE 2910 2 DROP: 1; 3 SOLUTION/ DROPS OPHTHALMIC at 23:11

## 2019-05-22 RX ADMIN — ROCURONIUM BROMIDE 5 MG: 10 INJECTION INTRAVENOUS at 15:16

## 2019-05-22 RX ADMIN — PHENYLEPHRINE HYDROCHLORIDE 100 MCG: 10 INJECTION INTRAVENOUS at 14:28

## 2019-05-22 RX ADMIN — PANTOPRAZOLE SODIUM 40 MG: 40 TABLET, DELAYED RELEASE ORAL at 08:50

## 2019-05-22 RX ADMIN — PHENYLEPHRINE HYDROCHLORIDE 100 MCG: 10 INJECTION INTRAVENOUS at 14:32

## 2019-05-22 RX ADMIN — DEXTRAN 70, AND HYPROMELLOSE 2910 2 DROP: 1; 3 SOLUTION/ DROPS OPHTHALMIC at 12:27

## 2019-05-22 RX ADMIN — HYDROMORPHONE HYDROCHLORIDE 0.2 MG: 1 INJECTION, SOLUTION INTRAMUSCULAR; INTRAVENOUS; SUBCUTANEOUS at 20:03

## 2019-05-22 RX ADMIN — ONDANSETRON 4 MG: 2 INJECTION INTRAMUSCULAR; INTRAVENOUS at 14:31

## 2019-05-22 RX ADMIN — GLYCOPYRROLATE 0.6 MG: 0.2 INJECTION, SOLUTION INTRAMUSCULAR; INTRAVENOUS at 15:26

## 2019-05-22 RX ADMIN — HYDRALAZINE HYDROCHLORIDE 25 MG: 25 TABLET ORAL at 08:50

## 2019-05-22 RX ADMIN — HYDROMORPHONE HYDROCHLORIDE 0.2 MG: 1 INJECTION, SOLUTION INTRAMUSCULAR; INTRAVENOUS; SUBCUTANEOUS at 23:25

## 2019-05-22 RX ADMIN — FENTANYL CITRATE 25 MCG: 50 INJECTION, SOLUTION INTRAMUSCULAR; INTRAVENOUS at 14:15

## 2019-05-22 RX ADMIN — ROCURONIUM BROMIDE 15 MG: 10 INJECTION INTRAVENOUS at 14:38

## 2019-05-22 RX ADMIN — CIPROFLOXACIN 400 MG: 2 INJECTION, SOLUTION INTRAVENOUS at 21:25

## 2019-05-22 RX ADMIN — DEXAMETHASONE SODIUM PHOSPHATE 4 MG: 4 INJECTION, SOLUTION INTRA-ARTICULAR; INTRALESIONAL; INTRAMUSCULAR; INTRAVENOUS; SOFT TISSUE at 14:29

## 2019-05-22 RX ADMIN — PROPOFOL 100 MG: 10 INJECTION, EMULSION INTRAVENOUS at 14:15

## 2019-05-22 ASSESSMENT — ACTIVITIES OF DAILY LIVING (ADL)
ADLS_ACUITY_SCORE: 15
ADLS_ACUITY_SCORE: 18
ADLS_ACUITY_SCORE: 15
ADLS_ACUITY_SCORE: 15
ADLS_ACUITY_SCORE: 19

## 2019-05-22 ASSESSMENT — MIFFLIN-ST. JEOR: SCORE: 1258.5

## 2019-05-22 ASSESSMENT — LIFESTYLE VARIABLES: TOBACCO_USE: 1

## 2019-05-22 NOTE — H&P
Admitted:     05/21/2019      HISTORY OF PRESENT ILLNESS:  This is a 89-year-old male with history of hypertension, hyperlipidemia, chronic kidney disease stage III, chronic pain syndrome, gout, history of bladder cancer, status post resection and bladder reconstruction x 2, narcotic dependence, mitral regurgitation, status post mitral valve clips, anemia, was transferred here from [facility] because of abdominal pain, nausea, and found to have acute cholecystitis.      According to the patient, he is currently living in [place] and visiting [place] and has been getting weaker and has been having some abdominal pain for sometimes.  The main concern that led him to the ER, was incontinence of stools which has been there for the last 1 month or so.  He has no appetite.  He has maybe losing some weight and had some abdominal pain today feeling nauseous.  The patient was taken to the ER.  In the ER, at [facility], he had extensive workup done including the labs which showed he has elevated liver enzymes.  Alkaline phosphatase was quite elevated at 340.  Total bilirubin was  3.8, platelets were 64 only.  CT scan of the abdomen done which shows cholelithiasis with mild gallbladder wall thickness and pericholecystic fluid consistent with cholecystitis.  He did have ultrasound of the right upper quadrant as well which shows numerous gallstones and gallbladder wall thickness consistent with acute cholecystitis.      Because of these concerns, the patient was transferred to [facility] for further management.      At this time, the patient said that he has been feeling weak and tired, but denies any chest pain, shortness of breath, orthopnea, PND, palpitations.  Some headache, some lightheadedness occasionally.  No fever, chills or cough.  No dysuria, hematuria or constipation.  He does have some loose stools with incontinence that is bothering him the most.  The rest of the review  of system is negative.      ASSESSMENT AND PLAN:   1.  Acute cholecystitis with possible choledocholithiasis:  This is a 89-year-old male with multiple comorbidities, comes in with abdominal pain and nausea, and some mild tenderness to right upper quadrant as well.  Liver enzymes elevated.  His bilirubin is elevated and have cholelithiasis.  At this time, I will keep him on Cipro and Flagyl as he is ALLERGIC TO PENICILLIN.  Consult General Surgery and GI to evaluate the patient.  The patient may need ERCP as he has elevated alkaline phosphatase, as well as bilirubin is elevated as well.  We will keep him on Cipro and Flagyl.  We will have the Surgery and GI evaluate in the morning.  We will recheck his labs in the morning as well.   2.  Incontinence of stool:  I am not certain the exact cause of his symptoms.  He has been there for 1 month and never sought any medical attention.  We will have General Surgery evaluate him in the morning as well.   3.  A 3.4 cm left bladder mass:  The patient has a history of bladder cancer.  He has a resection and bladder reconstruction x 2 in the past.  He told me that his last Surgery was in 11/2018 at the [facility].  We will have Urology evaluate the patient in the morning as well.   4.  Chronic kidney disease, stage III, baseline creatinine 1.4 to 1.6, slightly high 1.7 this morning.  We will keep him on gentle hydration at this time.   5.  Hypertension has been well controlled at this time.  We will keep him on metoprolol, hydralazine that he takes at home.   6.  Hyperlipidemia, on Zocor.  We will continue with that.  Moderate to severe mitral regurgitation, status post MitraClip placement in the past.  He has significant mitral and tricuspid regurgitation on his last echo. I will repeat the echocardiogram while he is here again.   7.  BPH:  On Proscar and Flomax will continue with that.   8.  Anemia:  History of B12 deficiency as he has been getting some B12 from  his oncologist.  Hemoglobin is 10.9 at this time.  No bleeding from anywhere.   9.  Gastrointestinal prophylaxis with Protonix.   10.  Deep venous thrombosis prophylaxis with SCDs.      CODE STATUS:  I had a detailed discussion with the patient regarding his code status and he wanted to be full code at this time.      Case discussed with referring physician, Dr. Razo from [facility] and the nursing staff taking care of the patient.         KENZIE RICHARDSON MD             D: 2019   T: 2019   MT: RAJINDER      Name:     MALLORIE NOVA   MRN:      [id number]        Account:      [id number]   :      10/12/1926        Admitted:     2019                   Document: [id number]       cc: Evaristo Magaña MD

## 2019-05-22 NOTE — CONSULTS
Consult received  Cholecystis with likely cholelithiasis and suspected cholengitis.  Agree with antibiotiucs  NPO  Full consult and possible EUS and ERCP tomorrow.    [facility]

## 2019-05-22 NOTE — ANESTHESIA CARE TRANSFER NOTE
Patient: Ivan Gomez    Procedure(s):  CHOLECYSTECTOMY, LAPAROSCOPIC, WITH CHOLANGIOGRAM    Diagnosis: UNKNOWN  Diagnosis Additional Information: No value filed.    Anesthesia Type:   General, ETT     Note:  Airway :Face Mask  Patient transferred to:PACU  Comments: Neuromuscular blockade reversed after TOF 4/4, spontaneous respirations, adequate tidal volumes, followed commands to voice, oropharynx suctioned with soft flexible catheter, extubated atraumatically, extubated with suction, airway patent after extubation.  Oxygen via facemask at 8 liters per minute to PACU. Oxygen tubing connected to wall O2 in PACU, SpO2, NiBP, and EKG monitors and alarms on and functioning, Matt Hugger warmer connected to patient gown, report on patient's clinical status given to PACU RN, RN questions answered. Handoff Report: Identifed the Patient, Identified the Reponsible Provider, Reviewed the pertinent medical history, Discussed the surgical course, Reviewed Intra-OP anesthesia mangement and issues during anesthesia, Set expectations for post-procedure period and Allowed opportunity for questions and acknowledgement of understanding      Vitals: (Last set prior to Anesthesia Care Transfer)    CRNA VITALS  5/22/2019 1519 - 5/22/2019 1556      5/22/2019             NIBP:  110/64    Pulse:  74    NIBP Mean:  77    SpO2:  98 %    Resp Rate (set):  10                Electronically Signed By: FRANK Horowitz CRNA  May 22, 2019  3:56 PM

## 2019-05-22 NOTE — PLAN OF CARE
OT: order received, chart reviewed. Pt currently having cholecystectomy procedure this PM. Will hold eval this date.

## 2019-05-22 NOTE — ANESTHESIA PREPROCEDURE EVALUATION
Anesthesia Pre-Procedure Evaluation    Patient: Ivan Gomez   MRN: [id number] : 10/12/1926          Preoperative Diagnosis: UNKNOWN    Procedure(s):  CHOLECYSTECTOMY, LAPAROSCOPIC, WITH CHOLANGIOGRAM    Past Medical History:   Diagnosis Date     Arthritis     Spinal Stenosis     Former smoker      Hemorrhoids      Hypercholesteremia      Hypertension      Malignant neoplasm (H)     skin CA, Basal cell     Other chronic pain      Renal disease      Past Surgical History:   Procedure Laterality Date     BACK SURGERY       BIOPSY      face, skin cancer     BIOPSY  2016    shoulder lesion     CYSTOSCOPY, TRANSURETHRAL RESECTION (TUR) PROSTATE, COMBINED N/A 2015    Procedure: COMBINED CYSTOSCOPY, TRANSURETHRAL RESECTION (TUR) PROSTATE;  Surgeon: Dennis Hilton MD;  Location: [facility] OR     CYSTOSCOPY, TRANSURETHRAL RESECTION (TUR) TUMOR BLADDER, COMBINED N/A 2016    Procedure: COMBINED CYSTOSCOPY, TRANSURETHRAL RESECTION (TUR) TUMOR BLADDER;  Surgeon: Dennis Hilton MD;  Location: [facility] OR     CYSTOSCOPY, TRANSURETHRAL RESECTION (TUR) TUMOR BLADDER, COMBINED N/A 2018    Procedure: Transurethral resection of bladder tumor > 5 cm in size;  Surgeon: Dennis Hilton MD;  Location: [facility] OR     ORTHOPEDIC SURGERY      lumbar and cervical surgery, Sep, 2008, knee surgery     PERCUTANEOUS MITRAL VALVE REPAIR N/A 10/16/2017    Procedure: PERCUTANEOUS MITRAL VALVE REPAIR ANESTHESIA;  Percutaneous MitraClip ;  Surgeon: Eber Monroe MD;  Location: [facility] OR     PICC INSERTION Right 10/14/2017    5fr DL Bard PICC, 40cm (1cm external) in the R basilic vein w/ tip in the mid SVC.       Anesthesia Evaluation     . Pt has had prior anesthetic. Type: General    No history of anesthetic complications          ROS/MED HX    ENT/Pulmonary:  - neg pulmonary ROS   (+)tobacco use, Past use , . .   (-) sleep apnea   Neurologic:  - neg neurologic ROS     Cardiovascular:     (+) Dyslipidemia,  hypertension----. : . . . :. valvular problems/murmurs type: MR s/p mitraclip. Continues to have MR:. Previous cardiac testing Echodate:1/18results:Several mitral valve clips noted. There is still some prolapse of the  posterior leaflet. Very eccentric jet of MR wraps around the back of the  dialted LA, and into a PV.  There is moderately severe (3+) mitral regurgitation that is eccentrically  directed.  There is mild to moderate mitral stenosis again noted.  There is moderate to mod-severe (2-3+) tricuspid regurgitation.  The right ventricular systolic pressure is approximated at 61mmHg plus the  right atrial pressure.  Right ventricular systolic pressure is elevated, consistent with severe  pulmonary hypertension.  The left atrium is severely dilated and the right atrium is moderately  dilated.     Compared to the echo dated 1-, the MR and TR are significant and  similar. The RVSP appears to be higher.     A patent foramen ovale is again present and a left to right atrial shunt that  is small.     The mean mitral valve gradient is 8.2 mmHg. The peak mitral valve gradient is  28.5 mmHg.  _____________________________________________________________________________  __        Left Ventricle  The left ventricle is normal in size. There is mild concentric left  ventricular hypertrophy. The left ventricular ejection fraction is normal. The  visual ejection fraction is estimated at 65-70%. Hyperdynamic left ventricular  function. Diastolic function not assessed due to significant valvular  regurgitation. No regional wall motion abnormalities noted. There is no  thrombus seen in the left ventricle.     Right Ventricle  Normal right ventricle structure and size. The right ventricular systolic  function is normal.     Atria  The left atrium is severely dilated. The right atrium is moderately dilated. A  patent foramen ovale is present. Left to right atrial shunt, small.     Mitral Valve  Several mitral valve clips  noted. There is still some prolapse of the  posterior leaflet. Very eccentric jet of MR wraps around the back of the  dialted LA, and into a PV. There is moderate mitral annular calcification.  There is no evidence of mitral valve prolapse. There is moderately severe (3+)  mitral regurgitation. The mitral regurgitant jet is eccentrically directed.  The mean mitral valve gradient is 8.2 mmHg. The peak mitral valve gradient is  28.5 mmHg. There is mild to moderate mitral stenosis.        Tricuspid Valve  The tricuspid valve is normal in structure and function. There is moderate to  mod-severe (2-3+) tricuspid regurgitation. The right ventricular systolic  pressure is approximated at 61.5 mmHg plus the right atrial pressure. Right  ventricular systolic pressure is elevated, consistent with severe pulmonary  hypertension. The right ventricular systolic pressure is approximated at  61mmHg plus the right atrial pressure.     Aortic Valve  The aortic valve is trileaflet. There is moderate trileaflet aortic sclerosis.  No aortic regurgitation is present. No hemodynamically significant valvular  aortic stenosis.     Pulmonic Valve  The pulmonic valve is not well seen, but is grossly normal. There is trace  pulmonic valvular regurgitation.     Vessels  Normal size aorta. Normal size ascending aorta. The inferior vena cava was  dilated at 2.6 cm without respiratory variability, consistent with increased  right atrial pressure.     Pericardium  There is no pericardial effusion. There is no pleural effusion.        Rhythm  The rhythm was sinus bradycardiadate: results: date: results: date: results:          METS/Exercise Tolerance:     Hematologic: Comments: Lab Test        10/29/18     01/26/18     01/17/18      --          10/16/17     10/15/17     10/14/17                       1456          1431          1815           --           0402          0420          1018          WBC          4.4          5.3          5.1             < >        6.7          7.2          10.4          HGB          9.7*         10.5*        11.0*          < >        11.4*        12.1*        13.3          MCV          92           94           93             < >        94           96           98            PLT          91*          90*          126*           < >        116*         108*         111*          INR           --           --           --           --          1.15*        1.15*        1.17*          < > = values in this interval not displayed.                  Lab Test        10/29/18     03/02/18     01/26/18     01/05/18                       1456          1412          1431          1424          NA           145*         139          139          140           POTASSIUM    3.8          3.6          3.7          3.9           CHLORIDE     107          100          102          100           CO2          26           30*          30           33*           BUN          40*          24           27           29            CR           1.57*        1.70*        1.56*        1.73*         ANIONGAP     12           12.6         7            10.9          GIUSEPPE           --          9.3          8.5          9.7           GLC           --          106*         114*         111*              (+) Anemia, -      Musculoskeletal:  - neg musculoskeletal ROS (+) arthritis,  -       GI/Hepatic:     (+) GERD Asymptomatic on medication, cholecystitis/cholelithiasis,       Renal/Genitourinary:     (+) chronic renal disease, type: CRI, Nephrolithiasis , Pt does not require dialysis, BPH, Other Renal/ Genitourinary (ladder), carcinoma bladder      Endo:      (-) Type II DM   Psychiatric:  - neg psychiatric ROS       Infectious Disease:  - neg infectious disease ROS       Malignancy:   (+) Malignancy History of Other          Other:    (+) H/O Chronic Pain,                        Physical Exam  Normal systems: pulmonary and dental    Airway   Mallampati: III  TM  "distance: >3 FB  Neck ROM: limited    Dental     Cardiovascular   Rhythm and rate: regular  (+) murmur       Pulmonary             Lab Results   Component Value Date    WBC 3.1 (L) 05/22/2019    HGB 10.9 (L) 05/22/2019    HCT 35.7 (L) 05/22/2019    PLT 75 (L) 05/22/2019    SED 6 03/23/2017     (H) 05/22/2019    POTASSIUM 4.5 05/22/2019    CHLORIDE 117 (H) 05/22/2019    CO2 22 05/22/2019    BUN 49 (H) 05/22/2019    CR 1.53 (H) 05/22/2019    GLC 83 05/22/2019    GIUSEPPE 8.4 (L) 05/22/2019    PHOS 2.9 05/22/2019    MAG 1.8 05/22/2019    ALBUMIN 2.6 (L) 05/22/2019    PROTTOTAL 6.0 (L) 05/22/2019    ALT 58 05/22/2019    AST 23 05/22/2019    ALKPHOS 298 (H) 05/22/2019    BILITOTAL 2.3 (H) 05/22/2019    LIPASE 100 11/18/2017    INR 1.15 (H) 10/16/2017    TSH 1.04 01/26/2018       Preop Vitals  BP Readings from Last 3 Encounters:   05/22/19 106/64   11/08/18 131/76   11/05/18 128/64    Pulse Readings from Last 3 Encounters:   11/08/18 76   10/29/18 85   10/24/18 83      Resp Readings from Last 3 Encounters:   05/22/19 18   11/05/18 18   10/24/18 14    SpO2 Readings from Last 3 Encounters:   05/22/19 93%   11/08/18 95%   11/05/18 93%      Temp Readings from Last 1 Encounters:   05/22/19 36.4  C (97.6  F) (Oral)    Ht Readings from Last 1 Encounters:   05/21/19 1.727 m (5' 8\")      Wt Readings from Last 1 Encounters:   05/22/19 63.4 kg (139 lb 12.4 oz)    Estimated body mass index is 21.25 kg/m  as calculated from the following:    Height as of this encounter: 1.727 m (5' 8\").    Weight as of this encounter: 63.4 kg (139 lb 12.4 oz).       Anesthesia Plan      History & Physical Review  History and physical reviewed and following examination; no interval change.    ASA Status:  4 .    NPO Status:  > 8 hours    Plan for General and ETT with Intravenous and Propofol induction. Maintenance will be Balanced.    PONV prophylaxis:  Ondansetron (or other 5HT-3)  Additional equipment: Videolaryngoscope      Postoperative " Care  Postoperative pain management:  Multi-modal analgesia.      Consents  Anesthetic plan, risks, benefits and alternatives discussed with:  Patient..                 Meagan Kline MD

## 2019-05-22 NOTE — CONSULTS
[facility] General Surgery Consultation    Ivan Gomez MRN# [id number]   YOB: 1926 Age: 89 year old      Date of Admission:  5/21/2019  Date of Consult: 5/22/2019         Assessment and Plan:   Ivan Gomez is a 89 year old male who we were asked to evaluate for upper abdominal pain, nausea and findings consistent with acute cholecystitis with cholelithiasis.  He presented to outside facility and transferred to [facility] for definitive care.  US at outside facility did show cholelithiasis with borderline to mild gallbladder wall thickening suggesting cholecystitis.  ABD CT also consistent with cholelithiasis with mild gallbladder wall thickening suggestive of cholecystitis. LFT were elevated - Alk Phos of 340, ALT 81, AST 44 and elevated total bili of 3.8    PLAN:  - continue NPO  - cholecystectomy vs ERCP - will discuss timing with Dr Romero         Requesting Physician:      Jc Parks MD          Chief Complaint:   Abdominal pain and nausea         History of Present Illness:   Ivan Gomez is a 89 year old male who was seen in consultation at the request of Dr. Jc Parks who presented with to outside facility with complaints of abdominal pain, nausea, decrease in appetite and diarrhea.  He reports that he has had loose sometimes uncontrolled bowel movements for the last month to month and a half.  He resided in [place] and has a PCP there.  He has been in [place] for the last several weeks visiting a close friend.  Over the last week he has developed upper abdominal pain and nausea.  He also reports difficulty with swallowing food and lack of appetite present even before he traveled to [place].  He also reports increase in fatigue and weakness over the last month.  He has not been evaluated for cholelithiasis in the past.  He has history of laparoscopic right inguinal hernia repair, no other abdominal surgeries.              Physical Exam:   Blood pressure  "(P) 103/61, temperature (P) 97.4  F (36.3  C), temperature source (P) Oral, resp. rate (P) 16, height 1.727 m (5' 8\"), weight 63.4 kg (139 lb 12.4 oz), SpO2 (P) 92 %.  139 lbs 12.35 oz  General: alert  Psych: Alert and Oriented.  Normal affect  Neurological: grossly intact  Eyes: Sclera clear  Respiratory:  nonlabored breathing  Cardiovascular:  Regular Rate and Rhythm   GI: Abd soft, mild tenderness RUQ with rebound tenderness.  Well healed incisions consistent with laparoscopic hernia repair   Lymphatic/Hematologic/Immune:  No femoral or cervical lymphadenopathy.  Integumentary:  No rashes         Past Medical History:     Past Medical History:   Diagnosis Date     Arthritis     Spinal Stenosis     Former smoker      Hemorrhoids      Hypercholesteremia      Hypertension      Malignant neoplasm (H)     skin CA, Basal cell     Other chronic pain      Renal disease             Past Surgical History:     Past Surgical History:   Procedure Laterality Date     BACK SURGERY       BIOPSY      face, skin cancer     BIOPSY  8/2016    shoulder lesion     CYSTOSCOPY, TRANSURETHRAL RESECTION (TUR) PROSTATE, COMBINED N/A 8/21/2015    Procedure: COMBINED CYSTOSCOPY, TRANSURETHRAL RESECTION (TUR) PROSTATE;  Surgeon: Dennis Hilton MD;  Location:  OR     CYSTOSCOPY, TRANSURETHRAL RESECTION (TUR) TUMOR BLADDER, COMBINED N/A 9/2/2016    Procedure: COMBINED CYSTOSCOPY, TRANSURETHRAL RESECTION (TUR) TUMOR BLADDER;  Surgeon: Dennis Hilton MD;  Location:  OR     CYSTOSCOPY, TRANSURETHRAL RESECTION (TUR) TUMOR BLADDER, COMBINED N/A 11/2/2018    Procedure: Transurethral resection of bladder tumor > 5 cm in size;  Surgeon: Dennis Hilton MD;  Location:  OR     ORTHOPEDIC SURGERY      lumbar and cervical surgery, Sep, 2008, knee surgery     PERCUTANEOUS MITRAL VALVE REPAIR N/A 10/16/2017    Procedure: PERCUTANEOUS MITRAL VALVE REPAIR ANESTHESIA;  Percutaneous MitraClip ;  Surgeon: Eber Monroe " MD Dylan;  Location: [facility] OR     PICC INSERTION Right 10/14/2017    5fr DL Bard PICC, 40cm (1cm external) in the R basilic vein w/ tip in the mid SVC.            Current Medications:           carvedilol  6.25 mg Oral BID w/meals     ciprofloxacin  400 mg Intravenous Q24H     finasteride  5 mg Oral Daily     furosemide  40 mg Oral Daily     gabapentin  300 mg Oral TID     hydrALAZINE  25 mg Oral TID     hypromellose-dextran  2 drop Left Eye 4x Daily     isosorbide dinitrate  20 mg Oral BID     metroNIDAZOLE  500 mg Intravenous Q8H     pantoprazole  40 mg Oral Daily     potassium chloride ER  10 mEq Oral Daily     simvastatin  20 mg Oral At Bedtime     tamsulosin  0.4 mg Oral Daily       acetaminophen, HYDROmorphone, magnesium sulfate, melatonin, metoclopramide **OR** metoclopramide, naloxone, ondansetron **OR** ondansetron, oxyCODONE, prochlorperazine **OR** prochlorperazine **OR** prochlorperazine         Home Medications:     Prior to Admission medications    Medication Sig Last Dose Taking? Auth Provider   aspirin EC 81 MG EC tablet Take 1 tablet (81 mg) by mouth daily 5/21/2019 at Unknown time Yes Mirela Coleman APRN CNP   carvedilol (COREG) 6.25 MG tablet Take 6.25 mg by mouth 2 times daily (with meals) 5/21/2019 at Unknown time Yes Unknown, Entered By History   finasteride (PROSCAR) 5 MG tablet TAKE 1 TABLET(5 MG) BY MOUTH DAILY 5/21/2019 at Unknown time Yes Evaristo Magaña MD   furosemide (LASIX) 20 MG tablet Take 2 tablets (40 mg) by mouth daily 5/21/2019 at Unknown time Yes Lance Yao MD   gabapentin (NEURONTIN) 300 MG capsule TAKE 1 CAPSULE BY MOUTH THREE TIMES DAILY 5/21/2019 at Unknown time Yes Evaristo Magaña MD   hydrALAZINE (APRESOLINE) 25 MG tablet Take 1 tablet (25 mg) by mouth 3 times daily 5/21/2019 at Unknown time Yes Evaristo Magaña MD   HYDROcodone-acetaminophen (NORCO) 7.5-325 MG per tablet Take 1 tablet by mouth every 6 hours as needed for pain maximum  4 tablet(s) per day 5/21/2019 at Unknown time Yes Evaristo Magaña MD   hypromellose (ARTIFICIAL TEARS) 0.4 % SOLN ophthalmic solution Place 2 drops Into the left eye 4 times daily 5/21/2019 at Unknown time Yes Reported, Patient   isosorbide dinitrate (ISORDIL) 20 MG tablet Take 20 mg by mouth 2 times daily 5/21/2019 at Unknown time Yes Unknown, Entered By History   pantoprazole (PROTONIX) 40 MG EC tablet Take 40 mg by mouth daily 5/21/2019 at Unknown time Yes Unknown, Entered By History   potassium chloride ER (K-DUR/KLOR-CON M) 10 MEQ CR tablet Take 10 mEq by mouth daily 5/21/2019 at Unknown time Yes Unknown, Entered By History   simvastatin (ZOCOR) 20 MG tablet TAKE ONE TABLET BY MOUTH AT BEDTIME 5/20/2019 at Unknown time Yes Evaristo Magaña MD   tamsulosin (FLOMAX) 0.4 MG capsule TAKE ONE CAPSULE BY MOUTH DAILY 5/21/2019 at Unknown time Yes Evaristo Magaña MD            Allergies:     Allergies   Allergen Reactions     Celebrex [Celecoxib] Hives     Penicillins Hives            Family History:     Family History   Problem Relation Age of Onset     Cancer Son 64        leukemia ? due to agent orange     Family History Negative Son            Social History:   Ivan Gomez  reports that he has quit smoking. He has never used smokeless tobacco. He reports that he does not drink alcohol or use drugs.          Review of Systems:   The 10 point Review of Systems is negative other than noted in the HPI.         Labs/Imaging   All new lab and imaging data was reviewed.     Milagro Craig PA-C

## 2019-05-22 NOTE — PROGRESS NOTES
[facility]    Medicine Progress Note - Hospitalist Service       Date of Admission:  5/21/2019  Assessment & Plan   Ivan Gomez is a 89-year-old male with history of hypertension, hyperlipidemia, chronic kidney disease stage III, chronic pain syndrome, gout, history of bladder cancer status post resection and bladder reconstruction x 2, narcotic dependence, mitral regurgitation status post mitral valve clips, and chronic anemia who was transferred here from outlying hospital because of abdominal pain and nausea secondary to acute cholecystitis.     Acute cholecystitis. Status post lap cholecystectomy with negative cholangiogram 5/22.  -appreciate management by GI and surgery  -continue Cipro and Flagyl  -continue Tylenol and IV dilaudid PRN for pain  -diet per surgery  -CBC in AM    Post operative hypoxia with acute hypoxic respiratory failure. No aspiration noted during surgery but in PACU developed shortness of breath and respiratory distress requiring BIPAP.  -will likely need to continue BIPAP in CICU overnight and wean as tolerated, I have placed orders for transfer to CICU with IMC status and BIPAP orders    Diarrhea with incontinence of stool:  Symptoms for 1 month and never sought any medical attention.  -appreciate GI assistance  -follow up stool studies for enteric pathogens and C. Diff which are ordered but not collected, also follow up O&P and fat stool qualitative random collection although I am not sure how useful those will be and would defer to GI  -if negative studies and does not improve GI says we will need to consider colonoscopy to eval for microscopic or ischemic colitis    Hypernatremia. Likely iatrogenic from saline.  -change to 0.45% normal saline  -repeat BMP in AM    3.4 cm left bladder mass:  The patient has a history of bladder cancer.  He has a resection and bladder reconstruction x 2 in the past. Last Surgery per patient was in 11/2018 at the [facility]  [place].    -urology evaluation here appreciated with recommendation to just follow up with his primary urologist and oncologist for ongoing management as outpatient    Chronic kidney disease, stage III. Baseline creatinine 1.4 to 1.6.  -continue IV fluids at 100 ml/hr, change to 0.45% NS  -BMP in AM    Hypertension.   -controlled  -continue home metoprolol and hydralazine    Hyperlipidemia.  -continue home Zocor     Moderate to severe mitral regurgitation, status post MitraClip placement in the past.  He has significant mitral and tricuspid regurgitation on his last echo.   -repeat Echo pending     BPH:    -continue home Proscar and Flomax     Chronic Anemia:  History of B12 deficiency as he has been getting some B12 from his oncologist.  Hemoglobin is 10.9 at admission.  No bleeding from anywhere.  -repeat CBC in AM       Diet: NPO per Anesthesia Guidelines for Procedure/Surgery Except for: Meds    DVT Prophylaxis: Pneumatic Compression Devices  Velasquez Catheter: not present  Code Status: Full Code      Disposition Plan   Expected discharge: 2 - 3 days, recommended to prior living arrangement once adequate pain management/ tolerating PO medications, O2 use less than 1 liters/minute and tolerating diet with clearance for discharge by surgery.  Entered: Nakul King MD 05/22/2019, 4:45 PM     The patient's care was discussed with the Bedside Nurse, Patient and PACU staff and Consultant.    Nakul King MD  Hospitalist Service  [facility]    ______________________________________________________________________    Interval History   Patient on BIPAP and not able to respond verbally but was following commands. Surgery went well but in PACU patient noted to have respiratory failure and be breathing rapidly. Ended up on BIPAP but after 10 minutes was looking better and respiratory rate had improved.  Discussed with PACU team and will need IMC status in CICU if continues to improve on BIPAP and  does not need reintubation.  The have an ABG pending and will continue to watch in PACU to make sure stable before going to CICU.    Data reviewed today: I reviewed all medications, new labs and imaging results over the last 24 hours. I personally reviewed no images or EKG's today.    Physical Exam   Vital Signs: Temp: 98.2  F (36.8  C) Temp src: Temporal BP: 111/62 Pulse: 78 Heart Rate: 79 Resp: 26 SpO2: 93 % O2 Device: Venturi mask Oxygen Delivery: 12 LPM  Weight: 139 lbs 12.35 oz  Constitutional: Arrousable, following commands, moderate respiratory distress, on BIPAP  Respiratory: Clear to auscultation bilaterally, no crackles or wheezing  Cardiovascular: Regular rate and rhythm, normal S1 and S2, and 3/6 systolic murmur noted throughout precordium  GI: Normal bowel sounds, soft, non-distended, mild right upper quadrant tenderness  Skin/Integumen: No rashes, no cyanosis, no edema  Other:    Data   Recent Labs   Lab 05/22/19  0935 05/21/19  1950   WBC 3.1* 2.6*   HGB 10.9* 10.7*   MCV 95 94   PLT 75* 62*   * 146*   POTASSIUM 4.5 4.4   CHLORIDE 117* 116*   CO2 22 22   BUN 49* 47*   CR 1.53* 1.53*   ANIONGAP 8 8   GIUSEPPE 8.4* 8.3*   GLC 83 81   ALBUMIN 2.6* 2.7*   PROTTOTAL 6.0* 6.2*   BILITOTAL 2.3* 3.1*   ALKPHOS 298* 326*   ALT 58 72*   AST 23 32     No results found for this or any previous visit (from the past 24 hour(s)).  Medications     NaCl         [Auto Hold] carvedilol  6.25 mg Oral BID w/meals     [Auto Hold] ciprofloxacin  400 mg Intravenous Q24H     [Auto Hold] finasteride  5 mg Oral Daily     [Auto Hold] gabapentin  300 mg Oral TID     [Auto Hold] hydrALAZINE  25 mg Oral TID     [Auto Hold] hypromellose-dextran  2 drop Left Eye 4x Daily     [Auto Hold] isosorbide dinitrate  20 mg Oral BID     [Auto Hold] metroNIDAZOLE  500 mg Intravenous Q8H     [Auto Hold] pantoprazole  40 mg Oral Daily     [Auto Hold] simvastatin  20 mg Oral At Bedtime     [Auto Hold] tamsulosin  0.4 mg Oral Daily

## 2019-05-22 NOTE — H&P
[facility]    History and Physical  Hospitalist       Date of Admission:  5/21/2019    Assessment & Plan     This is a 89-year-old male with history of hypertension, hyperlipidemia, chronic kidney disease stage III, chronic pain syndrome, gout, history of bladder cancer, status post resection and bladder reconstruction x 2, narcotic dependence, mitral regurgitation, status post mitral valve clips, anemia, was transferred here from outlying hospital because of abdominal pain, nausea, and found to have acute cholecystitis.      ASSESSMENT AND PLAN:   1.  Acute cholecystitis with possible choledocholithiasis:  This is a 89-year-old male with multiple comorbidities, comes in with abdominal pain and nausea, and some mild tenderness to right upper quadrant as well.  Liver enzymes elevated.  His bilirubin is elevated and have cholelithiasis.  At this time, I will keep him on Cipro and Flagyl as he is ALLERGIC TO PENICILLIN.  Consult General Surgery and GI to evaluate the patient.  The patient may need ERCP as he has elevated alkaline phosphatase, as well as bilirubin is elevated as well.  We will keep him on Cipro and Flagyl.  We will have the Surgery and GI evaluate in the morning.  We will recheck his labs in the morning as well.   2.  Incontinence of stool:  I am not certain the exact cause of his symptoms.  He has been there for 1 month and never sought any medical attention.  We will have General Surgery evaluate him in the morning as well.   3.  A 3.4 cm left bladder mass:  The patient has a history of bladder cancer.  He has a resection and bladder reconstruction x 2 in the past.  He told me that his last Surgery was in 11/2018 at the [facility].  We will have Urology evaluate the patient in the morning as well.   4.  Chronic kidney disease, stage III, baseline creatinine 1.4 to 1.6, slightly high 1.7 this morning.  We will keep him on gentle hydration at this time.   5.  Hypertension  has been well controlled at this time.  We will keep him on metoprolol, hydralazine that he takes at home.   6.  Hyperlipidemia, on Zocor.  We will continue with that.  Moderate to severe mitral regurgitation, status post MitraClip placement in the past.  He has significant mitral and tricuspid regurgitation on his last echo. I will repeat the echocardiogram while he is here again.   7.  BPH:  On Proscar and Flomax will continue with that.   8.  Anemia:  History of B12 deficiency as he has been getting some B12 from his oncologist.  Hemoglobin is 10.9 at this time.  No bleeding from anywhere.   9.  Gastrointestinal prophylaxis with Protonix.   10.  Deep venous thrombosis prophylaxis with SCDs.      CODE STATUS:  I had a detailed discussion with the patient regarding his code status and he wanted to be full code at this time.      Case discussed with referring physician, Dr. Razo from [facility] and the nursing staff taking care of the patient.         JC PARKS MD         DVT Prophylaxis: Pneumatic Compression Devices  Code Status: Full Code    Disposition: Expected discharge in 2 days once stable.    Jc Parks MD    Primary Care Physician   Evaristo Magaña    Chief Complaint   Abdominal pain, nausea and loose stool    History is obtained from the patient    History of Present Illness   Admitted:     05/21/2019      HISTORY OF PRESENT ILLNESS:  This is a 89-year-old male with history of hypertension, hyperlipidemia, chronic kidney disease stage III, chronic pain syndrome, gout, history of bladder cancer, status post resection and bladder reconstruction x 2, narcotic dependence, mitral regurgitation, status post mitral valve clips, anemia, was transferred here from [facility] hospital because of abdominal pain, nausea, and found to have acute cholecystitis.      According to the patient, he is currently living in [place] and visiting [place] and has been getting weaker and has been having  some abdominal pain for sometimes.  The main concern that led him to the ER, was incontinence of stools which has been there for the last 1 month or so.  He has no appetite.  He has maybe losing some weight and had some abdominal pain today feeling nauseous.  The patient was taken to the ER.  In the ER, at [facility], he had extensive workup done including the labs which showed he has elevated liver enzymes.  Alkaline phosphatase was quite elevated at 340.  Total bilirubin was  3.8, platelets were 64 only.  CT scan of the abdomen done which shows cholelithiasis with mild gallbladder wall thickness and pericholecystic fluid consistent with cholecystitis.  He did have ultrasound of the right upper quadrant as well which shows numerous gallstones and gallbladder wall thickness consistent with acute cholecystitis.      Because of these concerns, the patient was transferred to [facility] for further management.      At this time, the patient said that he has been feeling weak and tired, but denies any chest pain, shortness of breath, orthopnea, PND, palpitations.  Some headache, some lightheadedness occasionally.  No fever, chills or cough.  No dysuria, hematuria or constipation.  He does have some loose stools with incontinence that is bothering him the most.  The rest of the review of system is negative.     Past Medical History    I have reviewed this patient's medical history and updated it with pertinent information if needed.   Past Medical History:   Diagnosis Date     Arthritis     Spinal Stenosis     Former smoker      Hemorrhoids      Hypercholesteremia      Hypertension      Malignant neoplasm (H)     skin CA, Basal cell     Other chronic pain      Renal disease        Past Surgical History   I have reviewed this patient's surgical history and updated it with pertinent information if needed.  Past Surgical History:   Procedure Laterality Date     BACK SURGERY       BIOPSY       face, skin cancer     BIOPSY  8/2016    shoulder lesion     CYSTOSCOPY, TRANSURETHRAL RESECTION (TUR) PROSTATE, COMBINED N/A 8/21/2015    Procedure: COMBINED CYSTOSCOPY, TRANSURETHRAL RESECTION (TUR) PROSTATE;  Surgeon: Dennis Hilton MD;  Location: [facility] OR     CYSTOSCOPY, TRANSURETHRAL RESECTION (TUR) TUMOR BLADDER, COMBINED N/A 9/2/2016    Procedure: COMBINED CYSTOSCOPY, TRANSURETHRAL RESECTION (TUR) TUMOR BLADDER;  Surgeon: Dennis Hilton MD;  Location: [facility] OR     CYSTOSCOPY, TRANSURETHRAL RESECTION (TUR) TUMOR BLADDER, COMBINED N/A 11/2/2018    Procedure: Transurethral resection of bladder tumor > 5 cm in size;  Surgeon: Dennis Hilton MD;  Location: [facility] OR     ORTHOPEDIC SURGERY      lumbar and cervical surgery, Sep, 2008, knee surgery     PERCUTANEOUS MITRAL VALVE REPAIR N/A 10/16/2017    Procedure: PERCUTANEOUS MITRAL VALVE REPAIR ANESTHESIA;  Percutaneous MitraClip ;  Surgeon: Eber Monroe MD;  Location: [facility] OR     PICC INSERTION Right 10/14/2017    5fr DL Bard PICC, 40cm (1cm external) in the R basilic vein w/ tip in the mid SVC.       Prior to Admission Medications   Prior to Admission Medications   Prescriptions Last Dose Informant Patient Reported? Taking?   HYDROcodone-acetaminophen (NORCO) 7.5-325 MG per tablet   No No   Sig: Take 1 tablet by mouth every 6 hours as needed for pain maximum 4 tablet(s) per day   Skin Protectants, Misc. (EUCERIN) cream   Yes No   Sig: Apply topically At Bedtime Apply to hands   aspirin EC 81 MG EC tablet   No No   Sig: Take 1 tablet (81 mg) by mouth daily   finasteride (PROSCAR) 5 MG tablet   No No   Sig: TAKE 1 TABLET(5 MG) BY MOUTH DAILY   furosemide (LASIX) 20 MG tablet   No No   Sig: Take 2 tablets (40 mg) by mouth daily   gabapentin (NEURONTIN) 300 MG capsule   No No   Sig: TAKE 1 CAPSULE BY MOUTH THREE TIMES DAILY   hydrALAZINE (APRESOLINE) 25 MG tablet   No No   Sig: Take 1 tablet (25 mg) by mouth 3 times daily   hydrALAZINE  (APRESOLINE) 25 MG tablet   No No   Sig: TAKE ONE AND ONE-HALF TABLETS BY MOUTH EVERY 8 HOURS AS DIRECTED. HOLD IF SYSTOLIC BLOOD PRESSURE IS LESS THAN 100   hypromellose (ARTIFICIAL TEARS) 0.4 % SOLN ophthalmic solution   Yes No   Sig: Place 2 drops Into the left eye 4 times daily   isosorbide dinitrate (ISORDIL) 20 MG tablet   No No   Sig: Take 1 tablet (20 mg) by mouth 3 times daily   metoprolol succinate ER (TOPROL XL) 50 MG 24 hr tablet   No No   Sig: Take 1 tablet (50 mg) by mouth daily   pantoprazole (PROTONIX) 40 MG EC tablet   No No   Sig: Take 1 tablet (40 mg) by mouth 2 times daily   potassium chloride ER (K-DUR/KLOR-CON M) 10 MEQ CR tablet   No No   Sig: Take half tablet one time daily.   simvastatin (ZOCOR) 20 MG tablet   No No   Sig: TAKE ONE TABLET BY MOUTH AT BEDTIME   tamsulosin (FLOMAX) 0.4 MG capsule   No No   Sig: TAKE ONE CAPSULE BY MOUTH DAILY      Facility-Administered Medications: None     Allergies   Allergies   Allergen Reactions     Celebrex [Celecoxib] Hives     Penicillins Hives       Social History   I have reviewed this patient's social history and updated it with pertinent information if needed. Ivan PRYOR Patricia  reports that he has quit smoking. He has never used smokeless tobacco. He reports that he does not drink alcohol or use drugs.    Family History   I have reviewed this patient's family history and updated it with pertinent information if needed.   Family History   Problem Relation Age of Onset     Cancer Son 64        leukemia ? due to agent orange     Family History Negative Son        Review of Systems   CONSTITUTIONAL:  positive for  fatigue and malaise  EYES:  negative  HEENT:  negative  RESPIRATORY:  negative  CARDIOVASCULAR:  negative  GASTROINTESTINAL:  positive for nausea, abdominal pain and incontinence   GENITOURINARY:  negative  INTEGUMENT/BREAST:  negative  HEMATOLOGIC/LYMPHATIC:  negative  ALLERGIC/IMMUNOLOGIC:  negative  ENDOCRINE:  negative  MUSCULOSKELETAL:   negative  NEUROLOGICAL:  negative  BEHAVIOR/PSYCH:  negative    Physical Exam   Temp: 97.9  F (36.6  C) Temp src: Oral BP: 115/68   Heart Rate: 63 Resp: 16 SpO2: 92 % O2 Device: None (Room air)    Vital Signs with Ranges  Temp:  [97.9  F (36.6  C)] 97.9  F (36.6  C)  Heart Rate:  [63] 63  Resp:  [16] 16  BP: (115)/(68) 115/68  SpO2:  [92 %] 92 %  141 lbs 12.09 oz    Constitutional: Awake, alert, cooperative, no apparent distress.  Eyes: Conjunctiva and pupils examined and normal.  HEENT: dry mucous membranes, normal dentition.  Respiratory: Clear to auscultation bilaterally, no crackles or wheezing.  Cardiovascular: Regular rate and rhythm, normal S1 and S2, and loud systolic murmur.   GI: Soft, non-distended, non-tender, normal bowel sounds.  Lymph/Hematologic: No anterior cervical or supraclavicular adenopathy.  Skin: No rashes, no cyanosis, no edema.  Musculoskeletal: No joint swelling, erythema or tenderness.  Neurologic: Cranial nerves 2-12 intact, normal strength and sensation.  Psychiatric: Alert, oriented to person, place and time, no obvious anxiety or depression.    Data   Data reviewed today:  I personally reviewed the abdominal CT image(s) showing. No images available, report of US and CT abdomen as below.  No lab results found in last 7 days.     U/S Abdomen RUQ    IMPRESSION:  1. Cholelithiasis with borderline to mild gallbladder wall thickening suggesting cholecystitis.  2. Liver parenchyma is hypoechoic. This is a nonspecific finding that can be seen with hepatitis.      CT Abdomen and Pelvis 5/21/19    IMPRESSION:  Cholelithiasis with mild gallbladder wall thickening and pericholecystic   fluid and edema, suggestive of cholecystitis.  Correlate with sonography.  A 3.4 cm left bladder mass and a smaller mural soft tissue density along   the right bladder wall, compatible with urothelial neoplasm.  Recommend   cystoscopy.  Right renal pelviectasis. A punctate calcification in the region of the    distal right ureter, although possibly extra ureteral. Correlate   clinically.     Diffuse gastric wall thickening and right colonic wall thickening which   could be related to underdistention, however correlate clinically to   exclude active inflammation and recommend followup evaluation of the   stomach to exclude neoplasm.   Small to moderate free fluid.  Apparent tiny soft tissue density at the   posterior aspect of the pelvic free fluid which may represent tiny blood   products.  Early carcinomatosis cannot be excluded.   Pleural effusions. A small to moderate pericardial effusion.   Other findings as above.    No results found for this or any previous visit (from the past 24 hour(s)).     CBC WITH AUTO DIFFERENTIAL (05/21/2019 11:39 AM CDT)  CBC WITH AUTO DIFFERENTIAL (05/21/2019 11:39 AM CDT)   Component Value Ref Range Performed At Pathologist Signature   WHITE BLOOD COUNT 3.8 (L) 4.5 - 11.0 thou/cu [facility]     RED BLOOD COUNT  3.80 (L) 4.70 - 6.10 mil/cu [facility]     HEMOGLOBIN  10.9 (L) 14.0 - 18.0 g/dL [facility]     HEMATOCRIT  35.9 (L) 41.0 - 53.0 % [facility]     MCV  94 80 - 100 [facility]     MCH  28.6 27.0 - 31.0 pg [facility]     MCHC  30.3 (L) 32.0 - 36.0 g/dL [facility]     RDW  17.6 (H) 11.0 - 15.0 % [facility]     PLATELET COUNT  64 (L) 142 - 424 thou/cu [facility]     MPV  7.7 6.5 - 11.0 fL [facility]     NEUTROPHILS  87.3 (H) 37.0 - 80.0 % [facility]     LYMPHOCYTES  6.1 (L) 10.0 - 50.0 % [facility]     MONOCYTES  6.1 % [facility]     EOSINOPHILS  0.1 % [facility]     BASOPHILS  0.4 % [facility]     ABSOLUTE NEUTROPHILS  3.4 1.7 - 7.0 thou/cu mm  [facility]     ABSOLUTE LYMPHOCYTES  0.2 (L) 0.9 - 2.9 thou/cu [facility]     ABSOLUTE MONOCYTES  0.2 <0.9 thou/cu [facility]     ABSOLUTE EOSINOPHILS  0.0 <0.5 ou/cu [facility]     ABSOLUTE BASOPHILS  0.0 <0.3 thou/cu [facility]       CBC WITH AUTO DIFFERENTIAL (05/21/2019 11:39 AM CDT)   Specimen   Blood - Blood     CBC WITH AUTO DIFFERENTIAL (05/21/2019 11:39 AM CDT)   Performing Organization Address [facility] Phone Number   [facility]        [phone]     [facility] [place] [address] [phone]     Back to top of Results       INR,POCT (05/21/2019 11:39 AM CDT)  INR,POCT (05/21/2019 11:39 AM CDT)   Component Value Ref Range Performed At Pathologist Signature   INR 1.3   [facility]       INR,POCT (05/21/2019 11:39 AM CDT)   Specimen   Blood - Blood     INR,POCT (05/21/2019 11:39 AM CDT)   Performing Organization Address [facility] Phone Number   [facility]        [phone]     [facility] [place] [address] [phone]     Back to top of Results       C-REACTIVE PROTEIN (05/21/2019 11:39 AM CDT)  C-REACTIVE PROTEIN (05/21/2019 11:39 AM CDT)   Component Value Ref Range Performed At Pathologist Signature   C-REACTIVE PROTEIN  9.63 (H) <0.50 mg/dL [facility]       C-REACTIVE PROTEIN (05/21/2019 11:39 AM CDT)   Specimen   Blood - Blood     C-REACTIVE PROTEIN (05/21/2019 11:39 AM CDT)   Performing Organization Address [facility]ode Phone Number   [facility]        [phone]     [facility] [place] [address] [phone]     Back to top of Results       COMP METABOLIC PANEL (05/21/2019 11:39 AM CDT)  COMP METABOLIC PANEL (05/21/2019 11:39 AM CDT)   Component Value Ref Range  Performed At Pathologist Signature   SODIUM 145 136 - 145 mmol/L [facility]     POTASSIUM 4.6 3.5 - 5.1 mmol/L [facility]     CHLORIDE 114 (H) 98 - 109 mmol/L [facility]     CO2,TOTAL 20 20 - 29 mmol/L [facility]     ANION GAP 11 5 - 18  [facility]     GLUCOSE 83 70 - 100 mg/dL [facility]     CALCIUM 9.3 7.9 - 10.2 mg/dL [facility]     BUN 51 (HH) 6 - 22 mg/dL [facility]     CREATININE 1.70 (H) 0.50 - 1.30 mg/dL [facility]     BUN/CREAT RATIO  30 (H) 10 - 20  [facility]     GFR if  46 (L) >60 ml/min/1.73m2 [facility]     GFR if not  38 (L) >60 ml/min/1.73m2 [facility]     PROTEIN,TOTAL 6.4 6.4 - 8.3 g/dL [facility]     BILIRUBIN,TOTAL 3.8 (HH) 0.2 - 1.2 mg/dL [facility]     ALK PHOSPHATASE YUDY 340 (H) 40 - 150 IU/L [facility]     ALT (SGPT) YUDY 81 (H) 0 - 41 IU/L [facility]     AST (SGOT) YUDY 44 (H) 5 - 34 IU/L [facility]     ALBUMIN 3.5 3.5 - 5.0 g/dL [facility]     GLOBULIN 2.9 2.1 - 4.1 g/dL [facility]     A/G RATIO 1.2 0.8 - 2.0 [facility]

## 2019-05-22 NOTE — OR NURSING
Pt approved to be transfer to [facility] by MD Peters

## 2019-05-22 NOTE — PLAN OF CARE
A/O. VSS. Jaundice. Mild edema BLEs. On room air. C/O back pain- Oxycodone given with slight relief. Declined anything to eat. Reports no appetite. NS at 75 into PIV. Many bruises throughout body. Scabbing and bruising on B cheeks and L ear from skin cancer. Per pt R great toenail was ripped off- Band-Aid intact. Ax1. Appears weak and deconditioned. Due to void.

## 2019-05-22 NOTE — OP NOTE
Surgeon: Rey Romero MD  1st Assistant: Milagro Craig PA-C, The physicians assistant was medically necessary for their expertise in camera management, suctioning, suturing, and retraction.  PREOPERATIVE DIAGNOSIS: acute cholecystitis, choledocholithiasis.   POSTOPERATIVE DIAGNOSES: Same  PROCEDURE:   1.  Laparoscopic cholecystectomy.   2.  Intraoperative cholangiogram     ANESTHESIA: General.   ESTIMATED BLOOD LOSS: Less than 5 mL.   OPERATIVE PROCEDURE: After induction of general endotracheal anesthesia, Ivan Gomez abdomen was prepped and draped in the usual sterile fashion. With the Catie technique in an periumbilical location, the abdomen was entered and pneumoperitoneum was established. Three additional 5 mm trocars were placed along the right hypochondrium. The gallbladder fundus was retracted cephalad and lateral and the infundibulum was retracted caudad and lateral. The peritoneum investing the triangle of Calot was incised with electrocautery and dissected bluntly.  The critical view of a single cystic duct, single cystic artery was achieved and the artery was doubly clipped on the patient's side, singly clipped on the gallbladder side and transected sharply.  The cystic duct was clipped proximally and cystic ductotomy done distally, a significant amount of debris was removed form common bile duct thru the cystic duct.  A cholangiocatheter advanced and secured in place.  Cholangiogram showed normal cystic duct, hepatic ducts and a non obstructed common bile duct.  The catheter was removed and the duct decompressed.  We then triply clipped the cystic duct distally and transect it sharply.  The gallbladder was detached from the liver with electrocautery and blunt dissection. The specimen was removed from the patient's abdomen and sent to pathology. The gallbladder fossa was inspected. Hemostasis was secured, and no evidence of bile leakage noted. The abdomen was surveyed and no other pathology  seen. The trocars were then removed under direct visualization without evidence of bleeding. Pneumoperitoneum was released, and the fascia of the periumbilical port was closed with multiple interrupted 0 Vicryl suture. Skin was approximated with 4-0 Monocryl. Steri-Strips and sterile dressing applied. No immediate complications.   JEFF ARAUJO MD

## 2019-05-22 NOTE — OR NURSING
Dr Rodriguez order another ABG's to check pt improvement. O2 99% at this time. Lab will be drawn on the floor

## 2019-05-22 NOTE — CONSULTS
[facility]  Gastroenterology Consultation         Ivan Gomez  [address] [facility] [address]  89 year old male    Admission Date/Time: 5/21/2019  Primary Care Provider: Evaristo Magaña  Referring / Attending Physician:  Dr. Jc Parks    We were asked to see the patient in consultation by Dr. Jc Parks for evaluation of elevated liver enzymes, cholecystitis, and cholelithiasis.      CC: abdominal pain, fatigue, weakness, diarrhea, decreased appetite    HPI:  Ivan Gomez is a 89 year old male who presented to ED with increase in weakness and decrease appetite, weight loss, as well as increasing abdominal pain over last month. He has a past medical history of hypertension, hyperlipidemia, chronic kidney disease stage III, chronic pain syndrome, gout, history of bladder cancer, status post resection and bladder reconstruction x 2, narcotic dependence, mitral regurgitation, status post mitral valve clips, and anemia. Transferred here from outlying hospital because of abdominal pain, nausea, and found to have acute cholecystitis. Patient states over last month has had increase in abdominal pain, fatigue, weakness, lack of appetite and weight loss, diarrhea as well as incontinent with stool. Denies chest pain, vomiting, fever, chills, melena, bloody stools, recent travel outside [place].  Patient is here visiting from [place]. Patient denies use of anticoagulants. Noted to have elevated --> 326, total bilirubin 3.1, ALT 81-->72, AST 44-->32, hemoglobin improved from 8.8 to 10.7, WBC 4.1--> 2.6. Abdominal ultrasound at outside facility reveals cholelithiasis with borderline to mild gallbladder wall thickening suggesting cholecystitis, CBD measured 7 mm. No intrahepatic biliary dilation, noted liver parenchyma is hypoechoic and nonspecific findings for hepatitis. CT scan of abdomen and pelvis revealed similar findings as well as diffuse gastric wall thickening  and right colonic wall thickening, a 3.4 cm left bladder mass. Compatible with urothelial neoplasm.      ROS: A comprehensive ten point review of systems was negative aside from those in mentioned in the HPI.      PAST MED HX:  I have reviewed this patient's medical history and updated it with pertinent information if needed.   Past Medical History:   Diagnosis Date     Arthritis     Spinal Stenosis     Former smoker      Hemorrhoids      Hypercholesteremia      Hypertension      Malignant neoplasm (H)     skin CA, Basal cell     Other chronic pain      Renal disease        MEDICATIONS:   Prior to Admission Medications   Prescriptions Last Dose Informant Patient Reported? Taking?   HYDROcodone-acetaminophen (NORCO) 7.5-325 MG per tablet 5/21/2019 at Unknown time Self No Yes   Sig: Take 1 tablet by mouth every 6 hours as needed for pain maximum 4 tablet(s) per day   aspirin EC 81 MG EC tablet 5/21/2019 at Unknown time Self No Yes   Sig: Take 1 tablet (81 mg) by mouth daily   carvedilol (COREG) 6.25 MG tablet 5/21/2019 at Unknown time Self Yes Yes   Sig: Take 6.25 mg by mouth 2 times daily (with meals)   finasteride (PROSCAR) 5 MG tablet 5/21/2019 at Unknown time Self No Yes   Sig: TAKE 1 TABLET(5 MG) BY MOUTH DAILY   furosemide (LASIX) 20 MG tablet 5/21/2019 at Unknown time Self No Yes   Sig: Take 2 tablets (40 mg) by mouth daily   gabapentin (NEURONTIN) 300 MG capsule 5/21/2019 at Unknown time Self No Yes   Sig: TAKE 1 CAPSULE BY MOUTH THREE TIMES DAILY   hydrALAZINE (APRESOLINE) 25 MG tablet 5/21/2019 at Unknown time Self No Yes   Sig: Take 1 tablet (25 mg) by mouth 3 times daily   hypromellose (ARTIFICIAL TEARS) 0.4 % SOLN ophthalmic solution 5/21/2019 at Unknown time Self Yes Yes   Sig: Place 2 drops Into the left eye 4 times daily   isosorbide dinitrate (ISORDIL) 20 MG tablet 5/21/2019 at Unknown time Self Yes Yes   Sig: Take 20 mg by mouth 2 times daily   pantoprazole (PROTONIX) 40 MG EC tablet 5/21/2019 at  Unknown time Self Yes Yes   Sig: Take 40 mg by mouth daily   potassium chloride ER (K-DUR/KLOR-CON M) 10 MEQ CR tablet 5/21/2019 at Unknown time Self Yes Yes   Sig: Take 10 mEq by mouth daily   simvastatin (ZOCOR) 20 MG tablet 5/20/2019 at Unknown time Self No Yes   Sig: TAKE ONE TABLET BY MOUTH AT BEDTIME   tamsulosin (FLOMAX) 0.4 MG capsule 5/21/2019 at Unknown time Self No Yes   Sig: TAKE ONE CAPSULE BY MOUTH DAILY      Facility-Administered Medications: None       ALLERGIES:   Allergies   Allergen Reactions     Celebrex [Celecoxib] Hives     Penicillins Hives       SOCIAL HISTORY:  Social History     Tobacco Use     Smoking status: Former Smoker     Smokeless tobacco: Never Used   Substance Use Topics     Alcohol use: No     Alcohol/week: 0.0 oz     Comment: doesnt use anymore     Drug use: No       FAMILY HISTORY:  Family History   Problem Relation Age of Onset     Cancer Son 64        leukemia ? due to agent orange     Family History Negative Son        PHYSICAL EXAM:   General  Alert, oriented, frail and uncomfortable  Vital Signs with Ranges  Temp: (P) 97.4  F (36.3  C) Temp src: (P) Oral BP: (P) 103/61   Heart Rate: (P) 68 Resp: (P) 16 SpO2: (P) 92 % O2 Device: (P) None (Room air)    No intake/output data recorded.    Constitutional: healthy, alert and no distress   Cardiovascular: negative, PMI normal. No lifts, heaves, or thrills. RRR. No murmurs, clicks gallops or rub  Respiratory: negative, Percussion normal. Good diaphragmatic excursion. Lungs clear  Head: Normocephalic. No masses, lesions, tenderness or abnormalities  Neck: Neck supple. No adenopathy. Thyroid symmetric, normal size,, Carotids without bruits.  Abdomen: Abdomen soft, tender epigastric and RUQ pain, BS normal. No masses, organomegaly          ADDITIONAL COMMENTS:   I reviewed the patient's new clinical lab test results.   Recent Labs   Lab Test 05/21/19  1950 11/05/18  0639 11/04/18  0610  10/16/17  0402 10/15/17  0420 10/14/17  1018    WBC 2.6* 4.1 4.5   < > 6.7 7.2 10.4   HGB 10.7* 8.8* 8.7*   < > 11.4* 12.1* 13.3   MCV 94 91 90   < > 94 96 98   PLT 62* 73* 74*   < > 116* 108* 111*   INR  --   --   --   --  1.15* 1.15* 1.17*    < > = values in this interval not displayed.     Recent Labs   Lab Test 05/21/19  1950 11/05/18  0639 11/04/18  0610   POTASSIUM 4.4 3.9 4.1   CHLORIDE 116* 106 107   CO2 22 30 28   BUN 47* 45* 42*   ANIONGAP 8 5 6     Recent Labs   Lab Test 05/21/19  1950 01/26/18  1431 11/18/17  1303  08/24/16  1331 08/04/16  1502 03/22/16  1011  11/13/15  1055   ALBUMIN 2.7* 3.4 3.0*   < >  --  3.7  --    < >  --    BILITOTAL 3.1* 0.7 0.5   < >  --  0.8  --    < >  --    ALT 72* 19 17   < >  --  27  --    < >  --    AST 32 17 13   < >  --  20  --    < >  --    PROTEIN  --   --   --   --  30*  --  Negative  --  100*   LIPASE  --   --  100  --   --  160  --   --   --     < > = values in this interval not displayed.       I reviewed the patient's new imaging results.        CONSULTATION ASSESSMENT AND PLAN:    Active Problems:    Acute cholecystitis- Ivan Gomez is a pleasant 89 year old male with one month of increasing RUQ and epigastric abdominal pain, decreased appetite, and diarrhea. States has lost weight and feeling weak. His past medical history is as listed above.  Patient has elevated --> 326, total bilirubin is 3.1.  ALT 88-->72, AST 44-- >32. Abdominal ultrasound reveals cholelithiasis with mild wall gallbladder thickening, CBD 7 mm, Creatinine 1.53. CT scan of abdomen and pelvis CT scan of abdomen and pelvis revealed similar findings as well as diffuse gastric wall thickening and right colonic wall thickening recommend correlation to exclude active inflammation vs gastric neoplasm, a 3.4 cm left bladder mass. Compatible with urothelial neoplasm. Patient may have choledocholithiasis and/or sludge in CBD.  Will recommend to keep patient NPO. Will plan to perform E U/S with ERCP today. If surgery plans cholecystectomy  today can postpone procedure if intraoperative cholangiogram indicates necessary. Continue with pantoprazole.      Diarrhea    Assessment: Patient has had diarrhea with fecal incontinence over last month. Enteric pathogens pending.Other possible causes would include ischemic colitis, microscopic colitis or result of ongoing cholecystitis/ choledocholithiasis. Pending evaluation with E U/S and ERCP with possible cholecystectomy ( awaiting surgery consult) will consider colonoscopy if does not resolve and enteric pathogens are negative.            [facility].  Office: [phone]  Cell : [phone] (Dr. Jang)  Cell: [phone] (Lyla Graf PA-C)

## 2019-05-22 NOTE — PLAN OF CARE
PT: Orders received, chart reviewed, eval attempted. Pt reports he has been unable to control his bowel with any activity and declines PT eval at this time. Requests PT attempts tomorrow.

## 2019-05-22 NOTE — PLAN OF CARE
SLP: Orders received and chart reviewed. Patient is currently NPO for procedures and possible surgery. Will reschedule bedside swallow evaluation.

## 2019-05-22 NOTE — PROGRESS NOTES
DATE & TIME: 2300-0730 5/22/19  ORIENTATION: A/Ox4  BEHAVIOR & AGGRESSION TOOL COLOR: GREEN  CIWA SCORE:  ABNL VS/O2: VSS. RA  MOBILITY: Ax1  PAIN MANAGMENT: denies  DIET: NPO  BOWEL/BLADDER: urinal at bedside; up to bathroom; diarrhea (watery, yellow).   ABNL LAB/BG: ALT 72. Bilirubin 3.WBC 2.6. Platelets 62.   DRAIN/DEVICES: NS @75  TELEMETRY RHYTHM:  SKIN:   TESTS/PROCEDURES: possible ERCP and EUS today   D/C DAY/GOALS/PLACE: Discharge TBD  OTHER IMPORTANT INFO: general surgery and GI to consults. Urology to consult on bladder mass. Bit nauseous this morning; zofran given.

## 2019-05-22 NOTE — PHARMACY-ADMISSION MEDICATION HISTORY
Admission medication history interview status for the 5/21/2019  admission is complete. See EPIC admission navigator for prior to admission medications     Medication history source reliability:Moderate    Actions taken by pharmacist (provider contacted, etc):None     Additional medication history information not noted on PTA med list : no longer using Aldara for basal cell carcinoma     Medication reconciliation/reorder completed by provider prior to medication history? Yes    Time spent in this activity: 20 min    Prior to Admission medications    Medication Sig Last Dose Taking? Auth Provider   aspirin EC 81 MG EC tablet Take 1 tablet (81 mg) by mouth daily 5/21/2019 at Unknown time Yes Mirela Coleman APRN CNP   carvedilol (COREG) 6.25 MG tablet Take 6.25 mg by mouth 2 times daily (with meals) 5/21/2019 at Unknown time Yes Unknown, Entered By History   finasteride (PROSCAR) 5 MG tablet TAKE 1 TABLET(5 MG) BY MOUTH DAILY 5/21/2019 at Unknown time Yes Evaristo Magaña MD   furosemide (LASIX) 20 MG tablet Take 2 tablets (40 mg) by mouth daily 5/21/2019 at Unknown time Yes Lance Yao MD   gabapentin (NEURONTIN) 300 MG capsule TAKE 1 CAPSULE BY MOUTH THREE TIMES DAILY 5/21/2019 at Unknown time Yes Evaristo Magaña MD   hydrALAZINE (APRESOLINE) 25 MG tablet Take 1 tablet (25 mg) by mouth 3 times daily 5/21/2019 at Unknown time Yes Evaristo Magaña MD   HYDROcodone-acetaminophen (NORCO) 7.5-325 MG per tablet Take 1 tablet by mouth every 6 hours as needed for pain maximum 4 tablet(s) per day 5/21/2019 at Unknown time Yes Evaristo Magaña MD   hypromellose (ARTIFICIAL TEARS) 0.4 % SOLN ophthalmic solution Place 2 drops Into the left eye 4 times daily 5/21/2019 at Unknown time Yes Reported, Patient   isosorbide dinitrate (ISORDIL) 20 MG tablet Take 20 mg by mouth 2 times daily 5/21/2019 at Unknown time Yes Unknown, Entered By History   pantoprazole (PROTONIX) 40 MG EC tablet  Take 40 mg by mouth daily 5/21/2019 at Unknown time Yes Unknown, Entered By History   potassium chloride ER (K-DUR/KLOR-CON M) 10 MEQ CR tablet Take 10 mEq by mouth daily 5/21/2019 at Unknown time Yes Unknown, Entered By History   simvastatin (ZOCOR) 20 MG tablet TAKE ONE TABLET BY MOUTH AT BEDTIME 5/20/2019 at Unknown time Yes Evaristo Magaña MD   tamsulosin (FLOMAX) 0.4 MG capsule TAKE ONE CAPSULE BY MOUTH DAILY 5/21/2019 at Unknown time Yes Evaristo Magaña MD

## 2019-05-22 NOTE — PLAN OF CARE
DATE & TIME: 5/22/19 7787-4420  ORIENTATION: A&O x4, calm and cooperative   BEHAVIOR & AGGRESSION TOOL COLOR: Green  ABNL VS/O2: VSS on RA  MOBILITY: Assist of 1 with gait belt   PAIN MANAGMENT: Denies pain, C/o some abdominal discomfort, declined interventions  DIET: NPO except meds  BOWEL/BLADDER: Continent   ABNL LAB/BG: Plts 75, Na 147, creat 1.53, Hgb 10.9, WBC 3.1  DRAIN/DEVICES: IVF running at 75mL/hr  SKIN: Bruised all over body and on face, redness blanchable on coccyx.Trace edema noted on BLEs.  TESTS/PROCEDURES: Went down for cholecystectomy @1300, possible ERCP after per GI  D/C DAY/GOALS/PLACE: Discharge pending once medically stable  OTHER IMPORTANT INFO: Speech consulted due to pt coughing when taking medications. Few medications held due to pt throwing up after morning medications. Enteric precautions for rule out C-diff, stool sample needed. Contact precautions maintained for MRSA in nares. LS diminished, BS active x4. Surgery, social work, OT, PT following.

## 2019-05-22 NOTE — BRIEF OP NOTE
[facility]    Brief Operative Note    Pre-operative diagnosis: Cholelithiasis  Post-operative diagnosis Cholelithiasis  Procedure: Procedure(s):  CHOLECYSTECTOMY, LAPAROSCOPIC, WITH CHOLANGIOGRAM  Surgeon: Surgeon(s) and Role:     * Rey Romero MD - Primary  Anesthesia: General   Estimated blood loss: 5 mL  Drains: None  Specimens:   ID Type Source Tests Collected by Time Destination   A : gallbladder and contents Tissue Gallbladder and Contents SURGICAL PATHOLOGY EXAM Rey Romero MD 5/22/2019  2:59 PM      Findings:   as above.  Complications: None.  Implants:  * No implants in log *

## 2019-05-23 ENCOUNTER — APPOINTMENT (OUTPATIENT)
Dept: GENERAL RADIOLOGY | Facility: CLINIC | Age: 84
DRG: 417 | End: 2019-05-23
Attending: INTERNAL MEDICINE
Payer: MEDICARE

## 2019-05-23 ENCOUNTER — APPOINTMENT (OUTPATIENT)
Dept: CARDIOLOGY | Facility: CLINIC | Age: 84
DRG: 417 | End: 2019-05-23
Attending: INTERNAL MEDICINE
Payer: MEDICARE

## 2019-05-23 LAB
ALBUMIN SERPL-MCNC: 2.5 G/DL (ref 3.4–5)
ALP SERPL-CCNC: 259 U/L (ref 40–150)
ALT SERPL W P-5'-P-CCNC: 58 U/L (ref 0–70)
ANION GAP SERPL CALCULATED.3IONS-SCNC: 8 MMOL/L (ref 3–14)
AST SERPL W P-5'-P-CCNC: 39 U/L (ref 0–45)
BILIRUB DIRECT SERPL-MCNC: 1.3 MG/DL (ref 0–0.2)
BILIRUB SERPL-MCNC: 1.7 MG/DL (ref 0.2–1.3)
BUN SERPL-MCNC: 54 MG/DL (ref 7–30)
CALCIUM SERPL-MCNC: 8.5 MG/DL (ref 8.5–10.1)
CHLORIDE SERPL-SCNC: 116 MMOL/L (ref 94–109)
CO2 SERPL-SCNC: 22 MMOL/L (ref 20–32)
COPATH REPORT: NORMAL
CREAT SERPL-MCNC: 1.75 MG/DL (ref 0.66–1.25)
ERYTHROCYTE [DISTWIDTH] IN BLOOD BY AUTOMATED COUNT: 18.5 % (ref 10–15)
GFR SERPL CREATININE-BSD FRML MDRD: 33 ML/MIN/{1.73_M2}
GLUCOSE SERPL-MCNC: 105 MG/DL (ref 70–99)
HCT VFR BLD AUTO: 36.9 % (ref 40–53)
HGB BLD-MCNC: 11.3 G/DL (ref 13.3–17.7)
MCH RBC QN AUTO: 29 PG (ref 26.5–33)
MCHC RBC AUTO-ENTMCNC: 30.6 G/DL (ref 31.5–36.5)
MCV RBC AUTO: 95 FL (ref 78–100)
PLATELET # BLD AUTO: 77 10E9/L (ref 150–450)
POTASSIUM SERPL-SCNC: 4.8 MMOL/L (ref 3.4–5.3)
PROT SERPL-MCNC: 5.8 G/DL (ref 6.8–8.8)
RBC # BLD AUTO: 3.89 10E12/L (ref 4.4–5.9)
SODIUM SERPL-SCNC: 146 MMOL/L (ref 133–144)
WBC # BLD AUTO: 6.6 10E9/L (ref 4–11)

## 2019-05-23 PROCEDURE — 25800030 ZZH RX IP 258 OP 636: Performed by: INTERNAL MEDICINE

## 2019-05-23 PROCEDURE — 80076 HEPATIC FUNCTION PANEL: CPT | Performed by: PHYSICIAN ASSISTANT

## 2019-05-23 PROCEDURE — 25000132 ZZH RX MED GY IP 250 OP 250 PS 637: Performed by: INTERNAL MEDICINE

## 2019-05-23 PROCEDURE — A9270 NON-COVERED ITEM OR SERVICE: HCPCS | Performed by: INTERNAL MEDICINE

## 2019-05-23 PROCEDURE — 25000128 H RX IP 250 OP 636: Performed by: INTERNAL MEDICINE

## 2019-05-23 PROCEDURE — 12000000 ZZH R&B MED SURG/OB

## 2019-05-23 PROCEDURE — 99232 SBSQ HOSP IP/OBS MODERATE 35: CPT | Performed by: INTERNAL MEDICINE

## 2019-05-23 PROCEDURE — 93306 TTE W/DOPPLER COMPLETE: CPT

## 2019-05-23 PROCEDURE — 71045 X-RAY EXAM CHEST 1 VIEW: CPT

## 2019-05-23 PROCEDURE — 25800029 ZZH RX IP 258 OP 250: Performed by: INTERNAL MEDICINE

## 2019-05-23 PROCEDURE — 85027 COMPLETE CBC AUTOMATED: CPT | Performed by: PHYSICIAN ASSISTANT

## 2019-05-23 PROCEDURE — 36415 COLL VENOUS BLD VENIPUNCTURE: CPT | Performed by: PHYSICIAN ASSISTANT

## 2019-05-23 PROCEDURE — 93306 TTE W/DOPPLER COMPLETE: CPT | Mod: 26 | Performed by: INTERNAL MEDICINE

## 2019-05-23 PROCEDURE — 80048 BASIC METABOLIC PNL TOTAL CA: CPT | Performed by: PHYSICIAN ASSISTANT

## 2019-05-23 PROCEDURE — 25000128 H RX IP 250 OP 636: Performed by: PHYSICIAN ASSISTANT

## 2019-05-23 RX ORDER — MULTIPLE VITAMINS W/ MINERALS TAB 9MG-400MCG
1 TAB ORAL DAILY
Status: DISCONTINUED | OUTPATIENT
Start: 2019-05-23 | End: 2019-05-28 | Stop reason: HOSPADM

## 2019-05-23 RX ORDER — DEXTROSE MONOHYDRATE 50 MG/ML
INJECTION, SOLUTION INTRAVENOUS CONTINUOUS
Status: DISCONTINUED | OUTPATIENT
Start: 2019-05-23 | End: 2019-05-24

## 2019-05-23 RX ADMIN — ONDANSETRON 4 MG: 2 INJECTION INTRAMUSCULAR; INTRAVENOUS at 18:03

## 2019-05-23 RX ADMIN — DEXTROSE MONOHYDRATE: 50 INJECTION, SOLUTION INTRAVENOUS at 14:02

## 2019-05-23 RX ADMIN — DEXTRAN 70, AND HYPROMELLOSE 2910 2 DROP: 1; 3 SOLUTION/ DROPS OPHTHALMIC at 21:57

## 2019-05-23 RX ADMIN — ISOSORBIDE DINITRATE 20 MG: 20 TABLET ORAL at 08:33

## 2019-05-23 RX ADMIN — ONDANSETRON 4 MG: 2 INJECTION INTRAMUSCULAR; INTRAVENOUS at 08:49

## 2019-05-23 RX ADMIN — DEXTRAN 70, AND HYPROMELLOSE 2910 2 DROP: 1; 3 SOLUTION/ DROPS OPHTHALMIC at 17:55

## 2019-05-23 RX ADMIN — HYDROMORPHONE HYDROCHLORIDE 0.2 MG: 1 INJECTION, SOLUTION INTRAMUSCULAR; INTRAVENOUS; SUBCUTANEOUS at 14:02

## 2019-05-23 RX ADMIN — CARVEDILOL 6.25 MG: 3.12 TABLET, FILM COATED ORAL at 08:32

## 2019-05-23 RX ADMIN — GABAPENTIN 300 MG: 300 CAPSULE ORAL at 17:02

## 2019-05-23 RX ADMIN — GABAPENTIN 300 MG: 300 CAPSULE ORAL at 21:58

## 2019-05-23 RX ADMIN — OXYCODONE HYDROCHLORIDE 5 MG: 5 TABLET ORAL at 04:32

## 2019-05-23 RX ADMIN — ISOSORBIDE DINITRATE 20 MG: 20 TABLET ORAL at 17:02

## 2019-05-23 RX ADMIN — FINASTERIDE 5 MG: 5 TABLET, FILM COATED ORAL at 08:33

## 2019-05-23 RX ADMIN — ACETAMINOPHEN 650 MG: 325 TABLET, FILM COATED ORAL at 18:03

## 2019-05-23 RX ADMIN — DEXTRAN 70, AND HYPROMELLOSE 2910 2 DROP: 1; 3 SOLUTION/ DROPS OPHTHALMIC at 08:40

## 2019-05-23 RX ADMIN — SIMVASTATIN 20 MG: 20 TABLET, FILM COATED ORAL at 21:58

## 2019-05-23 RX ADMIN — DEXTRAN 70, AND HYPROMELLOSE 2910 2 DROP: 1; 3 SOLUTION/ DROPS OPHTHALMIC at 15:44

## 2019-05-23 RX ADMIN — OXYCODONE HYDROCHLORIDE 5 MG: 5 TABLET ORAL at 18:03

## 2019-05-23 RX ADMIN — OXYCODONE HYDROCHLORIDE 5 MG: 5 TABLET ORAL at 08:33

## 2019-05-23 RX ADMIN — SODIUM CHLORIDE: 4.5 INJECTION, SOLUTION INTRAVENOUS at 07:31

## 2019-05-23 RX ADMIN — ACETAMINOPHEN 650 MG: 325 TABLET, FILM COATED ORAL at 04:32

## 2019-05-23 RX ADMIN — CARVEDILOL 6.25 MG: 3.12 TABLET, FILM COATED ORAL at 17:55

## 2019-05-23 RX ADMIN — TAMSULOSIN HYDROCHLORIDE 0.4 MG: 0.4 CAPSULE ORAL at 08:33

## 2019-05-23 RX ADMIN — PANTOPRAZOLE SODIUM 40 MG: 40 TABLET, DELAYED RELEASE ORAL at 08:32

## 2019-05-23 RX ADMIN — METRONIDAZOLE 500 MG: 500 INJECTION, SOLUTION INTRAVENOUS at 21:52

## 2019-05-23 ASSESSMENT — ACTIVITIES OF DAILY LIVING (ADL)
ADLS_ACUITY_SCORE: 17
ADLS_ACUITY_SCORE: 18
ADLS_ACUITY_SCORE: 18

## 2019-05-23 NOTE — PLAN OF CARE
A&Ox4.  AVSS, on O2 @ 4L/NC.  IMC, SB on the monitor at 50's.  IV Dilaudid given at shift change, with good relief.  Oxycodone & Tylenol given when pt were more awake.  LS dim.  IS encouraged.  BS hypoactive.  On clear liquid diet, tolerating.  Voided 175ml per urinal with 1 assist, unable to bladder scan for PVR as pt wants to stay up on chair.  Lap sties covered with band-aids, CDI.  Slept in between cares.

## 2019-05-23 NOTE — PLAN OF CARE
PT-Pt flatly refused PT this morning, states he is not up to participating in PT. Wants to try tomorrow.

## 2019-05-23 NOTE — PROGRESS NOTES
Arrived from PACU at 1945. Alert and oriented x4. Vital signs stable on 4L nasal cannula, BPs soft and bradycardic. Pt removed and refused BiPAP at 2245. Assist of 1 + gait belt and walker. NPO due to BiPAP. Lung sounds clear. Bowel sounds hypoactive, denies flatus, BM this morning. Voided 100 mL at 2245, bladder scanned PVR = 382 mL. 4 lap sites with steri strips and band aids, CDI. Wound on great toe on right found (lost toenail) SUKHWINDER, WDL. Pain managed with PRN dilaudid. Denies nausea. Tele sinus bradycardic.

## 2019-05-23 NOTE — PROGRESS NOTES
[facility]    Medicine Progress Note - Hospitalist Service       Date of Admission:  5/21/2019  Assessment & Plan   Ivan Gomez is a 89-year-old male with history of hypertension, hyperlipidemia, chronic kidney disease stage III, chronic pain syndrome, gout, history of bladder cancer status post resection and bladder reconstruction x 2, narcotic dependence, mitral regurgitation status post mitral valve clips, and chronic anemia who was transferred here from outlying hospital because of abdominal pain and nausea secondary to acute cholecystitis.     Acute cholecystitis. Status post lap cholecystectomy with negative cholangiogram 5/22.  -appreciate management by GI and surgery  -continue Cipro and Flagyl with last doses today  -continue Tylenol and IV dilaudid PRN for pain  -continue nausea medications  -diet per surgery  -CBC in AM    Post operative hypoxia with acute hypoxic respiratory failure. No aspiration noted during surgery but in PACU developed shortness of breath and respiratory distress requiring BIPAP. Chest x-ray with bilateral atelectasis versus infiltrate with normal WBC and afebrile favoring atelectasis.  -continue oxygen and wean as tolerated  -encourage incentive spirometry  -discontinue IMC status    Diarrhea with incontinence of stool:  Symptoms for 1 month and never sought any medical attention.  -appreciate GI assistance  -follow up stool studies for enteric pathogens and C. Diff which are ordered but not collected, also follow up O&P and fat stool qualitative random collection although I am not sure how useful those will be and would defer to GI   -if negative studies and does not improve GI says we will need to consider colonoscopy to eval for microscopic or ischemic colitis; could defer this to outpatient and just give imodium and fiber trial at discharge     Hypernatremia. Likely iatrogenic from saline.  -change to D5W at 100 ml/hr  -repeat BMP in AM    3.4 cm left  bladder mass:  The patient has a history of bladder cancer.  He has a resection and bladder reconstruction x 2 in the past. Last Surgery per patient was in 11/2018 at the [facility].    -urology evaluation here appreciated with recommendation to just follow up with his primary urologist and oncologist for ongoing management as outpatient    Chronic kidney disease, stage III. Baseline creatinine 1.4 to 1.6.  -continue IV fluids at 100 ml/hr, change to 0.45% NS  -BMP in AM    Hypertension.   -controlled  -continue home metoprolol and hydralazine    Hyperlipidemia.  -continue home Zocor     Moderate to severe mitral regurgitation, status post MitraClip placement in the past.  He has significant mitral and tricuspid regurgitation on his last echo.   -repeat Echo pending   -follow fluid status and plan to discontinue IV fluids tomorrow if intake improves and sodium normalizes    BPH:    -continue home Proscar and Flomax     Chronic Anemia and thrombocytopenia:  History of B12 deficiency as he has been getting some B12 from his oncologist.  Hemoglobin was 10.9 at admission.  No bleeding from anywhere.  -stable counts  -repeat CBC in AM       Diet: Clear Liquid Diet  Snacks/Supplements Adult: Boost Breeze; Between Meals    DVT Prophylaxis: Pneumatic Compression Devices  Velasquez Catheter: not present  Code Status: Full Code      Disposition Plan   Expected discharge: 1-2 days, recommended to prior living arrangement once adequate pain management/ tolerating PO medications, O2 use less than 1 liters/minute and tolerating diet with clearance for discharge by surgery. Pending PT/OT eval.  Entered: Nakul King MD 05/23/2019, 11:06 AM     The patient's care was discussed with the Bedside Nurse, Patient and Surgical Consultant.    Nakul King MD  Hospitalist Service  [facility]    ______________________________________________________________________    Interval History   Breathing better.  Denies chest pain.  Right upper quadrant pain continues but controlled with medications. Dry mouth. No fevers. Nausea with some vomiting this morning.    Data reviewed today: I reviewed all medications, new labs and imaging results over the last 24 hours. I personally reviewed no images or EKG's today.    Physical Exam   Vital Signs: Temp: 97.3  F (36.3  C) Temp src: Oral BP: 96/75 Pulse: 65 Heart Rate: 66 Resp: 16 SpO2: 97 % O2 Device: Nasal cannula Oxygen Delivery: 3 LPM  Weight: 139 lbs 12.35 oz  Constitutional: Arrousable, following commands, no acute distress  Respiratory: Clear to auscultation bilaterally, no crackles or wheezing  Cardiovascular: Regular rate and rhythm, normal S1 and S2, and 3/6 systolic murmur noted throughout precordium  GI: Normal bowel sounds, soft, non-distended, mild right upper quadrant tenderness  Skin/Integumen: No rashes, no cyanosis, no edema  Other:    Data   Recent Labs   Lab 05/23/19  0656 05/22/19  0935 05/21/19  1950   WBC 6.6 3.1* 2.6*   HGB 11.3* 10.9* 10.7*   MCV 95 95 94   PLT 77* 75* 62*   * 147* 146*   POTASSIUM 4.8 4.5 4.4   CHLORIDE 116* 117* 116*   CO2 22 22 22   BUN 54* 49* 47*   CR 1.75* 1.53* 1.53*   ANIONGAP 8 8 8   GIUSEPPE 8.5 8.4* 8.3*   * 83 81   ALBUMIN 2.5* 2.6* 2.7*   PROTTOTAL 5.8* 6.0* 6.2*   BILITOTAL 1.7* 2.3* 3.1*   ALKPHOS 259* 298* 326*   ALT 58 58 72*   AST 39 23 32     Recent Results (from the past 24 hour(s))   XR Chest Port 1 View    Narrative    CHEST ONE VIEW UPRIGHT 5/23/2019 8:44 AM     HISTORY: Post operative hypoxic respiratory failure.    COMPARISON: November 3, 2018      Impression    IMPRESSION: Decreased lung volumes and bibasilar atelectasis and/or  infiltrate.     EDIS ROJO MD     Medications     - MEDICATION INSTRUCTIONS -       NaCl 100 mL/hr at 05/23/19 0731       carvedilol  6.25 mg Oral BID w/meals     finasteride  5 mg Oral Daily     gabapentin  300 mg Oral TID     hydrALAZINE  25 mg Oral TID     hypromellose-dextran   2 drop Left Eye 4x Daily     isosorbide dinitrate  20 mg Oral BID     metroNIDAZOLE  500 mg Intravenous Q8H     multivitamin w/minerals  1 tablet Oral Daily     pantoprazole  40 mg Oral Daily     simvastatin  20 mg Oral At Bedtime     sodium chloride (PF)  3 mL Intracatheter Q8H     tamsulosin  0.4 mg Oral Daily

## 2019-05-23 NOTE — PROGRESS NOTES
"SPIRITUAL HEALTH SERVICES  Spiritual Assessment Progress Note  [facility]   met with pt and talked about his surgery.  Pt stated, \"I feel awful!\"  He share that he was in [place] on vacation to visit his partner who is in a memory care unit in [facility].  He lives in [place] with his brother and his nephew.    Pt was quite discouraged and wondered if he will die.      Pt is [employer] and has connections to [facility].    Pt was tearful and struggling emotionally,       listened and provided support and prayer.    Please re consult if pt would like to see  over holiday weekend.                                                                                                                                              Anastasiya Beasley M.Div., [facility]  Staff    Pager [phone]  "

## 2019-05-23 NOTE — PLAN OF CARE
[id number]: VSS on 2LPM, ex soft BPs. A/Ox4. Incisions on abdomen with steri strips and bandaids, SUKHWINDER. Pain controlled with PRN oxycodone and tylenol. Zofran given for nausea. Up with asstx1, GB, up in chair on shift. Clear liquid diet, no appetite due to nausea. BS hypo, Flatus +. Voiding adequately to urinal. LS clear, dim in bases.

## 2019-05-23 NOTE — PLAN OF CARE
SLP: RN reported nausea today and limited intake with clear liquids. Reviewed role of SLP with the pt. Pt currently denied evaluation due to nausea. Reviewed evaluation in 2017 with dry mouth causing similar symptoms (gagging, inability to initiate swallow). Pt denied Biotene at this time. Pt did agree to reschedule SLP evaluation for tomorrow in hopes that his nausea will improve to advance diet.

## 2019-05-23 NOTE — CONSULTS
"CLINICAL NUTRITION SERVICES  -  ASSESSMENT NOTE      RECOMMENDATIONS FOR MD/PROVIDER TO ORDER:   Pt may benefit from an appetite stimulant     Recommendations Ordered by Registered Dietitian (RD):   Medical Food Supplement - will send Boost Breeze between meals, advance to Boost Plus once his diet advandes  Multivitamin/Mineral - ordered daily Thera-Vit-M     Malnutrition (5/23):   % Weight Loss:  > 10% in 6 months (severe malnutrition)  % Intake:  </= 50% for >/= 5 days (severe malnutrition)  Subcutaneous Fat Loss:  Upper arm region significant depletion  Muscle Loss:  Temporal and Clavicle bone - signifcant depletion  Fluid Retention:  None noted    Malnutrition Diagnosis: Severe malnutrition  In Context of:  Chronic illness or disease        REASON FOR ASSESSMENT  Ivan Gomez is a 89 year old male seen by Registered Dietitian for Admission Nutrition Risk Screen for reduced oral intake over the last month and RN Consult - for the same      NUTRITION HISTORY  - Information obtained from the pt.  He says his appetite has been poor since his heart surgery last October.  In particular, he's been eating <25% for the past 2-3 weeks due to abdominal pain and nausea.  He was taking Boost/Ensure once/day, \"I probably should have had it more often\".      CURRENT NUTRITION ORDERS  Diet Order:     Clear Liquid     Current Intake/Tolerance:  Pt is nauseated, has only had 50% of his jello so far this morning.       NUTRITION FOCUSED PHYSICAL ASSESSMENT (NFPA)  Completed:  Yes Partial assessment -         Observed:    Muscle wasting (refer to documentation in Malnutrition section) and Subcutaneous fat loss (refer to documentation in Malnutrition section)    Obtained from Chart/Interdisciplinary Team:  None    ANTHROPOMETRICS  Height: 5' 8\"  Weight: 139 lbs 12.35 oz (63.4 kg) - 5/23  Body mass index is 21.25 kg/m .  Weight Status:  Normal BMI  IBW: 70 kg +/- 10%  % IBW: 91%  Weight History: Pt states he weighed 165# last " October. He's lost 25# (16%) in the past 7 months.    LABS  Labs reviewed    MEDICATIONS  Medications reviewed      ASSESSED NUTRITION NEEDS PER APPROVED PRACTICE GUIDELINES:  Dosing Weight 63.4 kg - 5/23  Estimated Energy Needs: 9971-6043 kcals (30-35 Kcal/Kg)  Justification: underweight  Estimated Protein Needs: 65-75 grams protein (1-1.2 g pro/Kg)  Justification: Repletion and CKD      MALNUTRITION:  % Weight Loss:  > 10% in 6 months (severe malnutrition)  % Intake:  </= 50% for >/= 5 days (severe malnutrition)  Subcutaneous Fat Loss:  Upper arm region significant depletion  Muscle Loss:  Temporal and Clavicle bone - signifcant depletion  Fluid Retention:  None noted    Malnutrition Diagnosis: Severe malnutrition  In Context of:  Chronic illness or disease    NUTRITION DIAGNOSIS:  Inadequate oral intake related to pain and decreased appetite as evidenced by 25# (16%) wt loss x 7 months      NUTRITION INTERVENTIONS  Recommendations / Nutrition Prescription  ADAT per MD  Pt may benefit from an appetite stimulant      Implementation  Nutrition education: Provided education on nutritional supplements  Medical Food Supplement - will send Boost Breeze between meals, advance to Boost Plus once his diet advandes  Multivitamin/Mineral - ordered daily Thera-Vit-M  .    Nutrition Goals  Pt will advance to solids in 2-3 days  No further weight loss - long-range goal.      MONITORING AND EVALUATION:  Progress towards goals will be monitored and evaluated per protocol and Practice Guidelines      Debbie Rey RD  Pager [phone] (M-F)            [phone] (W/E & Hol)

## 2019-05-23 NOTE — PROGRESS NOTES
[facility]    General Surgery  Daily Post-Op Note       Assessment and Plan:   Ivan Gomez is a 89 year old male S/P Procedure(s):  CHOLECYSTECTOMY, LAPAROSCOPIC, WITH CHOLANGIOGRAM, 1 Day Post-Op    Pain: complains of pain RUQ - has IVP dilaudid, oral percocet or Tylenol available  Bowel: - gas, - BM, burping some, nauseated this morning - await GI function return  Diet: continue clears until nausea resolves  IVFs: NS @ 100 mL/hr  Hypotension: low BP's this am, hold morning hydralazine, hospital ist to monitor meds  Activity: encourage up to chair, ambulation  Antibiotics: Cipro/Flagyl for 24 hours, otherwise no further need from surgical perspective  LFT: labs trending down  Diarrhea: await GI work up and recommendations  DVT prophylaxis: ambulation and PCD   Dispo: 1-2 days depending on clinical course        Interval History:   Placed on Bipap after surgery, off bipap last evening.  Pt sitting up in chair.  Nauseated this am, unable to keep meds down.  Complains of some pain in RUQ           Physical Exam:   Temp: 97.3  F (36.3  C) Temp src: Oral BP: 97/60 Pulse: 56 Heart Rate: 57 Resp: 14 SpO2: 99 % O2 Device: Nasal cannula Oxygen Delivery: 3 LPM    I/O last 3 completed shifts:  In: 1400 [I.V.:1400]  Out: 280 [Urine:275; Blood:5]      Constitutional: alert and no distress   Cardiovascular: negative  Respiratory: negative  Abdomen: Abdomen soft, mild tenderness RUQ, distended.  Incisions: band aids removed, steri strips in place - clean/dry/intact       Data   Recent Labs   Lab 05/23/19  0656 05/22/19  0935 05/21/19  1950   WBC 6.6 3.1* 2.6*   HGB 11.3* 10.9* 10.7*   MCV 95 95 94   PLT 77* 75* 62*   * 147* 146*   POTASSIUM 4.8 4.5 4.4   CHLORIDE 116* 117* 116*   CO2 22 22 22   BUN 54* 49* 47*   CR 1.75* 1.53* 1.53*   ANIONGAP 8 8 8   GIUSEPPE 8.5 8.4* 8.3*   * 83 81   ALBUMIN 2.5* 2.6* 2.7*   PROTTOTAL 5.8* 6.0* 6.2*   BILITOTAL 1.7* 2.3* 3.1*   ALKPHOS 259* 298* 326*   ALT 58 58  72*   AST 39 23 32       Milagro Craig PA-C

## 2019-05-23 NOTE — PLAN OF CARE
"OT: orders received, chart reviewed and attempted eval.  Pt sitting up in chair attempting to eat jello. States he is familiar with OT and he just is not ready yet. He is not feeling well and wants to wait to start therapy. Agreeable to have someone check back if \"we have to\" but states maybe tomorrow.     "

## 2019-05-23 NOTE — PLAN OF CARE
Off IMC at noon. AVSS except BP's running soft held hydralazine. On 3LO2 tolerating well. LS clear. Up with AX1 and GB. 3 lap sites covered with steri strips no drainage noted. BS hypo, no gas or BM yet. Clear liquids tolerating with poor appetite. Became nauseous with oral pills. Zofran given x1. Advised to take liquids slowly. Pain managed with oxycodone and Dilaudid, some improvement noted. Low urine output, bladder scanned for only 149. D5 at 100ml/hr. Skin bruised and dry throughout. Tele NSR. Echo and chest x-ray complete. Continue to monitor.

## 2019-05-24 ENCOUNTER — APPOINTMENT (OUTPATIENT)
Dept: PHYSICAL THERAPY | Facility: CLINIC | Age: 84
DRG: 417 | End: 2019-05-24
Attending: INTERNAL MEDICINE
Payer: MEDICARE

## 2019-05-24 LAB
ANION GAP SERPL CALCULATED.3IONS-SCNC: 8 MMOL/L (ref 3–14)
BUN SERPL-MCNC: 55 MG/DL (ref 7–30)
C COLI+JEJUNI+LARI FUSA STL QL NAA+PROBE: NOT DETECTED
C DIFF TOX B STL QL: POSITIVE
CALCIUM SERPL-MCNC: 8 MG/DL (ref 8.5–10.1)
CHLORIDE SERPL-SCNC: 111 MMOL/L (ref 94–109)
CO2 SERPL-SCNC: 20 MMOL/L (ref 20–32)
CREAT SERPL-MCNC: 1.59 MG/DL (ref 0.66–1.25)
EC STX1 GENE STL QL NAA+PROBE: NOT DETECTED
EC STX2 GENE STL QL NAA+PROBE: NOT DETECTED
ENTERIC PATHOGEN COMMENT: NORMAL
ERYTHROCYTE [DISTWIDTH] IN BLOOD BY AUTOMATED COUNT: 18.2 % (ref 10–15)
GFR SERPL CREATININE-BSD FRML MDRD: 37 ML/MIN/{1.73_M2}
GLUCOSE SERPL-MCNC: 149 MG/DL (ref 70–99)
HCT VFR BLD AUTO: 34.2 % (ref 40–53)
HGB BLD-MCNC: 10.6 G/DL (ref 13.3–17.7)
MCH RBC QN AUTO: 29 PG (ref 26.5–33)
MCHC RBC AUTO-ENTMCNC: 31 G/DL (ref 31.5–36.5)
MCV RBC AUTO: 93 FL (ref 78–100)
NOROV GI+II ORF1-ORF2 JNC STL QL NAA+PR: NOT DETECTED
PLATELET # BLD AUTO: 62 10E9/L (ref 150–450)
POTASSIUM SERPL-SCNC: 4.5 MMOL/L (ref 3.4–5.3)
RBC # BLD AUTO: 3.66 10E12/L (ref 4.4–5.9)
RVA NSP5 STL QL NAA+PROBE: NOT DETECTED
SALMONELLA SP RPOD STL QL NAA+PROBE: NOT DETECTED
SHIGELLA SP+EIEC IPAH STL QL NAA+PROBE: NOT DETECTED
SODIUM SERPL-SCNC: 139 MMOL/L (ref 133–144)
SPECIMEN SOURCE: ABNORMAL
UPPER GI ENDOSCOPY: NORMAL
V CHOL+PARA RFBL+TRKH+TNAA STL QL NAA+PR: NOT DETECTED
WBC # BLD AUTO: 5.3 10E9/L (ref 4–11)
Y ENTERO RECN STL QL NAA+PROBE: NOT DETECTED

## 2019-05-24 PROCEDURE — 0DB68ZZ EXCISION OF STOMACH, VIA NATURAL OR ARTIFICIAL OPENING ENDOSCOPIC: ICD-10-PCS | Performed by: INTERNAL MEDICINE

## 2019-05-24 PROCEDURE — 87177 OVA AND PARASITES SMEARS: CPT | Performed by: INTERNAL MEDICINE

## 2019-05-24 PROCEDURE — 87493 C DIFF AMPLIFIED PROBE: CPT | Performed by: INTERNAL MEDICINE

## 2019-05-24 PROCEDURE — 99232 SBSQ HOSP IP/OBS MODERATE 35: CPT | Performed by: INTERNAL MEDICINE

## 2019-05-24 PROCEDURE — 87506 IADNA-DNA/RNA PROBE TQ 6-11: CPT | Performed by: INTERNAL MEDICINE

## 2019-05-24 PROCEDURE — 97161 PT EVAL LOW COMPLEX 20 MIN: CPT | Mod: GP | Performed by: PHYSICAL THERAPIST

## 2019-05-24 PROCEDURE — 97530 THERAPEUTIC ACTIVITIES: CPT | Mod: GP | Performed by: PHYSICAL THERAPIST

## 2019-05-24 PROCEDURE — 25000128 H RX IP 250 OP 636: Performed by: INTERNAL MEDICINE

## 2019-05-24 PROCEDURE — A9270 NON-COVERED ITEM OR SERVICE: HCPCS | Performed by: INTERNAL MEDICINE

## 2019-05-24 PROCEDURE — 25000132 ZZH RX MED GY IP 250 OP 250 PS 637: Performed by: INTERNAL MEDICINE

## 2019-05-24 PROCEDURE — 25800029 ZZH RX IP 258 OP 250: Performed by: INTERNAL MEDICINE

## 2019-05-24 PROCEDURE — 36415 COLL VENOUS BLD VENIPUNCTURE: CPT | Performed by: INTERNAL MEDICINE

## 2019-05-24 PROCEDURE — 12000000 ZZH R&B MED SURG/OB

## 2019-05-24 PROCEDURE — 80048 BASIC METABOLIC PNL TOTAL CA: CPT | Performed by: INTERNAL MEDICINE

## 2019-05-24 PROCEDURE — 87209 SMEAR COMPLEX STAIN: CPT | Performed by: INTERNAL MEDICINE

## 2019-05-24 PROCEDURE — 43239 EGD BIOPSY SINGLE/MULTIPLE: CPT | Performed by: INTERNAL MEDICINE

## 2019-05-24 PROCEDURE — 88305 TISSUE EXAM BY PATHOLOGIST: CPT | Mod: 26 | Performed by: INTERNAL MEDICINE

## 2019-05-24 PROCEDURE — 85027 COMPLETE CBC AUTOMATED: CPT | Performed by: INTERNAL MEDICINE

## 2019-05-24 PROCEDURE — 25800030 ZZH RX IP 258 OP 636: Performed by: INTERNAL MEDICINE

## 2019-05-24 PROCEDURE — 25000128 H RX IP 250 OP 636: Performed by: PHYSICIAN ASSISTANT

## 2019-05-24 PROCEDURE — 88305 TISSUE EXAM BY PATHOLOGIST: CPT | Performed by: INTERNAL MEDICINE

## 2019-05-24 PROCEDURE — 25000125 ZZHC RX 250: Performed by: INTERNAL MEDICINE

## 2019-05-24 PROCEDURE — 0DB58ZX EXCISION OF ESOPHAGUS, VIA NATURAL OR ARTIFICIAL OPENING ENDOSCOPIC, DIAGNOSTIC: ICD-10-PCS | Performed by: INTERNAL MEDICINE

## 2019-05-24 PROCEDURE — 40000104 ZZH STATISTIC MODERATE SEDATION < 10 MIN: Performed by: INTERNAL MEDICINE

## 2019-05-24 RX ORDER — FLUMAZENIL 0.1 MG/ML
0.2 INJECTION, SOLUTION INTRAVENOUS
Status: ACTIVE | OUTPATIENT
Start: 2019-05-24 | End: 2019-05-25

## 2019-05-24 RX ORDER — SODIUM CHLORIDE 450 MG/100ML
INJECTION, SOLUTION INTRAVENOUS CONTINUOUS
Status: DISCONTINUED | OUTPATIENT
Start: 2019-05-24 | End: 2019-05-27

## 2019-05-24 RX ORDER — NALOXONE HYDROCHLORIDE 0.4 MG/ML
.1-.4 INJECTION, SOLUTION INTRAMUSCULAR; INTRAVENOUS; SUBCUTANEOUS
Status: DISCONTINUED | OUTPATIENT
Start: 2019-05-24 | End: 2019-05-24

## 2019-05-24 RX ORDER — VANCOMYCIN HYDROCHLORIDE 50 MG/ML
125 KIT ORAL 4 TIMES DAILY
Status: DISCONTINUED | OUTPATIENT
Start: 2019-05-24 | End: 2019-05-28 | Stop reason: HOSPADM

## 2019-05-24 RX ORDER — LIDOCAINE 40 MG/G
CREAM TOPICAL
Status: DISCONTINUED | OUTPATIENT
Start: 2019-05-24 | End: 2019-05-24 | Stop reason: HOSPADM

## 2019-05-24 RX ORDER — DEXTROSE MONOHYDRATE 50 MG/ML
INJECTION, SOLUTION INTRAVENOUS CONTINUOUS
Status: DISCONTINUED | OUTPATIENT
Start: 2019-05-24 | End: 2019-05-24

## 2019-05-24 RX ADMIN — FINASTERIDE 5 MG: 5 TABLET, FILM COATED ORAL at 08:19

## 2019-05-24 RX ADMIN — ISOSORBIDE DINITRATE 20 MG: 20 TABLET ORAL at 08:19

## 2019-05-24 RX ADMIN — MULTIPLE VITAMINS W/ MINERALS TAB 1 TABLET: TAB at 08:19

## 2019-05-24 RX ADMIN — DEXTRAN 70, AND HYPROMELLOSE 2910 2 DROP: 1; 3 SOLUTION/ DROPS OPHTHALMIC at 08:20

## 2019-05-24 RX ADMIN — METRONIDAZOLE 500 MG: 500 INJECTION, SOLUTION INTRAVENOUS at 04:57

## 2019-05-24 RX ADMIN — GABAPENTIN 300 MG: 300 CAPSULE ORAL at 08:19

## 2019-05-24 RX ADMIN — DEXTRAN 70, AND HYPROMELLOSE 2910 2 DROP: 1; 3 SOLUTION/ DROPS OPHTHALMIC at 17:31

## 2019-05-24 RX ADMIN — CARVEDILOL 6.25 MG: 3.12 TABLET, FILM COATED ORAL at 08:19

## 2019-05-24 RX ADMIN — OXYCODONE HYDROCHLORIDE 5 MG: 5 TABLET ORAL at 05:07

## 2019-05-24 RX ADMIN — OXYCODONE HYDROCHLORIDE 5 MG: 5 TABLET ORAL at 21:15

## 2019-05-24 RX ADMIN — TAMSULOSIN HYDROCHLORIDE 0.4 MG: 0.4 CAPSULE ORAL at 08:20

## 2019-05-24 RX ADMIN — SIMVASTATIN 20 MG: 20 TABLET, FILM COATED ORAL at 21:15

## 2019-05-24 RX ADMIN — GABAPENTIN 300 MG: 300 CAPSULE ORAL at 16:16

## 2019-05-24 RX ADMIN — ISOSORBIDE DINITRATE 20 MG: 20 TABLET ORAL at 16:16

## 2019-05-24 RX ADMIN — VANCOMYCIN HYDROCHLORIDE 125 MG: KIT at 22:44

## 2019-05-24 RX ADMIN — ONDANSETRON 4 MG: 2 INJECTION INTRAMUSCULAR; INTRAVENOUS at 08:50

## 2019-05-24 RX ADMIN — PANTOPRAZOLE SODIUM 40 MG: 40 TABLET, DELAYED RELEASE ORAL at 08:20

## 2019-05-24 RX ADMIN — GABAPENTIN 300 MG: 300 CAPSULE ORAL at 21:15

## 2019-05-24 RX ADMIN — DEXTRAN 70, AND HYPROMELLOSE 2910 2 DROP: 1; 3 SOLUTION/ DROPS OPHTHALMIC at 16:17

## 2019-05-24 RX ADMIN — METRONIDAZOLE 500 MG: 500 INJECTION, SOLUTION INTRAVENOUS at 13:15

## 2019-05-24 RX ADMIN — DEXTRAN 70, AND HYPROMELLOSE 2910 2 DROP: 1; 3 SOLUTION/ DROPS OPHTHALMIC at 21:19

## 2019-05-24 RX ADMIN — SODIUM CHLORIDE: 4.5 INJECTION, SOLUTION INTRAVENOUS at 17:22

## 2019-05-24 RX ADMIN — DEXTROSE MONOHYDRATE: 50 INJECTION, SOLUTION INTRAVENOUS at 05:00

## 2019-05-24 ASSESSMENT — ACTIVITIES OF DAILY LIVING (ADL)
ADLS_ACUITY_SCORE: 20
ADLS_ACUITY_SCORE: 17
ADLS_ACUITY_SCORE: 18

## 2019-05-24 ASSESSMENT — MIFFLIN-ST. JEOR: SCORE: 1325.5

## 2019-05-24 NOTE — PROGRESS NOTES
[facility]  Gastroenterology Progress Note     Ivan Gomez MRN# [id number]   YOB: 1926 Age: 89 year old          Assessment and Plan:     Acute cholecystitis    Fecal incontinence    Bladder mass    Nausea and vomiting- Patient has had nausea for one month. States feels like cannot swallow well and pills and food get stuck. There is a concern for esophageal stricture vs esophageal dysmotility vs neoplastic cause. Will recommend EGD with dilation today. Please keep NPO.  Continue with pantoprazole.  LFTs trending down.  Hemoglobin stable around 10-11.  Difficulty swallowing  Diarrhea- Patient has had diarrhea for one month. C difficile not ran due to lack of bowel movements.  Passing flatus.  Patient may require a colonoscopy.              Interval History:   denies chest pain and mild shortness of breath, nauseated, mild abdominal pain              Review of Systems:   C: NEGATIVE for fever, chills, change in weight  E/M: NEGATIVE for ear, mouth and throat problems  R: NEGATIVE for significant cough or SOB  CV: NEGATIVE for chest pain, palpitations or peripheral edema             Medications:   I have reviewed this patient's current medications    carvedilol  6.25 mg Oral BID w/meals     finasteride  5 mg Oral Daily     gabapentin  300 mg Oral TID     hydrALAZINE  25 mg Oral TID     hypromellose-dextran  2 drop Left Eye 4x Daily     isosorbide dinitrate  20 mg Oral BID     metroNIDAZOLE  500 mg Intravenous Q8H     multivitamin w/minerals  1 tablet Oral Daily     pantoprazole  40 mg Oral Daily     simvastatin  20 mg Oral At Bedtime     tamsulosin  0.4 mg Oral Daily                  Physical Exam:   Vitals were reviewed  Vital Signs with Ranges  Temp:  [97.3  F (36.3  C)-98.2  F (36.8  C)] 97.4  F (36.3  C)  Pulse:  [58-65] 60  Heart Rate:  [59-66] 59  Resp:  [16] 16  BP: ()/(61-75) 102/65  SpO2:  [93 %-98 %] 93 %  I/O last 3 completed shifts:  In: 3932.67 [P.O.:540;  I.V.:3392.67]  Out: 500 [Urine:500]  Constitutional: healthy, alert, moderate distress and cooperative   Cardiovascular: negative, PMI normal. No lifts, heaves, or thrills. RRR. No murmurs, clicks gallops or rub  Respiratory: negative, Percussion normal. Good diaphragmatic excursion. Lungs clear  Head: Normocephalic. No masses, lesions, tenderness or abnormalities  Neck: Neck supple. No adenopathy. Thyroid symmetric, normal size,, Carotids without bruits.  Abdomen: Abdomen soft, tender. BS normal. No masses, organomegaly           Data:   I reviewed the patient's new clinical lab test results.   Recent Labs   Lab Test 05/24/19  0729 05/23/19  0656 05/22/19  0935  10/16/17  0402 10/15/17  0420 10/14/17  1018   WBC 5.3 6.6 3.1*   < > 6.7 7.2 10.4   HGB 10.6* 11.3* 10.9*   < > 11.4* 12.1* 13.3   MCV 93 95 95   < > 94 96 98   PLT 62* 77* 75*   < > 116* 108* 111*   INR  --   --   --   --  1.15* 1.15* 1.17*    < > = values in this interval not displayed.     Recent Labs   Lab Test 05/24/19  0729 05/23/19  0656 05/22/19  0935   POTASSIUM 4.5 4.8 4.5   CHLORIDE 111* 116* 117*   CO2 20 22 22   BUN 55* 54* 49*   ANIONGAP 8 8 8     Recent Labs   Lab Test 05/23/19  0656 05/22/19  0935 05/21/19  1950  11/18/17  1303  08/24/16  1331 08/04/16  1502 03/22/16  1011  11/13/15  1055   ALBUMIN 2.5* 2.6* 2.7*   < > 3.0*   < >  --  3.7  --    < >  --    BILITOTAL 1.7* 2.3* 3.1*   < > 0.5   < >  --  0.8  --    < >  --    ALT 58 58 72*   < > 17   < >  --  27  --    < >  --    AST 39 23 32   < > 13   < >  --  20  --    < >  --    PROTEIN  --   --   --   --   --   --  30*  --  Negative  --  100*   LIPASE  --   --   --   --  100  --   --  160  --   --   --     < > = values in this interval not displayed.       I reviewed the patient's new imaging results.    All laboratory data reviewed  All imaging studies reviewed by me.    Lyla Graf PA-C,  5/24/2019  [facility]  Office : [phone]  Cell: [phone] (  [place])  Cell: [phone] (Lyla Graf PA-C)

## 2019-05-24 NOTE — PLAN OF CARE
OT:Orders received and chart reviewed. Pt. currently lethargic from procedure earlier today. Will reschedule to 5/25.

## 2019-05-24 NOTE — PLAN OF CARE
Attempted again to see patient for clinical swallow evaluation following EGD, noting GI MD changed diet to mechanical soft. Arrived to patient room to find patient holding apple juice cup with straw, but falling asleep and unable to maintain alertness. Incoherent speech upon tactile and verbal attempts to wake. Recommend hold PO until fully alert, caution with any viscous lidocaine before PO intake as it may increase aspiration risk.  Will follow for clinical swallow evaluation, as appropriate. SLP pager today: [phone]

## 2019-05-24 NOTE — PROGRESS NOTES
[facility]    Medicine Progress Note - Hospitalist Service       Date of Admission:  5/21/2019  Assessment & Plan   Ivan Gomez is a 89-year-old male with history of hypertension, hyperlipidemia, chronic kidney disease stage III, chronic pain syndrome, gout, history of bladder cancer status post resection and bladder reconstruction x 2, narcotic dependence, mitral regurgitation status post mitral valve clips, and chronic anemia who was transferred here from outlying hospital because of abdominal pain and nausea secondary to acute cholecystitis.     Acute cholecystitis status post cholecystectomy 5/22. Negative cholangiogram 5/22 during surgery.  -appreciate management by GI and surgery  -completed course of Cipro and Flagyl  -continue Tylenol and IV dilaudid PRN for pain  -continue nausea medications  -diet per surgery and GI    Dysphagia secondary to esophagitis. Nausea after surgery and as of 5/24 not able to take any pills because of pain. EGD showed esophagitis likely from a pill with local erosion.  -appreciate GI assistance  -PRN carafate and lidocaine ordered by GI  -change IVF to 0.45% NS at 75 ml/hr until oral intake improves  -mechanical soft diet ordered by GI  -speech therapy assisting but patient to somnolent to swallow well for testing currently     Diarrhea with incontinence of stool:  Symptoms for 1 month and never sought any medical attention.  -appreciate GI assistance  -follow up stool studies for enteric pathogens, C. Diff, O&P and fat stool qualitative random collection   -if negative studies and does not improve GI says we could consider colonoscopy to eval for microscopic or ischemic colitis or could defer this to outpatient and just give cholestyramine and imodium trial here and at discharge    3.4 cm left bladder mass:  The patient has a history of bladder cancer.  He has a resection and bladder reconstruction x 2 in the past. Last Surgery per patient was in 11/2018 at  the [facility].    -urology evaluation here appreciated with recommendation to just follow up with his primary urologist and oncologist for ongoing management as outpatient    Chronic kidney disease, stage III. Baseline creatinine 1.4 to 1.6.  -creatinine stable at baseline  -noted mild leg edema developing with fluids  -reduced fluids to 75 mL/hr and continue to monitor closely    Hypertension.   -controlled  -continue home metoprolol and hydralazine    Hyperlipidemia.  -continue home Zocor     Moderate to severe mitral regurgitation, status post MitraClip placement in the past.  He has significant mitral and tricuspid regurgitation on his last echo.   -repeat Echo pending   -follow fluid status and plan to discontinue IV fluids tomorrow if intake improves and sodium normalizes    BPH:    -continue home Proscar and Flomax     Chronic Anemia and thrombocytopenia:  History of B12 deficiency as he has been getting some B12 from his oncologist.  Hemoglobin was 10.9 at admission.  No bleeding from anywhere.  -stable counts during hospital stay    Post operative hypoxia with acute hypoxic respiratory failure - resolved. No aspiration noted during surgery but in PACU developed shortness of breath and respiratory distress requiring BIPAP. Chest x-ray with bilateral atelectasis versus infiltrate with normal WBC and afebrile favoring atelectasis.  -encourage incentive spirometry    Hypernatremia - resolved. Likely iatrogenic from saline.  -resolved with D5W IV fluids  -encourage fluids      Diet: Mechanical/Dental Soft Diet  Snacks/Supplements Adult: Boost Plus; Between Meals    DVT Prophylaxis: Pneumatic Compression Devices  Velasquez Catheter: not present  Code Status: Full Code      Disposition Plan   Expected discharge: 1-2 days, recommended to transitional care unit once adequate pain management/ tolerating PO medications and tolerating diet.  Entered: Nakul King MD 05/24/2019, 4:55 PM     The patient's  care was discussed with the Bedside Nurse, Patient and Surgical Consultant.    Nakul King MD  Hospitalist Service  [facility]    ______________________________________________________________________    Interval History   Denies shortness of breath. Had episode of diarrhea and vomiting. Pain in esophagus worse with any food and not wanting to eat.  Had EGD with evidence of erosive esophagitis. No fevers.    Data reviewed today: I reviewed all medications, new labs and imaging results over the last 24 hours. I personally reviewed no images or EKG's today.    Physical Exam   Vital Signs: Temp: 97.4  F (36.3  C) Temp src: Oral BP: 95/62 Pulse: 58 Heart Rate: 58 Resp: 15 SpO2: 96 % O2 Device: None (Room air) Oxygen Delivery: 1 LPM  Weight: 154 lbs 8.68 oz  Constitutional: Arrousable, following commands, no acute distress  Respiratory: Clear to auscultation bilaterally, no crackles or wheezing  Cardiovascular: Regular rate and rhythm, normal S1 and S2, and 3/6 systolic murmur noted throughout precordium  GI: Normal bowel sounds, soft, non-distended, mild right upper quadrant tenderness  Skin/Integumen: No rashes, no cyanosis, 1+ leg edema  Other:    Data   Recent Labs   Lab 05/24/19  0729 05/23/19  0656 05/22/19  0935   WBC 5.3 6.6 3.1*   HGB 10.6* 11.3* 10.9*   MCV 93 95 95   PLT 62* 77* 75*    146* 147*   POTASSIUM 4.5 4.8 4.5   CHLORIDE 111* 116* 117*   CO2 20 22 22   BUN 55* 54* 49*   CR 1.59* 1.75* 1.53*   ANIONGAP 8 8 8   GIUSEPPE 8.0* 8.5 8.4*   * 105* 83   ALBUMIN  --  2.5* 2.6*   PROTTOTAL  --  5.8* 6.0*   BILITOTAL  --  1.7* 2.3*   ALKPHOS  --  259* 298*   ALT  --  58 58   AST  --  39 23     No results found for this or any previous visit (from the past 24 hour(s)).  Medications     D5W 100 mL/hr at 05/24/19 1235     - MEDICATION INSTRUCTIONS -         carvedilol  6.25 mg Oral BID w/meals     finasteride  5 mg Oral Daily     gabapentin  300 mg Oral TID     hydrALAZINE  25 mg Oral  TID     hypromellose-dextran  2 drop Left Eye 4x Daily     isosorbide dinitrate  20 mg Oral BID     multivitamin w/minerals  1 tablet Oral Daily     pantoprazole  40 mg Oral Daily     simvastatin  20 mg Oral At Bedtime     tamsulosin  0.4 mg Oral Daily

## 2019-05-24 NOTE — PLAN OF CARE
"Discharge Planner PT   Patient plan for discharge: \"I need to get stronger\" - mentions he would be hopeful to do rehab at his S.O.'s facility if an option    Current status: PT orders received, eval completed, treatment initiated. Pt is a 89yo male admitted for evaluation of abdominal pain and nausea and now seen POD#1 s/p laparoscopic cholecystectomy secondary to acute cholecystitis; also with 1 month of chronic diarrhea. Pt reports he typically lives in [place] with his brother and nephew in a house with steps to enter; visiting his S.O.in [place] who lives in a memory care facility in [place] per pt. Pt reports he can ambulate household distances without AD but uses 4WW for community mobility.    Pt currently rates pain 7/10. Educated on abdominal precautions. Pt requires modA for bed mobility, modA for sit<>stand transfers to FWW; declined further ambulation or exercise d/t pain and getting a phone call.   Barriers to return to prior living situation: Pain, level of assist, fall risk, limited support here in [place]  Recommendations for discharge: TCU  Rationale for recommendations: Pt would benefit from continued skilled PT services to progress functional strength, activity tolerance, safety and IND with functional mobility prior to returning to community setting in [place].       Entered by: Ness Rodriguez 05/24/2019 3:27 PM       "

## 2019-05-24 NOTE — PLAN OF CARE
Pt is A+Ox4, VSS. BP meds held this morning due to low Bp. Pt went down for EGD at 1200 for possible esophogeal stricturing - no stricturing found. Inflammation of esophagus found and polyp found in stomach - polyp removed. Pt has been lethargic since returning from the EGD. Pt has scaly skin throughout, arms are bruised throughout. Ears have skin breakdown and bruising on tips of ears. Trace edema on knees and ankles bilat. Up with one. Generalized weakness. Being checked for C. Diff and parasites in stool - stool sample sent down this afternoon. Diminished lung sounds. Intermittent pain in abdomen r/t abd incisions.

## 2019-05-24 NOTE — PLAN OF CARE
Attempted to see patient for swallow evaluation, however, patient is NPO for EGD and possible dilation around 1200. Will follow, as appropriate/

## 2019-05-24 NOTE — PLAN OF CARE
PT: Orders received, chart reviewed, and PT Eval attempted.  However, patient taken for EGD.  Nursing notified.  Will re-attempt Eval later today or tomorrow.

## 2019-05-24 NOTE — PLAN OF CARE
Pt is alert and oriented x 4, AVSS soft BP. 98% on 2 liters. Lungs diminished.   Positive bowel sounds, passing flatus. Smear of BM. Voiding on his own in urinal.  Abdominal pain treated with oxycodone.  Incision with steri strips CDI.  Up with 1 assist.

## 2019-05-25 ENCOUNTER — APPOINTMENT (OUTPATIENT)
Dept: SPEECH THERAPY | Facility: CLINIC | Age: 84
DRG: 417 | End: 2019-05-25
Attending: INTERNAL MEDICINE
Payer: MEDICARE

## 2019-05-25 PROCEDURE — 99207 ZZC CDG-MDM COMPONENT: MEETS LOW - DOWN CODED: CPT | Performed by: INTERNAL MEDICINE

## 2019-05-25 PROCEDURE — 92526 ORAL FUNCTION THERAPY: CPT | Mod: GN | Performed by: SPEECH-LANGUAGE PATHOLOGIST

## 2019-05-25 PROCEDURE — 99207 ZZC MOONLIGHTING INDICATOR: CPT | Performed by: INTERNAL MEDICINE

## 2019-05-25 PROCEDURE — 25000132 ZZH RX MED GY IP 250 OP 250 PS 637: Performed by: INTERNAL MEDICINE

## 2019-05-25 PROCEDURE — A9270 NON-COVERED ITEM OR SERVICE: HCPCS | Performed by: INTERNAL MEDICINE

## 2019-05-25 PROCEDURE — 99232 SBSQ HOSP IP/OBS MODERATE 35: CPT | Performed by: INTERNAL MEDICINE

## 2019-05-25 PROCEDURE — 25800029 ZZH RX IP 258 OP 250: Performed by: INTERNAL MEDICINE

## 2019-05-25 PROCEDURE — 12000000 ZZH R&B MED SURG/OB

## 2019-05-25 PROCEDURE — 92610 EVALUATE SWALLOWING FUNCTION: CPT | Mod: GN | Performed by: SPEECH-LANGUAGE PATHOLOGIST

## 2019-05-25 RX ADMIN — GABAPENTIN 300 MG: 300 CAPSULE ORAL at 17:24

## 2019-05-25 RX ADMIN — GABAPENTIN 300 MG: 300 CAPSULE ORAL at 08:11

## 2019-05-25 RX ADMIN — CARVEDILOL 6.25 MG: 3.12 TABLET, FILM COATED ORAL at 17:24

## 2019-05-25 RX ADMIN — TAMSULOSIN HYDROCHLORIDE 0.4 MG: 0.4 CAPSULE ORAL at 08:11

## 2019-05-25 RX ADMIN — VANCOMYCIN HYDROCHLORIDE 125 MG: KIT at 17:24

## 2019-05-25 RX ADMIN — VANCOMYCIN HYDROCHLORIDE 125 MG: KIT at 21:04

## 2019-05-25 RX ADMIN — HYDRALAZINE HYDROCHLORIDE 25 MG: 25 TABLET ORAL at 08:11

## 2019-05-25 RX ADMIN — MULTIPLE VITAMINS W/ MINERALS TAB 1 TABLET: TAB at 08:12

## 2019-05-25 RX ADMIN — PANTOPRAZOLE SODIUM 40 MG: 40 TABLET, DELAYED RELEASE ORAL at 08:11

## 2019-05-25 RX ADMIN — SODIUM CHLORIDE: 4.5 INJECTION, SOLUTION INTRAVENOUS at 06:24

## 2019-05-25 RX ADMIN — HYDRALAZINE HYDROCHLORIDE 25 MG: 25 TABLET ORAL at 17:24

## 2019-05-25 RX ADMIN — DEXTRAN 70, AND HYPROMELLOSE 2910 2 DROP: 1; 3 SOLUTION/ DROPS OPHTHALMIC at 08:12

## 2019-05-25 RX ADMIN — FINASTERIDE 5 MG: 5 TABLET, FILM COATED ORAL at 08:11

## 2019-05-25 RX ADMIN — OXYCODONE HYDROCHLORIDE 5 MG: 5 TABLET ORAL at 01:08

## 2019-05-25 RX ADMIN — SODIUM CHLORIDE: 4.5 INJECTION, SOLUTION INTRAVENOUS at 20:55

## 2019-05-25 RX ADMIN — VANCOMYCIN HYDROCHLORIDE 125 MG: KIT at 08:10

## 2019-05-25 RX ADMIN — DEXTRAN 70, AND HYPROMELLOSE 2910 2 DROP: 1; 3 SOLUTION/ DROPS OPHTHALMIC at 12:45

## 2019-05-25 RX ADMIN — DEXTRAN 70, AND HYPROMELLOSE 2910 2 DROP: 1; 3 SOLUTION/ DROPS OPHTHALMIC at 17:25

## 2019-05-25 RX ADMIN — DEXTRAN 70, AND HYPROMELLOSE 2910 2 DROP: 1; 3 SOLUTION/ DROPS OPHTHALMIC at 20:57

## 2019-05-25 RX ADMIN — CARVEDILOL 6.25 MG: 3.12 TABLET, FILM COATED ORAL at 08:11

## 2019-05-25 RX ADMIN — GABAPENTIN 300 MG: 300 CAPSULE ORAL at 20:54

## 2019-05-25 RX ADMIN — ISOSORBIDE DINITRATE 20 MG: 20 TABLET ORAL at 17:24

## 2019-05-25 RX ADMIN — SIMVASTATIN 20 MG: 20 TABLET, FILM COATED ORAL at 20:54

## 2019-05-25 RX ADMIN — VANCOMYCIN HYDROCHLORIDE 125 MG: KIT at 12:45

## 2019-05-25 RX ADMIN — OXYCODONE HYDROCHLORIDE 5 MG: 5 TABLET ORAL at 12:37

## 2019-05-25 RX ADMIN — ISOSORBIDE DINITRATE 20 MG: 20 TABLET ORAL at 08:12

## 2019-05-25 ASSESSMENT — ACTIVITIES OF DAILY LIVING (ADL)
ADLS_ACUITY_SCORE: 17
ADLS_ACUITY_SCORE: 18

## 2019-05-25 ASSESSMENT — MIFFLIN-ST. JEOR: SCORE: 1322.5

## 2019-05-25 NOTE — PLAN OF CARE
Discharge Planner OT   Patient plan for discharge: N/A  Current status: Orders rec'd and chart reviewed. Pt admitted due to Acute cholecystitis status post cholecystectomy 5/22. Spoke with PT, pt with decreased tolerance for therapy at this time and per Hospitalist note probable discharge in 1-2 days. Will defer OT to TCU, PT to continue to work with pt while inpt. Please reorder if pt with prolonged hospital stay as appropriate. OT orders completed.   Barriers to return to prior living situation: Decreased independence with ADL's and functional mobility.   Recommendations for discharge: TCU  Rationale for recommendations: Pt will requires daily therapy to increase ADL and functional mobility independence and safety to PLOF.         Entered by: Shena Diego 05/25/2019 12:42 PM

## 2019-05-25 NOTE — PROGRESS NOTES
05/25/19 0900   General Information   Onset Date 05/21/19   Start of Care Date 05/25/19   Referring Physician Dr. King   Patient Profile Review/OT: Additional Occupational Profile Info See Profile for full history and prior level of function   Patient/Family Goals Statement Agreed to POC goal, no additional goal stated.   Swallowing Evaluation Bedside swallow evaluation   Behaviorial Observations Alert   Mode of current nutrition Oral diet   Type of oral diet Thin liquid  (Mechanical dental soft)   Respiratory Status Room air   Comments Per MD note: Ivan Gomez is a 89-year-old male with history of hypertension, hyperlipidemia, chronic kidney disease stage III, chronic pain syndrome, gout, history of bladder cancer status post resection and bladder reconstruction x 2, narcotic dependence, mitral regurgitation status post mitral valve clips, and chronic anemia [facility] and nausea secondary to acute cholecystitis.  Dysphagia secondary to esophagitis. Nausea after surgery and as of 5/24 not able to take any pills because of pain. EGD showed esophagitis likely from a pill with local erosion. 3.4 cm left bladder mass, Post operative hypoxia with acute hypoxic respiratory failure - resolved. No aspiration noted during surgery but in PACU developed shortness of breath and respiratory distress requiring BIPAP. Chest x-ray with bilateral atelectasis versus infiltrate with normal WBC and afebrile favoring atelectasis.      Pt reports poor appetite, does not report cough with po intake, but feels pills get stuck.   Clinical Swallow Evaluation   Oral Musculature generally intact   Dentition present and adequate   Clinical Swallow Eval: Thin Liquid Texture Trial   Mode of Presentation, Thin Liquids cup;straw   Volume of Liquid or Food Presented sips by cup x 4, sip by straw x 1   Oral Phase of Swallow Premature pharyngeal entry   Pharyngeal Phase of  Swallow repeated swallows  (delay)   Diagnostic Statement cough after thin by straw, grimace with large consecutive sips by cup - stated uncomfortable feeling   Clinical Swallow Eval: Puree Solid Texture Trial   Mode of Presentation, Puree spoon   Volume of Puree Presented tsps x 2   Oral Phase, Puree WFL   Pharyngeal Phase, Puree repeated swallows  (delay)   Diagnostic Statement no signs of aspiration    Clinical Swallow Eval: Semisolid Texture Trial   Mode of Presentation, Semisolid spoon   Volume of Semisolid Food Presented bites x 4   Oral Phase, Semisolid WFL   Pharyngeal Phase, Semisolid repeated swallows  (delay)   Diagnostic Statement no signs of aspiration    Swallow Compensations   Swallow Compensations Pacing;Reduce amounts;Alternate viscosity of consistencies;Multiple swallow   Esophageal Phase of Swallow   Patient reports or presents with symptoms of esophageal dysphagia Yes   Esophageal comments Esophagitis - pill erosion, burp x 1 after all trials completed today   Swallow Eval: Clinical Impressions   Skilled Criteria for Therapy Intervention Skilled criteria met.  Treatment indicated.   Functional Assessment Scale (FAS) 4   Treatment Diagnosis mild-moderate oral-pharyngeal dysphagia   Diet texture recommendations Thin liquids  (mechanical/dental soft)   Recommended Feeding/Eating Techniques   (see below)   Therapy Frequency 5 times/wk   Predicted Duration of Therapy Intervention (days/wks) 1 week   Anticipated Discharge Disposition inpatient rehabilitation facility   Risks and Benefits of Treatment have been explained. Yes   Patient, family and/or staff in agreement with Plan of Care Yes   Clinical Impression Comments A bedside swallow evaluation was completed this am.  Patient presents with mild-moderate oral-pharyngeal dysphagia at bedside.  Deficits include delayed swallow reflex, need for double swallows, cough with thin by straw, belching at times, esophagitis due to pill erosion, and poor po  intake.  Patient tolerated small sips of thin by cup and small bites of pudding and semi-solids with strategies.  Recommend a continued mechanical/dental soft diet and thin liquids, no straw, sit up at 90 degrees, remain up for 30-60 minutes after eating, small bites/sips, crush meds and give with puree.  Plan to continue swallow Tx to monitor diet tolerance, train strategies, and determine need for video swallow study/FEES.   Total Evaluation Time   Total Evaluation Time (Minutes) 15

## 2019-05-25 NOTE — PLAN OF CARE
Discharge Planner SLP   Patient plan for discharge: Did not state  Current status: A bedside swallow evaluation was completed this am.  Patient presents with mild-moderate oral-pharyngeal dysphagia at bedside.  Deficits include delayed swallow reflex, need for double swallows, cough with thin by straw, belching at times, esophagitis due to pill erosion, and poor po intake.  Patient tolerated small sips of thin by cup and small bites of pudding and semi-solids with strategies.  Recommend a continued mechanical/dental soft diet and thin liquids, no straw, sit up at 90 degrees, remain up for 30-60 minutes after eating, small bites/sips, crush meds and give with puree.  Plan to continue swallow Tx to monitor diet tolerance, train strategies, and determine need for video swallow study/FEES.  Barriers to return to prior living situation: Level of assist  Recommendations for discharge: TCU, SLP Tx  Rationale for recommendations: SLP swallow Tx to maximize function/safety for a least restrictive diet       Entered by: Mirela Gale 05/25/2019 9:32 AM

## 2019-05-25 NOTE — PLAN OF CARE
A+O AVSS on room air. LS diminished. BS active, flatus+, BM+. Voiding adequately. BLE edematous. Poor oral intake. Up w/ 1. Oxy for pain.

## 2019-05-25 NOTE — PLAN OF CARE
"PT: Attempted to see pt for PT. Pt currently up on BSC, states he will need \"a while\" and declining PT at this time.  "

## 2019-05-25 NOTE — PROGRESS NOTES
Cross Cover:     C Diff positive.    - Start PO Vancomycin    Tomasz Shaw DO   [facility]

## 2019-05-25 NOTE — PLAN OF CARE
VSS ex soft BP. A/O. Up-1. abdo incisions CDI. Had a loose stool. Positive C-diff. Bruised ears & arms. LE edema/dry. mapilex changed on mid back, small redness. coccyx mapilex CDI. Poor oral intake. Oxy given once.

## 2019-05-25 NOTE — PLAN OF CARE
Pt continues to have loose incontinent stools, does use bedside commode at times, good urine output, poor appetite, some nausea noted, lungs clear but diminished in bases, O2 sats mid 90's on room air, up in room with assist of one and walker, incisions clean dry and intact, skin dry and fragile, blanchable erythema noted on coccyx and mid back, foam dressing intact, oral pian medications given for pain with good results,

## 2019-05-25 NOTE — PROGRESS NOTES
[facility]    Medicine Progress Note - Hospitalist Service       Date of Admission:  5/21/2019  Assessment & Plan   Ivan Gomez is a 89-year-old male with history of hypertension, hyperlipidemia, chronic kidney disease stage III, chronic pain syndrome, gout, history of bladder cancer status post resection and bladder reconstruction x 2, narcotic dependence, mitral regurgitation status post mitral valve clips, and chronic anemia [facility] and nausea secondary to acute cholecystitis.     Acute cholecystitis status post cholecystectomy 5/22. Negative cholangiogram 5/22 during surgery.  -appreciate management by GI and surgery  -completed course of Cipro and Flagyl  -continue Tylenol and IV dilaudid PRN for pain  -continue nausea medications  -diet per surgery and GI    Dysphagia secondary to esophagitis. Nausea after surgery and as of 5/24 not able to take any pills because of pain. EGD showed esophagitis likely from a pill with local erosion.  -appreciate GI assistance  -PRN carafate and lidocaine ordered by GI  -change IVF to 0.45% NS at 75 ml/hr until oral intake improves  -mechanical soft diet ordered by GI  -speech therapy assisting but patient to somnolent to swallow well for testing currently     Diarrhea with incontinence of stool:  Symptoms for 1 month and never sought any medical attention.  -appreciate GI assistance  -follow up stool studies for enteric pathogens, C. Diff, O&P and fat stool qualitative random collection   -c diff positive, on oral vanc now    3.4 cm left bladder mass:  The patient has a history of bladder cancer.  He has a resection and bladder reconstruction x 2 in the past. Last Surgery per patient was in 11/2018 at the [facility].    -urology evaluation here appreciated with recommendation to just follow up with his primary urologist and oncologist for ongoing management as  outpatient    Chronic kidney disease, stage III. Baseline creatinine 1.4 to 1.6.  -creatinine stable at baseline  -noted mild leg edema developing with fluids  -reduced fluids to 75 mL/hr and continue to monitor closely    Hypertension.   -controlled  -continue home metoprolol and hydralazine    Hyperlipidemia.  -continue home Zocor     Moderate to severe mitral regurgitation, status post MitraClip placement in the past.  He has significant mitral and tricuspid regurgitation on his last echo.   -repeat Echo pending   -follow fluid status and plan to discontinue IV fluids tomorrow if intake improves and sodium normalizes    BPH:    -continue home Proscar and Flomax     Chronic Anemia and thrombocytopenia:  History of B12 deficiency as he has been getting some B12 from his oncologist.  Hemoglobin was 10.9 at admission.  No bleeding from anywhere.  -stable counts during hospital stay    Post operative hypoxia with acute hypoxic respiratory failure - resolved. No aspiration noted during surgery but in PACU developed shortness of breath and respiratory distress requiring BIPAP. Chest x-ray with bilateral atelectasis versus infiltrate with normal WBC and afebrile favoring atelectasis.  -encourage incentive spirometry    Hypernatremia - resolved. Likely iatrogenic from saline.  -resolved with D5W IV fluids  -encourage fluids      Diet: Snacks/Supplements Adult: Boost Plus; Between Meals  Room Service  Mechanical/Dental Soft Diet No Straws    DVT Prophylaxis: Pneumatic Compression Devices  Velasquez Catheter: not present  Code Status: Full Code      Disposition Plan   Expected discharge: 1-2 days, recommended to transitional care unit once adequate pain management/ tolerating PO medications and tolerating diet.  Entered: Nakul Parks MD 05/25/2019, 10:25 AM     The patient's care was discussed with the Bedside Nurse, Patient and Surgical Consultant.    Nakul Parks MD  Hospitalist Service  [facility]  Hospital    ______________________________________________________________________    Interval History   C diff positive. Started oral vanc.     Data reviewed today: I reviewed all medications, new labs and imaging results over the last 24 hours. I personally reviewed no images or EKG's today.    Physical Exam   Vital Signs: Temp: 97.2  F (36.2  C) Temp src: Oral BP: 105/65 Pulse: 61 Heart Rate: 64 Resp: 16 SpO2: 95 % O2 Device: None (Room air)    Weight: 153 lbs 14.1 oz  Constitutional: Arrousable, following commands, no acute distress  Respiratory: Clear to auscultation bilaterally, no crackles or wheezing  Cardiovascular: Regular rate and rhythm, normal S1 and S2, and 3/6 systolic murmur noted throughout precordium  GI: Normal bowel sounds, soft, non-distended, mild right upper quadrant tenderness  Skin/Integumen: No rashes, no cyanosis, 1+ leg edema  Other:    Data   Recent Labs   Lab 05/24/19  0729 05/23/19  0656 05/22/19  0935   WBC 5.3 6.6 3.1*   HGB 10.6* 11.3* 10.9*   MCV 93 95 95   PLT 62* 77* 75*    146* 147*   POTASSIUM 4.5 4.8 4.5   CHLORIDE 111* 116* 117*   CO2 20 22 22   BUN 55* 54* 49*   CR 1.59* 1.75* 1.53*   ANIONGAP 8 8 8   GIUSEPPE 8.0* 8.5 8.4*   * 105* 83   ALBUMIN  --  2.5* 2.6*   PROTTOTAL  --  5.8* 6.0*   BILITOTAL  --  1.7* 2.3*   ALKPHOS  --  259* 298*   ALT  --  58 58   AST  --  39 23     No results found for this or any previous visit (from the past 24 hour(s)).  Medications     - MEDICATION INSTRUCTIONS -       NaCl 70 mL/hr at 05/25/19 0624       carvedilol  6.25 mg Oral BID w/meals     finasteride  5 mg Oral Daily     gabapentin  300 mg Oral TID     hydrALAZINE  25 mg Oral TID     hypromellose-dextran  2 drop Left Eye 4x Daily     isosorbide dinitrate  20 mg Oral BID     multivitamin w/minerals  1 tablet Oral Daily     pantoprazole  40 mg Oral Daily     simvastatin  20 mg Oral At Bedtime     tamsulosin  0.4 mg Oral Daily     vancomycin  125 mg Oral 4x Daily

## 2019-05-25 NOTE — PROGRESS NOTES
"   05/24/19 1400   Quick Adds   Type of Visit Initial PT Evaluation   Living Environment   Lives With sibling(s);other relative(s)  (nephew)   Living Arrangements house   Home Accessibility stairs to enter home   Number of Stairs, Main Entrance 2   Transportation Anticipated family or friend will provide   Living Environment Comment Pt typically lives in [place] with his brother and nephew; 2 steps to enter and 1 step within (sunk in living area), all needs met on main level; currently here visiting his SO in [place], [place] where she lives in [facility] and pt staying with her   Self-Care   Usual Activity Tolerance moderate   Current Activity Tolerance poor   Regular Exercise No   Equipment Currently Used at Home walker, rolling   Activity/Exercise/Self-Care Comment reports IND with ADLs, able to ambulate household distances without AD but typically uses 4WW for community mobility/at home when feeling fatigued; nephew provides transportation and performs all IADLs   Functional Level Prior   Ambulation 1-->assistive equipment   Transferring 1-->assistive equipment   Toileting 1-->assistive equipment   Bathing 0-->independent   Communication 0-->understands/communicates without difficulty   Cognition 0 - no cognition issues reported   Fall history within last six months no   Which of the above functional risks had a recent onset or change? ambulation;transferring;bathing;toileting;dressing   General Information   Onset of Illness/Injury or Date of Surgery - Date 05/24/19   Referring Physician Jc Parks MD   Patient/Family Goals Statement \"I need to get stronger\"   Pertinent History of Current Problem (include personal factors and/or comorbidities that impact the POC) Pt is a 89yo male admitted for evaluation of abdominal pain and nausea and now seen POD#1 s/p laparoscopic cholecystectomy secondary to acute cholecystitis; also with 1 month of chronic diarrhea; complex PMH, see EMR   Precautions/Limitations " fall precautions;abdominal precautions   General Observations Pt resting in bed upon arrival, with IV   Cognitive Status Examination   Orientation orientation to person, place and time   Level of Consciousness alert   Follows Commands and Answers Questions 100% of the time;able to follow multistep instructions   Personal Safety and Judgment intact   Pain Assessment   Patient Currently in Pain Yes, see Vital Sign flowsheet   Integumentary/Edema   Integumentary/Edema Comments lap sites covered and abdominal binder in place, no significant LE edema noted   Posture    Posture Forward head position;Protracted shoulders   Range of Motion (ROM)   ROM Comment BLE WNL for functional transfers   Strength   Strength Comments BLE WFL for functional transfers, not formally assessed d/t pain but appears at least 3/5 BLE with mobility   Bed Mobility   Bed Mobility Comments modA supine>sit   Transfer Skills   Transfer Comments modA sit>stand to FWW   Gait   Gait Comments able to take 1 step forward/back with FWW and min-modA, pt declined further gait   Balance   Balance Comments SBA at EOB   Sensory Examination   Sensory Perception Comments denies new N/T   Coordination   Coordination Comments gross motor coordination appears WFL   General Therapy Interventions   Planned Therapy Interventions balance training;bed mobility training;gait training;strengthening;stretching;transfer training;home program guidelines;progressive activity/exercise   Clinical Impression   Criteria for Skilled Therapeutic Intervention yes, treatment indicated   PT Diagnosis Difficulty ambulating   Influenced by the following impairments Pain, weakness, decreased activity tolerance   Functional limitations due to impairments Decreased safety and IND with functional transfers, gait, stairs   Clinical Presentation Stable/Uncomplicated   Clinical Presentation Rationale clinically improving   Clinical Decision Making (Complexity) Low complexity   Therapy  "Frequency` daily   Predicted Duration of Therapy Intervention (days/wks) 4 days   Anticipated Discharge Disposition Transitional Care Facility   Risk & Benefits of therapy have been explained Yes   Patient, Family & other staff in agreement with plan of care Yes   [facility] TM \"6 Clicks\"   2016, Trustees of [facility], under license to [employer].  All rights reserved.   6 Clicks Short Forms Basic Mobility Inpatient Short Form   [facility]  \"6 Clicks\" V.2 Basic Mobility Inpatient Short Form   1. Turning from your back to your side while in a flat bed without using bedrails? 3 - A Little   2. Moving from lying on your back to sitting on the side of a flat bed without using bedrails? 2 - A Lot   3. Moving to and from a bed to a chair (including a wheelchair)? 2 - A Lot   4. Standing up from a chair using your arms (e.g., wheelchair, or bedside chair)? 2 - A Lot   5. To walk in hospital room? 2 - A Lot   6. Climbing 3-5 steps with a railing? 2 - A Lot   Basic Mobility Raw Score (Score out of 24.Lower scores equate to lower levels of function) 13   Total Evaluation Time   Total Evaluation Time (Minutes) 10     "

## 2019-05-25 NOTE — PROGRESS NOTES
"General Surgery Progress Note    Subjective: pt up to chair, reports minimal appetite. Abdominal pain improving.     Objective: /65   Pulse 61   Temp 97.2  F (36.2  C) (Oral)   Resp 16   Ht 1.727 m (5' 8\")   Wt 69.8 kg (153 lb 14.1 oz)   SpO2 95%   BMI 23.40 kg/m    Gen: alert, no distress  CV: RRR  Pulm: nonlabored breathing  Abd: soft, nt, incisions c/d/i  Ext: WWP    Assessment/Plan:   Ivan Gomez  is a 89 year old male s/p laparoscopic cholecystectomy for cholecystitis. Multiple other medical problems. Doing fine from surgical perspective.   - surgery will follow peripherally, call with questions.     Sharon Perez MD  [facility]  [phone]            "

## 2019-05-26 LAB
ALBUMIN SERPL-MCNC: 2.4 G/DL (ref 3.4–5)
ALP SERPL-CCNC: 195 U/L (ref 40–150)
ALT SERPL W P-5'-P-CCNC: 39 U/L (ref 0–70)
ANION GAP SERPL CALCULATED.3IONS-SCNC: 4 MMOL/L (ref 3–14)
AST SERPL W P-5'-P-CCNC: 18 U/L (ref 0–45)
BILIRUB SERPL-MCNC: 1.1 MG/DL (ref 0.2–1.3)
BUN SERPL-MCNC: 44 MG/DL (ref 7–30)
CALCIUM SERPL-MCNC: 7.9 MG/DL (ref 8.5–10.1)
CHLORIDE SERPL-SCNC: 112 MMOL/L (ref 94–109)
CO2 SERPL-SCNC: 22 MMOL/L (ref 20–32)
CREAT SERPL-MCNC: 1.35 MG/DL (ref 0.66–1.25)
GFR SERPL CREATININE-BSD FRML MDRD: 45 ML/MIN/{1.73_M2}
GLUCOSE SERPL-MCNC: 90 MG/DL (ref 70–99)
POTASSIUM SERPL-SCNC: 4.1 MMOL/L (ref 3.4–5.3)
PROT SERPL-MCNC: 5.5 G/DL (ref 6.8–8.8)
SODIUM SERPL-SCNC: 138 MMOL/L (ref 133–144)

## 2019-05-26 PROCEDURE — 25000132 ZZH RX MED GY IP 250 OP 250 PS 637: Performed by: INTERNAL MEDICINE

## 2019-05-26 PROCEDURE — 36415 COLL VENOUS BLD VENIPUNCTURE: CPT | Performed by: INTERNAL MEDICINE

## 2019-05-26 PROCEDURE — 25800029 ZZH RX IP 258 OP 250: Performed by: INTERNAL MEDICINE

## 2019-05-26 PROCEDURE — 12000000 ZZH R&B MED SURG/OB

## 2019-05-26 PROCEDURE — 80053 COMPREHEN METABOLIC PANEL: CPT | Performed by: INTERNAL MEDICINE

## 2019-05-26 PROCEDURE — 99207 ZZC CDG-MDM COMPONENT: MEETS LOW - DOWN CODED: CPT | Performed by: INTERNAL MEDICINE

## 2019-05-26 PROCEDURE — A9270 NON-COVERED ITEM OR SERVICE: HCPCS | Performed by: INTERNAL MEDICINE

## 2019-05-26 PROCEDURE — 99232 SBSQ HOSP IP/OBS MODERATE 35: CPT | Performed by: INTERNAL MEDICINE

## 2019-05-26 PROCEDURE — 99207 ZZC MOONLIGHTING INDICATOR: CPT | Performed by: INTERNAL MEDICINE

## 2019-05-26 RX ORDER — COLESEVELAM 180 1/1
1875 TABLET ORAL 2 TIMES DAILY WITH MEALS
Status: DISCONTINUED | OUTPATIENT
Start: 2019-05-26 | End: 2019-05-28 | Stop reason: HOSPADM

## 2019-05-26 RX ADMIN — PANTOPRAZOLE SODIUM 40 MG: 40 TABLET, DELAYED RELEASE ORAL at 08:27

## 2019-05-26 RX ADMIN — FINASTERIDE 5 MG: 5 TABLET, FILM COATED ORAL at 08:27

## 2019-05-26 RX ADMIN — SODIUM CHLORIDE: 4.5 INJECTION, SOLUTION INTRAVENOUS at 10:34

## 2019-05-26 RX ADMIN — OXYCODONE HYDROCHLORIDE 5 MG: 5 TABLET ORAL at 04:03

## 2019-05-26 RX ADMIN — GABAPENTIN 300 MG: 300 CAPSULE ORAL at 23:29

## 2019-05-26 RX ADMIN — COLESEVELAM HYDROCHLORIDE 1875 MG: 625 TABLET, FILM COATED ORAL at 18:39

## 2019-05-26 RX ADMIN — VANCOMYCIN HYDROCHLORIDE 125 MG: KIT at 19:43

## 2019-05-26 RX ADMIN — DEXTRAN 70, AND HYPROMELLOSE 2910 2 DROP: 1; 3 SOLUTION/ DROPS OPHTHALMIC at 23:32

## 2019-05-26 RX ADMIN — SODIUM CHLORIDE: 4.5 INJECTION, SOLUTION INTRAVENOUS at 23:28

## 2019-05-26 RX ADMIN — DEXTRAN 70, AND HYPROMELLOSE 2910 2 DROP: 1; 3 SOLUTION/ DROPS OPHTHALMIC at 18:40

## 2019-05-26 RX ADMIN — MULTIPLE VITAMINS W/ MINERALS TAB 1 TABLET: TAB at 08:27

## 2019-05-26 RX ADMIN — GABAPENTIN 300 MG: 300 CAPSULE ORAL at 16:17

## 2019-05-26 RX ADMIN — VANCOMYCIN HYDROCHLORIDE 125 MG: KIT at 13:47

## 2019-05-26 RX ADMIN — GABAPENTIN 300 MG: 300 CAPSULE ORAL at 08:27

## 2019-05-26 RX ADMIN — SIMVASTATIN 20 MG: 20 TABLET, FILM COATED ORAL at 23:30

## 2019-05-26 RX ADMIN — DEXTRAN 70, AND HYPROMELLOSE 2910 2 DROP: 1; 3 SOLUTION/ DROPS OPHTHALMIC at 13:47

## 2019-05-26 RX ADMIN — ISOSORBIDE DINITRATE 20 MG: 20 TABLET ORAL at 08:27

## 2019-05-26 RX ADMIN — TAMSULOSIN HYDROCHLORIDE 0.4 MG: 0.4 CAPSULE ORAL at 08:27

## 2019-05-26 RX ADMIN — VANCOMYCIN HYDROCHLORIDE 125 MG: KIT at 23:30

## 2019-05-26 RX ADMIN — DEXTRAN 70, AND HYPROMELLOSE 2910 2 DROP: 1; 3 SOLUTION/ DROPS OPHTHALMIC at 08:28

## 2019-05-26 RX ADMIN — VANCOMYCIN HYDROCHLORIDE 125 MG: KIT at 08:27

## 2019-05-26 RX ADMIN — OXYCODONE HYDROCHLORIDE 5 MG: 5 TABLET ORAL at 19:59

## 2019-05-26 RX ADMIN — ISOSORBIDE DINITRATE 20 MG: 20 TABLET ORAL at 16:17

## 2019-05-26 ASSESSMENT — ACTIVITIES OF DAILY LIVING (ADL)
ADLS_ACUITY_SCORE: 17

## 2019-05-26 NOTE — PLAN OF CARE
Pt up in room with assist of one and walker, bowel movements have slowed down, continues to be incontinent at times, good urine output, denies pain, tolerates diet, appetite fair, needs help to order meals, held blood pressure medications due to lower blood pressures, lungs clear but diminished, O2 sats mid 90's on room air

## 2019-05-26 NOTE — PROGRESS NOTES
[facility]  Gastroenterology Progress Note     Ivan Gomez MRN# [id number]   YOB: 1926 Age: 89 year old          Assessment and Plan:     Acute cholecystitis    Fecal incontinence    Bladder mass  Patient is sitting up patient is clinically doing better patient is tolerating full liquid to soft food patient is currently drinking.  Patient was diagnosed with C. difficile due to his chronic diarrhea and fecal incontinence patient is still complaining of some fecal incontinence.  This point I will recommend to continue on current treatment for both esophagitis and C. difficile.  Patient developed pill esophagitis which is contributing into symptoms of dysphagia and odynophagia.  Continue on supportive care.  I will recommend to add WelChol along with current treatment which will help with his bile acid component which may be contributing into his diarrhea since his cholecystectomy.         Data Unavailable      Interval History:   doing well; no cp, sob, n/v/d, or abd pain.              Review of Systems:   C: NEGATIVE for fever, chills, change in weight  E/M: NEGATIVE for ear, mouth and throat problems  R: NEGATIVE for significant cough or SOB  CV: NEGATIVE for chest pain, palpitations or peripheral edema             Medications:   I have reviewed this patient's current medications    carvedilol  6.25 mg Oral BID w/meals     finasteride  5 mg Oral Daily     gabapentin  300 mg Oral TID     hydrALAZINE  25 mg Oral TID     hypromellose-dextran  2 drop Left Eye 4x Daily     isosorbide dinitrate  20 mg Oral BID     multivitamin w/minerals  1 tablet Oral Daily     pantoprazole  40 mg Oral Daily     simvastatin  20 mg Oral At Bedtime     tamsulosin  0.4 mg Oral Daily     vancomycin  125 mg Oral 4x Daily                  Physical Exam:   Vitals were reviewed  Vital Signs with Ranges  Temp:  [97.2  F (36.2  C)-97.7  F (36.5  C)] 97.7  F (36.5  C)  Pulse:  [59-62] 62  Heart Rate:  [61-64]  64  Resp:  [16] 16  BP: ()/(55-71) 95/59  SpO2:  [95 %-98 %] 95 %  I/O last 3 completed shifts:  In: 2742 [P.O.:1080; I.V.:1662]  Out: 650 [Urine:650]  Constitutional: healthy, alert and no distress   Cardiovascular: negative, PMI normal. No lifts, heaves, or thrills. RRR. No murmurs, clicks gallops or rub  Respiratory: negative, Percussion normal. Good diaphragmatic excursion. Lungs clear  Head: Normocephalic. No masses, lesions, tenderness or abnormalities  Neck: Neck supple. No adenopathy. Thyroid symmetric, normal size,, Carotids without bruits.  Abdomen: Abdomen soft, non-tender. BS normal. No masses, organomegaly  SKIN: no suspicious lesions or rashes           Data:   I reviewed the patient's new clinical lab test results.   Recent Labs   Lab Test 05/24/19  0729 05/23/19  0656 05/22/19  0935  10/16/17  0402 10/15/17  0420 10/14/17  1018   WBC 5.3 6.6 3.1*   < > 6.7 7.2 10.4   HGB 10.6* 11.3* 10.9*   < > 11.4* 12.1* 13.3   MCV 93 95 95   < > 94 96 98   PLT 62* 77* 75*   < > 116* 108* 111*   INR  --   --   --   --  1.15* 1.15* 1.17*    < > = values in this interval not displayed.     Recent Labs   Lab Test 05/26/19  0823 05/24/19  0729 05/23/19  0656   POTASSIUM 4.1 4.5 4.8   CHLORIDE 112* 111* 116*   CO2 22 20 22   BUN 44* 55* 54*   ANIONGAP 4 8 8     Recent Labs   Lab Test 05/26/19  0823 05/23/19  0656 05/22/19  0935  11/18/17  1303  08/24/16  1331 08/04/16  1502 03/22/16  1011  11/13/15  1055   ALBUMIN 2.4* 2.5* 2.6*   < > 3.0*   < >  --  3.7  --    < >  --    BILITOTAL 1.1 1.7* 2.3*   < > 0.5   < >  --  0.8  --    < >  --    ALT 39 58 58   < > 17   < >  --  27  --    < >  --    AST 18 39 23   < > 13   < >  --  20  --    < >  --    PROTEIN  --   --   --   --   --   --  30*  --  Negative  --  100*   LIPASE  --   --   --   --  100  --   --  160  --   --   --     < > = values in this interval not displayed.       I reviewed the patient's new imaging results.    All laboratory data reviewed  All imaging  studies reviewed by me.    Abe Jang MD,  5/26/2019  [facility]  Office : [phone]  Cell: [phone]

## 2019-05-26 NOTE — CONSULTS
Care Transition Initial Assessment -      Met with: Patient    Active Problems:    Acute cholecystitis    Fecal incontinence    Bladder mass       DATA  Lives With: other relative(s), sibling(s)   Living Arrangements: house  Quality of Family Relationships: helpful, involved  Description of Support System: Supportive, Involved  Who is your support system?: Significant Other, Sibling(s), Other (specify)  Support Assessment: Adequate family and caregiver support  Identified issues/concerns regarding health management:     Quality of Family Relationships: helpful, involved  Transportation Anticipated: family or friend will provide    Per care transitions consult for TCU placement on discharge.  Patient was admitted on 5/21/2019 after experiencing difficulty swallowing and eating and having pain in his side.  Patient lives in [place] and was in [place], [place] visiting his significant other, Jerri, when he started to not feel well.  Patient was first seen in [place], and then transferred to [facility] for ongoing care. Patient was diagnosed with acute cholecystitis and had a cholecystectomy on 5/22.  Patient has a medical history of stage III kidney disease, bladder cancer, gout, narcotic dependence, hypertension, bladder reconstruction x 2.  Patient lived in [place] for sixteen years with Jerri.  Patient reported that he moved to [place] last June and currently lives with his brother and nephew.  Per therapy note, patient's discharge recommendation is to a TC to progress functional strength, activity tolerance, safety, and independence in [place] prior to returning home to [place].  Patient is aware of, and in agreement with the recommendation to discharge to a TCU. Patient reported that he has previously been at [facility] would be his first choice.  Patient also informed social work that patient would prefer to stay in the vicinity near or around [place]. Referrals for TCU placement were sent to [facility].    ASSESSMENT  Cognitive Status:  awake and disoriented  Concerns to be addressed: Discharge planning, TCU placement on discharge.     PLAN  Financial costs for the patient includes: private room amenity fees if applicable.  Patient given options and choices for discharge: yes, TCU choices.  Patient/family is agreeable to the plan?  Yes  Patient Goals and Preferences: TCU placement on discharge.  Patient anticipates discharging to: TCU.    Social work will confirm bed and continue to monitor and follow for a safe, discharge plan.

## 2019-05-26 NOTE — PLAN OF CARE
SLP - Attempted to see pt this pm for swallow Tx; however, pt politely declined po trials.  Will continue to follow.

## 2019-05-26 NOTE — PROGRESS NOTES
[facility]    Medicine Progress Note - Hospitalist Service       Date of Admission:  5/21/2019  Assessment & Plan   Ivan Gomez is a 89-year-old male with history of hypertension, hyperlipidemia, chronic kidney disease stage III, chronic pain syndrome, gout, history of bladder cancer status post resection and bladder reconstruction x 2, narcotic dependence, mitral regurgitation status post mitral valve clips, and chronic anemia [facility] of abdominal pain and nausea secondary to acute cholecystitis.     Acute cholecystitis status post cholecystectomy 5/22. Negative cholangiogram 5/22 during surgery.  -appreciate management by GI and surgery  -completed course of Cipro and Flagyl  -continue Tylenol and IV dilaudid PRN for pain  -continue nausea medications  -diet per surgery and GI    Dysphagia secondary to esophagitis. Nausea after surgery and as of 5/24 not able to take any pills because of pain. EGD showed esophagitis likely from a pill with local erosion.  -appreciate GI assistance  -PRN carafate and lidocaine ordered by GI  -change IVF to 0.45% NS at 75 ml/hr until oral intake improves  -mechanical soft diet ordered by GI  -speech therapy assisting but patient to somnolent to swallow well for testing currently     Diarrhea with incontinence of stool:  Symptoms for 1 month and never sought any medical attention.  -appreciate GI assistance  -follow up stool studies for enteric pathogens, C. Diff, O&P and fat stool qualitative random collection   -c diff positive, on oral vanc now    3.4 cm left bladder mass:  The patient has a history of bladder cancer.  He has a resection and bladder reconstruction x 2 in the past. Last Surgery per patient was in 11/2018 at the [facility].    -urology evaluation here appreciated with recommendation to just follow up with his primary urologist and oncologist for ongoing management as  outpatient    Chronic kidney disease, stage III. Baseline creatinine 1.4 to 1.6.  -creatinine stable at baseline  -noted mild leg edema developing with fluids  -reduced fluids to 75 mL/hr and continue to monitor closely    Hypertension.   -controlled  -continue home metoprolol and hydralazine    Hyperlipidemia.  -continue home Zocor     Moderate to severe mitral regurgitation, status post MitraClip placement in the past.  He has significant mitral and tricuspid regurgitation on his last echo.   -repeat Echo pending   -follow fluid status and plan to discontinue IV fluids tomorrow if intake improves and sodium normalizes    BPH:    -continue home Proscar and Flomax     Chronic Anemia and thrombocytopenia:  History of B12 deficiency as he has been getting some B12 from his oncologist.  Hemoglobin was 10.9 at admission.  No bleeding from anywhere.  -stable counts during hospital stay    Post operative hypoxia with acute hypoxic respiratory failure - resolved. No aspiration noted during surgery but in PACU developed shortness of breath and respiratory distress requiring BIPAP. Chest x-ray with bilateral atelectasis versus infiltrate with normal WBC and afebrile favoring atelectasis.  -encourage incentive spirometry    Hypernatremia - resolved. Likely iatrogenic from saline.  -resolved with D5W IV fluids  -encourage fluids      Diet: Snacks/Supplements Adult: Boost Plus; Between Meals  Room Service  Mechanical/Dental Soft Diet No Straws    DVT Prophylaxis: Pneumatic Compression Devices  Velasquez Catheter: not present  Code Status: Full Code      Disposition Plan   Expected discharge: 1-2 days, recommended to transitional care unit once adequate pain management/ tolerating PO medications and tolerating diet.  Entered: Nakul Parks MD 05/26/2019, 10:44 AM     The patient's care was discussed with the Bedside Nurse, Patient and Surgical Consultant.    Nakul Parks MD  Hospitalist Service  [facility]    ______________________________________________________________________    Interval History   VSS, walking, doing fine from pain standpoint. Has cdiff, on vanc.     Data reviewed today: I reviewed all medications, new labs and imaging results over the last 24 hours. I personally reviewed no images or EKG's today.    Physical Exam   Vital Signs: Temp: 97.6  F (36.4  C) Temp src: Oral BP: 94/55 Pulse: 59 Heart Rate: 64 Resp: 16 SpO2: 95 % O2 Device: None (Room air)    Weight: 153 lbs 14.1 oz  Constitutional: Arrousable, following commands, no acute distress  Respiratory: Clear to auscultation bilaterally, no crackles or wheezing  Cardiovascular: Regular rate and rhythm, normal S1 and S2, and 3/6 systolic murmur noted throughout precordium  GI: Normal bowel sounds, soft, non-distended, mild right upper quadrant tenderness  Skin/Integumen: No rashes, no cyanosis, 1+ leg edema  Other:    Data   Recent Labs   Lab 05/26/19  0823 05/24/19  0729 05/23/19  0656 05/22/19  0935   WBC  --  5.3 6.6 3.1*   HGB  --  10.6* 11.3* 10.9*   MCV  --  93 95 95   PLT  --  62* 77* 75*    139 146* 147*   POTASSIUM 4.1 4.5 4.8 4.5   CHLORIDE 112* 111* 116* 117*   CO2 22 20 22 22   BUN 44* 55* 54* 49*   CR 1.35* 1.59* 1.75* 1.53*   ANIONGAP 4 8 8 8   GIUSEPPE 7.9* 8.0* 8.5 8.4*   GLC 90 149* 105* 83   ALBUMIN 2.4*  --  2.5* 2.6*   PROTTOTAL 5.5*  --  5.8* 6.0*   BILITOTAL 1.1  --  1.7* 2.3*   ALKPHOS 195*  --  259* 298*   ALT 39  --  58 58   AST 18  --  39 23     No results found for this or any previous visit (from the past 24 hour(s)).  Medications     - MEDICATION INSTRUCTIONS -       NaCl 70 mL/hr at 05/26/19 1034       carvedilol  6.25 mg Oral BID w/meals     finasteride  5 mg Oral Daily     gabapentin  300 mg Oral TID     hydrALAZINE  25 mg Oral TID     hypromellose-dextran  2 drop Left Eye 4x Daily     isosorbide dinitrate  20 mg Oral BID     multivitamin w/minerals  1 tablet Oral Daily     pantoprazole  40 mg Oral Daily      simvastatin  20 mg Oral At Bedtime     tamsulosin  0.4 mg Oral Daily     vancomycin  125 mg Oral 4x Daily

## 2019-05-27 LAB
O+P STL MICRO: NORMAL
O+P STL MICRO: NORMAL
SPECIMEN SOURCE: NORMAL

## 2019-05-27 PROCEDURE — 25000132 ZZH RX MED GY IP 250 OP 250 PS 637: Performed by: INTERNAL MEDICINE

## 2019-05-27 PROCEDURE — A9270 NON-COVERED ITEM OR SERVICE: HCPCS | Performed by: INTERNAL MEDICINE

## 2019-05-27 PROCEDURE — 25800029 ZZH RX IP 258 OP 250: Performed by: INTERNAL MEDICINE

## 2019-05-27 PROCEDURE — 12000000 ZZH R&B MED SURG/OB

## 2019-05-27 PROCEDURE — 99232 SBSQ HOSP IP/OBS MODERATE 35: CPT | Performed by: INTERNAL MEDICINE

## 2019-05-27 RX ADMIN — COLESEVELAM HYDROCHLORIDE 1875 MG: 625 TABLET, FILM COATED ORAL at 08:06

## 2019-05-27 RX ADMIN — PANTOPRAZOLE SODIUM 40 MG: 40 TABLET, DELAYED RELEASE ORAL at 08:06

## 2019-05-27 RX ADMIN — HYDRALAZINE HYDROCHLORIDE 25 MG: 25 TABLET ORAL at 16:31

## 2019-05-27 RX ADMIN — ACETAMINOPHEN 650 MG: 325 TABLET, FILM COATED ORAL at 13:36

## 2019-05-27 RX ADMIN — VANCOMYCIN HYDROCHLORIDE 125 MG: KIT at 21:21

## 2019-05-27 RX ADMIN — HYDRALAZINE HYDROCHLORIDE 25 MG: 25 TABLET ORAL at 08:06

## 2019-05-27 RX ADMIN — TAMSULOSIN HYDROCHLORIDE 0.4 MG: 0.4 CAPSULE ORAL at 08:06

## 2019-05-27 RX ADMIN — DEXTRAN 70, AND HYPROMELLOSE 2910 2 DROP: 1; 3 SOLUTION/ DROPS OPHTHALMIC at 13:36

## 2019-05-27 RX ADMIN — FINASTERIDE 5 MG: 5 TABLET, FILM COATED ORAL at 08:06

## 2019-05-27 RX ADMIN — ISOSORBIDE DINITRATE 20 MG: 20 TABLET ORAL at 16:31

## 2019-05-27 RX ADMIN — GABAPENTIN 300 MG: 300 CAPSULE ORAL at 16:31

## 2019-05-27 RX ADMIN — ISOSORBIDE DINITRATE 20 MG: 20 TABLET ORAL at 08:06

## 2019-05-27 RX ADMIN — HYDRALAZINE HYDROCHLORIDE 25 MG: 25 TABLET ORAL at 21:28

## 2019-05-27 RX ADMIN — CARVEDILOL 6.25 MG: 3.12 TABLET, FILM COATED ORAL at 08:06

## 2019-05-27 RX ADMIN — SODIUM CHLORIDE: 4.5 INJECTION, SOLUTION INTRAVENOUS at 13:37

## 2019-05-27 RX ADMIN — OXYCODONE HYDROCHLORIDE 5 MG: 5 TABLET ORAL at 21:22

## 2019-05-27 RX ADMIN — DEXTRAN 70, AND HYPROMELLOSE 2910 2 DROP: 1; 3 SOLUTION/ DROPS OPHTHALMIC at 18:38

## 2019-05-27 RX ADMIN — VANCOMYCIN HYDROCHLORIDE 125 MG: KIT at 13:36

## 2019-05-27 RX ADMIN — GABAPENTIN 300 MG: 300 CAPSULE ORAL at 21:30

## 2019-05-27 RX ADMIN — VANCOMYCIN HYDROCHLORIDE 125 MG: KIT at 08:06

## 2019-05-27 RX ADMIN — CARVEDILOL 6.25 MG: 3.12 TABLET, FILM COATED ORAL at 18:26

## 2019-05-27 RX ADMIN — DEXTRAN 70, AND HYPROMELLOSE 2910 2 DROP: 1; 3 SOLUTION/ DROPS OPHTHALMIC at 08:07

## 2019-05-27 RX ADMIN — MULTIPLE VITAMINS W/ MINERALS TAB 1 TABLET: TAB at 08:06

## 2019-05-27 RX ADMIN — SIMVASTATIN 20 MG: 20 TABLET, FILM COATED ORAL at 21:23

## 2019-05-27 RX ADMIN — COLESEVELAM HYDROCHLORIDE 1875 MG: 625 TABLET, FILM COATED ORAL at 18:32

## 2019-05-27 RX ADMIN — GABAPENTIN 300 MG: 300 CAPSULE ORAL at 08:06

## 2019-05-27 RX ADMIN — VANCOMYCIN HYDROCHLORIDE 125 MG: KIT at 18:32

## 2019-05-27 RX ADMIN — OXYCODONE HYDROCHLORIDE 5 MG: 5 TABLET ORAL at 18:23

## 2019-05-27 RX ADMIN — DEXTRAN 70, AND HYPROMELLOSE 2910 2 DROP: 1; 3 SOLUTION/ DROPS OPHTHALMIC at 21:30

## 2019-05-27 ASSESSMENT — ACTIVITIES OF DAILY LIVING (ADL)
ADLS_ACUITY_SCORE: 17

## 2019-05-27 NOTE — PLAN OF CARE
SLP - Attempted to see pt this pm for swallow Tx; however, pt politely declined po trials.  Will re-attempt during a meal time 5/28.

## 2019-05-27 NOTE — PLAN OF CARE
Pt up with assist of one and walker in room, generalized weakness noted, tolerates diet, fair appetite, continues to have loose stools, incontinent at times, lungs clear but diminished, O2 sats mid 90's on room air, abd tender, incisions clean dry and intact, foam dressing intact on back and coccyx, did take tylenol one time this shift for headache.

## 2019-05-27 NOTE — PROGRESS NOTES
[facility]    Medicine Progress Note - Hospitalist Service       Date of Admission:  5/21/2019   Assessment & Plan   Ivan Gomez is a 89-year-old male with history of hypertension, hyperlipidemia, chronic kidney disease stage III, chronic pain syndrome, gout, history of bladder cancer status post resection and bladder reconstruction x 2, narcotic dependence, mitral regurgitation status post mitral valve clips, and chronic anemia who was transferred here from outlying hospital because of abdominal pain and nausea secondary to acute cholecystitis.      Acute cholecystitis status post cholecystectomy 5/22 Negative cholangiogram 5/22 during surgery.  - Completed course of Cipro and Flagyl  - Appreciate management by GI and surgery     Dysphagia secondary to esophagitis  Nausea after surgery and as of 5/24 not able to take any pills because of pain. EGD showed esophagitis likely from a pill with local erosion.  Symptoms are improving.   - PRN carafate and lidocaine ordered by GI  - Mechanical soft diet ordered by GI  - Speech therapy assisting but patient to somnolent to swallow well for testing currently      C Diff colitis   Symptoms for 1 month and never sought any medical attention.  C Diff positive. Patient believes his diarrhea is slowly improving   - Continue PO Vancomycin      3.4 cm left bladder mass  The patient has a history of bladder cancer.  He has a resection and bladder reconstruction x 2 in the past. Last Surgery per patient was in 11/2018 at the [facility].    - Urology evaluation here appreciated with recommendation to just follow up with his primary urologist and oncologist for ongoing management as outpatient     Chronic kidney disease, stage III  Baseline creatinine 1.4 to 1.6.     Hypertension  Controlled  - Continue home metoprolol and hydralazine     Hyperlipidemia  - Continue home Zocor      Moderate to severe mitral regurgitation, status post MitraClip  placement in the past  He has significant mitral and tricuspid regurgitation on his last echo.     Echocardiogram this hospitalization showed Mitraclip in place with severe MR.  EF >70%.    - IVF discontinued      BPH    - Continue home Proscar and Flomax      Chronic Anemia and thrombocytopenia  History of B12 deficiency as he has been getting some B12 from his oncologist.  Hemoglobin was 10.9 at admission.  No bleeding from anywhere.  - Stable counts during hospital stay     Post operative hypoxia with acute hypoxic respiratory failure.  Resolved  No aspiration noted during surgery but in PACU developed shortness of breath and respiratory distress requiring BIPAP. Chest x-ray with bilateral atelectasis versus infiltrate with normal WBC and afebrile favoring atelectasis.  - Encourage incentive spirometry     Hypernatremia. Resolved  Likely iatrogenic from saline.  Resolved with D5W IV fluids  - Continue to encourage fluids      Diet: Snacks/Supplements Adult: Boost Plus; Between Meals  Room Service  Mechanical/Dental Soft Diet No Straws    DVT Prophylaxis: Pneumatic Compression Devices  Velasquez Catheter: not present  Code Status: Full Code      Disposition Plan   Expected discharge: 1-2 days, recommended to transitional care unit once diarrhea improved and patient tolerating PO.  Entered: Tomasz Shaw DO 05/27/2019, 9:49 AM       The patient's care was discussed with the Bedside Nurse, Care Coordinator/ and Patient.    Tomasz Shaw DO  Hospitalist Service  [facility]    ______________________________________________________________________    Interval History   Patient seen and examined.  Still having issues with diarrhea but believes it is slowing down.  Was able to eat approximately 50% of breakfast without difficulty.  Still having some abdominal pain but it is improved.  No chest pain or SOB.     Data reviewed today: I reviewed all medications, new labs and imaging  results over the last 24 hours. I personally reviewed no images or EKG's today.    Physical Exam   Vital Signs: Temp: 97.3  F (36.3  C) Temp src: Oral BP: 107/68 Pulse: 62 Heart Rate: 66 Resp: 16 SpO2: 96 % O2 Device: None (Room air)    Weight: 153 lbs 14.1 oz  General Appearance: Resting comfortably.  NAD   Respiratory: Clear to auscultation.  No respiratory distress  Cardiovascular: RRR.  No obvious murmurs  GI: Bowel sounds noted.  RUQ tenderness.  Voluntary guarding  Skin: No rashes.  No cyanosis  Other: No edema.  No calf tenderness      Data   Recent Labs   Lab 05/26/19  0823 05/24/19  0729 05/23/19  0656 05/22/19  0935   WBC  --  5.3 6.6 3.1*   HGB  --  10.6* 11.3* 10.9*   MCV  --  93 95 95   PLT  --  62* 77* 75*    139 146* 147*   POTASSIUM 4.1 4.5 4.8 4.5   CHLORIDE 112* 111* 116* 117*   CO2 22 20 22 22   BUN 44* 55* 54* 49*   CR 1.35* 1.59* 1.75* 1.53*   ANIONGAP 4 8 8 8   GIUSEPPE 7.9* 8.0* 8.5 8.4*   GLC 90 149* 105* 83   ALBUMIN 2.4*  --  2.5* 2.6*   PROTTOTAL 5.5*  --  5.8* 6.0*   BILITOTAL 1.1  --  1.7* 2.3*   ALKPHOS 195*  --  259* 298*   ALT 39  --  58 58   AST 18  --  39 23     No results found for this or any previous visit (from the past 24 hour(s)).

## 2019-05-27 NOTE — PLAN OF CARE
VSS except soft BPs, held BP meds. Generalized pain, prn oxycodone given x1. Incisions intact with steri strips. BS active, passing flatus. BMx1 overnight. Voiding per urinal, cam be incontinent at times. LS diminished. IVFs infusing. Assist of 1 with walker. Dental soft diet. Foam dressings on back and coccyx CDI. Lower extremities +2 edema, elevated on pillows.

## 2019-05-28 VITALS
OXYGEN SATURATION: 96 % | SYSTOLIC BLOOD PRESSURE: 111 MMHG | TEMPERATURE: 98 F | HEIGHT: 68 IN | RESPIRATION RATE: 16 BRPM | HEART RATE: 66 BPM | BODY MASS INDEX: 24.78 KG/M2 | DIASTOLIC BLOOD PRESSURE: 65 MMHG | WEIGHT: 163.5 LBS

## 2019-05-28 LAB — COPATH REPORT: NORMAL

## 2019-05-28 PROCEDURE — A9270 NON-COVERED ITEM OR SERVICE: HCPCS | Performed by: INTERNAL MEDICINE

## 2019-05-28 PROCEDURE — 99239 HOSP IP/OBS DSCHRG MGMT >30: CPT | Performed by: INTERNAL MEDICINE

## 2019-05-28 PROCEDURE — 25000132 ZZH RX MED GY IP 250 OP 250 PS 637: Performed by: INTERNAL MEDICINE

## 2019-05-28 RX ORDER — HYDROCODONE BITARTRATE AND ACETAMINOPHEN 7.5; 325 MG/1; MG/1
1 TABLET ORAL EVERY 6 HOURS PRN
Qty: 15 TABLET | Refills: 0 | Status: ON HOLD | OUTPATIENT
Start: 2019-05-28 | End: 2019-07-13

## 2019-05-28 RX ORDER — FUROSEMIDE 20 MG
40 TABLET ORAL DAILY
Qty: 180 TABLET | Refills: 0 | Status: ON HOLD | OUTPATIENT
Start: 2019-05-30 | End: 2019-07-13

## 2019-05-28 RX ORDER — COLESEVELAM 180 1/1
1875 TABLET ORAL 2 TIMES DAILY WITH MEALS
DISCHARGE
Start: 2019-05-28 | End: 2019-06-11

## 2019-05-28 RX ORDER — VANCOMYCIN HYDROCHLORIDE 50 MG/ML
125 KIT ORAL 4 TIMES DAILY
Qty: 60 ML | Refills: 0 | DISCHARGE
Start: 2019-05-28 | End: 2019-06-05

## 2019-05-28 RX ADMIN — ISOSORBIDE DINITRATE 20 MG: 20 TABLET ORAL at 08:40

## 2019-05-28 RX ADMIN — FINASTERIDE 5 MG: 5 TABLET, FILM COATED ORAL at 08:39

## 2019-05-28 RX ADMIN — GABAPENTIN 300 MG: 300 CAPSULE ORAL at 08:39

## 2019-05-28 RX ADMIN — OXYCODONE HYDROCHLORIDE 5 MG: 5 TABLET ORAL at 08:49

## 2019-05-28 RX ADMIN — VANCOMYCIN HYDROCHLORIDE 125 MG: KIT at 08:40

## 2019-05-28 RX ADMIN — DEXTRAN 70, AND HYPROMELLOSE 2910 2 DROP: 1; 3 SOLUTION/ DROPS OPHTHALMIC at 12:51

## 2019-05-28 RX ADMIN — MULTIPLE VITAMINS W/ MINERALS TAB 1 TABLET: TAB at 08:40

## 2019-05-28 RX ADMIN — DEXTRAN 70, AND HYPROMELLOSE 2910 2 DROP: 1; 3 SOLUTION/ DROPS OPHTHALMIC at 08:50

## 2019-05-28 RX ADMIN — COLESEVELAM HYDROCHLORIDE 1875 MG: 625 TABLET, FILM COATED ORAL at 08:39

## 2019-05-28 RX ADMIN — OXYCODONE HYDROCHLORIDE 5 MG: 5 TABLET ORAL at 13:17

## 2019-05-28 RX ADMIN — OXYCODONE HYDROCHLORIDE 5 MG: 5 TABLET ORAL at 01:16

## 2019-05-28 RX ADMIN — TAMSULOSIN HYDROCHLORIDE 0.4 MG: 0.4 CAPSULE ORAL at 08:40

## 2019-05-28 RX ADMIN — PANTOPRAZOLE SODIUM 40 MG: 40 TABLET, DELAYED RELEASE ORAL at 08:39

## 2019-05-28 RX ADMIN — VANCOMYCIN HYDROCHLORIDE 125 MG: KIT at 12:51

## 2019-05-28 ASSESSMENT — MIFFLIN-ST. JEOR: SCORE: 1366.13

## 2019-05-28 ASSESSMENT — ACTIVITIES OF DAILY LIVING (ADL)
ADLS_ACUITY_SCORE: 17

## 2019-05-28 NOTE — DISCHARGE SUMMARY
[facility]  Hospitalist Discharge Summary       Date of Admission:  5/21/2019  Date of Discharge:  5/28/2019  Discharging Provider: Tomasz Shaw DO      Discharge Diagnoses   1. Acute cholecystitis s/p cholecystectomy on 5/22  2. Dysphagia secondary pill esophagitis  3. C diff colitis  4. Bladder mass  5. CKD stage III   6. HTN   7. Moderate to severe mitrla regurgitation s/p MitraClip   8. BPH   9. Chronic anemia and thrombocytopenia  10. Post-operative hypoxia with acute hypoxic respiratory failure   11. Hypernatremia     Follow-ups Needed After Discharge   Follow-up Appointments     Follow Up and recommended labs and tests      Follow up with [facility] physician.  The following labs/tests are   recommended: BMP in 4 or 5 days.  Follow up with PCP 1-2 weeks after TCU discharge  Follow up with GI and surgery as planned             Unresulted Labs Ordered in the Past 30 Days of this Admission     Date and Time Order Name Status Description    5/24/2019 1216 Surgical pathology exam In process       These results will be followed up by PCP and surgery    Discharge Disposition   Discharged to short-term care facility  Condition at discharge: Stable    Hospital Course   Acute cholecystitis status post [facility]cyst[facility] and nausea secondary to acute cholecystitis.  Negative cholangiogram 5/22 during surgery.  - Completed course of Cipro and Flagyl  - Appreciate management by GI and surgery     Dysphagia secondary to esophagitis  Nausea after surgery and as of 5/24 not able to take any pills because of pain. EGD showed esophagitis likely from a pill with local erosion.  Symptoms are improving.   - PRN carafate and lidocaine ordered by GI  - Mechanical soft diet ordered by GI     C Diff colitis   Symptoms for 1 month and never sought any medical attention.  C Diff positive. Patient believes his diarrhea is slowly improving.  By  discharge his stools had been more formed   - Continue PO Vancomycin      3.4 cm left bladder mass  The patient has a history of bladder cancer.  He has a resection and bladder reconstruction x 2 in the past. Last Surgery per patient was in 11/2018 at the [facility].    - Urology evaluation here appreciated with recommendation to just follow up with his primary urologist and oncologist for ongoing management as outpatient     Chronic kidney disease, stage III  Baseline creatinine 1.4 to 1.6.     Hypertension  Controlled  - Continue home metoprolol and hydralazine     Hyperlipidemia  - Continue home Zocor      Moderate to severe mitral regurgitation, status post MitraClip placement in the past  He has significant mitral and tricuspid regurgitation on his last echo.     Echocardiogram this hospitalization showed Mitraclip in place with severe MR.  EF >70%.    - IVF discontinued      BPH    - Continue home Proscar and Flomax      Chronic Anemia and thrombocytopenia  History of B12 deficiency as he has been getting some B12 from his oncologist.  Hemoglobin was 10.9 at admission.  No bleeding from anywhere.  - Stable counts during hospital stay     Post operative hypoxia with acute hypoxic respiratory failure.  Resolved  No aspiration noted during surgery but in PACU developed shortness of breath and respiratory distress requiring BIPAP. Chest x-ray with bilateral atelectasis versus infiltrate with normal WBC and afebrile favoring atelectasis.  - Encourage incentive spirometry     Hypernatremia. Resolved  Likely iatrogenic from saline.  Resolved with D5W IV fluids  - Continue to encourage fluids      Consultations This Hospital Stay   SURGERY GENERAL IP CONSULT  PHYSICAL THERAPY ADULT IP CONSULT  OCCUPATIONAL THERAPY ADULT IP CONSULT  GASTROENTEROLOGY IP CONSULT  UROLOGY IP CONSULT  NUTRITION SERVICES ADULT IP CONSULT  SOCIAL WORK IP CONSULT  SOCIAL WORK IP CONSULT  SWALLOW EVAL SPEECH PATH AT BEDSIDE IP  CONSULT  CARE TRANSITION RN/SW IP CONSULT  SOCIAL WORK IP CONSULT  PHYSICAL THERAPY ADULT IP CONSULT  OCCUPATIONAL THERAPY ADULT IP CONSULT  SPEECH LANGUAGE PATH ADULT IP CONSULT    Code Status   Full Code    Time Spent on this Encounter   I, Tomasz Shaw, personally saw the patient today and spent greater than 30 minutes discharging this patient.       Tomasz Shaw, DO  [facility]  ______________________________________________________________________    Physical Exam   Vital Signs: Temp: 98  F (36.7  C) Temp src: Oral BP: 111/65 Pulse: 66 Heart Rate: 69 Resp: 16 SpO2: 96 % O2 Device: None (Room air)    Weight: 163 lbs 8 oz  General Appearance: Resting comfortably.  NAD   Respiratory: Clear to auscultation.  No respiratory distress  Cardiovascular: RRR.  No murmurs  GI: Bowel sounds present.  Non-tender  Skin: No rashes.  No cyanosis  Other: No edema        Primary Care Physician   Evaristo Magaña    Discharge Orders      General info for SNF    Length of Stay Estimate: Short Term Care: Estimated # of Days <30  Condition at Discharge: Improving  Level of care:skilled   Rehabilitation Potential: Good  Admission H&P remains valid and up-to-date: Yes  Recent Chemotherapy: N/A  Use Nursing Home Standing Orders: Yes     Mantoux instructions    Give two-step Mantoux (PPD) Per Facility Policy Yes     Reason for your hospital stay    Acute cholecystitis     Follow Up and recommended labs and tests    Follow up with [facility] physician.  The following labs/tests are recommended: BMP in 4 or 5 days.  Follow up with PCP 1-2 weeks after TCU discharge  Follow up with GI and surgery as planned     Activity - Up ad marcy     Physical Therapy Adult Consult    Evaluate and treat as clinically indicated.    Reason:  Deconditioning     Occupational Therapy Adult Consult    Evaluate and treat as clinically indicated.    Reason:  Deconditioning     Speech Language Path Adult Consult    Evaluate and  treat as clinically indicated.    Reason:  Pill esophagitis     Advance Diet as Tolerated    Follow this diet upon discharge: Orders Placed This Encounter      Snacks/Supplements Adult: Boost Plus; Between Meals      Room Service      Mechanical/Dental Soft Diet No Straws       Significant Results and Procedures   Most Recent 3 CBC's:  Recent Labs   Lab Test 05/24/19  0729 05/23/19  0656 05/22/19  0935   WBC 5.3 6.6 3.1*   HGB 10.6* 11.3* 10.9*   MCV 93 95 95   PLT 62* 77* 75*     Most Recent 3 BMP's:  Recent Labs   Lab Test 05/26/19  0823 05/24/19  0729 05/23/19  0656    139 146*   POTASSIUM 4.1 4.5 4.8   CHLORIDE 112* 111* 116*   CO2 22 20 22   BUN 44* 55* 54*   CR 1.35* 1.59* 1.75*   ANIONGAP 4 8 8   GIUSEPPE 7.9* 8.0* 8.5   GLC 90 149* 105*   ,   Results for orders placed or performed during the hospital encounter of 05/21/19   XR Surgery JALEN L/T 5 Min Fluoro w Stills    Narrative    SURGERY C-ARM FLUOROSCOPY LESS THAN FIVE MINUTES WITH STILLS   5/22/2019 3:15 PM     COMPARISON: None.    HISTORY: Cholangiogram.    NUMBER OF IMAGES ACQUIRED: 1    VIEWS: 1    FLUOROSCOPY TIME: 0.3 minute(s)      Impression    IMPRESSION: Cholangiogram. A single image demonstrates no filling  defects or stones in the common duct.    BARBARA YOU MD   XR Chest Port 1 View    Narrative    CHEST ONE VIEW UPRIGHT 5/23/2019 8:44 AM     HISTORY: Post operative hypoxic respiratory failure.    COMPARISON: November 3, 2018      Impression    IMPRESSION: Decreased lung volumes and bibasilar atelectasis and/or  infiltrate.     EDIS ROJO MD       Discharge Medications   Current Discharge Medication List      START taking these medications    Details   colesevelam (WELCHOL) 625 MG tablet Take 3 tablets (1,875 mg) by mouth 2 times daily (with meals)    Associated Diagnoses: C. difficile colitis      vancomycin (FIRVANQ) 50 MG/ML oral solution Take 2.5 mLs (125 mg) by mouth 4 times daily for 6 days  Qty: 60 mL, Refills: 0    Associated  Diagnoses: C. difficile colitis         CONTINUE these medications which have CHANGED    Details   furosemide (LASIX) 20 MG tablet Take 2 tablets (40 mg) by mouth daily  Qty: 180 tablet, Refills: 0    Comments: Restart on 5/30/19  Associated Diagnoses: Chronic diastolic heart failure (H)      HYDROcodone-acetaminophen (NORCO) 7.5-325 MG per tablet Take 1 tablet by mouth every 6 hours as needed for pain maximum 4 tablet(s) per day  Qty: 15 tablet, Refills: 0    Comments: Future refills by PCP Dr. Evaristo Magaña with phone number [phone].  Associated Diagnoses: Spinal stenosis of lumbar region, unspecified whether neurogenic claudication present         CONTINUE these medications which have NOT CHANGED    Details   carvedilol (COREG) 6.25 MG tablet Take 6.25 mg by mouth 2 times daily (with meals)      finasteride (PROSCAR) 5 MG tablet TAKE 1 TABLET(5 MG) BY MOUTH DAILY  Qty: 90 tablet, Refills: 3    Associated Diagnoses: Benign enlargement of prostate      gabapentin (NEURONTIN) 300 MG capsule TAKE 1 CAPSULE BY MOUTH THREE TIMES DAILY  Qty: 270 capsule, Refills: 0    Associated Diagnoses: Spinal stenosis of lumbar region with neurogenic claudication      hydrALAZINE (APRESOLINE) 25 MG tablet Take 1 tablet (25 mg) by mouth 3 times daily  Qty: 270 tablet, Refills: 1    Associated Diagnoses: Acute on chronic heart failure, unspecified heart failure type (H)      hypromellose (ARTIFICIAL TEARS) 0.4 % SOLN ophthalmic solution Place 2 drops Into the left eye 4 times daily      isosorbide dinitrate (ISORDIL) 20 MG tablet Take 20 mg by mouth 2 times daily      pantoprazole (PROTONIX) 40 MG EC tablet Take 40 mg by mouth daily      potassium chloride ER (K-DUR/KLOR-CON M) 10 MEQ CR tablet Take 10 mEq by mouth daily      simvastatin (ZOCOR) 20 MG tablet TAKE ONE TABLET BY MOUTH AT BEDTIME  Qty: 30 tablet, Refills: 0    Associated Diagnoses: Hyperlipidemia LDL goal <100      tamsulosin (FLOMAX) 0.4 MG capsule TAKE ONE  CAPSULE BY MOUTH DAILY  Qty: 90 capsule, Refills: 3    Associated Diagnoses: Benign prostatic hyperplasia without lower urinary tract symptoms         STOP taking these medications       aspirin EC 81 MG EC tablet Comments:   Reason for Stopping:             Allergies   Allergies   Allergen Reactions     Celebrex [Celecoxib] Hives     Penicillins Hives

## 2019-05-28 NOTE — PLAN OF CARE
SLP - Patient politely declined po trials during session attempt, but did request a lunch orders.  Lunch order placed for patient.  Will continue to follow.

## 2019-05-28 NOTE — PLAN OF CARE
Physical Therapy Discharge Summary    Reason for therapy discharge:    Discharged to transitional care facility.    Progress towards therapy goal(s). See goals on Care Plan in [facility] electronic health record for goal details.  Goals not met.  Barriers to achieving goals:   discharge from facility.    Therapy recommendation(s):    Continued therapy is recommended.  Rationale/Recommendations: Per treating therapist's recommendation: Pt would benefit from continued skilled PT services to progress functional strength, activity tolerance, safety and IND with functional mobility prior to returning to community setting in [place].

## 2019-05-28 NOTE — PROGRESS NOTES
SW:    I: DANNY following for discharge planning. Pt accepted at [facility], which is his first preference. SW cancelled referral to [facility] TCU. SW to fax discharge orders when available to [facility] admissions [phone]. [facility] likely to have transport aide available today, time pending confirmation that pt is medically appropriate for discharge today.    P: Pt to discharge to [facility] TCU via transport aide pending medical clearance.    PAS-RR    D: Per DHS regulation, SW completed and submitted PAS-RR to [place] [employer] via the Senior [employer] Line.  PAS-RR confirmation # is : [id number]    I: DANNY spoke with pt and they are aware a PAS-RR has been submitted.  DANNY reviewed with pt that they may be contacted for a follow up appointment within 10 days of hospital discharge if their SNF stay is < 30 days.  Contact information for [facility] was also provided.    A: Pt verbalized understanding.    P: Further questions may be directed to [facility] at #[phone], option #4 for PAS-RR staff.    ADDENDUM: Pt medically appropriate for discharge today 5/28/19. SW to fax discharge orders when available to [phone].    1350: Orders sent to facility admissions via DOD, hard script faxed. [facility] transport aide picking up pt at 1430.    NATASHA Hogan, Health system  Daytime (8:00am-4:30pm): [phone]  After-Hours DANNY Pager (4:30pm-11:30pm): [phone]

## 2019-05-28 NOTE — PLAN OF CARE
Pt. A & O x 4; forgetful.  Contact & enteric precautions maintained.  Assist x 1 w/ walker.  LS diminished.  BM++ loose.  Mepilex on coccyx & back.  Mechanical soft diet & no straws.  Pain managed w/ oxy x 2.  Incisions CDI.  [facility] to  pt. At 1430.  Packet prepared for [facility].  Will give report to nurse.  Eye drops sent with pt's belongings.

## 2019-05-28 NOTE — PLAN OF CARE
A&O, VSS. Lung sounds diminished. Bowel sounds active, +2 loose BM.  Adequate urine output. Incisions SUKHWINDER, w/ ecchymosis. Mepilex in place on coccyx and back. Skin- jannette & fragile with few skin tears. Ambulates assist x1 w/ walker. Tolerating dysphasia soft diet. Pain controlled by PRN oxycodone.

## 2019-05-28 NOTE — PLAN OF CARE
A/Ox4. AVSS on Ra. Contact for MRSA and eneteric precautions for c-diff. Dysphagia diet. Up with assist of 1 and walker. 3 lap sites. LS-diminished. Loose Bmx3 yesterday evening. 2x mepilex on back and 1 on coccyx. Pain managed with PRN oxycodone.

## 2019-05-29 ENCOUNTER — NURSING HOME VISIT (OUTPATIENT)
Dept: GERIATRICS | Facility: CLINIC | Age: 84
End: 2019-05-29
Payer: MEDICARE

## 2019-05-29 VITALS
SYSTOLIC BLOOD PRESSURE: 109 MMHG | HEIGHT: 67 IN | RESPIRATION RATE: 18 BRPM | WEIGHT: 162.3 LBS | DIASTOLIC BLOOD PRESSURE: 81 MMHG | TEMPERATURE: 97.4 F | BODY MASS INDEX: 25.47 KG/M2 | OXYGEN SATURATION: 97 % | HEART RATE: 77 BPM

## 2019-05-29 DIAGNOSIS — G89.29 OTHER CHRONIC PAIN: ICD-10-CM

## 2019-05-29 DIAGNOSIS — R53.81 PHYSICAL DECONDITIONING: ICD-10-CM

## 2019-05-29 DIAGNOSIS — I50.32 CHRONIC DIASTOLIC CONGESTIVE HEART FAILURE (H): ICD-10-CM

## 2019-05-29 DIAGNOSIS — D63.8 ANEMIA IN OTHER CHRONIC DISEASES CLASSIFIED ELSEWHERE: ICD-10-CM

## 2019-05-29 DIAGNOSIS — I10 BENIGN ESSENTIAL HYPERTENSION: ICD-10-CM

## 2019-05-29 DIAGNOSIS — Z90.49 S/P CHOLECYSTECTOMY: ICD-10-CM

## 2019-05-29 DIAGNOSIS — I34.0 NON-RHEUMATIC MITRAL REGURGITATION: ICD-10-CM

## 2019-05-29 DIAGNOSIS — Z95.818 S/P MITRAL VALVE CLIP IMPLANTATION: ICD-10-CM

## 2019-05-29 DIAGNOSIS — M48.061 SPINAL STENOSIS OF LUMBAR REGION, UNSPECIFIED WHETHER NEUROGENIC CLAUDICATION PRESENT: ICD-10-CM

## 2019-05-29 DIAGNOSIS — R13.12 OROPHARYNGEAL DYSPHAGIA: ICD-10-CM

## 2019-05-29 DIAGNOSIS — Z98.890 S/P MITRAL VALVE CLIP IMPLANTATION: ICD-10-CM

## 2019-05-29 DIAGNOSIS — N18.30 CKD (CHRONIC KIDNEY DISEASE) STAGE 3, GFR 30-59 ML/MIN (H): ICD-10-CM

## 2019-05-29 DIAGNOSIS — C67.4 MALIGNANT NEOPLASM OF POSTERIOR WALL OF URINARY BLADDER (H): ICD-10-CM

## 2019-05-29 DIAGNOSIS — A04.72 CLOSTRIDIUM DIFFICILE DIARRHEA: ICD-10-CM

## 2019-05-29 DIAGNOSIS — Z71.89 ADVANCED DIRECTIVES, COUNSELING/DISCUSSION: ICD-10-CM

## 2019-05-29 DIAGNOSIS — K81.0 ACUTE CHOLECYSTITIS: Primary | ICD-10-CM

## 2019-05-29 DIAGNOSIS — I27.20 PULMONARY HYPERTENSION (H): ICD-10-CM

## 2019-05-29 PROCEDURE — 99207 ZZC CDG-MDM COMPONENT: MEETS LOW - DOWN CODED: CPT | Performed by: NURSE PRACTITIONER

## 2019-05-29 PROCEDURE — 99309 SBSQ NF CARE MODERATE MDM 30: CPT | Performed by: NURSE PRACTITIONER

## 2019-05-29 ASSESSMENT — MIFFLIN-ST. JEOR: SCORE: 1344.82

## 2019-05-29 NOTE — LETTER
5/29/2019        RE: Ivan Gomez  [address] [facility] [address]        [place] [facility]  PRIMARY CARE PROVIDER AND CLINIC:  Evaristo Magaña MD, [address] [facility] [address]  Chief Complaint   Patient presents with     Hospital F/U     [facility] Record Number:  [id number]  Place of Service where encounter took place:  [facility] [[id number]]    Ivan Gomez  is a 89 year old  (10/12/1926), admitted to the above facility from  [facility]. Hospital stay 5/21/2019 through 5/28/2019..  Admitted to this facility for  rehab, medical management and nursing care.    HPI:    HPI information obtained from: facility chart records, facility staff, patient report and [facility] chart review.   Brief Summary of Hospital Course:   He has a medical history significant for bladder cancer s/p resection and bladder reconstruction X 2, mitral regurgitation, s/p mitral valve clips, HTN, CKD stage 3, CHF, chronic pain syndrome with opioid dependence, gout, chronic anemia,  hyperlipidemia and was hospitalized after presenting to the  [facility] ED in [facility] with worsening abdominal pain, nausea and diarrhea. He was found to have acute cholecystitis and was transferred to [facility] for management. He underwent cholecystectomy 5/22/2019 by Dr Romero. Completed a course of flagyl and cipro. He developed post op hypoxia requiring BiPAP. CXR with atelectasis vs infiltrate. No symptoms of infection and WBC normal. Respiratory issues resolved.   He had significant dysphagia and EGD showed esophagitis, thought to be from a pill with local erosion. Treated with carafate and lidocaine with improvement. Discharged on mechanical soft diet.   He was positive for C diff and discharged on  vancomycin for 6 more days. Urology consulted for a 3.4 cm bladder mass seen on imaging and recommended follow up with his primary urologist and  oncologist.  ECHO 5/21/2019 showed severe mitral regurgitation with clip in place, EF >70%, severe biatrial enlargement, severe pulmonary hypertension, small pericardial effusion.       Updates on Status Since Skilled nursing Admission:      Acute cholecystitis-denies surgical pain. Poor appetite. No nausea or vomiting.   S/P cholecystectomy  CKD (chronic kidney disease) stage 3, GFR 30-59 ml/min (H)-baseline creatinine 1.4-1.6  Chronic diastolic congestive heart failure (H)-short of breath with exertion. No hypoxia. No cough or chest pain.   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 Followed at [facility].   Clostridium difficile diarrhea-reports ongoing liquid stools, but less frequent. Doesn't want to eat due to diarrhea.   Oropharyngeal dysphagia-refusing KCl and Welchol due to difficulty swallowing. Denies throat pain.   Benign essential hypertension-BPs: 110/71, 103/63, 125/71, 122/79  HR: 72-88  Spinal stenosis of lumbar region, unspecified whether neurogenic claudication present-reports chronic back and LE pain with neuropathy of both feet, unchanged from usual  Other chronic pain  Anemia in other chronic diseases classified elsewhere  Physical deconditioning-very weak and easily fatigued. Ambulating in his room with walker and assist. Requires assist of 1 with transfers and cares.       CODE STATUS/ADVANCE DIRECTIVES DISCUSSION:   CPR/Full code   Patient's living condition: has lived  in [place] since 6/2018. Recently staying in [place] apt [place] the [place] where his [place]. She has Parkinson's disease and is in Memory Care.   ALLERGIES: Celebrex [celecoxib] and Penicillins  PAST MEDICAL HISTORY:  has a past medical history of Arthritis, Former smoker, Heart disease, Hemorrhoids, Hypercholesteremia, Hypertension, Malignant neoplasm (H), Other chronic pain, and Renal disease. He also has no past  medical history of Chronic infection, Complication of anesthesia, Malignant hyperthermia, Numbness and tingling, PONV (postoperative nausea and vomiting), Sleep apnea, or Thrombosis of leg.  PAST SURGICAL HISTORY:   has a past surgical history that includes back surgery; orthopedic surgery; Cystoscopy, transurethral resection (TUR) prostate, combined (N/A, 8/21/2015); biopsy; biopsy (8/2016); Cystoscopy, transurethral resection (TUR) tumor bladder, combined (N/A, 9/2/2016); picc insertion (Right, 10/14/2017); Percutaneous Mitral Valve Repair (N/A, 10/16/2017); Cystoscopy, transurethral resection (TUR) tumor bladder, combined (N/A, 11/2/2018); Laparoscopic cholecystectomy with cholangiograms (N/A, 5/22/2019); and Esophagoscopy, gastroscopy, duodenoscopy (EGD), combined (N/A, 5/24/2019).  FAMILY HISTORY: family history includes Cancer (age of onset: 64) in his son; Family History Negative in his son.  SOCIAL HISTORY:   reports that he has quit smoking. He has never used smokeless tobacco. He reports that he does not drink alcohol or use drugs.    Post Discharge Medication Reconciliation Status: discharge medications reconciled, continue medications without change    Current Outpatient Medications   Medication Sig Dispense Refill     carvedilol (COREG) 6.25 MG tablet Take 6.25 mg by mouth 2 times daily (with meals)       colesevelam (WELCHOL) 625 MG tablet Take 3 tablets (1,875 mg) by mouth 2 times daily (with meals)       finasteride (PROSCAR) 5 MG tablet TAKE 1 TABLET(5 MG) BY MOUTH DAILY 90 tablet 3     furosemide (LASIX) 20 MG tablet Take 2 tablets (40 mg) by mouth daily 180 tablet 0     gabapentin (NEURONTIN) 300 MG capsule TAKE 1 CAPSULE BY MOUTH THREE TIMES DAILY 270 capsule 0     hydrALAZINE (APRESOLINE) 25 MG tablet Take 1 tablet (25 mg) by mouth 3 times daily 270 tablet 1     HYDROcodone-acetaminophen (NORCO) 7.5-325 MG per tablet Take 1 tablet by mouth every 6 hours as needed for pain maximum 4 tablet(s) per  "day 15 tablet 0     hypromellose (ARTIFICIAL TEARS) 0.4 % SOLN ophthalmic solution Place 2 drops Into the left eye 4 times daily       isosorbide dinitrate (ISORDIL) 20 MG tablet Take 20 mg by mouth 2 times daily       pantoprazole (PROTONIX) 40 MG EC tablet Take 40 mg by mouth daily       potassium chloride ER (K-DUR/KLOR-CON M) 10 MEQ CR tablet Take 10 mEq by mouth daily       simvastatin (ZOCOR) 20 MG tablet TAKE ONE TABLET BY MOUTH AT BEDTIME 30 tablet 0     tamsulosin (FLOMAX) 0.4 MG capsule TAKE ONE CAPSULE BY MOUTH DAILY 90 capsule 3     vancomycin (FIRVANQ) 50 MG/ML oral solution Take 2.5 mLs (125 mg) by mouth 4 times daily for 6 days 60 mL 0       ROS:  10 point ROS of systems including Constitutional, Eyes, Respiratory, Cardiovascular, Gastroenterology, Genitourinary, Integumentary, Musculoskeletal, Psychiatric were all negative except for pertinent positives noted in my HPI.    Vitals:  /81   Pulse 77   Temp 97.4  F (36.3  C)   Resp 18   Ht 1.702 m (5' 7\")   Wt 73.6 kg (162 lb 4.8 oz)   SpO2 97%   BMI 25.42 kg/m     Exam:  GENERAL APPEARANCE:  Alert, in no distress, frail  ENT:  Mouth and posterior oropharynx normal, moist mucous membranes, [place]  EYES:  EOM normal, conjunctiva and lids normal, PERRL  NECK:  No adenopathy,masses or thyromegaly  RESP:  respiratory effort and palpation of chest normal, no respiratory distress, diminished breath sounds bilaterally, no wheezes or crackles  CV:  Palpation and auscultation of heart done , regular rate and rhythm, 2/6  murmur, +2 pedal pulses, peripheral edema 1+ in both LE  ABDOMEN:  normal bowel sounds, soft, nontender, no hepatosplenomegaly or other masses  M/S:   in recliner. [place] with good strength. No joint inflammation  SKIN:  abdominal incisions with steri strips, clean, dry, fading bruises. No rashes or open areas  PSYCH:  oriented X 3, normal insight, judgement and memory, affect and mood normal    Lab/Diagnostic data:  Lab Results "   Component Value Date    WBC 5.3 05/24/2019     Lab Results   Component Value Date    RBC 3.66 05/24/2019     Lab Results   Component Value Date    HGB 10.6 05/24/2019     Lab Results   Component Value Date    HCT 34.2 05/24/2019     Lab Results   Component Value Date    MCV 93 05/24/2019     Lab Results   Component Value Date    MCH 29.0 05/24/2019     Lab Results   Component Value Date    MCHC 31.0 05/24/2019     Lab Results   Component Value Date    RDW 18.2 05/24/2019     Lab Results   Component Value Date    PLT 62 05/24/2019     Last Comprehensive Metabolic Panel:  Sodium   Date Value Ref Range Status   05/26/2019 138 133 - 144 mmol/L Final     Potassium   Date Value Ref Range Status   05/26/2019 4.1 3.4 - 5.3 mmol/L Final     Chloride   Date Value Ref Range Status   05/26/2019 112 (H) 94 - 109 mmol/L Final     Carbon Dioxide   Date Value Ref Range Status   05/26/2019 22 20 - 32 mmol/L Final     Anion Gap   Date Value Ref Range Status   05/26/2019 4 3 - 14 mmol/L Final     Glucose   Date Value Ref Range Status   05/26/2019 90 70 - 99 mg/dL Final     Urea Nitrogen   Date Value Ref Range Status   05/26/2019 44 (H) 7 - 30 mg/dL Final     Creatinine   Date Value Ref Range Status   05/26/2019 1.35 (H) 0.66 - 1.25 mg/dL Final     GFR Estimate   Date Value Ref Range Status   05/26/2019 45 (L) >60 mL/min/[1.73_m2] Final     Comment:     Non  GFR Calc  Starting 12/18/2018, serum creatinine based estimated GFR (eGFR) will be   calculated using the Chronic Kidney Disease Epidemiology Collaboration   (CKD-EPI) equation.       Calcium   Date Value Ref Range Status   05/26/2019 7.9 (L) 8.5 - 10.1 mg/dL Final     Bilirubin Total   Date Value Ref Range Status   05/26/2019 1.1 0.2 - 1.3 mg/dL Final     Alkaline Phosphatase   Date Value Ref Range Status   05/26/2019 195 (H) 40 - 150 U/L Final     ALT   Date Value Ref Range Status   05/26/2019 39 0 - 70 U/L Final     AST   Date Value Ref Range Status    05/26/2019 18 0 - 45 U/L Final         ASSESSMENT / PLAN:  (K81.0) Acute cholecystitis  (primary encounter diagnosis)  (Z90.49) S/P cholecystectomy  Comment: incisions healing without signs of infection. Poor intake  Plan:  Dietician to consult. Norco prn. Follow up with Dr Romero and Dr Jang as scheduled.     (N18.3) CKD (chronic kidney disease) stage 3, GFR 30-59 ml/min (H)  Comment: creatinine sat baseline  Plan: BMP. Avoid nephrotoxins.     (I50.32) Chronic diastolic congestive heart failure (H)  (I27.20) Pulmonary hypertension (H)  (I34.0) Non-rheumatic mitral regurgitation  (Z98.890,  Z95.818) S/P mitral valve clip implantation  Comment: mild volume overload with LE edema and shortness of breath with exertion   Plan: continue lasix, carvedilol, hydralazine, isosorbide. Continue statin. Daily weight. BMP    (C67.4) 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   Comment: no acute issues  Plan: follow up with Urology and Oncology at [facility] per usual schedule. Continue finasteride and tamsulosin for BPH.     (A04.72) Clostridium difficile diarrhea  Comment: some improvement in frequency, stools remain liquid  Plan: continue vancomycin and monitor. May require longer course with a taper. Continue Welchol.     (R13.12) Oropharyngeal dysphagia  Comment: acute, felt to be related to esophagitis. Slowly improving.   Plan: SPEECH THERAPY eval and treat, advance diet as tolerated. He's willing to try meds crushed and mixed in pudding.     (I10) Benign essential hypertension  Comment: controlled  Plan: continue  hydralazine, lasix, carvedilol, isosorbide. Monitor VS. Avoid hypotension due to advanced age and fall risk.     (M48.061) Spinal stenosis of lumbar region, unspecified whether neurogenic claudication present  (G89.29) Other chronic pain  Comment: long standing, appears controlled  Plan: continue gabapentin, Norco.     (D63.8) Anemia in other chronic  diseases classified elsewhere  Comment: Hgb at baseline. No s/s of active bleeding  Plan: CBC    (R53.81) Physical deconditioning  Comment: due to acute illnesses, surgery, multiple comorbidities  Plan: PHYSICAL THERAPY/OT. Disposition unclear at this time,as he will probably not be able to return home to [place] in the near future.  to consult.     (Z71.89) Advanced directives, counseling/discussion  Comment: he has a healthcare directive from 2015 that indicates DNR/DNI, but states that he has changed his mind and requests Full Code. This is consistent with inpatient documentation   Plan: POLST completed.         Total time spent with patient visit at the skilled nursing facility was 45 min including patient visit and review of past records. Greater than 50% of total time spent with counseling and coordinating care due to complexity of care  Electronically signed by:  FRANK Mena CNP                    Sincerely,        FRANK Mena CNP

## 2019-05-29 NOTE — PROGRESS NOTES
Speech Language Therapy Discharge Summary    Reason for therapy discharge:    Discharged to transitional care facility.    Progress towards therapy goal(s). See goals on Care Plan in [facility] electronic health record for goal details.  Goals partially met.  Barriers to achieving goals:   discharge from facility.    Therapy recommendation(s):    Continued therapy is recommended.  Rationale/Recommendations:  Eval recommendations below.    5/25/19:  Discharge Planner SLP   Patient plan for discharge: Did not state  Current status: A bedside swallow evaluation was completed this am.  Patient presents with mild-moderate oral-pharyngeal dysphagia at bedside.  Deficits include delayed swallow reflex, need for double swallows, cough with thin by straw, belching at times, esophagitis due to pill erosion, and poor po intake.  Patient tolerated small sips of thin by cup and small bites of pudding and semi-solids with strategies.  Recommend a continued mechanical/dental soft diet and thin liquids, no straw, sit up at 90 degrees, remain up for 30-60 minutes after eating, small bites/sips, crush meds and give with puree.  Plan to continue swallow Tx to monitor diet tolerance, train strategies, and determine need for video swallow study/FEES.  Barriers to return to prior living situation: Level of assist  Recommendations for discharge: TCU, SLP Tx  Rationale for recommendations: SLP swallow Tx to maximize function/safety for a least restrictive diet

## 2019-05-29 NOTE — PROGRESS NOTES
[place] [facility]  PRIMARY CARE PROVIDER AND CLINIC:  Evaristo Magaña MD, [address] [facility] [address] / [facility] [address]  Chief Complaint   Patient presents with     Hospital F/U     [place] Medical Record Number:  [id number]  Place of Service where encounter took place:  [facility] [[id number]]    Ivan Gomez  is a 89 year old  (10/12/1926), admitted to the above facility from  [facility]. Hospital stay 5/21/2019 through 5/28/2019..  Admitted to this facility for  rehab, medical management and nursing care.    HPI:    HPI information obtained from: facility chart records, facility staff, patient report and [facility] chart review.   Brief Summary of Hospital Course:   He has a medical history significant for bladder cancer s/p resection and bladder reconstruction X 2, mitral regurgitation, s/p mitral valve clips, HTN, CKD stage 3, CHF, chronic pain syndrome with opioid dependence, gout, chronic anemia,  hyperlipidemia and was hospitalized after presenting to the  [facility] ED in [facility] with worsening abdominal pain, nausea and diarrhea. He was found to have acute cholecystitis and was transferred to [facility] for management. He underwent cholecystectomy 5/22/2019 by Dr Romero. Completed a course of flagyl and cipro. He developed post op hypoxia requiring BiPAP. CXR with atelectasis vs infiltrate. No symptoms of infection and WBC normal. Respiratory issues resolved.   He had significant dysphagia and EGD showed esophagitis, thought to be from a pill with local erosion. Treated with carafate and lidocaine with improvement. Discharged on mechanical soft diet.   He was positive for C diff and discharged on  vancomycin for 6 more days. Urology consulted for a 3.4 cm bladder mass seen on imaging and recommended follow up with his primary urologist and oncologist.  ECHO 5/21/2019 showed severe mitral regurgitation with clip in place, EF  >70%, severe biatrial enlargement, severe pulmonary hypertension, small pericardial effusion.       Updates on Status Since Skilled nursing Admission:      Acute cholecystitis-denies surgical pain. Poor appetite. No nausea or vomiting.   S/P cholecystectomy  CKD (chronic kidney disease) stage 3, GFR 30-59 ml/min (H)-baseline creatinine 1.4-1.6  Chronic diastolic congestive heart failure (H)-short of breath with exertion. No hypoxia. No cough or chest pain.   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 Followed at [facility].   Clostridium difficile diarrhea-reports ongoing liquid stools, but less frequent. Doesn't want to eat due to diarrhea.   Oropharyngeal dysphagia-refusing KCl and Welchol due to difficulty swallowing. Denies throat pain.   Benign essential hypertension-BPs: 110/71, 103/63, 125/71, 122/79  HR: 72-88  Spinal stenosis of lumbar region, unspecified whether neurogenic claudication present-reports chronic back and LE pain with neuropathy of both feet, unchanged from usual  Other chronic pain  Anemia in other chronic diseases classified elsewhere  Physical deconditioning-very weak and easily fatigued. Ambulating in his room with walker and assist. Requires assist of 1 with transfers and cares.       CODE STATUS/ADVANCE DIRECTIVES DISCUSSION:   CPR/Full code   Patient's living condition: has lived  in [place] since 6/2018. Recently staying in [place] apt at the [place] where his SO [place]. She has Parkinson's disease and is in Memory Care.   ALLERGIES: Celebrex [celecoxib] and Penicillins  PAST MEDICAL HISTORY:  has a past medical history of Arthritis, Former smoker, Heart disease, Hemorrhoids, Hypercholesteremia, Hypertension, Malignant neoplasm (H), Other chronic pain, and Renal disease. He also has no past medical history of Chronic infection, Complication of anesthesia, Malignant hyperthermia,  Numbness and tingling, PONV (postoperative nausea and vomiting), Sleep apnea, or Thrombosis of leg.  PAST SURGICAL HISTORY:   has a past surgical history that includes back surgery; orthopedic surgery; Cystoscopy, transurethral resection (TUR) prostate, combined (N/A, 8/21/2015); biopsy; biopsy (8/2016); Cystoscopy, transurethral resection (TUR) tumor bladder, combined (N/A, 9/2/2016); picc insertion (Right, 10/14/2017); Percutaneous Mitral Valve Repair (N/A, 10/16/2017); Cystoscopy, transurethral resection (TUR) tumor bladder, combined (N/A, 11/2/2018); Laparoscopic cholecystectomy with cholangiograms (N/A, 5/22/2019); and Esophagoscopy, gastroscopy, duodenoscopy (EGD), combined (N/A, 5/24/2019).  FAMILY HISTORY: family history includes Cancer (age of onset: 64) in his son; Family History Negative in his son.  SOCIAL HISTORY:   reports that he has quit smoking. He has never used smokeless tobacco. He reports that he does not drink alcohol or use drugs.    Post Discharge Medication Reconciliation Status: discharge medications reconciled, continue medications without change    Current Outpatient Medications   Medication Sig Dispense Refill     carvedilol (COREG) 6.25 MG tablet Take 6.25 mg by mouth 2 times daily (with meals)       colesevelam (WELCHOL) 625 MG tablet Take 3 tablets (1,875 mg) by mouth 2 times daily (with meals)       finasteride (PROSCAR) 5 MG tablet TAKE 1 TABLET(5 MG) BY MOUTH DAILY 90 tablet 3     furosemide (LASIX) 20 MG tablet Take 2 tablets (40 mg) by mouth daily 180 tablet 0     gabapentin (NEURONTIN) 300 MG capsule TAKE 1 CAPSULE BY MOUTH THREE TIMES DAILY 270 capsule 0     hydrALAZINE (APRESOLINE) 25 MG tablet Take 1 tablet (25 mg) by mouth 3 times daily 270 tablet 1     HYDROcodone-acetaminophen (NORCO) 7.5-325 MG per tablet Take 1 tablet by mouth every 6 hours as needed for pain maximum 4 tablet(s) per day 15 tablet 0     hypromellose (ARTIFICIAL TEARS) 0.4 % SOLN ophthalmic solution Place  "2 drops Into the left eye 4 times daily       isosorbide dinitrate (ISORDIL) 20 MG tablet Take 20 mg by mouth 2 times daily       pantoprazole (PROTONIX) 40 MG EC tablet Take 40 mg by mouth daily       potassium chloride ER (K-DUR/KLOR-CON M) 10 MEQ CR tablet Take 10 mEq by mouth daily       simvastatin (ZOCOR) 20 MG tablet TAKE ONE TABLET BY MOUTH AT BEDTIME 30 tablet 0     tamsulosin (FLOMAX) 0.4 MG capsule TAKE ONE CAPSULE BY MOUTH DAILY 90 capsule 3     vancomycin (FIRVANQ) 50 MG/ML oral solution Take 2.5 mLs (125 mg) by mouth 4 times daily for 6 days 60 mL 0       ROS:  10 point ROS of systems including Constitutional, Eyes, Respiratory, Cardiovascular, Gastroenterology, Genitourinary, Integumentary, Musculoskeletal, Psychiatric were all negative except for pertinent positives noted in my HPI.    Vitals:  /81   Pulse 77   Temp 97.4  F (36.3  C)   Resp 18   Ht 1.702 m (5' 7\")   Wt 73.6 kg (162 lb 4.8 oz)   SpO2 97%   BMI 25.42 kg/m    Exam:  GENERAL APPEARANCE:  Alert, in no distress, frail  ENT:  Mouth and posterior oropharynx normal, moist mucous membranes, [place]  EYES:  EOM normal, conjunctiva and lids normal, PERRL  NECK:  No adenopathy,masses or thyromegaly  RESP:  respiratory effort and palpation of chest normal, no respiratory distress, diminished breath sounds bilaterally, no wheezes or crackles  CV:  Palpation and auscultation of heart done , regular rate and rhythm, 2/6  murmur, +2 pedal pulses, peripheral edema 1+ in both LE  ABDOMEN:  normal bowel sounds, soft, nontender, no hepatosplenomegaly or other masses  M/S:   in recliner. WAKEFIELD with good strength. No joint inflammation  SKIN:  abdominal incisions with steri strips, clean, dry, fading bruises. No rashes or open areas  PSYCH:  oriented X 3, normal insight, judgement and memory, affect and mood normal    Lab/Diagnostic data:  Lab Results   Component Value Date    WBC 5.3 05/24/2019     Lab Results   Component Value Date    RBC 3.66 " 05/24/2019     Lab Results   Component Value Date    HGB 10.6 05/24/2019     Lab Results   Component Value Date    HCT 34.2 05/24/2019     Lab Results   Component Value Date    MCV 93 05/24/2019     Lab Results   Component Value Date    MCH 29.0 05/24/2019     Lab Results   Component Value Date    MCHC 31.0 05/24/2019     Lab Results   Component Value Date    RDW 18.2 05/24/2019     Lab Results   Component Value Date    PLT 62 05/24/2019     Last Comprehensive Metabolic Panel:  Sodium   Date Value Ref Range Status   05/26/2019 138 133 - 144 mmol/L Final     Potassium   Date Value Ref Range Status   05/26/2019 4.1 3.4 - 5.3 mmol/L Final     Chloride   Date Value Ref Range Status   05/26/2019 112 (H) 94 - 109 mmol/L Final     Carbon Dioxide   Date Value Ref Range Status   05/26/2019 22 20 - 32 mmol/L Final     Anion Gap   Date Value Ref Range Status   05/26/2019 4 3 - 14 mmol/L Final     Glucose   Date Value Ref Range Status   05/26/2019 90 70 - 99 mg/dL Final     Urea Nitrogen   Date Value Ref Range Status   05/26/2019 44 (H) 7 - 30 mg/dL Final     Creatinine   Date Value Ref Range Status   05/26/2019 1.35 (H) 0.66 - 1.25 mg/dL Final     GFR Estimate   Date Value Ref Range Status   05/26/2019 45 (L) >60 mL/min/[1.73_m2] Final     Comment:     Non  GFR Calc  Starting 12/18/2018, serum creatinine based estimated GFR (eGFR) will be   calculated using the Chronic Kidney Disease Epidemiology Collaboration   (CKD-EPI) equation.       Calcium   Date Value Ref Range Status   05/26/2019 7.9 (L) 8.5 - 10.1 mg/dL Final     Bilirubin Total   Date Value Ref Range Status   05/26/2019 1.1 0.2 - 1.3 mg/dL Final     Alkaline Phosphatase   Date Value Ref Range Status   05/26/2019 195 (H) 40 - 150 U/L Final     ALT   Date Value Ref Range Status   05/26/2019 39 0 - 70 U/L Final     AST   Date Value Ref Range Status   05/26/2019 18 0 - 45 U/L Final         ASSESSMENT / PLAN:  (K81.0) Acute cholecystitis  (primary  encounter diagnosis)  (Z90.49) S/P cholecystectomy  Comment: incisions healing without signs of infection. Poor intake  Plan:  Dietician to consult. Norco prn. Follow up with Dr Romero and Dr Jang as scheduled.     (N18.3) CKD (chronic kidney disease) stage 3, GFR 30-59 ml/min (H)  Comment: creatinine sat baseline  Plan: BMP. Avoid nephrotoxins.     (I50.32) Chronic diastolic congestive heart failure (H)  (I27.20) Pulmonary hypertension (H)  (I34.0) Non-rheumatic mitral regurgitation  (Z98.890,  Z95.818) S/P mitral valve clip implantation  Comment: mild volume overload with LE edema and shortness of breath with exertion   Plan: continue lasix, carvedilol, hydralazine, isosorbide. Continue statin. Daily weight. BMP    (C67.4) 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   Comment: no acute issues  Plan: follow up with Urology and Oncology at [facility] per usual schedule. Continue finasteride and tamsulosin for BPH.     (A04.72) Clostridium difficile diarrhea  Comment: some improvement in frequency, stools remain liquid  Plan: continue vancomycin and monitor. May require longer course with a taper. Continue Welchol.     (R13.12) Oropharyngeal dysphagia  Comment: acute, felt to be related to esophagitis. Slowly improving.   Plan: SPEECH THERAPY eval and treat, advance diet as tolerated. He's willing to try meds crushed and mixed in pudding.     (I10) Benign essential hypertension  Comment: controlled  Plan: continue  hydralazine, lasix, carvedilol, isosorbide. Monitor VS. Avoid hypotension due to advanced age and fall risk.     (M48.061) Spinal stenosis of lumbar region, unspecified whether neurogenic claudication present  (G89.29) Other chronic pain  Comment: long standing, appears controlled  Plan: continue gabapentin, Norco.     (D63.8) Anemia in other chronic diseases classified elsewhere  Comment: Hgb at baseline. No s/s of active bleeding  Plan:  CBC    (R53.81) Physical deconditioning  Comment: due to acute illnesses, surgery, multiple comorbidities  Plan: PHYSICAL THERAPY/OT. Disposition unclear at this time,as he will probably not be able to return home to [place] in the near future.  to consult.     (Z71.89) Advanced directives, counseling/discussion  Comment: he has a healthcare directive from 2015 that indicates DNR/DNI, but states that he has changed his mind and requests Full Code. This is consistent with inpatient documentation   Plan: POLST completed.         Total time spent with patient visit at the skilled nursing facility was 45 min including patient visit and review of past records. Greater than 50% of total time spent with counseling and coordinating care due to complexity of care  Electronically signed by:  FRANK Mena CNP

## 2019-05-30 ENCOUNTER — DOCUMENTATION ONLY (OUTPATIENT)
Dept: OTHER | Facility: CLINIC | Age: 84
End: 2019-05-30

## 2019-05-31 ENCOUNTER — TELEPHONE (OUTPATIENT)
Dept: SURGERY | Facility: CLINIC | Age: 84
End: 2019-05-31

## 2019-05-31 ENCOUNTER — NURSING HOME VISIT (OUTPATIENT)
Dept: GERIATRICS | Facility: CLINIC | Age: 84
End: 2019-05-31
Payer: MEDICARE

## 2019-05-31 ENCOUNTER — HOSPITAL LABORATORY (OUTPATIENT)
Dept: OTHER | Facility: CLINIC | Age: 84
End: 2019-05-31

## 2019-05-31 VITALS
HEART RATE: 59 BPM | HEIGHT: 67 IN | OXYGEN SATURATION: 95 % | RESPIRATION RATE: 17 BRPM | WEIGHT: 164.7 LBS | DIASTOLIC BLOOD PRESSURE: 67 MMHG | TEMPERATURE: 97.4 F | BODY MASS INDEX: 25.85 KG/M2 | SYSTOLIC BLOOD PRESSURE: 111 MMHG

## 2019-05-31 DIAGNOSIS — D69.6 THROMBOCYTOPENIA (H): ICD-10-CM

## 2019-05-31 DIAGNOSIS — C67.4 MALIGNANT NEOPLASM OF POSTERIOR WALL OF URINARY BLADDER (H): ICD-10-CM

## 2019-05-31 DIAGNOSIS — Z90.49 S/P CHOLECYSTECTOMY: ICD-10-CM

## 2019-05-31 DIAGNOSIS — I10 BENIGN ESSENTIAL HYPERTENSION: ICD-10-CM

## 2019-05-31 DIAGNOSIS — M48.061 SPINAL STENOSIS OF LUMBAR REGION, UNSPECIFIED WHETHER NEUROGENIC CLAUDICATION PRESENT: ICD-10-CM

## 2019-05-31 DIAGNOSIS — R53.81 PHYSICAL DECONDITIONING: ICD-10-CM

## 2019-05-31 DIAGNOSIS — K81.0 ACUTE CHOLECYSTITIS: Primary | ICD-10-CM

## 2019-05-31 DIAGNOSIS — I27.20 PULMONARY HYPERTENSION (H): ICD-10-CM

## 2019-05-31 DIAGNOSIS — I50.32 CHRONIC DIASTOLIC CONGESTIVE HEART FAILURE (H): ICD-10-CM

## 2019-05-31 DIAGNOSIS — R13.12 OROPHARYNGEAL DYSPHAGIA: ICD-10-CM

## 2019-05-31 DIAGNOSIS — A04.72 CLOSTRIDIUM DIFFICILE DIARRHEA: ICD-10-CM

## 2019-05-31 DIAGNOSIS — N18.30 CKD (CHRONIC KIDNEY DISEASE) STAGE 3, GFR 30-59 ML/MIN (H): ICD-10-CM

## 2019-05-31 DIAGNOSIS — D63.8 ANEMIA IN OTHER CHRONIC DISEASES CLASSIFIED ELSEWHERE: ICD-10-CM

## 2019-05-31 DIAGNOSIS — Z95.818 S/P MITRAL VALVE CLIP IMPLANTATION: ICD-10-CM

## 2019-05-31 DIAGNOSIS — I34.0 NON-RHEUMATIC MITRAL REGURGITATION: ICD-10-CM

## 2019-05-31 DIAGNOSIS — Z98.890 S/P MITRAL VALVE CLIP IMPLANTATION: ICD-10-CM

## 2019-05-31 LAB
ANION GAP SERPL CALCULATED.3IONS-SCNC: 6 MMOL/L (ref 3–14)
BUN SERPL-MCNC: 21 MG/DL (ref 7–30)
CALCIUM SERPL-MCNC: 8 MG/DL (ref 8.5–10.1)
CHLORIDE SERPL-SCNC: 114 MMOL/L (ref 94–109)
CO2 SERPL-SCNC: 23 MMOL/L (ref 20–32)
CREAT SERPL-MCNC: 1.16 MG/DL (ref 0.66–1.25)
ERYTHROCYTE [DISTWIDTH] IN BLOOD BY AUTOMATED COUNT: 19 % (ref 10–15)
GFR SERPL CREATININE-BSD FRML MDRD: 54 ML/MIN/{1.73_M2}
GLUCOSE SERPL-MCNC: 119 MG/DL (ref 70–99)
HCT VFR BLD AUTO: 35.2 % (ref 40–53)
HGB BLD-MCNC: 11 G/DL (ref 13.3–17.7)
MCH RBC QN AUTO: 29.1 PG (ref 26.5–33)
MCHC RBC AUTO-ENTMCNC: 31.3 G/DL (ref 31.5–36.5)
MCV RBC AUTO: 93 FL (ref 78–100)
PLATELET # BLD AUTO: 85 10E9/L (ref 150–450)
POTASSIUM SERPL-SCNC: 3.8 MMOL/L (ref 3.4–5.3)
RBC # BLD AUTO: 3.78 10E12/L (ref 4.4–5.9)
SODIUM SERPL-SCNC: 143 MMOL/L (ref 133–144)
WBC # BLD AUTO: 7.2 10E9/L (ref 4–11)

## 2019-05-31 PROCEDURE — 99309 SBSQ NF CARE MODERATE MDM 30: CPT | Performed by: NURSE PRACTITIONER

## 2019-05-31 ASSESSMENT — MIFFLIN-ST. JEOR: SCORE: 1355.7

## 2019-05-31 NOTE — TELEPHONE ENCOUNTER
Name of caller: caregiver    Reason for Call:  Wants to understand more about the health of Ivan, states he is his caregiver and is concerned about all that is going on with him.      Surgeon:  Dr. Romero    Recent Surgery:  Yes.    If yes, when & what type:  Parish poole      Best phone number to reach pt at is: [phone] - Yemi  Ok to leave a message with medical info? Yes.    Pharmacy preferred (if calling for a refill): N/A

## 2019-05-31 NOTE — PROGRESS NOTES
"[place] [facility]  [place] Medical Record Number:  [id number]  Place of Service where encounter took place:  [facility] [[id number]]  Chief Complaint   Patient presents with     RECHECK       HPI:    Ivan Gomez  is a 89 year old (10/12/1926), who is being seen today for an episodic care visit.  HPI information obtained from: facility chart records, facility staff, patient report and [facility] chart review.  He came to this facility 5/28/2019 for short term rehab and medical management following hospitalization after presenting to the  [facility] ED in [facility] 5/21/2019  with worsening abdominal pain, nausea and diarrhea. He was found to have acute cholecystitis and was transferred to [facility] for management. He underwent cholecystectomy 5/22/2019 by Dr Romero. Completed a course of flagyl and cipro. He developed post op hypoxia requiring BiPAP. CXR with atelectasis vs infiltrate. No symptoms of infection and WBC normal. Respiratory issues resolved.   He had significant dysphagia and EGD showed esophagitis, thought to be from a pill with local erosion. Treated with carafate and lidocaine with improvement. Discharged on mechanical soft diet.   He was positive for C diff and discharged on  vancomycin for 6 more days.      Today's concern is:     Acute cholecystitis-reports feeling slightly better with some improvement in appetite. No nausea or vomiting. Very fatigued.   S/P cholecystectomy  CKD (chronic kidney disease) stage 3, GFR 30-59 ml/min (H)  Chronic diastolic congestive heart failure (H)-mild shortness of breath with exertion. No cough or chest pain.   Pulmonary hypertension (H)  Non-rheumatic mitral regurgitation  S/P mitral valve clip implantation  Malignant neoplasm of posterior wall of urinary bladder (H)-followed at [facility].   Clostridium difficile diarrhea-reports this is \"finally\" starting to improve. Having 4-5  soft stools daily. "   Oropharyngeal dysphagia-throat pain is less. Tolerating pills crushed and mixed in pudding.   Benign essential hypertension-BPs: 111/67, 110/71 109/81    HR: 59-77  Spinal stenosis of lumbar region, unspecified whether neurogenic claudication present-reports pain of feet is slightly better with voltaren gel   Anemia in other chronic diseases classified elsewhere  Physical deconditioning-ambulating short distances in his room with walker and contact guard  assist. Requires assist of 1 with transfers and cares.     Past Medical and Surgical History reviewed in Epic today.    MEDICATIONS:  Current Outpatient Medications   Medication Sig Dispense Refill     carvedilol (COREG) 6.25 MG tablet Take 6.25 mg by mouth 2 times daily (with meals)       colesevelam (WELCHOL) 625 MG tablet Take 3 tablets (1,875 mg) by mouth 2 times daily (with meals)       diclofenac (VOLTAREN) 1 % topical gel Place 2 g onto the skin 3 times daily Apply to both ankles       finasteride (PROSCAR) 5 MG tablet TAKE 1 TABLET(5 MG) BY MOUTH DAILY 90 tablet 3     furosemide (LASIX) 20 MG tablet Take 2 tablets (40 mg) by mouth daily 180 tablet 0     gabapentin (NEURONTIN) 300 MG capsule TAKE 1 CAPSULE BY MOUTH THREE TIMES DAILY 270 capsule 0     hydrALAZINE (APRESOLINE) 25 MG tablet Take 1 tablet (25 mg) by mouth 3 times daily 270 tablet 1     HYDROcodone-acetaminophen (NORCO) 7.5-325 MG per tablet Take 1 tablet by mouth every 6 hours as needed for pain maximum 4 tablet(s) per day 15 tablet 0     hypromellose (ARTIFICIAL TEARS) 0.4 % SOLN ophthalmic solution Place 2 drops Into the left eye 4 times daily       isosorbide dinitrate (ISORDIL) 20 MG tablet Take 20 mg by mouth 2 times daily       pantoprazole (PROTONIX) 40 MG EC tablet Take 40 mg by mouth daily       potassium chloride ER (K-DUR/KLOR-CON M) 10 MEQ CR tablet Take 10 mEq by mouth daily       simvastatin (ZOCOR) 20 MG tablet TAKE ONE TABLET BY MOUTH AT BEDTIME 30 tablet 0     tamsulosin  "(FLOMAX) 0.4 MG capsule TAKE ONE CAPSULE BY MOUTH DAILY 90 capsule 3     vancomycin (FIRVANQ) 50 MG/ML oral solution Take 2.5 mLs (125 mg) by mouth 4 times daily for 6 days 60 mL 0       REVIEW OF SYSTEMS:  4 point ROS including Respiratory, CV, GI and , other than that noted in the HPI,  is negative    Objective:  /67   Pulse 59   Temp 97.4  F (36.3  C)   Resp 17   Ht 1.702 m (5' 7\")   Wt 74.7 kg (164 lb 11.2 oz)   SpO2 95%   BMI 25.80 kg/m    Exam:  GENERAL APPEARANCE:  Alert, in no distress, frail  ENT:  Mouth and posterior oropharynx normal, moist mucous membranes, [place]  EYES:  EOM normal, conjunctiva and lids normal  NECK:  No adenopathy,masses or thyromegaly  RESP:  respiratory effort and palpation of chest normal, no respiratory distress, diminished breath sounds bilaterally, no wheezes or crackles  CV:  Palpation and auscultation of heart done , regular rate and rhythm, 2/6  murmur, +2 pedal pulses, peripheral edema trace  in both LE  ABDOMEN: soft, nontender, no hepatosplenomegaly or other masses  M/S: up in chair.  WAKEFIELD with good strength. No joint inflammation  SKIN:  abdominal incisions with steri strips, clean, dry, faint  bruises. No rashes or open areas  PSYCH:  oriented X 3, normal insight, judgement and memory, affect and mood normal      Labs:   Lab Results   Component Value Date    WBC 7.2 05/31/2019     Lab Results   Component Value Date    RBC 3.78 05/31/2019     Lab Results   Component Value Date    HGB 11.0 05/31/2019     Lab Results   Component Value Date    HCT 35.2 05/31/2019     Lab Results   Component Value Date    MCV 93 05/31/2019     Lab Results   Component Value Date    MCH 29.1 05/31/2019     Lab Results   Component Value Date    MCHC 31.3 05/31/2019     Lab Results   Component Value Date    RDW 19.0 05/31/2019     Lab Results   Component Value Date    PLT 85 05/31/2019     Last Comprehensive Metabolic Panel:  Sodium   Date Value Ref Range Status   05/31/2019 143 133 - " 144 mmol/L Final     Potassium   Date Value Ref Range Status   05/31/2019 3.8 3.4 - 5.3 mmol/L Final     Chloride   Date Value Ref Range Status   05/31/2019 114 (H) 94 - 109 mmol/L Final     Carbon Dioxide   Date Value Ref Range Status   05/31/2019 23 20 - 32 mmol/L Final     Anion Gap   Date Value Ref Range Status   05/31/2019 6 3 - 14 mmol/L Final     Glucose   Date Value Ref Range Status   05/31/2019 119 (H) 70 - 99 mg/dL Final     Urea Nitrogen   Date Value Ref Range Status   05/31/2019 21 7 - 30 mg/dL Final     Creatinine   Date Value Ref Range Status   05/31/2019 1.16 0.66 - 1.25 mg/dL Final     GFR Estimate   Date Value Ref Range Status   05/31/2019 54 (L) >60 mL/min/[1.73_m2] Final     Comment:     Non  GFR Calc  Starting 12/18/2018, serum creatinine based estimated GFR (eGFR) will be   calculated using the Chronic Kidney Disease Epidemiology Collaboration   (CKD-EPI) equation.       Calcium   Date Value Ref Range Status   05/31/2019 8.0 (L) 8.5 - 10.1 mg/dL Final     ASSESSMENT / PLAN:  (K81.0) Acute cholecystitis  (primary encounter diagnosis)  (Z90.49) S/P cholecystectomy  Comment: slowly recuperating with improved intake.  Incisions healing without signs of infection.   Plan: continue Norco prn. Follow up with Dr Romero and Dr Jang as scheduled.     (N18.3) CKD (chronic kidney disease) stage 3, GFR 30-59 ml/min (H)  Comment: renal function at baseline   Plan: BMP. Avoid nephrotoxins.     (I50.32) Chronic diastolic congestive heart failure (H)  (I27.20) Pulmonary hypertension (H)  (I34.0) Non-rheumatic mitral regurgitation  (Z98.890,  Z95.818) S/P mitral valve clip implantation  Comment: volume status appears to be improving with less edema and dyspnea. Weight is up 1 lb.   Plan: continue lasix, carvedilol, hydralazine, isosorbide. Continue statin. Daily weight. Follow BMP. Low sodium diet. Compression stockings during the day.     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 Continue finasteride and tamsulosin for BPH.     (A04.72) Clostridium difficile diarrhea  Comment: stools are more formed, still frequent, up to 5 daily.   Plan: decrease vancomycin to tid starting 6/4/2019. Monitor stools. Continue Welchol.     (R13.12) Oropharyngeal dysphagia  Comment: esophagitis and swallow appear to be improving  Plan: continue SPEECH THERAPY, advance diet as tolerated.     (D69.6) Thrombocytopenia (H)  (D63.8) Anemia in other chronic diseases classified elsewhere  Comment: platelet count improved, Hgb at baseline. No s/s of active bleeding  Plan: follow CBC    (I10) Benign essential hypertension  Comment: mild hypotension, asymptomatic  Plan: continue cardiac meds as above. Monitor VS.     (M48.061) Spinal stenosis of lumbar region, unspecified whether neurogenic claudication present  Comment: pain controlled  Plan: continue gabapentin, Norco, voltaren gel. Therapies.     (R53.81) Physical deconditioning  Comment: slow progress in therapies  Plan: continue PHYSICAL THERAPY/OT. Disposition is unclear at this time. His home is in [place] and he was here visiting his SO when he became acutely ill.  has scheduled a care conference.       Electronically signed by:  FRANK Mena CNP

## 2019-05-31 NOTE — TELEPHONE ENCOUNTER
Phone number provided is not valid.  Will have to wait until he calls back to discuss.    Attempted to call phone number listed on two separate occasions.    Lotus Barber RN-BSN

## 2019-05-31 NOTE — LETTER
5/31/2019        RE: Ivan Gomez  [address] [facility] [address]        [facility]  [facility] Record Number:  [id number]  Place of Service where encounter took place:  [facility] [[id number]]  Chief Complaint   Patient presents with     RECHECK       HPI:    Ivan Gomez  is a 89 year old (10/12/1926), who is being seen today for an episodic care visit.  HPI information obtained from: facility chart records, facility staff, patient report and [facility] chart review.  He came to this facility 5/28/2019 for short term rehab and medical management following hospitalization after presenting to the  [facility] ED in [facility] 5/21/2019  with worsening abdominal pain, nausea and diarrhea. He was found to have acute cholecystitis and was transferred to [facility] for management. He underwent cholecystectomy 5/22/2019 by Dr Romero. Completed a course of flagyl and cipro. He developed post op hypoxia requiring BiPAP. CXR with atelectasis vs infiltrate. No symptoms of infection and WBC normal. Respiratory issues resolved.   He had significant dysphagia and EGD showed esophagitis, thought to be from a pill with local erosion. Treated with carafate and lidocaine with improvement. Discharged on mechanical soft diet.   He was positive for C diff and discharged on  vancomycin for 6 more days.      Today's concern is:     Acute cholecystitis-reports feeling slightly better with some improvement in appetite. No nausea or vomiting. Very fatigued.   S/P cholecystectomy  CKD (chronic kidney disease) stage 3, GFR 30-59 ml/min (H)  Chronic diastolic congestive heart failure (H)-mild shortness of breath with exertion. No cough or chest pain.   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   Clostridium difficile  "diarrhea-reports this is \"finally\" starting to improve. Having 4-5  soft stools daily.   Oropharyngeal dysphagia-throat pain is less. Tolerating pills crushed and mixed in pudding.   Benign essential hypertension-BPs: 111/67, 110/71 109/81    HR: 59-77  Spinal stenosis of lumbar region, unspecified whether neurogenic claudication present-reports pain of feet is slightly better with voltaren gel   Anemia in other chronic diseases classified elsewhere  Physical deconditioning-ambulating short distances in his room with walker and contact guard  assist. Requires assist of 1 with transfers and cares.     Past Medical and Surgical History reviewed in Epic today.    MEDICATIONS:  Current Outpatient Medications   Medication Sig Dispense Refill     carvedilol (COREG) 6.25 MG tablet Take 6.25 mg by mouth 2 times daily (with meals)       colesevelam (WELCHOL) 625 MG tablet Take 3 tablets (1,875 mg) by mouth 2 times daily (with meals)       diclofenac (VOLTAREN) 1 % topical gel Place 2 g onto the skin 3 times daily Apply to both ankles       finasteride (PROSCAR) 5 MG tablet TAKE 1 TABLET(5 MG) BY MOUTH DAILY 90 tablet 3     furosemide (LASIX) 20 MG tablet Take 2 tablets (40 mg) by mouth daily 180 tablet 0     gabapentin (NEURONTIN) 300 MG capsule TAKE 1 CAPSULE BY MOUTH THREE TIMES DAILY 270 capsule 0     hydrALAZINE (APRESOLINE) 25 MG tablet Take 1 tablet (25 mg) by mouth 3 times daily 270 tablet 1     HYDROcodone-acetaminophen (NORCO) 7.5-325 MG per tablet Take 1 tablet by mouth every 6 hours as needed for pain maximum 4 tablet(s) per day 15 tablet 0     hypromellose (ARTIFICIAL TEARS) 0.4 % SOLN ophthalmic solution Place 2 drops Into the left eye 4 times daily       isosorbide dinitrate (ISORDIL) 20 MG tablet Take 20 mg by mouth 2 times daily       pantoprazole (PROTONIX) 40 MG EC tablet Take 40 mg by mouth daily       potassium chloride ER (K-DUR/KLOR-CON M) 10 MEQ CR tablet Take 10 mEq by mouth daily       simvastatin " "(ZOCOR) 20 MG tablet TAKE ONE TABLET BY MOUTH AT BEDTIME 30 tablet 0     tamsulosin (FLOMAX) 0.4 MG capsule TAKE ONE CAPSULE BY MOUTH DAILY 90 capsule 3     vancomycin (FIRVANQ) 50 MG/ML oral solution Take 2.5 mLs (125 mg) by mouth 4 times daily for 6 days 60 mL 0       REVIEW OF SYSTEMS:  4 point ROS including Respiratory, CV, GI and , other than that noted in the HPI,  is negative    Objective:  /67   Pulse 59   Temp 97.4  F (36.3  C)   Resp 17   Ht 1.702 m (5' 7\")   Wt 74.7 kg (164 lb 11.2 oz)   SpO2 95%   BMI 25.80 kg/m     Exam:  GENERAL APPEARANCE:  Alert, in no distress, frail  ENT:  Mouth and posterior oropharynx normal, moist mucous membranes, [place]  EYES:  EOM normal, conjunctiva and lids normal  NECK:  No adenopathy,masses or thyromegaly  RESP:  respiratory effort and palpation of chest normal, no respiratory distress, diminished breath sounds bilaterally, no wheezes or crackles  CV:  Palpation and auscultation of heart done , regular rate and rhythm, 2/6  murmur, +2 pedal pulses, peripheral edema trace  in both LE  ABDOMEN: soft, nontender, no hepatosplenomegaly or other masses  M/S:  up in chair.  [place] with good strength. No joint inflammation  SKIN:  abdominal incisions with steri strips, clean, dry, faint  bruises. No rashes or open areas  PSYCH:  oriented X 3, normal insight, judgement and memory, affect and mood normal      Labs:   Lab Results   Component Value Date    WBC 7.2 05/31/2019     Lab Results   Component Value Date    RBC 3.78 05/31/2019     Lab Results   Component Value Date    HGB 11.0 05/31/2019     Lab Results   Component Value Date    HCT 35.2 05/31/2019     Lab Results   Component Value Date    MCV 93 05/31/2019     Lab Results   Component Value Date    MCH 29.1 05/31/2019     Lab Results   Component Value Date    MCHC 31.3 05/31/2019     Lab Results   Component Value Date    RDW 19.0 05/31/2019     Lab Results   Component Value Date    PLT 85 05/31/2019     Last " Comprehensive Metabolic Panel:  Sodium   Date Value Ref Range Status   05/31/2019 143 133 - 144 mmol/L Final     Potassium   Date Value Ref Range Status   05/31/2019 3.8 3.4 - 5.3 mmol/L Final     Chloride   Date Value Ref Range Status   05/31/2019 114 (H) 94 - 109 mmol/L Final     Carbon Dioxide   Date Value Ref Range Status   05/31/2019 23 20 - 32 mmol/L Final     Anion Gap   Date Value Ref Range Status   05/31/2019 6 3 - 14 mmol/L Final     Glucose   Date Value Ref Range Status   05/31/2019 119 (H) 70 - 99 mg/dL Final     Urea Nitrogen   Date Value Ref Range Status   05/31/2019 21 7 - 30 mg/dL Final     Creatinine   Date Value Ref Range Status   05/31/2019 1.16 0.66 - 1.25 mg/dL Final     GFR Estimate   Date Value Ref Range Status   05/31/2019 54 (L) >60 mL/min/[1.73_m2] Final     Comment:     Non  GFR Calc  Starting 12/18/2018, serum creatinine based estimated GFR (eGFR) will be   calculated using the Chronic Kidney Disease Epidemiology Collaboration   (CKD-EPI) equation.       Calcium   Date Value Ref Range Status   05/31/2019 8.0 (L) 8.5 - 10.1 mg/dL Final     ASSESSMENT / PLAN:  (K81.0) Acute cholecystitis  (primary encounter diagnosis)  (Z90.49) S/P cholecystectomy  Comment: slowly recuperating with improved intake.  Incisions healing without signs of infection.   Plan: continue Norco prn. Follow up with Dr Romero and Dr Jang as scheduled.     (N18.3) CKD (chronic kidney disease) stage 3, GFR 30-59 ml/min (H)  Comment: renal function at baseline   Plan: BMP. Avoid nephrotoxins.     (I50.32) Chronic diastolic congestive heart failure (H)  (I27.20) Pulmonary hypertension (H)  (I34.0) Non-rheumatic mitral regurgitation  (Z98.890,  Z95.818) S/P mitral valve clip implantation  Comment: volume status appears to be improving with less edema and dyspnea. Weight is up 1 lb.   Plan: continue lasix, carvedilol, hydralazine, isosorbide. Continue statin. Daily weight. Follow BMP. Low sodium diet.  Compression stockings during the day.     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 Continue finasteride and tamsulosin for BPH.     (A04.72) Clostridium difficile diarrhea  Comment: stools are more formed, still frequent, up to 5 daily.   Plan: decrease vancomycin to tid starting 6/4/2019. Monitor stools. Continue Welchol.     (R13.12) Oropharyngeal dysphagia  Comment: esophagitis and swallow appear to be improving  Plan: continue SPEECH THERAPY, advance diet as tolerated.     (D69.6) Thrombocytopenia (H)  (D63.8) Anemia in other chronic diseases classified elsewhere  Comment: platelet count improved, Hgb at baseline. No s/s of active bleeding  Plan: follow CBC    (I10) Benign essential hypertension  Comment: mild hypotension, asymptomatic  Plan: continue cardiac meds as above. Monitor VS.     (M48.061) Spinal stenosis of lumbar region, unspecified whether neurogenic claudication present  Comment: pain controlled  Plan: continue gabapentin, Norco, voltaren gel. Therapies.     (R53.81) Physical deconditioning  Comment: slow progress in therapies  Plan: continue PHYSICAL THERAPY/OT. Disposition is unclear at this time. His home is in [place] and he was here visiting his SO when he became acutely ill.  has scheduled a care conference.       Electronically signed by:  FRANK Mena CNP       Sincerely,        FRANK Mena CNP

## 2019-06-03 VITALS
RESPIRATION RATE: 18 BRPM | OXYGEN SATURATION: 97 % | WEIGHT: 164.7 LBS | BODY MASS INDEX: 25.85 KG/M2 | SYSTOLIC BLOOD PRESSURE: 118 MMHG | HEART RATE: 66 BPM | DIASTOLIC BLOOD PRESSURE: 70 MMHG | TEMPERATURE: 97.2 F | HEIGHT: 67 IN

## 2019-06-03 ASSESSMENT — MIFFLIN-ST. JEOR: SCORE: 1355.7

## 2019-06-04 ENCOUNTER — NURSING HOME VISIT (OUTPATIENT)
Dept: GERIATRICS | Facility: CLINIC | Age: 84
End: 2019-06-04
Payer: MEDICARE

## 2019-06-04 DIAGNOSIS — K81.0 ACUTE CHOLECYSTITIS: Primary | ICD-10-CM

## 2019-06-04 DIAGNOSIS — R13.12 OROPHARYNGEAL DYSPHAGIA: ICD-10-CM

## 2019-06-04 DIAGNOSIS — I50.32 CHRONIC DIASTOLIC CONGESTIVE HEART FAILURE (H): ICD-10-CM

## 2019-06-04 DIAGNOSIS — A04.72 CLOSTRIDIUM DIFFICILE DIARRHEA: ICD-10-CM

## 2019-06-04 DIAGNOSIS — I34.0 NON-RHEUMATIC MITRAL REGURGITATION: ICD-10-CM

## 2019-06-04 DIAGNOSIS — R53.81 PHYSICAL DECONDITIONING: ICD-10-CM

## 2019-06-04 DIAGNOSIS — Z90.49 S/P CHOLECYSTECTOMY: ICD-10-CM

## 2019-06-04 DIAGNOSIS — C67.4 MALIGNANT NEOPLASM OF POSTERIOR WALL OF URINARY BLADDER (H): ICD-10-CM

## 2019-06-04 DIAGNOSIS — G89.4 CHRONIC PAIN SYNDROME: ICD-10-CM

## 2019-06-04 DIAGNOSIS — K21.00 GASTROESOPHAGEAL REFLUX DISEASE WITH ESOPHAGITIS: ICD-10-CM

## 2019-06-04 PROCEDURE — 99306 1ST NF CARE HIGH MDM 50: CPT | Performed by: INTERNAL MEDICINE

## 2019-06-04 NOTE — LETTER
"    6/4/2019        RE: Ivan Gomez  [address] [facility] [address]        Ivan Gomez is a 89 year old male seen June 4, 2019 at [facility] where he was admitted this week after [facility] hospitalization for acute cholecystitis for which he underwent laparoscopic cholecystectomy on 5/22/19    His post op course was complicated by hypoxia requiring BiPAP.   He also had dysphagia, and an EGD showed erosive esophagitis, ?from a pill.    He was tx'd with carafate and lidocaine, and is on a mechanical soft diet and working with Speech therapy.   He also developed c diff and is on po vancomycin  Patient is seen in his room, up to chair and reports he is \"so-so\"   He is able to give a fairly accurate history, states he has had 4 surgeries over past couple of years with physical decline and 30# weight loss over the time and is no longer able to live independently    He has chronic pain from OA and spinal stenosis, no change since admission     Patient has a h/o urothelial carcinoma diagnosed in 2015 s/p TURBT, then had a recurrence in 2016 with another TURBT, then again in 11/2018     CTscan during this admission showed a 3.4 cm mass in the bladder.       Past Medical History:   Diagnosis Date     Arthritis     Spinal Stenosis     Former smoker      Heart disease      Hemorrhoids      Hypercholesteremia      Hypertension      Malignant neoplasm (H)     skin CA, Basal cell     Other chronic pain      Renal disease        Past Surgical History:   Procedure Laterality Date     BACK SURGERY       BIOPSY      face, skin cancer     BIOPSY  8/2016    shoulder lesion     CYSTOSCOPY, TRANSURETHRAL RESECTION (TUR) PROSTATE, COMBINED N/A 8/21/2015    Procedure: COMBINED CYSTOSCOPY, TRANSURETHRAL RESECTION (TUR) PROSTATE;  Surgeon: Dennis Hilton MD;  Location:  OR     CYSTOSCOPY, TRANSURETHRAL RESECTION (TUR) TUMOR BLADDER, COMBINED N/A 9/2/2016    Procedure: COMBINED CYSTOSCOPY, TRANSURETHRAL RESECTION " "(TUR) TUMOR BLADDER;  Surgeon: Dennis Hilton MD;  Location:  OR     CYSTOSCOPY, TRANSURETHRAL RESECTION (TUR) TUMOR BLADDER, COMBINED N/A 11/2/2018    Procedure: Transurethral resection of bladder tumor > 5 cm in size;  Surgeon: Dennis Hilton MD;  Location:  OR     ESOPHAGOSCOPY, GASTROSCOPY, DUODENOSCOPY (EGD), COMBINED N/A 5/24/2019    Procedure: ESOPHAGOGASTRODUODENOSCOPY, WITH BIOPSY;  Surgeon: Abe Jang MD;  Location:  GI     LAPAROSCOPIC CHOLECYSTECTOMY WITH CHOLANGIOGRAMS N/A 5/22/2019    Procedure: CHOLECYSTECTOMY, LAPAROSCOPIC, WITH CHOLANGIOGRAM;  Surgeon: Rey Romero MD;  Location:  OR     ORTHOPEDIC SURGERY      lumbar and cervical surgery, Sep, 2008, knee surgery     PERCUTANEOUS MITRAL VALVE REPAIR N/A 10/16/2017    Procedure: PERCUTANEOUS MITRAL VALVE REPAIR ANESTHESIA;  Percutaneous MitraClip ;  Surgeon: Eber Monroe MD;  Location: [facility] OR     PICC INSERTION Right 10/14/2017    5fr DL Bard PICC, 40cm (1cm external) in the R basilic vein w/ tip in the mid SVC.       Family History   Problem Relation Age of Onset     Cancer Son 64        leukemia ? due to agent orange     Family History Negative Son        Social History     Tobacco Use     Smoking status: Former Smoker     Smokeless tobacco: Never Used   Substance Use Topics     Alcohol use: No     Alcohol/week: 0.0 oz     Comment: doesnt use anymore      SH:  Lives with his brother and nephew in [place], [place].   States his nephew \"runs the show\"    Patient lived in a condo in [place] with his significant other for 16 years.   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       Review Of Systems  Skin: open areas bilateral LEs  Eyes: impaired vision  Ears/Nose/Throat: hearing loss  Respiratory: dyspnea on exertion  Cardiovascular: lower extremity edema and exercise " "intolerance  Gastrointestinal: dysphagia, diarrhea, poor appetite and weight loss  Genitourinary: bladder cancer  Musculoskeletal: stooped gait, reports height has decreased from 6'1\" to 5'8\"     Assist of one for transfers and cares, ambulates with FWW and CGA.    Previously ambulatory with 4WW     Neurologic: negative  Psychiatric: negative  Hematologic/Lymphatic/Immunologic: anemia  Endocrine: negative      GENERAL APPEARANCE: alert and no distress, frail  /70   Pulse 66   Temp 97.2  F (36.2  C)   Resp 18   Ht 1.702 m (5' 7\")   Wt 74.7 kg (164 lb 11.2 oz)   SpO2 97%   BMI 25.80 kg/m      HEENT: normocephalic, no lesion or abnormalities  NECK: no adenopathy, no asymmetry, masses, or scars and thyroid normal to palpation, pulsatile JVD at 75 degrees.     RESP: lungs clear to auscultation - no rales, rhonchi or wheezes  CV: regular rate and rhythm, normal S1 S2, 3/6 HSM throughout precordium  ABDOMEN:  soft, nontender, no HSM or masses and bowel sounds normal; several small incisions on abd, all well healed; no rebound or guarding.  MS: + severe kyphosis, extremities with 2+ woody edema  SKIN: no suspicious lesions or rashes, multiple superficial open areas  NEURO: Normal strength and tone, sensory exam grossly normal, and speech normal  PSYCH: affect okay  LYMPHATICS: No cervical,  or supraclavicular nodes    Lab Results   Component Value Date    WBC 7.2 05/31/2019      HGB 11.0 05/31/2019      MCV 93 05/31/2019      PLT 85 05/31/2019     Last Comprehensive Metabolic Panel:  Sodium   Date Value Ref Range Status   05/31/2019 143 133 - 144 mmol/L Final     Potassium   Date Value Ref Range Status   05/31/2019 3.8 3.4 - 5.3 mmol/L Final     Chloride   Date Value Ref Range Status   05/31/2019 114 (H) 94 - 109 mmol/L Final     Carbon Dioxide   Date Value Ref Range Status   05/31/2019 23 20 - 32 mmol/L Final     Anion Gap   Date Value Ref Range Status   05/31/2019 6 3 - 14 mmol/L Final     Glucose   Date " Value Ref Range Status   05/31/2019 119 (H) 70 - 99 mg/dL Final     Urea Nitrogen   Date Value Ref Range Status   05/31/2019 21 7 - 30 mg/dL Final     Creatinine   Date Value Ref Range Status   05/31/2019 1.16 0.66 - 1.25 mg/dL Final     GFR Estimate   Date Value Ref Range Status   05/31/2019 54 (L) >60 mL/min/[1.73_m2] Final     Comment:     Non  GFR Calc  Starting 12/18/2018, serum creatinine based estimated GFR (eGFR) will be   calculated using the Chronic Kidney Disease Epidemiology Collaboration   (CKD-EPI) equation.       Calcium   Date Value Ref Range Status   05/31/2019 8.0 (L) 8.5 - 10.1 mg/dL Final     Lab Results   Component Value Date    AST 18 05/26/2019     Lab Results   Component Value Date    ALT 39 05/26/2019      BILITOTAL 1.1 05/26/2019     Lab Results   Component Value Date    ALBUMIN 2.4 05/26/2019     Lab Results   Component Value Date    PROTTOTAL 5.5 05/26/2019      Lab Results   Component Value Date    ALKPHOS 195 05/26/2019     ECHO 5/21/19  Interpretation Summary  Mitraclip in place with severe MR, with jet going through the iatrogenic ASD into the RA.  The visual ejection fraction is estimated at >70%.  Flattened septum is consistent with RV pressure/volume overload.  The right ventricle is moderately dilated.  There is severe biatrial enlargement.  Severe pulmonary hypertension is present.  Dilated IVC with small pericardial effusion.  Pleural effusion, correlate with alternative imaging.  There is mild to moderate iatrogenic plus functional (from MR) mitral stenosis.       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   Comment: new bladder mass as above     Plan: Urology and Oncology followup   Finasteride and tamsulosin to help avoid urinary retention       (G89.4) Chronic pain syndrome  Comment: OA, spinal stenosis  Plan: diclofenac gel, gabapentin, Norco prn       (K21.0) Gastroesophageal reflux disease with esophagitis  Comment: by EGD  Plan: continue pantoprazole    (I34.0) Non-rheumatic mitral regurgitation  (I50.32) Chronic diastolic congestive heart failure (H)  Comment: volume up     Plan: may need to increase dose of furosemide   Also on carvedilol, hydralazine, isosorbide and a statin.    Daily weights, follow exam and BMP     Compression stockings to LEs.       (R53.81) Physical deconditioning  Comment: acute on chronic after surgery     Plan: PHYSICAL THERAPY / OCCUPATIONAL THERAPY for strengthening, transfers, balance, gait, ADLs.   Discharge goal is return to [place] with his family      Miley Bill MD       Sincerely,        Miley Bill MD

## 2019-06-05 ENCOUNTER — HOSPITAL LABORATORY (OUTPATIENT)
Dept: OTHER | Facility: CLINIC | Age: 84
End: 2019-06-05

## 2019-06-05 ENCOUNTER — OFFICE VISIT (OUTPATIENT)
Dept: SURGERY | Facility: CLINIC | Age: 84
End: 2019-06-05
Payer: MEDICARE

## 2019-06-05 VITALS
OXYGEN SATURATION: 95 % | WEIGHT: 163 LBS | RESPIRATION RATE: 16 BRPM | BODY MASS INDEX: 24.71 KG/M2 | HEART RATE: 67 BPM | SYSTOLIC BLOOD PRESSURE: 102 MMHG | DIASTOLIC BLOOD PRESSURE: 64 MMHG | HEIGHT: 68 IN

## 2019-06-05 DIAGNOSIS — Z09 SURGICAL FOLLOWUP VISIT: Primary | ICD-10-CM

## 2019-06-05 LAB
ANION GAP SERPL CALCULATED.3IONS-SCNC: 6 MMOL/L (ref 3–14)
BUN SERPL-MCNC: 23 MG/DL (ref 7–30)
CALCIUM SERPL-MCNC: 8.3 MG/DL (ref 8.5–10.1)
CHLORIDE SERPL-SCNC: 112 MMOL/L (ref 94–109)
CO2 SERPL-SCNC: 24 MMOL/L (ref 20–32)
CREAT SERPL-MCNC: 1.29 MG/DL (ref 0.66–1.25)
ERYTHROCYTE [DISTWIDTH] IN BLOOD BY AUTOMATED COUNT: 19 % (ref 10–15)
GFR SERPL CREATININE-BSD FRML MDRD: 48 ML/MIN/{1.73_M2}
GLUCOSE SERPL-MCNC: 85 MG/DL (ref 70–99)
HCT VFR BLD AUTO: 34.8 % (ref 40–53)
HGB BLD-MCNC: 11 G/DL (ref 13.3–17.7)
MCH RBC QN AUTO: 29.6 PG (ref 26.5–33)
MCHC RBC AUTO-ENTMCNC: 31.6 G/DL (ref 31.5–36.5)
MCV RBC AUTO: 94 FL (ref 78–100)
PLATELET # BLD AUTO: 55 10E9/L (ref 150–450)
POTASSIUM SERPL-SCNC: 4.2 MMOL/L (ref 3.4–5.3)
RBC # BLD AUTO: 3.72 10E12/L (ref 4.4–5.9)
SODIUM SERPL-SCNC: 142 MMOL/L (ref 133–144)
WBC # BLD AUTO: 5.5 10E9/L (ref 4–11)

## 2019-06-05 PROCEDURE — 99024 POSTOP FOLLOW-UP VISIT: CPT | Performed by: SURGERY

## 2019-06-05 ASSESSMENT — MIFFLIN-ST. JEOR: SCORE: 1363.86

## 2019-06-06 NOTE — PROGRESS NOTES
Mr. Gomez has had a difficult time recovering from his laparoscopic cholecystectomy.  He has been able to eat and have bowel function, but this is been complicated by C. difficile colitis.  He has also experienced a significant lower extremity swelling.  No fevers no chills.    On exam his incisions have healed nicely, no infection or hernia.    Slow but steady recovery after laparoscopic cholecystectomy.  I explained the benefits of leg elevation in 2-3 pillows while in bed and 1 or 2 times a day to help with his lower extremity edema.    His diet is without limitations from our standpoint, he is following type of dysphagia diet to avoid aspiration though.    Follow-up as needed.    If questions please call us.    Best regards.    Please route or send letter to:  Primary Care Provider (PCP), Referring Provider, Include Op Note and Include Progress Note

## 2019-06-06 NOTE — PROGRESS NOTES
"Ivan Gomez is a 89 year old male seen June 4, 2019 at [facility] where he was admitted this week after [facility] hospitalization for acute cholecystitis for which he underwent laparoscopic cholecystectomy on 5/22/19    His post op course was complicated by hypoxia requiring BiPAP.   He also had dysphagia, and an EGD showed erosive esophagitis, ?from a pill.    He was tx'd with carafate and lidocaine, and is on a mechanical soft diet and working with Speech therapy.   He also developed c diff and is on po vancomycin  Patient is seen in his room, up to chair and reports he is \"so-so\"   He is able to give a fairly accurate history, states he has had 4 surgeries over past couple of years with physical decline and 30# weight loss over the time and is no longer able to live independently    He has chronic pain from OA and spinal stenosis, no change since admission     Patient has a h/o urothelial carcinoma diagnosed in 2015 s/p TURBT, then had a recurrence in 2016 with another TURBT, then again in 11/2018     CTscan during this admission showed a 3.4 cm mass in the bladder.       Past Medical History:   Diagnosis Date     Arthritis     Spinal Stenosis     Former smoker      Heart disease      Hemorrhoids      Hypercholesteremia      Hypertension      Malignant neoplasm (H)     skin CA, Basal cell     Other chronic pain      Renal disease        Past Surgical History:   Procedure Laterality Date     BACK SURGERY       BIOPSY      face, skin cancer     BIOPSY  8/2016    shoulder lesion     CYSTOSCOPY, TRANSURETHRAL RESECTION (TUR) PROSTATE, COMBINED N/A 8/21/2015    Procedure: COMBINED CYSTOSCOPY, TRANSURETHRAL RESECTION (TUR) PROSTATE;  Surgeon: Dennis Hilton MD;  Location:  OR     CYSTOSCOPY, TRANSURETHRAL RESECTION (TUR) TUMOR BLADDER, COMBINED N/A 9/2/2016    Procedure: COMBINED CYSTOSCOPY, TRANSURETHRAL RESECTION (TUR) TUMOR BLADDER;  Surgeon: Dennis Hilton MD;  Location:  OR     " "CYSTOSCOPY, TRANSURETHRAL RESECTION (TUR) TUMOR BLADDER, COMBINED N/A 11/2/2018    Procedure: Transurethral resection of bladder tumor > 5 cm in size;  Surgeon: Dennis Hilton MD;  Location:  OR     ESOPHAGOSCOPY, GASTROSCOPY, DUODENOSCOPY (EGD), COMBINED N/A 5/24/2019    Procedure: ESOPHAGOGASTRODUODENOSCOPY, WITH BIOPSY;  Surgeon: Abe Jang MD;  Location: [facility]     LAPAROSCOPIC CHOLECYSTECTOMY WITH CHOLANGIOGRAMS N/A 5/22/2019    Procedure: CHOLECYSTECTOMY, LAPAROSCOPIC, WITH CHOLANGIOGRAM;  Surgeon: Rey Romero MD;  Location:  OR     ORTHOPEDIC SURGERY      lumbar and cervical surgery, Sep, 2008, knee surgery     PERCUTANEOUS MITRAL VALVE REPAIR N/A 10/16/2017    Procedure: PERCUTANEOUS MITRAL VALVE REPAIR ANESTHESIA;  Percutaneous MitraClip ;  Surgeon: Eber Monroe MD;  Location: [facility] OR     PICC INSERTION Right 10/14/2017    5fr DL Bard PICC, 40cm (1cm external) in the R basilic vein w/ tip in the mid SVC.       Family History   Problem Relation Age of Onset     Cancer Son 64        leukemia ? due to agent orange     Family History Negative Son        Social History     Tobacco Use     Smoking status: Former Smoker     Smokeless tobacco: Never Used   Substance Use Topics     Alcohol use: No     Alcohol/week: 0.0 oz     Comment: doesnt use anymore      SH:  Lives with his brother and nephew in [place], [place].   States his nephew \"runs the show\"    [facility] [place] with his significant other for 16 years.   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       Review Of Systems  Skin: open areas bilateral LEs  Eyes: impaired vision  Ears/Nose/Throat: hearing loss  Respiratory: dyspnea on exertion  Cardiovascular: lower extremity edema and exercise intolerance  Gastrointestinal: dysphagia, diarrhea, poor appetite and weight loss  Genitourinary: " "bladder cancer  Musculoskeletal: stooped gait, reports height has decreased from 6'1\" to 5'8\"     Assist of one for transfers and cares, ambulates with FWW and CGA.    Previously ambulatory with 4WW     Neurologic: negative  Psychiatric: negative  Hematologic/Lymphatic/Immunologic: anemia  Endocrine: negative      GENERAL APPEARANCE: alert and no distress, frail  /70   Pulse 66   Temp 97.2  F (36.2  C)   Resp 18   Ht 1.702 m (5' 7\")   Wt 74.7 kg (164 lb 11.2 oz)   SpO2 97%   BMI 25.80 kg/m     HEENT: normocephalic, no lesion or abnormalities  NECK: no adenopathy, no asymmetry, masses, or scars and thyroid normal to palpation, pulsatile JVD at 75 degrees.     RESP: lungs clear to auscultation - no rales, rhonchi or wheezes  CV: regular rate and rhythm, normal S1 S2, 3/6 HSM throughout precordium  ABDOMEN:  soft, nontender, no HSM or masses and bowel sounds normal; several small incisions on abd, all well healed; no rebound or guarding.  MS: + severe kyphosis, extremities with 2+ woody edema  SKIN: no suspicious lesions or rashes, multiple superficial open areas  NEURO: Normal strength and tone, sensory exam grossly normal, and speech normal  PSYCH: affect okay  LYMPHATICS: No cervical,  or supraclavicular nodes    Lab Results   Component Value Date    WBC 7.2 05/31/2019      HGB 11.0 05/31/2019      MCV 93 05/31/2019      PLT 85 05/31/2019     Last Comprehensive Metabolic Panel:  Sodium   Date Value Ref Range Status   05/31/2019 143 133 - 144 mmol/L Final     Potassium   Date Value Ref Range Status   05/31/2019 3.8 3.4 - 5.3 mmol/L Final     Chloride   Date Value Ref Range Status   05/31/2019 114 (H) 94 - 109 mmol/L Final     Carbon Dioxide   Date Value Ref Range Status   05/31/2019 23 20 - 32 mmol/L Final     Anion Gap   Date Value Ref Range Status   05/31/2019 6 3 - 14 mmol/L Final     Glucose   Date Value Ref Range Status   05/31/2019 119 (H) 70 - 99 mg/dL Final     Urea Nitrogen   Date Value Ref " Range Status   05/31/2019 21 7 - 30 mg/dL Final     Creatinine   Date Value Ref Range Status   05/31/2019 1.16 0.66 - 1.25 mg/dL Final     GFR Estimate   Date Value Ref Range Status   05/31/2019 54 (L) >60 mL/min/[1.73_m2] Final     Comment:     Non  GFR Calc  Starting 12/18/2018, serum creatinine based estimated GFR (eGFR) will be   calculated using the Chronic Kidney Disease Epidemiology Collaboration   (CKD-EPI) equation.       Calcium   Date Value Ref Range Status   05/31/2019 8.0 (L) 8.5 - 10.1 mg/dL Final     Lab Results   Component Value Date    AST 18 05/26/2019     Lab Results   Component Value Date    ALT 39 05/26/2019      BILITOTAL 1.1 05/26/2019     Lab Results   Component Value Date    ALBUMIN 2.4 05/26/2019     Lab Results   Component Value Date    PROTTOTAL 5.5 05/26/2019      Lab Results   Component Value Date    ALKPHOS 195 05/26/2019     ECHO 5/21/19  Interpretation Summary  Mitraclip in place with severe MR, with jet going through the iatrogenic ASD into the RA.  The visual ejection fraction is estimated at >70%.  Flattened septum is consistent with RV pressure/volume overload.  The right ventricle is moderately dilated.  There is severe biatrial enlargement.  Severe pulmonary hypertension is present.  Dilated IVC with small pericardial effusion.  Pleural effusion, correlate with alternative imaging.  There is mild to moderate iatrogenic plus functional (from MR) mitral stenosis.       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) s/po resection  and bladder reconstruction x 2   Comment: new bladder mass as above     Plan: Urology and Oncology followup   Finasteride and tamsulosin to help avoid urinary retention       (G89.4) Chronic pain syndrome  Comment: OA, spinal stenosis  Plan: diclofenac gel, gabapentin, Norco prn       (K21.0) Gastroesophageal reflux disease with esophagitis  Comment: by EGD  Plan: continue pantoprazole    (I34.0) Non-rheumatic mitral regurgitation  (I50.32) Chronic diastolic congestive heart failure (H)  Comment: volume up     Plan: may need to increase dose of furosemide   Also on carvedilol, hydralazine, isosorbide and a statin.    Daily weights, follow exam and BMP     Compression stockings to LEs.       (R53.81) Physical deconditioning  Comment: acute on chronic after surgery     Plan: PHYSICAL THERAPY / OCCUPATIONAL THERAPY for strengthening, transfers, balance, gait, ADLs.   Discharge goal is return to [place] with his family      Miley Bill MD

## 2019-06-07 ENCOUNTER — HOSPITAL LABORATORY (OUTPATIENT)
Dept: OTHER | Facility: CLINIC | Age: 84
End: 2019-06-07

## 2019-06-07 LAB
ANION GAP SERPL CALCULATED.3IONS-SCNC: 7 MMOL/L (ref 3–14)
BUN SERPL-MCNC: 28 MG/DL (ref 7–30)
CALCIUM SERPL-MCNC: 8.4 MG/DL (ref 8.5–10.1)
CHLORIDE SERPL-SCNC: 112 MMOL/L (ref 94–109)
CO2 SERPL-SCNC: 25 MMOL/L (ref 20–32)
CREAT SERPL-MCNC: 1.27 MG/DL (ref 0.66–1.25)
GFR SERPL CREATININE-BSD FRML MDRD: 49 ML/MIN/{1.73_M2}
GLUCOSE SERPL-MCNC: 78 MG/DL (ref 70–99)
POTASSIUM SERPL-SCNC: 4.2 MMOL/L (ref 3.4–5.3)
SODIUM SERPL-SCNC: 144 MMOL/L (ref 133–144)

## 2019-06-11 ENCOUNTER — NURSING HOME VISIT (OUTPATIENT)
Dept: GERIATRICS | Facility: CLINIC | Age: 84
End: 2019-06-11
Payer: MEDICARE

## 2019-06-11 VITALS
DIASTOLIC BLOOD PRESSURE: 75 MMHG | HEART RATE: 72 BPM | SYSTOLIC BLOOD PRESSURE: 121 MMHG | OXYGEN SATURATION: 96 % | RESPIRATION RATE: 16 BRPM | TEMPERATURE: 97.9 F | WEIGHT: 156 LBS | HEIGHT: 67 IN | BODY MASS INDEX: 24.48 KG/M2

## 2019-06-11 DIAGNOSIS — Z98.890 S/P MITRAL VALVE CLIP IMPLANTATION: ICD-10-CM

## 2019-06-11 DIAGNOSIS — N18.30 CKD (CHRONIC KIDNEY DISEASE) STAGE 3, GFR 30-59 ML/MIN (H): ICD-10-CM

## 2019-06-11 DIAGNOSIS — I34.0 NON-RHEUMATIC MITRAL REGURGITATION: ICD-10-CM

## 2019-06-11 DIAGNOSIS — K81.0 ACUTE CHOLECYSTITIS: Primary | ICD-10-CM

## 2019-06-11 DIAGNOSIS — D63.8 ANEMIA IN OTHER CHRONIC DISEASES CLASSIFIED ELSEWHERE: ICD-10-CM

## 2019-06-11 DIAGNOSIS — A04.72 CLOSTRIDIUM DIFFICILE DIARRHEA: ICD-10-CM

## 2019-06-11 DIAGNOSIS — M48.061 SPINAL STENOSIS OF LUMBAR REGION, UNSPECIFIED WHETHER NEUROGENIC CLAUDICATION PRESENT: ICD-10-CM

## 2019-06-11 DIAGNOSIS — Z90.49 S/P CHOLECYSTECTOMY: ICD-10-CM

## 2019-06-11 DIAGNOSIS — D69.6 THROMBOCYTOPENIA (H): ICD-10-CM

## 2019-06-11 DIAGNOSIS — I27.20 PULMONARY HYPERTENSION (H): ICD-10-CM

## 2019-06-11 DIAGNOSIS — I10 BENIGN ESSENTIAL HYPERTENSION: ICD-10-CM

## 2019-06-11 DIAGNOSIS — R53.81 PHYSICAL DECONDITIONING: ICD-10-CM

## 2019-06-11 DIAGNOSIS — Z95.818 S/P MITRAL VALVE CLIP IMPLANTATION: ICD-10-CM

## 2019-06-11 DIAGNOSIS — R13.12 OROPHARYNGEAL DYSPHAGIA: ICD-10-CM

## 2019-06-11 DIAGNOSIS — I50.32 CHRONIC DIASTOLIC CONGESTIVE HEART FAILURE (H): ICD-10-CM

## 2019-06-11 PROCEDURE — 99309 SBSQ NF CARE MODERATE MDM 30: CPT | Performed by: NURSE PRACTITIONER

## 2019-06-11 ASSESSMENT — MIFFLIN-ST. JEOR: SCORE: 1316.24

## 2019-06-11 NOTE — PROGRESS NOTES
[facility]  [facility] Record Number:  [id number]  Place of Service where encounter took place:  [facility] [[id number]]  Chief Complaint   Patient presents with     RECHECK       HPI:    Ivan Gomez  is a 89 year old (10/12/1926), who is being seen today for an episodic care visit.  HPI information obtained from: facility chart records, facility staff, patient report and [facility] chart review.  He came to this facility 5/28/2019 for short term rehab and medical management following hospitalization after presenting to the  [facility] ED in [facility] 5/21/2019  with worsening abdominal pain, nausea and diarrhea. He was found to have acute cholecystitis and was transferred to [facility] for management. He underwent cholecystectomy 5/22/2019 by Dr Romero. Completed a course of flagyl and cipro. He developed post op hypoxia requiring BiPAP. CXR with atelectasis vs infiltrate. No symptoms of infection and WBC normal. Respiratory issues resolved.   He had significant dysphagia and EGD showed esophagitis, thought to be from a pill with local erosion. Treated with carafate and lidocaine with improvement. Discharged on mechanical soft diet. He was positive for C diff and discharged on  vancomycin.     Today's concern is:     Acute cholecystitis-no pain at surgical sites. He saw Dr Romero 6/5/2019 and is to return prn.   S/P cholecystectomy  CKD (chronic kidney disease) stage 3, GFR 30-59 ml/min (H)  Chronic diastolic congestive heart failure (H)-weight down 9 lbs-in part  due to poor appetite. LE edema has improved. Denies cough, shortness or breath or chest pain.   Pulmonary hypertension (H)  Non-rheumatic mitral regurgitation  S/P mitral valve clip implantation  Clostridium difficile diarrhea-vancomycin decreased to tid on 6/4/2019. Having 1-4 stools daily and they remain liquid. No cramping or abdominal pain. Would like to stop Welchol as it's  difficult to swallow, even when crushed and mixed with pudding.   Oropharyngeal dysphagia-tolerating DD2 with thin liquids. Appetite remains fair to poor.   Thrombocytopenia (H)  Anemia in other chronic diseases classified elsewhere  Benign essential hypertension-BPs: 116/75, 114/77, 120/69, 109/75   HR: 61-69  Spinal stenosis of lumbar region, unspecified whether neurogenic claudication present-reports feet and ankles continue to be painful, some improvement with voltaren gel.   Physical deconditioning-ambulating 30 ft with walker and stand by assist. Requires min to mod assist with all cares.   SLUMS 21/30, CPT 5.1/5.6    Past Medical and Surgical History reviewed in Epic today.    MEDICATIONS:  Current Outpatient Medications   Medication Sig Dispense Refill     artificial saliva (BIOTENE MT) AERS spray Take 1 spray by mouth every 2 hours as needed for dry mouth       carvedilol (COREG) 6.25 MG tablet Take 6.25 mg by mouth 2 times daily (with meals)       diclofenac (VOLTAREN) 1 % topical gel Place 2 g onto the skin 3 times daily Apply to both ankles       finasteride (PROSCAR) 5 MG tablet TAKE 1 TABLET(5 MG) BY MOUTH DAILY 90 tablet 3     furosemide (LASIX) 20 MG tablet Take 2 tablets (40 mg) by mouth daily 180 tablet 0     gabapentin (NEURONTIN) 300 MG capsule TAKE 1 CAPSULE BY MOUTH THREE TIMES DAILY 270 capsule 0     hydrALAZINE (APRESOLINE) 25 MG tablet Take 1 tablet (25 mg) by mouth 3 times daily 270 tablet 1     HYDROcodone-acetaminophen (NORCO) 7.5-325 MG per tablet Take 1 tablet by mouth every 6 hours as needed for pain maximum 4 tablet(s) per day 15 tablet 0     hypromellose (ARTIFICIAL TEARS) 0.4 % SOLN ophthalmic solution Place 2 drops Into the left eye 4 times daily       isosorbide dinitrate (ISORDIL) 20 MG tablet Take 20 mg by mouth 2 times daily       pantoprazole (PROTONIX) 40 MG EC tablet Take 40 mg by mouth daily       potassium chloride ER (K-DUR/KLOR-CON M) 10 MEQ CR tablet Take 10 mEq by  "mouth daily       simvastatin (ZOCOR) 20 MG tablet TAKE ONE TABLET BY MOUTH AT BEDTIME 30 tablet 0     Skin Protectants, Misc. (EUCERIN) cream Apply topically as needed for dry skin Apply to LE + Areas of dryness topically as needed for Dryness       tamsulosin (FLOMAX) 0.4 MG capsule TAKE ONE CAPSULE BY MOUTH DAILY 90 capsule 3     vancomycin (FIRVANQ) 50 MG/ML oral solution Take 125 mg by mouth 3 times daily         REVIEW OF SYSTEMS:  10 point ROS of systems including Constitutional, Eyes, Respiratory, Cardiovascular, Gastroenterology, Genitourinary, Integumentary, Musculoskeletal, Psychiatric were all negative except for pertinent positives noted in my HPI.    Objective:  /75   Pulse 72   Temp 97.9  F (36.6  C)   Resp 16   Ht 1.702 m (5' 7\")   Wt 70.8 kg (156 lb)   SpO2 96%   BMI 24.43 kg/m    Exam:  GENERAL APPEARANCE:  Alert, in no distress, frail  ENT:  Mouth and posterior oropharynx normal, moist mucous membranes, [place]  EYES:  EOM normal, conjunctiva and lids normal  NECK:  No adenopathy,masses or thyromegaly  RESP:  respiratory effort and palpation of chest normal, no respiratory distress, diminished breath sounds bilaterally, no wheezes or crackles  CV:  Palpation and auscultation of heart done , regular rate and rhythm, 2/6  murmur, +2 pedal pulses, peripheral edema trace  in both LE  ABDOMEN: soft, nontender, no hepatosplenomegaly or other masses  M/S: up in recliner with legs elevated. [place] with good strength. No joint inflammation  SKIN:  abdominal incisions with clean, dry, fading bruises.  No rashes or open areas. No erythema of LE  PSYCH:  oriented X 3, normal insight, judgement and memory, affect and mood normal     Labs:   Labs done in SNF are in [facility] EPIC. Please refer to them using [employer]/Care Everywhere.    ASSESSMENT / PLAN:  (K81.0) Acute cholecystitis  (primary encounter diagnosis)  (Z90.49) S/P cholecystectomy  Comment: incisions healing and surgical pain has resolved.   Plan: " monitor incisions. Surgical follow up prn.     (N18.3) CKD (chronic kidney disease) stage 3, GFR 30-59 ml/min (H)  Comment: creatinine up slightly   Plan: recheck BMP. Avoid nephrotoxins.     (I50.32) Chronic diastolic congestive heart failure (H)  (I27.20) Pulmonary hypertension (H)  (I34.0) Non-rheumatic mitral regurgitation  (Z98.890,  Z95.818) S/P mitral valve clip implantation  Comment: some improvement in LE edema and respiratory status. Weight loss  due to improved volume status and poor intake.  Plan: continue lasix, carvedilol, hydralazine, isosorbide. Daily weight. Continue compression stockings and elevating legs.     (A04.72) Clostridium difficile diarrhea  Comment:  improving in frequency, stools remain liquid  Plan: continue vancomycin tid and reassess later this week. Continue to taper if stools have improved.     (R13.12) Oropharyngeal dysphagia  Comment: throat pain has resolved. Appetite remains fairly poor.   Plan: continue SPEECH THERAPY, advance diet as tolerated. Dietician is involved.     (D69.6) Thrombocytopenia (H)  (D63.8) Anemia in other chronic diseases classified elsewhere  Comment: chronic. Platelet count is lower at 55,000, Hgb unchanged  at 11.0. No s/s of bleeding/bruising.   Plan: recheck CBC. Monitor for signs of bleeding.     (I10) Benign essential hypertension  Comment: mild hypotension at times, unchanged and asymptomatic   Plan: continue cardiac meds as above     (M48.061) Spinal stenosis of lumbar region, unspecified whether neurogenic claudication present  Comment: chronic pain and neuropathy controlled  Plan: continue gabapentin, voltaren gel, Norco.     (R53.81) Physical deconditioning  Comment: slow progress in therapies  Plan: continue PHYSICAL THERAPY/OT. He has decided to stay in [place] (instead of going home to [place]) and is on the waiting list at the [facility] in [facility], where his SO lives.       Electronically signed by:  FRANK Mena CNP

## 2019-06-11 NOTE — LETTER
6/11/2019        RE: Ivan Gomez  [address] [facility] [address]        [facility]  [place] Medical Record Number:  [id number]  Place of Service where encounter took place:  [facility] [[id number]]  Chief Complaint   Patient presents with     RECHECK       HPI:    Ivan Gomez  is a 89 year old (10/12/1926), who is being seen today for an episodic care visit.  HPI information obtained from: facility chart records, facility staff, patient report and [facility] chart review.  He came to this facility 5/28/2019 for short term rehab and medical management following hospitalization after presenting to the  [facility] ED in [facility] 5/21/2019  with worsening abdominal pain, nausea and diarrhea. He was found to have acute cholecystitis and was transferred to [facility] for management. He underwent cholecystectomy 5/22/2019 by Dr Romero. Completed a course of flagyl and cipro. He developed post op hypoxia requiring BiPAP. CXR with atelectasis vs infiltrate. No symptoms of infection and WBC normal. Respiratory issues resolved.   He had significant dysphagia and EGD showed esophagitis, thought to be from a pill with local erosion. Treated with carafate and lidocaine with improvement. Discharged on mechanical soft diet. He was positive for C diff and discharged on  vancomycin.     Today's concern is:     Acute cholecystitis-no pain at surgical sites. He saw Dr Romero 6/5/2019 and is to return prn.   S/P cholecystectomy  CKD (chronic kidney disease) stage 3, GFR 30-59 ml/min (H)  Chronic diastolic congestive heart failure (H)-weight down 9 lbs-in part  due to poor appetite. LE edema has improved. Denies cough, shortness or breath or chest pain.   Pulmonary hypertension (H)  Non-rheumatic mitral regurgitation  S/P mitral valve clip implantation  Clostridium difficile diarrhea-vancomycin decreased to tid on 6/4/2019. Having 1-4 stools daily and  they remain liquid. No cramping or abdominal pain. Would like to stop Welchol as it's difficult to swallow, even when crushed and mixed with pudding.   Oropharyngeal dysphagia-tolerating DD2 with thin liquids. Appetite remains fair to poor.   Thrombocytopenia (H)  Anemia in other chronic diseases classified elsewhere  Benign essential hypertension-BPs: 116/75, 114/77, 120/69, 109/75   HR: 61-69  Spinal stenosis of lumbar region, unspecified whether neurogenic claudication present-reports feet and ankles continue to be painful, some improvement with voltaren gel.   Physical deconditioning-ambulating 30 ft with walker and stand by assist. Requires min to mod assist with all cares.   SLUMS 21/30, CPT 5.1/5.6    Past Medical and Surgical History reviewed in Epic today.    MEDICATIONS:  Current Outpatient Medications   Medication Sig Dispense Refill     artificial saliva (BIOTENE MT) AERS spray Take 1 spray by mouth every 2 hours as needed for dry mouth       carvedilol (COREG) 6.25 MG tablet Take 6.25 mg by mouth 2 times daily (with meals)       diclofenac (VOLTAREN) 1 % topical gel Place 2 g onto the skin 3 times daily Apply to both ankles       finasteride (PROSCAR) 5 MG tablet TAKE 1 TABLET(5 MG) BY MOUTH DAILY 90 tablet 3     furosemide (LASIX) 20 MG tablet Take 2 tablets (40 mg) by mouth daily 180 tablet 0     gabapentin (NEURONTIN) 300 MG capsule TAKE 1 CAPSULE BY MOUTH THREE TIMES DAILY 270 capsule 0     hydrALAZINE (APRESOLINE) 25 MG tablet Take 1 tablet (25 mg) by mouth 3 times daily 270 tablet 1     HYDROcodone-acetaminophen (NORCO) 7.5-325 MG per tablet Take 1 tablet by mouth every 6 hours as needed for pain maximum 4 tablet(s) per day 15 tablet 0     hypromellose (ARTIFICIAL TEARS) 0.4 % SOLN ophthalmic solution Place 2 drops Into the left eye 4 times daily       isosorbide dinitrate (ISORDIL) 20 MG tablet Take 20 mg by mouth 2 times daily       pantoprazole (PROTONIX) 40 MG EC tablet Take 40 mg by mouth  "daily       potassium chloride ER (K-DUR/KLOR-CON M) 10 MEQ CR tablet Take 10 mEq by mouth daily       simvastatin (ZOCOR) 20 MG tablet TAKE ONE TABLET BY MOUTH AT BEDTIME 30 tablet 0     Skin Protectants, Misc. (EUCERIN) cream Apply topically as needed for dry skin Apply to LE + Areas of dryness topically as needed for Dryness       tamsulosin (FLOMAX) 0.4 MG capsule TAKE ONE CAPSULE BY MOUTH DAILY 90 capsule 3     vancomycin (FIRVANQ) 50 MG/ML oral solution Take 125 mg by mouth 3 times daily         REVIEW OF SYSTEMS:  10 point ROS of systems including Constitutional, Eyes, Respiratory, Cardiovascular, Gastroenterology, Genitourinary, Integumentary, Musculoskeletal, Psychiatric were all negative except for pertinent positives noted in my HPI.    Objective:  /75   Pulse 72   Temp 97.9  F (36.6  C)   Resp 16   Ht 1.702 m (5' 7\")   Wt 70.8 kg (156 lb)   SpO2 96%   BMI 24.43 kg/m     Exam:  GENERAL APPEARANCE:  Alert, in no distress, frail  ENT:  Mouth and posterior oropharynx normal, moist mucous membranes, [place]  EYES:  EOM normal, conjunctiva and lids normal  NECK:  No adenopathy,masses or thyromegaly  RESP:  respiratory effort and palpation of chest normal, no respiratory distress, diminished breath sounds bilaterally, no wheezes or crackles  CV:  Palpation and auscultation of heart done , regular rate and rhythm, 2/6  murmur, +2 pedal pulses, peripheral edema trace  in both LE  ABDOMEN: soft, nontender, no hepatosplenomegaly or other masses  M/S: up in recliner with legs elevated. WAKEFIELD with good strength. No joint inflammation  SKIN:  abdominal incisions with clean, dry, fading bruises.  No rashes or open areas. No erythema of LE  PSYCH:  oriented X 3, normal insight, judgement and memory, affect and mood normal     Labs:   Labs done in SNF are in [place] EPIC. Please refer to them using [facility] Everywhere.    ASSESSMENT / PLAN:  (K81.0) Acute cholecystitis  (primary encounter diagnosis)  (Z90.49) " S/P cholecystectomy  Comment: incisions healing and surgical pain has resolved.   Plan: monitor incisions. Surgical follow up prn.     (N18.3) CKD (chronic kidney disease) stage 3, GFR 30-59 ml/min (H)  Comment: creatinine up slightly   Plan: recheck BMP. Avoid nephrotoxins.     (I50.32) Chronic diastolic congestive heart failure (H)  (I27.20) Pulmonary hypertension (H)  (I34.0) Non-rheumatic mitral regurgitation  (Z98.890,  Z95.818) S/P mitral valve clip implantation  Comment: some improvement in LE edema and respiratory status. Weight loss  due to improved volume status and poor intake.  Plan: continue lasix, carvedilol, hydralazine, isosorbide. Daily weight. Continue compression stockings and elevating legs.     (A04.72) Clostridium difficile diarrhea  Comment:  improving in frequency, stools remain liquid  Plan: continue vancomycin tid and reassess later this week. Continue to taper if stools have improved.     (R13.12) Oropharyngeal dysphagia  Comment: throat pain has resolved. Appetite remains fairly poor.   Plan: continue SPEECH THERAPY, advance diet as tolerated. Dietician is involved.     (D69.6) Thrombocytopenia (H)  (D63.8) Anemia in other chronic diseases classified elsewhere  Comment: chronic. Platelet count is lower at 55,000, Hgb unchanged  at 11.0. No s/s of bleeding/bruising.   Plan: recheck CBC. Monitor for signs of bleeding.     (I10) Benign essential hypertension  Comment: mild hypotension at times, unchanged and asymptomatic   Plan: continue cardiac meds as above     (M48.061) Spinal stenosis of lumbar region, unspecified whether neurogenic claudication present  Comment: chronic pain and neuropathy controlled  Plan: continue gabapentin, voltaren gel, Norco.     (R53.81) Physical deconditioning  Comment: slow progress in therapies  Plan: continue PHYSICAL THERAPY/OT. He has decided to stay in [place] (instead of going home to [place]) and is on the waiting list at the AL in [facility], where his SO lives.        Electronically signed by:  FRANK Mena CNP       Sincerely,        FRANK Mena CNP

## 2019-06-13 ENCOUNTER — HOSPITAL LABORATORY (OUTPATIENT)
Dept: OTHER | Facility: CLINIC | Age: 84
End: 2019-06-13

## 2019-06-13 LAB
ANION GAP SERPL CALCULATED.3IONS-SCNC: 5 MMOL/L (ref 3–14)
BUN SERPL-MCNC: 34 MG/DL (ref 7–30)
CALCIUM SERPL-MCNC: 8.4 MG/DL (ref 8.5–10.1)
CHLORIDE SERPL-SCNC: 114 MMOL/L (ref 94–109)
CO2 SERPL-SCNC: 26 MMOL/L (ref 20–32)
CREAT SERPL-MCNC: 1.45 MG/DL (ref 0.66–1.25)
ERYTHROCYTE [DISTWIDTH] IN BLOOD BY AUTOMATED COUNT: 19.7 % (ref 10–15)
GFR SERPL CREATININE-BSD FRML MDRD: 41 ML/MIN/{1.73_M2}
GLUCOSE SERPL-MCNC: 88 MG/DL (ref 70–99)
HCT VFR BLD AUTO: 36.6 % (ref 40–53)
HGB BLD-MCNC: 11.4 G/DL (ref 13.3–17.7)
MCH RBC QN AUTO: 29.5 PG (ref 26.5–33)
MCHC RBC AUTO-ENTMCNC: 31.1 G/DL (ref 31.5–36.5)
MCV RBC AUTO: 95 FL (ref 78–100)
PLATELET # BLD AUTO: 65 10E9/L (ref 150–450)
POTASSIUM SERPL-SCNC: 3.9 MMOL/L (ref 3.4–5.3)
RBC # BLD AUTO: 3.86 10E12/L (ref 4.4–5.9)
SODIUM SERPL-SCNC: 145 MMOL/L (ref 133–144)
WBC # BLD AUTO: 3.8 10E9/L (ref 4–11)

## 2019-06-14 ENCOUNTER — NURSING HOME VISIT (OUTPATIENT)
Dept: GERIATRICS | Facility: CLINIC | Age: 84
End: 2019-06-14
Payer: MEDICARE

## 2019-06-14 VITALS
BODY MASS INDEX: 24.55 KG/M2 | SYSTOLIC BLOOD PRESSURE: 108 MMHG | HEIGHT: 67 IN | WEIGHT: 156.4 LBS | HEART RATE: 62 BPM | OXYGEN SATURATION: 95 % | DIASTOLIC BLOOD PRESSURE: 69 MMHG | RESPIRATION RATE: 16 BRPM | TEMPERATURE: 97 F

## 2019-06-14 DIAGNOSIS — C67.4 MALIGNANT NEOPLASM OF POSTERIOR WALL OF URINARY BLADDER (H): ICD-10-CM

## 2019-06-14 DIAGNOSIS — A04.72 CLOSTRIDIUM DIFFICILE DIARRHEA: ICD-10-CM

## 2019-06-14 DIAGNOSIS — N18.30 CKD (CHRONIC KIDNEY DISEASE) STAGE 3, GFR 30-59 ML/MIN (H): ICD-10-CM

## 2019-06-14 DIAGNOSIS — I10 BENIGN ESSENTIAL HYPERTENSION: ICD-10-CM

## 2019-06-14 DIAGNOSIS — I34.0 NON-RHEUMATIC MITRAL REGURGITATION: ICD-10-CM

## 2019-06-14 DIAGNOSIS — Z95.818 S/P MITRAL VALVE CLIP IMPLANTATION: ICD-10-CM

## 2019-06-14 DIAGNOSIS — I27.20 PULMONARY HYPERTENSION (H): ICD-10-CM

## 2019-06-14 DIAGNOSIS — Z98.890 S/P MITRAL VALVE CLIP IMPLANTATION: ICD-10-CM

## 2019-06-14 DIAGNOSIS — R53.81 PHYSICAL DECONDITIONING: ICD-10-CM

## 2019-06-14 DIAGNOSIS — M48.061 SPINAL STENOSIS OF LUMBAR REGION, UNSPECIFIED WHETHER NEUROGENIC CLAUDICATION PRESENT: ICD-10-CM

## 2019-06-14 DIAGNOSIS — I50.32 CHRONIC DIASTOLIC CONGESTIVE HEART FAILURE (H): Primary | ICD-10-CM

## 2019-06-14 DIAGNOSIS — K81.0 ACUTE CHOLECYSTITIS: ICD-10-CM

## 2019-06-14 DIAGNOSIS — Z90.49 S/P CHOLECYSTECTOMY: ICD-10-CM

## 2019-06-14 DIAGNOSIS — R13.12 OROPHARYNGEAL DYSPHAGIA: ICD-10-CM

## 2019-06-14 PROCEDURE — 99309 SBSQ NF CARE MODERATE MDM 30: CPT | Performed by: NURSE PRACTITIONER

## 2019-06-14 RX ORDER — VANCOMYCIN HYDROCHLORIDE 50 MG/ML
125 KIT ORAL 2 TIMES DAILY
COMMUNITY
Start: 2019-07-24 | End: 2019-01-01

## 2019-06-14 ASSESSMENT — MIFFLIN-ST. JEOR: SCORE: 1318.06

## 2019-06-14 NOTE — LETTER
6/14/2019        RE: Ivan Gomez  [address] [facility] [address]        [facility]  [place] Medical Record Number:  [id number]  [facility] of Service where encounter took place[facility] [[id number]]  Chief Complaint   Patient presents with     RECHECK       HPI:    Ivan Gomez  is a 89 year old (10/12/1926), who is being seen today for an episodic care visit.  HPI information obtained from: facility chart records, facility staff, patient report and [facility] chart review.   He came to this facility 5/28/2019 for short term rehab and medical management following hospitalization after presenting to the  [facility] ED in [facility] 5/21/2019  with worsening abdominal pain, nausea and diarrhea. He was found to have acute cholecystitis and was transferred to [facility] for management. He underwent cholecystectomy 5/22/2019 by Dr Romero. Completed a course of flagyl and cipro. He developed post op hypoxia requiring BiPAP. CXR with atelectasis vs infiltrate. No symptoms of infection and WBC normal. Respiratory issues resolved.   He had significant dysphagia and EGD showed esophagitis, thought to be from a pill with local erosion. Treated with carafate and lidocaine with improvement. Discharged on mechanical soft diet. He was positive for C diff and discharged on  vancomycin.      Today's concern is:     Chronic diastolic congestive heart failure (H)-increased edema of extremities with weeping of his RUE. Reports knees are painful and swollen from wearing compression stockings. Denies increased shortness of breath, cough or chest pain. Weight stable at 156 lbs, down about 9 lbs from admission.   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 No cramping or pain.   Oropharyngeal dysphagia-remains on DD2 with thin liquids. Appetite fair.   Benign essential hypertension-BPs: 108/69, 121/75, 114/77, 116/75   HR: 62-72  Spinal stenosis of lumbar region, unspecified whether neurogenic claudication present-reports voltaren gel has helped ankle and foot pain  Physical deconditioning-ambulating short distances with walker and stand by assist. Requires assist of 1 with transfers and all cares.     Past Medical and Surgical History reviewed in Epic today.    MEDICATIONS:  Current Outpatient Medications   Medication Sig Dispense Refill     artificial saliva (BIOTENE MT) AERS spray Take 1 spray by mouth every 2 hours as needed for dry mouth       carvedilol (COREG) 6.25 MG tablet Take 6.25 mg by mouth 2 times daily (with meals)       diclofenac (VOLTAREN) 1 % topical gel Place 2 g onto the skin 3 times daily Apply to both ankles       finasteride (PROSCAR) 5 MG tablet TAKE 1 TABLET(5 MG) BY MOUTH DAILY 90 tablet 3     furosemide (LASIX) 20 MG tablet Take 2 tablets (40 mg) by mouth daily 180 tablet 0     gabapentin (NEURONTIN) 300 MG capsule TAKE 1 CAPSULE BY MOUTH THREE TIMES DAILY 270 capsule 0     hydrALAZINE (APRESOLINE) 25 MG tablet Take 1 tablet (25 mg) by mouth 3 times daily 270 tablet 1     HYDROcodone-acetaminophen (NORCO) 7.5-325 MG per tablet Take 1 tablet by mouth every 6 hours as needed for pain maximum 4 tablet(s) per day 15 tablet 0     hypromellose (ARTIFICIAL TEARS) 0.4 % SOLN ophthalmic solution Place 2 drops Into the left eye 4 times daily       isosorbide dinitrate (ISORDIL) 20 MG tablet Take 20 mg by mouth 2 times daily       pantoprazole (PROTONIX) 40 MG EC tablet Take 40 mg by mouth daily       potassium chloride ER (K-DUR/KLOR-CON M) 10 MEQ CR tablet Take 10 mEq by mouth daily       simvastatin (ZOCOR) 20 MG tablet  "TAKE ONE TABLET BY MOUTH AT BEDTIME 30 tablet 0     Skin Protectants, Misc. (EUCERIN) cream Apply topically as needed for dry skin Apply to LE + Areas of dryness topically as needed for Dryness       tamsulosin (FLOMAX) 0.4 MG capsule TAKE ONE CAPSULE BY MOUTH DAILY 90 capsule 3     vancomycin (FIRVANQ) 50 MG/ML oral solution Take 125 mg by mouth 3 times daily           REVIEW OF SYSTEMS:  4 point ROS including Respiratory, CV, GI and , other than that noted in the HPI,  is negative    Objective:  /69   Pulse 62   Temp 97  F (36.1  C)   Resp 16   Ht 1.702 m (5' 7\")   Wt 70.9 kg (156 lb 6.4 oz)   SpO2 95%   BMI 24.50 kg/m     Exam:  GENERAL APPEARANCE:  Alert, in no distress, frail  ENT:  Mouth and posterior oropharynx normal, moist mucous membranes, [place]  EYES:  EOM normal, conjunctiva and lids normal  NECK:  No adenopathy,masses or thyromegaly  RESP:  respiratory effort and palpation of chest normal, no respiratory distress, diminished breath sounds bilaterally, no wheezes or crackles  CV:  Palpation and auscultation of heart done , regular rate and rhythm, 2/6  murmur, +2 pedal pulses, peripheral edema 1-2+ all extremities  ABDOMEN: soft, nontender, no hepatosplenomegaly or other masses  M/S: up in recliner with legs elevated. WAKEFIELD with good strength. No joint inflammation  SKIN:  small amount of weeping from RUE. Abdominal incisions clean, dry, fading bruises. No erythema of LE  PSYCH:  oriented X 3, normal insight, judgement and memory, affect and mood normal    Labs:   Labs done in SNF are in [place] EPIC. Please refer to them using [facility] Everywhere.    ASSESSMENT / PLAN:  (I50.32) Chronic diastolic congestive heart failure (H)  (primary encounter diagnosis)  (I27.20) Pulmonary hypertension (H)  (I34.0) Non-rheumatic mitral regurgitation  (Z98.890,  Z95.818) S/P mitral valve clip implantation  Comment: volume overload with increased edema of extremities.   Plan: increase lasix to 40 mg in am " plus 20 mg in pm for 2 days. He's not happy about this due to increased urination, but is willing to try. Daily weights. Start 1500 ml fluid restriction. Hold compression stockings for now and elevate legs as much as possible. Dry kerlix wrap to weeping areas of RUE.   Discussed with nurse.     (N18.3) CKD (chronic kidney disease) stage 3, GFR 30-59 ml/min (H)  Comment: gradual rise in creatinine to 1.45, GFR remains at baseline   Plan: lasix as above. BMP 6/17/2019. Avoid nephrotoxins.     (K81.0) Acute cholecystitis  (Z90.49) S/P cholecystectomy  Comment: incisions healing without signs of infection. 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 Continue finasteride and tamsulosin for BPH.     (A04.72) Clostridium difficile diarrhea  Comment: improving.   Plan: decrease vancomycin to bid X 5 days, then daily X 5 days, then discontinue. Closely monitor for recurrent diarrhea.     (R13.12) Oropharyngeal dysphagia  Comment:swallow has improved. Appetite remains fairly poor  Plan: continue SPEECH THERAPY, advance diet as tolerated. Dietician is following.     (I10) Benign essential hypertension  Comment: mild hypotension, asymptomatic   Plan: cardiac meds as above     (M48.061) Spinal stenosis of lumbar region, unspecified whether neurogenic claudication present  Comment: chronic, pain controlled.   Plan: continue gabapentin, voltaren gel, Norco.     (R53.81) Physical deconditioning  Comment: very slow progress in therapies  Plan: continue PHYSICAL THERAPY/OT. Goal is to discharge directly to AL with high level services.       Electronically signed by:  FRANK Mena CNP       Sincerely,        FRANK Mena CNP

## 2019-06-14 NOTE — PROGRESS NOTES
[facility]  [place] [facility] Record Number:  [id number]  [facility] of Service where encounter took place[facility] [[id number]]  Chief Complaint   Patient presents with     RECHECK       HPI:    Ivan Gomez  is a 89 year old (10/12/1926), who is being seen today for an episodic care visit.  HPI information obtained from: facility chart records, facility staff, patient report and [facility] chart review.   He came to this facility 5/28/2019 for short term rehab and medical management following hospitalization after presenting to the  [facility] ED in [facility] 5/21/2019  with worsening abdominal pain, nausea and diarrhea. He was found to have acute cholecystitis and was transferred to [facility] for management. He underwent cholecystectomy 5/22/2019 by Dr Romero. Completed a course of flagyl and cipro. He developed post op hypoxia requiring BiPAP. CXR with atelectasis vs infiltrate. No symptoms of infection and WBC normal. Respiratory issues resolved.   He had significant dysphagia and EGD showed esophagitis, thought to be from a pill with local erosion. Treated with carafate and lidocaine with improvement. Discharged on mechanical soft diet. He was positive for C diff and discharged on  vancomycin.      Today's concern is:     Chronic diastolic congestive heart failure (H)-increased edema of extremities with weeping of his RUE. Reports knees are painful and swollen from wearing compression stockings. Denies increased shortness of breath, cough or chest pain. Weight stable at 156 lbs, down about 9 lbs from admission.   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 No cramping or pain.   Oropharyngeal dysphagia-remains on DD2 with thin liquids. Appetite fair.   Benign essential hypertension-BPs: 108/69, 121/75, 114/77, 116/75   HR: 62-72  Spinal stenosis of lumbar region, unspecified whether neurogenic claudication present-reports voltaren gel has helped ankle and foot pain  Physical deconditioning-ambulating short distances with walker and stand by assist. Requires assist of 1 with transfers and all cares.     Past Medical and Surgical History reviewed in Epic today.    MEDICATIONS:  Current Outpatient Medications   Medication Sig Dispense Refill     artificial saliva (BIOTENE MT) AERS spray Take 1 spray by mouth every 2 hours as needed for dry mouth       carvedilol (COREG) 6.25 MG tablet Take 6.25 mg by mouth 2 times daily (with meals)       diclofenac (VOLTAREN) 1 % topical gel Place 2 g onto the skin 3 times daily Apply to both ankles       finasteride (PROSCAR) 5 MG tablet TAKE 1 TABLET(5 MG) BY MOUTH DAILY 90 tablet 3     furosemide (LASIX) 20 MG tablet Take 2 tablets (40 mg) by mouth daily 180 tablet 0     gabapentin (NEURONTIN) 300 MG capsule TAKE 1 CAPSULE BY MOUTH THREE TIMES DAILY 270 capsule 0     hydrALAZINE (APRESOLINE) 25 MG tablet Take 1 tablet (25 mg) by mouth 3 times daily 270 tablet 1     HYDROcodone-acetaminophen (NORCO) 7.5-325 MG per tablet Take 1 tablet by mouth every 6 hours as needed for pain maximum 4 tablet(s) per day 15 tablet 0     hypromellose (ARTIFICIAL TEARS) 0.4 % SOLN ophthalmic solution Place 2 drops Into the left eye 4 times daily       isosorbide dinitrate (ISORDIL) 20 MG tablet Take 20 mg by mouth 2 times daily       pantoprazole (PROTONIX) 40 MG EC tablet Take 40 mg by mouth daily       potassium chloride ER (K-DUR/KLOR-CON M) 10 MEQ CR tablet Take 10 mEq by mouth daily       simvastatin (ZOCOR) 20 MG tablet TAKE ONE TABLET BY MOUTH AT BEDTIME 30 tablet 0     Skin Protectants, Misc. (EUCERIN)  "cream Apply topically as needed for dry skin Apply to LE + Areas of dryness topically as needed for Dryness       tamsulosin (FLOMAX) 0.4 MG capsule TAKE ONE CAPSULE BY MOUTH DAILY 90 capsule 3     vancomycin (FIRVANQ) 50 MG/ML oral solution Take 125 mg by mouth 3 times daily           REVIEW OF SYSTEMS:  4 point ROS including Respiratory, CV, GI and , other than that noted in the HPI,  is negative    Objective:  /69   Pulse 62   Temp 97  F (36.1  C)   Resp 16   Ht 1.702 m (5' 7\")   Wt 70.9 kg (156 lb 6.4 oz)   SpO2 95%   BMI 24.50 kg/m    Exam:  GENERAL APPEARANCE:  Alert, in no distress, frail  ENT:  Mouth and posterior oropharynx normal, moist mucous membranes, [place]  EYES:  EOM normal, conjunctiva and lids normal  NECK:  No adenopathy,masses or thyromegaly  RESP:  respiratory effort and palpation of chest normal, no respiratory distress, diminished breath sounds bilaterally, no wheezes or crackles  CV:  Palpation and auscultation of heart done , regular rate and rhythm, 2/6  murmur, +2 pedal pulses, peripheral edema 1-2+ all extremities  ABDOMEN: soft, nontender, no hepatosplenomegaly or other masses  M/S: up in recliner with legs elevated. WA[place] with good strength. No joint inflammation  SKIN:  small amount of weeping from RUE. Abdominal incisions clean, dry, fading bruises. No erythema of LE  PSYCH:  oriented X 3, normal insight, judgement and memory, affect and mood normal    Labs:   Labs done in SNF are in [place] EPIC. Please refer to them using [facility] Everywhere.    ASSESSMENT / PLAN:  (I50.32) Chronic diastolic congestive heart failure (H)  (primary encounter diagnosis)  (I27.20) Pulmonary hypertension (H)  (I34.0) Non-rheumatic mitral regurgitation  (Z98.890,  Z95.818) S/P mitral valve clip implantation  Comment: volume overload with increased edema of extremities.   Plan: increase lasix to 40 mg in am plus 20 mg in pm for 2 days. He's not happy about this due to increased urination, but " is willing to try. Daily weights. Start 1500 ml fluid restriction. Hold compression stockings for now and elevate legs as much as possible. Dry kerlix wrap to weeping areas of RUE.   Discussed with nurse.     (N18.3) CKD (chronic kidney disease) stage 3, GFR 30-59 ml/min (H)  Comment: gradual rise in creatinine to 1.45, GFR remains at baseline   Plan: lasix as above. BMP 6/17/2019. Avoid nephrotoxins.     (K81.0) Acute cholecystitis  (Z90.49) S/P cholecystectomy  Comment: incisions healing without signs of infection. 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 Continue finasteride and tamsulosin for BPH.     (A04.72) Clostridium difficile diarrhea  Comment: improving.   Plan: decrease vancomycin to bid X 5 days, then daily X 5 days, then discontinue. Closely monitor for recurrent diarrhea.     (R13.12) Oropharyngeal dysphagia  Comment:swallow has improved. Appetite remains fairly poor  Plan: continue SPEECH THERAPY, advance diet as tolerated. Dietician is following.     (I10) Benign essential hypertension  Comment: mild hypotension, asymptomatic   Plan: cardiac meds as above     (M48.061) Spinal stenosis of lumbar region, unspecified whether neurogenic claudication present  Comment: chronic, pain controlled.   Plan: continue gabapentin, voltaren gel, Norco.     (R53.81) Physical deconditioning  Comment: very slow progress in therapies  Plan: continue PHYSICAL THERAPY/OT. Goal is to discharge directly to AL with high level services.       Electronically signed by:  FRANK Mena CNP

## 2019-06-17 ENCOUNTER — HOSPITAL LABORATORY (OUTPATIENT)
Dept: OTHER | Facility: CLINIC | Age: 84
End: 2019-06-17

## 2019-06-17 LAB
ANION GAP SERPL CALCULATED.3IONS-SCNC: 6 MMOL/L (ref 3–14)
BUN SERPL-MCNC: 33 MG/DL (ref 7–30)
CALCIUM SERPL-MCNC: 8 MG/DL (ref 8.5–10.1)
CHLORIDE SERPL-SCNC: 112 MMOL/L (ref 94–109)
CO2 SERPL-SCNC: 28 MMOL/L (ref 20–32)
CREAT SERPL-MCNC: 1.44 MG/DL (ref 0.66–1.25)
GFR SERPL CREATININE-BSD FRML MDRD: 42 ML/MIN/{1.73_M2}
GLUCOSE SERPL-MCNC: 84 MG/DL (ref 70–99)
POTASSIUM SERPL-SCNC: 3.3 MMOL/L (ref 3.4–5.3)
SODIUM SERPL-SCNC: 146 MMOL/L (ref 133–144)

## 2019-06-18 ENCOUNTER — NURSING HOME VISIT (OUTPATIENT)
Dept: GERIATRICS | Facility: CLINIC | Age: 84
End: 2019-06-18
Payer: MEDICARE

## 2019-06-18 VITALS
TEMPERATURE: 97.1 F | SYSTOLIC BLOOD PRESSURE: 105 MMHG | HEIGHT: 67 IN | RESPIRATION RATE: 18 BRPM | OXYGEN SATURATION: 96 % | HEART RATE: 62 BPM | WEIGHT: 152.5 LBS | BODY MASS INDEX: 23.93 KG/M2 | DIASTOLIC BLOOD PRESSURE: 68 MMHG

## 2019-06-18 DIAGNOSIS — R13.12 OROPHARYNGEAL DYSPHAGIA: ICD-10-CM

## 2019-06-18 DIAGNOSIS — K81.0 ACUTE CHOLECYSTITIS: ICD-10-CM

## 2019-06-18 DIAGNOSIS — I27.20 PULMONARY HYPERTENSION (H): ICD-10-CM

## 2019-06-18 DIAGNOSIS — Z98.890 S/P MITRAL VALVE CLIP IMPLANTATION: ICD-10-CM

## 2019-06-18 DIAGNOSIS — I34.0 NON-RHEUMATIC MITRAL REGURGITATION: ICD-10-CM

## 2019-06-18 DIAGNOSIS — A04.72 CLOSTRIDIUM DIFFICILE DIARRHEA: ICD-10-CM

## 2019-06-18 DIAGNOSIS — I10 BENIGN ESSENTIAL HYPERTENSION: ICD-10-CM

## 2019-06-18 DIAGNOSIS — M48.061 SPINAL STENOSIS OF LUMBAR REGION, UNSPECIFIED WHETHER NEUROGENIC CLAUDICATION PRESENT: ICD-10-CM

## 2019-06-18 DIAGNOSIS — R53.81 PHYSICAL DECONDITIONING: ICD-10-CM

## 2019-06-18 DIAGNOSIS — Z90.49 S/P CHOLECYSTECTOMY: ICD-10-CM

## 2019-06-18 DIAGNOSIS — I50.32 CHRONIC DIASTOLIC CONGESTIVE HEART FAILURE (H): Primary | ICD-10-CM

## 2019-06-18 DIAGNOSIS — N18.30 CKD (CHRONIC KIDNEY DISEASE) STAGE 3, GFR 30-59 ML/MIN (H): ICD-10-CM

## 2019-06-18 DIAGNOSIS — Z95.818 S/P MITRAL VALVE CLIP IMPLANTATION: ICD-10-CM

## 2019-06-18 PROCEDURE — 99309 SBSQ NF CARE MODERATE MDM 30: CPT | Performed by: NURSE PRACTITIONER

## 2019-06-18 ASSESSMENT — MIFFLIN-ST. JEOR: SCORE: 1300.37

## 2019-06-18 NOTE — LETTER
6/18/2019        RE: Ivan Gomez  [address] [facility] [address]        [facility]  [place] Medical Record Number:  [id number]  [facility] of [facility] where encounter took place[facility] [[id number]]  Chief Complaint   Patient presents with     RECHECK       HPI:    Ivan Gomez  is a 89 year old (10/12/1926), who is being seen today for an episodic care visit.  HPI information obtained from: facility chart records, facility staff, patient report and [facility] chart review.   He came to this facility 5/28/2019 for short term rehab and medical management following hospitalization after presenting to the  [facility] ED in [facility] 5/21/2019  with worsening abdominal pain, nausea and diarrhea. He was found to have acute cholecystitis and was transferred to [facility] for management. He underwent cholecystectomy 5/22/2019 by Dr Romero. Completed a course of flagyl and cipro. He developed post op hypoxia requiring BiPAP. CXR with atelectasis vs infiltrate. No symptoms of infection and WBC normal. Respiratory issues resolved.   He had significant dysphagia and EGD showed esophagitis, thought to be from a pill with local erosion. Treated with carafate and lidocaine with improvement. Discharged on mechanical soft diet. He was positive for C diff and discharged on  vancomycin.      Today's concern is:     Chronic diastolic congestive heart failure (H)-received an additional lasix 20 mg on 6/15 and 6/16/2109 for increased edema of his extremities with some improvement. Weight is down 5 lbs to 151 lbs. His weight on admission was 152, highest weight was 164 lbs on 6/8/2019. Reports some improvement in edema with less weeping from his legs and RUE. Short of breath with exertion, no coughing or chest pain. Unhappy about receiving extra lasix due to more frequent urination.   Pulmonary hypertension (H)  Non-rheumatic mitral regurgitation  S/P  mitral valve clip implantation  CKD (chronic kidney disease) stage 3, GFR 30-59 ml/min (H)  Acute cholecystitis  S/P cholecystectomy  Clostridium difficile diarrhea-having 1 soft stool daily. No cramping or abdominal pain.   Oropharyngeal dysphagia-oral intake remains fairly poor. Working with SPEECH THERAPY and remains on DD2 with thin liquids.   Benign essential hypertension-BPs: 105/68, 110/68, 115/76   RH: 66-68  Spinal stenosis of lumbar region, unspecified whether neurogenic claudication present-reports pain of ankles, feet is unchanged, some improvement with voltaren. Knee pain and edema is better when he doesn't wear the compression stockings.   Physical deconditioning-ambulating up 125 ft with walker and supervision. Requires assist of 1 with all cares.   SLUMS 21/30, CPT 5.1/5.6    Past Medical and Surgical History reviewed in Epic today.    MEDICATIONS:  Current Outpatient Medications   Medication Sig Dispense Refill     artificial saliva (BIOTENE MT) AERS spray Take 1 spray by mouth every 2 hours as needed for dry mouth       carvedilol (COREG) 6.25 MG tablet Take 6.25 mg by mouth 2 times daily (with meals)       diclofenac (VOLTAREN) 1 % topical gel Place 2 g onto the skin 3 times daily Apply to both ankles       finasteride (PROSCAR) 5 MG tablet TAKE 1 TABLET(5 MG) BY MOUTH DAILY 90 tablet 3     furosemide (LASIX) 20 MG tablet Take 2 tablets (40 mg) by mouth daily 180 tablet 0     gabapentin (NEURONTIN) 300 MG capsule TAKE 1 CAPSULE BY MOUTH THREE TIMES DAILY 270 capsule 0     hydrALAZINE (APRESOLINE) 25 MG tablet Take 1 tablet (25 mg) by mouth 3 times daily 270 tablet 1     HYDROcodone-acetaminophen (NORCO) 7.5-325 MG per tablet Take 1 tablet by mouth every 6 hours as needed for pain maximum 4 tablet(s) per day 15 tablet 0     hypromellose (ARTIFICIAL TEARS) 0.4 % SOLN ophthalmic solution Place 2 drops Into the left eye 4 times daily       isosorbide dinitrate (ISORDIL) 20 MG tablet Take 20 mg by  "mouth 2 times daily       pantoprazole (PROTONIX) 40 MG EC tablet Take 40 mg by mouth daily       Phenylephrine-Witch Hazel 0.25-50 % GEL Place rectally 4 times daily as needed Insert 1 application rectally as needed for Hemorrhoids       potassium chloride ER (K-DUR/KLOR-CON M) 10 MEQ CR tablet Take 10 mEq by mouth daily       simvastatin (ZOCOR) 20 MG tablet TAKE ONE TABLET BY MOUTH AT BEDTIME 30 tablet 0     Skin Protectants, Misc. (EUCERIN) cream Apply topically as needed for dry skin Apply to LE + Areas of dryness topically as needed for Dryness       tamsulosin (FLOMAX) 0.4 MG capsule TAKE ONE CAPSULE BY MOUTH DAILY 90 capsule 3     vancomycin (FIRVANQ) 50 MG/ML oral solution Take 125 mg by mouth 3 times daily         REVIEW OF SYSTEMS:  4 point ROS including Respiratory, CV, GI and , other than that noted in the HPI,  is negative    Objective:  /68   Pulse 62   Temp 97.1  F (36.2  C)   Resp 18   Ht 1.702 m (5' 7\")   Wt 69.2 kg (152 lb 8 oz)   SpO2 96%   BMI 23.88 kg/m     Exam:  GENERAL APPEARANCE:  Alert, in no distress, frail  ENT:  Mouth and posterior oropharynx normal, moist mucous membranes, [place]  EYES:  EOM normal, conjunctiva and lids normal  NECK:  No adenopathy,masses or thyromegaly  RESP:  respiratory effort and palpation of chest normal, no respiratory distress, diminished breath sounds bilaterally, no wheezes or crackles  CV:  Palpation and auscultation of heart done , regular rate and rhythm, 2/6  murmur, +2 pedal pulses, peripheral edema 1+ all extremities  ABDOMEN: soft, nontender, no hepatosplenomegaly or other masses  M/S: up in chair. WAKEFIELD with good strength. No joint inflammation  SKIN:  small amount of weeping from RUE both thighs. Abdominal incisions clean, dry, no erythema. . No erythema of LE  PSYCH:  oriented X 3, normal insight, judgement and memory, affect and mood normal    Labs:   Labs done in SNF are in [place] EPIC. Please refer to them using EPIC/Care " Everywhere.    ASSESSMENT / PLAN:  (I50.32) Chronic diastolic congestive heart failure (H)  (primary encounter diagnosis)  (I27.20) Pulmonary hypertension (H)  (I34.0) Non-rheumatic mitral regurgitation  (Z98.890,  Z95.818) S/P mitral valve clip implantation  Comment: some increase in volume status with less edema. Weight is back to tcu admission weight. Diuresis is somewhat limited by renal function. Less knee pain without wearing compression stockings.   Plan: continue lasix 20 mg daily. Continue 1500 ml fluid restriction, although his fluid intake remains fairly poor. Elevate legs as much as possible.     (N18.3) CKD (chronic kidney disease) stage 3, GFR 30-59 ml/min (H)  Comment: gradual rise in creatinine, has stabilized at 1.44.   Plan: follow BMP. Avoid nephrotoxins.     (K81.0) Acute cholecystitis  (Z90.49) S/P cholecystectomy  Comment: incisions healing without signs of infection. He saw Dr Romero for follow up 6/5/2019 and is to RTC prn  Plan: monitor incisions    (A04.72) Clostridium difficile diarrhea  Comment: improving   Plan: decrease vancomycin to bid for 5 days, then daily for 5 days, then discontinue. Monitor number of stools/shift.     (R13.12) Oropharyngeal dysphagia  Comment: some improvement in swallow, although oral intake remains poor. No signs of aspiration.   Plan: continue SPEECH THERAPY, advance diet as tolerated.     (I10) Benign essential hypertension  Comment: mild hypotension, improved  Plan: cardiac meds as above     (M48.061) Spinal stenosis of lumbar region, unspecified whether neurogenic claudication present  Comment: chronic, pain fairly well controlled  Plan: continue gabapentin, voltaren gel, Norco.     (R53.81) Physical deconditioning  Comment: slowly progressing in therapies  Plan: continue PHYSICAL THERAPY/OT. Goal is to discharge to AL with high level services.       Electronically signed by:  FRANK Mena CNP       Sincerely,        FRANK Mena  CNP

## 2019-06-20 ENCOUNTER — NURSING HOME VISIT (OUTPATIENT)
Dept: GERIATRICS | Facility: CLINIC | Age: 84
End: 2019-06-20
Payer: MEDICARE

## 2019-06-20 VITALS
BODY MASS INDEX: 23.46 KG/M2 | HEART RATE: 52 BPM | TEMPERATURE: 97 F | HEIGHT: 67 IN | OXYGEN SATURATION: 91 % | RESPIRATION RATE: 18 BRPM | DIASTOLIC BLOOD PRESSURE: 69 MMHG | SYSTOLIC BLOOD PRESSURE: 102 MMHG | WEIGHT: 149.5 LBS

## 2019-06-20 DIAGNOSIS — I50.32 CHRONIC DIASTOLIC CONGESTIVE HEART FAILURE (H): ICD-10-CM

## 2019-06-20 DIAGNOSIS — R53.81 PHYSICAL DECONDITIONING: ICD-10-CM

## 2019-06-20 DIAGNOSIS — I10 BENIGN ESSENTIAL HYPERTENSION: ICD-10-CM

## 2019-06-20 DIAGNOSIS — N18.30 CKD (CHRONIC KIDNEY DISEASE) STAGE 3, GFR 30-59 ML/MIN (H): ICD-10-CM

## 2019-06-20 DIAGNOSIS — Z98.890 S/P MITRAL VALVE CLIP IMPLANTATION: ICD-10-CM

## 2019-06-20 DIAGNOSIS — Z95.818 S/P MITRAL VALVE CLIP IMPLANTATION: ICD-10-CM

## 2019-06-20 DIAGNOSIS — I34.0 NON-RHEUMATIC MITRAL REGURGITATION: ICD-10-CM

## 2019-06-20 DIAGNOSIS — Z90.49 S/P CHOLECYSTECTOMY: ICD-10-CM

## 2019-06-20 DIAGNOSIS — M48.061 SPINAL STENOSIS OF LUMBAR REGION, UNSPECIFIED WHETHER NEUROGENIC CLAUDICATION PRESENT: ICD-10-CM

## 2019-06-20 DIAGNOSIS — R13.12 OROPHARYNGEAL DYSPHAGIA: ICD-10-CM

## 2019-06-20 DIAGNOSIS — Z91.81 PERSONAL HISTORY OF FALL: ICD-10-CM

## 2019-06-20 DIAGNOSIS — K81.0 ACUTE CHOLECYSTITIS: ICD-10-CM

## 2019-06-20 DIAGNOSIS — I49.9 IRREGULAR HEART RATE: Primary | ICD-10-CM

## 2019-06-20 DIAGNOSIS — I27.20 PULMONARY HYPERTENSION (H): ICD-10-CM

## 2019-06-20 DIAGNOSIS — A04.72 CLOSTRIDIUM DIFFICILE DIARRHEA: ICD-10-CM

## 2019-06-20 DIAGNOSIS — J18.9 PNEUMONIA OF LEFT LOWER LOBE DUE TO INFECTIOUS ORGANISM: ICD-10-CM

## 2019-06-20 DIAGNOSIS — F43.21 GRIEF: ICD-10-CM

## 2019-06-20 PROCEDURE — 99309 SBSQ NF CARE MODERATE MDM 30: CPT | Performed by: NURSE PRACTITIONER

## 2019-06-20 ASSESSMENT — MIFFLIN-ST. JEOR: SCORE: 1286.76

## 2019-06-20 NOTE — LETTER
6/20/2019        RE: Ivan Gomez  [address] [facility] [address]        [facility]  [place] [facility] Record Number:  [id number]  [facility] of Service where encounter took place[facility] [[id number]]  Chief Complaint   Patient presents with     RECHECK       HPI:    Ivan Gomez  is a 89 year old (10/12/1926), who is being seen today for an episodic care visit.  HPI information obtained from: facility chart records, facility staff, patient report and [facility] chart review.   He came to this facility 5/28/2019 for short term rehab and medical management following hospitalization after presenting to the  [facility] ED in [facility] 5/21/2019  with worsening abdominal pain, nausea and diarrhea. He was found to have acute cholecystitis and was transferred to [facility] for management. He underwent cholecystectomy 5/22/2019 by Dr Romero. Completed a course of flagyl and cipro. He developed post op hypoxia requiring BiPAP. CXR with atelectasis vs infiltrate. No symptoms of infection and WBC normal. Respiratory issues resolved.   He had significant dysphagia and EGD showed esophagitis, thought to be from a pill with local erosion. Treated with carafate and lidocaine with improvement. Discharged on mechanical soft diet. He was positive for C diff and discharged on  vancomycin.    Today's concerns are:      Irregular heart rate-he fell early this am while attempting to get to the bathroom. States that he lost his balance and fell back against the bed. No LOC or dizziness  and did not hit his head. Nurse reports his HR was in the 30s, then quickly came up to 60-70s .  On exam, his HR is irregular,  which is new. EKG obtained and shows sinus arrhythmia with HR 63, ventricular extrasystole and prolonged QT.   Review of records show telemetry in 2017 with possible afib, EKG showed NSR with PACs and 24 hr  Holter monitor did not show afib. EKG  "11/2/2018 showed sinus rhythm with occasional PVC.   He denies palpitations or chest pain. Fatigued and felt a 'little foggy\", but ok now.   Pneumonia of left lower lobe due to infectious organism (H)-reports some increase in shortness of breath and left sided rib pain. CXR obtained and shows a LLL infiltrate. No fractures on rib XR. No cough. Afebrile.   Oropharyngeal dysphagia-remains on DD2 with thin.   Chronic diastolic congestive heart failure (H)-weight is down 13 lbs from admission. LE edema is less.   Pulmonary hypertension (H)  Non-rheumatic mitral regurgitation  S/P mitral valve clip implantation  CKD (chronic kidney disease) stage 3, GFR 30-59 ml/min (H)  Acute cholecystitis  S/P cholecystectomy  Clostridium difficile diarrhea-vancomycin decreased to bid on 6/18/2019. Reports he was incontinent for 2 liquid stools yesterday. No cramping or pain.   Benign essential hypertension-BP was 102/69 this am. Other readings: 105/68, 110/66  Spinal stenosis of lumbar region, unspecified whether neurogenic claudication present- chronic back and LE pain is unchanged.   Grief-in addition to dealing with his own medical issues, his SO has had a change in status and is actively dying.  She lives on a Memory Care unit in [facility].   Personal history of fall  Physical deconditioning-ambulating up to 125 ft with walker and supervision. Had progressed to being up alone in his room with his walker, but because of today's fall, will need supervision when up.     Past Medical and Surgical History reviewed in Epic today.    MEDICATIONS:  Current Outpatient Medications   Medication Sig Dispense Refill     artificial saliva (BIOTENE MT) AERS spray Take 1 spray by mouth every 2 hours as needed for dry mouth       carvedilol (COREG) 6.25 MG tablet Take 6.25 mg by mouth 2 times daily (with meals)       diclofenac (VOLTAREN) 1 % topical gel Place 2 g onto the skin 3 times daily Apply to both ankles       finasteride (PROSCAR) 5 MG " "tablet TAKE 1 TABLET(5 MG) BY MOUTH DAILY 90 tablet 3     furosemide (LASIX) 20 MG tablet Take 2 tablets (40 mg) by mouth daily 180 tablet 0     gabapentin (NEURONTIN) 300 MG capsule TAKE 1 CAPSULE BY MOUTH THREE TIMES DAILY 270 capsule 0     hydrALAZINE (APRESOLINE) 25 MG tablet Take 1 tablet (25 mg) by mouth 3 times daily 270 tablet 1     HYDROcodone-acetaminophen (NORCO) 7.5-325 MG per tablet Take 1 tablet by mouth every 6 hours as needed for pain maximum 4 tablet(s) per day 15 tablet 0     hypromellose (ARTIFICIAL TEARS) 0.4 % SOLN ophthalmic solution Place 2 drops Into the left eye 4 times daily       isosorbide dinitrate (ISORDIL) 20 MG tablet Take 20 mg by mouth 2 times daily       pantoprazole (PROTONIX) 40 MG EC tablet Take 40 mg by mouth daily       Phenylephrine-Witch Hazel 0.25-50 % GEL Place rectally 4 times daily as needed Insert 1 application rectally as needed for Hemorrhoids       potassium chloride ER (K-DUR/KLOR-CON M) 10 MEQ CR tablet Take 10 mEq by mouth daily       simvastatin (ZOCOR) 20 MG tablet TAKE ONE TABLET BY MOUTH AT BEDTIME 30 tablet 0     Skin Protectants, Misc. (EUCERIN) cream Apply topically as needed for dry skin Apply to LE + Areas of dryness topically as needed for Dryness       tamsulosin (FLOMAX) 0.4 MG capsule TAKE ONE CAPSULE BY MOUTH DAILY 90 capsule 3     vancomycin (FIRVANQ) 50 MG/ML oral solution Take 125 mg by mouth 3 times daily       levofloxacin (LEVAQUIN) 500 MG tablet Take 500 mg by mouth daily         REVIEW OF SYSTEMS:  10 point ROS of systems including Constitutional, Eyes, Respiratory, Cardiovascular, Gastroenterology, Genitourinary, Integumentary, Musculoskeletal, Psychiatric were all negative except for pertinent positives noted in my HPI.    Objective:  /69   Pulse 52   Temp 97  F (36.1  C)   Resp 18   Ht 1.702 m (5' 7\")   Wt 67.8 kg (149 lb 8 oz)   SpO2 91%   BMI 23.42 kg/m     Exam:  GENERAL APPEARANCE:  Alert, in no " distress, frail  ENT:  Mouth and posterior oropharynx normal, moist mucous membranes, [place]  EYES:  EOM normal, conjunctiva and lids normal  NECK:  No adenopathy,masses or thyromegaly  RESP:  respiratory effort and palpation of chest normal, no respiratory distress, diminished breath sounds bilaterally, no wheezes or crackles  CV:  Palpation and auscultation of heart done, irregular rate and rhythm, HR 68,  2-3 /6  murmur, +2 pedal pulses, peripheral edema 1+ all extremities  ABDOMEN: soft, nontender, no hepatosplenomegaly or other masses  M/S: up in chair. WAKEFIELD with good strength. No joint inflammation  SKIN:  small amount of weeping from RUE and  both thighs. Abdominal incisions clean, dry, no erythema.  No erythema of LE  PSYCH:  oriented X 3, normal insight, judgement and memory, sad     Labs:   Labs done in SNF are in [place] EPIC. Please refer to them using [facility]/Care Everywhere.    ASSESSMENT / PLAN:  (I49.9) Irregular heart rate  (primary encounter diagnosis)  Comment: new finding on exam. EKG and chart review as noted above. Checked him frequently throughout the day and HR remained irregular in the 60-70s. Appears asymptomatic. Discussed with Dr Ramsay who reviewed EKG and did not think it showed afib. He would not be a candidate for anticoagulation, given his chronic anemia, thrombocytopenia, overall frailty and falls.  Plan: discontinue carvedilol. CBC, BMP, TSH. Monitor VS, symptoms.     (J18.1) Pneumonia of left lower lobe due to infectious organism (H)  (R13.12) Oropharyngeal dysphagia  Comment: possible aspiration. Has been tolerating DD2 with thin liquids.   Plan: levaquin for 5 days. Continue SPEECH THERAPY. Closely monitor respiratory status.     (I50.32) Chronic diastolic congestive heart failure (H)  (I27.20) Pulmonary hypertension (H)  (I34.0) Non-rheumatic mitral regurgitation  (Z98.890,  Z95.818) S/P mitral valve clip implantation  Comment: volume status slowly improving. Diuresis is limited by  renal function and borderline hypotension.   Plan: lasix, Isordil  and hydralazine were held this am due to fall and hypotension. Resume tomorrow at same doses. Discontinue carvedilol as above. Continue Isordil. Daily weight. Compression stockings and elevate legs.     (N18.3) CKD (chronic kidney disease) stage 3, GFR 30-59 ml/min (H)  Comment: creatinine has stabilized in the 1.4-1.5 range  Plan: BMP    (K81.0) Acute cholecystitis  (Z90.49) S/P cholecystectomy  Comment: incisions healing without signs of infection. He saw Dr Romero for follow up 6/5/2019 and is to RTC prn  Plan: monitor incisions    (A04.72) Clostridium difficile diarrhea  Comment: 2 episodes of incontinence yesterday after decrease in  vancomycin. Will need a longer course  while on levaquin.   Plan: increase vancomycin back to tid and monitor. Slow taper after finishes levaquin.     (I10) Benign essential hypertension  Comment: hypotension possibly contributed to his fall today  Plan: med changes as noted above. Monitor VS    (M48.061) Spinal stenosis of lumbar region, unspecified whether neurogenic claudication present  Comment: chronic, pain fairly well controlled  Plan: continue gabapentin, voltaren gel, Norco.    (F43.21) Grief  Comment: his SO is on hospice care and actively dying. He was planning to move to her AL, and is now unsure where he will go. He feels that he's coping fairly well and declines meeting with the .   Plan: supportive care. Discussed with .     (Z91.81) Personal history of fall  (R53.81) Physical deconditioning  Comment: set back today with a fall.   Plan: continue PHYSICAL THERAPY/OT. Up with assistance. Goal is to discharge to high level AL.         Total time spent with patient visit at the skilled nursing facility was 50 mins including patient visit and review of past records. Greater than 50% of total time spent with counseling and coordinating care due to complex medical issues with acute  change in status today requiring work up:  new irregular heart rate, pneumonia and a fall. Medical issues addressed and medication changes as noted above. Care coordination with facility staff and discussion with collaborating physician, Dr Sharon Ramsay.     Electronically signed by:  FRANK Mena CNP              Sincerely,        FRANK Mena CNP

## 2019-06-20 NOTE — PROGRESS NOTES
[place] [facility]  [place] [facility] Record Number:  [id number]  Place of Service where encounter took place:  [facility] [[id number]]  Chief Complaint   Patient presents with     RECHECK       HPI:    Ivan Gomez  is a 89 year old (10/12/1926), who is being seen today for an episodic care visit.  HPI information obtained from: facility chart records, facility staff, patient report and [facility] chart review.   He came to this facility 5/28/2019 for short term rehab and medical management following hospitalization after presenting to the  [facility] ED in [facility] 5/21/2019  with worsening abdominal pain, nausea and diarrhea. He was found to have acute cholecystitis and was transferred to [facility] for management. He underwent cholecystectomy 5/22/2019 by Dr Romero. Completed a course of flagyl and cipro. He developed post op hypoxia requiring BiPAP. CXR with atelectasis vs infiltrate. No symptoms of infection and WBC normal. Respiratory issues resolved.   He had significant dysphagia and EGD showed esophagitis, thought to be from a pill with local erosion. Treated with carafate and lidocaine with improvement. Discharged on mechanical soft diet. He was positive for C diff and discharged on  vancomycin.    Today's concerns are:      Irregular heart rate-he fell early this am while attempting to get to the bathroom. States that he lost his balance and fell back against the bed. No LOC or dizziness  and did not hit his head. Nurse reports his HR was in the 30s, then quickly came up to 60-70s .  On exam, his HR is irregular,  which is new. EKG obtained and shows sinus arrhythmia with HR 63, ventricular extrasystole and prolonged QT.   Review of records show telemetry in 2017 with possible afib, EKG showed NSR with PACs and 24 hr  Holter monitor did not show afib. EKG 11/2/2018 showed sinus rhythm with occasional PVC.   He denies palpitations or chest pain.  "Fatigued and felt a 'little foggy\", but ok now.   Pneumonia of left lower lobe due to infectious organism (H)-reports some increase in shortness of breath and left sided rib pain. CXR obtained and shows a LLL infiltrate. No fractures on rib XR. No cough. Afebrile.   Oropharyngeal dysphagia-remains on DD2 with thin.   Chronic diastolic congestive heart failure (H)-weight is down 13 lbs from admission. LE edema is less.   Pulmonary hypertension (H)  Non-rheumatic mitral regurgitation  S/P mitral valve clip implantation  CKD (chronic kidney disease) stage 3, GFR 30-59 ml/min (H)  Acute cholecystitis  S/P cholecystectomy  Clostridium difficile diarrhea-vancomycin decreased to bid on 6/18/2019. Reports he was incontinent for 2 liquid stools yesterday. No cramping or pain.   Benign essential hypertension-BP was 102/69 this am. Other readings: 105/68, 110/66  Spinal stenosis of lumbar region, unspecified whether neurogenic claudication present- chronic back and LE pain is unchanged.   Grief-in addition to dealing with his own medical issues, his SO has had a change in status and is actively dying.  She lives on a Memory Care unit in [facility].   Personal history of fall  Physical deconditioning-ambulating up to 125 ft with walker and supervision. Had progressed to being up alone in his room with his walker, but because of today's fall, will need supervision when up.     Past Medical and Surgical History reviewed in Epic today.    MEDICATIONS:  Current Outpatient Medications   Medication Sig Dispense Refill     artificial saliva (BIOTENE MT) AERS spray Take 1 spray by mouth every 2 hours as needed for dry mouth       carvedilol (COREG) 6.25 MG tablet Take 6.25 mg by mouth 2 times daily (with meals)       diclofenac (VOLTAREN) 1 % topical gel Place 2 g onto the skin 3 times daily Apply to both ankles       finasteride (PROSCAR) 5 MG tablet TAKE 1 TABLET(5 MG) BY MOUTH DAILY 90 tablet 3     furosemide (LASIX) 20 MG tablet Take " "2 tablets (40 mg) by mouth daily 180 tablet 0     gabapentin (NEURONTIN) 300 MG capsule TAKE 1 CAPSULE BY MOUTH THREE TIMES DAILY 270 capsule 0     hydrALAZINE (APRESOLINE) 25 MG tablet Take 1 tablet (25 mg) by mouth 3 times daily 270 tablet 1     HYDROcodone-acetaminophen (NORCO) 7.5-325 MG per tablet Take 1 tablet by mouth every 6 hours as needed for pain maximum 4 tablet(s) per day 15 tablet 0     hypromellose (ARTIFICIAL TEARS) 0.4 % SOLN ophthalmic solution Place 2 drops Into the left eye 4 times daily       isosorbide dinitrate (ISORDIL) 20 MG tablet Take 20 mg by mouth 2 times daily       pantoprazole (PROTONIX) 40 MG EC tablet Take 40 mg by mouth daily       Phenylephrine-Witch Hazel 0.25-50 % GEL Place rectally 4 times daily as needed Insert 1 application rectally as needed for Hemorrhoids       potassium chloride ER (K-DUR/KLOR-CON M) 10 MEQ CR tablet Take 10 mEq by mouth daily       simvastatin (ZOCOR) 20 MG tablet TAKE ONE TABLET BY MOUTH AT BEDTIME 30 tablet 0     Skin Protectants, Misc. (EUCERIN) cream Apply topically as needed for dry skin Apply to LE + Areas of dryness topically as needed for Dryness       tamsulosin (FLOMAX) 0.4 MG capsule TAKE ONE CAPSULE BY MOUTH DAILY 90 capsule 3     vancomycin (FIRVANQ) 50 MG/ML oral solution Take 125 mg by mouth 3 times daily       levofloxacin (LEVAQUIN) 500 MG tablet Take 500 mg by mouth daily         REVIEW OF SYSTEMS:  10 point ROS of systems including Constitutional, Eyes, Respiratory, Cardiovascular, Gastroenterology, Genitourinary, Integumentary, Musculoskeletal, Psychiatric were all negative except for pertinent positives noted in my HPI.    Objective:  /69   Pulse 52   Temp 97  F (36.1  C)   Resp 18   Ht 1.702 m (5' 7\")   Wt 67.8 kg (149 lb 8 oz)   SpO2 91%   BMI 23.42 kg/m    Exam:  GENERAL APPEARANCE:  Alert, in no distress, frail  ENT:  Mouth and posterior oropharynx normal, moist mucous membranes, [place]  EYES:  EOM normal, conjunctiva " and lids normal  NECK:  No adenopathy,masses or thyromegaly  RESP:  respiratory effort and palpation of chest normal, no respiratory distress, diminished breath sounds bilaterally, no wheezes or crackles  CV:  Palpation and auscultation of heart done, irregular rate and rhythm, HR 68,  2-3 /6  murmur, +2 pedal pulses, peripheral edema 1+ all extremities  ABDOMEN: soft, nontender, no hepatosplenomegaly or other masses  M/S: up in chair. WAKEFIELD with good strength. No joint inflammation  SKIN:  small amount of weeping from RUE and  both thighs. Abdominal incisions clean, dry, no erythema.  No erythema of LE  PSYCH:  oriented X 3, normal insight, judgement and memory, sad     Labs:   Labs done in SNF are in [place] EPIC. Please refer to them using [employer] Everywhere.    ASSESSMENT / PLAN:  (I49.9) Irregular heart rate  (primary encounter diagnosis)  Comment: new finding on exam. EKG and chart review as noted above. Checked him frequently throughout the day and HR remained irregular in the 60-70s. Appears asymptomatic. Discussed with Dr Ramsay who reviewed EKG and did not think it showed afib. He would not be a candidate for anticoagulation, given his chronic anemia, thrombocytopenia, overall frailty and falls.  Plan: discontinue carvedilol. CBC, BMP, TSH. Monitor VS, symptoms.     (J18.1) Pneumonia of left lower lobe due to infectious organism (H)  (R13.12) Oropharyngeal dysphagia  Comment: possible aspiration. Has been tolerating DD2 with thin liquids.   Plan: levaquin for 5 days. Continue SPEECH THERAPY. Closely monitor respiratory status.     (I50.32) Chronic diastolic congestive heart failure (H)  (I27.20) Pulmonary hypertension (H)  (I34.0) Non-rheumatic mitral regurgitation  (Z98.890,  Z95.818) S/P mitral valve clip implantation  Comment: volume status slowly improving. Diuresis is limited by renal function and borderline hypotension.   Plan: lasix, Isordil  and hydralazine were held this am due to fall and  hypotension. Resume tomorrow at same doses. Discontinue carvedilol as above. Continue Isordil. Daily weight. Compression stockings and elevate legs.     (N18.3) CKD (chronic kidney disease) stage 3, GFR 30-59 ml/min (H)  Comment: creatinine has stabilized in the 1.4-1.5 range  Plan: BMP    (K81.0) Acute cholecystitis  (Z90.49) S/P cholecystectomy  Comment: incisions healing without signs of infection. He saw Dr Romero for follow up 6/5/2019 and is to RTC prn  Plan: monitor incisions    (A04.72) Clostridium difficile diarrhea  Comment: 2 episodes of incontinence yesterday after decrease in  vancomycin. Will need a longer course  while on levaquin.   Plan: increase vancomycin back to tid and monitor. Slow taper after finishes levaquin.     (I10) Benign essential hypertension  Comment: hypotension possibly contributed to his fall today  Plan: med changes as noted above. Monitor VS    (M48.061) Spinal stenosis of lumbar region, unspecified whether neurogenic claudication present  Comment: chronic, pain fairly well controlled  Plan: continue gabapentin, voltaren gel, Norco.    (F43.21) Grief  Comment: his SO is on hospice care and actively dying. He was planning to move to her AL, and is now unsure where he will go. He feels that he's coping fairly well and declines meeting with the .   Plan: supportive care. Discussed with .     (Z91.81) Personal history of fall  (R53.81) Physical deconditioning  Comment: set back today with a fall.   Plan: continue PHYSICAL THERAPY/OT. Up with assistance. Goal is to discharge to high level AL.         Total time spent with patient visit at the skilled nursing facility was 50 mins including patient visit and review of past records. Greater than 50% of total time spent with counseling and coordinating care due to complex medical issues with acute change in status today requiring work up:  new irregular heart rate, pneumonia and a fall. Medical issues addressed and  medication changes as noted above. Care coordination with facility staff and discussion with collaborating physician, Dr Sharon Ramsay.     Electronically signed by:  FRANK Mena CNP

## 2019-06-21 ENCOUNTER — NURSING HOME VISIT (OUTPATIENT)
Dept: GERIATRICS | Facility: CLINIC | Age: 84
End: 2019-06-21
Payer: MEDICARE

## 2019-06-21 ENCOUNTER — HOSPITAL LABORATORY (OUTPATIENT)
Dept: OTHER | Facility: CLINIC | Age: 84
End: 2019-06-21

## 2019-06-21 VITALS
HEART RATE: 62 BPM | WEIGHT: 150 LBS | RESPIRATION RATE: 18 BRPM | TEMPERATURE: 96.4 F | HEIGHT: 67 IN | BODY MASS INDEX: 23.54 KG/M2 | DIASTOLIC BLOOD PRESSURE: 64 MMHG | OXYGEN SATURATION: 95 % | SYSTOLIC BLOOD PRESSURE: 107 MMHG

## 2019-06-21 DIAGNOSIS — R53.81 PHYSICAL DECONDITIONING: ICD-10-CM

## 2019-06-21 DIAGNOSIS — Z90.49 S/P CHOLECYSTECTOMY: ICD-10-CM

## 2019-06-21 DIAGNOSIS — I49.9 IRREGULAR HEART RATE: ICD-10-CM

## 2019-06-21 DIAGNOSIS — R13.12 OROPHARYNGEAL DYSPHAGIA: ICD-10-CM

## 2019-06-21 DIAGNOSIS — Z98.890 S/P MITRAL VALVE CLIP IMPLANTATION: ICD-10-CM

## 2019-06-21 DIAGNOSIS — Z95.818 S/P MITRAL VALVE CLIP IMPLANTATION: ICD-10-CM

## 2019-06-21 DIAGNOSIS — N18.30 CKD (CHRONIC KIDNEY DISEASE) STAGE 3, GFR 30-59 ML/MIN (H): ICD-10-CM

## 2019-06-21 DIAGNOSIS — A04.72 CLOSTRIDIUM DIFFICILE DIARRHEA: ICD-10-CM

## 2019-06-21 DIAGNOSIS — I34.0 NON-RHEUMATIC MITRAL REGURGITATION: ICD-10-CM

## 2019-06-21 DIAGNOSIS — M48.061 SPINAL STENOSIS OF LUMBAR REGION, UNSPECIFIED WHETHER NEUROGENIC CLAUDICATION PRESENT: ICD-10-CM

## 2019-06-21 DIAGNOSIS — I10 BENIGN ESSENTIAL HYPERTENSION: ICD-10-CM

## 2019-06-21 DIAGNOSIS — K81.0 ACUTE CHOLECYSTITIS: ICD-10-CM

## 2019-06-21 DIAGNOSIS — I27.20 PULMONARY HYPERTENSION (H): ICD-10-CM

## 2019-06-21 DIAGNOSIS — J18.9 PNEUMONIA OF LEFT LOWER LOBE DUE TO INFECTIOUS ORGANISM: Primary | ICD-10-CM

## 2019-06-21 DIAGNOSIS — I50.32 CHRONIC DIASTOLIC CONGESTIVE HEART FAILURE (H): ICD-10-CM

## 2019-06-21 DIAGNOSIS — F43.21 GRIEF: ICD-10-CM

## 2019-06-21 LAB
ALBUMIN SERPL-MCNC: 3.1 G/DL (ref 3.4–5)
ANION GAP SERPL CALCULATED.3IONS-SCNC: 7 MMOL/L (ref 3–14)
BUN SERPL-MCNC: 39 MG/DL (ref 7–30)
CALCIUM SERPL-MCNC: 8.2 MG/DL (ref 8.5–10.1)
CHLORIDE SERPL-SCNC: 111 MMOL/L (ref 94–109)
CO2 SERPL-SCNC: 28 MMOL/L (ref 20–32)
CREAT SERPL-MCNC: 1.5 MG/DL (ref 0.66–1.25)
ERYTHROCYTE [DISTWIDTH] IN BLOOD BY AUTOMATED COUNT: 19.1 % (ref 10–15)
GFR SERPL CREATININE-BSD FRML MDRD: 40 ML/MIN/{1.73_M2}
GLUCOSE SERPL-MCNC: 109 MG/DL (ref 70–99)
HCT VFR BLD AUTO: 37.8 % (ref 40–53)
HGB BLD-MCNC: 11.6 G/DL (ref 13.3–17.7)
MCH RBC QN AUTO: 29.2 PG (ref 26.5–33)
MCHC RBC AUTO-ENTMCNC: 30.7 G/DL (ref 31.5–36.5)
MCV RBC AUTO: 95 FL (ref 78–100)
PLATELET # BLD AUTO: 64 10E9/L (ref 150–450)
POTASSIUM SERPL-SCNC: 3.9 MMOL/L (ref 3.4–5.3)
RBC # BLD AUTO: 3.97 10E12/L (ref 4.4–5.9)
SODIUM SERPL-SCNC: 146 MMOL/L (ref 133–144)
TSH SERPL DL<=0.005 MIU/L-ACNC: 2.11 MU/L (ref 0.4–4)
WBC # BLD AUTO: 5.2 10E9/L (ref 4–11)

## 2019-06-21 PROCEDURE — 99309 SBSQ NF CARE MODERATE MDM 30: CPT | Performed by: NURSE PRACTITIONER

## 2019-06-21 RX ORDER — LEVOFLOXACIN 500 MG/1
500 TABLET, FILM COATED ORAL DAILY
COMMUNITY
Start: 2019-06-21 | End: 2019-07-04

## 2019-06-21 ASSESSMENT — MIFFLIN-ST. JEOR: SCORE: 1289.03

## 2019-06-21 NOTE — PROGRESS NOTES
[facility] [facility]  [place] [facility] Record Number:  [id number]  Place of Service where encounter took place:  [facility] [[id number]]  Chief Complaint   Patient presents with     RECHECK       HPI:    Ivan Gomez  is a 89 year old (10/12/1926), who is being seen today for an episodic care visit.  HPI information obtained from: facility chart records, facility staff, patient report and [facility] chart review.   He came to this facility 5/28/2019 for short term rehab and medical management following hospitalization after presenting to the  [facility] ED in [facility] 5/21/2019  with worsening abdominal pain, nausea and diarrhea. He was found to have acute cholecystitis and was transferred to [facility] for management. He underwent cholecystectomy 5/22/2019 by Dr Romero. Completed a course of flagyl and cipro. He developed post op hypoxia requiring BiPAP. CXR with atelectasis vs infiltrate. No symptoms of infection and WBC normal. Respiratory issues resolved.   He had significant dysphagia and EGD showed esophagitis, thought to be from a pill with local erosion. Treated with carafate and lidocaine with improvement. Discharged on mechanical soft diet. He was positive for C diff and discharged on  vancomycin.      Today's concern is:     Pneumonia of left lower lobe due to infectious organism (H)-he fell early yesterday morning and had left rib pain and increased shortness of breath. CXR showed left lower lobe infiltrate, no rib fractures. Levaquin was started. Reports feeling better today with less shortness of breath and better energy level. Afebrile. No cough or chest pain.   Oropharyngeal dysphagia-remains on DD2 with thin liquids.   Irregular heart rate-new onset yesterday. Nurse reported HR in the 30s at the time of the fall, quickly came up to 60-70s. Irregular rate and rhythm noted on exam. EKG showed sinus arrhythmia with  HR 63, ventricular extrasystole  and prolonged QT. Carvedilol was discontinued. Rate has been in the 60s. Denies palpitations or dizziness.   Chronic diastolic congestive heart failure (H)  Pulmonary hypertension (H)  Non-rheumatic mitral regurgitation  S/P mitral valve clip implantation  CKD (chronic kidney disease) stage 3, GFR 30-59 ml/min (H)  Acute cholecystitis  S/P cholecystectomy  Clostridium difficile diarrhea-soft stool during the night and one so far today. No cramping or abdominal pain.   Benign essential hypertension-BPs improved today: 107/64, 123/89  Spinal stenosis of lumbar region, unspecified whether neurogenic claudication present-right sided rib and back pain today,which he attributes to the fall.  Reports left sided rib pain is less today.   Grief-his SO has been on hospice and  today. He's very sad but feels that he's coping well.   Physical deconditioning-ambulating 125 ft with walker and supervision. Requires supervision to stand by assist with cares.  SLUMS , CPT 5.1/5.6    Past Medical and Surgical History reviewed in Epic today.    MEDICATIONS:  Current Outpatient Medications   Medication Sig Dispense Refill     artificial saliva (BIOTENE MT) AERS spray Take 1 spray by mouth every 2 hours as needed for dry mouth       diclofenac (VOLTAREN) 1 % topical gel Place 2 g onto the skin 3 times daily Apply to both ankles       finasteride (PROSCAR) 5 MG tablet TAKE 1 TABLET(5 MG) BY MOUTH DAILY 90 tablet 3     furosemide (LASIX) 20 MG tablet Take 2 tablets (40 mg) by mouth daily 180 tablet 0     gabapentin (NEURONTIN) 300 MG capsule TAKE 1 CAPSULE BY MOUTH THREE TIMES DAILY 270 capsule 0     hydrALAZINE (APRESOLINE) 25 MG tablet Take 1 tablet (25 mg) by mouth 3 times daily 270 tablet 1     HYDROcodone-acetaminophen (NORCO) 7.5-325 MG per tablet Take 1 tablet by mouth every 6 hours as needed for pain maximum 4 tablet(s) per day 15 tablet 0     hypromellose (ARTIFICIAL TEARS) 0.4 % SOLN ophthalmic solution Place 2 drops  "Into the left eye 4 times daily       isosorbide dinitrate (ISORDIL) 20 MG tablet Take 20 mg by mouth 2 times daily       levofloxacin (LEVAQUIN) 500 MG tablet Take 500 mg by mouth daily       pantoprazole (PROTONIX) 40 MG EC tablet Take 40 mg by mouth daily       Phenylephrine-Witch Hazel 0.25-50 % GEL Place rectally 4 times daily as needed Insert 1 application rectally as needed for Hemorrhoids       potassium chloride ER (K-DUR/KLOR-CON M) 10 MEQ CR tablet Take 10 mEq by mouth daily       simvastatin (ZOCOR) 20 MG tablet TAKE ONE TABLET BY MOUTH AT BEDTIME 30 tablet 0     Skin Protectants, Misc. (EUCERIN) cream Apply topically as needed for dry skin Apply to LE + Areas of dryness topically as needed for Dryness       tamsulosin (FLOMAX) 0.4 MG capsule TAKE ONE CAPSULE BY MOUTH DAILY 90 capsule 3     vancomycin (FIRVANQ) 50 MG/ML oral solution Take 125 mg by mouth 3 times daily         REVIEW OF SYSTEMS:  10 point ROS of systems including Constitutional, Eyes, Respiratory, Cardiovascular, Gastroenterology, Genitourinary, Integumentary, Musculoskeletal, Psychiatric were all negative except for pertinent positives noted in my HPI.    Objective:  /64   Pulse 62   Temp 96.4  F (35.8  C)   Resp 18   Ht 1.702 m (5' 7\")   Wt 68 kg (150 lb)   SpO2 95%   BMI 23.49 kg/m    Exam:  GENERAL APPEARANCE:  Alert, in no distress, frail  ENT:  Mouth and posterior oropharynx normal, moist mucous membranes, [place]  EYES:  EOM normal, conjunctiva and lids normal  NECK:  No adenopathy,masses or thyromegaly  RESP:  respiratory effort and palpation of chest normal, no respiratory distress, diminished breath sounds bilaterally, no wheezes or crackles  CV:  Palpation and auscultation of heart done, irregular rate and rhythm, HR 68,  2-3 /6  murmur, +2 pedal pulses, peripheral edema 1+ all extremities  ABDOMEN: soft, nontender, no hepatosplenomegaly or other masses  M/S: up in chair. WAKEFIELD with good strength. No joint " inflammation  SKIN:  abdominal incisions healed. No weeping from extremities.   No erythema of LE  PSYCH:  oriented X 3, normal insight, judgement and memory, sad     Labs:   Lab Results   Component Value Date    WBC 5.2 06/21/2019     Lab Results   Component Value Date    RBC 3.97 06/21/2019     Lab Results   Component Value Date    HGB 11.6 06/21/2019     Lab Results   Component Value Date    HCT 37.8 06/21/2019     Lab Results   Component Value Date    MCV 95 06/21/2019     Lab Results   Component Value Date    MCH 29.2 06/21/2019     Lab Results   Component Value Date    MCHC 30.7 06/21/2019     Lab Results   Component Value Date    RDW 19.1 06/21/2019     Lab Results   Component Value Date    PLT 64 06/21/2019     Last Comprehensive Metabolic Panel:  Sodium   Date Value Ref Range Status   06/21/2019 146 (H) 133 - 144 mmol/L Final     Potassium   Date Value Ref Range Status   06/21/2019 3.9 3.4 - 5.3 mmol/L Final     Chloride   Date Value Ref Range Status   06/21/2019 111 (H) 94 - 109 mmol/L Final     Carbon Dioxide   Date Value Ref Range Status   06/21/2019 28 20 - 32 mmol/L Final     Anion Gap   Date Value Ref Range Status   06/21/2019 7 3 - 14 mmol/L Final     Glucose   Date Value Ref Range Status   06/21/2019 109 (H) 70 - 99 mg/dL Final     Urea Nitrogen   Date Value Ref Range Status   06/21/2019 39 (H) 7 - 30 mg/dL Final     Creatinine   Date Value Ref Range Status   06/21/2019 1.50 (H) 0.66 - 1.25 mg/dL Final     GFR Estimate   Date Value Ref Range Status   06/21/2019 40 (L) >60 mL/min/[1.73_m2] Final     Comment:     Non  GFR Calc  Starting 12/18/2018, serum creatinine based estimated GFR (eGFR) will be   calculated using the Chronic Kidney Disease Epidemiology Collaboration   (CKD-EPI) equation.       Calcium   Date Value Ref Range Status   06/21/2019 8.2 (L) 8.5 - 10.1 mg/dL Final     TSH   Date Value Ref Range Status   06/21/2019 2.11 0.40 - 4.00 mU/L Final       ASSESSMENT /  PLAN:  (J18.1) Pneumonia of left lower lobe due to infectious organism (H)  (primary encounter diagnosis)  (R13.12) Oropharyngeal dysphagia  Comment: he feels and looks better today. Tolerating DD2 with thin liquids.   Plan: continue levaquin. Closely monitor respiratory status. Continue SPEECH THERAPY, advance diet as tolerated.     (I49.9) Irregular heart rate  Comment: rhythm remains irregular, rate has stayed in the 60s. EKG findings as noted above. Labs within range, TSH normal.   Plan: monitor VS and symptoms. Follow up with Cardiology when he feels up to attending an outpatient appointment.     (I50.32) Chronic diastolic congestive heart failure (H)  (I27.20) Pulmonary hypertension (H)  (I34.0) Non-rheumatic mitral regurgitation  (Z98.890,  Z95.818) S/P mitral valve clip implantation  Comment: volume status has improved with less edema and weeping of his extremities. Diuresis is limited by renal function and hypotension.   Plan: continue lasix, Isordil and hydralazine. Daily weight. Continue compression stockings and elevating legs. Cardiology follow up as above.     (N18.3) CKD (chronic kidney disease) stage 3, GFR 30-59 ml/min (H)  Comment: creatinine has been fairly stable in the 1.4-1.5 range  Plan: follow BMP. Avoid nephrotoxins.     (K81.0) Acute cholecystitis  (Z90.49) S/P cholecystectomy  Comment: incisions healed. He saw Dr Romero 6/5/2019 and is to return prn.   Plan: monitor     (A04.72) Clostridium difficile diarrhea  Comment: very slow to resolve and now on antibiotic for pneumonia  Plan: continue vancomycin tid and slowly taper starting 7 days after finishing levaquin. Document number of stools/shift.     (I10) Benign essential hypertension  Comment: hypotension improved after carvedilol was discontinued yesterday.   Plan: cardiac meds as above. Monitor VS     (M48.061) Spinal stenosis of lumbar region, unspecified whether neurogenic claudication present  Comment: increase in pain after falling  yesterday  Plan: continue gabapentin, voltaren gel, Norco. Therapies     (F43.21) Grief  Comment: death of his SO today after sudden illness. He appears to be coping fairly well. He declines meeting with the  or psychologist  Plan: supportive care     (R53.81) Physical deconditioning  Comment: slow progress in therapies  Plan: continue PHYSICAL THERAPY/OT. Disposition unclear at this time, he is looking for AL       Electronically signed by:  FRANK Mena CNP

## 2019-06-21 NOTE — LETTER
6/21/2019        RE: Ivan Gomez  [address] [facility] [address]        [place] [facility]  [facility] Record Number:  [id number]  [facility] of [facility] where encounter took place[facility] [[id number]]  Chief Complaint   Patient presents with     RECHECK       HPI:    Ivan Gomez  is a 89 year old (10/12/1926), who is being seen today for an episodic care visit.  HPI information obtained from: facility chart records, facility staff, patient report and [facility] chart review.   He came to this facility 5/28/2019 for short term rehab and medical management following hospitalization after presenting to the  [facility] ED in [facility] 5/21/2019  with worsening abdominal pain, nausea and diarrhea. He was found to have acute cholecystitis and was transferred to [facility] for management. He underwent cholecystectomy 5/22/2019 by Dr Romero. Completed a course of flagyl and cipro. He developed post op hypoxia requiring BiPAP. CXR with atelectasis vs infiltrate. No symptoms of infection and WBC normal. Respiratory issues resolved.   He had significant dysphagia and EGD showed esophagitis, thought to be from a pill with local erosion. Treated with carafate and lidocaine with improvement. Discharged on mechanical soft diet. He was positive for C diff and discharged on  vancomycin.      Today's concern is:     Pneumonia of left lower lobe due to infectious organism (H)-he fell early yesterday morning and had left rib pain and increased shortness of breath. CXR showed left lower lobe infiltrate, no rib fractures. Levaquin was started. Reports feeling better today with less shortness of breath and better energy level. Afebrile. No cough or chest pain.   Oropharyngeal dysphagia-remains on DD2 with thin liquids.   Irregular heart rate-new onset yesterday. Nurse reported HR in the 30s at the time of the fall, quickly came up to 60-70s. Irregular rate  and rhythm noted on exam. EKG showed sinus arrhythmia with  HR 63, ventricular extrasystole and prolonged QT. Carvedilol was discontinued. Rate has been in the 60s. Denies palpitations or dizziness.   Chronic diastolic congestive heart failure (H)  Pulmonary hypertension (H)  Non-rheumatic mitral regurgitation  S/P mitral valve clip implantation  CKD (chronic kidney disease) stage 3, GFR 30-59 ml/min (H)  Acute cholecystitis  S/P cholecystectomy  Clostridium difficile diarrhea-soft stool during the night and one so far today. No cramping or abdominal pain.   Benign essential hypertension-BPs improved today: 107/64, 123/89  Spinal stenosis of lumbar region, unspecified whether neurogenic claudication present-right sided rib and back pain today,which he attributes to the fall.  Reports left sided rib pain is less today.   Grief-his SO has been on hospice and  today. He's very sad but feels that he's coping well.   Physical deconditioning-ambulating 125 ft with walker and supervision. Requires supervision to stand by assist with cares.  SLUMS , CPT 5.1/5.6    Past Medical and Surgical History reviewed in Epic today.    MEDICATIONS:  Current Outpatient Medications   Medication Sig Dispense Refill     artificial saliva (BIOTENE MT) AERS spray Take 1 spray by mouth every 2 hours as needed for dry mouth       diclofenac (VOLTAREN) 1 % topical gel Place 2 g onto the skin 3 times daily Apply to both ankles       finasteride (PROSCAR) 5 MG tablet TAKE 1 TABLET(5 MG) BY MOUTH DAILY 90 tablet 3     furosemide (LASIX) 20 MG tablet Take 2 tablets (40 mg) by mouth daily 180 tablet 0     gabapentin (NEURONTIN) 300 MG capsule TAKE 1 CAPSULE BY MOUTH THREE TIMES DAILY 270 capsule 0     hydrALAZINE (APRESOLINE) 25 MG tablet Take 1 tablet (25 mg) by mouth 3 times daily 270 tablet 1     HYDROcodone-acetaminophen (NORCO) 7.5-325 MG per tablet Take 1 tablet by mouth every 6 hours as needed for pain maximum 4 tablet(s) per day 15  "tablet 0     hypromellose (ARTIFICIAL TEARS) 0.4 % SOLN ophthalmic solution Place 2 drops Into the left eye 4 times daily       isosorbide dinitrate (ISORDIL) 20 MG tablet Take 20 mg by mouth 2 times daily       levofloxacin (LEVAQUIN) 500 MG tablet Take 500 mg by mouth daily       pantoprazole (PROTONIX) 40 MG EC tablet Take 40 mg by mouth daily       Phenylephrine-Witch Hazel 0.25-50 % GEL Place rectally 4 times daily as needed Insert 1 application rectally as needed for Hemorrhoids       potassium chloride ER (K-DUR/KLOR-CON M) 10 MEQ CR tablet Take 10 mEq by mouth daily       simvastatin (ZOCOR) 20 MG tablet TAKE ONE TABLET BY MOUTH AT BEDTIME 30 tablet 0     Skin Protectants, Misc. (EUCERIN) cream Apply topically as needed for dry skin Apply to LE + Areas of dryness topically as needed for Dryness       tamsulosin (FLOMAX) 0.4 MG capsule TAKE ONE CAPSULE BY MOUTH DAILY 90 capsule 3     vancomycin (FIRVANQ) 50 MG/ML oral solution Take 125 mg by mouth 3 times daily         REVIEW OF SYSTEMS:  10 point ROS of systems including Constitutional, Eyes, Respiratory, Cardiovascular, Gastroenterology, Genitourinary, Integumentary, Musculoskeletal, Psychiatric were all negative except for pertinent positives noted in my HPI.    Objective:  /64   Pulse 62   Temp 96.4  F (35.8  C)   Resp 18   Ht 1.702 m (5' 7\")   Wt 68 kg (150 lb)   SpO2 95%   BMI 23.49 kg/m     Exam:  GENERAL APPEARANCE:  Alert, in no distress, frail  ENT:  Mouth and posterior oropharynx normal, moist mucous membranes, [place]  EYES:  EOM normal, conjunctiva and lids normal  NECK:  No adenopathy,masses or thyromegaly  RESP:  respiratory effort and palpation of chest normal, no respiratory distress, diminished breath sounds bilaterally, no wheezes or crackles  CV:  Palpation and auscultation of heart done, irregular rate and rhythm, HR 68,  2-3 /6  murmur, +2 pedal pulses, peripheral edema 1+ all extremities  ABDOMEN: soft, nontender, no " hepatosplenomegaly or other masses  M/S: up in chair. WAKEFIELD with good strength. No joint inflammation  SKIN:  abdominal incisions healed. No weeping from extremities.   No erythema of LE  PSYCH:  oriented X 3, normal insight, judgement and memory, sad     Labs:   Lab Results   Component Value Date    WBC 5.2 06/21/2019     Lab Results   Component Value Date    RBC 3.97 06/21/2019     Lab Results   Component Value Date    HGB 11.6 06/21/2019     Lab Results   Component Value Date    HCT 37.8 06/21/2019     Lab Results   Component Value Date    MCV 95 06/21/2019     Lab Results   Component Value Date    MCH 29.2 06/21/2019     Lab Results   Component Value Date    MCHC 30.7 06/21/2019     Lab Results   Component Value Date    RDW 19.1 06/21/2019     Lab Results   Component Value Date    PLT 64 06/21/2019     Last Comprehensive Metabolic Panel:  Sodium   Date Value Ref Range Status   06/21/2019 146 (H) 133 - 144 mmol/L Final     Potassium   Date Value Ref Range Status   06/21/2019 3.9 3.4 - 5.3 mmol/L Final     Chloride   Date Value Ref Range Status   06/21/2019 111 (H) 94 - 109 mmol/L Final     Carbon Dioxide   Date Value Ref Range Status   06/21/2019 28 20 - 32 mmol/L Final     Anion Gap   Date Value Ref Range Status   06/21/2019 7 3 - 14 mmol/L Final     Glucose   Date Value Ref Range Status   06/21/2019 109 (H) 70 - 99 mg/dL Final     Urea Nitrogen   Date Value Ref Range Status   06/21/2019 39 (H) 7 - 30 mg/dL Final     Creatinine   Date Value Ref Range Status   06/21/2019 1.50 (H) 0.66 - 1.25 mg/dL Final     GFR Estimate   Date Value Ref Range Status   06/21/2019 40 (L) >60 mL/min/[1.73_m2] Final     Comment:     Non  GFR Calc  Starting 12/18/2018, serum creatinine based estimated GFR (eGFR) will be   calculated using the Chronic Kidney Disease Epidemiology Collaboration   (CKD-EPI) equation.       Calcium   Date Value Ref Range Status   06/21/2019 8.2 (L) 8.5 - 10.1 mg/dL Final     TSH   Date Value  Ref Range Status   06/21/2019 2.11 0.40 - 4.00 mU/L Final       ASSESSMENT / PLAN:  (J18.1) Pneumonia of left lower lobe due to infectious organism (H)  (primary encounter diagnosis)  (R13.12) Oropharyngeal dysphagia  Comment: he feels and looks better today. Tolerating DD2 with thin liquids.   Plan: continue levaquin. Closely monitor respiratory status. Continue SPEECH THERAPY, advance diet as tolerated.     (I49.9) Irregular heart rate  Comment: rhythm remains irregular, rate has stayed in the 60s. EKG findings as noted above. Labs within range, TSH normal.   Plan: monitor VS and symptoms. Follow up with Cardiology when he feels up to attending an outpatient appointment.     (I50.32) Chronic diastolic congestive heart failure (H)  (I27.20) Pulmonary hypertension (H)  (I34.0) Non-rheumatic mitral regurgitation  (Z98.890,  Z95.818) S/P mitral valve clip implantation  Comment: volume status has improved with less edema and weeping of his extremities. Diuresis is limited by renal function and hypotension.   Plan: continue lasix, Isordil and hydralazine. Daily weight. Continue compression stockings and elevating legs. Cardiology follow up as above.     (N18.3) CKD (chronic kidney disease) stage 3, GFR 30-59 ml/min (H)  Comment: creatinine has been fairly stable in the 1.4-1.5 range  Plan: follow BMP. Avoid nephrotoxins.     (K81.0) Acute cholecystitis  (Z90.49) S/P cholecystectomy  Comment: incisions healed. He saw Dr Romero 6/5/2019 and is to return prn.   Plan: monitor     (A04.72) Clostridium difficile diarrhea  Comment: very slow to resolve and now on antibiotic for pneumonia  Plan: continue vancomycin tid and slowly taper starting 7 days after finishing levaquin. Document number of stools/shift.     (I10) Benign essential hypertension  Comment: hypotension improved after carvedilol was discontinued yesterday.   Plan: cardiac meds as above. Monitor VS     (M48.061) Spinal stenosis of lumbar region, unspecified  whether neurogenic claudication present  Comment: increase in pain after falling yesterday  Plan: continue gabapentin, voltaren gel, Norco. Therapies     (F43.21) Grief  Comment: death of his SO today after sudden illness. He appears to be coping fairly well. He declines meeting with the  or psychologist  Plan: supportive care     (R53.81) Physical deconditioning  Comment: slow progress in therapies  Plan: continue PHYSICAL THERAPY/OT. Disposition unclear at this time, he is looking for AL       Electronically signed by:  FRANK Mena CNP               Sincerely,        FRANK Mena CNP

## 2019-06-24 ENCOUNTER — NURSING HOME VISIT (OUTPATIENT)
Dept: GERIATRICS | Facility: CLINIC | Age: 84
End: 2019-06-24
Payer: MEDICARE

## 2019-06-24 VITALS
HEART RATE: 68 BPM | RESPIRATION RATE: 18 BRPM | TEMPERATURE: 97.3 F | DIASTOLIC BLOOD PRESSURE: 75 MMHG | HEIGHT: 67 IN | BODY MASS INDEX: 24.01 KG/M2 | OXYGEN SATURATION: 96 % | SYSTOLIC BLOOD PRESSURE: 123 MMHG | WEIGHT: 153 LBS

## 2019-06-24 DIAGNOSIS — I50.32 CHRONIC DIASTOLIC CONGESTIVE HEART FAILURE (H): ICD-10-CM

## 2019-06-24 DIAGNOSIS — I49.9 IRREGULAR HEART RATE: ICD-10-CM

## 2019-06-24 DIAGNOSIS — K81.0 ACUTE CHOLECYSTITIS: ICD-10-CM

## 2019-06-24 DIAGNOSIS — I27.20 PULMONARY HYPERTENSION (H): ICD-10-CM

## 2019-06-24 DIAGNOSIS — F43.21 GRIEF: ICD-10-CM

## 2019-06-24 DIAGNOSIS — R53.81 PHYSICAL DECONDITIONING: ICD-10-CM

## 2019-06-24 DIAGNOSIS — I34.0 NON-RHEUMATIC MITRAL REGURGITATION: ICD-10-CM

## 2019-06-24 DIAGNOSIS — Z90.49 S/P CHOLECYSTECTOMY: ICD-10-CM

## 2019-06-24 DIAGNOSIS — R13.12 OROPHARYNGEAL DYSPHAGIA: ICD-10-CM

## 2019-06-24 DIAGNOSIS — N18.30 CKD (CHRONIC KIDNEY DISEASE) STAGE 3, GFR 30-59 ML/MIN (H): ICD-10-CM

## 2019-06-24 DIAGNOSIS — J18.9 PNEUMONIA OF LEFT LOWER LOBE DUE TO INFECTIOUS ORGANISM: Primary | ICD-10-CM

## 2019-06-24 DIAGNOSIS — I10 BENIGN ESSENTIAL HYPERTENSION: ICD-10-CM

## 2019-06-24 DIAGNOSIS — M48.061 SPINAL STENOSIS OF LUMBAR REGION, UNSPECIFIED WHETHER NEUROGENIC CLAUDICATION PRESENT: ICD-10-CM

## 2019-06-24 DIAGNOSIS — Z98.890 S/P MITRAL VALVE CLIP IMPLANTATION: ICD-10-CM

## 2019-06-24 DIAGNOSIS — Z95.818 S/P MITRAL VALVE CLIP IMPLANTATION: ICD-10-CM

## 2019-06-24 DIAGNOSIS — A04.72 CLOSTRIDIUM DIFFICILE DIARRHEA: ICD-10-CM

## 2019-06-24 PROCEDURE — 99309 SBSQ NF CARE MODERATE MDM 30: CPT | Performed by: NURSE PRACTITIONER

## 2019-06-24 RX ORDER — CARVEDILOL 6.25 MG/1
6.25 TABLET ORAL 2 TIMES DAILY WITH MEALS
COMMUNITY
End: 2019-07-04

## 2019-06-24 ASSESSMENT — MIFFLIN-ST. JEOR: SCORE: 1302.63

## 2019-06-24 NOTE — LETTER
6/24/2019        RE: Ivan Gomez  [address] [facility] [address]        [place] [facility]  [place] Medical Record Number:  [id number]  [facility] of Service where encounter took place[facility] [[id number]]  Chief Complaint   Patient presents with     RECHECK       HPI:    Ivan Gomez  is a 89 year old (10/12/1926), who is being seen today for an episodic care visit.  HPI information obtained from: facility chart records, facility staff, patient report and [facility] chart review.   He came to this facility 5/28/2019 for short term rehab and medical management following hospitalization after presenting to the  [facility] ED in [facility] 5/21/2019  with worsening abdominal pain, nausea and diarrhea. He was found to have acute cholecystitis and was transferred to [facility] for management. He underwent cholecystectomy 5/22/2019 by Dr Romero. Completed a course of flagyl and cipro. He developed post op hypoxia requiring BiPAP. CXR with atelectasis vs infiltrate. No symptoms of infection and WBC normal. Respiratory issues resolved.   He had significant dysphagia and EGD showed esophagitis, thought to be from a pill with local erosion. Treated with carafate and lidocaine with improvement. Discharged on mechanical soft diet. He was positive for C diff and discharged on  vancomycin.    Today's concerns are:     Pneumonia of left lower lobe due to infectious organism (H)-levaquin was started 6/20/2019. He declined taking yesterday's and today's doses as he feels it made his diarrhea worse. Reports feeling better with less left sided pain. No cough or chest pain. Afebrile, no hypoxia.   Oropharyngeal dysphagia-continues to work with SPEECH THERAPY and is on DD2 with thin liquids. Appetite remains fair to poor.   Irregular heart rate-he fell early am 6/20/2019 and was found to have an irregular rate and rhythm. Nurse reported his HR at the time of the  "fall was in the 30s, then quickly came up to the 60-70s. EKG showed sinus arrhythmia with HR 63, ventricular extrasystole and prolonged QT. Carvedilol was discontinued. Denies palpitations or dizziness. HR: 52-68  Pulmonary hypertension (H)  Chronic diastolic congestive heart failure (H)-mild weeping due to edema of his thighs. UE edema has improved. Weight is down 13 lbs since tcu admission.   Non-rheumatic mitral regurgitation  S/P mitral valve clip implantation  CKD (chronic kidney disease) stage 3, GFR 30-59 ml/min (H)  Clostridium difficile diarrhea-vancomycin increased back to tid when levaquin was started. Increased diarrhea while on levaquin, improved today after he stopped the med yesterday. Has had 1 soft stool today.   Acute cholecystitis  S/P cholecystectomy  Benign essential hypertension-BPs: 123/75, 122/85, 107/64  Spinal stenosis of lumbar region, unspecified whether neurogenic claudication present-had increased back pain after the fall, better today.   Grief-his SO  2019 and he's feeling sad, but \"alright.\"  Physical deconditioning-ambulating >125 ft with walker and supervision. Requires supervision to stand by assist with cares.     Past Medical and Surgical History reviewed in Epic today.    MEDICATIONS:  Current Outpatient Medications   Medication Sig Dispense Refill     artificial saliva (BIOTENE MT) AERS spray Take 1 spray by mouth every 2 hours as needed for dry mouth       carvedilol (COREG) 6.25 MG tablet Take 6.25 mg by mouth 2 times daily (with meals)       diclofenac (VOLTAREN) 1 % topical gel Place 2 g onto the skin 3 times daily Apply to both ankles       finasteride (PROSCAR) 5 MG tablet TAKE 1 TABLET(5 MG) BY MOUTH DAILY 90 tablet 3     furosemide (LASIX) 20 MG tablet Take 2 tablets (40 mg) by mouth daily 180 tablet 0     gabapentin (NEURONTIN) 300 MG capsule TAKE 1 CAPSULE BY MOUTH THREE TIMES DAILY 270 capsule 0     hydrALAZINE (APRESOLINE) 25 MG tablet Take 1 tablet (25 mg) " "by mouth 3 times daily 270 tablet 1     HYDROcodone-acetaminophen (NORCO) 7.5-325 MG per tablet Take 1 tablet by mouth every 6 hours as needed for pain maximum 4 tablet(s) per day 15 tablet 0     hypromellose (ARTIFICIAL TEARS) 0.4 % SOLN ophthalmic solution Place 2 drops Into the left eye 4 times daily       isosorbide dinitrate (ISORDIL) 20 MG tablet Take 20 mg by mouth 2 times daily       pantoprazole (PROTONIX) 40 MG EC tablet Take 40 mg by mouth daily       Phenylephrine-Witch Hazel 0.25-50 % GEL Place rectally 4 times daily as needed Insert 1 application rectally as needed for Hemorrhoids       potassium chloride ER (K-DUR/KLOR-CON M) 10 MEQ CR tablet Take 10 mEq by mouth daily       simvastatin (ZOCOR) 20 MG tablet TAKE ONE TABLET BY MOUTH AT BEDTIME 30 tablet 0     Skin Protectants, Misc. (EUCERIN) cream Apply topically as needed for dry skin Apply to LE + Areas of dryness topically as needed for Dryness       tamsulosin (FLOMAX) 0.4 MG capsule TAKE ONE CAPSULE BY MOUTH DAILY 90 capsule 3     vancomycin (FIRVANQ) 50 MG/ML oral solution Take 125 mg by mouth 3 times daily       dicyclomine (BENTYL) 10 MG capsule Take 10 mg by mouth 3 times daily         REVIEW OF SYSTEMS:  4 point ROS including Respiratory, CV, GI and , other than that noted in the HPI,  is negative    Objective:  /75   Pulse 68   Temp 97.3  F (36.3  C)   Resp 18   Ht 1.702 m (5' 7\")   Wt 69.4 kg (153 lb)   SpO2 96%   BMI 23.96 kg/m     Exam:  GENERAL APPEARANCE:  Alert, in no distress, frail  ENT:  Mouth and posterior oropharynx normal, moist mucous membranes, [place]  EYES:  EOM normal, conjunctiva and lids normal  NECK:  No adenopathy,masses or thyromegaly  RESP:  respiratory effort and palpation of chest normal, no respiratory distress, lungs clear to ausculation bilaterally   CV:  Palpation and auscultation of heart done, irregular rate and rhythm with skipped beats,  2-3 /6  murmur, +2 pedal pulses, peripheral edema 1+ all " extremities  ABDOMEN: soft, nontender, no hepatosplenomegaly or other masses  M/S: up in chair. WAKEFIELD with good strength. No joint inflammation  SKIN:  abdominal incisions healed.  Small amount of weeping from right thigh, no open wounds.  No erythema of LE  PSYCH:  oriented X 3, normal insight, judgement and memory, mood normal     Labs:   Labs done in SNF are in [place] EPIC. Please refer to them using EPIC/Care Everywhere.      ASSESSMENT / PLAN:  (J18.1) Pneumonia of left lower lobe due to infectious organism (H)  (primary encounter diagnosis)  (R13.12) Oropharyngeal dysphagia  Comment: swallow has improved. He took 3 doses of levaquin, then stopped due to increase in diarrhea. Lungs clear on exam today and no symptoms of infection.   Plan: discontinue levaquin. Closely monitor respiratory status. Continue SPEECH THERAPY, advance diet as tolerated.     (I49.9) Irregular heart rate  Comment: rhythm remains irregular, rate has improved off carvedilol. He appears asymptomatic. He had a 24 Holter in 2017 that did not show afib, and he would not be a candidate for anticoagulation given his anemia, thrombocytopenia, overall frailty and falls. We discussed Cardiology follow up, and he would like to hold off for now.   Plan: monitor VS.     (I27.20) Pulmonary hypertension (H)  (I50.32) Chronic diastolic congestive heart failure (H)  (I34.0) Non-rheumatic mitral regurgitation  (Z98.890,  Z95.818) S/P mitral valve clip implantation  Comment: volume status improving with less edema. Diuresis is limited by renal function and borderline hypotension.  Weight loss due to volume, as well as poor intake. Compression stockings discontinued due to pain of his knees and lower legs.   Plan: continue lasix, Isordil and hydralazine. Daily weight. Elevate legs.     (N18.3) CKD (chronic kidney disease) stage 3, GFR 30-59 ml/min (H)  Comment: renal function is at baseline  Plan: BMP. Avoid nephrotoxins.     (A04.72) Clostridium difficile  diarrhea  Comment: increase in diarrhea while on levaquin, improved today.   Plan: continue vancomycin tid and plan to decrease to bid next week if diarrhea improves. GI follow up 6/25/2019    (K81.0) Acute cholecystitis  (Z90.49) S/P cholecystectomy  Comment: incisions healed. He saw Dr Romero 6/5/2019 and is to return prn.   Plan: monitor incisions.     (I10) Benign essential hypertension  Comment: hypotension has improved with discontinuation of carvedilol  Plan: continue cardiac meds as above. Monitor VS    (M48.061) Spinal stenosis of lumbar region, unspecified whether neurogenic claudication present  Comment: pain fairly well controlled. Acute pain from the fall on 6/20/2019 appears resolved  Plan: continue gabapentin, voltaren gel, Norco. Therapies    (F43.21) Grief  Comment: appears to be coping well. He has declined meeting with the  or psychologist.   Plan: supportive care     (R53.81) Physical deconditioning  Comment: slowly progressing in therapies.   Plan: continue PHYSICAL THERAPY/OT. Disposition unclear at this time as he no longer plans to move to the [place] where his SO lived.  is helping him look for placement.       Electronically signed by  FRANK Mena CNP               Sincerely,        FRANK Mena CNP

## 2019-06-24 NOTE — PROGRESS NOTES
[facility]  [place] Medical Record Number:  [id number]  Place of Service where encounter took place:  [facility] [[id number]]  Chief Complaint   Patient presents with     RECHECK       HPI:    Ivan Gomez  is a 89 year old (10/12/1926), who is being seen today for an episodic care visit.  HPI information obtained from: facility chart records, facility staff, patient report and [facility] chart review.   He came to this facility 5/28/2019 for short term rehab and medical management following hospitalization after presenting to the  [facility] ED in [facility] 5/21/2019  with worsening abdominal pain, nausea and diarrhea. He was found to have acute cholecystitis and was transferred to [facility] for management. He underwent cholecystectomy 5/22/2019 by Dr Romero. Completed a course of flagyl and cipro. He developed post op hypoxia requiring BiPAP. CXR with atelectasis vs infiltrate. No symptoms of infection and WBC normal. Respiratory issues resolved.   He had significant dysphagia and EGD showed esophagitis, thought to be from a pill with local erosion. Treated with carafate and lidocaine with improvement. Discharged on mechanical soft diet. He was positive for C diff and discharged on  vancomycin.    Today's concerns are:     Pneumonia of left lower lobe due to infectious organism (H)-levaquin was started 6/20/2019. He declined taking yesterday's and today's doses as he feels it made his diarrhea worse. Reports feeling better with less left sided pain. No cough or chest pain. Afebrile, no hypoxia.   Oropharyngeal dysphagia-continues to work with SPEECH THERAPY and is on DD2 with thin liquids. Appetite remains fair to poor.   Irregular heart rate-he fell early am 6/20/2019 and was found to have an irregular rate and rhythm. Nurse reported his HR at the time of the fall was in the 30s, then quickly came up to the 60-70s. EKG showed sinus arrhythmia with  "HR 63, ventricular extrasystole and prolonged QT. Carvedilol was discontinued. Denies palpitations or dizziness. HR: 52-68  Pulmonary hypertension (H)  Chronic diastolic congestive heart failure (H)-mild weeping due to edema of his thighs. UE edema has improved. Weight is down 13 lbs since tcu admission.   Non-rheumatic mitral regurgitation  S/P mitral valve clip implantation  CKD (chronic kidney disease) stage 3, GFR 30-59 ml/min (H)  Clostridium difficile diarrhea-vancomycin increased back to tid when levaquin was started. Increased diarrhea while on levaquin, improved today after he stopped the med yesterday. Has had 1 soft stool today.   Acute cholecystitis  S/P cholecystectomy  Benign essential hypertension-BPs: 123/75, 122/85, 107/64  Spinal stenosis of lumbar region, unspecified whether neurogenic claudication present-had increased back pain after the fall, better today.   Grief-his SO  2019 and he's feeling sad, but \"alright.\"  Physical deconditioning-ambulating >125 ft with walker and supervision. Requires supervision to stand by assist with cares.     Past Medical and Surgical History reviewed in Epic today.    MEDICATIONS:  Current Outpatient Medications   Medication Sig Dispense Refill     artificial saliva (BIOTENE MT) AERS spray Take 1 spray by mouth every 2 hours as needed for dry mouth       carvedilol (COREG) 6.25 MG tablet Take 6.25 mg by mouth 2 times daily (with meals)       diclofenac (VOLTAREN) 1 % topical gel Place 2 g onto the skin 3 times daily Apply to both ankles       finasteride (PROSCAR) 5 MG tablet TAKE 1 TABLET(5 MG) BY MOUTH DAILY 90 tablet 3     furosemide (LASIX) 20 MG tablet Take 2 tablets (40 mg) by mouth daily 180 tablet 0     gabapentin (NEURONTIN) 300 MG capsule TAKE 1 CAPSULE BY MOUTH THREE TIMES DAILY 270 capsule 0     hydrALAZINE (APRESOLINE) 25 MG tablet Take 1 tablet (25 mg) by mouth 3 times daily 270 tablet 1     HYDROcodone-acetaminophen (NORCO) 7.5-325 MG per " "tablet Take 1 tablet by mouth every 6 hours as needed for pain maximum 4 tablet(s) per day 15 tablet 0     hypromellose (ARTIFICIAL TEARS) 0.4 % SOLN ophthalmic solution Place 2 drops Into the left eye 4 times daily       isosorbide dinitrate (ISORDIL) 20 MG tablet Take 20 mg by mouth 2 times daily       pantoprazole (PROTONIX) 40 MG EC tablet Take 40 mg by mouth daily       Phenylephrine-Witch Hazel 0.25-50 % GEL Place rectally 4 times daily as needed Insert 1 application rectally as needed for Hemorrhoids       potassium chloride ER (K-DUR/KLOR-CON M) 10 MEQ CR tablet Take 10 mEq by mouth daily       simvastatin (ZOCOR) 20 MG tablet TAKE ONE TABLET BY MOUTH AT BEDTIME 30 tablet 0     Skin Protectants, Misc. (EUCERIN) cream Apply topically as needed for dry skin Apply to LE + Areas of dryness topically as needed for Dryness       tamsulosin (FLOMAX) 0.4 MG capsule TAKE ONE CAPSULE BY MOUTH DAILY 90 capsule 3     vancomycin (FIRVANQ) 50 MG/ML oral solution Take 125 mg by mouth 3 times daily       dicyclomine (BENTYL) 10 MG capsule Take 10 mg by mouth 3 times daily         REVIEW OF SYSTEMS:  4 point ROS including Respiratory, CV, GI and , other than that noted in the HPI,  is negative    Objective:  /75   Pulse 68   Temp 97.3  F (36.3  C)   Resp 18   Ht 1.702 m (5' 7\")   Wt 69.4 kg (153 lb)   SpO2 96%   BMI 23.96 kg/m    Exam:  GENERAL APPEARANCE:  Alert, in no distress, frail  ENT:  Mouth and posterior oropharynx normal, moist mucous membranes, [place]  EYES:  EOM normal, conjunctiva and lids normal  NECK:  No adenopathy,masses or thyromegaly  RESP:  respiratory effort and palpation of chest normal, no respiratory distress, lungs clear to ausculation bilaterally   CV:  Palpation and auscultation of heart done, irregular rate and rhythm with skipped beats,  2-3 /6  murmur, +2 pedal pulses, peripheral edema 1+ all extremities  ABDOMEN: soft, nontender, no hepatosplenomegaly or other masses  M/S: up " in chair. WAKEFIELD with good strength. No joint inflammation  SKIN:  abdominal incisions healed. Small amount of weeping from right thigh, no open wounds.  No erythema of LE  PSYCH:  oriented X 3, normal insight, judgement and memory, mood normal     Labs:   Labs done in SNF are in [place] EPIC. Please refer to them using EPIC/Care Everywhere.      ASSESSMENT / PLAN:  (J18.1) Pneumonia of left lower lobe due to infectious organism (H)  (primary encounter diagnosis)  (R13.12) Oropharyngeal dysphagia  Comment: swallow has improved. He took 3 doses of levaquin, then stopped due to increase in diarrhea. Lungs clear on exam today and no symptoms of infection.   Plan: discontinue levaquin. Closely monitor respiratory status. Continue SPEECH THERAPY, advance diet as tolerated.     (I49.9) Irregular heart rate  Comment: rhythm remains irregular, rate has improved off carvedilol. He appears asymptomatic. He had a 24 Holter in 2017 that did not show afib, and he would not be a candidate for anticoagulation given his anemia, thrombocytopenia, overall frailty and falls. We discussed Cardiology follow up, and he would like to hold off for now.   Plan: monitor VS.     (I27.20) Pulmonary hypertension (H)  (I50.32) Chronic diastolic congestive heart failure (H)  (I34.0) Non-rheumatic mitral regurgitation  (Z98.890,  Z95.818) S/P mitral valve clip implantation  Comment: volume status improving with less edema. Diuresis is limited by renal function and borderline hypotension.  Weight loss due to volume, as well as poor intake. Compression stockings discontinued due to pain of his knees and lower legs.   Plan: continue lasix, Isordil and hydralazine. Daily weight. Elevate legs.     (N18.3) CKD (chronic kidney disease) stage 3, GFR 30-59 ml/min (H)  Comment: renal function is at baseline  Plan: BMP. Avoid nephrotoxins.     (A04.72) Clostridium difficile diarrhea  Comment: increase in diarrhea while on levaquin, improved today.   Plan:  continue vancomycin tid and plan to decrease to bid next week if diarrhea improves. GI follow up 6/25/2019    (K81.0) Acute cholecystitis  (Z90.49) S/P cholecystectomy  Comment: incisions healed. He saw Dr Romero 6/5/2019 and is to return prn.   Plan: monitor incisions.     (I10) Benign essential hypertension  Comment: hypotension has improved with discontinuation of carvedilol  Plan: continue cardiac meds as above. Monitor VS    (M48.061) Spinal stenosis of lumbar region, unspecified whether neurogenic claudication present  Comment: pain fairly well controlled. Acute pain from the fall on 6/20/2019 appears resolved  Plan: continue gabapentin, voltaren gel, Norco. Therapies    (F43.21) Grief  Comment: appears to be coping well. He has declined meeting with the  or psychologist.   Plan: supportive care     (R53.81) Physical deconditioning  Comment: slowly progressing in therapies.   Plan: continue PHYSICAL THERAPY/OT. Disposition unclear at this time as he no longer plans to move to the [place] where his SO lived.  is helping him look for placement.       Electronically signed by  FRANK Mena CNP

## 2019-06-25 VITALS
OXYGEN SATURATION: 97 % | TEMPERATURE: 96.1 F | SYSTOLIC BLOOD PRESSURE: 130 MMHG | WEIGHT: 153.2 LBS | RESPIRATION RATE: 18 BRPM | BODY MASS INDEX: 23.99 KG/M2 | HEART RATE: 60 BPM | DIASTOLIC BLOOD PRESSURE: 84 MMHG

## 2019-06-25 RX ORDER — DICYCLOMINE HYDROCHLORIDE 10 MG/1
10 CAPSULE ORAL 3 TIMES DAILY
COMMUNITY
Start: 2019-06-25 | End: 2019-01-01

## 2019-06-26 ENCOUNTER — HOSPITAL LABORATORY (OUTPATIENT)
Dept: OTHER | Facility: CLINIC | Age: 84
End: 2019-06-26

## 2019-06-26 ENCOUNTER — NURSING HOME VISIT (OUTPATIENT)
Dept: GERIATRICS | Facility: CLINIC | Age: 84
End: 2019-06-26
Payer: MEDICARE

## 2019-06-26 DIAGNOSIS — N18.30 CKD (CHRONIC KIDNEY DISEASE) STAGE 3, GFR 30-59 ML/MIN (H): ICD-10-CM

## 2019-06-26 DIAGNOSIS — M48.061 SPINAL STENOSIS OF LUMBAR REGION, UNSPECIFIED WHETHER NEUROGENIC CLAUDICATION PRESENT: ICD-10-CM

## 2019-06-26 DIAGNOSIS — F43.21 GRIEF: ICD-10-CM

## 2019-06-26 DIAGNOSIS — Z95.818 S/P MITRAL VALVE CLIP IMPLANTATION: ICD-10-CM

## 2019-06-26 DIAGNOSIS — J18.9 PNEUMONIA OF LEFT LOWER LOBE DUE TO INFECTIOUS ORGANISM: Primary | ICD-10-CM

## 2019-06-26 DIAGNOSIS — I34.0 NON-RHEUMATIC MITRAL REGURGITATION: ICD-10-CM

## 2019-06-26 DIAGNOSIS — I49.9 IRREGULAR HEART RATE: ICD-10-CM

## 2019-06-26 DIAGNOSIS — I10 BENIGN ESSENTIAL HYPERTENSION: ICD-10-CM

## 2019-06-26 DIAGNOSIS — Z98.890 S/P MITRAL VALVE CLIP IMPLANTATION: ICD-10-CM

## 2019-06-26 DIAGNOSIS — Z90.49 S/P CHOLECYSTECTOMY: ICD-10-CM

## 2019-06-26 DIAGNOSIS — A04.72 CLOSTRIDIUM DIFFICILE DIARRHEA: ICD-10-CM

## 2019-06-26 DIAGNOSIS — R13.12 OROPHARYNGEAL DYSPHAGIA: ICD-10-CM

## 2019-06-26 DIAGNOSIS — I50.32 CHRONIC DIASTOLIC CONGESTIVE HEART FAILURE (H): ICD-10-CM

## 2019-06-26 DIAGNOSIS — R53.81 PHYSICAL DECONDITIONING: ICD-10-CM

## 2019-06-26 DIAGNOSIS — I27.20 PULMONARY HYPERTENSION (H): ICD-10-CM

## 2019-06-26 DIAGNOSIS — K81.0 ACUTE CHOLECYSTITIS: ICD-10-CM

## 2019-06-26 LAB
ANION GAP SERPL CALCULATED.3IONS-SCNC: 6 MMOL/L (ref 3–14)
BUN SERPL-MCNC: 36 MG/DL (ref 7–30)
CALCIUM SERPL-MCNC: 8.1 MG/DL (ref 8.5–10.1)
CHLORIDE SERPL-SCNC: 111 MMOL/L (ref 94–109)
CO2 SERPL-SCNC: 28 MMOL/L (ref 20–32)
CREAT SERPL-MCNC: 1.31 MG/DL (ref 0.66–1.25)
ERYTHROCYTE [DISTWIDTH] IN BLOOD BY AUTOMATED COUNT: 19.1 % (ref 10–15)
GFR SERPL CREATININE-BSD FRML MDRD: 47 ML/MIN/{1.73_M2}
GLUCOSE SERPL-MCNC: 80 MG/DL (ref 70–99)
HCT VFR BLD AUTO: 32.5 % (ref 40–53)
HGB BLD-MCNC: 10.3 G/DL (ref 13.3–17.7)
MCH RBC QN AUTO: 29.4 PG (ref 26.5–33)
MCHC RBC AUTO-ENTMCNC: 31.7 G/DL (ref 31.5–36.5)
MCV RBC AUTO: 93 FL (ref 78–100)
PLATELET # BLD AUTO: 51 10E9/L (ref 150–450)
POTASSIUM SERPL-SCNC: 3.5 MMOL/L (ref 3.4–5.3)
RBC # BLD AUTO: 3.5 10E12/L (ref 4.4–5.9)
SODIUM SERPL-SCNC: 145 MMOL/L (ref 133–144)
WBC # BLD AUTO: 5 10E9/L (ref 4–11)

## 2019-06-26 PROCEDURE — 99309 SBSQ NF CARE MODERATE MDM 30: CPT | Performed by: NURSE PRACTITIONER

## 2019-06-26 NOTE — PROGRESS NOTES
[facility]  [place] [facility] Record Number:  [id number]  Place of Service where encounter took place:  [facility] [[id number]]  Chief Complaint   Patient presents with     RECHECK       HPI:    Ivan Gomez  is a 89 year old (10/12/1926), who is being seen today for an episodic care visit.  HPI information obtained from: facility chart records, facility staff, patient report and [facility] chart review.  He came to this facility 5/28/2019 for short term rehab and medical management following hospitalization after presenting to the  [facility] ED in [facility] 5/21/2019  with worsening abdominal pain, nausea and diarrhea. He was found to have acute cholecystitis and was transferred to [facility] for management. He underwent cholecystectomy 5/22/2019 by Dr Romero. Completed a course of flagyl and cipro. He developed post op hypoxia requiring BiPAP. CXR with atelectasis vs infiltrate. No symptoms of infection and WBC normal. Respiratory issues resolved.   He had significant dysphagia and EGD showed esophagitis, thought to be from a pill with local erosion. Treated with carafate and lidocaine with improvement. Discharged on mechanical soft diet. He was positive for C diff and discharged on  vancomycin.       Today's concern is:     Pneumonia of left lower lobe due to infectious organism (H)-levaquin was started 6/20/2019, but it made his diarrhea worse and he stopped taking it after 3 doses. Reports his left sided pain has resolved, no coughing or shortness of breath. Afebrile.   Oropharyngeal dysphagia-remains on DD2 with thin liquids.Slight improvement in appetite.   Irregular heart rate-new irregular rate and rhythm noted 6/20/2019 after he fell. Nurse reported his rate was in the 30s, then quickly came up to the 60-70s. No LOC or syncopal symptoms EKG showed sinus arrhythmia with HR 63, ventricular extrasystole and prolonged QT. Carvedilol was  discontinued. HR: 60-88  Denies feeling dizzy or lightheaded, no palpitations.   Pulmonary hypertension (H)  Chronic diastolic congestive heart failure (H)-edema of extremities has improved, thighs remain edematous with mild weeping. Weight is down 12 lbs from admission.   Non-rheumatic mitral regurgitation  S/P mitral valve clip implantation  CKD (chronic kidney disease) stage 3, GFR 30-59 ml/min (H)  Acute cholecystitis  S/P cholecystectomy  Clostridium difficile diarrhea-has had 1 small formed stool daily for the past 2 days.  Benign essential hypertension-BPs: 126/80, 113/68. 123/75  Spinal stenosis of lumbar region, unspecified whether neurogenic claudication present-chronic back and joint pain. Using Norco with relief, although trying to stay away from narcotics due to side effects. He's noticed some twitching of his UE night, possibly due to Norco.   Grief-his SO  last week. Feels that he's coping well, but this changes his discharge plan, as he no longer wants to move to her [place].   Physical deconditioning-ambulating 245 ft with walker and supervision. This is improved from 125 ft last week. Independent with toileting. Requires stand by to min assist with bathing and dressing.     Past Medical and Surgical History reviewed in Epic today.    MEDICATIONS:  Current Outpatient Medications   Medication Sig Dispense Refill     artificial saliva (BIOTENE MT) AERS spray Take 1 spray by mouth every 2 hours as needed for dry mouth       carvedilol (COREG) 6.25 MG tablet Take 6.25 mg by mouth 2 times daily (with meals)       diclofenac (VOLTAREN) 1 % topical gel Place 2 g onto the skin 3 times daily Apply to both ankles       dicyclomine (BENTYL) 10 MG capsule Take 10 mg by mouth 3 times daily       finasteride (PROSCAR) 5 MG tablet TAKE 1 TABLET(5 MG) BY MOUTH DAILY 90 tablet 3     furosemide (LASIX) 20 MG tablet Take 2 tablets (40 mg) by mouth daily 180 tablet 0     gabapentin (NEURONTIN) 300 MG capsule TAKE 1  CAPSULE BY MOUTH THREE TIMES DAILY 270 capsule 0     hydrALAZINE (APRESOLINE) 25 MG tablet Take 1 tablet (25 mg) by mouth 3 times daily 270 tablet 1     HYDROcodone-acetaminophen (NORCO) 7.5-325 MG per tablet Take 1 tablet by mouth every 6 hours as needed for pain maximum 4 tablet(s) per day 15 tablet 0     hypromellose (ARTIFICIAL TEARS) 0.4 % SOLN ophthalmic solution Place 2 drops Into the left eye 4 times daily       isosorbide dinitrate (ISORDIL) 20 MG tablet Take 20 mg by mouth 2 times daily       pantoprazole (PROTONIX) 40 MG EC tablet Take 40 mg by mouth daily       Phenylephrine-Witch Hazel 0.25-50 % GEL Place rectally 4 times daily as needed Insert 1 application rectally as needed for Hemorrhoids       potassium chloride ER (K-DUR/KLOR-CON M) 10 MEQ CR tablet Take 10 mEq by mouth daily       simvastatin (ZOCOR) 20 MG tablet TAKE ONE TABLET BY MOUTH AT BEDTIME 30 tablet 0     Skin Protectants, Misc. (EUCERIN) cream Apply topically as needed for dry skin Apply to LE + Areas of dryness topically as needed for Dryness       tamsulosin (FLOMAX) 0.4 MG capsule TAKE ONE CAPSULE BY MOUTH DAILY 90 capsule 3     vancomycin (FIRVANQ) 50 MG/ML oral solution Take 125 mg by mouth 3 times daily       melatonin 3 MG tablet Take 3 mg by mouth At Bedtime         REVIEW OF SYSTEMS:  4 point ROS including Respiratory, CV, GI and , other than that noted in the HPI,  is negative    Objective:  /84   Pulse 60   Temp 96.1  F (35.6  C)   Resp 18   Wt 69.5 kg (153 lb 3.2 oz)   SpO2 97%   BMI 23.99 kg/m    Exam:  GENERAL APPEARANCE:  Alert, in no distress, frail  ENT:  Mouth and posterior oropharynx normal, moist mucous membranes, [place]  EYES:  EOM normal, conjunctiva and lids normal  NECK:  No adenopathy,masses or thyromegaly  RESP:  respiratory effort and palpation of chest normal, no respiratory distress, lungs clear to ausculation bilaterally   CV:  Palpation and auscultation of heart done, irregular rate and rhythm  with skipped beats,  2-3 /6  murmur, +2 pedal pulses, peripheral edema 1+ upper thighs, no UE edema   ABDOMEN: soft, nontender, no hepatosplenomegaly or other masses  M/S: gait steady with walker. [place] with good strength. No joint inflammation  SKIN:  abdominal incisions healed. Scant amount of weeping from right thigh, no open wounds.  No erythema of LE  PSYCH:  oriented X 3, normal insight, judgement and memory, mood normal     Labs:   Labs done in SNF are in [place] EPIC. Please refer to them using EPIC/Care Everywhere.    ASSESSMENT / PLAN:  (J18.1) Pneumonia of left lower lobe due to infectious organism (H)  (primary encounter diagnosis)  (R13.12) Oropharyngeal dysphagia  Comment: infection appears resolved. No s/s of aspiration   Plan: continue SPEECH THERAPY, advance diet as tolerated.     (I49.9) Irregular heart rate  Comment: rhythm remains irregular, rate has improved off carvedilol. He appears asymptomatic. He had a 24 Holter in 2017 that did not show afib, and he would not be a candidate for anticoagulation given his anemia, thrombocytopenia, overall frailty and falls.  Plan: monitor VS     (I27.20) Pulmonary hypertension (H)  (I50.32) Chronic diastolic congestive heart failure (H)  (I34.0) Non-rheumatic mitral regurgitation  (Z98.890,  Z95.818) S/P mitral valve clip implantation  Comment: volume status improved, still has edema and scant weeping of his thighs.   Plan: increase lasix to 40 mg in the am and 20 mg at lunch for 2 days. Continue hydralazine and Isordil. Daily weight. Elevate legs as much as possible.     (N18.3) CKD (chronic kidney disease) stage 3, GFR 30-59 ml/min (H)  Comment: renal function is at baseline  Plan: follow BMP    (K81.0) Acute cholecystitis  (Z90.49) S/P cholecystectomy  Comment: incisions healed. He saw Dr Romero 6/5/2019  Plan: surgical follow up prn      (A04.72) Clostridium difficile diarrhea  Comment: diarrhea improving. He saw GI 6/25/2019 and dicyclomine was started  for cramping.   Plan: decrease vancomycin to bid starting 7/1/2019. Continue dicyclomine. Follow up GI in 1 month.     (I10) Benign essential hypertension  Comment: hypotension improved with discontinuation of carvedilol.   Plan: continue cardiac meds as above. Monitor VS    (M48.061) Spinal stenosis of lumbar region, unspecified whether neurogenic claudication present  Comment: fair pain control. Intermittent twitching of extremities likely due to Norco  Plan: continue gabapentin, voltaren gel, Norco.     (F43.21) Grief  Comment: appears to be coping well. He has declined meeting with the  or psychologist.   Plan: supportive care     (R53.81) Physical deconditioning  Comment: good progress in therapies over the past week.   Plan: continue PHYSICAL THERAPY/OT.  is involved for discharge planning and has started the process to utilize his VA benefits. Goal is to discharge to [place], but funds are limited.         Electronically signed by  FRANK Mena CNP

## 2019-06-26 NOTE — LETTER
6/26/2019        RE: Ivan Gomez  [address] [facility] [address]        [facility]  [facility] Record Number:  [id number]  [facility] of [facility] where encounter took place[facility] [[id number]]  Chief Complaint   Patient presents with     RECHECK       HPI:    Ivan Gomez  is a 89 year old (10/12/1926), who is being seen today for an episodic care visit.  HPI information obtained from: facility chart records, facility staff, patient report and [facility] chart review.  He came to this facility 5/28/2019 for short term rehab and medical management following hospitalization after presenting to the  [facility] ED in [facility] 5/21/2019  with worsening abdominal pain, nausea and diarrhea. He was found to have acute cholecystitis and was transferred to [facility] for management. He underwent cholecystectomy 5/22/2019 by Dr Romero. Completed a course of flagyl and cipro. He developed post op hypoxia requiring BiPAP. CXR with atelectasis vs infiltrate. No symptoms of infection and WBC normal. Respiratory issues resolved.   He had significant dysphagia and EGD showed esophagitis, thought to be from a pill with local erosion. Treated with carafate and lidocaine with improvement. Discharged on mechanical soft diet. He was positive for C diff and discharged on  vancomycin.       Today's concern is:     Pneumonia of left lower lobe due to infectious organism (H)-levaquin was started 6/20/2019, but it made his diarrhea worse and he stopped taking it after 3 doses. Reports his left sided pain has resolved, no coughing or shortness of breath. Afebrile.   Oropharyngeal dysphagia-remains on DD2 with thin liquids.Slight improvement in appetite.   Irregular heart rate-new irregular rate and rhythm noted 6/20/2019 after he fell. Nurse reported his rate was in the 30s, then quickly came up to the 60-70s. No LOC or syncopal symptoms EKG showed sinus  arrhythmia with HR 63, ventricular extrasystole and prolonged QT. Carvedilol was discontinued. HR: 60-88  Denies feeling dizzy or lightheaded, no palpitations.   Pulmonary hypertension (H)  Chronic diastolic congestive heart failure (H)-edema of extremities has improved, thighs remain edematous with mild weeping. Weight is down 12 lbs from admission.   Non-rheumatic mitral regurgitation  S/P mitral valve clip implantation  CKD (chronic kidney disease) stage 3, GFR 30-59 ml/min (H)  Acute cholecystitis  S/P cholecystectomy  Clostridium difficile diarrhea-has had 1 small formed stool daily for the past 2 days.  Benign essential hypertension-BPs: 126/80, 113/68. 123/75  Spinal stenosis of lumbar region, unspecified whether neurogenic claudication present-chronic back and joint pain. Using Norco with relief, although trying to stay away from narcotics due to side effects. He's noticed some twitching of his UE night, possibly due to Norco.   Grief-his SO  last week. Feels that he's coping well, but this changes his discharge plan, as he no longer wants to move to her AL.   Physical deconditioning-ambulating 245 ft with walker and supervision. This is improved from 125 ft last week. Independent with toileting. Requires stand by to min assist with bathing and dressing.     Past Medical and Surgical History reviewed in Epic today.    MEDICATIONS:  Current Outpatient Medications   Medication Sig Dispense Refill     artificial saliva (BIOTENE MT) AERS spray Take 1 spray by mouth every 2 hours as needed for dry mouth       carvedilol (COREG) 6.25 MG tablet Take 6.25 mg by mouth 2 times daily (with meals)       diclofenac (VOLTAREN) 1 % topical gel Place 2 g onto the skin 3 times daily Apply to both ankles       dicyclomine (BENTYL) 10 MG capsule Take 10 mg by mouth 3 times daily       finasteride (PROSCAR) 5 MG tablet TAKE 1 TABLET(5 MG) BY MOUTH DAILY 90 tablet 3     furosemide (LASIX) 20 MG tablet Take 2 tablets (40 mg)  by mouth daily 180 tablet 0     gabapentin (NEURONTIN) 300 MG capsule TAKE 1 CAPSULE BY MOUTH THREE TIMES DAILY 270 capsule 0     hydrALAZINE (APRESOLINE) 25 MG tablet Take 1 tablet (25 mg) by mouth 3 times daily 270 tablet 1     HYDROcodone-acetaminophen (NORCO) 7.5-325 MG per tablet Take 1 tablet by mouth every 6 hours as needed for pain maximum 4 tablet(s) per day 15 tablet 0     hypromellose (ARTIFICIAL TEARS) 0.4 % SOLN ophthalmic solution Place 2 drops Into the left eye 4 times daily       isosorbide dinitrate (ISORDIL) 20 MG tablet Take 20 mg by mouth 2 times daily       pantoprazole (PROTONIX) 40 MG EC tablet Take 40 mg by mouth daily       Phenylephrine-Witch Hazel 0.25-50 % GEL Place rectally 4 times daily as needed Insert 1 application rectally as needed for Hemorrhoids       potassium chloride ER (K-DUR/KLOR-CON M) 10 MEQ CR tablet Take 10 mEq by mouth daily       simvastatin (ZOCOR) 20 MG tablet TAKE ONE TABLET BY MOUTH AT BEDTIME 30 tablet 0     Skin Protectants, Misc. (EUCERIN) cream Apply topically as needed for dry skin Apply to LE + Areas of dryness topically as needed for Dryness       tamsulosin (FLOMAX) 0.4 MG capsule TAKE ONE CAPSULE BY MOUTH DAILY 90 capsule 3     vancomycin (FIRVANQ) 50 MG/ML oral solution Take 125 mg by mouth 3 times daily       melatonin 3 MG tablet Take 3 mg by mouth At Bedtime         REVIEW OF SYSTEMS:  4 point ROS including Respiratory, CV, GI and , other than that noted in the HPI,  is negative    Objective:  /84   Pulse 60   Temp 96.1  F (35.6  C)   Resp 18   Wt 69.5 kg (153 lb 3.2 oz)   SpO2 97%   BMI 23.99 kg/m     Exam:  GENERAL APPEARANCE:  Alert, in no distress, frail  ENT:  Mouth and posterior oropharynx normal, moist mucous membranes, [place]  EYES:  EOM normal, conjunctiva and lids normal  NECK:  No adenopathy,masses or thyromegaly  RESP:  respiratory effort and palpation of chest normal, no respiratory distress, lungs clear to ausculation  bilaterally   CV:  Palpation and auscultation of heart done, irregular rate and rhythm with skipped beats,  2-3 /6  murmur, +2 pedal pulses, peripheral edema 1+ upper thighs, no UE edema   ABDOMEN: soft, nontender, no hepatosplenomegaly or other masses  M/S: gait steady with walker. [place] with good strength. No joint inflammation  SKIN:  abdominal incisions healed. S cant amount of weeping from right thigh, no open wounds.  No erythema of LE  PSYCH:  oriented X 3, normal insight, judgement and memory, mood normal     Labs:   Labs done in SNF are in [place] EPIC. Please refer to them using EPIC/Care Everywhere.    ASSESSMENT / PLAN:  (J18.1) Pneumonia of left lower lobe due to infectious organism (H)  (primary encounter diagnosis)  (R13.12) Oropharyngeal dysphagia  Comment: infection appears resolved. No s/s of aspiration   Plan: continue SPEECH THERAPY, advance diet as tolerated.     (I49.9) Irregular heart rate  Comment: rhythm remains irregular, rate has improved off carvedilol. He appears asymptomatic. He had a 24 Holter in 2017 that did not show afib, and he would not be a candidate for anticoagulation given his anemia, thrombocytopenia, overall frailty and falls.  Plan: monitor VS     (I27.20) Pulmonary hypertension (H)  (I50.32) Chronic diastolic congestive heart failure (H)  (I34.0) Non-rheumatic mitral regurgitation  (Z98.890,  Z95.818) S/P mitral valve clip implantation  Comment: volume status improved, still has edema and scant weeping of his thighs.   Plan: increase lasix to 40 mg in the am and 20 mg at lunch for 2 days. Continue hydralazine and Isordil. Daily weight. Elevate legs as much as possible.     (N18.3) CKD (chronic kidney disease) stage 3, GFR 30-59 ml/min (H)  Comment: renal function is at baseline  Plan: follow BMP    (K81.0) Acute cholecystitis  (Z90.49) S/P cholecystectomy  Comment: incisions healed. He saw Dr Romero 6/5/2019  Plan: surgical follow up prn      (A04.72) Clostridium difficile  diarrhea  Comment: diarrhea improving. He saw GI 6/25/2019 and dicyclomine was started for cramping.   Plan: decrease vancomycin to bid starting 7/1/2019. Continue dicyclomine. Follow up GI in 1 month.     (I10) Benign essential hypertension  Comment: hypotension improved with discontinuation of carvedilol.   Plan: continue cardiac meds as above. Monitor VS    (M48.061) Spinal stenosis of lumbar region, unspecified whether neurogenic claudication present  Comment: fair pain control. Intermittent twitching of extremities likely due to Norco  Plan: continue gabapentin, voltaren gel, Norco.     (F43.21) Grief  Comment: appears to be coping well. He has declined meeting with the  or psychologist.   Plan: supportive care     (R53.81) Physical deconditioning  Comment: good progress in therapies over the past week.   Plan: continue PHYSICAL THERAPY/OT.  is involved for discharge planning and has started the process to utilize his VA benefits. Goal is to discharge to AL, but funds are limited.         Electronically signed by  FRANK Mena CNP               Sincerely,        FRANK Mena CNP

## 2019-06-27 VITALS
RESPIRATION RATE: 18 BRPM | WEIGHT: 151.2 LBS | TEMPERATURE: 97 F | OXYGEN SATURATION: 95 % | SYSTOLIC BLOOD PRESSURE: 120 MMHG | BODY MASS INDEX: 23.68 KG/M2 | DIASTOLIC BLOOD PRESSURE: 76 MMHG | HEART RATE: 71 BPM

## 2019-06-27 RX ORDER — LANOLIN ALCOHOL/MO/W.PET/CERES
3 CREAM (GRAM) TOPICAL AT BEDTIME
COMMUNITY

## 2019-06-27 NOTE — PROGRESS NOTES
[facility]  [place] Medical Record Number:  [id number]  [facility] of Service where encounter took place:  [facility] [[id number]]  Chief Complaint   Patient presents with     RECHECK       HPI:    Ivan Gomez  is a 89 year old (10/12/1926), who is being seen today for an episodic care visit.  HPI information obtained from: facility chart records, facility staff, patient report and [facility] chart review.   He came to this facility 5/28/2019 for short term rehab and medical management following hospitalization after presenting to the  [facility] ED in [facility] 5/21/2019  with worsening abdominal pain, nausea and diarrhea. He was found to have acute cholecystitis and was transferred to [facility] for management. He underwent cholecystectomy 5/22/2019 by Dr Romero. Completed a course of flagyl and cipro. He developed post op hypoxia requiring BiPAP. CXR with atelectasis vs infiltrate. No symptoms of infection and WBC normal. Respiratory issues resolved.   He had significant dysphagia and EGD showed esophagitis, thought to be from a pill with local erosion. Treated with carafate and lidocaine with improvement. Discharged on mechanical soft diet. He was positive for C diff and discharged on  vancomycin.      Today's concerns are:     Pneumonia of left lower lobe due to infectious organism (H)-denies respiratory symptoms. Afebrile. He quit taking levaquin after 3 doses because it made his diarrhea worse, last dose was 6/23/2019  Oropharyngeal dysphagia-tolerating DD2 with thin liquids. Appetite remains fair.   Irregular heart rate-new irregular rate and rhythm noted 6/20/2019 after he fell. Nurse reported his rate was in the 30s, then quickly came up to the 60-70s. No LOC or syncopal symptoms EKG showed sinus arrhythmia with HR 63, ventricular extrasystole and prolonged QT. Carvedilol was discontinued. HR: 60-71  Denies feeling dizzy or lightheaded, no  "palpitations.   Pulmonary hypertension (H)  Chronic diastolic congestive heart failure (H)-additional lasix 20 mg with lunch given  and 2019 for LE edema. Weight down 2 lbs to 151, down 14 lbs from admission. Continues to have some edema of his thighs with scant weeping.   Non-rheumatic mitral regurgitation  S/P mitral valve clip implantation  CKD (chronic kidney disease) stage 3, GFR 30-59 ml/min (H)  C diff-1 soft stool yesterday and 1 so far today. No cramping or abdominal pain.   Benign essential hypertension-BPs: 114/78, 120/76, 130/84  Spinal stenosis of lumbar region, unspecified whether neurogenic claudication present-using Norco with relief. He thinks he may need to see Vascular Surgery to follow up on surgery for varicose veins done 3 months ago in [place]. He feels that the pain of his lower legs should have resolved with the surgery.   Grief-his SO  last week, reports he's coping \"as well as expected.\"  Physical deconditioning-ambulating 245 ft with walker and supervision. Independent with toileting and requires stand by to min assist with bathing and dressing.     Past Medical and Surgical History reviewed in Epic today.    MEDICATIONS:  Current Outpatient Medications   Medication Sig Dispense Refill     artificial saliva (BIOTENE MT) AERS spray Take 1 spray by mouth every 2 hours as needed for dry mouth       carvedilol (COREG) 6.25 MG tablet Take 6.25 mg by mouth 2 times daily (with meals)       diclofenac (VOLTAREN) 1 % topical gel Place 2 g onto the skin 3 times daily Apply to both ankles       dicyclomine (BENTYL) 10 MG capsule Take 10 mg by mouth 3 times daily       finasteride (PROSCAR) 5 MG tablet TAKE 1 TABLET(5 MG) BY MOUTH DAILY 90 tablet 3     furosemide (LASIX) 20 MG tablet Take 2 tablets (40 mg) by mouth daily 180 tablet 0     gabapentin (NEURONTIN) 300 MG capsule TAKE 1 CAPSULE BY MOUTH THREE TIMES DAILY 270 capsule 0     hydrALAZINE (APRESOLINE) 25 MG tablet Take 1 tablet (25 mg) " by mouth 3 times daily 270 tablet 1     HYDROcodone-acetaminophen (NORCO) 7.5-325 MG per tablet Take 1 tablet by mouth every 6 hours as needed for pain maximum 4 tablet(s) per day 15 tablet 0     hypromellose (ARTIFICIAL TEARS) 0.4 % SOLN ophthalmic solution Place 2 drops Into the left eye 4 times daily       isosorbide dinitrate (ISORDIL) 20 MG tablet Take 20 mg by mouth 2 times daily       melatonin 3 MG tablet Take 3 mg by mouth At Bedtime       pantoprazole (PROTONIX) 40 MG EC tablet Take 40 mg by mouth daily       Phenylephrine-Witch Hazel 0.25-50 % GEL Place rectally 4 times daily as needed Insert 1 application rectally as needed for Hemorrhoids       potassium chloride ER (K-DUR/KLOR-CON M) 10 MEQ CR tablet Take 10 mEq by mouth daily       Skin Protectants, Misc. (EUCERIN) cream Apply topically as needed for dry skin Apply to LE + Areas of dryness topically as needed for Dryness       tamsulosin (FLOMAX) 0.4 MG capsule TAKE ONE CAPSULE BY MOUTH DAILY 90 capsule 3     vancomycin (FIRVANQ) 50 MG/ML oral solution Take 125 mg by mouth 3 times daily       simvastatin (ZOCOR) 20 MG tablet TAKE ONE TABLET BY MOUTH AT BEDTIME 30 tablet 0       REVIEW OF SYSTEMS:  4 point ROS including Respiratory, CV, GI and , other than that noted in the HPI,  is negative    Objective:  /76   Pulse 71   Temp 97  F (36.1  C)   Resp 18   Wt 68.6 kg (151 lb 3.2 oz)   SpO2 95%   BMI 23.68 kg/m    Exam:  GENERAL APPEARANCE:  Alert, in no distress, frail  ENT:  Mouth and posterior oropharynx normal, moist mucous membranes, [place]  EYES:  EOM normal, conjunctiva and lids normal  NECK:  No adenopathy,masses or thyromegaly  RESP:  respiratory effort and palpation of chest normal, no respiratory distress, lungs clear to ausculation bilaterally   CV:  Palpation and auscultation of heart done, irregular rate and rhythm with skipped beats,  2-3 /6  murmur, +2 pedal pulses, peripheral edema 1+ upper thighs,no weeping,  no UE edema    ABDOMEN: soft, nontender, no hepatosplenomegaly or other masses  M/S: gait steady with walker. [place] with good strength. No joint inflammation  SKIN:  abdominal incisions healed. No rashes or open areas  PSYCH:  oriented X 3, normal insight, judgement and memory, mood normal     Labs:   Labs done in SNF are in [place] EPIC. Please refer to them using EPIC/Care Everywhere.    ASSESSMENT / PLAN:  (J18.1) Pneumonia of left lower lobe due to infectious organism (H)  (primary encounter diagnosis)  (R13.12) Oropharyngeal dysphagia  Comment: infection appears resolved. No s/s of aspiration   Plan: continue SPEECH THERAPY, advance diet as tolerated.      (I49.9) Irregular heart rate  Comment: rhythm remains irregular, rate improved off carvedilol. He appears asymptomatic. He had a 24 Holter in 2017 that did not show afib, and he would not be a candidate for anticoagulation given his anemia, thrombocytopenia, overall frailty and falls.  Plan: monitor VS      (I27.20) Pulmonary hypertension (H)  (I50.32) Chronic diastolic congestive heart failure (H)  (I34.0) Non-rheumatic mitral regurgitation  (Z98.890,  Z95.818) S/P mitral valve clip implantation  Comment: volume status improved, still has mild edema and scant weeping of his thighs.   Plan: continue lasix 40 mg daily. Continue hydralazine and Isordil. Daily weight. Elevate legs as much as possible. He requests follow up with Dr Chidi Blackburn, who he last saw in 5/2018-unit coordinator to schedule.      (N18.3) CKD (chronic kidney disease) stage 3, GFR 30-59 ml/min (H)  Comment: renal function is at baseline  Plan: follow BMP      (A04.72) Clostridium difficile diarrhea  Comment: diarrhea improving. He saw GI 6/25/2019 and dicyclomine was started for cramping.   Plan: decrease vancomycin to bid starting 7/1/2019. Continue dicyclomine. Follow up GI in 1 month.      (I10) Benign essential hypertension  Comment: hypotension improved with discontinuation of carvedilol.   Plan:  "continue cardiac meds as above. Monitor VS     (M48.061) Spinal stenosis of lumbar region, unspecified whether neurogenic claudication present  Comment: fair pain control. Difficult to assess if his LE pain is vascular in nature, but he feels that he should have had more benefit from recent \"varicose vein surgeries\" that were done in [place] 3 months ago.   Plan: continue gabapentin, voltaren gel, Norco. Consider referral to Vascular, though not sure what, if any, treatment options are available.      (F43.21) Grief  Comment: appears to be coping well. He has declined meeting with the  or psychologist.   Plan: supportive care      (R53.81) Physical deconditioning  Comment: progressing in therapies.   Plan: continue PHYSICAL THERAPY/OT.  has started the process to utilize his VA benefits. He would like to move to an AL, but funds are limited so is also  considering long term care.           Electronically signed by:  FRANK Mena CNP           "

## 2019-06-28 ENCOUNTER — NURSING HOME VISIT (OUTPATIENT)
Dept: GERIATRICS | Facility: CLINIC | Age: 84
End: 2019-06-28
Payer: MEDICARE

## 2019-06-28 DIAGNOSIS — A04.72 CLOSTRIDIUM DIFFICILE DIARRHEA: ICD-10-CM

## 2019-06-28 DIAGNOSIS — I27.20 PULMONARY HYPERTENSION (H): ICD-10-CM

## 2019-06-28 DIAGNOSIS — I10 BENIGN ESSENTIAL HYPERTENSION: ICD-10-CM

## 2019-06-28 DIAGNOSIS — M48.061 SPINAL STENOSIS OF LUMBAR REGION, UNSPECIFIED WHETHER NEUROGENIC CLAUDICATION PRESENT: ICD-10-CM

## 2019-06-28 DIAGNOSIS — R13.12 OROPHARYNGEAL DYSPHAGIA: ICD-10-CM

## 2019-06-28 DIAGNOSIS — N18.30 CKD (CHRONIC KIDNEY DISEASE) STAGE 3, GFR 30-59 ML/MIN (H): ICD-10-CM

## 2019-06-28 DIAGNOSIS — I49.9 IRREGULAR HEART RATE: ICD-10-CM

## 2019-06-28 DIAGNOSIS — R53.81 PHYSICAL DECONDITIONING: ICD-10-CM

## 2019-06-28 DIAGNOSIS — Z95.818 S/P MITRAL VALVE CLIP IMPLANTATION: ICD-10-CM

## 2019-06-28 DIAGNOSIS — I34.0 NON-RHEUMATIC MITRAL REGURGITATION: ICD-10-CM

## 2019-06-28 DIAGNOSIS — I50.32 CHRONIC DIASTOLIC CONGESTIVE HEART FAILURE (H): ICD-10-CM

## 2019-06-28 DIAGNOSIS — J18.9 PNEUMONIA OF LEFT LOWER LOBE DUE TO INFECTIOUS ORGANISM: Primary | ICD-10-CM

## 2019-06-28 DIAGNOSIS — Z98.890 S/P MITRAL VALVE CLIP IMPLANTATION: ICD-10-CM

## 2019-06-28 DIAGNOSIS — F43.21 GRIEF: ICD-10-CM

## 2019-06-28 PROCEDURE — 99309 SBSQ NF CARE MODERATE MDM 30: CPT | Performed by: NURSE PRACTITIONER

## 2019-06-28 NOTE — LETTER
6/28/2019        RE: Ivan Gomez  [address] [facility] [address]        [facility]  [facility] Record Number:  [id number]  [facility] of [facility] where encounter took place[facility] [[id number]]  Chief Complaint   Patient presents with     RECHECK       HPI:    Ivan Gomez  is a 89 year old (10/12/1926), who is being seen today for an episodic care visit.  HPI information obtained from: facility chart records, facility staff, patient report and [facility] chart review.   He came to this facility 5/28/2019 for short term rehab and medical management following hospitalization after presenting to the  [facility] ED in [facility] 5/21/2019  with worsening abdominal pain, nausea and diarrhea. He was found to have acute cholecystitis and was transferred to [facility] for management. He underwent cholecystectomy 5/22/2019 by Dr Romero. Completed a course of flagyl and cipro. He developed post op hypoxia requiring BiPAP. CXR with atelectasis vs infiltrate. No symptoms of infection and WBC normal. Respiratory issues resolved.   He had significant dysphagia and EGD showed esophagitis, thought to be from a pill with local erosion. Treated with carafate and lidocaine with improvement. Discharged on mechanical soft diet. He was positive for C diff and discharged on  vancomycin.      Today's concerns are:     Pneumonia of left lower lobe due to infectious organism (H)-denies respiratory symptoms. Afebrile. He quit taking levaquin after 3 doses because it made his diarrhea worse, last dose was 6/23/2019  Oropharyngeal dysphagia-tolerating DD2 with thin liquids. Appetite remains fair.   Irregular heart rate-new irregular rate and rhythm noted 6/20/2019 after he fell. Nurse reported his rate was in the 30s, then quickly came up to the 60-70s. No LOC or syncopal symptoms EKG showed sinus arrhythmia with HR 63, ventricular extrasystole and prolonged  "QT. Carvedilol was discontinued. HR:  60-71  Denies feeling dizzy or lightheaded, no palpitations.   Pulmonary hypertension (H)  Chronic diastolic congestive heart failure (H)-additional lasix 20 mg with lunch given  and 2019 for LE edema. Weight down 2 lbs to 151, down 14 lbs from admission. Continues to have some edema of his thighs with scant weeping.   Non-rheumatic mitral regurgitation  S/P mitral valve clip implantation  CKD (chronic kidney disease) stage 3, GFR 30-59 ml/min (H)  C diff-1 soft stool yesterday and 1 so far today. No cramping or abdominal pain.   Benign essential hypertension-BPs: 114/78, 120/76, 130/84  Spinal stenosis of lumbar region, unspecified whether neurogenic claudication present-using Norco with relief. He thinks he may need to see Vascular Surgery to follow up on surgery for varicose veins done 3 months ago in [place]. He feels that the pain of his lower legs should have resolved with the surgery.   Grief-his SO  last week, reports he's coping \"as well as expected.\"  Physical deconditioning-ambulating 245 ft with walker and supervision. Independent with toileting and requires stand by to min assist with bathing and dressing.     Past Medical and Surgical History reviewed in Epic today.    MEDICATIONS:  Current Outpatient Medications   Medication Sig Dispense Refill     artificial saliva (BIOTENE MT) AERS spray Take 1 spray by mouth every 2 hours as needed for dry mouth       carvedilol (COREG) 6.25 MG tablet Take 6.25 mg by mouth 2 times daily (with meals)       diclofenac (VOLTAREN) 1 % topical gel Place 2 g onto the skin 3 times daily Apply to both ankles       dicyclomine (BENTYL) 10 MG capsule Take 10 mg by mouth 3 times daily       finasteride (PROSCAR) 5 MG tablet TAKE 1 TABLET(5 MG) BY MOUTH DAILY 90 tablet 3     furosemide (LASIX) 20 MG tablet Take 2 tablets (40 mg) by mouth daily 180 tablet 0     gabapentin (NEURONTIN) 300 MG capsule TAKE 1 CAPSULE BY MOUTH THREE " TIMES DAILY 270 capsule 0     hydrALAZINE (APRESOLINE) 25 MG tablet Take 1 tablet (25 mg) by mouth 3 times daily 270 tablet 1     HYDROcodone-acetaminophen (NORCO) 7.5-325 MG per tablet Take 1 tablet by mouth every 6 hours as needed for pain maximum 4 tablet(s) per day 15 tablet 0     hypromellose (ARTIFICIAL TEARS) 0.4 % SOLN ophthalmic solution Place 2 drops Into the left eye 4 times daily       isosorbide dinitrate (ISORDIL) 20 MG tablet Take 20 mg by mouth 2 times daily       melatonin 3 MG tablet Take 3 mg by mouth At Bedtime       pantoprazole (PROTONIX) 40 MG EC tablet Take 40 mg by mouth daily       Phenylephrine-Witch Hazel 0.25-50 % GEL Place rectally 4 times daily as needed Insert 1 application rectally as needed for Hemorrhoids       potassium chloride ER (K-DUR/KLOR-CON M) 10 MEQ CR tablet Take 10 mEq by mouth daily       Skin Protectants, Misc. (EUCERIN) cream Apply topically as needed for dry skin Apply to LE + Areas of dryness topically as needed for Dryness       tamsulosin (FLOMAX) 0.4 MG capsule TAKE ONE CAPSULE BY MOUTH DAILY 90 capsule 3     vancomycin (FIRVANQ) 50 MG/ML oral solution Take 125 mg by mouth 3 times daily       simvastatin (ZOCOR) 20 MG tablet TAKE ONE TABLET BY MOUTH AT BEDTIME 30 tablet 0       REVIEW OF SYSTEMS:  4 point ROS including Respiratory, CV, GI and , other than that noted in the HPI,  is negative    Objective:  /76   Pulse 71   Temp 97  F (36.1  C)   Resp 18   Wt 68.6 kg (151 lb 3.2 oz)   SpO2 95%   BMI 23.68 kg/m     Exam:  GENERAL APPEARANCE:  Alert, in no distress, frail  ENT:  Mouth and posterior oropharynx normal, moist mucous membranes, [place]  EYES:  EOM normal, conjunctiva and lids normal  NECK:  No adenopathy,masses or thyromegaly  RESP:  respiratory effort and palpation of chest normal, no respiratory distress, lungs clear to ausculation bilaterally   CV:  Palpation and auscultation of heart done, irregular rate and rhythm with skipped  beats,  2-3 /6  murmur, +2 pedal pulses, peripheral edema 1+ upper thighs,no weeping,  no UE edema   ABDOMEN: soft, nontender, no hepatosplenomegaly or other masses  M/S: gait steady with walker. [place] with good strength. No joint inflammation  SKIN:  abdominal incisions healed. No rashes or open areas  PSYCH:  oriented X 3, normal insight, judgement and memory, mood normal     Labs:   Labs done in SNF are in [place] EPIC. Please refer to them using EPIC/Care Everywhere.    ASSESSMENT / PLAN:  (J18.1) Pneumonia of left lower lobe due to infectious organism (H)  (primary encounter diagnosis)  (R13.12) Oropharyngeal dysphagia  Comment: infection appears resolved. No s/s of aspiration   Plan: continue SPEECH THERAPY, advance diet as tolerated.      (I49.9) Irregular heart rate  Comment: rhythm remains irregular, rate improved off carvedilol. He appears asymptomatic. He had a 24 Holter in 2017 that did not show afib, and he would not be a candidate for anticoagulation given his anemia, thrombocytopenia, overall frailty and falls.  Plan: monitor VS      (I27.20) Pulmonary hypertension (H)  (I50.32) Chronic diastolic congestive heart failure (H)  (I34.0) Non-rheumatic mitral regurgitation  (Z98.890,  Z95.818) S/P mitral valve clip implantation  Comment: volume status improved, still has mild edema and scant weeping of his thighs.   Plan:  continue lasix 40 mg daily. Continue hydralazine and Isordil. Daily weight. Elevate legs as much as possible.  He requests follow up with Dr Chidi Blackburn, who he last saw in 5/2018-unit coordinator to schedule.      (N18.3) CKD (chronic kidney disease) stage 3, GFR 30-59 ml/min (H)  Comment: renal function is at baseline  Plan: follow BMP      (A04.72) Clostridium difficile diarrhea  Comment: diarrhea improving. He saw GI 6/25/2019 and dicyclomine was started for cramping.   Plan: decrease vancomycin to bid starting 7/1/2019. Continue dicyclomine. Follow up GI in 1 month.      (I10)  "Benign essential hypertension  Comment: hypotension improved with discontinuation of carvedilol.   Plan: continue cardiac meds as above. Monitor VS     (M48.061) Spinal stenosis of lumbar region, unspecified whether neurogenic claudication present  Comment: fair pain control. Difficult to assess if his LE pain is vascular in nature, but he feels that he should have had more benefit from recent \"varicose vein surgeries\" that were done in [place] 3 months ago.   Plan: continue gabapentin, voltaren gel, Norco. Consider referral to Vascular, though not sure what, if any, treatment options are available.      (F43.21) Grief  Comment: appears to be coping well. He has declined meeting with the  or psychologist.   Plan: supportive care      (R53.81) Physical deconditioning  Comment: progressing in therapies.   Plan: continue PHYSICAL THERAPY/OT.  has started the process to utilize his VA benefits. He would like to move to an AL, but funds are limited so is also  considering long term care.           Electronically signed by:  FRANK Mena CNP               Sincerely,        FRANK Mena CNP    "

## 2019-07-02 ENCOUNTER — DISCHARGE SUMMARY NURSING HOME (OUTPATIENT)
Dept: GERIATRICS | Facility: CLINIC | Age: 84
End: 2019-07-02
Payer: MEDICARE

## 2019-07-02 ENCOUNTER — HOSPITAL LABORATORY (OUTPATIENT)
Dept: OTHER | Facility: CLINIC | Age: 84
End: 2019-07-02

## 2019-07-02 VITALS
DIASTOLIC BLOOD PRESSURE: 78 MMHG | HEIGHT: 67 IN | BODY MASS INDEX: 23.23 KG/M2 | WEIGHT: 148 LBS | TEMPERATURE: 97.4 F | OXYGEN SATURATION: 94 % | SYSTOLIC BLOOD PRESSURE: 115 MMHG | HEART RATE: 70 BPM | RESPIRATION RATE: 18 BRPM

## 2019-07-02 DIAGNOSIS — M48.061 SPINAL STENOSIS OF LUMBAR REGION, UNSPECIFIED WHETHER NEUROGENIC CLAUDICATION PRESENT: ICD-10-CM

## 2019-07-02 DIAGNOSIS — I10 BENIGN ESSENTIAL HYPERTENSION: ICD-10-CM

## 2019-07-02 DIAGNOSIS — R13.12 OROPHARYNGEAL DYSPHAGIA: ICD-10-CM

## 2019-07-02 DIAGNOSIS — A04.72 CLOSTRIDIUM DIFFICILE DIARRHEA: ICD-10-CM

## 2019-07-02 DIAGNOSIS — D69.6 THROMBOCYTOPENIA (H): ICD-10-CM

## 2019-07-02 DIAGNOSIS — Z95.818 S/P MITRAL VALVE CLIP IMPLANTATION: ICD-10-CM

## 2019-07-02 DIAGNOSIS — I49.9 IRREGULAR HEART RATE: ICD-10-CM

## 2019-07-02 DIAGNOSIS — I50.32 CHRONIC DIASTOLIC CONGESTIVE HEART FAILURE (H): ICD-10-CM

## 2019-07-02 DIAGNOSIS — N18.30 CKD (CHRONIC KIDNEY DISEASE) STAGE 3, GFR 30-59 ML/MIN (H): ICD-10-CM

## 2019-07-02 DIAGNOSIS — D63.8 ANEMIA IN OTHER CHRONIC DISEASES CLASSIFIED ELSEWHERE: ICD-10-CM

## 2019-07-02 DIAGNOSIS — R53.81 PHYSICAL DECONDITIONING: ICD-10-CM

## 2019-07-02 DIAGNOSIS — G89.4 CHRONIC PAIN SYNDROME: ICD-10-CM

## 2019-07-02 DIAGNOSIS — Z98.890 S/P MITRAL VALVE CLIP IMPLANTATION: ICD-10-CM

## 2019-07-02 DIAGNOSIS — C67.4 MALIGNANT NEOPLASM OF POSTERIOR WALL OF URINARY BLADDER (H): ICD-10-CM

## 2019-07-02 DIAGNOSIS — I34.0 NON-RHEUMATIC MITRAL REGURGITATION: ICD-10-CM

## 2019-07-02 DIAGNOSIS — R60.0 BILATERAL LEG EDEMA: ICD-10-CM

## 2019-07-02 DIAGNOSIS — F43.21 GRIEF: ICD-10-CM

## 2019-07-02 DIAGNOSIS — K81.0 ACUTE CHOLECYSTITIS: Primary | ICD-10-CM

## 2019-07-02 DIAGNOSIS — I27.20 PULMONARY HYPERTENSION (H): ICD-10-CM

## 2019-07-02 DIAGNOSIS — Z90.49 S/P CHOLECYSTECTOMY: ICD-10-CM

## 2019-07-02 DIAGNOSIS — J18.9 PNEUMONIA OF LEFT LOWER LOBE DUE TO INFECTIOUS ORGANISM: ICD-10-CM

## 2019-07-02 LAB
ANION GAP SERPL CALCULATED.3IONS-SCNC: 8 MMOL/L (ref 3–14)
BUN SERPL-MCNC: 41 MG/DL (ref 7–30)
CALCIUM SERPL-MCNC: 8.2 MG/DL (ref 8.5–10.1)
CHLORIDE SERPL-SCNC: 106 MMOL/L (ref 94–109)
CO2 SERPL-SCNC: 29 MMOL/L (ref 20–32)
CREAT SERPL-MCNC: 1.37 MG/DL (ref 0.66–1.25)
ERYTHROCYTE [DISTWIDTH] IN BLOOD BY AUTOMATED COUNT: 19.2 % (ref 10–15)
GFR SERPL CREATININE-BSD FRML MDRD: 44 ML/MIN/{1.73_M2}
GLUCOSE SERPL-MCNC: 95 MG/DL (ref 70–99)
HCT VFR BLD AUTO: 32 % (ref 40–53)
HGB BLD-MCNC: 10.2 G/DL (ref 13.3–17.7)
MCH RBC QN AUTO: 30 PG (ref 26.5–33)
MCHC RBC AUTO-ENTMCNC: 31.9 G/DL (ref 31.5–36.5)
MCV RBC AUTO: 94 FL (ref 78–100)
PLATELET # BLD AUTO: 70 10E9/L (ref 150–450)
POTASSIUM SERPL-SCNC: 3.3 MMOL/L (ref 3.4–5.3)
RBC # BLD AUTO: 3.4 10E12/L (ref 4.4–5.9)
SODIUM SERPL-SCNC: 143 MMOL/L (ref 133–144)
WBC # BLD AUTO: 4.6 10E9/L (ref 4–11)

## 2019-07-02 PROCEDURE — 99316 NF DSCHRG MGMT 30 MIN+: CPT | Performed by: NURSE PRACTITIONER

## 2019-07-02 ASSESSMENT — MIFFLIN-ST. JEOR: SCORE: 1279.95

## 2019-07-02 NOTE — PROGRESS NOTES
[facility] DISCHARGE SUMMARY  PATIENT'S NAME: Ivan Gomez  YOB: 1926  MEDICAL RECORD NUMBER:  [id number]  [facility] Service where encounter took place[facility] [[id number]]    PRIMARY CARE PROVIDER AND CLINIC RESPONSIBLE AFTER TRANSFER:   [facility]    [facility] long term care.      Transferring providers: FRANK Mena CNP, Miley Bill MD  Recent Hospitalization/ED:  Hospital  [facility] stay 5/21/2019 to 5/28/2019.  Date of SNF Admission: May / 28 / 2019  Date of SNF (anticipated) Discharge: July / 05 / 2019  Discharged to: [facility]  Cognitive Scores: SLUMS: 21/30 and CPT: 5.1/5.6  DME: Walker    CODE STATUS/ADVANCE DIRECTIVES DISCUSSION:  Full Code   ALLERGIES: Celebrex [celecoxib] and Penicillins    DISCHARGE DIAGNOSIS/NURSING FACILITY COURSE:   He came to this facility 5/28/2019 for short term rehab and medical management following hospitalization after presenting to the  [facility] ED in [facility] 5/21/2019  with worsening abdominal pain, nausea and diarrhea. He was found to have acute cholecystitis and was transferred to [facility] for management. He underwent cholecystectomy 5/22/2019 by Dr Romero. Completed a course of flagyl and cipro. He developed post op hypoxia requiring BiPAP. CXR with atelectasis vs infiltrate. No symptoms of infection and WBC was normal. Respiratory issues resolved.   He had significant dysphagia and EGD showed esophagitis, thought to be from a pill with local erosion. Treated with carafate and lidocaine with improvement. Discharged on mechanical soft diet.Urology consulted for a 3.4 cm bladder mass seen on imaging and recommended follow up with his primary urologist and oncologist.  ECHO 5/21/2019 showed severe mitral regurgitation with clip in place, EF >70%, severe biatrial enlargement, severe pulmonary hypertension,  small pericardial effusion.  He was positive for C diff and vancomycin was started while inpatient.     He has worked with PHYSICAL THERAPY/OT and SPEECH THERAPY with slow, but ultimately good progress. Ambulating 245 ft with walker and supervision. TUG 20 seconds, indicating fair balance. Independent with toileting and dressing. Requires stand by to min assist with bathing.   He has been living in [place] with his nephew and was in [place] visiting his SO when he became acutely ill. Plan was to discharge to the AL where his SO was living. Unfortunately, she  unexpectedly while patient was here and returning to [place] is not an option. His goal is to move to [facility] when he's able to utilize his VA benefits (u  has started this process).   ASSESSMENT / PLAN:  (K81.0) Acute cholecystitis  (primary encounter diagnosis)  (Z90.49) S/P cholecystectomy  Comment: incisions have healed. He saw Dr Romero 2019 and can follow up prn. He's feeling well and ready for discharge, but is apprehensive about moving to long term care.   Plan: discharge to [facility] as planned. Jennifer Cornelius NP and Miley Bill MD to assume primary care.     (A04.72) Clostridium difficile diarrhea  Comment: he's had worsening  diarrhea on 2 attempts to decrease vancomycin to bid. Most recent was yesterday and he was incontinent for liquid stools again this am. He was having 1-2 soft stools daily on vancomycin tid.   He saw GI 2019 and dicyclomine was started for cramping.   Plan: increase vancomycin back to tid and monitor stools. Taper vancomycin as able. Continue dicyclomine. GI recommended follow up in 1 month.     (I50.32) Chronic diastolic congestive heart failure (H)  (I27.20) Pulmonary hypertension (H)  (I34.0) Non-rheumatic mitral regurgitation  (Z98.890,  Z95.818) S/P mitral valve clip implantation  Comment: he had significant volume overload on admission,which has improved. Weight is down 17 lbs, probably  not all fluid loss. Respiratory status much improved and he has weaned off O2. LE edema also improved.  Plan: continue lasix 40 mg daily. Continue hydralazine and Isordil. Daily weight.     (J18.1) Pneumonia of left lower lobe due to infectious organism (H)  (R13.12) Oropharyngeal dysphagia  Comment: CXR was obtained 6/20/2019 due to increased shortness of breath and left rib pain (after falling) and showed a left lower lobe infiltrate. Levaquin was started, but he stopped taking it after 3 doses because it caused increased diarrhea. Respiratory status and left sided pain have improved, no signs of infection. Tolerating DD3 with thin liquids. Oral intake has improved.   Plan: continue current diet. Monitor for s/s of aspiration.     (N18.3) CKD (chronic kidney disease) stage 3, GFR 30-59 ml/min (H)  Comment: renal function is at baseline.   Plan: follow BMP. Avoid nephrotoxins.     (I49.9) Irregular heart rate  Comment: new irregular rate and rhythm noted 6/20/2019 after he fell. Nurse reported his rate was in the 30s, then quickly came up to the 60-70s. No LOC or syncopal symptoms EKG showed sinus arrhythmia with HR 63, ventricular extrasystole and prolonged QT. Carvedilol was discontinued. HR has remained in the 60-70 range and appears asymptomatic.  Further work up deferred as he had a 24 Holter monitor in 2017 that did not show afib and he would not be a candidate for anticoagulation given his anemia, thrombocytopenia, overall frailty and falls.He would like to follow up with his previous cardiologist, Dr Chidi Blackburn, and is on a waiting list.   Plan: monitor VS.       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 No acute issues   Plan: follow up with Urology when he feels up to attending outpatient appts. Continue finasteride and tamsulosin for BPH.     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 No s/s of active bleeding  Plan: follow CBC    (R60.0) Bilateral leg edema  Comment: LE edema has improved, stills has some thigh edema with intermittent weeping. Compression stockings discontinued as they made his LE pain worse.   Plan: lasix as above. Elevate legs     (G89.4) Chronic pain syndrome  (M48.061) Spinal stenosis of lumbar region, unspecified whether neurogenic claudication present  Comment: pain fairly well controlled. He reports having \"varicose vein\" surgery 3 months ago in [place], which did not help with his LE pain. He would like to see a Vascular Surgeon at some point. No claudication symptoms and it's difficult to determine if his pain is vascular in nature. Ankle and foot pain has improved with voltaren gel.   Plan: continue gabapentin, voltaren gel, Norco. Consider referral to Vascular, though not sure any treatment options are available.     (I10) Benign essential hypertension  Comment:  hypotension has improved off carvedilol. Recent BPs: 115/78, 119/74, 120/73  Plan: continue cardiac meds as above     (F43.21) Grief  Comment: coping fairly well. Has no real social support in [place].  He declined meeting with the  or psychologist.   Plan: supportive care.     (R53.81) Physical deconditioning  Comment: progress in therapies as above.   Plan: therapies as needed at long term care facility       Past Medical History:  has a past medical history of Arthritis, Former smoker, Heart disease, Hemorrhoids, Hypercholesteremia, Hypertension, Malignant neoplasm (H), Other chronic pain, and Renal disease. He also has no past medical history of Chronic infection, Complication of anesthesia, Malignant hyperthermia, Numbness and tingling, PONV (postoperative nausea and vomiting), Sleep apnea, or Thrombosis of leg.    Discharge Medications:  Current Outpatient Medications   Medication Sig Dispense Refill     artificial saliva (BIOTENE MT) AERS spray " Take 1 spray by mouth every 2 hours as needed for dry mouth       diclofenac (VOLTAREN) 1 % topical gel Place 2 g onto the skin 3 times daily Apply to both ankles       dicyclomine (BENTYL) 10 MG capsule Take 10 mg by mouth 3 times daily       finasteride (PROSCAR) 5 MG tablet TAKE 1 TABLET(5 MG) BY MOUTH DAILY 90 tablet 3     furosemide (LASIX) 20 MG tablet Take 2 tablets (40 mg) by mouth daily 180 tablet 0     gabapentin (NEURONTIN) 300 MG capsule TAKE 1 CAPSULE BY MOUTH THREE TIMES DAILY 270 capsule 0     hydrALAZINE (APRESOLINE) 25 MG tablet Take 1 tablet (25 mg) by mouth 3 times daily 270 tablet 1     HYDROcodone-acetaminophen (NORCO) 7.5-325 MG per tablet Take 1 tablet by mouth every 6 hours as needed for pain maximum 4 tablet(s) per day 15 tablet 0     hypromellose (ARTIFICIAL TEARS) 0.4 % SOLN ophthalmic solution Place 2 drops Into the left eye 4 times daily       isosorbide dinitrate (ISORDIL) 20 MG tablet Take 20 mg by mouth 2 times daily       melatonin 3 MG tablet Take 3 mg by mouth At Bedtime       pantoprazole (PROTONIX) 40 MG EC tablet Take 40 mg by mouth daily       Phenylephrine-Witch Hazel 0.25-50 % GEL Place rectally 4 times daily as needed Insert 1 application rectally as needed for Hemorrhoids       potassium chloride ER (K-DUR/KLOR-CON M) 10 MEQ CR tablet Take 10 mEq by mouth daily       simvastatin (ZOCOR) 20 MG tablet TAKE ONE TABLET BY MOUTH AT BEDTIME 30 tablet 0     Skin Protectants, Misc. (EUCERIN) cream Apply topically as needed for dry skin Apply to LE + Areas of dryness topically as needed for Dryness       tamsulosin (FLOMAX) 0.4 MG capsule TAKE ONE CAPSULE BY MOUTH DAILY 90 capsule 3     vancomycin (FIRVANQ) 50 MG/ML oral solution Take 125 mg by mouth 3 times daily         Medication Changes/Rationale:     Vancomycin taper attempted and failed, increase back to tid today    Additional lasix 20 mg daily given prn volume overload, last doses were on 6/26 and 6/27/2019    Carvedilol  "discontinued due to bradycardia     Welchol discontinued per patient request as pills were difficult to swallow    Levaquin 6/20-6/23/2019 for pneumonia, stopped due to increased diarrhea    Dicyclomine started per GI    Voltaren gel for ankle and foot pain    Controlled medications sent with patient:   Medication: Norco , 20 tabs given to patient at the time of discharge to take home     ROS:   4 point ROS including Respiratory, CV, GI and , other than that noted in the HPI,  is negative    Physical Exam:   Vitals: /78   Pulse 70   Temp 97.4  F (36.3  C)   Resp 18   Ht 1.702 m (5' 7\")   Wt 67.1 kg (148 lb)   SpO2 94%   BMI 23.18 kg/m    BMI= Body mass index is 23.18 kg/m .  GENERAL APPEARANCE:  Alert, in no distress, frail  ENT:  Mouth and posterior oropharynx normal, moist mucous membranes, [place]  EYES:  EOM normal, conjunctiva and lids normal  NECK:  No adenopathy,masses or thyromegaly  RESP:  respiratory effort and palpation of chest normal, no respiratory distress, lungs clear to ausculation bilaterally   CV:  Palpation and auscultation of heart done, irregular rate and rhythm with skipped beats,  2-3 /6  murmur, +2 pedal pulses, peripheral edema 1+ upper thighs,no weeping,  no UE edema   ABDOMEN: soft, nontender, no hepatosplenomegaly or other masses  M/S: gait steady with walker. [place] with good strength. No joint inflammation  SKIN:  abdominal incisions healed. No rashes or open areas  PSYCH:  oriented X 3, normal insight, judgement and memory, mood normal     SNF labs: Labs done in SNF are in [facility] EPIC. Please refer to them using EPIC/Care Everywhere.      DISCHARGE PLAN:    Follow up labs: per PCP    Medical Follow Up:      Establish onsite primary care at long term care facility   Follow up Cardiology when Dr Chidi Blackburn is available   GI recommended follow up in 1 month     MTM referral needed and placed by this provider: No    Discharge Services: No therapy  recommended.         TOTAL " DISCHARGE TIME:   Greater than 30 minutes  Electronically signed by:  FRANK Mena CNP

## 2019-07-02 NOTE — LETTER
7/2/2019        RE: Ivan Gomez  [address] [facility] [address]        [facility] DISCHARGE SUMMARY  PATIENT'S NAME: Ivan Gomez  YOB: 1926  MEDICAL RECORD NUMBER:  [id number]  Place of Service where encounter took place:  [facility] [[id number]]    PRIMARY CARE PROVIDER AND CLINIC RESPONSIBLE AFTER TRANSFER:   [facility]    [facility] long term care.      Transferring providers: FRANK Mena CNP, Miley Bill MD  Recent Hospitalization/ED:  [facility] stay 5/21/2019 to 5/28/2019.  Date of SNF Admission: May / 28 / 2019  Date of SNF (anticipated) Discharge: July / 05 / 2019  Discharged to: [facility]  Cognitive Scores: SLUMS: 21/30 and CPT: 5.1/5.6  DME: Walker    CODE STATUS/ADVANCE DIRECTIVES DISCUSSION:  Full Code   ALLERGIES: Celebrex [celecoxib] and Penicillins    DISCHARGE DIAGNOSIS/NURSING FACILITY COURSE:   He came to this facility 5/28/2019 for short term rehab and medical management following hospitalization after presenting to the  [facility] ED in [facility] 5/21/2019  with worsening abdominal pain, nausea and diarrhea. He was found to have acute cholecystitis and was transferred to [facility] for management. He underwent cholecystectomy 5/22/2019 by Dr Romero. Completed a course of flagyl and cipro. He developed post op hypoxia requiring BiPAP. CXR with atelectasis vs infiltrate. No symptoms of infection and WBC was normal. Respiratory issues resolved.   He had significant dysphagia and EGD showed esophagitis, thought to be from a pill with local erosion. Treated with carafate and lidocaine with improvement. Discharged on mechanical soft diet.Urology consulted for a 3.4 cm bladder mass seen on imaging and recommended follow up with his primary urologist and oncologist.  ECHO 5/21/2019 showed severe mitral regurgitation  with clip in place, EF >70%, severe biatrial enlargement, severe pulmonary hypertension, small pericardial effusion.  He was positive for C diff and vancomycin was started while inpatient.     He has worked with PHYSICAL THERAPY/OT and SPEECH THERAPY with slow, but ultimately good progress. Ambulating 245 ft with walker and supervision. TUG 20 seconds, indicating fair balance. Independent with toileting and dressing. Requires stand by to min assist with bathing.   He has been living in [place] with his nephew and was in [place] visiting his SO when he became acutely ill. Plan was [place]. Unfortunately, she  unexpectedly while patient was here and returning to [place] is not an option. His goal is to move to [facility] when he's able to utilize his [employer] benefits (u  has started this process).   ASSESSMENT / PLAN:  (K81.0) Acute cholecystitis  (primary encounter diagnosis)  (Z90.49) S/P cholecystectomy  Comment: incisions have healed. He saw Dr Romero 2019 and can follow up prn. He's feeling well and ready for discharge, but is apprehensive about moving to long term care.   Plan: discharge to [facility] as planned. Jennifer Cornelius NP and Miley Bill MD to assume primary care.     (A04.72) Clostridium difficile diarrhea  Comment: he's had worsening  diarrhea on 2 attempts to decrease vancomycin to bid. Most recent was yesterday and he was incontinent for liquid stools again this am. He was having 1-2 soft stools daily on vancomycin tid.   He saw GI 2019 and dicyclomine was started for cramping.   Plan: increase vancomycin back to tid and monitor stools. Taper vancomycin as able. Continue dicyclomine. GI recommended follow up in 1 month.     (I50.32) Chronic diastolic congestive heart failure (H)  (I27.20) Pulmonary hypertension (H)  (I34.0) Non-rheumatic mitral regurgitation  (Z98.890,  Z95.818) S/P mitral valve clip implantation  Comment: he had  significant volume overload on admission,which has improved. Weight is down 17 lbs, probably not all fluid loss. Respiratory status much improved and he has weaned off O2. LE edema also improved.  Plan: continue lasix 40 mg daily. Continue hydralazine and Isordil. Daily weight.     (J18.1) Pneumonia of left lower lobe due to infectious organism (H)  (R13.12) Oropharyngeal dysphagia  Comment: CXR was obtained 6/20/2019 due to increased shortness of breath and left rib pain (after falling) and showed a left lower lobe infiltrate. Levaquin was started, but he stopped taking it after 3 doses because it caused increased diarrhea. Respiratory status and left sided pain have improved, no signs of infection. Tolerating DD3 with thin liquids. Oral intake has improved.   Plan: continue current diet. Monitor for s/s of aspiration.     (N18.3) CKD (chronic kidney disease) stage 3, GFR 30-59 ml/min (H)  Comment: renal function is at baseline.   Plan: follow BMP. Avoid nephrotoxins.     (I49.9) Irregular heart rate  Comment: new irregular rate and rhythm noted 6/20/2019 after he fell. Nurse reported his rate was in the 30s, then quickly came up to the 60-70s. No LOC or syncopal symptoms EKG showed sinus arrhythmia with HR 63, ventricular extrasystole and prolonged QT. Carvedilol was discontinued. HR has remained in the 60-70 range and appears asymptomatic.  Further work up deferred as he had a 24 Holter monitor in 2017 that did not show afib and he would not be a candidate for anticoagulation given his anemia, thrombocytopenia, overall frailty and falls.He would like to follow up with his previous cardiologist, Dr Chidi Blackburn, and is on a waiting list.   Plan: monitor VS.       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 No acute issues   Plan: follow up with Urology when he feels up to attending outpatient appts. Continue  "finasteride and tamsulosin for BPH.     (D69.6) Thrombocytopenia (H)  (D63.8) Anemia in other chronic diseases classified elsewhere  Comment: blood counts stable. No s/s of active bleeding  Plan: follow CBC    (R60.0) Bilateral leg edema  Comment: LE edema has improved, stills has some thigh edema with intermittent weeping. Compression stockings discontinued as they made his LE pain worse.   Plan: lasix as above. Elevate legs     (G89.4) Chronic pain syndrome  (M48.061) Spinal stenosis of lumbar region, unspecified whether neurogenic claudication present  Comment: pain fairly well controlled. He reports having \"varicose vein\" surgery 3 months ago in [place], which did not help with his LE pain. He would like to see a Vascular Surgeon at some point. No claudication symptoms and it's difficult to determine if his pain is vascular in nature. Ankle and foot pain has improved with voltaren gel.   Plan: continue gabapentin, voltaren gel, Norco. Consider referral to Vascular, though not sure any treatment options are available.     (I10) Benign essential hypertension  Comment:  hypotension has improved off carvedilol. Recent BPs: 115/78, 119/74, 120/73  Plan: continue cardiac meds as above     (F43.21) Grief  Comment: coping fairly well. Has no real social support in [place].  He declined meeting with the  or psychologist.   Plan: supportive care.     (R53.81) Physical deconditioning  Comment: progress in therapies as above.   Plan: therapies as needed at long term care facility       Past Medical History:  has a past medical history of Arthritis, Former smoker, Heart disease, Hemorrhoids, Hypercholesteremia, Hypertension, Malignant neoplasm (H), Other chronic pain, and Renal disease. He also has no past medical history of Chronic infection, Complication of anesthesia, Malignant hyperthermia, Numbness and tingling, PONV (postoperative nausea and vomiting), Sleep apnea, or Thrombosis of leg.    Discharge Medications:  Current " Outpatient Medications   Medication Sig Dispense Refill     artificial saliva (BIOTENE MT) AERS spray Take 1 spray by mouth every 2 hours as needed for dry mouth       diclofenac (VOLTAREN) 1 % topical gel Place 2 g onto the skin 3 times daily Apply to both ankles       dicyclomine (BENTYL) 10 MG capsule Take 10 mg by mouth 3 times daily       finasteride (PROSCAR) 5 MG tablet TAKE 1 TABLET(5 MG) BY MOUTH DAILY 90 tablet 3     furosemide (LASIX) 20 MG tablet Take 2 tablets (40 mg) by mouth daily 180 tablet 0     gabapentin (NEURONTIN) 300 MG capsule TAKE 1 CAPSULE BY MOUTH THREE TIMES DAILY 270 capsule 0     hydrALAZINE (APRESOLINE) 25 MG tablet Take 1 tablet (25 mg) by mouth 3 times daily 270 tablet 1     HYDROcodone-acetaminophen (NORCO) 7.5-325 MG per tablet Take 1 tablet by mouth every 6 hours as needed for pain maximum 4 tablet(s) per day 15 tablet 0     hypromellose (ARTIFICIAL TEARS) 0.4 % SOLN ophthalmic solution Place 2 drops Into the left eye 4 times daily       isosorbide dinitrate (ISORDIL) 20 MG tablet Take 20 mg by mouth 2 times daily       melatonin 3 MG tablet Take 3 mg by mouth At Bedtime       pantoprazole (PROTONIX) 40 MG EC tablet Take 40 mg by mouth daily       Phenylephrine-[place]el 0.25-50 % GEL Place rectally 4 times daily as needed Insert 1 application rectally as needed for Hemorrhoids       potassium chloride ER (K-DUR/KLOR-CON M) 10 MEQ CR tablet Take 10 mEq by mouth daily       simvastatin (ZOCOR) 20 MG tablet TAKE ONE TABLET BY MOUTH AT BEDTIME 30 tablet 0     Skin Protectants, Misc. (EUCERIN) cream Apply topically as needed for dry skin Apply to LE + Areas of dryness topically as needed for Dryness       tamsulosin (FLOMAX) 0.4 MG capsule TAKE ONE CAPSULE BY MOUTH DAILY 90 capsule 3     vancomycin (FIRVANQ) 50 MG/ML oral solution Take 125 mg by mouth 3 times daily         Medication Changes/Rationale:     Vancomycin taper attempted and failed, increase back to tid  "today    Additional lasix 20 mg daily given prn volume overload, last doses were on 6/26 and 6/27/2019    Carvedilol discontinued due to bradycardia     Welchol discontinued per patient request as pills were difficult to swallow    Levaquin 6/20-6/23/2019 for pneumonia, stopped due to increased diarrhea    Dicyclomine started per GI    Voltaren gel for ankle and foot pain    Controlled medications sent with patient:   Medication: Norco , 20 tabs given to patient at the time of discharge to take home     ROS:   4 point ROS including Respiratory, CV, GI and , other than that noted in the HPI,  is negative    Physical Exam:   Vitals: /78   Pulse 70   Temp 97.4  F (36.3  C)   Resp 18   Ht 1.702 m (5' 7\")   Wt 67.1 kg (148 lb)   SpO2 94%   BMI 23.18 kg/m     BMI= Body mass index is 23.18 kg/m .  GENERAL APPEARANCE:  Alert, in no distress, frail  ENT:  Mouth and posterior oropharynx normal, moist mucous membranes, [place]  EYES:  EOM normal, conjunctiva and lids normal  NECK:  No adenopathy,masses or thyromegaly  RESP:  respiratory effort and palpation of chest normal, no respiratory distress, lungs clear to ausculation bilaterally   CV:  Palpation and auscultation of heart done, irregular rate and rhythm with skipped beats,  2-3 /6  murmur, +2 pedal pulses, peripheral edema 1+ upper thighs,no weeping,  no UE edema   ABDOMEN: soft, nontender, no hepatosplenomegaly or other masses  M/S: gait steady with walker. [place] with good strength. No joint inflammation  SKIN:  abdominal incisions healed. No rashes or open areas  PSYCH:  oriented X 3, normal insight, judgement and memory, mood normal     SNF labs: Labs done in SNF are in [facility] EPIC. Please refer to them using EPIC/Care Everywhere.      DISCHARGE PLAN:    Follow up labs: per PCP    Medical Follow Up:      Establish onsite primary care at long term care facility   Follow up Cardiology when Dr Chidi Blackburn is available   GI recommended follow up in 1 " month     MTM referral needed and placed by this provider: No    Discharge Services: No therapy  recommended.         TOTAL DISCHARGE TIME:   Greater than 30 minutes  Electronically signed by:  FRANK Mena CNP                     Sincerely,        FRANK Mena CNP

## 2019-07-04 ENCOUNTER — APPOINTMENT (OUTPATIENT)
Dept: CT IMAGING | Facility: CLINIC | Age: 84
DRG: 291 | End: 2019-07-04
Attending: EMERGENCY MEDICINE
Payer: MEDICARE

## 2019-07-04 ENCOUNTER — HOSPITAL ENCOUNTER (INPATIENT)
Facility: CLINIC | Age: 84
LOS: 9 days | Discharge: NURSING, REHAB OR LONG-TERM CARE | DRG: 291 | End: 2019-07-13
Attending: EMERGENCY MEDICINE | Admitting: INTERNAL MEDICINE
Payer: MEDICARE

## 2019-07-04 DIAGNOSIS — J18.9 PNEUMONIA DUE TO INFECTIOUS ORGANISM, UNSPECIFIED LATERALITY, UNSPECIFIED PART OF LUNG: ICD-10-CM

## 2019-07-04 DIAGNOSIS — I50.9 CONGESTIVE HEART FAILURE, UNSPECIFIED HF CHRONICITY, UNSPECIFIED HEART FAILURE TYPE (H): ICD-10-CM

## 2019-07-04 DIAGNOSIS — D50.9 IRON DEFICIENCY ANEMIA, UNSPECIFIED IRON DEFICIENCY ANEMIA TYPE: ICD-10-CM

## 2019-07-04 DIAGNOSIS — I50.9 CHF EXACERBATION (H): Primary | ICD-10-CM

## 2019-07-04 DIAGNOSIS — N18.30 CKD (CHRONIC KIDNEY DISEASE) STAGE 3, GFR 30-59 ML/MIN (H): ICD-10-CM

## 2019-07-04 DIAGNOSIS — I50.82 BIVENTRICULAR CONGESTIVE HEART FAILURE (H): ICD-10-CM

## 2019-07-04 DIAGNOSIS — M48.061 SPINAL STENOSIS OF LUMBAR REGION, UNSPECIFIED WHETHER NEUROGENIC CLAUDICATION PRESENT: ICD-10-CM

## 2019-07-04 DIAGNOSIS — I48.91 ATRIAL FIBRILLATION WITH RVR (H): ICD-10-CM

## 2019-07-04 LAB
ALBUMIN SERPL-MCNC: 3.4 G/DL (ref 3.4–5)
ALBUMIN UR-MCNC: 30 MG/DL
ALP SERPL-CCNC: 126 U/L (ref 40–150)
ALT SERPL W P-5'-P-CCNC: 20 U/L (ref 0–70)
ANION GAP SERPL CALCULATED.3IONS-SCNC: 5 MMOL/L (ref 3–14)
APPEARANCE UR: CLEAR
AST SERPL W P-5'-P-CCNC: 12 U/L (ref 0–45)
BASOPHILS # BLD AUTO: 0 10E9/L (ref 0–0.2)
BASOPHILS NFR BLD AUTO: 0.3 %
BILIRUB SERPL-MCNC: 1.1 MG/DL (ref 0.2–1.3)
BILIRUB UR QL STRIP: NEGATIVE
BUN SERPL-MCNC: 41 MG/DL (ref 7–30)
CALCIUM SERPL-MCNC: 8.3 MG/DL (ref 8.5–10.1)
CHLORIDE SERPL-SCNC: 105 MMOL/L (ref 94–109)
CO2 SERPL-SCNC: 30 MMOL/L (ref 20–32)
COLOR UR AUTO: YELLOW
CREAT SERPL-MCNC: 1.43 MG/DL (ref 0.66–1.25)
DIFFERENTIAL METHOD BLD: ABNORMAL
EOSINOPHIL # BLD AUTO: 0.1 10E9/L (ref 0–0.7)
EOSINOPHIL NFR BLD AUTO: 1.8 %
ERYTHROCYTE [DISTWIDTH] IN BLOOD BY AUTOMATED COUNT: 19 % (ref 10–15)
GFR SERPL CREATININE-BSD FRML MDRD: 42 ML/MIN/{1.73_M2}
GLUCOSE SERPL-MCNC: 108 MG/DL (ref 70–99)
GLUCOSE UR STRIP-MCNC: NEGATIVE MG/DL
HCT VFR BLD AUTO: 37.2 % (ref 40–53)
HGB BLD-MCNC: 11.9 G/DL (ref 13.3–17.7)
HGB UR QL STRIP: ABNORMAL
IMM GRANULOCYTES # BLD: 0 10E9/L (ref 0–0.4)
IMM GRANULOCYTES NFR BLD: 0.3 %
INR PPP: 1.26 (ref 0.86–1.14)
INTERPRETATION ECG - MUSE: NORMAL
INTERPRETATION ECG - MUSE: NORMAL
KETONES UR STRIP-MCNC: NEGATIVE MG/DL
LEUKOCYTE ESTERASE UR QL STRIP: NEGATIVE
LIPASE SERPL-CCNC: 93 U/L (ref 73–393)
LYMPHOCYTES # BLD AUTO: 0.8 10E9/L (ref 0.8–5.3)
LYMPHOCYTES NFR BLD AUTO: 10 %
MCH RBC QN AUTO: 30.3 PG (ref 26.5–33)
MCHC RBC AUTO-ENTMCNC: 32 G/DL (ref 31.5–36.5)
MCV RBC AUTO: 95 FL (ref 78–100)
MONOCYTES # BLD AUTO: 0.4 10E9/L (ref 0–1.3)
MONOCYTES NFR BLD AUTO: 5.7 %
MUCOUS THREADS #/AREA URNS LPF: PRESENT /LPF
NEUTROPHILS # BLD AUTO: 6.2 10E9/L (ref 1.6–8.3)
NEUTROPHILS NFR BLD AUTO: 81.9 %
NITRATE UR QL: NEGATIVE
NRBC # BLD AUTO: 0 10*3/UL
NRBC BLD AUTO-RTO: 0 /100
NT-PROBNP SERPL-MCNC: ABNORMAL PG/ML (ref 0–1800)
PH UR STRIP: 5.5 PH (ref 5–7)
PLATELET # BLD AUTO: 90 10E9/L (ref 150–450)
POTASSIUM SERPL-SCNC: 3.8 MMOL/L (ref 3.4–5.3)
PROCALCITONIN SERPL-MCNC: 0.09 NG/ML
PROT SERPL-MCNC: 7.6 G/DL (ref 6.8–8.8)
RBC # BLD AUTO: 3.93 10E12/L (ref 4.4–5.9)
RBC #/AREA URNS AUTO: 21 /HPF (ref 0–2)
SODIUM SERPL-SCNC: 140 MMOL/L (ref 133–144)
SOURCE: ABNORMAL
SP GR UR STRIP: 1.01 (ref 1–1.03)
TROPONIN I SERPL-MCNC: 0.02 UG/L (ref 0–0.04)
TROPONIN I SERPL-MCNC: <0.015 UG/L (ref 0–0.04)
UROBILINOGEN UR STRIP-MCNC: NORMAL MG/DL (ref 0–2)
WBC # BLD AUTO: 7.6 10E9/L (ref 4–11)
WBC #/AREA URNS AUTO: 12 /HPF (ref 0–5)

## 2019-07-04 PROCEDURE — 36415 COLL VENOUS BLD VENIPUNCTURE: CPT | Performed by: INTERNAL MEDICINE

## 2019-07-04 PROCEDURE — 84484 ASSAY OF TROPONIN QUANT: CPT | Performed by: EMERGENCY MEDICINE

## 2019-07-04 PROCEDURE — 21000001 ZZH R&B HEART CARE

## 2019-07-04 PROCEDURE — 87186 SC STD MICRODIL/AGAR DIL: CPT | Performed by: EMERGENCY MEDICINE

## 2019-07-04 PROCEDURE — 25000132 ZZH RX MED GY IP 250 OP 250 PS 637: Mod: GY | Performed by: INTERNAL MEDICINE

## 2019-07-04 PROCEDURE — 96365 THER/PROPH/DIAG IV INF INIT: CPT

## 2019-07-04 PROCEDURE — 93005 ELECTROCARDIOGRAM TRACING: CPT | Mod: 76

## 2019-07-04 PROCEDURE — 25000128 H RX IP 250 OP 636: Performed by: EMERGENCY MEDICINE

## 2019-07-04 PROCEDURE — 85610 PROTHROMBIN TIME: CPT | Performed by: EMERGENCY MEDICINE

## 2019-07-04 PROCEDURE — 25800030 ZZH RX IP 258 OP 636: Performed by: EMERGENCY MEDICINE

## 2019-07-04 PROCEDURE — 87086 URINE CULTURE/COLONY COUNT: CPT | Performed by: EMERGENCY MEDICINE

## 2019-07-04 PROCEDURE — 85025 COMPLETE CBC W/AUTO DIFF WBC: CPT | Performed by: EMERGENCY MEDICINE

## 2019-07-04 PROCEDURE — 71260 CT THORAX DX C+: CPT

## 2019-07-04 PROCEDURE — 87088 URINE BACTERIA CULTURE: CPT | Performed by: EMERGENCY MEDICINE

## 2019-07-04 PROCEDURE — 25000132 ZZH RX MED GY IP 250 OP 250 PS 637: Mod: GY | Performed by: EMERGENCY MEDICINE

## 2019-07-04 PROCEDURE — 99285 EMERGENCY DEPT VISIT HI MDM: CPT | Mod: 25

## 2019-07-04 PROCEDURE — 96366 THER/PROPH/DIAG IV INF ADDON: CPT

## 2019-07-04 PROCEDURE — 99223 1ST HOSP IP/OBS HIGH 75: CPT | Mod: AI | Performed by: INTERNAL MEDICINE

## 2019-07-04 PROCEDURE — 81001 URINALYSIS AUTO W/SCOPE: CPT | Performed by: EMERGENCY MEDICINE

## 2019-07-04 PROCEDURE — 84484 ASSAY OF TROPONIN QUANT: CPT | Performed by: INTERNAL MEDICINE

## 2019-07-04 PROCEDURE — 96376 TX/PRO/DX INJ SAME DRUG ADON: CPT

## 2019-07-04 PROCEDURE — 74177 CT ABD & PELVIS W/CONTRAST: CPT

## 2019-07-04 PROCEDURE — 96368 THER/DIAG CONCURRENT INF: CPT

## 2019-07-04 PROCEDURE — 25000125 ZZHC RX 250: Performed by: EMERGENCY MEDICINE

## 2019-07-04 PROCEDURE — 84145 PROCALCITONIN (PCT): CPT | Performed by: INTERNAL MEDICINE

## 2019-07-04 PROCEDURE — 93005 ELECTROCARDIOGRAM TRACING: CPT

## 2019-07-04 PROCEDURE — 83880 ASSAY OF NATRIURETIC PEPTIDE: CPT | Performed by: EMERGENCY MEDICINE

## 2019-07-04 PROCEDURE — 87040 BLOOD CULTURE FOR BACTERIA: CPT | Performed by: EMERGENCY MEDICINE

## 2019-07-04 PROCEDURE — 96375 TX/PRO/DX INJ NEW DRUG ADDON: CPT

## 2019-07-04 PROCEDURE — 83690 ASSAY OF LIPASE: CPT | Performed by: EMERGENCY MEDICINE

## 2019-07-04 PROCEDURE — 80053 COMPREHEN METABOLIC PANEL: CPT | Performed by: EMERGENCY MEDICINE

## 2019-07-04 RX ORDER — NALOXONE HYDROCHLORIDE 0.4 MG/ML
.1-.4 INJECTION, SOLUTION INTRAMUSCULAR; INTRAVENOUS; SUBCUTANEOUS
Status: DISCONTINUED | OUTPATIENT
Start: 2019-07-04 | End: 2019-07-13 | Stop reason: HOSPADM

## 2019-07-04 RX ORDER — AMOXICILLIN 250 MG
2 CAPSULE ORAL 2 TIMES DAILY PRN
Status: DISCONTINUED | OUTPATIENT
Start: 2019-07-04 | End: 2019-07-13 | Stop reason: HOSPADM

## 2019-07-04 RX ORDER — HYDROCODONE BITARTRATE AND ACETAMINOPHEN 5; 325 MG/1; MG/1
2 TABLET ORAL ONCE
Status: COMPLETED | OUTPATIENT
Start: 2019-07-04 | End: 2019-07-04

## 2019-07-04 RX ORDER — LEVOFLOXACIN 5 MG/ML
250 INJECTION, SOLUTION INTRAVENOUS EVERY 24 HOURS
Status: DISCONTINUED | OUTPATIENT
Start: 2019-07-05 | End: 2019-07-05

## 2019-07-04 RX ORDER — FUROSEMIDE 10 MG/ML
40 INJECTION INTRAMUSCULAR; INTRAVENOUS
Status: DISCONTINUED | OUTPATIENT
Start: 2019-07-05 | End: 2019-07-06

## 2019-07-04 RX ORDER — POTASSIUM CHLORIDE 1.5 G/1.58G
20-40 POWDER, FOR SOLUTION ORAL
Status: DISCONTINUED | OUTPATIENT
Start: 2019-07-04 | End: 2019-07-13 | Stop reason: HOSPADM

## 2019-07-04 RX ORDER — ACETAMINOPHEN 325 MG/1
650 TABLET ORAL EVERY 4 HOURS PRN
Status: DISCONTINUED | OUTPATIENT
Start: 2019-07-04 | End: 2019-07-13 | Stop reason: HOSPADM

## 2019-07-04 RX ORDER — POTASSIUM CHLORIDE 1500 MG/1
20-40 TABLET, EXTENDED RELEASE ORAL
Status: DISCONTINUED | OUTPATIENT
Start: 2019-07-04 | End: 2019-07-13 | Stop reason: HOSPADM

## 2019-07-04 RX ORDER — ONDANSETRON 2 MG/ML
4 INJECTION INTRAMUSCULAR; INTRAVENOUS EVERY 6 HOURS PRN
Status: DISCONTINUED | OUTPATIENT
Start: 2019-07-04 | End: 2019-07-13 | Stop reason: HOSPADM

## 2019-07-04 RX ORDER — POTASSIUM CHLORIDE 29.8 MG/ML
20 INJECTION INTRAVENOUS
Status: DISCONTINUED | OUTPATIENT
Start: 2019-07-04 | End: 2019-07-13 | Stop reason: HOSPADM

## 2019-07-04 RX ORDER — FUROSEMIDE 10 MG/ML
40 INJECTION INTRAMUSCULAR; INTRAVENOUS ONCE
Status: COMPLETED | OUTPATIENT
Start: 2019-07-04 | End: 2019-07-04

## 2019-07-04 RX ORDER — POTASSIUM CL/LIDO/0.9 % NACL 10MEQ/0.1L
10 INTRAVENOUS SOLUTION, PIGGYBACK (ML) INTRAVENOUS
Status: DISCONTINUED | OUTPATIENT
Start: 2019-07-04 | End: 2019-07-13 | Stop reason: HOSPADM

## 2019-07-04 RX ORDER — POTASSIUM CHLORIDE 7.45 MG/ML
10 INJECTION INTRAVENOUS
Status: DISCONTINUED | OUTPATIENT
Start: 2019-07-04 | End: 2019-07-13 | Stop reason: HOSPADM

## 2019-07-04 RX ORDER — IOPAMIDOL 755 MG/ML
73 INJECTION, SOLUTION INTRAVASCULAR ONCE
Status: COMPLETED | OUTPATIENT
Start: 2019-07-04 | End: 2019-07-04

## 2019-07-04 RX ORDER — LEVOFLOXACIN 5 MG/ML
500 INJECTION, SOLUTION INTRAVENOUS ONCE
Status: COMPLETED | OUTPATIENT
Start: 2019-07-04 | End: 2019-07-04

## 2019-07-04 RX ORDER — METOPROLOL TARTRATE 1 MG/ML
2.5 INJECTION, SOLUTION INTRAVENOUS EVERY 4 HOURS PRN
Status: DISCONTINUED | OUTPATIENT
Start: 2019-07-04 | End: 2019-07-13 | Stop reason: HOSPADM

## 2019-07-04 RX ORDER — AMOXICILLIN 250 MG
1 CAPSULE ORAL 2 TIMES DAILY PRN
Status: DISCONTINUED | OUTPATIENT
Start: 2019-07-04 | End: 2019-07-13 | Stop reason: HOSPADM

## 2019-07-04 RX ORDER — ONDANSETRON 4 MG/1
4 TABLET, ORALLY DISINTEGRATING ORAL EVERY 6 HOURS PRN
Status: DISCONTINUED | OUTPATIENT
Start: 2019-07-04 | End: 2019-07-13 | Stop reason: HOSPADM

## 2019-07-04 RX ORDER — METOPROLOL TARTRATE 25 MG/1
25 TABLET, FILM COATED ORAL 2 TIMES DAILY
Status: DISCONTINUED | OUTPATIENT
Start: 2019-07-04 | End: 2019-07-13 | Stop reason: HOSPADM

## 2019-07-04 RX ORDER — DILTIAZEM HYDROCHLORIDE 5 MG/ML
20 INJECTION INTRAVENOUS ONCE
Status: COMPLETED | OUTPATIENT
Start: 2019-07-04 | End: 2019-07-04

## 2019-07-04 RX ADMIN — DILTIAZEM HYDROCHLORIDE 20 MG: 5 INJECTION INTRAVENOUS at 16:29

## 2019-07-04 RX ADMIN — FUROSEMIDE 40 MG: 10 INJECTION, SOLUTION INTRAVENOUS at 19:35

## 2019-07-04 RX ADMIN — HYDROCODONE BITARTRATE AND ACETAMINOPHEN 1 TABLET: 5; 325 TABLET ORAL at 19:16

## 2019-07-04 RX ADMIN — LEVOFLOXACIN 500 MG: 5 INJECTION, SOLUTION INTRAVENOUS at 19:42

## 2019-07-04 RX ADMIN — DILTIAZEM HYDROCHLORIDE 2.5 MG/HR: 5 INJECTION INTRAVENOUS at 18:27

## 2019-07-04 RX ADMIN — IOPAMIDOL 73 ML: 755 INJECTION, SOLUTION INTRAVENOUS at 18:18

## 2019-07-04 RX ADMIN — SODIUM CHLORIDE 62 ML: 9 INJECTION, SOLUTION INTRAVENOUS at 18:18

## 2019-07-04 RX ADMIN — METOPROLOL TARTRATE 25 MG: 25 TABLET ORAL at 22:47

## 2019-07-04 ASSESSMENT — ENCOUNTER SYMPTOMS
SHORTNESS OF BREATH: 1
ABDOMINAL PAIN: 1

## 2019-07-04 ASSESSMENT — MIFFLIN-ST. JEOR: SCORE: 1282.22

## 2019-07-04 NOTE — ED NOTES
Bed: ED12  Expected date: 7/4/19  Expected time: 3:45 PM  Means of arrival: Ambulance  Comments:  Rizwana 89m SOB  ETA now

## 2019-07-04 NOTE — ED TRIAGE NOTES
[facility] saw pt SOB; 82% Room and blue of color needs 10L mask to bring O2 up

## 2019-07-04 NOTE — ED PROVIDER NOTES
"  History     Chief Complaint:  Shortness of Breath    HPI   Ivan Gomez is a 89 year old male with history of malignant neoplasm of bladder and recent laparoscopic cholecystectomy last month on 5/22 done by Dr. Romero who presents with shortness of breath. The patient reports earlier today after using the bathroom, he went to sit down in a chair in his room and was shivering. He reports that a nurse who was already in the room noticed that the patient's face was turning blue, so the nurse wrapped him in a warm blanket. Here, the patient endorses that he feels like he is breathing harder today and he is complains of some mild epigastric tenderness he has been having for some time, it is not new today.    Oxygen Saturation  82% at room air    Allergies:  Celebrex  Penicillins     Medications:    Diclofenac  Proscar  Lasix  Gabapentin  Hydralazine  Hydrocodone-acetaminophen  Protonix  Klor-con  Simvastatin  Flomax  Vancomycin  Coreg  Isordil  81 mg Aspirin  Metoprolol  Hypromellose    Past Medical History:    Arthritis  Heart disease  Hemorrhoids  Hypercholesteremia  Hypertension  Malignant neoplasm  Chronic pain  Renal disease    Past Surgical History:    Back surgery  Laparoscopic cholecystectomy  Lumbar and cervical surgery  Knee surgery  Percutaneous mitral valve repair  PICC insertion    Family History:    Cancer    Social History:  Smoking status: Former smoker  Alcohol use: No  Drug use: No  PCP: Evraisto Magaña  Presents to the ED with himself  Marital Status:  Single     Review of Systems   Respiratory: Positive for shortness of breath.    Gastrointestinal: Positive for abdominal pain (soreness).   All other systems reviewed and are negative.      Physical Exam     Patient Vitals for the past 24 hrs:   BP Temp Temp src Heart Rate Resp SpO2 Height Weight   07/04/19 1557 -- -- -- -- (!) 35 -- -- --   07/04/19 1552 (!) 137/92 98.5  F (36.9  C) Oral 127 22 97 % 1.727 m (5' 8\") 65.8 kg (145 lb) "       Physical Exam  Constitutional: Elderly white male. Sitting.No respiratory distress.  HENT: No signs of trauma.   Eyes: EOM are normal. Pupils are equal, round, and reactive to light.   Neck: Normal range of motion. No JVD present. No cervical adenopathy.  Cardiovascular: Irregularly irregular and rapid.  Exam reveals no gallop and no friction rub.    No murmur heard.  Pulmonary/Chest: Rales. Expiratory wheezing. Bilateral breath sounds normal. No rhonchi.  Abdominal: Distended. Mild epigastric tenderness. No mass appreciated. 1+ femoral pulse. Soft. No rebound or guarding.   Musculoskeletal: Trace edema bilateral lower extremities. No tenderness.   Lymphadenopathy: No lymphadenopathy.   Neurological: Alert and oriented to person, place, and time. Normal strength. Coordination normal.   Skin: Skin is warm and dry. No rash noted. No erythema.     Emergency Department Course   ECG #1 (15:56:32):  Rate 131 bpm. NV interval *. QRS duration 94. QT/QTc 328/484. P-R-T axes * 93 61. Atrial fibrillation with rapid ventricular response. Lateral infarct, age undetermined. Interpreted at 1609 by Dion Larose MD.    ECG #2 (20:06:52):  Rate 86 bpm. NV interval 174. QRS duration 108. QT/QTc 396/473. P-R-T axes 84 30 62. Normal sinus rhythm with sinus arrhythmia. Incomplete right bundle branch block. Repeat EKG. Interpreted at 2021 by Dion Larose MD.    Imaging:  Radiographic findings were communicated with the patient who voiced understanding of the findings.    CT Chest (PE) Abdomen Pelvis w Contrast  1. No evidence for pulmonary embolism.  2. Patchy airspace and groundglass opacities in both mid and lower  lungs are suspicious for pneumonia.  3. Small bilateral pleural effusions, larger on the right.  4. Smooth interstitial thickening in both lungs suggests pulmonary  edema.  5. Cardiac enlargement.  6. Small amount of ascites.  7. Multiple bladder wall masses are noted, with the largest on the left  measuring 3.5 cm. Findings are suspicious for neoplasm, such as  transitional cell carcinoma.  8. Non obstructing 0.5 cm stone in the lower pole of the left kidney   9. Heterogeneous enhancement of the liver is a nonspecific finding.  As read by Radiology.    Laboratory:  CBC: WBC 7.6, HGB 11.9, PLT 90  INR: 1.26  CMP: Glucose 1087, Bun 41, Creatinine 1.43, GFR 42, Calcium 8.3.  Troponin I (1559): <0.015  Lipase: 93  BNP: 17,137    UA with Microscopic to reflex w Contrast: Small blood, protein albumin 30, WBC 12, RBC 21, mucous present.  Urine Culture Aerobic Bacterial: In process    Interventions:  1629: 20 mg Cardizem IV  1827: 2.5 mg/hr Cardizem IV in 125 ml sodium chloride 0.9% infusion  IV lasix 40 mg  IV zosyn 3.375 mg    Emergency Department Course:  Past medical records, nursing notes, and vitals reviewed.  1602: I performed an exam of the patient and obtained history, as documented above.    EKG performed, findings above.    IV inserted and blood drawn.    The patient was sent for a chest CT abdomen pelvis while in the emergency department, findings above.    UA obtained, findings above.    Findings and plan explained to the patient who consents to admission.     1913: Discussed the patient with Dr. Mcmullen, who will admit the patient to a [facility] bed for further monitoring, evaluation, and treatment.     Impression & Plan    Medical Decision Making:  Shahriar Gomez is a 89 year old male who had a laparoscopic cholecystectomy last month. He has had complications including C. difficile. He also has bladder cancer and has had a TURB for that in the past, but does have a residual mass. He was at [facility] when he had a spell where he became cyanotic and hypoxic. Paramedics were called and he was transported on oxygen. On arrival, he complains of some mild epigastric tenderness he has been having for some time, it is not new today. He denies any productive cough, although there had been discussion about a  possible pneumonia. On monitor, he is in atrial fibrillation RVR. Routine labs were obtained. Patient was started on Cardizem bolus and drip to control his rate. In addition, CT was obtained to rule out PE and also to look for any other lung pathology or abdominal pathology. No PE was seen, but there was pulmonary edema and possible pulmonary infiltrate, although the patient was afebrile, antibiotics were started and culture was obtained. He also received Lasix. Patient appears to be back in sinus rhythm. He is on 2 L nasal canula. He is not cyanotic. I discussed patient with a hospitalist who will admit for further evaluation and treatment.    Diagnosis:    ICD-10-CM    1. Atrial fibrillation with RVR (H) I48.91    2. Pneumonia due to infectious organism, unspecified laterality, unspecified part of lung J18.9    3. Congestive heart failure, unspecified HF chronicity, unspecified heart failure type (H) I50.9        Disposition: Admitted to [facility]    I, Chana Urias, am serving as a scribe at 4:02 PM on 7/4/2019 to document services personally performed by Dion Larose MD based on my observations and the provider's statements to me.       Chana Urias  7/4/2019    EMERGENCY DEPARTMENT       Dion Larose MD  07/04/19 6503

## 2019-07-04 NOTE — LETTER
Transition Communication Hand-off for Care Transitions to Next Level of Care Provider    Name: Ivan Gomez  : 10/12/1926  MRN #: [id number]  Primary Care Provider: Evaristo Magaña     Primary Clinic: [address]  [facility] [address]     Reason for Hospitalization:  Atrial fibrillation with RVR (H) [I48.91]  Pneumonia due to infectious organism, unspecified laterality, unspecified part of lung [J18.9]  Congestive heart failure, unspecified HF chronicity, unspecified heart failure type (H) [I50.9]  Admit Date/Time: 2019  3:48 PM  Discharge Date: 2019  Payor Source: Payor: MEDICARE / Plan: MEDICARE / Product Type: Medicare /     Readmission Assessment Measure (HILLARY) Risk Score/category: Elevated    Plan of Care Goals/Milestone Events:        Reason for Communication Hand-off Referral: Admission diagnoses: CHF  Avoidable readmission within 30 days    Discharge Plan: to [facility] and enrolling in [facility]       Concern for non-adherence with plan of care:   Y/N no  Discharge Needs Assessment:  Needs      Most Recent Value   Equipment Currently Used at Home  other (see comments) [4WW]   # of Referrals Placed by CTS  Post Acute Facilities   Skilled Nursing Facility  [facility] [phone], Fax: [phone]          Already enrolled in Tele-monitoring program and name of program:  no  Follow-up specialty is recommended: Yes    Follow-up plan:    Future Appointments   Date Time Provider Department Center   7/15/2019  6:00 AM SH PT [facility]   2019  9:30 AM Italia Carvajal, APRN CNP [facility]         Referrals     Future Labs/Procedures    Follow-Up with [facility]     Occupational Therapy Adult Consult     Comments:    Evaluate and treat as clinically indicated.    Reason:  chf    Physical Therapy Adult Consult     Comments:    Evaluate and treat as clinically indicated.    Reason:  chf    Speech Language Path Adult Consult     Comments:     Evaluate and treat as clinically indicated.    Reason:  dysphagia            Key Recommendations:  Follow up with GI and enroll with [facility].    Princess Burleson    AVS/Discharge Summary is the source of truth; this is a helpful guide for improved communication of patient story

## 2019-07-05 ENCOUNTER — APPOINTMENT (OUTPATIENT)
Dept: PHYSICAL THERAPY | Facility: CLINIC | Age: 84
DRG: 291 | End: 2019-07-05
Attending: INTERNAL MEDICINE
Payer: MEDICARE

## 2019-07-05 ENCOUNTER — APPOINTMENT (OUTPATIENT)
Dept: CARDIOLOGY | Facility: CLINIC | Age: 84
DRG: 291 | End: 2019-07-05
Attending: INTERNAL MEDICINE
Payer: MEDICARE

## 2019-07-05 LAB
ANION GAP SERPL CALCULATED.3IONS-SCNC: 6 MMOL/L (ref 3–14)
BUN SERPL-MCNC: 41 MG/DL (ref 7–30)
CALCIUM SERPL-MCNC: 8.4 MG/DL (ref 8.5–10.1)
CHLORIDE SERPL-SCNC: 105 MMOL/L (ref 94–109)
CO2 SERPL-SCNC: 31 MMOL/L (ref 20–32)
CREAT SERPL-MCNC: 1.45 MG/DL (ref 0.66–1.25)
ERYTHROCYTE [DISTWIDTH] IN BLOOD BY AUTOMATED COUNT: 18.7 % (ref 10–15)
GFR SERPL CREATININE-BSD FRML MDRD: 41 ML/MIN/{1.73_M2}
GLUCOSE SERPL-MCNC: 95 MG/DL (ref 70–99)
HCT VFR BLD AUTO: 34.4 % (ref 40–53)
HGB BLD-MCNC: 10.9 G/DL (ref 13.3–17.7)
MCH RBC QN AUTO: 30 PG (ref 26.5–33)
MCHC RBC AUTO-ENTMCNC: 31.7 G/DL (ref 31.5–36.5)
MCV RBC AUTO: 95 FL (ref 78–100)
PLATELET # BLD AUTO: 65 10E9/L (ref 150–450)
POTASSIUM SERPL-SCNC: 3.7 MMOL/L (ref 3.4–5.3)
RBC # BLD AUTO: 3.63 10E12/L (ref 4.4–5.9)
SODIUM SERPL-SCNC: 142 MMOL/L (ref 133–144)
TROPONIN I SERPL-MCNC: <0.015 UG/L (ref 0–0.04)
WBC # BLD AUTO: 5 10E9/L (ref 4–11)

## 2019-07-05 PROCEDURE — 93321 DOPPLER ECHO F-UP/LMTD STD: CPT | Mod: 26 | Performed by: INTERNAL MEDICINE

## 2019-07-05 PROCEDURE — G0463 HOSPITAL OUTPT CLINIC VISIT: HCPCS

## 2019-07-05 PROCEDURE — 80048 BASIC METABOLIC PNL TOTAL CA: CPT | Performed by: INTERNAL MEDICINE

## 2019-07-05 PROCEDURE — 93010 ELECTROCARDIOGRAM REPORT: CPT | Performed by: INTERNAL MEDICINE

## 2019-07-05 PROCEDURE — 25000132 ZZH RX MED GY IP 250 OP 250 PS 637: Mod: GY | Performed by: INTERNAL MEDICINE

## 2019-07-05 PROCEDURE — 21000001 ZZH R&B HEART CARE

## 2019-07-05 PROCEDURE — 97161 PT EVAL LOW COMPLEX 20 MIN: CPT | Mod: GP

## 2019-07-05 PROCEDURE — 25000132 ZZH RX MED GY IP 250 OP 250 PS 637: Mod: GY | Performed by: HOSPITALIST

## 2019-07-05 PROCEDURE — 93005 ELECTROCARDIOGRAM TRACING: CPT

## 2019-07-05 PROCEDURE — 40000901 ZZH STATISTIC WOC PT EDUCATION, 0-15 MIN

## 2019-07-05 PROCEDURE — 25000128 H RX IP 250 OP 636: Performed by: INTERNAL MEDICINE

## 2019-07-05 PROCEDURE — 99222 1ST HOSP IP/OBS MODERATE 55: CPT | Mod: 25 | Performed by: INTERNAL MEDICINE

## 2019-07-05 PROCEDURE — 85027 COMPLETE CBC AUTOMATED: CPT | Performed by: INTERNAL MEDICINE

## 2019-07-05 PROCEDURE — 99207 ZZC CDG-MDM COMPONENT: MEETS MODERATE - UP CODED: CPT | Performed by: HOSPITALIST

## 2019-07-05 PROCEDURE — 40000264 ECHOCARDIOGRAM LIMITED

## 2019-07-05 PROCEDURE — 97530 THERAPEUTIC ACTIVITIES: CPT | Mod: GP

## 2019-07-05 PROCEDURE — 25500064 ZZH RX 255 OP 636: Performed by: INTERNAL MEDICINE

## 2019-07-05 PROCEDURE — 93325 DOPPLER ECHO COLOR FLOW MAPG: CPT | Mod: 26 | Performed by: INTERNAL MEDICINE

## 2019-07-05 PROCEDURE — 84484 ASSAY OF TROPONIN QUANT: CPT | Performed by: INTERNAL MEDICINE

## 2019-07-05 PROCEDURE — 99233 SBSQ HOSP IP/OBS HIGH 50: CPT | Performed by: HOSPITALIST

## 2019-07-05 PROCEDURE — 36415 COLL VENOUS BLD VENIPUNCTURE: CPT | Performed by: INTERNAL MEDICINE

## 2019-07-05 PROCEDURE — 93308 TTE F-UP OR LMTD: CPT | Mod: 26 | Performed by: INTERNAL MEDICINE

## 2019-07-05 RX ORDER — FINASTERIDE 5 MG/1
5 TABLET, FILM COATED ORAL DAILY
Status: DISCONTINUED | OUTPATIENT
Start: 2019-07-05 | End: 2019-07-13 | Stop reason: HOSPADM

## 2019-07-05 RX ORDER — PANTOPRAZOLE SODIUM 40 MG/1
40 TABLET, DELAYED RELEASE ORAL DAILY
Status: DISCONTINUED | OUTPATIENT
Start: 2019-07-05 | End: 2019-07-13 | Stop reason: HOSPADM

## 2019-07-05 RX ORDER — HYDROCODONE BITARTRATE AND ACETAMINOPHEN 7.5; 325 MG/1; MG/1
1 TABLET ORAL EVERY 6 HOURS PRN
Status: DISCONTINUED | OUTPATIENT
Start: 2019-07-05 | End: 2019-07-13 | Stop reason: HOSPADM

## 2019-07-05 RX ORDER — SIMVASTATIN 20 MG
20 TABLET ORAL AT BEDTIME
Status: DISCONTINUED | OUTPATIENT
Start: 2019-07-05 | End: 2019-07-13 | Stop reason: HOSPADM

## 2019-07-05 RX ORDER — LANOLIN ALCOHOL/MO/W.PET/CERES
3 CREAM (GRAM) TOPICAL AT BEDTIME
Status: DISCONTINUED | OUTPATIENT
Start: 2019-07-05 | End: 2019-07-13 | Stop reason: HOSPADM

## 2019-07-05 RX ORDER — TAMSULOSIN HYDROCHLORIDE 0.4 MG/1
0.4 CAPSULE ORAL DAILY
Status: DISCONTINUED | OUTPATIENT
Start: 2019-07-05 | End: 2019-07-13 | Stop reason: HOSPADM

## 2019-07-05 RX ORDER — VANCOMYCIN HYDROCHLORIDE 50 MG/ML
125 KIT ORAL 3 TIMES DAILY
Status: DISCONTINUED | OUTPATIENT
Start: 2019-07-05 | End: 2019-07-13 | Stop reason: HOSPADM

## 2019-07-05 RX ORDER — POTASSIUM CHLORIDE 750 MG/1
10 TABLET, EXTENDED RELEASE ORAL DAILY
Status: DISCONTINUED | OUTPATIENT
Start: 2019-07-05 | End: 2019-07-11

## 2019-07-05 RX ORDER — GABAPENTIN 300 MG/1
300 CAPSULE ORAL 3 TIMES DAILY
Status: DISCONTINUED | OUTPATIENT
Start: 2019-07-05 | End: 2019-07-13 | Stop reason: HOSPADM

## 2019-07-05 RX ORDER — DICYCLOMINE HYDROCHLORIDE 10 MG/1
10 CAPSULE ORAL
Status: DISCONTINUED | OUTPATIENT
Start: 2019-07-05 | End: 2019-07-13 | Stop reason: HOSPADM

## 2019-07-05 RX ADMIN — DICYCLOMINE HYDROCHLORIDE 10 MG: 10 CAPSULE ORAL at 16:12

## 2019-07-05 RX ADMIN — VANCOMYCIN HYDROCHLORIDE 125 MG: KIT at 16:12

## 2019-07-05 RX ADMIN — POTASSIUM CHLORIDE 10 MEQ: 750 TABLET, EXTENDED RELEASE ORAL at 13:30

## 2019-07-05 RX ADMIN — PANTOPRAZOLE SODIUM 40 MG: 40 TABLET, DELAYED RELEASE ORAL at 13:30

## 2019-07-05 RX ADMIN — FUROSEMIDE 40 MG: 10 INJECTION, SOLUTION INTRAVENOUS at 16:11

## 2019-07-05 RX ADMIN — TAMSULOSIN HYDROCHLORIDE 0.4 MG: 0.4 CAPSULE ORAL at 13:30

## 2019-07-05 RX ADMIN — DICLOFENAC 2 G: 10 GEL TOPICAL at 16:12

## 2019-07-05 RX ADMIN — FINASTERIDE 5 MG: 5 TABLET, FILM COATED ORAL at 13:30

## 2019-07-05 RX ADMIN — GABAPENTIN 300 MG: 300 CAPSULE ORAL at 16:12

## 2019-07-05 RX ADMIN — DICYCLOMINE HYDROCHLORIDE 10 MG: 10 CAPSULE ORAL at 14:56

## 2019-07-05 RX ADMIN — METOPROLOL TARTRATE 25 MG: 25 TABLET ORAL at 10:36

## 2019-07-05 RX ADMIN — HUMAN ALBUMIN MICROSPHERES AND PERFLUTREN 5 ML: 10; .22 INJECTION, SOLUTION INTRAVENOUS at 10:30

## 2019-07-05 RX ADMIN — MELATONIN TAB 3 MG 3 MG: 3 TAB at 21:58

## 2019-07-05 RX ADMIN — ACETAMINOPHEN 650 MG: 325 TABLET, FILM COATED ORAL at 01:09

## 2019-07-05 RX ADMIN — DICLOFENAC 2 G: 10 GEL TOPICAL at 21:58

## 2019-07-05 RX ADMIN — HYDROCODONE BITARTRATE AND ACETAMINOPHEN 1 TABLET: 7.5; 325 TABLET ORAL at 14:49

## 2019-07-05 RX ADMIN — HYPROMELLOSE 2910 2 DROP: 5 SOLUTION OPHTHALMIC at 14:56

## 2019-07-05 RX ADMIN — ACETAMINOPHEN 650 MG: 325 TABLET, FILM COATED ORAL at 06:02

## 2019-07-05 RX ADMIN — METOPROLOL TARTRATE 25 MG: 25 TABLET ORAL at 21:58

## 2019-07-05 RX ADMIN — FUROSEMIDE 40 MG: 10 INJECTION, SOLUTION INTRAVENOUS at 10:38

## 2019-07-05 RX ADMIN — SIMVASTATIN 20 MG: 20 TABLET, FILM COATED ORAL at 21:58

## 2019-07-05 RX ADMIN — VANCOMYCIN HYDROCHLORIDE 125 MG: KIT at 21:58

## 2019-07-05 RX ADMIN — ACETAMINOPHEN 650 MG: 325 TABLET, FILM COATED ORAL at 10:38

## 2019-07-05 RX ADMIN — GABAPENTIN 300 MG: 300 CAPSULE ORAL at 21:58

## 2019-07-05 ASSESSMENT — ACTIVITIES OF DAILY LIVING (ADL)
ADLS_ACUITY_SCORE: 18
ADLS_ACUITY_SCORE: 16
ADLS_ACUITY_SCORE: 18
ADLS_ACUITY_SCORE: 16
ADLS_ACUITY_SCORE: 16
ADLS_ACUITY_SCORE: 18

## 2019-07-05 ASSESSMENT — MIFFLIN-ST. JEOR: SCORE: 1302.63

## 2019-07-05 NOTE — PLAN OF CARE
PT.A/OX4.denies any cp.ROMAN.vss on 3L NC. Up with 1 assist. Updated daughter on phone.tele- SR with PAC'S will continue to monitor.

## 2019-07-05 NOTE — PROGRESS NOTES
[facility]    Hospitalist Progress Note      Assessment & Plan   Ivan Gomez is a 89 year old male who was admitted on 7/4/2019 for dyspnea, found to be in acute exacerbation of chronic diastolic congestive heart failure.    Acute exacerbation of chronic diastolic congestive heart failure  Pulmonary hypertension  Mitral regurgitation S/P mitral valve clip implantation  Hypertension  PTA Lasix 40 mg daily.  Hx 2 days of dyspnea which worsened on day of admission, associated with hypoxia. Received 40mg IV lasix in ED. BNP 18371 on admit. Was requiring supplemental O2 (up to 10L on admit). Was just discharged from TCU on 7/2.  - Continue IV lasix BID for now  - Monitor I/Os, daily weights  - Appreciate cardiology consult, plan to optimize volume status with IV diuresis and transition to PO in next 24-48 hours.  - No plans for anticoagulation  - Will consider palliative consult pending progress over weekend and patient interest.  - Wean supplemental O2 as able    New onset paroxysmal atrial fibrillation with rapid ventricular response  HR in the 130s on admission, converted to sinus rhythm after cardizem gtt initiated while in the ED. No further recurrence, likely triggered by exacerbation of CHF as above.   - Continue telemetry monitoring  - Metoprolol tartrate 25mg BID as BP tolerates (previously been on coreg--which BP did not tolerate)    Ground glass opacities, patchy airspace opacities on CT  Hx Dysphagia  CT showed patchy airspace and ground-glass opacities in both mid and lower lungs, suspicious for pneumonia.  Recent CXR June 20th also showed some LLL infiltrate, levaquin started then by discontinued after 3 doses due to worsening diarrhea. Pt denies cough, fever. WBC 5 and procalcitonin 0.09. Started on levaquin, will discontinue.  - Monitor for clinical signs/symptoms of infection, hold off on further abx in setting of on going c diff diarrhea.  - Continue DD3 diet with thin  "liquids.    Bladder mass  Hx bladder cancer and bladder mass. Urology was consulted when he was last here towards the end of May for his acute cholecystitis.  At that time Dr. Jerrod Mcmanus from Urology evaluated the patient and recommended outpatient followup with his primary urologist, Dr. Farnsworth.    Chronic kidney disease, stage III  Baseline Cr from 1.3 to 1.6.   - Currently stable at 1.45, continue to monitor with diuresis    Benign prostatic hypertrophy  We will continue PTA Flomax and proscar    Chronic anemia  Baseline 10-11, appears to be at baseline. No signs active bleeding  - Continue to monitor    Chronic thrombocytopenia  Baseline seems to be near 50-80 in the past month. Currently 65  - Continue to monitor.    C difficile diarrhea  Diagnosed during last admission (5/21) after having sxs x1 month.  - Vancomycin had been attempted to be stopped, but he developed worsening diarrhea, currently on vancomycin 125mg TID--had sxs in BID  - Defer further titration to outpatient setting with focus now on CHF exacerbation.  - Follow up GI in 1 month as previously arranged.    Placement  - Had been just discharged from TCU on 7/2 to [facility]. He has plans for moving into assisted living here once it is available.  - PT to see while here  - Care coordinator consulted    Chronic pain syndrome  Spinal stenosis of lumbar region  He reports having \"varicose vein\" surgery 3 months ago in [place], which did not help with his LE pain. Ankle and foot pain has improved with voltaren gel.   - Continue PTA gabapentin, voltaren gel, Norco.     DVT Prophylaxis: Pneumatic Compression Devices  Code Status: Full Code  Expected discharge: 2 - 3 days, await PT eval for dispo planning and stabilization of fluid status on PO diuretics.    Arlyn Cherry, DO  Text Page (7am - 6pm)    Interval History   Patient seen and examined. He feels okay, denies shortness of breath. Reports that he is still having loose stools with his " c. Diff diarrhea (did miss his AM dose this morning)--rectified for afternoon dosing. Tells me the swelling in his lower extremities is chronic due to prior varicose vein surgery, denies worsening recently with CHF exacerbation.    -Data reviewed today: I reviewed all new labs and imaging results over the last 24 hours. I personally reviewed no images or EKG's today. Sinus rhythm with PACs on telemetry    Echocardiogram 7/5:   1. Stable position of the MitraClip devices.  2. At least, moderately severe residual mitral regurgitation. Eccentric jet, anteriorly directed, reaching the superior surface of the left atrium.  3. No significant mitral stenosis. Mean diastolic mitral valve gradient is 3.3 mmHg (heart rate 73 bpm).  4. Massively dilated left atrium. Severely dilated right atrium.  5. Moderately severe (3+) tricuspid regurgitation. Findings consistent with significant pulmonary hypertension. Estimated pulmonary artery systolic pressure is 65-70 mmHg.  6. Normal left ventricular size, hyperdynamic systolic function, estimated LVEF 70-75%.  7. Moderately dilated right ventricle with moderately reduced systolic function. TAPSE 1.3 - 1.6 cm, tissue Doppler systolic velocity 9 cm/s.  8. Dilated inferior vena cava.    Physical Exam   Temp: 97.6  F (36.4  C) Temp src: Oral BP: 94/65 Pulse: 96 Heart Rate: 61 Resp: 20 SpO2: 92 % O2 Device: Nasal cannula Oxygen Delivery: 3 LPM  Vitals:    07/04/19 1552 07/05/19 0435   Weight: 65.8 kg (145 lb) 67.8 kg (149 lb 8 oz)     Vital Signs with Ranges  Temp:  [97.6  F (36.4  C)-98.8  F (37.1  C)] 97.6  F (36.4  C)  Pulse:  [79-96] 96  Heart Rate:  [] 61  Resp:  [16-35] 20  BP: ()/(57-92) 94/65  SpO2:  [91 %-97 %] 92 %  I/O last 3 completed shifts:  In: 100 [P.O.:100]  Out: 150 [Urine:150]    Constitutional: Awake, alert, cooperative, no apparent distress  Respiratory: Clear to auscultation bilaterally with crackles at bilateral bases, no rhonchi  Cardiovascular:  Regular rate and rhythm, normal S1 and S2, and +loud systolic murmur  GI: Normal bowel sounds, soft, non-distended, non-tender  Skin/Integumen: No rashes, no cyanosis, 2+ pitting edema.  Other:     Medications       furosemide  40 mg Intravenous BID     levofloxacin  250 mg Intravenous Q24H     metoprolol tartrate  25 mg Oral BID       Data   Recent Labs   Lab 07/05/19  0605 07/05/19  0108 07/04/19  2146 07/04/19  1559 07/02/19  0940   WBC 5.0  --   --  7.6 4.6   HGB 10.9*  --   --  11.9* 10.2*   MCV 95  --   --  95 94   PLT 65*  --   --  90* 70*   INR  --   --   --  1.26*  --      --   --  140 143   POTASSIUM 3.7  --   --  3.8 3.3*   CHLORIDE 105  --   --  105 106   CO2 31  --   --  30 29   BUN 41*  --   --  41* 41*   CR 1.45*  --   --  1.43* 1.37*   ANIONGAP 6  --   --  5 8   GIUSEPPE 8.4*  --   --  8.3* 8.2*   GLC 95  --   --  108* 95   ALBUMIN  --   --   --  3.4  --    PROTTOTAL  --   --   --  7.6  --    BILITOTAL  --   --   --  1.1  --    ALKPHOS  --   --   --  126  --    ALT  --   --   --  20  --    AST  --   --   --  12  --    LIPASE  --   --   --  93  --    TROPI  --  <0.015 0.021 <0.015  --        Recent Results (from the past 24 hour(s))   CT Chest (PE) Abdomen Pelvis w Contrast    Narrative    CT CHEST PE ABDOMEN AND PELVIS WITH CONTRAST   7/4/2019 6:31 PM    HISTORY: Sepsis. Hypoxia.    TECHNIQUE: Pulmonary embolism protocol was performed through the  chest. 73 mL Isovue-370 were injected IV. After contrast injection,  volumetric helical acquisition was performed from the thoracic inlet  through the ischial tuberosities. Radiation dose for this scan was  reduced using automated exposure control, adjustment of the mA and/or  kV according to patient size, or iterative reconstruction technique.    COMPARISON: CT of the abdomen and pelvis performed 8/15/2015.    FINDINGS:     Chest:  No evidence for pulmonary embolism. The thoracic aorta is of  normal caliber, without evidence for thoracic aortic aneurysm.  The  thoracic aorta is not well opacified on this exam. Atherosclerotic  calcification of the thoracic aorta and coronary arteries. There are  small bilateral pleural effusions, larger on the right. No pericardial  effusion. No enlarged lymph nodes are identified in the chest.     Patchy airspace and groundglass opacities are noted in both mid and  lower lungs, and are suspicious for pneumonia, although edema could  also have this appearance. Smooth interstitial thickening in both  lungs suggests pulmonary edema. Cardiac enlargement.    Abdomen and pelvis: Prior cholecystectomy. Mildly heterogeneous  enhancement throughout the liver, with no definite focal hepatic  lesions. There are several bilateral renal cysts, with the largest  noted in the lower pole of the left kidney, measuring 5.1 cm.  Nonobstructing stone in the lower pole of the left kidney measures 0.5  cm. The spleen, adrenal glands, pancreas, and kidneys are otherwise  unremarkable. No hydronephrosis. Mild atherosclerotic aortoiliac  calcification.     No bowel obstruction. A small amount of ascites is noted. There are  several bladder wall masses and nodules identified, with the largest  on the left measuring 3.5 cm (series 6 image 79). A previously  described bladder wall mass posteriorly on the right has decreased in  prominence. Additional bladder wall masses are new since the previous  exam. Postoperative changes of right inguinal hernia are noted.  Degenerative changes are noted throughout the thoracolumbar spine.  Lumbar curve is noted.         Impression    IMPRESSION:   1. No evidence for pulmonary embolism.  2. Patchy airspace and groundglass opacities in both mid and lower  lungs are suspicious for pneumonia.  3. Small bilateral pleural effusions, larger on the right.  4. Smooth interstitial thickening in both lungs suggests pulmonary  edema.  5. Cardiac enlargement.  6. Small amount of ascites.  7. Multiple bladder wall masses are noted,  with the largest on the  left measuring 3.5 cm. Findings are suspicious for neoplasm, such as  transitional cell carcinoma.  8. Nonobstructing 0.5 cm stone in the lower pole of the left kidney   9. Heterogeneous enhancement of the liver is a nonspecific finding.      SY CAROLINA MD

## 2019-07-05 NOTE — ED NOTES
"Pt refused two tabs of Norco that was ordered. Pt stated \" I only ever take 1 tab\". Pt given 1 tab as requested and 2nd tab was wasted with Mile RIVERA RN.   "

## 2019-07-05 NOTE — PROGRESS NOTES
[facility] Nurse Inpatient Wound Assessment      Assessment of wound(s) on pt's:    Coccyx and heel PI - all community acquired        Data:   Patient History:      Ivan Gomez is a 89 year old male who was admitted on 7/4/2019 for dyspnea, found to be in acute exacerbation of chronic diastolic congestive heart failure.     Acute exacerbation of chronic diastolic congestive heart failure  Pulmonary hypertension  Mitral regurgitation S/P mitral valve clip implantation  Hypertension  P   New onset paroxysmal atrial fibrillation with rapid ventricular response     Ground glass opacities, patchy airspace opacities on CT  Hx Dysphagia  CT showed patchy airspace and ground-glass opacities in both mid and lower lungs, suspicious for pneumonia.     Bladder mass  Hx bladder cancer and bladder mass. Urology was consulted when he was last here towards the end of May for his acute cholecystitis.     Chronic kidney disease, stage III      Ahim Risk Assessment  Sensory Perception: 3-->slightly limited    Moisture: 3-->occasionally moist   Activity: 3-->walks occasionally     Mobility: 3-->slightly limited   Nutrition: 3-->adequate   Haim Score: 17      Positioning: Turns with assist x 1    Mattress:  Atmos air       Moisture Management:  Urinal for UIC and BSC for FIC      Current Diet / Nutrition:           Combination Diet Regular Diet Adult; Dysphagia Diet Level 3: Advanced; Thin Liquids (water, ice chips, juice, milk gelatin, ice cream, etc)    Labs:   Recent Labs   Lab Test 07/05/19  0605 07/04/19  1559  08/18/15  1146   ALBUMIN  --  3.4   < >  --    HGB 10.9* 11.9*   < >  --    INR  --  1.26*   < >  --    WBC 5.0 7.6   < >  --    A1C  --   --   --  5.3    < > = values in this interval not displayed.       Wound Assessment (location):   #1: Lt plantar heel - community acquired  Wound History:  Pt states area in new since [facility] photo 7-5-19 Lt plantar heel    Wound Base:100% pink wound  bed    Specific Dimensions (length x width x depth, in cm) :  2.0cm x 2.0cm x superficial    Tunneling:  N/A    Undermining: N/A    Palpation of the wound bed:  normal    Slough appearance:  none    Eschar appearance:  none    Periwound Skin: intact with blanchable pink/purplish erythema/ecchymosis    Temperature  normal     Drainage:  none    Odor: none    Pain: Tenderness    #2: Coccyx - community acquired    WOC photo:  7-5-19 coccyx  Area 4.0cm x 4.0cm with 5 x small PI    5 x small PI all < 1.5cm x .6cm with 4 x white adherent slough base with granulation buds and 1 Pi with          100% red base                                   Tunneling:  N/A    Undermining: N/A    Palpation of the wound bed:  normal    Slough appearance:  Yes white adherent    Eschar appearance:  none    Periwound Skin: intact with blanchable ecchymosis    Temperature  normal     Drainage:  none    Odor: none    Pain: Tenderness                Intervention:     Patient's chart evaluated.      Wound(s) assessed    Wound Care: completed    Orders  In Epic    Supplies  In pt room    Discussed plan of care with Patient             All patient  questions answered: yes          Assessment:   Lt plantar heel: Stage 2 PI; community acquired, no local s/s infection  Coccyx:  Unstageable PI; community acquired, no local s/s infection        Plan:     Nursing to notify the Provider(s) and re-consult the WOC Nurse if wound(s) deteriorate(s) or if the wound care plan needs reevaluation.    Wound care: Lt plantar heel and coccyx  1. Change dressing on odd days and prn  2.  Clean Microklenz. Pat dry  3.  Mepilex border to heel and mepilex sacral to coccyx  PIP:   1. Diligent turning in bed with heels suspended   2. Limit sitting to <2 hours @ a time   3. Pillow under feet when sitting to minimize Pressure to heel   4. Mobilize when medically indicated   5 Encourage use of BSC for FIC   6. Haim risk: document all interventions   7. HOB below 30 degrees        WOC Nurse will return: weekly and prn

## 2019-07-05 NOTE — PLAN OF CARE
Pt transferred from ED on [facility].Walked to bed from [facility].pt.settled in room.VSS on 4 L NC.dIlt gtt.running at 2.5 mg.

## 2019-07-05 NOTE — PLAN OF CARE
Discharge Planner PT   Patient plan for discharge: LTC/Assisted Living.  Current status: PT eval completed, treatment initiated. Pt is a 89 yr old male admitted for afib and dyspnea. Pt is alert and oriented x 4. Per pt he was staying with his brother and nephew in [place] for  last year. He was visiting [place] to see his partner,who  a few months back and after which pt has been in [facility] receiving PT/OT. Pt stated that he was supposed to be discharged from [facility] to a LTC facility. Pt reported being mod I with all ADL's and ambulating using 4WW.   Currently pt is on 2L of O2 and is at SBA with transfers and ambulated 15 ft , then another 25 ft without assistive devices and SBA for safety. Pt sat at EOB x 10 min with supervision and is at supervision with sit>supine. Vitals stable post PT. Discussed PT POC and recommendations with pt.   Barriers to return to prior living situation: Fall risk.  Recommendations for discharge: LTC  Rationale for recommendations: Pt appears to be near baseline with functional mobility. Pt will benefit from continued skilled PT during his stay in the acute setting to prevent debility and increase activity tolerance. Pt will need LTC or increased family support/assist upon discharge.        Entered by: Kamryn Stoddard 2019 3:59 PM

## 2019-07-05 NOTE — PLAN OF CARE
Problem: Adult Inpatient Plan of Care  Goal: Plan of Care Review  7/5/2019 1758 by Magda Webb, RN  Outcome: Improving  7/5/2019 0521 by Tia Bowen, RN  Outcome: No Change     Problem: Fluid Imbalance (Pneumonia)  Goal: Fluid Balance  Outcome: Improving     Able to wean oxygen to 1 liter, IV lasix being given along with IV antibiotics. Metoprolol PO-SD with frequent PACs. Norco for back pain, echo done. Ax1 with transfers, alert x 4.

## 2019-07-05 NOTE — CONSULTS
CARDIOLOGY CONSULT    ASSESSMENT:  1.  New onset paroxysmal atrial fibrillation with RVR; spontaneously converted to sinus rhythm  2.  CHFpEF  3.  Valvular heart disease:  Moderately severe MR (despite hx of MitraClip), moderately severe TR  4.  Severe pulmonary hypertension  5.  Deconditioning    RECOMMENDATIONS:  1. Reviewed pathophysiology of atrial fibrillation, treatment.  He is now back in sinus rhythm.  Agree with metoprolol as has been started by primary service.    Given his advanced age and significant thrombocytopenia/anemia will not start anticoagulation.  This was discussed with Don and he is agreeable with this plan.    2.  Continue with IV diuresis  3.  Given his advanced valvular heart disease, he will have ongoing issues with CHF.  Will attempt to optimize his volume status with IV diuresis this admission and transition to PO.  However, in the longer term his prognosis is poor.  Palliative care consult this admission would be reasonable.      Kelsey Walsh MD  Cardiology - [facility]  Pager:  [phone]  July 5, 2019              REASON FOR CONSULT:  CHF    PRIMARY CARE PHYSICIAN:  Evaristo Magaña    HISTORY OF PRESENT ILLNESS:  Mr. Gomez is a 89 y/o gentleman with PMH significant for CHFpEF, severe symptomatic mitral regurgitation s/p mitraclip in 10/2017, hypertension, hyperlipidemia who presents with shortness of breath/CHF exacerbation and new onset atrial fibrillation with RVR.    He was recently hospitalized the yovany of May with acute cholecystitis and underwent cholecystectomy.  His post operative course was complicated by hypoxia and acute hypoxic respiratory failure. He has been at a TCU since that time.  He has not been on a diuretic in recent months.    He was at his TCU on the day of admission, when he was noted to turn blue.  He had been feeling more short of breath in recent days.  He denies any exertional chest pain, chest discomfort.  He denies orthopnea, PND.  EMS was  called and he was noted to be hypoxic with 89% sats and was started on O2.  He was noted to be in afib with RVR; started on diltiazem gtt and then converted to sinus rhythm.  He was started in IV abx and given IV lasix for CHF exacerbation.  Don reports these past few months have bene difficult.  He has been at a TCU for some time.  His significant other  a couple of months ago.  He does not have any appetite and feels fatigued all of the time.        PAST MEDICAL HISTORY:  1.  CHFpEF  2.  Severe mitral regurgitation:  S/P mitraclip in 10/2017  3.  Spinal steonsis  4.  Hyperlipidemia  5.  Hypertension  6.  CKD stage III  7.  Bladder Cancer s/p resection and bladder reconstruciton x2  8.  Acute cholecystitis  9.  BPH  10.  Anemia/thrombocytopenia  11.  Dysphagia secondary to pill esophagitis  12.  C diff colitis      MEDICATIONS:  Current Facility-Administered Medications   Medication     acetaminophen (TYLENOL) tablet 650 mg     diclofenac (VOLTAREN) 1 % topical gel 2 g     dicyclomine (BENTYL) capsule 10 mg     finasteride (PROSCAR) tablet 5 mg     furosemide (LASIX) injection 40 mg     gabapentin (NEURONTIN) capsule 300 mg     HYDROcodone-acetaminophen (NORCO) 7.5-325 MG per tablet 1 tablet     hypromellose (ARTIFICIAL TEARS) 0.5 % ophthalmic solution 2 drop     levofloxacin (LEVAQUIN) infusion 250 mg     melatonin tablet 1 mg     melatonin tablet 3 mg     metoprolol (LOPRESSOR) injection 2.5 mg     metoprolol tartrate (LOPRESSOR) tablet 25 mg     naloxone (NARCAN) injection 0.1-0.4 mg     ondansetron (ZOFRAN-ODT) ODT tab 4 mg    Or     ondansetron (ZOFRAN) injection 4 mg     pantoprazole (PROTONIX) EC tablet 40 mg     potassium chloride (KLOR-CON) Packet 20-40 mEq     potassium chloride 10 mEq in 100 mL intermittent infusion with 10 mg lidocaine     potassium chloride 10 mEq in 100 mL sterile water intermittent infusion (premix)     potassium chloride 20 mEq in 50 mL intermittent infusion     potassium  chloride ER (K-DUR/KLOR-CON M) CR tablet 10 mEq     potassium chloride ER (K-DUR/KLOR-CON M) CR tablet 20-40 mEq     senna-docusate (SENOKOT-S/PERICOLACE) 8.6-50 MG per tablet 1 tablet    Or     senna-docusate (SENOKOT-S/PERICOLACE) 8.6-50 MG per tablet 2 tablet     simvastatin (ZOCOR) tablet 20 mg     tamsulosin (FLOMAX) capsule 0.4 mg     vancomycin (FIRVANQ) oral solution 125 mg       ALLERGIES:  Allergies   Allergen Reactions     Celebrex [Celecoxib] Hives     Penicillins Hives       SOCIAL HISTORY:  I have reviewed this patient's social history and updated it with pertinent information if needed. Ivan Gomez  reports that he has quit smoking. He has never used smokeless tobacco. He reports that he does not drink alcohol or use drugs.    FAMILY HISTORY:  I have reviewed this patient's family history and updated it with pertinent information if needed.   Family History   Problem Relation Age of Onset     Cancer Son 64        leukemia ? due to agent orange     Family History Negative Son        ROS:  A complete ROS was obtained and the pertinent positives are outlined in the history of present illness above.  The remainder of systems is negative.      PHYSICAL EXAM:  Temp: 98  F (36.7  C) Temp src: Oral BP: 103/69 Pulse: 96 Heart Rate: 83 Resp: 18 SpO2: 97 % O2 Device: Nasal cannula Oxygen Delivery: 2 LPM  Vital Signs with Ranges  Temp:  [97.6  F (36.4  C)-98.8  F (37.1  C)] 98  F (36.7  C)  Pulse:  [79-96] 96  Heart Rate:  [] 83  Resp:  [16-35] 18  BP: ()/(57-92) 103/69  SpO2:  [91 %-97 %] 97 %  149 lbs 8 oz    Constitutional: awake, alert, no distress  Eyes: PERRL, sclera nonicteric  ENT: trachea midline  Respiratory:  Crackles bilaterally  Cardiovascular:   RRR, II/VI holosystolic murmur, +JVD  GI: nondistended, nontender, bowel sounds present  Lymph/Hematologic: no lymphadenopathy  Skin: dry, no rash  Musculoskeletal: good muscle tone, +1 edema bilaterally  Neurologic: no focal  deficits  Neuropsychiatric: appropriate affact    DATA:  Reviewed in EPIC

## 2019-07-05 NOTE — ED NOTES
"[facility]  ED Nurse Handoff Report    ED Chief complaint: Shortness of Breath      ED Diagnosis:   Final diagnoses:   Atrial fibrillation with RVR (H)   Pneumonia due to infectious organism, unspecified laterality, unspecified part of lung   Congestive heart failure, unspecified HF chronicity, unspecified heart failure type (H)       Code Status: See prior  Allergies:   Allergies   Allergen Reactions     Celebrex [Celecoxib] Hives     Penicillins Hives       Activity level - Baseline/Home:  Independent  Activity Level - Current:   Stand with Assist    Patient's Preferred language: English   Needed?: No    Isolation: Yes  Infection: Not Applicable  MRSA  Bariatric?: No    Vital Signs:   Vitals:    07/04/19 1645 07/04/19 1700 07/04/19 1745 07/04/19 1830   BP: 102/57 104/67 113/74 124/82   Pulse: 81 80 79 96   Resp: 17 16  18   Temp:       TempSrc:       SpO2: 91% 94% 94% 94%   Weight:       Height:           Cardiac Rhythm: ,   Cardiac  Cardiac Rhythm: Atrial fibrillation    Pain level:      Is this patient confused?: No   Does this patient have a guardian?  No         If yes, is there guardianship documents in the Epic \"Code/ACP\" activity?  N/A         Guardian Notified?  N/A  [place] - Suicide Severity Rating Scale Completed?  Yes  If yes, what color did the patient score?  White    Patient Report: Initial Complaint: SOB  Focused Assessment: Pt aox3. Came to ED today for SOB. CHF with possible pneumonia. Afib rvr @ 127 bpm upon arrival. Now 83 BPM after 20MG IV dose of Diltiazem. Currently on 2.5mg Gtt of diltiazem. See CT chest results. Pt on 3L NC of o2.  Tests Performed: Labs. EKG. CT.  Abnormal Results:   Labs Ordered and Resulted from Time of ED Arrival Up to the Time of Departure from the ED   CBC WITH PLATELETS DIFFERENTIAL - Abnormal; Notable for the following components:       Result Value    RBC Count 3.93 (*)     Hemoglobin 11.9 (*)     Hematocrit 37.2 (*)     RDW 19.0 (*)  "    Platelet Count 90 (*)     All other components within normal limits   INR - Abnormal; Notable for the following components:    INR 1.26 (*)     All other components within normal limits   COMPREHENSIVE METABOLIC PANEL - Abnormal; Notable for the following components:    Glucose 108 (*)     Urea Nitrogen 41 (*)     Creatinine 1.43 (*)     GFR Estimate 42 (*)     GFR Estimate If Black 49 (*)     Calcium 8.3 (*)     All other components within normal limits   NT PROBNP INPATIENT - Abnormal; Notable for the following components:    N-Terminal Pro BNP Inpatient 17,137 (*)     All other components within normal limits   ROUTINE UA WITH MICROSCOPIC REFLEX TO CULTURE - Abnormal; Notable for the following components:    Blood Urine Small (*)     Protein Albumin Urine 30 (*)     WBC Urine 12 (*)     RBC Urine 21 (*)     Mucous Urine Present (*)     All other components within normal limits   TROPONIN I   LIPASE   URINE CULTURE AEROBIC BACTERIAL   BLOOD CULTURE   BLOOD CULTURE       Treatments provided: Lasix. Diltiazem. Levo. See Mar.     Family Comments: No family at bedside.    OBS brochure/video discussed/provided to patient/family: N/A              Name of person given brochure if not patient: n/a              Relationship to patient: n/a    ED Medications:   Medications   diltiazem (CARDIZEM) 125 mg in sodium chloride 0.9 % 125 mL infusion (2.5 mg/hr Intravenous New Bag 7/4/19 1827)   levofloxacin (LEVAQUIN) infusion 500 mg (500 mg Intravenous New Bag 7/4/19 1942)   diltiazem (CARDIZEM) injection 20 mg (20 mg Intravenous Given 7/4/19 1629)   iopamidol (ISOVUE-370) solution 73 mL (73 mLs Intravenous Given 7/4/19 1818)   Saline flush (62 mLs Intravenous Given 7/4/19 1818)   HYDROcodone-acetaminophen (NORCO) 5-325 MG per tablet 2 tablet (1 tablet Oral Given 7/4/19 1916)   furosemide (LASIX) injection 40 mg (40 mg Intravenous Given 7/1935)       Drips infusing?:  Yes    For the majority of the shift this patient was  Green.   Interventions performed were Routine ED cares.    Severe Sepsis OR Septic Shock Diagnosis Present: No    To be done/followed up on inpatient unit:  N/A    ED NURSE PHONE NUMBER: [phone]

## 2019-07-05 NOTE — PROGRESS NOTES
07/05/19 1500   Quick Adds   Type of Visit Initial PT Evaluation   Living Environment   Lives With facility resident   Living Arrangements other (see comments)  (LTC)   Home Accessibility no concerns   Living Environment Comment Pt was living in [place] with his brother and nephew up until last year. Visited partner in [place] earlier this year, and when the partner passed away, pt was admitted to the [facility]. Per pt he was about to be discharged to a LTC facility.   Self-Care   Usual Activity Tolerance fair   Current Activity Tolerance fair   Regular Exercise No   Equipment Currently Used at Home other (see comments)  (4WW)   Activity/Exercise/Self-Care Comment Per pt he was mod I with all ADL's.    Functional Level Prior   Ambulation 1-->assistive equipment   Transferring 1-->assistive equipment   Toileting 1-->assistive equipment   Bathing 1-->assistive equipment   Communication 0-->understands/communicates without difficulty   Swallowing 0-->swallows foods/liquids without difficulty   Cognition 0 - no cognition issues reported   Fall history within last six months yes   Number of times patient has fallen within last six months 1   Which of the above functional risks had a recent onset or change? ambulation   Prior Functional Level Comment Pt was mod I with ambualtion using 4WW.   General Information   Onset of Illness/Injury or Date of Surgery - Date 07/04/19   Referring Physician Charles Mcmullen MD   Patient/Family Goals Statement Go home   Pertinent History of Current Problem (include personal factors and/or comorbidities that impact the POC) Pt is a 89 yr old male with PMH of hypertension, hyperlipidemia, chronic kidney disease stage III, gout, history of bladder cancer, status post resection and bladder reconstruction x 2, chronic pain, mitral regurgitation, status post mitral clip, chronic anemia is admitted with dyspnea.    Precautions/Limitations fall precautions   Weight-Bearing Status - LLE full  weight-bearing   Weight-Bearing Status - RLE full weight-bearing   General Observations Cooperative   Cognitive Status Examination   Orientation orientation to person, place and time   Level of Consciousness alert   Follows Commands and Answers Questions 100% of the time   Personal Safety and Judgment intact   Memory intact   Pain Assessment   Patient Currently in Pain No   Integumentary/Edema   Integumentary/Edema Comments BLE  edema noted.   Posture    Posture Forward head position;Protracted shoulders;Kyphosis   Range of Motion (ROM)   ROM Comment BLE:WFL   Strength   Strength Comments BLE: 4-/5   Bed Mobility   Bed Mobility Comments Sit>supine: supervision   Transfer Skills   Transfer Comments STS at SBA   Gait   Gait Comments 15 ft wihtout ADs and SBA, dependent on 2L of O2 via NC   Balance   Balance Comments Sitting: good   Sensory Examination   Sensory Perception no deficits were identified   Coordination   Coordination no deficits were identified   Muscle Tone   Muscle Tone no deficits were identified   General Therapy Interventions   Planned Therapy Interventions balance training;bed mobility training;gait training;strengthening;stretching;transfer training;risk factor education;home program guidelines;progressive activity/exercise   Clinical Impression   Criteria for Skilled Therapeutic Intervention yes, treatment indicated   PT Diagnosis Impaired gait   Influenced by the following impairments decreased strength and activity toelrance   Functional limitations due to impairments assistnace needed with mobility   Clinical Presentation Stable/Uncomplicated   Clinical Presentation Rationale clinical judgement   Clinical Decision Making (Complexity) Low complexity   Therapy Frequency 3x/week   Predicted Duration of Therapy Intervention (days/wks) 3   Anticipated Discharge Disposition Long Term Care Facility   Risk & Benefits of therapy have been explained Yes   Patient, Family & other staff in agreement with  "plan of care Yes   Clinical Impression Comments Pt presents with decreased activity toelrance limiting ambulation distance. Pt is enocuarged to ambualte with RN staff as toelrated. Pt may benefit from continued skilled PT in the acute setting to prevent debility and increase strength and endurance.    [facility] AM-PAC  \"6 Clicks\" V.2 Basic Mobility Inpatient Short Form   1. Turning from your back to your side while in a flat bed without using bedrails? 4 - None   2. Moving from lying on your back to sitting on the side of a flat bed without using bedrails? 4 - None   3. Moving to and from a bed to a chair (including a wheelchair)? 3 - A Little   4. Standing up from a chair using your arms (e.g., wheelchair, or bedside chair)? 3 - A Little   5. To walk in hospital room? 3 - A Little   6. Climbing 3-5 steps with a railing? 3 - A Little   Basic Mobility Raw Score (Score out of 24.Lower scores equate to lower levels of function) 20   Total Evaluation Time   Total Evaluation Time (Minutes) 15     "

## 2019-07-05 NOTE — PLAN OF CARE
SLP:  orders received. Pt is familiar to this department. Hx of esophageal dysfunction and recurrent pneumonia. Lastest SLP recommended mechanical/dental soft diet with thin liquids and strict GERD precautions. No history of Video Swallow Study or FEES, but suspect high risk of aspiration. Pt was in the restroom then planned PT session following. Will continue to follow and evaluate when pt is available.

## 2019-07-05 NOTE — PHARMACY-ADMISSION MEDICATION HISTORY
Admission medication history interview status for the 7/4/2019  admission is complete. See EPIC admission navigator for prior to admission medications     Medication history source reliability:Good    Actions taken by pharmacist (provider contacted, etc): Chart review. PTA list completed using MAR provided by [facility].     Additional medication history information not noted on PTA med list :None    Medication reconciliation/reorder completed by provider prior to medication history? No    Time spent in this activity: 15 minutes    Prior to Admission medications    Medication Sig Last Dose Taking? Auth Provider   diclofenac (VOLTAREN) 1 % topical gel Place 2 g onto the skin 3 times daily Apply to both ankles , Right back/rib.  0800, 1200, 1800 7/4/2019 at 1200 Yes Reported, Patient   dicyclomine (BENTYL) 10 MG capsule Take 10 mg by mouth 3 times daily 7/4/2019 at 1200 Yes Reported, Patient   finasteride (PROSCAR) 5 MG tablet TAKE 1 TABLET(5 MG) BY MOUTH DAILY 7/4/2019 at am Yes Evaristo Magaña MD   furosemide (LASIX) 20 MG tablet Take 2 tablets (40 mg) by mouth daily 7/4/2019 at am Yes Tomasz Shaw,    gabapentin (NEURONTIN) 300 MG capsule TAKE 1 CAPSULE BY MOUTH THREE TIMES DAILY 7/4/2019 at 1200 Yes Evaristo Magaña MD   hydrALAZINE (APRESOLINE) 25 MG tablet Take 1 tablet (25 mg) by mouth 3 times daily  Patient taking differently: Take 25 mg by mouth 3 times daily 0800, 1200, 1800. Hold for SBP < 100 7/4/2019 at 1200 Yes Evaristo Magaña MD   HYDROcodone-acetaminophen (NORCO) 7.5-325 MG per tablet Take 1 tablet by mouth every 6 hours as needed for pain maximum 4 tablet(s) per day 7/4/2019 at 0500 Yes Tomasz Shaw,    hypromellose (ARTIFICIAL TEARS) 0.4 % SOLN ophthalmic solution Place 2 drops Into the left eye 4 times daily 7/3/2019 at 1200 Yes Reported, Patient   isosorbide dinitrate (ISORDIL) 20 MG tablet Take 20 mg by mouth 2 times daily 7/4/2019 at am Yes  Unknown, Entered By History   melatonin 3 MG tablet Take 3 mg by mouth At Bedtime 7/3/2019 at hs Yes Reported, Patient   pantoprazole (PROTONIX) 40 MG EC tablet Take 40 mg by mouth daily 7/4/2019 at am Yes Unknown, Entered By History   potassium chloride ER (K-DUR/KLOR-CON M) 10 MEQ CR tablet Take 10 mEq by mouth daily 7/4/2019 at am Yes Unknown, Entered By History   simvastatin (ZOCOR) 20 MG tablet TAKE ONE TABLET BY MOUTH AT BEDTIME 7/3/2019 at hs Yes Evaristo Magaña MD   Skin Protectants, Misc. (EUCERIN) cream Apply topically 2 times daily Apply topically to LE & areas of dryness twice daily (also ordered prn) 7/4/2019 at am Yes Unknown, Entered By History   tamsulosin (FLOMAX) 0.4 MG capsule TAKE ONE CAPSULE BY MOUTH DAILY 7/4/2019 at am Yes Evaristo Magaña MD   vancomycin (FIRVANQ) 50 MG/ML oral solution Take 125 mg by mouth 3 times daily 7/4/2019 at 0800 Yes Reported, Patient   artificial saliva (BIOTENE MT) AERS spray Take 1 spray by mouth every 2 hours as needed for dry mouth Unknown at prn  Reported, Patient   Phenylephrine-Witch Hazel 0.25-50 % GEL Place rectally 4 times daily as needed Insert 1 application rectally as needed for Hemorrhoids Unknown at prn  Reported, Patient   Skin Protectants, Misc. (EUCERIN) cream Apply topically as needed for dry skin Apply to LE + Areas of dryness topically as needed for Dryness Unknown at prn  Reported, Patient

## 2019-07-05 NOTE — H&P
Admitted:     07/04/2019      PRIMARY CARE PROVIDER:  Evaristo Magaañ MD      CHIEF COMPLAINT:  Dyspnea.      HISTORY OF PRESENT ILLNESS:  Ivan Gomez is a 89-year-old male with history of hypertension, hyperlipidemia, chronic kidney disease stage III, gout, history of bladder cancer, status post resection and bladder reconstruction x 2, chronic pain, mitral regurgitation, status post mitral clip, chronic anemia who presents today from [facility] where he complained of dyspnea and was found to have O2 sats as low as 82% on room air and also was cyanotic for a while.  The patient reports that he has been having mild shortness of breath for the last couple of days; however, today he got acutely short of breath and was also shivering.  He denies any fever.  Apparently, the staff noticed that his face was turning blue and they started him on oxygen at 10 liters and also wrapped him in a warm blanket.  He also endorsed some chest tightness at that time.  Denies any cough.  No nausea or vomiting.  No dysuria, urinary urgency or frequency.  No hematochezia, melena or hematuria.  The patient denies PND but he does endorse some orthopnea.  He also has chronic lower extremity edema, which he says is probably around his baseline.      When EMS arrived at the scene, he was placed on 3 liters oxygen and was saturating around 89% and apparently was in severe respiratory distress.      In the emergency room, he had a CT scan of the chest, abdomen and pelvis which was read as no evidence of pulmonary embolism, patchy airspace, and ground-glass opacities in both mid and lower lung suspicious for pneumonia, small bilateral pleural effusion; larger on the right, smooth interstitial thickening in both lungs suggest pulmonary edema, small amount of ascites, multiple bladder wall masses.  His basic labs showed stable renal function with creatinine at 1.43.  BNP was more than 17,000.  He was given a dose of IV Lasix and IV Levaquin  after which he feels slightly better.  He is admitted with concerns for CHF exacerbation with likely pneumonia.  He was also found to be in rapid atrial fibrillation, which is presumed to be new, was started on Cardizem drip and converted to normal sinus rhythm while he was in the Emergency Room.      PAST MEDICAL HISTORY:   1.  Chronic kidney disease, stage III.   2.  History of bladder cancer, status post resection.     3.  Mitral regurgitation, status post mitral clip.   4.  Hyperlipidemia.   5.  Hypertension.   6.  Chronic pain.   7.  Heart failure with preserved EF.   8.  BPH.   9.  Lumbar spinal stenosis.   10.  Thrombocytopenia.   11.  Gout.   12.  Recent cholecystectomy for acute cholecystitis.      SOCIAL AND PERSONAL HISTORY:  Apparently, the patient is currently residing at [facility].  Former smoker; quit several years ago.  Denied alcohol or illicit drug use.      FAMILY HISTORY:  Reviewed and is noncontributory.      HOME MEDICATIONS:   Prior to Admission Medications   Prescriptions Last Dose Informant Patient Reported? Taking?   HYDROcodone-acetaminophen (NORCO) 7.5-325 MG per tablet 7/4/2019 at 0500 [facility] No Yes   Sig: Take 1 tablet by mouth every 6 hours as needed for pain maximum 4 tablet(s) per day   Phenylephrine-Witch Hazel 0.25-50 % GEL Unknown at prn [facility] Yes No   Sig: Place rectally 4 times daily as needed Insert 1 application rectally as needed for Hemorrhoids   Skin Protectants, Misc. (EUCERIN) cream Unknown at prn [facility] Yes No   Sig: Apply topically as needed for dry skin Apply to LE + Areas of dryness topically as needed for Dryness   Skin Protectants, Misc. (EUCERIN) cream 7/4/2019 at am Nursing Home Yes Yes   Sig: Apply topically 2 times daily Apply topically to LE & areas of dryness twice daily (also ordered prn)   artificial saliva (BIOTENE MT) AERS spray Unknown at prn [facility] Yes No   Sig: Take 1 spray by mouth every 2 hours as needed for dry mouth    diclofenac (VOLTAREN) 1 % topical gel 7/4/2019 at [address] [place] [address] Yes Yes   Sig: Place 2 g onto the skin 3 times daily Apply to both ankles , Right back/rib.  0800, 1200, 1800   dicyclomine (BENTYL) 10 MG capsule 7/4/2019 at [address] [place] [address] Yes Yes   Sig: Take 10 mg by mouth 3 times daily   finasteride (PROSCAR) 5 MG tablet 7/4/2019 at am Nursing Home No Yes   Sig: TAKE 1 TABLET(5 MG) BY MOUTH DAILY   furosemide (LASIX) 20 MG tablet 7/4/2019 at am Nursing Home No Yes   Sig: Take 2 tablets (40 mg) by mouth daily   gabapentin (NEURONTIN) 300 MG capsule 7/4/2019 at [address] [place] [address] No Yes   Sig: TAKE 1 CAPSULE BY MOUTH THREE TIMES DAILY   hydrALAZINE (APRESOLINE) 25 MG tablet 7/4/2019 at [address] [place] [address] No Yes   Sig: Take 1 tablet (25 mg) by mouth 3 times daily   Patient taking differently: Take 25 mg by mouth 3 times daily 0800, 1200, 1800. Hold for SBP < 100   hypromellose (ARTIFICIAL TEARS) 0.4 % SOLN ophthalmic solution 7/3/2019 at [address] [place] [address] Yes Yes   Sig: Place 2 drops Into the left eye 4 times daily   isosorbide dinitrate (ISORDIL) 20 MG tablet 7/4/2019 at am Nursing Home Yes Yes   Sig: Take 20 mg by mouth 2 times daily   melatonin 3 MG tablet 7/3/2019 at  [facility] Yes Yes   Sig: Take 3 mg by mouth At Bedtime   pantoprazole (PROTONIX) 40 MG EC tablet 7/4/2019 at am Nursing Home Yes Yes   Sig: Take 40 mg by mouth daily   potassium chloride ER (K-DUR/KLOR-CON M) 10 MEQ CR tablet 7/4/2019 at am Nursing Home Yes Yes   Sig: Take 10 mEq by mouth daily   simvastatin (ZOCOR) 20 MG tablet 7/3/2019 at  [facility] No Yes   Sig: TAKE ONE TABLET BY MOUTH AT BEDTIME   tamsulosin (FLOMAX) 0.4 MG capsule 7/4/2019 at am Nursing Home No Yes   Sig: TAKE ONE CAPSULE BY MOUTH DAILY   vancomycin (FIRVANQ) 50 MG/ML oral solution 7/4/2019 at 0800 Nursing Home Yes Yes   Sig: Take 125 mg by mouth 3 times daily      Facility-Administered Medications: None        REVIEW OF SYSTEMS:  A complete review of system was  done and was negative for anything else other than that mentioned in HPI.      PHYSICAL EXAMINATION:   GENERAL:  Mr. Ivan Gomez is a 89-year-old male.   VITAL SIGNS:  Are as follows, temperature 98.6, blood pressure 124/82, heart rate 93.  Initially, he was in rapid atrial fibrillation with heart rate up to 131, respiratory rate is 18, O2 sat is 94% on 3 liters via nasal cannula.   HEENT:  Atraumatic, normocephalic, no pallor.   NECK:  Supple.  He does have elevated JVD.   RESPIRATORY:  Crackles bilaterally in all the lung fields posteriorly, normal effort of breathing.   CARDIOVASCULAR:  At the time of my evaluation, it was regular rate and rhythm, systolic murmur in the left parasternal area, 3/6 in intensity.   GASTROINTESTINAL:  Abdomen was soft, nontender.   EXTREMITIES:  With 2+ pitting edema, no cyanosis or clubbing.   SKIN:  Warm and dry.   NEUROLOGIC:  Awake, alert, oriented x 3, answering my questions appropriately, moving all 4 extremities.      LABORATORY AND DIAGNOSTIC DATA:  Creatinine today is 1.43.  It appears like that is around his baseline.  Hemoglobin is 11.9, platelet is 19,000, which again appears to be slightly better than his baseline actually.  BNP was more than 17,000.  Troponin was negative.  UA with small blood, negative leukocyte esterase.        IMAGING:  CT chest, abdomen and pelvis as described above.  A 12-lead EKG initially showed rapid atrial fibrillation.      ASSESSMENT AND PLAN:  Mr. Ivan Gomez is a 89-year-old male who presented to the hospital with dyspnea and was found to have acute exacerbation of his diastolic CHF and rapid atrial fibrillation which is presumed to be new.   1.  Acute exacerbation of chronic diastolic congestive heart failure.  The patient does appear to have chronic diastolic congestive heart failure and is on Lasix 40 mg daily.  He endorses 2 days of dyspnea which acutely got worse today and he was hypoxic also.  At this time, he has been started  on Lasix 40 mg IV daily.  I will continue Lasix 40 mg IV b.i.d. We will monitor his weight and intake and output closely.  We will repeat an echo.  Cardiology will be consulted.  The patient does follow up with Dr. Lance Yao as outpatient.   2.  Atrial fibrillation with rapid ventricular response, presumed to be new.  He was also found to be in rapid atrial fibrillation with heart rate in the 130s.  He was started on Cardizem drip in the Emergency Room and it appears like he is spontaneously converted.  He will be monitored on telemetry.  Echo will be repeated in the morning.  Since he has converted, I will discontinue the Cardizem drip and place him on small dose of metoprolol 25 mg p.o. b.i.d.   3.  Presumed pneumonia.  A CT scan done today was read as patchy airspace and ground-glass opacities in both mid and lower lungs, suspicious for pneumonia.  The patient denies any cough; however, it is difficult to say if there is an overlap of pneumonia.  I will continue Levaquin started in the Emergency Room.  We will also check a procalcitonin level.   4.  Bladder mass.  The patient has a history of bladder cancer and had a bladder mass and Urology was consulted when he was last here towards the end of May for his acute cholecystitis.  At that time Dr. Jerrod Mcmanus from Urology evaluated the patient and recommended outpatient followup with his primary urologist who is Dr. Farnsworth.  At this time, would recommend again following up with his primary urologist once the current respiratory issues are resolved.  Per Urology evaluation at that time it was felt that he did not have any indication for urologic intervention or further evaluation.   5.  Chronic kidney disease, stage III.  This appears to be at baseline.  His baseline runs anywhere from 1.3 to 1.6.  This will be monitored closely while he is on IV diuresis.   6.  Benign prostatic hypertrophy.  We will continue his prior to admission, Flomax 0.4 mg daily.   7.   Chronic anemia.  This actually appears to be slightly better than baseline, monitor intermittently.   8.  Chronic thrombocytopenia.  Again, likely at baseline.  Follow intermittently.      Anticipated length of stay more than 2 midnights.      CODE STATUS:  Full code.  This was discussed with the patient and also his POST reviewed.         RENAE DE LA GARZA MD             D: 2019   T: 2019   MT: RAJINDER      Name:     MALLORIE NOVA   MRN:      [id number]        Account:      [id number]   :      10/12/1926        Admitted:     2019                   Document: [id number]       cc: Evaristo Magaña MD

## 2019-07-05 NOTE — PROVIDER NOTIFICATION
Paged MD to clarify on whether to continue to   diltiazem drip as it was mentioned in note that (will discontinue diltiazem and start on low dose metoprolol.)   Received order to stop the diltiazem gtt since pt continue remain SR.Will continue to monitor.

## 2019-07-05 NOTE — PROGRESS NOTES
RECEIVING UNIT ED HANDOFF REVIEW    ED Nurse Handoff Report was reviewed by: Fabienne Chavez on July 4, 2019 at 7:59 PM

## 2019-07-05 NOTE — CONSULTS
Care Transition Initial Assessment - SW     Met with: Patient    Active Problems:    CHF exacerbation (H)       DATA  Lives With: facility resident([facility])      Who is your support system?: Sibling(s)  Identified issues/concerns regarding health management:     Per care transitions consult for discharge planning.  Patient was admitted on 7-4-19 from [facility].  The tentative date of discharge is yet to be determined.  Reviewed chart and spoke with patient regarding discharge plans.  Per patient report, patient was admitted from [facility].  Patient states that all of his belongings are still at [facility].  They are all of his belongings that he brought with him from [place].  Patient states his significant other has passed away.  Inquired as to who may be assisted with his affairs.  Patient states his brother in [place] is starting to get confused so his nephew is helping him to a certain extent and so is his significant other's daughter Nicolasa.  Call placed to [facility] to check the beds hold status.  Per Divya, patient was scheduled to discharge from [facility] today and be admitted to [facility] today.  Call placed to [facility] to inquire as to whether they have spoken with anyone regarding this today.  Per Harper, she has not spoken with anyone.  Patient was planning on being admitted under MA pending.  Harper states she will plan on holding the bed until at least Monday.  Spoke with patient to inquire as tow whether he is planning on going to [facility] on discharge.  Patient states he is unsure, but he thinks so.  Explained to patient that I was unsure when he was being discharged, but I would assist him with any discharge plans.  Patient is in agreement.     ASSESSMENT  Cognitive Status:  awake and alert  Concerns to be addressed: discharge planning, SNF on discharge.     PLAN  Financial costs for the patient includes N/A.  Patient given options and choices for discharge discharge to [facility], when appropriate.  Patient/family is agreeable  to the plan?  Yes  Transportation/person available to transport on day of discharge  is TBD and have they been notified/set up TBD  Patient Goals and Preferences: [facility] on discharge.  Patient anticipates discharging to:  [facility].    Will continue to follow and assist with a safe discharge plan.    NATASHA New, [facility]  [phone]

## 2019-07-05 NOTE — PLAN OF CARE
A&OX4, BP soft, denies SOB/CP, ROMAN, on 3 L nasal cannula. Tele- SR with PACs, complains of  back pain,PO tylenol administered this shift. On regular diet, up with assist of one with belt.Voiding on urinal, mepilex on the coccyx. Bed alarm on for safety.

## 2019-07-06 ENCOUNTER — APPOINTMENT (OUTPATIENT)
Dept: SPEECH THERAPY | Facility: CLINIC | Age: 84
DRG: 291 | End: 2019-07-06
Attending: HOSPITALIST
Payer: MEDICARE

## 2019-07-06 LAB
ANION GAP SERPL CALCULATED.3IONS-SCNC: 8 MMOL/L (ref 3–14)
BACTERIA SPEC CULT: ABNORMAL
BACTERIA SPEC CULT: ABNORMAL
BUN SERPL-MCNC: 43 MG/DL (ref 7–30)
CALCIUM SERPL-MCNC: 8.6 MG/DL (ref 8.5–10.1)
CHLORIDE SERPL-SCNC: 104 MMOL/L (ref 94–109)
CO2 SERPL-SCNC: 29 MMOL/L (ref 20–32)
CREAT SERPL-MCNC: 1.45 MG/DL (ref 0.66–1.25)
ERYTHROCYTE [DISTWIDTH] IN BLOOD BY AUTOMATED COUNT: 18.9 % (ref 10–15)
GFR SERPL CREATININE-BSD FRML MDRD: 41 ML/MIN/{1.73_M2}
GLUCOSE SERPL-MCNC: 85 MG/DL (ref 70–99)
HCT VFR BLD AUTO: 33.8 % (ref 40–53)
HGB BLD-MCNC: 10.6 G/DL (ref 13.3–17.7)
Lab: ABNORMAL
MCH RBC QN AUTO: 29.8 PG (ref 26.5–33)
MCHC RBC AUTO-ENTMCNC: 31.4 G/DL (ref 31.5–36.5)
MCV RBC AUTO: 95 FL (ref 78–100)
PLATELET # BLD AUTO: 74 10E9/L (ref 150–450)
POTASSIUM SERPL-SCNC: 3.6 MMOL/L (ref 3.4–5.3)
RBC # BLD AUTO: 3.56 10E12/L (ref 4.4–5.9)
SODIUM SERPL-SCNC: 141 MMOL/L (ref 133–144)
SPECIMEN SOURCE: ABNORMAL
WBC # BLD AUTO: 3.8 10E9/L (ref 4–11)

## 2019-07-06 PROCEDURE — 92610 EVALUATE SWALLOWING FUNCTION: CPT | Mod: GN | Performed by: SPEECH-LANGUAGE PATHOLOGIST

## 2019-07-06 PROCEDURE — 25000132 ZZH RX MED GY IP 250 OP 250 PS 637: Mod: GY | Performed by: INTERNAL MEDICINE

## 2019-07-06 PROCEDURE — 99233 SBSQ HOSP IP/OBS HIGH 50: CPT | Performed by: HOSPITALIST

## 2019-07-06 PROCEDURE — 80048 BASIC METABOLIC PNL TOTAL CA: CPT | Performed by: HOSPITALIST

## 2019-07-06 PROCEDURE — 92526 ORAL FUNCTION THERAPY: CPT | Mod: GN | Performed by: SPEECH-LANGUAGE PATHOLOGIST

## 2019-07-06 PROCEDURE — 85027 COMPLETE CBC AUTOMATED: CPT | Performed by: HOSPITALIST

## 2019-07-06 PROCEDURE — 21000001 ZZH R&B HEART CARE

## 2019-07-06 PROCEDURE — 99207 ZZC CDG-MDM COMPONENT: MEETS MODERATE - UP CODED: CPT | Performed by: HOSPITALIST

## 2019-07-06 PROCEDURE — 99232 SBSQ HOSP IP/OBS MODERATE 35: CPT | Performed by: INTERNAL MEDICINE

## 2019-07-06 PROCEDURE — 36415 COLL VENOUS BLD VENIPUNCTURE: CPT | Performed by: HOSPITALIST

## 2019-07-06 PROCEDURE — 25000128 H RX IP 250 OP 636: Performed by: INTERNAL MEDICINE

## 2019-07-06 PROCEDURE — 25000132 ZZH RX MED GY IP 250 OP 250 PS 637: Mod: GY | Performed by: HOSPITALIST

## 2019-07-06 RX ORDER — FUROSEMIDE 10 MG/ML
60 INJECTION INTRAMUSCULAR; INTRAVENOUS
Status: DISCONTINUED | OUTPATIENT
Start: 2019-07-06 | End: 2019-07-08

## 2019-07-06 RX ADMIN — FUROSEMIDE 60 MG: 10 INJECTION, SOLUTION INTRAVENOUS at 09:03

## 2019-07-06 RX ADMIN — VANCOMYCIN HYDROCHLORIDE 125 MG: KIT at 16:40

## 2019-07-06 RX ADMIN — HYDROCODONE BITARTRATE AND ACETAMINOPHEN 1 TABLET: 7.5; 325 TABLET ORAL at 13:56

## 2019-07-06 RX ADMIN — DICLOFENAC 2 G: 10 GEL TOPICAL at 21:02

## 2019-07-06 RX ADMIN — SIMVASTATIN 20 MG: 20 TABLET, FILM COATED ORAL at 21:01

## 2019-07-06 RX ADMIN — DICYCLOMINE HYDROCHLORIDE 10 MG: 10 CAPSULE ORAL at 16:39

## 2019-07-06 RX ADMIN — GABAPENTIN 300 MG: 300 CAPSULE ORAL at 21:00

## 2019-07-06 RX ADMIN — HYPROMELLOSE 2910 2 DROP: 5 SOLUTION OPHTHALMIC at 17:40

## 2019-07-06 RX ADMIN — METOPROLOL TARTRATE 25 MG: 25 TABLET ORAL at 09:04

## 2019-07-06 RX ADMIN — VANCOMYCIN HYDROCHLORIDE 125 MG: KIT at 21:00

## 2019-07-06 RX ADMIN — ACETAMINOPHEN 650 MG: 325 TABLET, FILM COATED ORAL at 17:42

## 2019-07-06 RX ADMIN — HYDROCODONE BITARTRATE AND ACETAMINOPHEN 1 TABLET: 7.5; 325 TABLET ORAL at 21:59

## 2019-07-06 RX ADMIN — HYPROMELLOSE 2910 2 DROP: 5 SOLUTION OPHTHALMIC at 09:10

## 2019-07-06 RX ADMIN — HYPROMELLOSE 2910 2 DROP: 5 SOLUTION OPHTHALMIC at 12:16

## 2019-07-06 RX ADMIN — FUROSEMIDE 60 MG: 10 INJECTION, SOLUTION INTRAVENOUS at 16:39

## 2019-07-06 RX ADMIN — VANCOMYCIN HYDROCHLORIDE 125 MG: KIT at 09:12

## 2019-07-06 RX ADMIN — PANTOPRAZOLE SODIUM 40 MG: 40 TABLET, DELAYED RELEASE ORAL at 09:08

## 2019-07-06 RX ADMIN — GABAPENTIN 300 MG: 300 CAPSULE ORAL at 16:39

## 2019-07-06 RX ADMIN — HYDROCODONE BITARTRATE AND ACETAMINOPHEN 1 TABLET: 7.5; 325 TABLET ORAL at 01:04

## 2019-07-06 RX ADMIN — HYPROMELLOSE 2910 2 DROP: 5 SOLUTION OPHTHALMIC at 21:02

## 2019-07-06 RX ADMIN — GABAPENTIN 300 MG: 300 CAPSULE ORAL at 09:02

## 2019-07-06 RX ADMIN — FINASTERIDE 5 MG: 5 TABLET, FILM COATED ORAL at 09:01

## 2019-07-06 RX ADMIN — TAMSULOSIN HYDROCHLORIDE 0.4 MG: 0.4 CAPSULE ORAL at 09:09

## 2019-07-06 RX ADMIN — METOPROLOL TARTRATE 25 MG: 25 TABLET ORAL at 21:01

## 2019-07-06 RX ADMIN — DICYCLOMINE HYDROCHLORIDE 10 MG: 10 CAPSULE ORAL at 09:09

## 2019-07-06 RX ADMIN — DICLOFENAC 2 G: 10 GEL TOPICAL at 16:38

## 2019-07-06 RX ADMIN — MELATONIN TAB 3 MG 3 MG: 3 TAB at 21:59

## 2019-07-06 RX ADMIN — DICYCLOMINE HYDROCHLORIDE 10 MG: 10 CAPSULE ORAL at 12:15

## 2019-07-06 RX ADMIN — DICLOFENAC 2 G: 10 GEL TOPICAL at 09:11

## 2019-07-06 RX ADMIN — POTASSIUM CHLORIDE 10 MEQ: 750 TABLET, EXTENDED RELEASE ORAL at 09:09

## 2019-07-06 ASSESSMENT — ACTIVITIES OF DAILY LIVING (ADL)
ADLS_ACUITY_SCORE: 18

## 2019-07-06 ASSESSMENT — MIFFLIN-ST. JEOR: SCORE: 1296.5

## 2019-07-06 NOTE — PROGRESS NOTES
[facility]    Hospitalist Progress Note      Assessment & Plan   Ivan Gomez is a 89 year old male who was admitted on 7/4/2019 for dyspnea, found to be in acute exacerbation of chronic diastolic congestive heart failure.    Acute exacerbation of chronic diastolic congestive heart failure  Pulmonary hypertension  Mitral regurgitation S/P mitral valve clip implantation  Hypertension  PTA Lasix 40 mg daily.  Hx 2 days of dyspnea which worsened on day of admission, associated with hypoxia. Received 40mg IV lasix in ED. BNP 47421 on admit. Was requiring supplemental O2 (up to 10L on admit). Was just discharged from TCU on 7/2.  - Continue IV lasix BID for now, increased to 60mg from 40 as he only lost 1.5 lbs from yesterday  - Monitor I/Os, daily weights  - Appreciate cardiology consult, plan to optimize volume status with IV diuresis and transition to PO in next 24-48 hours.  - No plans for anticoagulation  - Will consider palliative consult pending progress over weekend and patient interest.  - Wean supplemental O2 as able    New onset paroxysmal atrial fibrillation with rapid ventricular response  HR in the 130s on admission, converted to sinus rhythm after cardizem gtt initiated while in the ED. No further recurrence, likely triggered by exacerbation of CHF as above.   - Continue telemetry monitoring  - Metoprolol tartrate 25mg BID as BP tolerates (previously been on coreg--which BP did not tolerate)    Ground glass opacities, patchy airspace opacities on CT  Hx Dysphagia  CT showed patchy airspace and ground-glass opacities in both mid and lower lungs, suspicious for pneumonia.  Recent CXR June 20th also showed some LLL infiltrate, levaquin started then by discontinued after 3 doses due to worsening diarrhea. Pt denies cough, fever. WBC 5 and procalcitonin 0.09. Started on levaquin, will discontinue.  - Monitor for clinical signs/symptoms of infection, hold off on further abx in setting of on  "going c diff diarrhea.  - Continue DD3 diet with thin liquids.    Bladder mass  Hx bladder cancer and bladder mass. Urology was consulted when he was last here towards the end of May for his acute cholecystitis.  At that time Dr. Jerrod Mcmanus from Urology evaluated the patient and recommended outpatient followup with his primary urologist, Dr. Farnsworth.    Chronic kidney disease, stage III  Baseline Cr from 1.3 to 1.6.   - Currently stable at 1.45, continue to monitor with diuresis    Benign prostatic hypertrophy  We will continue PTA Flomax and proscar    Chronic anemia  Baseline 10-11, appears to be at baseline. No signs active bleeding  - Continue to monitor    Chronic thrombocytopenia  Baseline seems to be near 50-80 in the past month. Currently 74  - Continue to monitor.    C difficile diarrhea  Diagnosed during last admission (5/21) after having sxs x1 month.  - Vancomycin had been attempted to be stopped, but he developed worsening diarrhea, currently on vancomycin 125mg TID--had sxs in BID  - Defer further titration to outpatient setting with focus now on CHF exacerbation.  - Follow up GI in 1 month as previously arranged.    Placement  - Had been at TCU, plans to discharge to [facility].  - PT recommends TCU on discharge  - Care coordinator following    Chronic pain syndrome  Spinal stenosis of lumbar region  He reports having \"varicose vein\" surgery 3 months ago in [place], which did not help with his LE pain. Ankle and foot pain has improved with voltaren gel.   - Continue PTA gabapentin, voltaren gel, Norco.      DVT Prophylaxis: Pneumatic Compression Devices  Code Status: Full Code  Expected discharge: 2 - 3 days, TCU on discharge, await conversion to PO diuretics and stabilization    Arlyn Cherry, DO  Text Page (7am - 6pm)    Interval History   Patient seen and examined. He feels okay, still with some shortness of breath. Still with intermittent loose stools but tolerable. Denies chest pain, " abdominal pain, nausea, vomiting, constipation, fevers, chills, cough.    -Data reviewed today: I reviewed all new labs and imaging results over the last 24 hours. I personally reviewed no images or EKG's today. Sinus rhythm with PACs on telemetry    Echocardiogram 7/5:   1. Stable position of the MitraClip devices.  2. At least, moderately severe residual mitral regurgitation. Eccentric jet, anteriorly directed, reaching the superior surface of the left atrium.  3. No significant mitral stenosis. Mean diastolic mitral valve gradient is 3.3 mmHg (heart rate 73 bpm).  4. Massively dilated left atrium. Severely dilated right atrium.  5. Moderately severe (3+) tricuspid regurgitation. Findings consistent with significant pulmonary hypertension. Estimated pulmonary artery systolic pressure is 65-70 mmHg.  6. Normal left ventricular size, hyperdynamic systolic function, estimated LVEF 70-75%.  7. Moderately dilated right ventricle with moderately reduced systolic function. TAPSE 1.3 - 1.6 cm, tissue Doppler systolic velocity 9 cm/s.  8. Dilated inferior vena cava.    Physical Exam   Temp: 98.1  F (36.7  C) Temp src: Oral BP: 104/82   Heart Rate: 77 Resp: 20 SpO2: 95 % O2 Device: Nasal cannula Oxygen Delivery: 2 LPM  Vitals:    07/04/19 1552 07/05/19 0435 07/06/19 0600   Weight: 65.8 kg (145 lb) 67.8 kg (149 lb 8 oz) 67.2 kg (148 lb 2.4 oz)     Vital Signs with Ranges  Temp:  [97.4  F (36.3  C)-98.1  F (36.7  C)] 98.1  F (36.7  C)  Heart Rate:  [] 77  Resp:  [18-20] 20  BP: ()/(65-85) 104/82  SpO2:  [93 %-100 %] 95 %  I/O last 3 completed shifts:  In: 550 [P.O.:550]  Out: 300 [Urine:300]    Constitutional: Awake, alert, cooperative, no apparent distress  Respiratory: Clear to auscultation bilaterally with crackles at bilateral bases, no rhonchi  Cardiovascular: Regular rate and rhythm, normal S1 and S2, and +loud systolic murmur  GI: Normal bowel sounds, soft, non-distended, non-tender  Skin/Integumen: No  rashes, no cyanosis, 2+ pitting edema.  Other:     Medications       diclofenac  2 g Transdermal TID     dicyclomine  10 mg Oral TID AC     finasteride  5 mg Oral Daily     furosemide  40 mg Intravenous BID     gabapentin  300 mg Oral TID     hypromellose  2 drop Left Eye 4x Daily     melatonin  3 mg Oral At Bedtime     metoprolol tartrate  25 mg Oral BID     pantoprazole  40 mg Oral Daily     potassium chloride ER  10 mEq Oral Daily     simvastatin  20 mg Oral At Bedtime     tamsulosin  0.4 mg Oral Daily     vancomycin  125 mg Oral TID       Data   Recent Labs   Lab 07/06/19  0604 07/05/19  0605 07/05/19  0108 07/04/19  2146 07/04/19  1559   WBC 3.8* 5.0  --   --  7.6   HGB 10.6* 10.9*  --   --  11.9*   MCV 95 95  --   --  95   PLT 74* 65*  --   --  90*   INR  --   --   --   --  1.26*    142  --   --  140   POTASSIUM 3.6 3.7  --   --  3.8   CHLORIDE 104 105  --   --  105   CO2 29 31  --   --  30   BUN 43* 41*  --   --  41*   CR 1.45* 1.45*  --   --  1.43*   ANIONGAP 8 6  --   --  5   GIUSEPPE 8.6 8.4*  --   --  8.3*   GLC 85 95  --   --  108*   ALBUMIN  --   --   --   --  3.4   PROTTOTAL  --   --   --   --  7.6   BILITOTAL  --   --   --   --  1.1   ALKPHOS  --   --   --   --  126   ALT  --   --   --   --  20   AST  --   --   --   --  12   LIPASE  --   --   --   --  93   TROPI  --   --  <0.015 0.021 <0.015       No results found for this or any previous visit (from the past 24 hour(s)).

## 2019-07-06 NOTE — PROGRESS NOTES
[facility]    Cardiology Progress Note     Assessment & Plan   Ivan Gomez is a 89 year old male who was admitted on 7/4/2019. Cardiology was consulted for recommendations regarding CHF.    1.  New onset paroxysmal atrial fibrillation with RVR; spontaneously converted to sinus rhythm  2.  CHFpEF  3.  Valvular heart disease:  Moderately severe MR (despite hx of MitraClip), moderately severe TR  4.  Severe pulmonary hypertension  5.  Deconditioning    He made a long terms of progress of diuresis yesterday.  His creatinine is stable at 1.45.  Will increase Lasix to 60 mg IV twice a day.  Otherwise, will continue on metoprolol for rate control.  Telemetry shows frequent premature atrial contractions but no definite atrial fibrillation this morning.    Cardiology will continue to follow along.  Please page with any questions or concerns.    Leonid Padilla MD    Interval History   Overall, feels about the same.  No chest pain or chest discomfort.  Shortness of breath present.  Significant lower extremity edema.    Physical Exam   Temp: 97.7  F (36.5  C) Temp src: Oral BP: 106/73 Pulse: 82 Heart Rate: 56 Resp: 18 SpO2: 97 % O2 Device: Nasal cannula Oxygen Delivery: 2 LPM  Vitals:    07/04/19 1552 07/05/19 0435 07/06/19 0600   Weight: 65.8 kg (145 lb) 67.8 kg (149 lb 8 oz) 67.2 kg (148 lb 2.4 oz)     Vital Signs with Ranges  Temp:  [97.4  F (36.3  C)-98.1  F (36.7  C)] 97.7  F (36.5  C)  Pulse:  [82] 82  Heart Rate:  [] 56  Resp:  [18-20] 18  BP: ()/(65-82) 106/73  SpO2:  [93 %-100 %] 97 %  I/O last 3 completed shifts:  In: 550 [P.O.:550]  Out: 300 [Urine:300]    Constitutional: No apparent distress.   Eyes: No xanthelasma or conjunctivitis  Respiratory: Crackles at the bases bilaterally.  Cardiovascular: Regular rate with frequent premature beats. Normal S1 and S2.  Grade 3/6 systolic murmur heard best at the apex with an additional grade 2/6 murmur heard best at the left lower sternal  border.  Extremities: 3+, bilateral lower extremity edema  Neurologic: Moving all extremities. No facial assymmetry.  Psychiatric: Alert and oriented. Answers questions appropriately.     Medications       diclofenac  2 g Transdermal TID     dicyclomine  10 mg Oral TID AC     finasteride  5 mg Oral Daily     furosemide  60 mg Intravenous BID     gabapentin  300 mg Oral TID     hypromellose  2 drop Left Eye 4x Daily     melatonin  3 mg Oral At Bedtime     metoprolol tartrate  25 mg Oral BID     pantoprazole  40 mg Oral Daily     potassium chloride ER  10 mEq Oral Daily     simvastatin  20 mg Oral At Bedtime     tamsulosin  0.4 mg Oral Daily     vancomycin  125 mg Oral TID       Data   Results for orders placed or performed during the hospital encounter of 19 (from the past 24 hour(s))   Echocardiogram Limited    Narrative    [id number]  [id number]  [id number]  [id number]^[id number]^RENAE           [facility]  Echocardiography Laboratory  [address] [place] [address]        Name: MALLORIE NOVA  MRN: [id number]  : 10/12/1926  Study Date: 2019 10:00 AM  Age: 89 yrs  Gender: Male  Patient Location: [facility]  Reason For Study: CHF  Ordering Physician: RENAE DE LA GARZA  Referring Physician: EDIS ARNDT  Performed By: Porfirio Cameron RDCS     BSA: 1.8 m2  Height: 68 in  Weight: 145 lb  HR: 93  BP: 105/85 mmHg  _____________________________________________________________________________  __        Procedure  Limited Portable Echo Adult. Optison (NDC #[id number]) given intravenously.  _____________________________________________________________________________  __        Interpretation Summary     Sinus rhythm was noted.  Status post transcatheter mitral valve repair with MitraClip x 2, 10/16/2017.     1. Stable position of the MitraClip devices.  2. At least, moderately severe residual mitral regurgitation. Eccentric jet,  anteriorly directed, reaching the superior surface of the left  atrium.  3. No significant mitral stenosis. Mean diastolic mitral valve gradient is 3.3  mmHg (heart rate 73 bpm).  4. Massively dilated left atrium. Severely dilated right atrium.  5. Moderately severe (3+) tricuspid regurgitation. Findings consistent with  significant pulmonary hypertension. Estimated pulmonary artery systolic  pressure is 65-70 mmHg.  6. Normal left ventricular size, hyperdynamic systolic function, estimated  LVEF 70-75%.  7. Moderately dilated right ventricle with moderately reduced systolic  function. TAPSE 1.3 - 1.6 cm, tissue Doppler systolic velocity 9 cm/s.  8. Dilated inferior vena cava.     No significant changes compared to previous study dated 5/23/2019.  _____________________________________________________________________________  __        Left Ventricle  The left ventricle is normal in size. There is severe eccentric left  ventricular hypertrophy. Hyperdynamic left ventricular function. The visual  ejection fraction is estimated at >70%. Diastolic function not assessed due to  presence of prosthetic mitral valve. No regional wall motion abnormalities  noted. Flattened septum is consistent with RV pressure/volume overload.     Right Ventricle  The right ventricle is mildly dilated. Moderately decreased right ventricular  systolic function. TAPSE 1.3 - 1.6 cm, tissue Doppler systolic velocity 9  cm/s.     Atria  The left atrium is severely dilated. The right atrium is severely dilated.  Atrial septum not well visualized.     Mitral Valve  The mitral valve leaflets appear normal. There is no evidence of stenosis,  fluttering, or prolapse. There is mod-severe to severe (3-4+) mitral  regurgitation. The mitral regurgitant jet is eccentrically directed. The  mitral regurgitant jet is anteriorly directed, which is consistent with  posterior leaflet pathology. (anteriorly directed). The mean mitral valve  gradient is 3.3 mmHg. Heart rate 73 BPM.        Tricuspid Valve  Normal tricuspid  valve. There is moderately severe (3+) tricuspid  regurgitation. The right ventricular systolic pressure is approximated at 50.7  mmHg plus the right atrial pressure.     Aortic Valve  The aortic valve is trileaflet. There is trace aortic regurgitation. No  hemodynamically significant valvular aortic stenosis.     Pulmonic Valve  The pulmonic valve is not well visualized. There is trace pulmonic valvular  regurgitation.     Vessels  The aortic root is normal size. Ascending aorta not well visualized. Dilation  of the inferior vena cava is present with abnormal respiratory variation in  diameter. IVC diameter >2.1 cm collapsing <50% with sniff suggests a high RA  pressure estimated at 15 mmHg or greater.     Pericardium  There is no pericardial effusion.        Rhythm  Sinus rhythm was noted.  _____________________________________________________________________________  __  MMode/2D Measurements & Calculations  IVSd: 1.5 cm     LVIDd: 5.2 cm  LVIDs: 3.8 cm  LVPWd: 0.80 cm  FS: 27.6 %  LV mass(C)d: 244.0 grams  LV mass(C)dI: 136.9 grams/m2  Ao root diam: 3.7 cm  LA dimension: 6.0 cm  LA/Ao: 1.6  RWT: 0.31        Doppler Measurements & Calculations  MV max P.7 mmHg  MV mean PG: 3.3 mmHg  MV V2 VTI: 55.2 cm  TR max reina: 355.6 cm/sec  TR max P.7 mmHg              _____________________________________________________________________________  __        Report approved by: Dr Melissa Jain 2019 11:36 AM      Care Transition RN/SW IP Consult    Narrative    Pushpa Regan LICSW     2019  4:14 PM  Care Transition Initial Assessment - SW     Met with: Patient    Active Problems:    CHF exacerbation (H)       DATA  Lives With: facility resident([facility])      Who is your support system?: Sibling(s)  Identified issues/concerns regarding health management:     Per care transitions consult for discharge planning.  Patient was   admitted on 19 from [facility].  The tentative date of discharge   is yet to be  determined.  Reviewed chart and spoke with patient   regarding discharge plans.  Per patient report, patient was   admitted from [facility].  Patient states that all of his belongings   are still at [facility].  They are all of his belongings that he   brought with him from [place].  Patient states his significant   other has passed away.  Inquired as to who may be assisted with   his affairs.  Patient states his brother in [place] is   starting to get confused so his nephew is helping him to a   certain extent and so is his significant other's daughter Nicolasa.    Call placed to [facility] to check the beds hold status.  Per   Divya, patient was scheduled to discharge from [facility] today   and be admitted to [facility] today.  Call placed to [facility] to inquire   as to whether they have spoken with anyone regarding this today.    Per Harper, she has not spoken with anyone.  Patient was planning   on being admitted under MA pending.  Harper states she will plan   on holding the bed until at least Monday.  Spoke with patient to   inquire as tow whether he is planning on going to [facility] on   discharge.  Patient states he is unsure, but he thinks so.    Explained to patient that I was unsure when he was being   discharged, but I would assist him with any discharge plans.    Patient is in agreement.     ASSESSMENT  Cognitive Status:  awake and alert  Concerns to be addressed: discharge planning, SNF on discharge.     PLAN  Financial costs for the patient includes N/A.  Patient given options and choices for discharge discharge to [facility],   when appropriate.  Patient/family is agreeable to the plan?  Yes  Transportation/person available to transport on day of discharge    is TBD and have they been notified/set up TBD  Patient Goals and Preferences: [facility] on discharge.  Patient anticipates discharging to:  [facility].    Will continue to follow and assist with a safe discharge plan.    NATASHA New, [facility]  [phone]         EKG 12-lead,  tracing only   Result Value Ref Range    Interpretation ECG Click View Image link to view waveform and result    Basic metabolic panel   Result Value Ref Range    Sodium 141 133 - 144 mmol/L    Potassium 3.6 3.4 - 5.3 mmol/L    Chloride 104 94 - 109 mmol/L    Carbon Dioxide 29 20 - 32 mmol/L    Anion Gap 8 3 - 14 mmol/L    Glucose 85 70 - 99 mg/dL    Urea Nitrogen 43 (H) 7 - 30 mg/dL    Creatinine 1.45 (H) 0.66 - 1.25 mg/dL    GFR Estimate 41 (L) >60 mL/min/[1.73_m2]    GFR Estimate If Black 48 (L) >60 mL/min/[1.73_m2]    Calcium 8.6 8.5 - 10.1 mg/dL   CBC with platelets   Result Value Ref Range    WBC 3.8 (L) 4.0 - 11.0 10e9/L    RBC Count 3.56 (L) 4.4 - 5.9 10e12/L    Hemoglobin 10.6 (L) 13.3 - 17.7 g/dL    Hematocrit 33.8 (L) 40.0 - 53.0 %    MCV 95 78 - 100 fl    MCH 29.8 26.5 - 33.0 pg    MCHC 31.4 (L) 31.5 - 36.5 g/dL    RDW 18.9 (H) 10.0 - 15.0 %    Platelet Count 74 (L) 150 - 450 10e9/L

## 2019-07-06 NOTE — PLAN OF CARE
Pt is A&O, VSS on 2L NC. Tele: SR with PAC's. [place] given for L heel pain. Effective. SBA to BR. DD3 diet with thin. Mepilex on coccyx and to L heel. +2 edema, on IV lasix. Discharge pending.

## 2019-07-06 NOTE — PLAN OF CARE
Discharge Planner SLP   Patient plan for discharge: Not stated.   Current status: Bedside swallow evaluation completed. Patient presents with mild to moderate oral and pharyngeal dysphagia at bedside, coupled with esophageal dysfunction. He demonstrated premature entry of thin liquid without overt Sx of aspiration via the cup. Sx of penetration or silent aspiration via the straw. Education provided on rationale not to use straws. He was able to tolerate pudding without difficulty. Mastication was prolonged for a soft solids with decreased AP movement of the bolus and minimal oral residue. Patient has dry mouth and has difficulty swallowing dry foods and larger pieces of meats. No overt Sx of aspiration noted during the evaluation. Recommend: 1. Continue on the Dysphagia Diet level 3 and thin liquids. 2. Up in a chair for all meals and remain in a chair 30-60 minutes after a meal. No straws, small bites/sips, check for oral residue and alternate liquids and solids as needed. Medications should be crushed and plcaed in apple sauce or pudding. Hold diet if changes in his respiratory status or other Sx of aspiration.   Barriers to return to prior living situation: Deconditioning.  Recommendations for discharge: Per PT recommendations.  Rationale for recommendations: Anticipate swallow goals to be met prior to discharge. Patient is at his baseline.        Entered by: Katy Lyon 07/06/2019 12:27 PM

## 2019-07-06 NOTE — PROGRESS NOTES
07/06/19 1159   General Information   Onset Date 07/04/19   Start of Care Date 07/06/19   Referring Physician Dr. Cherry   Patient Profile Review/OT: Additional Occupational Profile Info See Profile for full history and prior level of function   Patient/Family Goals Statement Patient did note state.   Swallowing Evaluation Bedside swallow evaluation   Behaviorial Observations Alert   Mode of current nutrition Oral diet   Type of oral diet Dysphagia diet level 3;Thin liquid   Respiratory Status Room air   Comments Ivan Gomez is a 89 year old male who was admitted on 7/4/2019 for dyspnea, found to be in acute exacerbation of chronic diastolic congestive heart failure. History of recurrent pneumonia CT showed patchy airspace and ground-glass opacities in both mid and lower lungs, suspicious for pneumonia.  Recent CXR June 20th also showed some LLL infiltrate, levaquin started then by discontinued after 3 doses due to worsening diarrhea. Pt denies cough, fever. WBC 5 and procalcitonin 0.09. Started on levaquin, will discontinue.   Clinical Swallow Evaluation   Oral Musculature generally intact   Structural Abnormalities none present   Dentition present and adequate   Mucosal Quality dry   Mandibular Strength and Mobility intact   Oral Labial Strength and Mobility WFL   Lingual Strength and Mobility WFL   Velar Elevation intact   Buccal Strength and Mobility impaired   Laryngeal Function Cough;Throat clear;Swallow;Voicing initiated;Dry swallow palpated   Oral Musculature Comments Mild impairment   Additional Documentation Yes   Additional evaluation(s) completed today Yes   Swallow Eval   Feeding Assistance set up only required   Clinical Swallow Eval: Thin Liquid Texture Trial   Mode of Presentation, Thin Liquids cup;self-fed   Volume of Liquid or Food Presented 3 oz of water   Oral Phase of Swallow Premature pharyngeal entry   Pharyngeal Phase of Swallow repeated swallows;impaired   Diagnostic Statement  Premature entry without overt Sx of aspiration.    Clinical Swallow Eval: Puree Solid Texture Trial   Mode of Presentation, Puree spoon;self-fed   Volume of Puree Presented 3 oz of pudding   Oral Phase, Puree WFL   Pharyngeal Phase, Puree intact   Clinical Swallow Eval: Semisolid Texture Trial   Mode of Presentation, Semisolid spoon;self-fed   Volume of Semisolid Food Presented 4 teaspoons   Oral Phase, Semisolid Residue in oral cavity   Oral Residue, Semisolid mid posterior tongue   Pharyngeal Phase, Semisolid impaired;reduction in laryngeal movement;repeated swallows   Diagnostic Statement Minimal oral residue on the mid tongue.    Swallow Compensations   Swallow Compensations Alternate viscosity of consistencies;Pacing;Reduce amounts;Multiple swallow   Results Oral difficulties only;Suspect silent aspiration   Esophageal Phase of Swallow   Patient reports or presents with symptoms of esophageal dysphagia Yes   Esophageal comments Esophagitis with pill erosion.    General Therapy Interventions   Planned Therapy Interventions Dysphagia Treatment   Dysphagia treatment Modified diet education;Instruction of safe swallow strategies   Swallow Eval: Clinical Impressions   Skilled Criteria for Therapy Intervention Skilled criteria met.  Treatment indicated.   Functional Assessment Scale (FAS) 4   Treatment Diagnosis Mild to moderate oral and pharyngeal dysphagia   Diet texture recommendations Dysphagia diet level 3;Thin liquids   Recommended Feeding/Eating Techniques alternate between small bites and sips of food/liquid;maintain upright posture during/after eating for 30 mins;no straws;small sips/bites   Therapy Frequency 3x/week  (1 meal f/u.)   Predicted Duration of Therapy Intervention (days/wks) 1 week   Anticipated Discharge Disposition inpatient rehabilitation facility   Risks and Benefits of Treatment have been explained. Yes   Patient, family and/or staff in agreement with Plan of Care Yes   Clinical Impression  Comments Patient presents with mild to moderate oral and pharyngeal dysphagia at bedside, coupled with esophageal dysfunction. He demonstrated premature entry of thin liquid without overt Sx of aspiration via the cup. Sx of penetration or silent aspiration via the straw. Education provided on rationale not to use straws. He was able to tolerate pudding without difficulty. Mastication was prolonged for a soft solids with decreased AP movement of the bolus and minimal oral residue. Patient has dry mouth and has difficulty swallowing dry foods and larger pieces of meats. No overt Sx of aspiration noted during the evaluation. Recommend: 1. Continue on the Dysphagia Diet level 3 and thin liquids. 2. Up in a chair for all meals and remain in a chair 30-60 minutes after a meal. No straws, small bites/sips, check for oral residue and alternate liquids and solids as needed. Medications should be crushed and plcaed in apple sauce or pudding.    Total Evaluation Time   Total Evaluation Time (Minutes) 15

## 2019-07-06 NOTE — PLAN OF CARE
Pt A&O, VSS on 2 L 02 overnight. Tele A-fib CVR. Pt denies CP, dizziness, and SOB. Up w SBA to BR, loose stool x1. Plan for IV lasix, wean 02 as able, oral vanco for c-diff, and consider palliative pending progress over the weekend. Will continue to monitor.

## 2019-07-06 NOTE — PROGRESS NOTES
DANNY  I: DANNY spoke with Haven from [facility] who confirmed they do have a bed for patient in LTC when ready for discharge. DANNY will keep [facility] updated.     P: DANNY will continue to follow and assist as needed.    Kristy Zimmerman, DANITZA   *[phone]

## 2019-07-07 LAB
ANION GAP SERPL CALCULATED.3IONS-SCNC: 7 MMOL/L (ref 3–14)
BUN SERPL-MCNC: 41 MG/DL (ref 7–30)
CALCIUM SERPL-MCNC: 8.5 MG/DL (ref 8.5–10.1)
CHLORIDE SERPL-SCNC: 105 MMOL/L (ref 94–109)
CO2 SERPL-SCNC: 28 MMOL/L (ref 20–32)
CREAT SERPL-MCNC: 1.33 MG/DL (ref 0.66–1.25)
ERYTHROCYTE [DISTWIDTH] IN BLOOD BY AUTOMATED COUNT: 18.4 % (ref 10–15)
GFR SERPL CREATININE-BSD FRML MDRD: 46 ML/MIN/{1.73_M2}
GLUCOSE SERPL-MCNC: 76 MG/DL (ref 70–99)
HCT VFR BLD AUTO: 34.9 % (ref 40–53)
HGB BLD-MCNC: 10.9 G/DL (ref 13.3–17.7)
MAGNESIUM SERPL-MCNC: 1.6 MG/DL (ref 1.6–2.3)
MCH RBC QN AUTO: 29.4 PG (ref 26.5–33)
MCHC RBC AUTO-ENTMCNC: 31.2 G/DL (ref 31.5–36.5)
MCV RBC AUTO: 94 FL (ref 78–100)
PLATELET # BLD AUTO: 70 10E9/L (ref 150–450)
POTASSIUM SERPL-SCNC: 4.1 MMOL/L (ref 3.4–5.3)
RBC # BLD AUTO: 3.71 10E12/L (ref 4.4–5.9)
SODIUM SERPL-SCNC: 140 MMOL/L (ref 133–144)
WBC # BLD AUTO: 5 10E9/L (ref 4–11)

## 2019-07-07 PROCEDURE — 21000001 ZZH R&B HEART CARE

## 2019-07-07 PROCEDURE — 25000132 ZZH RX MED GY IP 250 OP 250 PS 637: Mod: GY | Performed by: INTERNAL MEDICINE

## 2019-07-07 PROCEDURE — 36415 COLL VENOUS BLD VENIPUNCTURE: CPT | Performed by: INTERNAL MEDICINE

## 2019-07-07 PROCEDURE — 83735 ASSAY OF MAGNESIUM: CPT | Performed by: HOSPITALIST

## 2019-07-07 PROCEDURE — 99207 ZZC MOONLIGHTING INDICATOR: CPT | Performed by: INTERNAL MEDICINE

## 2019-07-07 PROCEDURE — 85027 COMPLETE CBC AUTOMATED: CPT | Performed by: INTERNAL MEDICINE

## 2019-07-07 PROCEDURE — 25000132 ZZH RX MED GY IP 250 OP 250 PS 637: Mod: GY | Performed by: HOSPITALIST

## 2019-07-07 PROCEDURE — 25000128 H RX IP 250 OP 636: Performed by: INTERNAL MEDICINE

## 2019-07-07 PROCEDURE — 99232 SBSQ HOSP IP/OBS MODERATE 35: CPT | Performed by: INTERNAL MEDICINE

## 2019-07-07 PROCEDURE — 99233 SBSQ HOSP IP/OBS HIGH 50: CPT | Performed by: INTERNAL MEDICINE

## 2019-07-07 PROCEDURE — 80048 BASIC METABOLIC PNL TOTAL CA: CPT | Performed by: HOSPITALIST

## 2019-07-07 PROCEDURE — 36415 COLL VENOUS BLD VENIPUNCTURE: CPT | Performed by: HOSPITALIST

## 2019-07-07 RX ADMIN — DICLOFENAC 2 G: 10 GEL TOPICAL at 17:01

## 2019-07-07 RX ADMIN — GABAPENTIN 300 MG: 300 CAPSULE ORAL at 21:31

## 2019-07-07 RX ADMIN — DICLOFENAC 2 G: 10 GEL TOPICAL at 09:00

## 2019-07-07 RX ADMIN — DICLOFENAC 2 G: 10 GEL TOPICAL at 21:32

## 2019-07-07 RX ADMIN — HYDROCODONE BITARTRATE AND ACETAMINOPHEN 1 TABLET: 7.5; 325 TABLET ORAL at 23:19

## 2019-07-07 RX ADMIN — VANCOMYCIN HYDROCHLORIDE 125 MG: KIT at 17:01

## 2019-07-07 RX ADMIN — GABAPENTIN 300 MG: 300 CAPSULE ORAL at 08:59

## 2019-07-07 RX ADMIN — SIMVASTATIN 20 MG: 20 TABLET, FILM COATED ORAL at 21:31

## 2019-07-07 RX ADMIN — POTASSIUM CHLORIDE 10 MEQ: 750 TABLET, EXTENDED RELEASE ORAL at 09:00

## 2019-07-07 RX ADMIN — DICYCLOMINE HYDROCHLORIDE 10 MG: 10 CAPSULE ORAL at 11:53

## 2019-07-07 RX ADMIN — GABAPENTIN 300 MG: 300 CAPSULE ORAL at 17:01

## 2019-07-07 RX ADMIN — HYDROCODONE BITARTRATE AND ACETAMINOPHEN 1 TABLET: 7.5; 325 TABLET ORAL at 06:16

## 2019-07-07 RX ADMIN — DICYCLOMINE HYDROCHLORIDE 10 MG: 10 CAPSULE ORAL at 06:16

## 2019-07-07 RX ADMIN — VANCOMYCIN HYDROCHLORIDE 125 MG: KIT at 21:32

## 2019-07-07 RX ADMIN — FINASTERIDE 5 MG: 5 TABLET, FILM COATED ORAL at 08:59

## 2019-07-07 RX ADMIN — MELATONIN TAB 3 MG 3 MG: 3 TAB at 21:31

## 2019-07-07 RX ADMIN — VANCOMYCIN HYDROCHLORIDE 125 MG: KIT at 08:59

## 2019-07-07 RX ADMIN — TAMSULOSIN HYDROCHLORIDE 0.4 MG: 0.4 CAPSULE ORAL at 08:59

## 2019-07-07 RX ADMIN — FUROSEMIDE 60 MG: 10 INJECTION, SOLUTION INTRAVENOUS at 08:58

## 2019-07-07 RX ADMIN — FUROSEMIDE 60 MG: 10 INJECTION, SOLUTION INTRAVENOUS at 17:01

## 2019-07-07 RX ADMIN — HYPROMELLOSE 2910 2 DROP: 5 SOLUTION OPHTHALMIC at 09:00

## 2019-07-07 RX ADMIN — ACETAMINOPHEN 650 MG: 325 TABLET, FILM COATED ORAL at 17:01

## 2019-07-07 RX ADMIN — HYPROMELLOSE 2910 2 DROP: 5 SOLUTION OPHTHALMIC at 17:01

## 2019-07-07 RX ADMIN — HYPROMELLOSE 2910 2 DROP: 5 SOLUTION OPHTHALMIC at 21:32

## 2019-07-07 RX ADMIN — PANTOPRAZOLE SODIUM 40 MG: 40 TABLET, DELAYED RELEASE ORAL at 08:59

## 2019-07-07 RX ADMIN — DICYCLOMINE HYDROCHLORIDE 10 MG: 10 CAPSULE ORAL at 17:01

## 2019-07-07 RX ADMIN — HYPROMELLOSE 2910 2 DROP: 5 SOLUTION OPHTHALMIC at 11:54

## 2019-07-07 ASSESSMENT — ACTIVITIES OF DAILY LIVING (ADL)
BATHING: 1-->ASSISTIVE EQUIPMENT
ADLS_ACUITY_SCORE: 18
ADLS_ACUITY_SCORE: 20
ADLS_ACUITY_SCORE: 18
SWALLOWING: 0-->SWALLOWS FOODS/LIQUIDS WITHOUT DIFFICULTY
COGNITION: 0 - NO COGNITION ISSUES REPORTED
DRESS: 2-->ASSISTIVE PERSON
TRANSFERRING: 1-->ASSISTIVE EQUIPMENT
NUMBER_OF_TIMES_PATIENT_HAS_FALLEN_WITHIN_LAST_SIX_MONTHS: 1
ADLS_ACUITY_SCORE: 20
RETIRED_EATING: 0-->INDEPENDENT
FALL_HISTORY_WITHIN_LAST_SIX_MONTHS: YES
TOILETING: 1-->ASSISTIVE EQUIPMENT
WHICH_OF_THE_ABOVE_FUNCTIONAL_RISKS_HAD_A_RECENT_ONSET_OR_CHANGE?: AMBULATION;EATING
ADLS_ACUITY_SCORE: 18
AMBULATION: 1-->ASSISTIVE EQUIPMENT
RETIRED_COMMUNICATION: 0-->UNDERSTANDS/COMMUNICATES WITHOUT DIFFICULTY
ADLS_ACUITY_SCORE: 20

## 2019-07-07 ASSESSMENT — MIFFLIN-ST. JEOR: SCORE: 1284.48

## 2019-07-07 NOTE — PLAN OF CARE
Heart Failure Care Pathway  GOALS TO BE MET BEFORE DISCHARGE:    1. Decrease congestion and/or edema with diuretic therapy to achieve near      optimal volume status.            Dyspnea improved:  Yes            Edema improved:     Yes        Net I/O and Weights since admission:          06/07 1500 - 07/07 1459  In: 1730 [P.O.:1730]  Out: 750 [Urine:750]  Net: 980 (Urine occurrences have been charted). Output skewed based on this fact. Pt knows we re trying to monitor amount of urine now.            Vitals:    07/04/19 1552 07/05/19 0435 07/06/19 0600 07/07/19 0600   Weight: 65.8 kg (145 lb) 67.8 kg (149 lb 8 oz) 67.2 kg (148 lb 2.4 oz) 66 kg (145 lb 8 oz)       2.  O2 sats > 92% on RA or at prior home O2 therapy level.          Current oxygenation status:       SpO2: 92 %         O2 Device: None (Room air),  Oxygen Delivery: 4 LPM         Able to wean O2 this shift to keep sats > 92%:  Yes       Does patient use Home O2? No    3.  Tolerates ambulation and mobility near baseline: No, please explain: Still assist of one with walker.        How many times did the patient ambulate with nursing staff this shift?  Due to dyspnea, Pt ambulating in room only.  Please review the Heart Failure Care Pathway for additional HF goal outcomes.    Elias Carlisle RN  7/7/2019     A&O x4. Up with one and a walker. Tele SR with PAC's and PVC's. Lungs crackles. Tolerating diet. Voiding without difficulty. Plan is to keep diuresing. Monitor I&O's. Hat placed per shift for clearer monitoring. Vanco for C diff. Metoprolol for rate control. Plan is to switch to PO when diuresis improves. Palliative consulted as well.

## 2019-07-07 NOTE — PLAN OF CARE
Heart Failure Care Pathway  GOALS TO BE MET BEFORE DISCHARGE:    1. Decrease congestion and/or edema with diuretic therapy to achieve near      optimal volume status.            Dyspnea improved:  Yes            Edema improved:     No, please explain: continues with bilateral LE edema up to the thigh's         Net I/O and Weights since admission:          06/07 0700 - 07/07 0659  In: 1370 [P.O.:1370]  Out: 450 [Urine:450]  Net: 920            Vitals:    07/04/19 1552 07/05/19 0435 07/06/19 0600 07/07/19 0600   Weight: 65.8 kg (145 lb) 67.8 kg (149 lb 8 oz) 67.2 kg (148 lb 2.4 oz) 66 kg (145 lb 8 oz)         2.  O2 sats > 92% on RA or at prior home O2 therapy level.          Current oxygenation status:       SpO2: 96 %         O2 Device: None (Room air),  Oxygen Delivery: 4 LPM         Able to wean O2 this shift to keep sats > 92%:  Yes       Does patient use Home O2? No    3.  Tolerates ambulation and mobility near baseline: NO        How many times did the patient ambulate with nursing staff this shift? 3 - in room to bathroom only- did not walk the halls r/t low activity tolerance.     Pt c/o pain in buttock, received norco with some improvement. Coccyx mepilex CDI, heel dressing also intact. Q2H T&R. Vitals stable, weaned o2 this shift. Up w/ A-1 and walker. Tele SR w/ PAC's and PVC's. Continue to diurese. discharge planning to [facility].     Please review the Heart Failure Care Pathway for additional HF goal outcomes.    Anastasiya Resendiz RN  7/7/2019

## 2019-07-07 NOTE — PROGRESS NOTES
[facility]    Cardiology Progress Note     Assessment & Plan   Ivan Gomez is a 89 year old male who was admitted on 7/4/2019. Cardiology was consulted for recommendations regarding CHF.     1.  New onset paroxysmal atrial fibrillation with RVR; spontaneously converted to sinus rhythm  2.  CHFpEF  3.  Valvular heart disease:  Moderately severe MR (despite hx of MitraClip), moderately severe TR  4.  Severe pulmonary hypertension  5.  Deconditioning    He still has evidence of substantial volume overload on exam.  I think a lot of this is lower extremity edema and I recommended that we wrap his legs to help mobilize his fluid.  He states that this is always been very painful for him in the past and is not interested in having this performed right now.  Discussed with him that it may take this longer to actually adequately diurese him if we continue in this fashion.  He states he is okay with this.    I suspect his ins and outs are not accurate.  His weight is actually down though he has minimal urine output charted and he tells me he goes to the bathroom about every 1-2 hours.  We will continue with current diuresis and observe his symptoms.  Anticipate another 2 to 3 days before he will be closer to euvolemic and we can transition him to oral diuretics.    Overall, poor prognosis given severe hypertension and valve disease.  Consider palliative care involvement either this hospitalization or in the outpatient setting.  Cardiology will continue to follow along.  Please page with any questions or concerns.    Leonid Padilla MD    Interval History   Overall feels about the same.  Shortness of breath is unchanged.  Lower extremity edema is also unchanged.  No chest pain or chest discomfort.    Physical Exam   Temp: 97.3  F (36.3  C) Temp src: Oral BP: 96/52   Heart Rate: 72 Resp: 20 SpO2: 92 % O2 Device: None (Room air) Oxygen Delivery: 4 LPM  Vitals:    07/05/19 0435 07/06/19 0600 07/07/19 0600    Weight: 67.8 kg (149 lb 8 oz) 67.2 kg (148 lb 2.4 oz) 66 kg (145 lb 8 oz)     Vital Signs with Ranges  Temp:  [97.1  F (36.2  C)-97.5  F (36.4  C)] 97.3  F (36.3  C)  Heart Rate:  [64-81] 72  Resp:  [18-20] 20  BP: ()/(52-78) 96/52  SpO2:  [91 %-96 %] 92 %  I/O last 3 completed shifts:  In: 960 [P.O.:960]  Out: -     Constitutional: No apparent distress.   Eyes: No xanthelasma or conjunctivitis  Respiratory: Crackles at the bases bilaterally.  Cardiovascular: Regular rate with frequent premature beats. Normal S1 and S2.  Grade 3/6 systolic murmur heard best at the apex with an additional grade 2/6 murmur heard best at the left lower sternal border.  Extremities: 3+, bilateral lower extremity edema  Neurologic: Moving all extremities. No facial assymmetry.  Psychiatric: Alert and oriented. Answers questions appropriately.     Medications       diclofenac  2 g Transdermal TID     dicyclomine  10 mg Oral TID AC     finasteride  5 mg Oral Daily     furosemide  60 mg Intravenous BID     gabapentin  300 mg Oral TID     hypromellose  2 drop Left Eye 4x Daily     melatonin  3 mg Oral At Bedtime     metoprolol tartrate  25 mg Oral BID     pantoprazole  40 mg Oral Daily     potassium chloride ER  10 mEq Oral Daily     simvastatin  20 mg Oral At Bedtime     tamsulosin  0.4 mg Oral Daily     vancomycin  125 mg Oral TID       Data   Results for orders placed or performed during the hospital encounter of 07/04/19 (from the past 24 hour(s))   Basic metabolic panel   Result Value Ref Range    Sodium 140 133 - 144 mmol/L    Potassium 4.1 3.4 - 5.3 mmol/L    Chloride 105 94 - 109 mmol/L    Carbon Dioxide 28 20 - 32 mmol/L    Anion Gap 7 3 - 14 mmol/L    Glucose 76 70 - 99 mg/dL    Urea Nitrogen 41 (H) 7 - 30 mg/dL    Creatinine 1.33 (H) 0.66 - 1.25 mg/dL    GFR Estimate 46 (L) >60 mL/min/[1.73_m2]    GFR Estimate If Black 53 (L) >60 mL/min/[1.73_m2]    Calcium 8.5 8.5 - 10.1 mg/dL   Magnesium   Result Value Ref Range     Magnesium 1.6 1.6 - 2.3 mg/dL   CBC with platelets   Result Value Ref Range    WBC 5.0 4.0 - 11.0 10e9/L    RBC Count 3.71 (L) 4.4 - 5.9 10e12/L    Hemoglobin 10.9 (L) 13.3 - 17.7 g/dL    Hematocrit 34.9 (L) 40.0 - 53.0 %    MCV 94 78 - 100 fl    MCH 29.4 26.5 - 33.0 pg    MCHC 31.2 (L) 31.5 - 36.5 g/dL    RDW 18.4 (H) 10.0 - 15.0 %    Platelet Count 70 (L) 150 - 450 10e9/L

## 2019-07-07 NOTE — PROGRESS NOTES
[facility]    Hospitalist Progress Note      Assessment & Plan   Ivan Gomez is a 89 year old male who was admitted on 7/4/2019 for dyspnea, found to be in acute exacerbation of chronic diastolic congestive heart failure.    Acute exacerbation of chronic diastolic congestive heart failure  Pulmonary hypertension  Mitral regurgitation S/P mitral valve clip implantation  Hypertension  PTA Lasix 40 mg daily.  Hx 2 days of dyspnea which worsened on day of admission, associated with hypoxia. Received 40mg IV lasix in ED. BNP 40881 on admit. Was requiring supplemental O2 (up to 10L on admit). Was just discharged from TCU on 7/2.  - Continue IV lasix BID for now, increased to 60mg from 40 as he only lost 1.5 lbs from yesterday  - Monitor I/Os, daily weights  - Appreciate cardiology consult, plan to optimize volume status with IV diuresis and transition to PO in next 24-48 hours.  - No plans for anticoagulation  - Will consider palliative consult pending progress over weekend and patient interest.  - Wean supplemental O2 as able  - Palliative consult    New onset paroxysmal atrial fibrillation with rapid ventricular response  HR in the 130s on admission, converted to sinus rhythm after cardizem gtt initiated while in the ED. No further recurrence, likely triggered by exacerbation of CHF as above.   - Continue telemetry monitoring  - Metoprolol tartrate 25mg BID as BP tolerates (previously been on coreg--which BP did not tolerate)    Ground glass opacities, patchy airspace opacities on CT  Hx Dysphagia  CT showed patchy airspace and ground-glass opacities in both mid and lower lungs, suspicious for pneumonia.  Recent CXR June 20th also showed some LLL infiltrate, levaquin started then by discontinued after 3 doses due to worsening diarrhea. Pt denies cough, fever. WBC 5 and procalcitonin 0.09. Started on levaquin, will discontinue.  - Monitor for clinical signs/symptoms of infection, hold off on further  "abx in setting of on going c diff diarrhea.  - Continue DD3 diet with thin liquids.    Bladder mass  Hx bladder cancer and bladder mass. Urology was consulted when he was last here towards the end of May for his acute cholecystitis.  At that time Dr. Jerrod Mcmanus from Urology evaluated the patient and recommended outpatient followup with his primary urologist, Dr. Farnsworth.    Chronic kidney disease, stage III  Baseline Cr from 1.3 to 1.6.   - Currently stable at 1.45, continue to monitor with diuresis    Benign prostatic hypertrophy  We will continue PTA Flomax and proscar    Chronic anemia  Baseline 10-11, appears to be at baseline. No signs active bleeding  - Continue to monitor    Chronic thrombocytopenia  Baseline seems to be near 50-80 in the past month. Currently 74  - Continue to monitor.    C difficile diarrhea  Diagnosed during last admission (5/21) after having sxs x1 month.  - Vancomycin had been attempted to be stopped, but he developed worsening diarrhea, currently on vancomycin 125mg TID--had sxs in BID  - Defer further titration to outpatient setting with focus now on CHF exacerbation.  - Follow up GI in 1 month as previously arranged.    Placement  - Had been at TCU, plans to discharge to [facility].  - PT recommends TCU on discharge  - Care coordinator following    Chronic pain syndrome  Spinal stenosis of lumbar region  He reports having \"varicose vein\" surgery 3 months ago in [place], which did not help with his LE pain. Ankle and foot pain has improved with voltaren gel.   - Continue PTA gabapentin, voltaren gel, Norco.      DVT Prophylaxis: Pneumatic Compression Devices  Code Status: Full Code  Expected discharge: 2 - 3 days, TCU on discharge, await conversion to PO diuretics and stabilization    Nakul Parks MD  Text Page (7am - 6pm)    Interval History   Patient seen and examined. Denies chest pain, abdominal pain, nausea, vomiting, constipation, fevers, chills, cough. Cardiology " suggested palliative consult    -Data reviewed today: I reviewed all new labs and imaging results over the last 24 hours. I personally reviewed no images or EKG's today. Sinus rhythm with PACs on telemetry    Echocardiogram 7/5:   1. Stable position of the MitraClip devices.  2. At least, moderately severe residual mitral regurgitation. Eccentric jet, anteriorly directed, reaching the superior surface of the left atrium.  3. No significant mitral stenosis. Mean diastolic mitral valve gradient is 3.3 mmHg (heart rate 73 bpm).  4. Massively dilated left atrium. Severely dilated right atrium.  5. Moderately severe (3+) tricuspid regurgitation. Findings consistent with significant pulmonary hypertension. Estimated pulmonary artery systolic pressure is 65-70 mmHg.  6. Normal left ventricular size, hyperdynamic systolic function, estimated LVEF 70-75%.  7. Moderately dilated right ventricle with moderately reduced systolic function. TAPSE 1.3 - 1.6 cm, tissue Doppler systolic velocity 9 cm/s.  8. Dilated inferior vena cava.    Physical Exam   Temp: 97.3  F (36.3  C) Temp src: Oral BP: 96/52   Heart Rate: 72 Resp: 20 SpO2: 92 % O2 Device: None (Room air) Oxygen Delivery: 4 LPM  Vitals:    07/05/19 0435 07/06/19 0600 07/07/19 0600   Weight: 67.8 kg (149 lb 8 oz) 67.2 kg (148 lb 2.4 oz) 66 kg (145 lb 8 oz)     Vital Signs with Ranges  Temp:  [97.1  F (36.2  C)-97.5  F (36.4  C)] 97.3  F (36.3  C)  Heart Rate:  [64-81] 72  Resp:  [18-20] 20  BP: ()/(52-78) 96/52  SpO2:  [91 %-96 %] 92 %  I/O last 3 completed shifts:  In: 960 [P.O.:960]  Out: -     Constitutional: Awake, alert, cooperative, no apparent distress  Respiratory: Clear to auscultation bilaterally with crackles at bilateral bases, no rhonchi  Cardiovascular: Regular rate and rhythm, normal S1 and S2, and +loud systolic murmur  GI: Normal bowel sounds, soft, non-distended, non-tender  Skin/Integumen: No rashes, no cyanosis, 2+ pitting  edema.  Other:     Medications       diclofenac  2 g Transdermal TID     dicyclomine  10 mg Oral TID AC     finasteride  5 mg Oral Daily     furosemide  60 mg Intravenous BID     gabapentin  300 mg Oral TID     hypromellose  2 drop Left Eye 4x Daily     melatonin  3 mg Oral At Bedtime     metoprolol tartrate  25 mg Oral BID     pantoprazole  40 mg Oral Daily     potassium chloride ER  10 mEq Oral Daily     simvastatin  20 mg Oral At Bedtime     tamsulosin  0.4 mg Oral Daily     vancomycin  125 mg Oral TID       Data   Recent Labs   Lab 07/07/19  0820 07/07/19  0639 07/06/19  0604 07/05/19  0605 07/05/19  0108 07/04/19  2146 07/04/19  1559   WBC 5.0  --  3.8* 5.0  --   --  7.6   HGB 10.9*  --  10.6* 10.9*  --   --  11.9*   MCV 94  --  95 95  --   --  95   PLT 70*  --  74* 65*  --   --  90*   INR  --   --   --   --   --   --  1.26*   NA  --  140 141 142  --   --  140   POTASSIUM  --  4.1 3.6 3.7  --   --  3.8   CHLORIDE  --  105 104 105  --   --  105   CO2  --  28 29 31  --   --  30   BUN  --  41* 43* 41*  --   --  41*   CR  --  1.33* 1.45* 1.45*  --   --  1.43*   ANIONGAP  --  7 8 6  --   --  5   GIUSEPPE  --  8.5 8.6 8.4*  --   --  8.3*   GLC  --  76 85 95  --   --  108*   ALBUMIN  --   --   --   --   --   --  3.4   PROTTOTAL  --   --   --   --   --   --  7.6   BILITOTAL  --   --   --   --   --   --  1.1   ALKPHOS  --   --   --   --   --   --  126   ALT  --   --   --   --   --   --  20   AST  --   --   --   --   --   --  12   LIPASE  --   --   --   --   --   --  93   TROPI  --   --   --   --  <0.015 0.021 <0.015       No results found for this or any previous visit (from the past 24 hour(s)).

## 2019-07-08 ENCOUNTER — APPOINTMENT (OUTPATIENT)
Dept: SPEECH THERAPY | Facility: CLINIC | Age: 84
DRG: 291 | End: 2019-07-08
Payer: MEDICARE

## 2019-07-08 ENCOUNTER — APPOINTMENT (OUTPATIENT)
Dept: PHYSICAL THERAPY | Facility: CLINIC | Age: 84
DRG: 291 | End: 2019-07-08
Payer: MEDICARE

## 2019-07-08 LAB
ALBUMIN SERPL-MCNC: 2.9 G/DL (ref 3.4–5)
ALP SERPL-CCNC: 99 U/L (ref 40–150)
ALT SERPL W P-5'-P-CCNC: 13 U/L (ref 0–70)
ANION GAP SERPL CALCULATED.3IONS-SCNC: 4 MMOL/L (ref 3–14)
AST SERPL W P-5'-P-CCNC: 11 U/L (ref 0–45)
BILIRUB SERPL-MCNC: 1 MG/DL (ref 0.2–1.3)
BUN SERPL-MCNC: 39 MG/DL (ref 7–30)
CALCIUM SERPL-MCNC: 8.4 MG/DL (ref 8.5–10.1)
CHLORIDE SERPL-SCNC: 105 MMOL/L (ref 94–109)
CO2 SERPL-SCNC: 33 MMOL/L (ref 20–32)
CREAT SERPL-MCNC: 1.3 MG/DL (ref 0.66–1.25)
GFR SERPL CREATININE-BSD FRML MDRD: 47 ML/MIN/{1.73_M2}
GLUCOSE SERPL-MCNC: 76 MG/DL (ref 70–99)
POTASSIUM SERPL-SCNC: 3.8 MMOL/L (ref 3.4–5.3)
PROT SERPL-MCNC: 6.4 G/DL (ref 6.8–8.8)
SODIUM SERPL-SCNC: 142 MMOL/L (ref 133–144)

## 2019-07-08 PROCEDURE — 97530 THERAPEUTIC ACTIVITIES: CPT | Mod: GP

## 2019-07-08 PROCEDURE — 25000128 H RX IP 250 OP 636: Performed by: INTERNAL MEDICINE

## 2019-07-08 PROCEDURE — 25000132 ZZH RX MED GY IP 250 OP 250 PS 637: Mod: GY | Performed by: INTERNAL MEDICINE

## 2019-07-08 PROCEDURE — 92526 ORAL FUNCTION THERAPY: CPT | Mod: GN | Performed by: SPEECH-LANGUAGE PATHOLOGIST

## 2019-07-08 PROCEDURE — 80053 COMPREHEN METABOLIC PANEL: CPT | Performed by: INTERNAL MEDICINE

## 2019-07-08 PROCEDURE — 25000125 ZZHC RX 250: Performed by: NURSE PRACTITIONER

## 2019-07-08 PROCEDURE — 97116 GAIT TRAINING THERAPY: CPT | Mod: GP

## 2019-07-08 PROCEDURE — 21000001 ZZH R&B HEART CARE

## 2019-07-08 PROCEDURE — 99232 SBSQ HOSP IP/OBS MODERATE 35: CPT | Performed by: INTERNAL MEDICINE

## 2019-07-08 PROCEDURE — 25000132 ZZH RX MED GY IP 250 OP 250 PS 637: Mod: GY | Performed by: HOSPITALIST

## 2019-07-08 PROCEDURE — 36415 COLL VENOUS BLD VENIPUNCTURE: CPT | Performed by: INTERNAL MEDICINE

## 2019-07-08 PROCEDURE — 99233 SBSQ HOSP IP/OBS HIGH 50: CPT | Performed by: PHYSICIAN ASSISTANT

## 2019-07-08 RX ADMIN — VANCOMYCIN HYDROCHLORIDE 125 MG: KIT at 16:07

## 2019-07-08 RX ADMIN — METOPROLOL TARTRATE 25 MG: 25 TABLET ORAL at 21:44

## 2019-07-08 RX ADMIN — HYPROMELLOSE 2910 2 DROP: 5 SOLUTION OPHTHALMIC at 08:13

## 2019-07-08 RX ADMIN — FINASTERIDE 5 MG: 5 TABLET, FILM COATED ORAL at 08:03

## 2019-07-08 RX ADMIN — TAMSULOSIN HYDROCHLORIDE 0.4 MG: 0.4 CAPSULE ORAL at 08:03

## 2019-07-08 RX ADMIN — GABAPENTIN 300 MG: 300 CAPSULE ORAL at 21:43

## 2019-07-08 RX ADMIN — GABAPENTIN 300 MG: 300 CAPSULE ORAL at 16:06

## 2019-07-08 RX ADMIN — HYPROMELLOSE 2910 2 DROP: 5 SOLUTION OPHTHALMIC at 21:46

## 2019-07-08 RX ADMIN — BUMETANIDE 0.25 MG/HR: 0.25 INJECTION INTRAMUSCULAR; INTRAVENOUS at 16:11

## 2019-07-08 RX ADMIN — HYDROCODONE BITARTRATE AND ACETAMINOPHEN 1 TABLET: 7.5; 325 TABLET ORAL at 08:03

## 2019-07-08 RX ADMIN — DICLOFENAC 2 G: 10 GEL TOPICAL at 21:46

## 2019-07-08 RX ADMIN — FUROSEMIDE 60 MG: 10 INJECTION, SOLUTION INTRAVENOUS at 08:03

## 2019-07-08 RX ADMIN — HYPROMELLOSE 2910 2 DROP: 5 SOLUTION OPHTHALMIC at 12:16

## 2019-07-08 RX ADMIN — VANCOMYCIN HYDROCHLORIDE 125 MG: KIT at 08:10

## 2019-07-08 RX ADMIN — DICYCLOMINE HYDROCHLORIDE 10 MG: 10 CAPSULE ORAL at 16:06

## 2019-07-08 RX ADMIN — METOPROLOL TARTRATE 25 MG: 25 TABLET ORAL at 08:02

## 2019-07-08 RX ADMIN — VANCOMYCIN HYDROCHLORIDE 125 MG: KIT at 21:44

## 2019-07-08 RX ADMIN — DICYCLOMINE HYDROCHLORIDE 10 MG: 10 CAPSULE ORAL at 06:50

## 2019-07-08 RX ADMIN — PANTOPRAZOLE SODIUM 40 MG: 40 TABLET, DELAYED RELEASE ORAL at 08:03

## 2019-07-08 RX ADMIN — DICLOFENAC 2 G: 10 GEL TOPICAL at 16:09

## 2019-07-08 RX ADMIN — MELATONIN TAB 3 MG 3 MG: 3 TAB at 21:44

## 2019-07-08 RX ADMIN — DICLOFENAC 2 G: 10 GEL TOPICAL at 08:13

## 2019-07-08 RX ADMIN — HYPROMELLOSE 2910 2 DROP: 5 SOLUTION OPHTHALMIC at 17:48

## 2019-07-08 RX ADMIN — SIMVASTATIN 20 MG: 20 TABLET, FILM COATED ORAL at 21:44

## 2019-07-08 RX ADMIN — GABAPENTIN 300 MG: 300 CAPSULE ORAL at 08:03

## 2019-07-08 RX ADMIN — POTASSIUM CHLORIDE 10 MEQ: 750 TABLET, EXTENDED RELEASE ORAL at 08:03

## 2019-07-08 RX ADMIN — DICYCLOMINE HYDROCHLORIDE 10 MG: 10 CAPSULE ORAL at 12:16

## 2019-07-08 ASSESSMENT — ACTIVITIES OF DAILY LIVING (ADL)
ADLS_ACUITY_SCORE: 20

## 2019-07-08 ASSESSMENT — MIFFLIN-ST. JEOR: SCORE: 1293.1

## 2019-07-08 NOTE — PLAN OF CARE
Pt is A/O x4. SBA with walker. VSS, RA. PRN Norco for back and coccyx pain. 2-3+ bilat LE edema. Lung sounds clear throughout. Pt states minimal ROMAN. Tele SR with 1st degree AVB. Started on Bumex gtt this evening @ 0.25mg/hr. DD3 diet with thin liquids, tolerates well. Dressing CDI (to be changed on odd days per orders). Awaiting CORE clinic eval, likely to discharge to LTC. Continue to diurese at this time.

## 2019-07-08 NOTE — PLAN OF CARE
Discharge Planner PT   Patient plan for discharge: LTC/Assisted Living.  Current status: Pt on RA, VS throughout session. Pt SBA for bed mobility and transfers. Pt tolerates ambulating 100' with FWW and SBA, no overt LOB noted  Barriers to return to prior living situation: Fall risk.  Recommendations for discharge: LTC  Rationale for recommendations: Pt appears to be near baseline with functional mobility. Pt will benefit from continued skilled PT during his stay in the acute setting to prevent debility and increase activity tolerance. Pt will need LTC or increased family support/assist upon discharge.        Entered by: Halei Moctezuma 07/08/2019 3:56 PM

## 2019-07-08 NOTE — PROGRESS NOTES
"SPIRITUAL HEALTH SERVICES Progress Note  [facility]    Initial visit per LOS. Pt was sitting up and watching TV upon arrival. Pt asked for assistance changing the channel and volume of the TV (SH also found the remote to be out of service, and passed along the concern to a nurse).    Pt welcomed reflective conversation. Pt said he is recovering from \"two heart problems.\" Pt explained that his life partner (Jerri) passed away two weeks ago. Pt said he is coping with the emotional pain and physical illness. Pt does not have family in the area but he has friends who visit him and Jerri' daughter (who lives in [place]) calls him. Pt is a member of [facility] and received spiritual/emotional support visits from [place]'s staff during his stay at [employer] [place].    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 \"If it's not one thing, it's another,\" pt said.     provided spiritual/emotional support and prayer. Pt invited SH to return based on LOS. SH will follow and remains available upon request.    Mirela Krause   Intern  Pager:  [phone]  "

## 2019-07-08 NOTE — PLAN OF CARE
Discharge Planner SLP   Patient plan for discharge: Did not state  Current status: Swallow Tx was provided this am.  Pt demonstrated a swallow delay and report of mild residue during po observation.  Increased wet vocal quality with slight throat clearing was noted.  Unable to rule out silent aspiration at bedside.  Note palliative care consult is pending.  Recommend continued precautions with a dysphagia diet level 3 and thin liquids including: Up in a chair for all meals and remain in a chair 30-60 minutes after a meal. No straws, small bites/sips, check for oral residue and alternate liquids and solids as needed. Medications should be crushed and plcaed in apple sauce or pudding. Hold diet if changes in his respiratory status or other Sx of aspiration.    Pending overall POC goals per palliative care consult, consider video swallow study for further assessment of aspiration risk given recurrent pneumonia and possible silent aspiration.    SLP to follow up as indicated for swallow Tx and/or video swallow study per palliative care outcome and orders.  Barriers to return to prior living situation: Level of assist  Recommendations for discharge: TCU with SLP  Rationale for recommendations:  SLP swallow Tx to maximize safety for a least restrictive diet       Entered by: Mirela Gale 07/08/2019 8:26 AM

## 2019-07-08 NOTE — PROGRESS NOTES
[facility]  Cardiology Progress Note  Date of Service: 07/08/2019  Primary Cardiologist: Previous patient of Dr. Yao    Assessment & Plan    Ivan Gomez is a 89 year old male with past medical history significant for HTN, HLP, CKD Stage III, bladder cancer s/p resection and reconstruction, mitral regurgitation s/p MitraClip, chornic anemia admitted on 7/4/2019 with dyspnea and hypoxia from [facility].     Assessment:   1. New onset paroxysmal atrial fibrillation    Spontaneously converted to SR    Metoprolol    No anticoagulation due to advanced age and thrombocytopenia    2. HFpEF [PTA: Lasix 40 mg daily]    BNP 17,000    Weight down 2 lbs    Net negative 500 ml    Lasix 60 mg IV bid      3. Valvular heart disease    Moderaterly severe MR post MitraClip    Moderately severe TR    4. C. Diff diarrhea    Diagnosed last admission 5/2019    Attempted to stop Vancomycin, but had worsening of diarrhea.     5. Severe pulmonary hypertension    6. CKD, stage III    Creatine stable between 1.3-1.5    Plan:  1. Stop IV Lasix  2. Start Bumex infusion  3. Palliative consult pending given poor overall prognosis    MECHELLE ADEN, APRN CNP  Pager:  [phone]   (7am - 5pm, M-F)    Interval History   Sitting up in chair without complaints of orthopnea, but shortness of breath on exertion when getting up to the toilet. Palliative consult pending.    Physical Exam   Temp: 97.4  F (36.3  C) Temp src: Oral BP: 105/67 Pulse: 74 Heart Rate: 65 Resp: 16 SpO2: 93 % O2 Device: None (Room air)    Vitals:    07/06/19 0600 07/07/19 0600 07/08/19 0129   Weight: 67.2 kg (148 lb 2.4 oz) 66 kg (145 lb 8 oz) 66.9 kg (147 lb 6.4 oz)       Constitutional:   NAD   Skin:   Warm and dry   Head:   Nontraumatic   Neck:   elevated JVP   Lungs:   Bilateral crackles   Cardiovascular:   regular rate and rhythm, normal S1 and S2 and 3/6 systolic murmur at the apex and 2/6 murmur at LLSB   Abdomen:   Benign   Extremities and  Back:   Edema 2+   Neurological:   Grossly nonfocal       Medications       diclofenac  2 g Transdermal TID     dicyclomine  10 mg Oral TID AC     finasteride  5 mg Oral Daily     furosemide  60 mg Intravenous BID     gabapentin  300 mg Oral TID     hypromellose  2 drop Left Eye 4x Daily     melatonin  3 mg Oral At Bedtime     metoprolol tartrate  25 mg Oral BID     pantoprazole  40 mg Oral Daily     potassium chloride ER  10 mEq Oral Daily     simvastatin  20 mg Oral At Bedtime     sodium chloride (PF)  3 mL Intracatheter Q8H     tamsulosin  0.4 mg Oral Daily     vancomycin  125 mg Oral TID       Data     Most Recent 3 CBC's:  Recent Labs   Lab Test 07/07/19  0820 07/06/19  0604 07/05/19  0605   WBC 5.0 3.8* 5.0   HGB 10.9* 10.6* 10.9*   MCV 94 95 95   PLT 70* 74* 65*     Most Recent 3 BMP's:  Recent Labs   Lab Test 07/08/19  0638 07/07/19  0639 07/06/19  0604    140 141   POTASSIUM 3.8 4.1 3.6   CHLORIDE 105 105 104   CO2 33* 28 29   BUN 39* 41* 43*   CR 1.30* 1.33* 1.45*   ANIONGAP 4 7 8   GIUSEPPE 8.4* 8.5 8.6   GLC 76 76 85     Most Recent 3 Hemoglobins:  Recent Labs   Lab Test 07/07/19  0820 07/06/19  0604 07/05/19  0605   HGB 10.9* 10.6* 10.9*     Most Recent 3 Troponin's:  Recent Labs   Lab Test 07/05/19  0108 07/04/19  2146 07/04/19  1559   TROPI <0.015 0.021 <0.015     Most Recent Cholesterol Panel:  Recent Labs   Lab Test 10/12/17  0729   CHOL 108   LDL 29   HDL 63   TRIG 81

## 2019-07-08 NOTE — PLAN OF CARE
Heart Failure Care Pathway  GOALS TO BE MET BEFORE DISCHARGE:    1. Decrease congestion and/or edema with diuretic therapy to achieve near      optimal volume status.            Dyspnea improved:  Yes            Edema improved:     No, please explain: continues w/ 2-3+ in LE's up into the hips         Net I/O and Weights since admission:          06/08 0700 - 07/08 0659  In: 1730 [P.O.:1730]  Out: 1750 [Urine:1750]  Net: -20            Vitals:    07/04/19 1552 07/05/19 0435 07/06/19 0600 07/07/19 0600   Weight: 65.8 kg (145 lb) 67.8 kg (149 lb 8 oz) 67.2 kg (148 lb 2.4 oz) 66 kg (145 lb 8 oz)    07/08/19 0129   Weight: 66.9 kg (147 lb 6.4 oz)         2.  O2 sats > 92% on RA or at prior home O2 therapy level.          Current oxygenation status:       SpO2: 92 %         O2 Device: None (Room air),            Able to wean O2 this shift to keep sats > 92%:  on RA        Does patient use Home O2? No    3.  Tolerates ambulation and mobility near baseline: Yes        How many times did the patient ambulate with nursing staff this shift? 1 , pt ambulates in room frequently to bathroom.     Please review the Heart Failure Care Pathway for additional HF goal outcomes.      Pt c/o pain in buttock, received norco with some improvement. Coccyx mepilex CDI, heel dressing also intact. Q2H T&R. Vitals stable on RA. Up w/ A-1 and walker. Tele SR w/ PAC's and PVC's. Continue to diurese. discharge planning to [facility].     Anastasiya Resendiz RN   7/8/2019

## 2019-07-08 NOTE — PROGRESS NOTES
[facility]    Medicine Progress Note - Hospitalist Service       Date of Admission:  7/4/2019    Assessment & Plan   Ivan Gomez is a 89 year old male who was admitted on 7/4/2019 for dyspnea, found to be in acute exacerbation of chronic diastolic congestive heart failure.     Acute exacerbation of chronic diastolic congestive heart failure  Pulmonary hypertension  Mitral regurgitation S/P mitral valve clip implantation  Hypertension  PTA Lasix 40 mg daily.  Hx 2 days of dyspnea which worsened on day of admission, associated with hypoxia. Received 40mg IV lasix in ED. BNP 59062 on admit. Was requiring supplemental O2 (up to 10L on admit). Was just discharged from TCU on 7/2.  - Continue IV lasix 60mg BID for now  - Monitor I/Os, daily weights  - Appreciate cardiology consult, plan to optimize volume status with IV diuresis and transition to PO in next 24-48 hours.  - No plans for anticoagulation  - Wean supplemental O2 as able  - Discussed palliative consult with patient, who at this time is uninterested in having conversations regarding goals of care or code status. Patient currently feels his breathing status is improved from prior and although complains about being on chronic diuretics, would prefer to achieve euvolemia rather than change goal of treatment to comfort-based approach. He verbalizes understanding he will likely continue to experience difficulties managing volume status due to his underlying valvular disease. Despite this, he continues to desire aggressive treatment and full code status. Despite attempts at discussing goals of care, patient highly reluctant to discuss alternative treatment options. Palliative care consult was cancelled at this time due to patient's preferences, highly recommend ongoing discussions as an outpatient and considering referral to palliative care clinic at discharge.     New onset paroxysmal atrial fibrillation with rapid ventricular response  HR in  "the 130s on admission, converted to sinus rhythm after cardizem gtt initiated while in the ED. No further recurrence, likely triggered by exacerbation of CHF as above.   - Continue telemetry monitoring  - Metoprolol tartrate 25mg BID as BP tolerates (previously been on coreg--which BP did not tolerate)     Ground glass opacities, patchy airspace opacities on CT  Hx Dysphagia  CT showed patchy airspace and ground-glass opacities in both mid and lower lungs, suspicious for pneumonia.  Recent CXR June 20th also showed LLL infiltrate, levaquin started then but discontinued after 3 doses due to worsening diarrhea. Pt denies cough, fever. WBC 5 and procalcitonin 0.09. Afebrile. Not likely infectious.  - Monitor for clinical signs/symptoms of infection, hold off on further abx in setting of on going c diff diarrhea  - Continue DD3 diet with thin liquids     Chronic kidney disease, stage III  Baseline Cr from 1.3 to 1.6. Stable on admission.  - Monitor     Benign prostatic hypertrophy  - Continue PTA Flomax and proscar     Chronic anemia  Chronic thrombocytopenia  Baseline Hgb 10-11, Plt 50-80. Appears to be at baseline. No signs active bleeding  - Continue to monitor     C difficile diarrhea  Diagnosed during last admission (5/21) after having sxs x1 month.  - Vancomycin had been attempted to be stopped, but he developed worsening diarrhea, currently on vancomycin 125mg TID--had sxs in BID  - Defer further titration to outpatient setting with focus now on CHF exacerbation.  - Follow up GI in 1 month as previously arranged    Bladder mass  Hx bladder cancer and bladder mass. Urology was consulted when he was last here towards the end of May for his acute cholecystitis.  At that time Dr. Jerrod Mcmanus from Urology evaluated the patient and recommended outpatient followup with his primary urologist, Dr. Farnsworth.     Chronic pain syndrome  Spinal stenosis of lumbar region  He reports having \"varicose vein\" surgery 3 months ago in " [place], which did not help with his LE pain. Ankle and foot pain has improved with voltaren gel.   - Continue PTA gabapentin, voltaren gel, Norco      Diet: Combination Diet Regular Diet Adult; Dysphagia Diet Level 3: Advanced; Thin Liquids (water, ice chips, juice, milk gelatin, ice cream, etc)    DVT Prophylaxis: Pneumatic Compression Devices  Velasquez Catheter: not present  Code Status: Full Code      Disposition Plan   Expected discharge: 1-2 days, recommended to LTC once volume status stable.  Entered: Koko Cooper PA-C 07/08/2019, 1:36 PM       The patient's care was discussed with the Attending Physician, Dr. Slade, Bedside Nurse, Care Coordinator/ and Patient.    Koko Cooper PA-C  Hospitalist Service  [facility]    ______________________________________________________________________    Interval History   Pt seen & evaluated. Sitting up in chair. Currently reports breathing is improved from admission but continuing to c/o BLE edema which he reports has been ongoing since varicose vein surgery 3mo ago, is frustrated the swelling has not improved. Also complains of ongoing urinary output from IV lasix, however when option to reduce diuretic therapy to allow for comfort, pt declines and desires to continue on current regimen.    Data reviewed today: I reviewed all medications, new labs and imaging results over the last 24 hours. I personally reviewed no images or EKG's today.    Physical Exam   Vital Signs: Temp: 97.4  F (36.3  C) Temp src: Oral BP: 105/67 Pulse: 74 Heart Rate: 65 Resp: 16 SpO2: 93 % O2 Device: None (Room air)    Weight: 147 lbs 6.4 oz  GEN: well-developed, frail elderly male, appears comfortable  HEENT: NCAT, PERRL, EOM intact bilaterally, sclera clear, conjunctiva normal, nose & mouth patent, mucous membranes moist  CHEST: lungs CTA bilaterally, no increased work of breathing, no wheeze, rales, rhonchi  HEART: RRR, S1 & S2, no murmur  ABD: soft, nontender,  nondistended, no guarding or rigidity, +BS in all 4 quadrants  MSK: full AROM bilateral UE/LE  SKIN: warm & dry without rash, 1-2+ pitting pedal edema bilaterally, BLE ruborous    Data   Recent Labs   Lab 07/08/19  0638 07/07/19  0820 07/07/19  0639 07/06/19  0604 07/05/19  0605 07/05/19  0108 07/04/19  2146 07/04/19  1559   WBC  --  5.0  --  3.8* 5.0  --   --  7.6   HGB  --  10.9*  --  10.6* 10.9*  --   --  11.9*   MCV  --  94  --  95 95  --   --  95   PLT  --  70*  --  74* 65*  --   --  90*   INR  --   --   --   --   --   --   --  1.26*     --  140 141 142  --   --  140   POTASSIUM 3.8  --  4.1 3.6 3.7  --   --  3.8   CHLORIDE 105  --  105 104 105  --   --  105   CO2 33*  --  28 29 31  --   --  30   BUN 39*  --  41* 43* 41*  --   --  41*   CR 1.30*  --  1.33* 1.45* 1.45*  --   --  1.43*   ANIONGAP 4  --  7 8 6  --   --  5   GIUSEPPE 8.4*  --  8.5 8.6 8.4*  --   --  8.3*   GLC 76  --  76 85 95  --   --  108*   ALBUMIN 2.9*  --   --   --   --   --   --  3.4   PROTTOTAL 6.4*  --   --   --   --   --   --  7.6   BILITOTAL 1.0  --   --   --   --   --   --  1.1   ALKPHOS 99  --   --   --   --   --   --  126   ALT 13  --   --   --   --   --   --  20   AST 11  --   --   --   --   --   --  12   LIPASE  --   --   --   --   --   --   --  93   TROPI  --   --   --   --   --  <0.015 0.021 <0.015     No results found for this or any previous visit (from the past 24 hour(s)).  Medications       diclofenac  2 g Transdermal TID     dicyclomine  10 mg Oral TID AC     finasteride  5 mg Oral Daily     furosemide  60 mg Intravenous BID     gabapentin  300 mg Oral TID     hypromellose  2 drop Left Eye 4x Daily     melatonin  3 mg Oral At Bedtime     metoprolol tartrate  25 mg Oral BID     pantoprazole  40 mg Oral Daily     potassium chloride ER  10 mEq Oral Daily     simvastatin  20 mg Oral At Bedtime     sodium chloride (PF)  3 mL Intracatheter Q8H     tamsulosin  0.4 mg Oral Daily     vancomycin  125 mg Oral TID

## 2019-07-09 LAB
ANION GAP SERPL CALCULATED.3IONS-SCNC: 4 MMOL/L (ref 3–14)
BUN SERPL-MCNC: 35 MG/DL (ref 7–30)
CALCIUM SERPL-MCNC: 8.1 MG/DL (ref 8.5–10.1)
CHLORIDE SERPL-SCNC: 101 MMOL/L (ref 94–109)
CO2 SERPL-SCNC: 36 MMOL/L (ref 20–32)
CREAT SERPL-MCNC: 1.4 MG/DL (ref 0.66–1.25)
GFR SERPL CREATININE-BSD FRML MDRD: 43 ML/MIN/{1.73_M2}
GLUCOSE SERPL-MCNC: 83 MG/DL (ref 70–99)
POTASSIUM SERPL-SCNC: 3.6 MMOL/L (ref 3.4–5.3)
SODIUM SERPL-SCNC: 141 MMOL/L (ref 133–144)

## 2019-07-09 PROCEDURE — 36415 COLL VENOUS BLD VENIPUNCTURE: CPT | Performed by: PHYSICIAN ASSISTANT

## 2019-07-09 PROCEDURE — 25000132 ZZH RX MED GY IP 250 OP 250 PS 637: Mod: GY | Performed by: HOSPITALIST

## 2019-07-09 PROCEDURE — 99233 SBSQ HOSP IP/OBS HIGH 50: CPT | Performed by: PHYSICIAN ASSISTANT

## 2019-07-09 PROCEDURE — 25000132 ZZH RX MED GY IP 250 OP 250 PS 637: Mod: GY | Performed by: INTERNAL MEDICINE

## 2019-07-09 PROCEDURE — 21000001 ZZH R&B HEART CARE

## 2019-07-09 PROCEDURE — 99232 SBSQ HOSP IP/OBS MODERATE 35: CPT | Performed by: INTERNAL MEDICINE

## 2019-07-09 PROCEDURE — 80048 BASIC METABOLIC PNL TOTAL CA: CPT | Performed by: PHYSICIAN ASSISTANT

## 2019-07-09 PROCEDURE — 25000128 H RX IP 250 OP 636: Performed by: NURSE PRACTITIONER

## 2019-07-09 RX ORDER — BUMETANIDE 0.25 MG/ML
1 INJECTION INTRAMUSCULAR; INTRAVENOUS ONCE
Status: COMPLETED | OUTPATIENT
Start: 2019-07-09 | End: 2019-07-09

## 2019-07-09 RX ADMIN — METOPROLOL TARTRATE 25 MG: 25 TABLET ORAL at 20:56

## 2019-07-09 RX ADMIN — FINASTERIDE 5 MG: 5 TABLET, FILM COATED ORAL at 09:02

## 2019-07-09 RX ADMIN — SIMVASTATIN 20 MG: 20 TABLET, FILM COATED ORAL at 21:02

## 2019-07-09 RX ADMIN — HYDROCODONE BITARTRATE AND ACETAMINOPHEN 1 TABLET: 7.5; 325 TABLET ORAL at 04:22

## 2019-07-09 RX ADMIN — MELATONIN TAB 3 MG 3 MG: 3 TAB at 21:02

## 2019-07-09 RX ADMIN — VANCOMYCIN HYDROCHLORIDE 125 MG: KIT at 09:03

## 2019-07-09 RX ADMIN — HYDROCODONE BITARTRATE AND ACETAMINOPHEN 1 TABLET: 7.5; 325 TABLET ORAL at 21:02

## 2019-07-09 RX ADMIN — TAMSULOSIN HYDROCHLORIDE 0.4 MG: 0.4 CAPSULE ORAL at 09:02

## 2019-07-09 RX ADMIN — DICLOFENAC 2 G: 10 GEL TOPICAL at 16:18

## 2019-07-09 RX ADMIN — DICLOFENAC 2 G: 10 GEL TOPICAL at 09:03

## 2019-07-09 RX ADMIN — HYPROMELLOSE 2910 2 DROP: 5 SOLUTION OPHTHALMIC at 09:03

## 2019-07-09 RX ADMIN — HYPROMELLOSE 2910 2 DROP: 5 SOLUTION OPHTHALMIC at 17:53

## 2019-07-09 RX ADMIN — METOPROLOL TARTRATE 25 MG: 25 TABLET ORAL at 09:02

## 2019-07-09 RX ADMIN — BUMETANIDE 1 MG: 0.25 INJECTION INTRAMUSCULAR; INTRAVENOUS at 11:53

## 2019-07-09 RX ADMIN — HYPROMELLOSE 2910 2 DROP: 5 SOLUTION OPHTHALMIC at 13:24

## 2019-07-09 RX ADMIN — DICYCLOMINE HYDROCHLORIDE 10 MG: 10 CAPSULE ORAL at 07:51

## 2019-07-09 RX ADMIN — POTASSIUM CHLORIDE 10 MEQ: 750 TABLET, EXTENDED RELEASE ORAL at 09:09

## 2019-07-09 RX ADMIN — DICLOFENAC 2 G: 10 GEL TOPICAL at 21:02

## 2019-07-09 RX ADMIN — HYPROMELLOSE 2910 2 DROP: 5 SOLUTION OPHTHALMIC at 21:03

## 2019-07-09 RX ADMIN — GABAPENTIN 300 MG: 300 CAPSULE ORAL at 21:02

## 2019-07-09 RX ADMIN — GABAPENTIN 300 MG: 300 CAPSULE ORAL at 16:18

## 2019-07-09 RX ADMIN — VANCOMYCIN HYDROCHLORIDE 125 MG: KIT at 21:03

## 2019-07-09 RX ADMIN — PANTOPRAZOLE SODIUM 40 MG: 40 TABLET, DELAYED RELEASE ORAL at 09:02

## 2019-07-09 RX ADMIN — DICYCLOMINE HYDROCHLORIDE 10 MG: 10 CAPSULE ORAL at 16:18

## 2019-07-09 RX ADMIN — DICYCLOMINE HYDROCHLORIDE 10 MG: 10 CAPSULE ORAL at 11:53

## 2019-07-09 RX ADMIN — VANCOMYCIN HYDROCHLORIDE 125 MG: KIT at 16:18

## 2019-07-09 RX ADMIN — GABAPENTIN 300 MG: 300 CAPSULE ORAL at 09:02

## 2019-07-09 ASSESSMENT — ACTIVITIES OF DAILY LIVING (ADL)
ADLS_ACUITY_SCORE: 20

## 2019-07-09 ASSESSMENT — MIFFLIN-ST. JEOR: SCORE: 1289.02

## 2019-07-09 NOTE — PROGRESS NOTES
[facility]  Cardiology Progress Note  Date of Service: 07/09/2019  Primary Cardiologist: Previous patient of Dr. Yao and requests to reestablish care    Assessment & Plan    Ivan Gomez is a 89 year old male with past medical history significant for HTN, HLP, CKD Stage III, bladder cancer s/p resection and reconstruction, mitral regurgitation s/p MitraClip, chornic anemia admitted [facility] 7/4/2019 [facility]     Assessment:   1. New onset paroxysmal atrial fibrillation    Spontaneously converted to SR    Metoprolol 25 mg BID    No anticoagulation due to advanced age and thrombocytopenia    2. HFpEF [PTA: Lasix 40 mg daily]    BNP 17,000    Weight down 3 lbs with 1 lbs overnight    Net negative 4.3 L    Transitioned to Bumex infusion at 0.25 mg/hr 7/8    3. Valvular heart disease    Moderaterly severe MR post MitraClip    Moderately severe TR    4. C. Diff diarrhea    Diagnosed last admission 5/2019    Attempted to stop Vancomycin, but had worsening of diarrhea.     5. Severe pulmonary hypertension    6. CKD, stage III     Baseline creatine 1.3-1.5    Creatine increased overnight from 1.3 to 1.4    Plan:  1. Give 1 mg IV Bumex x 1 dose  2. Continue Bumex infusion at 0.25 mg/hr  3. CORE f/u as outpatient  4. Consider palliative consult as outpatient given poor overall prognosis    MECHELLE ADEN, FRANK CNP  Pager:  [phone]   (7am - 5pm, M-F)    Interval History   Sitting up in chair without complaints of orthopnea, but shortness of breath on exertion when getting up to the toilet.    Physical Exam   Temp: 97.6  F (36.4  C) Temp src: Axillary BP: 104/72 Pulse: 74 Heart Rate: 75 Resp: 16 SpO2: 96 % O2 Device: None (Room air)    Vitals:    07/07/19 0600 07/08/19 0129 07/09/19 0000   Weight: 66 kg (145 lb 8 oz) 66.9 kg (147 lb 6.4 oz) 66.5 kg (146 lb 8 oz)       Constitutional:   NAD   Skin:   Warm and dry   Head:   Nontraumatic   Neck:   elevated JVP    Lungs:   Bilateral crackles   Cardiovascular:   regular rate and rhythm, normal S1 and S2 and 3/6 systolic murmur at the apex and 2/6 murmur at LLSB   Abdomen:   Benign   Extremities and Back:   Edema 2+   Neurological:   Grossly nonfocal       Medications     bumetanide 0.25 mg/hr (07/08/19 1611)       diclofenac  2 g Transdermal TID     dicyclomine  10 mg Oral TID AC     finasteride  5 mg Oral Daily     gabapentin  300 mg Oral TID     hypromellose  2 drop Left Eye 4x Daily     melatonin  3 mg Oral At Bedtime     metoprolol tartrate  25 mg Oral BID     pantoprazole  40 mg Oral Daily     potassium chloride ER  10 mEq Oral Daily     simvastatin  20 mg Oral At Bedtime     sodium chloride (PF)  3 mL Intracatheter Q8H     tamsulosin  0.4 mg Oral Daily     vancomycin  125 mg Oral TID       Data     Most Recent 3 CBC's:  Recent Labs   Lab Test 07/07/19  0820 07/06/19  0604 07/05/19  0605   WBC 5.0 3.8* 5.0   HGB 10.9* 10.6* 10.9*   MCV 94 95 95   PLT 70* 74* 65*     Most Recent 3 BMP's:  Recent Labs   Lab Test 07/09/19  0601 07/08/19  0638 07/07/19  0639    142 140   POTASSIUM 3.6 3.8 4.1   CHLORIDE 101 105 105   CO2 36* 33* 28   BUN 35* 39* 41*   CR 1.40* 1.30* 1.33*   ANIONGAP 4 4 7   GIUSEPPE 8.1* 8.4* 8.5   GLC 83 76 76     Most Recent 3 Hemoglobins:  Recent Labs   Lab Test 07/07/19  0820 07/06/19  0604 07/05/19  0605   HGB 10.9* 10.6* 10.9*     Most Recent 3 Troponin's:  Recent Labs   Lab Test 07/05/19  0108 07/04/19  2146 07/04/19  1559   TROPI <0.015 0.021 <0.015     Most Recent Cholesterol Panel:  Recent Labs   Lab Test 10/12/17  0729   CHOL 108   LDL 29   HDL 63   TRIG 81

## 2019-07-09 NOTE — CONSULTS
[facility] evaluation referral received from Dr. Mcmullen.      Shahriar is not currently established in the [facility]. He was last seen in our office by Dr. Chidi Blackburn in 5/2018.    Note that Shahriar was admitted for ROMAN, hypoxia, HF exacerbation, and he's currently on a bumex gtt. A palliative consult was recommended, and per hospitalist note yesterday, Shahriar declined this recommendation and wishes to continue with restorative cares; palliative consult was cancelled.     As he conts on IV diuresis, anticipate he'll be inpatient for several more days. Note plan for SNF at discharge.   [facility] appointment made for:  Thursday 7/18/19 for visit with Italia Carvajal CNP, will arrange for labs at SNF, once disposition known.  Orders for above placed.      We look forward to seeing Shahriar in the clinic.   Please call with questions.     Sarah Sanchez RN, BSN  [facility] RN Care Coordinator  [facility]  [phone]    [facility]: Cardiomyopathy, Optimization, Rehabilitation, Education   The [facility] is a heart failure specialty clinic within the [facility] where you will work with your cardiologist, nurse practitioners, physician assistants and registered nurses who specialize in heart failure care. They are dedicated to helping patients with heart failure to carefully adjust medications, receive education, and learn who and when to call if symptoms develop. They specialize in helping you better understand your condition, slow the progression of your disease, improve the length and quality of your life, help you detect future heart problems before they become life threatening, and avoid hospitalizations.

## 2019-07-09 NOTE — PLAN OF CARE
Heart Failure Care Pathway  GOALS TO BE MET BEFORE DISCHARGE:    1. Decrease congestion and/or edema with diuretic therapy to achieve near      optimal volume status.            Dyspnea improved:  Yes            Edema improved:     Yes        Net I/O and Weights since admission:          06/08 2300 - 07/08 2259  In: 2670 [P.O.:2670]  Out: 6000 [Urine:6000]  Net: -3330            Vitals:    07/04/19 1552 07/05/19 0435 07/06/19 0600 07/07/19 0600   Weight: 65.8 kg (145 lb) 67.8 kg (149 lb 8 oz) 67.2 kg (148 lb 2.4 oz) 66 kg (145 lb 8 oz)    07/08/19 0129   Weight: 66.9 kg (147 lb 6.4 oz)         2.  O2 sats > 92% on RA or at prior home O2 therapy level.          Current oxygenation status:       SpO2: 95 %         O2 Device: None (Room air),            Able to wean O2 this shift to keep sats > 92%:  Yes       Does patient use Home O2? No    3.  Tolerates ambulation and mobility near baseline: Yes        How many times did the patient ambulate with nursing staff this shift? 2    Please review the Heart Failure Care Pathway for additional HF goal outcomes.    AOx4. VSS. DD3 diet. Pain in heels. Mepilex on coccyx and L heel. Tele: SR + PACs. R PIV bumex @ 1ml/hr. SBA + walker. +2-3 edema in BLE. ROMAN. Bun 39, Cr 1.30, Plt 70. Discharge pending.    Randee Ambrosio RN  7/8/2019

## 2019-07-09 NOTE — PLAN OF CARE
A&Ox4 but sometimes forgetful. Tele, SR with PVCs. Denies pain. Crackles in lower bases on room air. SOB reported on exertion only. Tolerating DD3 diet. Edema BLE. SBA with walker OOB to bathroom. Bumex drip. Discharging to [facility] when appropriate.

## 2019-07-09 NOTE — PROGRESS NOTES
[facility]    Medicine Progress Note - Hospitalist Service       Date of Admission:  7/4/2019  Assessment & Plan      Ivan Gomez is a 89 year old male who was admitted on 7/4/2019 for dyspnea, found to be in acute exacerbation of chronic diastolic congestive heart failure.     Acute exacerbation of chronic diastolic congestive heart failure  Pulmonary hypertension  Mitral regurgitation S/P mitral valve clip implantation  Hypertension  PTA Lasix 40 mg daily.  Hx 2 days of dyspnea which worsened on day of admission, associated with hypoxia. Received 40mg IV lasix in ED. BNP 45049 on admit. Was requiring supplemental O2 (up to 10L on admit). Was just discharged from TCU on 7/2.  - Monitor I/Os, daily weights, Net -4.3L  - Appreciate cardiology consult, plan to optimize volume status with IV diuresis, transitioned to Bumex drip 7/8  - No plans for anticoagulation  - Wean supplemental O2 as able    Goals of Care  Discussed palliative consult with patient on consecutive days. Patient at this time is uninterested in having conversations regarding goals of care or code status. Although pt consistently reports dissatisfaction with aggressive diuresis, pt dismisses alternative approach of comfort and focus on quality of life. Pt continues to desire Full Code status.  - Palliative was initially consulted, discontinued due to patient's preferences  - Pt would benefit from ongoing conversation on goals of care, possible referral to palliative care as outpatient     New onset paroxysmal atrial fibrillation with rapid ventricular response  HR in the 130s on admission, converted to sinus rhythm after cardizem gtt initiated while in the ED. No further recurrence, likely triggered by exacerbation of CHF as above.   - Continue telemetry monitoring  - Metoprolol tartrate 25mg BID as BP tolerates (previously been on coreg--which BP did not tolerate)     Ground glass opacities, patchy airspace opacities on CT  Hx  "Dysphagia  CT showed patchy airspace and ground-glass opacities in both mid and lower lungs, suspicious for pneumonia.  Recent CXR June 20th also showed LLL infiltrate, levaquin started then but discontinued after 3 doses due to worsening diarrhea. Pt denies cough, fever. WBC 5 and procalcitonin 0.09. Afebrile. Not likely infectious.  - Monitor for clinical signs/symptoms of infection, hold off on further abx in setting of on going c diff diarrhea  - Continue DD3 diet with thin liquids     Chronic kidney disease, stage III  Baseline Cr from 1.3 to 1.6. Stable on admission, slightly increased today but remains within baseline.  - Monitor     Benign prostatic hypertrophy  - Continue PTA Flomax and proscar     Chronic anemia  Chronic thrombocytopenia  Baseline Hgb 10-11, Plt 50-80. Appears to be at baseline. No signs active bleeding  - Continue to monitor     C difficile diarrhea  Diagnosed during last admission (5/21) after having sxs x1 month.  - Vancomycin had been attempted to be stopped, but he developed worsening diarrhea, currently on vancomycin 125mg TID--had sxs in BID  - Defer further titration to outpatient setting with focus now on CHF exacerbation.  - Follow up GI in 1 month as previously arranged    Bladder mass  Hx bladder cancer and bladder mass. Urology was consulted during most recent hospitalization.  At that time Dr. Jerrod Mcmanus from Urology evaluated the patient and recommended outpatient followup with his primary urologist, Dr. Farnsworth.     Chronic pain syndrome  Spinal stenosis of lumbar region  He reports having \"varicose vein\" surgery 3 months ago in [place], which did not help with his LE pain. Ankle and foot pain has improved with voltaren gel.   - Continue PTA gabapentin, voltaren gel, Norco      Diet: Combination Diet Regular Diet Adult; Dysphagia Diet Level 3: Advanced; Thin Liquids (water, ice chips, juice, milk gelatin, ice cream, etc)    DVT Prophylaxis: Pneumatic Compression Devices  Velasquez " "Catheter: not present  Code Status: Full Code      Disposition Plan   Expected discharge: 2 - 3 days, recommended to LTC once diuretic plan established.  Entered: Koko Cooper PA-C 07/09/2019, 3:41 PM       The patient's care was discussed with the Attending Physician, Dr. Slade, Bedside Nurse and Patient.    Koko Cooper PA-C  Hospitalist Service  [facility]    ______________________________________________________________________    Interval History   Pt seen & evaluated. Lying in bed, resting. Denies sob, cp. Complains about Bumex drip and constant need to void. Attempted to discuss goals of care and ongoing treatment plans in future if recurs, patient reports he cannot make outpatient appointments and states \"let's just focus on this hospital stay right now.\"     Data reviewed today: I reviewed all medications, new labs and imaging results over the last 24 hours. I personally reviewed no images or EKG's today.    Physical Exam   Vital Signs: Temp: 97.8  F (36.6  C) Temp src: Oral BP: 109/68 Pulse: 81 Heart Rate: 75 Resp: 16 SpO2: 95 % O2 Device: None (Room air)    Weight: 146 lbs 8 oz  GEN: well-developed, well-nourished, appears comfortable  HEENT: NCAT, PERRL, EOM intact bilaterally, sclera clear, conjunctiva normal, nose & mouth patent, mucous membranes moist  CHEST: lungs diminished in bilateral bases, no crackles or wheeze appreciated, no increased work of breathing  HEART: irregularly irregular, S1 & S2, holosystolic murmur present  ABD: soft, nontender, nondistended, no guarding or rigidity, +BS in all 4 quadrants  MSK: full AROM bilateral UE/LE, pedal & radial pulses 2+ bilaterally  SKIN: warm & dry without rash, 1+ pitting bilateral pedal edema    Data   Recent Labs   Lab 07/09/19  0601 07/08/19  0638 07/07/19  0820 07/07/19  0639 07/06/19  0604 07/05/19  0605 07/05/19  0108 07/04/19  2146 07/04/19  1559   WBC  --   --  5.0  --  3.8* 5.0  --   --  7.6   HGB  --   --  10.9*  --  " 10.6* 10.9*  --   --  11.9*   MCV  --   --  94  --  95 95  --   --  95   PLT  --   --  70*  --  74* 65*  --   --  90*   INR  --   --   --   --   --   --   --   --  1.26*    142  --  140 141 142  --   --  140   POTASSIUM 3.6 3.8  --  4.1 3.6 3.7  --   --  3.8   CHLORIDE 101 105  --  105 104 105  --   --  105   CO2 36* 33*  --  28 29 31  --   --  30   BUN 35* 39*  --  41* 43* 41*  --   --  41*   CR 1.40* 1.30*  --  1.33* 1.45* 1.45*  --   --  1.43*   ANIONGAP 4 4  --  7 8 6  --   --  5   GIUSEPPE 8.1* 8.4*  --  8.5 8.6 8.4*  --   --  8.3*   GLC 83 76  --  76 85 95  --   --  108*   ALBUMIN  --  2.9*  --   --   --   --   --   --  3.4   PROTTOTAL  --  6.4*  --   --   --   --   --   --  7.6   BILITOTAL  --  1.0  --   --   --   --   --   --  1.1   ALKPHOS  --  99  --   --   --   --   --   --  126   ALT  --  13  --   --   --   --   --   --  20   AST  --  11  --   --   --   --   --   --  12   LIPASE  --   --   --   --   --   --   --   --  93   TROPI  --   --   --   --   --   --  <0.015 0.021 <0.015     No results found for this or any previous visit (from the past 24 hour(s)).  Medications     bumetanide 0.25 mg/hr (07/08/19 1611)       diclofenac  2 g Transdermal TID     dicyclomine  10 mg Oral TID AC     finasteride  5 mg Oral Daily     gabapentin  300 mg Oral TID     hypromellose  2 drop Left Eye 4x Daily     melatonin  3 mg Oral At Bedtime     metoprolol tartrate  25 mg Oral BID     pantoprazole  40 mg Oral Daily     potassium chloride ER  10 mEq Oral Daily     simvastatin  20 mg Oral At Bedtime     sodium chloride (PF)  3 mL Intracatheter Q8H     tamsulosin  0.4 mg Oral Daily     vancomycin  125 mg Oral TID

## 2019-07-09 NOTE — PLAN OF CARE
Heart Failure Care Pathway  GOALS TO BE MET BEFORE DISCHARGE:    1. Decrease congestion and/or edema with diuretic therapy to achieve near      optimal volume status.            Dyspnea improved:  Yes            Edema improved:     No, please explain: continues to have BLE edema         Net I/O and Weights since admission:          06/09 0700 - 07/09 0659  In: 2770 [P.O.:2770]  Out: 6750 [Urine:6750]  Net: -3980            Vitals:    07/04/19 1552 07/05/19 0435 07/06/19 0600 07/07/19 0600   Weight: 65.8 kg (145 lb) 67.8 kg (149 lb 8 oz) 67.2 kg (148 lb 2.4 oz) 66 kg (145 lb 8 oz)    07/08/19 0129 07/09/19 0000   Weight: 66.9 kg (147 lb 6.4 oz) 66.5 kg (146 lb 8 oz)         2.  O2 sats > 92% on RA or at prior home O2 therapy level.          Current oxygenation status:       SpO2: 96 %         O2 Device: None (Room air),            Able to wean O2 this shift to keep sats > 92%:  Yes       Does patient use Home O2? No    3.  Tolerates ambulation and mobility near baseline: Yes        How many times did the patient ambulate with nursing staff this shift? 3- Ambulated to bathroom     Please review the Heart Failure Care Pathway for additional HF goal outcomes.    Harper Correa RN  7/9/2019     Patient is A/O x4. VSS on RA. IV bumix gtt infusing. LS diminished in all fields, denies SOB, ROMAN. Voiding in BR, good urine output. Patient ambulated x3 to bathroom with SBA, tolerated well. Tele SR with frequent PACs. DD3 with thin liquid diet, tolerating well. Plan on continuing to monitor.

## 2019-07-10 ENCOUNTER — APPOINTMENT (OUTPATIENT)
Dept: SPEECH THERAPY | Facility: CLINIC | Age: 84
DRG: 291 | End: 2019-07-10
Payer: MEDICARE

## 2019-07-10 ENCOUNTER — APPOINTMENT (OUTPATIENT)
Dept: PHYSICAL THERAPY | Facility: CLINIC | Age: 84
DRG: 291 | End: 2019-07-10
Payer: MEDICARE

## 2019-07-10 LAB
ANION GAP SERPL CALCULATED.3IONS-SCNC: 8 MMOL/L (ref 3–14)
BACTERIA SPEC CULT: NO GROWTH
BACTERIA SPEC CULT: NO GROWTH
BUN SERPL-MCNC: 36 MG/DL (ref 7–30)
CALCIUM SERPL-MCNC: 8.2 MG/DL (ref 8.5–10.1)
CHLORIDE SERPL-SCNC: 101 MMOL/L (ref 94–109)
CO2 SERPL-SCNC: 34 MMOL/L (ref 20–32)
CREAT SERPL-MCNC: 1.19 MG/DL (ref 0.66–1.25)
GFR SERPL CREATININE-BSD FRML MDRD: 52 ML/MIN/{1.73_M2}
GLUCOSE SERPL-MCNC: 78 MG/DL (ref 70–99)
INTERPRETATION ECG - MUSE: NORMAL
Lab: NORMAL
Lab: NORMAL
POTASSIUM SERPL-SCNC: 3.4 MMOL/L (ref 3.4–5.3)
SODIUM SERPL-SCNC: 143 MMOL/L (ref 133–144)
SPECIMEN SOURCE: NORMAL
SPECIMEN SOURCE: NORMAL

## 2019-07-10 PROCEDURE — 92526 ORAL FUNCTION THERAPY: CPT | Mod: GN | Performed by: SPEECH-LANGUAGE PATHOLOGIST

## 2019-07-10 PROCEDURE — 25000132 ZZH RX MED GY IP 250 OP 250 PS 637: Mod: GY | Performed by: HOSPITALIST

## 2019-07-10 PROCEDURE — 25000128 H RX IP 250 OP 636: Performed by: NURSE PRACTITIONER

## 2019-07-10 PROCEDURE — 99233 SBSQ HOSP IP/OBS HIGH 50: CPT | Performed by: INTERNAL MEDICINE

## 2019-07-10 PROCEDURE — 80048 BASIC METABOLIC PNL TOTAL CA: CPT | Performed by: PHYSICIAN ASSISTANT

## 2019-07-10 PROCEDURE — 25000132 ZZH RX MED GY IP 250 OP 250 PS 637: Mod: GY | Performed by: INTERNAL MEDICINE

## 2019-07-10 PROCEDURE — 21000001 ZZH R&B HEART CARE

## 2019-07-10 PROCEDURE — 25000125 ZZHC RX 250: Performed by: INTERNAL MEDICINE

## 2019-07-10 PROCEDURE — 97530 THERAPEUTIC ACTIVITIES: CPT | Mod: GP | Performed by: PHYSICAL THERAPIST

## 2019-07-10 PROCEDURE — 97116 GAIT TRAINING THERAPY: CPT | Mod: GP | Performed by: PHYSICAL THERAPIST

## 2019-07-10 PROCEDURE — 36415 COLL VENOUS BLD VENIPUNCTURE: CPT | Performed by: PHYSICIAN ASSISTANT

## 2019-07-10 RX ORDER — BUMETANIDE 0.25 MG/ML
1 INJECTION INTRAMUSCULAR; INTRAVENOUS ONCE
Status: COMPLETED | OUTPATIENT
Start: 2019-07-10 | End: 2019-07-10

## 2019-07-10 RX ADMIN — HYPROMELLOSE 2910 2 DROP: 5 SOLUTION OPHTHALMIC at 08:48

## 2019-07-10 RX ADMIN — DICYCLOMINE HYDROCHLORIDE 10 MG: 10 CAPSULE ORAL at 08:48

## 2019-07-10 RX ADMIN — SIMVASTATIN 20 MG: 20 TABLET, FILM COATED ORAL at 21:38

## 2019-07-10 RX ADMIN — HYPROMELLOSE 2910 2 DROP: 5 SOLUTION OPHTHALMIC at 17:21

## 2019-07-10 RX ADMIN — BUMETANIDE 1 MG: 0.25 INJECTION INTRAMUSCULAR; INTRAVENOUS at 11:19

## 2019-07-10 RX ADMIN — Medication 0.5 MG/HR: at 17:22

## 2019-07-10 RX ADMIN — MELATONIN TAB 3 MG 3 MG: 3 TAB at 21:38

## 2019-07-10 RX ADMIN — DICYCLOMINE HYDROCHLORIDE 10 MG: 10 CAPSULE ORAL at 11:19

## 2019-07-10 RX ADMIN — POTASSIUM CHLORIDE 10 MEQ: 750 TABLET, EXTENDED RELEASE ORAL at 08:48

## 2019-07-10 RX ADMIN — HYPROMELLOSE 2910 2 DROP: 5 SOLUTION OPHTHALMIC at 21:37

## 2019-07-10 RX ADMIN — VANCOMYCIN HYDROCHLORIDE 125 MG: KIT at 08:52

## 2019-07-10 RX ADMIN — TAMSULOSIN HYDROCHLORIDE 0.4 MG: 0.4 CAPSULE ORAL at 08:48

## 2019-07-10 RX ADMIN — FINASTERIDE 5 MG: 5 TABLET, FILM COATED ORAL at 08:48

## 2019-07-10 RX ADMIN — PANTOPRAZOLE SODIUM 40 MG: 40 TABLET, DELAYED RELEASE ORAL at 08:48

## 2019-07-10 RX ADMIN — DICLOFENAC 2 G: 10 GEL TOPICAL at 16:10

## 2019-07-10 RX ADMIN — DICLOFENAC 2 G: 10 GEL TOPICAL at 21:35

## 2019-07-10 RX ADMIN — VANCOMYCIN HYDROCHLORIDE 125 MG: KIT at 16:08

## 2019-07-10 RX ADMIN — DICLOFENAC 2 G: 10 GEL TOPICAL at 08:47

## 2019-07-10 RX ADMIN — METOPROLOL TARTRATE 25 MG: 25 TABLET ORAL at 21:38

## 2019-07-10 RX ADMIN — DICYCLOMINE HYDROCHLORIDE 10 MG: 10 CAPSULE ORAL at 16:07

## 2019-07-10 RX ADMIN — GABAPENTIN 300 MG: 300 CAPSULE ORAL at 16:07

## 2019-07-10 RX ADMIN — HYDROCODONE BITARTRATE AND ACETAMINOPHEN 1 TABLET: 7.5; 325 TABLET ORAL at 23:20

## 2019-07-10 RX ADMIN — GABAPENTIN 300 MG: 300 CAPSULE ORAL at 21:37

## 2019-07-10 RX ADMIN — VANCOMYCIN HYDROCHLORIDE 125 MG: KIT at 21:39

## 2019-07-10 RX ADMIN — HYPROMELLOSE 2910 2 DROP: 5 SOLUTION OPHTHALMIC at 13:17

## 2019-07-10 RX ADMIN — GABAPENTIN 300 MG: 300 CAPSULE ORAL at 08:48

## 2019-07-10 RX ADMIN — METOPROLOL TARTRATE 25 MG: 25 TABLET ORAL at 08:48

## 2019-07-10 ASSESSMENT — ACTIVITIES OF DAILY LIVING (ADL)
ADLS_ACUITY_SCORE: 19
ADLS_ACUITY_SCORE: 20
ADLS_ACUITY_SCORE: 19

## 2019-07-10 ASSESSMENT — MIFFLIN-ST. JEOR
SCORE: 1272.69
SCORE: 1267.25

## 2019-07-10 NOTE — PLAN OF CARE
VSS on RA. Tele- SR with PVCs. Bumex gtt increased to 2cc/hr. Ambulated with assist of 1 +walker to the bathroom. Calls appropriately. Heel dressing changed. Mepilex on coccyx CDI. DD3 with thin liquids. Up in chair most of the day with breaks off coccyx. Continue to monitor.

## 2019-07-10 NOTE — PLAN OF CARE
Discharge Planner SLP   Patient plan for discharge: Not addressed.   Current status: SLP: Patient was seen for swallow treatment at bedside while up in the chair. He had recently finished 100% of his  lunch and his vocal quality was wet sounding so suspect pharyngeal retention. He took sips of water and demonstrated premature entry and intermittent throat clearing. Improvement with trials of nectar thick liquids but he stated that he would not drink something he did not like. He has prolonged mastication of solids with minimal oral residue and needs a liquid to assist in clearing. Per MD notes he wants to continue aggressive care and full code. Do to repeated pneumonia's I recommended that we could complete a video swallow study to determine aspiration risk and least restrictive diet, but he does not want to drink barium. He stated he does not want ground meats satisfied with the chopped but then stated he would like a hamburger or salad. Spoke about quality of life issues and full code status that I feel he does not clearly understand. (Had a palliative care meeting).  Spoke with MD regarding the issues and she is in agreement to sign off and will continue on the current diet per patient's wishes despite the increase risks for aspiration. Recommend: 1. Continue on the DDL 3 with thin liquids. 2. Up in a chair for all meals and remain in a chair 30-60 minutes after a meal. No straws, small bites/sips, check for oral residue and alternate liquids and solids as needed. Medications should be crushed and plcaed in apple sauce or pudding.   Barriers to return to prior living situation: Acuity of his illness.   Recommendations for discharge: Per medical team.   Rationale for recommendations: No further ST needs.     Speech Language Therapy Discharge Summary    Reason for therapy discharge:    No further expectations of functional progress.    Progress towards therapy goal(s). See goals on Care Plan in [facility] electronic  health record for goal details.  Goals not met.  Barriers to achieving goals:     .    Therapy recommendation(s):    No further therapy is recommended.           Entered by: Katy Lyon 07/10/2019 4:05 PM

## 2019-07-10 NOTE — PLAN OF CARE
Discharge Planner PT   Patient plan for discharge: LTC  Current status: Pt evans increased gait distance 170' with CGA and FWW, CGA for toileting, too fatigued for strengthening ex after amb.  Barriers to return to prior living situation: fall risk, requires CGA/SBA for all mob  Recommendations for discharge: LTC  Rationale for recommendations: Pt appears to be near baseline with functional mobility. Pt will benefit from continued skilled PT during his stay in the acute setting to prevent debility and increase activity tolerance. Pt will need LTC or increased family support/assist upon discharge.        Entered by: CLAUDIA PRIETO 07/10/2019 3:41 PM

## 2019-07-10 NOTE — PLAN OF CARE
A&Ox4. VSS on RA except soft BP to 90s/60s. L/s crackles at bases. Tele SR w/ occasional PACs and PVCs. On Bumex gtt at 0.25mg/hr. Diuresing well, voids in B/R. Trace edema to BLE. Up Ax1 w/ walker. Multiple scabs and bruising. Mepilex to to coccyx/left heel wounds-CDI.  Turn and repo done q2hrs. On DD3 w/ thin liquid. Woc RN and SLP following. Discharge to [facility] pending progress.

## 2019-07-10 NOTE — PROGRESS NOTES
[facility]    Hospitalist Progress Note    Date of Service (when I saw the patient): 07/10/2019    Assessment & Plan   Ivan Gomez is a 89 year old male with hx of dCHF, mitral regurgitation s/p mitral valve clip, HTN, CKD, BPH, and hx of bladder cancer who was admitted on 7/4/2019 for acute CHF exacerbation and new onset a-fib/RVR    Advanced care planning  Patient with advanced diastolic and right-sided CHF with mitral regurgitation and tricuspid regurgitation. Per Cardiology, prognosis is poor. Goals of care have been reviewed by the Hospitalist service. Poor prognosis, high likelihood for rehospitalizations, and short period of time between hospitalizations have been reviewed with the patient. He lacks insight into his medical comorbidities, and wants to remain FULL CODE with restorative cares  - Plan of care remains restorative    Acute exacerbation of chronic diastolic and right-sided CHF with acute hypoxic respiratory failure, Improving  Pulmonary hypertension  History of mitral regurgitation s/p mitral valve clip  Essential hypertension  [PTA: Lasix 40 mg daily, Imdur 20 mg BID]  * TTE (7/5): LVEF 70-75%, moderately dilated RV with moderately reduced RV systolic function, moderately severe residual MR, massively dilated left atrium, severely dilated right atrium, and 3+ TR with significant pulmonary hypertension.   * Presented with 2 day history of progressive dyspnea. Noted to be in acute respiratory distress in the field with hypoxia to 82% on room air. Chest CT on arrival showed bilateral patchy airspace and groundglass opacities, small bilateral pleural effusions, and pulmonary edema. NTproBNP>17,000. In the ED, he was initiated on IV Lasix with questionable improvement. He was ultimately started on a Bumex infusion by Cardiology on 7/9.   - Now breathing comfortably on room air  - Net negative 2.7L in last 24 hours, 7.4L since admission  - Continues on Bumex gtt as per  Cardiology  - Holding PTA Imdur to allow BP room for diuresis  - Cardiac rehab recommending long term care  - Cardiology following, appreciate assistance  - Has appointment in [facility] on 7/18    Atrial fibrillation with RVR, Resolved  New onset paroxysmal atrial fibrillation  Noted to be in a-fib with HR in the 130s on arrival. He was initiated on a diltiazem infusion and converted to sinus rhythm while still in the ED. He has since remained in sinus rhythm.  - He has mike started on metoprolol 25 mg BID  - Not on anticoagulation per Cardiology due to age, frailty, and risk for bleeding    CKD stage III  Creatinine currently improved from most recent baseline 1.3-1.6  - Creatinine 1.19 today, will follow    History of C.diff colitis  Diagnosed on May 21. Treated with oral vancomycin and dicyclomine as per Suhail FELDMAN. Developed worsening diarrhea with attempts to wean oral vancomycin (as recently as 7/1). Has since been maintained on vancomycin 125 mg TID.  - Continues on vancomycin 125 mg TID  - Continues on PTA dicyclomine  - Follow up with Suhail FELDMAN in 1 month as previously arranged, defer weaning of vancomycin to Dr. Jang    Chronic dysphagia   Patient has been evaluated by SLP who has high suspicion for aspiration. Patient has been unwilling to consider a modified diet despite multiple conversations, and is also unwilling to change his code status. Video swallow study has not been pursued given patient's unwillingness to consider a modified diet  - On DDL3 with thin liquids  - SLP has signed off    History of pill esophagitis  - Continues on PTA pantoprazole    Stage II pressure ulcer on left heel  Unstageable pressure ulcer on coccyx  Present on admission  - Wound cares in place as per WOC RN    Bladder mass  History of recurrent bladder cancer s/p multiple TURBT  Multiple bladder masses up to 3.5 cm suspicious for neoplasm noted on outpatient CT. The patient was evaluated by Dr. Yoon ([facility])  during his last hospitalization in May 2019, and it was recommended at that time to follow up with his primary urologist, Dr. Hilton, for outpatient cystoscopy  - Follow up with [facility] after discharge    BPH  [PTA: tamsulosin 0.4 mg daily, finasteride 5 mg daily]  - Continues on PTA meds    Chronic anemia and thrombocytopenia  Hgb has been stable within baseline 10-11, and platelets have been stable within recent baseline 50-90k.    Dyslipidemia  Chronic and stable on simvastatin    Chronic pain syndrome  [PTA: gabapentin 300 mg TID, Norco 7.5-325 mg q6h prn]  - Continues on PTA meds    FEN: DDL3 with thin liquids  DVT Prophylaxis: Pneumatic Compression Devices  Code Status: Full Code    Disposition: Expected discharge to long term care once of Bumex gtt and cleared by Cardiology, at least 3-4 more days as per Cardiology    Mile Slade    Interval History   No events overnight. Diuresis well. Denies cp/sob, LE edema stable. Goals of care reviewed at bedside - wants to remain full code and continue restorative cares.    -Data reviewed today: I reviewed all new labs and imaging results over the last 24 hours. I personally reviewed no images or EKG's today.    Physical Exam   Temp: 97.6  F (36.4  C) Temp src: Oral BP: 116/74 Pulse: 77 Heart Rate: 70 Resp: 16 SpO2: 98 % O2 Device: None (Room air)    Vitals:    07/08/19 0129 07/09/19 0000 07/10/19 0131   Weight: 66.9 kg (147 lb 6.4 oz) 66.5 kg (146 lb 8 oz) 64.8 kg (142 lb 14.4 oz)     Vital Signs with Ranges  Temp:  [97.3  F (36.3  C)-98.1  F (36.7  C)] 97.6  F (36.4  C)  Pulse:  [77-89] 77  Heart Rate:  [64-70] 70  Resp:  [16-18] 16  BP: ()/(66-75) 116/74  SpO2:  [92 %-98 %] 98 %  I/O last 3 completed shifts:  In: 240 [P.O.:240]  Out: 2975 [Urine:2975]    Constitutional: Chronically ill appearing, cachectic, NAD  Respiratory: Breathing non-labored. Diminished breath sounds over bilateral bases  Cardiovascular: Heart RRR, 2/6 systolic murmur.  1+ BLE edema  GI: +BS, abd soft/NT  Skin/Integumen: No rash  Neuro: Alert and appropriate, [place]  Psych: Calm and cooperative, lacks insight    Medications     bumetanide 0.25 mg/hr (07/10/19 0000)       diclofenac  2 g Transdermal TID     dicyclomine  10 mg Oral TID AC     finasteride  5 mg Oral Daily     gabapentin  300 mg Oral TID     hypromellose  2 drop Left Eye 4x Daily     melatonin  3 mg Oral At Bedtime     metoprolol tartrate  25 mg Oral BID     pantoprazole  40 mg Oral Daily     potassium chloride ER  10 mEq Oral Daily     simvastatin  20 mg Oral At Bedtime     sodium chloride (PF)  3 mL Intracatheter Q8H     tamsulosin  0.4 mg Oral Daily     vancomycin  125 mg Oral TID     Data   Recent Labs   Lab 07/10/19  0602 07/09/19  0601 07/08/19  0638 07/07/19  0820  07/06/19  0604 07/05/19  0605 07/05/19  0108 07/04/19  2146 07/04/19  1559   WBC  --   --   --  5.0  --  3.8* 5.0  --   --  7.6   HGB  --   --   --  10.9*  --  10.6* 10.9*  --   --  11.9*   MCV  --   --   --  94  --  95 95  --   --  95   PLT  --   --   --  70*  --  74* 65*  --   --  90*   INR  --   --   --   --   --   --   --   --   --  1.26*    141 142  --    < > 141 142  --   --  140   POTASSIUM 3.4 3.6 3.8  --    < > 3.6 3.7  --   --  3.8   CHLORIDE 101 101 105  --    < > 104 105  --   --  105   CO2 34* 36* 33*  --    < > 29 31  --   --  30   BUN 36* 35* 39*  --    < > 43* 41*  --   --  41*   CR 1.19 1.40* 1.30*  --    < > 1.45* 1.45*  --   --  1.43*   ANIONGAP 8 4 4  --    < > 8 6  --   --  5   GIUSEPPE 8.2* 8.1* 8.4*  --    < > 8.6 8.4*  --   --  8.3*   GLC 78 83 76  --    < > 85 95  --   --  108*   ALBUMIN  --   --  2.9*  --   --   --   --   --   --  3.4   PROTTOTAL  --   --  6.4*  --   --   --   --   --   --  7.6   BILITOTAL  --   --  1.0  --   --   --   --   --   --  1.1   ALKPHOS  --   --  99  --   --   --   --   --   --  126   ALT  --   --  13  --   --   --   --   --   --  20   AST  --   --  11  --   --   --   --   --   --  12   LIPASE  --    --   --   --   --   --   --   --   --  93   TROPI  --   --   --   --   --   --   --  <0.015 0.021 <0.015    < > = values in this interval not displayed.       No results found for this or any previous visit (from the past 24 hour(s)).

## 2019-07-10 NOTE — PROGRESS NOTES
[facility]  Cardiology Progress Note  Date of Service: 07/10/2019  Primary Cardiologist: Previous patient of Dr. Yao and requests to reestablish care    Assessment & Plan    Ivan Gomez is a 89 year old male with past medical history significant for HTN, HLP, CKD Stage III, bladder cancer s/p resection and reconstruction, mitral regurgitation s/p MitraClip, chornic anemia admitted on 7/4/2019 with [facility]     Assessment:   1. New onset paroxysmal atrial fibrillation    Spontaneously converted to SR    Metoprolol 25 mg BID    No anticoagulation due to advanced age and thrombocytopenia    2. HFpEF [PTA: Lasix 40 mg daily]    BNP 17,000    Weight down 7 lbs from admission with 4 lbs overnight    Net negative 7.4 L    Transitioned to Bumex infusion at 0.25 mg/hr 7/8    3. Valvular heart disease    Moderaterly severe MR post MitraClip    Moderately severe TR    4. C. Diff diarrhea    Diagnosed last admission 5/2019    Attempted to stop Vancomycin, but had worsening of diarrhea.     5. Severe pulmonary hypertension    6. CKD, stage III     Baseline creatine 1.3-1.5    Creatine increased overnight from 1.3-1.4-1.19    Plan:  1. IV 1 mg bolus  2. Continue Bumex infusion at 0.25 mg/hr  3. CORE f/u as outpatient  4. Consider palliative consult as outpatient given poor overall prognosis    FRANK ARIAS CNP  Pager:  [phone]   (7am - 5pm, M-F)    Interval History   Sitting up in chair without complaints, improvement in shortness of breath on exertion when getting up to the toilet.     Physical Exam   Temp: 97.6  F (36.4  C) Temp src: Oral BP: 116/74 Pulse: 77 Heart Rate: 70 Resp: 16 SpO2: 92 % O2 Device: None (Room air)    Vitals:    07/08/19 0129 07/09/19 0000 07/10/19 0131   Weight: 66.9 kg (147 lb 6.4 oz) 66.5 kg (146 lb 8 oz) 64.8 kg (142 lb 14.4 oz)       Constitutional:   NAD   Skin:   Warm and dry   Head:   Nontraumatic   Neck:   elevated JVP    Lungs:   Bilateral crackles   Cardiovascular:   regular rate and rhythm, normal S1 and S2 and 3/6 systolic murmur at the apex and 2/6 murmur at LLSB   Abdomen:   Benign   Extremities and Back:   Edema 1+   Neurological:   Grossly nonfocal       Medications     bumetanide 0.25 mg/hr (07/10/19 0000)       diclofenac  2 g Transdermal TID     dicyclomine  10 mg Oral TID AC     finasteride  5 mg Oral Daily     gabapentin  300 mg Oral TID     hypromellose  2 drop Left Eye 4x Daily     melatonin  3 mg Oral At Bedtime     metoprolol tartrate  25 mg Oral BID     pantoprazole  40 mg Oral Daily     potassium chloride ER  10 mEq Oral Daily     simvastatin  20 mg Oral At Bedtime     sodium chloride (PF)  3 mL Intracatheter Q8H     tamsulosin  0.4 mg Oral Daily     vancomycin  125 mg Oral TID       Data     Most Recent 3 CBC's:  Recent Labs   Lab Test 07/07/19  0820 07/06/19  0604 07/05/19  0605   WBC 5.0 3.8* 5.0   HGB 10.9* 10.6* 10.9*   MCV 94 95 95   PLT 70* 74* 65*     Most Recent 3 BMP's:  Recent Labs   Lab Test 07/10/19  0602 07/09/19  0601 07/08/19  0638    141 142   POTASSIUM 3.4 3.6 3.8   CHLORIDE 101 101 105   CO2 34* 36* 33*   BUN 36* 35* 39*   CR 1.19 1.40* 1.30*   ANIONGAP 8 4 4   GIUSEPPE 8.2* 8.1* 8.4*   GLC 78 83 76     Most Recent 3 Hemoglobins:  Recent Labs   Lab Test 07/07/19  0820 07/06/19  0604 07/05/19  0605   HGB 10.9* 10.6* 10.9*     Most Recent 3 Troponin's:  Recent Labs   Lab Test 07/05/19  0108 07/04/19  2146 07/04/19  1559   TROPI <0.015 0.021 <0.015     Most Recent Cholesterol Panel:  Recent Labs   Lab Test 10/12/17  0729   CHOL 108   LDL 29   HDL 63   TRIG 81

## 2019-07-10 NOTE — PLAN OF CARE
Heart Failure Care Pathway  GOALS TO BE MET BEFORE DISCHARGE:    1. Decrease congestion and/or edema with diuretic therapy to achieve near      optimal volume status.            Dyspnea improved:  Yes            Edema improved:     No, please explain: Still has +2 BLE edema         Net I/O and Weights since admission:          06/09 2300 - 07/09 2259  In: 2890 [P.O.:2890]  Out: 8725 [Urine:8725]  Net: -5835            Vitals:    07/04/19 1552 07/05/19 0435 07/06/19 0600 07/07/19 0600   Weight: 65.8 kg (145 lb) 67.8 kg (149 lb 8 oz) 67.2 kg (148 lb 2.4 oz) 66 kg (145 lb 8 oz)    07/08/19 0129 07/09/19 0000   Weight: 66.9 kg (147 lb 6.4 oz) 66.5 kg (146 lb 8 oz)         2.  O2 sats > 92% on RA or at prior home O2 therapy level.          Current oxygenation status:       SpO2: 92 %         O2 Device: None (Room air),            Able to wean O2 this shift to keep sats > 92%:  Yes       Does patient use Home O2? No    3.  Tolerates ambulation and mobility near baseline: Yes        How many times did the patient ambulate with nursing staff this shift? 1 per pt activity tolerance.    Please review the Heart Failure Care Pathway for additional HF goal outcomes.    A&OX4, VSS on RA. Still slight ROMAN but improving per pt report. C/o lower back pain managed with a dose of PRN Norco at bedtime. [place] male catheter in use, refused it towards the end of the shift. Ambulating to the bathroom and voiding in the bathroom now. Pressure sore on the buttocks, mepilax sacral reapplied. Mepilax on the left heel changed as well. Turned and repoed Q 2hrs.  Tele has been SR with PACs. Enteric and contact precaution maintained this shift. Few more mepilax on the back for extra cushion. Up AX1 with walker. Bumex gtt at 1mL/hr. On DD3 thin liquid diet.Continue to monitor.    Kian Rivas RN  7/9/2019

## 2019-07-11 LAB
ANION GAP SERPL CALCULATED.3IONS-SCNC: 8 MMOL/L (ref 3–14)
BUN SERPL-MCNC: 32 MG/DL (ref 7–30)
CALCIUM SERPL-MCNC: 8.5 MG/DL (ref 8.5–10.1)
CHLORIDE SERPL-SCNC: 98 MMOL/L (ref 94–109)
CO2 SERPL-SCNC: 36 MMOL/L (ref 20–32)
CREAT SERPL-MCNC: 1.31 MG/DL (ref 0.66–1.25)
ERYTHROCYTE [DISTWIDTH] IN BLOOD BY AUTOMATED COUNT: 18.2 % (ref 10–15)
GFR SERPL CREATININE-BSD FRML MDRD: 47 ML/MIN/{1.73_M2}
GLUCOSE SERPL-MCNC: 96 MG/DL (ref 70–99)
HCT VFR BLD AUTO: 32.4 % (ref 40–53)
HGB BLD-MCNC: 10.2 G/DL (ref 13.3–17.7)
MCH RBC QN AUTO: 29.3 PG (ref 26.5–33)
MCHC RBC AUTO-ENTMCNC: 31.5 G/DL (ref 31.5–36.5)
MCV RBC AUTO: 93 FL (ref 78–100)
PLATELET # BLD AUTO: 69 10E9/L (ref 150–450)
POTASSIUM SERPL-SCNC: 3.3 MMOL/L (ref 3.4–5.3)
POTASSIUM SERPL-SCNC: 4 MMOL/L (ref 3.4–5.3)
RBC # BLD AUTO: 3.48 10E12/L (ref 4.4–5.9)
SODIUM SERPL-SCNC: 142 MMOL/L (ref 133–144)
WBC # BLD AUTO: 4.1 10E9/L (ref 4–11)

## 2019-07-11 PROCEDURE — 36415 COLL VENOUS BLD VENIPUNCTURE: CPT | Performed by: INTERNAL MEDICINE

## 2019-07-11 PROCEDURE — 36415 COLL VENOUS BLD VENIPUNCTURE: CPT | Performed by: PHYSICIAN ASSISTANT

## 2019-07-11 PROCEDURE — 25000132 ZZH RX MED GY IP 250 OP 250 PS 637: Mod: GY | Performed by: INTERNAL MEDICINE

## 2019-07-11 PROCEDURE — 25000132 ZZH RX MED GY IP 250 OP 250 PS 637: Mod: GY | Performed by: NURSE PRACTITIONER

## 2019-07-11 PROCEDURE — 80048 BASIC METABOLIC PNL TOTAL CA: CPT | Performed by: INTERNAL MEDICINE

## 2019-07-11 PROCEDURE — 25000128 H RX IP 250 OP 636: Performed by: NURSE PRACTITIONER

## 2019-07-11 PROCEDURE — G0463 HOSPITAL OUTPT CLINIC VISIT: HCPCS

## 2019-07-11 PROCEDURE — 99232 SBSQ HOSP IP/OBS MODERATE 35: CPT | Performed by: PHYSICIAN ASSISTANT

## 2019-07-11 PROCEDURE — 21000001 ZZH R&B HEART CARE

## 2019-07-11 PROCEDURE — 25000132 ZZH RX MED GY IP 250 OP 250 PS 637: Mod: GY | Performed by: HOSPITALIST

## 2019-07-11 PROCEDURE — 84132 ASSAY OF SERUM POTASSIUM: CPT | Performed by: PHYSICIAN ASSISTANT

## 2019-07-11 PROCEDURE — 25000125 ZZHC RX 250: Performed by: INTERNAL MEDICINE

## 2019-07-11 PROCEDURE — 85027 COMPLETE CBC AUTOMATED: CPT | Performed by: INTERNAL MEDICINE

## 2019-07-11 RX ORDER — POTASSIUM CHLORIDE 1500 MG/1
40 TABLET, EXTENDED RELEASE ORAL 2 TIMES DAILY
Status: DISCONTINUED | OUTPATIENT
Start: 2019-07-11 | End: 2019-07-12

## 2019-07-11 RX ORDER — BETA-CAROTENE 7500 MCG
25000 CAPSULE ORAL DAILY
Status: DISCONTINUED | OUTPATIENT
Start: 2019-07-11 | End: 2019-07-13 | Stop reason: HOSPADM

## 2019-07-11 RX ORDER — MULTIPLE VITAMINS W/ MINERALS TAB 9MG-400MCG
1 TAB ORAL DAILY
Status: DISCONTINUED | OUTPATIENT
Start: 2019-07-11 | End: 2019-07-13 | Stop reason: HOSPADM

## 2019-07-11 RX ORDER — ASCORBIC ACID 500 MG
500 TABLET ORAL DAILY
Status: DISCONTINUED | OUTPATIENT
Start: 2019-07-11 | End: 2019-07-13 | Stop reason: HOSPADM

## 2019-07-11 RX ORDER — BUMETANIDE 0.25 MG/ML
2 INJECTION INTRAMUSCULAR; INTRAVENOUS ONCE
Status: COMPLETED | OUTPATIENT
Start: 2019-07-11 | End: 2019-07-11

## 2019-07-11 RX ORDER — ZINC SULFATE 50(220)MG
220 CAPSULE ORAL DAILY
Status: DISCONTINUED | OUTPATIENT
Start: 2019-07-11 | End: 2019-07-13 | Stop reason: HOSPADM

## 2019-07-11 RX ADMIN — BUMETANIDE 2 MG: 0.25 INJECTION INTRAMUSCULAR; INTRAVENOUS at 10:36

## 2019-07-11 RX ADMIN — DICLOFENAC 2 G: 10 GEL TOPICAL at 08:39

## 2019-07-11 RX ADMIN — GABAPENTIN 300 MG: 300 CAPSULE ORAL at 21:59

## 2019-07-11 RX ADMIN — DICYCLOMINE HYDROCHLORIDE 10 MG: 10 CAPSULE ORAL at 16:37

## 2019-07-11 RX ADMIN — VANCOMYCIN HYDROCHLORIDE 125 MG: KIT at 08:39

## 2019-07-11 RX ADMIN — ZINC SULFATE CAP 220 MG (50 MG ELEMENTAL ZN) 220 MG: 220 (50 ZN) CAP at 10:35

## 2019-07-11 RX ADMIN — PANTOPRAZOLE SODIUM 40 MG: 40 TABLET, DELAYED RELEASE ORAL at 08:39

## 2019-07-11 RX ADMIN — SIMVASTATIN 20 MG: 20 TABLET, FILM COATED ORAL at 21:56

## 2019-07-11 RX ADMIN — Medication 0.5 MG/HR: at 08:32

## 2019-07-11 RX ADMIN — MELATONIN TAB 3 MG 3 MG: 3 TAB at 21:56

## 2019-07-11 RX ADMIN — GABAPENTIN 300 MG: 300 CAPSULE ORAL at 16:37

## 2019-07-11 RX ADMIN — HYPROMELLOSE 2910 2 DROP: 5 SOLUTION OPHTHALMIC at 14:05

## 2019-07-11 RX ADMIN — DICYCLOMINE HYDROCHLORIDE 10 MG: 10 CAPSULE ORAL at 11:15

## 2019-07-11 RX ADMIN — DICLOFENAC 2 G: 10 GEL TOPICAL at 16:38

## 2019-07-11 RX ADMIN — POTASSIUM CHLORIDE 10 MEQ: 750 TABLET, EXTENDED RELEASE ORAL at 08:39

## 2019-07-11 RX ADMIN — METOPROLOL TARTRATE 25 MG: 25 TABLET ORAL at 08:39

## 2019-07-11 RX ADMIN — Medication 25000 UNITS: at 10:35

## 2019-07-11 RX ADMIN — POTASSIUM CHLORIDE 40 MEQ: 1500 TABLET, EXTENDED RELEASE ORAL at 21:55

## 2019-07-11 RX ADMIN — MULTIPLE VITAMINS W/ MINERALS TAB 1 TABLET: TAB at 10:35

## 2019-07-11 RX ADMIN — METOPROLOL TARTRATE 25 MG: 25 TABLET ORAL at 21:56

## 2019-07-11 RX ADMIN — DICLOFENAC 2 G: 10 GEL TOPICAL at 21:58

## 2019-07-11 RX ADMIN — GABAPENTIN 300 MG: 300 CAPSULE ORAL at 08:39

## 2019-07-11 RX ADMIN — TAMSULOSIN HYDROCHLORIDE 0.4 MG: 0.4 CAPSULE ORAL at 08:39

## 2019-07-11 RX ADMIN — HYPROMELLOSE 2910 2 DROP: 5 SOLUTION OPHTHALMIC at 08:41

## 2019-07-11 RX ADMIN — OXYCODONE HYDROCHLORIDE AND ACETAMINOPHEN 500 MG: 500 TABLET ORAL at 10:35

## 2019-07-11 RX ADMIN — DICYCLOMINE HYDROCHLORIDE 10 MG: 10 CAPSULE ORAL at 08:39

## 2019-07-11 RX ADMIN — POTASSIUM CHLORIDE 20 MEQ: 1500 TABLET, EXTENDED RELEASE ORAL at 16:37

## 2019-07-11 RX ADMIN — POTASSIUM CHLORIDE 40 MEQ: 1500 TABLET, EXTENDED RELEASE ORAL at 11:15

## 2019-07-11 RX ADMIN — VANCOMYCIN HYDROCHLORIDE 125 MG: KIT at 16:37

## 2019-07-11 RX ADMIN — FINASTERIDE 5 MG: 5 TABLET, FILM COATED ORAL at 08:39

## 2019-07-11 ASSESSMENT — ACTIVITIES OF DAILY LIVING (ADL)
ADLS_ACUITY_SCORE: 19

## 2019-07-11 ASSESSMENT — MIFFLIN-ST. JEOR: SCORE: 1259.54

## 2019-07-11 NOTE — PLAN OF CARE
Vitals: VSS and WNL for this pt     Neuro: A&O     Cardiac:SR     Respiratory: LS diminished      Drips: bumex     Skin: mepilex applied to back prophylactically     GI/: voids adequately     Activity: A1 with Walker     Discharge:  To [facility] diuresing     FYI:     Plan:continue diuresing

## 2019-07-11 NOTE — PROGRESS NOTES
SW:  D:  Call placed to update [facility] as to patient's status and potential for discharge on Saturday or Sunday.  Per [facility], they will have a bed available for patient on discharge.  P:  Will continue to follow.

## 2019-07-11 NOTE — PROGRESS NOTES
[facility]    Medicine Progress Note - Hospitalist Service       Date of Admission:  7/4/2019    Assessment & Plan    Ivan Gomez is a 89 year old male with hx of dCHF, mitral regurgitation s/p mitral valve clip, HTN, CKD, BPH, and hx of bladder cancer who was admitted on 7/4/2019 for acute CHF exacerbation and new onset a-fib/RVR    Advanced care planning  Patient with advanced diastolic and right-sided CHF with mitral regurgitation and tricuspid regurgitation. Per Cardiology, prognosis is poor. Goals of care have been reviewed by the Hospitalist service. Poor prognosis, high likelihood for rehospitalizations, and short period of time between hospitalizations have been reviewed with the patient. He lacks insight into his medical comorbidities, and wants to remain FULL CODE with restorative cares  - Plan of care remains restorative    Acute exacerbation of chronic diastolic and right-sided CHF with acute hypoxic respiratory failure, improved  Pulmonary hypertension  History of mitral regurgitation s/p mitral valve clip  Essential hypertension  [PTA: Lasix 40 mg daily, Imdur 20 mg BID]  * TTE (7/5): LVEF 70-75%, moderately dilated RV with moderately reduced RV systolic function, moderately severe residual MR, massively dilated left atrium, severely dilated right atrium, and 3+ TR with significant pulmonary hypertension.   * Presented with 2 day history of progressive dyspnea. Noted to be in acute respiratory distress in the field with hypoxia to 82% on room air. Chest CT on arrival showed bilateral patchy airspace and groundglass opacities, small bilateral pleural effusions, and pulmonary edema. NTproBNP>17,000. In the ED, he was initiated on IV Lasix with questionable improvement. He was ultimately started on a Bumex infusion by Cardiology on 7/9.   - Now breathing comfortably on room air  - Net negative 1.7L in last 24 hours, 8.9L since admission  - Continues on Bumex gtt as per Cardiology  -  Holding PTA Imdur to allow BP room for diuresis  - Cardiac rehab recommending long term care  - Cardiology following, appreciate assistance  - Has appointment in CORE clinic on 7/18    Atrial fibrillation with RVR, Resolved  New onset paroxysmal atrial fibrillation  Noted to be in a-fib with HR in the 130s on arrival. He was initiated on a diltiazem infusion and converted to sinus rhythm while still in the ED. He has since remained in sinus rhythm.  - He has mike started on metoprolol 25 mg BID  - Not on anticoagulation per Cardiology due to age, frailty, and risk for bleeding    CKD stage III  Caseline Cr 1.3-1.6.    History of C.diff colitis  Diagnosed on May 21. Treated with oral vancomycin and dicyclomine as per Suhail FELDMAN. Developed worsening diarrhea with attempts to wean oral vancomycin (as recently as 7/1). Has since been maintained on vancomycin 125 mg TID.  - Continues on vancomycin 125 mg TID  - Continues on PTA dicyclomine  - Follow up with Suhail FELDMAN in 1 month as previously arranged, defer weaning of vancomycin to Dr. Jang    Chronic dysphagia   Patient has been evaluated by SLP who has high suspicion for aspiration. Patient has been unwilling to consider a modified diet despite multiple conversations, and is also unwilling to change his code status. Video swallow study has not been pursued given patient's unwillingness to consider a modified diet  - On DDL3 with thin liquids  - SLP has signed off    History of pill esophagitis  - Continues on PTA pantoprazole    Stage II pressure ulcer on left heel  Unstageable pressure ulcer on coccyx  Present on admission  - Wound cares in place as per WOC RN    Bladder mass  History of recurrent bladder cancer s/p multiple TURBT  Multiple bladder masses up to 3.5 cm suspicious for neoplasm noted on outpatient CT. The patient was evaluated by Dr. Yoon ([facility]) during his last hospitalization in May 2019, and it was recommended at that time to follow up  with his primary urologist, Dr. Hilton, for outpatient cystoscopy  - Follow up with [facility] after discharge    BPH  [PTA: tamsulosin 0.4 mg daily, finasteride 5 mg daily]  - Continues on PTA meds    Chronic anemia and thrombocytopenia  Hgb has been stable within baseline 10-11, and platelets have been stable within recent baseline 50-90k.    Dyslipidemia  Chronic and stable on simvastatin    Chronic pain syndrome  [PTA: gabapentin 300 mg TID, Norco 7.5-325 mg q6h prn]  - Continues on PTA meds       Diet: Combination Diet Regular Diet Adult; Dysphagia Diet Level 3: Advanced; Thin Liquids (water, ice chips, juice, milk gelatin, ice cream, etc)  Snacks/Supplements Adult: Other; milkshake + 2 pkts benepro; With Meals    DVT Prophylaxis: Pneumatic Compression Devices  Velasquez Catheter: not present  Code Status: Full Code      Disposition Plan   Expected discharge: 2 - 3 days per Cardiology once weaned from Bumex drip  Entered: Koko Cooper PA-C 07/11/2019, 1:47 PM       The patient's care was discussed with the Attending Physician, Dr. Garnica, Bedside Nurse and Patient.    Koko Cooper PA-C  Hospitalist Service  [facility]    ______________________________________________________________________    Interval History   Pt seen & evaluated. Sitting up in chair. No issues today.    Data reviewed today: I reviewed all medications, new labs and imaging results over the last 24 hours. I personally reviewed no images or EKG's today.    Physical Exam   Vital Signs: Temp: 98.2  F (36.8  C) Temp src: Oral BP: 99/62 Pulse: 78 Heart Rate: 67 Resp: 16 SpO2: 93 % O2 Device: None (Room air)    Weight: 140 lbs 0 oz  GEN: well-developed, well-nourished, appears comfortable  HEENT: NCAT, PERRL, EOM intact bilaterally, sclera clear, conjunctiva normal, nose & mouth patent, mucous membranes moist  CHEST: lungs CTA bilaterally, no increased work of breathing, no wheeze, rales, rhonchi  HEART: RRR, S1 & S2,  holosystolic murmur present  MSK: full AROM bilateral UE/LE, pedal & radial pulses 2+ bilaterally  SKIN: warm & dry without rash, 1-2+ pedal edema      Data

## 2019-07-11 NOTE — PROGRESS NOTES
"CLINICAL NUTRITION SERVICES  -  ASSESSMENT NOTE    Recommendations Ordered by Registered Dietitian (RD):   Add high protein supplement @ 2 pm + PRN if patient desires (milkshake + beneprotein)     Add micronutrients for PIP stage 2 and Unstageable -   - Thera vit M daily  - Zinc Sulfate 220 mg daily x 10 days  - Beta Carotene 25,000 international unit(s) daily  - Ascorbic Acid 500 mg daily    Malnutrition:   % Weight Loss:  > 5% in 1 month (severe malnutrition)  % Intake:  Decreased intake does not meet criteria for malnutrition   Subcutaneous Fat Loss:  Orbital region moderate depletion and Upper arm region moderate depletion  Muscle Loss:  Temporal region moderate depletion, Clavicle bone region moderate depletion and Dorsal hand region moderate depletion  Fluid Retention: Does not meet criteria     Malnutrition Diagnosis: Severe malnutrition  In Context of:  Acute illness or injury  Chronic illness or disease     REASON FOR ASSESSMENT  Ivan Gomez is a 89 year old male seen by Registered Dietitian for LOS    NUTRITION HISTORY  - Information obtained from patient, EMR.   - H/o HTN, hyperlipidemia, CKD 3, gout, bladder CA s/p resection and bladder reconstruction x2, chronic pain, mitral regurg, s/p mitral clip, anemia  - Comes from [facility] with dyspena.     - Patient with h/o poor appetite/intakes after heart surgery in October 2018. At that time he was eating 25-50% of meals, and occasionally drinking high protein drinks. Prefers Ensure Vanilla.   - Since then his appetite has improved some. He eats 2-3 meals daily depending on timing, and usually 75%. Has a hard time eating large portions.   - Verbalizes dislike for \"minced\" or \"pureed\" foods. \"They don't have to mince it anymore right?\"  - NKFA      CURRENT NUTRITION ORDERS  Diet Order:     Dysphagia diet level 3 + Thin liquids (no straw)    Current Intake/Tolerance:  % intakes recorded for meals most of admission. Patient verbalizes sometimes the " "portions are too large, wishes they wouldn't send so much. Late breakfast this morning, patient not sure he'll order lunch as a result (oatmeal and scrambled eggs to come up)       NUTRITION FOCUSED PHYSICAL ASSESSMENT (NFPA)  Completed:  Yes Partial assessment - No LEs         Observed:    Muscle wasting (refer to documentation in Malnutrition section) and Subcutaneous fat loss (refer to documentation in Malnutrition section)    Obtained from Chart/Interdisciplinary Team:  Palliative care --> pt wishes to remain restorative, full code.  SLP --> continue DD3 + thins, no straw. Patient declined video swallow. \"suspect pharyngeal retention. He took sips of water and demonstrated premature entry and intermittent throat clearing. Improvement with trials of nectar thick liquids but he  stated that he would not drink something he did not like\"  Plan LTC at discharge  WOCN 7/5:   Lt plantar heel: Stage 2 PI; community acquired, no local s/s infection  Coccyx:  Unstageable PI; community acquired, no local s/s infection  Last BM 7/9.   1-2+ Edema BLEs    ANTHROPOMETRICS  Height: 5' 8\"  Weight: 140 lbs 0 oz (63.5 kg)   Body mass index is 21.29 kg/m .  Weight Status:  Normal BMI  IBW: 70 kg  % IBW: 91%  Weight History: \"down to 140# now, used to be 175#\". ~9# loss in <1 month (6%).   Wt Readings from Last 20 Encounters:   07/11/19 63.5 kg (140 lb)   07/02/19 67.1 kg (148 lb)   06/27/19 68.6 kg (151 lb 3.2 oz)   06/25/19 69.5 kg (153 lb 3.2 oz)   06/24/19 69.4 kg (153 lb)   06/21/19 68 kg (150 lb)   06/20/19 67.8 kg (149 lb 8 oz)   06/18/19 69.2 kg (152 lb 8 oz)   06/14/19 70.9 kg (156 lb 6.4 oz)   06/11/19 70.8 kg (156 lb)   06/05/19 73.9 kg (163 lb)   06/03/19 74.7 kg (164 lb 11.2 oz)   05/31/19 74.7 kg (164 lb 11.2 oz)   05/29/19 73.6 kg (162 lb 4.8 oz)   05/28/19 74.2 kg (163 lb 8 oz)   11/08/18 71 kg (156 lb 8 oz)   11/05/18 69.6 kg (153 lb 6.4 oz)   10/29/18 68 kg (150 lb)   10/24/18 66 kg (145 lb 6.4 oz)   06/11/18 65.3 " kg (144 lb)     LABS  Labs reviewed  Recent Labs   Lab Test 07/10/19  0602 07/09/19  0601 07/08/19  0638 07/07/19  0639 07/06/19  0604   POTASSIUM 3.4 3.6 3.8 4.1 3.6     Recent Labs   Lab Test 05/22/19  0935 05/21/19  1950 10/17/17  0335 10/16/17  1215 10/16/17  0402   PHOS 2.9 2.9 4.3 3.5 2.7     Recent Labs   Lab Test 07/07/19  0639 05/22/19  0935 10/17/17  0335 10/16/17  2222 10/16/17  1215   MAG 1.6 1.8 2.4* 2.2 2.2     Recent Labs   Lab Test 07/10/19  0602 07/09/19  0601 07/08/19  0638 07/07/19  0639 07/06/19  0604    141 142 140 141     Recent Labs   Lab Test 07/10/19  0602 07/09/19  0601 07/08/19  0638 07/07/19  0639 07/06/19  0604   CR 1.19 1.40* 1.30* 1.33* 1.45*     Recent Labs   Lab 07/10/19  0602 07/09/19  0601 07/08/19  0638 07/07/19  0639 07/06/19  0604 07/05/19  0605 07/04/19  1559   GLC 78 83 76 76 85 95 108*     Lab Results   Component Value Date    A1C 5.3 08/18/2015     Triglycerides   Date Value Ref Range Status   10/12/2017 81 <150 mg/dL Final   08/18/2015 186 (H) 0 - 150 mg/dL Final     Comment:     Non Fasting   09/15/2014 199 (H) 0 - 150 mg/dL Final     Comment:     Fasting specimen      MEDICATIONS  Medications reviewed  Bumex drip    ASSESSED NUTRITION NEEDS PER APPROVED PRACTICE GUIDELINES:  Dosing Weight 63.5 kg  Estimated Energy Needs: 4570-4289 kcals (30-35 Kcal/Kg)  Justification: repletion and staged PIs  Estimated Protein Needs:  grams protein (1.5-2 g pro/Kg)  Justification: wound healing and preservation of lean body mass  Estimated Fluid Needs: 1 mL/kcal  Justification: maintenance or per provider pending fluid status    MALNUTRITION:  % Weight Loss:  > 5% in 1 month (severe malnutrition)  % Intake:  Decreased intake does not meet criteria for malnutrition   Subcutaneous Fat Loss:  Orbital region moderate depletion and Upper arm region moderate depletion  Muscle Loss:  Temporal region moderate depletion, Clavicle bone region moderate depletion and Dorsal hand region  moderate depletion  Fluid Retention: Does not meet criteria     Malnutrition Diagnosis: Severe malnutrition  In Context of:  Acute illness or injury  Chronic illness or disease    NUTRITION DIAGNOSIS:  Inadequate oral intake related to overall reduced appetite 2/2 symptoms of chronic illness and increased needs as evidenced by 6% weight loss in <1 month, e/o fat and muscle wasting, stage 2 and unstageable PIs documented, coding for malnutrition.       NUTRITION INTERVENTIONS  Recommendations / Nutrition Prescription  Continue diet per SLP    Add high protein supplement @ 2 pm + PRN if patient desires (milkshake + beneprotein)     Add micronutrients for PIP stage 2 and Unstageable -   - Thera vit M daily  - Zinc Sulfate 220 mg daily x 10 days  - Beta Carotene 25,000 international unit(s) daily  - Ascorbic Acid 500 mg daily       Implementation  Nutrition education: Provided education on protein sources, adequacy of intake, supplements available and how to order.   Medical Food Supplement and Multivitamin/Mineral: see above.       Nutrition Goals  Intake of at least 75% meals TID + 1 high protein supplement.       MONITORING AND EVALUATION:  Progress towards goals will be monitored and evaluated per protocol and Practice Guidelines    Rowan Rascon RD, LD

## 2019-07-11 NOTE — PLAN OF CARE
VSS on RA. Tele- SR. Bumex gtt infusing @ 0.5, extra 2mg dose administered. Up with assist of 1 +walker. A&ox4. WOC saw patient and changed dressings to left heal and coccyx. Mepilex on spine prophylactically. PO vanco for CDiff. No BM this shift. Continue to monitor.

## 2019-07-11 NOTE — PROGRESS NOTES
[facility]  Cardiology Progress Note  Date of Service: 07/11/2019  Primary Cardiologist: Previous patient of Dr. Yao and requests to reestablish care    Assessment & Plan    Ivan Gomez is a 89 year old male with past medical history significant for HTN, HLP, CKD Stage III, bladder cancer s/p resection and reconstruction, mitral regurgitation s/p MitraClip, chornic anemia admitted on 7/4/2019 with dyspnea and hypoxia from [facility].     Assessment:   1. New onset paroxysmal atrial fibrillation    Spontaneously converted to SR    Metoprolol 25 mg BID    No anticoagulation due to advanced age and thrombocytopenia    2. HFpEF [PTA: Lasix 40 mg daily]    BNP 17,000    Weight down 9 lbs from admission with 1 lb overnight    Net negative 9.2 L    Transitioned to Bumex infusion at 0.25 mg/hr 7/8 and dose increased to 0.5 mg/hr 7/10    3. Valvular heart disease    Moderaterly severe MR post MitraClip    Moderately severe TR    4. C. Diff diarrhea    Diagnosed last admission 5/2019    Attempted to stop Vancomycin, but had worsening of diarrhea.     5. Severe pulmonary hypertension    6. CKD, stage III     Baseline creatine 1.3-1.5    Creatine increased overnight from 1.3-1.4-1.19-1.3    Plan:  1. IV 2 mg bolus   2. Continue Bumex infusion at 0.5 mg/hr  3. Add KCL 40 mEq BID  4. Iron studies  5. CORE f/u as outpatient  6. Consider palliative consult as outpatient given poor overall prognosis    FRANK ARIAS CNP  Pager:  [phone]   (7am - 5pm, M-F)    Interval History   Sitting up in chair without complaints, improvement in shortness of breath on exertion when getting up to the toilet.     Physical Exam   Temp: 98.2  F (36.8  C) Temp src: Oral BP: 99/62 Pulse: 78 Heart Rate: 67 Resp: 16 SpO2: 93 % O2 Device: None (Room air)    Vitals:    07/10/19 0131 07/10/19 1610 07/11/19 0410   Weight: 64.8 kg (142 lb 14.4 oz) 64.3 kg (141 lb 11.2 oz) 63.5 kg (140 lb)       Constitutional:   NAD    Skin:   Warm and dry   Head:   Nontraumatic   Neck:   elevated JVP   Lungs:   Bilateral crackles   Cardiovascular:   regular rate and rhythm, normal S1 and S2 and 3/6 systolic murmur at the apex and 2/6 murmur at LLSB   Abdomen:   Benign   Extremities and Back:   Edema 1+   Neurological:   Grossly nonfocal       Medications     bumetanide 0.5 mg/hr (07/11/19 0832)       beta carotene  25,000 Units Oral Daily     diclofenac  2 g Transdermal TID     dicyclomine  10 mg Oral TID AC     finasteride  5 mg Oral Daily     gabapentin  300 mg Oral TID     hypromellose  2 drop Left Eye 4x Daily     melatonin  3 mg Oral At Bedtime     metoprolol tartrate  25 mg Oral BID     multivitamin w/minerals  1 tablet Oral Daily     pantoprazole  40 mg Oral Daily     potassium chloride  40 mEq Oral BID     simvastatin  20 mg Oral At Bedtime     sodium chloride (PF)  3 mL Intracatheter Q8H     tamsulosin  0.4 mg Oral Daily     vancomycin  125 mg Oral TID     vitamin C  500 mg Oral Daily     zinc sulfate  220 mg Oral Daily       Data     Most Recent 3 CBC's:  Recent Labs   Lab Test 07/11/19  0952 07/07/19  0820 07/06/19  0604   WBC 4.1 5.0 3.8*   HGB 10.2* 10.9* 10.6*   MCV 93 94 95   PLT 69* 70* 74*     Most Recent 3 BMP's:  Recent Labs   Lab Test 07/11/19  0952 07/10/19  0602 07/09/19  0601    143 141   POTASSIUM 3.3* 3.4 3.6   CHLORIDE 98 101 101   CO2 36* 34* 36*   BUN 32* 36* 35*   CR 1.31* 1.19 1.40*   ANIONGAP 8 8 4   GIUSEPPE 8.5 8.2* 8.1*   GLC 96 78 83     Most Recent 3 Hemoglobins:  Recent Labs   Lab Test 07/11/19  0952 07/07/19  0820 07/06/19  0604   HGB 10.2* 10.9* 10.6*     Most Recent 3 Troponin's:  Recent Labs   Lab Test 07/05/19  0108 07/04/19  2146 07/04/19  1559   TROPI <0.015 0.021 <0.015     Most Recent Cholesterol Panel:  Recent Labs   Lab Test 10/12/17  0729   CHOL 108   LDL 29   HDL 63   TRIG 81

## 2019-07-11 NOTE — PROGRESS NOTES
[facility] Nurse Inpatient Wound Assessment      Assessment of wound(s) on pt's:    Coccyx and heel PI - all community acquired        Data:   Patient History:      Ivan Gomez is a 89 year old male who was admitted on 7/4/2019 for dyspnea, found to be in acute exacerbation of chronic diastolic congestive heart failure.     Acute exacerbation of chronic diastolic congestive heart failure  Pulmonary hypertension  Mitral regurgitation S/P mitral valve clip implantation  Hypertension   New onset paroxysmal atrial fibrillation with rapid ventricular response     Ground glass opacities, patchy airspace opacities on CT  Hx Dysphagia  CT showed patchy airspace and ground-glass opacities in both mid and lower lungs, suspicious for pneumonia.     Bladder mass  Hx bladder cancer and bladder mass. Urology was consulted when he was last here towards the end of May for his acute cholecystitis.     Chronic kidney disease, stage III      Haim Risk Assessment  Sensory Perception: 3-->slightly limited    Moisture: 4-->rarely moist   Activity: 3-->walks occasionally     Mobility: 3-->slightly limited   Nutrition: 3-->adequate   Friction and Shear: 2-->potential problem  Haim Score: 18       Positioning: Turns and stands with minimal assist x 1    Mattress:  Atmos air       Moisture Management:  Urinal for UIC and BSC for FIC      Current Diet / Nutrition:           Combination Diet Regular Diet Adult; Dysphagia Diet Level 3: Advanced; Thin Liquids (water, ice chips, juice, milk gelatin, ice cream, etc)    Labs:   Recent Labs   Lab Test 07/11/19  0952 07/08/19  0638  07/04/19  1559  08/18/15  1146   ALBUMIN  --  2.9*  --  3.4   < >  --    HGB 10.2*  --    < > 11.9*   < >  --    INR  --   --   --  1.26*   < >  --    WBC 4.1  --    < > 7.6   < >  --    A1C  --   --   --   --   --  5.3    < > = values in this interval not displayed.       Wound Assessment (location):   #1: Lt plantar heel - community  acquired  Wound History:  Pt states area is new since Qing    WO photo 7-11-19 Lt plantar heel    Wound Base:100% pink wound bed    Specific Dimensions (length x width x depth, in cm) :  2.0cm x 2.0cm x superficial    Tunneling:  N/A    Undermining: N/A    Palpation of the wound bed:  normal    Slough appearance:  none    Eschar appearance:  none    Periwound Skin: intact with blanchable pink/purplish erythema/ecchymosis    Temperature  normal     Drainage: scant serosang    Odor: none    Pain: Tenderness    #2: Coccyx - community acquired    WO photo:  7-5-19 coccyx        WOC photo:  7-11-19 coccyx    Red erythematic area about 4.0cm x 3.0cm with approx 5 small pressure injuries in varying stages of healing    2 unstageable pressure injuries about 1.5cm x 1 cm x 0.3cm with a moist white adherent fibrous base and granulation buds.  A few other superficial pressure injuries with 100% red base all < 0.8cm.   Small dry crusty tan scab approx 0.5cm.                  Tunneling:  N/A    Undermining: N/A    Palpation of the wound bed:  normal    Slough appearance: questionable slough vs fibrin vs deep dermis; white adherent    Eschar appearance:  none    Periwound Skin: intact with blanchable erythema, about a 2 cm of macerated skin inferior to the PI but directly superior and bordering the gluteal cleft.    Temperature  normal     Drainage:  Scant serosang    Odor: none    Pain: Tenderness            Intervention:     Patient's chart evaluated.      Wound(s) assessed    Wound Care: completed    Orders  In Epic    Supplies  In pt room    Discussed plan of care with Patient             All patient  questions answered: yes          Assessment:   Lt plantar heel: Stage 2 PI; community acquired, no local s/s infection  Coccyx:  Unstageable PI; community acquired, no local s/s infection        Plan:     Nursing to notify the Provider(s) and re-consult the [facility] Nurse if wound(s) deteriorate(s) or if the wound care plan needs  reevaluation.    Wound care: Lt plantar heel and coccyx  1.  Change dressing on odd days and prn  2.  Clean Microklenz. Pat dry  3.  Mepilex border to heel  4.  Mepilex sacral to coccyx (swab periwound with cavilon no-sting skin barrier first, and apply dressing 'upside-down' with pointy-end toward head)  PIP:   1. Diligent turning in bed with heels suspended   2. Limit sitting to <2 hours @ a time   3. Pillow under feet when sitting to minimize Pressure to heel   4. Mobilize when medically indicated   5. Encourage use of BSC for FIC   6. Haim risk: document all interventions   7. HOB below 30 degrees       WOC Nurse will return: weekly and prn

## 2019-07-12 ENCOUNTER — APPOINTMENT (OUTPATIENT)
Dept: PHYSICAL THERAPY | Facility: CLINIC | Age: 84
DRG: 291 | End: 2019-07-12
Payer: MEDICARE

## 2019-07-12 LAB
ANION GAP SERPL CALCULATED.3IONS-SCNC: 4 MMOL/L (ref 3–14)
BUN SERPL-MCNC: 41 MG/DL (ref 7–30)
CALCIUM SERPL-MCNC: 8.8 MG/DL (ref 8.5–10.1)
CHLORIDE SERPL-SCNC: 100 MMOL/L (ref 94–109)
CO2 SERPL-SCNC: 38 MMOL/L (ref 20–32)
CREAT SERPL-MCNC: 1.49 MG/DL (ref 0.66–1.25)
FERRITIN SERPL-MCNC: 116 NG/ML (ref 26–388)
GFR SERPL CREATININE-BSD FRML MDRD: 40 ML/MIN/{1.73_M2}
GLUCOSE SERPL-MCNC: 106 MG/DL (ref 70–99)
IRON SATN MFR SERPL: 10 % (ref 15–46)
IRON SERPL-MCNC: 23 UG/DL (ref 35–180)
POTASSIUM SERPL-SCNC: 4.4 MMOL/L (ref 3.4–5.3)
SODIUM SERPL-SCNC: 142 MMOL/L (ref 133–144)
TIBC SERPL-MCNC: 235 UG/DL (ref 240–430)
TRANSFERRIN SERPL-MCNC: 186 MG/DL (ref 210–360)

## 2019-07-12 PROCEDURE — 82728 ASSAY OF FERRITIN: CPT | Performed by: NURSE PRACTITIONER

## 2019-07-12 PROCEDURE — 97530 THERAPEUTIC ACTIVITIES: CPT | Mod: GP

## 2019-07-12 PROCEDURE — 97116 GAIT TRAINING THERAPY: CPT | Mod: GP

## 2019-07-12 PROCEDURE — 25000132 ZZH RX MED GY IP 250 OP 250 PS 637: Mod: GY | Performed by: NURSE PRACTITIONER

## 2019-07-12 PROCEDURE — 21000001 ZZH R&B HEART CARE

## 2019-07-12 PROCEDURE — 25000132 ZZH RX MED GY IP 250 OP 250 PS 637: Mod: GY | Performed by: HOSPITALIST

## 2019-07-12 PROCEDURE — 36415 COLL VENOUS BLD VENIPUNCTURE: CPT | Performed by: NURSE PRACTITIONER

## 2019-07-12 PROCEDURE — 25000132 ZZH RX MED GY IP 250 OP 250 PS 637: Mod: GY | Performed by: INTERNAL MEDICINE

## 2019-07-12 PROCEDURE — 25000131 ZZH RX MED GY IP 250 OP 636 PS 637: Mod: GY | Performed by: INTERNAL MEDICINE

## 2019-07-12 PROCEDURE — 83540 ASSAY OF IRON: CPT | Performed by: NURSE PRACTITIONER

## 2019-07-12 PROCEDURE — 99232 SBSQ HOSP IP/OBS MODERATE 35: CPT | Performed by: INTERNAL MEDICINE

## 2019-07-12 PROCEDURE — 83550 IRON BINDING TEST: CPT | Performed by: NURSE PRACTITIONER

## 2019-07-12 PROCEDURE — 25000125 ZZHC RX 250: Performed by: INTERNAL MEDICINE

## 2019-07-12 PROCEDURE — 80048 BASIC METABOLIC PNL TOTAL CA: CPT | Performed by: NURSE PRACTITIONER

## 2019-07-12 PROCEDURE — 84466 ASSAY OF TRANSFERRIN: CPT | Performed by: NURSE PRACTITIONER

## 2019-07-12 RX ORDER — POTASSIUM CHLORIDE 1500 MG/1
40 TABLET, EXTENDED RELEASE ORAL DAILY
Status: DISCONTINUED | OUTPATIENT
Start: 2019-07-13 | End: 2019-07-13 | Stop reason: HOSPADM

## 2019-07-12 RX ORDER — FERROUS SULFATE 325(65) MG
325 TABLET ORAL 2 TIMES DAILY
Status: DISCONTINUED | OUTPATIENT
Start: 2019-07-12 | End: 2019-07-13 | Stop reason: HOSPADM

## 2019-07-12 RX ORDER — TORSEMIDE 20 MG/1
20 TABLET ORAL DAILY
Status: DISCONTINUED | OUTPATIENT
Start: 2019-07-13 | End: 2019-07-13 | Stop reason: HOSPADM

## 2019-07-12 RX ADMIN — VANCOMYCIN HYDROCHLORIDE 125 MG: KIT at 08:24

## 2019-07-12 RX ADMIN — ONDANSETRON 4 MG: 4 TABLET, ORALLY DISINTEGRATING ORAL at 09:15

## 2019-07-12 RX ADMIN — HYPROMELLOSE 2910 2 DROP: 5 SOLUTION OPHTHALMIC at 16:54

## 2019-07-12 RX ADMIN — FINASTERIDE 5 MG: 5 TABLET, FILM COATED ORAL at 08:16

## 2019-07-12 RX ADMIN — HYDROCODONE BITARTRATE AND ACETAMINOPHEN 1 TABLET: 7.5; 325 TABLET ORAL at 02:00

## 2019-07-12 RX ADMIN — DICLOFENAC 2 G: 10 GEL TOPICAL at 16:47

## 2019-07-12 RX ADMIN — POTASSIUM CHLORIDE 40 MEQ: 1500 TABLET, EXTENDED RELEASE ORAL at 08:16

## 2019-07-12 RX ADMIN — GABAPENTIN 300 MG: 300 CAPSULE ORAL at 22:31

## 2019-07-12 RX ADMIN — ZINC SULFATE CAP 220 MG (50 MG ELEMENTAL ZN) 220 MG: 220 (50 ZN) CAP at 08:15

## 2019-07-12 RX ADMIN — SIMVASTATIN 20 MG: 20 TABLET, FILM COATED ORAL at 22:31

## 2019-07-12 RX ADMIN — MELATONIN TAB 3 MG 3 MG: 3 TAB at 22:32

## 2019-07-12 RX ADMIN — FERROUS SULFATE TAB 325 MG (65 MG ELEMENTAL FE) 325 MG: 325 (65 FE) TAB at 22:31

## 2019-07-12 RX ADMIN — HYDROCODONE BITARTRATE AND ACETAMINOPHEN 1 TABLET: 7.5; 325 TABLET ORAL at 08:16

## 2019-07-12 RX ADMIN — DICLOFENAC 2 G: 10 GEL TOPICAL at 22:32

## 2019-07-12 RX ADMIN — FERROUS SULFATE TAB 325 MG (65 MG ELEMENTAL FE) 325 MG: 325 (65 FE) TAB at 12:44

## 2019-07-12 RX ADMIN — DICYCLOMINE HYDROCHLORIDE 10 MG: 10 CAPSULE ORAL at 08:15

## 2019-07-12 RX ADMIN — MULTIPLE VITAMINS W/ MINERALS TAB 1 TABLET: TAB at 08:16

## 2019-07-12 RX ADMIN — HYDROCODONE BITARTRATE AND ACETAMINOPHEN 1 TABLET: 7.5; 325 TABLET ORAL at 16:48

## 2019-07-12 RX ADMIN — PANTOPRAZOLE SODIUM 40 MG: 40 TABLET, DELAYED RELEASE ORAL at 08:16

## 2019-07-12 RX ADMIN — GABAPENTIN 300 MG: 300 CAPSULE ORAL at 16:48

## 2019-07-12 RX ADMIN — DICLOFENAC 2 G: 10 GEL TOPICAL at 02:13

## 2019-07-12 RX ADMIN — OXYCODONE HYDROCHLORIDE AND ACETAMINOPHEN 500 MG: 500 TABLET ORAL at 08:15

## 2019-07-12 RX ADMIN — DICYCLOMINE HYDROCHLORIDE 10 MG: 10 CAPSULE ORAL at 16:48

## 2019-07-12 RX ADMIN — HYPROMELLOSE 2910 2 DROP: 5 SOLUTION OPHTHALMIC at 22:32

## 2019-07-12 RX ADMIN — GABAPENTIN 300 MG: 300 CAPSULE ORAL at 08:15

## 2019-07-12 RX ADMIN — VANCOMYCIN HYDROCHLORIDE 125 MG: KIT at 16:47

## 2019-07-12 RX ADMIN — Medication 25000 UNITS: at 08:16

## 2019-07-12 RX ADMIN — Medication 0.5 MG/HR: at 02:11

## 2019-07-12 RX ADMIN — TAMSULOSIN HYDROCHLORIDE 0.4 MG: 0.4 CAPSULE ORAL at 08:15

## 2019-07-12 RX ADMIN — VANCOMYCIN HYDROCHLORIDE 125 MG: KIT at 22:34

## 2019-07-12 RX ADMIN — METOPROLOL TARTRATE 25 MG: 25 TABLET ORAL at 08:16

## 2019-07-12 RX ADMIN — DICYCLOMINE HYDROCHLORIDE 10 MG: 10 CAPSULE ORAL at 12:44

## 2019-07-12 ASSESSMENT — ACTIVITIES OF DAILY LIVING (ADL)
ADLS_ACUITY_SCORE: 19

## 2019-07-12 ASSESSMENT — MIFFLIN-ST. JEOR: SCORE: 1247.74

## 2019-07-12 NOTE — PROGRESS NOTES
"SPIRITUAL HEALTH SERVICES Progress Note  [facility]    Follow-up visit. Pt said he is feeling a lot better now that most of the fluid has drained off his legs. Pt invited conversation and said: \"I\"m sleepy, but I'm always sleepy, so we better chat now.\"    Pt is happy to have his iPhone back and said he is grateful for the nurse who helped him call his nephew. Pt said he is trying to get his belongings in the room tidied up because he thinks he might be transferred to a care facility in the coming days. Pt's nephew is helping him get his items back from [place]. Pt said his nephew shipped pt's computer to pt's residence, but because pt was not there to sign for it the computer got shipped back - pt is sad about this situation.     Pt had a sense of humor, and he said he enjoys having a room that overlooks an entrance to the hospital so he can watch the people and cars come and go. Pt appears realistic about the future, and though he remains saddened by the loss of his partner, he is determined to find randall in his life.    SH provided spiritual/emotinal support.  will continue to follow and visit per KELLIE.    Mirela Krause   Intern  Pager:  [phone]  "

## 2019-07-12 NOTE — PLAN OF CARE
"A&O. VSS ex BPs soft, on RA. Tele- SR with PVCs. Up with A1 GB and walker. Back pain managed with Norco. Bumex gtt discontinued. LS diminished. ROMAN. BLE edema +1. Mepilex to left heal and coccyx wounds CDI. Mepilex on spine for prevention CDI. Mepilex applied to bilat forearms for skin tears. Pt says he \"bumped against the wall getting into the bathroom\". BM formed x1 today. Tolertaing dysphagia 3 diet. Possible discharge to TCU Saturday/Sunday.   "

## 2019-07-12 NOTE — PLAN OF CARE
Discharge Planner PT   Patient plan for discharge: None stated at this time  Current status: Pt is SBA for sit to stand transfers, SBA with FWW to ambulate 75' no LOB. Pt with hypotension post ambulation, denies symptoms, see vital signs flow sheet. Pt participated in seated LE exercises.  Barriers to return to prior living situation: None to LTC  Recommendations for discharge: Per chart review, pt plans to discharge to LTC facility  Rationale for recommendations: Pt appears to be near baseline level of function. Recommend he continue to ambulate daily with nursing staff.       Entered by: Marta Rahman 07/12/2019 2:10 PM

## 2019-07-12 NOTE — PROGRESS NOTES
DANNY  I: SW was updated that patient would likely be ready for discharge on Saturday 7/13. DANNY confirmed bed with admissions at [facility]. DANNY called HE and arranged w/c transport for 1445 on 7/13. DANNY called and updated [facility] admissions. SW will update patient. SW will fax orders/scripts when complete. Patient does not need PAS per [facility] admissions.    P: SW will continue to follow and assist as needed.    Kristy Zimmerman, DANITZA   *[phone]

## 2019-07-12 NOTE — PROGRESS NOTES
[facility]    Hospitalist Progress Note    Interval History   - Patient without acute complaints, today, reports good exercise tolerance with PT today, notes improving LE edema  - Per discussion with care team plan discharge patient to [facility] tomorrow AM    Assessment & Plan   Summary: Ivan Gomez is a 89 yo M with PMH of diastolic CHF, mitral regurgitation s/p mitral valve clip, HTN, CKD, BPH, and hx of bladder cancer who was admitted on 7/4/2019 for acute CHF exacerbation and new onset a-fib/RVR. Spontaneously converted out of atrial fibrillation in ED. Improved with IV diuresis, transitioning to PO diuresis on 7/13.      Advanced care planning  Patient with advanced diastolic and right-sided CHF with mitral regurgitation and tricuspid regurgitation. Per Cardiology, prognosis is poor. Goals of care have been reviewed by the Hospitalist service. Poor prognosis, high likelihood for rehospitalizations, and short period of time between hospitalizations have been reviewed with the patient. He lacks insight into his medical comorbidities, and wants to remain FULL CODE with restorative cares  - Plan of care remains restorative     Acute exacerbation of chronic diastolic and right-sided CHF with acute hypoxic respiratory failure, improved  Pulmonary hypertension  History of mitral regurgitation s/p mitral valve clip  Essential hypertension  [PTA: Lasix 40 mg daily, Imdur 20 mg BID]  * TTE (7/5): LVEF 70-75%, moderately dilated RV with moderately reduced RV systolic function, moderately severe residual MR, massively dilated left atrium, severely dilated right atrium, and 3+ TR with significant pulmonary hypertension.   * Presented with 2 day history of progressive dyspnea. Noted to be in acute respiratory distress in the field with hypoxia to 82% on room air. Chest CT on arrival showed bilateral patchy airspace and groundglass opacities, small bilateral pleural effusions, and pulmonary edema.  NTproBNP>17,000. In the ED, he was initiated on IV Lasix with questionable improvement. He was ultimately started on a Bumex infusion by Cardiology on 7/9, stopped 7/12. Transitioning to PO diuresis on 7/13.   - Appreciate Cardiology involvement   - Now breathing comfortably on room air  - Holding PTA Imdur to allow BP room for diuresis  - Cardiac rehab recommending long term care  - Has appointment in [facility] on 7/18     New onset atrial fibrillation with RVR, Resolved  Noted to be in a-fib with HR in the 130s on arrival. He was initiated on a diltiazem infusion and converted to sinus rhythm while still in the ED. He has since remained in sinus rhythm. Not on anticoagulation per Cardiology due to age, frailty, and risk for bleeding  - Metoprolol 25 mg BID     CKD stage III:  Baseline Cr 1.3-1.6.     History of C.diff colitis  Diagnosed on May 21. Treated with oral vancomycin and dicyclomine as per Suhail FELDMAN. Developed worsening diarrhea with attempts to wean oral vancomycin (as recently as 7/1). Has since been maintained on vancomycin 125 mg TID.  - Continues on vancomycin 125 mg TID  - Continues on PTA dicyclomine  - Follow up with Suhail FELDMAN in 1 month as previously arranged, defer weaning of vancomycin to Dr. Jang     Chronic dysphagia   Patient has been evaluated by SLP who has high suspicion for aspiration. Patient has been unwilling to consider a modified diet despite multiple conversations, and is also unwilling to change his code status. Video swallow study has not been pursued given patient's unwillingness to consider a modified diet  - On DDL3 with thin liquids  - SLP has signed off     History of pill esophagitis  - Continues on PTA pantoprazole     Stage II pressure ulcer on left heel  Unstageable pressure ulcer on coccyx  Present on admission  - Wound cares in place as per WOC RN     Bladder mass  History of recurrent bladder cancer s/p multiple TURBT  Multiple bladder masses up to 3.5 cm  suspicious for neoplasm noted on outpatient CT. The patient was evaluated by Dr. Yoon ([facility]) during his last hospitalization in May 2019, and it was recommended at that time to follow up with his primary urologist, Dr. Hilton, for outpatient cystoscopy  - Follow up with [facility] after discharge     BPH  [PTA: tamsulosin 0.4 mg daily, finasteride 5 mg daily]  - Continues on PTA meds     Chronic anemia and thrombocytopenia  Hgb has been stable within baseline 10-11, and platelets have been stable within recent baseline 50-90k.     Dyslipidemia  Chronic and stable on simvastatin     Chronic pain syndrome  [PTA: gabapentin 300 mg TID, Norco 7.5-325 mg q6h prn]  - Continues on PTA meds    DVT Prophylaxis: Pneumatic Compression Devices  Code Status: Full Code  PT/OT: ordered    Disposition: Expected discharge tomorrow morning to [facility]    Mike Garnica MD  Text Page  (7am to 6pm)  -Data reviewed today: I reviewed all new labs and imaging results over the last 24 hours.    Physical Exam   Temp: 97.4  F (36.3  C) Temp src: Oral BP: (!) 81/67 Pulse: 75 Heart Rate: 72 Resp: 18 SpO2: 92 % O2 Device: None (Room air)    Vitals:    07/10/19 1610 07/11/19 0410 07/12/19 0146   Weight: 64.3 kg (141 lb 11.2 oz) 63.5 kg (140 lb) 62.3 kg (137 lb 6.4 oz)     Vital Signs with Ranges  Temp:  [97.4  F (36.3  C)-98.7  F (37.1  C)] 97.4  F (36.3  C)  Pulse:  [75-89] 75  Heart Rate:  [71-94] 72  Resp:  [14-22] 18  BP: ()/(64-81) 81/67  SpO2:  [90 %-92 %] 92 %  I/O last 3 completed shifts:  In: 360 [P.O.:360]  Out: 2600 [Urine:2600]  O2 requirements: none    Constitutional: Elderly thin male in NAD  Cardiovascular: RRR, normal S1/2, no m/r/g  Respiratory: CTAB, no wheezing or crackles  Vascular: 1+ BLE pitting edema  GI: Normoactive bowel sounds, nontender  Skin/Integumen: Scattered bruises and thin skin  Neuro/Psych: Appropriate affect and mood. A&Ox3, moves all extremities    Medications       beta carotene   25,000 Units Oral Daily     diclofenac  2 g Transdermal TID     dicyclomine  10 mg Oral TID AC     ferrous sulfate  325 mg Oral BID     finasteride  5 mg Oral Daily     gabapentin  300 mg Oral TID     hypromellose  2 drop Left Eye 4x Daily     melatonin  3 mg Oral At Bedtime     metoprolol tartrate  25 mg Oral BID     multivitamin w/minerals  1 tablet Oral Daily     pantoprazole  40 mg Oral Daily     [START ON 7/13/2019] potassium chloride  40 mEq Oral Daily     simvastatin  20 mg Oral At Bedtime     sodium chloride (PF)  3 mL Intracatheter Q8H     tamsulosin  0.4 mg Oral Daily     [START ON 7/13/2019] torsemide  20 mg Oral Daily     vancomycin  125 mg Oral TID     vitamin C  500 mg Oral Daily     zinc sulfate  220 mg Oral Daily       Data   Recent Labs   Lab 07/12/19  1032 07/11/19 2010 07/11/19  0952 07/10/19  0602  07/08/19  0638 07/07/19  0820  07/06/19  0604   WBC  --   --  4.1  --   --   --  5.0  --  3.8*   HGB  --   --  10.2*  --   --   --  10.9*  --  10.6*   MCV  --   --  93  --   --   --  94  --  95   PLT  --   --  69*  --   --   --  70*  --  74*     --  142 143   < > 142  --    < > 141   POTASSIUM 4.4 4.0 3.3* 3.4   < > 3.8  --    < > 3.6   CHLORIDE 100  --  98 101   < > 105  --    < > 104   CO2 38*  --  36* 34*   < > 33*  --    < > 29   BUN 41*  --  32* 36*   < > 39*  --    < > 43*   CR 1.49*  --  1.31* 1.19   < > 1.30*  --    < > 1.45*   ANIONGAP 4  --  8 8   < > 4  --    < > 8   GIUSEPPE 8.8  --  8.5 8.2*   < > 8.4*  --    < > 8.6   *  --  96 78   < > 76  --    < > 85   ALBUMIN  --   --   --   --   --  2.9*  --   --   --    PROTTOTAL  --   --   --   --   --  6.4*  --   --   --    BILITOTAL  --   --   --   --   --  1.0  --   --   --    ALKPHOS  --   --   --   --   --  99  --   --   --    ALT  --   --   --   --   --  13  --   --   --    AST  --   --   --   --   --  11  --   --   --     < > = values in this interval not displayed.       Imaging:   No results found for this or any previous visit  (from the past 24 hour(s)).

## 2019-07-12 NOTE — PLAN OF CARE
Heart Failure Care Pathway  GOALS TO BE MET BEFORE DISCHARGE:    1. Decrease congestion and/or edema with diuretic therapy to achieve near      optimal volume status.            Dyspnea improved:  Yes             Edema improved:     No, please explain: no change in edema         Net I/O and Weights since admission:          06/12 0700 - 07/12 0659  In: 3710 [P.O.:3710]  Out: 39487 [Urine:51249]  Net: -53143            Vitals:    07/04/19 1552 07/05/19 0435 07/06/19 0600 07/07/19 0600   Weight: 65.8 kg (145 lb) 67.8 kg (149 lb 8 oz) 67.2 kg (148 lb 2.4 oz) 66 kg (145 lb 8 oz)    07/08/19 0129 07/09/19 0000 07/10/19 0131 07/10/19 1610   Weight: 66.9 kg (147 lb 6.4 oz) 66.5 kg (146 lb 8 oz) 64.8 kg (142 lb 14.4 oz) 64.3 kg (141 lb 11.2 oz)    07/11/19 0410 07/12/19 0146   Weight: 63.5 kg (140 lb) 62.3 kg (137 lb 6.4 oz)         2.  O2 sats > 92% on RA or at prior home O2 therapy level.          Current oxygenation status:       SpO2: 90 %         O2 Device: None (Room air),          Able to wean O2 this shift to keep sats > 92%:  N/A        Does patient use Home O2? No    3.  Tolerates ambulation and mobility near baseline: Yes        How many times did the patient ambulate with nursing staff this shift? 2, to bathroom    Please review the Heart Failure Care Pathway for additional HF goal outcomes.    Harper Correa RN  7/12/2019        Patient is A/O x4. VSS on RA. IV bumix gtt infusing. LS diminished in all fields, denies SOB, ROMAN. Voiding in BR, good urine output. Patient ambulated x3 to bathroom with SBA, tolerated well. Tele SR with frequent PACs. DD3 with thin liquid diet, tolerating well. Plan on continuing to monitor.

## 2019-07-13 VITALS
HEIGHT: 68 IN | DIASTOLIC BLOOD PRESSURE: 72 MMHG | WEIGHT: 134.1 LBS | HEART RATE: 68 BPM | OXYGEN SATURATION: 94 % | TEMPERATURE: 98 F | RESPIRATION RATE: 16 BRPM | BODY MASS INDEX: 20.32 KG/M2 | SYSTOLIC BLOOD PRESSURE: 108 MMHG

## 2019-07-13 PROCEDURE — 25000132 ZZH RX MED GY IP 250 OP 250 PS 637: Mod: GY | Performed by: NURSE PRACTITIONER

## 2019-07-13 PROCEDURE — 25000132 ZZH RX MED GY IP 250 OP 250 PS 637: Mod: GY | Performed by: HOSPITALIST

## 2019-07-13 PROCEDURE — 25000132 ZZH RX MED GY IP 250 OP 250 PS 637: Mod: GY | Performed by: INTERNAL MEDICINE

## 2019-07-13 PROCEDURE — 99239 HOSP IP/OBS DSCHRG MGMT >30: CPT | Performed by: INTERNAL MEDICINE

## 2019-07-13 RX ORDER — BETA-CAROTENE 7500 MCG
25000 CAPSULE ORAL DAILY
DISCHARGE
Start: 2019-07-14

## 2019-07-13 RX ORDER — ASCORBIC ACID 500 MG
500 TABLET ORAL DAILY
DISCHARGE
Start: 2019-07-14 | End: 2020-01-01

## 2019-07-13 RX ORDER — MULTIPLE VITAMINS W/ MINERALS TAB 9MG-400MCG
1 TAB ORAL DAILY
DISCHARGE
Start: 2019-07-14 | End: 2019-01-01

## 2019-07-13 RX ORDER — HYDROCODONE BITARTRATE AND ACETAMINOPHEN 7.5; 325 MG/1; MG/1
1 TABLET ORAL EVERY 6 HOURS PRN
Qty: 6 TABLET | Refills: 0 | Status: SHIPPED | DISCHARGE
Start: 2019-07-13 | End: 2019-07-15

## 2019-07-13 RX ORDER — POTASSIUM CHLORIDE 1500 MG/1
40 TABLET, EXTENDED RELEASE ORAL DAILY
DISCHARGE
Start: 2019-07-14 | End: 2019-07-24

## 2019-07-13 RX ORDER — FERROUS SULFATE 325(65) MG
325 TABLET ORAL 2 TIMES DAILY
DISCHARGE
Start: 2019-07-13 | End: 2019-07-13

## 2019-07-13 RX ORDER — TORSEMIDE 20 MG/1
20 TABLET ORAL DAILY
DISCHARGE
Start: 2019-07-14 | End: 2019-07-18

## 2019-07-13 RX ORDER — METOPROLOL TARTRATE 25 MG/1
25 TABLET, FILM COATED ORAL 2 TIMES DAILY
DISCHARGE
Start: 2019-07-13 | End: 2019-07-24

## 2019-07-13 RX ORDER — ZINC SULFATE 50(220)MG
220 CAPSULE ORAL DAILY
DISCHARGE
Start: 2019-07-14 | End: 2019-01-01

## 2019-07-13 RX ADMIN — Medication 25000 UNITS: at 08:05

## 2019-07-13 RX ADMIN — POTASSIUM CHLORIDE 40 MEQ: 1500 TABLET, EXTENDED RELEASE ORAL at 08:05

## 2019-07-13 RX ADMIN — DICYCLOMINE HYDROCHLORIDE 10 MG: 10 CAPSULE ORAL at 13:11

## 2019-07-13 RX ADMIN — HYPROMELLOSE 2910 2 DROP: 5 SOLUTION OPHTHALMIC at 13:11

## 2019-07-13 RX ADMIN — METOPROLOL TARTRATE 25 MG: 25 TABLET ORAL at 08:05

## 2019-07-13 RX ADMIN — MULTIPLE VITAMINS W/ MINERALS TAB 1 TABLET: TAB at 08:05

## 2019-07-13 RX ADMIN — DICLOFENAC 2 G: 10 GEL TOPICAL at 08:09

## 2019-07-13 RX ADMIN — FINASTERIDE 5 MG: 5 TABLET, FILM COATED ORAL at 08:06

## 2019-07-13 RX ADMIN — ZINC SULFATE CAP 220 MG (50 MG ELEMENTAL ZN) 220 MG: 220 (50 ZN) CAP at 08:04

## 2019-07-13 RX ADMIN — PANTOPRAZOLE SODIUM 40 MG: 40 TABLET, DELAYED RELEASE ORAL at 08:05

## 2019-07-13 RX ADMIN — DICYCLOMINE HYDROCHLORIDE 10 MG: 10 CAPSULE ORAL at 08:04

## 2019-07-13 RX ADMIN — HYDROCODONE BITARTRATE AND ACETAMINOPHEN 1 TABLET: 7.5; 325 TABLET ORAL at 03:08

## 2019-07-13 RX ADMIN — FERROUS SULFATE TAB 325 MG (65 MG ELEMENTAL FE) 325 MG: 325 (65 FE) TAB at 08:05

## 2019-07-13 RX ADMIN — TORSEMIDE 20 MG: 20 TABLET ORAL at 08:05

## 2019-07-13 RX ADMIN — VANCOMYCIN HYDROCHLORIDE 125 MG: KIT at 10:24

## 2019-07-13 RX ADMIN — HYPROMELLOSE 2910 2 DROP: 5 SOLUTION OPHTHALMIC at 08:09

## 2019-07-13 RX ADMIN — GABAPENTIN 300 MG: 300 CAPSULE ORAL at 08:04

## 2019-07-13 RX ADMIN — TAMSULOSIN HYDROCHLORIDE 0.4 MG: 0.4 CAPSULE ORAL at 08:05

## 2019-07-13 RX ADMIN — OXYCODONE HYDROCHLORIDE AND ACETAMINOPHEN 500 MG: 500 TABLET ORAL at 08:04

## 2019-07-13 ASSESSMENT — ACTIVITIES OF DAILY LIVING (ADL)
ADLS_ACUITY_SCORE: 21
ADLS_ACUITY_SCORE: 21
ADLS_ACUITY_SCORE: 19
ADLS_ACUITY_SCORE: 21

## 2019-07-13 ASSESSMENT — MIFFLIN-ST. JEOR: SCORE: 1232.77

## 2019-07-13 NOTE — PLAN OF CARE
Discharge to TCU with transport, tolerating food, up with stand by assist and walker, no pain, change dressings.

## 2019-07-13 NOTE — PLAN OF CARE
A&Ox4 w/ VSS. RA. Tele is SR w/ PVC's. Up Ax1 w/ gait belt + walker. Norco given x1 for back pain. Plan to transfer to [facility] Saturday 7/13 - ride set up at 1445. Will continue POC.

## 2019-07-13 NOTE — DISCHARGE SUMMARY
[facility]    Hospitalist Discharge Summary       Date of Admission:  7/4/2019  Date of Discharge:  7/13/2019  Discharging Provider: Mike Garnica MD      Discharge Diagnoses   Acute exacerbation of chronic diastolic and right-sided CHF with acute hypoxic respiratory failure, improved  Pulmonary hypertension  Mitral valve clip, with residual severe mitral regurgitation  Essential hypertension  History of C diff colitis currently on treatment  Chronic dysphagia   Stage II pressure ulcer on left heel  Unstageable pressure ulcer on coccyx  Bladder mass  History of recurrent bladder cancer s/p multiple TURBT    Follow-ups Needed After Discharge   Follow-up Appointments     Follow Up and recommended labs and tests      Follow up with halfway physician.  The following labs/tests are   recommended: basic metabolic panel in one week.  - Follow up with cardiology as scheduled.  - Follow up with Dr. Jang (GI clinic) regarding your Cdiff within three   weeks.           Hospital Course    Ivan Gomez is a 89 yo M with PMH of diastolic CHF, mitral regurgitation s/p mitral valve clip, HTN, CKD, BPH, and hx of bladder cancer who was admitted on 7/4/2019 for acute CHF exacerbation and new onset a-fib/RVR. Spontaneously converted out of atrial fibrillation in ED. Presented with 2 day history of progressive dyspnea. Noted to be in acute respiratory distress in the field with hypoxia to 82% on room air. Chest CT on arrival showed bilateral patchy airspace and groundglass opacities, small bilateral pleural effusions, and pulmonary edema. NTproBNP>17,000. TTE (7/5): LVEF 70-75%, moderately dilated RV with moderately reduced RV systolic function, moderately severe residual MR, massively dilated left atrium, severely dilated right atrium, and 3+ TR with significant pulmonary hypertension. He was ultimately transitioned to a Bumex infusion by Cardiology on 7/9, stopped 7/12. Transitioning to PO diuresis  on 7/13. On room air at discharge, with 1+ BLE edema and noted gut edema at discharge (likely due to his severe MR).     Advanced care planning  Patient with advanced diastolic and right-sided CHF with mitral regurgitation and tricuspid regurgitation. Per Cardiology, prognosis is poor. Goals of care have been reviewed by the Hospitalist service. Poor prognosis, high likelihood for rehospitalizations, and short period of time between hospitalizations have been reviewed with the patient. He lacks insight into his medical comorbidities, and wants to remain FULL CODE with restorative cares     New onset atrial fibrillation with RVR, Resolved  Noted to be in a-fib with HR in the 130s on arrival. He was initiated on a diltiazem infusion and converted to sinus rhythm while still in the ED. He has since remained in sinus rhythm. Not on anticoagulation per Cardiology due to age, frailty, and risk for bleeding     CKD stage III: Baseline Cr 1.3-1.6, at baseline 1.49 at discharge.    History of C.diff colitis  Diagnosed on May 21. Treated with oral vancomycin and dicyclomine as per Suhail FELDMAN. Developed worsening diarrhea with attempts to wean oral vancomycin (as recently as 7/1). Has since been maintained on vancomycin 125 mg TID.  - Continues on vancomycin 125 mg TID  - Continues on PTA dicyclomine  - Defer weaning of vancomycin to Dr. Jang     Chronic dysphagia   Patient has been evaluated by SLP who has high suspicion for aspiration. Patient has been unwilling to consider a modified diet despite multiple conversations, and is also unwilling to change his code status. Video swallow study has not been pursued given patient's unwillingness to consider a modified diet  - Continue SLP at TCU     Stage II pressure ulcer on left heel  Unstageable pressure ulcer on coccyx  Present on admission  - Wound cares in place as per [facility]OC RN     Bladder mass  History of recurrent bladder cancer s/p multiple TURBT  Multiple bladder masses up  to 3.5 cm suspicious for neoplasm noted on outpatient CT. The patient was evaluated by Dr. Yoon ([facility]) during his last hospitalization in May 2019, and it was recommended at that time to follow up with his primary urologist, Dr. Hilton, for outpatient cystoscopy  - Follow up with [facility] after discharge     Chronic anemia and thrombocytopenia  Hgb has been stable within baseline 10-11, and platelets have been stable within recent baseline 50-90k.      Chronic pain syndrome  - Continues on PTA Norco, gabapentin      Consultations This Hospital Stay   PHYSICAL THERAPY ADULT IP CONSULT  CORE CLINIC EVALUATION IP CONSULT  CARDIOLOGY IP CONSULT  WOUND OSTOMY CONTINENCE NURSE  IP CONSULT  CARE TRANSITION RN/SW IP CONSULT  SPEECH LANGUAGE PATH ADULT IP CONSULT  PALLIATIVE CARE ADULT IP CONSULT  PHYSICAL THERAPY ADULT IP CONSULT  OCCUPATIONAL THERAPY ADULT IP CONSULT  SPEECH LANGUAGE PATH ADULT IP CONSULT    Code Status   Full Code    Time Spent on this Encounter   I, Mike Garnica, personally saw the patient today and spent approximately 35 minutes discharging this patient.       Mike Garnica MD  [facility]  ______________________________________________________________________    Physical Exam   Vital Signs: Temp: 98  F (36.7  C) Temp src: Oral BP: 108/72 Pulse: 68 Heart Rate: 68 Resp: 16 SpO2: 94 % O2 Device: None (Room air)    Weight: 134 lbs 1.6 oz    Constitutional: Elderly thin male in NAD  Cardiovascular: RRR, normal S1/2, no m/r/g  Respiratory: CTAB, no wheezing or crackles  Vascular: 1+ BLE pitting edema  GI: Normoactive bowel sounds, nontender  Skin/Integumen: Scattered bruises and thin skin  Neuro/Psych: Appropriate affect and mood. A&Ox3, moves all extremities       Primary Care Physician   Evaristo Magaña    Discharge Disposition   Discharged to short-term care facility  Condition at discharge: Fair    Significant Results and Procedures   Most Recent 3  CBC's:  Recent Labs   Lab Test 07/11/19  0952 07/07/19  0820 07/06/19  0604   WBC 4.1 5.0 3.8*   HGB 10.2* 10.9* 10.6*   MCV 93 94 95   PLT 69* 70* 74*     Most Recent 3 BMP's:  Recent Labs   Lab Test 07/12/19  1032 07/11/19 2010 07/11/19  0952 07/10/19  0602     --  142 143   POTASSIUM 4.4 4.0 3.3* 3.4   CHLORIDE 100  --  98 101   CO2 38*  --  36* 34*   BUN 41*  --  32* 36*   CR 1.49*  --  1.31* 1.19   ANIONGAP 4  --  8 8   GIUSEPPE 8.8  --  8.5 8.2*   *  --  96 78     Most Recent 2 LFT's:  Recent Labs   Lab Test 07/08/19  0638 07/04/19  1559   AST 11 12   ALT 13 20   ALKPHOS 99 126   BILITOTAL 1.0 1.1     Most Recent 3 Troponin's:  Recent Labs   Lab Test 07/05/19  0108 07/04/19  2146 07/04/19  1559   TROPI <0.015 0.021 <0.015     Most Recent 3 BNP's:  Recent Labs   Lab Test 07/04/19  1559 01/26/18  1431 10/10/17  2345 01/03/12  0940   NTBNPI 17,137*  --  9,149* 1740   NTBNP  --  3,845*  --   --    ,   Results for orders placed or performed during the hospital encounter of 07/04/19   CT Chest (PE) Abdomen Pelvis w Contrast    Narrative    CT CHEST PE ABDOMEN AND PELVIS WITH CONTRAST   7/4/2019 6:31 PM    HISTORY: Sepsis. Hypoxia.    TECHNIQUE: Pulmonary embolism protocol was performed through the  chest. 73 mL Isovue-370 were injected IV. After contrast injection,  volumetric helical acquisition was performed from the thoracic inlet  through the ischial tuberosities. Radiation dose for this scan was  reduced using automated exposure control, adjustment of the mA and/or  kV according to patient size, or iterative reconstruction technique.    COMPARISON: CT of the abdomen and pelvis performed 8/15/2015.    FINDINGS:     Chest:  No evidence for pulmonary embolism. The thoracic aorta is of  normal caliber, without evidence for thoracic aortic aneurysm. The  thoracic aorta is not well opacified on this exam. Atherosclerotic  calcification of the thoracic aorta and coronary arteries. There are  small bilateral  pleural effusions, larger on the right. No pericardial  effusion. No enlarged lymph nodes are identified in the chest.     Patchy airspace and groundglass opacities are noted in both mid and  lower lungs, and are suspicious for pneumonia, although edema could  also have this appearance. Smooth interstitial thickening in both  lungs suggests pulmonary edema. Cardiac enlargement.    Abdomen and pelvis: Prior cholecystectomy. Mildly heterogeneous  enhancement throughout the liver, with no definite focal hepatic  lesions. There are several bilateral renal cysts, with the largest  noted in the lower pole of the left kidney, measuring 5.1 cm.  Nonobstructing stone in the lower pole of the left kidney measures 0.5  cm. The spleen, adrenal glands, pancreas, and kidneys are otherwise  unremarkable. No hydronephrosis. Mild atherosclerotic aortoiliac  calcification.     No bowel obstruction. A small amount of ascites is noted. There are  several bladder wall masses and nodules identified, with the largest  on the left measuring 3.5 cm (series 6 image 79). A previously  described bladder wall mass posteriorly on the right has decreased in  prominence. Additional bladder wall masses are new since the previous  exam. Postoperative changes of right inguinal hernia are noted.  Degenerative changes are noted throughout the thoracolumbar spine.  Lumbar curve is noted.         Impression    IMPRESSION:   1. No evidence for pulmonary embolism.  2. Patchy airspace and groundglass opacities in both mid and lower  lungs are suspicious for pneumonia.  3. Small bilateral pleural effusions, larger on the right.  4. Smooth interstitial thickening in both lungs suggests pulmonary  edema.  5. Cardiac enlargement.  6. Small amount of ascites.  7. Multiple bladder wall masses are noted, with the largest on the  left measuring 3.5 cm. Findings are suspicious for neoplasm, such as  transitional cell carcinoma.  8. Nonobstructing 0.5 cm stone in the  lower pole of the left kidney   9. Heterogeneous enhancement of the liver is a nonspecific finding.      SY CAROLINA MD       Discharge Orders      Basic metabolic panel     N terminal pro BNP outpatient     Follow-Up with [facility]      General info for SNF    Length of Stay Estimate: Short Term Care: Estimated # of Days <30  Condition at Discharge: Improving  Level of care:skilled   Rehabilitation Potential: Fair  Admission H&P remains valid and up-to-date: Yes  Recent Chemotherapy: N/A  Use Nursing Home Standing Orders: Yes     Mantoux instructions    Give two-step Mantoux (PPD) Per Facility Policy Yes     Reason for your hospital stay    You were hospitalized for congestive heart failure and atrial fibrillation.     Daily weights    Call Provider for weight gain of more than 2 pounds per day or 5 pounds per week.     Glucose monitor nursing POCT    Before meals and at bedtime     Additional Discharge Instructions     Activity - Up with nursing assistance     Follow Up and recommended labs and tests    Follow up with residential physician.  The following labs/tests are recommended: basic metabolic panel in one week.  - Follow up with cardiology as scheduled.  - Follow up with Dr. Jang (GI clinic) regarding your Cdiff within three weeks.     Full Code     Physical Therapy Adult Consult    Evaluate and treat as clinically indicated.    Reason:  chf     Occupational Therapy Adult Consult    Evaluate and treat as clinically indicated.    Reason:  chf     Speech Language Path Adult Consult    Evaluate and treat as clinically indicated.    Reason:  dysphagia     Advance Diet as Tolerated    Follow this diet upon discharge:       Snacks/Supplements Adult: Other; milkshake + 2 pkts benepro; With Meals      Combination Diet Regular Diet Adult; Dysphagia Diet Level 3: Advanced; Thin Liquids (water, ice chips, juice, milk gelatin, ice cream, etc)     Discharge Medications   Current Discharge Medication List       START taking these medications    Details   beta carotene 30006 UNIT capsule Take 1 capsule (25,000 Units) by mouth daily    Associated Diagnoses: CKD (chronic kidney disease) stage 3, GFR 30-59 ml/min (H)      metoprolol tartrate (LOPRESSOR) 25 MG tablet Take 1 tablet (25 mg) by mouth 2 times daily    Associated Diagnoses: Biventricular congestive heart failure (H)      multivitamin w/minerals (THERA-VIT-M) tablet Take 1 tablet by mouth daily    Associated Diagnoses: CKD (chronic kidney disease) stage 3, GFR 30-59 ml/min (H)      torsemide (DEMADEX) 20 MG tablet Take 1 tablet (20 mg) by mouth daily    Associated Diagnoses: Biventricular congestive heart failure (H)      vitamin C 500 MG TABS Take 1 tablet (500 mg) by mouth daily    Associated Diagnoses: CKD (chronic kidney disease) stage 3, GFR 30-59 ml/min (H)      zinc sulfate (ZINCATE) 220 (50 Zn) MG capsule Take 1 capsule (220 mg) by mouth daily    Associated Diagnoses: CKD (chronic kidney disease) stage 3, GFR 30-59 ml/min (H)         CONTINUE these medications which have CHANGED    Details   HYDROcodone-acetaminophen (NORCO) 7.5-325 MG per tablet Take 1 tablet by mouth every 6 hours as needed for pain maximum 4 tablet(s) per day  Qty: 6 tablet, Refills: 0    Associated Diagnoses: Spinal stenosis of lumbar region, unspecified whether neurogenic claudication present      potassium chloride ER (K-DUR/KLOR-CON M) 20 MEQ CR tablet Take 2 tablets (40 mEq) by mouth daily    Associated Diagnoses: Biventricular congestive heart failure (H)         CONTINUE these medications which have NOT CHANGED    Details   diclofenac (VOLTAREN) 1 % topical gel Place 2 g onto the skin 3 times daily Apply to both ankles , Right back/rib.  0800, 1200, 1800      dicyclomine (BENTYL) 10 MG capsule Take 10 mg by mouth 3 times daily      finasteride (PROSCAR) 5 MG tablet TAKE 1 TABLET(5 MG) BY MOUTH DAILY  Qty: 90 tablet, Refills: 3    Associated Diagnoses: Benign enlargement of  prostate      gabapentin (NEURONTIN) 300 MG capsule TAKE 1 CAPSULE BY MOUTH THREE TIMES DAILY  Qty: 270 capsule, Refills: 0    Associated Diagnoses: Spinal stenosis of lumbar region with neurogenic claudication      hydrALAZINE (APRESOLINE) 25 MG tablet Take 1 tablet (25 mg) by mouth 3 times daily  Qty: 270 tablet, Refills: 1    Associated Diagnoses: Acute on chronic heart failure, unspecified heart failure type (H)      hypromellose (ARTIFICIAL TEARS) 0.4 % SOLN ophthalmic solution Place 2 drops Into the left eye 4 times daily      melatonin 3 MG tablet Take 3 mg by mouth At Bedtime      pantoprazole (PROTONIX) 40 MG EC tablet Take 40 mg by mouth daily      simvastatin (ZOCOR) 20 MG tablet TAKE ONE TABLET BY MOUTH AT BEDTIME  Qty: 30 tablet, Refills: 0    Associated Diagnoses: Hyperlipidemia LDL goal <100      !! Skin Protectants, Misc. (EUCERIN) cream Apply topically 2 times daily Apply topically to LE & areas of dryness twice daily (also ordered prn)      tamsulosin (FLOMAX) 0.4 MG capsule TAKE ONE CAPSULE BY MOUTH DAILY  Qty: 90 capsule, Refills: 3    Associated Diagnoses: Benign prostatic hyperplasia without lower urinary tract symptoms      vancomycin (FIRVANQ) 50 MG/ML oral solution Take 125 mg by mouth 3 times daily      artificial saliva (BIOTENE MT) AERS spray Take 1 spray by mouth every 2 hours as needed for dry mouth      Phenylephrine-Witch Hazel 0.25-50 % GEL Place rectally 4 times daily as needed Insert 1 application rectally as needed for Hemorrhoids      !! Skin Protectants, Misc. (EUCERIN) cream Apply topically as needed for dry skin Apply to LE + Areas of dryness topically as needed for Dryness       !! - Potential duplicate medications found. Please discuss with provider.      STOP taking these medications       furosemide (LASIX) 20 MG tablet Comments:   Reason for Stopping:         isosorbide dinitrate (ISORDIL) 20 MG tablet Comments:   Reason for Stopping:             Allergies   Allergies    Allergen Reactions     Celebrex [Celecoxib] Hives     Penicillins Hives

## 2019-07-13 NOTE — DISCHARGE INSTRUCTIONS
Patient will discharge to [facility] today via [facility] wheelchair at 14:45.  [facility] phone number is [phone].

## 2019-07-13 NOTE — PROGRESS NOTES
SW:  D:  Received discharge orders for patient.  Bed available at [facility] for today.  [facility] wheelchair ride set up for 14:45 today.  Patient informed of the plan and in agreement to the plan.  Call placed to update [facility] and faxed the orders.

## 2019-07-15 ENCOUNTER — PATIENT OUTREACH (OUTPATIENT)
Dept: CARE COORDINATION | Facility: CLINIC | Age: 84
End: 2019-07-15

## 2019-07-15 ENCOUNTER — NURSING HOME VISIT (OUTPATIENT)
Dept: GERIATRICS | Facility: CLINIC | Age: 84
End: 2019-07-15
Payer: MEDICARE

## 2019-07-15 ENCOUNTER — CARE COORDINATION (OUTPATIENT)
Dept: CARDIOLOGY | Facility: CLINIC | Age: 84
End: 2019-07-15

## 2019-07-15 VITALS
WEIGHT: 139.3 LBS | HEIGHT: 68 IN | HEART RATE: 92 BPM | TEMPERATURE: 97.9 F | SYSTOLIC BLOOD PRESSURE: 101 MMHG | RESPIRATION RATE: 18 BRPM | BODY MASS INDEX: 21.11 KG/M2 | DIASTOLIC BLOOD PRESSURE: 64 MMHG | OXYGEN SATURATION: 95 %

## 2019-07-15 DIAGNOSIS — D69.6 THROMBOCYTOPENIA (H): ICD-10-CM

## 2019-07-15 DIAGNOSIS — I50.9 HEART FAILURE (H): Primary | ICD-10-CM

## 2019-07-15 DIAGNOSIS — N32.89 BLADDER MASS: ICD-10-CM

## 2019-07-15 DIAGNOSIS — J96.01 ACUTE RESPIRATORY FAILURE WITH HYPOXIA (H): ICD-10-CM

## 2019-07-15 DIAGNOSIS — M48.061 SPINAL STENOSIS OF LUMBAR REGION, UNSPECIFIED WHETHER NEUROGENIC CLAUDICATION PRESENT: ICD-10-CM

## 2019-07-15 DIAGNOSIS — I27.20 PULMONARY HYPERTENSION (H): ICD-10-CM

## 2019-07-15 DIAGNOSIS — L89.150 PRESSURE INJURY OF SACRAL REGION, UNSTAGEABLE (H): ICD-10-CM

## 2019-07-15 DIAGNOSIS — R60.0 BILATERAL LEG EDEMA: ICD-10-CM

## 2019-07-15 DIAGNOSIS — I50.32 CHRONIC DIASTOLIC CONGESTIVE HEART FAILURE (H): Primary | ICD-10-CM

## 2019-07-15 DIAGNOSIS — G89.4 CHRONIC PAIN SYNDROME: ICD-10-CM

## 2019-07-15 DIAGNOSIS — R53.81 PHYSICAL DECONDITIONING: ICD-10-CM

## 2019-07-15 DIAGNOSIS — R13.12 OROPHARYNGEAL DYSPHAGIA: ICD-10-CM

## 2019-07-15 DIAGNOSIS — D63.8 ANEMIA IN OTHER CHRONIC DISEASES CLASSIFIED ELSEWHERE: ICD-10-CM

## 2019-07-15 DIAGNOSIS — N18.30 CKD (CHRONIC KIDNEY DISEASE) STAGE 3, GFR 30-59 ML/MIN (H): ICD-10-CM

## 2019-07-15 DIAGNOSIS — Z86.19 H/O CLOSTRIDIUM DIFFICILE INFECTION: ICD-10-CM

## 2019-07-15 PROCEDURE — 99310 SBSQ NF CARE HIGH MDM 45: CPT | Performed by: NURSE PRACTITIONER

## 2019-07-15 RX ORDER — HYDROCODONE BITARTRATE AND ACETAMINOPHEN 7.5; 325 MG/1; MG/1
1 TABLET ORAL EVERY 6 HOURS PRN
Qty: 60 TABLET | Refills: 0 | Status: SHIPPED | OUTPATIENT
Start: 2019-07-15 | End: 2019-01-01

## 2019-07-15 ASSESSMENT — MIFFLIN-ST. JEOR: SCORE: 1256.36

## 2019-07-15 NOTE — PROGRESS NOTES
Patient was evaluated by cardiology while inpatient for presents with shortness of breath/CHF exacerbation and new onset atrial fibrillation with RVR. RN called [facility] to confirm the follow up plan for patient with Care Coordinator. RN confirmed with Care Coordinator that patient has a scheduled [facility] appt on 7/18/19 with Italia Carvajal CARLEE. RN confirmed with Care Coordinator that patient is weighed daily, to call clinic with a weight gain of 2 lbs overnight or 5 lbs in a week and to bring list of daily weights/vitals, last progress note, MAR, active orders, and provider order sheet to appt.

## 2019-07-15 NOTE — PLAN OF CARE
Physical Therapy Discharge Summary    Reason for therapy discharge:    Discharged to transitional care facility.    Progress towards therapy goal(s). See goals on Care Plan in [facility] electronic health record for goal details.  Goals partially met.  Barriers to achieving goals:   Goals met with SBA, limited by orthostatic hypotension.  .    Therapy recommendation(s):    Pt is close to baseline with functional mobility, will benefit from frequent short walks to increase functional activity tolerance and general strength.

## 2019-07-15 NOTE — LETTER
[facility]   To:   [facility]          Please give to facility    From:   Koko Jimenez RN Care Coordinator [facility]     Patient Name:  Ivan Gomez YOB: 1926   Admit date: 7/13/19      *Information Needed:  Please contact me when the patient will discharge (or if they will move to long term care)- include the discharge date, disposition, and main diagnosis   - If the patient is discharged with home care services, please provide the name of the agency    Also- Please inform me if a care conference is being held.   Phone, Fax or Email with information       Thank You,   Koko Jimenez, RN  Clinic Care Coordinator  [facility]  Ph: [phone]  [email or web address]

## 2019-07-15 NOTE — PROGRESS NOTES
[facility]    PRIMARY CARE PROVIDER AND CLINIC:  Evaristo Magaña MD, [address] [facility] / [facility] [address]  Chief Complaint   Patient presents with     Hospital F/U     [place] Medical Record Number:  [id number]  Place of Service where encounter took place:  [facility] [[id number]]    Ivan Gomez  is a 89 year old  (10/12/1926), admitted to the above facility from  [facility]. Hospital stay 7/4/19 through 7/13/19..  Admitted to this facility for  rehab, medical management and nursing care.    HPI:    HPI information obtained from: facility chart records, facility staff, patient report and [facility] chart review.     Brief Summary of Hospital Course:   Patient admitted to [facility] 7/4-7/13 due to acute exacerbation of CHF with acute respiratory failure. Chest CT found bilateral patchy airspace and groundglass opacities, small bilateral pleural effusions, and pulmonary edema. Echo 7/5 found EF 70-75%, severe residual MR, and significant pulmonary hypertension. Was treated with bumex infusion and now taking torsemide 20mg BID. Also noted to have new onset of atrial fibrillation with RVR, was treated with diltiazem infusion and converted back to sinus rhythm in the ED. Per cardiology, not recommending anticoagulation due to age, frailty, and risk for bleeding.     Updates on Status Since Skilled nursing Admission:     CHF  Pulmonary hypertension  Benign essential hypertension  Bilateral lower extremity edema  Hx CHF, pulmonary hypertension, s/p mitral valve clip with residual severe mitral regurgitation. Last Echo 7/5 found EF 70-75%, severe residual MR, and significant pulmonary hypertension. Currently taking metoprolol, torsemide, hydralazine, and simvastatin. During exam, patient does endorse chronic BLE edema. Denies chest pain, SOB, headaches, syncope. Patient to follow-up with Cardiologist as directed.    Hx C diff colitis  C diff +  05/2019. Was seen by GI on 6/25 and was started on dicyclomine for abdominal cramping. Currently taking vancomycin TID. Patient denies having loose stools. Patient to follow-up with GI (Dr. Jang) in 3 weeks.    Chronic dysphagia  Was evaluated by ST during hospital stay, noted high concern for aspiration and recommended dysphagia diet. Patient declined ST recommendations and declined video swallow study. Currently tolerating DD3 diet.     CKD stage 3  Creat baseline 1.3-1.6. Last creat was 1.49 on 7/12.     Bladder mass  Hx recurrent bladder cancer, s/p multiple TURBT. Patient to follow-up with Urologist (Dr. Hilton) for outpatient cystoscopy.     Thrombocytopenia  Chronic anemia  Baseline platelets 50-90K and Hgb baseline 10-11. No active sx of bleeding.     Spinal stenosis of lumbar region  Chronic pain syndrome  Noted to have chronic pain syndrome associated with spinal stenosis. Currently taking norco prn and gabapentin.     Stage II pressure ulcer to L heel  Unstageable pressure ulcer to coccyx  Noted to have stage II pressure ulcer to L heel and unstageable pressure ulcer to coccyx during hospital admission.     Physical deconditioning   PTA lives in [place]. Patient is actively participating in therapy sessions. Requires assistance with activity and ADLs. Cognitive testing to be done in therapy. SW following for discharge planning.             CODE STATUS/ADVANCE DIRECTIVES DISCUSSION:   DNR / DNI    Patient's living condition: lives in a skilled nursing facility  ALLERGIES: Celebrex [celecoxib] and Penicillins  PAST MEDICAL HISTORY:  has a past medical history of Arthritis, Former smoker, Heart disease, Hemorrhoids, Hypercholesteremia, Hypertension, Malignant neoplasm (H), Other chronic pain, and Renal disease. He also has no past medical history of Chronic infection, Complication of anesthesia, Malignant hyperthermia, Numbness and tingling, PONV (postoperative nausea and vomiting), Sleep apnea, or Thrombosis  of leg.  PAST SURGICAL HISTORY:   has a past surgical history that includes back surgery; orthopedic surgery; Cystoscopy, transurethral resection (TUR) prostate, combined (N/A, 8/21/2015); biopsy; biopsy (8/2016); Cystoscopy, transurethral resection (TUR) tumor bladder, combined (N/A, 9/2/2016); picc insertion (Right, 10/14/2017); Percutaneous Mitral Valve Repair (N/A, 10/16/2017); Cystoscopy, transurethral resection (TUR) tumor bladder, combined (N/A, 11/2/2018); Laparoscopic cholecystectomy with cholangiograms (N/A, 5/22/2019); and Esophagoscopy, gastroscopy, duodenoscopy (EGD), combined (N/A, 5/24/2019).  FAMILY HISTORY: family history includes Cancer (age of onset: 64) in his son; Family History Negative in his son.  SOCIAL HISTORY:   reports that he has quit smoking. He has never used smokeless tobacco. He reports that he does not drink alcohol or use drugs.    Post Discharge Medication Reconciliation Status: discharge medications reconciled and changed, per note/orders (see AVS)    Current Outpatient Medications   Medication Sig Dispense Refill     artificial saliva (BIOTENE MT) AERS spray Take 1 spray by mouth 3 times daily And every 2 hours as needed       beta carotene 39301 UNIT capsule Take 1 capsule (25,000 Units) by mouth daily       diclofenac (VOLTAREN) 1 % topical gel Place 2 g onto the skin 3 times daily Apply to both ankles , Right back/rib.  0800, 1200, 1800       dicyclomine (BENTYL) 10 MG capsule Take 10 mg by mouth 3 times daily       finasteride (PROSCAR) 5 MG tablet TAKE 1 TABLET(5 MG) BY MOUTH DAILY 90 tablet 3     gabapentin (NEURONTIN) 300 MG capsule TAKE 1 CAPSULE BY MOUTH THREE TIMES DAILY 270 capsule 0     HYDRALAZINE HCL PO Take 25 mg by mouth 3 times daily       HYDROcodone-acetaminophen (NORCO) 7.5-325 MG per tablet Take 1 tablet by mouth every 6 hours as needed for pain maximum 4 tablet(s) per day 60 tablet 0     hypromellose (ARTIFICIAL TEARS) 0.4 % SOLN ophthalmic solution Place 2  "drops Into the left eye 2 times daily        melatonin 3 MG tablet Take 3 mg by mouth At Bedtime       metoprolol tartrate (LOPRESSOR) 25 MG tablet Take 1 tablet (25 mg) by mouth 2 times daily       multivitamin w/minerals (THERA-VIT-M) tablet Take 1 tablet by mouth daily       pantoprazole (PROTONIX) 40 MG EC tablet Take 40 mg by mouth daily       Phenylephrine-Witch Hazel 0.25-50 % GEL Place rectally 4 times daily as needed Insert 1 application rectally as needed for Hemorrhoids       potassium chloride ER (K-DUR/KLOR-CON M) 20 MEQ CR tablet Take 2 tablets (40 mEq) by mouth daily (Patient taking differently: Take 20 mEq by mouth daily )       simvastatin (ZOCOR) 20 MG tablet TAKE ONE TABLET BY MOUTH AT BEDTIME 30 tablet 0     Skin Protectants, Misc. (EUCERIN) cream Apply topically 2 times daily Apply topically to LE & areas of dryness twice daily (also ordered prn)       Skin Protectants, Misc. (EUCERIN) cream Apply topically as needed for dry skin Apply to LE + Areas of dryness topically as needed for Dryness       tamsulosin (FLOMAX) 0.4 MG capsule TAKE ONE CAPSULE BY MOUTH DAILY 90 capsule 3     vancomycin (FIRVANQ) 50 MG/ML oral solution Take 125 mg by mouth 3 times daily       vitamin C 500 MG TABS Take 1 tablet (500 mg) by mouth daily       zinc sulfate (ZINCATE) 220 (50 Zn) MG capsule Take 1 capsule (220 mg) by mouth daily       ferrous sulfate (FEROSUL) 325 (65 Fe) MG tablet Take 325 mg by mouth daily (with breakfast)       POTASSIUM CHLORIDE ER PO Take 40 mEq by mouth daily       torsemide (DEMADEX) 20 MG tablet Take 1 tablet (20 mg) by mouth 2 times daily 90 tablet 1         ROS:  10 point ROS of systems including Constitutional, Eyes, Respiratory, Cardiovascular, Gastroenterology, Genitourinary, Integumentary, Musculoskeletal, Psychiatric were all negative except for pertinent positives noted in my HPI.      Vitals:  /64   Pulse 92   Temp 97.9  F (36.6  C)   Resp 18   Ht 1.727 m (5' 8\")   Wt " 63.2 kg (139 lb 4.8 oz)   SpO2 95%   BMI 21.18 kg/m    Exam:  GENERAL APPEARANCE:  Alert, in no distress, appears healthy, oriented, cooperative  ENT:  Mouth and posterior oropharynx normal, moist mucous membranes, [place]  EYES:  EOM, conjunctivae, lids, pupils and irises normal, PERRL  NECK:  No adenopathy,masses or thyromegaly  RESP:  respiratory effort and palpation of chest normal, lungs clear to auscultation , no respiratory distress  CV:  Palpation and auscultation of heart done , regular rate and rhythm, no murmur, rub, or gallop, +2 pedal pulses, edema BLE  ABDOMEN:  normal bowel sounds, soft, nontender, no hepatosplenomegaly or other masses, no guarding or rebound  M/S:   Gait and station abnormal, requires assistance with activity and ADLs. Digits and nails normal  SKIN:  Inspection of skin and subcutaneous tissue baseline, Palpation of skin and subcutaneous tissue baseline  NEURO:   Cranial nerves 2-12 are normal tested and grossly at patient's baseline, Examination of sensation by touch normal  PSYCH:  oriented X 3, affect and mood normal      Lab/Diagnostic data:  Recent labs in [facility] reviewed by me today.         ASSESSMENT/PLAN:    (I50.32) Chronic diastolic congestive heart failure (H)  (primary encounter diagnosis)  (I27.20) Pulmonary hypertension (H)  (R60.0) Bilateral leg edema  Comment: Chronic BLE edema r/t CHF, severe MR. Last Echo 7/5 found EF 70-75%, severe residual MR, and significant pulmonary hypertension.   Plan: Check CBC & BMP 7/16. Therapy to eval/treat edema. Add hold parameters to hydralazine, monitor BP/HR. Monitor daily weights. Patient to follow-up with Cardiologist as directed/[facility] on 7/18.     (Z86.19) H/O Clostridium difficile infection  Comment: Hx C diff + 05/2019.   Plan: Continue vancomycin TID and dicyclomine. Attempt to wean off vancomycin as tolerated. Patient to follow-up with GI in 3 weeks.     (R13.12) Oropharyngeal dysphagia  Comment: Currently on DD3 diet.    Plan: ST eval/treat dysphagia. Dietary following. Monitor intake, respiratory status.      (N18.3) CKD (chronic kidney disease) stage 3, GFR 30-59 ml/min (H)  Comment: Creat at baseline, 1.3-1.6.   Plan: Monitor BMP. Avoid nephrotoxic medications.     (N32.89) Bladder mass  Comment: Noted to have bladder mass with hx recurrent bladder cancer.   Plan: Patient to follow-up with Urologist (Dr. Hilton) as directed for outpatient cystoscpoy.    (D69.6) Thrombocytopenia (H)  (D63.8) Anemia in other chronic diseases classified elsewhere  Comment: Baseline platelets 50-90K and Hgb baseline 10-11. Last platelet count was 69K and Hgb 10.2 on 7/11. No active sx of bleeding.   Plan: Continue plan of care, monitor CBC.     (M48.061) Spinal stenosis of lumbar region, unspecified whether neurogenic claudication present  Comment: Chronic pain syndrome associated with spinal stenosis of lumbar region.   Plan: Continue plan of care.     (L89.150) Pressure injury of sacral region, unstageable (H)  Comment: Pressure injury to coccyx and left heel related to physical deconditioning and poor nutrition.   Plan: House wound care RN to follow. Elevate feet w/pillow in bed. APM. Therapy to eval/treat pressure mapping wheelchair. Continue wound care to coccyx and left heel pressure ulcers.     (R53.81) Physical deconditioning   Comment: Patient was full code during hospital stay; upon TCU admission, signed POLST as DNR/DNI, which was confirmed with patient during visit today. Ongoing physical deconditioning due to multiple co-morbidities noted above.   Plan: Confirmed code status, DNR/DNI. Encourage active participation in therapy session to increase strength and promote independence in activities and ADLs. Cognitive testing to be done in therapy. SW following for discharge planning.             Total time spent with patient visit at the skilled nursing facility was 60 min including patient visit and review of past records. Greater than 50%  of total time spent with counseling and coordinating care due to coordinating care with nurse on admission orders, coordinating care with follow up labs and appointments, the reason for their hospitalization and what the treatment is, the plan of SNF stay and projected length of stay, options of code status and their current code status order, current medication reconciled from the hospital, recent past lab and imaging results and subsequent treatment plan and current pain control plan and controlled substances ordered and taper plan of care.    Electronically signed by:  FRANK Pugh CNP

## 2019-07-15 NOTE — PROGRESS NOTES
Clinic Care Coordination Contact  Care Coordination Transition Communication    Referral Source: IP Handoff    Clinical Data: Patient was hospitalized at [facility] from 7/4/19 to 7/13/19 with diagnosis of CHF exacerbation, atrial fibrillation.     Transition to Facility:              Facility Name: [facility]              Contact name and phone number/fax: [phone]    Fax sent to facility containing CCC RN contact information and request for notification when patient discharging.    Plan: RN/SW Care Coordinator will await notification from facility staff informing RN/SW Care Coordinator of patient's discharge plans/needs. RN/SW Care Coordinator will review chart and outreach to facility staff every 4 weeks and as needed.     Koko Jimenez RN  Clinic Care Coordinator  [facility]  Ph: [phone]   Yes

## 2019-07-16 ENCOUNTER — NURSING HOME VISIT (OUTPATIENT)
Dept: GERIATRICS | Facility: CLINIC | Age: 84
End: 2019-07-16
Payer: MEDICARE

## 2019-07-16 ENCOUNTER — TELEPHONE (OUTPATIENT)
Dept: CARDIOLOGY | Facility: CLINIC | Age: 84
End: 2019-07-16

## 2019-07-16 ENCOUNTER — HOSPITAL LABORATORY (OUTPATIENT)
Dept: OTHER | Facility: CLINIC | Age: 84
End: 2019-07-16

## 2019-07-16 VITALS
WEIGHT: 140 LBS | DIASTOLIC BLOOD PRESSURE: 73 MMHG | BODY MASS INDEX: 21.22 KG/M2 | HEART RATE: 68 BPM | TEMPERATURE: 98.1 F | RESPIRATION RATE: 18 BRPM | HEIGHT: 68 IN | SYSTOLIC BLOOD PRESSURE: 102 MMHG | OXYGEN SATURATION: 96 %

## 2019-07-16 DIAGNOSIS — N18.9 ACUTE KIDNEY INJURY SUPERIMPOSED ON CHRONIC KIDNEY DISEASE (H): Primary | ICD-10-CM

## 2019-07-16 DIAGNOSIS — I50.32 CHRONIC DIASTOLIC CONGESTIVE HEART FAILURE (H): ICD-10-CM

## 2019-07-16 DIAGNOSIS — D63.8 ANEMIA IN OTHER CHRONIC DISEASES CLASSIFIED ELSEWHERE: ICD-10-CM

## 2019-07-16 DIAGNOSIS — R73.9 HYPERGLYCEMIA: ICD-10-CM

## 2019-07-16 DIAGNOSIS — N17.9 ACUTE KIDNEY INJURY SUPERIMPOSED ON CHRONIC KIDNEY DISEASE (H): Primary | ICD-10-CM

## 2019-07-16 DIAGNOSIS — R60.0 BILATERAL LEG EDEMA: ICD-10-CM

## 2019-07-16 LAB
ANION GAP SERPL CALCULATED.3IONS-SCNC: 3 MMOL/L (ref 3–14)
BUN SERPL-MCNC: 64 MG/DL (ref 7–30)
CALCIUM SERPL-MCNC: 8.5 MG/DL (ref 8.5–10.1)
CHLORIDE SERPL-SCNC: 104 MMOL/L (ref 94–109)
CO2 SERPL-SCNC: 35 MMOL/L (ref 20–32)
CREAT SERPL-MCNC: 1.92 MG/DL (ref 0.66–1.25)
ERYTHROCYTE [DISTWIDTH] IN BLOOD BY AUTOMATED COUNT: 18 % (ref 10–15)
GFR SERPL CREATININE-BSD FRML MDRD: 29 ML/MIN/{1.73_M2}
GLUCOSE SERPL-MCNC: 69 MG/DL (ref 70–99)
HCT VFR BLD AUTO: 32.7 % (ref 40–53)
HGB BLD-MCNC: 10.2 G/DL (ref 13.3–17.7)
MCH RBC QN AUTO: 29.7 PG (ref 26.5–33)
MCHC RBC AUTO-ENTMCNC: 31.2 G/DL (ref 31.5–36.5)
MCV RBC AUTO: 95 FL (ref 78–100)
PLATELET # BLD AUTO: 85 10E9/L (ref 150–450)
POTASSIUM SERPL-SCNC: 5.3 MMOL/L (ref 3.4–5.3)
RBC # BLD AUTO: 3.44 10E12/L (ref 4.4–5.9)
SODIUM SERPL-SCNC: 142 MMOL/L (ref 133–144)
WBC # BLD AUTO: 3.9 10E9/L (ref 4–11)

## 2019-07-16 PROCEDURE — 99309 SBSQ NF CARE MODERATE MDM 30: CPT | Performed by: NURSE PRACTITIONER

## 2019-07-16 ASSESSMENT — MIFFLIN-ST. JEOR: SCORE: 1259.54

## 2019-07-16 NOTE — TELEPHONE ENCOUNTER
Message from CARLEE Flexuspine ([phone]): [facility] Labs today show elevated Cr=1.9 and K+=5.3. Weight is similar at 140 pounds and peripheral edema is the same.  CARLEE Rashard ordered the KCl 40mEq held today and decreased to 20mEq going forward.  She asks for input on the torsemide dose of 20mg daily.    Patient was discharged 7/13/19 for CHF exacerbation & pulmonary HTN. He is to start as a new [facility] patient on 7/18/19.    Will message CARLEE Linda Gill to review as she saw the patient during recent admission.

## 2019-07-16 NOTE — PROGRESS NOTES
[place] [facility]  [place] Medical Record Number:  [id number]  Place of Service where encounter took place:  [facility] ([facility] [[id number]]  Chief Complaint   Patient presents with     RECHECK       HPI:    Ivan Gomez  is a 89 year old (10/12/1926), who is being seen today for an episodic care visit.  HPI information obtained from: facility chart records, facility staff, patient report and [facility] chart review.     Patient admitted to [facility] 7/4-7/13 due to acute exacerbation of CHF with acute respiratory failure. Chest CT found bilateral patchy airspace and groundglass opacities, small bilateral pleural effusions, and pulmonary edema. Echo 7/5 found EF 70-75%, severe residual MR, and significant pulmonary hypertension. Was treated with bumex infusion and now taking torsemide 20mg BID. Also noted to have new onset of atrial fibrillation with RVR, was treated with diltiazem infusion and converted back to sinus rhythm in the ED. Per cardiology, not recommending anticoagulation due to age, frailty, and risk for bleeding.       Today's concern is:    ZAK  CHF  Bilateral lower extremity edema  Hx CHF, pulmonary hypertension, s/p mitral valve clip with residual severe mitral regurgitation. Last Echo 7/5 found EF 70-75%, severe residual MR, and significant pulmonary hypertension. Currently taking metoprolol, torsemide, hydralazine, and simvastatin. During exam, patient does endorse chronic BLE edema. Denies chest pain, SOB, headaches, syncope. Creat baseline 1.3-1.6. Today, creat 1.9. Patient reports variable intake.   Patient has follow-up appt with Cardiologist on 7/18.     Anemia  Hgb baseline 10-11. Last Hgb 10.2. Patient does not recall taking iron supplements in the past.     Hyperglycemia  Patient denies hx of diabetes. Does not recall reason to check BG.         Past Medical and Surgical History reviewed in Epic today.    MEDICATIONS:  Current Outpatient Medications    Medication Sig Dispense Refill     artificial saliva (BIOTENE MT) AERS spray Take 1 spray by mouth every 2 hours as needed for dry mouth       beta carotene 38849 UNIT capsule Take 1 capsule (25,000 Units) by mouth daily       diclofenac (VOLTAREN) 1 % topical gel Place 2 g onto the skin 3 times daily Apply to both ankles , Right back/rib.  0800, 1200, 1800       dicyclomine (BENTYL) 10 MG capsule Take 10 mg by mouth 3 times daily       finasteride (PROSCAR) 5 MG tablet TAKE 1 TABLET(5 MG) BY MOUTH DAILY 90 tablet 3     gabapentin (NEURONTIN) 300 MG capsule TAKE 1 CAPSULE BY MOUTH THREE TIMES DAILY 270 capsule 0     HYDRALAZINE HCL PO Take 25 mg by mouth 3 times daily       HYDROcodone-acetaminophen (NORCO) 7.5-325 MG per tablet Take 1 tablet by mouth every 6 hours as needed for pain maximum 4 tablet(s) per day 60 tablet 0     hypromellose (ARTIFICIAL TEARS) 0.4 % SOLN ophthalmic solution Place 2 drops Into the left eye 2 times daily        melatonin 3 MG tablet Take 3 mg by mouth At Bedtime       metoprolol tartrate (LOPRESSOR) 25 MG tablet Take 1 tablet (25 mg) by mouth 2 times daily       multivitamin w/minerals (THERA-VIT-M) tablet Take 1 tablet by mouth daily       pantoprazole (PROTONIX) 40 MG EC tablet Take 40 mg by mouth daily       Phenylephrine-Witch Hazel 0.25-50 % GEL Place rectally 4 times daily as needed Insert 1 application rectally as needed for Hemorrhoids       potassium chloride ER (K-DUR/KLOR-CON M) 20 MEQ CR tablet Take 2 tablets (40 mEq) by mouth daily       simvastatin (ZOCOR) 20 MG tablet TAKE ONE TABLET BY MOUTH AT BEDTIME 30 tablet 0     Skin Protectants, Misc. (EUCERIN) cream Apply topically 2 times daily Apply topically to LE & areas of dryness twice daily (also ordered prn)       Skin Protectants, Misc. (EUCERIN) cream Apply topically as needed for dry skin Apply to LE + Areas of dryness topically as needed for Dryness       tamsulosin (FLOMAX) 0.4 MG capsule TAKE ONE CAPSULE BY MOUTH  "DAILY 90 capsule 3     torsemide (DEMADEX) 20 MG tablet Take 1 tablet (20 mg) by mouth daily       vancomycin (FIRVANQ) 50 MG/ML oral solution Take 125 mg by mouth 3 times daily       vitamin C 500 MG TABS Take 1 tablet (500 mg) by mouth daily       zinc sulfate (ZINCATE) 220 (50 Zn) MG capsule Take 1 capsule (220 mg) by mouth daily             REVIEW OF SYSTEMS:  4 point ROS including Respiratory, CV, GI and , other than that noted in the HPI,  is negative      Objective:  /73   Pulse 68   Temp 98.1  F (36.7  C)   Resp 18   Ht 1.727 m (5' 8\")   Wt 63.5 kg (140 lb)   SpO2 96%   BMI 21.29 kg/m    Exam:  GENERAL APPEARANCE:  Alert, in no distress, appears healthy, oriented, cooperative  RESP:  respiratory effort and palpation of chest normal, lungs clear to auscultation , no respiratory distress  CV:  Palpation and auscultation of heart done , regular rate and rhythm, no murmur, rub, or gallop, +2 pedal pulses, edema BLE  ABDOMEN:  normal bowel sounds, soft, nontender, no hepatosplenomegaly or other masses, no guarding or rebound  M/S:   Gait and station abnormal, requires assistance with activity and ADLs. Digits and nails normal  SKIN:  Inspection of skin and subcutaneous tissue baseline, Palpation of skin and subcutaneous tissue baseline  NEURO:   Cranial nerves 2-12 are normal tested and grossly at patient's baseline, Examination of sensation by touch normal  PSYCH:  oriented X 3, affect and mood normal           Labs:   Labs done in SNF are in [place] [facility]. Please refer to them using [facility]/Care Everywhere., Recent labs in EPIC reviewed by me today.  and   Most Recent 3 CBC's:  Recent Labs   Lab Test 07/16/19  0600 07/11/19  0952 07/07/19  0820   WBC 3.9* 4.1 5.0   HGB 10.2* 10.2* 10.9*   MCV 95 93 94   PLT 85* 69* 70*     Most Recent 3 BMP's:  Recent Labs   Lab Test 07/16/19  0600 07/12/19  1032 07/11/19 2010 07/11/19  0952    142  --  142   POTASSIUM 5.3 4.4 4.0 3.3*   CHLORIDE 104 100  --  " 98   CO2 35* 38*  --  36*   BUN 64* 41*  --  32*   CR 1.92* 1.49*  --  1.31*   ANIONGAP 3 4  --  8   GIUSEPPE 8.5 8.8  --  8.5   GLC 69* 106*  --  96          Ref. Range 7/12/2019 05:55   Ferritin Latest Ref Range: 26 - 388 ng/mL 116   Iron Latest Ref Range: 35 - 180 ug/dL 23 (L)   Iron Binding Cap Latest Ref Range: 240 - 430 ug/dL 235 (L)   Iron Saturation Index Latest Ref Range: 15 - 46 % 10 (L)   Transferrin Latest Ref Range: 210 - 360 mg/dL 186 (L)         ASSESSMENT/PLAN:    (N17.9,  N18.9) Acute kidney injury superimposed on chronic kidney disease (H)  (primary encounter diagnosis)  (I50.32) Chronic diastolic congestive heart failure (H)  (R60.0) Bilateral leg edema  Comment: Creat baseline 1.3-1.6,; creat 1.92 on 7/16. Last Echo 7/5 found EF 70-75%, severe residual MR, and significant pulmonary hypertension.   Plan: Discontinue current KCl orders. Hold KCl 7/17, then start KCl 20meq every day on 7/18. Decrease torsemide to 10mg every day. Continue metoprolol, hydralazine, and simvastatin. Check BMP 7/19.   Left voicemail for [facility] regarding patient's weight, creat, meds, and requesting call back for recommendations for torsemide. Patient to follow-up with [facility] on 7/18.    UPDATE: [facility] called back on 7/17, recommending to keep torsemide at 20mg every day. Plan to also give potassium chloride 20meq on 7/17.     (D63.8) Anemia in other chronic diseases classified elsewhere  Comment: Iron studies completed on 7/12, slightly iron deficient. Baseline Hgb 10-11. No active sx of bleeding.  Plan: Add ferrous sulfate 325mg every day dx iron deficiency anemia. Monitor Hgb.     (R73.9) Hyperglycemia  Comment: Per discharge summary, recommending to monitor BG. BG have been stable. No prior hx of diabetes noted.   Plan: Check A1C 7/19. Discontinue BG checks.               Electronically signed by:  FRANK Pugh CNP

## 2019-07-17 ENCOUNTER — HOSPITAL LABORATORY (OUTPATIENT)
Dept: OTHER | Facility: CLINIC | Age: 84
End: 2019-07-17

## 2019-07-17 ENCOUNTER — DOCUMENTATION ONLY (OUTPATIENT)
Dept: OTHER | Facility: CLINIC | Age: 84
End: 2019-07-17

## 2019-07-17 LAB
ANION GAP SERPL CALCULATED.3IONS-SCNC: 4 MMOL/L (ref 3–14)
BUN SERPL-MCNC: 61 MG/DL (ref 7–30)
CALCIUM SERPL-MCNC: 9.3 MG/DL (ref 8.5–10.1)
CHLORIDE SERPL-SCNC: 104 MMOL/L (ref 94–109)
CO2 SERPL-SCNC: 33 MMOL/L (ref 20–32)
CREAT SERPL-MCNC: 1.85 MG/DL (ref 0.66–1.25)
GFR SERPL CREATININE-BSD FRML MDRD: 31 ML/MIN/{1.73_M2}
GLUCOSE SERPL-MCNC: 106 MG/DL (ref 70–99)
HBA1C MFR BLD: 5.1 % (ref 0–5.6)
POTASSIUM SERPL-SCNC: 5 MMOL/L (ref 3.4–5.3)
SODIUM SERPL-SCNC: 141 MMOL/L (ref 133–144)

## 2019-07-17 NOTE — TELEPHONE ENCOUNTER
Agree with decreased dose of torsemide at 20 mg daily and holding KCL yesterday and today. BMP prior to OV 7/18/19      FRANK Harris, CNP

## 2019-07-17 NOTE — TELEPHONE ENCOUNTER
Spoke with [facility] team, they will arrange for bmp and bnp prior to [facility] visit on 7/18/19.    Left a message for CARLEE Position[employer] with update from CARLEE Linda Gill.

## 2019-07-18 ENCOUNTER — OFFICE VISIT (OUTPATIENT)
Dept: CARDIOLOGY | Facility: CLINIC | Age: 84
End: 2019-07-18
Attending: NURSE PRACTITIONER
Payer: MEDICARE

## 2019-07-18 VITALS
HEIGHT: 68 IN | DIASTOLIC BLOOD PRESSURE: 62 MMHG | OXYGEN SATURATION: 95 % | BODY MASS INDEX: 21.61 KG/M2 | HEART RATE: 62 BPM | WEIGHT: 142.6 LBS | SYSTOLIC BLOOD PRESSURE: 106 MMHG

## 2019-07-18 DIAGNOSIS — I50.812 CHRONIC RIGHT-SIDED HEART FAILURE (H): Primary | ICD-10-CM

## 2019-07-18 DIAGNOSIS — I50.9 CHF EXACERBATION (H): ICD-10-CM

## 2019-07-18 DIAGNOSIS — N18.30 CKD (CHRONIC KIDNEY DISEASE) STAGE 3, GFR 30-59 ML/MIN (H): ICD-10-CM

## 2019-07-18 DIAGNOSIS — I10 BENIGN ESSENTIAL HYPERTENSION: ICD-10-CM

## 2019-07-18 DIAGNOSIS — E78.5 HYPERLIPIDEMIA LDL GOAL <100: ICD-10-CM

## 2019-07-18 DIAGNOSIS — I50.82 BIVENTRICULAR CONGESTIVE HEART FAILURE (H): ICD-10-CM

## 2019-07-18 DIAGNOSIS — I34.0 MITRAL VALVE INSUFFICIENCY, UNSPECIFIED ETIOLOGY: Chronic | ICD-10-CM

## 2019-07-18 LAB
ANION GAP SERPL CALCULATED.3IONS-SCNC: 12.8 MMOL/L (ref 6–17)
BUN SERPL-MCNC: 63 MG/DL (ref 7–30)
CALCIUM SERPL-MCNC: 9.6 MG/DL (ref 8.5–10.5)
CHLORIDE SERPL-SCNC: 100 MMOL/L (ref 98–107)
CO2 SERPL-SCNC: 29 MMOL/L (ref 23–29)
CREAT SERPL-MCNC: 1.96 MG/DL (ref 0.7–1.3)
GFR SERPL CREATININE-BSD FRML MDRD: 32 ML/MIN/{1.73_M2}
GLUCOSE SERPL-MCNC: 110 MG/DL (ref 70–105)
NT-PROBNP SERPL-MCNC: ABNORMAL PG/ML (ref 0–450)
POTASSIUM SERPL-SCNC: 4.8 MMOL/L (ref 3.5–5.1)
SODIUM SERPL-SCNC: 137 MMOL/L (ref 136–145)

## 2019-07-18 PROCEDURE — 99215 OFFICE O/P EST HI 40 MIN: CPT | Mod: 24 | Performed by: NURSE PRACTITIONER

## 2019-07-18 PROCEDURE — 80048 BASIC METABOLIC PNL TOTAL CA: CPT | Performed by: NURSE PRACTITIONER

## 2019-07-18 PROCEDURE — 83880 ASSAY OF NATRIURETIC PEPTIDE: CPT | Performed by: NURSE PRACTITIONER

## 2019-07-18 PROCEDURE — 36415 COLL VENOUS BLD VENIPUNCTURE: CPT | Performed by: NURSE PRACTITIONER

## 2019-07-18 RX ORDER — TORSEMIDE 20 MG/1
20 TABLET ORAL 2 TIMES DAILY
Qty: 90 TABLET | Refills: 1 | Status: SHIPPED | OUTPATIENT
Start: 2019-07-18 | End: 2019-01-01

## 2019-07-18 RX ORDER — FERROUS SULFATE 325(65) MG
325 TABLET ORAL
COMMUNITY

## 2019-07-18 ASSESSMENT — MIFFLIN-ST. JEOR: SCORE: 1271.33

## 2019-07-18 NOTE — LETTER
7/18/2019    Evaristo Magaña MD  [address]  [facility] [address]    RE: Ivan Gomez       Dear Colleague,    I had the pleasure of seeing Ivan Gomez in the [facility].    Cardiology Clinic Progress Note  Ivan Gomez MRN# [id number]   YOB: 1926 Age: 89 year old   Primary Cardiologist: Dr. Yao Reason for visit: CORE enrollment            Assessment and Plan:   Ivan Gomez is a very pleasant 89 year old male with a history of chronic diastolic heart failure, right heart failure, pulmonary hypertension, mitral regurgitation s/p mitul clip x 2 - with residual severe MR, tricuspid regurgitation, HTN, and history of bladder cancer. Patient here for CORE enrollment.       1.  Chronic diastolic heart failure/HFpEF, right heart failure - normal LV size, EF 70-75%. RV moderately dilated and RV moderately reduced systolic function. Patient appears compensated and volume up on exam (elevated JVD - known TR, lower extremity edema into thighs, abdominal distention and exertional dyspnea with any activity). Hospital discharge weight 134#, clinic weight today 142#. Facility weights reported stable around 140# (unclear on accuracy). On 7/16/19 torsemide was decreased due to bump in creatinine to 1.92, no improvement with decrease in diuretics, creatinine today 1.96, BUN 63, BNP 15,162 accompanied with worsening HF symptoms, likely cardiorenal syndrome playing a part. Will trial increase in diuretic with close CORE followup. As noted during hospital stay patient prognosis is poor, see #9 below.    - NYHA class III-IV, stage C   - Fluid status : volume up   - Diuretic regimen : INCREASE torsemide 20mg BID   - Aldosterone antagonist : will consider in the future with    - Blood pressure : controlled   - HF education provided, reinforced low sodium diet and when to notify CORE clinic.   2. Acute on chronic kidney disease - baseline 1.3-1.4,  "worsening kidney function noted on 7/16/19 at facility, creatinine 1.94, torsemide decreased to 20mg daily. Labs today show no improvment in creatinine despite decrease in diuretic. Worsening kidney function likely secondary to cardiorenal syndrome.   3. Mitral regurgitation s/p mitul clip x 2 10/2017- with residual severe MR  4. Tricuspid regurgitation moderately severe tricuspid regurgitation  5. Atrial fibrillation - stable, rate controlled, appears to be in sinus rhythm today, patient noted to have episode of atrial fibrillation with RVR on presentation to ED, he spontaneously converted out of atrial fibrillation in the emergency room.    - [id number] score at least 3 (age, HTN)   - no anticoagulation due to advanced age, thrombocytopenia, risks felt to out weigh benefits.   6. Hypertension - controlled  7. Severe pulmonary hypertension  8. Anemia - hemoglobin 10.2 7/16/19, stable  9. Advance care planning   - Introduced role of palliative care, patient wishes to meet with Dr. Fox, palliative care referral placed today   - Explored goals of care today, patient appears to have better insight on his current health status, reviewed code status and he wishes to be DNR/DNI. At this point patient would still like to return to the hospital for cares if needed   - Patient notes he has no close family to elect as health care decision maker, his partner (kay) recently passed away 6/24/19, patient is appropriately tearful during visit today. He has an older brother and a nephew but states they wouldn't be able to be his decision maker.    - During hospital stay goals of care were reviewed, \"Poor prognosis, high likelihood for rehospitalizations, and short period of time between hospitalizations have been reviewed with the patient. He lacks insight into his medical comorbidities, and wants to remain FULL CODE with restorative cares\"      Changes today: INCREASE torsemide to 20mg BID    Follow up plan:     CORE " follow up early next week with labs    Palliative care referral         History of Presenting Illness:    Ivan Gomez is a very pleasant 89 year old male with a history of chronic diastolic heart failure, right heart failure, pulmonary hypertension, mitral regurgitation s/p mitul clip x 2 - with residual severe MR, tricuspid regurgitation, HTN, and history of bladder cancer.    Patient has followed regularly in our cardiology practice with Dr. Yao for the last couple years. Patient was hospitalized 7/4/19-7/13/19 due to acute diastolic heart failure exacerbation and new onset atrial fibrillation with RVR. Patient spontaneously converted out of atrial fibrillation in the emergency room. Chest CT showed bilateral patchy airspace and groundglass opacities, small bilateral pleural effusions, and pulmonary edema. NTproBNP 17,137. Echocardiogram 7/5/19 demonstrated stable position of mitraclip devices, moderately-severe residual mitral regurgitation, no significant mitral stenosis, massively dilated LA, severely dilated RA, moderately severe TR, normal LV size, EF 70-75%. RV moderately dilated and RV moderately reduced systolic function. Diuresed with bumetanide gtt, transitioned to torsemide 20mg BID at discharge. At discharge patient was noted to have persisting +1 lower extremity edema and gut edema. Admission weight 145-149#. Discharge weight 134#. CORE referral was placed at discharge. Prior to this patient was hospitalized in May 5/21-5/28/19 transferred from outside hospital, found to have acute cholecystitis s/p cholecystectomy 5/22.     Facility provider called on 7/16/19 noting elevated creatinine 1.9 (baseline 1.3-1.4) and potassium 5.3, weight was stable at 140# and lower extremity edema remained the same. Potassium decreased to 20meq daily and torsemide decreased to 20mg daily.     Patient is here today for CORE enrollment.     Patient reports feeling good. Feeling fatigued. Monitoring weights  "daily at facility. Weight today 142#. Facility weights have ranged 139-140# since hospital discharge, clinic weight shows 8# weight gain, since hospital discharge, unclear on consistency. States lower extremity edema worsening, noting edema in his thighs. Patient states difficult to walk due to \"heavy legs\". Denies shortness of breath at rest. Using walker for assistance. Reports exertional dyspnea with any movement. Feels breathing has stayed \"about the same\". Some complaints of abdominal distention. Sleeping okay. States he can't get in the right position. Complaints of PND/orthopnea. States related to his breathing. 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     Patient denies chest pain or chest tightness. Denies dizziness, lightheadedness or other presyncopal symptoms. Denies tachycardia or palpitations. Denies episodes of bleeding. Patient states he came to [place] to visit his partner, Bryn, she recently passed on 6/24/19. Patient tearful.     Labs today show continued worsening kidney function despite decrease in diuretic, creatinine 1.96, BUN 63, electrolytes stable, NTproBNP 15,162. Blood pressure 106/62 and HR 62 in clinic today.    Appetite okay, doesn't love the food at [facility] [facility] [employer] [employer]. Not adding salt to foods. States not drinking much fluid, states he is drinking a little ginger ale and water. Working with PT/OT for exercise. Denies alcohol use. Denies tobacco use.         Recent Hospitalizations   7/4/19-7/13/19 due to acute diastolic heart failure exacerbation and new onset atrial fibrillation with RVR. Patient spontaneously converted out of atrial fibrillation in the emergency room. Chest CT showed bilateral patchy airspace and groundglass opacities, small bilateral pleural effusions, and pulmonary edema. NTproBNP 17,137. Echocardiogram 7/5/19 demonstrated stable position of mitraclip devices, moderately-severe residual mitral regurgitation, no significant mitral stenosis, massively dilated LA, severely dilated " RA, moderately severe TR, normal LV size, EF 70-75%. RV moderately dilated and RV moderately reduced systolic function. Diuresed with bumetanide gtt, transitioned to torsemide 20mg BID at discharge. At discharge patient was noted to have persisting +1 lower extremity edema and gut edema. Admission weight 145-149#. Discharge weight 134#.   5/21-5/28/19 transferred from outside hospital, found to have acute cholecystitis s/p cholecystectomy 5/22.         Social History    His partner passed away in June 2019, son that he doesn't communicate with. Older brother in [place]. Nephew in [place].   Social History     Socioeconomic History     Marital status: Single     Spouse name: Not on file     Number of children: Not on file     Years of education: Not on file     Highest education level: Not on file   Occupational History     Not on file   Social Needs     Financial resource strain: Not on file     Food insecurity:     Worry: Not on file     Inability: Not on file     Transportation needs:     Medical: Not on file     Non-medical: Not on file   Tobacco Use     Smoking status: Former Smoker     Smokeless tobacco: Never Used   Substance and Sexual Activity     Alcohol use: No     Alcohol/week: 0.0 oz     Comment: doesnt use anymore     Drug use: No     Sexual activity: Never   Lifestyle     Physical activity:     Days per week: Not on file     Minutes per session: Not on file     Stress: Not on file   Relationships     Social connections:     Talks on phone: Not on file     Gets together: Not on file     Attends [employer] service: Not on file     Active member of club or organization: Not on file     Attends meetings of clubs or organizations: Not on file     Relationship status: Not on file     Intimate partner violence:     Fear of current or ex partner: Not on file     Emotionally abused: Not on file     Physically abused: Not on file     Forced sexual activity: Not on file   Other Topics Concern      "Parent/sibling w/ CABG, MI or angioplasty before 65F 55M? No   Social History Narrative     Not on file            Review of Systems:   Skin:  Negative     Eyes:  Negative    ENT:  Negative    Respiratory:  Negative    Cardiovascular:    Positive for;chest pain  Gastroenterology: Negative    Genitourinary:  Positive for urinary frequency;prostate problem  Musculoskeletal:  Positive for arthritis  Neurologic:  Positive for numbness or tingling of feet  Psychiatric:  Positive for    Heme/Lymph/Imm:  Positive for easy bruising  Endocrine:  Negative           Physical Exam:   Vitals: /62   Pulse 62   Ht 1.727 m (5' 8\")   Wt 64.7 kg (142 lb 9.6 oz)   SpO2 95%   BMI 21.68 kg/m      Wt Readings from Last 4 Encounters:   07/19/19 64 kg (141 lb 3.2 oz)   07/18/19 64.7 kg (142 lb 9.6 oz)   07/16/19 63.5 kg (140 lb)   07/15/19 63.2 kg (139 lb 4.8 oz)     GEN: elderly, frail  HEENT:  Pupils equal, round. Sclerae nonicteric.   NECK: Supple, no masses appreciated. JVP elevated to jaw  C/V:  Regular rate and rhythm, 3/6 systolic murmur  RESP: Respirations are unlabored. Clear to auscultation bilaterally, diminished in bases.   GI: Abdomen soft, nontender.  EXTREM: +1-2 LE edema to bilateral lower extremities, into thighs  NEURO: Alert and oriented, cooperative.  SKIN: Warm and dry.        Data:   ECHO 7/5/2019  Sinus rhythm was noted.  Status post transcatheter mitral valve repair with MitraClip x 2, 10/16/2017.     1. Stable position of the MitraClip devices.  2. At least, moderately severe residual mitral regurgitation. Eccentric jet,  anteriorly directed, reaching the superior surface of the left atrium.  3. No significant mitral stenosis. Mean diastolic mitral valve gradient is 3.3  mmHg (heart rate 73 bpm).  4. Massively dilated left atrium. Severely dilated right atrium.  5. Moderately severe (3+) tricuspid regurgitation. Findings consistent with  significant pulmonary hypertension. Estimated pulmonary artery " systolic  pressure is 65-70 mmHg.  6. Normal left ventricular size, hyperdynamic systolic function, estimated  LVEF 70-75%.  7. Moderately dilated right ventricle with moderately reduced systolic  function. TAPSE 1.3 - 1.6 cm, tissue Doppler systolic velocity 9 cm/s.  8. Dilated inferior vena cava.     No significant changes compared to previous study dated 5/23/2019.    LIPID RESULTS:  Lab Results   Component Value Date    CHOL 108 10/12/2017    HDL 63 10/12/2017    LDL 29 10/12/2017    TRIG 81 10/12/2017    CHOLHDLRATIO 2.6 08/18/2015     LIVER ENZYME RESULTS:  Lab Results   Component Value Date    AST 11 07/08/2019    ALT 13 07/08/2019     CBC RESULTS:  Lab Results   Component Value Date    WBC 3.9 (L) 07/16/2019    RBC 3.44 (L) 07/16/2019    HGB 10.2 (L) 07/16/2019    HCT 32.7 (L) 07/16/2019    MCV 95 07/16/2019    MCH 29.7 07/16/2019    MCHC 31.2 (L) 07/16/2019    RDW 18.0 (H) 07/16/2019    PLT 85 (L) 07/16/2019     BMP RESULTS:  Lab Results   Component Value Date     07/18/2019    POTASSIUM 4.8 07/18/2019    CHLORIDE 100 07/18/2019    CO2 29 07/18/2019    ANIONGAP 12.8 07/18/2019     (H) 07/18/2019    BUN 63 (H) 07/18/2019    CR 1.96 (H) 07/18/2019    GFRESTIMATED 32 (L) 07/18/2019    GFRESTBLACK 39 (L) 07/18/2019    GIUSEPPE 9.6 07/18/2019      A1C RESULTS:  Lab Results   Component Value Date    A1C 5.1 07/17/2019     INR RESULTS:  Lab Results   Component Value Date    INR 1.26 (H) 07/04/2019    INR 1.15 (H) 10/16/2017            Medications     Current Outpatient Medications   Medication Sig Dispense Refill     artificial saliva (BIOTENE MT) AERS spray Take 1 spray by mouth 3 times daily And every 2 hours as needed       beta carotene 71883 UNIT capsule Take 1 capsule (25,000 Units) by mouth daily       diclofenac (VOLTAREN) 1 % topical gel Place 2 g onto the skin 3 times daily Apply to both ankles , Right back/rib.  0800, 1200, 1800       dicyclomine (BENTYL) 10 MG capsule Take 10 mg by mouth 3  times daily       ferrous sulfate (FEROSUL) 325 (65 Fe) MG tablet Take 325 mg by mouth daily (with breakfast)       finasteride (PROSCAR) 5 MG tablet TAKE 1 TABLET(5 MG) BY MOUTH DAILY 90 tablet 3     gabapentin (NEURONTIN) 300 MG capsule TAKE 1 CAPSULE BY MOUTH THREE TIMES DAILY 270 capsule 0     HYDRALAZINE HCL PO Take 25 mg by mouth 3 times daily       HYDROcodone-acetaminophen (NORCO) 7.5-325 MG per tablet Take 1 tablet by mouth every 6 hours as needed for pain maximum 4 tablet(s) per day 60 tablet 0     hypromellose (ARTIFICIAL TEARS) 0.4 % SOLN ophthalmic solution Place 2 drops Into the left eye 2 times daily        melatonin 3 MG tablet Take 3 mg by mouth At Bedtime       metoprolol tartrate (LOPRESSOR) 25 MG tablet Take 1 tablet (25 mg) by mouth 2 times daily       multivitamin w/minerals (THERA-VIT-M) tablet Take 1 tablet by mouth daily       pantoprazole (PROTONIX) 40 MG EC tablet Take 40 mg by mouth daily       Phenylephrine-Witch Hazel 0.25-50 % GEL Place rectally 4 times daily as needed Insert 1 application rectally as needed for Hemorrhoids       potassium chloride ER (K-DUR/KLOR-CON M) 20 MEQ CR tablet Take 2 tablets (40 mEq) by mouth daily (Patient taking differently: Take 20 mEq by mouth daily )       simvastatin (ZOCOR) 20 MG tablet TAKE ONE TABLET BY MOUTH AT BEDTIME 30 tablet 0     Skin Protectants, Misc. (EUCERIN) cream Apply topically 2 times daily Apply topically to LE & areas of dryness twice daily (also ordered prn)       Skin Protectants, Misc. (EUCERIN) cream Apply topically as needed for dry skin Apply to LE + Areas of dryness topically as needed for Dryness       tamsulosin (FLOMAX) 0.4 MG capsule TAKE ONE CAPSULE BY MOUTH DAILY 90 capsule 3     torsemide (DEMADEX) 20 MG tablet Take 1 tablet (20 mg) by mouth 2 times daily 90 tablet 1     vancomycin (FIRVANQ) 50 MG/ML oral solution Take 125 mg by mouth 3 times daily       vitamin C 500 MG TABS Take 1 tablet (500 mg) by mouth daily        zinc sulfate (ZINCATE) 220 (50 Zn) MG capsule Take 1 capsule (220 mg) by mouth daily       POTASSIUM CHLORIDE ER PO Take 40 mEq by mouth daily            Past Medical History     Past Medical History:   Diagnosis Date     Arthritis     Spinal Stenosis     Former smoker      Heart disease      Hemorrhoids      Hypercholesteremia      Hypertension      Malignant neoplasm (H)     skin CA, Basal cell     Other chronic pain      Renal disease      Past Surgical History:   Procedure Laterality Date     BACK SURGERY       BIOPSY      face, skin cancer     BIOPSY  8/2016    shoulder lesion     CYSTOSCOPY, TRANSURETHRAL RESECTION (TUR) PROSTATE, COMBINED N/A 8/21/2015    Procedure: COMBINED CYSTOSCOPY, TRANSURETHRAL RESECTION (TUR) PROSTATE;  Surgeon: Dennis Hilton MD;  Location: [facility] OR     CYSTOSCOPY, TRANSURETHRAL RESECTION (TUR) TUMOR BLADDER, COMBINED N/A 9/2/2016    Procedure: COMBINED CYSTOSCOPY, TRANSURETHRAL RESECTION (TUR) TUMOR BLADDER;  Surgeon: Dennis Hilton MD;  Location: [facility] OR     CYSTOSCOPY, TRANSURETHRAL RESECTION (TUR) TUMOR BLADDER, COMBINED N/A 11/2/2018    Procedure: Transurethral resection of bladder tumor > 5 cm in size;  Surgeon: Dennis Hilton MD;  Location:  OR     ESOPHAGOSCOPY, GASTROSCOPY, DUODENOSCOPY (EGD), COMBINED N/A 5/24/2019    Procedure: ESOPHAGOGASTRODUODENOSCOPY, WITH BIOPSY;  Surgeon: Abe Jagn MD;  [facility]: [facility]     LAPAROSCOPIC CHOLECYSTECTOMY WITH CHOLANGIOGRAMS N/A 5/22/2019    Procedure: CHOLECYSTECTOMY, LAPAROSCOPIC, WITH CHOLANGIOGRAM;  Surgeon: Rey Romero MD;  Location:  OR     ORTHOPEDIC SURGERY      lumbar and cervical surgery, Sep, 2008, knee surgery     PERCUTANEOUS MITRAL VALVE REPAIR N/A 10/16/2017    Procedure: PERCUTANEOUS MITRAL VALVE REPAIR ANESTHESIA;  Percutaneous MitraClip ;  Surgeon: Eber Monroe MD;  Location: [facility] OR     PICC INSERTION Right 10/14/2017    5fr DL Bard PICC, 40cm (1cm external) in  the R basilic vein w/ tip in the mid SVC.     Family History   Problem Relation Age of Onset     Cancer Son 64        leukemia ? due to agent orange     Family History Negative Son             Allergies   Celebrex [celecoxib] and Penicillins        FRANK Thomas CNP  [facility]  Pager: [phone]      Thank you for allowing me to participate in the care of your patient.      Sincerely,     FRANK Thomas CNP     [facility]

## 2019-07-18 NOTE — PATIENT INSTRUCTIONS
Call CORE nurse for any questions or concerns Mon-Fri 8am-4pm:                                                 #[phone]                                       For concerns after hours:                                               #[phone]     1: Medication changes: INCREASE torsemide to 20mg 2 x a day  2: Plan from today: CORE follow up 7/22/19 at 8:50, labs prior at 8:00.   3: Lab results: see attached; kidney function remains impaired, ? If related to continued extra fluid. Electrolytes stable.   Component      Latest Ref Rng & Units 7/17/2019 7/18/2019   Sodium      136 - 145 mmol/L 141 137   Potassium      3.5 - 5.1 mmol/L 5.0 4.8   Chloride      98 - 107 mmol/L 104 100   Carbon Dioxide      23 - 29 mmol/L 33 (H) 29   Anion Gap      6 - 17 mmol/L 4 12.8   Glucose      70 - 105 mg/dL 106 (H) 110 (H)   Urea Nitrogen      7 - 30 mg/dL 61 (H) 63 (H)   Creatinine      0.70 - 1.30 mg/dL 1.85 (H) 1.96 (H)   GFR Estimate      >60 mL/min/1.73:m2 31 (L) 32 (L)   GFR Estimate If Black      >60 mL/min/1.73:m2 36 (L) 39 (L)   Calcium      8.5 - 10.5 mg/dL 9.3 9.6   Hemoglobin A1C      0 - 5.6 % 5.1

## 2019-07-18 NOTE — PROGRESS NOTES
Cardiology Clinic Progress Note  Ivan Gomez MRN# [id number]   YOB: 1926 Age: 89 year old   Primary Cardiologist: Dr. Yao Reason for visit: CORE enrollment            Assessment and Plan:   Ivan Gomez is a very pleasant 89 year old male with a history of chronic diastolic heart failure, right heart failure, pulmonary hypertension, mitral regurgitation s/p mitul clip x 2 - with residual severe MR, tricuspid regurgitation, HTN, and history of bladder cancer. Patient here for CORE enrollment.       1.  Chronic diastolic heart failure/HFpEF, right heart failure - normal LV size, EF 70-75%. RV moderately dilated and RV moderately reduced systolic function. Patient appears compensated and volume up on exam (elevated JVD - known TR, lower extremity edema into thighs, abdominal distention and exertional dyspnea with any activity). Hospital discharge weight 134#, clinic weight today 142#. Facility weights reported stable around 140# (unclear on accuracy). On 7/16/19 torsemide was decreased due to bump in creatinine to 1.92, no improvement with decrease in diuretics, creatinine today 1.96, BUN 63, BNP 15,162 accompanied with worsening HF symptoms, likely cardiorenal syndrome playing a part. Will trial increase in diuretic with close CORE followup. As noted during hospital stay patient prognosis is poor, see #9 below.    - NYHA class III-IV, stage C   - Fluid status : volume up   - Diuretic regimen : INCREASE torsemide 20mg BID   - Aldosterone antagonist : will consider in the future with    - Blood pressure : controlled   - HF education provided, reinforced low sodium diet and when to notify CORE clinic.   2. Acute on chronic kidney disease - baseline 1.3-1.4, worsening kidney function noted on 7/16/19 at facility, creatinine 1.94, torsemide decreased to 20mg daily. Labs today show no improvment in creatinine despite decrease in diuretic. Worsening kidney function likely secondary to  "cardiorenal syndrome.   3. Mitral regurgitation s/p mitul clip x 2 10/2017- with residual severe MR  4. Tricuspid regurgitation moderately severe tricuspid regurgitation  5. Atrial fibrillation - stable, rate controlled, appears to be in sinus rhythm today, patient noted to have episode of atrial fibrillation with RVR on presentation to ED, he spontaneously converted out of atrial fibrillation in the emergency room.    - [id number] score at least 3 (age, HTN)   - no anticoagulation due to advanced age, thrombocytopenia, risks felt to out weigh benefits.   6. Hypertension - controlled  7. Severe pulmonary hypertension  8. Anemia - hemoglobin 10.2 7/16/19, stable  9. Advance care planning   - Introduced role of palliative care, patient wishes to meet with Dr. Fox, palliative care referral placed today   - Explored goals of care today, patient appears to have better insight on his current health status, reviewed code status and he wishes to be DNR/DNI. At this point patient would still like to return to the hospital for cares if needed   - Patient notes he has no close family to elect as health care decision maker, his partner (kay) recently passed away 6/24/19, patient is appropriately tearful during visit today. He has an older brother and a nephew but states they wouldn't be able to be his decision maker.    - During hospital stay goals of care were reviewed, \"Poor prognosis, high likelihood for rehospitalizations, and short period of time between hospitalizations have been reviewed with the patient. He lacks insight into his medical comorbidities, and wants to remain FULL CODE with restorative cares\"      Changes today: INCREASE torsemide to 20mg BID    Follow up plan:     CORE follow up early next week with labs    Palliative care referral         History of Presenting Illness:    Ivan Gomze is a very pleasant 89 year old male with a history of chronic diastolic heart failure, right heart failure, " pulmonary hypertension, mitral regurgitation s/p mitul clip x 2 - with residual severe MR, tricuspid regurgitation, HTN, and history of bladder cancer.    Patient has followed regularly in our cardiology practice with Dr. Yao for the last couple years. Patient was hospitalized 7/4/19-7/13/19 due to acute diastolic heart failure exacerbation and new onset atrial fibrillation with RVR. Patient spontaneously converted out of atrial fibrillation in the emergency room. Chest CT showed bilateral patchy airspace and groundglass opacities, small bilateral pleural effusions, and pulmonary edema. NTproBNP 17,137. Echocardiogram 7/5/19 demonstrated stable position of mitraclip devices, moderately-severe residual mitral regurgitation, no significant mitral stenosis, massively dilated LA, severely dilated RA, moderately severe TR, normal LV size, EF 70-75%. RV moderately dilated and RV moderately reduced systolic function. Diuresed with bumetanide gtt, transitioned to torsemide 20mg BID at discharge. At discharge patient was noted to have persisting +1 lower extremity edema and gut edema. Admission weight 145-149#. Discharge weight 134#. CORE referral was placed at discharge. Prior to this patient was hospitalized in May 5/21-5/28/19 transferred from outside hospital, found to have acute cholecystitis s/p cholecystectomy 5/22.     Facility provider called on 7/16/19 noting elevated creatinine 1.9 (baseline 1.3-1.4) and potassium 5.3, weight was stable at 140# and lower extremity edema remained the same. Potassium decreased to 20meq daily and torsemide decreased to 20mg daily.     Patient is here today for CORE enrollment.     Patient reports feeling good. Feeling fatigued. Monitoring weights daily at facility. Weight today 142#. Facility weights have ranged 139-140# since hospital discharge, clinic weight shows 8# weight gain, since hospital discharge, unclear on consistency. States lower extremity edema worsening,  "noting edema in his thighs. Patient states difficult to walk due to \"heavy legs\". Denies shortness of breath at rest. Using walker for assistance. Reports exertional dyspnea with any movement. Feels breathing has stayed \"about the same\". Some complaints of abdominal distention. Sleeping okay. States he can't get in the right position. Complaints of PND/orthopnea. States related to his breathing. 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     Patient denies chest pain or chest tightness. Denies dizziness, lightheadedness or other presyncopal symptoms. Denies tachycardia or palpitations. Denies episodes of bleeding. Patient states he came to [place] to visit his partner, Bryn, she recently passed on 6/24/19. Patient tearful.     Labs today show continued worsening kidney function despite decrease in diuretic, creatinine 1.96, BUN 63, electrolytes stable, NTproBNP 15,162. Blood pressure 106/62 and HR 62 in clinic today.    Appetite okay, doesn't love the food at [employer]. Not adding salt to foods. States not drinking much fluid, states he is drinking a little ginger ale and water. Working with PT/OT for exercise. Denies alcohol use. Denies tobacco use.         Recent Hospitalizations   7/4/19-7/13/19 due to acute diastolic heart failure exacerbation and new onset atrial fibrillation with RVR. Patient spontaneously converted out of atrial fibrillation in the emergency room. Chest CT showed bilateral patchy airspace and groundglass opacities, small bilateral pleural effusions, and pulmonary edema. NTproBNP 17,137. Echocardiogram 7/5/19 demonstrated stable position of mitraclip devices, moderately-severe residual mitral regurgitation, no significant mitral stenosis, massively dilated LA, severely dilated RA, moderately severe TR, normal LV size, EF 70-75%. RV moderately dilated and RV moderately reduced systolic function. Diuresed with bumetanide gtt, transitioned to torsemide 20mg BID at discharge. At discharge patient was noted " to have persisting +1 lower extremity edema and gut edema. Admission weight 145-149#. Discharge weight 134#.   5/21-5/28/19 transferred from outside hospital, found to have acute cholecystitis s/p cholecystectomy 5/22.         Social History    His partner passed away in June 2019, son that he doesn't communicate with. Older brother in [place]. Nephew in [place].   Social History     Socioeconomic History     Marital status: Single     Spouse name: Not on file     Number of children: Not on file     Years of education: Not on file     Highest education level: Not on file   Occupational History     Not on file   Social Needs     Financial resource strain: Not on file     Food insecurity:     Worry: Not on file     Inability: Not on file     Transportation needs:     Medical: Not on file     Non-medical: Not on file   Tobacco Use     Smoking status: Former Smoker     Smokeless tobacco: Never Used   Substance and Sexual Activity     Alcohol use: No     Alcohol/week: 0.0 oz     Comment: doesnt use anymore     Drug use: No     Sexual activity: Never   Lifestyle     Physical activity:     Days per week: Not on file     Minutes per session: Not on file     Stress: Not on file   Relationships     Social connections:     Talks on phone: Not on file     Gets together: Not on file     Attends Hindu service: Not on file     Active member of club or organization: Not on file     Attends meetings of clubs or organizations: Not on file     Relationship status: Not on file     Intimate partner violence:     Fear of current or ex partner: Not on file     Emotionally abused: Not on file     Physically abused: Not on file     Forced sexual activity: Not on file   Other Topics Concern     Parent/sibling w/ CABG, MI or angioplasty before 65F 55M? No   Social History Narrative     Not on file            Review of Systems:   Skin:  Negative     Eyes:  Negative    ENT:  Negative    Respiratory:  Negative    Cardiovascular:     "Positive for;chest pain  Gastroenterology: Negative    Genitourinary:  Positive for urinary frequency;prostate problem  Musculoskeletal:  Positive for arthritis  Neurologic:  Positive for numbness or tingling of feet  Psychiatric:  Positive for    Heme/Lymph/Imm:  Positive for easy bruising  Endocrine:  Negative           Physical Exam:   Vitals: /62   Pulse 62   Ht 1.727 m (5' 8\")   Wt 64.7 kg (142 lb 9.6 oz)   SpO2 95%   BMI 21.68 kg/m     Wt Readings from Last 4 Encounters:   07/19/19 64 kg (141 lb 3.2 oz)   07/18/19 64.7 kg (142 lb 9.6 oz)   07/16/19 63.5 kg (140 lb)   07/15/19 63.2 kg (139 lb 4.8 oz)     GEN: elderly, frail  HEENT:  Pupils equal, round. Sclerae nonicteric.   NECK: Supple, no masses appreciated. JVP elevated to jaw  C/V:  Regular rate and rhythm, 3/6 systolic murmur  RESP: Respirations are unlabored. Clear to auscultation bilaterally, diminished in bases.   GI: Abdomen soft, nontender.  EXTREM: +1-2 LE edema to bilateral lower extremities, into thighs  NEURO: Alert and oriented, cooperative.  SKIN: Warm and dry.        Data:   ECHO 7/5/2019  Sinus rhythm was noted.  Status post transcatheter mitral valve repair with MitraClip x 2, 10/16/2017.     1. Stable position of the MitraClip devices.  2. At least, moderately severe residual mitral regurgitation. Eccentric jet,  anteriorly directed, reaching the superior surface of the left atrium.  3. No significant mitral stenosis. Mean diastolic mitral valve gradient is 3.3  mmHg (heart rate 73 bpm).  4. Massively dilated left atrium. Severely dilated right atrium.  5. Moderately severe (3+) tricuspid regurgitation. Findings consistent with  significant pulmonary hypertension. Estimated pulmonary artery systolic  pressure is 65-70 mmHg.  6. Normal left ventricular size, hyperdynamic systolic function, estimated  LVEF 70-75%.  7. Moderately dilated right ventricle with moderately reduced systolic  function. TAPSE 1.3 - 1.6 cm, tissue Doppler " systolic velocity 9 cm/s.  8. Dilated inferior vena cava.     No significant changes compared to previous study dated 5/23/2019.    LIPID RESULTS:  Lab Results   Component Value Date    CHOL 108 10/12/2017    HDL 63 10/12/2017    LDL 29 10/12/2017    TRIG 81 10/12/2017    CHOLHDLRATIO 2.6 08/18/2015     LIVER ENZYME RESULTS:  Lab Results   Component Value Date    AST 11 07/08/2019    ALT 13 07/08/2019     CBC RESULTS:  Lab Results   Component Value Date    WBC 3.9 (L) 07/16/2019    RBC 3.44 (L) 07/16/2019    HGB 10.2 (L) 07/16/2019    HCT 32.7 (L) 07/16/2019    MCV 95 07/16/2019    MCH 29.7 07/16/2019    MCHC 31.2 (L) 07/16/2019    RDW 18.0 (H) 07/16/2019    PLT 85 (L) 07/16/2019     BMP RESULTS:  Lab Results   Component Value Date     07/18/2019    POTASSIUM 4.8 07/18/2019    CHLORIDE 100 07/18/2019    CO2 29 07/18/2019    ANIONGAP 12.8 07/18/2019     (H) 07/18/2019    BUN 63 (H) 07/18/2019    CR 1.96 (H) 07/18/2019    GFRESTIMATED 32 (L) 07/18/2019    GFRESTBLACK 39 (L) 07/18/2019    GIUSEPPE 9.6 07/18/2019      A1C RESULTS:  Lab Results   Component Value Date    A1C 5.1 07/17/2019     INR RESULTS:  Lab Results   Component Value Date    INR 1.26 (H) 07/04/2019    INR 1.15 (H) 10/16/2017            Medications     Current Outpatient Medications   Medication Sig Dispense Refill     artificial saliva (BIOTENE MT) AERS spray Take 1 spray by mouth 3 times daily And every 2 hours as needed       beta carotene 03840 UNIT capsule Take 1 capsule (25,000 Units) by mouth daily       diclofenac (VOLTAREN) 1 % topical gel Place 2 g onto the skin 3 times daily Apply to both ankles , Right back/rib.  0800, 1200, 1800       dicyclomine (BENTYL) 10 MG capsule Take 10 mg by mouth 3 times daily       ferrous sulfate (FEROSUL) 325 (65 Fe) MG tablet Take 325 mg by mouth daily (with breakfast)       finasteride (PROSCAR) 5 MG tablet TAKE 1 TABLET(5 MG) BY MOUTH DAILY 90 tablet 3     gabapentin (NEURONTIN) 300 MG capsule TAKE 1  CAPSULE BY MOUTH THREE TIMES DAILY 270 capsule 0     HYDRALAZINE HCL PO Take 25 mg by mouth 3 times daily       HYDROcodone-acetaminophen (NORCO) 7.5-325 MG per tablet Take 1 tablet by mouth every 6 hours as needed for pain maximum 4 tablet(s) per day 60 tablet 0     hypromellose (ARTIFICIAL TEARS) 0.4 % SOLN ophthalmic solution Place 2 drops Into the left eye 2 times daily        melatonin 3 MG tablet Take 3 mg by mouth At Bedtime       metoprolol tartrate (LOPRESSOR) 25 MG tablet Take 1 tablet (25 mg) by mouth 2 times daily       multivitamin w/minerals (THERA-VIT-M) tablet Take 1 tablet by mouth daily       pantoprazole (PROTONIX) 40 MG EC tablet Take 40 mg by mouth daily       Phenylephrine-Witch Hazel 0.25-50 % GEL Place rectally 4 times daily as needed Insert 1 application rectally as needed for Hemorrhoids       potassium chloride ER (K-DUR/KLOR-CON M) 20 MEQ CR tablet Take 2 tablets (40 mEq) by mouth daily (Patient taking differently: Take 20 mEq by mouth daily )       simvastatin (ZOCOR) 20 MG tablet TAKE ONE TABLET BY MOUTH AT BEDTIME 30 tablet 0     Skin Protectants, Misc. (EUCERIN) cream Apply topically 2 times daily Apply topically to LE & areas of dryness twice daily (also ordered prn)       Skin Protectants, Misc. (EUCERIN) cream Apply topically as needed for dry skin Apply to LE + Areas of dryness topically as needed for Dryness       tamsulosin (FLOMAX) 0.4 MG capsule TAKE ONE CAPSULE BY MOUTH DAILY 90 capsule 3     torsemide (DEMADEX) 20 MG tablet Take 1 tablet (20 mg) by mouth 2 times daily 90 tablet 1     vancomycin (FIRVANQ) 50 MG/ML oral solution Take 125 mg by mouth 3 times daily       vitamin C 500 MG TABS Take 1 tablet (500 mg) by mouth daily       zinc sulfate (ZINCATE) 220 (50 Zn) MG capsule Take 1 capsule (220 mg) by mouth daily       POTASSIUM CHLORIDE ER PO Take 40 mEq by mouth daily            Past Medical History     Past Medical History:   Diagnosis Date     Arthritis     Spinal  Stenosis     Former smoker      Heart disease      Hemorrhoids      Hypercholesteremia      Hypertension      Malignant neoplasm (H)     skin CA, Basal cell     Other chronic pain      Renal disease      Past Surgical History:   Procedure Laterality Date     BACK SURGERY       BIOPSY      face, skin cancer     BIOPSY  8/2016    shoulder lesion     CYSTOSCOPY, TRANSURETHRAL RESECTION (TUR) PROSTATE, COMBINED N/A 8/21/2015    Procedure: COMBINED CYSTOSCOPY, TRANSURETHRAL RESECTION (TUR) PROSTATE;  Surgeon: Dennis Hilton MD;  Location: [facility] OR     CYSTOSCOPY, TRANSURETHRAL RESECTION (TUR) TUMOR BLADDER, COMBINED N/A 9/2/2016    Procedure: COMBINED CYSTOSCOPY, TRANSURETHRAL RESECTION (TUR) TUMOR BLADDER;  Surgeon: Dennis Hilton MD;  Location: [facility] OR     CYSTOSCOPY, TRANSURETHRAL RESECTION (TUR) TUMOR BLADDER, COMBINED N/A 11/2/2018    Procedure: Transurethral resection of bladder tumor > 5 cm in size;  Surgeon: Dennis Hilton MD;  Location:  OR     ESOPHAGOSCOPY, GASTROSCOPY, DUODENOSCOPY (EGD), COMBINED N/A 5/24/2019    Procedure: ESOPHAGOGASTRODUODENOSCOPY, WITH BIOPSY;  Surgeon: Abe Jang MD;  Location: [facility]     LAPAROSCOPIC CHOLECYSTECTOMY WITH CHOLANGIOGRAMS N/A 5/22/2019    Procedure: CHOLECYSTECTOMY, LAPAROSCOPIC, WITH CHOLANGIOGRAM;  Surgeon: Rey Romero MD;  Location:  OR     ORTHOPEDIC SURGERY      lumbar and cervical surgery, Sep, 2008, knee surgery     PERCUTANEOUS MITRAL VALVE REPAIR N/A 10/16/2017    Procedure: PERCUTANEOUS MITRAL VALVE REPAIR ANESTHESIA;  Percutaneous MitraClip ;  Surgeon: Eber Monroe MD;  Location: [facility] OR     PICC INSERTION Right 10/14/2017    5fr DL Bard PICC, 40cm (1cm external) in the R basilic vein w/ tip in the mid SVC.     Family History   Problem Relation Age of Onset     Cancer Son 64        leukemia ? due to agent orange     Family History Negative Son             Allergies   Celebrex [celecoxib] and  FRANK Reddy [facility]  Pager: [phone]

## 2019-07-19 ENCOUNTER — NURSING HOME VISIT (OUTPATIENT)
Dept: GERIATRICS | Facility: CLINIC | Age: 84
End: 2019-07-19
Payer: MEDICARE

## 2019-07-19 VITALS
BODY MASS INDEX: 21.4 KG/M2 | WEIGHT: 141.2 LBS | RESPIRATION RATE: 18 BRPM | TEMPERATURE: 97.6 F | HEART RATE: 68 BPM | SYSTOLIC BLOOD PRESSURE: 125 MMHG | OXYGEN SATURATION: 97 % | HEIGHT: 68 IN | DIASTOLIC BLOOD PRESSURE: 88 MMHG

## 2019-07-19 DIAGNOSIS — I50.32 CHRONIC DIASTOLIC CONGESTIVE HEART FAILURE (H): ICD-10-CM

## 2019-07-19 DIAGNOSIS — S51.811D SKIN TEAR OF RIGHT FOREARM WITHOUT COMPLICATION, SUBSEQUENT ENCOUNTER: ICD-10-CM

## 2019-07-19 DIAGNOSIS — N17.9 ACUTE KIDNEY INJURY SUPERIMPOSED ON CHRONIC KIDNEY DISEASE (H): Primary | ICD-10-CM

## 2019-07-19 DIAGNOSIS — R60.0 BILATERAL LEG EDEMA: ICD-10-CM

## 2019-07-19 DIAGNOSIS — N18.9 ACUTE KIDNEY INJURY SUPERIMPOSED ON CHRONIC KIDNEY DISEASE (H): Primary | ICD-10-CM

## 2019-07-19 PROCEDURE — 99309 SBSQ NF CARE MODERATE MDM 30: CPT | Performed by: NURSE PRACTITIONER

## 2019-07-19 ASSESSMENT — MIFFLIN-ST. JEOR: SCORE: 1264.98

## 2019-07-19 NOTE — PROGRESS NOTES
[facility]    [facility] Medical Record Number:  [id number]  Place of Service where encounter took place:  [facility] [[id number]]  Chief Complaint   Patient presents with     RECHECK       HPI:    Ivan Gomez  is a 89 year old (10/12/1926), who is being seen today for an episodic care visit.  HPI information obtained from: facility chart records, facility staff, patient report and [facility] chart review.     Today's concern is:    ZAK  CHF  Bilateral lower extremity edema  Hx CHF, pulmonary hypertension, s/p mitral valve clip with residual severe mitral regurgitation. Last Echo 7/5 found EF 70-75%, severe residual MR, and significant pulmonary hypertension. Currently taking metoprolol, torsemide, hydralazine, and simvastatin. During exam, patient does endorse chronic BLE edema. Denies chest pain, SOB, headaches, syncope. Creat baseline 1.3-1.6. Today, creat 1.96. Patient reports variable appetite, dietary and ST following.   Patient saw the Cardiologist on 7/18, torsemide was increased to 20mg BID and plans to follow-up next week on 7/24.      Multiple BUE skin tears  Noted to have multiple mepilex dressings covering BUE skin tears. Patient endorses having fragile skin, states dressing have not been changed since admitted to TCU.          Past Medical and Surgical History reviewed in Epic today.    MEDICATIONS:  Current Outpatient Medications   Medication Sig Dispense Refill     artificial saliva (BIOTENE MT) AERS spray Take 1 spray by mouth 3 times daily And every 2 hours as needed       beta carotene 89957 UNIT capsule Take 1 capsule (25,000 Units) by mouth daily       diclofenac (VOLTAREN) 1 % topical gel Place 2 g onto the skin 3 times daily Apply to both ankles , Right back/rib.  0800, 1200, 1800       dicyclomine (BENTYL) 10 MG capsule Take 10 mg by mouth 3 times daily       ferrous sulfate (FEROSUL) 325 (65 Fe) MG tablet Take 325 mg by mouth daily (with  breakfast)       finasteride (PROSCAR) 5 MG tablet TAKE 1 TABLET(5 MG) BY MOUTH DAILY 90 tablet 3     gabapentin (NEURONTIN) 300 MG capsule TAKE 1 CAPSULE BY MOUTH THREE TIMES DAILY 270 capsule 0     HYDRALAZINE HCL PO Take 25 mg by mouth 3 times daily       HYDROcodone-acetaminophen (NORCO) 7.5-325 MG per tablet Take 1 tablet by mouth every 6 hours as needed for pain maximum 4 tablet(s) per day 60 tablet 0     hypromellose (ARTIFICIAL TEARS) 0.4 % SOLN ophthalmic solution Place 2 drops Into the left eye 2 times daily        melatonin 3 MG tablet Take 3 mg by mouth At Bedtime       metoprolol tartrate (LOPRESSOR) 25 MG tablet Take 1 tablet (25 mg) by mouth 2 times daily       multivitamin w/minerals (THERA-VIT-M) tablet Take 1 tablet by mouth daily       pantoprazole (PROTONIX) 40 MG EC tablet Take 40 mg by mouth daily       Phenylephrine-Witch Hazel 0.25-50 % GEL Place rectally 4 times daily as needed Insert 1 application rectally as needed for Hemorrhoids       POTASSIUM CHLORIDE ER PO Take 40 mEq by mouth daily       simvastatin (ZOCOR) 20 MG tablet TAKE ONE TABLET BY MOUTH AT BEDTIME 30 tablet 0     Skin Protectants, Misc. (EUCERIN) cream Apply topically 2 times daily Apply topically to LE & areas of dryness twice daily (also ordered prn)       Skin Protectants, Misc. (EUCERIN) cream Apply topically as needed for dry skin Apply to LE + Areas of dryness topically as needed for Dryness       tamsulosin (FLOMAX) 0.4 MG capsule TAKE ONE CAPSULE BY MOUTH DAILY 90 capsule 3     torsemide (DEMADEX) 20 MG tablet Take 1 tablet (20 mg) by mouth 2 times daily 90 tablet 1     vancomycin (FIRVANQ) 50 MG/ML oral solution Take 125 mg by mouth 3 times daily       vitamin C 500 MG TABS Take 1 tablet (500 mg) by mouth daily       zinc sulfate (ZINCATE) 220 (50 Zn) MG capsule Take 1 capsule (220 mg) by mouth daily       potassium chloride ER (K-DUR/KLOR-CON M) 20 MEQ CR tablet Take 2 tablets (40 mEq) by mouth daily (Patient taking  "differently: Take 20 mEq by mouth daily )           REVIEW OF SYSTEMS:  4 point ROS including Respiratory, CV, GI and , other than that noted in the HPI,  is negative      Objective:  /88   Pulse 68   Temp 97.6  F (36.4  C)   Resp 18   Ht 1.727 m (5' 8\")   Wt 64 kg (141 lb 3.2 oz)   SpO2 97%   BMI 21.47 kg/m    Exam:  GENERAL APPEARANCE:  Alert, in no distress, appears healthy, oriented, cooperative  RESP:  respiratory effort and palpation of chest normal, lungs clear to auscultation , no respiratory distress  CV:  Palpation and auscultation of heart done , regular rate and rhythm, no murmur, rub, or gallop, +2 pedal pulses, edema BLE  ABDOMEN:  normal bowel sounds, soft, nontender, no hepatosplenomegaly or other masses, no guarding or rebound  M/S:   Gait and station abnormal, requires assistance with activity and ADLs. Digits and nails normal  SKIN:  Inspection of skin and subcutaneous tissue baseline, Palpation of skin and subcutaneous tissue baseline. Multiple skin tears noted to BUE, covered with mepilex dressings, no sx of infection.   NEURO:   Cranial nerves 2-12 are normal tested and grossly at patient's baseline, Examination of sensation by touch normal  PSYCH:  oriented X 3, affect and mood normal      Labs:   [facility] SNF [facility]. Please refer to them using [facility] Everywhere., Recent labs in EPIC reviewed by me today.  and   Most Recent 3 CBC's:  Recent Labs   Lab Test 07/16/19  0600 07/11/19  0952 07/07/19  0820   WBC 3.9* 4.1 5.0   HGB 10.2* 10.2* 10.9*   MCV 95 93 94   PLT 85* 69* 70*     Most Recent 3 BMP's:  Recent Labs   Lab Test 07/18/19  0943 07/17/19  0904 07/16/19  0600    141 142   POTASSIUM 4.8 5.0 5.3   CHLORIDE 100 104 104   CO2 29 33* 35*   BUN 63* 61* 64*   CR 1.96* 1.85* 1.92*   ANIONGAP 12.8 4 3   GIUSEPPE 9.6 9.3 8.5   * 106* 69*           ASSESSMENT/PLAN:    (N17.9,  N18.9) Acute kidney injury superimposed on chronic kidney disease (H)  " (primary encounter diagnosis)  (I50.32) Chronic diastolic congestive heart failure (H)  (R60.0) Bilateral leg edema  Comment: Creat baseline 1.3-1.6,; creat 1.92 on 7/16. Does have known bladder mass, patient denies issues voiding. Last Echo 7/5 found EF 70-75%, severe residual MR, and significant pulmonary hypertension.   Plan: Cardiology recently increased torsemide to 20mg BID and KCl 40meq every day. Change times of torsemide to 0800 & 1400. Increased KCl to 40meq every day. Discontinue oxygen orders.   Continue metoprolol, hydralazine, and simvastatin.   Due to ZAK and known bladder mass - Check PVR TID; straight cath PRN.  Check BMP 7/22 and follow-up with [facility] on 7/24.     (D88.711Y) Skin tear of right forearm without complication, subsequent encounter  Comment: Multiple skin tears to BUE w/o sx of infection.   Plan: Discontinue mepilex dressings to BUE skin tears; cleanse daily, cover with adaptic and gauze wrap - change daily. Discontinue dressing to BUE once skin tears scab over, then recommending to wear sherrie-sleeves. Continue wound care to R heel; stage II pressure ulcer.                  Electronically signed by:  FRANK Pugh CNP

## 2019-07-24 ENCOUNTER — NURSING HOME VISIT (OUTPATIENT)
Dept: GERIATRICS | Facility: CLINIC | Age: 84
End: 2019-07-24
Payer: MEDICARE

## 2019-07-24 ENCOUNTER — OFFICE VISIT (OUTPATIENT)
Dept: CARDIOLOGY | Facility: CLINIC | Age: 84
End: 2019-07-24
Attending: NURSE PRACTITIONER
Payer: MEDICARE

## 2019-07-24 VITALS
HEIGHT: 68 IN | DIASTOLIC BLOOD PRESSURE: 66 MMHG | WEIGHT: 140 LBS | SYSTOLIC BLOOD PRESSURE: 112 MMHG | OXYGEN SATURATION: 97 % | BODY MASS INDEX: 21.22 KG/M2 | HEART RATE: 60 BPM

## 2019-07-24 VITALS
HEIGHT: 68 IN | DIASTOLIC BLOOD PRESSURE: 73 MMHG | HEART RATE: 58 BPM | TEMPERATURE: 97.8 F | RESPIRATION RATE: 18 BRPM | WEIGHT: 139 LBS | OXYGEN SATURATION: 98 % | BODY MASS INDEX: 21.07 KG/M2 | SYSTOLIC BLOOD PRESSURE: 130 MMHG

## 2019-07-24 DIAGNOSIS — I50.812 CHRONIC RIGHT-SIDED HEART FAILURE (H): ICD-10-CM

## 2019-07-24 DIAGNOSIS — R53.81 PHYSICAL DECONDITIONING: ICD-10-CM

## 2019-07-24 DIAGNOSIS — Z86.19 H/O CLOSTRIDIUM DIFFICILE INFECTION: ICD-10-CM

## 2019-07-24 DIAGNOSIS — N17.9 ACUTE KIDNEY INJURY SUPERIMPOSED ON CHRONIC KIDNEY DISEASE (H): Primary | ICD-10-CM

## 2019-07-24 DIAGNOSIS — N18.9 ACUTE KIDNEY INJURY SUPERIMPOSED ON CHRONIC KIDNEY DISEASE (H): Primary | ICD-10-CM

## 2019-07-24 DIAGNOSIS — D69.6 THROMBOCYTOPENIA (H): ICD-10-CM

## 2019-07-24 DIAGNOSIS — I50.82 BIVENTRICULAR CONGESTIVE HEART FAILURE (H): ICD-10-CM

## 2019-07-24 DIAGNOSIS — R60.0 BILATERAL LEG EDEMA: ICD-10-CM

## 2019-07-24 DIAGNOSIS — I50.32 CHRONIC DIASTOLIC CONGESTIVE HEART FAILURE (H): ICD-10-CM

## 2019-07-24 DIAGNOSIS — D63.8 ANEMIA IN OTHER CHRONIC DISEASES CLASSIFIED ELSEWHERE: ICD-10-CM

## 2019-07-24 LAB
ANION GAP SERPL CALCULATED.3IONS-SCNC: 12.3 MMOL/L (ref 6–17)
BUN SERPL-MCNC: 50 MG/DL (ref 7–30)
CALCIUM SERPL-MCNC: 9 MG/DL (ref 8.5–10.5)
CHLORIDE SERPL-SCNC: 102 MMOL/L (ref 98–107)
CO2 SERPL-SCNC: 29 MMOL/L (ref 23–29)
CREAT SERPL-MCNC: 1.95 MG/DL (ref 0.7–1.3)
GFR SERPL CREATININE-BSD FRML MDRD: 32 ML/MIN/{1.73_M2}
GLUCOSE SERPL-MCNC: 119 MG/DL (ref 70–105)
POTASSIUM SERPL-SCNC: 4.3 MMOL/L (ref 3.5–5.1)
SODIUM SERPL-SCNC: 139 MMOL/L (ref 136–145)

## 2019-07-24 PROCEDURE — 99214 OFFICE O/P EST MOD 30 MIN: CPT | Mod: 24 | Performed by: PHYSICIAN ASSISTANT

## 2019-07-24 PROCEDURE — 36415 COLL VENOUS BLD VENIPUNCTURE: CPT | Performed by: NURSE PRACTITIONER

## 2019-07-24 PROCEDURE — 99309 SBSQ NF CARE MODERATE MDM 30: CPT | Performed by: NURSE PRACTITIONER

## 2019-07-24 PROCEDURE — 80048 BASIC METABOLIC PNL TOTAL CA: CPT | Performed by: NURSE PRACTITIONER

## 2019-07-24 RX ORDER — METOPROLOL TARTRATE 25 MG/1
12.5 TABLET, FILM COATED ORAL 2 TIMES DAILY
COMMUNITY
Start: 2019-07-24

## 2019-07-24 ASSESSMENT — MIFFLIN-ST. JEOR
SCORE: 1255
SCORE: 1259.54

## 2019-07-24 NOTE — PATIENT INSTRUCTIONS
Today, we discussed the following:   - Medication changes:     - Give torsemide 20 mg twice daily, but dose this once at breakfast and once at lunch   - CRUSH potassium and put in applesauce   - Reduce metoprolol to 12.5 mg twice daily  - Follow up:    Come back to see myself or Italia in 1 - 2 weeks.    We'll set you up to see Dr. Chidi Blackburn as well.   I will set you up with an oncologist here in the area. Someone will call you to arrange that appointment. Ask them where you need to have your [place] oncologist send your old records when they call.    You see Dr. Fox in palliative care on .    Please, remember to continue the followin.  Weigh yourself daily. Call if your weight is up > than 2 pounds in one day, or 5 pounds in one week; if you feel more short of breath or have worsening swelling in your legs or abdomen.  2.  Eat a low sodium diet (less than 2,000mg or 2g daily). If you eat less salt, you will retain less fluid.   3.  Avoid alcohol, as this can worsen heart failure.   4.  Avoid NSAIDs as able (For example, Ibuprofen / aleve / advil / naprosen / diclofenac).    Please call [facility] nurse for any questions or concerns Mon-Fri 8am-4pm:   [phone]  For concerns after hours:   [phone]   Scheduling phone number:   [phone]    Thank you for visiting with us today.   Jennifer Quesada PA-C  ______________________________________________________________

## 2019-07-24 NOTE — PROGRESS NOTES
"Cardiology Clinic Progress Note    Ivan Gomez MRN# [id number]   YOB: 1926 Age: 89 year old   Primary cardiologist: Dr. Yao         Assessment and Plan:     In summary, I had the privilege of meeting Ivna Gomez in the [facility] clinic today. He is a delightful and fairly sharp 89 year old gentleman, who unfortunately has severe PHTN and TR as well as moderate MR despite two mitraclip procedures. He presents for reassessment approximately one week after up-titration of his torsemide. His symptoms of LE edema and orthopnea have mildly improved with only a two lb wt loss, however he is resistant to increase his diuretic further due to frequent urination and nocturia. He tells me he's getting a dose of torsemide at bedtime and then urinating throughout the night with poor sleep and his biggest complaint is actually of daytime somnolence. He \"just wants to feel less tired.\"     Plan:  - For now, will maintain torsemide at 20 mg BID, but switch his dose to breakfast and lunch. After some counseling, he does understand the importance of this med and is agreeable to remain on it. Could consider infusion clinic if he does not diurese further with this.   - Reduce Lopressor from 25 BID to 12.5 mg BID to see if this helps with his fatigue.   - Referral placed to heme/onc to transfer care from his prior MD in [place].  - He's been appropriately referred to palliative care MD Dr. Fox, whom he sees in September. He confirms today that he does not want hospice care at this point.   - Return to see Italia in one week + BMP, proBNP. Will arrange something with his primary cardiologist Dr. Yao as well.     It was a pleasure seeing Shahriar in clinic today.         History of Presenting Illness:      Ivan Gomez is a pleasant 89 year old patient who presents today for a [facility] clinic f/u visit.    The patient has a history of the following -   # HFpEF / cor pulmonale / severe PHTN  # MR s/p " "mitral clip x 2 on 10/2017, with residual severe MR as well as TR  # ZAK on CKD, probable cardiorenal syndrome - baseline creat ~ 1.3 - 1.4  # PAF s/p spontaneous conversion in the ED - not anticoagulated due to advanced age, thrombocytopenia  # HTN  # hx bladder ca  # Fe def Anemia, reports receiving B12 injections historically with oncologist in [place]    He was seen by Italia Carvajal on 7/18/19 for a CORE enrollment after an admission for acute diastolic heart failure. Please see her note for details from that admission. He was noted to be 8 lbs up from his discharge wt of 134 lbs with pitting edema into the thighs. His torsemide had recently been reduced due to ZAK without improvement in his creatinine. His torsemide was increased back to 20 mg BID. Advanced directives were discussed and the patient expressed a desire to switch to DNR/DNI code status, and was referred to meet with Dr. Fox.     Today, Don presents to clinic alone. He presents in a wheelchair and tells me he doesn't like to walk too much, although denies feeling limited by dyspnea, just significant fatigue. That is his biggest complaint. He has acute on chronic LE edema which he feels is actually quite a bit better than it was last week. He has ongoing orthopnea but thinks this also might be improving. Despite this, he tells me he doesn't like the diuretic and wants it stopped altogether due to the amount that he's urinating. He tells me he's getting a dose before bed and then urinating all night. He has cold extremities and heavy legs \"all the time,\" that is unchanged. He tells me he was receiving Vitamin B12 injections with his oncologist in [place], where he was previously living. He requests a referral to an oncologist in the area, as he understands he can no longer depend on his older brother in [place] to care for him. He recently came back to [place] to be near his significant other who unfortunately passed away about a month ago. He is " "appropriately tearful when discussing this. He has no familial support in the area. He is currently residing at [facility].   His clinic wt is down 2 lbs. His BP is stable. BUN has improved from 63 --> 50, creatinine is stable at 1.95.         Review of Systems:     12-pt ROS is negative except for as noted in the HPI.          Physical Exam:     Vitals: /66   Pulse 60   Ht 1.727 m (5' 8\")   Wt 63.5 kg (140 lb)   SpO2 97%   BMI 21.29 kg/m    Wt Readings from Last 10 Encounters:   07/24/19 63.5 kg (140 lb)   07/24/19 63 kg (139 lb)   07/19/19 64 kg (141 lb 3.2 oz)   07/18/19 64.7 kg (142 lb 9.6 oz)   07/16/19 63.5 kg (140 lb)   07/15/19 63.2 kg (139 lb 4.8 oz)   07/13/19 60.8 kg (134 lb 1.6 oz)   07/02/19 67.1 kg (148 lb)   06/27/19 68.6 kg (151 lb 3.2 oz)   06/25/19 69.5 kg (153 lb 3.2 oz)       Constitutional:  Patient is pleasant, alert, cooperative, and in NAD.  HEENT:  NCAT. PERRLA. EOM's intact.   Neck:  Markedly elevated JVP to earlobe.   Pulmonary: Normal respiratory effort. Bibasilar crackles.  Cardiac: Irregularly irregular rhythm, grade 3/6 sm at the LSB and 2/6 dm  Abdomen:  Non-tender abdomen, no hepatosplenomegaly appreciated.   Vascular: Pulses in the upper and lower extremities are 2+ and equal bilaterally.  Extremities: 1+ pitting edema bilateral pretbial region  Skin:  No rashes or lesions appreciated.   Neurological:  No gross motor or sensory deficits.   Psych: Appropriate affect.        Data:     Labs reviewed:  Recent Labs   Lab Test 07/18/19  0943 07/12/19  0555 06/21/19  1040 01/26/18  1431 10/12/17  0729 08/04/16  1502  08/18/15  1146 09/15/14  1114   LDL  --   --   --   --  29  --   --  46 34   HDL  --   --   --   --  63  --   --  53 49   NHDL  --   --   --   --  45  --   --   --   --    CHOL  --   --   --   --  108  --   --  136 123   TRIG  --   --   --   --  81  --   --  186* 199*   TSH  --   --  2.11 1.04  --  1.14   < >  --   --    NTBNP 15,162*  --   --  3,845*  --   " --   --   --   --    IRON  --  23*  --   --   --  50   < >  --   --    FEB  --  235*  --   --   --  251   < >  --   --    IRONSAT  --  10*  --   --   --  20   < >  --   --    KESHAV  --  116  --   --   --   --   --   --   --     < > = values in this interval not displayed.       Lab Results   Component Value Date    WBC 3.9 (L) 07/16/2019    RBC 3.44 (L) 07/16/2019    HGB 10.2 (L) 07/16/2019    HCT 32.7 (L) 07/16/2019    MCV 95 07/16/2019    MCH 29.7 07/16/2019    MCHC 31.2 (L) 07/16/2019    RDW 18.0 (H) 07/16/2019    PLT 85 (L) 07/16/2019       Lab Results   Component Value Date     07/24/2019    POTASSIUM 4.3 07/24/2019    CHLORIDE 102 07/24/2019    CO2 29 07/24/2019    ANIONGAP 12.3 07/24/2019     (H) 07/24/2019    BUN 50 (H) 07/24/2019    CR 1.95 (H) 07/24/2019    GFRESTIMATED 32 (L) 07/24/2019    GFRESTBLACK 39 (L) 07/24/2019    GIUSEPPE 9.0 07/24/2019      Lab Results   Component Value Date    AST 11 07/08/2019    ALT 13 07/08/2019       Lab Results   Component Value Date    A1C 5.1 07/17/2019       Lab Results   Component Value Date    INR 1.26 (H) 07/04/2019    INR 1.15 (H) 10/16/2017           Problem List:     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      BPH (benign prostatic hypertrophy)     Bladder tumor     Benign essential hypertension     Hyperlipidemia LDL goal <100     Acute idiopathic gout of right foot     Femoral hernia of right side     Drug-induced constipation     Acute respiratory failure with hypoxia (H)     Heart failure (H)     Mitral regurgitation      S/P mitral valve clip implantation     Dry eyes     Abdominal pain, epigastric     Hypokalemia     Hypoxia     Acute cholecystitis     Fecal incontinence     Bladder mass     CHF exacerbation (H)           Medications:     Current Outpatient Medications   Medication Sig Dispense Refill     artificial saliva (BIOTENE MT) AERS spray Take 1 spray by mouth 3 times daily And every 2 hours as needed       beta carotene 31944 UNIT capsule Take 1 capsule (25,000 Units) by mouth daily       diclofenac (VOLTAREN) 1 % topical gel Place 2 g onto the skin 3 times daily Apply to both ankles , Right back/rib.  0800, 1200, 1800       dicyclomine (BENTYL) 10 MG capsule Take 10 mg by mouth 3 times daily       ferrous sulfate (FEROSUL) 325 (65 Fe) MG tablet Take 325 mg by mouth daily (with breakfast)       finasteride (PROSCAR) 5 MG tablet TAKE 1 TABLET(5 MG) BY MOUTH DAILY 90 tablet 3     gabapentin (NEURONTIN) 300 MG capsule TAKE 1 CAPSULE BY MOUTH THREE TIMES DAILY 270 capsule 0     HYDRALAZINE HCL PO Take 25 mg by mouth 3 times daily       HYDROcodone-acetaminophen (NORCO) 7.5-325 MG per tablet Take 1 tablet by mouth every 6 hours as needed for pain maximum 4 tablet(s) per day 60 tablet 0     hypromellose (ARTIFICIAL TEARS) 0.4 % SOLN ophthalmic solution Place 2 drops Into the left eye 2 times daily        melatonin 3 MG tablet Take 3 mg by mouth At Bedtime       metoprolol tartrate (LOPRESSOR) 25 MG tablet Take 1 tablet (25 mg) by mouth 2 times daily       multivitamin w/minerals (THERA-VIT-M) tablet Take 1 tablet by mouth daily       pantoprazole (PROTONIX) 40 MG EC tablet Take 40 mg by mouth daily       Phenylephrine-Witch Hazel 0.25-50 % GEL Place rectally 4 times daily as needed Insert 1 application rectally as needed for Hemorrhoids       potassium chloride ER (K-DUR/KLOR-CON M) 20 MEQ CR tablet Take 2 tablets (40 mEq) by mouth daily       POTASSIUM CHLORIDE ER PO Take 40 mEq by mouth daily       simvastatin (ZOCOR) 20 MG  tablet TAKE ONE TABLET BY MOUTH AT BEDTIME 30 tablet 0     Skin Protectants, Misc. (EUCERIN) cream Apply topically 2 times daily Apply topically to LE & areas of dryness twice daily (also ordered prn)       Skin Protectants, Misc. (EUCERIN) cream Apply topically as needed for dry skin Apply to LE + Areas of dryness topically as needed for Dryness       tamsulosin (FLOMAX) 0.4 MG capsule TAKE ONE CAPSULE BY MOUTH DAILY 90 capsule 3     torsemide (DEMADEX) 20 MG tablet Take 1 tablet (20 mg) by mouth 2 times daily 90 tablet 1     vancomycin (FIRVANQ) 50 MG/ML oral solution Take 125 mg by mouth 3 times daily       vitamin C 500 MG TABS Take 1 tablet (500 mg) by mouth daily       zinc sulfate (ZINCATE) 220 (50 Zn) MG capsule Take 1 capsule (220 mg) by mouth daily             Past Medical History:     Past Medical History:   Diagnosis Date     Arthritis     Spinal Stenosis     Former smoker      Heart disease      Hemorrhoids      Hypercholesteremia      Hypertension      Malignant neoplasm (H)     skin CA, Basal cell     Other chronic pain      Renal disease      Past Surgical History:   Procedure Laterality Date     BACK SURGERY       BIOPSY      face, skin cancer     BIOPSY  8/2016    shoulder lesion     CYSTOSCOPY, TRANSURETHRAL RESECTION (TUR) PROSTATE, COMBINED N/A 8/21/2015    Procedure: COMBINED CYSTOSCOPY, TRANSURETHRAL RESECTION (TUR) PROSTATE;  Surgeon: Dennis Hilton MD;  Location:  OR     CYSTOSCOPY, TRANSURETHRAL RESECTION (TUR) TUMOR BLADDER, COMBINED N/A 9/2/2016    Procedure: COMBINED CYSTOSCOPY, TRANSURETHRAL RESECTION (TUR) TUMOR BLADDER;  Surgeon: Dennis Hilton MD;  Location:  OR     CYSTOSCOPY, TRANSURETHRAL RESECTION (TUR) TUMOR BLADDER, COMBINED N/A 11/2/2018    Procedure: Transurethral resection of bladder tumor > 5 cm in size;  Surgeon: Dennis Hilton MD;  Location:  OR     ESOPHAGOSCOPY, GASTROSCOPY, DUODENOSCOPY (EGD), COMBINED N/A 5/24/2019    Procedure:  ESOPHAGOGASTRODUODENOSCOPY, WITH BIOPSY;  Surgeon: Abe Jang MD;  Location:  GI     LAPAROSCOPIC CHOLECYSTECTOMY WITH CHOLANGIOGRAMS N/A 5/22/2019    Procedure: CHOLECYSTECTOMY, LAPAROSCOPIC, WITH CHOLANGIOGRAM;  Surgeon: Rey Romero MD;  Location:  OR     ORTHOPEDIC SURGERY      lumbar and cervical surgery, Sep, 2008, knee surgery     PERCUTANEOUS MITRAL VALVE REPAIR N/A 10/16/2017    Procedure: PERCUTANEOUS MITRAL VALVE REPAIR ANESTHESIA;  Percutaneous MitraClip ;  Surgeon: Eber Monroe MD;  Location: [facility] OR     PICC INSERTION Right 10/14/2017    5fr DL Bard PICC, 40cm (1cm external) in the R basilic vein w/ tip in the mid SVC.     Family History   Problem Relation Age of Onset     Cancer Son 64        leukemia ? due to agent orange     Family History Negative Son      Social History     Socioeconomic History     Marital status: Single     Spouse name: Not on file     Number of children: Not on file     Years of education: Not on file     Highest education level: Not on file   Occupational History     Not on file   Social Needs     Financial resource strain: Not on file     Food insecurity:     Worry: Not on file     Inability: Not on file     Transportation needs:     Medical: Not on file     Non-medical: Not on file   Tobacco Use     Smoking status: Former Smoker     Smokeless tobacco: Never Used   Substance and Sexual Activity     Alcohol use: No     Alcohol/week: 0.0 oz     Comment: doesnt use anymore     Drug use: No     Sexual activity: Never   Lifestyle     Physical activity:     Days per week: Not on file     Minutes per session: Not on file     Stress: Not on file   Relationships     Social connections:     Talks on phone: Not on file     Gets together: Not on file     Attends [employer] service: Not on file     Active member of club or organization: Not on file     Attends meetings of clubs or organizations: Not on file     Relationship status: Not on file     Intimate  partner violence:     Fear of current or ex partner: Not on file     Emotionally abused: Not on file     Physically abused: Not on file     Forced sexual activity: Not on file   Other Topics Concern     Parent/sibling w/ CABG, MI or angioplasty before 65F 55M? No   Social History Narrative     Not on file           Allergies:   Celebrex [celecoxib] and Penicillins      Jennifer Quesada PA-C  [facility]  Pager: [phone]

## 2019-07-24 NOTE — PROGRESS NOTES
[facility]    [place] [facility] Record Number:  [id number]  Place of Service where encounter took place:  [facility] [[id number]]  Chief Complaint   Patient presents with     Nursing Home Acute       HPI:    Ivan Gomez  is a 89 year old (10/12/1926), who is being seen today for an episodic care visit.  HPI information obtained from: facility chart records, facility staff, patient report and [facility] chart review.     Today's concern is:    ZAK  CHF  Bilateral lower extremity edema  Hx CHF, pulmonary hypertension, s/p mitral valve clip with residual severe mitral regurgitation. Last Echo 7/5 found EF 70-75%, severe residual MR, and significant pulmonary hypertension. Currently taking metoprolol, torsemide, hydralazine, and simvastatin. During exam, reports BLE edema has significantly improved. Denies chest pain, SOB, headaches, syncope. Creat baseline 1.3-1.6. Last creat 1.96 on 7/18. Patient reports variable appetite, dietary and ST following.   Patient saw the Cardiologist on 7/18, torsemide was increased to 20mg BID and plans to follow-up today.      Wt Readings from Last 4 Encounters:   07/24/19 63.5 kg (140 lb)   07/24/19 63 kg (139 lb)   07/19/19 64 kg (141 lb 3.2 oz)   07/18/19 64.7 kg (142 lb 9.6 oz)       Hx C diff colitis  C diff + 05/2019. Was seen by GI on 6/25 and was started on dicyclomine for abdominal cramping. Currently taking vancomycin TID. Patient denies having loose stools, constipation, or abdominal pain. Patient to follow-up with GI (Dr. Jang) in 3 weeks.     Thrombocytopenia  Chronic anemia  Baseline platelets 50-90K and Hgb baseline 10-11. Last platelet count was 85K and Hgb was 10.2 on 7/16. No active sx of bleeding.       Physical deconditioning   PTA lives in [place]. Patient is actively participating in therapy sessions. Ambuates 200ft with walker. Requires assistance with activity and ADLs. Cognitive testing to be done in therapy. SW  following for discharge planning.           Past Medical and Surgical History reviewed in Epic today.    MEDICATIONS:  Current Outpatient Medications   Medication Sig Dispense Refill     artificial saliva (BIOTENE MT) AERS spray Take 1 spray by mouth 3 times daily And every 2 hours as needed       beta carotene 32008 UNIT capsule Take 1 capsule (25,000 Units) by mouth daily       ferrous sulfate (FEROSUL) 325 (65 Fe) MG tablet Take 325 mg by mouth daily (with breakfast)       finasteride (PROSCAR) 5 MG tablet TAKE 1 TABLET(5 MG) BY MOUTH DAILY 90 tablet 3     gabapentin (NEURONTIN) 300 MG capsule TAKE 1 CAPSULE BY MOUTH THREE TIMES DAILY 270 capsule 0     HYDROcodone-acetaminophen (NORCO) 7.5-325 MG per tablet Take 1 tablet by mouth every 6 hours as needed for pain maximum 4 tablet(s) per day 60 tablet 0     hypromellose (ARTIFICIAL TEARS) 0.4 % SOLN ophthalmic solution Place 2 drops Into the left eye 2 times daily        melatonin 3 MG tablet Take 3 mg by mouth At Bedtime       multivitamin w/minerals (THERA-VIT-M) tablet Take 1 tablet by mouth daily       pantoprazole (PROTONIX) 40 MG EC tablet Take 40 mg by mouth daily       Phenylephrine-Witch Hazel 0.25-50 % GEL Place rectally 4 times daily as needed Insert 1 application rectally as needed for Hemorrhoids       simvastatin (ZOCOR) 20 MG tablet TAKE ONE TABLET BY MOUTH AT BEDTIME 30 tablet 0     Skin Protectants, Misc. (EUCERIN) cream Apply topically 2 times daily Apply topically to LE & areas of dryness twice daily (also ordered prn)       Skin Protectants, Misc. (EUCERIN) cream Apply topically as needed for dry skin Apply to LE + Areas of dryness topically as needed for Dryness       tamsulosin (FLOMAX) 0.4 MG capsule TAKE ONE CAPSULE BY MOUTH DAILY 90 capsule 3     torsemide (DEMADEX) 20 MG tablet Take 1 tablet (20 mg) by mouth 2 times daily 90 tablet 1     vancomycin (FIRVANQ) 50 MG/ML oral solution Take 125 mg by mouth 2 times daily Take BID x 4 days, then  "every day x 3 days, then discontinue.       vitamin C 500 MG TABS Take 1 tablet (500 mg) by mouth daily       zinc sulfate (ZINCATE) 220 (50 Zn) MG capsule Take 1 capsule (220 mg) by mouth daily       metoprolol tartrate (LOPRESSOR) 25 MG tablet Take 0.5 tablets (12.5 mg) by mouth 2 times daily             REVIEW OF SYSTEMS:  4 point ROS including Respiratory, CV, GI and , other than that noted in the HPI,  is negative      Objective:  /73   Pulse 58   Temp 97.8  F (36.6  C)   Resp 18   Ht 1.727 m (5' 8\")   Wt 63 kg (139 lb)   SpO2 98%   BMI 21.13 kg/m    Exam:  GENERAL APPEARANCE:  Alert, in no distress, appears healthy, oriented, cooperative  RESP:  respiratory effort and palpation of chest normal, lungs clear to auscultation , no respiratory distress  CV:  Palpation and auscultation of heart done , regular rate and rhythm, no murmur, rub, or gallop, +2 pedal pulses, edema BLE (improving)  ABDOMEN:  normal bowel sounds, soft, nontender, no hepatosplenomegaly or other masses, no guarding or rebound  M/S:   Gait and station abnormal, requires assistance with activity and ADLs. Digits and nails normal  SKIN:  Inspection of skin and subcutaneous tissue baseline, Palpation of skin and subcutaneous tissue baseline  NEURO:   Cranial nerves 2-12 are normal tested and grossly at patient's baseline, Examination of sensation by touch normal  PSYCH:  oriented X 3, affect and mood normal      Labs:   Labs done in SNF are in [facility]. Please refer to them using [facility]/Care Everywhere., Recent labs in EPIC reviewed by me today.  and   Most Recent 3 CBC's:  Recent Labs   Lab Test 07/16/19  0600 07/11/19  0952 07/07/19  0820   WBC 3.9* 4.1 5.0   HGB 10.2* 10.2* 10.9*   MCV 95 93 94   PLT 85* 69* 70*     Most Recent 3 BMP's:  Recent Labs   Lab Test 07/29/19  0520 07/24/19  0945 07/18/19  0943    139 137   POTASSIUM 3.5 4.3 4.8   CHLORIDE 103 102 100   CO2 33* 29 29   BUN 39* 50* 63*   CR 1.44* 1.95* 1.96* "   ANIONGAP 6 12.3 12.8   GIUSEPPE 8.2* 9.0 9.6   GLC 68* 119* 110*             ASSESSMENT/PLAN:    (N17.9,  N18.9) Acute kidney injury superimposed on chronic kidney disease (H)  (primary encounter diagnosis)  (I50.32) Chronic diastolic congestive heart failure (H)  (R60.0) Bilateral leg edema  Comment: Chronic BLE edema r/t CHF, severe MR. Last Echo 7/5 found EF 70-75%, severe residual MR, and significant pulmonary hypertension. ZAK, creat baseline 1.3-1.6.   Plan: Check BMP 7/29. Continue torsemide, metoprolol. Discontinue diclofenac gel. Discontinue hydralazine. Discontinue PVR checks. Patient to follow-up with Cardiologist 7/24.     (Z86.19) H/O Clostridium difficile infection  Comment: C diff + 05/2019  Plan: Decrease vancomycin to BID x 4 days, then every day x 3 days, then discontinue. Monitor BM.     (D69.6) Thrombocytopenia (H)  (D63.8) Anemia in other chronic diseases classified elsewhere  Comment: Chronic  Plan: Monitor CBC. Continue ferrous sulfate.     (R53.81) Physical deconditioning  Comment: Ongoing  Plan: Encourage active participation in therapy session to increase strength and promote independence in activities and ADLs. Cognitive testing to be done in therapy. SW following for discharge planning.             Electronically signed by:  FRANK Pugh CNP

## 2019-07-24 NOTE — LETTER
"7/24/2019    Evaristo Magaña MD  [address]  [facility] [address]    RE: Ivan Gomez       Dear Colleague,    I had the pleasure of seeing Ivan Gomez in the [facility].    Cardiology Clinic Progress Note    Ivan Gomez MRN# [id number]   YOB: 1926 Age: 89 year old   Primary cardiologist: Dr. Yao         Assessment and Plan:     In summary, I had the privilege of meeting Ivan Gomez in the CORE clinic today. He is a delightful and fairly sharp 89 year old gentleman, who unfortunately has severe PHTN and TR as well as moderate MR despite two mitraclip procedures. He presents for reassessment approximately one week after up-titration of his torsemide. His symptoms of LE edema and orthopnea have mildly improved with only a two lb wt loss, however he is resistant to increase his diuretic further due to frequent urination and nocturia. He tells me he's getting a dose of torsemide at bedtime and then urinating throughout the night with poor sleep and his biggest complaint is actually of daytime somnolence. He \"just wants to feel less tired.\"     Plan:  - For now, will maintain torsemide at 20 mg BID, but switch his dose to breakfast and lunch. After some counseling, he does understand the importance of this med and is agreeable to remain on it. Could consider infusion clinic if he does not diurese further with this.   - Reduce Lopressor from 25 BID to 12.5 mg BID to see if this helps with his fatigue.   - Referral placed to heme/onc to transfer care from his prior MD in [place].  - He's been appropriately referred to palliative care MD Dr. Fox, whom he sees in September. He confirms today that he does not want hospice care at this point.   - Return to see Italia in one week + BMP, proBNP. Will arrange something with his primary cardiologist Dr. Yao as well.     It was a pleasure seeing Shahriar in clinic today.         History " "of Presenting Illness:      Ivan Gomez is a pleasant 89 year old patient who presents today for a CORE clinic f/u visit.    The patient has a history of the following -   # HFpEF / cor pulmonale / severe PHTN  # MR s/p mitral clip x 2 on 10/2017, with residual severe MR as well as TR  # ZAK on CKD, probable cardiorenal syndrome - baseline creat ~ 1.3 - 1.4  # PAF s/p spontaneous conversion in the ED - not anticoagulated due to advanced age, thrombocytopenia  # HTN  # hx bladder ca  # Fe def Anemia, reports receiving B12 injections historically with oncologist in [place]    He was seen by Italia Carvajal on 7/18/19 for a CORE enrollment after an admission for acute diastolic heart failure. Please see her note for details from that admission. He was noted to be 8 lbs up from his discharge wt of 134 lbs with pitting edema into the thighs. His torsemide had recently been reduced due to ZAK without improvement in his creatinine. His torsemide was increased back to 20 mg BID. Advanced directives were discussed and the patient expressed a desire to switch to DNR/DNI code status, and was referred to meet with Dr. Fox.     Today, Shahriar presents to clinic alone. He presents in a wheelchair and tells me he doesn't like to walk too much, although denies feeling limited by dyspnea, just significant fatigue. That is his biggest complaint. He has acute on chronic LE edema which he feels is actually quite a bit better than it was last week. He has ongoing orthopnea but thinks this also might be improving. Despite this, he tells me he doesn't like the diuretic and wants it stopped altogether due to the amount that he's urinating. He tells me he's getting a dose before bed and then urinating all night. He has cold extremities and heavy legs \"all the time,\" that is unchanged. He tells me he was receiving Vitamin B12 injections with his oncologist in [place], where he was previously living. He requests a referral to an " "oncologist in the area, as he understands he can no longer depend on his older brother in [place] to care for him. He recently came back to [place] to be near his significant other who unfortunately passed away about a month ago. He is appropriately tearful when discussing this. He has no familial support in the area. He is currently residing at [facility].   His clinic wt is down 2 lbs. His BP is stable. BUN has improved from 63 --> 50, creatinine is stable at 1.95.         Review of Systems:     12-pt ROS is negative except for as noted in the HPI.          Physical Exam:     Vitals: /66   Pulse 60   Ht 1.727 m (5' 8\")   Wt 63.5 kg (140 lb)   SpO2 97%   BMI 21.29 kg/m     Wt Readings from Last 10 Encounters:   07/24/19 63.5 kg (140 lb)   07/24/19 63 kg (139 lb)   07/19/19 64 kg (141 lb 3.2 oz)   07/18/19 64.7 kg (142 lb 9.6 oz)   07/16/19 63.5 kg (140 lb)   07/15/19 63.2 kg (139 lb 4.8 oz)   07/13/19 60.8 kg (134 lb 1.6 oz)   07/02/19 67.1 kg (148 lb)   06/27/19 68.6 kg (151 lb 3.2 oz)   06/25/19 69.5 kg (153 lb 3.2 oz)       Constitutional:  Patient is pleasant, alert, cooperative, and in NAD.  HEENT:  NCAT. PERRLA. EOM's intact.   Neck:  Markedly elevated JVP to earlobe.   Pulmonary: Normal respiratory effort. Bibasilar crackles.  Cardiac: Irregularly irregular rhythm, grade 3/6 sm at the LSB and 2/6 dm  Abdomen:  Non-tender abdomen, no hepatosplenomegaly appreciated.   Vascular: Pulses in the upper and lower extremities are 2+ and equal bilaterally.  Extremities: 1+ pitting edema bilateral pretbial region  Skin:  No rashes or lesions appreciated.   Neurological:  No gross motor or sensory deficits.   Psych: Appropriate affect.        Data:     Labs reviewed:  Recent Labs   Lab Test 07/18/19  0943 07/12/19  0555 06/21/19  1040 01/26/18  1431 10/12/17  0729 08/04/16  1502  08/18/15  1146 09/15/14  1114   LDL  --   --   --   --  29  --   --  46 34   HDL  --   --   --   --  63  --   --  53 49 "   NHDL  --   --   --   --  45  --   --   --   --    CHOL  --   --   --   --  108  --   --  136 123   TRIG  --   --   --   --  81  --   --  186* 199*   TSH  --   --  2.11 1.04  --  1.14   < >  --   --    NTBNP 15,162*  --   --  3,845*  --   --   --   --   --    IRON  --  23*  --   --   --  50   < >  --   --    FEB  --  235*  --   --   --  251   < >  --   --    IRONSAT  --  10*  --   --   --  20   < >  --   --    KESHAV  --  116  --   --   --   --   --   --   --     < > = values in this interval not displayed.       Lab Results   Component Value Date    WBC 3.9 (L) 07/16/2019    RBC 3.44 (L) 07/16/2019    HGB 10.2 (L) 07/16/2019    HCT 32.7 (L) 07/16/2019    MCV 95 07/16/2019    MCH 29.7 07/16/2019    MCHC 31.2 (L) 07/16/2019    RDW 18.0 (H) 07/16/2019    PLT 85 (L) 07/16/2019       Lab Results   Component Value Date     07/24/2019    POTASSIUM 4.3 07/24/2019    CHLORIDE 102 07/24/2019    CO2 29 07/24/2019    ANIONGAP 12.3 07/24/2019     (H) 07/24/2019    BUN 50 (H) 07/24/2019    CR 1.95 (H) 07/24/2019    GFRESTIMATED 32 (L) 07/24/2019    GFRESTBLACK 39 (L) 07/24/2019    GIUSEPPE 9.0 07/24/2019      Lab Results   Component Value Date    AST 11 07/08/2019    ALT 13 07/08/2019       Lab Results   Component Value Date    A1C 5.1 07/17/2019       Lab Results   Component Value Date    INR 1.26 (H) 07/04/2019    INR 1.15 (H) 10/16/2017           Problem List:     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      BPH (benign prostatic hypertrophy)     Bladder tumor     Benign essential  hypertension     Hyperlipidemia LDL goal <100     Acute idiopathic gout of right foot     Femoral hernia of right side     Drug-induced constipation     Acute respiratory failure with hypoxia (H)     Heart failure (H)     Mitral regurgitation     S/P mitral valve clip implantation     Dry eyes     Abdominal pain, epigastric     Hypokalemia     Hypoxia     Acute cholecystitis     Fecal incontinence     Bladder mass     CHF exacerbation (H)           Medications:     Current Outpatient Medications   Medication Sig Dispense Refill     artificial saliva (BIOTENE MT) AERS spray Take 1 spray by mouth 3 times daily And every 2 hours as needed       beta carotene 68685 UNIT capsule Take 1 capsule (25,000 Units) by mouth daily       diclofenac (VOLTAREN) 1 % topical gel Place 2 g onto the skin 3 times daily Apply to both ankles , Right back/rib.  0800, 1200, 1800       dicyclomine (BENTYL) 10 MG capsule Take 10 mg by mouth 3 times daily       ferrous sulfate (FEROSUL) 325 (65 Fe) MG tablet Take 325 mg by mouth daily (with breakfast)       finasteride (PROSCAR) 5 MG tablet TAKE 1 TABLET(5 MG) BY MOUTH DAILY 90 tablet 3     gabapentin (NEURONTIN) 300 MG capsule TAKE 1 CAPSULE BY MOUTH THREE TIMES DAILY 270 capsule 0     HYDRALAZINE HCL PO Take 25 mg by mouth 3 times daily       HYDROcodone-acetaminophen (NORCO) 7.5-325 MG per tablet Take 1 tablet by mouth every 6 hours as needed for pain maximum 4 tablet(s) per day 60 tablet 0     hypromellose (ARTIFICIAL TEARS) 0.4 % SOLN ophthalmic solution Place 2 drops Into the left eye 2 times daily        melatonin 3 MG tablet Take 3 mg by mouth At Bedtime       metoprolol tartrate (LOPRESSOR) 25 MG tablet Take 1 tablet (25 mg) by mouth 2 times daily       multivitamin w/minerals (THERA-VIT-M) tablet Take 1 tablet by mouth daily       pantoprazole (PROTONIX) 40 MG EC tablet Take 40 mg by mouth daily       Phenylephrine-Witch Hazel 0.25-50 % GEL Place rectally 4 times daily as needed  Insert 1 application rectally as needed for Hemorrhoids       potassium chloride ER (K-DUR/KLOR-CON M) 20 MEQ CR tablet Take 2 tablets (40 mEq) by mouth daily       POTASSIUM CHLORIDE ER PO Take 40 mEq by mouth daily       simvastatin (ZOCOR) 20 MG tablet TAKE ONE TABLET BY MOUTH AT BEDTIME 30 tablet 0     Skin Protectants, Misc. (EUCERIN) cream Apply topically 2 times daily Apply topically to LE & areas of dryness twice daily (also ordered prn)       Skin Protectants, Misc. (EUCERIN) cream Apply topically as needed for dry skin Apply to LE + Areas of dryness topically as needed for Dryness       tamsulosin (FLOMAX) 0.4 MG capsule TAKE ONE CAPSULE BY MOUTH DAILY 90 capsule 3     torsemide (DEMADEX) 20 MG tablet Take 1 tablet (20 mg) by mouth 2 times daily 90 tablet 1     vancomycin (FIRVANQ) 50 MG/ML oral solution Take 125 mg by mouth 3 times daily       vitamin C 500 MG TABS Take 1 tablet (500 mg) by mouth daily       zinc sulfate (ZINCATE) 220 (50 Zn) MG capsule Take 1 capsule (220 mg) by mouth daily             Past Medical History:     Past Medical History:   Diagnosis Date     Arthritis     Spinal Stenosis     Former smoker      Heart disease      Hemorrhoids      Hypercholesteremia      Hypertension      Malignant neoplasm (H)     skin CA, Basal cell     Other chronic pain      Renal disease      Past Surgical History:   Procedure Laterality Date     BACK SURGERY       BIOPSY      face, skin cancer     BIOPSY  8/2016    shoulder lesion     CYSTOSCOPY, TRANSURETHRAL RESECTION (TUR) PROSTATE, COMBINED N/A 8/21/2015    Procedure: COMBINED CYSTOSCOPY, TRANSURETHRAL RESECTION (TUR) PROSTATE;  Surgeon: Dennis Hilton MD;  Location: [facility] OR     CYSTOSCOPY, TRANSURETHRAL RESECTION (TUR) TUMOR BLADDER, COMBINED N/A 9/2/2016    Procedure: COMBINED CYSTOSCOPY, TRANSURETHRAL RESECTION (TUR) TUMOR BLADDER;  Surgeon: Dennis Hilton MD;  Location: [facility] OR     CYSTOSCOPY, TRANSURETHRAL RESECTION (TUR) TUMOR  BLADDER, COMBINED N/A 11/2/2018    Procedure: Transurethral resection of bladder tumor > 5 cm in size;  Surgeon: Dennis Hilton MD;  Location:  OR     ESOPHAGOSCOPY, GASTROSCOPY, DUODENOSCOPY (EGD), COMBINED N/A 5/24/2019    Procedure: ESOPHAGOGASTRODUODENOSCOPY, WITH BIOPSY;  Surgeon: Abe Jang MD;  Location: [facility]     LAPAROSCOPIC CHOLECYSTECTOMY WITH CHOLANGIOGRAMS N/A 5/22/2019    Procedure: CHOLECYSTECTOMY, LAPAROSCOPIC, WITH CHOLANGIOGRAM;  Surgeon: Rey Romero MD;  Location:  OR     ORTHOPEDIC SURGERY      lumbar and cervical surgery, Sep, 2008, knee surgery     PERCUTANEOUS MITRAL VALVE REPAIR N/A 10/16/2017    Procedure: PERCUTANEOUS MITRAL VALVE REPAIR ANESTHESIA;  Percutaneous MitraClip ;  Surgeon: Eber Monroe MD;  Location: [facility] OR     PICC INSERTION Right 10/14/2017    5fr DL Bard PICC, 40cm (1cm external) in the R basilic vein w/ tip in the mid SVC.     Family History   Problem Relation Age of Onset     Cancer Son 64        leukemia ? due to agent orange     Family History Negative Son      Social History     Socioeconomic History     Marital status: Single     Spouse name: Not on file     Number of children: Not on file     Years of education: Not on file     Highest education level: Not on file   Occupational History     Not on file   Social Needs     Financial resource strain: Not on file     Food insecurity:     Worry: Not on file     Inability: Not on file     Transportation needs:     Medical: Not on file     Non-medical: Not on file   Tobacco Use     Smoking status: Former Smoker     Smokeless tobacco: Never Used   Substance and Sexual Activity     Alcohol use: No     Alcohol/week: 0.0 oz     Comment: doesnt use anymore     Drug use: No     Sexual activity: Never   Lifestyle     Physical activity:     Days per week: Not on file     Minutes per session: Not on file     Stress: Not on file   Relationships     Social connections:     Talks on phone: Not on  file     Gets together: Not on file     Attends [employer] service: Not on file     Active member of club or organization: Not on file     Attends meetings of clubs or organizations: Not on file     Relationship status: Not on file     Intimate partner violence:     Fear of current or ex partner: Not on file     Emotionally abused: Not on file     Physically abused: Not on file     Forced sexual activity: Not on file   Other Topics Concern     Parent/sibling w/ CABG, MI or angioplasty before 65F 55M? No   Social History Narrative     Not on file           Allergies:   Celebrex [celecoxib] and Penicillins      Jennifer Quesada PA-C  [facility]  Pager: [phone]      Thank you for allowing me to participate in the care of your patient.    Sincerely,     Jennifer Quesada PA-C     [facility]

## 2019-07-24 NOTE — NURSING NOTE
Met briefly with Shahriar after his visit with Jennifer MONSALVE. He is a WWII vet and told me he [place]. He was  to [place] for 57 years, then had a companionship with Jerri for 16 years before she passed away last month. He had been living with an older brother and his nephew in [place] and now plans to reside in [place].     Instructed Don to call CORE RN with any questions/concerns. Highlighted CORE RN number on his AVS.     Jennifer MONSALVE requested that pt have follow-up next wk, but no available times worked for pt, could consider getting him in w/ Italia Carvajal CNP 8/1 urgent slot if neeed. Reviewed w/ Jennifer MONSALVE who requested CORE call SNF early next wk for update and that a BMP be drawn at that time. Faxed order to [facility] (fax: [phone], ph: [phone]) for BMP on 7/30 and sent reminder to RN board.     Jennifer MONSALVE requested pt see Dr. Chidi Blackburn in next 1-2 months, earliest available was arranged for 11/2019. Dr. Chidi Blackburn does have multiple DOD openings sooner, should pt need.     Assisted pt to use bathroom and to lobby to wait for his ride.       Sarah Sanchez RN BSN   1:08 PM 07/24/19

## 2019-07-25 ENCOUNTER — NURSING HOME VISIT (OUTPATIENT)
Dept: GERIATRICS | Facility: CLINIC | Age: 84
End: 2019-07-25

## 2019-07-25 ENCOUNTER — APPOINTMENT (OUTPATIENT)
Dept: GERIATRICS | Facility: CLINIC | Age: 84
End: 2019-07-25
Payer: MEDICARE

## 2019-07-25 DIAGNOSIS — I50.32 CHRONIC DIASTOLIC CONGESTIVE HEART FAILURE (H): Primary | ICD-10-CM

## 2019-07-25 DIAGNOSIS — Z86.19 H/O CLOSTRIDIUM DIFFICILE INFECTION: ICD-10-CM

## 2019-07-25 DIAGNOSIS — D69.6 THROMBOCYTOPENIA (H): ICD-10-CM

## 2019-07-25 DIAGNOSIS — N18.30 CKD (CHRONIC KIDNEY DISEASE) STAGE 3, GFR 30-59 ML/MIN (H): ICD-10-CM

## 2019-07-25 DIAGNOSIS — I27.20 PULMONARY HYPERTENSION (H): ICD-10-CM

## 2019-07-25 PROCEDURE — 99207 ZZC CDG-MDM COMPONENT: MEETS MODERATE - UP CODED: CPT | Performed by: INTERNAL MEDICINE

## 2019-07-25 PROCEDURE — 99309 SBSQ NF CARE MODERATE MDM 30: CPT | Performed by: INTERNAL MEDICINE

## 2019-07-25 NOTE — PROGRESS NOTES
[place] [facility]    PRIMARY CARE PROVIDER AND CLINIC:  Evaristo Magaña MD, [address] [facility] [address] / [facility] [address]      Patient was seen by Dr. Montilla at the [facility] on July 25, 2019 for hospital follow-up visit.      Ivan Gomez  is a 89 year old  (10/12/1926), admitted to the above facility from  [facility]. Hospital stay 7/4/19 through 7/13/19..  Admitted to this facility for  rehab, medical management and nursing care.           Patient was hospitalized at [facility] 7/4-7/13 due to acute exacerbation of CHF with acute respiratory failure. Chest CT found bilateral patchy airspace and groundglass opacities, small bilateral pleural effusions, and pulmonary edema. Echo 7/5 found EF 70-75%, severe residual MR, and significant pulmonary hypertension. History of mitral clip X2. Was treated with bumex infusion and was discharged on torsemide 20mg BID. Also noted to have new onset of atrial fibrillation with RVR, was treated with diltiazem infusion and converted back to sinus rhythm in the ED. Per cardiology, not recommending anticoagulation due to age, frailty, and risk for bleeding.     Patient reports feeling improved.  He has mild dyspnea with activity.  He notes stable lower extremity edema.  His main complaint is that of frequent nocturia and fatigue during the daytime.  He attributes this to the torsemide.  He denies cough, fevers, chills, dysuria.    History of C. difficile colitis.  Patient remains on vancomycin with plan to follow-up with GI in the next 2 weeks.  He denies diarrhea.    There is a history of chronic dysphasia with concern for aspiration.  Patient currently is receiving a dysphagia diet 3 diet.  He denies coughing with meals  Hx recurrent bladder cancer, s/p multiple TURBT. Patient to follow-up with Urologist (Dr. Hilton) for outpatient cystoscopy.     History of chronic thrombocytopenia, anemia, followed by Hematology    History of stage II  pressure ulcer to L heel and unstageable pressure ulcer to coccyx during hospital admission.       CODE STATUS/ADVANCE DIRECTIVES DISCUSSION:   DNR / DNI    Patient's living condition: lives in a skilled nursing facility  ALLERGIES: Celebrex [celecoxib] and Penicillins  PAST MEDICAL HISTORY:  has a past medical history of Arthritis, Former smoker, Heart disease, Hemorrhoids, Hypercholesteremia, Hypertension, Malignant neoplasm (H), Other chronic pain, and Renal disease. He also has no past medical history of Chronic infection, Complication of anesthesia, Malignant hyperthermia, Numbness and tingling, PONV (postoperative nausea and vomiting), Sleep apnea, or Thrombosis of leg.  PAST SURGICAL HISTORY:   has a past surgical history that includes back surgery; orthopedic surgery; Cystoscopy, transurethral resection (TUR) prostate, combined (N/A, 8/21/2015); biopsy; biopsy (8/2016); Cystoscopy, transurethral resection (TUR) tumor bladder, combined (N/A, 9/2/2016); picc insertion (Right, 10/14/2017); Percutaneous Mitral Valve Repair (N/A, 10/16/2017); Cystoscopy, transurethral resection (TUR) tumor bladder, combined (N/A, 11/2/2018); Laparoscopic cholecystectomy with cholangiograms (N/A, 5/22/2019); and Esophagoscopy, gastroscopy, duodenoscopy (EGD), combined (N/A, 5/24/2019).  FAMILY HISTORY: family history includes Cancer (age of onset: 64) in his son; Family History Negative in his son.  SOCIAL HISTORY:   reports that he has quit smoking. He has never used smokeless tobacco. He reports that he does not drink alcohol or use drugs.        Current Outpatient Medications   Medication Sig Dispense Refill     artificial saliva (BIOTENE MT) AERS spray Take 1 spray by mouth 3 times daily And every 2 hours as needed       beta carotene 43543 UNIT capsule Take 1 capsule (25,000 Units) by mouth daily       dicyclomine (BENTYL) 10 MG capsule Take 10 mg by mouth 3 times daily       ferrous sulfate (FEROSUL) 325 (65 Fe) MG tablet  Take 325 mg by mouth daily (with breakfast)       finasteride (PROSCAR) 5 MG tablet TAKE 1 TABLET(5 MG) BY MOUTH DAILY 90 tablet 3     gabapentin (NEURONTIN) 300 MG capsule TAKE 1 CAPSULE BY MOUTH THREE TIMES DAILY 270 capsule 0     HYDRALAZINE HCL PO Take 25 mg by mouth 3 times daily       HYDROcodone-acetaminophen (NORCO) 7.5-325 MG per tablet Take 1 tablet by mouth every 6 hours as needed for pain maximum 4 tablet(s) per day 60 tablet 0     hypromellose (ARTIFICIAL TEARS) 0.4 % SOLN ophthalmic solution Place 2 drops Into the left eye 2 times daily        melatonin 3 MG tablet Take 3 mg by mouth At Bedtime       metoprolol tartrate (LOPRESSOR) 25 MG tablet Take 0.5 tablets (12.5 mg) by mouth 2 times daily       multivitamin w/minerals (THERA-VIT-M) tablet Take 1 tablet by mouth daily       pantoprazole (PROTONIX) 40 MG EC tablet Take 40 mg by mouth daily       Phenylephrine-Witch Hazel 0.25-50 % GEL Place rectally 4 times daily as needed Insert 1 application rectally as needed for Hemorrhoids       simvastatin (ZOCOR) 20 MG tablet TAKE ONE TABLET BY MOUTH AT BEDTIME 30 tablet 0     Skin Protectants, Misc. (EUCERIN) cream Apply topically 2 times daily Apply topically to LE & areas of dryness twice daily (also ordered prn)       Skin Protectants, Misc. (EUCERIN) cream Apply topically as needed for dry skin Apply to LE + Areas of dryness topically as needed for Dryness       tamsulosin (FLOMAX) 0.4 MG capsule TAKE ONE CAPSULE BY MOUTH DAILY 90 capsule 3     torsemide (DEMADEX) 20 MG tablet Take 1 tablet (20 mg) by mouth 2 times daily 90 tablet 1     vancomycin (FIRVANQ) 50 MG/ML oral solution Take 125 mg by mouth 2 times daily Take BID x 4 days, then every day x 3 days, then discontinue.       vitamin C 500 MG TABS Take 1 tablet (500 mg) by mouth daily       zinc sulfate (ZINCATE) 220 (50 Zn) MG capsule Take 1 capsule (220 mg) by mouth daily           ROS:  10 point ROS neg except as noted above        Exam:  GENERAL  APPEARANCE:  Alert, in no distress, pale, frail appearing.  ENT:  Mouth and posterior oropharynx normal, moist mucous membranes, [place]  EYES:  No erythema, drainage  NECK: supple  RESP:  RR 12, unlabored, decreased BS at bases  CV:  Irreg, heart rate 70s, prominent murmur at apex  ABDOMEN: Soft, nontender  M/S: 2+ lower extremity edema bilaterally  Coccyx and left heel were not examined by me.    NEURO:   Cranial nerves 2-12 are normal tested and grossly at patient's baseline, Examination of sensation by touch normal  PSYCH:  oriented X 3, affect and mood normal.  Patient is very pleasant.      ASSESSMENT/PLAN:    (I50.32) Chronic diastolic congestive heart failure (H)  (primary encounter diagnosis)  (I27.20) Pulmonary hypertension (H), secondary to chronic severe MR  (R60.0) Bilateral leg edema  Comment: CV status improved on current regimen.  Plan: Closely monitor weight, exam, blood pressure, basic metabolic panel. Cardiology f/u.    (Z86.19) H/O Clostridium difficile infection  Comment: Hx C diff + 05/2019.  No diarrhea currently  Plan: Continue vancomycin TID and dicyclomine. Attempt to wean off vancomycin as tolerated. Patient to follow-up with GI in 2 weeks.     (R13.12) Oropharyngeal dysphagia  Comment: Currently on DD3 diet.   Plan: ST eval/treat dysphagia. Dietary following. Monitor intake, respiratory status.      (N18.3) CKD (chronic kidney disease) stage 3, GFR 30-59 ml/min (H)  Comment: Creat at baseline, 1.3-1.6.   Plan: Monitor BMP. Avoid nephrotoxic medications.     (N32.89) Bladder mass  Comment: Noted to have bladder mass with hx recurrent bladder cancer.   Plan: Patient to follow-up with Urologist (Dr. Hilton) as directed for outpatient cystoscpoy.    (D69.6) Thrombocytopenia (H)  (D63.8) Anemia in other chronic diseases classified elsewhere  Comment: Baseline platelets 50-90K and Hgb baseline 10-11. Last platelet count was 85K and Hgb 10.2 on 7/16  Plan: Continue plan of care, monitor CBC.   Hematology follow-up    (L89.150) Pressure injury of sacral region, unstageable (H)  Comment: Pressure injury to coccyx and left heel related to physical deconditioning and poor nutrition.   Plan: House wound care RN to follow. Elevate feet w/pillow in bed.         Jeison Montilla MD

## 2019-07-27 ENCOUNTER — TELEPHONE (OUTPATIENT)
Dept: GERIATRICS | Facility: CLINIC | Age: 84
End: 2019-07-27

## 2019-07-27 NOTE — TELEPHONE ENCOUNTER
Patient with diagnosis HF, not on diuretics at this time. Weight up 2 lbs one day but no s/s edema, no dyspnea and LSC. Is wearing different shoes today.     PLAN  Monitor for now. Update provider if weight gain continues or other symptoms of fluid overload develop.     Electronically signed by FRANK Heller GNP

## 2019-07-29 ENCOUNTER — HOSPITAL LABORATORY (OUTPATIENT)
Dept: OTHER | Facility: CLINIC | Age: 84
End: 2019-07-29

## 2019-07-29 LAB
ANION GAP SERPL CALCULATED.3IONS-SCNC: 6 MMOL/L (ref 3–14)
BUN SERPL-MCNC: 39 MG/DL (ref 7–30)
CALCIUM SERPL-MCNC: 8.2 MG/DL (ref 8.5–10.1)
CHLORIDE SERPL-SCNC: 103 MMOL/L (ref 94–109)
CO2 SERPL-SCNC: 33 MMOL/L (ref 20–32)
CREAT SERPL-MCNC: 1.44 MG/DL (ref 0.66–1.25)
GFR SERPL CREATININE-BSD FRML MDRD: 42 ML/MIN/{1.73_M2}
GLUCOSE SERPL-MCNC: 68 MG/DL (ref 70–99)
POTASSIUM SERPL-SCNC: 3.5 MMOL/L (ref 3.4–5.3)
SODIUM SERPL-SCNC: 142 MMOL/L (ref 133–144)

## 2019-07-30 ENCOUNTER — CARE COORDINATION (OUTPATIENT)
Dept: CARDIOLOGY | Facility: CLINIC | Age: 84
End: 2019-07-30

## 2019-07-30 NOTE — PROGRESS NOTES
[facility]    Current facility weights:   7/30 139.7   7/29 140.1   7/28 140.6   7/27 142.6   7/26 139.8  Current weight parameters: ~135#  Current daily diuretic: torsemide 20mg bid   Future Appointments   Date Time Provider Department Center   8/6/2019 12:30 PM [facility]   8/6/2019  1:10 PM Italia Carvajal, APRN CNP [facility]     Called facility to assess response to med adjustments. Patient last seen by Jennifer Quesada PA-C on 7/24. Per clinic notes:  ...will maintain torsemide at 20 mg BID, but switch his dose to breakfast and lunch.... Reduce Lopressor from 25 BID to 12.5 mg BID to see if this helps with his fatigue    Confirmed adjusted timing of torsemide w/nurse. He added that patient is sleeping through the night without report of orthopnea/PND. During the day, with therapies patient participating without SOB/ROMAN. Nurse stated that Sat patient was more fatigued, he did not eat well that day, but by Sunday this had resolved. Patient again working w/PT - he is able to walk ~100'+ using a walker. Nurse also reported there is no LE swelling.     Confirmed above appt. Will forward to Italia Carvajal NP for review.  Rajwinder Reddy RN on 7/30/2019 at 2:03 PM

## 2019-07-30 NOTE — PROGRESS NOTES
Called facility for patient update to assess response to med adjustments made after OV 7/24 w/GELY Guaman LM for call back.  Rajwinder Reddy RN on 7/30/2019 at 12:18 PM

## 2019-07-31 ENCOUNTER — NURSING HOME VISIT (OUTPATIENT)
Dept: GERIATRICS | Facility: CLINIC | Age: 84
End: 2019-07-31
Payer: MEDICARE

## 2019-07-31 VITALS
HEIGHT: 68 IN | SYSTOLIC BLOOD PRESSURE: 120 MMHG | RESPIRATION RATE: 18 BRPM | DIASTOLIC BLOOD PRESSURE: 78 MMHG | HEART RATE: 70 BPM | BODY MASS INDEX: 21.23 KG/M2 | OXYGEN SATURATION: 96 % | WEIGHT: 140.1 LBS | TEMPERATURE: 97.6 F

## 2019-07-31 DIAGNOSIS — I50.32 CHRONIC DIASTOLIC CONGESTIVE HEART FAILURE (H): Primary | ICD-10-CM

## 2019-07-31 DIAGNOSIS — R60.0 BILATERAL LEG EDEMA: ICD-10-CM

## 2019-07-31 DIAGNOSIS — N18.30 CKD (CHRONIC KIDNEY DISEASE) STAGE 3, GFR 30-59 ML/MIN (H): ICD-10-CM

## 2019-07-31 DIAGNOSIS — R53.81 PHYSICAL DECONDITIONING: ICD-10-CM

## 2019-07-31 PROCEDURE — 99309 SBSQ NF CARE MODERATE MDM 30: CPT | Performed by: NURSE PRACTITIONER

## 2019-07-31 RX ORDER — DICYCLOMINE HYDROCHLORIDE 10 MG/1
10 CAPSULE ORAL 3 TIMES DAILY
COMMUNITY
End: 2019-01-01

## 2019-07-31 RX ORDER — FLUORIDE TOOTHPASTE
15 TOOTHPASTE DENTAL 3 TIMES DAILY
COMMUNITY

## 2019-07-31 ASSESSMENT — MIFFLIN-ST. JEOR: SCORE: 1259.99

## 2019-07-31 NOTE — PROGRESS NOTES
[place] [facility]    [place] Medical Record Number:  [id number]  Place of Service where encounter took place:  [facility] [[id number]]  Chief Complaint   Patient presents with     Nursing Home Acute       HPI:    Ivan Gomez  is a 89 year old (10/12/1926), who is being seen today for an episodic care visit.  HPI information obtained from: facility chart records, facility staff, patient report and [facility] chart review.     Today's concern is:    CHF  CKD stage 3  Bilateral lower extremity edema  Hx CHF, pulmonary hypertension, s/p mitral valve clip with residual severe mitral regurgitation. Last Echo 7/5 found EF 70-75%, severe residual MR, and significant pulmonary hypertension. Currently taking metoprolol and simvastatin. Patient saw the Cardiologist on 7/18, torsemide was increased to 20mg BID. During exam, reports BLE edema has significantly improved. Denies chest pain, SOB, headaches, syncope. Creat baseline 1.3-1.6. Last creat 1.44 on 7/29. Patient has follow-up appt with Cardiologist on 8/6.     Physical deconditioning  PTA lives in half-way. Patient is actively participating in therapy sessions. Ambuates 300ft with walker. Patient does endorse increased pain to L knee; denies injury or trauma to R knee. Requires assistance with activity and ADLs. Cognitive testing to be done in therapy - patient refused cognitive testing. SW following for discharge planning.           Past Medical and Surgical History reviewed in Epic today.    MEDICATIONS:  Current Outpatient Medications   Medication Sig Dispense Refill     artificial saliva (BIOTENE DRY MOUTHWASH) LIQD liquid Swish and spit 15 mLs in mouth 3 times daily       artificial saliva (BIOTENE MT) AERS spray Take 1 spray by mouth every 2 hours as needed for dry mouth        beta carotene 26268 UNIT capsule Take 1 capsule (25,000 Units) by mouth daily       ferrous sulfate (FEROSUL) 325 (65 Fe) MG tablet Take 325 mg by  "mouth daily (with breakfast)       finasteride (PROSCAR) 5 MG tablet TAKE 1 TABLET(5 MG) BY MOUTH DAILY 90 tablet 3     gabapentin (NEURONTIN) 300 MG capsule TAKE 1 CAPSULE BY MOUTH THREE TIMES DAILY 270 capsule 0     HYDROcodone-acetaminophen (NORCO) 7.5-325 MG per tablet Take 1 tablet by mouth every 6 hours as needed for pain maximum 4 tablet(s) per day 60 tablet 0     hypromellose (ARTIFICIAL TEARS) 0.4 % SOLN ophthalmic solution Place 2 drops Into the left eye 2 times daily        melatonin 3 MG tablet Take 3 mg by mouth At Bedtime       metoprolol tartrate (LOPRESSOR) 25 MG tablet Take 0.5 tablets (12.5 mg) by mouth 2 times daily       multivitamin w/minerals (THERA-VIT-M) tablet Take 1 tablet by mouth daily       pantoprazole (PROTONIX) 40 MG EC tablet Take 40 mg by mouth daily       Phenylephrine-Witch Hazel 0.25-50 % GEL Place rectally 4 times daily as needed Insert 1 application rectally as needed for Hemorrhoids       simvastatin (ZOCOR) 20 MG tablet TAKE ONE TABLET BY MOUTH AT BEDTIME 30 tablet 0     Skin Protectants, Misc. (EUCERIN) cream Apply topically 2 times daily And as needed. Apply to LE and areas of dryness       tamsulosin (FLOMAX) 0.4 MG capsule TAKE ONE CAPSULE BY MOUTH DAILY 90 capsule 3     torsemide (DEMADEX) 20 MG tablet Take 1 tablet (20 mg) by mouth 2 times daily 90 tablet 1     vitamin C 500 MG TABS Take 1 tablet (500 mg) by mouth daily       zinc sulfate (ZINCATE) 220 (50 Zn) MG capsule Take 1 capsule (220 mg) by mouth daily           REVIEW OF SYSTEMS:  4 point ROS including Respiratory, CV, GI and , other than that noted in the HPI,  is negative      Objective:  /78   Pulse 70   Temp 97.6  F (36.4  C)   Resp 18   Ht 1.727 m (5' 8\")   Wt 63.5 kg (140 lb 1.6 oz)   SpO2 96%   BMI 21.30 kg/m    Exam:  GENERAL APPEARANCE:  Alert, in no distress, appears healthy, oriented, cooperative  RESP:  respiratory effort and palpation of chest normal, lungs clear to auscultation , no " respiratory distress  CV:  Palpation and auscultation of heart done , regular rate and rhythm, no murmur, rub, or gallop, +2 pedal pulses, edema BLE (improving)  ABDOMEN:  normal bowel sounds, soft, nontender, no hepatosplenomegaly or other masses, no guarding or rebound  M/S:   Gait and station abnormal, requires assistance with activity and ADLs. Digits and nails normal  SKIN:  Inspection of skin and subcutaneous tissue baseline, Palpation of skin and subcutaneous tissue baseline  NEURO:   Cranial nerves 2-12 are normal tested and grossly at patient's baseline, Examination of sensation by touch normal  PSYCH:  oriented X 3, affect and mood normal        Labs:   Labs done in SNF are in [facility]. Please refer to them using [employer]Care Everywhere., Recent labs in EPIC reviewed by me today.  and   Most Recent 3 CBC's:  Recent Labs   Lab Test 08/01/19  0755 07/16/19  0600 07/11/19  0952 07/07/19  0820   WBC  --  3.9* 4.1 5.0   HGB 10.3* 10.2* 10.2* 10.9*   MCV  --  95 93 94   PLT  --  85* 69* 70*     Most Recent 3 BMP's:  Recent Labs   Lab Test 08/06/19  1213 08/05/19  0650 08/01/19  0755    142 143   POTASSIUM 4.0 3.7 3.3*   CHLORIDE 101 104 104   CO2 35* 30 31   BUN 37* 38* 44*   CR 1.68* 1.42* 1.65*   ANIONGAP 6 8 8   GIUSEPPE 9.0 8.5 8.6   * 54* 72           ASSESSMENT/PLAN:    (I50.32) Chronic diastolic congestive heart failure (H)  (primary encounter diagnosis)  (N18.3) CKD (chronic kidney disease) stage 3, GFR 30-59 ml/min (H)  (R60.0) Bilateral leg edema  Comment: Chronic BLE edema r/t CHF, severe MR. Last Echo 7/5 found EF 70-75%, severe residual MR, and significant pulmonary hypertension. Creat at baseline 1.3-1.6.   Plan: Check BMP & Hgb 8/1. Give potassium chloride 20meq x 1 dose today. Continue torsemide, metoprolol, simvastatin. Monitor daily weights, BP/HR. Patient to follow-up with Cardiologist on 8/6.     (R53.81) Physical deconditioning  Comment: Ongoing  Plan: Encourage active  participation in therapy session to increase strength and promote independence in activities and ADLs.   XR R knee 7/31; update - negative for acute findings.   SW following for discharge planning. Recommending detention.               Electronically signed by:  FRANK Pugh CNP

## 2019-08-01 ENCOUNTER — HOSPITAL LABORATORY (OUTPATIENT)
Dept: OTHER | Facility: CLINIC | Age: 84
End: 2019-08-01

## 2019-08-01 LAB
ANION GAP SERPL CALCULATED.3IONS-SCNC: 8 MMOL/L (ref 3–14)
BUN SERPL-MCNC: 44 MG/DL (ref 7–30)
CALCIUM SERPL-MCNC: 8.6 MG/DL (ref 8.5–10.1)
CHLORIDE SERPL-SCNC: 104 MMOL/L (ref 94–109)
CO2 SERPL-SCNC: 31 MMOL/L (ref 20–32)
CREAT SERPL-MCNC: 1.65 MG/DL (ref 0.66–1.25)
GFR SERPL CREATININE-BSD FRML MDRD: 35 ML/MIN/{1.73_M2}
GLUCOSE SERPL-MCNC: 72 MG/DL (ref 70–99)
HGB BLD-MCNC: 10.3 G/DL (ref 13.3–17.7)
POTASSIUM SERPL-SCNC: 3.3 MMOL/L (ref 3.4–5.3)
SODIUM SERPL-SCNC: 143 MMOL/L (ref 133–144)

## 2019-08-06 NOTE — LETTER
8/6/2019    Evaristo Magaña MD  [address]  [facility] [address]    RE: Ivan Gomez       Dear Colleague,    I had the pleasure of seeing Ivan Gomez in the [facility].    Cardiology Clinic Progress Note  Ivan Gomez MRN# [id number]   YOB: 1926 Age: 89 year old   Primary Cardiologist: Dr. Yao Reason for visit: CORE follow up             Assessment and Plan:   Ivan Gomez is a very pleasant 89 year old male with a history of chronic diastolic heart failure, right heart failure, pulmonary hypertension, mitral regurgitation s/p mitul clip x 2 - with residual severe MR, tricuspid regurgitation, HTN, and history of bladder cancer. Patient here for CORE follow up.       1.  Chronic diastolic heart failure/HFpEF, right heart failure - normal LV size, EF 70-75%. RV moderately dilated and RV moderately reduced systolic function. Patient appears compensated and slightly volume up on exam (elevated JVD - known TR, bilateral lower extremity edema but notes improved ROMAN). Facility weights not available for review today but clinic scale today shows stable weight at 140#. NTproBNP is down from 15K -> 5K today. Kidney function stable (creatinine 1.6) and electrolytes stable. Reviewed consideration for further titration of diuretics today, patient resistant due to frequent bathroom trips.    - NYHA class III-IV, stage C   - Fluid status : slightly volume up   - Diuretic regimen : torsemide 20mg BID   - Aldosterone antagonist : will consider in the future if HF symptoms worsen, if kidney function allows.    - Blood pressure : controlled   - HF education provided, reinforced low sodium diet and when to notify CORE clinic.   2. Chronic kidney disease -  Labs today show stable kidney function (creatinine 1.68).   3. Mitral regurgitation s/p mitul clip x 2 10/2017- with residual severe MR  4. Tricuspid regurgitation moderately severe tricuspid  "regurgitation  5. Atrial fibrillation - stable, rate controlled, appears to be in sinus rhythm today, patient noted to have episode of atrial fibrillation with RVR on presentation to ED, he spontaneously converted out of atrial fibrillation in the emergency room.    - [id number] score at least 3 (age, HTN)   - no anticoagulation due to advanced age, thrombocytopenia, risks felt to out weigh benefits.   6. Hypertension - controlled  7. Severe pulmonary hypertension  8. Anemia - hemoglobin 10.2 7/16/19, stable  9. Advance care planning   - Introduced role of palliative care, patient wishes to meet with Dr. Fox, palliative care referral placed today   - Explored goals of care, patient appears to have better insight on his current health status, reviewed code status and he wishes to be DNR/DNI. At this point patient would still like to return to the hospital for cares if needed   - Patient notes he has no close family to elect as health care decision maker, his partner (kay) recently passed away 6/24/19. He has an older brother and a nephew but states they wouldn't be able to be his decision maker.    - During hospital stay goals of care were reviewed, \"Poor prognosis, high likelihood for rehospitalizations, and short period of time between hospitalizations have been reviewed with the patient. He lacks insight into his medical comorbidities, and wants to remain FULL CODE with restorative cares\"      Changes today: none    Follow up plan:     CORE follow up in 1 month.     Palliative care appt with Dr. Fox in September    Follow up with Dr. Yao 11/21/19        History of Presenting Illness:    Ivan Gomez is a very pleasant 89 year old male with a history of chronic diastolic heart failure, right heart failure, pulmonary hypertension, mitral regurgitation s/p mitul clip x 2 - with residual severe MR, tricuspid regurgitation, HTN, and history of bladder cancer.    Patient has followed regularly in " our cardiology practice with Dr. Yao for the last couple years. Patient was hospitalized 7/4/19-7/13/19 due to acute diastolic heart failure exacerbation and new onset atrial fibrillation with RVR. Patient spontaneously converted out of atrial fibrillation in the emergency room. Chest CT showed bilateral patchy airspace and groundglass opacities, small bilateral pleural effusions, and pulmonary edema. NTproBNP 17,137. Echocardiogram 7/5/19 demonstrated stable position of mitraclip devices, moderately-severe residual mitral regurgitation, no significant mitral stenosis, massively dilated LA, severely dilated RA, moderately severe TR, normal LV size, EF 70-75%. RV moderately dilated and RV moderately reduced systolic function. Diuresed with bumetanide gtt, transitioned to torsemide 20mg BID at discharge. At discharge patient was noted to have persisting +1 lower extremity edema and gut edema. Admission weight 145-149#. Discharge weight 134#. CORE referral was placed at discharge. Prior to this patient was hospitalized in May 5/21-5/28/19 transferred from outside hospital, found to have acute cholecystitis s/p cholecystectomy 5/22.     I first met patient in July 2019 for CORE enrollment after hospitalization. At that time his torsemide was increased to 20mg BID and he was referred to palliative care. He was then seen by Sharon Quesada PA-C for CORE follow up the following week at which point some mild improvement in symptoms were noted. Patient was continued torsemide 20mg BID, metoprolol was decreased to 12.5mg BID to see if it helps with patients fatigue.     Patient is here today for CORE follow up.      Patient reports feeling good. Feeling fatigued. Monitoring weights daily at facility. Weight today 140#, which is stable since last clinic visit. Facility did not send weights today, patient states weight today at facility 137#. Reports persisting lower extremity edema, denies worsening. Patient states  difficult to walk due to fatigued, legs getting tired, denies exertional dyspnea. Denies shortness of breath at rest. Using walker for assistance. Denies exertional dyspnea with current levels of activity, noting lifestyle is sedentary. Facility staff reported to CORE nurse that patient is sleeping through the night without orthopnea/PND, no SOB/ROMAN with therapies. Still feeling fatigued. Notes he is taking multiple naps per day. Denies abdominal distention. Sleeping okay. Complaints of PND/orthopnea, which he states comes/goes, not every night. Reports he has completed PT/OT at [facility]. Patient denies chest pain or chest tightness. Denies dizziness, lightheadedness or other presyncopal symptoms. Denies tachycardia or palpitations. Denies episodes of bleeding. Working with SW at [facility] placement, patient hoping for long term care.     Labs today show stable kidney function and electrolytes. Creatinine 1.68 today, which is patients baseline. BNP 10K down from 15K on 7/18/19. Blood pressure 114/70 and HR 66 in clinic today.    Appetite okay, feels he has been eating a little bit more. Doesn't love the food at [facility]. Not adding salt to foods. Patient reports getting a good breakfast, but then notes doesn't care for their other meals, typically not eating much lunch but will eat some dinner. States not drinking much fluid, states he is drinking a little ginger ale and water. No set exercise routine, lifestyle sedentary, completed PT/OT. Denies alcohol use. Denies tobacco use.         Recent Hospitalizations   7/4/19-7/13/19 due to acute diastolic heart failure exacerbation and new onset atrial fibrillation with RVR. Patient spontaneously converted out of atrial fibrillation in the emergency room. Chest CT showed bilateral patchy airspace and groundglass opacities, small bilateral pleural effusions, and pulmonary edema. NTproBNP 17,137. Echocardiogram 7/5/19 demonstrated stable position of mitraclip  devices, moderately-severe residual mitral regurgitation, no significant mitral stenosis, massively dilated LA, severely dilated RA, moderately severe TR, normal LV size, EF 70-75%. RV moderately dilated and RV moderately reduced systolic function. Diuresed with bumetanide gtt, transitioned to torsemide 20mg BID at discharge. At discharge patient was noted to have persisting +1 lower extremity edema and gut edema. Admission weight 145-149#. Discharge weight 134#.   5/21-5/28/19 transferred from outside hospital, found to have acute cholecystitis s/p cholecystectomy 5/22.         Social History    His partner passed away in June 2019, son that he doesn't communicate with. Older brother in [place]. Nephew in [place].   Social History     Socioeconomic History     Marital status: Single     Spouse name: Not on file     Number of children: Not on file     Years of education: Not on file     Highest education level: Not on file   Occupational History     Not on file   Social Needs     Financial resource strain: Not on file     Food insecurity:     Worry: Not on file     Inability: Not on file     Transportation needs:     Medical: Not on file     Non-medical: Not on file   Tobacco Use     Smoking status: Former Smoker     Smokeless tobacco: Never Used   Substance and Sexual Activity     Alcohol use: No     Alcohol/week: 0.0 oz     Comment: doesnt use anymore     Drug use: No     Sexual activity: Never   Lifestyle     Physical activity:     Days per week: Not on file     Minutes per session: Not on file     Stress: Not on file   Relationships     Social connections:     Talks on phone: Not on file     Gets together: Not on file     Attends Worship service: Not on file     Active member of club or organization: Not on file     Attends meetings of clubs or organizations: Not on file     Relationship status: Not on file     Intimate partner violence:     Fear of current or ex partner: Not on file     Emotionally  "abused: Not on file     Physically abused: Not on file     Forced sexual activity: Not on file   Other Topics Concern     Parent/sibling w/ CABG, MI or angioplasty before 65F 55M? No   Social History Narrative     Not on file            Review of Systems:   Skin:  Negative     Eyes:  Negative    ENT:  Negative    Respiratory:  Negative    Cardiovascular:    Positive for;chest pain  Gastroenterology: Negative    Genitourinary:  Positive for urinary frequency;prostate problem  Musculoskeletal:  Positive for arthritis  Neurologic:  Positive for numbness or tingling of feet  Psychiatric:  Positive for    Heme/Lymph/Imm:  Positive for easy bruising  Endocrine:  Negative           Physical Exam:   Vitals: /70   Pulse 66   Ht 1.727 m (5' 8\")   Wt 63.5 kg (140 lb)   SpO2 96%   BMI 21.29 kg/m      Wt Readings from Last 4 Encounters:   08/06/19 63.5 kg (140 lb)   07/31/19 63.5 kg (140 lb 1.6 oz)   07/24/19 63.5 kg (140 lb)   07/24/19 63 kg (139 lb)     GEN: elderly, frail  HEENT:  Pupils equal, round. Sclerae nonicteric.   NECK: Supple, no masses appreciated. JVP elevated to jaw  C/V:  Regular rate and rhythm, 3/6 systolic murmur  RESP: Respirations are unlabored. Clear to auscultation bilaterally, diminished in bases.   GI: Abdomen soft, nontender.  EXTREM: +1 LE edema to bilateral lower extremities, tight edema.   NEURO: Alert and oriented, cooperative.  SKIN: Warm and dry.        Data:   ECHO 7/5/2019  Sinus rhythm was noted.  Status post transcatheter mitral valve repair with MitraClip x 2, 10/16/2017.     1. Stable position of the MitraClip devices.  2. At least, moderately severe residual mitral regurgitation. Eccentric jet,  anteriorly directed, reaching the superior surface of the left atrium.  3. No significant mitral stenosis. Mean diastolic mitral valve gradient is 3.3  mmHg (heart rate 73 bpm).  4. Massively dilated left atrium. Severely dilated right atrium.  5. Moderately severe (3+) tricuspid " regurgitation. Findings consistent with  significant pulmonary hypertension. Estimated pulmonary artery systolic  pressure is 65-70 mmHg.  6. Normal left ventricular size, hyperdynamic systolic function, estimated  LVEF 70-75%.  7. Moderately dilated right ventricle with moderately reduced systolic  function. TAPSE 1.3 - 1.6 cm, tissue Doppler systolic velocity 9 cm/s.  8. Dilated inferior vena cava.     No significant changes compared to previous study dated 5/23/2019.    LIPID RESULTS:  Lab Results   Component Value Date    CHOL 108 10/12/2017    HDL 63 10/12/2017    LDL 29 10/12/2017    TRIG 81 10/12/2017    CHOLHDLRATIO 2.6 08/18/2015     LIVER ENZYME RESULTS:  Lab Results   Component Value Date    AST 11 07/08/2019    ALT 13 07/08/2019     CBC RESULTS:  Lab Results   Component Value Date    WBC 3.9 (L) 07/16/2019    RBC 3.44 (L) 07/16/2019    HGB 10.3 (L) 08/01/2019    HCT 32.7 (L) 07/16/2019    MCV 95 07/16/2019    MCH 29.7 07/16/2019    MCHC 31.2 (L) 07/16/2019    RDW 18.0 (H) 07/16/2019    PLT 85 (L) 07/16/2019     BMP RESULTS:  Lab Results   Component Value Date     08/06/2019    POTASSIUM 4.0 08/06/2019    CHLORIDE 101 08/06/2019    CO2 35 (H) 08/06/2019    ANIONGAP 6 08/06/2019     (H) 08/06/2019    BUN 37 (H) 08/06/2019    CR 1.68 (H) 08/06/2019    GFRESTIMATED 38 (L) 08/06/2019    GFRESTBLACK 46 (L) 08/06/2019    GIUSEPPE 9.0 08/06/2019      A1C RESULTS:  Lab Results   Component Value Date    A1C 5.1 07/17/2019     INR RESULTS:  Lab Results   Component Value Date    INR 1.26 (H) 07/04/2019    INR 1.15 (H) 10/16/2017            Medications     Current Outpatient Medications   Medication Sig Dispense Refill     artificial saliva (BIOTENE DRY MOUTHWASH) LIQD liquid Swish and spit 15 mLs in mouth 3 times daily       artificial saliva (BIOTENE MT) AERS spray Take 1 spray by mouth every 2 hours as needed for dry mouth        beta carotene 37767 UNIT capsule Take 1 capsule (25,000 Units) by mouth daily        dicyclomine (BENTYL) 10 MG capsule Take 10 mg by mouth 3 times daily       ferrous sulfate (FEROSUL) 325 (65 Fe) MG tablet Take 325 mg by mouth daily (with breakfast)       finasteride (PROSCAR) 5 MG tablet TAKE 1 TABLET(5 MG) BY MOUTH DAILY 90 tablet 3     gabapentin (NEURONTIN) 300 MG capsule TAKE 1 CAPSULE BY MOUTH THREE TIMES DAILY 270 capsule 0     HYDROcodone-acetaminophen (NORCO) 7.5-325 MG per tablet Take 1 tablet by mouth every 6 hours as needed for pain maximum 4 tablet(s) per day 60 tablet 0     hypromellose (ARTIFICIAL TEARS) 0.4 % SOLN ophthalmic solution Place 2 drops Into the left eye 2 times daily        melatonin 3 MG tablet Take 3 mg by mouth At Bedtime       metoprolol tartrate (LOPRESSOR) 25 MG tablet Take 0.5 tablets (12.5 mg) by mouth 2 times daily       multivitamin w/minerals (THERA-VIT-M) tablet Take 1 tablet by mouth daily       pantoprazole (PROTONIX) 40 MG EC tablet Take 40 mg by mouth daily       Phenylephrine-Witch Hazel 0.25-50 % GEL Place rectally 4 times daily as needed Insert 1 application rectally as needed for Hemorrhoids       simvastatin (ZOCOR) 20 MG tablet TAKE ONE TABLET BY MOUTH AT BEDTIME 30 tablet 0     Skin Protectants, Misc. (EUCERIN) cream Apply topically 2 times daily Apply topically to LE & areas of dryness twice daily (also ordered prn)       Skin Protectants, Misc. (EUCERIN) cream Apply topically 2 times daily And as needed. Apply to LE and areas of dryness       tamsulosin (FLOMAX) 0.4 MG capsule TAKE ONE CAPSULE BY MOUTH DAILY 90 capsule 3     torsemide (DEMADEX) 20 MG tablet Take 1 tablet (20 mg) by mouth 2 times daily 90 tablet 1     vancomycin (FIRVANQ) 50 MG/ML oral solution Take 125 mg by mouth 2 times daily Take BID x 4 days, then every day x 3 days, then discontinue.       vitamin C 500 MG TABS Take 1 tablet (500 mg) by mouth daily       zinc sulfate (ZINCATE) 220 (50 Zn) MG capsule Take 1 capsule (220 mg) by mouth daily            Past Medical  History     Past Medical History:   Diagnosis Date     Arthritis     Spinal Stenosis     Former smoker      Heart disease      Hemorrhoids      Hypercholesteremia      Hypertension      Malignant neoplasm (H)     skin CA, Basal cell     Other chronic pain      Renal disease      Past Surgical History:   Procedure Laterality Date     BACK SURGERY       BIOPSY      face, skin cancer     BIOPSY  8/2016    shoulder lesion     CYSTOSCOPY, TRANSURETHRAL RESECTION (TUR) PROSTATE, COMBINED N/A 8/21/2015    Procedure: COMBINED CYSTOSCOPY, TRANSURETHRAL RESECTION (TUR) PROSTATE;  Surgeon: Dennis Hilton MD;  Location: [facility] OR     CYSTOSCOPY, TRANSURETHRAL RESECTION (TUR) TUMOR BLADDER, COMBINED N/A 9/2/2016    Procedure: COMBINED CYSTOSCOPY, TRANSURETHRAL RESECTION (TUR) TUMOR BLADDER;  Surgeon: Dennis Hilton MD;  Location: [facility] OR     CYSTOSCOPY, TRANSURETHRAL RESECTION (TUR) TUMOR BLADDER, COMBINED N/A 11/2/2018    Procedure: Transurethral resection of bladder tumor > 5 cm in size;  Surgeon: Dennis Hilton MD;  Location:  OR     ESOPHAGOSCOPY, GASTROSCOPY, DUODENOSCOPY (EGD), COMBINED N/A 5/24/2019    Procedure: ESOPHAGOGASTRODUODENOSCOPY, WITH BIOPSY;  Surgeon: Abe Jang MD;  Location: [facility]     LAPAROSCOPIC CHOLECYSTECTOMY WITH CHOLANGIOGRAMS N/A 5/22/2019    Procedure: CHOLECYSTECTOMY, LAPAROSCOPIC, WITH CHOLANGIOGRAM;  Surgeon: Rey Romero MD;  Location:  OR     ORTHOPEDIC SURGERY      lumbar and cervical surgery, Sep, 2008, knee surgery     PERCUTANEOUS MITRAL VALVE REPAIR N/A 10/16/2017    Procedure: PERCUTANEOUS MITRAL VALVE REPAIR ANESTHESIA;  Percutaneous MitraClip ;  Surgeon: Eber Monroe MD;  Location: [facility] OR     PICC INSERTION Right 10/14/2017    5fr DL Bard PICC, 40cm (1cm external) in the R basilic vein w/ tip in the mid SVC.     Family History   Problem Relation Age of Onset     Cancer Son 64        leukemia ? due to agent orange     Family History  Negative Son             Allergies   Celebrex [celecoxib] and Penicillins        FRANK Thomas CNP  [facility]  Pager: [phone]      Thank you for allowing me to participate in the care of your patient.    Sincerely,     FRANK Thomas CNP     [facility]

## 2019-08-06 NOTE — PROGRESS NOTES
Cardiology Clinic Progress Note  Ivan Gomez MRN# [id number]   YOB: 1926 Age: 89 year old   Primary Cardiologist: Dr. Yao Reason for visit: CORE follow up             Assessment and Plan:   Ivan Gomez is a very pleasant 89 year old male with a history of chronic diastolic heart failure, right heart failure, pulmonary hypertension, mitral regurgitation s/p mitul clip x 2 - with residual severe MR, tricuspid regurgitation, HTN, and history of bladder cancer. Patient here for CORE follow up.       1.  Chronic diastolic heart failure/HFpEF, right heart failure - normal LV size, EF 70-75%. RV moderately dilated and RV moderately reduced systolic function. Patient appears compensated and slightly volume up on exam (elevated JVD - known TR, bilateral lower extremity edema but notes improved ROMAN). Facility weights not available for review today but clinic scale today shows stable weight at 140#. NTproBNP is down from 15K -> 5K today. Kidney function stable (creatinine 1.6) and electrolytes stable. Reviewed consideration for further titration of diuretics today, patient resistant due to frequent bathroom trips.    - NYHA class III-IV, stage C   - Fluid status : slightly volume up   - Diuretic regimen : torsemide 20mg BID   - Aldosterone antagonist : will consider in the future if HF symptoms worsen, if kidney function allows.    - Blood pressure : controlled   - HF education provided, reinforced low sodium diet and when to notify CORE clinic.   2. Chronic kidney disease -  Labs today show stable kidney function (creatinine 1.68).   3. Mitral regurgitation s/p mitul clip x 2 10/2017- with residual severe MR  4. Tricuspid regurgitation moderately severe tricuspid regurgitation  5. Atrial fibrillation - stable, rate controlled, appears to be in sinus rhythm today, patient noted to have episode of atrial fibrillation with RVR on presentation to ED, he spontaneously converted out of atrial  "fibrillation in the emergency room.    - [id number] score at least 3 (age, HTN)   - no anticoagulation due to advanced age, thrombocytopenia, risks felt to out weigh benefits.   6. Hypertension - controlled  7. Severe pulmonary hypertension  8. Anemia - hemoglobin 10.2 7/16/19, stable  9. Advance care planning   - Introduced role of palliative care, patient wishes to meet with Dr. Fox, palliative care referral placed today   - Explored goals of care, patient appears to have better insight on his current health status, reviewed code status and he wishes to be DNR/DNI. At this point patient would still like to return to the hospital for cares if needed   - Patient notes he has no close family to elect as health care decision maker, his partner (kay) recently passed away 6/24/19. He has an older brother and a nephew but states they wouldn't be able to be his decision maker.    - During hospital stay goals of care were reviewed, \"Poor prognosis, high likelihood for rehospitalizations, and short period of time between hospitalizations have been reviewed with the patient. He lacks insight into his medical comorbidities, and wants to remain FULL CODE with restorative cares\"      Changes today: none    Follow up plan:     CORE follow up in 1 month.     Palliative care appt with Dr. Fox in September    Follow up with Dr. Yao 11/21/19        History of Presenting Illness:    Ivan Gomez is a very pleasant 89 year old male with a history of chronic diastolic heart failure, right heart failure, pulmonary hypertension, mitral regurgitation s/p mitul clip x 2 - with residual severe MR, tricuspid regurgitation, HTN, and history of bladder cancer.    Patient has followed regularly in our cardiology practice with Dr. Yao for the last couple years. Patient was hospitalized 7/4/19-7/13/19 due to acute diastolic heart failure exacerbation and new onset atrial fibrillation with RVR. Patient spontaneously " converted out of atrial fibrillation in the emergency room. Chest CT showed bilateral patchy airspace and groundglass opacities, small bilateral pleural effusions, and pulmonary edema. NTproBNP 17,137. Echocardiogram 7/5/19 demonstrated stable position of mitraclip devices, moderately-severe residual mitral regurgitation, no significant mitral stenosis, massively dilated LA, severely dilated RA, moderately severe TR, normal LV size, EF 70-75%. RV moderately dilated and RV moderately reduced systolic function. Diuresed with bumetanide gtt, transitioned to torsemide 20mg BID at discharge. At discharge patient was noted to have persisting +1 lower extremity edema and gut edema. Admission weight 145-149#. Discharge weight 134#. CORE referral was placed at discharge. Prior to this patient was hospitalized in May 5/21-5/28/19 transferred from outside hospital, found to have acute cholecystitis s/p cholecystectomy 5/22.     I first met patient in July 2019 for CORE enrollment after hospitalization. At that time his torsemide was increased to 20mg BID and he was referred to palliative care. He was then seen by Sharon Quesada PA-C for CORE follow up the following week at which point some mild improvement in symptoms were noted. Patient was continued torsemide 20mg BID, metoprolol was decreased to 12.5mg BID to see if it helps with patients fatigue.     Patient is here today for CORE follow up.      Patient reports feeling good. Feeling fatigued. Monitoring weights daily at facility. Weight today 140#, which is stable since last clinic visit. Facility did not send weights today, patient states weight today at facility 137#. Reports persisting lower extremity edema, denies worsening. Patient states difficult to walk due to fatigued, legs getting tired, denies exertional dyspnea. Denies shortness of breath at rest. Using walker for assistance. Denies exertional dyspnea with current levels of activity, noting lifestyle is  sedentary. Facility staff reported to CORE nurse that patient is sleeping through the night without orthopnea/PND, no SOB/ROMAN with therapies. Still feeling fatigued. Notes he is taking multiple naps per day. Denies abdominal distention. Sleeping okay. Complaints of PND/orthopnea, which he states comes/goes, not every night. Reports he has completed PT/OT at [facility]. Patient denies chest pain or chest tightness. Denies dizziness, lightheadedness or other presyncopal symptoms. Denies tachycardia or palpitations. Denies episodes of bleeding. Working with SW at [facility] placement, patient hoping for long term care.     Labs today show stable kidney function and electrolytes. Creatinine 1.68 today, which is patients baseline. BNP 10K down from 15K on 7/18/19. Blood pressure 114/70 and HR 66 in clinic today.    Appetite okay, feels he has been eating a little bit more. Doesn't love the food at [facility]. Not adding salt to foods. Patient reports getting a good breakfast, but then notes doesn't care for their other meals, typically not eating much lunch but will eat some dinner. States not drinking much fluid, states he is drinking a little ginger ale and water. No set exercise routine, lifestyle sedentary, completed PT/OT. Denies alcohol use. Denies tobacco use.         Recent Hospitalizations   7/4/19-7/13/19 due to acute diastolic heart failure exacerbation and new onset atrial fibrillation with RVR. Patient spontaneously converted out of atrial fibrillation in the emergency room. Chest CT showed bilateral patchy airspace and groundglass opacities, small bilateral pleural effusions, and pulmonary edema. NTproBNP 17,137. Echocardiogram 7/5/19 demonstrated stable position of mitraclip devices, moderately-severe residual mitral regurgitation, no significant mitral stenosis, massively dilated LA, severely dilated RA, moderately severe TR, normal LV size, EF 70-75%. RV moderately dilated and RV moderately  reduced systolic function. Diuresed with bumetanide gtt, transitioned to torsemide 20mg BID at discharge. At discharge patient was noted to have persisting +1 lower extremity edema and gut edema. Admission weight 145-149#. Discharge weight 134#.   5/21-5/28/19 transferred from outside hospital, found to have acute cholecystitis s/p cholecystectomy 5/22.         Social History    His partner passed away in June 2019, son that he doesn't communicate with. Older brother in [place]. Nephew in [place].   Social History     Socioeconomic History     Marital status: Single     Spouse name: Not on file     Number of children: Not on file     Years of education: Not on file     Highest education level: Not on file   Occupational History     Not on file   Social Needs     Financial resource strain: Not on file     Food insecurity:     Worry: Not on file     Inability: Not on file     Transportation needs:     Medical: Not on file     Non-medical: Not on file   Tobacco Use     Smoking status: Former Smoker     Smokeless tobacco: Never Used   Substance and Sexual Activity     Alcohol use: No     Alcohol/week: 0.0 oz     Comment: doesnt use anymore     Drug use: No     Sexual activity: Never   Lifestyle     Physical activity:     Days per week: Not on file     Minutes per session: Not on file     Stress: Not on file   Relationships     Social connections:     Talks on phone: Not on file     Gets together: Not on file     Attends Mandaen service: Not on file     Active member of club or organization: Not on file     Attends meetings of clubs or organizations: Not on file     Relationship status: Not on file     Intimate partner violence:     Fear of current or ex partner: Not on file     Emotionally abused: Not on file     Physically abused: Not on file     Forced sexual activity: Not on file   Other Topics Concern     Parent/sibling w/ CABG, MI or angioplasty before 65F 55M? No   Social History Narrative     Not on file  "           Review of Systems:   Skin:  Negative     Eyes:  Negative    ENT:  Negative    Respiratory:  Negative    Cardiovascular:    Positive for;chest pain  Gastroenterology: Negative    Genitourinary:  Positive for urinary frequency;prostate problem  Musculoskeletal:  Positive for arthritis  Neurologic:  Positive for numbness or tingling of feet  Psychiatric:  Positive for    Heme/Lymph/Imm:  Positive for easy bruising  Endocrine:  Negative           Physical Exam:   Vitals: /70   Pulse 66   Ht 1.727 m (5' 8\")   Wt 63.5 kg (140 lb)   SpO2 96%   BMI 21.29 kg/m     Wt Readings from Last 4 Encounters:   08/06/19 63.5 kg (140 lb)   07/31/19 63.5 kg (140 lb 1.6 oz)   07/24/19 63.5 kg (140 lb)   07/24/19 63 kg (139 lb)     GEN: elderly, frail  HEENT:  Pupils equal, round. Sclerae nonicteric.   NECK: Supple, no masses appreciated. JVP elevated to jaw  C/V:  Regular rate and rhythm, 3/6 systolic murmur  RESP: Respirations are unlabored. Clear to auscultation bilaterally, diminished in bases.   GI: Abdomen soft, nontender.  EXTREM: +1 LE edema to bilateral lower extremities, tight edema.   NEURO: Alert and oriented, cooperative.  SKIN: Warm and dry.        Data:   ECHO 7/5/2019  Sinus rhythm was noted.  Status post transcatheter mitral valve repair with MitraClip x 2, 10/16/2017.     1. Stable position of the MitraClip devices.  2. At least, moderately severe residual mitral regurgitation. Eccentric jet,  anteriorly directed, reaching the superior surface of the left atrium.  3. No significant mitral stenosis. Mean diastolic mitral valve gradient is 3.3  mmHg (heart rate 73 bpm).  4. Massively dilated left atrium. Severely dilated right atrium.  5. Moderately severe (3+) tricuspid regurgitation. Findings consistent with  significant pulmonary hypertension. Estimated pulmonary artery systolic  pressure is 65-70 mmHg.  6. Normal left ventricular size, hyperdynamic systolic function, estimated  LVEF 70-75%.  7. " Moderately dilated right ventricle with moderately reduced systolic  function. TAPSE 1.3 - 1.6 cm, tissue Doppler systolic velocity 9 cm/s.  8. Dilated inferior vena cava.     No significant changes compared to previous study dated 5/23/2019.    LIPID RESULTS:  Lab Results   Component Value Date    CHOL 108 10/12/2017    HDL 63 10/12/2017    LDL 29 10/12/2017    TRIG 81 10/12/2017    CHOLHDLRATIO 2.6 08/18/2015     LIVER ENZYME RESULTS:  Lab Results   Component Value Date    AST 11 07/08/2019    ALT 13 07/08/2019     CBC RESULTS:  Lab Results   Component Value Date    WBC 3.9 (L) 07/16/2019    RBC 3.44 (L) 07/16/2019    HGB 10.3 (L) 08/01/2019    HCT 32.7 (L) 07/16/2019    MCV 95 07/16/2019    MCH 29.7 07/16/2019    MCHC 31.2 (L) 07/16/2019    RDW 18.0 (H) 07/16/2019    PLT 85 (L) 07/16/2019     BMP RESULTS:  Lab Results   Component Value Date     08/06/2019    POTASSIUM 4.0 08/06/2019    CHLORIDE 101 08/06/2019    CO2 35 (H) 08/06/2019    ANIONGAP 6 08/06/2019     (H) 08/06/2019    BUN 37 (H) 08/06/2019    CR 1.68 (H) 08/06/2019    GFRESTIMATED 38 (L) 08/06/2019    GFRESTBLACK 46 (L) 08/06/2019    GIUSEPPE 9.0 08/06/2019      A1C RESULTS:  Lab Results   Component Value Date    A1C 5.1 07/17/2019     INR RESULTS:  Lab Results   Component Value Date    INR 1.26 (H) 07/04/2019    INR 1.15 (H) 10/16/2017            Medications     Current Outpatient Medications   Medication Sig Dispense Refill     artificial saliva (BIOTENE DRY MOUTHWASH) LIQD liquid Swish and spit 15 mLs in mouth 3 times daily       artificial saliva (BIOTENE MT) AERS spray Take 1 spray by mouth every 2 hours as needed for dry mouth        beta carotene 04825 UNIT capsule Take 1 capsule (25,000 Units) by mouth daily       dicyclomine (BENTYL) 10 MG capsule Take 10 mg by mouth 3 times daily       ferrous sulfate (FEROSUL) 325 (65 Fe) MG tablet Take 325 mg by mouth daily (with breakfast)       finasteride (PROSCAR) 5 MG tablet TAKE 1 TABLET(5  MG) BY MOUTH DAILY 90 tablet 3     gabapentin (NEURONTIN) 300 MG capsule TAKE 1 CAPSULE BY MOUTH THREE TIMES DAILY 270 capsule 0     HYDROcodone-acetaminophen (NORCO) 7.5-325 MG per tablet Take 1 tablet by mouth every 6 hours as needed for pain maximum 4 tablet(s) per day 60 tablet 0     hypromellose (ARTIFICIAL TEARS) 0.4 % SOLN ophthalmic solution Place 2 drops Into the left eye 2 times daily        melatonin 3 MG tablet Take 3 mg by mouth At Bedtime       metoprolol tartrate (LOPRESSOR) 25 MG tablet Take 0.5 tablets (12.5 mg) by mouth 2 times daily       multivitamin w/minerals (THERA-VIT-M) tablet Take 1 tablet by mouth daily       pantoprazole (PROTONIX) 40 MG EC tablet Take 40 mg by mouth daily       Phenylephrine-Witch Hazel 0.25-50 % GEL Place rectally 4 times daily as needed Insert 1 application rectally as needed for Hemorrhoids       simvastatin (ZOCOR) 20 MG tablet TAKE ONE TABLET BY MOUTH AT BEDTIME 30 tablet 0     Skin Protectants, Misc. (EUCERIN) cream Apply topically 2 times daily Apply topically to LE & areas of dryness twice daily (also ordered prn)       Skin Protectants, Misc. (EUCERIN) cream Apply topically 2 times daily And as needed. Apply to LE and areas of dryness       tamsulosin (FLOMAX) 0.4 MG capsule TAKE ONE CAPSULE BY MOUTH DAILY 90 capsule 3     torsemide (DEMADEX) 20 MG tablet Take 1 tablet (20 mg) by mouth 2 times daily 90 tablet 1     vancomycin (FIRVANQ) 50 MG/ML oral solution Take 125 mg by mouth 2 times daily Take BID x 4 days, then every day x 3 days, then discontinue.       vitamin C 500 MG TABS Take 1 tablet (500 mg) by mouth daily       zinc sulfate (ZINCATE) 220 (50 Zn) MG capsule Take 1 capsule (220 mg) by mouth daily            Past Medical History     Past Medical History:   Diagnosis Date     Arthritis     Spinal Stenosis     Former smoker      Heart disease      Hemorrhoids      Hypercholesteremia      Hypertension      Malignant neoplasm (H)     skin CA, Basal cell      Other chronic pain      Renal disease      Past Surgical History:   Procedure Laterality Date     BACK SURGERY       BIOPSY      face, skin cancer     BIOPSY  8/2016    shoulder lesion     CYSTOSCOPY, TRANSURETHRAL RESECTION (TUR) PROSTATE, COMBINED N/A 8/21/2015    Procedure: COMBINED CYSTOSCOPY, TRANSURETHRAL RESECTION (TUR) PROSTATE;  Surgeon: Dennis Hilotn MD;  Location:  OR     CYSTOSCOPY, TRANSURETHRAL RESECTION (TUR) TUMOR BLADDER, COMBINED N/A 9/2/2016    Procedure: COMBINED CYSTOSCOPY, TRANSURETHRAL RESECTION (TUR) TUMOR BLADDER;  Surgeon: Dennis Hilton MD;  Location: [facility] OR     CYSTOSCOPY, TRANSURETHRAL RESECTION (TUR) TUMOR BLADDER, COMBINED N/A 11/2/2018    Procedure: Transurethral resection of bladder tumor > 5 cm in size;  Surgeon: Dennis Hilton MD;  Location:  OR     ESOPHAGOSCOPY, GASTROSCOPY, DUODENOSCOPY (EGD), COMBINED N/A 5/24/2019    Procedure: ESOPHAGOGASTRODUODENOSCOPY, WITH BIOPSY;  Surgeon: Abe Jang MD;  Location: [facility]     LAPAROSCOPIC CHOLECYSTECTOMY WITH CHOLANGIOGRAMS N/A 5/22/2019    Procedure: CHOLECYSTECTOMY, LAPAROSCOPIC, WITH CHOLANGIOGRAM;  Surgeon: Rey Romero MD;  Location:  OR     ORTHOPEDIC SURGERY      lumbar and cervical surgery, Sep, 2008, knee surgery     PERCUTANEOUS MITRAL VALVE REPAIR N/A 10/16/2017    Procedure: PERCUTANEOUS MITRAL VALVE REPAIR ANESTHESIA;  Percutaneous MitraClip ;  Surgeon: Eber Monroe MD;  Location: [facility] OR     PICC INSERTION Right 10/14/2017    5fr DL Bard PICC, 40cm (1cm external) in the R basilic vein w/ tip in the mid SVC.     Family History   Problem Relation Age of Onset     Cancer Son 64        leukemia ? due to agent orange     Family History Negative Son             Allergies   Celebrex [celecoxib] and Penicillins        FR[facility]  Pager: [phone]

## 2019-08-07 NOTE — PROGRESS NOTES
[facility]    [place] Medical Record Number:  [id number]  Place of Service where encounter took place:  [facility] [[id number]]  Chief Complaint   Patient presents with     Nursing Home Acute       HPI:    Ivan Gomez  is a 89 year old (10/12/1926), who is being seen today for an episodic care visit.  HPI information obtained from: facility chart records, facility staff, patient report and [facility] chart review.     Today's concern is:    CHF  CKD stage 3  Bilateral lower extremity edema  Hx CHF, pulmonary hypertension, s/p mitral valve clip with residual severe mitral regurgitation. Last Echo 7/5 found EF 70-75%, severe residual MR, and significant pulmonary hypertension. Currently taking metoprolol and simvastatin, and torsemide 20mg BID. During exam, reports BLE edema has significantly improved. Denies chest pain, SOB, headaches, syncope. Creat baseline 1.3-1.6. Last creat 1.68 on 8/6. Patient has follow-up appt with Cardiologist as directed; Cardiology clinic placed referral to palliative care.      Physical deconditioning  PTA lives in [facility]. Patient completed therapy sessions. Ambuates 300ft with walker. Patient does endorse increased pain to L knee; denies injury or trauma to R knee. Requires assistance with activity and ADLs. Cognitive testing to be done in therapy - patient refused cognitive testing. SW following for discharge planning.             Past Medical and Surgical History reviewed in Epic today.    MEDICATIONS:  Current Outpatient Medications   Medication Sig Dispense Refill     artificial saliva (BIOTENE DRY MOUTHWASH) LIQD liquid Swish and spit 15 mLs in mouth 3 times daily       artificial saliva (BIOTENE MT) AERS spray Take 1 spray by mouth every 2 hours as needed for dry mouth        beta carotene 90880 UNIT capsule Take 1 capsule (25,000 Units) by mouth daily       DICYCLOMINE HCL PO Take 10 mg by mouth 3 times daily       ferrous sulfate  "(FEROSUL) 325 (65 Fe) MG tablet Take 325 mg by mouth daily (with breakfast)       finasteride (PROSCAR) 5 MG tablet TAKE 1 TABLET(5 MG) BY MOUTH DAILY 90 tablet 3     GABAPENTIN PO Take 300 mg by mouth At Bedtime       HYDROcodone-acetaminophen (NORCO) 7.5-325 MG per tablet Take 1 tablet by mouth every 6 hours as needed for pain maximum 4 tablet(s) per day 60 tablet 0     hypromellose (ARTIFICIAL TEARS) 0.4 % SOLN ophthalmic solution Place 2 drops Into the left eye 2 times daily        melatonin 3 MG tablet Take 3 mg by mouth At Bedtime       metoprolol tartrate (LOPRESSOR) 25 MG tablet Take 0.5 tablets (12.5 mg) by mouth 2 times daily       multivitamin w/minerals (THERA-VIT-M) tablet Take 1 tablet by mouth daily       pantoprazole (PROTONIX) 40 MG EC tablet Take 40 mg by mouth daily       Phenylephrine-Witch Hazel 0.25-50 % GEL Place rectally 4 times daily as needed Insert 1 application rectally as needed for Hemorrhoids       POTASSIUM CHLORIDE ER PO Take 20 mEq by mouth daily       simvastatin (ZOCOR) 20 MG tablet TAKE ONE TABLET BY MOUTH AT BEDTIME 30 tablet 0     Skin Protectants, Misc. (EUCERIN) cream Apply topically 2 times daily And as needed. Apply to LE and areas of dryness       tamsulosin (FLOMAX) 0.4 MG capsule TAKE ONE CAPSULE BY MOUTH DAILY 90 capsule 3     torsemide (DEMADEX) 20 MG tablet Take 1 tablet (20 mg) by mouth 2 times daily 90 tablet 1     vitamin C 500 MG TABS Take 1 tablet (500 mg) by mouth daily       zinc sulfate (ZINCATE) 220 (50 Zn) MG capsule Take 1 capsule (220 mg) by mouth daily       gabapentin (NEURONTIN) 300 MG capsule TAKE 1 CAPSULE BY MOUTH THREE TIMES DAILY 270 capsule 0           REVIEW OF SYSTEMS:  4 point ROS including Respiratory, CV, GI and , other than that noted in the HPI,  is negative      Objective:  /76   Pulse 68   Temp 97.4  F (36.3  C)   Resp 18   Ht 1.727 m (5' 8\")   Wt 62.8 kg (138 lb 6.4 oz)   SpO2 93%   BMI 21.04 kg/m    Exam:  GENERAL " APPEARANCE:  Alert, in no distress, appears healthy, oriented, cooperative  RESP:  respiratory effort and palpation of chest normal, lungs clear to auscultation , no respiratory distress  CV:  Palpation and auscultation of heart done , regular rate and rhythm, no murmur, rub, or gallop, +2 pedal pulses, edema BLE (improving)  ABDOMEN:  normal bowel sounds, soft, nontender, no hepatosplenomegaly or other masses, no guarding or rebound  M/S:   Gait and station abnormal, requires assistance with activity and ADLs. Digits and nails normal  SKIN:  Inspection of skin and subcutaneous tissue baseline, Palpation of skin and subcutaneous tissue baseline  NEURO:   Cranial nerves 2-12 are normal tested and grossly at patient's baseline, Examination of sensation by touch normal  PSYCH:  oriented X 3, affect and mood normal           Labs:   Labs done in SNF are in [facility]. Please refer to them using [facility] Everywhere., Recent labs in EPIC reviewed by me today.  and   Most Recent 3 CBC's:  Recent Labs   Lab Test 08/01/19  0755 07/16/19  0600 07/11/19  0952 07/07/19  0820   WBC  --  3.9* 4.1 5.0   HGB 10.3* 10.2* 10.2* 10.9*   MCV  --  95 93 94   PLT  --  85* 69* 70*     Most Recent 3 BMP's:  Recent Labs   Lab Test 08/12/19  0650 08/06/19  1213 08/05/19  0650    138 142   POTASSIUM 3.4 4.0 3.7   CHLORIDE 103 101 104   CO2 33* 35* 30   BUN 43* 37* 38*   CR 1.51* 1.68* 1.42*   ANIONGAP 6 6 8   GIUSEPPE 8.7 9.0 8.5   GLC 63* 134* 54*           ASSESSMENT/PLAN:    (I50.32) Chronic diastolic congestive heart failure (H)  (primary encounter diagnosis)  (N18.3) CKD (chronic kidney disease) stage 3, GFR 30-59 ml/min (H)  (R60.0) Bilateral leg edema  Comment: Chronic BLE edema r/t CHF, severe MR. Last Echo 7/5 found EF 70-75%, severe residual MR, and significant pulmonary hypertension. Creat at baseline 1.3-1.6; last creat was 1.69 on 8/6.   Plan: Check BMP 8/12. Continue torsemide, metoprolol, simvastatin. Monitor daily  weights, BP/HR. Patient to follow-up with Cardiologist as directed. Patient has referral to meet with Palliative care (recommended by Cardiology clinic).      (R53.81) Physical deconditioning  Comment: Ongoing  Plan: Patient reports feeling bored; encourage engagement in activities. SW following for discharge planning. MA pending, patient is looking into [facility] options.               Electronically signed by:  FRANK Pugh CNP

## 2019-08-13 NOTE — PROGRESS NOTES
[facility]    [facility] Record Number:  [id number]  Place of Service where encounter took place:  [facility] [place] ([facility]) [[id number]]  Chief Complaint   Patient presents with     Nursing Home Acute       HPI:    Ivan Gomez  is a 89 year old (10/12/1926), who is being seen today for an episodic care visit.  HPI information obtained from: facility chart records, facility staff, patient report and [facility] chart review.     Today's concern is:    CHF  CKD stage 3  Bilateral lower extremity edema  Hx CHF, pulmonary hypertension, s/p mitral valve clip with residual severe mitral regurgitation. Last Echo 7/5 found EF 70-75%, severe residual MR, and significant pulmonary hypertension. Currently taking metoprolol and simvastatin, and torsemide 20mg BID. During exam, patient reports having difficulty swallowing potassium chloride. Also reports BLE edema has significantly improved. Denies chest pain, SOB, headaches, syncope. Creat baseline 1.3-1.6. Last creat 1.51 on 8/12. Patient to follow-up appt with Cardiologist as directed; Cardiology clinic placed referral to palliative care.     Wt Readings from Last 4 Encounters:   08/13/19 60.3 kg (133 lb)   08/07/19 62.8 kg (138 lb 6.4 oz)   08/06/19 63.5 kg (140 lb)   07/31/19 63.5 kg (140 lb 1.6 oz)       Thrombocytopenia  Chronic anemia  Baseline platelets 50-90K and Hgb baseline 10-11. Last platelet count was 85K and Hgb was 10.3. No active sx of bleeding.     Hx C diff  C diff + 05/2019. Was seen by GI on 6/25 and was started on dicyclomine for abdominal cramping. Patient was successfully weaned off vancomycin and has since had no reports of diarrhea/loose stools. Patient denies abdominal pain, cramping or nausea.      Physical deconditioning  PTA lives in [place]. Patient completed therapy sessions. Ambuates 300ft with walker. Patient reports not ambulating every day since therapy ended and would like to ambulate more often but  needs someone to assist for safety. Requires assistance with activity and ADLs. SW following for discharge planning, planning on discharging to [facility].      Bladder mass  BPH  Hx recurrent bladder cancer, s/p multiple TURBT. Patient to follow-up with Urologist (Dr. Hilton) for outpatient cystoscopy.   Currently taking finasteride and tamsulosin. Patient denies issues voiding or hematuria.           Past Medical and Surgical History reviewed in Epic today.    MEDICATIONS:  Current Outpatient Medications   Medication Sig Dispense Refill     artificial saliva (BIOTENE DRY MOUTHWASH) LIQD liquid Swish and spit 15 mLs in mouth 3 times daily       beta carotene 67520 UNIT capsule Take 1 capsule (25,000 Units) by mouth daily       DICYCLOMINE HCL PO Take 10 mg by mouth 2 times daily Take BID x 4 days, then every day x  4 days, then discontinue.       ferrous sulfate (FEROSUL) 325 (65 Fe) MG tablet Take 325 mg by mouth daily (with breakfast)       finasteride (PROSCAR) 5 MG tablet TAKE 1 TABLET(5 MG) BY MOUTH DAILY 90 tablet 3     GABAPENTIN PO Take 300 mg by mouth At Bedtime       HYDROcodone-acetaminophen (NORCO) 7.5-325 MG per tablet Take 1 tablet by mouth every 6 hours as needed for pain maximum 4 tablet(s) per day 60 tablet 0     hypromellose (ARTIFICIAL TEARS) 0.4 % SOLN ophthalmic solution Place 2 drops Into the left eye 2 times daily        melatonin 3 MG tablet Take 3 mg by mouth At Bedtime       metoprolol tartrate (LOPRESSOR) 25 MG tablet Take 0.5 tablets (12.5 mg) by mouth 2 times daily       multivitamin w/minerals (THERA-VIT-M) tablet Take 1 tablet by mouth daily       pantoprazole (PROTONIX) 40 MG EC tablet Take 40 mg by mouth daily       Phenylephrine-[place] 0.25-50 % GEL Place rectally 4 times daily as needed Insert 1 application rectally as needed for Hemorrhoids       POTASSIUM CHLORIDE ER PO Take 20 mEq by mouth daily       simvastatin (ZOCOR) 20 MG tablet TAKE ONE TABLET BY MOUTH AT BEDTIME 30 tablet  "0     Skin Protectants, Misc. (EUCERIN) cream Apply topically 2 times daily And as needed. Apply to LE and areas of dryness       tamsulosin (FLOMAX) 0.4 MG capsule TAKE ONE CAPSULE BY MOUTH DAILY 90 capsule 3     torsemide (DEMADEX) 20 MG tablet Take 1 tablet (20 mg) by mouth 2 times daily 90 tablet 1     vitamin C 500 MG TABS Take 1 tablet (500 mg) by mouth daily       zinc sulfate (ZINCATE) 220 (50 Zn) MG capsule Take 1 capsule (220 mg) by mouth daily             REVIEW OF SYSTEMS:  4 point ROS including Respiratory, CV, GI and , other than that noted in the HPI,  is negative      Objective:  /76   Pulse 68   Temp 98.5  F (36.9  C)   Resp 20   Ht 1.727 m (5' 8\")   Wt 60.3 kg (133 lb)   SpO2 98%   BMI 20.22 kg/m    Exam:  GENERAL APPEARANCE:  Alert, in no distress, appears healthy, oriented, cooperative  RESP:  respiratory effort and palpation of chest normal, lungs clear to auscultation , no respiratory distress  CV:  Palpation and auscultation of heart done , regular rate and rhythm, no murmur, rub, or gallop, +2 pedal pulses, edema BLE (improving)  ABDOMEN:  normal bowel sounds, soft, nontender, no hepatosplenomegaly or other masses, no guarding or rebound  M/S:   Gait and station abnormal, requires assistance with activity and ADLs. Digits and nails normal  SKIN:  Inspection of skin and subcutaneous tissue baseline, Palpation of skin and subcutaneous tissue baseline  NEURO:   Cranial nerves 2-12 are normal tested and grossly at patient's baseline, Examination of sensation by touch normal  PSYCH:  oriented X 3, affect and mood normal      Labs:   Labs done in SNF are in [facility]. Please refer to them using EPIC/Care Everywhere., Recent labs in EPIC reviewed by me today.  and   Most Recent 3 CBC's:  Recent Labs   Lab Test 08/01/19  0755 07/16/19  0600 07/11/19  0952 07/07/19  0820   WBC  --  3.9* 4.1 5.0   HGB 10.3* 10.2* 10.2* 10.9*   MCV  --  95 93 94   PLT  --  85* 69* 70*     Most Recent " 3 BMP's:  Recent Labs   Lab Test 08/12/19  0650 08/06/19  1213 08/05/19  0650    138 142   POTASSIUM 3.4 4.0 3.7   CHLORIDE 103 101 104   CO2 33* 35* 30   BUN 43* 37* 38*   CR 1.51* 1.68* 1.42*   ANIONGAP 6 6 8   GIUSEPPE 8.7 9.0 8.5   GLC 63* 134* 54*       Wt Readings from Last 4 Encounters:   08/13/19 60.3 kg (133 lb)   08/07/19 62.8 kg (138 lb 6.4 oz)   08/06/19 63.5 kg (140 lb)   07/31/19 63.5 kg (140 lb 1.6 oz)             ASSESSMENT/PLAN:    (I50.32) Chronic diastolic congestive heart failure (H)  (primary encounter diagnosis)  (N18.3) CKD (chronic kidney disease) stage 3, GFR 30-59 ml/min (H)  (R60.0) Bilateral leg edema  Comment: Chronic BLE edema r/t CHF, severe MR. Last Echo 7/5 found EF 70-75%, severe residual MR, and significant pulmonary hypertension. Creat at baseline. Chronic BLE edema, weight stable and trending down slighlty.   Plan: Check BMP 8/20. Continue plan of care. Monitor weights, BP/HR. Change potassium chloride to 10meq x 2 tabs (20meq total) every day dx hypokalemia. Patient to follow-up with [facility] clinic in 09/2019 and with Cardiologist as directed. Patient to follow-up with Palliative care as directed.     (D69.6) Thrombocytopenia (H)  (D63.8) Anemia in other chronic diseases classified elsewhere  Comment: Chronic; Baseline platelets 50-90K and Hgb baseline 10-11. No active sx of bleeding.  Plan: Check Hgb and platelet count 8/20.     (Z86.19) H/O Clostridium difficile infection  Comment: Resolved, no reports of additional loose stools.   Plan: Decrease dicyclomine to 10mg BID x 4 days, then 10mg every day x 4 days, then discontinue. Patient to follow-up with GI prn.     (R53.81) Physical deconditioning  Comment: Ongoing  Plan: Start walking program w/nursing staff BID. Activities referral. House psychologist to follow. SW following for discharge planning. Goal to discharge to care home.     (N32.89) Bladder mass  (N40.0) Benign prostatic hyperplasia without lower urinary tract  symptoms  Comment: BPH without current sx. New finding of bladder mass during last hospitalization; hx recurrent bladder cancer.   Plan: Patient to follow-up with Urology (Dr. Hilton) for outpatient cystoscopy.           Electronically signed by:  FRANK Pugh CNP

## 2019-08-22 NOTE — PROGRESS NOTES
[facility]  [facility] Record Number:  [id number]  Place of Service where encounter took place:  [facility] [place] ([facility] [[id number]]  Chief Complaint   Patient presents with     Nursing Home Acute       HPI:    Ivan Gomez  is a 89 year old (10/12/1926), who is being seen today for an episodic care visit.  HPI information obtained from: facility chart records, facility staff, patient report and [facility] chart review.     Per recent TCU provider progress notes:  89 yo male hospitalized with acute exacerbation of CHF with acute respiratory failure. Echo 7/5 found EF 70-75%, severe residual MR, and significant pulmonary hypertension. Treated with Bumex, then torsemide 20mg BID. Also new onset of atrial fibrillation with RVR, was treated with diltiazem infusion and converted back to sinus rhythm in the ED. Per cardiology, not recommending anticoagulation due to age, frailty, and risk for bleeding. Takes metoprolol, torsemide, hydralazine, and simvastatin. C diff positive treated with Vanco. Due to dysphagia hx aspiration pneumonia.   CKD stage 3 with Cr 1.3-1.6. Hx recurrent bladder cancer, s/p multiple TURBT (sees urology). Anemia and thrombocytopenia baseline platelets 50-90K and Hgb baseline 10-11. Chronic pain on norco and gabapentin. Pressure ulcers left heel and coccyx. Lives in [facility]. Now tapering off dicyclomine and stopped vanco.     Today's concern is:  Seen for episodic TCU follow up. Patient reports he is feeling well and getting stronger. Done with therapies, is up with walker. No headaches, dizziness, chest pain, dyspnea. Bowels are regular no bladder complaints. Denies pain today.   EMR reviewed, note wt down 10 lbs since admission. Body mass index is 19.8 kg/m . SBP range 105-121 and sats 95% on room air.     Past Medical and Surgical History reviewed in Epic today.    MEDICATIONS:  Current Outpatient Medications   Medication Sig Dispense Refill      artificial saliva (BIOTENE DRY MOUTHWASH) LIQD liquid Swish and spit 15 mLs in mouth 3 times daily       artificial saliva (BIOTENE MT) SOLN solution Swish and spit 1 spray in mouth every 2 hours as needed for dry mouth       beta carotene 09482 UNIT capsule Take 1 capsule (25,000 Units) by mouth daily       DICYCLOMINE HCL PO Take 10 mg by mouth daily        ferrous sulfate (FEROSUL) 325 (65 Fe) MG tablet Take 325 mg by mouth daily (with breakfast)       finasteride (PROSCAR) 5 MG tablet TAKE 1 TABLET(5 MG) BY MOUTH DAILY 90 tablet 3     GABAPENTIN PO Take 300 mg by mouth At Bedtime       HYDROcodone-acetaminophen (NORCO) 7.5-325 MG per tablet Take 1 tablet by mouth every 6 hours as needed for pain maximum 4 tablet(s) per day 60 tablet 0     hypromellose (ARTIFICIAL TEARS) 0.4 % SOLN ophthalmic solution Place 2 drops Into the left eye 2 times daily        melatonin 3 MG tablet Take 3 mg by mouth At Bedtime       metoprolol tartrate (LOPRESSOR) 25 MG tablet Take 0.5 tablets (12.5 mg) by mouth 2 times daily       multivitamin w/minerals (THERA-VIT-M) tablet Take 1 tablet by mouth daily       pantoprazole (PROTONIX) 40 MG EC tablet Take 40 mg by mouth daily       POTASSIUM CHLORIDE ER PO Take 20 mEq by mouth daily       simvastatin (ZOCOR) 20 MG tablet TAKE ONE TABLET BY MOUTH AT BEDTIME 30 tablet 0     Skin Protectants, Misc. (EUCERIN) cream Apply topically 2 times daily And as needed. Apply to LE and areas of dryness       tamsulosin (FLOMAX) 0.4 MG capsule TAKE ONE CAPSULE BY MOUTH DAILY 90 capsule 3     torsemide (DEMADEX) 20 MG tablet Take 1 tablet (20 mg) by mouth 2 times daily 90 tablet 1     vitamin C 500 MG TABS Take 1 tablet (500 mg) by mouth daily       zinc sulfate (ZINCATE) 220 (50 Zn) MG capsule Take 1 capsule (220 mg) by mouth daily       REVIEW OF SYSTEMS:  10 point ROS of systems including Constitutional, Eyes, Respiratory, Cardiovascular, Gastroenterology, Genitourinary, Integumentary,  "Musculoskeletal, Psychiatric were all negative except for pertinent positives noted in my HPI.    Objective:  /68   Pulse 71   Temp 97.8  F (36.6  C)   Resp 18   Ht 1.727 m (5' 8\")   Wt 59.1 kg (130 lb 3.2 oz)   SpO2 95%   BMI 19.80 kg/m    Exam:  GENERAL APPEARANCE:  Alert, in no distress, pleasant, cooperative, oriented x 4. Frail and cachectic appearing  EYES:  EOM, lids, pupils and irises normal, sclera clear and conjunctiva normal, no discharge or mattering on lids or lashes noted  ENT:  Mouth normal, moist mucous membranes, nose normal without drainage or crusting, external ears without lesions, hearing acuity intact  RESP:  respiratory effort normal, no chest wall tenderness, no respiratory distress, Lung sounds diminished at bases, patient is on room air  CV:  Auscultation of heart done, rate and rhythm controlled and regular today, no murmur, no rub or gallop. Edema none bilateral lower extremities.   ABDOMEN:  normal bowel sounds, soft, nontender, no palpable masses.  M/S:   Gait and station ambulates independently with walker and walks with assist , no tenderness or swelling of the joints; able to move all extremities, digits normal  NEURO: cranial nerves 2-12 grossly intact, no facial asymmetry, no speech deficits and able to follow directions, moves all extremities symmetrically  PSYCH:  insight and judgement and memory at baseline, affect and mood normal     Labs:   Most Recent 3 CBC's:  Recent Labs   Lab Test 08/20/19  0615 08/01/19  0755 07/16/19  0600 07/11/19  0952   WBC 3.2*  --  3.9* 4.1   HGB 10.0* 10.3* 10.2* 10.2*   MCV 96  --  95 93   PLT 75*  --  85* 69*     Most Recent 3 BMP's:  Recent Labs   Lab Test 08/20/19  0615 08/12/19  0650 08/06/19  1213    142 138   POTASSIUM 3.9 3.4 4.0   CHLORIDE 103 103 101   CO2 33* 33* 35*   BUN 43* 43* 37*   CR 1.41* 1.51* 1.68*   ANIONGAP 5 6 6   GIUSEPPE 8.6 8.7 9.0   GLC 68* 63* 134*       ASSESSMENT/PLAN:  Chronic diastolic congestive heart " failure (H)  Pulmonary hypertension (H)  Bilateral leg edema  Chronic issues, stable and fluid balanced at this time. Meds, vs, wt as ordered. Monitor. CARDS f/u per routine.     H/O Clostridium difficile infection  No symptoms, tapered meds. Monitor.     Oropharyngeal dysphagia  Ongoing, doing well at current diet level. Monitor.     CKD (chronic kidney disease) stage 3, GFR 30-59 ml/min (H)  Chronic, improved last check. Avoid nephrotoxic meds. BMP PRN.     Bladder mass  Chronic, urology f/u per routine.     Thrombocytopenia (H)  Anemia in other chronic diseases classified elsewhere  Chronic, relatively stable. Monitor PRN and hematology f/u as needed.     Spinal stenosis of lumbar region, unspecified whether neurogenic claudication present  Physical deconditioning  Chronic issues, appears stable and at baseline today. Monitor for safety. SW to assist with placement.     Orders written by provider at facility  No new orders today.     Electronically signed by:  FRANK Heller CNP

## 2019-08-28 NOTE — PROGRESS NOTES
[facility]    [facility] Record Number:  [id number]  Place of Service where encounter took place:  [facility] [place] ([facility] [[id number]]  Chief Complaint   Patient presents with     Nursing Home Acute       HPI:    Ivan Gomez  is a 89 year old (10/12/1926), who is being seen today for an episodic care visit.  HPI information obtained from: facility chart records, facility staff, patient report and [facility] chart review.     Today's concern is:    Chronic continuous use of opioids  Spinal stenosis of lumbar region  Hx lumbar spinal stenosis. Patient has been using chronic use of norco prn approx 1-2 times/day. Patient reports pain is currently controlled.    CHF  CKD stage 3  Bilateral lower extremity edema  Hx CHF, pulmonary hypertension, s/p mitral valve clip with residual severe mitral regurgitation. Last Echo 7/5 found EF 70-75%, severe residual MR, and significant pulmonary hypertension. Currently taking metoprolol and simvastatin, and torsemide 20mg BID. During exam, patient reports having difficulty swallowing potassium chloride. Also reports BLE edema has significantly improved. Patient requesting decrease in diuretics due to urinary frequency. Denies chest pain, SOB, headaches, syncope. Creat baseline 1.3-1.6. Last creat 1.51 on 8/12. Patient to follow-up appt with Cardiologist as directed; Cardiology clinic placed referral to palliative care.               Past Medical and Surgical History reviewed in Epic today.    MEDICATIONS:  Current Outpatient Medications   Medication Sig Dispense Refill     artificial saliva (BIOTENE DRY MOUTHWASH) LIQD liquid Swish and spit 15 mLs in mouth 3 times daily       artificial saliva (BIOTENE MT) SOLN solution Swish and spit 1 spray in mouth every 2 hours as needed for dry mouth       beta carotene 83951 UNIT capsule Take 1 capsule (25,000 Units) by mouth daily       ferrous sulfate (FEROSUL) 325 (65 Fe) MG tablet Take 325 mg  "by mouth daily (with breakfast)       finasteride (PROSCAR) 5 MG tablet TAKE 1 TABLET(5 MG) BY MOUTH DAILY 90 tablet 3     GABAPENTIN PO Take 300 mg by mouth At Bedtime       HYDROcodone-acetaminophen (NORCO) 7.5-325 MG per tablet Take 1 tablet by mouth every 6 hours as needed for pain maximum 4 tablet(s) per day 60 tablet 0     hypromellose (ARTIFICIAL TEARS) 0.4 % SOLN ophthalmic solution Place 2 drops Into the left eye 2 times daily        melatonin 3 MG tablet Take 3 mg by mouth At Bedtime       metoprolol tartrate (LOPRESSOR) 25 MG tablet Take 0.5 tablets (12.5 mg) by mouth 2 times daily       multivitamin w/minerals (THERA-VIT-M) tablet Take 1 tablet by mouth daily       pantoprazole (PROTONIX) 40 MG EC tablet Take 40 mg by mouth daily       POTASSIUM CHLORIDE ER PO Take 20 mEq by mouth daily       simvastatin (ZOCOR) 20 MG tablet TAKE ONE TABLET BY MOUTH AT BEDTIME 30 tablet 0     Skin Protectants, Misc. (EUCERIN) cream Apply topically 2 times daily And as needed. Apply to LE and areas of dryness       tamsulosin (FLOMAX) 0.4 MG capsule TAKE ONE CAPSULE BY MOUTH DAILY 90 capsule 3     torsemide (DEMADEX) 20 MG tablet Take 1 tablet (20 mg) by mouth 2 times daily 90 tablet 1     vitamin C 500 MG TABS Take 1 tablet (500 mg) by mouth daily       zinc sulfate (ZINCATE) 220 (50 Zn) MG capsule Take 1 capsule (220 mg) by mouth daily             REVIEW OF SYSTEMS:  4 point ROS including Respiratory, CV, GI and , other than that noted in the HPI,  is negative      Objective:  /67   Pulse 72   Temp 97.5  F (36.4  C)   Resp 18   Ht 1.727 m (5' 8\")   Wt 57.7 kg (127 lb 4.8 oz)   SpO2 99%   BMI 19.36 kg/m    Exam:  GENERAL APPEARANCE:  Alert, in no distress, appears healthy, oriented, cooperative  RESP:  respiratory effort and palpation of chest normal, lungs clear to auscultation , no respiratory distress  CV:  Palpation and auscultation of heart done , regular rate and rhythm, no murmur, rub, or gallop, +2 " pedal pulses, edema BLE, +JVD  ABDOMEN:  normal bowel sounds, soft, nontender, no hepatosplenomegaly or other masses, no guarding or rebound  M/S:   Gait and station abnormal, requires assistance with activity and ADLs. Digits and nails normal  SKIN:  Inspection of skin and subcutaneous tissue baseline, Palpation of skin and subcutaneous tissue baseline  NEURO:   Cranial nerves 2-12 are normal tested and grossly at patient's baseline, Examination of sensation by touch normal  PSYCH:  oriented X 3, affect and mood normal      Labs:   Labs done in SNF are in [facility]. Please refer to them using EPIC/Care Everywhere., Recent labs in EPIC reviewed by me today.  and Most Recent 3 CBC's:  Recent Labs   Lab Test 08/20/19  0615 08/01/19  0755 07/16/19  0600 07/11/19  0952   WBC 3.2*  --  3.9* 4.1   HGB 10.0* 10.3* 10.2* 10.2*   MCV 96  --  95 93   PLT 75*  --  85* 69*     Most Recent 3 BMP's:  Recent Labs   Lab Test 08/29/19  0618 08/20/19  0615 08/12/19  0650    141 142   POTASSIUM 3.8 3.9 3.4   CHLORIDE 99 103 103   CO2 35* 33* 33*   BUN 42* 43* 43*   CR 1.37* 1.41* 1.51*   ANIONGAP 4 5 6   GIUSEPPE 8.8 8.6 8.7   GLC 74 68* 63*       Wt Readings from Last 4 Encounters:   08/28/19 57.7 kg (127 lb 4.8 oz)   08/22/19 59.1 kg (130 lb 3.2 oz)   08/13/19 60.3 kg (133 lb)   08/07/19 62.8 kg (138 lb 6.4 oz)           ASSESSMENT/PLAN:    (F11.90) Chronic, continuous use of opioids  (M48.061) Spinal stenosis of lumbar region, unspecified whether neurogenic claudication present  Comment: Chronic use of narcotics r/t lumbar stenosis/chronic back pain  Plan: Refill norco prn. Discussed recommendations to wean norco as tolerated. Recommending to follow-up with Ortho for alternative options for back pain.     (I50.32) Chronic diastolic congestive heart failure (H)  (primary encounter diagnosis)  (N18.3) CKD (chronic kidney disease) stage 3, GFR 30-59 ml/min (H)  (R60.0) Bilateral leg edema  Comment: Chronic BLE edema r/t CHF,  severe MR. Last Echo 7/5 found EF 70-75%, severe residual MR, and significant pulmonary hypertension. Creat at baseline. Chronic BLE edema, weight stable and trending down slighlty.   Plan: Check BMP, BNP 9/4. Continue plan of care. Educated patient regarding importance of continuing current dose of torsemide. Monitor weights, BP/HR. Patient has follow-up appt with [facility] on 9/10. Palliative care appt 09/2019. Cardiologist appt 11/21/19.           Electronically signed by:  FRANK Pugh CNP

## 2019-08-29 PROBLEM — F11.90 CHRONIC, CONTINUOUS USE OF OPIOIDS: Status: ACTIVE | Noted: 2019-01-01

## 2019-09-05 NOTE — PROGRESS NOTES
[facility]    [place] [facility] Record Number:  [id number]  Place of Service where encounter took place:  [facility] [[id number]]  Chief Complaint   Patient presents with     Nursing Home Acute       HPI:    Ivan Gomez  is a 89 year old (10/12/1926), who is being seen today for an episodic care visit.  HPI information obtained from: facility chart records, facility staff, patient report and [facility] chart review.     Today's concern is:    CHF  CKD stage 3  Bilateral lower extremity edema  Hx CHF, pulmonary hypertension, s/p mitral valve clip with residual severe mitral regurgitation. Last Echo 7/5 found EF 70-75%, severe residual MR, and significant pulmonary hypertension. Currently taking metoprolol and simvastatin, and torsemide 20mg BID. During exam, patient reports BLE edema has been the same. Patient requesting decrease in diuretics due to urinary frequency. Denies chest pain, SOB, headaches, syncope. Creat baseline 1.3-1.6. Last creat 1.40 on 9/4. Patient to follow-up appt with Cardiologist on 9/10.            Past Medical and Surgical History reviewed in Epic today.    MEDICATIONS:  Current Outpatient Medications   Medication Sig Dispense Refill     artificial saliva (BIOTENE DRY MOUTHWASH) LIQD liquid Swish and spit 15 mLs in mouth 3 times daily       beta carotene 37667 UNIT capsule Take 1 capsule (25,000 Units) by mouth daily       ferrous sulfate (FEROSUL) 325 (65 Fe) MG tablet Take 325 mg by mouth daily (with breakfast)       finasteride (PROSCAR) 5 MG tablet TAKE 1 TABLET(5 MG) BY MOUTH DAILY 90 tablet 3     GABAPENTIN PO Take 300 mg by mouth At Bedtime       HYDROcodone-acetaminophen (NORCO) 7.5-325 MG per tablet Take 1 tablet by mouth every 6 hours as needed for pain maximum 4 tablet(s) per day 60 tablet 0     hypromellose (ARTIFICIAL TEARS) 0.4 % SOLN ophthalmic solution Place 2 drops Into the left eye 2 times daily        melatonin 3 MG tablet  "Take 3 mg by mouth At Bedtime       metoprolol tartrate (LOPRESSOR) 25 MG tablet Take 0.5 tablets (12.5 mg) by mouth 2 times daily       multivitamin w/minerals (THERA-VIT-M) tablet Take 1 tablet by mouth daily       pantoprazole (PROTONIX) 40 MG EC tablet Take 40 mg by mouth daily       POTASSIUM CHLORIDE ER PO Take 40 mEq by mouth daily        simvastatin (ZOCOR) 20 MG tablet TAKE ONE TABLET BY MOUTH AT BEDTIME 30 tablet 0     Skin Protectants, Misc. (EUCERIN) cream Apply topically 2 times daily And as needed. Apply to LE and areas of dryness       tamsulosin (FLOMAX) 0.4 MG capsule TAKE ONE CAPSULE BY MOUTH DAILY 90 capsule 3     torsemide (DEMADEX) 20 MG tablet Take 1 tablet (20 mg) by mouth 2 times daily 90 tablet 1     vitamin C 500 MG TABS Take 1 tablet (500 mg) by mouth daily       zinc sulfate (ZINCATE) 220 (50 Zn) MG capsule Take 1 capsule (220 mg) by mouth daily       artificial saliva (BIOTENE MT) SOLN solution Swish and spit 1 spray in mouth every 2 hours as needed for dry mouth             REVIEW OF SYSTEMS:  4 point ROS including Respiratory, CV, GI and , other than that noted in the HPI,  is negative      Objective:  /69   Pulse 81   Temp 97.8  F (36.6  C)   Resp 18   Ht 1.727 m (5' 8\")   Wt 59.6 kg (131 lb 8 oz)   SpO2 97%   BMI 19.99 kg/m    Exam:  GENERAL APPEARANCE:  Alert, in no distress, appears healthy, oriented, cooperative  RESP:  respiratory effort and palpation of chest normal, lungs clear to auscultation , no respiratory distress  CV:  Palpation and auscultation of heart done , regular rate and rhythm, no murmur, rub, or gallop, +2 pedal pulses, edema BLE, +JVD  ABDOMEN:  normal bowel sounds, soft, nontender, no hepatosplenomegaly or other masses, no guarding or rebound  M/S:   Gait and station abnormal, requires assistance with activity and ADLs. Digits and nails normal  SKIN:  Inspection of skin and subcutaneous tissue baseline, Palpation of skin and subcutaneous tissue " baseline  NEURO:   Cranial nerves 2-12 are normal tested and grossly at patient's baseline, Examination of sensation by touch normal  PSYCH:  oriented X 3, affect and mood normal      Labs:   [facility]. Please refer to them using [facility] Everywhere., Recent labs in EPIC reviewed by me today.  and   Most Recent 3 CBC's:  Recent Labs   Lab Test 08/20/19  0615 08/01/19  0755 07/16/19  0600 07/11/19  0952   WBC 3.2*  --  3.9* 4.1   HGB 10.0* 10.3* 10.2* 10.2*   MCV 96  --  95 93   PLT 75*  --  85* 69*     Most Recent 3 BMP's:  Recent Labs   Lab Test 09/04/19  0645 08/29/19  0618 08/20/19  0615    138 141   POTASSIUM 3.3* 3.8 3.9   CHLORIDE 97 99 103   CO2 32 35* 33*   BUN 35* 42* 43*   CR 1.40* 1.37* 1.41*   ANIONGAP 6 4 5   GIUSEPPE 8.9 8.8 8.6   GLC 66* 74 68*       Wt Readings from Last 4 Encounters:   09/05/19 59.6 kg (131 lb 8 oz)   08/28/19 57.7 kg (127 lb 4.8 oz)   08/22/19 59.1 kg (130 lb 3.2 oz)   08/13/19 60.3 kg (133 lb)         ASSESSMENT/PLAN:    (I50.32) Chronic diastolic congestive heart failure (H)  (primary encounter diagnosis)  (N18.3) CKD (chronic kidney disease) stage 3, GFR 30-59 ml/min (H)  (R60.0) Bilateral leg edema  Comment: Chronic BLE edema r/t CHF, severe MR. Last Echo 7/5 found EF 70-75%, severe residual MR, and significant pulmonary hypertension. Creat at baseline. Chronic BLE edema, weight stable and trending down slighlty. Hypokalemia r/t use of torsemide and noncompliance with KCl supplements.   Plan: Recently increased KCl to 40meq every day; patient reports refusing KCl tabs periodically. Per chart review, patient has missed dose of KCl today and has missed 2 doses of torsemide the past week due to refusal. Change KCl to 20meq qAM. Continue torsemide 20mg BID. Patient has Cardiology follow-up apt on 9/11. Monitor weights, BP/HR. Patient has follow-up appt with [facility] on 9/10. Palliative care appt 09/2019. Cardiologist appt 11/21/19.               Electronically signed by:  FRANK Pugh CNP

## 2019-09-11 NOTE — PATIENT INSTRUCTIONS
Call [facility] nurse for any questions or concerns Mon-Fri 8am-4pm:                             #[phone]                     For concerns after hours:                            #[phone]     1: Medication changes: DECREASE torsemide 20mg (1 tablet) daily   2: Plan from today: follow up with Dr. Fox in a couple weeks and Dr. Yao in 2 months. Please call for weight gain > 3 pounds over night or worsening swelling or breathing.   3: Lab results: see attached; stable kidney function and electrolytes.   Component      Latest Ref Rng & Units 8/29/2019 9/4/2019   Sodium      133 - 144 mmol/L 138 135   Potassium      3.4 - 5.3 mmol/L 3.8 3.3 (L)   Chloride      94 - 109 mmol/L 99 97   Carbon Dioxide      20 - 32 mmol/L 35 (H) 32   Anion Gap      3 - 14 mmol/L 4 6   Glucose      70 - 99 mg/dL 74 66 (L)   Urea Nitrogen      7 - 30 mg/dL 42 (H) 35 (H)   Creatinine      0.66 - 1.25 mg/dL 1.37 (H) 1.40 (H)   GFR Estimate      >60 mL/min/1.73:m2 44 (L) 43 (L)   GFR Estimate If Black      >60 mL/min/1.73:m2 51 (L) 50 (L)   Calcium      8.5 - 10.1 mg/dL 8.8 8.9   N-Terminal Pro Bnp      0 - 450 pg/mL  6,283 (H)

## 2019-09-11 NOTE — PROGRESS NOTES
Cardiology Clinic Progress Note  Ivan Goemz MRN# [id number]   YOB: 1926 Age: 89 year old   Primary Cardiologist: Dr. Yao Reason for visit: CORE follow up             Assessment and Plan:   Ivan Gomez is a very pleasant 89 year old male with a history of chronic diastolic heart failure, right heart failure, pulmonary hypertension, mitral regurgitation s/p mitul clip x 2 - with residual severe MR, tricuspid regurgitation, HTN, and history of bladder cancer. Patient here for CORE follow up.       1.  Chronic diastolic heart failure/HFpEF, right heart failure - normal LV size, EF 70-75%. RV moderately dilated and RV moderately reduced systolic function. Patient appears compensated and euvolemic on exam (elevated JVD - known TR). Facility weights not available for review today but clinic scale today shows weight down 10# in past month.  Kidney function stable (creatinine 1.4 last week), potassium 3.3 (facility provider increased potassium supplement). Patient very frustrated with torsemide, noting he refuses most afternoon dosages of torsemide. Given 10# weight loss and continued improvement in HF symptoms will trial decrease in torsemide to 20mg daily.    - NYHA class III-IV, stage C   - Fluid status : euvolemic   - Diuretic regimen : DECREASE torsemide 20mg daily   - Aldosterone antagonist : will consider in the future if HF symptoms worsen, if kidney function allows.    - Blood pressure : controlled   - HF education reinforced, including low sodium diet and when to notify CORE clinic.   2. Chronic kidney disease -  Labs today show stable kidney function (creatinine 1.4).   3. Mitral regurgitation s/p mitul clip x 2 10/2017- with residual severe MR  4. Tricuspid regurgitation moderately severe tricuspid regurgitation  5. Atrial fibrillation - stable, rate controlled, appears to be in sinus rhythm today, patient noted to have episode of atrial fibrillation with RVR on presentation to  "ED, he spontaneously converted out of atrial fibrillation in the emergency room.    - [id number] score at least 3 (age, HTN)   - no anticoagulation due to advanced age, thrombocytopenia, risks felt to out weigh benefits.   6. Hypertension - controlled  7. Severe pulmonary hypertension  8. Anemia - hemoglobin 10 8/20/19, stable  9. Advance care planning   - Introduced role of palliative care, patient wishes to meet with Dr. Fox, palliative care referral placed   - Explored goals of care, patient appears to have better insight on his current health status, reviewed code status and he wishes to be DNR/DNI. At this point patient would still like to return to the hospital for cares if needed   - Patient notes he has no close family to elect as health care decision maker, his partner (Bryn) recently passed away 6/24/19. He has an older brother and a nephew but states they wouldn't be able to be his decision maker.    - During hospital stay goals of care were reviewed, \"Poor prognosis, high likelihood for rehospitalizations, and short period of time between hospitalizations have been reviewed with the patient. He lacks insight into his medical comorbidities, and wants to remain FULL CODE with restorative cares\"      Changes today: DECREASE torsemide 20mg daily    Follow up plan:     Palliative care appt with Dr. Fox 9/24/2019    Follow up with Dr. Yao 11/21/19    CORE RN to call facility in 1 week to check on weight after decrease in torsemide today.         History of Presenting Illness:    Ivan Gomez is a very pleasant 89 year old male with a history of chronic diastolic heart failure, right heart failure, pulmonary hypertension, mitral regurgitation s/p mitul clip x 2 - with residual severe MR, tricuspid regurgitation, HTN, and history of bladder cancer.    Patient has followed regularly in our cardiology practice with Dr. Yao for the last couple years. Patient was hospitalized " 7/4/19-7/13/19 due to acute diastolic heart failure exacerbation and new onset atrial fibrillation with RVR. Patient spontaneously converted out of atrial fibrillation in the emergency room. Chest CT showed bilateral patchy airspace and groundglass opacities, small bilateral pleural effusions, and pulmonary edema. NTproBNP 17,137. Echocardiogram 7/5/19 demonstrated stable position of mitraclip devices, moderately-severe residual mitral regurgitation, no significant mitral stenosis, massively dilated LA, severely dilated RA, moderately severe TR, normal LV size, EF 70-75%. RV moderately dilated and RV moderately reduced systolic function. Diuresed with bumetanide gtt, transitioned to torsemide 20mg BID at discharge. At discharge patient was noted to have persisting +1 lower extremity edema and gut edema. Admission weight 145-149#. Discharge weight 134#. CORE referral was placed at discharge. Prior to this patient was hospitalized in May 5/21-5/28/19 transferred from outside hospital, found to have acute cholecystitis s/p cholecystectomy 5/22.     I first met patient in July 2019 for CORE enrollment after hospitalization. At that time his torsemide was increased to 20mg BID and he was referred to palliative care. During last CORE visit in August patient was felt to be slightly volume up but was resistant to adjustments in his diuretic regimen. No medications were changed and he was scheduled for 1 month CORE follow up.     Patient is here today for CORE follow up.      Patient reports feeling good. Feeling fatigued. Monitoring weights daily at facility. Weight today 131#, which is down 9# since last clinic visit. Facility did not send weights today, patient states weight today at facility 128#. States lower extremity edema has resolved. Patient states he is feeling stronger, now able to walk to the leisure room with his walker. Denies exertional dyspnea. Notes that activity is limited by legs getting tired. Denies  shortness of breath at rest. Using walker for assistance. Denies exertional dyspnea with current levels of activity, noting lifestyle is sedentary. Denies abdominal distention. Sleeping okay. Denies PND/orthopnea. Patient denies chest pain or chest tightness. Denies dizziness, lightheadedness or other presyncopal symptoms. Denies tachycardia or palpitations. Denies episodes of bleeding. [facility], patient is extremely frustrated that nothing has happened yet and that he is still living at [facility]. Taking medications daily, patient notes he is refusing afternoon torsemide most days.     Labs from 9/4/19 stable kidney function (creatinine 1.4), potassium 3.3, otherwise electrolytes stable. NTproBNP down from 10K 8/2019 -> 6K 9/4/19. Blood pressure 128/78 and HR 76 in clinic today. Facility notes that patients potassium was recently increased to 9/5/19 by facility provider. Patient is also noted to periodically refuse doses.     Appetite okay, states he is typically eating a good breakfast. Doesn't love the food at [facility], frustrated that there is no variety with the menu. Not adding salt to foods. No set exercise routine, lifestyle sedentary, notes he is walking more with his walker. Denies alcohol use. Denies tobacco use.         Recent Hospitalizations   7/4/19-7/13/19 due to acute diastolic heart failure exacerbation and new onset atrial fibrillation with RVR. Patient spontaneously converted out of atrial fibrillation in the emergency room. Chest CT showed bilateral patchy airspace and groundglass opacities, small bilateral pleural effusions, and pulmonary edema. NTproBNP 17,137. Echocardiogram 7/5/19 demonstrated stable position of mitraclip devices, moderately-severe residual mitral regurgitation, no significant mitral stenosis, massively dilated LA, severely dilated RA, moderately severe TR, normal LV size, EF 70-75%. RV moderately dilated and RV moderately  reduced systolic function. Diuresed with bumetanide gtt, transitioned to torsemide 20mg BID at discharge. At discharge patient was noted to have persisting +1 lower extremity edema and gut edema. Admission weight 145-149#. Discharge weight 134#.   5/21-5/28/19 transferred from outside hospital, found to have acute cholecystitis s/p cholecystectomy 5/22.         Social History    His partner passed away in June 2019, son that he doesn't communicate with. Older brother in [place]. Nephew in [place].   Social History     Socioeconomic History     Marital status: Single     Spouse name: Not on file     Number of children: Not on file     Years of education: Not on file     Highest education level: Not on file   Occupational History     Not on file   Social Needs     Financial resource strain: Not on file     Food insecurity:     Worry: Not on file     Inability: Not on file     Transportation needs:     Medical: Not on file     Non-medical: Not on file   Tobacco Use     Smoking status: Former Smoker     Smokeless tobacco: Never Used   Substance and Sexual Activity     Alcohol use: No     Alcohol/week: 0.0 oz     Comment: doesnt use anymore     Drug use: No     Sexual activity: Never   Lifestyle     Physical activity:     Days per week: Not on file     Minutes per session: Not on file     Stress: Not on file   Relationships     Social connections:     Talks on phone: Not on file     Gets together: Not on file     Attends [employer] service: Not on file     Active member of club or organization: Not on file     Attends meetings of clubs or organizations: Not on file     Relationship status: Not on file     Intimate partner violence:     Fear of current or ex partner: Not on file     Emotionally abused: Not on file     Physically abused: Not on file     Forced sexual activity: Not on file   Other Topics Concern     Parent/sibling w/ CABG, MI or angioplasty before 65F 55M? No   Social History Narrative     Not on file  "           Review of Systems:   Skin:  Negative     Eyes:  Negative    ENT:  Negative    Respiratory:  Negative    Cardiovascular:  Negative    Gastroenterology: Negative    Genitourinary:  Positive for urinary frequency;prostate problem  Musculoskeletal:  Positive for arthritis  Neurologic:  Positive for numbness or tingling of feet  Psychiatric:  Positive for    Heme/Lymph/Imm:  Positive for easy bruising  Endocrine:  Negative           Physical Exam:   Vitals: /78   Pulse 76   Ht 1.727 m (5' 8\")   Wt 59.4 kg (131 lb)   SpO2 96%   BMI 19.92 kg/m     Wt Readings from Last 4 Encounters:   09/11/19 59.4 kg (131 lb)   09/05/19 59.6 kg (131 lb 8 oz)   08/28/19 57.7 kg (127 lb 4.8 oz)   08/22/19 59.1 kg (130 lb 3.2 oz)     GEN: elderly, frail  HEENT:  Pupils equal, round. Sclerae nonicteric.   NECK: Supple, no masses appreciated. JVP elevated to jaw  C/V:  Regular rate and rhythm, 3/6 systolic murmur  RESP: Respirations are unlabored. Clear to auscultation bilaterally, diminished in bases.   GI: Abdomen soft, nontender.  EXTREM: trace LE edema to bilateral lower extremities, tightness noted to decrease in bilateral lower extremities  NEURO: Alert and oriented, cooperative.  SKIN: Warm and dry.        Data:   ECHO 7/5/2019  Sinus rhythm was noted.  Status post transcatheter mitral valve repair with MitraClip x 2, 10/16/2017.     1. Stable position of the MitraClip devices.  2. At least, moderately severe residual mitral regurgitation. Eccentric jet,  anteriorly directed, reaching the superior surface of the left atrium.  3. No significant mitral stenosis. Mean diastolic mitral valve gradient is 3.3  mmHg (heart rate 73 bpm).  4. Massively dilated left atrium. Severely dilated right atrium.  5. Moderately severe (3+) tricuspid regurgitation. Findings consistent with  significant pulmonary hypertension. Estimated pulmonary artery systolic  pressure is 65-70 mmHg.  6. Normal left ventricular size, hyperdynamic " systolic function, estimated  LVEF 70-75%.  7. Moderately dilated right ventricle with moderately reduced systolic  function. TAPSE 1.3 - 1.6 cm, tissue Doppler systolic velocity 9 cm/s.  8. Dilated inferior vena cava.     No significant changes compared to previous study dated 5/23/2019.    LIPID RESULTS:  Lab Results   Component Value Date    CHOL 108 10/12/2017    HDL 63 10/12/2017    LDL 29 10/12/2017    TRIG 81 10/12/2017    CHOLHDLRATIO 2.6 08/18/2015     LIVER ENZYME RESULTS:  Lab Results   Component Value Date    AST 11 07/08/2019    ALT 13 07/08/2019     CBC RESULTS:  Lab Results   Component Value Date    WBC 3.2 (L) 08/20/2019    RBC 3.37 (L) 08/20/2019    HGB 10.0 (L) 08/20/2019    HCT 32.4 (L) 08/20/2019    MCV 96 08/20/2019    MCH 29.7 08/20/2019    MCHC 30.9 (L) 08/20/2019    RDW 17.4 (H) 08/20/2019    PLT 75 (L) 08/20/2019     BMP RESULTS:  Lab Results   Component Value Date     09/04/2019    POTASSIUM 3.3 (L) 09/04/2019    CHLORIDE 97 09/04/2019    CO2 32 09/04/2019    ANIONGAP 6 09/04/2019    GLC 66 (L) 09/04/2019    BUN 35 (H) 09/04/2019    CR 1.40 (H) 09/04/2019    GFRESTIMATED 43 (L) 09/04/2019    GFRESTBLACK 50 (L) 09/04/2019    GIUSEPPE 8.9 09/04/2019      A1C RESULTS:  Lab Results   Component Value Date    A1C 5.1 07/17/2019     INR RESULTS:  Lab Results   Component Value Date    INR 1.26 (H) 07/04/2019    INR 1.15 (H) 10/16/2017            Medications     Current Outpatient Medications   Medication Sig Dispense Refill     artificial saliva (BIOTENE DRY MOUTHWASH) LIQD liquid Swish and spit 15 mLs in mouth 3 times daily       artificial saliva (BIOTENE MT) SOLN solution Swish and spit 1 spray in mouth every 2 hours as needed for dry mouth       beta carotene 29235 UNIT capsule Take 1 capsule (25,000 Units) by mouth daily       ferrous sulfate (FEROSUL) 325 (65 Fe) MG tablet Take 325 mg by mouth daily (with breakfast)       finasteride (PROSCAR) 5 MG tablet TAKE 1 TABLET(5 MG) BY MOUTH DAILY  90 tablet 3     GABAPENTIN PO Take 300 mg by mouth At Bedtime       HYDROcodone-acetaminophen (NORCO) 7.5-325 MG per tablet Take 1 tablet by mouth every 6 hours as needed for pain maximum 4 tablet(s) per day 60 tablet 0     hypromellose (ARTIFICIAL TEARS) 0.4 % SOLN ophthalmic solution Place 2 drops Into the left eye 2 times daily        melatonin 3 MG tablet Take 3 mg by mouth At Bedtime       metoprolol tartrate (LOPRESSOR) 25 MG tablet Take 0.5 tablets (12.5 mg) by mouth 2 times daily       multivitamin w/minerals (THERA-VIT-M) tablet Take 1 tablet by mouth daily       pantoprazole (PROTONIX) 40 MG EC tablet Take 40 mg by mouth daily       potassium chloride ER (K-TAB/KLOR-CON) 10 MEQ CR tablet Take 10 mEq by mouth 2 tabs (20 mEq) QD       simvastatin (ZOCOR) 20 MG tablet TAKE ONE TABLET BY MOUTH AT BEDTIME 30 tablet 0     Skin Protectants, Misc. (EUCERIN) cream Apply topically 2 times daily And as needed. Apply to LE and areas of dryness       tamsulosin (FLOMAX) 0.4 MG capsule TAKE ONE CAPSULE BY MOUTH DAILY 90 capsule 3     torsemide (DEMADEX) 20 MG tablet Take 1 tablet (20 mg) by mouth 2 times daily 90 tablet 1     vitamin C 500 MG TABS Take 1 tablet (500 mg) by mouth daily       zinc sulfate (ZINCATE) 220 (50 Zn) MG capsule Take 1 capsule (220 mg) by mouth daily            Past Medical History     Past Medical History:   Diagnosis Date     Arthritis     Spinal Stenosis     Former smoker      Heart disease      Hemorrhoids      Hypercholesteremia      Hypertension      Malignant neoplasm (H)     skin CA, Basal cell     Other chronic pain      Renal disease      Past Surgical History:   Procedure Laterality Date     BACK SURGERY       BIOPSY      face, skin cancer     BIOPSY  8/2016    shoulder lesion     CYSTOSCOPY, TRANSURETHRAL RESECTION (TUR) PROSTATE, COMBINED N/A 8/21/2015    Procedure: COMBINED CYSTOSCOPY, TRANSURETHRAL RESECTION (TUR) PROSTATE;  Surgeon: Dennis Hilton MD;  Location:  OR      CYSTOSCOPY, TRANSURETHRAL RESECTION (TUR) TUMOR BLADDER, COMBINED N/A 9/2/2016    Procedure: COMBINED CYSTOSCOPY, TRANSURETHRAL RESECTION (TUR) TUMOR BLADDER;  Surgeon: Dennis Hilton MD;  Location:  OR     CYSTOSCOPY, TRANSURETHRAL RESECTION (TUR) TUMOR BLADDER, COMBINED N/A 11/2/2018    Procedure: Transurethral resection of bladder tumor > 5 cm in size;  Surgeon: Dennis Hilton MD;  Location:  OR     ESOPHAGOSCOPY, GASTROSCOPY, DUODENOSCOPY (EGD), COMBINED N/A 5/24/2019    Procedure: ESOPHAGOGASTRODUODENOSCOPY, WITH BIOPSY;  Surgeon: Abe Jang MD;  Location: [facility]     LAPAROSCOPIC CHOLECYSTECTOMY WITH CHOLANGIOGRAMS N/A 5/22/2019    Procedure: CHOLECYSTECTOMY, LAPAROSCOPIC, WITH CHOLANGIOGRAM;  Surgeon: Rey Romero MD;  Location:  OR     ORTHOPEDIC SURGERY      lumbar and cervical surgery, Sep, 2008, knee surgery     PERCUTANEOUS MITRAL VALVE REPAIR N/A 10/16/2017    Procedure: PERCUTANEOUS MITRAL VALVE REPAIR ANESTHESIA;  Percutaneous MitraClip ;  Surgeon: Eber Monroe MD;  Location: [facility] OR     PICC INSERTION Right 10/14/2017    5fr DL Bard PICC, 40cm (1cm external) in the R basilic vein w/ tip in the mid SVC.     Family History   Problem Relation Age of Onset     Cancer Son 64        leukemia ? due to agent orange     Family History Negative Son             Allergies   Celebrex [celecoxib] and Penicillins        FRANK [facility]  Pager: [phone]

## 2019-09-11 NOTE — LETTER
9/11/2019    Evaristo Magaña MD  [address]  [facility] [address]    RE: Ivan Gomez       Dear Colleague,    I had the pleasure of seeing Ivan Gomez in the [facility].    Cardiology Clinic Progress Note  Ivan Gomez MRN# [id number]   YOB: 1926 Age: 89 year old   Primary Cardiologist: Dr. Yao Reason for visit: CORE follow up             Assessment and Plan:   Ivan Gomez is a very pleasant 89 year old male with a history of chronic diastolic heart failure, right heart failure, pulmonary hypertension, mitral regurgitation s/p mitul clip x 2 - with residual severe MR, tricuspid regurgitation, HTN, and history of bladder cancer. Patient here for CORE follow up.       1.  Chronic diastolic heart failure/HFpEF, right heart failure - normal LV size, EF 70-75%. RV moderately dilated and RV moderately reduced systolic function. Patient appears compensated and euvolemic on exam (elevated JVD - known TR). Facility weights not available for review today but clinic scale today shows weight down 10# in past month.  Kidney function stable (creatinine 1.4 last week), potassium 3.3 (facility provider increased potassium supplement). Patient very frustrated with torsemide, noting he refuses most afternoon dosages of torsemide. Given 10# weight loss and continued improvement in HF symptoms will trial decrease in torsemide to 20mg daily.    - NYHA class III-IV, stage C   - Fluid status : euvolemic   - Diuretic regimen : DECREASE torsemide 20mg daily   - Aldosterone antagonist : will consider in the future if HF symptoms worsen, if kidney function allows.    - Blood pressure : controlled   - HF education reinforced, including low sodium diet and when to notify CORE clinic.   2. Chronic kidney disease -  Labs today show stable kidney function (creatinine 1.4).   3. Mitral regurgitation s/p mitul clip x 2 10/2017- with residual severe MR  4.  "Tricuspid regurgitation moderately severe tricuspid regurgitation  5. Atrial fibrillation - stable, rate controlled, appears to be in sinus rhythm today, patient noted to have episode of atrial fibrillation with RVR on presentation to ED, he spontaneously converted out of atrial fibrillation in the emergency room.    - [id number] score at least 3 (age, HTN)   - no anticoagulation due to advanced age, thrombocytopenia, risks felt to out weigh benefits.   6. Hypertension - controlled  7. Severe pulmonary hypertension  8. Anemia - hemoglobin 10 8/20/19, stable  9. Advance care planning   - Introduced role of palliative care, patient wishes to meet with Dr. Fox, palliative care referral placed   - Explored goals of care, patient appears to have better insight on his current health status, reviewed code status and he wishes to be DNR/DNI. At this point patient would still like to return to the hospital for cares if needed   - Patient notes he has no close family to elect as health care decision maker, his partner (Bryn) recently passed away 6/24/19. He has an older brother and a nephew but states they wouldn't be able to be his decision maker.    - During hospital stay goals of care were reviewed, \"Poor prognosis, high likelihood for rehospitalizations, and short period of time between hospitalizations have been reviewed with the patient. He lacks insight into his medical comorbidities, and wants to remain FULL CODE with restorative cares\"      Changes today: DECREASE torsemide 20mg daily    Follow up plan:     Palliative care appt with Dr. Fox 9/24/2019    Follow up with Dr. Yao 11/21/19    CORE RN to call facility in 1 week to check on weight after decrease in torsemide today.         History of Presenting Illness:    Ivan Gomez is a very pleasant 89 year old male with a history of chronic diastolic heart failure, right heart failure, pulmonary hypertension, mitral regurgitation s/p mitul clip x " 2 - with residual severe MR, tricuspid regurgitation, HTN, and history of bladder cancer.    Patient has followed regularly in our cardiology practice with Dr. Yao for the last couple years. Patient was hospitalized 7/4/19-7/13/19 due to acute diastolic heart failure exacerbation and new onset atrial fibrillation with RVR. Patient spontaneously converted out of atrial fibrillation in the emergency room. Chest CT showed bilateral patchy airspace and groundglass opacities, small bilateral pleural effusions, and pulmonary edema. NTproBNP 17,137. Echocardiogram 7/5/19 demonstrated stable position of mitraclip devices, moderately-severe residual mitral regurgitation, no significant mitral stenosis, massively dilated LA, severely dilated RA, moderately severe TR, normal LV size, EF 70-75%. RV moderately dilated and RV moderately reduced systolic function. Diuresed with bumetanide gtt, transitioned to torsemide 20mg BID at discharge. At discharge patient was noted to have persisting +1 lower extremity edema and gut edema. Admission weight 145-149#. Discharge weight 134#. CORE referral was placed at discharge. Prior to this patient was hospitalized in May 5/21-5/28/19 transferred from outside hospital, found to have acute cholecystitis s/p cholecystectomy 5/22.     I first met patient in July 2019 for CORE enrollment after hospitalization. At that time his torsemide was increased to 20mg BID and he was referred to palliative care. During last CORE visit in August patient was felt to be slightly volume up but was resistant to adjustments in his diuretic regimen. No medications were changed and he was scheduled for 1 month CORE follow up.     Patient is here today for CORE follow up.      Patient reports feeling good. Feeling fatigued. Monitoring weights daily at facility. Weight today 131#, which is down 9# since last clinic visit. Facility did not send weights today, patient states weight today at facility 128#.  States lower extremity edema has resolved. Patient states he is feeling stronger, now able to walk to the leisure room with his walker. Denies exertional dyspnea. Notes that activity is limited by legs getting tired. Denies shortness of breath at rest. Using walker for assistance. Denies exertional dyspnea with current levels of activity, noting lifestyle is sedentary. Denies abdominal distention. Sleeping okay. Denies PND/orthopnea. Patient denies chest pain or chest tightness. Denies dizziness, lightheadedness or other presyncopal symptoms. Denies tachycardia or palpitations. Denies episodes of bleeding. [facility], patient is extremely frustrated that nothing has happened yet and that he is still living at [facility]. Taking medications daily, patient notes he is refusing afternoon torsemide most days.     Labs from 9/4/19 stable kidney function (creatinine 1.4), potassium 3.3, otherwise electrolytes stable. NTproBNP down from 10K 8/2019 -> 6K 9/4/19. Blood pressure 128/78 and HR 76 in clinic today. Facility notes that patients potassium was recently increased to 9/5/19 by facility provider. Patient is also noted to periodically refuse doses.     Appetite okay, states he is typically eating a good breakfast. Doesn't love the food at [facility], frustrated that there is no variety with the menu. Not adding salt to foods. No set exercise routine, lifestyle sedentary, notes he is walking more with his walker. Denies alcohol use. Denies tobacco use.         Recent Hospitalizations   7/4/19-7/13/19 due to acute diastolic heart failure exacerbation and new onset atrial fibrillation with RVR. Patient spontaneously converted out of atrial fibrillation in the emergency room. Chest CT showed bilateral patchy airspace and groundglass opacities, small bilateral pleural effusions, and pulmonary edema. NTproBNP 17,137. Echocardiogram 7/5/19 demonstrated stable position of mitraclip  devices, moderately-severe residual mitral regurgitation, no significant mitral stenosis, massively dilated LA, severely dilated RA, moderately severe TR, normal LV size, EF 70-75%. RV moderately dilated and RV moderately reduced systolic function. Diuresed with bumetanide gtt, transitioned to torsemide 20mg BID at discharge. At discharge patient was noted to have persisting +1 lower extremity edema and gut edema. Admission weight 145-149#. Discharge weight 134#.   5/21-5/28/19 transferred from outside hospital, found to have acute cholecystitis s/p cholecystectomy 5/22.         Social History    His partner passed away in June 2019, son that he doesn't communicate with. Older brother in [place]. Nephew in [place].   Social History     Socioeconomic History     Marital status: Single     Spouse name: Not on file     Number of children: Not on file     Years of education: Not on file     Highest education level: Not on file   Occupational History     Not on file   Social Needs     Financial resource strain: Not on file     Food insecurity:     Worry: Not on file     Inability: Not on file     Transportation needs:     Medical: Not on file     Non-medical: Not on file   Tobacco Use     Smoking status: Former Smoker     Smokeless tobacco: Never Used   Substance and Sexual Activity     Alcohol use: No     Alcohol/week: 0.0 oz     Comment: doesnt use anymore     Drug use: No     Sexual activity: Never   Lifestyle     Physical activity:     Days per week: Not on file     Minutes per session: Not on file     Stress: Not on file   Relationships     Social connections:     Talks on phone: Not on file     Gets together: Not on file     Attends Hinduism service: Not on file     Active member of club or organization: Not on file     Attends meetings of clubs or organizations: Not on file     Relationship status: Not on file     Intimate partner violence:     Fear of current or ex partner: Not on file     Emotionally  "abused: Not on file     Physically abused: Not on file     Forced sexual activity: Not on file   Other Topics Concern     Parent/sibling w/ CABG, MI or angioplasty before 65F 55M? No   Social History Narrative     Not on file            Review of Systems:   Skin:  Negative     Eyes:  Negative    ENT:  Negative    Respiratory:  Negative    Cardiovascular:  Negative    Gastroenterology: Negative    Genitourinary:  Positive for urinary frequency;prostate problem  Musculoskeletal:  Positive for arthritis  Neurologic:  Positive for numbness or tingling of feet  Psychiatric:  Positive for    Heme/Lymph/Imm:  Positive for easy bruising  Endocrine:  Negative           Physical Exam:   Vitals: /78   Pulse 76   Ht 1.727 m (5' 8\")   Wt 59.4 kg (131 lb)   SpO2 96%   BMI 19.92 kg/m      Wt Readings from Last 4 Encounters:   09/11/19 59.4 kg (131 lb)   09/05/19 59.6 kg (131 lb 8 oz)   08/28/19 57.7 kg (127 lb 4.8 oz)   08/22/19 59.1 kg (130 lb 3.2 oz)     GEN: elderly, frail  HEENT:  Pupils equal, round. Sclerae nonicteric.   NECK: Supple, no masses appreciated. JVP elevated to jaw  C/V:  Regular rate and rhythm, 3/6 systolic murmur  RESP: Respirations are unlabored. Clear to auscultation bilaterally, diminished in bases.   GI: Abdomen soft, nontender.  EXTREM: trace LE edema to bilateral lower extremities, tightness noted to decrease in bilateral lower extremities  NEURO: Alert and oriented, cooperative.  SKIN: Warm and dry.        Data:   ECHO 7/5/2019  Sinus rhythm was noted.  Status post transcatheter mitral valve repair with MitraClip x 2, 10/16/2017.     1. Stable position of the MitraClip devices.  2. At least, moderately severe residual mitral regurgitation. Eccentric jet,  anteriorly directed, reaching the superior surface of the left atrium.  3. No significant mitral stenosis. Mean diastolic mitral valve gradient is 3.3  mmHg (heart rate 73 bpm).  4. Massively dilated left atrium. Severely dilated right " atrium.  5. Moderately severe (3+) tricuspid regurgitation. Findings consistent with  significant pulmonary hypertension. Estimated pulmonary artery systolic  pressure is 65-70 mmHg.  6. Normal left ventricular size, hyperdynamic systolic function, estimated  LVEF 70-75%.  7. Moderately dilated right ventricle with moderately reduced systolic  function. TAPSE 1.3 - 1.6 cm, tissue Doppler systolic velocity 9 cm/s.  8. Dilated inferior vena cava.     No significant changes compared to previous study dated 5/23/2019.    LIPID RESULTS:  Lab Results   Component Value Date    CHOL 108 10/12/2017    HDL 63 10/12/2017    LDL 29 10/12/2017    TRIG 81 10/12/2017    CHOLHDLRATIO 2.6 08/18/2015     LIVER ENZYME RESULTS:  Lab Results   Component Value Date    AST 11 07/08/2019    ALT 13 07/08/2019     CBC RESULTS:  Lab Results   Component Value Date    WBC 3.2 (L) 08/20/2019    RBC 3.37 (L) 08/20/2019    HGB 10.0 (L) 08/20/2019    HCT 32.4 (L) 08/20/2019    MCV 96 08/20/2019    MCH 29.7 08/20/2019    MCHC 30.9 (L) 08/20/2019    RDW 17.4 (H) 08/20/2019    PLT 75 (L) 08/20/2019     BMP RESULTS:  Lab Results   Component Value Date     09/04/2019    POTASSIUM 3.3 (L) 09/04/2019    CHLORIDE 97 09/04/2019    CO2 32 09/04/2019    ANIONGAP 6 09/04/2019    GLC 66 (L) 09/04/2019    BUN 35 (H) 09/04/2019    CR 1.40 (H) 09/04/2019    GFRESTIMATED 43 (L) 09/04/2019    GFRESTBLACK 50 (L) 09/04/2019    GIUSEPPE 8.9 09/04/2019      A1C RESULTS:  Lab Results   Component Value Date    A1C 5.1 07/17/2019     INR RESULTS:  Lab Results   Component Value Date    INR 1.26 (H) 07/04/2019    INR 1.15 (H) 10/16/2017            Medications     Current Outpatient Medications   Medication Sig Dispense Refill     artificial saliva (BIOTENE DRY MOUTHWASH) LIQD liquid Swish and spit 15 mLs in mouth 3 times daily       artificial saliva (BIOTENE MT) SOLN solution Swish and spit 1 spray in mouth every 2 hours as needed for dry mouth       beta carotene 89011  UNIT capsule Take 1 capsule (25,000 Units) by mouth daily       ferrous sulfate (FEROSUL) 325 (65 Fe) MG tablet Take 325 mg by mouth daily (with breakfast)       finasteride (PROSCAR) 5 MG tablet TAKE 1 TABLET(5 MG) BY MOUTH DAILY 90 tablet 3     GABAPENTIN PO Take 300 mg by mouth At Bedtime       HYDROcodone-acetaminophen (NORCO) 7.5-325 MG per tablet Take 1 tablet by mouth every 6 hours as needed for pain maximum 4 tablet(s) per day 60 tablet 0     hypromellose (ARTIFICIAL TEARS) 0.4 % SOLN ophthalmic solution Place 2 drops Into the left eye 2 times daily        melatonin 3 MG tablet Take 3 mg by mouth At Bedtime       metoprolol tartrate (LOPRESSOR) 25 MG tablet Take 0.5 tablets (12.5 mg) by mouth 2 times daily       multivitamin w/minerals (THERA-VIT-M) tablet Take 1 tablet by mouth daily       pantoprazole (PROTONIX) 40 MG EC tablet Take 40 mg by mouth daily       potassium chloride ER (K-TAB/KLOR-CON) 10 MEQ CR tablet Take 10 mEq by mouth 2 tabs (20 mEq) QD       simvastatin (ZOCOR) 20 MG tablet TAKE ONE TABLET BY MOUTH AT BEDTIME 30 tablet 0     Skin Protectants, Misc. (EUCERIN) cream Apply topically 2 times daily And as needed. Apply to LE and areas of dryness       tamsulosin (FLOMAX) 0.4 MG capsule TAKE ONE CAPSULE BY MOUTH DAILY 90 capsule 3     torsemide (DEMADEX) 20 MG tablet Take 1 tablet (20 mg) by mouth 2 times daily 90 tablet 1     vitamin C 500 MG TABS Take 1 tablet (500 mg) by mouth daily       zinc sulfate (ZINCATE) 220 (50 Zn) MG capsule Take 1 capsule (220 mg) by mouth daily            Past Medical History     Past Medical History:   Diagnosis Date     Arthritis     Spinal Stenosis     Former smoker      Heart disease      Hemorrhoids      Hypercholesteremia      Hypertension      Malignant neoplasm (H)     skin CA, Basal cell     Other chronic pain      Renal disease      Past Surgical History:   Procedure Laterality Date     BACK SURGERY       BIOPSY      face, skin cancer     BIOPSY  8/2016     shoulder lesion     CYSTOSCOPY, TRANSURETHRAL RESECTION (TUR) PROSTATE, COMBINED N/A 8/21/2015    Procedure: COMBINED CYSTOSCOPY, TRANSURETHRAL RESECTION (TUR) PROSTATE;  Surgeon: Dennis Hilton MD;  Location:  OR     CYSTOSCOPY, TRANSURETHRAL RESECTION (TUR) TUMOR BLADDER, COMBINED N/A 9/2/2016    Procedure: COMBINED CYSTOSCOPY, TRANSURETHRAL RESECTION (TUR) TUMOR BLADDER;  Surgeon: Dennis Hilton MD;  Location: [facility] OR     CYSTOSCOPY, TRANSURETHRAL RESECTION (TUR) TUMOR BLADDER, COMBINED N/A 11/2/2018    Procedure: Transurethral resection of bladder tumor > 5 cm in size;  Surgeon: Dennis Hilton MD;  Location:  OR     ESOPHAGOSCOPY, GASTROSCOPY, DUODENOSCOPY (EGD), COMBINED N/A 5/24/2019    Procedure: ESOPHAGOGASTRODUODENOSCOPY, WITH BIOPSY;  Surgeon: Abe Jang MD;  Location:  GI     LAPAROSCOPIC CHOLECYSTECTOMY WITH CHOLANGIOGRAMS N/A 5/22/2019    Procedure: CHOLECYSTECTOMY, LAPAROSCOPIC, WITH CHOLANGIOGRAM;  Surgeon: Rey Romero MD;  Location:  OR     ORTHOPEDIC SURGERY      lumbar and cervical surgery, Sep, 2008, knee surgery     PERCUTANEOUS MITRAL VALVE REPAIR N/A 10/16/2017    Procedure: PERCUTANEOUS MITRAL VALVE REPAIR ANESTHESIA;  Percutaneous MitraClip ;  Surgeon: Eber Monroe MD;  Location: [facility] OR     PICC INSERTION Right 10/14/2017    5fr DL Bard PICC, 40cm (1cm external) in the R basilic vein w/ tip in the mid SVC.     Family History   Problem Relation Age of Onset     Cancer Son 64        leukemia ? due to agent orange     Family History Negative Son             Allergies   Celebrex [celecoxib] and Penicillins        FRANK Thomas CNP  [facility]  Pager: [phone]      Thank you for allowing me to participate in the care of your patient.      Sincerely,     FRANK Thomas CNP     [facility]    cc:   FRANK Chahal CNP  [address]  [place] [address]

## 2019-09-11 NOTE — LETTER
9/11/2019    Evaristo Magaña MD  [address]  [facility] [address]    RE: Ivan Gomez       Dear Colleague,    I had the pleasure of seeing Ivan Gomez in the [facility].    Cardiology Clinic Progress Note  Ivan Gomez MRN# [id number]   YOB: 1926 Age: 89 year old   Primary Cardiologist: Dr. Yao Reason for visit: CORE follow up             Assessment and Plan:   Ivan Gomez is a very pleasant 89 year old male with a history of chronic diastolic heart failure, right heart failure, pulmonary hypertension, mitral regurgitation s/p mitul clip x 2 - with residual severe MR, tricuspid regurgitation, HTN, and history of bladder cancer. Patient here for CORE follow up.       1.  Chronic diastolic heart failure/HFpEF, right heart failure - normal LV size, EF 70-75%. RV moderately dilated and RV moderately reduced systolic function. Patient appears compensated and euvolemic on exam (elevated JVD - known TR). Facility weights not available for review today but clinic scale today shows weight down 10# in past month.  Kidney function stable (creatinine 1.4 last week), potassium 3.3 (facility provider increased potassium supplement). Patient very frustrated with torsemide, noting he refuses most afternoon dosages of torsemide. Given 10# weight loss and continued improvement in HF symptoms will trial decrease in torsemide to 20mg daily.    - NYHA class III-IV, stage C   - Fluid status : euvolemic   - Diuretic regimen : DECREASE torsemide 20mg daily   - Aldosterone antagonist : will consider in the future if HF symptoms worsen, if kidney function allows.    - Blood pressure : controlled   - HF education reinforced, including low sodium diet and when to notify CORE clinic.   2. Chronic kidney disease -  Labs today show stable kidney function (creatinine 1.4).   3. Mitral regurgitation s/p mitul clip x 2 10/2017- with residual severe MR  4.  "Tricuspid regurgitation moderately severe tricuspid regurgitation  5. Atrial fibrillation - stable, rate controlled, appears to be in sinus rhythm today, patient noted to have episode of atrial fibrillation with RVR on presentation to ED, he spontaneously converted out of atrial fibrillation in the emergency room.    - [id number] score at least 3 (age, HTN)   - no anticoagulation due to advanced age, thrombocytopenia, risks felt to out weigh benefits.   6. Hypertension - controlled  7. Severe pulmonary hypertension  8. Anemia - hemoglobin 10 8/20/19, stable  9. Advance care planning   - Introduced role of palliative care, patient wishes to meet with Dr. Fox, palliative care referral placed   - Explored goals of care, patient appears to have better insight on his current health status, reviewed code status and he wishes to be DNR/DNI. At this point patient would still like to return to the hospital for cares if needed   - Patient notes he has no close family to elect as health care decision maker, his partner (Bryn) recently passed away 6/24/19. He has an older brother and a nephew but states they wouldn't be able to be his decision maker.    - During hospital stay goals of care were reviewed, \"Poor prognosis, high likelihood for rehospitalizations, and short period of time between hospitalizations have been reviewed with the patient. He lacks insight into his medical comorbidities, and wants to remain FULL CODE with restorative cares\"      Changes today: DECREASE torsemide 20mg daily    Follow up plan:     Palliative care appt with Dr. Fox 9/24/2019    Follow up with Dr. Yao 11/21/19    CORE RN to call facility in 1 week to check on weight after decrease in torsemide today.         History of Presenting Illness:    Ivan Gomez is a very pleasant 89 year old male with a history of chronic diastolic heart failure, right heart failure, pulmonary hypertension, mitral regurgitation s/p mitul clip x " 2 - with residual severe MR, tricuspid regurgitation, HTN, and history of bladder cancer.    Patient has followed regularly in our cardiology practice with Dr. Yao for the last couple years. Patient was hospitalized 7/4/19-7/13/19 due to acute diastolic heart failure exacerbation and new onset atrial fibrillation with RVR. Patient spontaneously converted out of atrial fibrillation in the emergency room. Chest CT showed bilateral patchy airspace and groundglass opacities, small bilateral pleural effusions, and pulmonary edema. NTproBNP 17,137. Echocardiogram 7/5/19 demonstrated stable position of mitraclip devices, moderately-severe residual mitral regurgitation, no significant mitral stenosis, massively dilated LA, severely dilated RA, moderately severe TR, normal LV size, EF 70-75%. RV moderately dilated and RV moderately reduced systolic function. Diuresed with bumetanide gtt, transitioned to torsemide 20mg BID at discharge. At discharge patient was noted to have persisting +1 lower extremity edema and gut edema. Admission weight 145-149#. Discharge weight 134#. CORE referral was placed at discharge. Prior to this patient was hospitalized in May 5/21-5/28/19 transferred from outside hospital, found to have acute cholecystitis s/p cholecystectomy 5/22.     I first met patient in July 2019 for CORE enrollment after hospitalization. At that time his torsemide was increased to 20mg BID and he was referred to palliative care. During last CORE visit in August patient was felt to be slightly volume up but was resistant to adjustments in his diuretic regimen. No medications were changed and he was scheduled for 1 month CORE follow up.     Patient is here today for CORE follow up.      Patient reports feeling good. Feeling fatigued. Monitoring weights daily at facility. Weight today 131#, which is down 9# since last clinic visit. Facility did not send weights today, patient states weight today at facility 128#.  States lower extremity edema has resolved. Patient states he is feeling stronger, now able to walk to the leisure room with his walker. Denies exertional dyspnea. Notes that activity is limited by legs getting tired. Denies shortness of breath at rest. Using walker for assistance. Denies exertional dyspnea with current levels of activity, noting lifestyle is sedentary. Denies abdominal distention. Sleeping okay. Denies PND/orthopnea. Patient denies chest pain or chest tightness. Denies dizziness, lightheadedness or other presyncopal symptoms. Denies tachycardia or palpitations. Denies episodes of bleeding. [facility], patient is extremely frustrated that nothing has happened yet and that he is still living at [facility]. Taking medications daily, patient notes he is refusing afternoon torsemide most days.     Labs from 9/4/19 stable kidney function (creatinine 1.4), potassium 3.3, otherwise electrolytes stable. NTproBNP down from 10K 8/2019 -> 6K 9/4/19. Blood pressure 128/78 and HR 76 in clinic today. Facility notes that patients potassium was recently increased to 9/5/19 by facility provider. Patient is also noted to periodically refuse doses.     Appetite okay, states he is typically eating a good breakfast. Doesn't love the food at [facility], frustrated that there is no variety with the menu. Not adding salt to foods. No set exercise routine, lifestyle sedentary, notes he is walking more with his walker. Denies alcohol use. Denies tobacco use.         Recent Hospitalizations   7/4/19-7/13/19 due to acute diastolic heart failure exacerbation and new onset atrial fibrillation with RVR. Patient spontaneously converted out of atrial fibrillation in the emergency room. Chest CT showed bilateral patchy airspace and groundglass opacities, small bilateral pleural effusions, and pulmonary edema. NTproBNP 17,137. Echocardiogram 7/5/19 demonstrated stable position of mitraclip  devices, moderately-severe residual mitral regurgitation, no significant mitral stenosis, massively dilated LA, severely dilated RA, moderately severe TR, normal LV size, EF 70-75%. RV moderately dilated and RV moderately reduced systolic function. Diuresed with bumetanide gtt, transitioned to torsemide 20mg BID at discharge. At discharge patient was noted to have persisting +1 lower extremity edema and gut edema. Admission weight 145-149#. Discharge weight 134#.   5/21-5/28/19 transferred from outside hospital, found to have acute cholecystitis s/p cholecystectomy 5/22.         Social History    His partner passed away in June 2019, son that he doesn't communicate with. Older brother in [place]. Nephew in [place].   Social History     Socioeconomic History     Marital status: Single     Spouse name: Not on file     Number of children: Not on file     Years of education: Not on file     Highest education level: Not on file   Occupational History     Not on file   Social Needs     Financial resource strain: Not on file     Food insecurity:     Worry: Not on file     Inability: Not on file     Transportation needs:     Medical: Not on file     Non-medical: Not on file   Tobacco Use     Smoking status: Former Smoker     Smokeless tobacco: Never Used   Substance and Sexual Activity     Alcohol use: No     Alcohol/week: 0.0 oz     Comment: doesnt use anymore     Drug use: No     Sexual activity: Never   Lifestyle     Physical activity:     Days per week: Not on file     Minutes per session: Not on file     Stress: Not on file   Relationships     Social connections:     Talks on phone: Not on file     Gets together: Not on file     Attends Muslim service: Not on file     Active member of club or organization: Not on file     Attends meetings of clubs or organizations: Not on file     Relationship status: Not on file     Intimate partner violence:     Fear of current or ex partner: Not on file     Emotionally  "abused: Not on file     Physically abused: Not on file     Forced sexual activity: Not on file   Other Topics Concern     Parent/sibling w/ CABG, MI or angioplasty before 65F 55M? No   Social History Narrative     Not on file            Review of Systems:   Skin:  Negative     Eyes:  Negative    ENT:  Negative    Respiratory:  Negative    Cardiovascular:  Negative    Gastroenterology: Negative    Genitourinary:  Positive for urinary frequency;prostate problem  Musculoskeletal:  Positive for arthritis  Neurologic:  Positive for numbness or tingling of feet  Psychiatric:  Positive for    Heme/Lymph/Imm:  Positive for easy bruising  Endocrine:  Negative           Physical Exam:   Vitals: /78   Pulse 76   Ht 1.727 m (5' 8\")   Wt 59.4 kg (131 lb)   SpO2 96%   BMI 19.92 kg/m      Wt Readings from Last 4 Encounters:   09/11/19 59.4 kg (131 lb)   09/05/19 59.6 kg (131 lb 8 oz)   08/28/19 57.7 kg (127 lb 4.8 oz)   08/22/19 59.1 kg (130 lb 3.2 oz)     GEN: elderly, frail  HEENT:  Pupils equal, round. Sclerae nonicteric.   NECK: Supple, no masses appreciated. JVP elevated to jaw  C/V:  Regular rate and rhythm, 3/6 systolic murmur  RESP: Respirations are unlabored. Clear to auscultation bilaterally, diminished in bases.   GI: Abdomen soft, nontender.  EXTREM: trace LE edema to bilateral lower extremities, tightness noted to decrease in bilateral lower extremities  NEURO: Alert and oriented, cooperative.  SKIN: Warm and dry.        Data:   ECHO 7/5/2019  Sinus rhythm was noted.  Status post transcatheter mitral valve repair with MitraClip x 2, 10/16/2017.     1. Stable position of the MitraClip devices.  2. At least, moderately severe residual mitral regurgitation. Eccentric jet,  anteriorly directed, reaching the superior surface of the left atrium.  3. No significant mitral stenosis. Mean diastolic mitral valve gradient is 3.3  mmHg (heart rate 73 bpm).  4. Massively dilated left atrium. Severely dilated right " atrium.  5. Moderately severe (3+) tricuspid regurgitation. Findings consistent with  significant pulmonary hypertension. Estimated pulmonary artery systolic  pressure is 65-70 mmHg.  6. Normal left ventricular size, hyperdynamic systolic function, estimated  LVEF 70-75%.  7. Moderately dilated right ventricle with moderately reduced systolic  function. TAPSE 1.3 - 1.6 cm, tissue Doppler systolic velocity 9 cm/s.  8. Dilated inferior vena cava.     No significant changes compared to previous study dated 5/23/2019.    LIPID RESULTS:  Lab Results   Component Value Date    CHOL 108 10/12/2017    HDL 63 10/12/2017    LDL 29 10/12/2017    TRIG 81 10/12/2017    CHOLHDLRATIO 2.6 08/18/2015     LIVER ENZYME RESULTS:  Lab Results   Component Value Date    AST 11 07/08/2019    ALT 13 07/08/2019     CBC RESULTS:  Lab Results   Component Value Date    WBC 3.2 (L) 08/20/2019    RBC 3.37 (L) 08/20/2019    HGB 10.0 (L) 08/20/2019    HCT 32.4 (L) 08/20/2019    MCV 96 08/20/2019    MCH 29.7 08/20/2019    MCHC 30.9 (L) 08/20/2019    RDW 17.4 (H) 08/20/2019    PLT 75 (L) 08/20/2019     BMP RESULTS:  Lab Results   Component Value Date     09/04/2019    POTASSIUM 3.3 (L) 09/04/2019    CHLORIDE 97 09/04/2019    CO2 32 09/04/2019    ANIONGAP 6 09/04/2019    GLC 66 (L) 09/04/2019    BUN 35 (H) 09/04/2019    CR 1.40 (H) 09/04/2019    GFRESTIMATED 43 (L) 09/04/2019    GFRESTBLACK 50 (L) 09/04/2019    GIUSEPPE 8.9 09/04/2019      A1C RESULTS:  Lab Results   Component Value Date    A1C 5.1 07/17/2019     INR RESULTS:  Lab Results   Component Value Date    INR 1.26 (H) 07/04/2019    INR 1.15 (H) 10/16/2017            Medications     Current Outpatient Medications   Medication Sig Dispense Refill     artificial saliva (BIOTENE DRY MOUTHWASH) LIQD liquid Swish and spit 15 mLs in mouth 3 times daily       artificial saliva (BIOTENE MT) SOLN solution Swish and spit 1 spray in mouth every 2 hours as needed for dry mouth       beta carotene 62818  UNIT capsule Take 1 capsule (25,000 Units) by mouth daily       ferrous sulfate (FEROSUL) 325 (65 Fe) MG tablet Take 325 mg by mouth daily (with breakfast)       finasteride (PROSCAR) 5 MG tablet TAKE 1 TABLET(5 MG) BY MOUTH DAILY 90 tablet 3     GABAPENTIN PO Take 300 mg by mouth At Bedtime       HYDROcodone-acetaminophen (NORCO) 7.5-325 MG per tablet Take 1 tablet by mouth every 6 hours as needed for pain maximum 4 tablet(s) per day 60 tablet 0     hypromellose (ARTIFICIAL TEARS) 0.4 % SOLN ophthalmic solution Place 2 drops Into the left eye 2 times daily        melatonin 3 MG tablet Take 3 mg by mouth At Bedtime       metoprolol tartrate (LOPRESSOR) 25 MG tablet Take 0.5 tablets (12.5 mg) by mouth 2 times daily       multivitamin w/minerals (THERA-VIT-M) tablet Take 1 tablet by mouth daily       pantoprazole (PROTONIX) 40 MG EC tablet Take 40 mg by mouth daily       potassium chloride ER (K-TAB/KLOR-CON) 10 MEQ CR tablet Take 10 mEq by mouth 2 tabs (20 mEq) QD       simvastatin (ZOCOR) 20 MG tablet TAKE ONE TABLET BY MOUTH AT BEDTIME 30 tablet 0     Skin Protectants, Misc. (EUCERIN) cream Apply topically 2 times daily And as needed. Apply to LE and areas of dryness       tamsulosin (FLOMAX) 0.4 MG capsule TAKE ONE CAPSULE BY MOUTH DAILY 90 capsule 3     torsemide (DEMADEX) 20 MG tablet Take 1 tablet (20 mg) by mouth 2 times daily 90 tablet 1     vitamin C 500 MG TABS Take 1 tablet (500 mg) by mouth daily       zinc sulfate (ZINCATE) 220 (50 Zn) MG capsule Take 1 capsule (220 mg) by mouth daily            Past Medical History     Past Medical History:   Diagnosis Date     Arthritis     Spinal Stenosis     Former smoker      Heart disease      Hemorrhoids      Hypercholesteremia      Hypertension      Malignant neoplasm (H)     skin CA, Basal cell     Other chronic pain      Renal disease      Past Surgical History:   Procedure Laterality Date     BACK SURGERY       BIOPSY      face, skin cancer     BIOPSY  8/2016     shoulder lesion     CYSTOSCOPY, TRANSURETHRAL RESECTION (TUR) PROSTATE, COMBINED N/A 8/21/2015    Procedure: COMBINED CYSTOSCOPY, TRANSURETHRAL RESECTION (TUR) PROSTATE;  Surgeon: Dennis Hilton MD;  Location:  OR     CYSTOSCOPY, TRANSURETHRAL RESECTION (TUR) TUMOR BLADDER, COMBINED N/A 9/2/2016    Procedure: COMBINED CYSTOSCOPY, TRANSURETHRAL RESECTION (TUR) TUMOR BLADDER;  Surgeon: Dennis Hilton MD;  Location: [facility] OR     CYSTOSCOPY, TRANSURETHRAL RESECTION (TUR) TUMOR BLADDER, COMBINED N/A 11/2/2018    Procedure: Transurethral resection of bladder tumor > 5 cm in size;  Surgeon: Dennis Hilton MD;  Location:  OR     ESOPHAGOSCOPY, GASTROSCOPY, DUODENOSCOPY (EGD), COMBINED N/A 5/24/2019    Procedure: ESOPHAGOGASTRODUODENOSCOPY, WITH BIOPSY;  Surgeon: Abe Jang MD;  Location: [facility]     LAPAROSCOPIC CHOLECYSTECTOMY WITH CHOLANGIOGRAMS N/A 5/22/2019    Procedure: CHOLECYSTECTOMY, LAPAROSCOPIC, WITH CHOLANGIOGRAM;  Surgeon: Rey Romero MD;  Location:  OR     ORTHOPEDIC SURGERY      lumbar and cervical surgery, Sep, 2008, knee surgery     PERCUTANEOUS MITRAL VALVE REPAIR N/A 10/16/2017    Procedure: PERCUTANEOUS MITRAL VALVE REPAIR ANESTHESIA;  Percutaneous MitraClip ;  Surgeon: Eber Monroe MD;  Location: [facility] OR     PICC INSERTION Right 10/14/2017    5fr DL Bard PICC, 40cm (1cm external) in the R basilic vein w/ tip in the mid SVC.     Family History   Problem Relation Age of Onset     Cancer Son 64        leukemia ? due to agent orange     Family History Negative Son             Allergies   Celebrex [celecoxib] and Penicillins        FRANK Thomas CNP  [facility]  Pager: [phone]      Thank you for allowing me to participate in the care of your patient.    Sincerely,     FRANK Thomas CNP     [facility]

## 2019-09-16 NOTE — PROGRESS NOTES
Called to follow up with facility on patients status after recent decrease in Torsemide. Per RN report, patient not experiencing any symptoms. Slight edema in LE but this is not a new finding and has not gotten worse. No other c/o from patient. Breathing regular, not labored. Rate about 18. Weights have been stable, 127.6 lbs over the weekend.   Future Appointments   Date Time Provider Department Center   9/24/2019 10:15 AM Lizzeth Fox MD [facility]   11/21/2019  1:30 PM [facility]   11/21/2019  2:15 PM Lance Yao MD [facility]      Facility instructed to call if any other increase in weight/symptoms. FRANCISCO Gilliam September 16, 2019 11:50 AM

## 2019-09-17 NOTE — PROGRESS NOTES
[facility]    [facility] Record Number:  [id number]  Place of Service where encounter took place:  [facility] ([facility] [[id number]]  Chief Complaint   Patient presents with     Nursing Home Acute       HPI:    Ivan Gomez  is a 89 year old (10/12/1926), who is being seen today for an episodic care visit.  HPI information obtained from: facility chart records, facility staff, patient report and [facility] chart review.     Today's concern is:    CHF  CKD stage 3  Bilateral lower extremity edema  Hx CHF, pulmonary hypertension, s/p mitral valve clip with residual severe mitral regurgitation. Last Echo 7/5 found EF 70-75%, severe residual MR, and significant pulmonary hypertension. Currently taking metoprolol and simvastatin, and torsemide 20mg every day. During exam, patient reports BLE edema has improved. Denies chest pain, SOB, headaches, syncope. Creat baseline 1.3-1.6. Last creat 1.40 on 9/4. Patient to follow-up appt with Cardiologist as directed on 9/24 for palliative care consult.      Wt Readings from Last 4 Encounters:   09/17/19 58 kg (127 lb 12.8 oz)   09/11/19 59.4 kg (131 lb)   09/05/19 59.6 kg (131 lb 8 oz)   08/28/19 57.7 kg (127 lb 4.8 oz)        Chronic, continuous use of opioids  Spinal stenosis of lumbar region  Current medications include: Norco 7.5-325mg q6hrs prn. Pain is described as Aching and Throbbing.   How is your pain? Tolerable with discomfort.  Are you able to do your normal activities? Can do everything I need to.  Are you able to sleep? Normal sleep. Patient reports the following side effects:  Denies Side Effects. GDR has been attempted No, patient insistent about not adjusting current dose of norco prn.     Frontal headache  Impaired vision   Patient reports new onset of frontal headache, behind both eyes. States he takes tylenol prn with relief. Reports seeing the Optometrist a few years ago. Wears eye glasses usually, but states  recently he has misplaced them. Denies changes in vision.         Past Medical and Surgical History reviewed in Epic today.    MEDICATIONS:  Current Outpatient Medications   Medication Sig Dispense Refill     artificial saliva (BIOTENE DRY MOUTHWASH) LIQD liquid Swish and spit 15 mLs in mouth 3 times daily       artificial saliva (BIOTENE MT) SOLN solution Swish and spit 1 spray in mouth 3 times daily        beta carotene 83649 UNIT capsule Take 1 capsule (25,000 Units) by mouth daily       ferrous sulfate (FEROSUL) 325 (65 Fe) MG tablet Take 325 mg by mouth daily (with breakfast)       finasteride (PROSCAR) 5 MG tablet TAKE 1 TABLET(5 MG) BY MOUTH DAILY 90 tablet 3     GABAPENTIN PO Take 300 mg by mouth At Bedtime       HYDROcodone-acetaminophen (NORCO) 7.5-325 MG per tablet Take 1 tablet by mouth every 6 hours as needed for pain maximum 4 tablet(s) per day 60 tablet 0     hypromellose (ARTIFICIAL TEARS) 0.4 % SOLN ophthalmic solution Place 2 drops Into the left eye 2 times daily        melatonin 3 MG tablet Take 3 mg by mouth At Bedtime       metoprolol tartrate (LOPRESSOR) 25 MG tablet Take 0.5 tablets (12.5 mg) by mouth 2 times daily       multivitamin w/minerals (THERA-VIT-M) tablet Take 1 tablet by mouth daily       pantoprazole (PROTONIX) 40 MG EC tablet Take 40 mg by mouth daily       potassium chloride ER (K-TAB/KLOR-CON) 10 MEQ CR tablet Take 10 mEq by mouth 2 tabs (20 mEq) QD       simvastatin (ZOCOR) 20 MG tablet TAKE ONE TABLET BY MOUTH AT BEDTIME 30 tablet 0     Skin Protectants, Misc. (EUCERIN) cream Apply topically 2 times daily And as needed. Apply to LE and areas of dryness       tamsulosin (FLOMAX) 0.4 MG capsule TAKE ONE CAPSULE BY MOUTH DAILY 90 capsule 3     torsemide (DEMADEX) 20 MG tablet Take 1 tablet (20 mg) by mouth daily 90 tablet 1     vitamin C 500 MG TABS Take 1 tablet (500 mg) by mouth daily       zinc sulfate (ZINCATE) 220 (50 Zn) MG capsule Take 1 capsule (220 mg) by mouth daily    "          REVIEW OF SYSTEMS:  4 point ROS including Respiratory, CV, GI and , other than that noted in the HPI,  is negative      Objective:  /65   Pulse 69   Temp 97.2  F (36.2  C)   Resp 18   Ht 1.727 m (5' 8\")   Wt 58 kg (127 lb 12.8 oz)   SpO2 98%   BMI 19.43 kg/m    Exam:  GENERAL APPEARANCE:  Alert, in no distress, appears healthy, oriented, cooperative  RESP:  respiratory effort and palpation of chest normal, lungs clear to auscultation , no respiratory distress  CV:  Palpation and auscultation of heart done , regular rate and rhythm, no murmur, rub, or gallop, +2 pedal pulses, no edema, +JVD  ABDOMEN:  normal bowel sounds, soft, nontender, no hepatosplenomegaly or other masses, no guarding or rebound  M/S:   Gait and station abnormal, requires assistance with activity and ADLs. Digits and nails normal  SKIN:  Inspection of skin and subcutaneous tissue baseline, Palpation of skin and subcutaneous tissue baseline  NEURO:   Cranial nerves 2-12 are normal tested and grossly at patient's baseline, Examination of sensation by touch normal  PSYCH:  oriented X 3, affect and mood normal         Labs:   Labs done in SNF are in [facility]. Please refer to them using [email or web address] [email or web address]., Recent labs in EPIC reviewed by me today.  and   Most Recent 3 CBC's:  Recent Labs   Lab Test 08/20/19  0615 08/01/19  0755 07/16/19  0600 07/11/19  0952   WBC 3.2*  --  3.9* 4.1   HGB 10.0* 10.3* 10.2* 10.2*   MCV 96  --  95 93   PLT 75*  --  85* 69*     Most Recent 3 BMP's:  Recent Labs   Lab Test 09/04/19  0645 08/29/19  0618 08/20/19  0615    138 141   POTASSIUM 3.3* 3.8 3.9   CHLORIDE 97 99 103   CO2 32 35* 33*   BUN 35* 42* 43*   CR 1.40* 1.37* 1.41*   ANIONGAP 6 4 5   GIUSEPPE 8.9 8.8 8.6   GLC 66* 74 68*         ASSESSMENT/PLAN:    (I50.32) Chronic diastolic congestive heart failure (H)  (primary encounter diagnosis)  (N18.3) CKD (chronic kidney disease) stage 3, GFR 30-59 ml/min (H)  (R60.0) Bilateral leg " edema  Comment: Chronic CHF controlled. CKD stable. Weight trending down since BLE edema resolving.   Plan: Continue plan of care. Monitor weights, BP/HR. Patient to follow-up with Cardiologist on 9/24.      (F11.90) Chronic, continuous use of opioids  (M48.061) Spinal stenosis of lumbar region, unspecified whether neurogenic claudication present  Comment: Chronic back pain with use of chronic opioids r/t spinal stenosis.   Plan: Continue plan of care. Monitor side effects of opioid use. Discussed recommendation to attempt to wean down norco prn, patient requesting to continue current plan since it is working. Discussed importance of limiting narcotic use and plan to re-evaluate at next visit.      (R51) Frontal headache  (H54.3) Impaired vision in both eyes  Comment: New onset of frontal headache d/t impaired vision vs other.   Plan: Continue tylenol prn. HUC to assist with scheduled outpatient appt with Optometrist.             Electronically signed by:  FRANK Pugh CNP

## 2019-09-23 NOTE — PROGRESS NOTES
Palliative Care Outpatient Clinic Consultation Note    Patient:  Ivan Gomez    Chief Complaint:   Ivan Gomez 89 year old male who is presenting to the palliative medicine clinic today at the request of Italia MARIE for a palliative care consultation secondary to chronic right heart failure.   The patient's primary care provider is:  Evaristo Magaña.     History of Present Illness:  Ivan Gomez is a 89yo M with chronic diastolic and right-sided heart failure, pulmonary HTN, mitral regurgitation s/p clipping x2 with residual severe MR, HTN, CKD, and hx of bladder cancer seen to establish palliative care.      He has been following with the [facility] and has had 2 admissions in the past 4 months - in July for heart failure exacerbation with new AFib and May for acute cholecystitis.  He is currently living at [facility], and [facility], but says was recently turned down.  He's not sure where he'll be living next month.  He had lived with his brother in [place] for about a year, but does not want to be there.  He had mostly lived with [facility]term [facility] [place] for the last 14 years, and came back to be with her in May, but she  recently.  While he was here to see her, he contracted C. Diff diarrhea and had several hospitalizations following that.  He was at rehab at [facility], then [facility], and now is working with the  to determine where he can go.  He says he has MA and is hoping to get into assisted living.  He is here in a wheelchair, but usually uses a walker to ambulate.  He washes up independently, and is otherwise independent in eating, dressing, and toileting.  He needs assistance for IADLs.  He still has a drivers license but is not driving.     His primary concern is new hematuria.  He has a history of bladder cancer and was following with Dr. Hilton, who he last saw in 2018.  He has had  "several procedures for this in the past, and does not think he would want more surgery, but is not sure what would be recommended.  He started having painless hematuria about 3 weeks ago, has not discussed with staff at [facility].  He's agreeable to see Dr. Hilton again.  Also has been losing weight, which he thinks may be related.  Denies any nausea or early satiety, but has had difficulty finding food he likes at the facility.  Is trying to eat what he orders.      Has longstanding back-pain, says is controlled with Norco q6h and gabapentin.  Has difficulty sleeping primarily from waking up several times at night to urinate.  Sometimes has stomach irritation when he \"gets worked up\" about something.  Denies any heartburn, diarrhea, or constipation.  Dislikes taking laxatives.      Asks if he needs to be on all the medications he's on.     Patient's Disease Understanding:  Does not discuss much about his heart failure, and is most concerned about a possible recurrence of bladder cancer and his housing.      Coping:  Appears to be coping well.  Says sometimes he's \"ornery\" at the facility.     Social History  Living Situation: Currently living in LTC, had been living with long-term partner who recently   Children: None  Actual/Potential Caregiver(s): unclear - does not want to live with only remaining family in [place].  Older brother is 96 and has memory issues.   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  [employer]  in [employer].   Hobbies: ND  Substance Use/History of misuse: Denies  Financial Concerns: As above.  Says does not have money to afford place to live.    Spiritual Background:  Says is Yarsani, has met with visiting  at [facility]  Spiritual Concerns/Needs: denies  Social History     Tobacco Use     Smoking status: Former Smoker "     Smokeless tobacco: Never Used   Substance Use Topics     Alcohol use: No     Alcohol/week: 0.0 standard drinks     Comment: doesnt use anymore     Drug use: No       Family History  Family History   Problem Relation Age of Onset     Cancer Son 64        leukemia ? due to agent orange     Family History Negative Son      Patient's Involvement with Prior History of Serious Illness in Family: Partner recently  from Parkinson's Disease    Advance Care Planning:  Advance Directive:    Completed, would not resuscitation  Health Care Agent Contact Information: DYANA Guthrie recently .  Nephew Noah is 1st Alternative, brother Josr is 2nd alternative  POLST:   Completed, DNR/DNI with selective treatment    Allergies   Allergen Reactions     Celebrex [Celecoxib] Hives     Penicillins Hives     Current Outpatient Medications   Medication Sig Dispense Refill     artificial saliva (BIOTENE DRY MOUTHWASH) LIQD liquid Swish and spit 15 mLs in mouth 3 times daily       artificial saliva (BIOTENE MT) SOLN solution Swish and spit 1 spray in mouth 3 times daily        beta carotene 81435 UNIT capsule Take 1 capsule (25,000 Units) by mouth daily       ferrous sulfate (FEROSUL) 325 (65 Fe) MG tablet Take 325 mg by mouth daily (with breakfast)       finasteride (PROSCAR) 5 MG tablet TAKE 1 TABLET(5 MG) BY MOUTH DAILY 90 tablet 3     GABAPENTIN PO Take 300 mg by mouth At Bedtime       HYDROcodone-acetaminophen (NORCO) 7.5-325 MG per tablet Take 1 tablet by mouth every 6 hours as needed for pain maximum 4 tablet(s) per day 60 tablet 0     hypromellose (ARTIFICIAL TEARS) 0.4 % SOLN ophthalmic solution Place 2 drops Into the left eye 2 times daily        melatonin 3 MG tablet Take 3 mg by mouth At Bedtime       metoprolol tartrate (LOPRESSOR) 25 MG tablet Take 0.5 tablets (12.5 mg) by mouth 2 times daily       multivitamin w/minerals (THERA-VIT-M) tablet Take 1 tablet by mouth daily       pantoprazole (PROTONIX) 40 MG EC  tablet Take 40 mg by mouth daily       potassium chloride ER (K-TAB/KLOR-CON) 10 MEQ CR tablet Take 10 mEq by mouth 2 tabs (20 mEq) QD       simvastatin (ZOCOR) 20 MG tablet TAKE ONE TABLET BY MOUTH AT BEDTIME 30 tablet 0     Skin Protectants, Misc. (EUCERIN) cream Apply topically 2 times daily And as needed. Apply to LE and areas of dryness       tamsulosin (FLOMAX) 0.4 MG capsule TAKE ONE CAPSULE BY MOUTH DAILY 90 capsule 3     torsemide (DEMADEX) 20 MG tablet Take 1 tablet (20 mg) by mouth daily 90 tablet 1     vitamin C 500 MG TABS Take 1 tablet (500 mg) by mouth daily       zinc sulfate (ZINCATE) 220 (50 Zn) MG capsule Take 1 capsule (220 mg) by mouth daily       Past Medical History:   Diagnosis Date     Arthritis     Spinal Stenosis     Former smoker      Heart disease      Hemorrhoids      Hypercholesteremia      Hypertension      Malignant neoplasm (H)     skin CA, Basal cell     Other chronic pain      Renal disease      Past Surgical History:   Procedure Laterality Date     BACK SURGERY       BIOPSY      face, skin cancer     BIOPSY  8/2016    shoulder lesion     CYSTOSCOPY, TRANSURETHRAL RESECTION (TUR) PROSTATE, COMBINED N/A 8/21/2015    Procedure: COMBINED CYSTOSCOPY, TRANSURETHRAL RESECTION (TUR) PROSTATE;  Surgeon: Dennis Hilton MD;  Location:  OR     CYSTOSCOPY, TRANSURETHRAL RESECTION (TUR) TUMOR BLADDER, COMBINED N/A 9/2/2016    Procedure: COMBINED CYSTOSCOPY, TRANSURETHRAL RESECTION (TUR) TUMOR BLADDER;  Surgeon: Dennis Hilton MD;  Location:  OR     CYSTOSCOPY, TRANSURETHRAL RESECTION (TUR) TUMOR BLADDER, COMBINED N/A 11/2/2018    Procedure: Transurethral resection of bladder tumor > 5 cm in size;  Surgeon: Dennis Hilton MD;  Location:  OR     ESOPHAGOSCOPY, GASTROSCOPY, DUODENOSCOPY (EGD), COMBINED N/A 5/24/2019    Procedure: ESOPHAGOGASTRODUODENOSCOPY, WITH BIOPSY;  Surgeon: Abe Jang MD;  Location:  GI     LAPAROSCOPIC CHOLECYSTECTOMY  "WITH CHOLANGIOGRAMS N/A 5/22/2019    Procedure: CHOLECYSTECTOMY, LAPAROSCOPIC, WITH CHOLANGIOGRAM;  Surgeon: Rey Romero MD;  Location: [facility] OR     ORTHOPEDIC SURGERY      lumbar and cervical surgery, Sep, 2008, knee surgery     PERCUTANEOUS MITRAL VALVE REPAIR N/A 10/16/2017    Procedure: PERCUTANEOUS MITRAL VALVE REPAIR ANESTHESIA;  Percutaneous MitraClip ;  Surgeon: Eber Monroe MD;  Location: [facility] OR     PICC INSERTION Right 10/14/2017    5fr DL Bard PICC, 40cm (1cm external) in the R basilic vein w/ tip in the mid SVC.       REVIEW OF SYSTEMS:   ROS: 10 point ROS neg other than the symptoms noted above in the HPI and here:  Palliative Symptom Review (0=no symptom/no concern, 1=mild, 2=moderate, 3=severe):      Pain: 1-2      Fatigue: 1      Nausea: 0      Constipation: 0      Diarrhea: 0      Depressive Symptoms: 0      Anxiety: 0      Drowsiness: 0      Poor Appetite: 1      Shortness of Breath: 0      Insomnia: 1      Other: 0      Overall (0 good/no concerns, 3 very poor):  1    /64   Pulse 68   Ht 1.727 m (5' 8\")   Wt 58 kg (127 lb 14.4 oz)   SpO2 95%   BMI 19.45 kg/m      Constitutional: Sitting up in chair, comfortable-appearing, in no distress   HENT: MMM, no oropharyngeal lesions.  Good dentition.   Eyes: Conjunctiva clear. Sclera anicteric  Cardiovascular: Normal rate, regular rhythm.    Respiratory:  Normal respiratory effort and breath sounds.  No wheezes or rales.   GI:  Soft, normoactive bowel sounds; Non-tender, non-distended  Musculoskeletal: No lower extremity edema.  Gait:  Stands with use of arms, able to ambulate to wheelchair independently  Neuro: Conversational, decreased muscle bulk, normal tone, no tremor.  Motor and sensation grossly intact.  Psych: Alert, appropriately interactive, full affect, clear sensorium.   Skin: Warm, no rash    Wt Readings from Last 15 Encounters:   09/24/19 58 kg (127 lb 14.4 oz)   09/17/19 58 kg (127 lb 12.8 oz)   09/11/19 59.4 kg (131 lb) "   09/05/19 59.6 kg (131 lb 8 oz)   08/28/19 57.7 kg (127 lb 4.8 oz)   08/22/19 59.1 kg (130 lb 3.2 oz)   08/13/19 60.3 kg (133 lb)   08/07/19 62.8 kg (138 lb 6.4 oz)   08/06/19 63.5 kg (140 lb)   07/31/19 63.5 kg (140 lb 1.6 oz)   07/24/19 63.5 kg (140 lb)   07/24/19 63 kg (139 lb)   07/19/19 64 kg (141 lb 3.2 oz)   07/18/19 64.7 kg (142 lb 9.6 oz)   07/16/19 63.5 kg (140 lb)       Data Reviewed:  LABS:   Recent Labs   Lab Test 09/20/19  0445 09/04/19  0645    135   POTASSIUM 3.4 3.3*   CHLORIDE 103 97   CO2 33* 32   ANIONGAP 4 6   GLC 77 66*   BUN 44* 35*   CR 1.33* 1.40*   GIUSEPPE 8.7 8.9     GFR 46    WBC 3.2, Hb 10, Plt 75    IMAGING:     EKG 7/5/19 -  QTc 507    TTE 7/5/19 -   1. Stable position of the MitraClip devices.  2. At least, moderately severe residual mitral regurgitation. Eccentric jet,  anteriorly directed, reaching the superior surface of the left atrium.  3. No significant mitral stenosis. Mean diastolic mitral valve gradient is 3.3  mmHg (heart rate 73 bpm).  4. Massively dilated left atrium. Severely dilated right atrium.  5. Moderately severe (3+) tricuspid regurgitation. Findings consistent with  significant pulmonary hypertension. Estimated pulmonary artery systolic  pressure is 65-70 mmHg.  6. Normal left ventricular size, hyperdynamic systolic function, estimated  LVEF 70-75%.  7. Moderately dilated right ventricle with moderately reduced systolic  function. TAPSE 1.3 - 1.6 cm, tissue Doppler systolic velocity 9 cm/s.  8. Dilated inferior vena cava.     Impressions:  Palliative Performance Score:  50   Decision Making Capacity:  Intact    Ivan Gomez is a 89yo M with chronic diastolic and right-sided heart failure, pulmonary HTN, mitral regurgitation s/p clipping x2 with residual severe MR, HTN, CKD, and hx of bladder cancer seen to establish palliative care.  His primary concerns are new hematuria and housing/financial difficulties, and has been experiencing weight loss.  He is  relatively socially isolated, due to the recent death of his partner and only surviving family lives in [place].  He appears to have good cognition, but is functionally restricted in his ability to live completely independently and has good awareness of this.  Unfortunately, he is having trouble finding appropriate housing due to finances, though it does seem he is at the borderline for needing LTC vs DAVID.  He very much would like to get into assisted living, but I do not know if there are any that accept MA for this, and I do believe if he has MA he may be limited in available housing.  He is working closely with the  at his current LTC, and I encouraged him to meet with them concerning [facility].        Recommendations & Counseling:    Gross Hematuria - in setting of previous bladder cancer is concerning for recurrence.  He does not think he'd want surgery again, but would want to see urology to discuss if cancer has recurred and what his treatment options would be.    - Referred to Urology, Dr. Hilton who he has seen previously    Weight loss - may be multifactorial, due to regulated mealtimes at facility, personal taste, and possibly due to recurrent cancer.    - Discussed ways to increase his intake, calorie-rich foods  - If continues to lose weight and HF has been well-controlled, would be reasonable to liberalize diet  - Referral to urology as above.     Polypharmacy - with stomach upset, I think would be reasonable to minimize unneeded meds.  Has been on vitamin C, MVI, zinc, and beta carotene since July, started  for wound and malnutrition.    - I think is reasonable to stop the zinc (recommended for 10 days in note)  - Also reasonable to stop PPI as says is not helping with stomach symptoms and would be risk for C. Diff.  If GI symptoms worsen after stopping, would restart.   - Consider stopping beta carotene, ascorbic acid, and MVI as wound has improved by his report and  ?indication to help wound healing?    Financial Hardships - recommend discussing with  at facility.  I will communicate with PCP at facility, also.      Advance Care Planning - HCD reviewed - agrees nephew would be his preferred decision maker.  Has POLST completed.  Currently working with  for housing options.     RTC in 3 months or sooner PRN.      Lizzeth Fox MD  Palliative Medicine  Pager [phone]

## 2019-09-24 NOTE — PATIENT INSTRUCTIONS
Thank you for coming into the Palliative Care Clinic today.      1. Stop pantoprazole and Zinc    2. Will make referral to Urology, Dr. Hilton.      Return in clinic in 2-3 months for a follow-up.      You can reach the Palliative Care Team during business hours at the following numbers:   -[phone] for the [facility] clinic  -[phone] to reach the palliative care nurse for questions     To reach the Palliative Care Provider on-call After-hours or on holidays and weekends, call: [phone].  Please note that we are not able to provide pain medication refills on evenings or weekends.

## 2019-09-25 NOTE — PROGRESS NOTES
"[facility]    [facility] Medical Record Number:  [id number]  Place of Service where encounter took place:  [facility] [place] ([facility]) [[id number]]  Chief Complaint   Patient presents with     Nursing Home Acute       HPI:    Ivan Gomez  is a 89 year old (10/12/1926), who is being seen today for an episodic care visit.  HPI information obtained from: facility chart records, facility staff, patient report and [facility] chart review.     Today's concern is:    Patient reports recently seen by Palliative Care on 9/24 and reported to have hematuria for the past 2-3 weeks. Patient states he did not let nursing staff know he was having hematuria. Denies dysuria, but does endorse ongoing urinary frequency likely r/t use of diuretics. Patient reports past hx enlarged prostate and hx recurrent bladder cancer. Has been followed by Dr. Hilton (Urology) in the past.   Also endorses chronic low back pain relieved by norco prn. Takes 1-2 tabs norco per day due to spinal stenosis. States pain medication does assist with mobility. Denies constipation. Reports taking norco prn for \"many years.\" Has not been seen by pain clinic.           Past Medical and Surgical History reviewed in Epic today.    MEDICATIONS:  Current Outpatient Medications   Medication Sig Dispense Refill     artificial saliva (BIOTENE DRY MOUTHWASH) LIQD liquid Swish and spit 15 mLs in mouth 3 times daily       beta carotene 33512 UNIT capsule Take 1 capsule (25,000 Units) by mouth daily       carboxymethylcellulose PF (REFRESH PLUS) 0.5 % ophthalmic solution Place 2 drops into both eyes 2 times daily And every 2 hours as needed       ferrous sulfate (FEROSUL) 325 (65 Fe) MG tablet Take 325 mg by mouth daily (with breakfast)       finasteride (PROSCAR) 5 MG tablet TAKE 1 TABLET(5 MG) BY MOUTH DAILY 90 tablet 3     GABAPENTIN PO Take 300 mg by mouth At Bedtime       HYDROcodone-acetaminophen (NORCO) 7.5-325 MG per tablet Take 1 " "tablet by mouth every 6 hours as needed for pain maximum 4 tablet(s) per day 60 tablet 0     melatonin 3 MG tablet Take 3 mg by mouth At Bedtime       metoprolol tartrate (LOPRESSOR) 25 MG tablet Take 0.5 tablets (12.5 mg) by mouth 2 times daily       multivitamin w/minerals (THERA-VIT-M) tablet Take 1 tablet by mouth daily       potassium chloride ER (K-TAB/KLOR-CON) 10 MEQ CR tablet Take 20 mEq by mouth daily        simvastatin (ZOCOR) 20 MG tablet TAKE ONE TABLET BY MOUTH AT BEDTIME 30 tablet 0     Skin Protectants, Misc. (EUCERIN) cream Apply topically 2 times daily And as needed. Apply to LE and areas of dryness       tamsulosin (FLOMAX) 0.4 MG capsule TAKE ONE CAPSULE BY MOUTH DAILY 90 capsule 3     torsemide (DEMADEX) 20 MG tablet Take 1 tablet (20 mg) by mouth daily 90 tablet 1     vitamin C 500 MG TABS Take 1 tablet (500 mg) by mouth daily       hypromellose (ARTIFICIAL TEARS) 0.4 % SOLN ophthalmic solution Place 2 drops Into the left eye 2 times daily              REVIEW OF SYSTEMS:  4 point ROS including Respiratory, CV, GI and , other than that noted in the HPI,  is negative      Objective:  /65   Pulse 71   Temp 98.1  F (36.7  C)   Resp 18   Ht 1.727 m (5' 8\")   Wt 57.5 kg (126 lb 11.2 oz)   SpO2 97%   BMI 19.26 kg/m    Exam:  GENERAL APPEARANCE:  Alert, in no distress, appears healthy, oriented, cooperative  RESP:  respiratory effort and palpation of chest normal, lungs clear to auscultation , no respiratory distress  CV:  Palpation and auscultation of heart done , regular rate and rhythm, no murmur, rub, or gallop, +2 pedal pulses, no edema, +JVD  ABDOMEN:  normal bowel sounds, soft, nontender, no hepatosplenomegaly or other masses, no guarding or rebound  M/S:   Gait and station abnormal, requires assistance with activity and ADLs. Digits and nails normal  SKIN:  Inspection of skin and subcutaneous tissue baseline, Palpation of skin and subcutaneous tissue baseline  NEURO:   Cranial " nerves 2-12 are normal tested and grossly at patient's baseline, Examination of sensation by touch normal  PSYCH:  oriented X 3, affect and mood normal         Labs:   Labs done in [facility] are in [facility]. Please refer to them using [employer]/Care Everywhere., Recent labs in EPIC reviewed by me today.  and   Most Recent 3 CBC's:  Recent Labs   Lab Test 09/26/19  0639 08/20/19  0615 08/01/19  0755 07/16/19  0600   WBC 5.2 3.2*  --  3.9*   HGB 11.0* 10.0* 10.3* 10.2*   MCV 98 96  --  95   PLT 84* 75*  --  85*     Most Recent 3 BMP's:  Recent Labs   Lab Test 09/26/19  0639 09/20/19  0445 09/04/19  0645    140 135   POTASSIUM 3.7 3.4 3.3*   CHLORIDE 104 103 97   CO2 33* 33* 32   BUN 42* 44* 35*   CR 1.36* 1.33* 1.40*   ANIONGAP 4 4 6   GIUSEPPE 9.0 8.7 8.9   GLC 76 77 66*     Most Recent 6 Bacteria Isolates From Any Culture (See EPIC Reports for Culture Details):  Recent Labs   Lab Test 09/25/19  0412 07/04/19  1940 07/04/19  1713 10/15/17  1012 10/11/17  0011 10/10/17  2345   CULT Canceled, Test credited  Duplicate request  Per Nicolasa from  Lab. 9/26/19 at 0833.TV.    PENDING No growth  No growth 10,000 to 50,000 colonies/mL  Methicillin resistant Staphylococcus aureus (MRSA)  *  <10,000 colonies/mL  urogenital anna  Susceptibility testing not routinely done   Light growth  Methicillin resistant Staphylococcus aureus (MRSA)  This isolate is presumed to be clindamycin resistant based on detection of inducible   clindamycin resistance. Erythromycin and clindamycin are resistant, therefore, they are   not recommended for use.  * No growth No growth     Most Recent Urinalysis:  Recent Labs   Lab Test 09/25/19  0412  08/24/16  1331   COLOR Red   < > Yellow   APPEARANCE Cloudy   < > Clear   URINEGLC Negative   < > Negative   URINEBILI Negative   < > Negative   URINEKETONE Negative   < > Negative   SG 1.010   < > 1.025   UBLD Moderate*   < > Large*   URINEPH 6.0   < > 5.5   PROTEIN 100*   < > 30*   UROBILINOGEN  --   --   0.2   NITRITE Negative   < > Negative   LEUKEST Negative   < > Negative   RBCU >182*   < > O - 2   WBCU >182*   < > O - 2    < > = values in this interval not displayed.            ASSESSMENT/PLAN:    (M48.061) Spinal stenosis of lumbar region, unspecified whether neurogenic claudication present  (primary encounter diagnosis)  (F11.90) Chronic, continuous use of opioids  Comment: Severe spinal stenosis with chronic use of opioids. PTA reports PCP was prescribing norco prn.   Plan:   - Discussed pain management and goals; patient declines recommendation for outpatient referral to pain management  - Refill Page prn sent to [facility]    (R31.0) Gross hematuria  (N40.1,  R35.0) Benign prostatic hyperplasia with urinary frequency  Comment: Acute onset of gross hematuria x 2-3 weeks in setting of BPH with hx recurrent bladder cancer. Noted to have bladder mass last hospitalization, recommended outpatient follow-up with Urology with cystoscopy at that time, in which patient declined.   Plan:   - Check BMP 9/26  - Check CBC 9/26  - Check UA/UC due to hematuria  - Recently stopped protonix and zinc   - Monitor urine ouptut  - Patient to follow-up with Urologist (Dr. Hilton)            Electronically signed by:  FRANK Pugh CNP

## 2019-10-29 NOTE — PROGRESS NOTES
[facility] DISCHARGE SUMMARY    PATIENT'S NAME: Ivan Gomez  YOB: 1926  MEDICAL RECORD NUMBER:  [id number]  Place of Service where encounter took place:  [facility] [place] ([facility] [[id number]]    PRIMARY CARE PROVIDER AND CLINIC RESPONSIBLE AFTER TRANSFER:   Evaristo Magaña MD, [address] [facility] [id number]    [facility] Provider     Transferring providers: FRANK Pugh CNP; Jeison Montilla MD  Recent Hospitalization/ED:  [facility] Hospital stay 7/4/19 to 7/13/19.  Date of SNF Admission: July / 13 / 2019  Date of SNF (anticipated) Discharge: November / 04 / 2019  Discharged to: new assisted living for patient [facility].  Cognitive Scores: SLUMS: 19/30  Physical Function: Ambulating 300 ft with walker  DME: Walker    CODE STATUS/ADVANCE DIRECTIVES DISCUSSION:  DNR / DNI     ALLERGIES: Celebrex [celecoxib] and Penicillins        DISCHARGE DIAGNOSIS/NURSING FACILITY COURSE:     Patient admitted to [facility] 7/4-7/13 due to acute exacerbation of CHF with acute respiratory failure. Chest CT found bilateral patchy airspace and groundglass opacities, small bilateral pleural effusions, and pulmonary edema. Echo 7/5 found EF 70-75%, severe residual MR, and significant pulmonary hypertension. Was treated with bumex infusion and now taking torsemide 20mg BID. Also noted to have new onset of atrial fibrillation with RVR, was treated with diltiazem infusion and converted back to sinus rhythm in the ED. Per cardiology, not recommending anticoagulation due to age, frailty, and risk for bleeding.       Gross hematuria  Benign prostatic hyperplasia with urinary frequency  Bladder mass  Hx recurrent bladder cancer. During last hospital stay, noted to have bladder mass and recommending follow-up with Urology at that time which patient deferred at that time. Recently developed hematuria last month, recommendation was to follow-up with Urology  (Dr. Hilton). Per [facility], patient has declined making appt.  - Patient to follow-up with Urologist due to known bladder mass and hematuria with hx recurrent bladder cancer    Chronic diastolic congestive heart failure (H)  (primary encounter diagnosis)  Pulmonary hypertension  Benign essential hypertension  CKD (chronic kidney disease) stage 3, GFR 30-59 ml/min (H)  Hx CHF, pulmonary hypertension, s/p mitral valve clip with residual severe mitral regurgitation. Last Echo 7/5 found EF 70-75%, severe residual MR, and significant pulmonary hypertension. Currently taking metoprolol and simvastatin, and torsemide 20mg every day. Creat baseline 1.3-1.6. Last creat 1.36 on 9/26.   - Patient to follow-up with Cardiologist as directed    Spinal stenosis of lumbar region, unspecified whether neurogenic claudication present   Chronic, continuous use of opioids  Hx chronic pain r/t severe spinal stenosis with chronic use of opioids. Currently taking norco prn 2-3 times/day. Recommending referral to pain clinic, patient declined.     Physical deconditioning   Patient ambulates 300ft with walker. Requires set up for medications. Per therapy, patient is independent with ADLs, but moderate fall risk. Cognitive testing done, QUINTON 19/30. SW following for discharge planning. Patient discharging to [facility], as well as [facility] at home home care services, including home PT, OT, RN, HHA.           Past Medical History:  has a past medical history of Arthritis, Former smoker, Heart disease, Hemorrhoids, Hypercholesteremia, Hypertension, Malignant neoplasm (H), Other chronic pain, Renal disease, and Right heart failure (H). He also has no past medical history of Chronic infection, Complication of anesthesia, Malignant hyperthermia, Numbness and tingling, PONV (postoperative nausea and vomiting), Sleep apnea, or Thrombosis of leg.    Discharge Medications:  Current Outpatient Medications   Medication Sig Dispense Refill     artificial saliva  (BIOTENE DRY MOUTHWASH) LIQD liquid Swish and spit 15 mLs in mouth 3 times daily       beta carotene 81541 UNIT capsule Take 1 capsule (25,000 Units) by mouth daily       carboxymethylcellulose PF (REFRESH PLUS) 0.5 % ophthalmic solution Place 2 drops into both eyes 2 times daily And every 2 hours as needed       ferrous sulfate (FEROSUL) 325 (65 Fe) MG tablet Take 325 mg by mouth daily (with breakfast)       finasteride (PROSCAR) 5 MG tablet TAKE 1 TABLET(5 MG) BY MOUTH DAILY 90 tablet 3     GABAPENTIN PO Take 300 mg by mouth At Bedtime       HYDROcodone-acetaminophen (NORCO) 7.5-325 MG per tablet TAKE 1 TABLET BY MOUTH EVERY 6 HOURS AS NEEDED FOR PAIN +MAX:4 TABLETS PER DAY+ 60 tablet 0     melatonin 3 MG tablet Take 3 mg by mouth At Bedtime       metoprolol tartrate (LOPRESSOR) 25 MG tablet Take 0.5 tablets (12.5 mg) by mouth 2 times daily       potassium chloride ER (K-TAB/KLOR-CON) 10 MEQ CR tablet Take 20 mEq by mouth daily        simvastatin (ZOCOR) 20 MG tablet TAKE ONE TABLET BY MOUTH AT BEDTIME 30 tablet 0     Skin Protectants, Misc. (EUCERIN) cream Apply topically 2 times daily And as needed. Apply to LE and areas of dryness       tamsulosin (FLOMAX) 0.4 MG capsule TAKE ONE CAPSULE BY MOUTH DAILY 90 capsule 3     torsemide (DEMADEX) 20 MG tablet Take 1 tablet (20 mg) by mouth daily 90 tablet 1     vitamin C 500 MG TABS Take 1 tablet (500 mg) by mouth daily         Medication Changes/Rationale:   - Discontinued hydralazine  - Added melatonin due to insomnia   - Decreased metoprolol to 12.5mg BID  - Decreased potassium chloride to 20meq every day   - Decreased torsemide to 20mg every day   - Discontinued vancomycin    Controlled medications sent with patient:   Medication: Norco, #30 tabs given to patient at the time of discharge to take home         ROS:   10 point ROS of systems including Constitutional, Eyes, Respiratory, Cardiovascular, Gastroenterology, Genitourinary, Integumentary, Musculoskeletal,  "Psychiatric were all negative except for pertinent positives noted in my HPI.      Physical Exam:   Vitals: /65   Pulse 76   Temp 98.2  F (36.8  C)   Resp 18   Ht 1.727 m (5' 8\")   Wt 57.2 kg (126 lb 3.2 oz)   SpO2 97%   BMI 19.19 kg/m    BMI= Body mass index is 19.19 kg/m .  GENERAL APPEARANCE:  Alert, in no distress, appears healthy, oriented, cooperative, thin  ENT:  Mouth and posterior oropharynx normal, moist mucous membranes, [place]  EYES:  EOM, conjunctivae, lids, pupils and irises normal, PERRL  NECK:  No adenopathy,masses or thyromegaly   RESP:  respiratory effort and palpation of chest normal, lungs clear to auscultation , no respiratory distress  CV:  Palpation and auscultation of heart done , regular rate and rhythm, no murmur, rub, or gallop, +2 pedal pulses, no edema, +JVD  ABDOMEN:  normal bowel sounds, soft, nontender, no hepatosplenomegaly or other masses, no guarding or rebound  M/S:   Gait and station abnormal, requires assistance with activity and ADLs. Digits and nails normal  SKIN:  Inspection of skin and subcutaneous tissue baseline, Palpation of skin and subcutaneous tissue baseline  NEURO:   Cranial nerves 2-12 are normal tested and grossly at patient's baseline, Examination of sensation by touch normal  PSYCH:  Oriented X 3, affect and mood normal, SLUMS 19/30        SNF labs: Labs done in SNF are in [facility]. Please refer to them using [facility]/Care Everywhere., Recent labs in EPIC reviewed by me today.  and   Most Recent 3 CBC's:  Recent Labs   Lab Test 09/26/19  0639 08/20/19  0615 08/01/19  0755 07/16/19  0600   WBC 5.2 3.2*  --  3.9*   HGB 11.0* 10.0* 10.3* 10.2*   MCV 98 96  --  95   PLT 84* 75*  --  85*     Most Recent 3 BMP's:  Recent Labs   Lab Test 09/26/19  0639 09/20/19  0445 09/04/19  0645    140 135   POTASSIUM 3.7 3.4 3.3*   CHLORIDE 104 103 97   CO2 33* 33* 32   BUN 42* 44* 35*   CR 1.36* 1.33* 1.40*   ANIONGAP 4 4 6   GIUSEPPE 9.0 8.7 8.9   GLC 76 77 66* "         DISCHARGE PLAN:    Follow up labs: Recheck BMP with PCP within 1-2 weeks    Medical Follow Up:      Follow up with primary care provider in 1-2 weeks   Follow up with specialist Cardiologist     MTM referral needed and placed by this provider: No    Current [facility] scheduled appointments:  1. Patient to follow-up with PCP within 7 days of discharge from TCU.  2. Patient to follow-up with Urologist due to known bladder mass and hematuria with hx recurrent bladder cancer  3. Patient to follow-up with Cardiologist as directed    Next 5 appointments (look out 90 days)    Nov 21, 2019  2:15 PM CST  Return Visit with Lance Yao MD  [facility]) [facility] [id number]  [facility] [id number] [phone] OPT 2            Discharge Services: Home Care:  Occupational Therapy, Physical Therapy, Registered Nurse, Home Health Aide and From:  [facility]      Discharge Instructions Verbalized to Patient at Discharge:     Weigh yourself daily in the morning and keep a record. Call your primary clinic: a) if you are more short of breath, or b) if your weight changes more than 3 pounds in one day or more than 5 pounds in one week.     Notify PCP if you have a fever greater than 100.5 degrees.     24-hour supervision is recommended for safety.             TOTAL DISCHARGE TIME:   Greater than 30 minutes    Electronically signed by:  FRANK Pugh CNP

## 2019-10-31 NOTE — LETTER
10/30/2019        RE: Ivan Gomez  [address] [place] [id number]        No notes on file      Sincerely,        FRANK Pugh CNP

## 2019-10-31 NOTE — LETTER
10/31/2019        RE: Ivan Gomez  [address] [facility] [address]        [place] [facility] DISCHARGE SUMMARY  PATIENT'S NAME: Ivan Gomez  YOB: 1926  MEDICAL RECORD NUMBER:  [id number]  Place of Service where encounter took place:  [facility] [place] [[id number]]    PRIMARY CARE PROVIDER AND CLINIC RESPONSIBLE AFTER TRANSFER:   Evaristo Magaña MD, [address] [facility] [address]    [facility] Provider     Transferring providers: FRANK Pugh CNP; Jeison Montilla MD  Recent Hospitalization/ED:  [facility] stay 19 to 19.  Date of SNF Admission:   Date of SNF (anticipated) Discharge:   Discharged to: new assisted living for patient [facility].  Cognitive Scores: {fgscog1:687569}  Physical Function: {fgsphysicalfunction:941507}  DME: {fgsdmedc:081697}    CODE STATUS/ADVANCE DIRECTIVES DISCUSSION:  DNR / DNI {Provider, verify code status is accurate as an order in Epic}  ALLERGIES: Celebrex [celecoxib] and Penicillins    DISCHARGE DIAGNOSIS/NURSING FACILITY COURSE:   {fgsdia}    Past Medical History:  has a past medical history of Arthritis, Former smoker, Heart disease, Hemorrhoids, Hypercholesteremia, Hypertension, Malignant neoplasm (H), Other chronic pain, Renal disease, and Right heart failure (H). He also has no past medical history of Chronic infection, Complication of anesthesia, Malignant hyperthermia, Numbness and tingling, PONV (postoperative nausea and vomiting), Sleep apnea, or Thrombosis of leg.    Discharge Medications:  Current Outpatient Medications   Medication Sig Dispense Refill     artificial saliva (BIOTENE DRY MOUTHWASH) LIQD liquid Swish and spit 15 mLs in mouth 3 times daily       beta carotene 72043 UNIT capsule Take 1 capsule (25,000 Units) by mouth daily       carboxymethylcellulose PF (REFRESH PLUS) 0.5 % ophthalmic  solution Place 2 drops into both eyes 2 times daily And every 2 hours as needed       ferrous sulfate (FEROSUL) 325 (65 Fe) MG tablet Take 325 mg by mouth daily (with breakfast)       finasteride (PROSCAR) 5 MG tablet TAKE 1 TABLET(5 MG) BY MOUTH DAILY 90 tablet 3     GABAPENTIN PO Take 300 mg by mouth At Bedtime       HYDROcodone-acetaminophen (NORCO) 7.5-325 MG per tablet TAKE 1 TABLET BY MOUTH EVERY 6 HOURS AS NEEDED FOR PAIN +MAX:4 TABLETS PER DAY+ 60 tablet 0     hypromellose (ARTIFICIAL TEARS) 0.4 % SOLN ophthalmic solution Place 2 drops Into the left eye 2 times daily        melatonin 3 MG tablet Take 3 mg by mouth At Bedtime       metoprolol tartrate (LOPRESSOR) 25 MG tablet Take 0.5 tablets (12.5 mg) by mouth 2 times daily       multivitamin w/minerals (THERA-VIT-M) tablet Take 1 tablet by mouth daily       potassium chloride ER (K-TAB/KLOR-CON) 10 MEQ CR tablet Take 20 mEq by mouth daily        simvastatin (ZOCOR) 20 MG tablet TAKE ONE TABLET BY MOUTH AT BEDTIME 30 tablet 0     Skin Protectants, Misc. (EUCERIN) cream Apply topically 2 times daily And as needed. Apply to LE and areas of dryness       tamsulosin (FLOMAX) 0.4 MG capsule TAKE ONE CAPSULE BY MOUTH DAILY 90 capsule 3     torsemide (DEMADEX) 20 MG tablet Take 1 tablet (20 mg) by mouth daily 90 tablet 1     vitamin C 500 MG TABS Take 1 tablet (500 mg) by mouth daily       ***  Medication Changes/Rationale:     ***    Controlled medications sent with patient:   {fgscontrolledmeds1:822483}     ROS:   {ROS FGS:582846:::0}    Physical Exam:   Vitals: There were no vitals taken for this visit.  BMI= There is no height or weight on file to calculate BMI.  {NURSING HOME PHYSICAL EXAM:202199}     SNF labs: {fgslabdischarge:844647}  {fgsinrtable:917488:x}    DISCHARGE PLAN:    Follow up labs: {fgsfuturelabs1:009375}    Medical Follow Up:      {fgsdischargefollowup:391954}    MTM referral needed and placed by this provider: {YES  (EXPLAIN)/NO:502557}    Current [place] scheduled appointments:  Next 5 appointments (look out 90 days)    2019  2:15 PM CST  Return Visit with Lance Yao MD  [facility] ([facility]) [id number]  [place] [address] [phone] OPT 2       ***    Discharge Services: Home Care:  Occupational Therapy, Physical Therapy, Registered Nurse, Home Health Aide and From:  [facility]    Discharge Instructions Verbalized to Patient at Discharge:     {fgsdischargeinstructions1:900020}      TOTAL DISCHARGE TIME:   {TIME:439072}  Electronically signed by:  Shabana Pino ***    {fgshomecare F2F:113702}  {Providers Please double check the med list (in the plan section >> meds & orders tab) and Discontinue any of the meds flagged by the TC to be discontinued}                [place] [facility] DISCHARGE SUMMARY  PATIENT'S NAME: Ivan Gomez  YOB: 1926  MEDICAL RECORD NUMBER:  [id number]  Place of Service where encounter took place:  [facility] [place] ([facility]) [[id number]]    PRIMARY CARE PROVIDER AND CLINIC RESPONSIBLE AFTER TRANSFER:   Evaristo Magaña MD, [address] [facility] [address]    [facility] Provider     Transferring providers: FRANK Pugh CNP; Jeison Montilla MD  Recent Hospitalization/ED:  [facility] stay 19 to 19.  Date of SNF Admission:   Date of SNF (anticipated) Discharge:   [facility].  Cognitive Scores: {fgscog1:812430}  Physical Function: {fgsphysicalfunction:105582}  DME: {fgsdmedc:716326}    CODE STATUS/ADVANCE DIRECTIVES DISCUSSION:  DNR / DNI {Provider, verify code status is accurate as an order in Epic}  ALLERGIES: Celebrex [celecoxib] and Penicillins    DISCHARGE DIAGNOSIS/NURSING FACILITY COURSE:   {fgsdia}    Past  Medical History:  has a past medical history of Arthritis, Former smoker, Heart disease, Hemorrhoids, Hypercholesteremia, Hypertension, Malignant neoplasm (H), Other chronic pain, Renal disease, and Right heart failure (H). He also has no past medical history of Chronic infection, Complication of anesthesia, Malignant hyperthermia, Numbness and tingling, PONV (postoperative nausea and vomiting), Sleep apnea, or Thrombosis of leg.    Discharge Medications:  Current Outpatient Medications   Medication Sig Dispense Refill     artificial saliva (BIOTENE DRY MOUTHWASH) LIQD liquid Swish and spit 15 mLs in mouth 3 times daily       beta carotene 34122 UNIT capsule Take 1 capsule (25,000 Units) by mouth daily       carboxymethylcellulose PF (REFRESH PLUS) 0.5 % ophthalmic solution Place 2 drops into both eyes 2 times daily And every 2 hours as needed       ferrous sulfate (FEROSUL) 325 (65 Fe) MG tablet Take 325 mg by mouth daily (with breakfast)       finasteride (PROSCAR) 5 MG tablet TAKE 1 TABLET(5 MG) BY MOUTH DAILY 90 tablet 3     GABAPENTIN PO Take 300 mg by mouth At Bedtime       HYDROcodone-acetaminophen (NORCO) 7.5-325 MG per tablet TAKE 1 TABLET BY MOUTH EVERY 6 HOURS AS NEEDED FOR PAIN +MAX:4 TABLETS PER DAY+ 60 tablet 0     hypromellose (ARTIFICIAL TEARS) 0.4 % SOLN ophthalmic solution Place 2 drops Into the left eye 2 times daily        melatonin 3 MG tablet Take 3 mg by mouth At Bedtime       metoprolol tartrate (LOPRESSOR) 25 MG tablet Take 0.5 tablets (12.5 mg) by mouth 2 times daily       multivitamin w/minerals (THERA-VIT-M) tablet Take 1 tablet by mouth daily       potassium chloride ER (K-TAB/KLOR-CON) 10 MEQ CR tablet Take 20 mEq by mouth daily        simvastatin (ZOCOR) 20 MG tablet TAKE ONE TABLET BY MOUTH AT BEDTIME 30 tablet 0     Skin Protectants, Misc. (EUCERIN) cream Apply topically 2 times daily And as needed. Apply to LE and areas of dryness       tamsulosin (FLOMAX) 0.4 MG capsule TAKE ONE  CAPSULE BY MOUTH DAILY 90 capsule 3     torsemide (DEMADEX) 20 MG tablet Take 1 tablet (20 mg) by mouth daily 90 tablet 1     vitamin C 500 MG TABS Take 1 tablet (500 mg) by mouth daily       ***  Medication Changes/Rationale:     ***    Controlled medications sent with patient:   {fgscontrolledmeds1:543710}     ROS:   {ROS FGS:319715:::0}    Physical Exam:   Vitals: There were no vitals taken for this visit.  BMI= There is no height or weight on file to calculate BMI.  {NURSING HOME PHYSICAL EXAM:146833}     SNF labs: {fgslabdischarge:783200}  {fgsinrtable:676691:x}    DISCHARGE PLAN:    Follow up labs: {fgsfuturelabs1:292556}    Medical Follow Up:      {fgsdischargefollowup:645345}    MTM referral needed and placed by this provider: {YES (EXPLAIN)/NO:218655}    Current [place] scheduled appointments:  Next 5 appointments (look out 90 days)    Nov 21, 2019  2:15 PM CST  Return Visit with Lance Yao MD  [facility] ([facility]) [address] [place] [address] [phone] OPT 2       ***    Discharge Services: Home Care:  Occupational Therapy, Physical Therapy, Registered Nurse, Home Health Aide and From:  [facility]    Discharge Instructions Verbalized to Patient at Discharge:     {fgsdischargeinstructions1:129358}      TOTAL DISCHARGE TIME:   {TIME:686658}  Electronically signed by:  Shabana Pino ***    {fgshomecare F2F:545177}  {Providers Please double check the med list (in the plan section >> meds & orders tab) and Discontinue any of the meds flagged by the TC to be discontinued}                  Sincerely,        FRANK Pugh CNP

## 2019-10-31 NOTE — PROGRESS NOTES
Clinic Care Coordination Contact    Situation: Patient chart reviewed by care coordinator.    Background: Patient admitted in TCU at [facility] since 7/13/19.    Assessment: Per chart review, potential plan for TCU discharge on 11/4/19.    Plan/Recommendations: CCC RN left voicemail for DANNY Hutchinson at [facility] TCU requested call back to discuss discharge plan for patient; will await return call.    UPDATE at 1519:  CCC RN received return call from DANNY Hutchinson at [facility] TCU.  She updated that the patient will be discharge on 11/4/19 to [facility] living with [facility] home care for PT/OT services.  She also reported that he has an Elderly Waiver through [facility].    [facility] RN will plan to outreach to patient following discharge to assess for any additional needs.    Koko Jimenez RN  Clinic Care Coordinator  [facility]  Ph: [phone]

## 2019-11-01 NOTE — TELEPHONE ENCOUNTER
Our goal is to have forms completed within 72 hours, however some forms may require a visit or additional information.    What clinic location was the form placed at [facility] or [place].?     Who is the form from?   Where did the form come from? Faxed to clinic   The form was placed in the inbox of Evaristo Magaña MD      Please fax to [phone]  Phone number:   Not given     Additional comments: [facility] and it to [facility] 11/4/19    Call take on 11/1/2019 at 4:00 PM by Alexandrea Yun

## 2019-11-01 NOTE — PATIENT INSTRUCTIONS
[facility] Discharge Orders    Name: Ivan Gomez  : 10/12/1926  Planned Discharge Date: 19  Discharged to: new assisted living for patient [facility]      MEDICAL FOLLOW UP  1. Patient to follow-up with PCP within 7 days of discharge from TCU.  2. Patient to follow-up with Urologist due to known bladder mass and hematuria with hx recurrent bladder cancer  3. Patient to follow-up with Cardiologist as directed    Next 5 appointments (look out 90 days)    2019  2:15 PM CST  Return Visit with Lance Yao MD  [facility] ([facility]) [id number]  [facility] [address] [phone] OPT 2            FUTURE LABS: Recheck BMP with PCP within 1-2 weeks      ORDER CHANGES:  - Discontinued hydralazine  - Added melatonin due to insomnia   - Decreased metoprolol to 12.5mg BID  - Decreased potassium chloride to 20meq every day   - Decreased torsemide to 20mg every day   - Discontinued vancomycin    DISCHARGE MEDICATIONS:  The patient s pharmacy is authorized to dispense a 30-day supply of medications. Refill requests should be directed to the primary provider, Evaristo Magaña.   Controlled medications sent with patient:   Medication: Norco, #30 tabs given to patient at the time of discharge to take home    Current Outpatient Medications   Medication Sig Dispense Refill     artificial saliva (BIOTENE DRY MOUTHWASH) LIQD liquid Swish and spit 15 mLs in mouth 3 times daily       beta carotene 52673 UNIT capsule Take 1 capsule (25,000 Units) by mouth daily       carboxymethylcellulose PF (REFRESH PLUS) 0.5 % ophthalmic solution Place 2 drops into both eyes 2 times daily And every 2 hours as needed       ferrous sulfate (FEROSUL) 325 (65 Fe) MG tablet Take 325 mg by mouth daily (with breakfast)       finasteride (PROSCAR) 5 MG tablet TAKE 1 TABLET(5 MG) BY MOUTH DAILY 90 tablet 3     GABAPENTIN PO Take  300 mg by mouth At Bedtime       HYDROcodone-acetaminophen (NORCO) 7.5-325 MG per tablet TAKE 1 TABLET BY MOUTH EVERY 6 HOURS AS NEEDED FOR PAIN +MAX:4 TABLETS PER DAY+ 60 tablet 0     melatonin 3 MG tablet Take 3 mg by mouth At Bedtime       metoprolol tartrate (LOPRESSOR) 25 MG tablet Take 0.5 tablets (12.5 mg) by mouth 2 times daily       potassium chloride ER (K-TAB/KLOR-CON) 10 MEQ CR tablet Take 20 mEq by mouth daily        simvastatin (ZOCOR) 20 MG tablet TAKE ONE TABLET BY MOUTH AT BEDTIME 30 tablet 0     Skin Protectants, Misc. (EUCERIN) cream Apply topically 2 times daily And as needed. Apply to LE and areas of dryness       tamsulosin (FLOMAX) 0.4 MG capsule TAKE ONE CAPSULE BY MOUTH DAILY 90 capsule 3     torsemide (DEMADEX) 20 MG tablet Take 1 tablet (20 mg) by mouth daily 90 tablet 1     vitamin C 500 MG TABS Take 1 tablet (500 mg) by mouth daily         SERVICES:  Home Care:  Occupational Therapy, Physical Therapy, Registered Nurse, Home Health Aide and From:  [facility]    ADDITIONAL INSTRUCTIONS:    Weigh yourself daily in the morning and keep a record. Call your primary clinic: a) if you are more short of breath, or b) if your weight changes more than 3 pounds in one day or more than 5 pounds in one week.     Notify PCP if you have a fever greater than 100.5 degrees.     24-hour supervision is recommended for safety.     FRANK Pugh CNP  This document was electronically signed on October 31, 2019

## 2019-11-06 NOTE — LETTER
[place] CARE COORDINATION  [facility]  [address] [address]    November 6, 2019    Ivan Gomez  [address] [facility] [address]      Dear Ivan,    I am a clinic care coordinator who works with Evaristo Magaña MD at [facility]. I recently tried to call and was unable to reach you. I wanted to introduce myself and provide you with my contact information so that you can call me with questions or concerns about your health care. Below is a description of clinic care coordination and how I can further assist you.     The clinic care coordinator is a registered nurse and/or  who understand the health care system. The goal of clinic care coordination is to help you manage your health and improve access to the [facility] system in the most efficient manner. The registered nurse can assist you in meeting your health care goals by providing education, coordinating services, and strengthening the communication among your providers. The  can assist you with financial, behavioral, psychosocial, chemical dependency, counseling, and/or psychiatric resources.    Please feel free to contact me at [phone] with any questions or concerns. We at [facility] are focused on providing you with the highest-quality healthcare experience possible and that all starts with you.     Sincerely,     Koko Jimenez RN  Clinic Care Coordinator  [facility], & [facility]  Ph: [phone]

## 2019-11-06 NOTE — PROGRESS NOTES
Clinic Care Coordination Contact  Care Coordination Communication    Referral Source: IP Handoff    Clinical Data: Patient was hospitalized at [facility] from 7/4/19 to 7/13/19 with diagnosis of CHF exacerbation, atrial fibrillation.    Home Care Contact:              Home Care Agency: [facility]              Contact name () and phone number: [phone]              Care Coordination contacted home care: Yes, CCC RN spoke with home care  who will provide CCC RN contact information to patient's home care staff and request that they call with any patient concerns and/or at the time of patient's discharge from home care.              Anticipated start of care date: TBD    Patient Contact: [facility]/Voicemail  Referral Source: IP Handoff  Clinical Data: Care Coordinator Outreach  Outreach attempted x 2.  Attempted to call patient's nephew; number no longer in service.  Left message on patient's voicemail with call back information and requested return call with any questions or concerns.  Plan: Care Coordinator will send care coordination introduction letter with care coordinator contact information and explanation of care coordination services via mail.    Plan: CCC RN will make no further scheduled patient outreaches at this time but will remain available should any patient needs arise.    Koko Jimenez, RN  Clinic Care Coordinator  [facility], & [facility]  Ph: [phone]

## 2019-11-06 NOTE — TELEPHONE ENCOUNTER
Reason for Call:  Home Health Care    Mare with [facility] called regarding (reason for call): orders    Orders are needed for this patient. pt    PT: 2 x a week for 3 weeks for strengthing, gait, balance, and transfer training.    OT: michael    Skilled Nursing: michael    Pt Provider: Dr Magaña    Phone Number Homecare Nurse can be reached at: [phone]    Can we leave a detailed message on this number? YES    Phone number patient can be reached at: [place] number on file [phone] (home)    Best Time: any    Call taken on 11/6/2019 at 3:34 PM by KEYLA MAYERS

## 2019-11-06 NOTE — TELEPHONE ENCOUNTER
Controlled Substance Refill Request for HYDROcodone-acetaminophen (NORCO) 7.5-325 MG per tablet  Problem List Complete:  Yes       Per Dr. Bell Note Oct 2013:You don't want to use a long acting narcotic for pain. At this time, you are using #120 hydro/apap 7.5/325 monthly./   I told you today and you agreed to follow up to see Dr. Evaristo Magaña in about 2 months before the present refills of narcotic are due to be refilled   I also told you that it would be my recommendation that you then be given an increased monthly prescription of #150 per month to better control the pain.   You do not want any further surgery on your back at this time so want to focus on better pain control.     Patient is followed by ANTHONY Murphy for ongoing prescription of pain medication.  All refills should be approved by this provider, or covering partner.     Medication(s): norco 7.5/325.   Maximum quantity per month: 120  Clinic visit frequency required: Q 4 months      Controlled substance agreement on file: 9/3/14     Pain Clinic evaluation in the past: Yes       Date/Location:  spine clinic     DIRE Total Score(s):  No flowsheet data found.     Last [facility] website verification: 1-28-18 provider to review    Last Written Prescription Date:  10/13/2019  Last Fill Quantity: 60 tablet,  # refills: 0   Last office visit: 10/29/2018 with prescribing provider:  Gómez   Future Office Visit:   Next 5 appointments (look out 90 days)    Nov 21, 2019  2:15 PM CST  Return Visit with Lance Yao MD  [facility] ([facility]) [address] [place] [address] [phone] OPT 2             Controlled substance agreement:   Encounter-Level CSA - 09/03/2014:    Controlled Substance Agreement - Scan on 10/15/2014 12:54 PM: [facility], Controlled Med Agreement, 9-03-14     Patient-Level CSA:    There are no patient-level csa.         Last Urine Drug Screen: No results  found for: CDAUT, No results found for: COMDAT, No results found for: THC13, PCP13, COC13, MAMP13, OPI13, AMP13, BZO13, TCA13, MTD13, BAR13, OXY13, PPX13, BUP13         [email or web address]   checked in past 3 months?  No, route to RN

## 2019-11-07 NOTE — TELEPHONE ENCOUNTER
Reason for Call:  Home Health Care    Betsy with [facility] called regarding (reason for call): pt update    Orders are needed for this patient. na    PT: na    OT: pt has skin lesion on forehead that looks cancerous.  Pt has bladder mass and only want to address with Dr Magaña. Doesn't want urology.   Also  Pt has a runny nose with clear drainage.    Skilled Nursing: na    Pt Provider: Dr Alcocer    Phone Number Homecare Nurse can be reached at: [phone]    Can we leave a detailed message on this number? YES    Phone number patient can be reached at: Home number on file [phone] (home)    Best Time: asap    Call taken on 11/7/2019 at 11:42 AM by KEYLA MAYERS

## 2019-11-07 NOTE — TELEPHONE ENCOUNTER
Patient was called following message from homecare nurse. Office visit was scheduled to discuss the following    1)Med review.    2)Nasal drainage- ongoing. Could this be related to medication or allergy?    3)Check suspicious skin lesion on forehead.     Pt will need a reminder call with appt date and time. Triage, please follow up with pt 11/01/2019. And let him know of appt information with PCP.     Home care nurse was informed of plan above.    No

## 2019-11-08 NOTE — TELEPHONE ENCOUNTER
[place]:  reviewed- recommend provider review    No [facility] providers listed for this patient.     Routing refill request to provider for review/approval because:  Drug not on the [facility] refill protocol

## 2019-11-08 NOTE — TELEPHONE ENCOUNTER
Spoke with Jose M paris [facility].     This patient has not been seen by [facility] provider in over a year and this medication has been given by non [facility] provider per  for the last year.     They were updated with this information. Family plans to call and set up OV with PCP at this time.     Sending to PCP to decline this medication at this time.

## 2019-11-08 NOTE — TELEPHONE ENCOUNTER
Jose M from [facility] called requesting status of Rx approval for Norco. States pt will be out of medication 11/09/2019. Nurse is requesting a call within the next 30 minutes before he leaves. Routing refill for provider approval.

## 2019-11-08 NOTE — TELEPHONE ENCOUNTER
Writer unable to look up  for this patient.     Routing to provider team to fill this refill request, if appropriate.

## 2019-11-12 NOTE — TELEPHONE ENCOUNTER
Our goal is to have forms completed within 72 hours, however some forms may require a visit or additional information.    What clinic location was the form placed at [facility] or [facility].?     Who is the form from?   Where did the form come from? Faxed to clinic   The form was placed in the inbox of Evaristo Magaña MD      Please fax to [phone]  Phone number: not given     Additional comments: [facility] medication error report 2 pages need MD sig     Call take on 11/12/2019 at 10:24 AM by Alexandrea Yun

## 2019-11-13 NOTE — PROGRESS NOTES
Subjective     Ivan Gomez is a 89 year old male who presents to clinic today for the following health issues:    HPI   Establish Care  Forms completed for [facility] Check      Duration: Recently returned to [place] and placement at [facility]    Description (location/character/radiation): See above    Intensity:  NA    Accompanying signs and symptoms: Has chronic back pain on medication    History (similar episodes/previous evaluation): None    Precipitating or alleviating factors: None    Therapies tried and outcome: None     Chronic Pain Follow-Up       Type / Location of Pain: Lumbar back pain  Analgesia/pain control:       Recent changes:  same      Overall control: Tolerable with discomfort  Activity level/function:      Daily activities:  Able to do light housework, cooking    Work:  Unable to work  Adverse effects:  No  Adherance    Taking medication as directed?  Yes    Participating in other treatments: no -   Risk Factors:    Sleep:  Fair    Mood/anxiety:  Has some depression    Recent family or social stressors:  none noted    Other aggravating factors: none  PHQ-9 SCORE 3/23/2017 4/25/2018 11/13/2019   PHQ-9 Total Score 4 1 3     KRISTI-7 SCORE 3/23/2017 4/25/2018 11/13/2019   Total Score 0 1 5     Encounter-Level CSA - 09/03/2014:    Controlled Substance Agreement - Scan on 10/15/2014 12:54 PM: BXFP, Controlled Med Agreement, 9-03-14     Patient-Level CSA:    There are no patient-level csa.         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      BPH (benign  prostatic hypertrophy)     Bladder tumor     Benign essential hypertension     Hyperlipidemia LDL goal <100     Acute idiopathic gout of right foot     Femoral hernia of right side     Drug-induced constipation     Acute respiratory failure with hypoxia (H)     Heart failure (H)     Mitral regurgitation     S/P mitral valve clip implantation     Dry eyes     Abdominal pain, epigastric     Hypokalemia     Hypoxia     Acute cholecystitis     Fecal incontinence     Bladder mass     CHF exacerbation (H)     Chronic, continuous use of opioids     Past Surgical History:   Procedure Laterality Date     BACK SURGERY       BIOPSY      face, skin cancer     BIOPSY  8/2016    shoulder lesion     CYSTOSCOPY, TRANSURETHRAL RESECTION (TUR) PROSTATE, COMBINED N/A 8/21/2015    Procedure: COMBINED CYSTOSCOPY, TRANSURETHRAL RESECTION (TUR) PROSTATE;  Surgeon: Dennis Hilton MD;  Location:  OR     CYSTOSCOPY, TRANSURETHRAL RESECTION (TUR) TUMOR BLADDER, COMBINED N/A 9/2/2016    Procedure: COMBINED CYSTOSCOPY, TRANSURETHRAL RESECTION (TUR) TUMOR BLADDER;  Surgeon: Dennis Hilton MD;  Location:  OR     CYSTOSCOPY, TRANSURETHRAL RESECTION (TUR) TUMOR BLADDER, COMBINED N/A 11/2/2018    Procedure: Transurethral resection of bladder tumor > 5 cm in size;  Surgeon: Dennis Hilton MD;  Location:  OR     ESOPHAGOSCOPY, GASTROSCOPY, DUODENOSCOPY (EGD), COMBINED N/A 5/24/2019    Procedure: ESOPHAGOGASTRODUODENOSCOPY, WITH BIOPSY;  Surgeon: Abe Jang MD;  Location: [facility]     LAPAROSCOPIC CHOLECYSTECTOMY WITH CHOLANGIOGRAMS N/A 5/22/2019    Procedure: CHOLECYSTECTOMY, LAPAROSCOPIC, WITH CHOLANGIOGRAM;  Surgeon: Rey Romero MD;  Location:  OR     ORTHOPEDIC SURGERY      lumbar and cervical surgery, Sep, 2008, knee surgery     PERCUTANEOUS MITRAL VALVE REPAIR N/A 10/16/2017    Procedure: PERCUTANEOUS MITRAL VALVE REPAIR ANESTHESIA;  Percutaneous MitraClip ;  Surgeon: Eber Monroe,  "MD;  Location: [facility] OR     PICC INSERTION Right 10/14/2017    5fr DL Bard PICC, 40cm (1cm external) in the R basilic vein w/ tip in the mid SVC.       Social History     Tobacco Use     Smoking status: Former Smoker     Smokeless tobacco: Never Used   Substance Use Topics     Alcohol use: No     Alcohol/week: 0.0 standard drinks     Comment: doesnt use anymore     Family History   Problem Relation Age of Onset     Cancer Son 64        leukemia ? due to agent orange     Family History Negative Son              Reviewed and updated as needed this visit by Provider         Review of Systems   ROS COMP: Constitutional, HEENT, cardiovascular, pulmonary, gi and gu systems are negative, except as otherwise noted.      Objective    /66 (Patient Position: Sitting, Cuff Size: Adult Regular)   Pulse 78   Temp 97.8  F (36.6  C) (Tympanic)   Resp 16   Ht 1.727 m (5' 8\")   Wt 59 kg (130 lb)   SpO2 98%   BMI 19.77 kg/m    Body mass index is 19.77 kg/m .  Physical Exam   GENERAL APPEARANCE: healthy, alert and no distress            Assessment & Plan       ICD-10-CM    1. Chronic, continuous use of opioids F11.90    2. S/P mitral valve clip implantation Z98.890     Z95.818    3. Acute on chronic systolic heart failure (H) I50.23    4. Hyperlipidemia LDL goal <100 E78.5    5. Benign essential hypertension I10    6. Mitral valve insufficiency, unspecified etiology I34.0    7. Chronic pain syndrome G89.4    8. CKD (chronic kidney disease) stage 3, GFR 30-59 ml/min (H) N18.3           Patient Instructions   I completed forms for [facility] where he now lives in assisted living.  He has his own apartment.  I refilled his Norco which he takes 120/month and has for many years now due to his spinal stenosis problems.  He recently returned from [place] where he had been living with his brother and his nephew but that did not work out.  He moved back to the [facility] area to be around his wife, Jerri.  " Unfortunately she passed away in June.  He is now living by himself.  She had been staying with relatives down in [place] due to her dementia.      Return in about 3 months (around 2/13/2020) for hypertension, dyslipidemia, labs, chronic pain.    Evaristo Magaña MD  [facility]

## 2019-11-13 NOTE — PROGRESS NOTES
"Subjective     Ivan Gomez is a 89 year old male who presents to clinic today for the following health issues:    HPI   Heart Failure Follow-up    Are you experiencing any shortness of breath? { :621768::\"No\"}    Are you experiencing any swelling in your legs or feet?  { :487167::\"No\"}    Are you using more pillows than usual? { :884745::\"Yes\"}    Do you cough at night?  { :198138::\"Yes\"}    Do you check your weight daily?  { :992687::\"Yes\"}    Have you had a weight change recently?  { :625244}    Are you having any of the following side effects from your medications? (Select all that apply)  { :457842}    Since your last visit, how many times have you gone to the cardiologist, urgent care, emergency room, or hospital because of your heart failure?   { :418837}    {Chronic Pain:872630}    How many servings of fruits and vegetables do you eat daily?  { :573044}    On average, how many sweetened beverages do you drink each day (soda, juice, sweet tea, etc)?   { 1-11:266378}    How many days per week do you miss taking your medication? {0-7 :821362}    Concern - Derm, skin lesion  Onset: ***    Description:   ***    Intensity: {.:453186}    Progression of Symptoms:  {.:619591}    Accompanying Signs & Symptoms:  ***    Previous history of similar problem:   ***    Precipitating factors:   Worsened by: ***    Alleviating factors:  Improved by: ***    Therapies Tried and outcome: ***  RESPIRATORY SYMPTOMS      Duration: ***    Description  { :983112}    Severity: {MILD, MODERATE, SEVERE LOW:990351}    Accompanying signs and symptoms: {NONE DEFAULTED:260608::\"None\"}    History (predisposing factors):  { :794366::\"none\"}    Precipitating or alleviating factors: {NONE DEFAULTED:080181::\"None\"}    Therapies tried and outcome:  { :849728::\"none\"}    {additonal problems for provider to add (Optional):523866}    {HIST REVIEW/ LINKS 2 (Optional):471874}    {Additional problems for the provider to add " "(optional):490092}  Reviewed and updated as needed this visit by Provider         Review of Systems   {ROS COMP (Optional):829356}      Objective    There were no vitals taken for this visit.  There is no height or weight on file to calculate BMI.  Physical Exam   {Exam List (Optional):934720}    {Diagnostic Test Results (Optional):278122::\"Diagnostic Test Results:\",\"Labs reviewed in Epic\"}        {PROVIDER CHARTING PREFERENCE:058637}      "

## 2019-11-13 NOTE — PATIENT INSTRUCTIONS
I completed forms for [facility] where he now lives in assisted living.  He has his own apartment.  I refilled his Norco which he takes 120/month and has for many years now due to his spinal stenosis problems.  He recently returned from [place] where he had been living with his brother and his nephew but that did not work out.  He moved back to the [facility] area to be around his wife, Jerri.  Unfortunately she passed away in June.  He is now living by himself.  She had been staying with relatives down in [place] due to her dementia.

## 2019-11-13 NOTE — PROGRESS NOTES
"Subjective     Ivan Gomez is a 89 year old male who presents to clinic today for the following health issues:    HPI   Heart Failure Follow-up    Are you experiencing any shortness of breath? { :400808::\"No\"}    Are you experiencing any swelling in your legs or feet?  { :401713::\"No\"}    Are you using more pillows than usual? { :360366::\"Yes\"}    Do you cough at night?  { :091799::\"Yes\"}    Do you check your weight daily?  { :655802::\"Yes\"}    Have you had a weight change recently?  { :336970}    Are you having any of the following side effects from your medications? (Select all that apply)  { :553035}    Since your last visit, how many times have you gone to the cardiologist, urgent care, emergency room, or hospital because of your heart failure?   { :818564}    Chronic Pain Follow-Up       Type / Location of Pain: ***  Analgesia/pain control:       Recent changes:  { :791687788}      Overall control: { :838478::\"Tolerable with discomfort\"}  Activity level/function:      Daily activities:  { :265021}    Work:  { :505391}  Adverse effects:  { :642489::\"No\"}  Adherance    Taking medication as directed?  { :860792::\"Yes\"}    Participating in other treatments: { :733834::\"yes\"}  Risk Factors:    Sleep:  { :416114}    Mood/anxiety:  { :195311::\"controlled\"}    Recent family or social stressors:  { :024809::\"none noted\"}    Other aggravating factors: { :834927::\"none\"}  PHQ-9 SCORE 9/29/2015 3/23/2017 4/25/2018   PHQ-9 Total Score 0 4 1     KRISTI-7 SCORE 9/29/2015 3/23/2017 4/25/2018   Total Score 0 0 1     Encounter-Level CSA - 09/03/2014:    Controlled Substance Agreement - Scan on 10/15/2014 12:54 PM: BXFP, Controlled Med Agreement, 9-03-14     Patient-Level CSA:    There are no patient-level csa.         How many servings of fruits and vegetables do you eat daily?  { :853130}    On average, how many sweetened beverages do you drink each day (soda, juice, sweet tea, etc)?   { 1-11:341366}    How many days per week " "do you miss taking your medication? {0-7 :892326}    {additonal problems for provider to add (Optional):255814}    {HIST REVIEW/ LINKS 2 (Optional):463631}    {Additional problems for the provider to add (optional):537052}  Reviewed and updated as needed this visit by Provider         Review of Systems   {ROS COMP (Optional):524396}      Objective    There were no vitals taken for this visit.  There is no height or weight on file to calculate BMI.  Physical Exam   {Exam List (Optional):373984}    {Diagnostic Test Results (Optional):519694::\"Diagnostic Test Results:\",\"Labs reviewed in Epic\"}        {PROVIDER CHARTING PREFERENCE:730440}      "

## 2019-11-13 NOTE — LETTER
"My Heart Failure Action Plan   Name: Ivan Gomez    YOB: 1926   Date: 11/13/2019    My doctor: Evaristo Magaña     [address] [address]  [phone]  My Diagnosis: {HF STAGES:118114}   My Exercise Goal: 30 minutes daily  .     My Weight Plan:   Wt Readings from Last 2 Encounters:   10/30/19 57.2 kg (126 lb 3.2 oz)   09/25/19 57.5 kg (126 lb 11.2 oz)     Weigh yourself daily using the same scale. If you gain more than 2 pounds in 24 hours or 5 pounds in a week {HF WEIGHT GOAL:496865}    My Diet Goal: { :616042::\"No added salt\"}    Emergency Room Visits:    Our goal is to improve your quality of life and help you avoid a visit to the emergency room or hospital.  If we work together, we can achieve this goal. But, if you feel you need to call 911 or go to the emergency room, please do so.  If you go to the emergency room, please bring your list of medicines and your daily weight chart with you.       GREEN ZONE     Doing well today    Weight gained is no more than 2 pounds a day or 5 pounds a week.    No swelling in feet, ankles, legs or stomach.    No more swelling than usual.    No more trouble breathing than usual.    No change in my sleep.    No other problems. Actions:    I am doing fine.  I will take my medicine, follow my diet, see my doctor, exercise, and watch for symptoms.           YELLOW ZONE         Having a bad day or flare up    Weight gain of more than 2 pounds in one day or 5 pounds in one week.    New swelling in ankle, leg, knee or thigh.    Bloating in belly, pants feel tighter.    Swelling in hands or face.    Coughing or trouble breathing while walking or talking.    Harder to breathe last night.    Have trouble sleeping, wake up short of breath.    Much more tired than usual.    Not eating.    Pain in my chest or bad leg cramps.    Feel weak or dizzy. Actions:    I need to take " action and call my doctor or nurse today.                 RED ZONE         Need medical care now    Weight gain of 5 pounds overnight.    Chest pain or pressure that does not go away.    Feel less alert.    Wheezing or have trouble breathing when at rest.    Cannot sleep lying down.    Cannot take my water pill.    Pass out or faint. Actions:    I need to call my doctor or nurse now!    Call 911 if I have chest pain or cannot breathe.

## 2019-11-20 NOTE — TELEPHONE ENCOUNTER
Our goal is to have forms completed within 72 hours, however some forms may require a visit or additional information.    What clinic location was the form placed at [facility] or [place].?     Who is the form from?   Where did the form come from? Faxed to clinic   The form was placed in the inbox of Evaristo Maagña MD      Please fax to [phone]  Phone number: [phone]    Additional comments: [facility] (cert period 11/05/2019-01/03/2020)    Call take on 11/20/2019 at 9:16 AM by Stephanie Reveles

## 2019-11-21 NOTE — TELEPHONE ENCOUNTER
Controlled Substance Refill Request for HYDROCODONE-ACETAMINOP 7.5-325 TABS    Problem List Complete:  Yes  (Found under chronic pain syndrome)    Per Dr. Bell Note Oct 2013:You don't want to use a long acting narcotic for pain. At this time, you are using #120 hydro/apap 7.5/325 monthly./   I told you today and you agreed to follow up to see Dr. Evaristo Magaña in about 2 months before the present refills of narcotic are due to be refilled   I also told you that it would be my recommendation that you then be given an increased monthly prescription of #150 per month to better control the pain.   You do not want any further surgery on your back at this time so want to focus on better pain control.    Patient is followed by ANTHONY Murphy for ongoing prescription of pain medication.  All refills should be approved by this provider, or covering partner.    Medication(s): norco 7.5/325.   Maximum quantity per month: 120  Clinic visit frequency required: Q 4 months     Controlled substance agreement on file: 9/3/14    Pain Clinic evaluation in the past: Yes       Date/Location:  spine clinic    DIRE Total Score(s):  No flowsheet data found.    Last [facility] website verification: 1-28-18 provider to review      Last Written Prescription Date:  11/09/2019  Last Fill Quantity: 60 tablet,  # refills: 0   Last office visit: 11/13/2019 with prescribing provider:  Gómez   Future Office Visit:     Next 5 appointments (look out 90 days)    Feb 12, 2020  1:30 PM CST  Office Visit with Evaristo [facility]aña MD  [facility] [address] [place] [address]  [phone]           Controlled substance agreement:   Encounter-Level CSA - 09/03/2014:    Controlled Substance Agreement - Scan on 10/15/2014 12:54 PM: BXFP, Controlled Med Agreement, 9-03-14     Patient-Level CSA:    There are no patient-level csa.         Last Urine  Drug Screen: No results found for: CDAUT, No results found for: COMDAT, No results found for: THC13, PCP13, COC13, MAMP13, OPI13, AMP13, BZO13, TCA13, MTD13, BAR13, OXY13, PPX13, BUP13     Processing:  Rx to be electronically transmitted to pharmacy by provider     [email or web address]   checked in past 3 months?  No, route to RN

## 2019-11-21 NOTE — TELEPHONE ENCOUNTER
RX [place] ([place]) reviewed:  reviewed- recommend provider review    [place]:  [email or web address]  Routing refill request to provider for review/approval because:  Drug not on the [facility] refill protocol

## 2019-11-21 NOTE — LETTER
11/21/2019      Evaristo Magaña MD  [address]  [facility] [address]      RE: Ivan Gomez       Dear Colleague,    I had the pleasure of seeing Ivan Gomez in the [facility].    Service Date: 11/21/2019      REFERRING PHYSICIAN:  Dr. Evaristo Magaña.      HISTORY OF PRESENT ILLNESS:  It is my pleasure to see your patient, Ivan Gomez.  He is a very pleasant 89-year-old gentleman who I knew in the past when I took care of his significant other who had coronary artery disease but unfortunately passed away from Alzheimer's and Parkinson's disease.  He was in [place] but moved back to the [facility].  As you know, this is a patient who had severe mitral regurgitation and he underwent MitraClip clipping of the mitral valve leaflets.  The severe mitral regurgitation went to 2 to 3+ mitral regurgitation.  He has moderate pulmonary hypertension also.  I have not seen Mr. Gomez since 05/2018.  He was seen by Jennifer Quesada and Italia Carvajal during that period of time.  His last echocardiogram was on 07/05/2018.  This shows that he has moderately severe residual mitral regurgitation.  It is an eccentric jet which is anteriorly directed.  He has moderate to severe tricuspid regurgitation.  His left ventricular systolic function is normal with an ejection fraction of greater than 70%.  He has a mildly dilated right ventricle with moderately decreased right ventricular systolic function.  Pulmonary pressures are moderately to severely raised at 50 mmHg plus right atrial pressure.  The IVC is dilated and non-collapsing, indicating that the right atrial pressure is 15 or greater.  This puts his absolute pulmonary artery systolic pressure at 66 mmHg or greater.  The patient has a history of paroxysmal atrial fibrillation, but it was decided not to proceed with anticoagulation because the patient has thrombocytopenia and he is also quite frail with the risk of falling and  possibly suffering a fatal bleed.      With that background in mind, the patient is feeling well.  He does not have orthopnea.  He does not have PND and he does not have ankle edema.  He is not complaining of chest pains or chest pressure.  The one complaint that he has which really bothers him is that he has urinary frequency, and the urinary frequency is continuing throughout the day and he is going to the bathroom every 2 hours at nighttime.  He takes his torsemide in the morning time.  Nocturnal frequency and frequency in the evening time or even in the late afternoon is clearly not due to the diuretic.  The effect of the diuretic is usually gone by noon in most patients.  It appears that this is an issue that he has had now since I saw him a year and a half ago and it is very much affecting his quality of life.      His blood pressure is well controlled at 137/83, pulse is 77 beats per minute.  His creatinine today shows a creatinine of 1.52, BUN of 46 and GFR of 43, which is slightly higher than he has been in the past.  His ballpark was between 1.33 and 1.41.  His LDL was 29 and HDL 63 with a total cholesterol of 108 and triglycerides of 81 on 10/12/2017.  His last platelet count was in September when it was 84.  His hemoglobin is 11.      IMPRESSION:   1.  Mitral regurgitation.  This is moderately severe status post MitraClip, but he is quite asymptomatic with this.   2.  Moderate to severe tricuspid regurgitation.   3.  Moderate to severe pulmonary hypertension.   4.  Almost certainly has prostatism or some urinary tract issue.  The urinary frequency going into the night and throughout the day is not typical of diuretic therapy, where the drug effect is usually gone in about 4 hours.   5.  Paroxysmal atrial fibrillation but on no anticoagulation because of his frailty and his low platelet count.   6.  Renal insufficiency as described above.   7.  Markedly frail.  He is much more frail than I remember him a  year and a half ago, and he has certainly lost a significant amount of weight.  He is now 127 pounds.  He was 140 pounds in August.  In 2018, he weighed 145 pounds.      PLAN:   1.  I will continue him on his present medications.   2.  I have asked him to make an appointment with either you or with his urologist to try and get to the bottom of this intractable urinary frequency.   3.  I will have him wear a Zio Patch monitor to determine his atrial fibrillation burden, and I may have him see Dr. Kelly for possible Watchman device placement.  He has a CHADS-VASc score of 4, 2 for being over 75 years of age, 1 for congestive heart failure and 1 for essential hypertension.  Therefore, he is at significant annual risk for stroke.  However, all of this has to be weighed up against this patient's age and frailty.  I will have the patient follow up with Italia Carvajal or one of the other APPs in a month's time, when we have the results of the Zio Patch monitor.  I will see him back again myself in approximately 3 months' time.  At that stage, I will repeat his echocardiogram.  It has been my pleasure to be involved in the care of this very nice patient.  I look forward to seeing him again.      cc:   [place] [address]         SERENITY CORONA MD, [facility]             D: 2019   T: 2019   MT: LEANDRA      Name:     MALLORIE NOVA   MRN:      [id number]        Account:      [id number]   :      10/12/1926           Service Date: 2019      Document: [id number]         Outpatient Encounter Medications as of 2019   Medication Sig Dispense Refill     artificial saliva (BIOTENE DRY MOUTHWASH) LIQD liquid Swish and spit 15 mLs in mouth 3 times daily       beta carotene 61453 UNIT capsule Take 1 capsule (25,000 Units) by mouth daily       carboxymethylcellulose PF (REFRESH PLUS) 0.5 % ophthalmic solution Place 2  drops into both eyes 2 times daily And every 2 hours as needed       ferrous sulfate (FEROSUL) 325 (65 Fe) MG tablet Take 325 mg by mouth daily (with breakfast)       finasteride (PROSCAR) 5 MG tablet TAKE 1 TABLET(5 MG) BY MOUTH DAILY 90 tablet 3     GABAPENTIN PO Take 300 mg by mouth At Bedtime       melatonin 3 MG tablet Take 3 mg by mouth At Bedtime       metoprolol tartrate (LOPRESSOR) 25 MG tablet Take 0.5 tablets (12.5 mg) by mouth 2 times daily       potassium chloride ER (K-TAB/KLOR-CON) 10 MEQ CR tablet Take 20 mEq by mouth daily        simvastatin (ZOCOR) 20 MG tablet TAKE ONE TABLET BY MOUTH AT BEDTIME 30 tablet 0     Skin Protectants, Misc. (EUCERIN) cream Apply topically 2 times daily And as needed. Apply to LE and areas of dryness       tamsulosin (FLOMAX) 0.4 MG capsule TAKE ONE CAPSULE BY MOUTH DAILY 90 capsule 3     torsemide (DEMADEX) 20 MG tablet Take 1 tablet (20 mg) by mouth daily 90 tablet 1     vitamin C 500 MG TABS Take 1 tablet (500 mg) by mouth daily       [DISCONTINUED] HYDROcodone-acetaminophen (NORCO) 7.5-325 MG per tablet TAKE 1 TABLET BY MOUTH EVERY 6 HOURS AS NEEDED FOR PAIN +MAX:4 TABLETS PER DAY+ 60 tablet 0     No facility-administered encounter medications on file as of 11/21/2019.        Again, thank you for allowing me to participate in the care of your patient.      Sincerely,    Lance Yao MD, MD     [facility]

## 2019-11-21 NOTE — PROGRESS NOTES
Service Date: 11/21/2019      REFERRING PHYSICIAN:  Dr. Evaristo Magaña.      HISTORY OF PRESENT ILLNESS:  It is my pleasure to see your patient, Ivan Gomez.  He is a very pleasant 89-year-old gentleman who I knew in the past when I took care of his significant other who had coronary artery disease but unfortunately passed away from Alzheimer's and Parkinson's disease.  He was in [place] but moved back to the [facility].  As you know, this is a patient who had severe mitral regurgitation and he underwent MitraClip clipping of the mitral valve leaflets.  The severe mitral regurgitation went to 2 to 3+ mitral regurgitation.  He has moderate pulmonary hypertension also.  I have not seen Mr. Gomez since 05/2018.  He was seen by Jennifer Quesada and Italia Carvajal during that period of time.  His last echocardiogram was on 07/05/2018.  This shows that he has moderately severe residual mitral regurgitation.  It is an eccentric jet which is anteriorly directed.  He has moderate to severe tricuspid regurgitation.  His left ventricular systolic function is normal with an ejection fraction of greater than 70%.  He has a mildly dilated right ventricle with moderately decreased right ventricular systolic function.  Pulmonary pressures are moderately to severely raised at 50 mmHg plus right atrial pressure.  The IVC is dilated and non-collapsing, indicating that the right atrial pressure is 15 or greater.  This puts his absolute pulmonary artery systolic pressure at 66 mmHg or greater.  The patient has a history of paroxysmal atrial fibrillation, but it was decided not to proceed with anticoagulation because the patient has thrombocytopenia and he is also quite frail with the risk of falling and possibly suffering a fatal bleed.      With that background in mind, the patient is feeling well.  He does not have orthopnea.  He does not have PND and he does not have ankle edema.  He is not complaining of chest pains or chest  pressure.  The one complaint that he has which really bothers him is that he has urinary frequency, and the urinary frequency is continuing throughout the day and he is going to the bathroom every 2 hours at nighttime.  He takes his torsemide in the morning time.  Nocturnal frequency and frequency in the evening time or even in the late afternoon is clearly not due to the diuretic.  The effect of the diuretic is usually gone by noon in most patients.  It appears that this is an issue that he has had now since I saw him a year and a half ago and it is very much affecting his quality of life.      His blood pressure is well controlled at 137/83, pulse is 77 beats per minute.  His creatinine today shows a creatinine of 1.52, BUN of 46 and GFR of 43, which is slightly higher than he has been in the past.  His ballpark was between 1.33 and 1.41.  His LDL was 29 and HDL 63 with a total cholesterol of 108 and triglycerides of 81 on 10/12/2017.  His last platelet count was in September when it was 84.  His hemoglobin is 11.      IMPRESSION:   1.  Mitral regurgitation.  This is moderately severe status post MitraClip, but he is quite asymptomatic with this.   2.  Moderate to severe tricuspid regurgitation.   3.  Moderate to severe pulmonary hypertension.   4.  Almost certainly has prostatism or some urinary tract issue.  The urinary frequency going into the night and throughout the day is not typical of diuretic therapy, where the drug effect is usually gone in about 4 hours.   5.  Paroxysmal atrial fibrillation but on no anticoagulation because of his frailty and his low platelet count.   6.  Renal insufficiency as described above.   7.  Markedly frail.  He is much more frail than I remember him a year and a half ago, and he has certainly lost a significant amount of weight.  He is now 127 pounds.  He was 140 pounds in August.  In 2018, he weighed 145 pounds.      PLAN:   1.  I will continue him on his present medications.    2.  I have asked him to make an appointment with either you or with his urologist to try and get to the bottom of this intractable urinary frequency.   3.  I will have him wear a Zio Patch monitor to determine his atrial fibrillation burden, and I may have him see Dr. Kelly for possible Watchman device placement.  He has a CHADS-VASc score of 4, 2 for being over 75 years of age, 1 for congestive heart failure and 1 for essential hypertension.  Therefore, he is at significant annual risk for stroke.  However, all of this has to be weighed up against this patient's age and frailty.  I will have the patient follow up with Italia Carvajal or one of the other APPs in a month's time, when we have the results of the Zio Patch monitor.  I will see him back again myself in approximately 3 months' time.  At that stage, I will repeat his echocardiogram.  It has been my pleasure to be involved in the care of this very nice patient.  I look forward to seeing him again.      cc:   [place] [address]         SERENITY CORONA MD, FACC             D: 2019   T: 2019   MT: LEANDRA      Name:     MALLORIE NOVA   MRN:      [id number]        Account:      [id number]   :      10/12/1926           Service Date: 2019      Document: [id number]

## 2019-11-21 NOTE — LETTER
11/21/2019    Evaristo Magaña MD  [address]  [facility] [address]    RE: Ivan Gomez       Dear Colleague,    I had the pleasure of seeing Ivan Gomez in the [facility].    HPI and Plan:   See dictation    Orders Placed This Encounter   Procedures     Follow-Up with Cardiac Advanced Practice Provider     Follow-Up with Cardiologist     EKG 12-lead complete w/read - Clinics     Zio Patch Holter Adult Pediatric Greater than 48 hrs     Echocardiogram Complete       No orders of the defined types were placed in this encounter.      There are no discontinued medications.      Encounter Diagnoses   Name Primary?     Chronic right-sided heart failure (H) Yes     Paroxysmal atrial fibrillation (H)      Tricuspid valve insufficiency, unspecified etiology      CKD (chronic kidney disease) stage 3, GFR 30-59 ml/min (H)      Mitral valve insufficiency, unspecified etiology      Benign essential hypertension      S/P mitral valve clip implantation      Palpitations        CURRENT MEDICATIONS:  Current Outpatient Medications   Medication Sig Dispense Refill     artificial saliva (BIOTENE DRY MOUTHWASH) LIQD liquid Swish and spit 15 mLs in mouth 3 times daily       beta carotene 93219 UNIT capsule Take 1 capsule (25,000 Units) by mouth daily       carboxymethylcellulose PF (REFRESH PLUS) 0.5 % ophthalmic solution Place 2 drops into both eyes 2 times daily And every 2 hours as needed       ferrous sulfate (FEROSUL) 325 (65 Fe) MG tablet Take 325 mg by mouth daily (with breakfast)       finasteride (PROSCAR) 5 MG tablet TAKE 1 TABLET(5 MG) BY MOUTH DAILY 90 tablet 3     GABAPENTIN PO Take 300 mg by mouth At Bedtime       HYDROcodone-acetaminophen (NORCO) 7.5-325 MG per tablet TAKE 1 TABLET BY MOUTH EVERY 6 HOURS AS NEEDED FOR PAIN +MAX:4 TABLETS PER DAY+ 60 tablet 0     melatonin 3 MG tablet Take 3 mg by mouth At Bedtime       metoprolol tartrate (LOPRESSOR) 25 MG tablet Take  0.5 tablets (12.5 mg) by mouth 2 times daily       potassium chloride ER (K-TAB/KLOR-CON) 10 MEQ CR tablet Take 20 mEq by mouth daily        simvastatin (ZOCOR) 20 MG tablet TAKE ONE TABLET BY MOUTH AT BEDTIME 30 tablet 0     Skin Protectants, Misc. (EUCERIN) cream Apply topically 2 times daily And as needed. Apply to LE and areas of dryness       tamsulosin (FLOMAX) 0.4 MG capsule TAKE ONE CAPSULE BY MOUTH DAILY 90 capsule 3     torsemide (DEMADEX) 20 MG tablet Take 1 tablet (20 mg) by mouth daily 90 tablet 1     vitamin C 500 MG TABS Take 1 tablet (500 mg) by mouth daily         ALLERGIES     Allergies   Allergen Reactions     Celebrex [Celecoxib] Hives     Penicillins Hives       PAST MEDICAL HISTORY:  Past Medical History:   Diagnosis Date     Arthritis     Spinal Stenosis     Former smoker      Heart disease      Hemorrhoids      Hypercholesteremia      Hypertension      Malignant neoplasm (H)     skin CA, Basal cell     Other chronic pain      Renal disease      Right heart failure (H)        PAST SURGICAL HISTORY:  Past Surgical History:   Procedure Laterality Date     BACK SURGERY       BIOPSY      face, skin cancer     BIOPSY  8/2016    shoulder lesion     CYSTOSCOPY, TRANSURETHRAL RESECTION (TUR) PROSTATE, COMBINED N/A 8/21/2015    Procedure: COMBINED CYSTOSCOPY, TRANSURETHRAL RESECTION (TUR) PROSTATE;  Surgeon: Dennis Hilton MD;  Location:  OR     CYSTOSCOPY, TRANSURETHRAL RESECTION (TUR) TUMOR BLADDER, COMBINED N/A 9/2/2016    Procedure: COMBINED CYSTOSCOPY, TRANSURETHRAL RESECTION (TUR) TUMOR BLADDER;  Surgeon: Dennis Hilton MD;  Location:  OR     CYSTOSCOPY, TRANSURETHRAL RESECTION (TUR) TUMOR BLADDER, COMBINED N/A 11/2/2018    Procedure: Transurethral resection of bladder tumor > 5 cm in size;  Surgeon: Dennis Hilton MD;  Location:  OR     ESOPHAGOSCOPY, GASTROSCOPY, DUODENOSCOPY (EGD), COMBINED N/A 5/24/2019    Procedure: ESOPHAGOGASTRODUODENOSCOPY, WITH  BIOPSY;  Surgeon: Abe Jang MD;  Location:  GI     LAPAROSCOPIC CHOLECYSTECTOMY WITH CHOLANGIOGRAMS N/A 5/22/2019    Procedure: CHOLECYSTECTOMY, LAPAROSCOPIC, WITH CHOLANGIOGRAM;  Surgeon: Rey Romero MD;  Location:  OR     ORTHOPEDIC SURGERY      lumbar and cervical surgery, Sep, 2008, knee surgery     PERCUTANEOUS MITRAL VALVE REPAIR N/A 10/16/2017    Procedure: PERCUTANEOUS MITRAL VALVE REPAIR ANESTHESIA;  Percutaneous MitraClip ;  Surgeon: Eber Monroe MD;  Location: [facility] OR     PICC INSERTION Right 10/14/2017    5fr DL Bard PICC, 40cm (1cm external) in the R basilic vein w/ tip in the mid SVC.       FAMILY HISTORY:  Family History   Problem Relation Age of Onset     Cancer Son 64        leukemia ? due to agent orange     Family History Negative Son        SOCIAL HISTORY:  Social History     Socioeconomic History     Marital status: Single     Spouse name: None     Number of children: None     Years of education: None     Highest education level: None   Occupational History     None   Social Needs     Financial resource strain: None     Food insecurity:     Worry: None     Inability: None     Transportation needs:     Medical: None     Non-medical: None   Tobacco Use     Smoking status: Former Smoker     Smokeless tobacco: Never Used   Substance and Sexual Activity     Alcohol use: No     Alcohol/week: 0.0 standard drinks     Comment: doesnt use anymore     Drug use: No     Sexual activity: Never   Lifestyle     Physical activity:     Days per week: None     Minutes per session: None     Stress: None   Relationships     Social connections:     Talks on phone: None     Gets together: None     Attends [employer] service: None     Active member of club or organization: None     Attends meetings of clubs or organizations: None     Relationship status: None     Intimate partner violence:     Fear of current or ex partner: None     Emotionally abused: None     Physically abused: None      "Forced sexual activity: None   Other Topics Concern     Parent/sibling w/ CABG, MI or angioplasty before 65F 55M? No   Social History Narrative     None       Review of Systems:  Skin:  Negative bruising     Eyes:  Negative      ENT:  Negative   sore throat  Respiratory:  Negative dyspnea on exertion     Cardiovascular:  Negative Positive for;fatigue;lower extremity symptoms extremely tired.hx varicose vein surgery  Gastroenterology: Negative poor appetite    Genitourinary:  Positive for urinary frequency;prostate problem    Musculoskeletal:  Positive for arthritis    Neurologic:  Positive for numbness or tingling of feet    Psychiatric:  Positive for      Heme/Lymph/Imm:  Positive for easy bruising    Endocrine:  Negative        Physical Exam:  Vitals: /83   Pulse 77   Ht 1.727 m (5' 8\")   Wt 57.8 kg (127 lb 6.4 oz)   BMI 19.37 kg/m       Constitutional:  cooperative, alert and oriented, well developed, well nourished, in no acute distress thin      Skin:  warm and dry to the touch, no apparent skin lesions or masses noted          Head:  normocephalic        Eyes:  pupils equal and round        Lymph:No Cervical lymphadenopathy present     ENT:  no pallor or cyanosis, dentition good        Neck:  carotid pulses are full and equal bilaterally JVP elevated;JVP 8-10      Respiratory:  clear to auscultation         Cardiac: regular rhythm;normal S1 and S2 frequent premature beats     grade 3;holosystolic murmur;apical;radiation to the axilla systolic ejection murmur;grade 3;RUSB;LUSB      pulses full and equal                                        GI:  not assessed this visit        Extremities and Muscular Skeletal:  no deformities, clubbing, cyanosis, erythema observed;no edema              Neurological:  no gross motor deficits;affect appropriate        Psych:  Alert and Oriented x 3        CC  Jennifer Quesada PA-C  [address]  [place] [address]                Thank you for allowing me " to participate in the care of your patient.      Sincerely,     Lance Yao MD, MD     [facility]    cc:   Jennifer Quesada PA-C  [address]  [place] [address]

## 2019-11-21 NOTE — PROGRESS NOTES
HPI and Plan:   See dictation    Orders Placed This Encounter   Procedures     Follow-Up with Cardiac Advanced Practice Provider     Follow-Up with Cardiologist     EKG 12-lead complete w/read - Clinics     Zio Patch Holter Adult Pediatric Greater than 48 hrs     Echocardiogram Complete       No orders of the defined types were placed in this encounter.      There are no discontinued medications.      Encounter Diagnoses   Name Primary?     Chronic right-sided heart failure (H) Yes     Paroxysmal atrial fibrillation (H)      Tricuspid valve insufficiency, unspecified etiology      CKD (chronic kidney disease) stage 3, GFR 30-59 ml/min (H)      Mitral valve insufficiency, unspecified etiology      Benign essential hypertension      S/P mitral valve clip implantation      Palpitations        CURRENT MEDICATIONS:  Current Outpatient Medications   Medication Sig Dispense Refill     artificial saliva (BIOTENE DRY MOUTHWASH) LIQD liquid Swish and spit 15 mLs in mouth 3 times daily       beta carotene 99943 UNIT capsule Take 1 capsule (25,000 Units) by mouth daily       carboxymethylcellulose PF (REFRESH PLUS) 0.5 % ophthalmic solution Place 2 drops into both eyes 2 times daily And every 2 hours as needed       ferrous sulfate (FEROSUL) 325 (65 Fe) MG tablet Take 325 mg by mouth daily (with breakfast)       finasteride (PROSCAR) 5 MG tablet TAKE 1 TABLET(5 MG) BY MOUTH DAILY 90 tablet 3     GABAPENTIN PO Take 300 mg by mouth At Bedtime       HYDROcodone-acetaminophen (NORCO) 7.5-325 MG per tablet TAKE 1 TABLET BY MOUTH EVERY 6 HOURS AS NEEDED FOR PAIN +MAX:4 TABLETS PER DAY+ 60 tablet 0     melatonin 3 MG tablet Take 3 mg by mouth At Bedtime       metoprolol tartrate (LOPRESSOR) 25 MG tablet Take 0.5 tablets (12.5 mg) by mouth 2 times daily       potassium chloride ER (K-TAB/KLOR-CON) 10 MEQ CR tablet Take 20 mEq by mouth daily        simvastatin (ZOCOR) 20 MG tablet TAKE ONE TABLET BY MOUTH AT BEDTIME 30 tablet 0     Skin  Protectants, Misc. (EUCERIN) cream Apply topically 2 times daily And as needed. Apply to LE and areas of dryness       tamsulosin (FLOMAX) 0.4 MG capsule TAKE ONE CAPSULE BY MOUTH DAILY 90 capsule 3     torsemide (DEMADEX) 20 MG tablet Take 1 tablet (20 mg) by mouth daily 90 tablet 1     vitamin C 500 MG TABS Take 1 tablet (500 mg) by mouth daily         ALLERGIES     Allergies   Allergen Reactions     Celebrex [Celecoxib] Hives     Penicillins Hives       PAST MEDICAL HISTORY:  Past Medical History:   Diagnosis Date     Arthritis     Spinal Stenosis     Former smoker      Heart disease      Hemorrhoids      Hypercholesteremia      Hypertension      Malignant neoplasm (H)     skin CA, Basal cell     Other chronic pain      Renal disease      Right heart failure (H)        PAST SURGICAL HISTORY:  Past Surgical History:   Procedure Laterality Date     BACK SURGERY       BIOPSY      face, skin cancer     BIOPSY  8/2016    shoulder lesion     CYSTOSCOPY, TRANSURETHRAL RESECTION (TUR) PROSTATE, COMBINED N/A 8/21/2015    Procedure: COMBINED CYSTOSCOPY, TRANSURETHRAL RESECTION (TUR) PROSTATE;  Surgeon: Dennis Hilton MD;  Location:  OR     CYSTOSCOPY, TRANSURETHRAL RESECTION (TUR) TUMOR BLADDER, COMBINED N/A 9/2/2016    Procedure: COMBINED CYSTOSCOPY, TRANSURETHRAL RESECTION (TUR) TUMOR BLADDER;  Surgeon: Dennis Hilton MD;  Location: [facility] OR     CYSTOSCOPY, TRANSURETHRAL RESECTION (TUR) TUMOR BLADDER, COMBINED N/A 11/2/2018    Procedure: Transurethral resection of bladder tumor > 5 cm in size;  Surgeon: Dennis Hilton MD;  Location:  OR     ESOPHAGOSCOPY, GASTROSCOPY, DUODENOSCOPY (EGD), COMBINED N/A 5/24/2019    Procedure: ESOPHAGOGASTRODUODENOSCOPY, WITH BIOPSY;  Surgeon: Abe Jang MD;  Location:  GI     LAPAROSCOPIC CHOLECYSTECTOMY WITH CHOLANGIOGRAMS N/A 5/22/2019    Procedure: CHOLECYSTECTOMY, LAPAROSCOPIC, WITH CHOLANGIOGRAM;  Surgeon: Rey Romero MD;  Location:  [facility] OR     ORTHOPEDIC SURGERY      lumbar and cervical surgery, Sep, 2008, knee surgery     PERCUTANEOUS MITRAL VALVE REPAIR N/A 10/16/2017    Procedure: PERCUTANEOUS MITRAL VALVE REPAIR ANESTHESIA;  Percutaneous MitraClip ;  Surgeon: Eber Monroe MD;  Location: [facility] OR     PICC INSERTION Right 10/14/2017    5fr DL Bard PICC, 40cm (1cm external) in the R basilic vein w/ tip in the mid SVC.       FAMILY HISTORY:  Family History   Problem Relation Age of Onset     Cancer Son 64        leukemia ? due to agent orange     Family History Negative Son        SOCIAL HISTORY:  Social History     Socioeconomic History     Marital status: Single     Spouse name: None     Number of children: None     Years of education: None     Highest education level: None   Occupational History     None   Social Needs     Financial resource strain: None     Food insecurity:     Worry: None     Inability: None     Transportation needs:     Medical: None     Non-medical: None   Tobacco Use     Smoking status: Former Smoker     Smokeless tobacco: Never Used   Substance and Sexual Activity     Alcohol use: No     Alcohol/week: 0.0 standard drinks     Comment: doesnt use anymore     Drug use: No     Sexual activity: Never   Lifestyle     Physical activity:     Days per week: None     Minutes per session: None     Stress: None   Relationships     Social connections:     Talks on phone: None     Gets together: None     Attends Worship service: None     Active member of club or organization: None     Attends meetings of clubs or organizations: None     Relationship status: None     Intimate partner violence:     Fear of current or ex partner: None     Emotionally abused: None     Physically abused: None     Forced sexual activity: None   Other Topics Concern     Parent/sibling w/ CABG, MI or angioplasty before 65F 55M? No   Social History Narrative     None       Review of Systems:  Skin:  Negative bruising     Eyes:  Negative      ENT:   "Negative   sore throat  Respiratory:  Negative dyspnea on exertion     Cardiovascular:  Negative Positive for;fatigue;lower extremity symptoms extremely tired.hx varicose vein surgery  Gastroenterology: Negative poor appetite    Genitourinary:  Positive for urinary frequency;prostate problem    Musculoskeletal:  Positive for arthritis    Neurologic:  Positive for numbness or tingling of feet    Psychiatric:  Positive for      Heme/Lymph/Imm:  Positive for easy bruising    Endocrine:  Negative        Physical Exam:  Vitals: /83   Pulse 77   Ht 1.727 m (5' 8\")   Wt 57.8 kg (127 lb 6.4 oz)   BMI 19.37 kg/m      Constitutional:  cooperative, alert and oriented, well developed, well nourished, in no acute distress thin      Skin:  warm and dry to the touch, no apparent skin lesions or masses noted          Head:  normocephalic        Eyes:  pupils equal and round        Lymph:No Cervical lymphadenopathy present     ENT:  no pallor or cyanosis, dentition good        Neck:  carotid pulses are full and equal bilaterally JVP elevated;JVP 8-10      Respiratory:  clear to auscultation         Cardiac: regular rhythm;normal S1 and S2 frequent premature beats     grade 3;holosystolic murmur;apical;radiation to the axilla systolic ejection murmur;grade 3;RUSB;LUSB      pulses full and equal                                        GI:  not assessed this visit        Extremities and Muscular Skeletal:  no deformities, clubbing, cyanosis, erythema observed;no edema              Neurological:  no gross motor deficits;affect appropriate        Psych:  Alert and Oriented x 3        CC  Jennifer Quesada PA-C  [address]  [place] [address]              "

## 2019-11-29 NOTE — TELEPHONE ENCOUNTER
"Requested Prescriptions   Pending Prescriptions Disp Refills     simvastatin (ZOCOR) 20 MG tablet    Last Written Prescription Date:  03/13/2019  Last Fill Quantity: 30 tablet,  # refills: 0   Last office visit: 11/13/2019 with prescribing provider:  Gómez   Future Office Visit:     Next 5 appointments (look out 90 days)    Jan 07, 2020  1:10 PM CST  Return Visit with FRANK Chahal CNP  [facility] ([facility]) [address]  [facility] [address] [phone] OPT 2   Feb 12, 2020  1:30 PM CST  Office Visit with Evaristo Magaña MD  [facility] ([facility]) [address]  [facility] [address] [phone]          30 tablet 0     Sig: Take 1 tablet (20 mg) by mouth       Statins Protocol Failed - 11/27/2019  4:55 PM        Failed - LDL on file in past 12 months     Recent Labs   Lab Test 10/12/17  0729   LDL 29             Passed - No abnormal creatine kinase in past 12 months     Recent Labs   Lab Test 10/16/17  1215   CKT 58                Passed - Recent (12 mo) or future (30 days) visit within the authorizing provider's specialty     Patient has had an office visit with the authorizing provider or a provider within the authorizing providers department within the previous 12 mos or has a future within next 30 days. See \"Patient Info\" tab in inbasket, or \"Choose Columns\" in Meds & Orders section of the refill encounter.              Passed - Medication is active on med list        Passed - Patient is age 18 or older           "

## 2019-11-29 NOTE — TELEPHONE ENCOUNTER
gabapentin (NEURONTIN) 300 MG capsule        Discontinued.    Last Written Prescription Date:  11/28/2018 END 08/15/2019  Last Fill Quantity: 270 capsule,  # refills: 0   Last office visit: 11/13/2019 with prescribing provider:  Gómez   Future Office Visit:     Next 5 appointments (look out 90 days)    Jan 07, 2020  1:10 PM CST  Return Visit with FRANK Chahal CNP  [facility] ([facility]) [address] [place] [address] [address]  [facility] [address] [phone] OPT 2   Feb 12, 2020  1:30 PM CST  Office Visit with Evaristo Magaña MD  [facility] ([facility]) [address] [place] [address] [address]  [facility] [id number] [phone]            Routing refill request to provider for review/approval because:  Drug not on the [facility] or [facility] refill protocol or controlled substance

## 2019-12-02 NOTE — TELEPHONE ENCOUNTER
Routing refill request to provider for review/approval because:  Medication is reported/historical    [place] reviewed:

## 2019-12-05 NOTE — TELEPHONE ENCOUNTER
"Requested Prescriptions   Pending Prescriptions Disp Refills     tamsulosin (FLOMAX) 0.4 MG capsule    Last Written Prescription Date:  11/28/2018  Last Fill Quantity: 90 capsule,  # refills: 3   Last office visit: 11/13/2019 with prescribing provider:  Gómez   Future Office Visit:     Next 5 appointments (look out 90 days)    Jan 07, 2020  1:10 PM CST  Return Visit with FRANK Chahal CNP  [facility] ([facility]) [address]  [facility] [address] [phone] OPT 2   Feb 12, 2020  1:30 PM CST  Office Visit with Evaristo Magaña MD  [facility] ([facility]) [address]  [facility] [address]  [phone]          90 capsule 3     Sig: Take 1 capsule (0.4 mg) by mouth daily       Alpha Blockers Passed - 12/5/2019  5:01 PM        Passed - Blood pressure under 140/90 in past 12 months     BP Readings from Last 3 Encounters:   11/21/19 137/83   11/13/19 120/66   10/30/19 103/65                 Passed - Recent (12 mo) or future (30 days) visit within the authorizing provider's specialty     Patient has had an office visit with the authorizing provider or a provider within the authorizing providers department within the previous 12 mos or has a future within next 30 days. See \"Patient Info\" tab in inbasket, or \"Choose Columns\" in Meds & Orders section of the refill encounter.              Passed - Patient does not have Tadalafil, Vardenafil, or Sildenafil on their medication list        Passed - Medication is active on med list        Passed - Patient is 18 years of age or older           "

## 2019-12-06 NOTE — PROGRESS NOTES
[facility]  Cardiology Progress Note  Date of Service: 07/12/2019  Primary Cardiologist: Previous patient of Dr. 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     Assessment:   1. New onset paroxysmal atrial fibrillation    Spontaneously converted to SR    Metoprolol 25 mg BID    No anticoagulation due to advanced age and thrombocytopenia    2. HFpEF [PTA: Lasix 40 mg daily]    BNP 17,000    Weight down 12 lbs from admission with 3 lb overnight    Net negative 10 L total 1 L in 24 hr.    Transitioned to Bumex infusion at 0.25 mg/hr 7/8 and dose increased to 0.5 mg/hr 7/10    3. Valvular heart disease    Moderaterly severe MR post MitraClip    Moderately severe TR    4. C. Diff diarrhea    Diagnosed last admission 5/2019    Attempted to stop Vancomycin, but had worsening of diarrhea.     5. Severe pulmonary hypertension    6. CKD, stage III     Baseline creatine 1.3-1.5    Creatine increased overnight from 1.3-1.4-1.19-1.3    Plan:  1. Discontinue bumex infusion  2. Start torsemide 20 mg daily, KCL 40 mg daily and PO iron 325 mg BID  3. CORE enrollment with FRANK Ocampo, CNP 7/18 at 9:30 am  4. He was resistant to palliative in the hospital, but consider palliative consult as outpatient given poor overall prognosis  5. Cardiology will sign off    FRANK ARIAS CNP  Pager:  [phone]   (7am - 5pm, M-F)    Interval History   Sitting up in chair without complaints, improvement in shortness of breath on exertion when getting up to the toilet.    Physical Exam   Temp: 97.8  F (36.6  C) Temp src: Oral BP: 106/69 Pulse: 89 Heart Rate: 80 Resp: 18 SpO2: 92 % O2 Device: None (Room air)    Vitals:     Writer called patient's daughter, Michelle, regarding d/c. No answer, a message was left with direct call back number.   07/10/19 1610 07/11/19 0410 07/12/19 0146   Weight: 64.3 kg (141 lb 11.2 oz) 63.5 kg (140 lb) 62.3 kg (137 lb 6.4 oz)       Constitutional:   NAD   Skin:   Warm and dry   Head:   Nontraumatic   Neck:   elevated JVP   Lungs:   Bilateral crackles   Cardiovascular:   regular rate and rhythm, normal S1 and S2 and 3/6 systolic murmur at the apex and 2/6 murmur at LLSB   Abdomen:   Benign   Extremities and Back:   Edema 1+   Neurological:   Grossly nonfocal       Medications     bumetanide 0.5 mg/hr (07/12/19 0211)       beta carotene  25,000 Units Oral Daily     diclofenac  2 g Transdermal TID     dicyclomine  10 mg Oral TID AC     finasteride  5 mg Oral Daily     gabapentin  300 mg Oral TID     hypromellose  2 drop Left Eye 4x Daily     melatonin  3 mg Oral At Bedtime     metoprolol tartrate  25 mg Oral BID     multivitamin w/minerals  1 tablet Oral Daily     pantoprazole  40 mg Oral Daily     potassium chloride  40 mEq Oral BID     simvastatin  20 mg Oral At Bedtime     sodium chloride (PF)  3 mL Intracatheter Q8H     tamsulosin  0.4 mg Oral Daily     vancomycin  125 mg Oral TID     vitamin C  500 mg Oral Daily     zinc sulfate  220 mg Oral Daily       Data     Most Recent 3 CBC's:  Recent Labs   Lab Test 07/11/19 0952 07/07/19 0820 07/06/19  0604   WBC 4.1 5.0 3.8*   HGB 10.2* 10.9* 10.6*   MCV 93 94 95   PLT 69* 70* 74*     Most Recent 3 BMP's:  Recent Labs   Lab Test 07/11/19 2010 07/11/19 0952 07/10/19  0602 07/09/19  0601   NA  --  142 143 141   POTASSIUM 4.0 3.3* 3.4 3.6   CHLORIDE  --  98 101 101   CO2  --  36* 34* 36*   BUN  --  32* 36* 35*   CR  --  1.31* 1.19 1.40*   ANIONGAP  --  8 8 4   GIUSEPPE  --  8.5 8.2* 8.1*   GLC  --  96 78 83     Most Recent 3 Hemoglobins:  Recent Labs   Lab Test 07/11/19 0952 07/07/19  0820 07/06/19  0604   HGB 10.2* 10.9* 10.6*     Most Recent 3 Troponin's:  Recent Labs   Lab Test 07/05/19  0108 07/04/19  2146 07/04/19  1559   TROPI <0.015 0.021 <0.015     Most Recent Cholesterol  Panel:  Recent Labs   Lab Test 10/12/17  0729   CHOL 108   LDL 29   HDL 63   TRIG 81

## 2019-12-10 NOTE — TELEPHONE ENCOUNTER
Reason for Call:  Other prescription    Detailed comments: Margareth from [employer] called and stated that the patient is requesting Non-sting Barrier Wipes for his heels.  She stated that the patient uses these for pain and they seem to help.  She stated that they would need a prescription for these wipes to be used PRN.      Phone Number Patient can be reached at: Other phone number:  [phone]    Best Time: Any    Can we leave a detailed message on this number? YES    Call taken on 12/10/2019 at 1:53 PM by Kacey Niño

## 2019-12-11 NOTE — TELEPHONE ENCOUNTER
Controlled Substance Refill Request for HYDROcodone-acetaminophen (NORCO) 7.5-325 MG per tablet  Problem List Complete:  Yes    Per Dr. Bell Note Oct 2013:You don't want to use a long acting narcotic for pain. At this time, you are using #120 hydro/apap 7.5/325 monthly./   I told you today and you agreed to follow up to see Dr. Evaristo Magaña in about 2 months before the present refills of narcotic are due to be refilled   I also told you that it would be my recommendation that you then be given an increased monthly prescription of #150 per month to better control the pain.   You do not want any further surgery on your back at this time so want to focus on better pain control.     Patient is followed by ANTHONY Murphy for ongoing prescription of pain medication.  All refills should be approved by this provider, or covering partner.     Medication(s): norco 7.5/325.   Maximum quantity per month: 120  Clinic visit frequency required: Q 4 months      Controlled substance agreement on file: 9/3/14     Pain Clinic evaluation in the past: Yes       Date/Location:  spine clinic     DIRE Total Score(s):  No flowsheet data found.     Last [facility] website verification: 11/21/19 provider to review; multiple providers       Last Written Prescription Date:  11/21/2019  Last Fill Quantity: 60 tablet,  # refills: 0   Last office visit: 11/13/2019 with prescribing provider:  Gómez   Future Office Visit:   Next 5 appointments (look out 90 days)    Jan 07, 2020  1:10 PM CST  Return Visit with FRANK Chahal CNP  [facility] ([facility]) [address]  [facility] [address]  [phone] OPT 2   Feb 12, 2020  1:30 PM CST  Office Visit with Evaristo Magaña MD  [facility] [address]  [facility] [address] [phone]           Future Office visit:    Next 5 appointments (look out 90 days)    Jan 07, 2020  1:10 PM CST  Return Visit with FRANK Chahal CNP  [facility] ([facility]) [address]  [facility] [address] [phone] OPT 2   Feb 12, 2020  1:30 PM CST  Office Visit with Evaristo Magaña MD  [facility] [address]  [facility] [address] [phone]          Controlled substance agreement:   Encounter-Level CSA - 09/03/2014:    Controlled Substance Agreement - Scan on 10/15/2014 12:54 PM: [facility], Controlled Med Agreement, 9-03-14     Patient-Level CSA:    There are no patient-level csa.         Last Urine Drug Screen: No results found for: CDAUT, No results found for: COMDAT, No results found for: THC13, PCP13, COC13, MAMP13, OPI13, AMP13, BZO13, TCA13, MTD13, BAR13, OXY13, PPX13, BUP13         [email or web address]   checked in past 3 months?  Yes 11/21/2019

## 2019-12-12 NOTE — TELEPHONE ENCOUNTER
Routing refill request to provider for review/approval because:  Drug not on the [facility] refill protocol     Chronic pain syndrome  Last [facility] website verification: 11/21/19 provider to review; multiple providers    Previous Version     TYLOR Ortiz, RN  [employer] Triage

## 2019-12-17 NOTE — TELEPHONE ENCOUNTER
Reason for Call: Request for an order or referral:    Order or referral being requested: Wound Care Orders    Date needed: as soon as possible    Has the patient been seen by the PCP for this problem? YES    Additional comments: Margareth with [facility] called and stated that they need Wound care orders for the patient from Dr. Magaña.  She stated that patient has a small open wound on his coccyx and they need orders to clean it with lantiseptic, apply barrier cream and to cover with a padded tegaderm.   Margareth also requested that once this order has been taken care of they need it faxed to them at [phone] in order to update the patient's chart.     Phone number Patient can be reached at:  Other phone number:  [phone]    Best Time:  Any    Can we leave a detailed message on this number?  YES    Call taken on 12/17/2019 at 11:53 AM by Kacey Niño

## 2019-12-17 NOTE — TELEPHONE ENCOUNTER
PRN med for constipation. He stated that he has used Senna in the past and that has worked.  Could we get an order for Senna PRN?  [facility] is asking            Fax [phone]

## 2019-12-17 NOTE — TELEPHONE ENCOUNTER
Routing to provider- Please see prior message.    Margareth with [facility] called and stated that they need Wound care orders for the patient from Dr. Magaña.  She stated that patient has a small open wound on his coccyx and they need orders to clean it with lantiseptic, apply barrier cream and to cover with a padded tegaderm.   Margareth also requested that once this order has been taken care of they need it faxed to them at [phone] in order to update the patient's chart.    Please let Nurse Triage know if we should do anything for follow up. Thank you!

## 2019-12-17 NOTE — TELEPHONE ENCOUNTER
Reason for Call:  Other prescription    Detailed comments: Margareth with [facility] called and stated that they are requesting a prescription for the patient for Senna from Dr. Magaña.  She stated that patient is in need of this for constipation.     Phone Number Patient can be reached at: Other phone number:  [phone]    Best Time: Any    Can we leave a detailed message on this number? YES    Call taken on 12/17/2019 at 11:49 AM by Kacey iNño

## 2019-12-17 NOTE — TELEPHONE ENCOUNTER
Margareth requested that once this has been done they will need an order faxed to them at [phone] so they can update the patient's chart.

## 2019-12-18 NOTE — TELEPHONE ENCOUNTER
Senna - Docusate Sodium 8.6 - 50 mg tablet     Routing refill request to provider for review/approval because:  Drug not active on patient's medication list

## 2019-12-18 NOTE — TELEPHONE ENCOUNTER
Spoke with Margareth at [facility].  She will fax over wound care orders for MD to sign.  No further action needed at this time.

## 2019-12-19 NOTE — TELEPHONE ENCOUNTER
"Requested Prescriptions   Pending Prescriptions Disp Refills     potassium chloride ER (K-TAB/KLOR-CON) 10 MEQ CR tablet    Last Written Prescription Date:  12/19/2019  Last Fill Quantity: 60 tablet,  # refills: 0   Last office visit: 11/13/2019 with prescribing provider:  Gómez   Future Office Visit:     Next 5 appointments (look out 90 days)    Jan 07, 2020  1:10 PM CST  Return Visit with FRANK Chahal CNP  [facility] ([facility]) [address] [place] [id number]  [phone] OPT 2   Feb 12, 2020  1:30 PM CST  Office Visit with Evaristo Magaña MD  [facility] ([facility] [address] [address]  [phone]   Mar 13, 2020  1:45 PM CDT  Return Visit with Lance Yao MD  [facility] ([facility]) [address] [place], [place] [id number]  [facility] [address]  [phone] OPT 2                Sig: Take 2 tablets (20 mEq) by mouth daily       Potassium Supplements Protocol Passed - 12/19/2019  3:31 PM        Passed - Recent (12 mo) or future (30 days) visit within the authorizing provider's department     Patient has had an office visit with the authorizing provider or a provider within the authorizing providers department within the previous 12 mos or has a future within next 30 days. See \"Patient Info\" tab in inbasket, or \"Choose Columns\" in Meds & Orders section of the refill encounter.              Passed - Medication is active on med list        Passed - Patient is age 18 or older        Passed - Normal serum potassium in past 12 months     Recent Labs   Lab Test 11/21/19  1255   POTASSIUM 5.1                       "

## 2019-12-27 NOTE — TELEPHONE ENCOUNTER
"Reviewed Ziopatch report showing       Per office note dated 11/21/19, Dr. Yao recommended, \"I will have him wear a Zio Patch monitor to determine his atrial fibrillation burden, and I may have him see Dr. Kelly for possible Watchman device placement.  He has a CHADS-VASc score of 4, 2 for being over 75 years of age, 1 for congestive heart failure and 1 for essential hypertension.  Therefore, he is at significant annual risk for stroke.  However, all of this has to be weighed up against this patient's age and frailty.  I will have the patient follow up with Italia Carvajal or one of the other APPs in a month's time, when we have the results of the Zio Patch monitor.  I will see him back again myself in approximately 3 months' time.  At that stage, I will repeat his echocardiogram.  It has been my pleasure to be involved in the care of this very nice patient.  I look forward to seeing him again.\"     Pt has office visit 1/07/20 w/ Italia Carvajal NP & will message Dr. Yao to review. Negar RN   "

## 2019-12-30 NOTE — TELEPHONE ENCOUNTER
No evidence of afib. Short run of NSVT. Rx medically given all his other med problems. Seeing Italia in 8 days. thx

## 2019-12-30 NOTE — TELEPHONE ENCOUNTER
Attempted to call pt with recommendations from Dr. Yao, left message for pt to call back. Negar SINGH

## 2019-12-31 NOTE — TELEPHONE ENCOUNTER
Pt called back, informed of results & recommendations from Dr. Yao. Reviewed upcoming apt w/ him & advised to keep these apts. Pt verbalized understanding. Negar SINGH

## 2020-01-01 ENCOUNTER — HOSPITAL ENCOUNTER (INPATIENT)
Facility: CLINIC | Age: 85
LOS: 9 days | Discharge: HOSPICE | DRG: 871 | End: 2020-07-29
Attending: EMERGENCY MEDICINE | Admitting: HOSPITALIST
Payer: COMMERCIAL

## 2020-01-01 ENCOUNTER — APPOINTMENT (OUTPATIENT)
Dept: SPEECH THERAPY | Facility: CLINIC | Age: 85
DRG: 871 | End: 2020-01-01
Payer: COMMERCIAL

## 2020-01-01 ENCOUNTER — TELEPHONE (OUTPATIENT)
Dept: UROLOGY | Facility: CLINIC | Age: 85
End: 2020-01-01

## 2020-01-01 ENCOUNTER — PRE VISIT (OUTPATIENT)
Dept: SURGERY | Facility: CLINIC | Age: 85
End: 2020-01-01

## 2020-01-01 ENCOUNTER — PRE VISIT (OUTPATIENT)
Dept: UROLOGY | Facility: CLINIC | Age: 85
End: 2020-01-01

## 2020-01-01 ENCOUNTER — PATIENT OUTREACH (OUTPATIENT)
Dept: UROLOGY | Facility: CLINIC | Age: 85
End: 2020-01-01

## 2020-01-01 ENCOUNTER — APPOINTMENT (OUTPATIENT)
Dept: GENERAL RADIOLOGY | Facility: CLINIC | Age: 85
DRG: 871 | End: 2020-01-01
Attending: EMERGENCY MEDICINE
Payer: COMMERCIAL

## 2020-01-01 ENCOUNTER — OFFICE VISIT (OUTPATIENT)
Dept: CARDIOLOGY | Facility: CLINIC | Age: 85
End: 2020-01-01
Attending: INTERNAL MEDICINE
Payer: MEDICARE

## 2020-01-01 ENCOUNTER — HOSPITAL ENCOUNTER (OUTPATIENT)
Facility: CLINIC | Age: 85
End: 2020-01-01
Attending: UROLOGY | Admitting: UROLOGY
Payer: COMMERCIAL

## 2020-01-01 ENCOUNTER — PREP FOR PROCEDURE (OUTPATIENT)
Dept: UROLOGY | Facility: CLINIC | Age: 85
End: 2020-01-01

## 2020-01-01 ENCOUNTER — APPOINTMENT (OUTPATIENT)
Dept: PHYSICAL THERAPY | Facility: CLINIC | Age: 85
DRG: 871 | End: 2020-01-01
Attending: INTERNAL MEDICINE
Payer: COMMERCIAL

## 2020-01-01 ENCOUNTER — APPOINTMENT (OUTPATIENT)
Dept: PHYSICAL THERAPY | Facility: CLINIC | Age: 85
DRG: 871 | End: 2020-01-01
Payer: COMMERCIAL

## 2020-01-01 ENCOUNTER — APPOINTMENT (OUTPATIENT)
Dept: SPEECH THERAPY | Facility: CLINIC | Age: 85
DRG: 871 | End: 2020-01-01
Attending: INTERNAL MEDICINE
Payer: COMMERCIAL

## 2020-01-01 ENCOUNTER — PREP FOR PROCEDURE (OUTPATIENT)
Dept: SURGERY | Facility: CLINIC | Age: 85
End: 2020-01-01

## 2020-01-01 ENCOUNTER — OFFICE VISIT (OUTPATIENT)
Dept: UROLOGY | Facility: CLINIC | Age: 85
End: 2020-01-01
Payer: COMMERCIAL

## 2020-01-01 ENCOUNTER — HEALTH MAINTENANCE LETTER (OUTPATIENT)
Age: 85
End: 2020-01-01

## 2020-01-01 ENCOUNTER — TELEPHONE (OUTPATIENT)
Dept: CARDIOLOGY | Facility: CLINIC | Age: 85
End: 2020-01-01

## 2020-01-01 ENCOUNTER — HOSPITAL ENCOUNTER (OUTPATIENT)
Dept: CARDIOLOGY | Facility: CLINIC | Age: 85
Discharge: HOME | End: 2020-03-13
Attending: INTERNAL MEDICINE | Admitting: INTERNAL MEDICINE
Payer: MEDICARE

## 2020-01-01 ENCOUNTER — DOCUMENTATION ONLY (OUTPATIENT)
Dept: OTHER | Facility: CLINIC | Age: 85
End: 2020-01-01

## 2020-01-01 VITALS
HEART RATE: 81 BPM | SYSTOLIC BLOOD PRESSURE: 134 MMHG | DIASTOLIC BLOOD PRESSURE: 69 MMHG | WEIGHT: 119 LBS | BODY MASS INDEX: 18.04 KG/M2 | HEIGHT: 68 IN

## 2020-01-01 VITALS
RESPIRATION RATE: 18 BRPM | WEIGHT: 126.2 LBS | TEMPERATURE: 97.3 F | HEIGHT: 68 IN | SYSTOLIC BLOOD PRESSURE: 107 MMHG | BODY MASS INDEX: 19.13 KG/M2 | DIASTOLIC BLOOD PRESSURE: 71 MMHG | HEART RATE: 79 BPM | OXYGEN SATURATION: 99 %

## 2020-01-01 VITALS
DIASTOLIC BLOOD PRESSURE: 73 MMHG | HEIGHT: 68 IN | HEART RATE: 69 BPM | SYSTOLIC BLOOD PRESSURE: 117 MMHG | WEIGHT: 128 LBS | BODY MASS INDEX: 19.4 KG/M2

## 2020-01-01 VITALS
SYSTOLIC BLOOD PRESSURE: 111 MMHG | HEIGHT: 68 IN | DIASTOLIC BLOOD PRESSURE: 70 MMHG | WEIGHT: 117.7 LBS | BODY MASS INDEX: 17.84 KG/M2 | HEART RATE: 71 BPM

## 2020-01-01 DIAGNOSIS — Z85.51 PERSONAL HISTORY OF MALIGNANT NEOPLASM OF BLADDER: Primary | ICD-10-CM

## 2020-01-01 DIAGNOSIS — G89.4 CHRONIC PAIN SYNDROME: ICD-10-CM

## 2020-01-01 DIAGNOSIS — I48.0 PAROXYSMAL ATRIAL FIBRILLATION (H): ICD-10-CM

## 2020-01-01 DIAGNOSIS — Z98.890 S/P MITRAL VALVE CLIP IMPLANTATION: ICD-10-CM

## 2020-01-01 DIAGNOSIS — M48.061 SPINAL STENOSIS OF LUMBAR REGION, UNSPECIFIED WHETHER NEUROGENIC CLAUDICATION PRESENT: ICD-10-CM

## 2020-01-01 DIAGNOSIS — N18.30 CKD (CHRONIC KIDNEY DISEASE) STAGE 3, GFR 30-59 ML/MIN (H): ICD-10-CM

## 2020-01-01 DIAGNOSIS — Z95.818 S/P MITRAL VALVE CLIP IMPLANTATION: ICD-10-CM

## 2020-01-01 DIAGNOSIS — F11.90 CHRONIC, CONTINUOUS USE OF OPIOIDS: ICD-10-CM

## 2020-01-01 DIAGNOSIS — J96.01 ACUTE RESPIRATORY FAILURE WITH HYPOXIA (H): ICD-10-CM

## 2020-01-01 DIAGNOSIS — I34.0 MITRAL VALVE INSUFFICIENCY, UNSPECIFIED ETIOLOGY: ICD-10-CM

## 2020-01-01 DIAGNOSIS — I10 BENIGN ESSENTIAL HYPERTENSION: ICD-10-CM

## 2020-01-01 DIAGNOSIS — Z11.59 ENCOUNTER FOR SCREENING FOR OTHER VIRAL DISEASES: Primary | ICD-10-CM

## 2020-01-01 DIAGNOSIS — R65.20 SEVERE SEPSIS (H): ICD-10-CM

## 2020-01-01 DIAGNOSIS — R39.9 LOWER URINARY TRACT SYMPTOMS (LUTS): Primary | ICD-10-CM

## 2020-01-01 DIAGNOSIS — I50.812 CHRONIC RIGHT-SIDED HEART FAILURE (H): ICD-10-CM

## 2020-01-01 DIAGNOSIS — I50.32 CHRONIC HEART FAILURE WITH PRESERVED EJECTION FRACTION (H): Primary | ICD-10-CM

## 2020-01-01 DIAGNOSIS — I07.1 TRICUSPID VALVE INSUFFICIENCY, UNSPECIFIED ETIOLOGY: ICD-10-CM

## 2020-01-01 DIAGNOSIS — M48.061 SPINAL STENOSIS OF LUMBAR REGION, UNSPECIFIED WHETHER NEUROGENIC CLAUDICATION PRESENT: Primary | ICD-10-CM

## 2020-01-01 DIAGNOSIS — C67.8 MALIGNANT NEOPLASM OF OVERLAPPING SITES OF BLADDER (H): Primary | ICD-10-CM

## 2020-01-01 DIAGNOSIS — R00.2 PALPITATIONS: ICD-10-CM

## 2020-01-01 DIAGNOSIS — J18.9 COMMUNITY ACQUIRED PNEUMONIA OF LEFT LOWER LOBE OF LUNG: ICD-10-CM

## 2020-01-01 DIAGNOSIS — Z85.51 PERSONAL HISTORY OF MALIGNANT NEOPLASM OF BLADDER: ICD-10-CM

## 2020-01-01 DIAGNOSIS — A41.9 SEVERE SEPSIS (H): ICD-10-CM

## 2020-01-01 LAB
ALBUMIN SERPL-MCNC: 2.6 G/DL (ref 3.4–5)
ALBUMIN UR-MCNC: 100 MG/DL
ALBUMIN UR-MCNC: >499 MG/DL
ALP SERPL-CCNC: 122 U/L (ref 40–150)
ALT SERPL W P-5'-P-CCNC: 16 U/L (ref 0–70)
ANION GAP SERPL CALCULATED.3IONS-SCNC: 10 MMOL/L (ref 3–14)
ANION GAP SERPL CALCULATED.3IONS-SCNC: 3 MMOL/L (ref 3–14)
ANION GAP SERPL CALCULATED.3IONS-SCNC: 5 MMOL/L (ref 3–14)
ANION GAP SERPL CALCULATED.3IONS-SCNC: 6 MMOL/L (ref 3–14)
ANION GAP SERPL CALCULATED.3IONS-SCNC: 7 MMOL/L (ref 3–14)
APPEARANCE UR: ABNORMAL
APPEARANCE UR: ABNORMAL
AST SERPL W P-5'-P-CCNC: 18 U/L (ref 0–45)
BACTERIA SPEC CULT: NO GROWTH
BASOPHILS # BLD AUTO: 0 10E9/L (ref 0–0.2)
BASOPHILS NFR BLD AUTO: 0 %
BASOPHILS NFR BLD AUTO: 0 %
BASOPHILS NFR BLD AUTO: 0.1 %
BASOPHILS NFR BLD AUTO: 0.1 %
BILIRUB SERPL-MCNC: 1.2 MG/DL (ref 0.2–1.3)
BILIRUB UR QL STRIP: NEGATIVE
BILIRUB UR QL STRIP: NEGATIVE
BUN SERPL-MCNC: 42 MG/DL (ref 7–30)
BUN SERPL-MCNC: 43 MG/DL (ref 7–30)
BUN SERPL-MCNC: 47 MG/DL (ref 7–30)
BUN SERPL-MCNC: 56 MG/DL (ref 7–30)
BUN SERPL-MCNC: 58 MG/DL (ref 7–30)
BUN SERPL-MCNC: 69 MG/DL (ref 7–30)
BUN SERPL-MCNC: 70 MG/DL (ref 7–30)
CALCIUM SERPL-MCNC: 7.9 MG/DL (ref 8.5–10.1)
CALCIUM SERPL-MCNC: 7.9 MG/DL (ref 8.5–10.1)
CALCIUM SERPL-MCNC: 8.1 MG/DL (ref 8.5–10.1)
CALCIUM SERPL-MCNC: 8.2 MG/DL (ref 8.5–10.1)
CALCIUM SERPL-MCNC: 8.3 MG/DL (ref 8.5–10.1)
CALCIUM SERPL-MCNC: 8.6 MG/DL (ref 8.5–10.1)
CALCIUM SERPL-MCNC: 8.7 MG/DL (ref 8.5–10.1)
CHLORIDE SERPL-SCNC: 102 MMOL/L (ref 94–109)
CHLORIDE SERPL-SCNC: 108 MMOL/L (ref 94–109)
CHLORIDE SERPL-SCNC: 110 MMOL/L (ref 94–109)
CHLORIDE SERPL-SCNC: 115 MMOL/L (ref 94–109)
CHLORIDE SERPL-SCNC: 116 MMOL/L (ref 94–109)
CHLORIDE SERPL-SCNC: 116 MMOL/L (ref 94–109)
CHLORIDE SERPL-SCNC: 118 MMOL/L (ref 94–109)
CO2 SERPL-SCNC: 20 MMOL/L (ref 20–32)
CO2 SERPL-SCNC: 22 MMOL/L (ref 20–32)
CO2 SERPL-SCNC: 23 MMOL/L (ref 20–32)
CO2 SERPL-SCNC: 27 MMOL/L (ref 20–32)
CO2 SERPL-SCNC: 31 MMOL/L (ref 20–32)
COLOR UR AUTO: ABNORMAL
COLOR UR AUTO: ABNORMAL
CREAT SERPL-MCNC: 1.15 MG/DL (ref 0.66–1.25)
CREAT SERPL-MCNC: 1.25 MG/DL (ref 0.66–1.25)
CREAT SERPL-MCNC: 1.38 MG/DL (ref 0.66–1.25)
CREAT SERPL-MCNC: 1.57 MG/DL (ref 0.66–1.25)
CREAT SERPL-MCNC: 1.7 MG/DL (ref 0.66–1.25)
CREAT SERPL-MCNC: 1.88 MG/DL (ref 0.66–1.25)
CREAT SERPL-MCNC: 1.92 MG/DL (ref 0.66–1.25)
DIFFERENTIAL METHOD BLD: ABNORMAL
EOSINOPHIL # BLD AUTO: 0 10E9/L (ref 0–0.7)
EOSINOPHIL NFR BLD AUTO: 0 %
ERYTHROCYTE [DISTWIDTH] IN BLOOD BY AUTOMATED COUNT: 14.3 % (ref 10–15)
ERYTHROCYTE [DISTWIDTH] IN BLOOD BY AUTOMATED COUNT: 14.4 % (ref 10–15)
ERYTHROCYTE [DISTWIDTH] IN BLOOD BY AUTOMATED COUNT: 14.5 % (ref 10–15)
ERYTHROCYTE [DISTWIDTH] IN BLOOD BY AUTOMATED COUNT: 14.6 % (ref 10–15)
GFR SERPL CREATININE-BSD FRML MDRD: 29 ML/MIN/{1.73_M2}
GFR SERPL CREATININE-BSD FRML MDRD: 30 ML/MIN/{1.73_M2}
GFR SERPL CREATININE-BSD FRML MDRD: 34 ML/MIN/{1.73_M2}
GFR SERPL CREATININE-BSD FRML MDRD: 37 ML/MIN/{1.73_M2}
GFR SERPL CREATININE-BSD FRML MDRD: 44 ML/MIN/{1.73_M2}
GFR SERPL CREATININE-BSD FRML MDRD: 49 ML/MIN/{1.73_M2}
GFR SERPL CREATININE-BSD FRML MDRD: 54 ML/MIN/{1.73_M2}
GLUCOSE SERPL-MCNC: 136 MG/DL (ref 70–99)
GLUCOSE SERPL-MCNC: 67 MG/DL (ref 70–99)
GLUCOSE SERPL-MCNC: 71 MG/DL (ref 70–99)
GLUCOSE SERPL-MCNC: 85 MG/DL (ref 70–99)
GLUCOSE SERPL-MCNC: 87 MG/DL (ref 70–99)
GLUCOSE SERPL-MCNC: 89 MG/DL (ref 70–99)
GLUCOSE SERPL-MCNC: 96 MG/DL (ref 70–99)
GLUCOSE UR STRIP-MCNC: NEGATIVE MG/DL
GLUCOSE UR STRIP-MCNC: NEGATIVE MG/DL
HCT VFR BLD AUTO: 30.3 % (ref 40–53)
HCT VFR BLD AUTO: 33.3 % (ref 40–53)
HCT VFR BLD AUTO: 35.6 % (ref 40–53)
HCT VFR BLD AUTO: 38.5 % (ref 40–53)
HGB BLD-MCNC: 10.4 G/DL (ref 13.3–17.7)
HGB BLD-MCNC: 10.8 G/DL (ref 13.3–17.7)
HGB BLD-MCNC: 11.9 G/DL (ref 13.3–17.7)
HGB BLD-MCNC: 9.5 G/DL (ref 13.3–17.7)
HGB UR QL STRIP: ABNORMAL
HGB UR QL STRIP: ABNORMAL
IMM GRANULOCYTES # BLD: 0 10E9/L (ref 0–0.4)
IMM GRANULOCYTES NFR BLD: 0.1 %
IMM GRANULOCYTES NFR BLD: 0.2 %
IMM GRANULOCYTES NFR BLD: 0.2 %
IMM GRANULOCYTES NFR BLD: 0.3 %
INTERPRETATION ECG - MUSE: NORMAL
KETONES UR STRIP-MCNC: NEGATIVE MG/DL
KETONES UR STRIP-MCNC: NEGATIVE MG/DL
LACTATE BLD-SCNC: 1.5 MMOL/L (ref 0.7–2)
LACTATE BLD-SCNC: 2.7 MMOL/L (ref 0.7–2)
LACTATE BLD-SCNC: 2.8 MMOL/L (ref 0.7–2)
LACTATE BLD-SCNC: 3.1 MMOL/L (ref 0.7–2)
LACTATE BLD-SCNC: 4 MMOL/L (ref 0.7–2)
LEUKOCYTE ESTERASE UR QL STRIP: ABNORMAL
LEUKOCYTE ESTERASE UR QL STRIP: NEGATIVE
LIPASE SERPL-CCNC: 57 U/L (ref 73–393)
LYMPHOCYTES # BLD AUTO: 0.3 10E9/L (ref 0.8–5.3)
LYMPHOCYTES # BLD AUTO: 0.3 10E9/L (ref 0.8–5.3)
LYMPHOCYTES # BLD AUTO: 0.4 10E9/L (ref 0.8–5.3)
LYMPHOCYTES # BLD AUTO: 0.7 10E9/L (ref 0.8–5.3)
LYMPHOCYTES NFR BLD AUTO: 3 %
LYMPHOCYTES NFR BLD AUTO: 3.4 %
LYMPHOCYTES NFR BLD AUTO: 6.5 %
LYMPHOCYTES NFR BLD AUTO: 7.8 %
Lab: NORMAL
MCH RBC QN AUTO: 30.6 PG (ref 26.5–33)
MCH RBC QN AUTO: 30.7 PG (ref 26.5–33)
MCH RBC QN AUTO: 31.4 PG (ref 26.5–33)
MCH RBC QN AUTO: 31.4 PG (ref 26.5–33)
MCHC RBC AUTO-ENTMCNC: 30.3 G/DL (ref 31.5–36.5)
MCHC RBC AUTO-ENTMCNC: 30.9 G/DL (ref 31.5–36.5)
MCHC RBC AUTO-ENTMCNC: 31.2 G/DL (ref 31.5–36.5)
MCHC RBC AUTO-ENTMCNC: 31.4 G/DL (ref 31.5–36.5)
MCV RBC AUTO: 100 FL (ref 78–100)
MCV RBC AUTO: 101 FL (ref 78–100)
MCV RBC AUTO: 101 FL (ref 78–100)
MCV RBC AUTO: 99 FL (ref 78–100)
MONOCYTES # BLD AUTO: 0.3 10E9/L (ref 0–1.3)
MONOCYTES # BLD AUTO: 0.3 10E9/L (ref 0–1.3)
MONOCYTES # BLD AUTO: 0.4 10E9/L (ref 0–1.3)
MONOCYTES # BLD AUTO: 0.4 10E9/L (ref 0–1.3)
MONOCYTES NFR BLD AUTO: 2.7 %
MONOCYTES NFR BLD AUTO: 4.2 %
MONOCYTES NFR BLD AUTO: 4.6 %
MONOCYTES NFR BLD AUTO: 5.3 %
MUCOUS THREADS #/AREA URNS LPF: PRESENT /LPF
NEUTROPHILS # BLD AUTO: 5.6 10E9/L (ref 1.6–8.3)
NEUTROPHILS # BLD AUTO: 7.5 10E9/L (ref 1.6–8.3)
NEUTROPHILS # BLD AUTO: 7.6 10E9/L (ref 1.6–8.3)
NEUTROPHILS # BLD AUTO: 9.1 10E9/L (ref 1.6–8.3)
NEUTROPHILS NFR BLD AUTO: 87.6 %
NEUTROPHILS NFR BLD AUTO: 88.7 %
NEUTROPHILS NFR BLD AUTO: 91.1 %
NEUTROPHILS NFR BLD AUTO: 94.1 %
NITRATE UR QL: NEGATIVE
NITRATE UR QL: NEGATIVE
NRBC # BLD AUTO: 0 10*3/UL
NRBC BLD AUTO-RTO: 0 /100
PH UR STRIP: 6.5 PH (ref 5–7)
PH UR STRIP: 7 PH (ref 5–7)
PLATELET # BLD AUTO: 125 10E9/L (ref 150–450)
PLATELET # BLD AUTO: 62 10E9/L (ref 150–450)
PLATELET # BLD AUTO: 78 10E9/L (ref 150–450)
PLATELET # BLD AUTO: 84 10E9/L (ref 150–450)
POTASSIUM SERPL-SCNC: 3.7 MMOL/L (ref 3.4–5.3)
POTASSIUM SERPL-SCNC: 3.8 MMOL/L (ref 3.4–5.3)
POTASSIUM SERPL-SCNC: 4.2 MMOL/L (ref 3.4–5.3)
POTASSIUM SERPL-SCNC: 4.5 MMOL/L (ref 3.4–5.3)
POTASSIUM SERPL-SCNC: 4.7 MMOL/L (ref 3.4–5.3)
POTASSIUM SERPL-SCNC: 5.1 MMOL/L (ref 3.4–5.3)
POTASSIUM SERPL-SCNC: 5.2 MMOL/L (ref 3.4–5.3)
POTASSIUM SERPL-SCNC: 5.5 MMOL/L (ref 3.4–5.3)
PROCALCITONIN SERPL-MCNC: 8.29 NG/ML
PROT SERPL-MCNC: 6.7 G/DL (ref 6.8–8.8)
RBC # BLD AUTO: 3.03 10E12/L (ref 4.4–5.9)
RBC # BLD AUTO: 3.31 10E12/L (ref 4.4–5.9)
RBC # BLD AUTO: 3.53 10E12/L (ref 4.4–5.9)
RBC # BLD AUTO: 3.88 10E12/L (ref 4.4–5.9)
RBC #/AREA URNS AUTO: >182 /HPF (ref 0–2)
RBC #/AREA URNS AUTO: >182 /HPF (ref 0–2)
SARS-COV-2 RNA SPEC QL NAA+PROBE: NOT DETECTED
SODIUM SERPL-SCNC: 139 MMOL/L (ref 133–144)
SODIUM SERPL-SCNC: 140 MMOL/L (ref 133–144)
SODIUM SERPL-SCNC: 140 MMOL/L (ref 133–144)
SODIUM SERPL-SCNC: 144 MMOL/L (ref 133–144)
SOURCE: ABNORMAL
SOURCE: ABNORMAL
SP GR UR STRIP: 1.01 (ref 1–1.03)
SP GR UR STRIP: 1.02 (ref 1–1.03)
SPECIMEN SOURCE: NORMAL
TRANS CELLS #/AREA URNS HPF: 7 /HPF
UROBILINOGEN UR STRIP-MCNC: 0 MG/DL (ref 0–2)
UROBILINOGEN UR STRIP-MCNC: NORMAL MG/DL (ref 0–2)
WBC # BLD AUTO: 6.3 10E9/L (ref 4–11)
WBC # BLD AUTO: 8.3 10E9/L (ref 4–11)
WBC # BLD AUTO: 8.7 10E9/L (ref 4–11)
WBC # BLD AUTO: 9.7 10E9/L (ref 4–11)
WBC #/AREA URNS AUTO: 34 /HPF (ref 0–5)
WBC #/AREA URNS AUTO: 7 /HPF (ref 0–5)

## 2020-01-01 PROCEDURE — 36415 COLL VENOUS BLD VENIPUNCTURE: CPT | Performed by: INTERNAL MEDICINE

## 2020-01-01 PROCEDURE — 87040 BLOOD CULTURE FOR BACTERIA: CPT | Performed by: EMERGENCY MEDICINE

## 2020-01-01 PROCEDURE — G0463 HOSPITAL OUTPT CLINIC VISIT: HCPCS | Mod: 25

## 2020-01-01 PROCEDURE — 93005 ELECTROCARDIOGRAM TRACING: CPT

## 2020-01-01 PROCEDURE — 12000011 ZZH R&B MS OVERFLOW

## 2020-01-01 PROCEDURE — 25800030 ZZH RX IP 258 OP 636: Performed by: INTERNAL MEDICINE

## 2020-01-01 PROCEDURE — 92526 ORAL FUNCTION THERAPY: CPT | Mod: GN

## 2020-01-01 PROCEDURE — 25000132 ZZH RX MED GY IP 250 OP 250 PS 637

## 2020-01-01 PROCEDURE — 25000132 ZZH RX MED GY IP 250 OP 250 PS 637: Performed by: STUDENT IN AN ORGANIZED HEALTH CARE EDUCATION/TRAINING PROGRAM

## 2020-01-01 PROCEDURE — 83690 ASSAY OF LIPASE: CPT | Performed by: EMERGENCY MEDICINE

## 2020-01-01 PROCEDURE — 25800025 ZZH RX 258: Performed by: INTERNAL MEDICINE

## 2020-01-01 PROCEDURE — 96375 TX/PRO/DX INJ NEW DRUG ADDON: CPT

## 2020-01-01 PROCEDURE — 25800030 ZZH RX IP 258 OP 636: Performed by: NURSE PRACTITIONER

## 2020-01-01 PROCEDURE — 25800030 ZZH RX IP 258 OP 636: Performed by: EMERGENCY MEDICINE

## 2020-01-01 PROCEDURE — 25000132 ZZH RX MED GY IP 250 OP 250 PS 637: Performed by: HOSPITALIST

## 2020-01-01 PROCEDURE — 25000128 H RX IP 250 OP 636: Performed by: INTERNAL MEDICINE

## 2020-01-01 PROCEDURE — 83605 ASSAY OF LACTIC ACID: CPT | Performed by: NURSE PRACTITIONER

## 2020-01-01 PROCEDURE — 25000128 H RX IP 250 OP 636: Performed by: HOSPITALIST

## 2020-01-01 PROCEDURE — C9113 INJ PANTOPRAZOLE SODIUM, VIA: HCPCS | Performed by: INTERNAL MEDICINE

## 2020-01-01 PROCEDURE — 25000128 H RX IP 250 OP 636: Performed by: STUDENT IN AN ORGANIZED HEALTH CARE EDUCATION/TRAINING PROGRAM

## 2020-01-01 PROCEDURE — 12000000 ZZH R&B MED SURG/OB

## 2020-01-01 PROCEDURE — 85025 COMPLETE CBC W/AUTO DIFF WBC: CPT | Performed by: INTERNAL MEDICINE

## 2020-01-01 PROCEDURE — 99233 SBSQ HOSP IP/OBS HIGH 50: CPT | Performed by: INTERNAL MEDICINE

## 2020-01-01 PROCEDURE — 71045 X-RAY EXAM CHEST 1 VIEW: CPT

## 2020-01-01 PROCEDURE — 83605 ASSAY OF LACTIC ACID: CPT | Performed by: INTERNAL MEDICINE

## 2020-01-01 PROCEDURE — 25000128 H RX IP 250 OP 636: Performed by: NURSE PRACTITIONER

## 2020-01-01 PROCEDURE — 99223 1ST HOSP IP/OBS HIGH 75: CPT | Mod: AI | Performed by: HOSPITALIST

## 2020-01-01 PROCEDURE — 25000132 ZZH RX MED GY IP 250 OP 250 PS 637: Performed by: NURSE PRACTITIONER

## 2020-01-01 PROCEDURE — C9803 HOPD COVID-19 SPEC COLLECT: HCPCS

## 2020-01-01 PROCEDURE — 97530 THERAPEUTIC ACTIVITIES: CPT | Mod: GP | Performed by: PHYSICAL THERAPIST

## 2020-01-01 PROCEDURE — 83605 ASSAY OF LACTIC ACID: CPT | Performed by: EMERGENCY MEDICINE

## 2020-01-01 PROCEDURE — 85025 COMPLETE CBC W/AUTO DIFF WBC: CPT | Performed by: EMERGENCY MEDICINE

## 2020-01-01 PROCEDURE — 25800030 ZZH RX IP 258 OP 636: Performed by: HOSPITALIST

## 2020-01-01 PROCEDURE — 25000132 ZZH RX MED GY IP 250 OP 250 PS 637: Performed by: EMERGENCY MEDICINE

## 2020-01-01 PROCEDURE — U0003 INFECTIOUS AGENT DETECTION BY NUCLEIC ACID (DNA OR RNA); SEVERE ACUTE RESPIRATORY SYNDROME CORONAVIRUS 2 (SARS-COV-2) (CORONAVIRUS DISEASE [COVID-19]), AMPLIFIED PROBE TECHNIQUE, MAKING USE OF HIGH THROUGHPUT TECHNOLOGIES AS DESCRIBED BY CMS-2020-01-R: HCPCS | Performed by: EMERGENCY MEDICINE

## 2020-01-01 PROCEDURE — 99232 SBSQ HOSP IP/OBS MODERATE 35: CPT | Performed by: INTERNAL MEDICINE

## 2020-01-01 PROCEDURE — 84145 PROCALCITONIN (PCT): CPT | Performed by: HOSPITALIST

## 2020-01-01 PROCEDURE — 25000132 ZZH RX MED GY IP 250 OP 250 PS 637: Performed by: INTERNAL MEDICINE

## 2020-01-01 PROCEDURE — 25000128 H RX IP 250 OP 636: Performed by: EMERGENCY MEDICINE

## 2020-01-01 PROCEDURE — 97162 PT EVAL MOD COMPLEX 30 MIN: CPT | Mod: GP | Performed by: PHYSICAL THERAPIST

## 2020-01-01 PROCEDURE — 80048 BASIC METABOLIC PNL TOTAL CA: CPT | Performed by: INTERNAL MEDICINE

## 2020-01-01 PROCEDURE — 99233 SBSQ HOSP IP/OBS HIGH 50: CPT | Performed by: NURSE PRACTITIONER

## 2020-01-01 PROCEDURE — 99238 HOSP IP/OBS DSCHRG MGMT 30/<: CPT | Performed by: INTERNAL MEDICINE

## 2020-01-01 PROCEDURE — 93306 TTE W/DOPPLER COMPLETE: CPT | Mod: 26 | Performed by: INTERNAL MEDICINE

## 2020-01-01 PROCEDURE — 92610 EVALUATE SWALLOWING FUNCTION: CPT | Mod: GN

## 2020-01-01 PROCEDURE — 81001 URINALYSIS AUTO W/SCOPE: CPT | Performed by: EMERGENCY MEDICINE

## 2020-01-01 PROCEDURE — 99285 EMERGENCY DEPT VISIT HI MDM: CPT | Mod: 25

## 2020-01-01 PROCEDURE — 51702 INSERT TEMP BLADDER CATH: CPT

## 2020-01-01 PROCEDURE — 96365 THER/PROPH/DIAG IV INF INIT: CPT

## 2020-01-01 PROCEDURE — 99214 OFFICE O/P EST MOD 30 MIN: CPT | Performed by: INTERNAL MEDICINE

## 2020-01-01 PROCEDURE — 97530 THERAPEUTIC ACTIVITIES: CPT | Mod: GP

## 2020-01-01 PROCEDURE — 93000 ELECTROCARDIOGRAM COMPLETE: CPT | Performed by: INTERNAL MEDICINE

## 2020-01-01 PROCEDURE — 80053 COMPREHEN METABOLIC PANEL: CPT | Performed by: EMERGENCY MEDICINE

## 2020-01-01 PROCEDURE — 80048 BASIC METABOLIC PNL TOTAL CA: CPT | Performed by: HOSPITALIST

## 2020-01-01 PROCEDURE — 87086 URINE CULTURE/COLONY COUNT: CPT | Performed by: EMERGENCY MEDICINE

## 2020-01-01 PROCEDURE — 97602 WOUND(S) CARE NON-SELECTIVE: CPT

## 2020-01-01 PROCEDURE — 92526 ORAL FUNCTION THERAPY: CPT | Mod: GN | Performed by: SPEECH-LANGUAGE PATHOLOGIST

## 2020-01-01 PROCEDURE — 99214 OFFICE O/P EST MOD 30 MIN: CPT | Performed by: NURSE PRACTITIONER

## 2020-01-01 PROCEDURE — 40000903 ZZH STATISTIC WOC PT EDUCATION, 31-45 MIN

## 2020-01-01 PROCEDURE — 85025 COMPLETE CBC W/AUTO DIFF WBC: CPT | Performed by: HOSPITALIST

## 2020-01-01 PROCEDURE — 93306 TTE W/DOPPLER COMPLETE: CPT

## 2020-01-01 PROCEDURE — 25000125 ZZHC RX 250: Performed by: INTERNAL MEDICINE

## 2020-01-01 PROCEDURE — 36415 COLL VENOUS BLD VENIPUNCTURE: CPT | Performed by: NURSE PRACTITIONER

## 2020-01-01 PROCEDURE — 99222 1ST HOSP IP/OBS MODERATE 55: CPT | Performed by: NURSE PRACTITIONER

## 2020-01-01 PROCEDURE — 99207 ZZC CDG-MDM COMPONENT: MEETS MODERATE - UP CODED: CPT | Performed by: INTERNAL MEDICINE

## 2020-01-01 PROCEDURE — 96361 HYDRATE IV INFUSION ADD-ON: CPT

## 2020-01-01 PROCEDURE — 84132 ASSAY OF SERUM POTASSIUM: CPT | Performed by: INTERNAL MEDICINE

## 2020-01-01 RX ORDER — ASCORBIC ACID 500 MG
TABLET ORAL
Qty: 100 TABLET | Refills: 0 | Status: SHIPPED | OUTPATIENT
Start: 2020-01-01 | End: 2020-01-01

## 2020-01-01 RX ORDER — VANCOMYCIN HYDROCHLORIDE 125 MG/1
125 CAPSULE ORAL 2 TIMES DAILY
Status: DISCONTINUED | OUTPATIENT
Start: 2020-01-01 | End: 2020-01-01

## 2020-01-01 RX ORDER — HYDROMORPHONE HYDROCHLORIDE 1 MG/ML
.3-.5 INJECTION, SOLUTION INTRAMUSCULAR; INTRAVENOUS; SUBCUTANEOUS
Status: DISCONTINUED | OUTPATIENT
Start: 2020-01-01 | End: 2020-01-01

## 2020-01-01 RX ORDER — ACETAMINOPHEN 500 MG
1000 TABLET ORAL ONCE
Status: COMPLETED | OUTPATIENT
Start: 2020-01-01 | End: 2020-01-01

## 2020-01-01 RX ORDER — OXYCODONE HYDROCHLORIDE 5 MG/1
5-10 TABLET ORAL EVERY 4 HOURS PRN
Status: DISCONTINUED | OUTPATIENT
Start: 2020-01-01 | End: 2020-01-01

## 2020-01-01 RX ORDER — HYDROMORPHONE HYDROCHLORIDE 10 MG/ML
4 INJECTION INTRAMUSCULAR; INTRAVENOUS; SUBCUTANEOUS EVERY 4 HOURS PRN
Qty: 30 ML | Refills: 0 | Status: SHIPPED | OUTPATIENT
Start: 2020-01-01

## 2020-01-01 RX ORDER — FENTANYL CITRATE 50 UG/ML
25-50 INJECTION, SOLUTION INTRAMUSCULAR; INTRAVENOUS
Status: DISCONTINUED | OUTPATIENT
Start: 2020-01-01 | End: 2020-01-01

## 2020-01-01 RX ORDER — VANCOMYCIN HYDROCHLORIDE 50 MG/ML
125 KIT ORAL 2 TIMES DAILY
Status: DISCONTINUED | OUTPATIENT
Start: 2020-01-01 | End: 2020-01-01 | Stop reason: HOSPADM

## 2020-01-01 RX ORDER — HYDROMORPHONE HYDROCHLORIDE 1 MG/ML
0.5 INJECTION, SOLUTION INTRAMUSCULAR; INTRAVENOUS; SUBCUTANEOUS
Status: DISCONTINUED | OUTPATIENT
Start: 2020-01-01 | End: 2020-01-01

## 2020-01-01 RX ORDER — AZITHROMYCIN 500 MG/1
500 INJECTION, POWDER, LYOPHILIZED, FOR SOLUTION INTRAVENOUS EVERY 24 HOURS
Status: DISCONTINUED | OUTPATIENT
Start: 2020-01-01 | End: 2020-01-01

## 2020-01-01 RX ORDER — LEVOFLOXACIN 250 MG/1
750 TABLET, FILM COATED ORAL EVERY OTHER DAY
Status: DISCONTINUED | OUTPATIENT
Start: 2020-01-01 | End: 2020-01-01 | Stop reason: HOSPADM

## 2020-01-01 RX ORDER — METOPROLOL TARTRATE 1 MG/ML
INJECTION, SOLUTION INTRAVENOUS
Status: DISCONTINUED
Start: 2020-01-01 | End: 2020-01-01 | Stop reason: HOSPADM

## 2020-01-01 RX ORDER — SIMVASTATIN 20 MG
20 TABLET ORAL AT BEDTIME
COMMUNITY

## 2020-01-01 RX ORDER — MORPHINE SULFATE 2 MG/ML
2-4 INJECTION, SOLUTION INTRAMUSCULAR; INTRAVENOUS
Status: DISCONTINUED | OUTPATIENT
Start: 2020-01-01 | End: 2020-01-01

## 2020-01-01 RX ORDER — METOPROLOL TARTRATE 1 MG/ML
2.5 INJECTION, SOLUTION INTRAVENOUS EVERY 4 HOURS PRN
Status: DISCONTINUED | OUTPATIENT
Start: 2020-01-01 | End: 2020-01-01 | Stop reason: HOSPADM

## 2020-01-01 RX ORDER — HYDROCODONE BITARTRATE AND ACETAMINOPHEN 7.5; 325 MG/1; MG/1
1 TABLET ORAL EVERY 6 HOURS PRN
Status: DISCONTINUED | OUTPATIENT
Start: 2020-01-01 | End: 2020-01-01

## 2020-01-01 RX ORDER — LEVOFLOXACIN 750 MG/1
750 TABLET, FILM COATED ORAL EVERY OTHER DAY
Qty: 2 TABLET | Refills: 0 | Status: SHIPPED | OUTPATIENT
Start: 2020-01-01

## 2020-01-01 RX ORDER — ONDANSETRON 4 MG/1
4 TABLET, ORALLY DISINTEGRATING ORAL EVERY 6 HOURS PRN
Status: DISCONTINUED | OUTPATIENT
Start: 2020-01-01 | End: 2020-01-01 | Stop reason: HOSPADM

## 2020-01-01 RX ORDER — ACETAMINOPHEN 650 MG/1
650 SUPPOSITORY RECTAL EVERY 4 HOURS PRN
Status: DISCONTINUED | OUTPATIENT
Start: 2020-01-01 | End: 2020-01-01 | Stop reason: HOSPADM

## 2020-01-01 RX ORDER — HYDROCODONE BITARTRATE AND ACETAMINOPHEN 7.5; 325 MG/1; MG/1
TABLET ORAL
Qty: 60 TABLET | Refills: 0 | OUTPATIENT
Start: 2020-01-01

## 2020-01-01 RX ORDER — ALBUTEROL SULFATE 90 UG/1
2 AEROSOL, METERED RESPIRATORY (INHALATION)
Status: DISCONTINUED | OUTPATIENT
Start: 2020-01-01 | End: 2020-01-01 | Stop reason: HOSPADM

## 2020-01-01 RX ORDER — NALOXONE HYDROCHLORIDE 0.4 MG/ML
.1-.4 INJECTION, SOLUTION INTRAMUSCULAR; INTRAVENOUS; SUBCUTANEOUS
Status: DISCONTINUED | OUTPATIENT
Start: 2020-01-01 | End: 2020-01-01 | Stop reason: HOSPADM

## 2020-01-01 RX ORDER — FINASTERIDE 5 MG/1
5 TABLET, FILM COATED ORAL DAILY
Status: DISCONTINUED | OUTPATIENT
Start: 2020-01-01 | End: 2020-01-01 | Stop reason: HOSPADM

## 2020-01-01 RX ORDER — CEFAZOLIN SODIUM 2 G/50ML
2 SOLUTION INTRAVENOUS
Status: CANCELLED | OUTPATIENT
Start: 2020-01-01

## 2020-01-01 RX ORDER — POLYETHYLENE GLYCOL 3350 17 G/17G
17 POWDER, FOR SOLUTION ORAL DAILY PRN
Status: DISCONTINUED | OUTPATIENT
Start: 2020-01-01 | End: 2020-01-01 | Stop reason: HOSPADM

## 2020-01-01 RX ORDER — LIDOCAINE 40 MG/G
CREAM TOPICAL
Status: DISCONTINUED | OUTPATIENT
Start: 2020-01-01 | End: 2020-01-01 | Stop reason: HOSPADM

## 2020-01-01 RX ORDER — CEFAZOLIN SODIUM 1 G/50ML
1 INJECTION, SOLUTION INTRAVENOUS SEE ADMIN INSTRUCTIONS
Status: CANCELLED | OUTPATIENT
Start: 2020-01-01

## 2020-01-01 RX ORDER — OXYCODONE HYDROCHLORIDE 5 MG/1
5 TABLET ORAL AT BEDTIME
Status: DISCONTINUED | OUTPATIENT
Start: 2020-01-01 | End: 2020-01-01

## 2020-01-01 RX ORDER — OXYCODONE HYDROCHLORIDE 5 MG/1
5-10 TABLET ORAL
Status: DISCONTINUED | OUTPATIENT
Start: 2020-01-01 | End: 2020-01-01

## 2020-01-01 RX ORDER — ACETAMINOPHEN 325 MG/1
650 TABLET ORAL EVERY 4 HOURS PRN
Status: DISCONTINUED | OUTPATIENT
Start: 2020-01-01 | End: 2020-01-01 | Stop reason: HOSPADM

## 2020-01-01 RX ORDER — SODIUM CHLORIDE 9 MG/ML
INJECTION, SOLUTION INTRAVENOUS CONTINUOUS
Status: DISCONTINUED | OUTPATIENT
Start: 2020-01-01 | End: 2020-01-01

## 2020-01-01 RX ORDER — BISACODYL 10 MG
10 SUPPOSITORY, RECTAL RECTAL DAILY PRN
Status: DISCONTINUED | OUTPATIENT
Start: 2020-01-01 | End: 2020-01-01 | Stop reason: HOSPADM

## 2020-01-01 RX ORDER — TROSPIUM CHLORIDE 20 MG/1
20 TABLET, FILM COATED ORAL DAILY
Qty: 30 TABLET | Refills: 11 | Status: SHIPPED | OUTPATIENT
Start: 2020-01-01 | End: 2020-01-01

## 2020-01-01 RX ORDER — FUROSEMIDE 10 MG/ML
40 INJECTION INTRAMUSCULAR; INTRAVENOUS ONCE
Status: COMPLETED | OUTPATIENT
Start: 2020-01-01 | End: 2020-01-01

## 2020-01-01 RX ORDER — CEFTRIAXONE 1 G/1
1 INJECTION, POWDER, FOR SOLUTION INTRAMUSCULAR; INTRAVENOUS ONCE
Status: COMPLETED | OUTPATIENT
Start: 2020-01-01 | End: 2020-01-01

## 2020-01-01 RX ORDER — AMOXICILLIN 250 MG
1 CAPSULE ORAL 2 TIMES DAILY PRN
Status: DISCONTINUED | OUTPATIENT
Start: 2020-01-01 | End: 2020-01-01

## 2020-01-01 RX ORDER — TROSPIUM CHLORIDE 20 MG/1
TABLET, FILM COATED ORAL
Qty: 30 TABLET | Refills: 11 | Status: SHIPPED | OUTPATIENT
Start: 2020-01-01 | End: 2020-01-01

## 2020-01-01 RX ORDER — LANOLIN ALCOHOL/MO/W.PET/CERES
3 CREAM (GRAM) TOPICAL
Status: DISCONTINUED | OUTPATIENT
Start: 2020-01-01 | End: 2020-01-01 | Stop reason: HOSPADM

## 2020-01-01 RX ORDER — CARBOXYMETHYLCELLULOSE SODIUM 5 MG/ML
2 SOLUTION/ DROPS OPHTHALMIC 2 TIMES DAILY
Status: DISCONTINUED | OUTPATIENT
Start: 2020-01-01 | End: 2020-01-01 | Stop reason: HOSPADM

## 2020-01-01 RX ORDER — MORPHINE SULFATE 2 MG/ML
2 INJECTION, SOLUTION INTRAMUSCULAR; INTRAVENOUS EVERY 10 MIN PRN
Status: DISCONTINUED | OUTPATIENT
Start: 2020-01-01 | End: 2020-01-01

## 2020-01-01 RX ORDER — AZITHROMYCIN 500 MG/1
500 INJECTION, POWDER, LYOPHILIZED, FOR SOLUTION INTRAVENOUS ONCE
Status: COMPLETED | OUTPATIENT
Start: 2020-01-01 | End: 2020-01-01

## 2020-01-01 RX ORDER — HYDROMORPHONE HYDROCHLORIDE 1 MG/ML
.5-1 INJECTION, SOLUTION INTRAMUSCULAR; INTRAVENOUS; SUBCUTANEOUS
Status: DISCONTINUED | OUTPATIENT
Start: 2020-01-01 | End: 2020-01-01

## 2020-01-01 RX ORDER — AMOXICILLIN 250 MG
2 CAPSULE ORAL 2 TIMES DAILY PRN
Status: DISCONTINUED | OUTPATIENT
Start: 2020-01-01 | End: 2020-01-01

## 2020-01-01 RX ORDER — HYDROMORPHONE HYDROCHLORIDE 1 MG/ML
0.2 INJECTION, SOLUTION INTRAMUSCULAR; INTRAVENOUS; SUBCUTANEOUS
Status: DISCONTINUED | OUTPATIENT
Start: 2020-01-01 | End: 2020-01-01

## 2020-01-01 RX ORDER — BISACODYL 5 MG/1
5 TABLET, DELAYED RELEASE ORAL DAILY PRN
COMMUNITY

## 2020-01-01 RX ORDER — GABAPENTIN 400 MG/1
400 CAPSULE ORAL AT BEDTIME
COMMUNITY

## 2020-01-01 RX ORDER — FLUORIDE TOOTHPASTE
15 TOOTHPASTE DENTAL 3 TIMES DAILY PRN
Status: DISCONTINUED | OUTPATIENT
Start: 2020-01-01 | End: 2020-01-01 | Stop reason: HOSPADM

## 2020-01-01 RX ORDER — HYDROMORPHONE HYDROCHLORIDE 10 MG/ML
4 INJECTION INTRAMUSCULAR; INTRAVENOUS; SUBCUTANEOUS EVERY 4 HOURS PRN
Status: DISCONTINUED | OUTPATIENT
Start: 2020-01-01 | End: 2020-01-01 | Stop reason: HOSPADM

## 2020-01-01 RX ORDER — VANCOMYCIN HYDROCHLORIDE 125 MG/1
125 CAPSULE ORAL 2 TIMES DAILY
Qty: 22 CAPSULE | Refills: 0 | Status: SHIPPED | OUTPATIENT
Start: 2020-01-01 | End: 2020-08-08

## 2020-01-01 RX ORDER — AMOXICILLIN 250 MG
1 CAPSULE ORAL DAILY PRN
COMMUNITY

## 2020-01-01 RX ORDER — ONDANSETRON 2 MG/ML
4 INJECTION INTRAMUSCULAR; INTRAVENOUS EVERY 6 HOURS PRN
Status: DISCONTINUED | OUTPATIENT
Start: 2020-01-01 | End: 2020-01-01 | Stop reason: HOSPADM

## 2020-01-01 RX ORDER — SODIUM CHLORIDE, SODIUM LACTATE, POTASSIUM CHLORIDE, CALCIUM CHLORIDE 600; 310; 30; 20 MG/100ML; MG/100ML; MG/100ML; MG/100ML
INJECTION, SOLUTION INTRAVENOUS CONTINUOUS
Status: DISCONTINUED | OUTPATIENT
Start: 2020-01-01 | End: 2020-01-01

## 2020-01-01 RX ORDER — HYDROCODONE BITARTRATE AND ACETAMINOPHEN 7.5; 325 MG/1; MG/1
1 TABLET ORAL EVERY 6 HOURS
Status: ON HOLD | COMMUNITY
End: 2020-01-01

## 2020-01-01 RX ORDER — CARBOXYMETHYLCELLULOSE SODIUM 5 MG/ML
1 SOLUTION/ DROPS OPHTHALMIC
Status: DISCONTINUED | OUTPATIENT
Start: 2020-01-01 | End: 2020-01-01 | Stop reason: HOSPADM

## 2020-01-01 RX ORDER — GABAPENTIN 300 MG/1
300 CAPSULE ORAL DAILY PRN
COMMUNITY

## 2020-01-01 RX ORDER — CARBOXYMETHYLCELLULOSE SODIUM 5 MG/ML
1 SOLUTION/ DROPS OPHTHALMIC
COMMUNITY

## 2020-01-01 RX ADMIN — OXYCODONE HYDROCHLORIDE 5 MG: 5 TABLET ORAL at 09:16

## 2020-01-01 RX ADMIN — DEXTROSE AND SODIUM CHLORIDE: 5; 450 INJECTION, SOLUTION INTRAVENOUS at 14:59

## 2020-01-01 RX ADMIN — HYDROCODONE BITARTRATE AND ACETAMINOPHEN 1 TABLET: 7.5; 325 TABLET ORAL at 08:33

## 2020-01-01 RX ADMIN — CEFEPIME HYDROCHLORIDE 2 G: 2 INJECTION, POWDER, FOR SOLUTION INTRAVENOUS at 12:24

## 2020-01-01 RX ADMIN — SODIUM CHLORIDE, POTASSIUM CHLORIDE, SODIUM LACTATE AND CALCIUM CHLORIDE: 600; 310; 30; 20 INJECTION, SOLUTION INTRAVENOUS at 01:12

## 2020-01-01 RX ADMIN — OXYCODONE HYDROCHLORIDE 10 MG: 5 TABLET ORAL at 08:31

## 2020-01-01 RX ADMIN — DEXTROSE AND SODIUM CHLORIDE: 5; 450 INJECTION, SOLUTION INTRAVENOUS at 12:36

## 2020-01-01 RX ADMIN — CEFEPIME HYDROCHLORIDE 2 G: 2 INJECTION, POWDER, FOR SOLUTION INTRAVENOUS at 02:29

## 2020-01-01 RX ADMIN — HYDROMORPHONE HYDROCHLORIDE 4 MG: 10 INJECTION, SOLUTION INTRAMUSCULAR; INTRAVENOUS; SUBCUTANEOUS at 11:09

## 2020-01-01 RX ADMIN — METOPROLOL TARTRATE 12.5 MG: 25 TABLET, FILM COATED ORAL at 09:01

## 2020-01-01 RX ADMIN — HYDROMORPHONE HYDROCHLORIDE 0.5 MG: 1 INJECTION, SOLUTION INTRAMUSCULAR; INTRAVENOUS; SUBCUTANEOUS at 02:44

## 2020-01-01 RX ADMIN — CARBOXYMETHYLCELLULOSE SODIUM 2 DROP: 5 SOLUTION/ DROPS OPHTHALMIC at 11:44

## 2020-01-01 RX ADMIN — VANCOMYCIN HYDROCHLORIDE 125 MG: 125 CAPSULE ORAL at 08:43

## 2020-01-01 RX ADMIN — OXYCODONE HYDROCHLORIDE 10 MG: 5 TABLET ORAL at 20:50

## 2020-01-01 RX ADMIN — HYDROMORPHONE HYDROCHLORIDE 0.5 MG: 1 INJECTION, SOLUTION INTRAMUSCULAR; INTRAVENOUS; SUBCUTANEOUS at 10:18

## 2020-01-01 RX ADMIN — CARBOXYMETHYLCELLULOSE SODIUM 2 DROP: 5 SOLUTION/ DROPS OPHTHALMIC at 08:43

## 2020-01-01 RX ADMIN — HYDROMORPHONE HYDROCHLORIDE 0.2 MG: 1 INJECTION, SOLUTION INTRAMUSCULAR; INTRAVENOUS; SUBCUTANEOUS at 01:14

## 2020-01-01 RX ADMIN — DEXTROSE AND SODIUM CHLORIDE: 5; 450 INJECTION, SOLUTION INTRAVENOUS at 11:47

## 2020-01-01 RX ADMIN — VANCOMYCIN HYDROCHLORIDE 125 MG: 125 CAPSULE ORAL at 09:01

## 2020-01-01 RX ADMIN — HYDROMORPHONE HYDROCHLORIDE 0.5 MG: 1 INJECTION, SOLUTION INTRAMUSCULAR; INTRAVENOUS; SUBCUTANEOUS at 12:24

## 2020-01-01 RX ADMIN — SODIUM CHLORIDE 500 ML: 9 INJECTION, SOLUTION INTRAVENOUS at 23:35

## 2020-01-01 RX ADMIN — METOPROLOL TARTRATE 12.5 MG: 25 TABLET, FILM COATED ORAL at 21:08

## 2020-01-01 RX ADMIN — HYDROMORPHONE HYDROCHLORIDE 4 MG: 10 INJECTION, SOLUTION INTRAMUSCULAR; INTRAVENOUS; SUBCUTANEOUS at 02:43

## 2020-01-01 RX ADMIN — ONDANSETRON 4 MG: 2 INJECTION INTRAMUSCULAR; INTRAVENOUS at 14:51

## 2020-01-01 RX ADMIN — HYDROMORPHONE HYDROCHLORIDE 0.5 MG: 1 INJECTION, SOLUTION INTRAMUSCULAR; INTRAVENOUS; SUBCUTANEOUS at 09:02

## 2020-01-01 RX ADMIN — SODIUM CHLORIDE 1000 ML: 9 INJECTION, SOLUTION INTRAVENOUS at 20:01

## 2020-01-01 RX ADMIN — SODIUM CHLORIDE 1000 ML: 9 INJECTION, SOLUTION INTRAVENOUS at 10:07

## 2020-01-01 RX ADMIN — FENTANYL CITRATE 25 MCG: 50 INJECTION, SOLUTION INTRAMUSCULAR; INTRAVENOUS at 22:31

## 2020-01-01 RX ADMIN — DEXTROSE AND SODIUM CHLORIDE: 5; 450 INJECTION, SOLUTION INTRAVENOUS at 18:39

## 2020-01-01 RX ADMIN — CEFTRIAXONE 1 G: 1 INJECTION, POWDER, FOR SOLUTION INTRAMUSCULAR; INTRAVENOUS at 20:04

## 2020-01-01 RX ADMIN — LEVOFLOXACIN 750 MG: 250 TABLET, FILM COATED ORAL at 19:53

## 2020-01-01 RX ADMIN — ACETAMINOPHEN 1000 MG: 500 TABLET, FILM COATED ORAL at 20:05

## 2020-01-01 RX ADMIN — HYDROMORPHONE HYDROCHLORIDE 4 MG: 10 INJECTION, SOLUTION INTRAMUSCULAR; INTRAVENOUS; SUBCUTANEOUS at 09:38

## 2020-01-01 RX ADMIN — HYDROMORPHONE HYDROCHLORIDE 4 MG: 10 INJECTION, SOLUTION INTRAMUSCULAR; INTRAVENOUS; SUBCUTANEOUS at 22:21

## 2020-01-01 RX ADMIN — VANCOMYCIN HYDROCHLORIDE 125 MG: 125 CAPSULE ORAL at 19:52

## 2020-01-01 RX ADMIN — METOPROLOL TARTRATE 12.5 MG: 25 TABLET, FILM COATED ORAL at 19:57

## 2020-01-01 RX ADMIN — CARBOXYMETHYLCELLULOSE SODIUM 2 DROP: 5 SOLUTION/ DROPS OPHTHALMIC at 20:21

## 2020-01-01 RX ADMIN — CARBOXYMETHYLCELLULOSE SODIUM 2 DROP: 5 SOLUTION/ DROPS OPHTHALMIC at 19:53

## 2020-01-01 RX ADMIN — CARBOXYMETHYLCELLULOSE SODIUM 2 DROP: 5 SOLUTION/ DROPS OPHTHALMIC at 09:21

## 2020-01-01 RX ADMIN — LEVOFLOXACIN 750 MG: 250 TABLET, FILM COATED ORAL at 08:42

## 2020-01-01 RX ADMIN — CARBOXYMETHYLCELLULOSE SODIUM 2 DROP: 5 SOLUTION/ DROPS OPHTHALMIC at 08:20

## 2020-01-01 RX ADMIN — OXYCODONE HYDROCHLORIDE 10 MG: 5 TABLET ORAL at 06:58

## 2020-01-01 RX ADMIN — VANCOMYCIN HYDROCHLORIDE 125 MG: KIT at 10:29

## 2020-01-01 RX ADMIN — METOPROLOL TARTRATE 12.5 MG: 25 TABLET, FILM COATED ORAL at 20:43

## 2020-01-01 RX ADMIN — FINASTERIDE 5 MG: 5 TABLET, FILM COATED ORAL at 08:43

## 2020-01-01 RX ADMIN — ONDANSETRON 4 MG: 2 INJECTION INTRAMUSCULAR; INTRAVENOUS at 02:48

## 2020-01-01 RX ADMIN — OXYCODONE HYDROCHLORIDE 5 MG: 5 TABLET ORAL at 14:03

## 2020-01-01 RX ADMIN — ONDANSETRON 4 MG: 2 INJECTION INTRAMUSCULAR; INTRAVENOUS at 23:59

## 2020-01-01 RX ADMIN — VANCOMYCIN HYDROCHLORIDE 125 MG: 125 CAPSULE ORAL at 09:22

## 2020-01-01 RX ADMIN — PANTOPRAZOLE SODIUM 40 MG: 40 INJECTION, POWDER, FOR SOLUTION INTRAVENOUS at 12:23

## 2020-01-01 RX ADMIN — OXYCODONE HYDROCHLORIDE 10 MG: 5 TABLET ORAL at 19:52

## 2020-01-01 RX ADMIN — PANTOPRAZOLE SODIUM 40 MG: 40 INJECTION, POWDER, FOR SOLUTION INTRAVENOUS at 08:31

## 2020-01-01 RX ADMIN — ONDANSETRON 4 MG: 2 INJECTION INTRAMUSCULAR; INTRAVENOUS at 08:25

## 2020-01-01 RX ADMIN — HYDROMORPHONE HYDROCHLORIDE 4 MG: 10 INJECTION, SOLUTION INTRAMUSCULAR; INTRAVENOUS; SUBCUTANEOUS at 20:42

## 2020-01-01 RX ADMIN — SODIUM CHLORIDE: 9 INJECTION, SOLUTION INTRAVENOUS at 00:08

## 2020-01-01 RX ADMIN — CARBOXYMETHYLCELLULOSE SODIUM 2 DROP: 5 SOLUTION/ DROPS OPHTHALMIC at 19:56

## 2020-01-01 RX ADMIN — METOPROLOL TARTRATE 12.5 MG: 25 TABLET, FILM COATED ORAL at 11:44

## 2020-01-01 RX ADMIN — METOPROLOL TARTRATE 12.5 MG: 25 TABLET, FILM COATED ORAL at 08:31

## 2020-01-01 RX ADMIN — DEXTROSE AND SODIUM CHLORIDE: 5; 450 INJECTION, SOLUTION INTRAVENOUS at 04:32

## 2020-01-01 RX ADMIN — METOPROLOL TARTRATE 12.5 MG: 25 TABLET, FILM COATED ORAL at 19:10

## 2020-01-01 RX ADMIN — HYDROMORPHONE HYDROCHLORIDE 0.5 MG: 1 INJECTION, SOLUTION INTRAMUSCULAR; INTRAVENOUS; SUBCUTANEOUS at 12:13

## 2020-01-01 RX ADMIN — PROCHLORPERAZINE EDISYLATE 5 MG: 5 INJECTION INTRAMUSCULAR; INTRAVENOUS at 18:37

## 2020-01-01 RX ADMIN — ONDANSETRON 4 MG: 2 INJECTION INTRAMUSCULAR; INTRAVENOUS at 14:22

## 2020-01-01 RX ADMIN — VANCOMYCIN HYDROCHLORIDE 125 MG: 125 CAPSULE ORAL at 19:09

## 2020-01-01 RX ADMIN — METOPROLOL TARTRATE 12.5 MG: 25 TABLET, FILM COATED ORAL at 19:52

## 2020-01-01 RX ADMIN — CARBOXYMETHYLCELLULOSE SODIUM 2 DROP: 5 SOLUTION/ DROPS OPHTHALMIC at 09:01

## 2020-01-01 RX ADMIN — AZITHROMYCIN MONOHYDRATE 500 MG: 500 INJECTION, POWDER, LYOPHILIZED, FOR SOLUTION INTRAVENOUS at 21:10

## 2020-01-01 RX ADMIN — ONDANSETRON 4 MG: 2 INJECTION INTRAMUSCULAR; INTRAVENOUS at 02:39

## 2020-01-01 RX ADMIN — METOPROLOL TARTRATE 12.5 MG: 25 TABLET, FILM COATED ORAL at 08:43

## 2020-01-01 RX ADMIN — CARBOXYMETHYLCELLULOSE SODIUM 2 DROP: 5 SOLUTION/ DROPS OPHTHALMIC at 19:52

## 2020-01-01 RX ADMIN — OXYCODONE HYDROCHLORIDE 10 MG: 5 TABLET ORAL at 05:18

## 2020-01-01 RX ADMIN — HYDROMORPHONE HYDROCHLORIDE 4 MG: 10 INJECTION, SOLUTION INTRAMUSCULAR; INTRAVENOUS; SUBCUTANEOUS at 05:40

## 2020-01-01 RX ADMIN — ONDANSETRON 4 MG: 2 INJECTION INTRAMUSCULAR; INTRAVENOUS at 01:14

## 2020-01-01 RX ADMIN — VANCOMYCIN HYDROCHLORIDE 125 MG: 125 CAPSULE ORAL at 19:57

## 2020-01-01 RX ADMIN — MAGNESIUM HYDROXIDE 30 ML: 400 SUSPENSION ORAL at 12:14

## 2020-01-01 RX ADMIN — PANTOPRAZOLE SODIUM 40 MG: 40 INJECTION, POWDER, FOR SOLUTION INTRAVENOUS at 10:24

## 2020-01-01 RX ADMIN — DEXTROSE AND SODIUM CHLORIDE: 5; 450 INJECTION, SOLUTION INTRAVENOUS at 22:04

## 2020-01-01 RX ADMIN — HYDROCODONE BITARTRATE AND ACETAMINOPHEN 1 TABLET: 7.5; 325 TABLET ORAL at 01:11

## 2020-01-01 RX ADMIN — HYDROMORPHONE HYDROCHLORIDE 4 MG: 10 INJECTION, SOLUTION INTRAMUSCULAR; INTRAVENOUS; SUBCUTANEOUS at 17:04

## 2020-01-01 RX ADMIN — HYDROMORPHONE HYDROCHLORIDE 0.5 MG: 1 INJECTION, SOLUTION INTRAMUSCULAR; INTRAVENOUS; SUBCUTANEOUS at 14:15

## 2020-01-01 RX ADMIN — PANTOPRAZOLE SODIUM 40 MG: 40 INJECTION, POWDER, FOR SOLUTION INTRAVENOUS at 09:01

## 2020-01-01 RX ADMIN — SODIUM CHLORIDE: 9 INJECTION, SOLUTION INTRAVENOUS at 17:54

## 2020-01-01 RX ADMIN — HYDROMORPHONE HYDROCHLORIDE 0.5 MG: 1 INJECTION, SOLUTION INTRAMUSCULAR; INTRAVENOUS; SUBCUTANEOUS at 16:13

## 2020-01-01 RX ADMIN — FINASTERIDE 5 MG: 5 TABLET, FILM COATED ORAL at 10:24

## 2020-01-01 RX ADMIN — HYDROMORPHONE HYDROCHLORIDE 4 MG: 10 INJECTION, SOLUTION INTRAMUSCULAR; INTRAVENOUS; SUBCUTANEOUS at 14:51

## 2020-01-01 RX ADMIN — HYDROCODONE BITARTRATE AND ACETAMINOPHEN 0.5 TABLET: 7.5; 325 TABLET ORAL at 20:20

## 2020-01-01 RX ADMIN — ONDANSETRON 4 MG: 2 INJECTION INTRAMUSCULAR; INTRAVENOUS at 22:18

## 2020-01-01 RX ADMIN — HYDROCODONE BITARTRATE AND ACETAMINOPHEN 1 TABLET: 7.5; 325 TABLET ORAL at 18:11

## 2020-01-01 RX ADMIN — PANTOPRAZOLE SODIUM 40 MG: 40 INJECTION, POWDER, FOR SOLUTION INTRAVENOUS at 08:55

## 2020-01-01 RX ADMIN — HYDROMORPHONE HYDROCHLORIDE 4 MG: 10 INJECTION, SOLUTION INTRAMUSCULAR; INTRAVENOUS; SUBCUTANEOUS at 13:39

## 2020-01-01 RX ADMIN — VANCOMYCIN HYDROCHLORIDE 125 MG: KIT at 22:18

## 2020-01-01 RX ADMIN — CEFEPIME HYDROCHLORIDE 2 G: 2 INJECTION, POWDER, FOR SOLUTION INTRAVENOUS at 00:45

## 2020-01-01 RX ADMIN — FINASTERIDE 5 MG: 5 TABLET, FILM COATED ORAL at 09:22

## 2020-01-01 RX ADMIN — FUROSEMIDE 40 MG: 40 INJECTION, SOLUTION INTRAMUSCULAR; INTRAVENOUS at 14:19

## 2020-01-01 RX ADMIN — MORPHINE SULFATE 4 MG: 2 INJECTION, SOLUTION INTRAMUSCULAR; INTRAVENOUS at 19:54

## 2020-01-01 RX ADMIN — ONDANSETRON 4 MG: 2 INJECTION INTRAMUSCULAR; INTRAVENOUS at 15:41

## 2020-01-01 RX ADMIN — MORPHINE SULFATE 4 MG: 2 INJECTION, SOLUTION INTRAMUSCULAR; INTRAVENOUS at 18:01

## 2020-01-01 RX ADMIN — OXYCODONE HYDROCHLORIDE 10 MG: 5 TABLET ORAL at 14:43

## 2020-01-01 RX ADMIN — FENTANYL CITRATE 25 MCG: 50 INJECTION, SOLUTION INTRAMUSCULAR; INTRAVENOUS at 16:55

## 2020-01-01 RX ADMIN — FENTANYL CITRATE 25 MCG: 50 INJECTION, SOLUTION INTRAMUSCULAR; INTRAVENOUS at 21:28

## 2020-01-01 RX ADMIN — METOPROLOL TARTRATE 12.5 MG: 25 TABLET, FILM COATED ORAL at 19:53

## 2020-01-01 RX ADMIN — ONDANSETRON 4 MG: 2 INJECTION INTRAMUSCULAR; INTRAVENOUS at 08:39

## 2020-01-01 RX ADMIN — HYDROMORPHONE HYDROCHLORIDE 0.2 MG: 1 INJECTION, SOLUTION INTRAMUSCULAR; INTRAVENOUS; SUBCUTANEOUS at 06:16

## 2020-01-01 RX ADMIN — FENTANYL CITRATE 25 MCG: 50 INJECTION, SOLUTION INTRAMUSCULAR; INTRAVENOUS at 05:42

## 2020-01-01 RX ADMIN — SODIUM CHLORIDE: 9 INJECTION, SOLUTION INTRAVENOUS at 20:20

## 2020-01-01 RX ADMIN — CARBOXYMETHYLCELLULOSE SODIUM 2 DROP: 5 SOLUTION/ DROPS OPHTHALMIC at 10:25

## 2020-01-01 RX ADMIN — FENTANYL CITRATE 25 MCG: 50 INJECTION, SOLUTION INTRAMUSCULAR; INTRAVENOUS at 01:15

## 2020-01-01 RX ADMIN — VANCOMYCIN HYDROCHLORIDE 125 MG: 125 CAPSULE ORAL at 08:56

## 2020-01-01 RX ADMIN — ONDANSETRON 4 MG: 2 INJECTION INTRAMUSCULAR; INTRAVENOUS at 09:01

## 2020-01-01 RX ADMIN — AZITHROMYCIN MONOHYDRATE 250 MG: 500 INJECTION, POWDER, LYOPHILIZED, FOR SOLUTION INTRAVENOUS at 01:38

## 2020-01-01 RX ADMIN — VANCOMYCIN HYDROCHLORIDE 125 MG: 125 CAPSULE ORAL at 07:41

## 2020-01-01 RX ADMIN — PANTOPRAZOLE SODIUM 40 MG: 40 INJECTION, POWDER, FOR SOLUTION INTRAVENOUS at 08:19

## 2020-01-01 RX ADMIN — OXYCODONE HYDROCHLORIDE 10 MG: 5 TABLET ORAL at 19:10

## 2020-01-01 RX ADMIN — ONDANSETRON 4 MG: 2 INJECTION INTRAMUSCULAR; INTRAVENOUS at 17:04

## 2020-01-01 RX ADMIN — FENTANYL CITRATE 25 MCG: 50 INJECTION, SOLUTION INTRAMUSCULAR; INTRAVENOUS at 04:29

## 2020-01-01 RX ADMIN — METOPROLOL TARTRATE 2.5 MG: 5 INJECTION INTRAVENOUS at 09:54

## 2020-01-01 RX ADMIN — LEVOFLOXACIN 750 MG: 250 TABLET, FILM COATED ORAL at 08:54

## 2020-01-01 RX ADMIN — VANCOMYCIN HYDROCHLORIDE 125 MG: 125 CAPSULE ORAL at 21:08

## 2020-01-01 RX ADMIN — FINASTERIDE 5 MG: 5 TABLET, FILM COATED ORAL at 08:54

## 2020-01-01 RX ADMIN — HYDROMORPHONE HYDROCHLORIDE 0.5 MG: 1 INJECTION, SOLUTION INTRAMUSCULAR; INTRAVENOUS; SUBCUTANEOUS at 08:12

## 2020-01-01 RX ADMIN — CALCIUM GLUCONATE 1 G: 98 INJECTION, SOLUTION INTRAVENOUS at 19:21

## 2020-01-01 RX ADMIN — CARBOXYMETHYLCELLULOSE SODIUM 2 DROP: 5 SOLUTION/ DROPS OPHTHALMIC at 08:55

## 2020-01-01 RX ADMIN — MORPHINE SULFATE 2 MG: 2 INJECTION, SOLUTION INTRAMUSCULAR; INTRAVENOUS at 20:01

## 2020-01-01 RX ADMIN — CARBOXYMETHYLCELLULOSE SODIUM 2 DROP: 5 SOLUTION/ DROPS OPHTHALMIC at 08:31

## 2020-01-01 RX ADMIN — SODIUM CHLORIDE 500 ML: 9 INJECTION, SOLUTION INTRAVENOUS at 17:05

## 2020-01-01 RX ADMIN — VANCOMYCIN HYDROCHLORIDE 125 MG: 125 CAPSULE ORAL at 19:53

## 2020-01-01 RX ADMIN — OXYCODONE HYDROCHLORIDE 10 MG: 5 TABLET ORAL at 00:53

## 2020-01-01 RX ADMIN — PANTOPRAZOLE SODIUM 40 MG: 40 INJECTION, POWDER, FOR SOLUTION INTRAVENOUS at 09:22

## 2020-01-01 RX ADMIN — DEXTROSE AND SODIUM CHLORIDE: 5; 450 INJECTION, SOLUTION INTRAVENOUS at 22:26

## 2020-01-01 RX ADMIN — FINASTERIDE 5 MG: 5 TABLET, FILM COATED ORAL at 20:51

## 2020-01-01 RX ADMIN — HYDROCODONE BITARTRATE AND ACETAMINOPHEN 1 TABLET: 7.5; 325 TABLET ORAL at 14:22

## 2020-01-01 RX ADMIN — PANTOPRAZOLE SODIUM 40 MG: 40 INJECTION, POWDER, FOR SOLUTION INTRAVENOUS at 08:34

## 2020-01-01 RX ADMIN — HYDROMORPHONE HYDROCHLORIDE 0.5 MG: 1 INJECTION, SOLUTION INTRAMUSCULAR; INTRAVENOUS; SUBCUTANEOUS at 00:43

## 2020-01-01 RX ADMIN — FENTANYL CITRATE 25 MCG: 50 INJECTION, SOLUTION INTRAMUSCULAR; INTRAVENOUS at 03:23

## 2020-01-01 RX ADMIN — CARBOXYMETHYLCELLULOSE SODIUM 2 DROP: 5 SOLUTION/ DROPS OPHTHALMIC at 20:20

## 2020-01-01 RX ADMIN — PROCHLORPERAZINE EDISYLATE 5 MG: 5 INJECTION INTRAMUSCULAR; INTRAVENOUS at 00:47

## 2020-01-01 RX ADMIN — CEFEPIME HYDROCHLORIDE 2 G: 2 INJECTION, POWDER, FOR SOLUTION INTRAVENOUS at 01:13

## 2020-01-01 RX ADMIN — CEFEPIME HYDROCHLORIDE 2 G: 2 INJECTION, POWDER, FOR SOLUTION INTRAVENOUS at 01:42

## 2020-01-01 RX ADMIN — VANCOMYCIN HYDROCHLORIDE 125 MG: 125 CAPSULE ORAL at 08:20

## 2020-01-01 RX ADMIN — SODIUM CHLORIDE 250 ML: 9 INJECTION, SOLUTION INTRAVENOUS at 18:39

## 2020-01-01 RX ADMIN — HYDROCODONE BITARTRATE AND ACETAMINOPHEN 1 TABLET: 7.5; 325 TABLET ORAL at 02:29

## 2020-01-01 RX ADMIN — PANTOPRAZOLE SODIUM 40 MG: 40 INJECTION, POWDER, FOR SOLUTION INTRAVENOUS at 08:43

## 2020-01-01 RX ADMIN — AZITHROMYCIN MONOHYDRATE 250 MG: 500 INJECTION, POWDER, LYOPHILIZED, FOR SOLUTION INTRAVENOUS at 20:21

## 2020-01-01 RX ADMIN — METOPROLOL TARTRATE 12.5 MG: 25 TABLET, FILM COATED ORAL at 08:54

## 2020-01-01 RX ADMIN — VANCOMYCIN HYDROCHLORIDE 125 MG: 125 CAPSULE ORAL at 08:33

## 2020-01-01 RX ADMIN — CARBOXYMETHYLCELLULOSE SODIUM 2 DROP: 5 SOLUTION/ DROPS OPHTHALMIC at 19:10

## 2020-01-01 RX ADMIN — FENTANYL CITRATE 25 MCG: 50 INJECTION, SOLUTION INTRAMUSCULAR; INTRAVENOUS at 11:46

## 2020-01-01 RX ADMIN — VANCOMYCIN HYDROCHLORIDE 125 MG: 125 CAPSULE ORAL at 08:31

## 2020-01-01 RX ADMIN — ONDANSETRON 4 MG: 2 INJECTION INTRAMUSCULAR; INTRAVENOUS at 20:27

## 2020-01-01 RX ADMIN — METOPROLOL TARTRATE 12.5 MG: 25 TABLET, FILM COATED ORAL at 10:24

## 2020-01-01 RX ADMIN — HYDROCODONE BITARTRATE AND ACETAMINOPHEN 1 TABLET: 7.5; 325 TABLET ORAL at 08:20

## 2020-01-01 RX ADMIN — DEXTROSE AND SODIUM CHLORIDE: 5; 450 INJECTION, SOLUTION INTRAVENOUS at 23:58

## 2020-01-01 RX ADMIN — CARBOXYMETHYLCELLULOSE SODIUM 2 DROP: 5 SOLUTION/ DROPS OPHTHALMIC at 21:10

## 2020-01-01 RX ADMIN — FINASTERIDE 5 MG: 5 TABLET, FILM COATED ORAL at 08:31

## 2020-01-01 ASSESSMENT — ACTIVITIES OF DAILY LIVING (ADL)
ADLS_ACUITY_SCORE: 21
ADLS_ACUITY_SCORE: 23
ADLS_ACUITY_SCORE: 19
ADLS_ACUITY_SCORE: 19
ADLS_ACUITY_SCORE: 21
ADLS_ACUITY_SCORE: 23
ADLS_ACUITY_SCORE: 20
ADLS_ACUITY_SCORE: 20
ADLS_ACUITY_SCORE: 23
ADLS_ACUITY_SCORE: 19
ADLS_ACUITY_SCORE: 23
ADLS_ACUITY_SCORE: 23
ADLS_ACUITY_SCORE: 22
ADLS_ACUITY_SCORE: 19
ADLS_ACUITY_SCORE: 21
ADLS_ACUITY_SCORE: 21
ADLS_ACUITY_SCORE: 20
ADLS_ACUITY_SCORE: 22
ADLS_ACUITY_SCORE: 23
ADLS_ACUITY_SCORE: 22
ADLS_ACUITY_SCORE: 23
ADLS_ACUITY_SCORE: 21
ADLS_ACUITY_SCORE: 20
ADLS_ACUITY_SCORE: 21
ADLS_ACUITY_SCORE: 23
ADLS_ACUITY_SCORE: 21
ADLS_ACUITY_SCORE: 19
ADLS_ACUITY_SCORE: 21
ADLS_ACUITY_SCORE: 22
ADLS_ACUITY_SCORE: 21
ADLS_ACUITY_SCORE: 21
ADLS_ACUITY_SCORE: 20
ADLS_ACUITY_SCORE: 23
ADLS_ACUITY_SCORE: 21
ADLS_ACUITY_SCORE: 22
ADLS_ACUITY_SCORE: 19
ADLS_ACUITY_SCORE: 23
ADLS_ACUITY_SCORE: 23
ADLS_ACUITY_SCORE: 22
ADLS_ACUITY_SCORE: 23
ADLS_ACUITY_SCORE: 19
ADLS_ACUITY_SCORE: 19
ADLS_ACUITY_SCORE: 22
ADLS_ACUITY_SCORE: 23
ADLS_ACUITY_SCORE: 22

## 2020-01-01 ASSESSMENT — MIFFLIN-ST. JEOR
SCORE: 1153.38
SCORE: 1088.98
SCORE: 1191.94
SCORE: 1151.12
SCORE: 1213.71
SCORE: 1199.97
SCORE: 1178.79
SCORE: 1231.86
SCORE: 1159.28

## 2020-01-01 ASSESSMENT — PAIN SCALES - GENERAL: PAINLEVEL: NO PAIN (0)

## 2020-01-07 NOTE — PATIENT INSTRUCTIONS
Call CORE nurse for any questions or concerns Mon-Fri 8am-4pm:                                                [phone]                                       For concerns after hours:                                                 [phone]     Medication changes: none  Plan from today:   - Follow up with Dr. Yao in March with echocardiogram prior

## 2020-01-07 NOTE — LETTER
1/7/2020    Inderjit Purvis MD  [facility] [address]  [facility] [address]    RE: Ivan Gomez       Dear Colleague,    I had the pleasure of seeing Ivan Gomez in the [facility].    Cardiology Clinic Progress Note  Ivan Gomez MRN# [id number]   YOB: 1926 Age: 89 year old   Primary Cardiologist: Dr. Yao Reason for visit: CORE follow up             Assessment and Plan:   Ivan Gomez is a very pleasant 89 year old male with a history of chronic diastolic heart failure, right heart failure, pulmonary hypertension, mitral regurgitation s/p mitul clip x 2 - with residual severe MR, tricuspid regurgitation, HTN, and history of bladder cancer. Patient here for CORE follow up.       1.  Chronic diastolic heart failure/HFpEF, right heart failure - normal LV size, EF 70-75%. RV moderately dilated and RV moderately reduced systolic function. Patient appears compensated and close to euvolemic on exam (elevated JVD - known TR). Overall compensated from cardiovascular standpoint. Patient has not been consistently monitoring weight in apartment but notes weight is typically 124-125#. Clinic weight stable at 128#. No labs today. Patient has been resistant to adjustment in diuretics, which has resulted in decreased dosages in the past. He has been maintained on torsemide 20mg daily, clinic weight has remained stable.    - NYHA class III-IV, stage C   - Fluid status : euvolemic   - Diuretic regimen : continue torsemide 20mg daily   - Aldosterone antagonist : will consider in the future if HF symptoms worsen, if kidney function allows.    - Blood pressure : controlled   - HF education reinforced, including low sodium diet and when to notify CORE clinic.   2. Chronic kidney disease -  Most recent labs from 11/21 show overall stable kidney function, creatinine 1.52.   3. Mitral regurgitation s/p mitul clip x 2 10/2017- with  residual severe MR  4. Tricuspid regurgitation moderately severe tricuspid regurgitation  5. Atrial fibrillation, paroxsymal - stable, rate controlled, appears to be in sinus rhythm today, recent zio monitor completed which showed no episodes of atrial fibrillation.    - [id number]-VACf score at least 3 (age, HTN)   - Continue metoprolol tartrate 12.5mg BID   - No anticoagulation due to advanced age, thrombocytopenia, risks felt to out weigh benefits.    - ZIO monitor reviewed, no atrial fibrillation, 9 beats of nonsustained VT and 111 short runs of SVT. Reviewed consideration for increase in metoprolol, patient declined.   6. Hypertension - controlled  7. Severe pulmonary hypertension  8. Anemia - hemoglobin 11 9/2019, stable  9. Advance care planning   - Patient met with Dr. Fox in September 2019. Code status DNR/DNI. POLST completed. Nephew his preferred decision maker.       Changes today: none    Follow up plan:     Follow up with Dr. Yao as scheduled in March, per patient request coordinated echocardiogram to completed the same day due to burden of travel to clinic appointments.           History of Presenting Illness:    Ivan Gomez is a very pleasant 89 year old male with a history of chronic diastolic heart failure, right heart failure, pulmonary hypertension, mitral regurgitation s/p mitul clip x 2 - with residual severe MR, tricuspid regurgitation, HTN, and history of bladder cancer.    Patient has followed regularly in our cardiology practice with Dr. Yao for the last couple years. Patient was hospitalized 7/4/19-7/13/19 due to acute diastolic heart failure exacerbation and new onset atrial fibrillation with RVR. Patient spontaneously converted out of atrial fibrillation in the emergency room. Chest CT showed bilateral patchy airspace and groundglass opacities, small bilateral pleural effusions, and pulmonary edema. NTproBNP 17,137. Echocardiogram 7/5/19 demonstrated stable position  of mitraclip devices, moderately-severe residual mitral regurgitation, no significant mitral stenosis, massively dilated LA, severely dilated RA, moderately severe TR, normal LV size, EF 70-75%. RV moderately dilated and RV moderately reduced systolic function. Diuresed with bumetanide gtt, transitioned to torsemide 20mg BID at discharge. At discharge patient was noted to have persisting +1 lower extremity edema and gut edema. Admission weight 145-149#. Discharge weight 134#. CORE referral was placed at discharge. Prior to this patient was hospitalized in May 5/21-5/28/19 transferred from outside hospital, found to have acute cholecystitis s/p cholecystectomy 5/22.     I first met patient in July 2019 for CORE enrollment after hospitalization. At that time his torsemide was increased to 20mg BID and he was referred to palliative care. Since this time his torsemide has been decreased to 20mg daily due to frustrations with urination associated with diuretic. Patient was most recently seen by Dr. Yao in November, no medications were changed, patient advised to see urologist for urinary frequency and zio monitor ordered to assess for atrial fibrillation. Zio showed no atrial fibrillation, 9 beats of nonsustained VT and 111 short runs of SVT.    Patient is here today for CORE follow up.      Patient reports feeling good. Since I last saw him he has move from [facility] to an apartment at [facility]. Continues to feel fatigued. States he has a scale in his apartment, but hasn't been using one. Reports weight at home 124-125#. Denies lower extremity edema. Notes some exertional dyspnea with longer walks, notes he got exertional dyspnea with walking from transportation to clinic today. Notes that activity is limited by legs getting tired. Denies shortness of breath at rest. Using walker for assistance. Denies abdominal distention. Sleeping okay. Denies PND/orthopnea. Patient denies chest pain or chest  tightness. Denies dizziness, lightheadedness or other presyncopal symptoms. Denies tachycardia or palpitations. Denies episodes of bleeding. Taking medications daily, staff at [facility] helping with medication management. No medication list brought with today, patient states no changes in medications.     Labs from November show overall stable kidney function, creatinine 1.52, and stable electrolytes.    Appetite good, eating meals at facility, notes he is frustrated with the cook at the facility, staff brings meals to his apartment, he notes fatigue limits him from wanting to go to dining lizama. Not adding salt to foods. No set exercise routine, lifestyle sedentary. Denies alcohol use. Denies tobacco use.         Recent Hospitalizations   7/4/19-7/13/19 due to acute diastolic heart failure exacerbation and new onset atrial fibrillation with RVR. Patient spontaneously converted out of atrial fibrillation in the emergency room. Chest CT showed bilateral patchy airspace and groundglass opacities, small bilateral pleural effusions, and pulmonary edema. NTproBNP 17,137. Echocardiogram 7/5/19 demonstrated stable position of mitraclip devices, moderately-severe residual mitral regurgitation, no significant mitral stenosis, massively dilated LA, severely dilated RA, moderately severe TR, normal LV size, EF 70-75%. RV moderately dilated and RV moderately reduced systolic function. Diuresed with bumetanide gtt, transitioned to torsemide 20mg BID at discharge. At discharge patient was noted to have persisting +1 lower extremity edema and gut edema. Admission weight 145-149#. Discharge weight 134#.   5/21-5/28/19 transferred from outside hospital, found to have acute cholecystitis s/p cholecystectomy 5/22.         Social History    His partner passed away in June 2019, son that he doesn't communicate with. Older brother in [place]. Nephew in [place].   Social History     Socioeconomic History     Marital status: Single      Spouse name: Not on file     Number of children: Not on file     Years of education: Not on file     Highest education level: Not on file   Occupational History     Not on file   Social Needs     Financial resource strain: Not on file     Food insecurity:     Worry: Not on file     Inability: Not on file     Transportation needs:     Medical: Not on file     Non-medical: Not on file   Tobacco Use     Smoking status: Former Smoker     Smokeless tobacco: Never Used   Substance and Sexual Activity     Alcohol use: No     Alcohol/week: 0.0 standard drinks     Comment: doesnt use anymore     Drug use: No     Sexual activity: Never   Lifestyle     Physical activity:     Days per week: Not on file     Minutes per session: Not on file     Stress: Not on file   Relationships     Social connections:     Talks on phone: Not on file     Gets together: Not on file     Attends Congregational service: Not on file     Active member of club or organization: Not on file     Attends meetings of clubs or organizations: Not on file     Relationship status: Not on file     Intimate partner violence:     Fear of current or ex partner: Not on file     Emotionally abused: Not on file     Physically abused: Not on file     Forced sexual activity: Not on file   Other Topics Concern     Parent/sibling w/ CABG, MI or angioplasty before 65F 55M? No   Social History Narrative     Not on file            Review of Systems:   Skin:  Negative bruising   Eyes:  Negative    ENT:  Negative    Respiratory:  Negative dyspnea on exertion  Cardiovascular:  Negative Positive for;fatigue;lower extremity symptoms  Gastroenterology: Negative poor appetite  Genitourinary:  Positive for urinary frequency;prostate problem  Musculoskeletal:  Positive for arthritis  Neurologic:  Positive for numbness or tingling of feet  Psychiatric:  Positive for    Heme/Lymph/Imm:  Positive for easy bruising  Endocrine:  Negative           Physical Exam:   Vitals: /73   Pulse 69  "  Ht 1.727 m (5' 7.99\")   Wt 58.1 kg (128 lb)   BMI 19.47 kg/m      Wt Readings from Last 4 Encounters:   01/07/20 58.1 kg (128 lb)   11/21/19 57.8 kg (127 lb 6.4 oz)   11/13/19 59 kg (130 lb)   10/30/19 57.2 kg (126 lb 3.2 oz)     GEN: elderly, frail  HEENT:  Pupils equal, round. Sclerae nonicteric.   NECK: Supple, no masses appreciated. JVP elevated to jaw (known TR)  C/V:  Regular rate and rhythm, 3/6 systolic ejection murmur and 3/6 holosystolic murmur heard best at apex.   RESP: Respirations are unlabored. Clear to auscultation bilaterally, diminished in bases.   GI: Abdomen soft, nontender.  EXTREM: trace to +1 LE edema to bilateral lower extremities  NEURO: Alert and oriented, cooperative.  SKIN: Warm and dry.        Data:   ECHO 7/5/2019  Sinus rhythm was noted.  Status post transcatheter mitral valve repair with MitraClip x 2, 10/16/2017.     1. Stable position of the MitraClip devices.  2. At least, moderately severe residual mitral regurgitation. Eccentric jet,  anteriorly directed, reaching the superior surface of the left atrium.  3. No significant mitral stenosis. Mean diastolic mitral valve gradient is 3.3  mmHg (heart rate 73 bpm).  4. Massively dilated left atrium. Severely dilated right atrium.  5. Moderately severe (3+) tricuspid regurgitation. Findings consistent with  significant pulmonary hypertension. Estimated pulmonary artery systolic  pressure is 65-70 mmHg.  6. Normal left ventricular size, hyperdynamic systolic function, estimated  LVEF 70-75%.  7. Moderately dilated right ventricle with moderately reduced systolic  function. TAPSE 1.3 - 1.6 cm, tissue Doppler systolic velocity 9 cm/s.  8. Dilated inferior vena cava.     No significant changes compared to previous study dated 5/23/2019.    LIPID RESULTS:  Lab Results   Component Value Date    CHOL 108 10/12/2017    HDL 63 10/12/2017    LDL 29 10/12/2017    TRIG 81 10/12/2017    CHOLHDLRATIO 2.6 08/18/2015     LIVER ENZYME RESULTS:  Lab " Results   Component Value Date    AST 11 07/08/2019    ALT 13 07/08/2019     CBC RESULTS:  Lab Results   Component Value Date    WBC 5.2 09/26/2019    RBC 3.58 (L) 09/26/2019    HGB 11.0 (L) 09/26/2019    HCT 35.0 (L) 09/26/2019    MCV 98 09/26/2019    MCH 30.7 09/26/2019    MCHC 31.4 (L) 09/26/2019    RDW 16.7 (H) 09/26/2019    PLT 84 (L) 09/26/2019     BMP RESULTS:  Lab Results   Component Value Date     11/21/2019    POTASSIUM 5.1 11/21/2019    CHLORIDE 101 11/21/2019    CO2 30 (H) 11/21/2019    ANIONGAP 14.1 11/21/2019     (H) 11/21/2019    BUN 46 (H) 11/21/2019    CR 1.52 (H) 11/21/2019    GFRESTIMATED 43 (L) 11/21/2019    GFRESTBLACK 52 (L) 11/21/2019    GIUSEPPE 9.6 11/21/2019      A1C RESULTS:  Lab Results   Component Value Date    A1C 5.1 07/17/2019     INR RESULTS:  Lab Results   Component Value Date    INR 1.26 (H) 07/04/2019    INR 1.15 (H) 10/16/2017            Medications     Current Outpatient Medications   Medication Sig Dispense Refill     artificial saliva (BIOTENE DRY MOUTHWASH) LIQD liquid Swish and spit 15 mLs in mouth 3 times daily       beta carotene 01091 UNIT capsule Take 1 capsule (25,000 Units) by mouth daily       carboxymethylcellulose PF (REFRESH PLUS) 0.5 % ophthalmic solution Place 2 drops into both eyes 2 times daily And every 2 hours as needed       ferrous sulfate (FEROSUL) 325 (65 Fe) MG tablet Take 325 mg by mouth daily (with breakfast)       finasteride (PROSCAR) 5 MG tablet TAKE 1 TABLET(5 MG) BY MOUTH DAILY 90 tablet 3     gabapentin (NEURONTIN) 300 MG capsule Take 1 capsule (300 mg) by mouth At Bedtime 90 capsule 3     HYDROcodone-acetaminophen (NORCO) 7.5-325 MG per tablet TAKE ONE TABLET BY MOUTH EVERY SIX HOURS AS NEEDED FOR PAIN. MAXIMUM FOUR TABLETS PER DAY. EARLIEST FILL DATE: 12/14/19 60 tablet 0     melatonin 3 MG tablet Take 3 mg by mouth At Bedtime       metoprolol tartrate (LOPRESSOR) 25 MG tablet Take 0.5 tablets (12.5 mg) by mouth 2 times daily        potassium chloride ER (K-TAB/KLOR-CON) 10 MEQ CR tablet Take 2 tablets (20 mEq) by mouth daily 180 tablet 3     SENNA-docusate sodium (SENNA S) 8.6-50 MG tablet Take 1 tablet by mouth At Bedtime 100 tablet 3     simvastatin (ZOCOR) 20 MG tablet TAKE ONE TABLET BY MOUTH AT BEDTIME 90 tablet 3     tamsulosin (FLOMAX) 0.4 MG capsule Take 1 capsule (0.4 mg) by mouth daily 90 capsule 3     torsemide (DEMADEX) 20 MG tablet Take 1 tablet (20 mg) by mouth daily 90 tablet 1     vitamin C 500 MG TABS Take 1 tablet (500 mg) by mouth daily       order for DME Equipment being ordered: Non-sting barrier wipes for bilateral heel 1 Box 11     simvastatin (ZOCOR) 20 MG tablet TAKE ONE TABLET BY MOUTH AT BEDTIME 30 tablet 0     Skin Protectants, Misc. (EUCERIN) cream Apply topically 2 times daily And as needed. Apply to LE and areas of dryness            Past Medical History     Past Medical History:   Diagnosis Date     Arthritis     Spinal Stenosis     Former smoker      Heart disease      Hemorrhoids      Hypercholesteremia      Hypertension      Malignant neoplasm (H)     skin CA, Basal cell     Other chronic pain      Renal disease      Right heart failure (H)      Past Surgical History:   Procedure Laterality Date     BACK SURGERY       BIOPSY      face, skin cancer     BIOPSY  8/2016    shoulder lesion     CYSTOSCOPY, TRANSURETHRAL RESECTION (TUR) PROSTATE, COMBINED N/A 8/21/2015    Procedure: COMBINED CYSTOSCOPY, TRANSURETHRAL RESECTION (TUR) PROSTATE;  Surgeon: Dennis Hilton MD;  Location:  OR     CYSTOSCOPY, TRANSURETHRAL RESECTION (TUR) TUMOR BLADDER, COMBINED N/A 9/2/2016    Procedure: COMBINED CYSTOSCOPY, TRANSURETHRAL RESECTION (TUR) TUMOR BLADDER;  Surgeon: Dennis Hilton MD;  Location:  OR     CYSTOSCOPY, TRANSURETHRAL RESECTION (TUR) TUMOR BLADDER, COMBINED N/A 11/2/2018    Procedure: Transurethral resection of bladder tumor > 5 cm in size;  Surgeon: Dennis Hilton MD;  Location:  [facility] OR     ESOPHAGOSCOPY, GASTROSCOPY, DUODENOSCOPY (EGD), COMBINED N/A 5/24/2019    Procedure: ESOPHAGOGASTRODUODENOSCOPY, WITH BIOPSY;  Surgeon: Abe Jang MD;  Location:  GI     LAPAROSCOPIC CHOLECYSTECTOMY WITH CHOLANGIOGRAMS N/A 5/22/2019    Procedure: CHOLECYSTECTOMY, LAPAROSCOPIC, WITH CHOLANGIOGRAM;  Surgeon: Rey Romero MD;  Location:  OR     ORTHOPEDIC SURGERY      lumbar and cervical surgery, Sep, 2008, knee surgery     PERCUTANEOUS MITRAL VALVE REPAIR N/A 10/16/2017    Procedure: PERCUTANEOUS MITRAL VALVE REPAIR ANESTHESIA;  Percutaneous MitraClip ;  Surgeon: Eber Monroe MD;  Location: [facility] OR     PICC INSERTION Right 10/14/2017    5fr DL Bard PICC, 40cm (1cm external) in the R basilic vein w/ tip in the mid SVC.     Family History   Problem Relation Age of Onset     Cancer Son 64        leukemia ? due to agent orange     Family History Negative Son             Allergies   Celebrex [celecoxib] and Penicillins        FRANK Thomas CNP  [facility]  Pager: [phone]      Thank you for allowing me to participate in the care of your patient.    Sincerely,     FRANK Thomas CNP     [facility]    cc:   Evaristo Magaña MD  [address] [place] [address]

## 2020-01-07 NOTE — PROGRESS NOTES
Cardiology Clinic Progress Note  Ivan Gomez MRN# [id number]   YOB: 1926 Age: 89 year old   Primary Cardiologist: Dr. Yao Reason for visit: CORE follow up             Assessment and Plan:   Ivan Gomez is a very pleasant 89 year old male with a history of chronic diastolic heart failure, right heart failure, pulmonary hypertension, mitral regurgitation s/p mitul clip x 2 - with residual severe MR, tricuspid regurgitation, HTN, and history of bladder cancer. Patient here for CORE follow up.       1.  Chronic diastolic heart failure/HFpEF, right heart failure - normal LV size, EF 70-75%. RV moderately dilated and RV moderately reduced systolic function. Patient appears compensated and close to euvolemic on exam (elevated JVD - known TR). Overall compensated from cardiovascular standpoint. Patient has not been consistently monitoring weight in apartment but notes weight is typically 124-125#. Clinic weight stable at 128#. No labs today. Patient has been resistant to adjustment in diuretics, which has resulted in decreased dosages in the past. He has been maintained on torsemide 20mg daily, clinic weight has remained stable.    - NYHA class III-IV, stage C   - Fluid status : euvolemic   - Diuretic regimen : continue torsemide 20mg daily   - Aldosterone antagonist : will consider in the future if HF symptoms worsen, if kidney function allows.    - Blood pressure : controlled   - HF education reinforced, including low sodium diet and when to notify CORE clinic.   2. Chronic kidney disease -  Most recent labs from 11/21 show overall stable kidney function, creatinine 1.52.   3. Mitral regurgitation s/p mitul clip x 2 10/2017- with residual severe MR  4. Tricuspid regurgitation moderately severe tricuspid regurgitation  5. Atrial fibrillation, paroxsymal - stable, rate controlled, appears to be in sinus rhythm today, recent zio monitor completed which showed no episodes of atrial  fibrillation.    - [id number] score at least 3 (age, HTN)   - Continue metoprolol tartrate 12.5mg BID   - No anticoagulation due to advanced age, thrombocytopenia, risks felt to out weigh benefits.    - ZIO monitor reviewed, no atrial fibrillation, 9 beats of nonsustained VT and 111 short runs of SVT. Reviewed consideration for increase in metoprolol, patient declined.   6. Hypertension - controlled  7. Severe pulmonary hypertension  8. Anemia - hemoglobin 11 9/2019, stable  9. Advance care planning   - Patient met with Dr. Fox in September 2019. Code status DNR/DNI. POLST completed. Nephew his preferred decision maker.       Changes today: none    Follow up plan:     Follow up with Dr. Yao as scheduled in March, per patient request coordinated echocardiogram to completed the same day due to burden of travel to clinic appointments.           History of Presenting Illness:    Ivan Gomez is a very pleasant 89 year old male with a history of chronic diastolic heart failure, right heart failure, pulmonary hypertension, mitral regurgitation s/p mitul clip x 2 - with residual severe MR, tricuspid regurgitation, HTN, and history of bladder cancer.    Patient has followed regularly in our cardiology practice with Dr. Yao for the last couple years. Patient was hospitalized 7/4/19-7/13/19 due to acute diastolic heart failure exacerbation and new onset atrial fibrillation with RVR. Patient spontaneously converted out of atrial fibrillation in the emergency room. Chest CT showed bilateral patchy airspace and groundglass opacities, small bilateral pleural effusions, and pulmonary edema. NTproBNP 17,137. Echocardiogram 7/5/19 demonstrated stable position of mitraclip devices, moderately-severe residual mitral regurgitation, no significant mitral stenosis, massively dilated LA, severely dilated RA, moderately severe TR, normal LV size, EF 70-75%. RV moderately dilated and RV moderately reduced systolic  function. Diuresed with bumetanide gtt, transitioned to torsemide 20mg BID at discharge. At discharge patient was noted to have persisting +1 lower extremity edema and gut edema. Admission weight 145-149#. Discharge weight 134#. CORE referral was placed at discharge. Prior to this patient was hospitalized in May 5/21-5/28/19 transferred from outside hospital, found to have acute cholecystitis s/p cholecystectomy 5/22.     I first met patient in July 2019 for CORE enrollment after hospitalization. At that time his torsemide was increased to 20mg BID and he was referred to palliative care. Since this time his torsemide has been decreased to 20mg daily due to frustrations with urination associated with diuretic. Patient was most recently seen by Dr. Yao in November, no medications were changed, patient advised to see urologist for urinary frequency and zio monitor ordered to assess for atrial fibrillation. Zio showed no atrial fibrillation, 9 beats of nonsustained VT and 111 short runs of SVT.    Patient is here today for CORE follow up.      Patient reports feeling good. Since I last saw him he has move from [facility] to an apartment at [facility]. Continues to feel fatigued. States he has a scale in his apartment, but hasn't been using one. Reports weight at home 124-125#. Denies lower extremity edema. Notes some exertional dyspnea with longer walks, notes he got exertional dyspnea with walking from transportation to clinic today. Notes that activity is limited by legs getting tired. Denies shortness of breath at rest. Using walker for assistance. Denies abdominal distention. Sleeping okay. Denies PND/orthopnea. Patient denies chest pain or chest tightness. Denies dizziness, lightheadedness or other presyncopal symptoms. Denies tachycardia or palpitations. Denies episodes of bleeding. Taking medications daily, staff at [facility] helping with medication management. No medication list brought with  today, patient states no changes in medications.     Labs from November show overall stable kidney function, creatinine 1.52, and stable electrolytes.    Appetite good, eating meals at facility, notes he is frustrated with the cook at the facility, staff brings meals to his apartment, he notes fatigue limits him from wanting to go to dining lizama. Not adding salt to foods. No set exercise routine, lifestyle sedentary. Denies alcohol use. Denies tobacco use.         Recent Hospitalizations   7/4/19-7/13/19 due to acute diastolic heart failure exacerbation and new onset atrial fibrillation with RVR. Patient spontaneously converted out of atrial fibrillation in the emergency room. Chest CT showed bilateral patchy airspace and groundglass opacities, small bilateral pleural effusions, and pulmonary edema. NTproBNP 17,137. Echocardiogram 7/5/19 demonstrated stable position of mitraclip devices, moderately-severe residual mitral regurgitation, no significant mitral stenosis, massively dilated LA, severely dilated RA, moderately severe TR, normal LV size, EF 70-75%. RV moderately dilated and RV moderately reduced systolic function. Diuresed with bumetanide gtt, transitioned to torsemide 20mg BID at discharge. At discharge patient was noted to have persisting +1 lower extremity edema and gut edema. Admission weight 145-149#. Discharge weight 134#.   5/21-5/28/19 transferred from outside hospital, found to have acute cholecystitis s/p cholecystectomy 5/22.         Social History    His partner passed away in June 2019, son that he doesn't communicate with. Older brother in [place]. Nephew in [place].   Social History     Socioeconomic History     Marital status: Single     Spouse name: Not on file     Number of children: Not on file     Years of education: Not on file     Highest education level: Not on file   Occupational History     Not on file   Social Needs     Financial resource strain: Not on file     Food  "insecurity:     Worry: Not on file     Inability: Not on file     Transportation needs:     Medical: Not on file     Non-medical: Not on file   Tobacco Use     Smoking status: Former Smoker     Smokeless tobacco: Never Used   Substance and Sexual Activity     Alcohol use: No     Alcohol/week: 0.0 standard drinks     Comment: doesnt use anymore     Drug use: No     Sexual activity: Never   Lifestyle     Physical activity:     Days per week: Not on file     Minutes per session: Not on file     Stress: Not on file   Relationships     Social connections:     Talks on phone: Not on file     Gets together: Not on file     Attends [employer] service: Not on file     Active member of club or organization: Not on file     Attends meetings of clubs or organizations: Not on file     Relationship status: Not on file     Intimate partner violence:     Fear of current or ex partner: Not on file     Emotionally abused: Not on file     Physically abused: Not on file     Forced sexual activity: Not on file   Other Topics Concern     Parent/sibling w/ CABG, MI or angioplasty before 65F 55M? No   Social History Narrative     Not on file            Review of Systems:   Skin:  Negative bruising   Eyes:  Negative    ENT:  Negative    Respiratory:  Negative dyspnea on exertion  Cardiovascular:  Negative Positive for;fatigue;lower extremity symptoms  Gastroenterology: Negative poor appetite  Genitourinary:  Positive for urinary frequency;prostate problem  Musculoskeletal:  Positive for arthritis  Neurologic:  Positive for numbness or tingling of feet  Psychiatric:  Positive for    Heme/Lymph/Imm:  Positive for easy bruising  Endocrine:  Negative           Physical Exam:   Vitals: /73   Pulse 69   Ht 1.727 m (5' 7.99\")   Wt 58.1 kg (128 lb)   BMI 19.47 kg/m     Wt Readings from Last 4 Encounters:   01/07/20 58.1 kg (128 lb)   11/21/19 57.8 kg (127 lb 6.4 oz)   11/13/19 59 kg (130 lb)   10/30/19 57.2 kg (126 lb 3.2 oz)     GEN: " elderly, frail  HEENT:  Pupils equal, round. Sclerae nonicteric.   NECK: Supple, no masses appreciated. JVP elevated to jaw (known TR)  C/V:  Regular rate and rhythm, 3/6 systolic ejection murmur and 3/6 holosystolic murmur heard best at apex.   RESP: Respirations are unlabored. Clear to auscultation bilaterally, diminished in bases.   GI: Abdomen soft, nontender.  EXTREM: trace to +1 LE edema to bilateral lower extremities  NEURO: Alert and oriented, cooperative.  SKIN: Warm and dry.        Data:   ECHO 7/5/2019  Sinus rhythm was noted.  Status post transcatheter mitral valve repair with MitraClip x 2, 10/16/2017.     1. Stable position of the MitraClip devices.  2. At least, moderately severe residual mitral regurgitation. Eccentric jet,  anteriorly directed, reaching the superior surface of the left atrium.  3. No significant mitral stenosis. Mean diastolic mitral valve gradient is 3.3  mmHg (heart rate 73 bpm).  4. Massively dilated left atrium. Severely dilated right atrium.  5. Moderately severe (3+) tricuspid regurgitation. Findings consistent with  significant pulmonary hypertension. Estimated pulmonary artery systolic  pressure is 65-70 mmHg.  6. Normal left ventricular size, hyperdynamic systolic function, estimated  LVEF 70-75%.  7. Moderately dilated right ventricle with moderately reduced systolic  function. TAPSE 1.3 - 1.6 cm, tissue Doppler systolic velocity 9 cm/s.  8. Dilated inferior vena cava.     No significant changes compared to previous study dated 5/23/2019.    LIPID RESULTS:  Lab Results   Component Value Date    CHOL 108 10/12/2017    HDL 63 10/12/2017    LDL 29 10/12/2017    TRIG 81 10/12/2017    CHOLHDLRATIO 2.6 08/18/2015     LIVER ENZYME RESULTS:  Lab Results   Component Value Date    AST 11 07/08/2019    ALT 13 07/08/2019     CBC RESULTS:  Lab Results   Component Value Date    WBC 5.2 09/26/2019    RBC 3.58 (L) 09/26/2019    HGB 11.0 (L) 09/26/2019    HCT 35.0 (L) 09/26/2019    MCV 98  09/26/2019    MCH 30.7 09/26/2019    MCHC 31.4 (L) 09/26/2019    RDW 16.7 (H) 09/26/2019    PLT 84 (L) 09/26/2019     BMP RESULTS:  Lab Results   Component Value Date     11/21/2019    POTASSIUM 5.1 11/21/2019    CHLORIDE 101 11/21/2019    CO2 30 (H) 11/21/2019    ANIONGAP 14.1 11/21/2019     (H) 11/21/2019    BUN 46 (H) 11/21/2019    CR 1.52 (H) 11/21/2019    GFRESTIMATED 43 (L) 11/21/2019    GFRESTBLACK 52 (L) 11/21/2019    GIUSEPPE 9.6 11/21/2019      A1C RESULTS:  Lab Results   Component Value Date    A1C 5.1 07/17/2019     INR RESULTS:  Lab Results   Component Value Date    INR 1.26 (H) 07/04/2019    INR 1.15 (H) 10/16/2017            Medications     Current Outpatient Medications   Medication Sig Dispense Refill     artificial saliva (BIOTENE DRY MOUTHWASH) LIQD liquid Swish and spit 15 mLs in mouth 3 times daily       beta carotene 33912 UNIT capsule Take 1 capsule (25,000 Units) by mouth daily       carboxymethylcellulose PF (REFRESH PLUS) 0.5 % ophthalmic solution Place 2 drops into both eyes 2 times daily And every 2 hours as needed       ferrous sulfate (FEROSUL) 325 (65 Fe) MG tablet Take 325 mg by mouth daily (with breakfast)       finasteride (PROSCAR) 5 MG tablet TAKE 1 TABLET(5 MG) BY MOUTH DAILY 90 tablet 3     gabapentin (NEURONTIN) 300 MG capsule Take 1 capsule (300 mg) by mouth At Bedtime 90 capsule 3     HYDROcodone-acetaminophen (NORCO) 7.5-325 MG per tablet TAKE ONE TABLET BY MOUTH EVERY SIX HOURS AS NEEDED FOR PAIN. MAXIMUM FOUR TABLETS PER DAY. EARLIEST FILL DATE: 12/14/19 60 tablet 0     melatonin 3 MG tablet Take 3 mg by mouth At Bedtime       metoprolol tartrate (LOPRESSOR) 25 MG tablet Take 0.5 tablets (12.5 mg) by mouth 2 times daily       potassium chloride ER (K-TAB/KLOR-CON) 10 MEQ CR tablet Take 2 tablets (20 mEq) by mouth daily 180 tablet 3     SENNA-docusate sodium (SENNA S) 8.6-50 MG tablet Take 1 tablet by mouth At Bedtime 100 tablet 3     simvastatin (ZOCOR) 20 MG  tablet TAKE ONE TABLET BY MOUTH AT BEDTIME 90 tablet 3     tamsulosin (FLOMAX) 0.4 MG capsule Take 1 capsule (0.4 mg) by mouth daily 90 capsule 3     torsemide (DEMADEX) 20 MG tablet Take 1 tablet (20 mg) by mouth daily 90 tablet 1     vitamin C 500 MG TABS Take 1 tablet (500 mg) by mouth daily       order for DME Equipment being ordered: Non-sting barrier wipes for bilateral heel 1 Box 11     simvastatin (ZOCOR) 20 MG tablet TAKE ONE TABLET BY MOUTH AT BEDTIME 30 tablet 0     Skin Protectants, Misc. (EUCERIN) cream Apply topically 2 times daily And as needed. Apply to LE and areas of dryness            Past Medical History     Past Medical History:   Diagnosis Date     Arthritis     Spinal Stenosis     Former smoker      Heart disease      Hemorrhoids      Hypercholesteremia      Hypertension      Malignant neoplasm (H)     skin CA, Basal cell     Other chronic pain      Renal disease      Right heart failure (H)      Past Surgical History:   Procedure Laterality Date     BACK SURGERY       BIOPSY      face, skin cancer     BIOPSY  8/2016    shoulder lesion     CYSTOSCOPY, TRANSURETHRAL RESECTION (TUR) PROSTATE, COMBINED N/A 8/21/2015    Procedure: COMBINED CYSTOSCOPY, TRANSURETHRAL RESECTION (TUR) PROSTATE;  Surgeon: Dennis Hilton MD;  Location:  OR     CYSTOSCOPY, TRANSURETHRAL RESECTION (TUR) TUMOR BLADDER, COMBINED N/A 9/2/2016    Procedure: COMBINED CYSTOSCOPY, TRANSURETHRAL RESECTION (TUR) TUMOR BLADDER;  Surgeon: Dennis Hilton MD;  Location:  OR     CYSTOSCOPY, TRANSURETHRAL RESECTION (TUR) TUMOR BLADDER, COMBINED N/A 11/2/2018    Procedure: Transurethral resection of bladder tumor > 5 cm in size;  Surgeon: Dennis Hilton MD;  Location:  OR     ESOPHAGOSCOPY, GASTROSCOPY, DUODENOSCOPY (EGD), COMBINED N/A 5/24/2019    Procedure: ESOPHAGOGASTRODUODENOSCOPY, WITH BIOPSY;  Surgeon: Abe Jang MD;  Location:  GI     LAPAROSCOPIC CHOLECYSTECTOMY WITH  CHOLANGIOGRAMS N/A 5/22/2019    Procedure: CHOLECYSTECTOMY, LAPAROSCOPIC, WITH CHOLANGIOGRAM;  Surgeon: Rey Romero MD;  Location:  OR     ORTHOPEDIC SURGERY      lumbar and cervical surgery, Sep, 2008, knee surgery     PERCUTANEOUS MITRAL VALVE REPAIR N/A 10/16/2017    Procedure: PERCUTANEOUS MITRAL VALVE REPAIR ANESTHESIA;  Percutaneous MitraClip ;  Surgeon: Eber Monroe MD;  Location: [facility] OR     PICC INSERTION Right 10/14/2017    5fr DL Bard PICC, 40cm (1cm external) in the R basilic vein w/ tip in the mid SVC.     Family History   Problem Relation Age of Onset     Cancer Son 64        leukemia ? due to agent orange     Family History Negative Son             Allergies   Celebrex [celecoxib] and Penicillins        FRANK Thomas [facility]  Pager: [phone]

## 2020-01-15 NOTE — TELEPHONE ENCOUNTER
Randolph Mendez,      Patient states that his Hematuria his started up again. He states that his he is going to the restroom every 20 mins. Per Dr. Dennis Hilton's note from 11/08/2018. Patient should follow up if Hematuria returns. Dr. Hilton is booking out to April 2020. Can you let the urology clinic coordinators know where they can schedule this patient? Patient states that he is available mid morning and afternoon.      Thanks, Ethel Sung MA

## 2020-01-15 NOTE — TELEPHONE ENCOUNTER
[facility] Call Center    Phone Message    May a detailed message be left on voicemail: yes    Reason for Call: Symptoms or Concerns     If patient has red-flag symptoms, warm transfer to triage line    Current symptom or concern: problems urinating     Symptoms have been present for:  unknown day(s)    Has patient previously been seen for this? Yes    By : Dr. Hilton    Date: 11/8/18    Are there any new or worsening symptoms? Yes: Pt requesting call back to discuss an appt with Dr. Hilton. Pt is having issues urinating and pt had surgery with Dr. Hilton in 2018      Action Taken: Message routed to:  [facility] ([facility] uro

## 2020-01-23 NOTE — TELEPHONE ENCOUNTER
Controlled Substance Refill Request for HYDROCODONE-ACETAMINOP 7.5-325 TABS    Problem List Complete:  Yes  (Following  info found under chronic pain Dx)    Per Dr. Bell Note Oct 2013:You don't want to use a long acting narcotic for pain. At this time, you are using #120 hydro/apap 7.5/325 monthly./   I told you today and you agreed to follow up to see Dr. Evaristo Magaña in about 2 months before the present refills of narcotic are due to be refilled   I also told you that it would be my recommendation that you then be given an increased monthly prescription of #150 per month to better control the pain.   You do not want any further surgery on your back at this time so want to focus on better pain control.    Patient is followed by ANTHNOY Murphy for ongoing prescription of pain medication.  All refills should be approved by this provider, or covering partner.    Medication(s): norco 7.5/325.   Maximum quantity per month: 120  Clinic visit frequency required: Q 4 months     Controlled substance agreement on file: 9/3/14    Pain Clinic evaluation in the past: Yes       Date/Location:  spine clinic    DIRE Total Score(s):  No flowsheet data found.    Last [facility] website verification: 11/21/19 provider to review; multiple providers     Last Written Prescription Date:  01/03/2020  Last Fill Quantity: 60 tablet,  # refills: 0   Last office visit: 11/13/2019 with prescribing provider:  Gómez   Future Office Visit:     Next 5 appointments (look out 90 days)    Feb 12, 2020  1:30 PM CST  [facility] [address]  [facility] [place] [facility] [address]  [phone]   Mar 13, 2020  1:45 PM CDT  Return Visit with Lance Yao MD  [facility] ([facility]) [address]  [facility] [id number] [phone] OPT 2            Controlled substance agreement:   Encounter-Level CSA - 09/03/2014:    Controlled Substance Agreement - Scan on 10/15/2014 12:54 PM: [facility], Controlled Med Agreement, 9-03-14     Patient-Level CSA:    There are no patient-level csa.         Last Urine Drug Screen: No results found for: CDAUT, No results found for: COMDAT, No results found for: THC13, PCP13, COC13, MAMP13, OPI13, AMP13, BZO13, TCA13, MTD13, BAR13, OXY13, PPX13, BUP13     Processing:  Rx to be electronically transmitted to pharmacy by provider     [email or web address]   checked in past 3 months?  Yes 11/21/2019

## 2020-01-24 NOTE — TELEPHONE ENCOUNTER
[place] reviewed:  reviewed- recommend provider review 1/24/2020. Multiple providers.    [place]  [email or web address]  Routing refill request to provider for review/approval because:  Drug not on the [facility] refill protocol

## 2020-01-24 NOTE — TELEPHONE ENCOUNTER
Pt requesting RF at 3 weeks instead of 4 weeks   Was to be seen q mo but last ov was > 9 mo ago   [place]  recommended review as is getting drugs from multiple providers  Needs to be seen for more

## 2020-02-12 NOTE — PROGRESS NOTES
Urology Clinic    Dennis Hilton MD  Date of Service: 2020     Name: Ivan Gomez  MRN: [id number]  Age: 89 year old  : 10/12/1926  Referring provider: Dennis Hilton     Assessment:  Ivan Gomez  is a 89 year old male with a history of low grade Ta bladder cancer, now with HGT1 recurrence. Previously, he had been very reluctant to further treatment but now with worsened LUTS which pushes him to pursue treatment. Before we jump to a procedure, would attempt other less invasive measures. If his symptoms persist, then we will proceed with TURBT. I am hesitant to this as he has a history of complications in the past which required nephrostomy tube and internalized double J.    Plan:  -UA/UC to check for infection.   -Stop Flomax and start on Trospium 20 mg daily. Watch for side effects such as confusion. Encouraged reduced caffeine intake.   -If in 2 weeks his symptoms persist then call us to schedule TURBT.     Attestation:  This patient was seen and evaluated by me, with a scribe taking notes.  I have reviewed the note above and agree.  The physical exam and or any procedures were performed by me and the pertinant details are outlined below.     TURBT(Trans Urethral Resection of Bladder Tumor) needs to be in-house given age and the fact that he will need hospitalization afterwards.    Dennis Hilton MD  Department of Urology  [facility]    ______________________________________________________________________    HPI  Ivan Gomez is a 89 year old male with a history of HTN, HLD, CHF, mitral valve regurgitation s/p valve clip placement in , stage 3 CKD, BPH s/p TURP in  and recurrent LGTa bladder cancer who is most recently s/p TURBT in 2018 which revealed HGT1 recurrence. Has been resistant to treatment and presents for follow-up.     Reports progressive LUTS which includes gross hematuria and dysuria intermittently. Hesitancy improves with  "increased fluid intake. He also reports incontinence, nocturia and frequency every 10-15 minutes. He does not drink much coffee anymore and does not consume alcohol. He has not smoked for years. Also notes increased fatigue. He is taking hydrocodone for lumbar pain every 6 hours.      Date of Initial Diagnosis: 2015  Stage of Initial Diagnosis: Ta, but large volume with a couple of tumors >3 cms near right UO requiring neph tube and stent  Grade at Initial Diagnosis: Low with focal high grade  Site of First Diagnosis: bladder  Any Recurrence? 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   Intravesical Treatments: TURBT( ), TURBT( ), TURBT (18)  Date of Last Cysto: 18  Date of Last Upper Tract Imagin/15/15, Renal US 3/9/17  Patient was pretreated with antibiotics.    Of note, he has had complications with previous TURBT () which included nephrostomy tube and internalized double J.     Presented to ED on 2019 with abdominal pain and nausea secondary to acute cholecystitis.He underwent cholecystectomy on 2019.     He just lost his brother last week. He continues to follow with his cardiologist and believes things are stable.     Review of Systems:   Pertinent items are noted in HPI or as below, remainder of complete ROS is negative.      Physical Exam:   Patient is a 89 year old  male   Vitals: Blood pressure 134/69, pulse 81, height 1.727 m (5' 8\"), weight 54 kg (119 lb).  General Appearance Adult: Alert, no acute distress, oriented  HENT: throat/mouth:normal, good dentition  Lungs: no respiratory distress, or pursed lip breathing  Heart: No obvious jugular venous distension present  Abdomen: Body mass index is 18.09 kg/m .  Musculoskeltal: extremities normal  Skin: no visible suspicious lesions or rashes  Neuro: Alert, oriented, speech and mentation normal  Psych: affect and mood " normal  Gait: Normal  : deferred    Laboratory:   I reviewed all applicable laboratory and pathology data and went over findings with patient  Significant for     Lab Results   Component Value Date    CR 1.52 11/21/2019    CR 1.36 09/26/2019    CR 1.33 09/20/2019    CR 1.40 09/04/2019    CR 1.37 08/29/2019    CR 1.41 08/20/2019    CR 1.51 08/12/2019    CR 1.68 08/06/2019    CR 1.42 08/05/2019    CR 1.65 08/01/2019       PSA   Date Value Ref Range Status   08/15/2015 1.39 0 - 4 ug/L Final       Imaging:   I personally reviewed all applicable imaging and went over the below findings with patient.    Results for orders placed or performed during the hospital encounter of 07/04/19   CT Chest (PE) Abdomen Pelvis w Contrast    Narrative    CT CHEST PE ABDOMEN AND PELVIS WITH CONTRAST   7/4/2019 6:31 PM    HISTORY: Sepsis. Hypoxia.    TECHNIQUE: Pulmonary embolism protocol was performed through the  chest. 73 mL Isovue-370 were injected IV. After contrast injection,  volumetric helical acquisition was performed from the thoracic inlet  through the ischial tuberosities. Radiation dose for this scan was  reduced using automated exposure control, adjustment of the mA and/or  kV according to patient size, or iterative reconstruction technique.    COMPARISON: CT of the abdomen and pelvis performed 8/15/2015.    FINDINGS:     Chest:  No evidence for pulmonary embolism. The thoracic aorta is of  normal caliber, without evidence for thoracic aortic aneurysm. The  thoracic aorta is not well opacified on this exam. Atherosclerotic  calcification of the thoracic aorta and coronary arteries. There are  small bilateral pleural effusions, larger on the right. No pericardial  effusion. No enlarged lymph nodes are identified in the chest.     Patchy airspace and groundglass opacities are noted in both mid and  lower lungs, and are suspicious for pneumonia, although edema could  also have this appearance. Smooth interstitial thickening  in both  lungs suggests pulmonary edema. Cardiac enlargement.    Abdomen and pelvis: Prior cholecystectomy. Mildly heterogeneous  enhancement throughout the liver, with no definite focal hepatic  lesions. There are several bilateral renal cysts, with the largest  noted in the lower pole of the left kidney, measuring 5.1 cm.  Nonobstructing stone in the lower pole of the left kidney measures 0.5  cm. The spleen, adrenal glands, pancreas, and kidneys are otherwise  unremarkable. No hydronephrosis. Mild atherosclerotic aortoiliac  calcification.     No bowel obstruction. A small amount of ascites is noted. There are  several bladder wall masses and nodules identified, with the largest  on the left measuring 3.5 cm (series 6 image 79). A previously  described bladder wall mass posteriorly on the right has decreased in  prominence. Additional bladder wall masses are new since the previous  exam. Postoperative changes of right inguinal hernia are noted.  Degenerative changes are noted throughout the thoracolumbar spine.  Lumbar curve is noted.         Impression    IMPRESSION:   1. No evidence for pulmonary embolism.  2. Patchy airspace and groundglass opacities in both mid and lower  lungs are suspicious for pneumonia.  3. Small bilateral pleural effusions, larger on the right.  4. Smooth interstitial thickening in both lungs suggests pulmonary  edema.  5. Cardiac enlargement.  6. Small amount of ascites.  7. Multiple bladder wall masses are noted, with the largest on the  left measuring 3.5 cm. Findings are suspicious for neoplasm, such as  transitional cell carcinoma.  8. Nonobstructing 0.5 cm stone in the lower pole of the left kidney   9. Heterogeneous enhancement of the liver is a nonspecific finding.      SY CAROLINA MD     Scribe Disclosure:  I, Fatou Walters, am serving as a scribe to document services personally performed by Dennis Hilton MD at this visit, based upon the provider's  statements to me. All documentation has been reviewed by the aforementioned provider prior to being entered into the official medical record.

## 2020-02-13 NOTE — TELEPHONE ENCOUNTER
Miranda was notified that the patient trospium 20 mg was sent to [facility].      Ethel Sung MA

## 2020-02-13 NOTE — LETTER
2020       RE: Ivan Gomez  [address]  [place] [id number]     Dear Colleague,    Thank you for referring your patient, Ivan Gomez, to the [facility] UROLOGIC [facility] at [facility]. Please see a copy of my visit note below.      Urology Clinic    Dennis Hilton MD  Date of Service: 2020     Name: Ivan Gomez  MRN: [id number]  Age: 89 year old  : 10/12/1926  Referring provider: Dennis Hilton     Assessment:  Ivan Gomez  is a 89 year old male with a history of low grade Ta bladder cancer, now with HGT1 recurrence. Previously, he had been very reluctant to further treatment but now with worsened LUTS which pushes him to pursue treatment. Before we jump to a procedure, would attempt other less invasive measures. If his symptoms persist, then we will proceed with TURBT. I am hesitant to this as he has a history of complications in the past which required nephrostomy tube and internalized double J.    Plan:  -UA/UC to check for infection.   -Stop Flomax and start on Trospium 20 mg daily. Watch for side effects such as confusion. Encouraged reduced caffeine intake.   -If in 2 weeks his symptoms persist then call us to schedule TURBT.     Attestation:  This patient was seen and evaluated by me, with a scribe taking notes.  I have reviewed the note above and agree.  The physical exam and or any procedures were performed by me and the pertinant details are outlined below.     TURBT(Trans Urethral Resection of Bladder Tumor) needs to be in-house given age and the fact that he will need hospitalization afterwards.    Dennis Hilton MD  Department of Urology  [facility]    ______________________________________________________________________    HPI  Ivan Gomez is a 89 year old male with a history of HTN, HLD, CHF, mitral valve regurgitation s/p valve clip placement  "in 2017, stage 3 CKD, BPH s/p TURP in  and recurrent LGTa bladder cancer who is most recently s/p TURBT in 2018 which revealed HGT1 recurrence. Has been resistant to treatment and presents for follow-up.     Reports progressive LUTS which includes gross hematuria and dysuria intermittently. Hesitancy improves with increased fluid intake. He also reports incontinence, nocturia and frequency every 10-15 minutes. He does not drink much coffee anymore and does not consume alcohol. He has not smoked for years. Also notes increased fatigue. He is taking hydrocodone for lumbar pain every 6 hours.      Date of Initial Diagnosis: 2015  Stage of Initial Diagnosis: Ta, but large volume with a couple of tumors >3 cms near right UO requiring neph tube and stent  Grade at Initial Diagnosis: Low with focal high grade  Site of First Diagnosis: bladder  Any Recurrence? 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   Intravesical Treatments: TURBT( ), TURBT( ), TURBT (18)  Date of Last Cysto: 18  Date of Last Upper Tract Imagin/15/15, Renal US 3/9/17  Patient was pretreated with antibiotics.    Of note, he has had complications with previous TURBT () which included nephrostomy tube and internalized double J.     Presented to ED on 2019 with abdominal pain and nausea secondary to acute cholecystitis.He underwent cholecystectomy on 2019.     He just lost his brother last week. He continues to follow with his cardiologist and believes things are stable.     Review of Systems:   Pertinent items are noted in HPI or as below, remainder of complete ROS is negative.      Physical Exam:   Patient is a 89 year old  male   Vitals: Blood pressure 134/69, pulse 81, height 1.727 m (5' 8\"), weight 54 kg (119 lb).  General Appearance Adult: Alert, no acute distress, oriented  HENT: throat/mouth:normal, good " dentition  Lungs: no respiratory distress, or pursed lip breathing  Heart: No obvious jugular venous distension present  Abdomen: Body mass index is 18.09 kg/m .  Musculoskeltal: extremities normal  Skin: no visible suspicious lesions or rashes  Neuro: Alert, oriented, speech and mentation normal  Psych: affect and mood normal  Gait: Normal  : deferred    Laboratory:   I reviewed all applicable laboratory and pathology data and went over findings with patient  Significant for     Lab Results   Component Value Date    CR 1.52 11/21/2019    CR 1.36 09/26/2019    CR 1.33 09/20/2019    CR 1.40 09/04/2019    CR 1.37 08/29/2019    CR 1.41 08/20/2019    CR 1.51 08/12/2019    CR 1.68 08/06/2019    CR 1.42 08/05/2019    CR 1.65 08/01/2019       PSA   Date Value Ref Range Status   08/15/2015 1.39 0 - 4 ug/L Final       Imaging:   I personally reviewed all applicable imaging and went over the below findings with patient.    Results for orders placed or performed during the hospital encounter of 07/04/19   CT Chest (PE) Abdomen Pelvis w Contrast    Narrative    CT CHEST PE ABDOMEN AND PELVIS WITH CONTRAST   7/4/2019 6:31 PM    HISTORY: Sepsis. Hypoxia.    TECHNIQUE: Pulmonary embolism protocol was performed through the  chest. 73 mL Isovue-370 were injected IV. After contrast injection,  volumetric helical acquisition was performed from the thoracic inlet  through the ischial tuberosities. Radiation dose for this scan was  reduced using automated exposure control, adjustment of the mA and/or  kV according to patient size, or iterative reconstruction technique.    COMPARISON: CT of the abdomen and pelvis performed 8/15/2015.    FINDINGS:     Chest:  No evidence for pulmonary embolism. The thoracic aorta is of  normal caliber, without evidence for thoracic aortic aneurysm. The  thoracic aorta is not well opacified on this exam. Atherosclerotic  calcification of the thoracic aorta and coronary arteries. There are  small  bilateral pleural effusions, larger on the right. No pericardial  effusion. No enlarged lymph nodes are identified in the chest.     Patchy airspace and groundglass opacities are noted in both mid and  lower lungs, and are suspicious for pneumonia, although edema could  also have this appearance. Smooth interstitial thickening in both  lungs suggests pulmonary edema. Cardiac enlargement.    Abdomen and pelvis: Prior cholecystectomy. Mildly heterogeneous  enhancement throughout the liver, with no definite focal hepatic  lesions. There are several bilateral renal cysts, with the largest  noted in the lower pole of the left kidney, measuring 5.1 cm.  Nonobstructing stone in the lower pole of the left kidney measures 0.5  cm. The spleen, adrenal glands, pancreas, and kidneys are otherwise  unremarkable. No hydronephrosis. Mild atherosclerotic aortoiliac  calcification.     No bowel obstruction. A small amount of ascites is noted. There are  several bladder wall masses and nodules identified, with the largest  on the left measuring 3.5 cm (series 6 image 79). A previously  described bladder wall mass posteriorly on the right has decreased in  prominence. Additional bladder wall masses are new since the previous  exam. Postoperative changes of right inguinal hernia are noted.  Degenerative changes are noted throughout the thoracolumbar spine.  Lumbar curve is noted.         Impression    IMPRESSION:   1. No evidence for pulmonary embolism.  2. Patchy airspace and groundglass opacities in both mid and lower  lungs are suspicious for pneumonia.  3. Small bilateral pleural effusions, larger on the right.  4. Smooth interstitial thickening in both lungs suggests pulmonary  edema.  5. Cardiac enlargement.  6. Small amount of ascites.  7. Multiple bladder wall masses are noted, with the largest on the  left measuring 3.5 cm. Findings are suspicious for neoplasm, such as  transitional cell carcinoma.  8. Nonobstructing 0.5 cm  stone in the lower pole of the left kidney   9. Heterogeneous enhancement of the liver is a nonspecific finding.      SY CAROLINA MD     Scribe Disclosure:  I, Fatou Walters, am serving as a scribe to document services personally performed by Dennis Hilton MD at this visit, based upon the provider's statements to me. All documentation has been reviewed by the aforementioned provider prior to being entered into the official medical record.       Again, thank you for allowing me to participate in the care of your patient.      Sincerely,    Dennis Hilton MD

## 2020-02-13 NOTE — PATIENT INSTRUCTIONS
Please call if meds done work      It was a pleasure meeting with you today.  Thank you for allowing me and my team the privilege of caring for you today.  YOU are the reason we are here, and I truly hope we provided you with the excellent service you deserve.  Please let us know if there is anything else we can do for you so that we can be sure you are leaving completely satisfied with your care experience.

## 2020-02-13 NOTE — NURSING NOTE
Chief Complaint   Patient presents with     RECHECK      frequently, blood in urine        Vanessa Oliveros MA

## 2020-02-13 NOTE — TELEPHONE ENCOUNTER
[facility] Call Center    Phone Message    May a detailed message be left on voicemail: yes     Reason for Call: Medication Question or concern regarding medication   Prescription Clarification  Name of Medication: trospium (SANCTURA) 20 MG tablet  Prescribing Provider: weight,c   Pharmacy: Supposed to go to [facility], not [facility]   What on the order needs clarification? Please reissue RX to [facility]. Please call [facility] to confirm at [phone]. Thanks.           Action Taken: Message routed to:  [facility] van uro    Travel Screening: Not Applicable

## 2020-02-26 NOTE — TELEPHONE ENCOUNTER
[facility] Call Center    Phone Message    May a detailed message be left on voicemail: yes     Reason for Call: Other: Don calling to request a call back. He states trospium (SANCTURA) 20 MG tablet is not working for him. He would like to speak with a nurse. Please call him back to discuss.      Action Taken: Message routed to:  [facility] van uro     Travel Screening: Not Applicable

## 2020-03-10 NOTE — TELEPHONE ENCOUNTER
Called and left message about scheduling surgery with Dr. Hilton. Gave [phone] as call back number

## 2020-03-10 NOTE — TELEPHONE ENCOUNTER
[facility] Call Center    Phone Message    May a detailed message be left on voicemail: yes     Reason for Call: Other: Margareth states the Pt's trospium (SANCTURA) is not working for him anymore and is requesting an order to discontinue the medication.  fax number is [phone].  Margareth can be reached at [phone]     Action Taken: Message routed to:  [facility] urology    Travel Screening: Not Applicable

## 2020-03-11 PROBLEM — Z85.51 PERSONAL HISTORY OF MALIGNANT NEOPLASM OF BLADDER: Status: ACTIVE | Noted: 2020-01-01

## 2020-03-11 NOTE — TELEPHONE ENCOUNTER
FUTURE VISIT INFORMATION      SURGERY INFORMATION:    Date: 3/31/20    Location: [facility]    Surgeon:  Dennis Hilton MD     Anesthesia Type:  MAC    Procedure: CYSTOSCOPY, Bladder biopsy and fulgaration or transurethal resection of bladder tumor     Consult: OV 20- Weight    RECORDS REQUESTED FROM:       Primary Care Provider: Evaristo Magaña MD [facility]    Pertinent Medical History: Mitral Valvular Regurgitation, hypertension, heart failure, CHF    Most recent EKG+ Tracin19    Most recent ECHO: 19    Most recent Cardiac Stress Test: 10/10/17

## 2020-03-12 NOTE — TELEPHONE ENCOUNTER
Patient is scheduled for surgery with Dr. Hilton      Left message with: Don    Date of Surgery: 3/31/2020    Location: [place]    Informed patient they will need an adult  yes    Pre-op with surgeon (if applicable): n/a    H&P: Scheduled with PAC on 3/18/2020    Post-op: scheduled on 4/23/2020     Additional imaging/appointments: n/a    Surgery packet: Mailed via [employer] on 3/12/2020     Additional comments: n/a

## 2020-03-13 NOTE — PROGRESS NOTES
HPI and Plan:   See dictation    Orders Placed This Encounter   Procedures     Basic metabolic panel     Follow-Up with Cardiac Advanced Practice Provider     Follow-Up with Cardiologist     EKG 12-lead complete w/read - Clinics (performed today)       No orders of the defined types were placed in this encounter.      There are no discontinued medications.      Encounter Diagnoses   Name Primary?     Chronic right-sided heart failure (H)      Paroxysmal atrial fibrillation (H)      Tricuspid valve insufficiency, unspecified etiology      CKD (chronic kidney disease) stage 3, GFR 30-59 ml/min (H)      Mitral valve insufficiency, unspecified etiology      Benign essential hypertension      S/P mitral valve clip implantation      Palpitations        CURRENT MEDICATIONS:  Current Outpatient Medications   Medication Sig Dispense Refill     artificial saliva (BIOTENE DRY MOUTHWASH) LIQD liquid Swish and spit 15 mLs in mouth 3 times daily       beta carotene 15271 UNIT capsule Take 1 capsule (25,000 Units) by mouth daily       carboxymethylcellulose PF (REFRESH PLUS) 0.5 % ophthalmic solution Place 2 drops into both eyes 2 times daily And every 2 hours as needed       ferrous sulfate (FEROSUL) 325 (65 Fe) MG tablet Take 325 mg by mouth daily (with breakfast)       finasteride (PROSCAR) 5 MG tablet TAKE 1 TABLET(5 MG) BY MOUTH DAILY 90 tablet 3     gabapentin (NEURONTIN) 300 MG capsule Take 1 capsule (300 mg) by mouth At Bedtime 90 capsule 3     HYDROcodone-acetaminophen (NORCO) 7.5-325 MG per tablet TAKE ONE TABLET BY MOUTH EVERY SIX HOURS AS NEEDED FOR PAIN. MAXIMUM FOUR TABLETS PER DAY. EARLIEST FILL DATE: 12/14/19 60 tablet 0     melatonin 3 MG tablet Take 3 mg by mouth At Bedtime       metoprolol tartrate (LOPRESSOR) 25 MG tablet Take 0.5 tablets (12.5 mg) by mouth 2 times daily       order for DME Equipment being ordered: Non-sting barrier wipes for bilateral heel 1 Box 11     potassium chloride ER (K-TAB/KLOR-CON) 10  MEQ CR tablet Take 2 tablets (20 mEq) by mouth daily 180 tablet 3     SENNA-docusate sodium (SENNA S) 8.6-50 MG tablet Take 1 tablet by mouth At Bedtime 100 tablet 3     simvastatin (ZOCOR) 20 MG tablet TAKE ONE TABLET BY MOUTH AT BEDTIME 90 tablet 3     simvastatin (ZOCOR) 20 MG tablet TAKE ONE TABLET BY MOUTH AT BEDTIME 30 tablet 0     Skin Protectants, Misc. (EUCERIN) cream Apply topically 2 times daily And as needed. Apply to LE and areas of dryness       torsemide (DEMADEX) 20 MG tablet Take 1 tablet (20 mg) by mouth daily 90 tablet 1     trospium (SANCTURA) 20 MG tablet Take 1 tablet (20 mg) by mouth daily 30 tablet 11     trospium (SANCTURA) 20 MG tablet Take 1 tablet (20 mg) by mouth daily - oral 30 tablet 11     vitamin C (ASCORBIC ACID) 500 MG tablet TAKE ONE TABLET BY MOUTH ONCE DAILY 100 tablet 0       ALLERGIES     Allergies   Allergen Reactions     Celebrex [Celecoxib] Hives     Penicillins Hives       PAST MEDICAL HISTORY:  Past Medical History:   Diagnosis Date     Arthritis     Spinal Stenosis     Former smoker      Heart disease      Hemorrhoids      Hypercholesteremia      Hypertension      Malignant neoplasm (H)     skin CA, Basal cell     Other chronic pain      Renal disease      Right heart failure (H)        PAST SURGICAL HISTORY:  Past Surgical History:   Procedure Laterality Date     BACK SURGERY       BIOPSY      face, skin cancer     BIOPSY  8/2016    shoulder lesion     CYSTOSCOPY, TRANSURETHRAL RESECTION (TUR) PROSTATE, COMBINED N/A 8/21/2015    Procedure: COMBINED CYSTOSCOPY, TRANSURETHRAL RESECTION (TUR) PROSTATE;  Surgeon: Dennis Hilton MD;  Location: [facility] OR     CYSTOSCOPY, TRANSURETHRAL RESECTION (TUR) TUMOR BLADDER, COMBINED N/A 9/2/2016    Procedure: COMBINED CYSTOSCOPY, TRANSURETHRAL RESECTION (TUR) TUMOR BLADDER;  Surgeon: Dennis Hilton MD;  Location: [facility] OR     CYSTOSCOPY, TRANSURETHRAL RESECTION (TUR) TUMOR BLADDER, COMBINED N/A 11/2/2018    Procedure:  Transurethral resection of bladder tumor > 5 cm in size;  Surgeon: Dennis Hilton MD;  Location:  OR     ESOPHAGOSCOPY, GASTROSCOPY, DUODENOSCOPY (EGD), COMBINED N/A 5/24/2019    Procedure: ESOPHAGOGASTRODUODENOSCOPY, WITH BIOPSY;  Surgeon: Abe Jang MD;  Location: [facility]     LAPAROSCOPIC CHOLECYSTECTOMY WITH CHOLANGIOGRAMS N/A 5/22/2019    Procedure: CHOLECYSTECTOMY, LAPAROSCOPIC, WITH CHOLANGIOGRAM;  Surgeon: Rey Romero MD;  Location:  OR     ORTHOPEDIC SURGERY      lumbar and cervical surgery, Sep, 2008, knee surgery     PERCUTANEOUS MITRAL VALVE REPAIR N/A 10/16/2017    Procedure: PERCUTANEOUS MITRAL VALVE REPAIR ANESTHESIA;  Percutaneous MitraClip ;  Surgeon: Eber Monroe MD;  Location: [facility] OR     PICC INSERTION Right 10/14/2017    5fr DL Bard PICC, 40cm (1cm external) in the R basilic vein w/ tip in the mid SVC.       FAMILY HISTORY:  Family History   Problem Relation Age of Onset     Cancer Son 64        leukemia ? due to agent orange     Family History Negative Son        SOCIAL HISTORY:  Social History     Socioeconomic History     Marital status:      Spouse name: None     Number of children: None     Years of education: None     Highest education level: None   Occupational History     None   Social Needs     Financial resource strain: None     Food insecurity     Worry: None     Inability: None     Transportation needs     Medical: None     Non-medical: None   Tobacco Use     Smoking status: Former Smoker     Smokeless tobacco: Never Used   Substance and Sexual Activity     Alcohol use: No     Alcohol/week: 0.0 standard drinks     Comment: doesnt use anymore     Drug use: No     Sexual activity: Never   Lifestyle     Physical activity     Days per week: None     Minutes per session: None     Stress: None   Relationships     Social connections     Talks on phone: None     Gets together: None     Attends Religion service: None     Active member of club or  "organization: None     Attends meetings of clubs or organizations: None     Relationship status: None     Intimate partner violence     Fear of current or ex partner: None     Emotionally abused: None     Physically abused: None     Forced sexual activity: None   Other Topics Concern     Parent/sibling w/ CABG, MI or angioplasty before 65F 55M? No   Social History Narrative     None       Review of Systems:  Skin:  Negative bruising     Eyes:  Negative      ENT:  Negative   sore throat  Respiratory:  Negative dyspnea on exertion     Cardiovascular:  Negative Positive for;fatigue;lower extremity symptoms extremely tired.hx varicose vein surgery  Gastroenterology: Negative poor appetite    Genitourinary:  Positive for urinary frequency;prostate problem    Musculoskeletal:  Positive for arthritis    Neurologic:  Positive for numbness or tingling of feet    Psychiatric:  Positive for      Heme/Lymph/Imm:  Positive for easy bruising    Endocrine:  Negative        Physical Exam:  Vitals: /70   Pulse 71   Ht 1.727 m (5' 8\")   Wt 53.4 kg (117 lb 11.2 oz)   BMI 17.90 kg/m      Constitutional:  cooperative, alert and oriented, well developed, well nourished, in no acute distress thin;frail      Skin:  warm and dry to the touch, no apparent skin lesions or masses noted          Head:  normocephalic        Eyes:  pupils equal and round        Lymph:No Cervical lymphadenopathy present     ENT:  no pallor or cyanosis, dentition good        Neck:  carotid pulses are full and equal bilaterally JVP elevated;JVP 8-10      Respiratory:  clear to auscultation         Cardiac: regular rhythm;normal S1 and S2 frequent premature beats     grade 3;holosystolic murmur;apical;radiation to the axilla systolic ejection murmur;grade 3;RUSB;LUSB      pulses full and equal                                        GI:  not assessed this visit        Extremities and Muscular Skeletal:  no deformities, clubbing, cyanosis, erythema " observed;no edema              Neurological:  no gross motor deficits;affect appropriate        Psych:  Alert and Oriented x 3        CC  Evaristo Magaña MD  [address] [place] [address]

## 2020-03-13 NOTE — PROGRESS NOTES
Service Date: 03/13/2020      HISTORY OF PRESENT ILLNESS:  It is my pleasure to see your patient, Ivan Gomez.  Mr. Gomez is an elderly 89-year-old gentleman who underwent a mitral clipping for severe mitral regurgitation.  He also has significant pulmonary hypertension.  Echocardiography also in 2018 showed moderate to severe tricuspid regurgitation, severe, mildly dilated right ventricle with moderately decreased right ventricular size and systolic function and moderately to severely raised pulmonary pressures with evidence of pressure overload.        The patient also has a history of paroxysmal atrial fibrillation but cannot be anticoagulated because his platelet count is low.  He also has renal insufficiency.  He is also very frail.        Repeat echocardiography, which was performed today, shows significant improvement.  If you remember, he had moderately severe tricuspid regurgitation.  It is now mild tricuspid regurgitation.  Pulmonary pressures are about the same as previously, being 50 mmHg plus right atrial pressure, but there does not appear to be evidence of RV pressure overload as there was previously.  The degree of mitral regurgitation is moderately severe as it was in the past.  He does have normal gradients across the mitral valve and his left ventricular systolic function is hyperdynamic being greater than 70%.        His blood pressure is well controlled at 111/70.  He feels quite weak.  He still uses a walker, but  he does get quite tired when he walks down the hallway.  He is also losing a significant amount of weight.  In January, he weighed 128 pounds.  He is now 117 pounds.  He is due to have a transurethral resection of a bladder tumor on 03/31.        Last platelet count in September was 84,000.  Hemoglobin is 11.0.        A 12-lead electrocardiogram today showed that he is in sinus rhythm with first-degree AV block, but no evidence of ischemia.  He did have a ZIO Patch monitor in,  which was 3 months ago, and that showed no evidence of atrial fibrillation.  He did have a 10-beat run of nonsustained asymptomatic VT.      He is not complaining of ankle edema, orthopnea or PND.  He does complain of leg tiredness, however.  About 2-3 years ago, he did have varicose veins treated by laser, I believe.      IMPRESSION:   1.  Mitral regurgitation, status post mitral clip.  He has moderate to severe mitral regurgitation present.   2.  Tricuspid regurgitation.  The tricuspid regurgitation is significantly improved, going from moderate to severe down to mild.   3.  Moderate to severe pulmonary hypertension, probably related to mitral regurgitation.   4.  Normal left ventricular size and systolic function.   5.  Paroxysmal atrial fibrillation, but not anticoagulated because of thrombocytopenia and significant risk of falls.   6.  Frail.   7.  Thrombocytopenia.   8.  Bladder tumor, due for fulguration at the end of this month.      PLAN:  The patient appears to be stable, and in fact on echo, he has improved.  We will keep the patient on his present medications.  I will have him return to see Shena Aguayo in 3 months' time with a basic metabolic profile.  I will see the patient myself again in 6 months' time.      As always, he has been told to contact us if he has any questions or any concerns.      cc:      Evaristo Magaña MD    [facility]   [address] [place] [address]       Inderjit Purvis MD   [facility]   [address] [place] [address]         SERENITY CORONA MD, [facility]             D: 2020   T: 2020   MT: [id number]      Name:     MALLORIE NOVA   MRN:      [id number]        Account:      [id number]   :      10/12/1926           Service Date: 2020      Document: [id number]

## 2020-03-13 NOTE — LETTER
3/13/2020      Inderjit Purvis MD  [facility] [address]  [facility] [address]      RE: Ivan Gomez       Dear Colleague,    I had the pleasure of seeing Ivan Gomez in the [facility].    Service Date: 03/13/2020      HISTORY OF PRESENT ILLNESS:  It is my pleasure to see your patient, Ivan Gomez.  Mr. Gomez is an elderly 89-year-old gentleman who underwent a mitral clipping for severe mitral regurgitation.  He also has significant pulmonary hypertension.  Echocardiography also in 2018 showed moderate to severe tricuspid regurgitation, severe, mildly dilated right ventricle with moderately decreased right ventricular size and systolic function and moderately to severely raised pulmonary pressures with evidence of pressure overload.        The patient also has a history of paroxysmal atrial fibrillation but cannot be anticoagulated because his platelet count is low.  He also has renal insufficiency.  He is also very frail.        Repeat echocardiography, which was performed today, shows significant improvement.  If you remember, he had moderately severe tricuspid regurgitation.  It is now mild tricuspid regurgitation.  Pulmonary pressures are about the same as previously, being 50 mmHg plus right atrial pressure, but there does not appear to be evidence of RV pressure overload as there was previously.  The degree of mitral regurgitation is moderately severe as it was in the past.  He does have normal gradients across the mitral valve and his left ventricular systolic function is hyperdynamic being greater than 70%.        His blood pressure is well controlled at 111/70.  He feels quite weak.  He still uses a walker, but  he does get quite tired when he walks down the hallway.  He is also losing a significant amount of weight.  In January, he weighed 128 pounds.  He is now 117 pounds.  He is due to have a transurethral resection of a  bladder tumor on 03/31.        Last platelet count in September was 84,000.  Hemoglobin is 11.0.        A 12-lead electrocardiogram today showed that he is in sinus rhythm with first-degree AV block, but no evidence of ischemia.  He did have a ZIO Patch monitor in, which was 3 months ago, and that showed no evidence of atrial fibrillation.  He did have a 10-beat run of nonsustained asymptomatic VT.      He is not complaining of ankle edema, orthopnea or PND.  He does complain of leg tiredness, however.  About 2-3 years ago, he did have varicose veins treated by laser, I believe.      IMPRESSION:   1.  Mitral regurgitation, status post mitral clip.  He has moderate to severe mitral regurgitation present.   2.  Tricuspid regurgitation.  The tricuspid regurgitation is significantly improved, going from moderate to severe down to mild.   3.  Moderate to severe pulmonary hypertension, probably related to mitral regurgitation.   4.  Normal left ventricular size and systolic function.   5.  Paroxysmal atrial fibrillation, but not anticoagulated because of thrombocytopenia and significant risk of falls.   6.  Frail.   7.  Thrombocytopenia.   8.  Bladder tumor, due for fulguration at the end of this month.      PLAN:  The patient appears to be stable, and in fact on echo, he has improved.  We will keep the patient on his present medications.  I will have him return to see Shena Aguayo in 3 months' time with a basic metabolic profile.  I will see the patient myself again in 6 months' time.      As always, he has been told to contact us if he has any questions or any concerns.      cc:      Evaristo Magaña MD    [facility]   [address] [place] [address]       Inderjit Purvis MD   [facility]   [address] [place] [id number]         [facility] CORONA MD, [facility]             D: 03/13/2020   T: 03/13/2020   MT: [id number]      Name:     MALLORIE NOVA   MRN:       [id number]        Account:      [id number]   :      10/12/1926           Service Date: 2020      Document: [id number]         Outpatient Encounter Medications as of 3/13/2020   Medication Sig Dispense Refill     artificial saliva (BIOTENE DRY MOUTHWASH) LIQD liquid Swish and spit 15 mLs in mouth 3 times daily       beta carotene 07935 UNIT capsule Take 1 capsule (25,000 Units) by mouth daily       carboxymethylcellulose PF (REFRESH PLUS) 0.5 % ophthalmic solution Place 2 drops into both eyes 2 times daily And every 2 hours as needed       ferrous sulfate (FEROSUL) 325 (65 Fe) MG tablet Take 325 mg by mouth daily (with breakfast)       finasteride (PROSCAR) 5 MG tablet TAKE 1 TABLET(5 MG) BY MOUTH DAILY 90 tablet 3     gabapentin (NEURONTIN) 300 MG capsule Take 1 capsule (300 mg) by mouth At Bedtime 90 capsule 3     HYDROcodone-acetaminophen (NORCO) 7.5-325 MG per tablet TAKE ONE TABLET BY MOUTH EVERY SIX HOURS AS NEEDED FOR PAIN. MAXIMUM FOUR TABLETS PER DAY. EARLIEST FILL DATE: 19 60 tablet 0     melatonin 3 MG tablet Take 3 mg by mouth At Bedtime       metoprolol tartrate (LOPRESSOR) 25 MG tablet Take 0.5 tablets (12.5 mg) by mouth 2 times daily       order for DME Equipment being ordered: Non-sting barrier wipes for bilateral heel 1 Box 11     potassium chloride ER (K-TAB/KLOR-CON) 10 MEQ CR tablet Take 2 tablets (20 mEq) by mouth daily 180 tablet 3     SENNA-docusate sodium (SENNA S) 8.6-50 MG tablet Take 1 tablet by mouth At Bedtime 100 tablet 3     simvastatin (ZOCOR) 20 MG tablet TAKE ONE TABLET BY MOUTH AT BEDTIME 90 tablet 3     simvastatin (ZOCOR) 20 MG tablet TAKE ONE TABLET BY MOUTH AT BEDTIME 30 tablet 0     Skin Protectants, Misc. (EUCERIN) cream Apply topically 2 times daily And as needed. Apply to LE and areas of dryness       torsemide (DEMADEX) 20 MG tablet Take 1 tablet (20 mg) by mouth daily 90 tablet 1     trospium (SANCTURA) 20 MG tablet Take 1 tablet (20 mg) by mouth daily 30  tablet 11     trospium (SANCTURA) 20 MG tablet Take 1 tablet (20 mg) by mouth daily - oral 30 tablet 11     vitamin C (ASCORBIC ACID) 500 MG tablet TAKE ONE TABLET BY MOUTH ONCE DAILY 100 tablet 0     No facility-administered encounter medications on file as of 3/13/2020.        Again, thank you for allowing me to participate in the care of your patient.      Sincerely,    Lance Yao MD, MD     [facility]

## 2020-03-13 NOTE — LETTER
3/13/2020    Inderjit Purvis MD  [facility] [address]  [facility] [address]    RE: Ivan Gomez       Dear Colleague,    I had the pleasure of seeing Ivan Gomez in the [facility].    HPI and Plan:   See dictation    Orders Placed This Encounter   Procedures     Basic metabolic panel     Follow-Up with Cardiac Advanced Practice Provider     Follow-Up with Cardiologist     EKG 12-lead complete w/read - Clinics (performed today)       No orders of the defined types were placed in this encounter.      There are no discontinued medications.      Encounter Diagnoses   Name Primary?     Chronic right-sided heart failure (H)      Paroxysmal atrial fibrillation (H)      Tricuspid valve insufficiency, unspecified etiology      CKD (chronic kidney disease) stage 3, GFR 30-59 ml/min (H)      Mitral valve insufficiency, unspecified etiology      Benign essential hypertension      S/P mitral valve clip implantation      Palpitations        CURRENT MEDICATIONS:  Current Outpatient Medications   Medication Sig Dispense Refill     artificial saliva (BIOTENE DRY MOUTHWASH) LIQD liquid Swish and spit 15 mLs in mouth 3 times daily       beta carotene 29997 UNIT capsule Take 1 capsule (25,000 Units) by mouth daily       carboxymethylcellulose PF (REFRESH PLUS) 0.5 % ophthalmic solution Place 2 drops into both eyes 2 times daily And every 2 hours as needed       ferrous sulfate (FEROSUL) 325 (65 Fe) MG tablet Take 325 mg by mouth daily (with breakfast)       finasteride (PROSCAR) 5 MG tablet TAKE 1 TABLET(5 MG) BY MOUTH DAILY 90 tablet 3     gabapentin (NEURONTIN) 300 MG capsule Take 1 capsule (300 mg) by mouth At Bedtime 90 capsule 3     HYDROcodone-acetaminophen (NORCO) 7.5-325 MG per tablet TAKE ONE TABLET BY MOUTH EVERY SIX HOURS AS NEEDED FOR PAIN. MAXIMUM FOUR TABLETS PER DAY. EARLIEST FILL DATE: 12/14/19 60 tablet 0     melatonin 3 MG tablet Take 3 mg  by mouth At Bedtime       metoprolol tartrate (LOPRESSOR) 25 MG tablet Take 0.5 tablets (12.5 mg) by mouth 2 times daily       order for DME Equipment being ordered: Non-sting barrier wipes for bilateral heel 1 Box 11     potassium chloride ER (K-TAB/KLOR-CON) 10 MEQ CR tablet Take 2 tablets (20 mEq) by mouth daily 180 tablet 3     SENNA-docusate sodium (SENNA S) 8.6-50 MG tablet Take 1 tablet by mouth At Bedtime 100 tablet 3     simvastatin (ZOCOR) 20 MG tablet TAKE ONE TABLET BY MOUTH AT BEDTIME 90 tablet 3     simvastatin (ZOCOR) 20 MG tablet TAKE ONE TABLET BY MOUTH AT BEDTIME 30 tablet 0     Skin Protectants, Misc. (EUCERIN) cream Apply topically 2 times daily And as needed. Apply to LE and areas of dryness       torsemide (DEMADEX) 20 MG tablet Take 1 tablet (20 mg) by mouth daily 90 tablet 1     trospium (SANCTURA) 20 MG tablet Take 1 tablet (20 mg) by mouth daily 30 tablet 11     trospium (SANCTURA) 20 MG tablet Take 1 tablet (20 mg) by mouth daily - oral 30 tablet 11     vitamin C (ASCORBIC ACID) 500 MG tablet TAKE ONE TABLET BY MOUTH ONCE DAILY 100 tablet 0       ALLERGIES     Allergies   Allergen Reactions     Celebrex [Celecoxib] Hives     Penicillins Hives       PAST MEDICAL HISTORY:  Past Medical History:   Diagnosis Date     Arthritis     Spinal Stenosis     Former smoker      Heart disease      Hemorrhoids      Hypercholesteremia      Hypertension      Malignant neoplasm (H)     skin CA, Basal cell     Other chronic pain      Renal disease      Right heart failure (H)        PAST SURGICAL HISTORY:  Past Surgical History:   Procedure Laterality Date     BACK SURGERY       BIOPSY      face, skin cancer     BIOPSY  8/2016    shoulder lesion     CYSTOSCOPY, TRANSURETHRAL RESECTION (TUR) PROSTATE, COMBINED N/A 8/21/2015    Procedure: COMBINED CYSTOSCOPY, TRANSURETHRAL RESECTION (TUR) PROSTATE;  Surgeon: Dennis Hilton MD;  Location: [facility] OR     CYSTOSCOPY, TRANSURETHRAL RESECTION (TUR) TUMOR  BLADDER, COMBINED N/A 9/2/2016    Procedure: COMBINED CYSTOSCOPY, TRANSURETHRAL RESECTION (TUR) TUMOR BLADDER;  Surgeon: Dennis Hilton MD;  Location:  OR     CYSTOSCOPY, TRANSURETHRAL RESECTION (TUR) TUMOR BLADDER, COMBINED N/A 11/2/2018    Procedure: Transurethral resection of bladder tumor > 5 cm in size;  Surgeon: Dennis Hilton MD;  Location:  OR     ESOPHAGOSCOPY, GASTROSCOPY, DUODENOSCOPY (EGD), COMBINED N/A 5/24/2019    Procedure: ESOPHAGOGASTRODUODENOSCOPY, WITH BIOPSY;  Surgeon: Abe Jang MD;  Location:  GI     LAPAROSCOPIC CHOLECYSTECTOMY WITH CHOLANGIOGRAMS N/A 5/22/2019    Procedure: CHOLECYSTECTOMY, LAPAROSCOPIC, WITH CHOLANGIOGRAM;  Surgeon: Rey Romero MD;  Location:  OR     ORTHOPEDIC SURGERY      lumbar and cervical surgery, Sep, 2008, knee surgery     PERCUTANEOUS MITRAL VALVE REPAIR N/A 10/16/2017    Procedure: PERCUTANEOUS MITRAL VALVE REPAIR ANESTHESIA;  Percutaneous MitraClip ;  Surgeon: Eber Monroe MD;  Location: [facility]     PICC INSERTION Right 10/14/2017    5fr DL Bard PICC, 40cm (1cm external) in the R basilic vein w/ tip in the mid SVC.       FAMILY HISTORY:  Family History   Problem Relation Age of Onset     Cancer Son 64        leukemia ? due to agent orange     Family History Negative Son        SOCIAL HISTORY:  Social History     Socioeconomic History     Marital status:      Spouse name: None     Number of children: None     Years of education: None     Highest education level: None   Occupational History     None   Social Needs     Financial resource strain: None     Food insecurity     Worry: None     Inability: None     Transportation needs     Medical: None     Non-medical: None   Tobacco Use     Smoking status: Former Smoker     Smokeless tobacco: Never Used   Substance and Sexual Activity     Alcohol use: No     Alcohol/week: 0.0 standard drinks     Comment: doesnt use anymore     Drug use: No     Sexual activity:  "Never   Lifestyle     Physical activity     Days per week: None     Minutes per session: None     Stress: None   Relationships     Social connections     Talks on phone: None     Gets together: None     Attends Confucianist service: None     Active member of club or organization: None     Attends meetings of clubs or organizations: None     Relationship status: None     Intimate partner violence     Fear of current or ex partner: None     Emotionally abused: None     Physically abused: None     Forced sexual activity: None   Other Topics Concern     Parent/sibling w/ CABG, MI or angioplasty before 65F 55M? No   Social History Narrative     None       Review of Systems:  Skin:  Negative bruising     Eyes:  Negative      ENT:  Negative   sore throat  Respiratory:  Negative dyspnea on exertion     Cardiovascular:  Negative Positive for;fatigue;lower extremity symptoms extremely tired.hx varicose vein surgery  Gastroenterology: Negative poor appetite    Genitourinary:  Positive for urinary frequency;prostate problem    Musculoskeletal:  Positive for arthritis    Neurologic:  Positive for numbness or tingling of feet    Psychiatric:  Positive for      Heme/Lymph/Imm:  Positive for easy bruising    Endocrine:  Negative        Physical Exam:  Vitals: /70   Pulse 71   Ht 1.727 m (5' 8\")   Wt 53.4 kg (117 lb 11.2 oz)   BMI 17.90 kg/m      Constitutional:  cooperative, alert and oriented, well developed, well nourished, in no acute distress thin;frail      Skin:  warm and dry to the touch, no apparent skin lesions or masses noted          Head:  normocephalic        Eyes:  pupils equal and round        Lymph:No Cervical lymphadenopathy present     ENT:  no pallor or cyanosis, dentition good        Neck:  carotid pulses are full and equal bilaterally JVP elevated;JVP 8-10      Respiratory:  clear to auscultation         Cardiac: regular rhythm;normal S1 and S2 frequent premature beats     grade 3;holosystolic " murmur;apical;radiation to the axilla systolic ejection murmur;grade 3;RUSB;LUSB      pulses full and equal                                        GI:  not assessed this visit        Extremities and Muscular Skeletal:  no deformities, clubbing, cyanosis, erythema observed;no edema              Neurological:  no gross motor deficits;affect appropriate        Psych:  Alert and Oriented x 3        CC  Evaristo Magaña MD  [address] [place] [address]                Thank you for allowing me to participate in the care of your patient.      Sincerely,     Lance Yao MD, MD     [facility]    cc:   Evaristo Magaña MD  [address] [place] [address]

## 2020-04-21 NOTE — TELEPHONE ENCOUNTER
[facility] Call Center    Phone Message    May a detailed message be left on voicemail: yes     Reason for Call: Order(s): Other:   Reason for requested: Per call from Margareth PT is refusing the trospium (SANCTURA) 20 MG tablet and a discontinue order is needed. The order can be faxed to the above fax #.   Date needed: ASAP  Provider name: Dr Hilton      Action Taken: Message routed to:  [facility] Urology    Travel Screening: Not Applicable

## 2020-04-21 NOTE — TELEPHONE ENCOUNTER
Randolph Hilton       Patient is refusing to take his trospium 20 mg. Is it ok to send an order to discontinue this medication? please advise       Thanks, Ethel Sung MA

## 2020-04-21 NOTE — TELEPHONE ENCOUNTER
Verbal order was given to Margareth from [facility] to discontinue trosipum (SANCTURA) 20 mg. Written order was faxed to [phone].      Ethel Sung MA

## 2020-05-06 NOTE — TELEPHONE ENCOUNTER
Randolph Hilton,    Should patient stop taking the Sanctura? Please advise    Thanks, Ethel Sung MA

## 2020-05-06 NOTE — TELEPHONE ENCOUNTER
The previous message was never responded to so the medication hasn't been stopped.    Pt requires a written order from Dr Hilton before they can stop medication.  Please fax an order to discontinue tropium

## 2020-05-11 NOTE — TELEPHONE ENCOUNTER
Margareth from [facility] was notified that patient's orders was faxed to [phone].    Ethel Sung MA

## 2020-05-11 NOTE — TELEPHONE ENCOUNTER
[facility] Call Center    Phone Message    May a detailed message be left on voicemail: yes     Reason for Call: Other: Margareth returning call. She states someone called her and fax# was wrong. [facility] fax# is [phone]     Action Taken: Message routed to:  [facility] van uro     Travel Screening: Not Applicable

## 2020-05-11 NOTE — TELEPHONE ENCOUNTER
Message left on Margareth from [facility] stating that I faxed orders from Dr. Dennis Hilton to discontinue Sanctura 20 mg if it not helping. Order was [phone].      Ethel Sung MA

## 2020-06-05 NOTE — TELEPHONE ENCOUNTER
Called to schedule surgery with Dr Hilton. Left voice mail with phone number [phone] for patient to call back.

## 2020-06-10 NOTE — TELEPHONE ENCOUNTER
Called patient to schedule surgery with Dr Hilton. Left voice mail and phone number [phone] for a call back.

## 2020-06-10 NOTE — TELEPHONE ENCOUNTER
Patient is scheduled for surgery with Dr. Hilton      Spoke or left message with: left voice mail with surgery information and phone number [phone] for a call back    Date of Surgery:  6/24/20    Location: [facility] OR    Informed patient they will need an adult  yes    Pre-op with surgeon (if applicable): n/a    H&P: Scheduled with pcp or PAC    Additional imaging/appointments: n/a    Surgery packet: to be mailed by 6/11/20     Additional comments: n/a

## 2020-06-11 NOTE — TELEPHONE ENCOUNTER
FUTURE VISIT INFORMATION      SURGERY INFORMATION:    Date: 6/24/20    [facility]    Surgeon:  Dennis Hilton MD     Anesthesia Type:  MAC    Procedure: CYSTOSCOPY, Bladder biopsy and fulgaration or transurethal resection of bladder tumor     Consult: OV 2/13/20- Weight     RECORDS REQUESTED FROM:         Primary Care Provider: Evaristo Magaña MD [facility]     Pertinent Medical History: Mitral Valvular Regurgitation, hypertension, heart failure, CHF     Most recent EKG+ Tracing: 3/13/20     Most recent ECHO: 3/13/20     Most recent Cardiac Stress Test: 10/10/17

## 2020-06-17 NOTE — TELEPHONE ENCOUNTER
[facility] Call Center    Phone Message    May a detailed message be left on voicemail: yes     Reason for Call: Other: Cynthia called and would like to know the pt's surgery time so she can schedule transportation for the pt.  Please call her back. Thanks     Action Taken: Message routed to:  [facility] uro clinic    Travel Screening: Not Applicable

## 2020-06-18 NOTE — TELEPHONE ENCOUNTER
Message left on Cynthia thorpe stating that the patient needs to arrive 2 hours prior to his surgery with Dr. Dennis Hilton on 6/24/2020 at the [facility] on [address] . Patient surgery is at 12:40 pm. Patient needs tr arrive @ 10:40 am.      Ethel Sung MA

## 2020-06-18 NOTE — TELEPHONE ENCOUNTER
Called patient and left voicemail to let him know surgery with  Weight needed to be rescheduled. Surgery has been rescheduled to 7/6/2020 @ [facility] OR.

## 2020-06-19 NOTE — TELEPHONE ENCOUNTER
[facility] Call Center    Phone Message    May a detailed message be left on voicemail: yes     Reason for Call: Other: when Godfrey called yesterday to verify his appointment for transportation it had been cancelled without notifying Pt or .   called to investigate and found it rescheduled but is concerned we may not have notified Pt.  They request that we contaact Pt and let him know about the new time and date.     Action Taken: Message routed to:  [facility] urology    Travel Screening: Not Applicable                                                                        
Sent to scheduling as well Yakelin Farnsworth LPN Staff Nurse    
rn/wayne

## 2020-06-22 NOTE — TELEPHONE ENCOUNTER
Pre Op Teaching Flowsheet       Pre and Post op Patient Education  Relevant Diagnosis:  Personal history of malignant neoplasm of bladder   Surgical procedure:  CYSTOSCOPY, Bladder biopsy and fulgaration or transurethal resection of bladder tumor   Teaching Topic:  Pre and post op teaching  Person Involved in teaching: Ivan Gomez    Motivation Level:  Asks Questions: Yes  Eager to Learn:  Yes  Cooperative: Yes  Receptive (willing/able to accept information):  Yes    Patient demonstrates understanding of the following:  Date of surgery:  7/6/20  Location of surgery:  [address] [place] [address]  History and Physical and any other testing necessary prior to surgery: Yes  Required time line for completion of History and Physical and any pre-op testing: Yes    Patient demonstrates understanding of the following:  Pre-op bowel prep:  None  Pre-op showering/scrub information with PCMX Soap: Yes  Blood thinner medications discussed and when to stop (if applicable):  Yes      Infection Prevention:   Patient demonstrates understanding of the following:  Surgical procedure site care taught: at time of discharge  Signs and symptoms of infection taught:  Yes      Post-op follow-up:  Discussed how to contact the hospital, nurse, and clinic scheduling staff if necessary.    Instructional materials used/given/mailed:  [facility] Booklet, post op teaching sheet, Map, Soap, and arrival/location information.    Surgical instructions packet given to patient in office:  Yes.    Patient has a  and someone to stay with him for the first 24 hours.

## 2020-07-02 NOTE — OR NURSING
Nurse from [facility] called to report pt is unable to get covid test or  urine culture as lab closed 7/3/20.  Message passed on to Dr Hilton and Kacey Stephens as nurse would like call back.

## 2020-07-17 NOTE — TELEPHONE ENCOUNTER
----- Message from Yakelin Farnsworth LPN sent at 2020 11:23 AM CDT -----  Regarding: FW: Missing components: Preop Order Set    ----- Message -----  From: Seth Vega MD  Sent: 2020  10:50 AM CDT  To: Dennis Hilton MD, #  Subject: RE: Missing components: Preop Order Set            Kacey - Can you reach out to Mr. Gomez? I would be happy to take him for TURBT. If he would like to at least have a phone or virtual visit with me prior to this I would be happy to do so.     Olena,  Silas    ----- Message -----  From: Dennis Hilton MD  Sent: 2020  10:24 PM CDT  To: Seth Vega MD, #  Subject: FW: Missing components: Preop Order Set          Silas,     This oneida is old with a bladder full of low grade tumor.  He is bleeding and symptomatic.  He got cancelled when my father  and I'm not going to be able to reschedule him.  He's quite high risk, but last time after a TURBT(Trans Urethral Resection of Bladder Tumor) it was 7 days in the hospital.    Ron  ----- Message -----  From: Shena Leger RN  Sent: 2020   4:01 PM CDT  To: Dennis Hilton MD, #  Subject: Missing components: Preop Order Set              PAS Notification: Your patient is scheduled for surgery in 3 days. Critical chart components are missing. If the required components are not completed in [facility] by noon the day prior to surgery your case may be rescheduled. Thank you in advance for completing the record and improving [facility]'s quality of care.    Surgeon's Name: Dr. Hilton  Date of Surgery: 2020  Procedure: CYSTOSCOPY, Bladder biopsy and fulgaration or transurethal resection of bladder tumor,       Missing components: Preop Order Set        Pre-Anesthesia Screening Department [facility]  [phone]   [phone] fax     [facility] Pre-procedure phone room  [phone]   [phone] fax    \

## 2020-07-21 PROBLEM — A41.9 SEVERE SEPSIS (H): Status: ACTIVE | Noted: 2020-01-01

## 2020-07-21 PROBLEM — R65.20 SEVERE SEPSIS (H): Status: ACTIVE | Noted: 2020-01-01

## 2020-07-21 NOTE — ED NOTES
"[facility]  ED Nurse Handoff Report    ED Chief complaint: Abdominal Pain and Fever      ED Diagnosis:   Final diagnoses:   Severe sepsis (H)   Community acquired pneumonia of left lower lobe of lung   Acute respiratory failure with hypoxia (H)   Personal history of malignant neoplasm of bladder       Code Status: DNR / DNI    Allergies:   Allergies   Allergen Reactions     Celebrex [Celecoxib] Hives     Penicillins Hives       Patient Story: Pt comes from nursing home w/ hx of bladder cancer and wounds to coccyx. Pt comes with c/o LUQ pain and was found to be hypoxic in the 70's on RA for EMS.   Focused Assessment:  Resp: Hypoxic requires 3L to maintain sats. Tachypnic. Cardiac: Initially tachycardic. Neuro: WNL Musculo: Weakness, difficulty moving. Pain all over. : Indwelling catheter with hematuria.    Treatments and/or interventions provided: Fluids, antibiotics, morphine  Patient's response to treatments and/or interventions: comfort    To be done/followed up on inpatient unit:      Does this patient have any cognitive concerns?: None    Activity level - Baseline/Home:  Stand with Assist  Activity Level - Current:   Unknown    Patient's Preferred language: English   Needed?: No    Isolation: None and Other: COVID r/o  Infection: Covid pending symptomatic   Bariatric?: No    Vital Signs:   Vitals:    07/20/20 1909 07/20/20 1915 07/20/20 1916 07/20/20 2015   BP: 100/64 106/60  91/55   Pulse:  112  106   Resp: (!) 40   27   Temp: 101  F (38.3  C)      TempSrc: Oral      SpO2: (!) 86%  93% 92%   Weight: 51.7 kg (114 lb)      Height: 1.651 m (5' 5\")          Cardiac Rhythm:Cardiac Rhythm: Atrial fibrillation    Was the PSS-3 completed:   Yes  What interventions are required if any?               Family Comments:   OBS brochure/video discussed/provided to patient/family: No              Name of person given brochure if not patient:               Relationship to patient:     For the " majority of the shift this patient's behavior was Green.   Behavioral interventions performed were .    ED NURSE PHONE NUMBER:

## 2020-07-21 NOTE — PROVIDER NOTIFICATION
MD Notification    Notified Person: MD    Notified Person Name: Dr. Harrell    Notification Date/Time: 7/20/2020 8180    Notification Interaction: Telephone    Purpose of Notification: BP readings in ED hypotensive.    Orders Received: OK to send up. Manage pain.    Comments:

## 2020-07-21 NOTE — PROVIDER NOTIFICATION
MD Notification    Notified Person: MD    Notified Person Name: Yaya    Notification Date/Time: 07/21/20 0822    Notification Interaction: Text page    Purpose of Notification: Procalcitonin level 8.29    Orders Received:     Comments:

## 2020-07-21 NOTE — PLAN OF CARE
Discharge Planner SLP   Patient plan for discharge: Did not discuss  Current status: Pt seen for a limited clinical swallow evaluation. Today, pt consumed small cup sips of thin liquids and nectar thick without obvious overt s/sx of aspiration, no change in breath sounds or vocal quality. No further trials assessed per pt request and concern re: his potential need  to puke.     Recommend a clear liquid diet (thin liquids) with complete upright positioning, small sips, no straws and remain upright for 30 minutes after PO intake. Palliative care consult has been placed by provider and I feel this would be beneficial to further discuss his dysphagia as it has been for a number of years now and he has declined modifying his diet or completing a video swallow to further assess his risk for aspiration.     Barriers to return to prior living situation: Below baseline, recurrent pneumonia   Recommendations for discharge: TCU  Rationale for recommendations: SLP at next level of care pending goals of care discussion          Entered by: Bhumi Durand 07/21/2020 3:23 PM

## 2020-07-21 NOTE — ED NOTES
Per hospitalist, this patient can go to the floor. The focus of this admission will be comfort. Receiving RN notified.

## 2020-07-21 NOTE — CODE/RAPID RESPONSE
"Sepsis Evaluation Progress Note    I was called to see Ivan Gomez due to abnormal vital signs triggering the Sepsis SIRS screening alert. He is known to have an infection. Admitted with PNA vs UTI, on cefipime and azithro. Very complex PMH, please see H&P.    Acute hypoxic respiratory failure 2/2 CAP vs CHF?  Lactic Acidosis  On my initial exam, the patient is alert, very weakly coughing frequently (high risk aspiration), and reports chronic, severe neck/back pain. BP has been soft, HR elevated ~30 min prior to my arrival. Does not appear hypervolemic. Appears very chronically ill and frail. HR elevated to 120s, and spontaneously converted to a slower rhythm which looked like afib. BP subsequently improved with rate/rhythm change. Oxygen sats 98% on 2L NC. EKG shows NSR w PACs, QTc mildly prolonged at 483.  --will try gentle fluid, 250cc NS bolus  --calcium 1g x 1  --recheck lactic in 2 hours to determine trend    Goals of Care  Attempted further discussion regarding goals of care and intensity of interventions. He repeatedly states he \"doesn't want to [place] the drain but doesn't want heroic efforts\" I.e. chemo. I attempted to question him regarding vasopressors and he repeated the above sentence.   --encourage continued conversations.    Unclear cause of lactic acidosis, fluid administration needs to be very carefully monitored, however urine output borderline, and coarse lung sounds are likely from aspiration. His underlying malignancy could be contributing to his lactic acidosis. I handed off care of this patient to my colleague Shabana Banks, CNP at 1845.    Physical Exam   Vital Signs:  Temp: 97  F (36.1  C) Temp src: Oral BP: (!) 83/55 Pulse: 82 Heart Rate: 123 Resp: 20 SpO2: 97 % O2 Device: Nasal cannula Oxygen Delivery: 2 LPM    Lab:  Lactic Acid   Date Value Ref Range Status   07/20/2020 1.5 0.7 - 2.0 mmol/L Final     Lactate for Sepsis Protocol   Date Value Ref Range Status   07/21/2020 4.0 (HH) 0.7 - " 2.0 mmol/L Final     Comment:     Critical Value called to and read back by  TONI BETANCOURT IN OBS AT 1804 BY DLW         The patient is at baseline mental status.     The rest of their physical exam is significant for     Physical Exam  Vitals signs and nursing note reviewed.   Constitutional:       General: He is not in acute distress.     Appearance: He is cachectic. He is ill-appearing. He is not toxic-appearing or diaphoretic.   HENT:      Head: Normocephalic and atraumatic.   Neck:      Musculoskeletal: Muscular tenderness present.   Cardiovascular:      Rate and Rhythm: Regular rhythm. Tachycardia present.      Heart sounds: Normal heart sounds. Heart sounds not distant. No murmur.   Pulmonary:      Effort: Tachypnea present. No bradypnea, accessory muscle usage, prolonged expiration or respiratory distress.      Breath sounds: Examination of the right-upper field reveals rales. Examination of the left-upper field reveals rales. Rales present.   Abdominal:      General: Abdomen is flat. There is no distension.      Palpations: Abdomen is soft.      Tenderness: There is no abdominal tenderness.   Musculoskeletal:      Right lower leg: No edema.      Left lower leg: No edema.      Comments: Generalized tenderness, limited ROM d/t pain   Skin:     General: Skin is warm and dry.      Findings: Abrasion, ecchymosis and wound present.   Neurological:      General: No focal deficit present.      Mental Status: He is alert.         Assessment & Plan   Ivan Gomez meets SIRS criteria but does NOT have a lactate >2 or other evidence of acute organ damage.  These vital sign, lab and physical exam findings are consistent with SEPSIS.    Sepsis Time-Zero (time Sepsis diagnosis confirmed):   07/20/20    Anti-infectives (From now, onward)    Start     Dose/Rate Route Frequency Ordered Stop    07/21/20 2000  azithromycin (ZITHROMAX) 250 mg in sodium chloride 0.9 % 250 mL intermittent infusion      250 mg  over 1 Hours  Intravenous EVERY 24 HOURS 07/21/20 0039 07/25/20 1959    07/21/20 1300  ceFEPIme (MAXIPIME) 2 g vial to attach to  ml bag for ADULTS or 50 ml bag for PEDS      2 g  over 30 Minutes Intravenous EVERY 12 HOURS 07/21/20 1012      07/21/20 0800  vancomycin (VANCOCIN) capsule 125 mg      125 mg Oral 2 TIMES DAILY 07/21/20 0058          Current antibiotic coverage is appropriate for source of infection.     Disposition: The patient will remain on the current unit. We will continue to monitor this patient closely..  FRANK Trotter CNP    Sepsis Criteria   Sepsis: 2+ SIRS criteria due to infection  Severe Sepsis: Sepsis AND 1+ new sign of acute organ dysfunction (Note: lactate >2 is organ dysfunction)  Septic Shock: Sepsis AND hypotension despite volume resuscitation with 30 ml/kg crystalloid

## 2020-07-21 NOTE — H&P
[facility]    History and Physical  Hospitalist       Date of Admission:  7/20/2020  Date of Service (when I saw the patient): 07/20/20    ASSESSMENT  Ivan Gomez is a very pleasant 89 year old gentleman with complex and extensive past medical history that is most notable for recurrent bladder cancer, chronic right sided diastolic CHF due to mitral regurgitation status post clipping, paroxysmal atrial fibrillation, CKD 3, chronic thrombocytopenia and anemia, prior C difficile colitis, and chronic back pain on narcotic medications; who presents with hypoxia and fever and is found to have severe sepsis due to UTI vs bilateral pneumonia.    PLAN    1) Severe sepsis due to UTI vs bilateral pneumonia: He has known history of malignant bladder cancer diagnosed in 2015, recurrent since then despite TURBT with nephrostomy tube placement and J stent in 2015 and TURBT again 2018; he has recently been found to have a new bladder mass and has been scheduled for fulguration and possible TURBT this month as an outpatient; it is unclear from his own history provision and notes but it seems this procedure was postponed. It was noted that he could be a very high risk surgical candidate. There is a note in Care Everywhere from 7/17/2020 documenting active outpatient hospice discussions. It seems he was due to have a visit with hospice tomorrow potentially to sign up for it.     Now he presents with fever, hypoxia, and reported confusion (though he seems fully oriented on my interview), and is found to have severe sepsis and acute hypoxemic respiratory distress with hypotension, fever, tachycardia, hypoxia, elevated lactate, suspected infiltrates bilaterally on CXR, and pyuria. He could have complex UTI in the setting of his bladder mass. He could have bilateral pneumonia. Note is made of chronic dysphagia and prior concerns for high aspiration risk. COVID-19 is possible though he has had no known exposures at  his facility. His chest infiltrates could be due to acute pulmonary edema, either from decompensated CHF or ARDS. Overall, as best I can tell at this time, I think the most likely cause of his acute illness today is bilateral pneumonia. We note that his Lactate improved to 1.5 in the ED after he received one liter IV fluid. He appears extremely cachectic and hypovolemic at present to me.    -- Inpatient. He is at risk for Pseudomonas and we will broaden antibiotic to Cefepime and Azithromycin empirically as we follow up cultures and check Pro-calcitonin.    --  ml/hour for one further liter carefully ordered overnight for ongoing relative hypotension    -- Monitor oxygenation closely and wean O2 as able (currently 3 L NC). Albuterol inhalers and anti-tussives prn ordered    -- Tylenol, Oxycodone, IV Dilaudid as needed for pain. Anti-emetics as needed.    -- His most recent POLST from 11/2019 says he is to be DNR    -- I have discussed with him extensively regarding his prognosis and care goal plans and he states clearly to me that under no circumstances in his life would he want CPR, cardioversion, intubation, pressors, or artificial feeding. Thus I have ordered DNR DNI    -- His Lactate has improved with fluid as has his tachycardia. His blood pressures remain 80 systolic though he seems to mentate well. He clearly states his main goal tonight is for relief of his severe back pain and so we have ordered careful narcotics (Oxycodone and IV Dilaudid) as needed to achieve that goal.    -- Going forward he says he doesn't know if he should proceed with contemplated bladder fulguration vs hospice enrollment. I have consulted Palliative Care for further discussions. He says his nephew is his power of  rather than his step-daughter; I have called his nephew and left a voicemail tonight.    -- Pending further care goal discussions I have held off ordering further tests such as TTE or abdominopelvic CT, both  of which might likely be indicated if he should decide for further ongoing medical evaluation of his sepsis    Rule Out COVID-19 infection  This patient was evaluated during a global COVID-19 pandemic, which necessitated consideration that the patient might be at risk for infection with the SARS-CoV-2 virus that causes COVID-19. Applicable protocols for evaluation were followed during the patient's care. Suspicion for infection at this time is NOT HIGH, as he has multiple other potential causes of sepsis and hypoxia.   -follow-up COVID-19 PCR test result  -droplet, contact precautions    2) Chronic CHF: He is followed by [facility]. He has chronic right sided diastolic CHF due to severe mitral regurgitation, and underwent mitral valve clipping in 2017 for that. In 2019 he was hospitalized several times for acute CHF exacerbation and paroxysmal afib with RVR. Most recent TTE 3/2020 showed improvement in tricuspid regurgitation and pulmonary hypertension, with ongoing moderately severe MR, mild MS, and LVEF greater than 70%. A Lexiscan from 2017 showed possible anterior wall inducible ischemia; aggressive evaluations for CAD reportedly have been deferred due to his overall frailty and multiple co-morbidities.    -- Hold outpatient Torsemide 20 mg due to hypotension. Resume statin when able clinically    3) Paroxysmal afib: He has a high CHADS2 vasc score but systemic anticoagulation has been deferred due to chronic thrombocytopenia and high bleeding risk. Currently he is in NSR.    -- Hold Carvedilol for now due to hypoptension    4) CKD 3: Baseline Cr 1.5. Today it is 1.38. daily labs ordered for now    5) Chronic anemia and thrombocytopenia:  HGB 12 today and was 11 on 9/2019.  today, similar to or slightly higher than prior values. The cause is not known to me at present.    -- Daily labs as above    6) C difficile colitis: Diagnosed reportedly in 2019 and treated.    -- While he receives broad spectrum  "antibiotics I have also ordered prophylactic oral Vancocin BID, at least for overnight    7) Chronic pain: Due to osteoarthritis and spinal stenosis reportedly. He has had prior cervical and lumbar spine surgeries as well as knee surgeries. He takes oral narcotics and Neurontin as an outpatient    -- Pain management plan as above for now; hold off on Neurontin for now pending course overnight    Chief Complaint   Fever    History is obtained from the patient and the ED physician whom I have spoken with    History of Present Illness   Ivan Gomez is a very pleasant 89 year old gentleman who presents with abdominal pain in the middle of the belly radiating to the left upper quadrant that is sharp, severe and constant, associated with pain in the middle and lower part of the back. He says these pains have been present for many days now. They are his main concern and he would like medication to relieve them. He called staff at his facility today for the pain and they found him to have hypoxia and fever; he denies feeling the fever to me and denies dyspnea, cough or chest pain, but does endorse progressive associated generalized weakness and fatigue. He says he has been thinking about going on hospice, vs having another bladder surgery, but he adds that he feels tired of surgeries and doesn't know how to proceed with his care at this point in his life. When asked about other symptoms such as dysuria he consistently re-directs the conversation to his back pain especially and asks for relief of the pain; denying any other acute complaints at this time.    In the ED, Blood pressure (!) 89/55, pulse 92, temperature 101  F (38.3  C), temperature source Oral, resp. rate 18, height 1.651 m (5' 5\"), weight 51.7 kg (114 lb), SpO2 93 %.    Initial heart rate 112, improved to 92 with IV fluid.  O2 sats initially 86% on room air, improved to 93% on 3 L NC.    CBC and CMP were done and notable for WBC 10, HGB 12, , BUN " "47, Cr 1.38, alb 2.6, tprot 6.7, otherwise were within the normal reference range. Lactate was 2.7. Lipase low at 57. UA showed 34 WBC, greater than 182 RBC. CXR showed cardiomegaly with bilateral patchy opacifications and EKG showed sinus tachycardia with PVC's and no ST segment elevations.    Blood and urine cultures and COVID-19 assay were sent and he was given 1 liter IV fluid, 1 gram Tylenol, 1 gram Ceftriaxone, 500 mg Azithromycin, and 2 mg Morphine in the ED.    PHYSICAL EXAM  Blood pressure (!) 89/55, pulse 92, temperature 101  F (38.3  C), temperature source Oral, resp. rate 18, height 1.651 m (5' 5\"), weight 51.7 kg (114 lb), SpO2 93 %.  Constitutional: Alert and oriented to person, place and time; no apparent distress; markedly emaciated and frail, cachectic appearing  HEENT: normocephalic dry mucus membranes  Respiratory: lungs have diminished breath sounds to auscultation bilaterally  Cardiovascular: regular S1 S2   GI: abdomen soft mildly tender diffusely non distended bowel sounds positive  Lymph/Hematologic: pale, no cervical lymphadenopathy  Skin: no rash, poor turgor  Musculoskeletal: no clubbing, cyanosis or edema  Neurologic: extra-ocular muscles intact; moves all four extremities  Psychiatric: appropriate affect, speech non-tangential     DVT Prophylaxis: Pneumatic Compression Devices  Code Status: DNR / DNI    Disposition: Expected discharge in several days    Josr Harrell MD    Past Medical History    I have reviewed this patient's medical history and updated it with pertinent information if needed.   Past Medical History:   Diagnosis Date     (HFpEF) heart failure with preserved ejection fraction (H)      Arthritis     Spinal Stenosis     Atrial fibrillation (H)      C. difficile colitis 2019     Chronic anemia      CKD (chronic kidney disease) stage 3, GFR 30-59 ml/min (H)      Former smoker      Hemorrhoids      Hypercholesteremia      Hypertension      Malignant neoplasm (H)     " skin CA, Basal cell     Other chronic pain      Recurrent malignant neoplasm of bladder (H)      Severe mitral regurgitation      Thrombocytopenia (H)        Past Surgical History   I have reviewed this patient's surgical history and updated it with pertinent information if needed.  Past Surgical History:   Procedure Laterality Date     BACK SURGERY       BIOPSY      face, skin cancer     BIOPSY  8/2016    shoulder lesion     CYSTOSCOPY, TRANSURETHRAL RESECTION (TUR) PROSTATE, COMBINED N/A 8/21/2015    Procedure: COMBINED CYSTOSCOPY, TRANSURETHRAL RESECTION (TUR) PROSTATE;  Surgeon: Dennis Hilton MD;  Location: [facility] OR     CYSTOSCOPY, TRANSURETHRAL RESECTION (TUR) TUMOR BLADDER, COMBINED N/A 9/2/2016    Procedure: COMBINED CYSTOSCOPY, TRANSURETHRAL RESECTION (TUR) TUMOR BLADDER;  Surgeon: Dennis Hilton MD;  Location: [facility] OR     CYSTOSCOPY, TRANSURETHRAL RESECTION (TUR) TUMOR BLADDER, COMBINED N/A 11/2/2018    Procedure: Transurethral resection of bladder tumor > 5 cm in size;  Surgeon: Dennis Hilton MD;  Location:  OR     ESOPHAGOSCOPY, GASTROSCOPY, DUODENOSCOPY (EGD), COMBINED N/A 5/24/2019    Procedure: ESOPHAGOGASTRODUODENOSCOPY, WITH BIOPSY;  Surgeon: Abe Jang MD;  Location: [facility]     LAPAROSCOPIC CHOLECYSTECTOMY WITH CHOLANGIOGRAMS N/A 5/22/2019    Procedure: CHOLECYSTECTOMY, LAPAROSCOPIC, WITH CHOLANGIOGRAM;  Surgeon: Rey Romero MD;  Location:  OR     ORTHOPEDIC SURGERY      lumbar and cervical surgery, Sep, 2008, knee surgery     PERCUTANEOUS MITRAL VALVE REPAIR N/A 10/16/2017    Procedure: PERCUTANEOUS MITRAL VALVE REPAIR ANESTHESIA;  Percutaneous MitraClip ;  Surgeon: Eber Monroe MD;  Location: [facility] OR     PICC INSERTION Right 10/14/2017    5fr DL Bard PICC, 40cm (1cm external) in the R basilic vein w/ tip in the mid SVC.       Prior to Admission Medications   Prior to Admission Medications   Prescriptions Last Dose Informant Patient Reported?  Taking?   HYDROcodone-acetaminophen (NORCO) 7.5-325 MG per tablet 7/20/2020 at x2 Nursing Home Yes Yes   Sig: Take 1 tablet by mouth every 6 hours 0800, 1400, 2000, 0400   Menthol, Topical Analgesic, (BIOFREEZE ROLL-ON EX) prn Nursing Home Yes Yes   Sig: Externally apply topically daily as needed (applies himself as needed)   Skin Protectants, Misc. (EUCERIN) cream 7/20/2020 at x1 Nursing Home Yes Yes   Sig: Apply topically 2 times daily And as needed. Apply to LE and areas of dryness   Skin Protectants, Misc. (LANTISEPTIC SKIN PROTECTANT) 50 % OINT prn Nursing Home Yes Yes   Sig: Externally apply topically 3 times daily as needed (to coccyx for pressure sore, pt ok to self administer)   artificial saliva (BIOTENE DRY MOUTHWASH) LIQD liquid 7/20/2020 at x2 Nursing Home Yes Yes   Sig: Swish and spit 15 mLs in mouth 3 times daily   beta carotene 65369 UNIT capsule 7/20/2020 at Unknown time snf No Yes   Sig: Take 1 capsule (25,000 Units) by mouth daily   bisacodyl (DULCOLAX) 5 MG EC tablet prn Nursing Home Yes Yes   Sig: Take 5 mg by mouth daily as needed for constipation   carboxymethylcellulose PF (REFRESH PLUS) 0.5 % ophthalmic solution 7/20/2020 at x1 Nursing Home Yes Yes   Sig: Place 2 drops into both eyes 2 times daily And every 2 hours as needed   carboxymethylcellulose PF (REFRESH PLUS) 0.5 % ophthalmic solution prn  Yes Yes   Sig: Place 1 drop into both eyes every 2 hours as needed for dry eyes   ferrous sulfate (FEROSUL) 325 (65 Fe) MG tablet 7/20/2020 at Unknown time Nursing Home Yes Yes   Sig: Take 325 mg by mouth daily (with breakfast)   finasteride (PROSCAR) 5 MG tablet 7/20/2020 at Unknown time snf No Yes   Sig: TAKE 1 TABLET(5 MG) BY MOUTH DAILY   gabapentin (NEURONTIN) 300 MG capsule prn Nursing Home Yes Yes   Sig: Take 300 mg by mouth daily as needed   gabapentin (NEURONTIN) 400 MG capsule 7/19/2020 at Unknown time Nursing Home Yes Yes   Sig: Take 400 mg by mouth At Bedtime    melatonin 3 MG tablet 7/19/2020 at Unknown time Nursing Home Yes Yes   Sig: Take 3 mg by mouth At Bedtime   metoprolol tartrate (LOPRESSOR) 25 MG tablet 7/20/2020 at x1 Nursing Home Yes Yes   Sig: Take 0.5 tablets (12.5 mg) by mouth 2 times daily   order for DME  Nursing Home No No   Sig: Equipment being ordered: Non-sting barrier wipes for bilateral heel   potassium chloride ER (K-TAB/KLOR-CON) 10 MEQ CR tablet 7/20/2020 at Unknown time assisted No Yes   Sig: Take 2 tablets (20 mEq) by mouth daily   senna-docusate (SENOKOT-S/PERICOLACE) 8.6-50 MG tablet prn Nursing Home Yes Yes   Sig: Take 1 tablet by mouth daily as needed for constipation   simvastatin (ZOCOR) 20 MG tablet 7/19/2020 at Unknown time Nursing Home Yes Yes   Sig: Take 20 mg by mouth At Bedtime   torsemide (DEMADEX) 20 MG tablet 7/20/2020 at Unknown time assisted No Yes   Sig: Take 1 tablet (20 mg) by mouth daily   vitamin C (ASCORBIC ACID) 500 MG tablet 7/20/2020 at Unknown time assisted No Yes   Sig: TAKE ONE TABLET BY MOUTH ONCE DAILY      Facility-Administered Medications: None     Allergies   Allergies   Allergen Reactions     Celebrex [Celecoxib] Hives     Penicillins Hives       Social History   I have reviewed this patient's social history and updated it with pertinent information if needed. Ivan Gomez  reports that he has quit smoking. He has never used smokeless tobacco. He reports that he does not drink alcohol or use drugs.    Family History   Family history assessed and, except as above, is non-contributory.    Family History   Problem Relation Age of Onset     Cancer Son 64        leukemia ? due to agent orange     Family History Negative Son        Review of Systems   The 10 point Review of Systems is negative other than noted in the HPI or here.     Primary Care Physician   Inderjit Purvis    Data   Labs Ordered and Resulted from Time of ED Arrival Up to the Time of Departure from the ED   CBC WITH PLATELETS  DIFFERENTIAL - Abnormal; Notable for the following components:       Result Value    RBC Count 3.88 (*)     Hemoglobin 11.9 (*)     Hematocrit 38.5 (*)     MCHC 30.9 (*)     Platelet Count 125 (*)     Absolute Neutrophil 9.1 (*)     Absolute Lymphocytes 0.3 (*)     All other components within normal limits   COMPREHENSIVE METABOLIC PANEL - Abnormal; Notable for the following components:    Urea Nitrogen 47 (*)     Creatinine 1.38 (*)     GFR Estimate 44 (*)     GFR Estimate If Black 50 (*)     Albumin 2.6 (*)     Protein Total 6.7 (*)     All other components within normal limits   LIPASE - Abnormal; Notable for the following components:    Lipase 57 (*)     All other components within normal limits   LACTIC ACID WHOLE BLOOD - Abnormal; Notable for the following components:    Lactic Acid 2.7 (*)     All other components within normal limits   ROUTINE UA WITH MICROSCOPIC REFLEX TO CULTURE - Abnormal; Notable for the following components:    Blood Urine Moderate (*)     Protein Albumin Urine 100 (*)     Leukocyte Esterase Urine Small (*)     WBC Urine 34 (*)     RBC Urine >182 (*)     All other components within normal limits   COVID-19 VIRUS (CORONAVIRUS) BY PCR   IP ACUTE INDWELLING URINARY CATHETER (SALAS)   BLOOD CULTURE   BLOOD CULTURE   URINE CULTURE AEROBIC BACTERIAL       Data reviewed today:  I personally reviewed the EKG tracing showing sinus tachycardia with PVC's and no ST segment elevations. and the chest x-ray image(s) showing cardiomegaly with bilateral patchy opacifications .    Recent Results (from the past 24 hour(s))   XR Chest Port 1 View    Narrative    XR CHEST PORT 1 VW 7/20/2020 7:55 PM    HISTORY: Low O2 sat    COMPARISON: CT 7/4/2019      Impression    IMPRESSION: Cardiomegaly with mild central vascular congestion.  Interstitial coarsening with bilateral mid and lower lung airspace  opacities, most pronounced within the left lung base. This may reflect  edema and/or pneumonia. No definite  pleural effusion.    NORI LATIF MD

## 2020-07-21 NOTE — PROGRESS NOTES
[facility]    Hospitalist Progress Note    Date of Service (when I saw the patient): 07/21/2020    Assessment & Plan   Ivan Gomez is a very pleasant 89 year old gentleman with complex and extensive past medical history that is most notable for recurrent bladder cancer, chronic right sided diastolic CHF due to mitral regurgitation status post clipping, paroxysmal atrial fibrillation, CKD 3, chronic thrombocytopenia and anemia, prior C difficile colitis, and chronic back pain on narcotic medications who presents with hypoxia and fever and is found to have severe sepsis due to UTI vs bilateral pneumonia.    Community acquired pneumonia  Severe sepsis (hypotension, fever, tachycardia, hypoxia, lactic acidosis)  Acute hypoxic respiratory failure  Rule out COVID-19, low suspicion  Underlying h/o dysphagia and prior concerns for high aspiration risk. Presented with fever, hypoxia and reported confusion (lucid in ED). Baseline BP's appear to run low/normal (111/70 3/2020 at cardiology visit). Temp 101 in ED. Hypotensive to 70-80's systolic, improved slightly with fluids.  Procalcitonin 8.29. WBC 9.7CXR with mild central vascular congestion, interstitial coarsening with bilateral mid and lower lung airspace opacities, L>R...edema vs pneumonia. No reported exposures to COVID-19.   - broad spectrum abx given concern for possible pseudomonas with cefepime and azithromycin  - IV fluids, monitor closely for volume overload  - confirmed DNR/ DNI  - blood cultures and urine cultures pending  - COVID-19 testing pending, no need to retest  - supplemental O2 as needed    UTI  UA on presentation with 34 WBC and >182 RBC's (not new)  - urine culture   - abx as above    Abdominal pain  On presentation with LUQ abdominal pain that was sharp, severe and constant, associated with pain in mid to lower back, present times days. Lipase normal.   - not currently having this or new pain, monitor    Bladder cancer,  recurrent  BPH with LUTS  PTA finastreride 5 mg daily  Follows with Dr. Hilton through [facility]. Plans noted for another TURBT this month but appears to have been postponed. Per review of outpatient notes (Care Everywhere) appears had started discussions re: hospice and not wanting further procedures. Pt seems reluctant to commit to comfort cares at this time.   - palliative care consulted    Chronic R sided CHF 2/2 severe MR  Mitral valve clipping 2017, still with mod-severe residual MR 3/2020  Paroxysmal atrial fibrillation with RVR  HLD  PTA metoprolol 12.5 mg BID, simvastatin 20 mg at HS, torsemide 20 mg daily  Follows with [facility]. With significant pulmonary hypertension, 3/2020 echo with PA pressures at 50+RA. [id number] score at least 3. Not on anticoagulation 2/2 low platelet count, fall risk and risk of bleeding.    - telemetry  - hold meds (metoprolol, statin, torsemide) for now    H/o c diff  Started on emipric vanco 125 mg BID    CKD  Baseline creatinine appears to be 1.3-1.5. on admission 1.38  - creatinine improved with IV fluids 1.15 7/21  - avoid nephrotoxins  - monitor     Thrombocytopenia, chronic  Anemia, chronic  PTA FeSO4 325 mg daily  Baseline hgb 10-11. On admission at 11.9 but suspect hemoconcentrated. baselin eplatelets appear to be 50-90's. On presentation 125 but suspect hemoconcentrated  - 7/21 hgb at 9.5, plts at 62  - no active bleeding   - monitor labs    Chronic back pain  PTA gabapentin 400 mg at HS and 300 mg prn, Norco 7.5-325 q6 hours  Severe back pain on presentation unchanged from previous  - prns available    FEN (fluids, electrolytes and nutrition): NPO pending SLP evaluation, IV fluids  Discussed with nursing.  DVT Prophylaxis: Pneumatic Compression Devices  Code Status: No CPR- Do NOT Intubate    Disposition: Expected discharge in 3-5 days pending improvement in infection    Michel Javier MD  [phone] (P)  Text Page    Interval History   Overnight events  reviewed. Doing ok. States breathing better. Severe back pain, chronic, denies acute worsening. No chest pressures or pain    -Data reviewed today: I reviewed all new labs and imaging results over the last 24 hours. I personally reviewed no images or EKG's today.    Physical Exam   Temp: 98  F (36.7  C) Temp src: Oral BP: 95/57 Pulse: 82 Heart Rate: 76 Resp: 14 SpO2: 95 % O2 Device: Nasal cannula Oxygen Delivery: 2 LPM  Vitals:    07/20/20 1909 07/21/20 0054   Weight: 51.7 kg (114 lb) 55.9 kg (123 lb 4.8 oz)     Vital Signs with Ranges  Temp:  [96.9  F (36.1  C)-101  F (38.3  C)] 98  F (36.7  C)  Pulse:  [] 82  Heart Rate:  [] 76  Resp:  [11-40] 14  BP: ()/(44-64) 95/57  SpO2:  [86 %-99 %] 95 %  I/O last 3 completed shifts:  In: 1000 [IV Piggyback:1000]  Out: 600 [Urine:600]    Constitutional: Alert, oriented, no acute distress  Respiratory: Lungs with rales on L base, otherwise good air movement  Cardiovascular: Regular rate and rhythm, with SHIRLEY. 1+ pitting edema BLE  GI: Soft, non-tender, non-disteneded, good bowel sounds  Skin/Integumen: No erythema, cyanosis   Other:      Medications     lactated ringers 100 mL/hr at 07/21/20 0112       azithromycin  250 mg Intravenous Q24H     ceFEPIme (MAXIPIME) IV  2 g Intravenous Q24H     sodium chloride (PF)  3 mL Intracatheter Q8H     vancomycin  125 mg Oral BID       Data   Recent Labs   Lab 07/21/20  0625 07/20/20  1924   WBC 6.3 9.7   HGB 9.5* 11.9*    99   PLT 62* 125*    139   POTASSIUM 4.5 4.7   CHLORIDE 108 102   CO2 27 31   BUN 43* 47*   CR 1.15 1.38*   ANIONGAP 5 6   GIUSEPPE 8.1* 8.6   GLC 85 89   ALBUMIN  --  2.6*   PROTTOTAL  --  6.7*   BILITOTAL  --  1.2   ALKPHOS  --  122   ALT  --  16   AST  --  18   LIPASE  --  57*       Recent Results (from the past 24 hour(s))   XR Chest Port 1 View    Narrative    XR CHEST PORT 1 VW 7/20/2020 7:55 PM    HISTORY: Low O2 sat    COMPARISON: CT 7/4/2019      Impression    IMPRESSION: Cardiomegaly  with mild central vascular congestion.  Interstitial coarsening with bilateral mid and lower lung airspace  opacities, most pronounced within the left lung base. This may reflect  edema and/or pneumonia. No definite pleural effusion.    NORI LATIF MD

## 2020-07-21 NOTE — CONSULTS
CLINICAL NUTRITION SERVICES BRIEF NOTE  Nutrition Admission Risk Screen Received - Stageable pressure injuries or large/non-healing wound or burn    New Findings  - Admitted on 7/20 for fever, upper abd pain, shortness of breath.   - Comes from detention  - Noted PIs to coccyx and L heel. Blanchable redness to bony prominences. WOCN consult in place.   - Patient is noted to be a poor historian.   - Noted that focus is comfort at this time.   - Pt is NPO pending a swallow eval.     Intervention  - Noted plan to move to non-COVID medial unit. Will complete full nutrition assessment as appropriate once admitted to medical unit.     Rowan Rascon RD, LD  Pager: [phone]  Weekend Pager: [phone]

## 2020-07-21 NOTE — PROGRESS NOTES
Care Coordinator received call from Kaitlyn Olszewski ([phone]) Nurse from [facility].  She wanted to know how he was doing.  Discussed Palliative consult.  Margareth noted there was a referral to [facility] last week and they were going to be meeting with pt today at [facility].  This information was given to SHRAVAN Pacheco

## 2020-07-21 NOTE — PROGRESS NOTES
07/21/20 1511   General Information   Onset Date 07/20/20   Start of Care Date 07/21/20   Referring Physician Michel Javier MD   Patient Profile Review/OT: Additional Occupational Profile Info See Profile for full history and prior level of function   Patient/Family Goals Statement None stated    Swallowing Evaluation Bedside swallow evaluation   Behaviorial Observations WFL (within functional limits)   Mode of current nutrition NPO   Respiratory Status O2 Supply   Type of O2 supply Nasal cannula   Comments Ivan Gomez is a very pleasant 89 year old gentleman with complex and extensive past medical history that is most notable for recurrent bladder cancer, chronic right sided diastolic CHF due to mitral regurgitation status post clipping, paroxysmal atrial fibrillation, CKD 3, chronic thrombocytopenia and anemia, prior C difficile colitis, and chronic back pain on narcotic medications who presents with hypoxia and fever and is found to have severe sepsis due to UTI vs bilateral pneumonia. Pt admits to significant difficulty swallowing. He states things are difficulty to chew, sometimes hard to get down, and he admits to likely aspiration episodes. We have seen this pt many times int he past and he has declined wanting to modify his diet past or complete a video swallow to further asses aspiration risk. Most recently he was seen in July of 2019 with recommendations for dysphagia diet 3 and thin liquids and a video swallow, however pt refused the video swallow. He has had recurrent pneumonia   Clinical Swallow Evaluation   Oral Musculature generally intact  (general weakness )   Structural Abnormalities none present   Dentition present and adequate  (poor condition and missing some )   Mucosal Quality adequate   Oral Labial Strength and Mobility WFL   Lingual Strength and Mobility WFL   Velar Elevation intact   Laryngeal Function Cough;Swallow;Voicing initiated  (Hoarse, dysphonic vocal quality )  "  Additional Documentation Yes   Swallow Eval   Feeding Assistance minimal assistance required   Clinical Swallow Eval: Thin Liquid Texture Trial   Mode of Presentation, Thin Liquids cup;spoon;self-fed   Volume of Liquid or Food Presented ice chips and sips of thin   Oral Phase of Swallow Premature pharyngeal entry   Pharyngeal Phase of Swallow impaired;reduction in laryngeal movement;repeated swallows   Diagnostic Statement Throat clearing and cough after ice chips, likely d/t phlegm and secretions. No overt s/sx of aspiration with very small cup sips of thin liquids. No clear wet vocal quality. After ice chips pt reported feeling like he needed to puke, however he was only coughing up and spitting out phlegm.   Clinical Swallow Eval: Nectar Thick Liquid Texture Trial   Mode of Presentation, Nectar cup;self-fed   Volume of Nectar Presented 2 sips    Oral Phase, Nectar Premature pharyngeal entry   Pharyngeal Phase, Nectar impaired;reduction in laryngeal movement   Diagnostic Statement No overt s/sx of aspiration, however pt immediately stated \"this is too thick to drink.\"   Swallow Compensations   Swallow Compensations Effortful swallow;Pacing;Multiple swallow   Esophageal Phase of Swallow   Patient reports or presents with symptoms of esophageal dysphagia Yes   General Therapy Interventions   Planned Therapy Interventions Dysphagia Treatment   Dysphagia treatment Oropharyngeal exercise training;Modified diet education;Instruction of safe swallow strategies   Swallow Eval: Clinical Impressions   Skilled Criteria for Therapy Intervention Skilled criteria met.  Treatment indicated.   Functional Assessment Scale (FAS) 4   Treatment Diagnosis Mild-moderate oropharyngeal dysphagia   Diet texture recommendations Clear liquid;Thin liquids   Recommended Feeding/Eating Techniques hard swallow w/ each bite or sip;maintain upright posture during/after eating for 30 mins;no straws;small sips/bites   Therapy Frequency 5x/week " "  Predicted Duration of Therapy Intervention (days/wks) 1 week    Anticipated Discharge Disposition extended care facility   Risks and Benefits of Treatment have been explained. Yes   Patient, family and/or staff in agreement with Plan of Care Yes   Clinical Impression Comments Pt seen for a limited clinical swallow evaluation. It was limited by the fact that pt started coughing up and spitting out phlegm and secretions but was insistent that he would likely vomit. He then only agreed to a small amount of PO trials after this. Pt has a hx of dysphagia and refusal of SLP recommendations (including modified diets and video swallow). Today, pt consumed small cup sips of thin liquids and nectar thick without obvious overt s/sx of aspiration, no change in breath sounds or vocal quality. No further trials assessed per pt request and concern re: his potential need \"to puke.\" Recommend a clear liquid diet (thin liquids) with complete upright positioning, small sips, no straws and remain upright for 30 minutes after PO intake. Palliative care consult has been placed by provider and I feel this would be beneficial to further discuss his dysphagia as it has been for a number of years now and he has declined modifying his diet or completing a video swallow to further assess his risk for aspiration.    Total Evaluation Time   Total Evaluation Time (Minutes) 25     "

## 2020-07-21 NOTE — ED PROVIDER NOTES
Patient signed out to me awaiting a bed upstairs.  I was made aware by the nurse that the patient had low blood pressures.  His pressures have been running 90-70 systolic.  His map has been mid 60s.  At about the time that he was assigned a bed his blood pressure was 78 systolic.  This was not a safe blood pressure to go up with a felt.  He was given a bolus of 500 mL of normal saline.  His blood pressure then came up to 90 systolic.  Dr. Harrell came down to assess the patient and admit him.  At this time the patient is DNR/DNI.  He would not want any pressors or central line.  It sounds like he was supposed to go on hospice starting tomorrow.  The patient's main concern is his chronic back pain.  He had received some morphine earlier in the evening that could have contributed to his lower blood pressures.  At this time, after discussion with the hospitalist and the hospitalist planning to speak with the nephew who is the POA, the patient will be made comfortable.  It is clear that should the patient decompensate no heroic measures or invasive measures would be taking.  At this time our focus is on comfort.     Ignacia Smyth MD  07/21/20 0038

## 2020-07-21 NOTE — PROGRESS NOTES
Due to palliative consult volume received this week, pt will not be seen until Wednesday 7/22 at the earliest. Page with urgent needs. Thanks.    Xuan MARIE, [facility]  Palliative Medicine   Pager: [phone]

## 2020-07-21 NOTE — PROGRESS NOTES
[facility] Nurse Inpatient Assessment   Reason for consultation: Evaluate and treat right posterior torso, spine, coccyx and left medial/ posterior heel    Assessment     4 areas of community acquired pressure injury noted  Right posterior torso- red, slick, shiny wound bed, likely biofilm present, will use topical gels to help debride and jump start healing  Midline thoracic spine, deep tissue injury, plan is to minimize pressure  Coccyx, unstageable pressure injury, looks like is healing and starting to release the slough, set within a greater area of scar tissue, no s/s/ of infection  Left medial/ posterior heel, sloughy base, red periwound tissue, tender to the touch, infected looking unstageable PI, will use topical medications that should help the wound to resolve    Treatment Plan    Right lateral posterior torso/ coccyx/ R medial-posterior heel plan of care: Daily    1. Clean wounds by wetting gauze with VASHE (374065) and pressing to wound bed x 10minutes, wipe clean  2. Paint with skin prep or No Sting Skin Prep (#954883) where ever you want your dressing to stick, allow to dry thoroughly. Do NOT skip this step! Skin prep will help your dressing to stick  3. Apply small dab of Iodosorb Gel (#823432) and Plurogel (#068985)to wound bed ONLY  4. Press a Mepilex  Border Dressing (#976745) to the area, making sure to conform nicely to skin curvatures  5. Time and date dressing change   -REPOSITION PT SIDE TO SIDE ONLY WHEN IN BED, EVERY 2 HOURS- this will help not only the skin but the respiratory status  -HEELS MUST BE KEPT ELEVATED AT ALL TIMES, USING AT LEAST 2 PILLOWS UNDER EACH CALF, ASSURING HEELS ARE FLOATING  -WHEN UP TO THE CHAIR PT NEEDS TO FULLY OFF LOAD EVERY 2 HOURS- stand or return to bed    General hygiene  - please shower pt using baby shampoo to really clean scalp  - pour and massage in Dipika Cleasne and Protect from head to toes, arms, legs, chest, etc.  This will  condition skin, then apply lotion    Recommended provider order: n/a   Orders Written  [facility] Nurse follow-up plan: weekly    Patient History    According to provider note(s):  89 year old gentleman with complex and extensive past medical history that is most notable for recurrent bladder cancer, chronic right sided diastolic CHF due to mitral regurgitation status post clipping, paroxysmal atrial fibrillation, CKD 3, chronic thrombocytopenia and anemia, prior C difficile colitis, and chronic back pain on narcotic medications who presents with hypoxia and fever and is found to have severe sepsis due to UTI vs bilateral pneumonia.     Community acquired pneumonia  Severe sepsis (hypotension, fever, tachycardia, hypoxia, lactic acidosis)  Acute hypoxic respiratory failure  Rule out COVID-19, low suspicion  Underlying h/o dysphagia and prior concerns for high aspiration risk. Presented with fever, hypoxia and reported confusion (lucid in ED). Baseline BP's appear to run low/normal (111/70 3/2020 at cardiology visit). Temp 101 in ED. Hypotensive to 70-80's systolic, improved slightly with fluids.  Procalcitonin 8.29. WBC 9.7CXR with mild central vascular congestion, interstitial coarsening with bilateral mid and lower lung airspace opacities, L>R...edema vs pneumonia. No reported exposures to COVID-19.     Objective Data    Containment of urine/stool: Diaper  Active Diet Order:  Orders Placed This Encounter      NPO for Medical/Clinical Reasons Except for: Meds, Ice Chips    Labs:   Recent Labs   Lab 07/21/20  0625 07/20/20  1924   ALBUMIN  --  2.6*   HGB 9.5* 11.9*   WBC 6.3 9.7     Output:   I/O last 3 completed shifts:  In: 1000 [IV Piggyback:1000]  Out: 850 [Urine:850]  Risk Assessment:   Sensory Perception: 4-->no impairment  Moisture: 4-->rarely moist  Activity: 2-->chairfast  Mobility: 3-->slightly limited  Nutrition: 2-->probably inadequate  Friction and Shear: 2-->potential problem  Haim Score: 17    Physical  Exam    Spine and right posterior torso assessment   Wound / skin history: community acquired injuries  Photo date: July 21, 2020 thoracic spine    Along midline spine is an area of nonblanchable maroon erythema 3.4cm x 1cm, denies pain    July 21, 2020 right lateral posterior torso    Red, slimy wound bed, glossy with crusting of brown drainage, about 4.3cm x 1.4cm x 0.2cm, no pain    July 21, 2020 coccyx    Coccyx total area of about 4.5cm is small crusty, hard slough, dry, nonfluctuant and lifting, only about 0.5cm x 0.5cm and a lot of area moist, red granulation tissue.  Greater periwound tissue scar tissue, painful    July 2,1 2020 left medial/ posterior heel    1.1cm x 1.4cm x 0.3cm moist yellow slough with, very painful, light ring of red erythema    Interventions  Current support surface: Standard  Atmos Air mattress,   Current off-loading measures: Pillows under calves and Pillows,   Visual inspection of wound(s) completed with RN  Wound Care: done per plan of care,   Supplies: reviewed, discussed with RN and discussed with patient  Education provided: importance of repositioning, wound progress, Moisture management, Hygiene and Off-loading pressure  Discussed plan of care with Patient and Nurse    Lisandro Guzman RN

## 2020-07-21 NOTE — ED NOTES
MD notified of BP readings, patient receiving fluids. Better Bps improved with fluids. Per ED MD this patient is ready to go up.  Writer called receiving RN to discuss recent Bps. Receiving RN paging the hospitalist.

## 2020-07-21 NOTE — PLAN OF CARE
RECEIVING UNIT ED HANDOFF REVIEW    ED Nurse Handoff Report was reviewed by: Radha Ibrahim RN on July 20, 2020 at 11:19 PM

## 2020-07-21 NOTE — ED PROVIDER NOTES
"  History     Chief Complaint:  Abdominal Pain and Fever      HPI   Ivan Gomez is a 89 year old male who presents with concerns for fever, upper abdominal pain, shortness of breath.  This patient is a very complicated past medical history with advanced bladder cancer.  He notes that he has had abdominal pain for \"a long time\" along with low back and buttock pain which she attributes to his thin stature and musculoskeletal issues.  He is a poor historian, but from what he can tell me, he started to feel more uncomfortable today.  He states that \"I just could not find a good position to sitting.\"  He then described feeling more short of breath and when they checked his vital signs, they found him to be hypoxic, tachycardic, and febrile.  He denies having a new cough.  He denies runny nose or sore throat.  He denies a known exposure to COVID.  He notes his abdominal pain is at its baseline and he denies any new vomiting or diarrhea.  With his bladder cancer, he notes that he frequently retains urine and has not been able to get all of the urine out.  He otherwise denies flank pain.  He denies other complaints at this juncture.    Allergies:  Celecoxib  Penicillins    Medications:    Ferrous sulfate  Proscar  Gabapentin  Norco  Metoprolol  Potassium chloride  Senna docusate  Simvastatin  Torsemide  Trospium     Past Medical History:    Arthritis  Hemorrhoids  Hyperlipidemia  HTN  Malignant neoplasm of bladder  Right heart failure  Lumbar spinal stenosis  CKD stage 3  Thrombocytopenia  Impaired glucose tolerance  Chronic pain syndrome  CHF  Cholecystitis    Past Surgical History:    Back sugery  Biopsy x2  TURP x3  Cholecystectomy  Percutaneous mitral valve repair  PICC insertion    Family History:    History reviewed. No pertinent family history.     Social History:  Smoking status: former smoker  Alcohol use: no  Drug use: no  The patient presents to the emergency department via EMS.  PCP: Inderjit Purvis, " "MD  Marital Status:       Review of Systems  Pertinent positives and negatives as above.  A 14-point review of systems was performed with all other systems reviewed as negative.      Physical Exam     Patient Vitals for the past 24 hrs:   BP Temp Temp src Pulse Heart Rate Resp SpO2 Height Weight   07/20/20 2100 (!) 89/55 -- -- 92 94 18 93 % -- --   07/20/20 2045 95/56 -- -- 92 90 20 95 % -- --   07/20/20 2030 90/58 -- -- 97 94 25 95 % -- --   07/20/20 2015 91/55 -- -- 106 101 27 92 % -- --   07/20/20 1916 -- -- -- -- -- -- 93 % -- --   07/20/20 1915 106/60 -- -- 112 -- -- -- -- --   07/20/20 1909 100/64 101  F (38.3  C) Oral -- 125 (!) 40 (!) 86 % 1.651 m (5' 5\") 51.7 kg (114 lb)     Physical Exam   General: Cachectic elderly man, appearing weak with increased work of breathing.    Eye:  Pupils are equal, round, and reactive.  Extraocular movements intact.    ENT:  No rhinorrhea.  Moist mucus membranes.  Normal tongue and tonsil.    Cardiac:  Tachycardic but regular with frequent ectopy.  No murmurs, gallops, or rubs.    Pulmonary:  Clear to auscultation bilaterally.  No wheezes, rales, or rhonchi. Mildly tachypneic with accessory muscle use.     Abdomen:  Positive bowel sounds.  Abdomen is soft and non-distended, without focal tenderness.    Musculoskeletal:  Normal movement of all extremities without evidence for deficit.    Skin:  Warm and dry without rashes. Mild breakdown over the sacral area without open wound.     Neurologic:  Non-focal exam without asymmetric weakness or numbness.     Psychiatric:  Normal affect with appropriate interaction with examiner.      Emergency Department Course   ECG (19:28:13):  Rate 114 bpm. HI interval 192. QRS duration 102. QT/QTc 342/471. P-R-T axes 90 9 89. Sinus tachycardia with occasional premature ventricular complexes. Otherwise normal ECG. Interpreted at 1943 by Trierweiler, Chad A, MD.      Imaging:  Radiographic findings were communicated with the patient who " voiced understanding of the findings.  XR Chest 1 view, portable:   IMPRESSION: Cardiomegaly with mild central vascular congestion.   Interstitial coarsening with bilateral mid and lower lung airspace   opacities, most pronounced within the left lung base. This may reflect   edema and/or pneumonia. No definite pleural effusion. as per radiology.    Laboratory:  CBC: WBC: 9.7, HGB: 11.9 (L), PLT: 125 (L)  CMP: Glucose 89, Urea nitrogen 47 (H), GFR 44 (L), Albumin 2.6 (L), Protein total 6.7 (L), o/w WNL (Creatinine: 1.38 (H))  UA: Blood moderate, Protein albumin 100, Leukocyte esterase small, WBC 34 (H), RBC >182 (H), o/w Negative  1924 Lactic acid: 2.4 (H)  Lipase: 57 (L)  Blood cultures (X2): Pending  Urine culture aerobic bacterial: Pending  Symptomatic COVID-19 PCR: Pending    Interventions:  2001 NS 1000 mL IV  2001 morphine 2 mg IV  2004 Rocephin 1 g IV  2005 Tylenol 1000 mg Oral    Emergency Department Course:  Nursing notes and vitals reviewed. (1937) I performed an exam of the patient as documented above.     IV inserted. Medicine administered as documented above. Blood drawn. Urine sample obtained. COVID swab obtained. This was sent to the lab for further testing, results above.     The patient was sent for a xray while in the emergency department, findings above.     An EKG was recorded. Results as noted above.     2053 I rechecked the patient and discussed the results of his workup thus far.     2135  I consulted with Dr. Harrell of the hospitalist services. He is in agreement to accept the patient for admission.    Findings and plan explained to the Patient who consents to admission. Discussed the patient with Dr. Harrell, who will admit the patient to a inpatient med bed for further monitoring, evaluation, and treatment.      Impression & Plan      Medical Decision Making:  This unfortunate elderly man with a history of significant bladder cancer, recently evaluated for hospice, presents to us from his  facility with increasing discomfort throughout his body, increased work of breathing, hypoxia, and ongoing issues of hematuria.  He was found to be significantly hypoxic with increased work of breathing.  He was found to have a temperature of 101 when he arrived here.  However, after Tylenol, fluids, and pain medication, he felt much improved.    Evaluation focused on source of fever.  He has a normal hemoglobin and white blood cell count.  His kidney function and liver function are at their baseline.  Urinalysis shows significant hematuria with a few white blood cells count, but no clear obvious signs of infection that would be causing sepsis.  Chest x-ray does show evidence of possible pneumonia starting in the left lower lobe.  The patient denies cough or increased work of breathing, but does have an oxygen requirement here.    On initial assessment, he was febrile and tachycardic.  After the above interventions, his heart rate came down and his oxygen requirement decreased.  His initial lactate was elevated, though after receiving 1.5 L of fluid, his lactate improved to normal levels.  However, after administration of morphine, the patient became somewhat more hypotensive.  He was arousable on evaluation and alert.  With the change of his blood pressure cuff to the appropriate size, his blood pressure is consistently showing an MAP of greater than 65.  With a normal lactate along with his recent switch to DNI/DNR status with anticipation of visiting with hospice tomorrow, I do not believe that he would require a central line, pressors, etc.    After my initial discussion with the hospitalist who accepted admission, I did call him back to discuss the patient's soft blood pressures.  However, considering his age, comorbid conditions, and otherwise improvement across all other variables, we will hold off on a central line or more invasive treatment at this time.  Because COVID-19 is still on the differential, he  will be admitted to the observation unit for asymptomatic rule out.  Antibiotics were initiated early in his course of treatment.  At this juncture, I feel he is stable for admission to the floor.    Covid-19  Ivan Gomez was evaluated during a global COVID-19 pandemic, which necessitated consideration that the patient might be at risk for infection with the SARS-CoV-2 virus that causes COVID-19.   Applicable protocols for evaluation were followed during the patient's care.   COVID-19 was considered as part of the patient's evaluation. The plan for testing is:  a test was obtained during this visit.    Diagnosis:    ICD-10-CM    1. Severe sepsis (H)  A41.9     R65.20    2. Community acquired pneumonia of left lower lobe of lung  J18.9    3. Acute respiratory failure with hypoxia (H)  J96.01    4. Personal history of malignant neoplasm of bladder  Z85.51        Disposition:  Admitted to inpatient med/surg  Joshua John  7/20/2020    EMERGENCY DEPARTMENT  Scribe Disclosure:  I, Joshua John, am serving as a scribe at 7:37 PM on 7/20/2020 to document services personally performed by Trierweiler, Chad A, MD based on my observations and the provider's statements to me.        Trierweiler, Chad A, MD  07/21/20 0021

## 2020-07-21 NOTE — PROVIDER NOTIFICATION
MD Notification    Notified Person: MD    Notified Person Name: Fortino    Notification Date/Time: July 21, 2020  5:53 PM      Notification Interaction:  Texted Paged    Purpose of Notification: HR in 120's 130s, pain uncontrolled with dilaudid. Recommending Morphine for pain?    Orders Received: provider placed order by 1800    Comments:

## 2020-07-21 NOTE — PLAN OF CARE
Pt here with CAP, pain, SOB. A&Ox4. AVSS- BPs in 80/50 range, MD aware on 3L O2, now 2L O2. Tele SD with PVCs. NPO diet, failed bedside swallow. Not OOB overnight, T/R q2 with Ax1. C/o 8/10 LUQ, lower back pain, managed with dilaudid x2, repositioning. Good output per carvalho, hematuria. LR infusing @100 mL/h. Skin bruised all over, PI on coccyx and L heel. Blanchable redness on multiple bony prominences. COVID swab pending. Plan for WOC, SLP.

## 2020-07-21 NOTE — ED TRIAGE NOTES
Pt from assisted living. Called staff for c/o LUQ abdominal pain. Found to have O2 in 70s on RA and to be febrile

## 2020-07-21 NOTE — CONSULTS
Care Transition Initial Assessment - SW     Met with: Patient and spoke to Caitlyn Olszewski ([phone]) from the [facility]   Active Problems:    Severe sepsis (H)       DATA  Lives With: facility resident [facility]  Description of Support System: Supportive, Involved  Who is your support system?: nephew in [place] and step dtr Nicolasa, facility staff  Identified issues/concerns regarding health management: discharge needs     ASSESSMENT  Cognitive Status:  awake, alert and oriented  Concerns to be addressed: discharge needs.  SW consulted to assist with discharge planning, emotional support and transportation arrangements.  Pt is a 89 yr old  male who admitted inpatient on 7/20/20 for sever sepsis. Pt previously lived at [facility] and contracted for; meals, med admin/mgmt, laundry, housekeeping. Patient remains independent with finances, showering, mobility, transfers. SW spoke to Charmaine at the [facility] who informed that patient had a a consultation with [facility] scheduled for today. Patient's support is his nephew in [facility] and his S.O.'s dtr Nicolasa.    SW met with patient to introduce self/role and discuss ST recommendations for TCU. Patient having 10/10 pain throughout visit and awaiting a pain pill. Pt at times sarcastic and at times smiling and chuckling about his current situation. Patient stated clearly that he is not ready for Hospice although is open to Palliative care for pain management. Patient states he prefers to go home but also stated he cannot get home therapies due to COVID. Discharge needs unclear at this point. Palliative Care is consulted and may see patient tomorrow for hospital course recommendations. SW to continue to follow and assist with discharge planning.     PLAN  Patient anticipates discharging to:  TBD .  Discharge date TBD  SW to continue to follow and assist with discharge planning.    DANITZA Flores  Daytime (8:00am-4:30pm): [phone]  After-Hours DANNY Pager  (4:30pm-11:30pm): [phone]

## 2020-07-21 NOTE — ED NOTES
Bed: ED11  Expected date: 7/20/20  Expected time: 6:58 PM  Means of arrival: Ambulance  Comments:  [place] 89m weak, fever ETA 1902

## 2020-07-21 NOTE — PLAN OF CARE
Pt AOx4, turn/repo q 2 hr. RRT called at 6:15 r/t lactic acid of 4.0. EKG done, fluid bolus given, plan to repeat lactic. Pt has back, neck pain moderately addressed with IV morphine. Seen by speech, clear liquids, pt has episodes of emesis after drinking. WOC nurse saw pt, wound on coccyx, L heel, skin tear on R ribs, abrasion on mid spine area. Tele sinus dysrhythmia/afib. BPs soft, improved with fluid boluses. Palliative care consult pending.

## 2020-07-21 NOTE — PHARMACY-ADMISSION MEDICATION HISTORY
Pharmacy Medication History  Admission medication history interview status for the 7/20/2020  admission is complete. See EPIC admission navigator for prior to admission medications     Medication history sources: MAR ([facility], reviewed med list. called them for last doses at [phone])  Medication history source reliability: Good  Adherence assessment: Good    Significant changes made to the medication list:  Removed: Trospium      Additional medication history information:   n/a    Medication reconciliation completed by provider prior to medication history? No    Time spent in this activity: 15 mins    Prior to Admission medications    Medication Sig Last Dose Taking? Auth Provider   artificial saliva (BIOTENE DRY MOUTHWASH) LIQD liquid Swish and spit 15 mLs in mouth 3 times daily 7/20/2020 at x2 Yes Reported, Patient   beta carotene 26176 UNIT capsule Take 1 capsule (25,000 Units) by mouth daily 7/20/2020 at Unknown time Yes Mike Garnica MD   bisacodyl (DULCOLAX) 5 MG EC tablet Take 5 mg by mouth daily as needed for constipation prn Yes Unknown, Entered By History   carboxymethylcellulose PF (REFRESH PLUS) 0.5 % ophthalmic solution Place 1 drop into both eyes every 2 hours as needed for dry eyes prn Yes Unknown, Entered By History   carboxymethylcellulose PF (REFRESH PLUS) 0.5 % ophthalmic solution Place 2 drops into both eyes 2 times daily And every 2 hours as needed 7/20/2020 at x1 Yes Reported, Patient   ferrous sulfate (FEROSUL) 325 (65 Fe) MG tablet Take 325 mg by mouth daily (with breakfast) 7/20/2020 at Unknown time Yes Reported, Patient   finasteride (PROSCAR) 5 MG tablet TAKE 1 TABLET(5 MG) BY MOUTH DAILY 7/20/2020 at Unknown time Yes Evaristo Magaña MD   gabapentin (NEURONTIN) 300 MG capsule Take 300 mg by mouth daily as needed prn Yes Unknown, Entered By History   gabapentin (NEURONTIN) 400 MG capsule Take 400 mg by mouth At Bedtime 7/19/2020 at Unknown time Yes Unknown,  Entered By History   HYDROcodone-acetaminophen (NORCO) 7.5-325 MG per tablet Take 1 tablet by mouth every 6 hours 0800, 1400, 2000, 0400 7/20/2020 at x2 Yes Unknown, Entered By History   melatonin 3 MG tablet Take 3 mg by mouth At Bedtime 7/19/2020 at Unknown time Yes Reported, Patient   Menthol, Topical Analgesic, (BIOFREEZE ROLL-ON EX) Externally apply topically daily as needed (applies himself as needed) prn Yes Unknown, Entered By History   metoprolol tartrate (LOPRESSOR) 25 MG tablet Take 0.5 tablets (12.5 mg) by mouth 2 times daily 7/20/2020 at x1 Yes Jennifer Quesada PA-C   potassium chloride ER (K-TAB/KLOR-CON) 10 MEQ CR tablet Take 2 tablets (20 mEq) by mouth daily 7/20/2020 at Unknown time Yes Evaristo Magaña MD   senna-docusate (SENOKOT-S/PERICOLACE) 8.6-50 MG tablet Take 1 tablet by mouth daily as needed for constipation prn Yes Unknown, Entered By History   simvastatin (ZOCOR) 20 MG tablet Take 20 mg by mouth At Bedtime 7/19/2020 at Unknown time Yes Unknown, Entered By History   Skin Protectants, Misc. (EUCERIN) cream Apply topically 2 times daily And as needed. Apply to LE and areas of dryness 7/20/2020 at x1 Yes Reported, Patient   Skin Protectants, Misc. (LANTISEPTIC SKIN PROTECTANT) 50 % OINT Externally apply topically 3 times daily as needed (to coccyx for pressure sore, pt ok to self administer) prn Yes Unknown, Entered By History   torsemide (DEMADEX) 20 MG tablet Take 1 tablet (20 mg) by mouth daily 7/20/2020 at Unknown time Yes Italia Sandhu, APRN CNP   vitamin C (ASCORBIC ACID) 500 MG tablet TAKE ONE TABLET BY MOUTH ONCE DAILY 7/20/2020 at Unknown time Yes Evaristo Magaña MD   order for DME Equipment being ordered: Non-sting barrier wipes for bilateral heel   Evaristo Magaña MD Tiffany M. Reinitz, PharmD

## 2020-07-22 NOTE — CONSULTS
[facility]  Palliative Care Consultation Note    Patient: Ivan Gomez  Date of Admission:  7/20/2020    Requesting Clinician / Team: Dr. Harrell/Hospitalist   Reason for consult: Symptom management, Goals of care    Recommendations:    Goals of care very clearly remain restorative. Pt is not interested in hospice at this point    We discuss that everyone has a threshold for what they are willing to put their body through in order to have more time. Although Shahriar is struggling with chronic neck and back pain, he still wants to be available to have conversations with people     Pt feels his pain and sx are being adequately managed by the hospitalist. I will continue to follow very peripherally for support, if needed      These recommendations have been discussed with nursing staff.    Xuan MARIE, [facility]  Palliative Medicine   Pager [phone]      Thank you for the opportunity to participate in the care of this patient and family. Our team: will continue to follow.     During regular M-F work hours -- if you are not sure who specifically to contact -- please contact us at [phone].    After regular work hours and on weekends/holidays, you can call our answering service at [phone]. Also, who's on call for us is available in [employer] Smart Web.     Attestation:  Total time on the floor involved in the patient's care: 60 minutes  Total time spent in counseling/care coordination: >50%    Assessments:  Ivan Gomez is a 89 year old male with PMH significant for recurrent bladder CA, BPH, chronic right diastolic HF, severe mitral regurgitation s/p clipping, paroxysmal a.fib, HLD, CKD stage III, chronic thrombocytopenia and anemia, prior c.diff infection, and chronic back pain 2/2 OA and spinal stenosis who presents with hypoxia and fever. He is found to have acute respiratory failure consistent with severe sepsis 2/2 CAP. COVID-19 negative. Outpatient, there have been discussions of  "bladder fulguration vs hospice. He is hypotensive this AM with lactic acid 3.1, s/p bolus with improvement.     Today, the patient was seen for:  Goals of care, symptom management     Visited Shahriar this AM. He is seen sitting up in bed. He has an emesis basin in his lap. He reports not feeling very well. He has chronic neck and back pain, which has been present for a long time.     Don is completely clear and oriented. He very much is in control of the conversation. He is clear in stating that he was supposed to talk to someone at home regarding hospice. He is not ready for hospice. He states that he wants to remain \"available to people.\"     He is able to note that despite living with pain, he is not ready to \"bow down.\"     He speaks of his partner's death 1 year ago from dementia, and his brother's death (he was 97) in February of this year. His nephew, who lives in [place], stays in touch.     We touch on sx. He feels that Dr. Javier is doing a good job managing things. He doesn't think any adjustments are necessary.    He is ultimately ok with me following peripherally for support, and he expresses gratitude for this.     Prognosis, Goals, & Planning:      Functional Status just prior to hospitalization: 2 (Ambulatory and capable of all selfcare but unable to carry out any work activities; may need help with IADLs up and about > 50% of waking hours)      Prognosis, Goals, and/or Advance Care Planning were addressed today: Yes        Summary/Comments:       Patient's decision making preferences: independently          Patient has decision-making capacity today for complex decisions: Yes            I have concerns about the patient/family's health literacy today: No           Patient has a completed Health Care Directive: Yes, and on file.      Code status: DNR/DNI    Coping, Meaning, & Spirituality:   Mood, coping, and/or meaning in the context of serious illness were addressed today: Yes  Summary/Comments: Tori is " important to him. He used to go to his [employer] [facility] with his partner. Will invite spiritual health support     Social:     Living situation: [facility] independent apartment     [place] family / caregivers: Partner  1 year ago from dementia. Brother, age 97,  in February of this year. Nephew in [place]    History of Present Illness:  History gathered today from: patient, medical chart, medical team members, unit team members    Ivan Gomez is a 89 year old male with PMH significant for recurrent bladder CA, BPH, chronic right diastolic HF, severe mitral regurgitation s/p clipping, paroxysmal a.fib, HLD, CKD stage III, chronic thrombocytopenia and anemia, prior c.diff infection, and chronic back pain 2/2 OA and spinal stenosis who presents with hypoxia and fever. He is found to have acute respiratory failure consistent with severe sepsis 2/2 CAP. COVID-19 negative. Outpatient, there have been discussions of bladder fulguration vs hospice. He is hypotensive this AM with lactic acid 3.1, s/p bolus with improvement.     Key Palliative Symptom Data:  # Pain severity the last 12 hours: severe  # Dyspnea severity the last 12 hours: low  # Nausea severity the last 12 hours: moderate  # Anxiety severity the last 12 hours: none    Patient is on opioids: bowels not assessed today.    ROS:  Comprehensive ROS is reviewed and is negative except as here & per HPI:      Past Medical History:  Past Medical History:   Diagnosis Date     (HFpEF) heart failure with preserved ejection fraction (H)      Arthritis     Spinal Stenosis     Atrial fibrillation (H)      C. difficile colitis 2019     Chronic anemia      CKD (chronic kidney disease) stage 3, GFR 30-59 ml/min (H)      Former smoker      Hemorrhoids      Hypercholesteremia      Hypertension      Malignant neoplasm (H)     skin CA, Basal cell     Other chronic pain      Recurrent malignant neoplasm of bladder (H)      Severe mitral regurgitation      Thrombocytopenia  (H)         Past Surgical History:  Past Surgical History:   Procedure Laterality Date     BACK SURGERY       BIOPSY      face, skin cancer     BIOPSY  8/2016    shoulder lesion     CYSTOSCOPY, TRANSURETHRAL RESECTION (TUR) PROSTATE, COMBINED N/A 8/21/2015    Procedure: COMBINED CYSTOSCOPY, TRANSURETHRAL RESECTION (TUR) PROSTATE;  Surgeon: Dennis Hilton MD;  Location: [facility] OR     CYSTOSCOPY, TRANSURETHRAL RESECTION (TUR) TUMOR BLADDER, COMBINED N/A 9/2/2016    Procedure: COMBINED CYSTOSCOPY, TRANSURETHRAL RESECTION (TUR) TUMOR BLADDER;  Surgeon: Dennis Hilton MD;  Location: [facility] OR     CYSTOSCOPY, TRANSURETHRAL RESECTION (TUR) TUMOR BLADDER, COMBINED N/A 11/2/2018    Procedure: Transurethral resection of bladder tumor > 5 cm in size;  Surgeon: Dennis Hilton MD;  Location:  OR     ESOPHAGOSCOPY, GASTROSCOPY, DUODENOSCOPY (EGD), COMBINED N/A 5/24/2019    Procedure: ESOPHAGOGASTRODUODENOSCOPY, WITH BIOPSY;  Surgeon: Abe Jang MD;  Location: [facility]     LAPAROSCOPIC CHOLECYSTECTOMY WITH CHOLANGIOGRAMS N/A 5/22/2019    Procedure: CHOLECYSTECTOMY, LAPAROSCOPIC, WITH CHOLANGIOGRAM;  Surgeon: Rey Romero MD;  Location:  OR     ORTHOPEDIC SURGERY      lumbar and cervical surgery, Sep, 2008, knee surgery     PERCUTANEOUS MITRAL VALVE REPAIR N/A 10/16/2017    Procedure: PERCUTANEOUS MITRAL VALVE REPAIR ANESTHESIA;  Percutaneous MitraClip ;  Surgeon: Eber Monroe MD;  Location: [facility] OR     PICC INSERTION Right 10/14/2017    5fr DL Bard PICC, 40cm (1cm external) in the R basilic vein w/ tip in the mid SVC.         Family History:  Family History   Problem Relation Age of Onset     Cancer Son 64        leukemia ? due to agent orange     Family History Negative Son         Allergies:  Allergies   Allergen Reactions     Celebrex [Celecoxib] Hives     Penicillins Hives        Medications:  I have reviewed this patient's medication profile and medications from this  hospitalization.   Noted scheduled meds are:  Azithromycin IV  Cefepime IV  Vancomycin PO  IVF    Noted PRN meds are:  APAP PO/WA PRN pain   Dilaudid 0.5mg IV Q2hrs PRN pain   Norco 1 tab PO Q6hrs PRN pain    Physical Exam:  Vital Signs: Temp: 97.3  F (36.3  C) Temp src: Axillary BP: 94/67 Pulse: 95 Heart Rate: 80 Resp: 20 SpO2: 92 % O2 Device: Nasal cannula Oxygen Delivery: 2 LPM  Weight: 123 lbs 4.8 oz  CONSTITUTIONAL: Chronically ill cachectic man seen lying in bed in NAD, A&Ox3. Calm and cooperative.  HEENT: NCAT  RESPIRATORY: NL respiratory effort on NC 2L  SKIN: Extensive ecchymoses to BUE  NEUROLOGIC: Appropriately responsive during interview  PSYCH: Affect congruent     Data reviewed:  Recent imaging reviewed, my comments on pertinents:   7/4 CT CAP No evidence for pulmonary embolism. Multiple bladder wall masses are noted, with the largest on the left measuring 3.5 cm. Findings are suspicious for neoplasm, such as transitional cell carcinoma. Nonobstructing 0.5 cm stone in the lower pole of the left kidney. Heterogeneous enhancement of the liver is a nonspecific finding.    7/20 CXR Cardiomegaly with mild central vascular congestion. Interstitial coarsening with bilateral mid and lower lung airspace opacities, most pronounced within the left lung base. This may reflect edema and/or pneumonia. No definite pleural effusion.    Recent lab data reviewed, my comments on pertinents:   WBC 8.3  Hgb 10.4  Plt 78  Na 140  K 5.5  Creat 1.7 up from 1.15  Lactic acid 3.1

## 2020-07-22 NOTE — PROGRESS NOTES
[facility]    Hospitalist Progress Note    Date of Service (when I saw the patient): 07/22/2020    Assessment & Plan   Ivan Gomez is a very pleasant 89 year old gentleman with complex and extensive past medical history that is most notable for recurrent bladder cancer, chronic right sided diastolic CHF due to mitral regurgitation status post clipping, paroxysmal atrial fibrillation, CKD 3, chronic thrombocytopenia and anemia, prior C difficile colitis, and chronic back pain on narcotic medications who presents with hypoxia and fever and is found to have severe sepsis due to UTI vs bilateral pneumonia.    Community acquired pneumonia  Severe sepsis (hypotension, fever, tachycardia, hypoxia, lactic acidosis)  Acute hypoxic respiratory failure  Underlying h/o dysphagia and prior concerns for high aspiration risk. Presented with fever, hypoxia and reported confusion (lucid in ED). Baseline BP's appear to run low/normal (111/70 3/2020 at cardiology visit). Temp 101 in ED. Hypotensive to 70-80's systolic, improved slightly with fluids.  Procalcitonin 8.29. WBC 9.7CXR with mild central vascular congestion, interstitial coarsening with bilateral mid and lower lung airspace opacities, L>R...edema vs pneumonia. No reported exposures to COVID-19.   - broad spectrum abx given concern for possible pseudomonas with cefepime and azithromycin  - IV fluids, monitor closely for volume overload, ok 7/22  - confirmed DNR/ DNI  - blood cultures from 7/20 NGTD 7/22  - urine culture negative  - supplemental O2 as needed  - with RRT 7/21 2/2 hypotension and lactic acidosis to 4.0, improved to 2.8 with fluids. BP's stable overnight    SVT  H/o PAF as below, intermittent tachycardia here, jumps from normal to 's. EKG shows SVT. Doesn't appear to be tachycardic response to sepsis at this time,   - Metoprolol 12.5 mg BID restarted 7/22  - metoprolol 2.5 mg q4 hours prn HR>120    Bladder cancer, recurrent  BPH with  LUTS  PTA finastreride 5 mg daily  Follows with Dr. Hilton through [facility]. Plans noted for another TURBT this month but appears to have been postponed. Per review of outpatient notes (Care Everywhere) appears had started discussions re: hospice and not wanting further procedures. Pt seems reluctant to commit to comfort cares at this time.   - palliative care consulted    Chronic R sided CHF 2/2 severe MR  Mitral valve clipping 2017, still with mod-severe residual MR 3/2020  Paroxysmal atrial fibrillation with RVR  HLD  PTA metoprolol 12.5 mg BID, simvastatin 20 mg at HS, torsemide 20 mg daily  Follow[facility] with [facility]. With significant pulmonary hypertension, 3/2020 echo with PA pressures at 50+RA. [id number] score at least 3. Not on anticoagulation 2/2 low platelet count, fall risk and risk of bleeding.    - telemetry  - hold statin, torsemide for now  - restart metoprolol 12.5 mg BID 7/22    H/o c diff  Started on emipric vanco 125 mg BID    CKD  Baseline creatinine appears to be 1.3-1.5. on admission 1.38  - creatinine improved with IV fluids 1.15 7/21  - avoid nephrotoxins  - BMP pending 7/22    Thrombocytopenia, chronic  Anemia, chronic  PTA FeSO4 325 mg daily  Baseline hgb 10-11. On admission at 11.9 but suspect hemoconcentrated. baselin eplatelets appear to be 50-90's. On presentation 125 but suspect hemoconcentrated  - 7/21 hgb at 9.5, plts at 62  - no active bleeding   - monitor labs closely, CBC pending 7/22    Abdominal pain  On presentation with LUQ abdominal pain that was sharp, severe and constant, associated with pain in mid to lower back, present times days. Lipase normal.   - not currently having this or new pain, monitor    Chronic back pain  PTA gabapentin 400 mg at HS and 300 mg prn, Norco 7.5-325 q6 hours  Severe back pain on presentation unchanged from previous  - prn acetaminophen, norco, dilaudid IV available    COVID-19 negative from admission 7/20    FEN (fluids, electrolytes and  nutrition): NPO pending SLP evaluation, IV fluids  Discussed with nursing.  DVT Prophylaxis: Pneumatic Compression Devices  Code Status: No CPR- Do NOT Intubate    Disposition: Expected discharge unclear pending improvement clinically    I spent >35 minutes on the unit/floor in management of care today. Over 50% of my time was spent counseling the patient and/or coordinating care regarding services listed in this note.    Michel Javier MD  [phone] (P)  Text Page    Interval History   Overnight events reviewed. Biggest c/o is back pain, difficult to clarify what would help. Feels as if breathing is better, denies cp.     -Data reviewed today: I reviewed all new labs and imaging results over the last 24 hours. I personally reviewed no images or EKG's today.    Physical Exam   Temp: 97.3  F (36.3  C) Temp src: Axillary BP: 126/74 Pulse: 95 Heart Rate: 69 Resp: 20 SpO2: 92 % O2 Device: Nasal cannula Oxygen Delivery: 2 LPM  Vitals:    07/20/20 1909 07/21/20 0054   Weight: 51.7 kg (114 lb) 55.9 kg (123 lb 4.8 oz)     Vital Signs with Ranges  Temp:  [96.7  F (35.9  C)-97.5  F (36.4  C)] 97.3  F (36.3  C)  Pulse:  [95] 95  Heart Rate:  [] 69  Resp:  [18-22] 20  BP: ()/(52-74) 126/74  SpO2:  [90 %-97 %] 92 %  I/O last 3 completed shifts:  In: -   Out: 800 [Urine:800]    Constitutional: Alert, oriented, no acute distress  Respiratory: Lungs cparse bilateral bases, otherwise good air movement  Cardiovascular: Regular rate and rhythm, with SHIRLEY. 1+ pitting edema BLE  GI: Soft, non-tender, non-disteneded, good bowel sounds  Skin/Integumen: No erythema, cyanosis   Other:      Medications     sodium chloride 100 mL/hr at 07/22/20 0008       azithromycin  250 mg Intravenous Q24H     ceFEPIme (MAXIPIME) IV  2 g Intravenous Q12H     pantoprazole (PROTONIX) IV  40 mg Intravenous Daily with breakfast     sodium chloride (PF)  3 mL Intracatheter Q8H     vancomycin  125 mg Oral BID       Data   Recent Labs   Lab  07/21/20  0625 07/20/20  1924   WBC 6.3 9.7   HGB 9.5* 11.9*    99   PLT 62* 125*    139   POTASSIUM 4.5 4.7   CHLORIDE 108 102   CO2 27 31   BUN 43* 47*   CR 1.15 1.38*   ANIONGAP 5 6   GIUSEPPE 8.1* 8.6   GLC 85 89   ALBUMIN  --  2.6*   PROTTOTAL  --  6.7*   BILITOTAL  --  1.2   ALKPHOS  --  122   ALT  --  16   AST  --  18   LIPASE  --  57*       No results found for this or any previous visit (from the past 24 hour(s)).

## 2020-07-22 NOTE — PLAN OF CARE
Contact precautions maintained. A&Ox4. VSS on 2L NC. Tele-SR. PRN Compazine and PRN Norco given for pain with relief. Repositioned/turned Q2H. Tolerating clear liquid diet. IVF @100/hr. On IV ABX. Mepilex on coccyx is CDI. Multiple bruises all over. Wound on L heel is covered, CDI. Red output from carvalho. Palliative to see patient today. WOC following. Denies SOB, dizziness and lightheadedness.

## 2020-07-22 NOTE — PROVIDER NOTIFICATION
MD Notification    Notified Person: MD    Notified Person Name: Dr. Javier     Notification Date/Time: 7/22/2020 11:11am     Notification Interaction: web page     Purpose of Notification: Just to verify - you said it's okay to give oral metoprolol to patient even if the systolic blood pressure is below 100? Thanks MR RN    Orders Received:    Comments:     Calm

## 2020-07-22 NOTE — PROVIDER NOTIFICATION
MD Notification    Notified Person: MD    Notified Person Name:  Maravilla    Notification Date/Time: 7/22/20 @0028    Notification Interaction: [employer]    Purpose of Notification: OBS 20 DC    Pt asking if he can have Norco since it helps control his pain. Declining to take Morphine or Oxycodone. Also Zofran did not help nausea. Could we get something else for him? Thanks     Orders Received: Norco and Compazine given    Comments:

## 2020-07-22 NOTE — PROGRESS NOTES
Spiritual Health  Obs    SH visited Pt per request. Pt said he wasn t feeling well enough today for a SH visit and asked that SH visit tomorrow instead.     SH will make a follow up visit tomorrow.     Theresa Grossman  Chaplain Resident

## 2020-07-22 NOTE — CONSULTS
"CLINICAL NUTRITION SERVICES  -  ASSESSMENT NOTE    Recommendations Ordered by Registered Dietitian (RD):   - Will send clear liquid gelatein high protein supplement w/ meals.    Future/Additional Recommendations:  - Recommend Thera vit daily, Zinc Sulfate 220 mg x 10 days, Vitamin C 500 mg x 10 days once diet advanced further    Malnutrition:   Unable to fully assess      REASON FOR ASSESSMENT  Ivan Gomez is a 89 year old male seen by Registered Dietitian for Admission Nutrition Risk Screen for stageable pressure injuries or large/non-healing wound or burn    NUTRITION HISTORY  - Information obtained from Chart Review.   - Did not visit with patient given admitted to COVID19 Unit of [facility].   - Admitted with severe sepsis r/t UTI, and bilateral pneumonia.     The following history was obtained by this writer on 7/11/17:   - Information obtained from patient, EMR.   - H/o HTN, hyperlipidemia, CKD 3, gout, bladder CA s/p resection and bladder reconstruction x2, chronic pain, mitral regurg, s/p mitral clip, anemia  [facility]      - Patient with h/o poor appetite/intakes after heart surgery in October 2018. At that time he was eating 25-50% of meals, and occasionally drinking high protein drinks. Prefers Ensure Vanilla.   - Since then his appetite has improved some. He eats 2-3 meals daily depending on timing, and usually 75%. Has a hard time eating large portions.   - Verbalizes dislike for \"minced\" or \"pureed\" foods. \"They don't have to mince it anymore right?\"  - NKFA      CURRENT NUTRITION ORDERS  Diet Order:     Clear Liquid     Current Intake/Tolerance:  25% intakes recorded for Clear liquid trays.     NUTRITION FOCUSED PHYSICAL ASSESSMENT FOR DIAGNOSING MALNUTRITION)  No:  Patient admitted to COVID19 unit, will await transfer for full exam.            Obtained from Chart/Interdisciplinary Team:   \"Cachectic, ill-appearing\"     SLP is following, currently recommending CLD. They " "have worked with patient in the past - he has a history of declining testing and texture modifications.     WOCN 7/21 -     4 areas of community acquired pressure injury noted   Right posterior torso- red, slick, shiny wound bed   Midline thoracic spine, deep tissue injury, plan is to minimize pressure   Coccyx, unstageable pressure injury, looks like is healing and starting to release the slough, set within a greater area of scar tissue, no s/s/ of infection   Left medial/ posterior heel, sloughy base, red periwound tissue, tender to the touch, infected looking unstageable PI     ANTHROPOMETRICS  Height: 5' 8\"  Weight: 51.7 kg   Body mass index is 17.3 kg/m .  Weight Status:  Underweight BMI <18.5  IBW: 70 kg  % IBW: 74%  Weight History: 3# loss in the past 4 months (2.5%). In the past year pt has lost ~13# (10%).   Wt Readings from Last 10 Encounters:   07/21/20 55.9 kg (123 lb 4.8 oz)   03/13/20 53.4 kg (117 lb 11.2 oz)   02/13/20 54 kg (119 lb)   01/07/20 58.1 kg (128 lb)   11/21/19 57.8 kg (127 lb 6.4 oz)   11/13/19 59 kg (130 lb)   10/30/19 57.2 kg (126 lb 3.2 oz)   09/25/19 57.5 kg (126 lb 11.2 oz)   09/24/19 58 kg (127 lb 14.4 oz)   09/17/19 58 kg (127 lb 12.8 oz)     LABS  Labs reviewed  K 5.5 (H)    MEDICATIONS  Medications reviewed  NaCl IVF @ 100 ml/hr  PRN antiemetic     ASSESSED NUTRITION NEEDS PER APPROVED PRACTICE GUIDELINES:  Dosing Weight 51.7 kg   Estimated Energy Needs: 1803-9283 kcals (30-35 Kcal/Kg)  Justification: repletion and underweight  Estimated Protein Needs: 62-78 grams protein (1.2-1.5 g pro/Kg)  Justification: preservation of lean body mass and repletion  Estimated Fluid Needs: 1 mL/kcal  Justification: maintenance    MALNUTRITION:  % Weight Loss:  Weight loss does not meet criteria for malnutrition - chronically underweight  % Intake:  Unable to fully assess, suspect would meet criteria with difficulty swallowing and underweight BMI  Subcutaneous Fat Loss:  Did not assess - admitted " to COVID19 unit  Muscle Loss:  Did not assess - admitted to COVID19 unit  Fluid Retention:  Mild 2+    Malnutrition Diagnosis: Unable to determine due to lack of NFPE and nutrition history     NUTRITION DIAGNOSIS:  Inadequate protein-energy intake related to difficulty swallowing as evidenced by low BMI of <18 kg/m2, 74% IBW, and current clear liquid diet order.       NUTRITION INTERVENTIONS  Recommendations / Nutrition Prescription  Diet per SLP  If goals restorative may need to consider feeding tube, if not already discussed.     - Recommend Thera vit daily, Zinc Sulfate 220 mg x 10 days, Vitamin C 500 mg x 10 days once diet advanced further     Implementation  Nutrition education: No education needs assessed at this time  Medical Food Supplement: as above       Nutrition Goals  Diet to advance past CLD vs discussions regarding alternate means of nutrition in the next 48 hours.       MONITORING AND EVALUATION:  Progress towards goals will be monitored and evaluated per protocol and Practice Guidelines    Rowan Rascon RD, LD  Pager: [phone]

## 2020-07-22 NOTE — PLAN OF CARE
COVID negative, contact iso for MRSA. A&Ox4, VSS on 2L NC with ex of tachycardia, soft bp's. MD notified of HR in 130's, IV metoprolol given and blood pressures monitored. Tele currently SR with [facility] PAC's.  C/o back pain managed with PRN norco.  Ax2, turn/repositioned q2 hours. Legs elevated.  Clear liq diet, poor appetite. Intermittent nausea, PRN zofran given. Velasquez in place draining red urine. IV NS infusing 100/hr. Speech seen. WOC following. Plan for Palliative care consult and to transfer off floor.

## 2020-07-22 NOTE — PROGRESS NOTES
SW:  Discharge Planner   Discharge Plans in progress: TBD- ST recommending TCU of which patient is not interested as of yesterday (7/21)    Barriers to discharge plan: medical stability      Follow up plan: DANNY spoke to RN Charmaine ([phone]) to provide update. Charmaine informed that their [facility] does not readmit patient's over the weekend.    SW to continue to follow and assist with discharge planning.    DANITZA Flores  Daytime (8:00am-4:30pm): [phone]  After-Hours SW Pager (4:30pm-11:30pm): [phone]            Entered by: Tara العلي 07/22/2020 11:37 AM

## 2020-07-22 NOTE — PROVIDER NOTIFICATION
MD Notification    Notified Person: MD    Notified Person Name: Dr. Javier     Notification Date/Time: 7/22/2020 12:21pm     Notification Interaction: web page     Purpose of Notification: FYI patient's K+ 5.5. Blood pressure, heart rate normalized. MR RN     Orders Received:    Comments:

## 2020-07-22 NOTE — PROVIDER NOTIFICATION
MD Notification    Notified Person: MD    Notified Person Name: Dr. Javier     Notification Date/Time: 7/22/2020 9:15am     Notification Interaction: web page     Purpose of Notification: Pt tachycardic in 130's, denies chest pain or SOB. Order IV metoprolol or other medication? Please advise thanks MR RN     Orders Received:    Comments:

## 2020-07-22 NOTE — PLAN OF CARE
"Alert and orientated.Forgetful. Tele SR with PACs. VSS after bolus. 2 L NC O2 in the 90's. Productive cough and nasuea, antiemetic given. Pt tends to gag while he coughs prompting his reflex. Offered oral care, pt likes ice chips can only tolerate one at a time. Frail skin, multiple skin tares, coccyx wound open, continue repo q2 side to side. Clear liquids diet, minimal intake or appetite. PRN morphine helpful with pain. Pt woke up slightly disorientated saying everything was wet, while his bed was dry. We had changed his bed earlier and may be referring to that.   Pt has has his significant other and older brother pass away with in the last year. Pt doesn't want to pass 'like this\". Pt appear very prideful but understands his current state is not the best. Palliative care will meet with him tomorrow.   "

## 2020-07-22 NOTE — PLAN OF CARE
VSS on 2L. A&O - forgetful. Back pain managed w/ PRN norco. Up w/ lift. T/R q 2 hrs. Coccyx dressing changed. Tolerating clear liquid diet. NS @ 100. Report given to .  nurse, pt transferred to [facility].

## 2020-07-22 NOTE — PROGRESS NOTES
BRIEF HOUSE OFFICER NOTE:    I received signed out from my colleague, Lyla Yang CNP, regarding sepsis evaluation and follow up for lactic acidosis.   Repeat LA 2.8, trending down. Noted patient's Vital signs. Patient's mental status is at baseline.   Plan to continue to monitor patient closely, no additional intervention at this time.    -Follow up LA in AM    FRANK Roberts CNP  Text Page

## 2020-07-22 NOTE — PLAN OF CARE
Discharge Planner SLP   Patient plan for discharge: not stated  Current status: Pt not feeling well, BP issues today.  Pt has been tolerating clear thin liquid diet.  He feels nauseas and held basin throughout session.  He stated that he only wants COLD liquids and COLD applesauce.  Trialed applesauce 1/2t bolus which he insisted he self-feed.  Adequate oral management of applesauce and ice chips without overt s/s aspiration.  Pt refused all other presentations.  He feels safest staying on same diet plus applesauce.  Recommend continue Thin clear liquid diet plus applesauce in 1/4 t boluses, slow rate, fully upright, small sips of thin liquid.  Barriers to return to prior living situation: recurrent pna, dysphagia  Recommendations for discharge: TCU  Rationale for recommendations: SLP at next level of care pending goals of care discussion.       Entered by: Ana Medina 07/22/2020 10:16 AM

## 2020-07-23 NOTE — PROGRESS NOTES
07/23/20 1432   Quick Adds   Type of Visit Initial PT Evaluation   Living Environment   Lives With facility resident   Home Accessibility wheelchair accessible   Living Environment Comment Per chart review, had assist with laundry, getting in and out of tub and night time check   Self-Care   Usual Activity Tolerance fair   Current Activity Tolerance fair   Regular Exercise No   Equipment Currently Used at Home other (see comments)  (4WW)   Activity/Exercise/Self-Care Comment Was Guido with functional mobility   Functional Level Prior   Ambulation 1-->assistive equipment   Transferring 1-->assistive equipment   Toileting 1-->assistive equipment   Bathing 1-->assistive equipment   Communication 0-->understands/communicates without difficulty   Swallowing 0-->swallows foods/liquids without difficulty   Cognition 0 - no cognition issues reported   Fall history within last six months no   Which of the above functional risks had a recent onset or change? ambulation;transferring;swallowing   Prior Functional Level Comment Per chart review, pt was ambulaitng with 4WW IND   General Information   Onset of Illness/Injury or Date of Surgery - Date 07/20/20   Referring Physician Michel Javier MD    Pertinent History of Current Problem (include personal factors and/or comorbidities that impact the POC) Ivan Gomez is a very pleasant 89 year old gentleman with complex and extensive past medical history that is most notable for recurrent bladder cancer, chronic right sided diastolic CHF due to mitral regurgitation status post clipping, paroxysmal atrial fibrillation, CKD 3, chronic thrombocytopenia and anemia, prior C difficile colitis, and chronic back pain on narcotic medications who presents with hypoxia and fever and is found to have severe sepsis due to UTI vs bilateral pneumonia.   Precautions/Limitations fall precautions   General Observations Pt laying supine; kyphotic, 2L supplemental o2   General Info  "Comments Eval & tx for d/c recs.    Cognitive Status Examination   Orientation person   Level of Consciousness alert;confused   Follows Commands and Answers Questions able to follow single-step instructions;75% of the time   Personal Safety and Judgment intact   Memory impaired   Pain Assessment   Patient Currently in Pain No   Posture    Posture Kyphosis   Posture Comments significant kyphosis   Range of Motion (ROM)   ROM Comment NT   Strength   Strength Comments NT   Bed Mobility   Bed Mobility Comments supine>sit modA    Transfer Skills   Transfer Comments sit<>stand Shalini FWW   Gait   Gait Comments NT   Balance   Balance Comments sitting balance CGA; significant kyphosis a risk at EOB; forward flexion    Sensory Examination   Sensory Perception no deficits were identified   General Therapy Interventions   Planned Therapy Interventions balance training;bed mobility training;gait training;strengthening;transfer training;neuromuscular re-education;risk factor education;home program guidelines;progressive activity/exercise   Clinical Impression   Criteria for Skilled Therapeutic Intervention yes, treatment indicated   PT Diagnosis deconditioning   Influenced by the following impairments decreased strength, impaired balance   Functional limitations due to impairments difficulty transferring/walking   Clinical Presentation Evolving/Changing   Clinical Presentation Rationale clinical judgement   Clinical Decision Making (Complexity) Moderate complexity   Therapy Frequency Daily   Predicted Duration of Therapy Intervention (days/wks) 4 days   Anticipated Discharge Disposition Transitional Care Facility   Risk & Benefits of therapy have been explained Yes   Patient, Family & other staff in agreement with plan of care Yes   [facility] AM-PAC TM \"6 Clicks\"   2016, Trustees of [facility], under license to [employer].  All rights reserved.   6 Clicks Short Forms Basic Mobility Inpatient Short Form   [place] " "[facility]  \"6 Clicks\" V.2 Basic Mobility Inpatient Short Form   1. Turning from your back to your side while in a flat bed without using bedrails? 3 - A Little   2. Moving from lying on your back to sitting on the side of a flat bed without using bedrails? 3 - A Little   3. Moving to and from a bed to a chair (including a wheelchair)? 3 - A Little   4. Standing up from a chair using your arms (e.g., wheelchair, or bedside chair)? 3 - A Little   5. To walk in hospital room? 2 - A Lot   6. Climbing 3-5 steps with a railing? 1 - Total   Basic Mobility Raw Score (Score out of 24.Lower scores equate to lower levels of function) 15   Total Evaluation Time   Total Evaluation Time (Minutes) 9     "

## 2020-07-23 NOTE — PROGRESS NOTES
SW: Following for discharge planning    Information on patient's face sheet is incorrect.  Life partner Jerri passed away last summer.  Patient's brother passed away.  Patient does not have a daughter named Nicolasa. Nicolasa was Jerri's estranged daughter who does not have contact with patient.    Primary contact is patient's nephew Yemi Gomez who lives in the [facility] area.    D/I: Social work met with patient to discuss patient's current recommendations at discharge and goals of care.  Patient reported that he does not want to discharge to a TCU and wants to return home.  Patient is from the [facility] at [facility].  Patient was in significant pain when social work was present so patient agreed that social work could contact Yemi.      This writer contacted pt's nephew Yemi, who provided the info stated above.    DANNY called and spoke to Aurora RNCC at [facility] @ [phone].  Today is Aurora's last day, so patients RNCC moving forward will be Jose M.    Aurora informed this writer that patient was rather independent prior to this hospital admission.  Facility staff assisted patient with laundry, supervised patient getting in and out of the tub (as patient was independent), set up and administered medication, safety checks throughout the night, and started the meal plan last Friday. Prior to that that, patient was independent with meal preparations and cooking. Aurora stated that patient was independent with his walker for mobility.      Aurora stated that if patient's needs have changed, then patient would need to discharge to the TCU or come back to the facility with hospice services.  Patient was scheduled to meet with Joy with [facility] on 7/24/20 at [facility].      Myriam informed this writer that patient might have questions about a urology procedure that was scheduled to be completed (tumor resection), and mentioned that she thought the  patient might benefit from talking to a physician about that procedure during his hospital stay if possible.    Cynthia from [facility] physician services contacted  and left message requesting a discharge update on patient @ [phone].    P: Social work will continue to follow.    Lexii Licea MA, MSW, [facility]  [phone]

## 2020-07-23 NOTE — PROGRESS NOTES
[facility]    Hospitalist Progress Note    Date of Service (when I saw the patient): 07/23/2020    Assessment & Plan   Ivan Gomez is a very pleasant 89 year old gentleman with complex and extensive past medical history that is most notable for recurrent bladder cancer, chronic right sided diastolic CHF due to mitral regurgitation status post clipping, paroxysmal atrial fibrillation, CKD 3, chronic thrombocytopenia and anemia, prior C difficile colitis, and chronic back pain on narcotic medications who presents with hypoxia and fever and is found to have severe sepsis due to UTI vs bilateral pneumonia.    Community acquired pneumonia  Severe sepsis (hypotension, fever, tachycardia, hypoxia, lactic acidosis)  Acute hypoxic respiratory failure  Underlying h/o dysphagia and prior concerns for high aspiration risk. Presented with fever, hypoxia and reported confusion (lucid in ED). Baseline BP's appear to run low/normal (111/70 3/2020 at cardiology visit). Temp 101 in ED. Hypotensive to 70-80's systolic, improved slightly with fluids.  Procalcitonin 8.29. WBC 9.7. CXR with mild central vascular congestion, interstitial coarsening with bilateral mid and lower lung airspace opacities, L>R...edema vs pneumonia. No reported exposures to COVID-19.   - broad spectrum abx given concern for possible pseudomonas with cefepime   - IV fluids, monitor closely for volume overload, ok 7/23 (poor po as well)  - confirmed DNR/ DNI  - blood cultures from 7/20 NGTD 7/23  - urine culture negative  - supplemental O2 as needed    SVT  H/o PAF as below, intermittent tachycardia here, jumps from normal to 's. EKG shows SVT. Doesn't appear to be tachycardic response to sepsis at this time,   - Metoprolol 12.5 mg BID restarted 7/22  - metoprolol 2.5 mg q4 hours prn HR>120  - rates ok 7/23    Bladder cancer, recurrent  BPH with LUTS  PTA finastreride 5 mg daily  Follows with Dr. Hilton through [facility]. Plans noted for  another TURBT this month but appears to have been postponed. Per review of outpatient notes (Care Everywhere) appears had started discussions re: hospice and not wanting further procedures. Pt seems reluctant to commit to comfort cares at this time.   - palliative care consulted, would like ongoing restorative cares  - will need follow up with urology at discharge  - restart finasteride when able to take PO    Chronic R sided CHF 2/2 severe MR  Mitral valve clipping 2017, still with mod-severe residual MR 3/2020  Paroxysmal atrial fibrillation with RVR  HLD  PTA metoprolol 12.5 mg BID, simvastatin 20 mg at HS, torsemide 20 mg daily  Follows with [facility]. With significant pulmonary hypertension, 3/2020 echo with PA pressures at 50+RA. [id number] score at least 3. Not on anticoagulation 2/2 low platelet count, fall risk and risk of bleeding.    - telemetry  - hold statin, torsemide for now  - restarted metoprolol 12.5 mg BID 7/22    H/o c diff  Started on emipric vanco 125 mg BID    ZAK  CKD  Baseline creatinine appears to be 1.3-1.5. on admission   - 1.38 acute rise 1.7->1.88 7/23. Making urine  - avoid nephrotoxins  - monitor renal function closely    Thrombocytopenia, chronic  Anemia, chronic  PTA FeSO4 325 mg daily  Baseline hgb 10-11. On admission at 11.9 but suspect hemoconcentrated. baselin eplatelets appear to be 50-90's. On presentation 125 but suspect hemoconcentrated  - no active bleeding (known hematuria from cancer)  - hgb stable 10.8 7/23  - plts stable 7/23  - monitor per routine     Abdominal pain  On presentation with LUQ abdominal pain that was sharp, severe and constant, associated with pain in mid to lower back, present times days. Lipase normal.   - not currently having this or new pain, monitor    Chronic back pain  PTA gabapentin 400 mg at HS and 300 mg prn, Norco 7.5-325 q6 hours  Severe back pain on presentation unchanged from previous  - prn acetaminophen, norco, dilaudid IV  available  - resume gabapentin at HS    COVID-19 negative from admission 7/20    FEN (fluids, electrolytes and nutrition): diet as per SLP, IV fluids until adequate PO  Discussed with nursing.  DVT Prophylaxis: Pneumatic Compression Devices  Code Status: No CPR- Do NOT Intubate    Disposition: Expected discharge possible in 2-3 days but will depend upon ability to take PO and PT assessment    Michel Javier MD  [phone] (P)  Text Page    Interval History   Overnight events reviewed. Ongoing perseveration on back pain. Unable to state what we can do to help him. Breathing ok, no CP. Having nausea (? 2/2 dilaudid vs azithro)    -Data reviewed today: I reviewed all new labs and imaging results over the last 24 hours. I personally reviewed no images or EKG's today.    Physical Exam   Temp: 95.4  F (35.2  C) Temp src: Axillary BP: 104/65 Pulse: 77 Heart Rate: 114 Resp: 16 SpO2: 98 % O2 Device: Nasal cannula Oxygen Delivery: 2 LPM  Vitals:    07/20/20 1909 07/21/20 0054   Weight: 51.7 kg (114 lb) 55.9 kg (123 lb 4.8 oz)     Vital Signs with Ranges  Temp:  [95.4  F (35.2  C)-97.7  F (36.5  C)] 95.4  F (35.2  C)  Pulse:  [77] 77  Heart Rate:  [] 114  Resp:  [16-22] 16  BP: ()/(48-68) 104/65  SpO2:  [95 %-98 %] 98 %  I/O last 3 completed shifts:  In: 360 [P.O.:360]  Out: 700 [Urine:700]    Constitutional: Alert, oriented, no acute distress  Respiratory: Lungs largely clear anteriorly  Cardiovascular: Regular rate and rhythm, with SHIRLEY. 1+ pitting edema BLE  GI: Soft, non-tender, non-disteneded, good bowel sounds  Skin/Integumen: No erythema, cyanosis   Other:      Medications     sodium chloride 100 mL/hr at 07/22/20 2020       azithromycin  250 mg Intravenous Q24H     carboxymethylcellulose PF  2 drop Both Eyes BID     ceFEPIme (MAXIPIME) IV  2 g Intravenous Q24H     metoprolol tartrate  12.5 mg Oral BID     pantoprazole (PROTONIX) IV  40 mg Intravenous Daily with breakfast     sodium chloride (PF)  3 mL  Intracatheter Q8H     vancomycin  125 mg Oral BID       Data   Recent Labs   Lab 07/23/20  0744 07/22/20 2000 07/22/20  1114 07/21/20  0625 07/20/20  1924   WBC 8.7  --  8.3 6.3 9.7   HGB 10.8*  --  10.4* 9.5* 11.9*   *  --  101* 100 99   PLT 84*  --  78* 62* 125*     --  140 140 139   POTASSIUM 5.1 5.2 5.5* 4.5 4.7   CHLORIDE 115*  --  110* 108 102   CO2 22  --  20 27 31   BUN 70*  --  58* 43* 47*   CR 1.88*  --  1.70* 1.15 1.38*   ANIONGAP 7  --  10 5 6   GIUSEPPE 8.3*  --  8.7 8.1* 8.6   GLC 67*  --  71 85 89   ALBUMIN  --   --   --   --  2.6*   PROTTOTAL  --   --   --   --  6.7*   BILITOTAL  --   --   --   --  1.2   ALKPHOS  --   --   --   --  122   ALT  --   --   --   --  16   AST  --   --   --   --  18   LIPASE  --   --   --   --  57*       No results found for this or any previous visit (from the past 24 hour(s)).

## 2020-07-23 NOTE — PLAN OF CARE
Patient c/o neck and back pain rating his pain 10/10.  Dilaudid given but patient continues to c/o nausea.  Dilaudid discontinued and Fentanyl was ordered which seems to be more effective.  Patient has not requested something for pain or nausea since this am.  BP soft and HR sixto at times; afebrile; O2sats 90's 1L per NC.  Velasquez in place and continues to drain watermelon-colored urine.  Appetite is poor - likes ice cold water.  Speech saw patient and upgraded to full liquids (no straws).   Wounds on coccyx, left heel and right torso noted.  Dressings applied.  Spine has nonblanchable erythema - repositioned Q2hrs and prn and pulsate mattress ordered.  Patient was unable to increase activity d/t pain and nausea (see PT notes).  Palliative Care following.  Expected discharge 2-3 days pending ability to take po and PT eval.  SW following.

## 2020-07-23 NOTE — PROGRESS NOTES
Brief Hospitalist Cross cover note:    Temp: 97.6  F (36.4  C) Temp src: Oral BP: 113/52 Pulse: 77 Heart Rate: 70 Resp: 20 SpO2: 96 % O2 Device: Nasal cannula Oxygen Delivery: 2 LPM     Paged regarding pt throwing consistent PVCs. On telemetry and beta blocker. HR controlled. Asymptomatic. No intervention overnight. Defer to daytime provider        Anne Berkowitz MD  1:33 AM

## 2020-07-23 NOTE — PROGRESS NOTES
SPIRITUAL HEALTH SERVICES Progress Note  [facility]    Visit with pt, per request and palliative team consult.    Pt was alert although not able to engage in much conversation.  He asked me my name several times, and also several times asked questions about where he was, and which direction he was looking (out the window).    Pt agreed to my offer of prayer, and thanked me for coming to visit.     team available for further visits per need or request.                                                                                                                                                 Dulce Cornelius M.A.  Staff   Pager [phone]  Phone [phone]

## 2020-07-23 NOTE — PROGRESS NOTES
Pt AO4, can be forgetful.  A2, repo Q2h.  Using IV dilaudid for pain, pt had trouble swallowing 1/2 NORCO pill at beginning of shift.  Zofran given x2.  VSS.  Wounds changed previous shift, CDI.  IVF.  Palliative care consult pending.  Pt having some PVCs, paged provider, no intervention.

## 2020-07-23 NOTE — PLAN OF CARE
"Discharge Planner PT   Patient plan for discharge: Pt wants to go \"home\"  Current status: PT evaluation completed & tx initiated. Pt greeted laying supine; agreeable to tx following PT encouraging pt to demo OOB in order to demo his strength for OOB. However, with OOB,  pt becomes nauseous and unable to demo ambulation today. Requires Shalini to EOB, maintains sitting at EOB with close CGA due to significant kyphotic posture posing a forward flexion risk and sit<>stand x2 with side stepping CGA with FWW. Ending session supine; signifcinat extra time to ensure comfort with pillows.   Barriers to return to prior living situation: Weakness, Falls risk, pain   Recommendations for discharge: TCU  Rationale for recommendations: At this time, pt unable to demonstrate ambulation secondary to pain and nausea. Based on need for Ax1 for bed mobility, appears pt is deconditioned from LOS. Pt is below baseline functional mobility; was walking with 4WW Guido household distances/facility distances and would benefit from TCU to progress to PLOF.   Per chart review, pt cannot return to his prior location with rehab services; can return on hospice only. Pt still to meet with hospice per chart review.        Entered by: Smiley Fisher 07/23/2020 4:59 PM       "

## 2020-07-23 NOTE — PLAN OF CARE
"Discharge Planner SLP   Patient plan for discharge: did not discuss  Current status: Per palliative note, restorative cares. Pt seen for swallow tx session. Fully upright, pleasantly confused, perseverating with each swallow that he \"doesn't want it to come back up\" and therefore again very limited PO completed. Pt swallowed x6 1/4 tsps of puree solids, ~ 1 oz thin liquids. Reduced oral control, suspect premature spillage, slight wet vocal quality with thin but no other signs/sx aspiration or regurgitation noted.     Recommendations:   - cautious upgrades of SMALL amounts of full liquids (puree items via 1/4-1/2 tsp size) and thin liquids complete upright positioning, small sips, remain upright for 30 minutes after PO intake   - pt with chronic dysphagia for a number of years now, again declining a video swallow during today' session     Barriers to return to prior living situation: Below baseline, recurrent pneumonia   Recommendations for discharge: TCU  Rationale for recommendations: SLP at next level of care pending goals of care discussion        Entered by: Deya Albrehct 07/23/2020 2:03 PM       "

## 2020-07-23 NOTE — PLAN OF CARE
Patient was transferred from OBS unit at around 1845 this pm.  Patient c/o neck and back pain rating his pain 10/10.  Patient also c/o nausea.  Patient was repositioned and cold pack was given (receiving RN will reassess and administer pain med and antiemetic as needed).  Velasquez in place and draining watermelon-colored urine.  K+ 5.5 - recheck at 2000.  Palliative Care following.  Expected discharge unclear pending clinical improvement.

## 2020-07-24 NOTE — PLAN OF CARE
A&Ox4, forgetful, anxious. VSS on RA, ex soft BPs. C/o back pain, gave norco x1 with relief. C/o nausea and scant amount of emesis w/ blood, managed with zofran x1. Pulsate mattress placed on evenings, skin flaky and bruised throughout, blanchable redness to spine and coccyx, mepilex in place. Redness to L heel as well, elevated with pillows. Velasquez with adequate watermelon OP. L IV infusing D5 1/2 NS @ 75 w/ int abx. Pt not interested in pursuing hospice, PT recommending TCU at discharge.

## 2020-07-24 NOTE — PLAN OF CARE
"Discharge Planner PT   Patient plan for discharge: Pt wants to go \"home\"  Current status: Pt making steady progress with skilled PT intervention. Transfers supine>sit with Shalini, sit<>stand with Shalini x1 and FWW and progressing to stand pivot Shalini FWW.    Barriers to return to prior living situation: Weakness, Falls risk, pain   Recommendations for discharge: TCU  Rationale for recommendations: At this time, pt unable to demonstrate ambulation secondary to pain and nausea. Based on need for Ax1 for bed mobility, appears pt is deconditioned from LOS. Pt is below baseline functional mobility; was walking with 4WW Guido household distances/facility distances and would benefit from TCU to progress to PLOF.      Entered by: Smiley Fisher 07/24/2020 4:44 PM       "

## 2020-07-24 NOTE — PROGRESS NOTES
"CLINICAL NUTRITION SERVICES - REASSESSMENT NOTE    Recommendations Ordered by Registered Dietitian (RD):   Continue DD1, thin liquid diet, per SLP  Discontinue Gelatein with meals  Add Vanilla and/or Strawberry milkshake with meals  Add pudding between meals   Malnutrition (7/22):   % Weight Loss:  Weight loss does not meet criteria for malnutrition - chronically underweight  % Intake:  Unable to fully assess, suspect would meet criteria with difficulty swallowing and underweight BMI  Subcutaneous Fat Loss:  Did not assess - admitted to COVID19 unit  Muscle Loss:  Did not assess [id number]  Fluid Retention:  Mild 2+     Malnutrition Diagnosis: Unable to determine due to lack of NFPE and nutrition history       EVALUATION OF PROGRESS TOWARD GOALS   Diet:  DD1, Thin liquids + Gelatein Plus w meals    Intake/Tolerance:  Poor intake.  Pt does not care for Gelatein.  Will discontinue.      NEW FINDINGS:   7/24 - Per SLP note, Swallow tx completed. Pt with significant pain but was agreeable to session. He really enjoyed vanilla pudding/vanilla ice cream which resulted in increased PO intake --- seen with 2 oz thin water (cup and straw), 1 oz thin ice cream tsps, 2 oz puree solids via 1/4-1/2 tsp bolus size. Slow oral transit with x3-4 independent extra swallows per tsp of puree. Slight throat clear with ~25% of solid food. Pt requesting straw trials, with cues for small single sips with independent 1-2 second oral hold, adequate control and no signs/sx aspiration. X1 cough after larger sip via straw. No regurgitation across/after trials, though pt reported \"mild\" nausea.    No new wt recorded since 7/21.  Will order wt check.     Previous Goals:   Diet to advance past CLD vs discussions regarding alternate means of nutrition in the next 48 hours.   Evaluation: Met    Previous Nutrition Diagnosis:   Inadequate protein-energy intake related to difficulty swallowing as evidenced by low BMI of <18 kg/m2, 74% " IBW, and current clear liquid diet order.   Evaluation: Improving    CURRENT NUTRITION DIAGNOSIS  Inadequate protein-energy intake related to difficulty swallowing as evidenced by low BMI of <19 kg/m2 and current DD1, thin liquid diet order.     INTERVENTIONS  Recommendations / Nutrition Prescription  Continue DD1, thin liquid diet, per SLP  Discontinue Gelatein with meals  Add Vanilla and/or Strawberry milkshake with meals  Add pudding between meals    Implementation  Medical Food Supplement - ordered as above    Goals  Diet to advance past DD1, thin liquids vs discussions regarding alternate means of nutrition in the next 48 -72 hours.       MONITORING AND EVALUATION:  Progress towards goals will be monitored and evaluated per protocol and Practice Guidelines    Shena Curry RDN, LD

## 2020-07-24 NOTE — PLAN OF CARE
Pt A/o, VSS, ex soft BP. Tele: Afib CVR. C/o chronic back pain, PRN norco given. C/o int nausea, managed with PRN zofran. T/R Q2. On [place] mattress. PI to coccyx, L heel, mepilex changed, CDI. Wound to spine and L shoulder, mepilex in place. Scattered bruising to BLE.  Legs elevated. L PIV infusing D5+1/2 AT 75ml/hr with int abx. Speech advanced diet to DD1 diet. Pt recommending TCU. Discharge pending, continue to monitor.

## 2020-07-24 NOTE — PLAN OF CARE
"Discharge Planner SLP   Patient plan for discharge: did not state  Current status: Swallow tx completed. Pt with significant pain but was agreeable to session. He really enjoyed vanilla pudding/vanilla ice cream which resulted in increased PO intake --- seen with 2 oz thin water (cup and straw), 1 oz thin ice cream tsps, 2 oz puree solids via 1/4-1/2 tsp bolus size. Slow oral transit with x3-4 independent extra swallows per tsp of puree. Slight throat clear with ~25% of solid food. Pt requesting straw trials, with cues for small single sips with independent 1-2 second oral hold, adequate control and no signs/sx aspiration. X1 cough after larger sip via straw. No regurgitation across/after trials, though pt reported \"mild\" nausea.     Recommendations:   - upgrade to dysphagia diet level 1 via 1/4-1/2 tsp size  - thin liquids via small single cups or small single straw sip with slight oral hold (discontinue straws if increased difficulty)  - small amounts of PO at a time, sit as upright as possible given back pain, small sips, allow extra time for extra swallows after bites of solids, remain upright for 30 minutes after PO intake   - pt reports he really enjoys vanilla and strawberry flavors (dislikes chocolate and dislikes applesauce) - will page dietician re: preferences and possibility of shakes vs magic cup for supplements vs jello given preferences     Barriers to return to prior living situation: Below baseline, recurrent pneumonia   Recommendations for discharge: TCU  Rationale for recommendations: SLP at next level of care given restorative goals       Entered by: Deya Albrecht 07/24/2020 12:30 PM         "

## 2020-07-24 NOTE — PROGRESS NOTES
[facility]    Hospitalist Progress Note    Date of Service (when I saw the patient): 07/24/2020    Assessment & Plan   Ivan Gomez is a very pleasant 89 year old gentleman with complex and extensive past medical history that is most notable for recurrent bladder cancer, chronic right sided diastolic CHF due to mitral regurgitation status post clipping, paroxysmal atrial fibrillation, CKD 3, chronic thrombocytopenia and anemia, prior C difficile colitis, and chronic back pain on narcotic medications who presents with hypoxia and fever and is found to have severe sepsis due to UTI vs bilateral pneumonia.    Community acquired pneumonia  Severe sepsis (hypotension, fever, tachycardia, hypoxia, lactic acidosis)  Acute hypoxic respiratory failure  Underlying h/o dysphagia and prior concerns for high aspiration risk. Presented with fever, hypoxia and reported confusion (lucid in ED). Baseline BP's appear to run low/normal (111/70 3/2020 at cardiology visit). Temp 101 in ED. Hypotensive to 70-80's systolic, improved slightly with fluids.  Procalcitonin 8.29. WBC 9.7. CXR with mild central vascular congestion, interstitial coarsening with bilateral mid and lower lung airspace opacities, L>R...edema vs pneumonia. No reported exposures to COVID-19.   - IV fluids, monitor closely for volume overload, ok 7/24 (poor po as well), lungs clear except R basilar crackles  - blood cultures from 7/20 NGTD 7/24  - urine culture negative  - supplemental O2 as needed  - change from cefepime to levofloxacin 750 mg PO daily 7/24  - diet as per SLP  - looks much better 7/24     SVT  H/o PAF as below, intermittent tachycardia here, jumps from normal to 's. EKG shows SVT. Doesn't appear to be tachycardic response to sepsis at this time,   - Metoprolol 12.5 mg BID restarted 7/22  - metoprolol 2.5 mg q4 hours prn HR>120  - rates ok 7/24    ZAK  CKD  Baseline creatinine appears to be 1.3-1.5. on admission. With acute  rise this admission, likely 2/2 ATN. Low muscle mass makes creatinine likely overestimate GFR  - 1.38 acute rise 1.7->1.88->1.92 7/24. Hopefully plateauing. Making urine  - avoid nephrotoxins  - monitor renal function closely    Bladder cancer, recurrent  BPH with LUTS  PTA finastreride 5 mg daily  Follows with Dr. Hilton through [facility]. Plans noted for another TURBT this month but appears to have been postponed. Per review of outpatient notes (Care Everywhere) appears had started discussions re: hospice and not wanting further procedures. Pt seems reluctant to commit to comfort cares at this time.   - palliative care consulted, pt would like ongoing restorative cares  - will need follow up with urology at discharge  - restart finasteride 5 mg daily    Chronic R sided CHF 2/2 severe MR  Mitral valve clipping 2017, still with mod-severe residual MR 3/2020  Paroxysmal atrial fibrillation with RVR  HLD  PTA metoprolol 12.5 mg BID, simvastatin 20 mg at HS, torsemide 20 mg daily  Follows with [facility]. With significant pulmonary hypertension, 3/2020 echo with PA pressures at 50+RA. [id number] score at least 3. Not on anticoagulation 2/2 low platelet count, fall risk and risk of bleeding.    - telemetry  - hold statin, torsemide for now  - restarted metoprolol 12.5 mg BID 7/22    H/o c diff  Started on emipric vanco 125 mg BID on admission    Thrombocytopenia, chronic  Anemia, chronic  PTA FeSO4 325 mg daily  Baseline hgb 10-11. On admission at 11.9 but suspect hemoconcentrated. baselin eplatelets appear to be 50-90's. On presentation 125 but suspect hemoconcentrated  - no active bleeding (known hematuria from cancer)  - hgb stable 10.8 7/23  - plts stable 7/23  - monitor per routine     Abdominal pain  On presentation with LUQ abdominal pain that was sharp, severe and constant, associated with pain in mid to lower back, present times days. Lipase normal.   - not currently having this or new pain,  monitor    Chronic back pain  PTA gabapentin 400 mg at HS and 300 mg prn, Norco 7.5-325 q6 hours  Severe back pain on presentation unchanged from previous  - prn acetaminophen, norco, fentanyl IV available  - resume gabapentin at HS    Right Posterior Torso Pressure Injury   Midline Thoracic Spine Deep Tissue Injury   Coccyx - Unstageable Pressure Injury   Left Medial/ Posterior Heel Unstageable PI   - WOC consulted    COVID-19 negative from admission 7/20    FEN (fluids, electrolytes and nutrition): diet as per SLP, IV fluids until adequate PO  Discussed with nursing.  DVT Prophylaxis: Pneumatic Compression Devices  Code Status: No CPR- Do NOT Intubate    Disposition: Expected discharge possible in 2-3 days to TCU once renal function starts to improve and improved PO    Michel Javier MD  [phone] (P)  Text Page    Interval History   Overnight events reviewed. Ongoing back pain but less perseverating on this today. Breathing ok, no cp. Still poor PO intake    -Data reviewed today: I reviewed all new labs and imaging results over the last 24 hours. I personally reviewed no images or EKG's today.    Physical Exam   Temp: 95.4  F (35.2  C) Temp src: Oral BP: 119/70 Pulse: 84 Heart Rate: 70 Resp: 16 SpO2: 92 % O2 Device: Nasal cannula Oxygen Delivery: 2 LPM  Vitals:    07/20/20 1909 07/21/20 0054 07/24/20 1500   Weight: 51.7 kg (114 lb) 55.9 kg (123 lb 4.8 oz) 59.4 kg (131 lb)     Vital Signs with Ranges  Temp:  [95.4  F (35.2  C)-95.9  F (35.5  C)] 95.4  F (35.2  C)  Pulse:  [84] 84  Heart Rate:  [64-71] 70  Resp:  [16] 16  BP: ()/(48-70) 119/70  SpO2:  [92 %-98 %] 92 %  I/O last 3 completed shifts:  In: 330 [P.O.:330]  Out: 1250 [Urine:1250]    Constitutional: Alert, oriented, no acute distress  Respiratory: Lungs largely clear anteriorly, some R crackles  Cardiovascular: Regular rate and rhythm, with SHIRLEY. tr pitting edema BLE  GI: Soft, non-tender, non-disteneded, good bowel sounds  Skin/Integumen: No  erythema, cyanosis   Other:      Medications     dextrose 5% and 0.45% NaCl 75 mL/hr at 07/24/20 1236       carboxymethylcellulose PF  2 drop Both Eyes BID     ceFEPIme (MAXIPIME) IV  2 g Intravenous Q24H     metoprolol tartrate  12.5 mg Oral BID     pantoprazole (PROTONIX) IV  40 mg Intravenous Daily with breakfast     sodium chloride (PF)  3 mL Intracatheter Q8H     vancomycin  125 mg Oral BID       Data   Recent Labs   Lab 07/24/20  0718 07/23/20  0744 07/22/20 2000 07/22/20  1114 07/21/20  0625 07/20/20  1924   WBC  --  8.7  --  8.3 6.3 9.7   HGB  --  10.8*  --  10.4* 9.5* 11.9*   MCV  --  101*  --  101* 100 99   PLT  --  84*  --  78* 62* 125*    144  --  140 140 139   POTASSIUM 4.2 5.1 5.2 5.5* 4.5 4.7   CHLORIDE 116* 115*  --  110* 108 102   CO2 22 22  --  20 27 31   BUN 69* 70*  --  58* 43* 47*   CR 1.92* 1.88*  --  1.70* 1.15 1.38*   ANIONGAP 6 7  --  10 5 6   GIUSEPPE 7.9* 8.3*  --  8.7 8.1* 8.6   * 67*  --  71 85 89   ALBUMIN  --   --   --   --   --  2.6*   PROTTOTAL  --   --   --   --   --  6.7*   BILITOTAL  --   --   --   --   --  1.2   ALKPHOS  --   --   --   --   --  122   ALT  --   --   --   --   --  16   AST  --   --   --   --   --  18   LIPASE  --   --   --   --   --  57*       No results found for this or any previous visit (from the past 24 hour(s)).

## 2020-07-24 NOTE — PLAN OF CARE
[id number]  Pt. A/Ox4, forgetful. VSS on RA. C/o neck pain, managed with repositioning. Denies nausea. Pulsate mattress placed this evening. T/R q2h. Skin bruised, flaky, blanchable redness on spine and coccyx, mepilex in place. Velasquez in place, with watermelon OP. IVF infusing @ 75. PT recommending TCU. Plan for palliative consult.

## 2020-07-25 NOTE — PLAN OF CARE
"PT: Cancel; pt politely declining OOB; states he was just repositioned by staff and is comfortable. Encouraged to complete supine exercises with PT but states \"I already do them\", demonstrating APs. Encouraged to do more but pt politely declines. Nursing aware pt is stand pivot Ax2 with FWW to chair; encouraged to please assist with pt OOB at later time today.  "

## 2020-07-25 NOTE — PROGRESS NOTES
[facility]    Hospitalist Progress Note    Date of Service (when I saw the patient): 07/25/2020    Assessment & Plan   Ivan Gomez is a very pleasant 89 year old gentleman with complex and extensive past medical history that is most notable for recurrent bladder cancer, chronic right sided diastolic CHF due to mitral regurgitation status post clipping, paroxysmal atrial fibrillation, CKD 3, chronic thrombocytopenia and anemia, prior C difficile colitis, and chronic back pain on narcotic medications who presents with hypoxia and fever and is found to have severe sepsis due to UTI vs bilateral pneumonia.    Community acquired pneumonia  Severe sepsis (hypotension, fever, tachycardia, hypoxia, lactic acidosis)  Acute hypoxic respiratory failure  Underlying h/o dysphagia and prior concerns for high aspiration risk. Presented with fever, hypoxia and reported confusion (lucid in ED). Baseline BP's appear to run low/normal (111/70 3/2020 at cardiology visit). Temp 101 in ED. Hypotensive to 70-80's systolic, improved slightly with fluids.  Procalcitonin 8.29. WBC 9.7. CXR with mild central vascular congestion, interstitial coarsening with bilateral mid and lower lung airspace opacities, L>R...edema vs pneumonia. No reported exposures to COVID-19.   - IV fluids, monitor closely for volume overload, ok 7/24 (poor po as well), lungs clear except R basilar crackles  - blood cultures from 7/20 NGTD 7/24  - urine culture negative  - supplemental O2 as needed  - change from cefepime to levofloxacin 750 mg PO daily 7/24  - diet as per SLP  - looks much better 7/24     SVT  H/o PAF as below, intermittent tachycardia here, jumps from normal to 's. EKG shows SVT. Doesn't appear to be tachycardic response to sepsis at this time,   - Metoprolol 12.5 mg BID restarted 7/22  - metoprolol 2.5 mg q4 hours prn HR>120  - rates ok 7/24    ZAK  CKD  Baseline creatinine appears to be 1.3-1.5. on admission. With acute  rise this admission, likely 2/2 ATN. Low muscle mass makes creatinine likely overestimate GFR  - 1.38 acute rise 1.7->1.88->1.92 7/24. Hopefully plateauing. Making urine  - avoid nephrotoxins  - monitor renal function closely    Bladder cancer, recurrent  BPH with LUTS  PTA finastreride 5 mg daily  Follows with Dr. Hilton through [facility]. Plans noted for another TURBT this month but appears to have been postponed. Per review of outpatient notes (Care Everywhere) appears had started discussions re: hospice and not wanting further procedures. Pt seems reluctant to commit to comfort cares at this time.   - palliative care consulted, pt would like ongoing restorative cares  - will need follow up with urology at discharge  - restart finasteride 5 mg daily    Chronic R sided CHF 2/2 severe MR  Mitral valve clipping 2017, still with mod-severe residual MR 3/2020  Paroxysmal atrial fibrillation with RVR  HLD  PTA metoprolol 12.5 mg BID, simvastatin 20 mg at HS, torsemide 20 mg daily  Follows with [facility]. With significant pulmonary hypertension, 3/2020 echo with PA pressures at 50+RA. [id number] score at least 3. Not on anticoagulation 2/2 low platelet count, fall risk and risk of bleeding.    - telemetry  - hold statin, torsemide for now  - restarted metoprolol 12.5 mg BID 7/22    H/o c diff  Started on emipric vanco 125 mg BID on admission    Thrombocytopenia, chronic  Anemia, chronic  PTA FeSO4 325 mg daily  Baseline hgb 10-11. On admission at 11.9 but suspect hemoconcentrated. baselin eplatelets appear to be 50-90's. On presentation 125 but suspect hemoconcentrated  - no active bleeding (known hematuria from cancer)  - hgb stable 10.8 7/23  - plts stable 7/23  - monitor per routine     Abdominal pain  On presentation with LUQ abdominal pain that was sharp, severe and constant, associated with pain in mid to lower back, present times days. Lipase normal.   - not currently having this or new pain,  "monitor    Chronic back pain  PTA gabapentin 400 mg at HS and 300 mg prn, Norco 7.5-325 q6 hours  Severe back pain on presentation unchanged from previous  - prn acetaminophen, norco, fentanyl IV available  - resume gabapentin at HS    COVID-19 negative from admission 7/20    FEN (fluids, electrolytes and nutrition): diet as per SLP, IV fluids until adequate PO.  No labs ordered today. BMP ordered, will follow up.  Discussed with nursing.  DVT Prophylaxis: Pneumatic Compression Devices  Code Status: No CPR- Do NOT Intubate    Disposition: Expected discharge possible in 2-3 days to TCU once renal function starts to improve and improved PO    Ritika Brooks MD  [phone] (P)  Text Page    Interval History   Chart reviewed, patient seen.  Feels \"blah\" today.  Tried oxycodone last evening with modest results.  Not SOB at rest, no CP, n/v.    -Data reviewed today: I reviewed all new labs and imaging results over the last 24 hours. I personally reviewed no images or EKG's today.    Physical Exam   Temp: 96.2  F (35.7  C) Temp src: Axillary BP: 102/55 Pulse: 77 Heart Rate: 63 Resp: 18 SpO2: 100 % O2 Device: Nasal cannula Oxygen Delivery: 2 LPM  Vitals:    07/20/20 1909 07/21/20 0054 07/24/20 1500   Weight: 51.7 kg (114 lb) 55.9 kg (123 lb 4.8 oz) 59.4 kg (131 lb)     Vital Signs with Ranges  Temp:  [95.4  F (35.2  C)-96.3  F (35.7  C)] 96.2  F (35.7  C)  Pulse:  [76-81] 77  Heart Rate:  [63-70] 63  Resp:  [16-18] 18  BP: (102-119)/(55-70) 102/55  SpO2:  [92 %-100 %] 100 %  I/O last 3 completed shifts:  In: 1529 [P.O.:90; I.V.:1439]  Out: 900 [Urine:900]    Constitutional: Alert, oriented, no acute distress  Respiratory: Lungs largely clear anteriorly, some R crackles  Cardiovascular: Regular rate and rhythm, with SHIRLEY. tr pitting edema BLE  GI: Soft, non-tender, non-disteneded, good bowel sounds  Skin/Integumen: No erythema, cyanosis   Other:      Medications     dextrose 5% and 0.45% NaCl 50 mL/hr at 07/25/20 0432 "       carboxymethylcellulose PF  2 drop Both Eyes BID     finasteride  5 mg Oral Daily     levofloxacin  750 mg Oral Every Other Day     metoprolol tartrate  12.5 mg Oral BID     pantoprazole (PROTONIX) IV  40 mg Intravenous Daily with breakfast     sodium chloride (PF)  3 mL Intracatheter Q8H     vancomycin  125 mg Oral BID       Data   Recent Labs   Lab 07/24/20  0718 07/23/20  0744 07/22/20 2000 07/22/20  1114 07/21/20  0625 07/20/20  1924   WBC  --  8.7  --  8.3 6.3 9.7   HGB  --  10.8*  --  10.4* 9.5* 11.9*   MCV  --  101*  --  101* 100 99   PLT  --  84*  --  78* 62* 125*    144  --  140 140 139   POTASSIUM 4.2 5.1 5.2 5.5* 4.5 4.7   CHLORIDE 116* 115*  --  110* 108 102   CO2 22 22 --  20 27 31   BUN 69* 70*  --  58* 43* 47*   CR 1.92* 1.88*  --  1.70* 1.15 1.38*   ANIONGAP 6 7  --  10 5 6   GIUSEPPE 7.9* 8.3*  --  8.7 8.1* 8.6   * 67*  --  71 85 89   ALBUMIN  --   --   --   --   --  2.6*   PROTTOTAL  --   --   --   --   --  6.7*   BILITOTAL  --   --   --   --   --  1.2   ALKPHOS  --   --   --   --   --  122   ALT  --   --   --   --   --  16   AST  --   --   --   --   --  18   LIPASE  --   --   --   --   --  57*       No results found for this or any previous visit (from the past 24 hour(s)).

## 2020-07-25 NOTE — PLAN OF CARE
Patient is A&Ox4. VSS except soft BP. Patient anxious intermittently. Tele Afib Patient complained of nausea, declined antiemetic, did fluids and cool rag, effective. Patient complained of severe left hip and lower back pain, oxy effective then pain worsened. IV fentynal given x1,  Effective.Patient transfer to chair with pivot assist of 1 with GB, other transfers with lift. T&R q2. Due to pain patient refused me to visulize wounds on spine and coccyx. L PIV infusing IVF at 50 ml/hr. DD1 with thin liquids.  Calls appropriately. Discharge pending approval

## 2020-07-25 NOTE — PLAN OF CARE
Discharge Planner SLP   Patient plan for discharge: Patient did not state  Current status: Patient seen for swallowing tx while in bed. Pt denied most PO trials, reporting that everything was too warm. Pudding, Boost, and jello at bedside that were untouched from the morning/last night. Throat clear and wet vocal quality on 50% of thin liquids trials and pudding (limited amounts <9 teaspoons). Refused all other oral intake.     Recommend continue DDL1 with thin liquids Strict swallow precautions: half teaspoons, single sips from cup or straw, upright as able, slow rate. Pt enjoys: vanilla and strawberry flavors, dislikes chocolate and applesauce.   Barriers to return to prior living situation: Below baseline, recurrent pneumonia   Recommendations for discharge: TCU  Rationale for recommendations: SLP at next level of care given restorative goals       Entered by: Xuan Durand 07/25/2020 1:40 PM

## 2020-07-25 NOTE — PLAN OF CARE
Pt Aox4, VSS on RA, ex soft BP. Pain managed with PRN Oxy and fentanyl. Tele: Afib, CVR. Denies nausea. T/R Q2. Wound cares done today per WOC orders. Continue with Dd1 with thins, poor appetite. PIV infusing Ivf at 50ml.hr. Velasquez with cherry red output. Calls appropriately. Discharge pending.

## 2020-07-25 NOTE — PLAN OF CARE
Patient is A&Ox4, anxious at times. VSS. Telemetry was Afib CVR. C/o nausea, gave IV zofran and Compazine, ineffective at times. C/o back pain, gave Norco and oxy. Up to chair with pivot of assist of 1 and gait belt. L PIV infusing IVF at 50 ml/hr. DD1 with thin liquids. Wounds with foam dressing. Calls appropriately.

## 2020-07-25 NOTE — PROVIDER NOTIFICATION
MD Notification    Notified Person: MD    Notified Person Name: Dr. Parks    Notification Date/Time: 07/24/20 @ 1925    Notification Interaction: Phone Call    Purpose of Notification: Continued pain    Orders Received: Oxy [id number]

## 2020-07-25 NOTE — PROGRESS NOTES
X-cover call for back pain, as Norco not helpful and patient does not want IV Fentanyl.  His Dilaudid just changed to fentanyl yesterday.  As patient does not want IV medication, will switch his Norco to Oxycodone 10  Mg Q 4 hrs PRN.

## 2020-07-26 NOTE — PLAN OF CARE
Discharge Planner SLP   Patient plan for discharge: Patient did not state  Current status: Patient seen for swallowing tx. Per RN, limited PO intake this morning and difficulty with larger pills. Pt tolerated thin liquids via single straw sip with double swallows per bolus and x1 cough. Pt consumed pills crushed in purees and required 3 swallows per tablespoon. Pt consumed 5 sips of Boost supplement and then refuses further PO. Pt is unable to meet nutritional needs orally due to disinterest. Pt remains at high risk for aspiration.     Recommend continue DDL1 with thin liquids. Strict swallow precautions: double swallows and wait time for every bolus, half teaspoons, single sips from cup or straw, upright as able, slow rate.   Barriers to return to prior living situation: below baseline, recurrent pneumonia   Recommendations for discharge: TCU  Rationale for recommendations: SLP at next level of care given restorative goals       Entered by: Xuan Durand 07/26/2020 2:10 PM

## 2020-07-26 NOTE — PLAN OF CARE
Pt slept well. Pain better after fentanyl infusion. Took oxycodone x 1 overnight. Tele a fib with CVR. On pulsate mattress, turn and repo q 2 hours. Wounds on spine, heel, coccyx, CDI. Nausea after water intake. Suctioned some secretions that pt was not able to cough up. Velasquez in place, cherry red output.

## 2020-07-26 NOTE — PROGRESS NOTES
[facility]    Hospitalist Progress Note    Date of Service (when I saw the patient): 07/26/2020    Assessment & Plan   Ivan Gomez is a very pleasant 89 year old gentleman with complex and extensive past medical history that is most notable for recurrent bladder cancer, chronic right sided diastolic CHF due to mitral regurgitation status post clipping, paroxysmal atrial fibrillation, CKD 3, chronic thrombocytopenia and anemia, prior C difficile colitis, and chronic back pain on narcotic medications who presents with hypoxia and fever and is found to have severe sepsis due to UTI vs bilateral pneumonia.    Community acquired pneumonia  Severe sepsis (hypotension, fever, tachycardia, hypoxia, lactic acidosis)  Acute hypoxic respiratory failure  Underlying h/o dysphagia and prior concerns for high aspiration risk. Presented with fever, hypoxia and reported confusion (lucid in ED). Baseline BP's appear to run low/normal (111/70 3/2020 at cardiology visit). Temp 101 in ED. Hypotensive to 70-80's systolic, improved slightly with fluids.  Procalcitonin 8.29. WBC 9.7. CXR with mild central vascular congestion, interstitial coarsening with bilateral mid and lower lung airspace opacities, L>R...edema vs pneumonia. No reported exposures to COVID-19.   - IV fluids, monitor closely for volume overload, ok 7/24 (poor po as well), lungs clear except R basilar crackles  - blood cultures from 7/20 NGTD 7/24  - urine culture negative  - supplemental O2 as needed  - change from cefepime to levofloxacin 750 mg PO daily 7/24, day 7 total antibiotics today, continue through 8/1.  - diet as per SLP       SVT  H/o PAF as below, intermittent tachycardia here, jumps from normal to 's. EKG shows SVT. Doesn't appear to be tachycardic response to sepsis at this time,   - Metoprolol 12.5 mg BID restarted 7/22  - metoprolol 2.5 mg q4 hours prn HR>120  - rates ok 7/24    ZAK  CKD  Baseline creatinine appears to be  1.3-1.5. on admission. With acute rise this admission, likely 2/2 ATN. Low muscle mass makes creatinine likely overestimate GFR  - 1.38 acute rise 1.7->1.88->1.92 7/24. Hopefully plateauing. Making urine  - avoid nephrotoxins  - renal function back to near baseline.    Malnutrition:  Poor oral intake with texture restrictions. His recovery is already tenuous but I fear is even more hindered by poor oral intake, will ask nutrition to make some recommendations.    Bladder cancer, recurrent  BPH with LUTS  PTA finastreride 5 mg daily  Follows with Dr. Hilton through [facility]. Plans noted for another TURBT this month but appears to have been postponed. Per review of outpatient notes (Care Everywhere) appears had started discussions re: hospice and not wanting further procedures. Pt seems reluctant to commit to comfort cares at this time.   - palliative care consulted, pt would like ongoing restorative cares  - will need follow up with urology at discharge  - restart finasteride 5 mg daily    Chronic R sided CHF 2/2 severe MR  Mitral valve clipping 2017, still with mod-severe residual MR 3/2020  Paroxysmal atrial fibrillation with RVR  HLD  PTA metoprolol 12.5 mg BID, simvastatin 20 mg at HS, torsemide 20 mg daily  [facility]. With significant pulmonary hypertension, 3/2020 echo with PA pressures at 50+RA. [id number] score at least 3. Not on anticoagulation 2/2 low platelet count, fall risk and risk of bleeding.    - telemetry  - hold statin, torsemide for now  - restarted metoprolol 12.5 mg BID 7/22    H/o c diff  Started on emipric vanco 125 mg BID on admission    Thrombocytopenia, chronic  Anemia, chronic  PTA FeSO4 325 mg daily  Baseline hgb 10-11. On admission at 11.9 but suspect hemoconcentrated. baselin eplatelets appear to be 50-90's. On presentation 125 but suspect hemoconcentrated  - no active bleeding (known hematuria from cancer)  - hgb stable 10.8 7/23  - plts stable 7/23  - monitor per  routine     Abdominal pain  On presentation with LUQ abdominal pain that was sharp, severe and constant, associated with pain in mid to lower back, present times days. Lipase normal.   - not currently having this or new pain, monitor    Chronic back pain  PTA gabapentin 400 mg at HS and 300 mg prn, Norco 7.5-325 q6 hours  Severe back pain on presentation unchanged from previous  - prn acetaminophen, norco, fentanyl IV available, got fentanyl last night, added a scheduled dose of oxycodone at bedtime (he may refuse it) as he won't be able to get fentanyl at a TCU.  - resume gabapentin at HS    COVID-19 negative from admission 7/20    FEN (fluids, electrolytes and nutrition): diet as per SLP, IV fluids until adequate PO.    Discussed with nursing.  DVT Prophylaxis: Pneumatic Compression Devices  Code Status: No CPR- Do NOT Intubate    Disposition: By the numbers he is approaching discharge but his oral intake is poor and I fear will lead to re-hospitalization if it doesn't improve. Ok to start looking for TCUs (tomorrow discharge at earliest.) But would like to see nutrition improve.    Ritika Brooks MD  [phone] (P)  Text Page    Interval History   Needed fentanyl for back pain last evening.  Didn't eat much of breakfast this AM.  Denies SOB, not much OOB activity.    -Data reviewed today: I reviewed all new labs and imaging results over the last 24 hours. I personally reviewed no images or EKG's today.    Physical Exam   Temp: 96  F (35.6  C) Temp src: Oral BP: 118/63   Heart Rate: 76 Resp: 16 SpO2: 96 % O2 Device: Nasal cannula Oxygen Delivery: 2 LPM  Vitals:    07/21/20 0054 07/24/20 1500 07/26/20 0600   Weight: 55.9 kg (123 lb 4.8 oz) 59.4 kg (131 lb) 61.2 kg (135 lb)     Vital Signs with Ranges  Temp:  [96  F (35.6  C)-96.4  F (35.8  C)] 96  F (35.6  C)  Heart Rate:  [67-76] 76  Resp:  [12-16] 16  BP: (100-118)/(55-65) 118/63  SpO2:  [90 %-96 %] 96 %  I/O last 3 completed shifts:  In: 3351  [I.V.:1565]  Out: 2000 [Urine:2000]    Constitutional: Alert, oriented, no acute distress  Respiratory: Lungs largely clear anteriorly, some R crackles  Cardiovascular: Regular rate and rhythm, with SHIRLEY. tr pitting edema BLE  GI: Soft, non-tender, non-disteneded, good bowel sounds  Skin/Integumen: No erythema, cyanosis   Other:      Medications     dextrose 5% and 0.45% NaCl 50 mL/hr at 07/25/20 2358       carboxymethylcellulose PF  2 drop Both Eyes BID     finasteride  5 mg Oral Daily     levofloxacin  750 mg Oral Every Other Day     metoprolol tartrate  12.5 mg Oral BID     oxyCODONE  5 mg Oral At Bedtime     pantoprazole (PROTONIX) IV  40 mg Intravenous Daily with breakfast     sodium chloride (PF)  3 mL Intracatheter Q8H     vancomycin  125 mg Oral BID       Data   Recent Labs   Lab 07/26/20  0652 07/25/20  1041 07/24/20  0718 07/23/20  0744  07/22/20  1114 07/21/20  0625 07/20/20  1924   WBC  --   --   --  8.7  --  8.3 6.3 9.7   HGB  --   --   --  10.8*  --  10.4* 9.5* 11.9*   MCV  --   --   --  101*  --  101* 100 99   PLT  --   --   --  84*  --  78* 62* 125*    144 144 144  --  140 140 139   POTASSIUM 3.8 3.7 4.2 5.1   < > 5.5* 4.5 4.7   CHLORIDE 116* 118* 116* 115*  --  110* 108 102   CO2 22 23 22 22  --  20 27 31   BUN 42* 56* 69* 70*  --  58* 43* 47*   CR 1.25 1.57* 1.92* 1.88*  --  1.70* 1.15 1.38*   ANIONGAP 6 3 6 7  --  10 5 6   GIUSEPPE 8.2* 7.9* 7.9* 8.3*  --  8.7 8.1* 8.6   GLC 87 96 136* 67*  --  71 85 89   ALBUMIN  --   --   --   --   --   --   --  2.6*   PROTTOTAL  --   --   --   --   --   --   --  6.7*   BILITOTAL  --   --   --   --   --   --   --  1.2   ALKPHOS  --   --   --   --   --   --   --  122   ALT  --   --   --   --   --   --   --  16   AST  --   --   --   --   --   --   --  18   LIPASE  --   --   --   --   --   --   --  57*    < > = values in this interval not displayed.       No results found for this or any previous visit (from the past 24 hour(s)).

## 2020-07-26 NOTE — PLAN OF CARE
PT: Cancel; Pt just up in chair with nursing staff Ax2 and now nauseous; politely declines further gait training. RN reports pt with small knee buckle during transfer and reccommending to place chair right against bed for lessened number of steps/ease.

## 2020-07-26 NOTE — CONSULTS
"CLINICAL NUTRITION SERVICES - REASSESSMENT NOTE      RECOMMENDATIONS FOR MD/PROVIDER TO ORDER:  - Do not recommend more aggressive nutrition interventions at this time per patient specific decline of a FT this afternoon (see below). Defer to MD/Palliative to reestablish nutrition goals of care   Recommendations Ordered by Registered Dietitian (RD):   - None today; providing max interventions    Malnutrition:   % Weight Loss:  Weight loss does not meet criteria for malnutrition  -- chronically underweight   % Intake:  </= 50% for >/= 5 days (severe malnutrition)  Subcutaneous Fat Loss:  Total body overt severe losses  Muscle Loss:  Total body overt severe losses  Fluid Retention:  None noted    Malnutrition Diagnosis: Severe malnutrition  In Context of:  Acute illness or injury  Chronic illness or disease     Received Provider Consult - malnutrition (7/26)   RD already following patient, please see other notes on 7/22 and 7/24 for more details     EVALUATION OF PROGRESS TOWARD GOALS   Diet:  DD1, pureed + thin liquids   Plus2 milkshake BID between meals  Vanilla pudding TID with meals     Intake/Tolerance:    Visited with patient this evening (RN providing cares at bedside)  Patient continues to eat poorly with lack of motivation and desire for PO  He reports to me today that he does not want to eat   When asked how would feel about a feeding tube, patient clearly states \"no\"  Defer to MD/Palliative care to reestablish nutrition goals of care as patients medical goals have remained restorative     Visual nutrition focused physical exam completed today     Vitals:    07/20/20 1909 07/21/20 0054 07/24/20 1500 07/26/20 0600   Weight: 51.7 kg (114 lb) 55.9 kg (123 lb 4.8 oz) 59.4 kg (131 lb) 61.2 kg (135 lb)       ASSESSED NUTRITION NEEDS:  Dosing Weight 51.7 kg   Estimated Energy Needs: 0370-7949 kcals (30-35 Kcal/Kg)  Justification: repletion and underweight  Estimated Protein Needs: 62-78 grams protein (1.2-1.5 g " pro/Kg)  Justification: preservation of lean body mass and repletion  Estimated Fluid Needs: 1 mL/kcal  Justification: maintenance      NEW FINDINGS:   SLP eval today --> Recommend continue DDL1 with thin liquids. Strict swallow precautions: double swallows and wait time for every bolus, half teaspoons, single sips from cup or straw, upright as able, slow rate. High risk for aspiration     TCU at discharge     Previous Goals:   Diet to advance past DD1, thin liquids vs discussions regarding alternate means of nutrition in the next 48 -72 hours.   Evaluation: Not met    Previous Nutrition Diagnosis:   Inadequate protein-energy intake related to difficulty swallowing as evidenced by low BMI of <19 kg/m2 and current DD1, thin liquid diet order.   Evaluation: No change      MALNUTRITION (updated)  % Weight Loss:  Weight loss does not meet criteria for malnutrition  -- chronically underweight   % Intake:  </= 50% for >/= 5 days (severe malnutrition)  Subcutaneous Fat Loss:  Total body overt severe losses  Muscle Loss:  Total body overt severe losses  Fluid Retention:  None noted    Malnutrition Diagnosis: Severe malnutrition  In Context of:  Acute illness or injury  Chronic illness or disease    CURRENT NUTRITION DIAGNOSIS  Inadequate protein-energy intake related to poor appetite, lack of motivation and desire to eat as evidenced by intakes <50% for >5 days and e/o overt fat and muscle wasting     INTERVENTIONS  Recommendations / Nutrition Prescription  Continue diet per SLP  Continue providing high protein supplements  Do no recommend more aggressive nutrition interventions per patient wishes reported 7/26 --> defer to MD    Implementation  None at this time     Goals  Patient will consume >50% meals and supplements TID       MONITORING AND EVALUATION:  Progress towards goals will be monitored and evaluated per protocol and Practice Guidelines      Vanessa Dallas RD, LD  Clinical Dietitian

## 2020-07-26 NOTE — PLAN OF CARE
Pt alert and oriented, forgetful at times. VSS on RA, tele: afib, w/ BBB, CVR.  C/o pain to back, managed with PRN oxy. T/R Q2 hours, on pulsate mattress. Up A2 GB/W to chair. Wounds to spine, R shoulder, coccyx and heel done per care plan. Velasquez with cherry red output. Renal functions looking better, possibly discharge to TCU tomorrow. Continues to have poor PO intake.

## 2020-07-26 NOTE — PLAN OF CARE
A&Ox4, forgetful. VSS on 2L via NC. C/o 10/10 neck/back/hip pain, minimal relief w/ oxy & ice, fentanyl given x1 w/ relief. Tele: a fib CVR. C/o nausea after medications, cool cloth applied w/ relief. Regular diet, refused dinner. Multiple wounds, dressed per WOC orders. Pulsate mattress & T/R q2 hours. Velasquez in place w/ cherry red UOP. PIV infusing @50ml/hr. Discharge pending.

## 2020-07-27 NOTE — PLAN OF CARE
"Discharge Planner SLP   Patient plan for discharge: Patient did not state  Current status: Patient seen for swallowing tx while upright in the chair. Pt reports that swallowing \"just is not working right\". RN got report of inability to swallow water overnight. Pt consumed thin liquid via cup and straw with double to triple swallows per bolus due to suspected pharyngeal residue, throat clear, and increased risk for aspiration on residue. Trialed nectar thick liquid and pt tolerated well with double swallows and decrease in throat clear and cough. Pt consumed 2 small teaspoons of pudding and refused further trials, no concerns. Discussed VFSS with pt who denied again, per RN, palliative consult for pt placed.     Recommend downgrade dysphagia diet level 1 with nectar thick liquid. Strict swallow precautions: double swallows and wait time for every bolus, half teaspoons, single sips from cup or straw, upright as able, slow rate. Pt not appropriate for diet upgrade and continues to have limited oral intake with a high risk for aspiration. Per MD, pt does not want tube feeding.   Barriers to return to prior living situation: dysphagia, generalized weakness, recurrent pneumonia   Recommendations for discharge: TCU  Rationale for recommendations: SLP at next level of care given restorative goals       Entered by: Xuan Durand 07/27/2020 10:46 AM       "

## 2020-07-27 NOTE — PROGRESS NOTES
Formal palliative note to follow. In summary, pt open to hearing more from hospice. He is hoping to focus more on comfort. I did place a SW consult for hospice. I did discontinue his IV fentanyl and oxycodone, and did order dilaudid 4mg PO Q4hrs PRN pain, 0.5-1mg IV every 2hrs severe pain. Pt accepts that if increased pain medication is needed for comfort, it may add to increased sleepiness.     Xuan MARIE, [facility]  Palliative Medicine   Pager: [phone]

## 2020-07-27 NOTE — PROGRESS NOTES
[facility]    Hospitalist Progress Note    Date of Service (when I saw the patient): 07/27/2020    Assessment & Plan   Ivan Gomez is a very pleasant 89 year old gentleman with complex and extensive past medical history that is most notable for recurrent bladder cancer, chronic right sided diastolic CHF due to mitral regurgitation status post clipping, paroxysmal atrial fibrillation, CKD 3, chronic thrombocytopenia and anemia, prior C difficile colitis, and chronic back pain on narcotic medications who presents with hypoxia and fever and is found to have severe sepsis due to UTI vs bilateral pneumonia.    Community acquired pneumonia  Severe sepsis (hypotension, fever, tachycardia, hypoxia, lactic acidosis)  Acute hypoxic respiratory failure  Underlying h/o dysphagia and prior concerns for high aspiration risk. Presented with fever, hypoxia and reported confusion (lucid in ED). Baseline BP's appear to run low/normal (111/70 3/2020 at cardiology visit). Temp 101 in ED. Hypotensive to 70-80's systolic, improved slightly with fluids.  Procalcitonin 8.29. WBC 9.7. CXR with mild central vascular congestion, interstitial coarsening with bilateral mid and lower lung airspace opacities, L>R...edema vs pneumonia. No reported exposures to COVID-19.   - IV fluids, monitor closely for volume overload, ok 7/24 (poor po as well), lungs clear except R basilar crackles  - blood cultures from 7/20 NGTD 7/24  - urine culture negative  - supplemental O2 as needed  - change from cefepime to levofloxacin 750 mg PO daily 7/24, day 8 total antibiotics today, continue through 8/1.  - diet as per SLP       SVT  H/o PAF as below, intermittent tachycardia here, jumps from normal to 's. EKG shows SVT. Doesn't appear to be tachycardic response to sepsis at this time,   - Metoprolol 12.5 mg BID restarted 7/22  - metoprolol 2.5 mg q4 hours prn HR>120  - rates ok 7/24    ZAK  CKD  Baseline creatinine appears to be  1.3-1.5. on admission. With acute rise this admission, likely 2/2 ATN. Low muscle mass makes creatinine likely overestimate GFR  - 1.38 acute rise 1.7->1.88->1.92 7/24. Hopefully plateauing. Making urine  - avoid nephrotoxins  - renal function back to near baseline.    Malnutrition:  Poor oral intake with texture restrictions. His recovery is already tenuous but I fear is even more hindered by poor oral intake, nutrition following.    Bladder cancer, recurrent  BPH with LUTS  PTA finastreride 5 mg daily  Follows with Dr. Hilton through [facility]. Plans noted for another TURBT this month but appears to have been postponed. Per review of outpatient notes (Care Everywhere) appears had started discussions re: hospice and not wanting further procedures. Pt seems reluctant to commit to comfort cares at this time.   - palliative care consulted, pt would like ongoing restorative cares  - will need follow up with urology at discharge  - restart finasteride 5 mg daily    Chronic R sided CHF 2/2 severe MR  Mitral valve clipping 2017, still with mod-severe residual MR 3/2020  Paroxysmal atrial fibrillation with RVR  HLD  PTA metoprolol 12.5 mg BID, simvastatin 20 mg at HS, torsemide 20 mg daily  [facility]. With significant pulmonary hypertension, 3/2020 echo with PA pressures at 50+RA. [id number] score at least 3. Not on anticoagulation 2/2 low platelet count, fall risk and risk of bleeding.    - telemetry  - hold statin, torsemide for now  - restarted metoprolol 12.5 mg BID 7/22    H/o c diff  Started on emipric vanco 125 mg BID on admission    Thrombocytopenia, chronic  Anemia, chronic  PTA FeSO4 325 mg daily  Baseline hgb 10-11. On admission at 11.9 but suspect hemoconcentrated. baselin eplatelets appear to be 50-90's. On presentation 125 but suspect hemoconcentrated  - no active bleeding (known hematuria from cancer)  - hgb stable 10.8 7/23  - plts stable 7/23  - monitor per routine     Abdominal pain  On  presentation with LUQ abdominal pain that was sharp, severe and constant, associated with pain in mid to lower back, present times days. Lipase normal.   - not currently having this or new pain, monitor    Chronic back pain  PTA gabapentin 400 mg at HS and 300 mg prn, Norco 7.5-325 q6 hours  Severe back pain on presentation unchanged from previous  - prn acetaminophen, norco, fentanyl IV available, has been requiring IV analgesics.  - resume gabapentin at HS    COVID-19 negative from admission 7/20    Discussion:  I had a long talk with Mr. Gomez today, I reiterated my worries about his nutrition and that he likely will not recover unless his nutrition improves.  He reiterates his wishes against a feeding tube, and also clearly stated he does not want to be rehospitalized. To clarify, I asked if we liberalized his diet so he could eat what he wants and he developed an aspiration pneumonia, he STILL would not want to be rehospitalized.  He has previously during this stay stated that he does not want hospice, I tried to explain that hospice would be appropriate for him particularly if he does not want to be hospitalized again.  He is still hesitant to pursue this and states he wants to recover.  I stated my worry that this may not be possible with his current nutritional intake.  He has been seen by Palliative medicine this stay and I would like the to revisit with Mr. Gomez regarding goals of care, and for pain management.    Time spent: 30 minutes.    FEN (fluids, electrolytes and nutrition): diet as per SLP, IV fluids until adequate PO.    Discussed with nursing.  DVT Prophylaxis: Pneumatic Compression Devices  Code Status: No CPR- Do NOT Intubate    Disposition: By the numbers he is approaching discharge but his oral intake is poor and I fear will lead to re-hospitalization if it doesn't improve. Ok to start looking for TCUs. Pain control and nutrition are barriers to discharge.    Ritika Brooks,  MD  [phone] (P)  Text Page    Interval History   Needed IV fentanyl a few times for back pain.  Poor oral intake. Not SOB, no f/c.    -Data reviewed today: I reviewed all new labs and imaging results over the last 24 hours. I personally reviewed no images or EKG's today.    Physical Exam   Temp: 96.4  F (35.8  C) Temp src: Axillary BP: 107/56 Pulse: 71 Heart Rate: 67 Resp: 16 SpO2: 98 % O2 Device: Nasal cannula Oxygen Delivery: 2 LPM  Vitals:    07/24/20 1500 07/26/20 0600 07/27/20 0612   Weight: 59.4 kg (131 lb) 61.2 kg (135 lb) 53.2 kg (117 lb 3.2 oz)     Vital Signs with Ranges  Temp:  [96.4  F (35.8  C)-96.7  F (35.9  C)] 96.4  F (35.8  C)  Pulse:  [71-80] 71  Heart Rate:  [67] 67  Resp:  [14-20] 16  BP: (105-108)/(56-63) 107/56  SpO2:  [93 %-98 %] 98 %  I/O last 3 completed shifts:  In: 0   Out: 360 [Urine:360]    Constitutional: Alert, oriented, no acute distress  Respiratory: Lungs largely clear anteriorly, some R crackles  Cardiovascular: Regular rate and rhythm, with SHIRLEY. tr pitting edema BLE  GI: Soft, non-tender, non-disteneded, good bowel sounds  Skin/Integumen: No erythema, cyanosis   Other:      Medications     dextrose 5% and 0.45% NaCl 50 mL/hr at 07/26/20 2204       carboxymethylcellulose PF  2 drop Both Eyes BID     finasteride  5 mg Oral Daily     levofloxacin  750 mg Oral Every Other Day     metoprolol tartrate  12.5 mg Oral BID     oxyCODONE  5 mg Oral At Bedtime     pantoprazole (PROTONIX) IV  40 mg Intravenous Daily with breakfast     sodium chloride (PF)  3 mL Intracatheter Q8H     vancomycin  125 mg Oral BID       Data   Recent Labs   Lab 07/26/20  0652 07/25/20  1041 07/24/20  0718 07/23/20  0744  07/22/20  1114 07/21/20  0625 07/20/20  1924   WBC  --   --   --  8.7  --  8.3 6.3 9.7   HGB  --   --   --  10.8*  --  10.4* 9.5* 11.9*   MCV  --   --   --  101*  --  101* 100 99   PLT  --   --   --  84*  --  78* 62* 125*    144 144 144  --  140 140 139   POTASSIUM 3.8 3.7 4.2 5.1   < >  5.5* 4.5 4.7   CHLORIDE 116* 118* 116* 115*  --  110* 108 102   CO2 22 23 22 22  --  20 27 31   BUN 42* 56* 69* 70*  --  58* 43* 47*   CR 1.25 1.57* 1.92* 1.88*  --  1.70* 1.15 1.38*   ANIONGAP 6 3 6 7  --  10 5 6   GIUSEPPE 8.2* 7.9* 7.9* 8.3*  --  8.7 8.1* 8.6   GLC 87 96 136* 67*  --  71 85 89   ALBUMIN  --   --   --   --   --   --   --  2.6*   PROTTOTAL  --   --   --   --   --   --   --  6.7*   BILITOTAL  --   --   --   --   --   --   --  1.2   ALKPHOS  --   --   --   --   --   --   --  122   ALT  --   --   --   --   --   --   --  16   AST  --   --   --   --   --   --   --  18   LIPASE  --   --   --   --   --   --   --  57*    < > = values in this interval not displayed.       No results found for this or any previous visit (from the past 24 hour(s)).

## 2020-07-27 NOTE — PROVIDER NOTIFICATION
MD Notification    Notified Person: MD    Notified Person Name: Dr. Boogie     Notification Date/Time: 21:44, 7/26/2020    Notification Interaction: Text-page    Purpose of Notification: Minimal UOP from Velasquez w/ large amounts of leakage around insertion site. No pain, BS 0ml. Please advise.     Orders Received: Continue to monitor for retention. Will be assessed AM.    Comments:

## 2020-07-27 NOTE — PROGRESS NOTES
Care Coordination:    Per SW note:  Information on patient's face sheet is incorrect.  Life partner Jerri passed away last summer.  Patient's brother passed away.  Patient does not have a daughter named Nicolasa. Nicolasa was Jerri's estranged daughter who does not have contact with patient.    This information was confirmed with Herkike of [facility] pt's  Nurse Jose M.   [phone].  Per Jose M the only family and  the pt has is his Nephew Noah Gomez in [place].      Per registration they are not able to delete the incorrect contacts due to them being listed as decision makers in [employer].  An Email was went to [email or web address] to update the pt's emergency contact and decrease chance of future confusion.      Jose M SINGH, also confirmed that if the pt is not Independent he cannot come back to [facility], but it might be possible to have him move to the [facility] where they can provide total care.  I informed Jose M that per PT note the pt is requiring Min A x 2 and TCU is recommended and the pt is currently refusing TCU.   Current POC is to have Palliative Care is to see the pt again.      Malia Grace RN, BSN Care Coordinator  [employer]  Mobile: [phone]

## 2020-07-27 NOTE — PROGRESS NOTES
[facility]  Palliative Care Social Work Note:    Patient Info:  Ivan Gomez is a 89 year old male with recurrent bladder cancer, CHF,  CKD, admitted with sepsis. Palliative care is following for goals of care support and symptom support.     Brief summary of visit: Visited with Shahriar this afternoon.  Most of his focus was on the pain he was having in his hip and back.  Sat until bedside RN was able to help with repositioning and pain meds.  He wasn't able to tell me much about what he has been hearing from his medical providers, but it's possible this is because he was focusing on his hip pain. Visit ended to allow RN to do carvalho cares.  I will continue to follow      Date of Admission: 2020    Reason for consult: Patient and family support    Sources of information: Patient    Recommendations & Plan:       These recommendations have been discussed with patient, bedside RN.    Symptoms & Concerns Addressed Today:      Strengths Identified:        Relationships & Support:  Aspects of relationships and support assessed today:    Identified family members: Appears he has had several losses in the past year including the death of his brother and long term SO.  One nephew lives in [place].      Professional supports:     Family coping:     Bereavement Risk concerns:     Coping, Mental Health & Adjustment to Illness:       Goals, Decision Making & Advance Care Planning:   Prognosis, Goals, and/or Advance Care Planning were assessed today: No  If yes, brief summary of discussion:   Preferred language:   I have concerns about the patient/family's health literacy today: Unable to assess today  Patient has a completed Health Care Directive: Yes, and on file.--names Jerri as his primary decision maker, brother as secondary and nephew as third alternate.  Jerri and his brother have both  (per chart review, I did not confirm this with Shahriar)  Code status per chart review: 'Special  code': appears no CPR, ok with intubation for reversible conditions     Key Palliative Symptom Data:  # Pain severity the last 12 hours: moderate      Clinical Social Work Interventions:   Assessment of palliative specific issues    Introduction of Palliative clinical social work interventions  Facilitation of processing of thoughts/feelings      NAATSHA Harper, [facility]   Palliative Care    Pgr:[phone]  Ph: [phone]

## 2020-07-27 NOTE — PLAN OF CARE
Discharge Planner PT   Patient plan for discharge: Per chart TCU  Current status: Pt reporting back pain, agreeable to transfer to chair to attempt repositioning to assist with pain.  Min A x 1 sup->sit with HOB elevated and use of bedrail.  Min A x 2 to stand from bed and transfer to chair with walker.  One episode of knee buckling.  Noted per chart and conversation with RN palliative consult and discussion around goals of care.  Barriers to return to prior living situation: Weakness, fall risk, back pain  Recommendations for discharge: TCU if goals of care restorative  Rationale for recommendations: Pt below baseline mobility, PT indicated to achieve max functional I with mobility and decrease burden of care.         Entered by: Keara Tiwari 07/27/2020 10:23 AM

## 2020-07-27 NOTE — PLAN OF CARE
A&Ox4 but forgetful. VSS on 2L oxygen. Tele: afib CVR. Patient having severe lower back an neck pain.Gave Fentanyl x3 with mild to mod relief. Patient stating in pain again often at 90 mins, advised we could try the higher dose and patient was unsure of that at this time. Said would think about it. Patient having increased difficulty swallowing so did not use PO meds. After last T&R patient starting significant amount of coughing, did suction, resolved. Dd1 + thins. Velasquez in place, minimal output. Velasquez leaking significantly. Per notes to be addressed in AM. Attempted to reposition, unsuccessful. T&R patient q2. Patient tolerating well. Replaced dressing on coccyx. Other dressings CDI. Discharge to TCU pending.

## 2020-07-27 NOTE — PLAN OF CARE
"A&Ox4, forgetful. VSS on 2L oxygen. Tele: afib CVR. C/o moderate/severe back & neck pain, PRN oxy not effective. Fentanyl given x2 w/ some relief. Pt continues to rate pain as 10/10 but does state he feels more comfortable after IV fentanyl. Dd1 + thins, pt did not tolerate thin liquids for PM meds. Coughed with small  sips of water & required oral suctioning to alleviate cough. Poor intake & refused dinner stating \"I don't eat.\" Velasquez in place, minimal output w/ large amounts of leakage, bladder scanned for 0mL & MD notified, continue to monitor for retention. A2 gb/w to chair. Wounds dressed per orders. Discharge to TCU pending.  "

## 2020-07-27 NOTE — PLAN OF CARE
Pt A/Ox4, VSS on 2L [place]. Tele: Afib CVR. C/o pain to back, neck and hips, switched to PRN IV dilaudid and PO. PO dilaudid seems to help relieve pain, however makes pt very sleepy. T/R Q2h. A2, GB/W, up in chair this AM. Wound cares done today per order. On Dd1 w/ nectar thick liquid. Velasquez in place, some leakage around site, MD aware. Palliative met with pt this morning. PIV w/ D5+1/2NS at 50ml/hr. Plan for hospice to consult, disposition pending.

## 2020-07-28 NOTE — PROGRESS NOTES
SW:  D:  Received a call from Malia from [facility] who states she has spoken with Renee from [facility].  Renee can do the hospice consult tomorrow, Wednesday afternoon back at [facility].  Malia states that she will call the nursing station later this morning and do a nursing assessment as she is anticipating patient will need to increase services.  Explained that I agree as I feel that patient is not at his baseline.  Explained that I will check in with the providers today to see if patient's pain is under better control and if it is we will plan for a 11:00 am discharge tomorrow back to [facility].  Malia is asking for the discharge orders to be faxed to [phone].  Malia states she will speak with Renee from [facility] and ask for the equipment to be delivered prior to patient's discharge.  Malia also states that Renee will take care of the hospice meds.  P:  Will continue to follow.      NATASHA Jack, [facility]  Lead   [phone]  [facility]

## 2020-07-28 NOTE — PROGRESS NOTES
[facility]    Internal Medicine Hospitalist Progress Note  07/28/2020  I evaluated patient on the above date.    Sean Merchant Jr., MD  [phone] (p)  Text Page        Assessment & Plan New actions/orders today (07/28/2020) are underlined.    Ivan Gomez is a very pleasant 89 year old gentleman with complex and extensive past medical history that is most notable for recurrent bladder cancer, chronic right sided CHF due to mitral regurgitation status post clipping, paroxysmal atrial fibrillation, CKD 3, chronic thrombocytopenia and anemia, prior C difficile colitis, and chronic back pain on narcotic medications, who presented to [facility] from assisted living facility 7/20/2020 with hypoxia and fever and was found to have severe sepsis with signs of pneumonia and UTI.     ASSESSMENT:  1. Pneumonia, suspect community acquired pneumonia +/- aspiration.  2. Severe sepsis (hypotension, fever, tachycardia, hypoxia, lactic acidosis) due to above.  3. Acute hypoxic respiratory failure due to above.  Underlying h/o dysphagia and prior concerns for high aspiration risk. Presented 7/20 with fever, hypoxia and reported confusion (lucid in ED). Baseline BP's appeared to run low/normal (111/70 3/2020 at cardiology visit). On in evaluation in ED 7/20, temp 101, hypotensive to 70-80's systolic, improved slightly with fluids; procalcitonin 8.29, WBC 9.7, UA abnormal with 34 WBC and small LE. CXR 7/20 with mild central vascular congestion, interstitial coarsening with bilateral mid and lower lung airspace opacities, L>R, edema vs pneumonia. COVID-19 PCR 7/20 negative. BC's 7/20 NGTD. UC 7/20 NG. Started on azithromycin and ceftriaxone on admit 7/20. Ceftriaxone stopped and started cefepime 7/21. Cefepime changed to levofloxacin 7/24.    4. Paroxysmal SVT.  H/o PAF as below. Intermittent tachycardia noted here, at times jumping from normal to 's. EKG showed SVT. Did not appear to be tachycardic response  to sepsis at this time. Metoprolol restarted 7/22. Rates OK 7/24.    5. ZAK (suspect prerenal and ATN related to sepsis) on CKD.  Baseline creatinine appears to be 1.3-1.5. Cr 1.38 on admit. PTA torsemide held on admit. Started on IVF's on admit. Cr increased up to 1.92 on 7/24 with subsequent trend down. Cr normalized 7/26.  Recent Labs   Lab 07/26/20  0652 07/25/20  1041 07/24/20  0718 07/23/20  0744 07/22/20  1114   CR 1.25 1.57* 1.92* 1.88* 1.70*       6. Malnutrition due to multiple acute and chronic medical issues.  7. Dysphagia.  NPO on admit given aspiration. Seen by Speech and diet cautiously advanced, but poor oral intake with texture restrictions. Overall, felt that his recovery would be limited by his nutritional status; however, he did not want feeding tube. Further discussions made regarding goals of care and pt med with Palliative care again 7/27 as below.     8. Bladder cancer, recurrent.  9. BPH with LUTS.  * PTA on finastreride 5 mg daily.   * Follows with Dr. Hilton through [facility]. Plans noted for another TURBT this month (7/2020) but appeared to have been postponed. Per review of outpatient notes (Care Everywhere), it appeared that had started discussions re: hospice and not wanting further procedures. On admit, pt seemed reluctant to commit to comfort cares on admit. Palliative care consulted and pt desired ongoing restorative cares. Further discussions made with Hospitalist and Palliative care during this stay and ultimately pt was more open to consider hospice.    10. Chronic R sided CHF 2/2 severe MR.  11. S/p mitral valve clipping 2017, but still with mod-severe residual MR 3/2020.  12. Paroxysmal atrial fibrillation with RVR, suspect driven by infection/sepsis.  [PTA BP meds/diuretics: metoprolol 12.5 mg BID; torsemide 20 mg daily.]  * Follows with [facility]. With significant pulmonary hypertension, 3/2020 echo with PA pressures at 50+RA. [id number] score at least 3. Not on  anticoagulation 2/2 low platelet count, fall risk and risk of bleeding.    * Metoprolol and torsemide held on admit. Received IVF's this hospitalization given sepsis, ZAK. Restarted metoprolol 7/22.    13. H/o C. Diff.  Started on emipric vanco 125 mg BID on admission.     14. Thrombocytopenia, chronic.  15. Anemia, chronic.  PTA FeSO4 325 mg daily. Baseline hgb 10-11. Baseline platelets appear to be 50-90K range. On admission, hgb at 11.9 and plts 125, but suspect hemoconcentrated. No clinical signs of major bleeding (known hematuria from cancer).  Recent Labs   Lab 07/23/20  0744 07/22/20  1114   WBC 8.7 8.3   HGB 10.8* 10.4*   PLT 84* 78*       16. Abdominal pain on admission.  On presentation with LUQ abdominal pain that was sharp, severe and constant, associated with pain in mid to lower back, present times days. Lipase normal. Symptoms improved.    17. Chronic pain (back).  * PTA gabapentin 400 mg at HS and  mg daily; Norco 7.5-325 q6 hours scheduled.  * Severe back pain on presentation unchanged from previous. Seen by Palliative care this hospitalization as below and medication regimen intensified 7/27.     Goals of care discussion.  On 7/27, Dr. Brooks had a long talk with Mr. Gomez and reiterated his worries about his nutrition and that he likely would not recover unless his nutrition improved.  He reiterated his wishes against a feeding tube, and also clearly stated he did not want to be rehospitalized. To clarify, Dr. Brooks asked if we liberalized his diet so he could eat what he wanted and if he developed an aspiration pneumonia, he STILL would not want to be rehospitalized.  He had previously during this stay stated that he did not want hospice; it was explained that hospice would be appropriate for him particularly if he did not want to be hospitalized again.  He was still hesitant to pursue this and stated he wants to recover. Dr. Brooks stated his worry that this may not be possible with  his current nutritional intake. Palliative care re-consulted 7/27 and after further discussion, ultimately pt was agreeable to hospice. Pain meds adjusted 7/27.      PLAN:  - Continue levofloxacin (started 7/24) - plan stop after 8/1.  - Continue vanco 125 mg BID prophylactically (started 7/21) until 1 week after other antibiotics (stop 8/7).  - Continue O2 for comfort  - Continue PRN acetaminophen, PRN PO Dilaudid, PRN IV Dilaudid.  - Continue DD1 with nectar liquids; plan liberalize diet when enrolled in hospice.  - Plan to arrange for discharge back to home/assisted living with hospice.  - Appreciate help from Palliative Care.    COVID-19 testing.  COVID-19 PCR Results    COVID-19 PCR Results 7/20/20   COVID-19 Virus PCR to [facility] - Result Not Detected   COVID-19 Virus PCR to [facility] - Source Nasopharyngeal      Comments are available for some flowsheets but are not being displayed.         COVID-19 Antibody Results, Testing for Immunity    COVID-19 Antibody Results, Testing for Immunity   No data to display.             Diet: Snacks/Supplements Adult: Other; Plus 2 milkshake; Between Meals  Snacks/Supplements Adult: Other; Pudding; With Meals  Combination Diet Dysphagia Diet Level 1: Pureed; Nectar Thickened Liquids (pre-thickened or use instant food thickener)    Prophylaxis: PCD's, ambulation. Protonix for GI.  Velasquez Catheter: in place, indication: Strict 1-2 Hour I&O  Code Status: No CPR- Do NOT Intubate    Disposition Plan   Expected discharge: Tomorrow, recommended to prior living arrangement with hospice if pain regimen acceptable and after arrangements made for hospice.  Entered: Sean Merchant MD 07/28/2020, 7:24 AM       Oxygen Documentation:   I certify that this patient, Ivan Gomez has been under my care (or a nurse practitioner or physican's assistant working with me). This is the face-to-face encounter for oxygen medical necessity.      Ivan Gomez is now in a chronic stable state  "and continues to require supplemental oxygen. Patient has continued oxygen desaturation due to Chronic Heart Failure I50, pneumonia, hospice    Alternative treatment(s) tried or considered and deemed clinically infective for treatment of Chronic Heart Failure I50, pneumoia, hospice - include Diuretics, antibiotics  If portability is ordered, is the patient mobile within the home? yes    **Patients who qualify for home O2 coverage under the CMS guidelines require ABG tests or O2 sat readings obtained closest to, but no earlier than 2 days prior to the discharge, as evidence of the need for home oxygen therapy. Testing must be performed while patient is in the chronic stable state. See notes for O2 sats.**        Interval History   Pt more sleepy today.  Pain seems better controlled.    -Data reviewed today: I reviewed all new labs and imaging over the last 24 hours. I personally reviewed no images or EKG's today.    Physical Exam   Heart Rate: 81, Blood pressure 108/62, pulse 78, temperature 95.7  F (35.4  C), temperature source Axillary, resp. rate 16, height 1.727 m (5' 8\"), weight 57.2 kg (126 lb 3.2 oz), SpO2 98 %.  Vitals:    07/26/20 0600 07/27/20 0612 07/28/20 0500   Weight: 61.2 kg (135 lb) 53.2 kg (117 lb 3.2 oz) 57.2 kg (126 lb 3.2 oz)     Vital Signs with Ranges  Temp:  [95.6  F (35.3  C)-96.4  F (35.8  C)] 95.7  F (35.4  C)  Pulse:  [71-78] 78  Heart Rate:  [81] 81  Resp:  [16] 16  BP: (107-119)/(56-71) 108/62  SpO2:  [94 %-99 %] 98 %  Patient Vitals for the past 24 hrs:   BP Temp Temp src Pulse Heart Rate Resp SpO2 Weight   07/28/20 0500 -- -- -- -- -- -- -- 57.2 kg (126 lb 3.2 oz)   07/28/20 0113 108/62 95.7  F (35.4  C) Axillary -- 81 16 98 % --   07/27/20 1957 119/71 -- -- 78 -- 16 99 % --   07/27/20 1504 108/69 95.6  F (35.3  C) Axillary 75 -- 16 94 % --   07/27/20 0819 107/56 96.4  F (35.8  C) Axillary 71 -- 16 98 % --     I/O's Last 24 hours  I/O last 3 completed shifts:  In: -   Out: 850 " [Urine:850]    Constitutional: Frail, cachectic, arousable, answers some questions.  Respiratory:   Cardiovascular:   GI:   Skin/Integumen:   Other:        Data   Recent Labs   Lab 07/26/20  0652 07/25/20  1041 07/24/20  0718 07/23/20  0744  07/22/20  1114   WBC  --   --   --  8.7  --  8.3   HGB  --   --   --  10.8*  --  10.4*   MCV  --   --   --  101*  --  101*   PLT  --   --   --  84*  --  78*    144 144 144  --  140   POTASSIUM 3.8 3.7 4.2 5.1   < > 5.5*   CHLORIDE 116* 118* 116* 115*  --  110*   CO2 22 23 22 22  --  20   BUN 42* 56* 69* 70*  --  58*   CR 1.25 1.57* 1.92* 1.88*  --  1.70*   ANIONGAP 6 3 6 7  --  10   GIUSEPPE 8.2* 7.9* 7.9* 8.3*  --  8.7   GLC 87 96 136* 67*  --  71    < > = values in this interval not displayed.     Recent Labs   Lab Test 07/26/20  0652 07/25/20  1041 07/24/20  0718 07/23/20  0744 07/22/20  1114  09/04/16  0522 09/03/16  0603  08/21/15  0557   GLC 87 96 136* 67* 71   < >  --   --    < >  --    BGM  --   --   --   --   --   --  85 112*  --  106*    < > = values in this interval not displayed.         No results found for this or any previous visit (from the past 24 hour(s)).    Medications   All medications were reviewed.    dextrose 5% and 0.45% NaCl 50 mL/hr at 07/27/20 6991       carboxymethylcellulose PF  2 drop Both Eyes BID     finasteride  5 mg Oral Daily     levofloxacin  750 mg Oral Every Other Day     magnesium hydroxide  30 mL Oral Daily     metoprolol tartrate  12.5 mg Oral BID     pantoprazole (PROTONIX) IV  40 mg Intravenous Daily with breakfast     sodium chloride (PF)  3 mL Intracatheter Q8H     vancomycin  125 mg Oral BID

## 2020-07-28 NOTE — PROGRESS NOTES
"SW:  D:  Spoke with Xuan from palliative care regarding discharge plans.  Xuan states that patient has agreed to hospice care on discharge. Spoke with patient regarding discharge plans.  Patient states that he is planning on returning back to [facility] on discharge, \"it is my home\".  Explained to patient that I will work on the details and I will update him once the details are determined.  Patient is in agreement.  Call placed to [facility] and spoke with patient's  Malia, [phone].  Malia states that patient is able to return to [facility] on discharge and they prefer that patient uses [facility] and Renee is the hospice nurse from [facility] that is in the building.  Patient was scheduled to have a hospice meeting with Renee on 7-24, but this meeting did not take place as patient was in the hospital.  Malia is asking for a referral to be made to [facility] and she is asking for an update once the details are determined.  Patient must be back at the facility by 13:00 on any given day (except the weekends where they do not take admissions).  Referral made to [facility] and spoke with Ama.  Awaiting a return call with a meeting time.  Unsure if we can make this happen tomorrow with the strict return time requirement or if we will have to wait until Wednesday.  Call placed and message left for Malia from [facility] with this update.  Also received a call from Cynthia, patient's [facility] Physician's Care Coordinator, [phone].  Once a discharge plan is determined she is asking for an update.  P:  Will continue to follow.      NATASHA Jack, [facility]  Lead   [phone]  [facility]    "

## 2020-07-28 NOTE — PROGRESS NOTES
[facility]  Palliative Care Daily Progress Note       Recommendations & Counseling       Pt interested in meeting with hospice. He repeatedly stated today that he wanted to be more comfortable. We acknowledge that hospice will be the best way to provide comfort moving forward. SW consult in place     Pt is accepting of me making adjustments to his pain regimen to help provide relief. He accepts that these medications may make him feel sleepy. Will discontinue IV fentanyl PRN, scheduled and PRN oxycodone and replace with dilaudid 4mg SL every 4hrs PRN pain with dilaudid 0.5-1mg IV Q2hrs PRN severe pain     Difficulty with pills and large volume. Will trial milk of mag daily for constipation     Will cancel pain consult for today, given likely change in GOC to hospice      Case was reviewed with bedside RN Mayte, Dr. Brooks, Stas Conde [facility], unit care coordinator Rita, and unit SW Pushpa.    Xuan MARIE, [facility]  Palliative Medicine   Pager [phone]      Thank you for the opportunity to participate in the care of this patient and family. Our team: will continue to follow.     During regular M-F work hours -- if you are not sure who specifically to contact -- please contact us at [phone].    After regular work hours and on weekends/holidays, you can call our answering service at [phone]. Also, who's on call for us is available in [facility] Smart Web.     Attestation:  Total time on the floor involved in the patient's care: 60 minutes  Total time spent in counseling/care coordination: >50%     Assessments          Ivan Gomez is a 89 year old male with PMH significant for recurrent bladder CA, BPH, chronic right diastolic HF, severe mitral regurgitation s/p clipping, paroxysmal a.fib, HLD, CKD stage III, chronic thrombocytopenia and anemia, prior c.diff infection, and chronic back pain 2/2 OA and spinal stenosis who presents with hypoxia and fever. He is found to have acute respiratory  failure consistent with severe sepsis 2/2 CAP. COVID-19 negative. Outpatient, there have been discussions of bladder fulguration vs hospice. Since admission, he has had intermittent hypotension, ZAK on CKD, and intractable back/neck pain.     Today, the patient was seen for:  Pain management, goals of care     Visited Shahriar this AM. He is seen sitting up in the chair. He is so frail appearing. He is holding an emesis basin. He reports pain in his tailbone. Mayte SINGH and I help reposition him.     I express concern for Shahriar's ongoing pain in his back, which has been present for some time, but appears to be worsening. I express concern to him that living with this pain is going to get harder with time, and I am worried that in the absence of a good plan, he may continue to struggle. He is also very cachectic, and I am worried for his ability to maintain his weight.     We note that the best plan of care for managing his pain moving forward will be hospice. He acknowledges that he doesn't like the word hospice, and expresses frustration for possibly suboptimal care of his bladder CA leading to his decline. We do note that that generally is the complication of cancer over time, which is sadly quite common for this type of disease.     Mayte SINGH joined the conversation, and it is clear that Shahriar trusts her. Mayte and I note that hospice really would be a alexander plan and Shahriar is reluctantly agreeable, repeatedly stating that he just wants to be comfortable.    We note that with more aggressive pain management, Shahriar may become sleepier. At this time, he is agreeable to being more assertive with pain management, even if it compromises his wakefulness and engagement.     Prognosis, Goals, or Advance Care Planning was addressed today with: Yes.  Mood, coping, and/or meaning in the context of serious illness were addressed today: Yes.  Summary/Comments: We note that oftentimes our mind, heart, and spirit are not necessarily ready for  end of life cares, but sometimes our body tells us it's time.             Interval History:     Chart review/discussion with unit or clinical team members:   ZAK resolved. Still with intractable back/neck pain. Using IV fentanyl     Per patient or family/caregivers today:  Pain in tailbone and pain all over. Difficult to get comfortable in chair, despite only being up for a short bit. Feeling cold.     Key Palliative Symptoms:  # Pain severity the last 12 hours: severe  # Dyspnea severity the last 12 hours: low  # Nausea severity the last 12 hours: low  # Anxiety severity the last 12 hours: low    Patient is on opioids: assessed and I made recommendations about bowel care as above.           Review of Systems:     Besides above, an additional N/A system ROS was reviewed and is unremarkable          Medications:     I have reviewed this patient's medication profile and medications during this hospitalization.    Noted meds:    Levaquin   Vancomycin oral   MOM daily   IVF  Dilaudid 4mg SL every 4hrs PRN pain with dilaudid 0.5-1mg IV Q2hrs PRN severe pain            Physical Exam:   Temp: 95.6  F (35.3  C) Temp src: Axillary BP: 119/71 Pulse: 78   Resp: 16 SpO2: 99 % O2 Device: None (Room air) Oxygen Delivery: 2 LPM  CONSTITUTIONAL: Chronically ill cachectic man seen sitting up in the chair in moderate distress. He is A&Ox3. Calm and cooperative.  HEENT: NCAT  RESPIRATORY: NL respiratory effort on RA  SKIN: Extensive ecchymoses to BUE, extremities cold to touch   NEUROLOGIC: Appropriately responsive during interview  PSYCH: Affect congruent            Data Reviewed:     Reviewed recent pertinent imaging, comments:   7/4 CT CAP No evidence for pulmonary embolism. Multiple bladder wall masses are noted, with the largest on the left measuring 3.5 cm. Findings are suspicious for neoplasm, such as transitional cell carcinoma. Nonobstructing 0.5 cm stone in the lower pole of the left kidney. Heterogeneous enhancement of the  liver is a nonspecific finding.     7/20 CXR Cardiomegaly with mild central vascular congestion. Interstitial coarsening with bilateral mid and lower lung airspace opacities, most pronounced within the left lung base. This may reflect edema and/or pneumonia. No definite pleural effusion.    Reviewed recent labs, comments:   Na 144  K 3.8  Creat 1.25  WBC 8.7  Hgb 10.8  Plt 84

## 2020-07-28 NOTE — PROGRESS NOTES
[facility] Nurse Inpatient Assessment   Reason for consultation: Evaluate and treat right posterior torso, spine, coccyx and left medial/ posterior heel    Assessment     4 areas of community acquired pressure injury noted  Right posterior torso- red, slick, shiny wound bed, likely biofilm present, will use topical gels to help debride and jump start healing  Midline thoracic spine, deep tissue injury, plan is to minimize pressure  Coccyx, unstageable pressure injury, looks like is healing and starting to release the slough, set within a greater area of scar tissue, no s/s/ of infection  Left medial/ posterior heel, sloughy base, red periwound tissue, tender to the touch, infected looking unstageable PI, will use topical medications that should help the wound to resolve    Treatment Plan    Right lateral posterior torso/ coccyx/ R medial-posterior heel plan of care: Daily    1. Clean wounds by wetting gauze with VASHE (826304) and pressing to wound bed x 10minutes, wipe clean  2. Paint with skin prep or No Sting Skin Prep (#278128) where ever you want your dressing to stick, allow to dry thoroughly. Do NOT skip this step! Skin prep will help your dressing to stick  3. Apply small dab of Iodosorb Gel (#533295) and Plurogel (#710561)to wound bed ONLY  4. Press a Mepilex  Border Dressing (#159104) to the area, making sure to conform nicely to skin curvatures  5. Time and date dressing change   -REPOSITION PT SIDE TO SIDE ONLY WHEN IN BED, EVERY 2 HOURS- this will help not only the skin but the respiratory status  -HEELS MUST BE KEPT ELEVATED AT ALL TIMES, USING AT LEAST 2 PILLOWS UNDER EACH CALF, ASSURING HEELS ARE FLOATING  -WHEN UP TO THE CHAIR PT NEEDS TO FULLY OFF LOAD EVERY 2 HOURS- stand or return to bed    General hygiene  - please shower pt using baby shampoo to really clean scalp  - pour and massage in Dipika Cle[place] and Protect from head to toes, arms, legs, chest, etc.  This will  condition skin, then apply lotion    Recommended provider order: n/a   Orders Reviewed  [facility] Nurse follow-up plan: weekly    Patient History    According to provider note(s):  89 year old gentleman with complex and extensive past medical history that is most notable for recurrent bladder cancer, chronic right sided diastolic CHF due to mitral regurgitation status post clipping, paroxysmal atrial fibrillation, CKD 3, chronic thrombocytopenia and anemia, prior C difficile colitis, and chronic back pain on narcotic medications who presents with hypoxia and fever and is found to have severe sepsis due to UTI vs bilateral pneumonia.     Community acquired pneumonia  Severe sepsis (hypotension, fever, tachycardia, hypoxia, lactic acidosis)  Acute hypoxic respiratory failure  Rule out COVID-19, low suspicion  Underlying h/o dysphagia and prior concerns for high aspiration risk. Presented with fever, hypoxia and reported confusion (lucid in ED). Baseline BP's appear to run low/normal (111/70 3/2020 at cardiology visit). Temp 101 in ED. Hypotensive to 70-80's systolic, improved slightly with fluids.  Procalcitonin 8.29. WBC 9.7CXR with mild central vascular congestion, interstitial coarsening with bilateral mid and lower lung airspace opacities, L>R...edema vs pneumonia. No reported exposures to COVID-19.     Objective Data    Containment of urine/stool: Diaper  Active Diet Order:  Orders Placed This Encounter      Combination Diet Dysphagia Diet Level 1: Pureed; Nectar Thickened Liquids (pre-thickened or use instant food thickener)    Labs:   Recent Labs   Lab 07/23/20  0744   HGB 10.8*   WBC 8.7     Output:   I/O last 3 completed shifts:  In: 2720 [I.V.:2720]  Out: 850 [Urine:850]    Risk Assessment:   Sensory Perception: 2-->very limited  Moisture: 3-->occasionally moist  Activity: 2-->chairfast  Mobility: 2-->very limited  Nutrition: 2-->probably inadequate  Friction and Shear: 2-->potential problem  Haim Score:  13    Physical Exam    Spine and right posterior torso assessment   Wound / skin history: community acquired injuries  Photo date: July 21, 2020 thoracic spine and  7/28/20         Along midline spine is an area of nonblanchable maroon erythema 3.4cm x 2cm, denies pain    July 21, 2020 right lateral posterior torso  And  7/28/20       Red wound bed, glossy with crusting of brown drainage around edges, about 2.3cm x 1.4cm x 0.2cm, no pain    July 21, 2020 coccyx     And 7/28/20       Coccyx total area of about 4.5cm is small crusty, slough, with friable red granulation tissue only about 2.2cm x 2.5cm.  Greater periwound tissue scar tissue, painful, eccymosis    July 2,1 2020 left medial/ posterior heel  And 7/28/20       1.1cm x 1.4cm x 0.3cm moist yellow slough with, painful during dressing change, light ring of red erythema    Interventions  Current support surface: Standard  Low air loss mattress with pulsation ,   Current off-loading measures: Pillows under calves and Pillows,   Visual inspection of wound(s) completed with RN  Wound Care: done per plan of care,   Supplies: reviewed, discussed with RN and discussed with patient  Education provided: importance of repositioning, wound progress, Moisture management, Hygiene and Off-loading pressure  Discussed plan of care with Patient and Nurse    Linda Bingham, RN CWOCN

## 2020-07-28 NOTE — PLAN OF CARE
A&Ox4, slow to respond at times, forgetful, lethargic. VSS on 2L O2 ex soft BPs. C/o 8/10 neck pain, gave sublingual dilaudid x1.  Mepilex to coccyx, L heel, R shoulder. T/R q2h. Velasquez in place with adequate pink output. Dd1 diet w/ nectar thick liquids, reported small amt of aspiration after taking PO dilaudid, encouraged coughing which was effective. PIV w/ D5 1/2 NS infusing at 50. Plan for discharge w/ hospice, consult tomorrow, needing placement. Slept between cares.

## 2020-07-28 NOTE — PLAN OF CARE
Discharge Planner SLP   Patient plan for discharge: Patient did not state  Current status: Patient seen for swallowing tx while in bed. Pt looking very fatigued and denied most PO today. Per RN, difficulty swallowing overnight. Pt tolerated 3 small sips of nectar thick liquid via single sips from straw, required double swallows but no overt coughing. Pt continues to be at high risk of aspiration due to global weakness and dysphagia, with limited interest in PO.     Recommend catiously continue DDL1 with nectar thick liquid. Strict swallow precautions: double swallows and wait time for every bolus, half teaspoons, single sips from cup or straw, upright as able, slow rate.     Barriers to return to prior living situation: below baseline  Recommendations for discharge: Return to [facility]   Rationale for recommendations: If chose hospice at discharge, no further SLP services        Entered by: Xuan Durand 07/28/2020 10:40 AM

## 2020-07-28 NOTE — PLAN OF CARE
A&Ox4, slow to respond at times, forgetful. VSS on 2L O2 ex soft Bps. 10/10 neck pain managed with PRN Dilaudid; effective. Mepilex to coccyx, L heel, R shoulder; CDI-changed today. T/R q2h. Velasquez in place with adequate pink/red output. Dd1 diet w/ nectar thick liquids- frequently coughs after intake. Encouraged small slow bites. Plan for discharge w/ hospice tomorrow to [facility] at 1100.

## 2020-07-28 NOTE — DISCHARGE SUMMARY
[facility]  Discharge Summary        Ivan Gomez MRN# [id number]   YOB: 1926 Age: 89 year old     Date of Admission: 7/20/2020  Date of Discharge: 7/29/2020  Admitting Physician: Josr Harrell MD  Discharge Physician: Sean Merchant MD     Primary Provider: Inderjit Purvis MD  Primary Care Physician Phone Number: [phone]         Discharge Diagnoses:   1. Pneumonia, suspect community acquired pneumonia +/- aspiration.  2. Severe sepsis (hypotension, fever, tachycardia, hypoxia, lactic acidosis) due to above.  3. Acute hypoxic respiratory failure due to above.  4. Paroxysmal SVT.  5. ZAK (suspect prerenal and ATN related to sepsis) on CKD.  6. Malnutrition due to multiple acute and chronic medical issues.  7. Dysphagia.  8. Bladder cancer, recurrent.        Other Chronic Medical Problems:      1. Chronic R sided CHF 2/2 severe MR.  2. S/p mitral valve clipping 2017, but still with mod-severe residual MR 3/2020.  3. Paroxysmal atrial fibrillation with RVR, suspect driven by infection/sepsis.  4. BPH with LUTS.  5. Thrombocytopenia, chronic.  6. Anemia, chronic.  7. Chronic pain (back).  8. H/o C. Diff.         Allergies:         Allergies   Allergen Reactions     Celebrex [Celecoxib] Hives     Penicillins Hives           Discharge Medications:        Current Discharge Medication List      START taking these medications    Details   guaiFENesin (ROBITUSSIN) 20 mg/mL SOLN solution Take 10 mLs by mouth every 4 hours as needed for other (secretion expectorant)  Qty: 473 mL, Refills: 1    Associated Diagnoses: Severe sepsis (H)      HYDROmorphone (DILAUDID) 4 mg/0.4 mL (HIGH CONC) oral solution Place 0.4 mLs (4 mg) under the tongue every 4 hours as needed for moderate to severe pain  Qty: 30 mL, Refills: 0    Associated Diagnoses: Spinal stenosis of lumbar region, unspecified whether neurogenic claudication present      levofloxacin (LEVAQUIN) 750 MG tablet  Take 1 tablet (750 mg) by mouth every other day  Qty: 2 tablet, Refills: 0    Associated Diagnoses: Severe sepsis (H)      vancomycin (VANCOCIN) 125 MG capsule Take 1 capsule (125 mg) by mouth 2 times daily for 11 days  Qty: 22 capsule, Refills: 0    Associated Diagnoses: Severe sepsis (H)         CONTINUE these medications which have NOT CHANGED    Details   artificial saliva (BIOTENE DRY MOUTHWASH) LIQD liquid Swish and spit 15 mLs in mouth 3 times daily      beta carotene 47997 UNIT capsule Take 1 capsule (25,000 Units) by mouth daily    Associated Diagnoses: CKD (chronic kidney disease) stage 3, GFR 30-59 ml/min (H)      bisacodyl (DULCOLAX) 5 MG EC tablet Take 5 mg by mouth daily as needed for constipation      !! carboxymethylcellulose PF (REFRESH PLUS) 0.5 % ophthalmic solution Place 1 drop into both eyes every 2 hours as needed for dry eyes      !! carboxymethylcellulose PF (REFRESH PLUS) 0.5 % ophthalmic solution Place 2 drops into both eyes 2 times daily And every 2 hours as needed      ferrous sulfate (FEROSUL) 325 (65 Fe) MG tablet Take 325 mg by mouth daily (with breakfast)      finasteride (PROSCAR) 5 MG tablet TAKE 1 TABLET(5 MG) BY MOUTH DAILY  Qty: 90 tablet, Refills: 3    Associated Diagnoses: Benign enlargement of prostate      !! gabapentin (NEURONTIN) 300 MG capsule Take 300 mg by mouth daily as needed      !! gabapentin (NEURONTIN) 400 MG capsule Take 400 mg by mouth At Bedtime      melatonin 3 MG tablet Take 3 mg by mouth At Bedtime      Menthol, Topical Analgesic, (BIOFREEZE ROLL-ON EX) Externally apply topically daily as needed (applies himself as needed)      metoprolol tartrate (LOPRESSOR) 25 MG tablet Take 0.5 tablets (12.5 mg) by mouth 2 times daily    Associated Diagnoses: Biventricular congestive heart failure (H)      senna-docusate (SENOKOT-S/PERICOLACE) 8.6-50 MG tablet Take 1 tablet by mouth daily as needed for constipation      simvastatin (ZOCOR) 20 MG tablet Take 20 mg by mouth  At Bedtime      Skin Protectants, Misc. (EUCERIN) cream Apply topically 2 times daily And as needed. Apply to LE and areas of dryness      Skin Protectants, Misc. (LANTISEPTIC SKIN PROTECTANT) 50 % OINT Externally apply topically 3 times daily as needed (to coccyx for pressure sore, pt ok to self administer)      vitamin C (ASCORBIC ACID) 500 MG tablet TAKE ONE TABLET BY MOUTH ONCE DAILY  Qty: 100 tablet, Refills: 3    Associated Diagnoses: CKD (chronic kidney disease) stage 3, GFR 30-59 ml/min (H)      order for DME Equipment being ordered: Non-sting barrier wipes for bilateral heel  Qty: 1 Box, Refills: 11    Associated Diagnoses: Heel pain, bilateral       !! - Potential duplicate medications found. Please discuss with provider.      STOP taking these medications       HYDROcodone-acetaminophen (NORCO) 7.5-325 MG per tablet Comments:   Reason for Stopping:         potassium chloride ER (K-TAB/KLOR-CON) 10 MEQ CR tablet Comments:   Reason for Stopping:         torsemide (DEMADEX) 20 MG tablet Comments:   Reason for Stopping:                   Discharge Instructions and Follow-Up:      Discharge Orders      Reason for your hospital stay    Pneumonia with sepsis and acute hypoxic respiratory failure.  CHF.  Malnutrition.  Bladder cancer.     Follow-up and recommended labs and tests    Follow-up with primary care provider, Inderjit Purvis, as needed.  Follow-up with home hospice ([facility]).     Activity    Your activity upon discharge: activity as tolerated     Tubes and drains    You are going home with the following tubes or drains: carvalho catheter.  Tube cares per hospital or home care instructions     Oxygen Adult/Peds    Oxygen Documentation:   I certify that this patient, Ivan Gomez has been under my care (or a nurse practitioner or physican's assistant working with me). This is the face-to-face encounter for oxygen medical necessity.      Ivan Gomez is now in a chronic stable state and continues to  require supplemental oxygen. Patient has continued oxygen desaturation due to Chronic Heart Failure I50, pneumonia, hospice    Alternative treatment(s) tried or considered and deemed clinically infective for treatment of Chronic Heart Failure I50, pneumoia, hospice - include Diuretics, antibiotics  If portability is ordered, is the patient mobile within the home? yes    **Patients who qualify for home O2 coverage under the CMS guidelines require ABG tests or O2 sat readings obtained closest to, but no earlier than 2 days prior to the discharge, as evidence of the need for home oxygen therapy. Testing must be performed while patient is in the chronic stable state. See notes for O2 sats.**     Diet    Follow this diet upon discharge: Orders Placed This Encounter      Snacks/Supplements Adult: Other; Plus 2 milkshake; Between Meals      Snacks/Supplements Adult: Other; Pudding; With Meals      Combination Diet Dysphagia Diet Level 1: Pureed; Nectar Thickened Liquids (pre-thickened or use instant food thickener)             Consultations This Hospital Stay:      Palliative Care.        Admission History:      Please see the H&P by Josr Harrell MD on 7/20/2020 for complete details. Briefly, Ivan Gomez is a very pleasant 89 year old gentleman with complex and extensive past medical history that is most notable for recurrent bladder cancer, chronic right sided CHF due to mitral regurgitation status post clipping, paroxysmal atrial fibrillation, CKD 3, chronic thrombocytopenia and anemia, prior C difficile colitis, and chronic back pain on narcotic medications, who presented to [facility] from assisted living facility 7/20/2020 with hypoxia and fever and was found to have severe sepsis with signs of pneumonia and UTI.        Problem Oriented Hospital Course:        1. Pneumonia, suspect community acquired pneumonia +/- aspiration.  2. Severe sepsis (hypotension, fever, tachycardia, hypoxia, lactic acidosis) due to  above.  3. Acute hypoxic respiratory failure due to above.  Underlying h/o dysphagia and prior concerns for high aspiration risk. Presented 7/20 with fever, hypoxia and reported confusion (lucid in ED). Baseline BP's appeared to run low/normal (111/70 3/2020 at cardiology visit). On in evaluation in ED 7/20, temp 101, hypotensive to 70-80's systolic, improved slightly with fluids; procalcitonin 8.29, WBC 9.7, UA abnormal with 34 WBC and small LE. CXR 7/20 with mild central vascular congestion, interstitial coarsening with bilateral mid and lower lung airspace opacities, L>R, edema vs pneumonia. COVID-19 PCR 7/20 negative. BC's 7/20 NGTD. UC 7/20 NG. Started on azithromycin and ceftriaxone on admit 7/20. Ceftriaxone stopped and started cefepime 7/21. Cefepime changed to levofloxacin 7/24.    4. Paroxysmal SVT.  H/o PAF as below. Intermittent tachycardia noted here, at times jumping from normal to 's. EKG showed SVT. Did not appear to be tachycardic response to sepsis at this time. Metoprolol restarted 7/22. Rates OK 7/24.    5. ZAK (suspect prerenal and ATN related to sepsis) on CKD.  Baseline creatinine appears to be 1.3-1.5. Cr 1.38 on admit. PTA torsemide held on admit. Started on IVF's on admit. Cr increased up to 1.92 on 7/24 with subsequent trend down. Cr normalized 7/26.    6. Malnutrition due to multiple acute and chronic medical issues.  7. Dysphagia.  NPO on admit given aspiration. Seen by Speech and diet cautiously advanced, but poor oral intake with texture restrictions. Overall, felt that his recovery would be limited by his nutritional status; however, he did not want feeding tube. Further discussions made regarding goals of care and pt med with Palliative care again 7/27 as below.     8. Bladder cancer, recurrent.  9. BPH with LUTS.  * PTA on finastreride 5 mg daily.   * Follows with Dr. Hilton through [facility]. Plans noted for another TURBT this month (7/2020) but appeared to have been postponed.  Per review of outpatient notes (Care Everywhere), it appeared that had started discussions re: hospice and not wanting further procedures. On admit, pt seemed reluctant to commit to comfort cares on admit. Palliative care consulted and pt desired ongoing restorative cares. Further discussions made with Hospitalist and Palliative care during this stay and ultimately pt was more open to consider hospice.    10. Chronic R sided CHF 2/2 severe MR.  11. S/p mitral valve clipping 2017, but still with mod-severe residual MR 3/2020.  12. Paroxysmal atrial fibrillation with RVR, suspect driven by infection/sepsis.  [PTA BP meds/diuretics: metoprolol 12.5 mg BID; torsemide 20 mg daily.]  * Follows with [facility]. With significant pulmonary hypertension, 3/2020 echo with PA pressures at 50+RA. [id number] score at least 3. Not on anticoagulation 2/2 low platelet count, fall risk and risk of bleeding.    * Metoprolol and torsemide held on admit. Received IVF's this hospitalization given sepsis, ZAK. Restarted metoprolol 7/22.    13. H/o C. Diff.  Started on emipric vanco 125 mg BID on admission.     14. Thrombocytopenia, chronic.  15. Anemia, chronic.  PTA FeSO4 325 mg daily. Baseline hgb 10-11. Baseline platelets appear to be 50-90K range. On admission, hgb at 11.9 and plts 125, but suspect hemoconcentrated. No clinical signs of major bleeding (known hematuria from cancer).    16. Abdominal pain on admission.  On presentation with LUQ abdominal pain that was sharp, severe and constant, associated with pain in mid to lower back, present times days. Lipase normal. Symptoms improved.    17. Chronic pain (back).  * PTA gabapentin 400 mg at HS and  mg daily; Norco 7.5-325 q6 hours scheduled.  * Severe back pain on presentation unchanged from previous. Seen by Palliative care this hospitalization as below and medication regimen intensified 7/27.     Goals of care discussion.  On 7/27, Dr. Brooks had a long talk with  Mr. Gomez and reiterated his worries about his nutrition and that he likely would not recover unless his nutrition improved.  He reiterated his wishes against a feeding tube, and also clearly stated he did not want to be rehospitalized. To clarify, Dr. Brooks asked if we liberalized his diet so he could eat what he wanted and if he developed an aspiration pneumonia, he STILL would not want to be rehospitalized.  He had previously during this stay stated that he did not want hospice; it was explained that hospice would be appropriate for him particularly if he did not want to be hospitalized again.  He was still hesitant to pursue this and stated he wants to recover. Dr. Brooks stated his worry that this may not be possible with his current nutritional intake. Palliative care re-consulted 7/27 and after further discussion, ultimately pt was agreeable to hospice. Pain meds adjusted 7/27.      PLAN:  - Continue levofloxacin (started 7/24) - plan stop after 8/1.  - Continue vanco 125 mg BID prophylactically (started 7/21) until 1 week after other antibiotics (stop 8/7).  - Continue O2 for comfort  - Continue PRN acetaminophen, PRN PO Dilaudid.  - Continue DD1 with nectar liquids; plan liberalize diet when enrolled in hospice.  - Plan to arrange for discharge back to home/assisted living with hospice.    COVID-19 testing.  COVID-19 PCR Results    COVID-19 PCR Results 7/20/20   COVID-19 Virus PCR to [facility] - Result Not Detected   COVID-19 Virus PCR to [facility] - Source Nasopharyngeal      Comments are available for some flowsheets but are not being displayed.         COVID-19 Antibody Results, Testing for Immunity    COVID-19 Antibody Results, Testing for Immunity   No data to display.                 Pending Results:        Unresulted Labs Ordered in the Past 30 Days of this Admission     No orders found from 6/20/2020 to 7/21/2020.                Discharge Disposition:      Discharged to home (assisted living) with  hospice.        Discharge Time:      Less than 30 minutes.        Key Imaging Studies, Lab Findings and Procedures/Surgeries:        Results for orders placed or performed during the hospital encounter of 07/20/20   XR Chest Port 1 View    Narrative    XR CHEST PORT 1 VW 7/20/2020 7:55 PM    HISTORY: Low O2 sat    COMPARISON: CT 7/4/2019      Impression    IMPRESSION: Cardiomegaly with mild central vascular congestion.  Interstitial coarsening with bilateral mid and lower lung airspace  opacities, most pronounced within the left lung base. This may reflect  edema and/or pneumonia. No definite pleural effusion.    NORI LATIF MD

## 2020-07-29 NOTE — PROGRESS NOTES
Chart reviewed, noted patient will discharge with Hospice.  Will be available if more aggressive nutrition intervention desired.     Cathryn Albarran RD, LD, CNSC   Clinical Dietitian - [facility]

## 2020-07-29 NOTE — PROGRESS NOTES
SW:  D:  Received discharge orders for patient.  Patient is planning on returning home to [facility] via [facility] stretcher at 11:30.  Once patient arrives back at [facility] will meet patient there to do the hospice consult and they will fill the hospice meds.  Call placed to update Malia, patient's  at [facility] to confirm the discharge plan.  She is asking to speak with the bedside nurse so she can do a nursing assessment so she knows what kind of services to add.  Faxed the discharge orders to [facility].      NATASHA Jack, [facility] HospitalSW  Lead   [phone]  [facility]

## 2020-07-29 NOTE — PLAN OF CARE
A&Ox4, slow to respond, forgetful at times. VSS, ex soft BPs, on 2L NC. C/o pain in back and neck, prn SL dilaudid given x1, effective. Frequent weak cough, c/o being unable to bring up secretions from throat, aspiration precautions in place but good O2 sats. Suctioned x2, scant amt of white phlegm produced. Mepilex dressings in place and CDI to bilat heels, coccyx, R torso, dressings changed in PM, T/R q2h. PIV infusing D5 1/2 NS @ 50. Velasquez in place w/ adequate, watermelon colored urine. DD1 diet w/ nectar thick liquids. Plan to discharge with hospice to [facility] today, ride planned for 1130.

## 2020-07-29 NOTE — PROGRESS NOTES
[facility]    Internal Medicine Hospitalist Progress Note  07/29/2020  I evaluated patient on the above date.    Sean Merchant Jr., MD  [phone] (p)  Text Page        Assessment & Plan New actions/orders today (07/29/2020) are underlined.    Ivan Gomez is a very pleasant 89 year old gentleman with complex and extensive past medical history that is most notable for recurrent bladder cancer, chronic right sided CHF due to mitral regurgitation status post clipping, paroxysmal atrial fibrillation, CKD 3, chronic thrombocytopenia and anemia, prior C difficile colitis, and chronic back pain on narcotic medications, who presented to [facility] from assisted living facility 7/20/2020 with hypoxia and fever and was found to have severe sepsis with signs of pneumonia and UTI.     ASSESSMENT:  1. Pneumonia, suspect community acquired pneumonia +/- aspiration.  2. Severe sepsis (hypotension, fever, tachycardia, hypoxia, lactic acidosis) due to above.  3. Acute hypoxic respiratory failure due to above.  Underlying h/o dysphagia and prior concerns for high aspiration risk. Presented 7/20 with fever, hypoxia and reported confusion (lucid in ED). Baseline BP's appeared to run low/normal (111/70 3/2020 at cardiology visit). On in evaluation in ED 7/20, temp 101, hypotensive to 70-80's systolic, improved slightly with fluids; procalcitonin 8.29, WBC 9.7, UA abnormal with 34 WBC and small LE. CXR 7/20 with mild central vascular congestion, interstitial coarsening with bilateral mid and lower lung airspace opacities, L>R, edema vs pneumonia. COVID-19 PCR 7/20 negative. BC's 7/20 NGTD. UC 7/20 NG. Started on azithromycin and ceftriaxone on admit 7/20. Ceftriaxone stopped and started cefepime 7/21. Cefepime changed to levofloxacin 7/24.    4. Paroxysmal SVT.  H/o PAF as below. Intermittent tachycardia noted here, at times jumping from normal to 's. EKG showed SVT. Did not appear to be tachycardic response  to sepsis at this time. Metoprolol restarted 7/22. Rates OK 7/24.    5. ZAK (suspect prerenal and ATN related to sepsis) on CKD.  Baseline creatinine appears to be 1.3-1.5. Cr 1.38 on admit. PTA torsemide held on admit. Started on IVF's on admit. Cr increased up to 1.92 on 7/24 with subsequent trend down. Cr normalized 7/26.  Recent Labs   Lab 07/26/20  0652 07/25/20  1041 07/24/20  0718 07/23/20  0744 07/22/20  1114   CR 1.25 1.57* 1.92* 1.88* 1.70*       6. Malnutrition due to multiple acute and chronic medical issues.  7. Dysphagia.  NPO on admit given aspiration. Seen by Speech and diet cautiously advanced, but poor oral intake with texture restrictions. Overall, felt that his recovery would be limited by his nutritional status; however, he did not want feeding tube. Further discussions made regarding goals of care and pt med with Palliative care again 7/27 as below.     8. Bladder cancer, recurrent.  9. BPH with LUTS.  * PTA on finastreride 5 mg daily.   * Follows with Dr. Hilton through [facility]. Plans noted for another TURBT this month (7/2020) but appeared to have been postponed. Per review of outpatient notes (Care Everywhere), it appeared that had started discussions re: hospice and not wanting further procedures. On admit, pt seemed reluctant to commit to comfort cares on admit. Palliative care consulted and pt desired ongoing restorative cares. Further discussions made with Hospitalist and Palliative care during this stay and ultimately pt was more open to consider hospice.    10. Chronic R sided CHF 2/2 severe MR.  11. S/p mitral valve clipping 2017, but still with mod-severe residual MR 3/2020.  12. Paroxysmal atrial fibrillation with RVR, suspect driven by infection/sepsis.  [PTA BP meds/diuretics: metoprolol 12.5 mg BID; torsemide 20 mg daily.]  * Follows with [facility]. With significant pulmonary hypertension, 3/2020 echo with PA pressures at 50+RA. [id number] score at least 3. Not on  anticoagulation 2/2 low platelet count, fall risk and risk of bleeding.    * Metoprolol and torsemide held on admit. Received IVF's this hospitalization given sepsis, ZAK. Restarted metoprolol 7/22.    13. H/o C. Diff.  Started on emipric vanco 125 mg BID on admission.     14. Thrombocytopenia, chronic.  15. Anemia, chronic.  PTA FeSO4 325 mg daily. Baseline hgb 10-11. Baseline platelets appear to be 50-90K range. On admission, hgb at 11.9 and plts 125, but suspect hemoconcentrated. No clinical signs of major bleeding (known hematuria from cancer).  Recent Labs   Lab 07/23/20  0744 07/22/20  1114   WBC 8.7 8.3   HGB 10.8* 10.4*   PLT 84* 78*       16. Abdominal pain on admission.  On presentation with LUQ abdominal pain that was sharp, severe and constant, associated with pain in mid to lower back, present times days. Lipase normal. Symptoms improved.    17. Chronic pain (back).  * PTA gabapentin 400 mg at HS and  mg daily; Norco 7.5-325 q6 hours scheduled.  * Severe back pain on presentation unchanged from previous. Seen by Palliative care this hospitalization as below and medication regimen intensified 7/27.       Goals of care discussion.  On 7/27, Dr. Brooks had a long talk with Mr. Gomez and reiterated his worries about his nutrition and that he likely would not recover unless his nutrition improved.  He reiterated his wishes against a feeding tube, and also clearly stated he did not want to be rehospitalized. To clarify, Dr. Brooks asked if we liberalized his diet so he could eat what he wanted and if he developed an aspiration pneumonia, he STILL would not want to be rehospitalized.  He had previously during this stay stated that he did not want hospice; it was explained that hospice would be appropriate for him particularly if he did not want to be hospitalized again.  He was still hesitant to pursue this and stated he wants to recover. Dr. Brooks stated his worry that this may not be possible  with his current nutritional intake. Palliative care re-consulted 7/27 and after further discussion, ultimately pt was agreeable to hospice. Pain meds adjusted 7/27.      PLAN:  - Continue levofloxacin (started 7/24) - plan stop after 8/1.  - Continue vanco 125 mg BID prophylactically (started 7/21) until 1 week after other antibiotics (stop 8/7).  - Continue O2 for comfort  - Continue PRN acetaminophen, PRN PO Dilaudid, PRN IV Dilaudid.  - Continue DD1 with nectar liquids; plan liberalize diet when enrolled in hospice.  - Plan to arrange for discharge back to home/assisted living with hospice.  - Appreciate help from Palliative Care.    COVID-19 testing.  COVID-19 PCR Results    COVID-19 PCR Results 7/20/20   COVID-19 Virus PCR to [facility] - Result Not Detected   COVID-19 Virus PCR to [facility] - Source Nasopharyngeal      Comments are available for some flowsheets but are not being displayed.         COVID-19 Antibody Results, Testing for Immunity    COVID-19 Antibody Results, Testing for Immunity   No data to display.             Diet: Snacks/Supplements Adult: Other; Plus 2 milkshake; Between Meals  Snacks/Supplements Adult: Other; Pudding; With Meals  Combination Diet Dysphagia Diet Level 1: Pureed; Nectar Thickened Liquids (pre-thickened or use instant food thickener)  Diet    Prophylaxis: PCD's, ambulation. Protonix for GI.  Velasquez Catheter: in place, indication: Strict 1-2 Hour I&O  Code Status: No CPR- Do NOT Intubate    Disposition Plan   Expected discharge: Today, recommended to assisted living with hospice.  Entered: Sean Merchant MD 07/29/2020, 7:26 AM       Oxygen Documentation:   I certify that this patient, Ivan Gomez has been under my care (or a nurse practitioner or physican's assistant working with me). This is the face-to-face encounter for oxygen medical necessity.      Ivan Gomez is now in a chronic stable state and continues to require supplemental oxygen. Patient has continued  "oxygen desaturation due to Chronic Heart Failure I50, pneumonia, hospice    Alternative treatment(s) tried or considered and deemed clinically infective for treatment of Chronic Heart Failure I50, pneumoia, hospice - include Diuretics, antibiotics  If portability is ordered, is the patient mobile within the home? yes    **Patients who qualify for home O2 coverage under the CMS guidelines require ABG tests or O2 sat readings obtained closest to, but no earlier than 2 days prior to the discharge, as evidence of the need for home oxygen therapy. Testing must be performed while patient is in the chronic stable state. See notes for O2 sats.**        Interval History   No acute events overnight.  Awake, slow to respond at times.    -Data reviewed today: I reviewed all new labs and imaging over the last 24 hours. I personally reviewed no images or EKG's today.    Physical Exam   Heart Rate: 83, Blood pressure 106/54, pulse 79, temperature 96.8  F (36  C), temperature source Oral, resp. rate 18, height 1.727 m (5' 8\"), weight 57.2 kg (126 lb 3.2 oz), SpO2 98 %.  Vitals:    07/26/20 0600 07/27/20 0612 07/28/20 0500   Weight: 61.2 kg (135 lb) 53.2 kg (117 lb 3.2 oz) 57.2 kg (126 lb 3.2 oz)     Vital Signs with Ranges  Temp:  [95.4  F (35.2  C)-96.9  F (36.1  C)] 96.8  F (36  C)  Pulse:  [79] 79  Heart Rate:  [75-91] 83  Resp:  [16-18] 18  BP: (102-128)/(54-65) 106/54  SpO2:  [92 %-98 %] 98 %  Patient Vitals for the past 24 hrs:   BP Temp Temp src Pulse Heart Rate Resp SpO2   07/29/20 0004 106/54 96.8  F (36  C) Oral -- 83 18 98 %   07/28/20 2043 -- -- -- 79 -- -- --   07/28/20 2013 128/59 96.9  F (36.1  C) Oral -- 79 18 95 %   07/28/20 1543 112/65 96.8  F (36  C) Oral -- 91 16 92 %   07/28/20 0730 102/62 95.4  F (35.2  C) Axillary -- 75 16 97 %     I/O's Last 24 hours  I/O last 3 completed shifts:  In: 2720 [I.V.:2720]  Out: 1025 [Urine:1025]    Constitutional: Frail, cachectic, arousable, some mild delay in " mentation/responses.  Respiratory:   Cardiovascular:   GI:   Skin/Integumen:   Other:        Data   Recent Labs   Lab 07/26/20  0652 07/25/20  1041 07/24/20  0718 07/23/20  0744  07/22/20  1114   WBC  --   --   --  8.7  --  8.3   HGB  --   --   --  10.8*  --  10.4*   MCV  --   --   --  101*  --  101*   PLT  --   --   --  84*  --  78*    144 144 144  --  140   POTASSIUM 3.8 3.7 4.2 5.1   < > 5.5*   CHLORIDE 116* 118* 116* 115*  --  110*   CO2 22 23 22 22  --  20   BUN 42* 56* 69* 70*  --  58*   CR 1.25 1.57* 1.92* 1.88*  --  1.70*   ANIONGAP 6 3 6 7  --  10   GIUSEPPE 8.2* 7.9* 7.9* 8.3*  --  8.7   GLC 87 96 136* 67*  --  71    < > = values in this interval not displayed.     Recent Labs   Lab Test 07/26/20  0652 07/25/20  1041 07/24/20  0718 07/23/20  0744 07/22/20  1114  09/04/16  0522 09/03/16  0603  08/21/15  0557   GLC 87 96 136* 67* 71   < >  --   --    < >  --    BGM  --   --   --   --   --   --  85 112*  --  106*    < > = values in this interval not displayed.         No results found for this or any previous visit (from the past 24 hour(s)).    Medications   All medications were reviewed.    dextrose 5% and 0.45% NaCl 50 mL/hr at 07/28/20 2019       carboxymethylcellulose PF  2 drop Both Eyes BID     finasteride  5 mg Oral Daily     levofloxacin  750 mg Oral Every Other Day     magnesium hydroxide  30 mL Oral Daily     metoprolol tartrate  12.5 mg Oral BID     pantoprazole (PROTONIX) IV  40 mg Intravenous Daily with breakfast     sodium chloride (PF)  3 mL Intracatheter Q8H     vancomycin  125 mg Oral BID

## 2020-07-29 NOTE — PLAN OF CARE
Patient discharged at 12:26 PM to Discharged to assisted living  IV was discontinued, while taking this out patient skin was VERY fragile and two skin tears were created and two meliples were placed. Pain at time of discharge was 10/10- pt was given SL (high concentrate) dilaudid at 11:10 (ride suppose to come at 1130 but transport was delayed till 1220pm) and is unable to give more. Belongings returned to patient.  Discharge paperwork and medications were given to [facility] transport. At time of discharge, patient condition was stable and left the unit  escorted by [facility] stretcher.

## 2020-07-29 NOTE — PLAN OF CARE
Physical Therapy Discharge Summary    Reason for therapy discharge:    Discharged to Home with hospice    Progress towards therapy goal(s). See goals on Care Plan in Epic electronic health record for goal details.  Goals not met.  Barriers to achieving goals:   discharge from facility.    Therapy recommendation(s):    Pt is discharging on hospice; Pt would benefit from continued skilled PT services to decrease burden of care at hospice discretion.

## 2020-07-29 NOTE — PLAN OF CARE
Speech Language Therapy Discharge Summary    Reason for therapy discharge:    Discharged to Home with hospice.    Progress towards therapy goal(s). See goals on Care Plan in Epic electronic health record for goal details.  Goals not met.  Barriers to achieving goals:   discharge from facility.    Therapy recommendation(s):    No further therapy is recommended.

## 2020-07-29 NOTE — PLAN OF CARE
"[id number]-    A&Ox4, slow to respond @ times and is a little forgetful. VSS. Continues on two liters NC. C/o pain in lower back and neck. Prn SL dilaudid given; effective. Frequent cough, especially after medications. Aspiration precautions in place. Did suction with the [place] on shift d/t patient feeling pills didn\"t go down well. Small amount of white thick phelgm able to come up. Patient has a hard time getting phlegm up d/t weak cough. RR/staus unchanged. TELE: AFIB CVR. PIV infusing IVF @ 50 mL/hour. LS diminished. T&R q2 hours in bed. Mepilex in place to bilat heels, coccyx and right torso areas. All dressings C/D/I. Last changed in PM. Velasquez in place with good output. Urine is pink/red in color. DD1 with NTL. Plan for discharge with hospice tomorrow to [facility] at 1100.    "

## 2020-07-30 NOTE — TELEPHONE ENCOUNTER
Visit Type : talk about surgery     Records/Orders: NA    Pt Contacted: no    At Rooming: phone

## 2020-08-06 ENCOUNTER — VIRTUAL VISIT (OUTPATIENT)
Dept: UROLOGY | Facility: CLINIC | Age: 85
End: 2020-08-06
Payer: COMMERCIAL

## 2020-08-06 DIAGNOSIS — Z53.9 ERRONEOUS ENCOUNTER--DISREGARD: Primary | ICD-10-CM

## 2020-08-06 NOTE — LETTER
8/6/2020       RE: Ivan Gomez  [address] [place] [id number]     Dear Colleague,    Thank you for referring your patient, Ivan Gomez, to the [facility] at [facility]. Please see a copy of my visit note below.      This encounter was opened in error. Please disregard.    Again, thank you for allowing me to participate in the care of your patient.      Sincerely,    Seth Vega MD

## 2020-08-06 NOTE — LETTER
Date:August 10, 2020      Provider requested that no letter be sent. Do not send.       [facility] Health Information

## 2021-06-18 NOTE — PROGRESS NOTES
[facility]  [place] Medical Record Number:  [id number]  Place of Service where encounter took place:  [facility] [[id number]]  Chief Complaint   Patient presents with     RECHECK       HPI:    Ivan Gomez  is a 89 year old (10/12/1926), who is being seen today for an episodic care visit.  HPI information obtained from: facility chart records, facility staff, patient report and [facility] chart review.   He came to this facility 5/28/2019 for short term rehab and medical management following hospitalization after presenting to the  [facility] ED in [facility] 5/21/2019  with worsening abdominal pain, nausea and diarrhea. He was found to have acute cholecystitis and was transferred to [facility] for management. He underwent cholecystectomy 5/22/2019 by Dr Romero. Completed a course of flagyl and cipro. He developed post op hypoxia requiring BiPAP. CXR with atelectasis vs infiltrate. No symptoms of infection and WBC normal. Respiratory issues resolved.   He had significant dysphagia and EGD showed esophagitis, thought to be from a pill with local erosion. Treated with carafate and lidocaine with improvement. Discharged on mechanical soft diet. He was positive for C diff and discharged on  vancomycin.      Today's concern is:     Chronic diastolic congestive heart failure (H)-received an additional lasix 20 mg on 6/15 and 6/16/2109 for increased edema of his extremities with some improvement. Weight is down 5 lbs to 151 lbs. His weight on admission was 152, highest weight was 164 lbs on 6/8/2019. Reports some improvement in edema with less weeping from his legs and RUE. Short of breath with exertion, no coughing or chest pain. Unhappy about receiving extra lasix due to more frequent urination.   Pulmonary hypertension (H)  Non-rheumatic mitral regurgitation  S/P mitral valve clip implantation  CKD (chronic kidney disease) stage 3, GFR 30-59 ml/min  What Type Of Note Output Would You Prefer (Optional)?: Standard Output (H)  Acute cholecystitis  S/P cholecystectomy  Clostridium difficile diarrhea-having 1 soft stool daily. No cramping or abdominal pain.   Oropharyngeal dysphagia-oral intake remains fairly poor. Working with SPEECH THERAPY and remains on DD2 with thin liquids.   Benign essential hypertension-BPs: 105/68, 110/68, 115/76   RH: 66-68  Spinal stenosis of lumbar region, unspecified whether neurogenic claudication present-reports pain of ankles, feet is unchanged, some improvement with voltaren. Knee pain and edema is better when he doesn't wear the compression stockings.   Physical deconditioning-ambulating up 125 ft with walker and supervision. Requires assist of 1 with all cares.   SLUMS 21/30, CPT 5.1/5.6    Past Medical and Surgical History reviewed in Epic today.    MEDICATIONS:  Current Outpatient Medications   Medication Sig Dispense Refill     artificial saliva (BIOTENE MT) AERS spray Take 1 spray by mouth every 2 hours as needed for dry mouth       carvedilol (COREG) 6.25 MG tablet Take 6.25 mg by mouth 2 times daily (with meals)       diclofenac (VOLTAREN) 1 % topical gel Place 2 g onto the skin 3 times daily Apply to both ankles       finasteride (PROSCAR) 5 MG tablet TAKE 1 TABLET(5 MG) BY MOUTH DAILY 90 tablet 3     furosemide (LASIX) 20 MG tablet Take 2 tablets (40 mg) by mouth daily 180 tablet 0     gabapentin (NEURONTIN) 300 MG capsule TAKE 1 CAPSULE BY MOUTH THREE TIMES DAILY 270 capsule 0     hydrALAZINE (APRESOLINE) 25 MG tablet Take 1 tablet (25 mg) by mouth 3 times daily 270 tablet 1     HYDROcodone-acetaminophen (NORCO) 7.5-325 MG per tablet Take 1 tablet by mouth every 6 hours as needed for pain maximum 4 tablet(s) per day 15 tablet 0     hypromellose (ARTIFICIAL TEARS) 0.4 % SOLN ophthalmic solution Place 2 drops Into the left eye 4 times daily       isosorbide dinitrate (ISORDIL) 20 MG tablet Take 20 mg by mouth 2 times daily       pantoprazole (PROTONIX) 40 MG EC tablet Take 40 mg by mouth daily  How Severe Is Your Rash?: mild "      Phenylephrine-[place] 0.25-50 % GEL Place rectally 4 times daily as needed Insert 1 application rectally as needed for Hemorrhoids       potassium chloride ER (K-DUR/KLOR-CON M) 10 MEQ CR tablet Take 10 mEq by mouth daily       simvastatin (ZOCOR) 20 MG tablet TAKE ONE TABLET BY MOUTH AT BEDTIME 30 tablet 0     Skin Protectants, Misc. (EUCERIN) cream Apply topically as needed for dry skin Apply to LE + Areas of dryness topically as needed for Dryness       tamsulosin (FLOMAX) 0.4 MG capsule TAKE ONE CAPSULE BY MOUTH DAILY 90 capsule 3     vancomycin (FIRVANQ) 50 MG/ML oral solution Take 125 mg by mouth 3 times daily         REVIEW OF SYSTEMS:  4 point ROS including Respiratory, CV, GI and , other than that noted in the HPI,  is negative    Objective:  /68   Pulse 62   Temp 97.1  F (36.2  C)   Resp 18   Ht 1.702 m (5' 7\")   Wt 69.2 kg (152 lb 8 oz)   SpO2 96%   BMI 23.88 kg/m    Exam:  GENERAL APPEARANCE:  Alert, in no distress, frail  ENT:  Mouth and posterior oropharynx normal, moist mucous membranes, [place]  EYES:  EOM normal, conjunctiva and lids normal  NECK:  No adenopathy,masses or thyromegaly  RESP:  respiratory effort and palpation of chest normal, no respiratory distress, diminished breath sounds bilaterally, no wheezes or crackles  CV:  Palpation and auscultation of heart done , regular rate and rhythm, 2/6  murmur, +2 pedal pulses, peripheral edema 1+ all extremities  ABDOMEN: soft, nontender, no hepatosplenomegaly or other masses  M/S: up in chair. [place] with good strength. No joint inflammation  SKIN:  small amount of weeping from RUE both thighs. Abdominal incisions clean, dry, no erythema. . No erythema of LE  PSYCH:  oriented X 3, normal insight, judgement and memory, affect and mood normal    Labs:   Labs done in SNF are in [place] EPIC. Please refer to them using EPIC/Care Everywhere.    ASSESSMENT / PLAN:  (I50.32) Chronic diastolic congestive heart failure (H)  (primary " Is This A New Presentation, Or A Follow-Up?: Rash encounter diagnosis)  (I27.20) Pulmonary hypertension (H)  (I34.0) Non-rheumatic mitral regurgitation  (Z98.890,  Z95.818) S/P mitral valve clip implantation  Comment: some increase in volume status with less edema. Weight is back to tcu admission weight. Diuresis is somewhat limited by renal function. Less knee pain without wearing compression stockings.   Plan: continue lasix 20 mg daily. Continue 1500 ml fluid restriction, although his fluid intake remains fairly poor. Elevate legs as much as possible.     (N18.3) CKD (chronic kidney disease) stage 3, GFR 30-59 ml/min (H)  Comment: gradual rise in creatinine, has stabilized at 1.44.   Plan: follow BMP. Avoid nephrotoxins.     (K81.0) Acute cholecystitis  (Z90.49) S/P cholecystectomy  Comment: incisions healing without signs of infection. He saw Dr Romero for follow up 6/5/2019 and is to RTC prn  Plan: monitor incisions    (A04.72) Clostridium difficile diarrhea  Comment: improving   Plan: decrease vancomycin to bid for 5 days, then daily for 5 days, then discontinue. Monitor number of stools/shift.     (R13.12) Oropharyngeal dysphagia  Comment: some improvement in swallow, although oral intake remains poor. No signs of aspiration.   Plan: continue SPEECH THERAPY, advance diet as tolerated.     (I10) Benign essential hypertension  Comment: mild hypotension, improved  Plan: cardiac meds as above     (M48.061) Spinal stenosis of lumbar region, unspecified whether neurogenic claudication present  Comment: chronic, pain fairly well controlled  Plan: continue gabapentin, voltaren gel, Norco.     (R53.81) Physical deconditioning  Comment: slowly progressing in therapies  Plan: continue PHYSICAL THERAPY/OT. Goal is to discharge to [facility] with high level services.       Electronically signed by:  FRANK Mena CNP

## 2022-11-19 NOTE — PLAN OF CARE
Pt A/O. Slow to respond at times. VSS on 2L O2. C/o neck pain, controlled w/ PO Dilaudid. Dd1 w/ nectar thick. Velasquez w/ adequate output. PIV w/ D5+1/2NS at 50 ml/hr. Mepilex to coccyx, L heel. Turn/repo q2hrs. Plan for hospice consult tomorrow.    [[id number]]

## 2024-01-11 NOTE — H&P
[facility]    H & P  Cardiology       Patient Name: Ivan Gomez   Date of Admission: 10/13/2017            Summary:     Ivan Gomez is a 89 y.o. Male with a history of HFpEF, CKD III, bladder cancer s/p TURBT x2, GERD, DJD, HTN, and HLD. He is a transfer from [facility] for evaluation of Mitral-Clip procedure in the setting of acute hypoxic respiratory failure 2/2 new MR and HFpEF.           Assessment and Plan:     #Acute Hypoxemic Respiratory Failure  #Acute on Chronic HFpEF  #Severe 4+ Mitral Regurgitation 2/2 flail posterior leaflet  Echo on 10/11/17: EF >70%; hyperdynamic; severe (4+) MR; torn mitral valve chordae tendinae with flail posterior MV leaflet; grade III diastolic dysfunction. The MR is new since stress Echo in 2012.   Lexiscan on 10/12/17: small area of anterior inducible ischemia.   Nt-BNP elevated to 9149.  No 02 at home. Needing increasing O2 requirements; up to 15L O2 on admission. Likely secondary to pulmonary edema and decreased CO from severe mitral regurgitation. Had been diuresing at [facility]. Chest Xray from 10/13 with small bilateral pleural effusions and evidence of interstitial pulmonary edema.   -Gave 20mg IV furosemide on admission  -No diuretics ordered for AM; primary team to address tomorrow  -Telemetry  -O2 as needed; BiPAP as next step; patient is DNI  -BMP in AM  -Electrolyte goal: K >4, Mg >2  -Plan for IFEANYI on 10/16  -patient is on schedule for mitral clip on 10/16    #New Area of Inducible Ischemia (anteroapical)  Troponins peaked at [facility] to 0.06. ECG with NSR. Found on Lexiscan from 10/12/17. ASA held on admission to [facility] due to epigastric pain.  -Plan to restart ASA 81mg in AM; he will likely need this post mitral clip  -Unclear what plan is regarding catheterization    CKD III  Baseline Cr ~1.2-1.3. Increased to 1.42 on 10/11, but has normalized. His BUN however, has been increasing with diuresis; 50 on 10/13. No melena nor history of GI  "Patient is scheduled for LOOP Recorder Implant on 3/2/24 at [facility] with /Florencia Penaloza.     Patient aware of all general instructions.    Instructions sent to patient through [employer].      Medication holds:   N/A    Blood work to be done on:  N/A  (Patient did BMP/ CBC  on 1/10/24)    Insurance: [facility]     Please obtain auth.         Thank you,  Ashley \"Anya\" Willie      " bleed.   -Monitor BMP    Bladder Cancer s/p TURBT x2  Low grade urothelial cell carcinoma. No longer wants treatment. Palliative approach.   -Continue PTA tamsulosin and finasteride    HTN    BP soft on admission.   -Will hold home amlodipine    HLD  -Continue PTA simvastatin      Code Status: DNR/DNI  FEN: Cardiac Diet; Sodium restriction  PPx: Padua score : 8; heparin ppx  Dispo: General Medicine    Bobby Fuchs MD  Internal Medicine Resident, PGY2  [phone]           HPI:     Ivan Gomez is a 89 y.o. Male with a history of HFpEF, CKD III, bladder cancer s/p TURBT x2, GERD, DJD, HTN, and HLD.     The patient initially started feeling a pressure in the middle of his chest and/or upper abdomen 4 weeks prior to admission. He was assessed by his PCP who placed him on a PPI. The discomfort did not get better. Then he developed shortness of breath later that evening that included orthopnea, so he called 911. He was subsequently taken to [facility].     At [facility] the patient was found to be hypoxic to 76% in the ED, so he placed on BiPAP. He was subsequently diuresed. During his evaluation, he was found to have new severe mitral regurgitation that was a likely contributor to his symptoms. He was evaluated by cardiothoracic surgery there, and it was decided that he was not a surgical candidate. There was a plan to undergo IFEANYI, but he was too tenuous to undergo the procedure without general anesthesia. He was therefore transferred to the [facility] for further cares.     On 10/13, when I met the patient he is comfortable in his room on 15L O2. He denies shortness of breath at the current time. He sits up in bed, because he says laying down flat makes his breathing worse. He has not noticed and chest pain today. He denies any recent weight gain. He has not noticed any increased swelling in his legs. No fevers, cough. He has not been up walking around because that exacerbates the dyspnea.           Past Medical  History:     Past Medical History:   Diagnosis Date     Arthritis     Spinal Stenosis     Former smoker      Hemorrhoids      Hypercholesteremia      Hypertension      Malignant neoplasm (H)     skin CA, Basal cell     Other chronic pain      Renal disease               Past Surgical History:        Past Surgical History:   Procedure Laterality Date     BACK SURGERY       BIOPSY      face, skin cancer     BIOPSY  8/2016    shoulder lesion     CYSTOSCOPY, TRANSURETHRAL RESECTION (TUR) PROSTATE, COMBINED N/A 8/21/2015    Procedure: COMBINED CYSTOSCOPY, TRANSURETHRAL RESECTION (TUR) PROSTATE;  Surgeon: Dennis Hilton MD;  Location: [facility] OR     CYSTOSCOPY, TRANSURETHRAL RESECTION (TUR) TUMOR BLADDER, COMBINED N/A 9/2/2016    Procedure: COMBINED CYSTOSCOPY, TRANSURETHRAL RESECTION (TUR) TUMOR BLADDER;  Surgeon: Dennis Hilton MD;  Location: [facility] OR     ORTHOPEDIC SURGERY      lumbar and cervical surgery, Sep, 2008, knee surgery              Social History:     Social History   Substance Use Topics     Smoking status: Former Smoker     Smokeless tobacco: Never Used     Alcohol use No      Comment: doesnt use anymore              Family History:     Family History   Problem Relation Age of Onset     CANCER Son 64     leukemia ? due to agent orange     Family History Negative Son      DIABETES No family hx of      Coronary Artery Disease No family hx of      Hypertension No family hx of      Hyperlipidemia No family hx of      CEREBROVASCULAR DISEASE No family hx of      Breast Cancer No family hx of      Colon Cancer No family hx of      Prostate Cancer No family hx of      Other Cancer No family hx of      Depression No family hx of      Anxiety Disorder No family hx of      MENTAL ILLNESS No family hx of      Substance Abuse No family hx of      Anesthesia Reaction No family hx of      Asthma No family hx of      OSTEOPOROSIS No family hx of      Genetic Disorder No family hx of      Thyroid Disease No  family hx of      Obesity No family hx of      Unknown/Adopted No family hx of               Immunizations:     Immunization History   Administered Date(s) Administered     Influenza (High Dose) 3 valent vaccine 10/11/2014, 09/29/2015, 09/13/2016, 09/19/2017     Pneumococcal (PCV 13) 08/18/2015     Pneumococcal 23 valent 06/13/2013     TDAP Vaccine (Adacel) 06/13/2013              Allergies:     Allergies   Allergen Reactions     Celebrex [Celecoxib] Hives     Penicillins Hives              Medications:       sodium chloride (PF)  3 mL Intracatheter Q8H     senna-docusate  1-2 tablet Oral BID     [START ON 10/14/2017] finasteride  5 mg Oral Daily     [START ON 10/14/2017] gabapentin  300 mg Oral TID     [START ON 10/14/2017] metoprolol  25 mg Oral Daily     [START ON 10/14/2017] pantoprazole  40 mg Oral Daily     simvastatin  20 mg Oral At Bedtime     [START ON 10/14/2017] tamsulosin  0.4 mg Oral Daily              Review of Systems:     10 point Review of Systems is negative except as noted in the HPI          Physical Exam:     There were no vitals taken for this visit.    General: Sitting up in bed. Alert and oriented completely. No acute distress   HEENT: Oxymizer on. PERRL. Senilus arcus. EOM intact. Sclera non-icteric. Oral mucosa tacky.  Neck/Thyroid: Trachea midline. No lymphadenopathy  Resp: Inspiratory crackles throughout. No wheezing.   CV: Regular rate and rhythm. Grade 3/6 holosystolic murmur heard best at the apex.    Abdominal: Soft, nondistended. Nontender to palpation. No peritoneal signs. BS+  MSK: Frail appearing.   Extremities: Radial, and pedal pulses present and bilaterally equal. Trace LE edema.   Neurological: CN's II-XII grossly intact. A&O completely            Data:     Results for orders placed or performed during the hospital encounter of 10/10/17 (from the past 24 hour(s))   Basic metabolic panel   Result Value Ref Range    Sodium 140 133 - 144 mmol/L    Potassium 4.0 3.4 - 5.3 mmol/L     Chloride 105 94 - 109 mmol/L    Carbon Dioxide 26 20 - 32 mmol/L    Anion Gap 9 3 - 14 mmol/L    Glucose 115 (H) 70 - 99 mg/dL    Urea Nitrogen 50 (H) 7 - 30 mg/dL    Creatinine 1.23 0.66 - 1.25 mg/dL    GFR Estimate 55 (L) >60 mL/min/1.7m2    GFR Estimate If Black 67 >60 mL/min/1.7m2    Calcium 9.0 8.5 - 10.1 mg/dL   CBC with platelets differential   Result Value Ref Range    WBC 10.3 4.0 - 11.0 10e9/L    RBC Count 4.02 (L) 4.4 - 5.9 10e12/L    Hemoglobin 12.7 (L) 13.3 - 17.7 g/dL    Hematocrit 37.9 (L) 40.0 - 53.0 %    MCV 94 78 - 100 fl    MCH 31.6 26.5 - 33.0 pg    MCHC 33.5 31.5 - 36.5 g/dL    RDW 13.4 10.0 - 15.0 %    Platelet Count 86 (L) 150 - 450 10e9/L    Diff Method Automated Method     % Neutrophils 85.5 %    % Lymphocytes 6.5 %    % Monocytes 7.5 %    % Eosinophils 0.2 %    % Basophils 0.1 %    % Immature Granulocytes 0.2 %    Nucleated RBCs 0 0 /100    Absolute Neutrophil 8.8 (H) 1.6 - 8.3 10e9/L    Absolute Lymphocytes 0.7 (L) 0.8 - 5.3 10e9/L    Absolute Monocytes 0.8 0.0 - 1.3 10e9/L    Absolute Eosinophils 0.0 0.0 - 0.7 10e9/L    Absolute Basophils 0.0 0.0 - 0.2 10e9/L    Abs Immature Granulocytes 0.0 0 - 0.4 10e9/L    Absolute Nucleated RBC 0.0    XR Chest 2 Views    Narrative    CHEST TWO VIEWS   10/13/2017 8:44 AM     HISTORY: Congestive heart failure and hypoxia with dyspnea.    COMPARISON: 10/11/2017.     FINDINGS. Cardiac and mediastinal silhouettes are within normal  limits. No change in bilateral interstitial and airspace opacities.  Tiny bilateral pleural effusions are mildly increased when compared to  the prior study. No pneumothorax.      Impression    IMPRESSION: No change in diffuse mixed interstitial and airspace  opacities throughout both lungs.    HELEN HUMPHREYS, DO   Cardiothoracic Surgery IP Consult: Patient to be seen: Routine - within 24 hours; mitral valve; Consultant may enter orders: Yes    Narrative    Michel Galloway MD     10/13/2017  4:46 PM  Cardiothoracic  Surgery Consult    89M with symptomatic acute on chronic mitral regurgitation.   Patient feels improved since arrival . He is comfortable at   present, sitting in a chair, on supplemental oxygen. No chest   pain.   The patient has a history of bladder cancer, s/p TURBT x2,   nephrostomy, and chemotherapy He does not want further   chemotherapy or major surgery. He is DNR, DNI.    PMH: CHF, MR, ZAK, CKD stage 3, bladder cancer, thrombocytopenia,   arthritis, GERD, HTN, HLD, basal cell skin cancer    PSH: urinary procedures as above, skin biopsies    Allergies: penicillin, celebrex    SH: former smoker, no EtOH    FH: son- cancer possible leukemia    Home Meds: protonix, flomax, norco, neurontin, zocor, proscar,   metoprolol XL, norvasc, colchicine, ASA 81mg<   vitamins/supplements    ROS: all other systems negative    PE  T 98, HR 84, /67, sat 91% on 15LPM  70kg, 170cm, BMI 24  NAD  Unlabored respirations  NSR  Creatinine 1.23, platelets 83051, Hb 12.7  Lexiscan: small reversible anteroapical defect consistent with   ischemia  ECHO: EF greater than 70%, severe 4+ MR with flail posterior   leaflet from torn chordae, grade III advanced diastolic   dysfunction  Mild MR on 2012 ECHO    A/P 89M with multiple medical comorbidities, who is DNR/DNI, with   acute on chronic severe 4+ mitral regurgitation secondary to a   flail posterior leaflet.    Agree with cardiology assessment that the patient is poor   surgical candidate. He would be high risk for a surgical mitral   valve repair or replacement. Furthermore the patient is   uninterested in surgery.   Agree with plan for transfer and MitraClip    Patient seen with Dr. Nelly Galloway            I have seen and examined the patient and agree with the finding and plan.       Tevin Meza MD  Cardiology-[facility]  [phone]

## 2025-07-16 NOTE — TELEPHONE ENCOUNTER
Controlled Substance Refill Request for hydrocodone acetaminophen (norco) 7.5-325 mg Problem List Complete:  Yes   checked in past 6 months?  Yes 5/30/1917   Medication(s): norco 7.5/325.   Maximum quantity per month: 120  Clinic visit frequency required: Q 4 months     Controlled substance agreement on file: 9/3/14    Pain Clinic evaluation in the past: Yes       Date/Location:  spine clinic    DIRE Total Score(s):  No flowsheet data found.    Last [facility] website verification: 5/30/17  [email or web address]    Hydrocodone-amphetamine (norco) 7.5-325     Last Written Prescription Date:  4/27/17  Last Fill Quantity: 120   # refills: 0  Last Office Visit with [facility] prescribing provider: 5/9/17  Future Office visit:       Routing refill request to provider for review/approval because:  Drug not on the [facility] refill protocol or controlled substance       spinal precautions/surgical precautions

## (undated) DEVICE — SU VICRYL 0 UR-6 27" J603H

## (undated) DEVICE — SOL WATER IRRIG 3000ML BAG 2B7117

## (undated) DEVICE — SYR ELLIK EVACUATOR W/ADAPT LATEX

## (undated) DEVICE — LINEN TOWEL PACK X5 5464

## (undated) DEVICE — BAG URINARY DRAIN 4000ML LF 153509

## (undated) DEVICE — CATH CHOLANGIOGRAM 4.5FR TAUT METAL TIP 20018-M55

## (undated) DEVICE — ENDO TROCAR BLUNT TIP KII BALLOON 12X100MM C0R47

## (undated) DEVICE — PREP POVIDONE IODINE SCRUB 7.5% 120ML

## (undated) DEVICE — PACK LAP CHOLE SLC15LCFSD

## (undated) DEVICE — GLOVE PROTEXIS MICRO 7.5  2D73PM75

## (undated) DEVICE — SU MONOCRYL 4-0 PS-2 18" UND Y496G

## (undated) DEVICE — BAG CLEAR TRASH 1.3M 39X33" P4040C

## (undated) DEVICE — ESU ELEC LOOP 24FR 20750G

## (undated) DEVICE — SOL NACL 0.9% INJ 1000ML BAG 2B1324X

## (undated) DEVICE — ESU ELEC ROLLERBALL 24FR 5MM 27050NK

## (undated) DEVICE — ESU GROUND PAD UNIVERSAL W/O CORD

## (undated) DEVICE — GLOVE PROTEXIS BLUE W/NEU-THERA 7.5  2D73EB75

## (undated) DEVICE — DECANTER VIAL 2006S

## (undated) DEVICE — LINEN HALF SHEET 5512

## (undated) DEVICE — TUBING SUCTION 12"X1/4" N612

## (undated) DEVICE — SUCTION IRR STRYKERFLOW II W/TIP 250-070-520

## (undated) DEVICE — SOL WATER IRRIG 1000ML BOTTLE 2F7114

## (undated) DEVICE — ENDO TROCAR FIRST ENTRY KII FIOS Z-THRD 05X100MM CTF03

## (undated) DEVICE — TUBING IRRIG TUR Y TYPE 96" LF 6543-01

## (undated) DEVICE — ESU HOLDER LAP INST DISP PURPLE LONG 330MM H-PRO-330

## (undated) DEVICE — EVAC SYSTEM CLEAR FLOW SC082500

## (undated) DEVICE — COVER FOOTSWITCH W/CINCH 20X24" 923267

## (undated) DEVICE — GLOVE PROTEXIS W/NEU-THERA 7.5  2D73TE75

## (undated) DEVICE — CATH FOLEY 3WAY 20FR 30ML LUBRICATH LATEX 0167L20

## (undated) DEVICE — ESU CORD MONOPOLAR 10'  E0510

## (undated) DEVICE — PAD CHUX UNDERPAD 30X36" P3036C

## (undated) DEVICE — PREP POVIDONE IODINE SOLUTION 10% 120ML

## (undated) DEVICE — ESU GROUND PAD ADULT W/CORD E7507

## (undated) DEVICE — ENDO TROCAR SLEEVE KII Z-THREADED 05X100MM CTS02

## (undated) DEVICE — PREP CHLORAPREP 26ML TINTED ORANGE  260815

## (undated) DEVICE — DRAPE UNDER BUTTOCK 89415

## (undated) DEVICE — LINEN FULL SHEET 5511

## (undated) DEVICE — DRSG TELFA 2X3"

## (undated) DEVICE — SYSTEM CLEARIFY VISUALIZATION 21-345

## (undated) DEVICE — PACK CYSTO CUSTOM RIDGES

## (undated) DEVICE — SUCTION CANISTER MEDIVAC LINER 3000ML W/LID 65651-530

## (undated) RX ORDER — CALCIUM CHLORIDE 100 MG/ML
INJECTION INTRAVENOUS; INTRAVENTRICULAR
Status: DISPENSED
Start: 2017-10-16

## (undated) RX ORDER — NEOSTIGMINE METHYLSULFATE 1 MG/ML
VIAL (ML) INJECTION
Status: DISPENSED
Start: 2019-05-22

## (undated) RX ORDER — PROPOFOL 10 MG/ML
INJECTION, EMULSION INTRAVENOUS
Status: DISPENSED
Start: 2017-10-16

## (undated) RX ORDER — PHENYLEPHRINE HCL IN 0.9% NACL 1 MG/10 ML
SYRINGE (ML) INTRAVENOUS
Status: DISPENSED
Start: 2017-10-16

## (undated) RX ORDER — GLYCOPYRROLATE 0.2 MG/ML
INJECTION, SOLUTION INTRAMUSCULAR; INTRAVENOUS
Status: DISPENSED
Start: 2019-05-22

## (undated) RX ORDER — HEPARIN SODIUM 1000 [USP'U]/ML
INJECTION, SOLUTION INTRAVENOUS; SUBCUTANEOUS
Status: DISPENSED
Start: 2017-10-16

## (undated) RX ORDER — FENTANYL CITRATE 50 UG/ML
INJECTION, SOLUTION INTRAMUSCULAR; INTRAVENOUS
Status: DISPENSED
Start: 2019-05-24

## (undated) RX ORDER — LIDOCAINE HYDROCHLORIDE 20 MG/ML
INJECTION, SOLUTION EPIDURAL; INFILTRATION; INTRACAUDAL; PERINEURAL
Status: DISPENSED
Start: 2019-05-22

## (undated) RX ORDER — FUROSEMIDE 10 MG/ML
INJECTION INTRAMUSCULAR; INTRAVENOUS
Status: DISPENSED
Start: 2017-10-16

## (undated) RX ORDER — FENTANYL CITRATE 50 UG/ML
INJECTION, SOLUTION INTRAMUSCULAR; INTRAVENOUS
Status: DISPENSED
Start: 2018-11-02

## (undated) RX ORDER — ONDANSETRON 2 MG/ML
INJECTION INTRAMUSCULAR; INTRAVENOUS
Status: DISPENSED
Start: 2019-05-22

## (undated) RX ORDER — NITROGLYCERIN 20 MG/100ML
INJECTION INTRAVENOUS
Status: DISPENSED
Start: 2017-10-16

## (undated) RX ORDER — CEFAZOLIN SODIUM 2 G/100ML
INJECTION, SOLUTION INTRAVENOUS
Status: DISPENSED
Start: 2018-11-02

## (undated) RX ORDER — ROCURONIUM BROMIDE 50 MG/5 ML
SYRINGE (ML) INTRAVENOUS
Status: DISPENSED
Start: 2017-10-16

## (undated) RX ORDER — LIDOCAINE HYDROCHLORIDE 20 MG/ML
INJECTION, SOLUTION EPIDURAL; INFILTRATION; INTRACAUDAL; PERINEURAL
Status: DISPENSED
Start: 2017-10-16

## (undated) RX ORDER — FENTANYL CITRATE 50 UG/ML
INJECTION, SOLUTION INTRAMUSCULAR; INTRAVENOUS
Status: DISPENSED
Start: 2017-10-16

## (undated) RX ORDER — ALBUMIN, HUMAN INJ 5% 5 %
SOLUTION INTRAVENOUS
Status: DISPENSED
Start: 2017-10-16

## (undated) RX ORDER — LIDOCAINE HYDROCHLORIDE 10 MG/ML
INJECTION, SOLUTION EPIDURAL; INFILTRATION; INTRACAUDAL; PERINEURAL
Status: DISPENSED
Start: 2019-05-22

## (undated) RX ORDER — LIDOCAINE HYDROCHLORIDE 10 MG/ML
INJECTION, SOLUTION EPIDURAL; INFILTRATION; INTRACAUDAL; PERINEURAL
Status: DISPENSED
Start: 2017-10-14

## (undated) RX ORDER — CLINDAMYCIN PHOSPHATE 900 MG/50ML
INJECTION, SOLUTION INTRAVENOUS
Status: DISPENSED
Start: 2019-05-22

## (undated) RX ORDER — ONDANSETRON 2 MG/ML
INJECTION INTRAMUSCULAR; INTRAVENOUS
Status: DISPENSED
Start: 2018-11-02

## (undated) RX ORDER — ETOMIDATE 2 MG/ML
INJECTION INTRAVENOUS
Status: DISPENSED
Start: 2017-10-16

## (undated) RX ORDER — REGADENOSON 0.08 MG/ML
INJECTION, SOLUTION INTRAVENOUS
Status: DISPENSED
Start: 2017-10-12

## (undated) RX ORDER — DEXAMETHASONE SODIUM PHOSPHATE 10 MG/ML
INJECTION, SOLUTION INTRAMUSCULAR; INTRAVENOUS
Status: DISPENSED
Start: 2017-06-02

## (undated) RX ORDER — LIDOCAINE HYDROCHLORIDE 10 MG/ML
INJECTION, SOLUTION EPIDURAL; INFILTRATION; INTRACAUDAL; PERINEURAL
Status: DISPENSED
Start: 2017-06-02

## (undated) RX ORDER — ONDANSETRON 2 MG/ML
INJECTION INTRAMUSCULAR; INTRAVENOUS
Status: DISPENSED
Start: 2017-10-16

## (undated) RX ORDER — PROTAMINE SULFATE 10 MG/ML
INJECTION, SOLUTION INTRAVENOUS
Status: DISPENSED
Start: 2017-10-16

## (undated) RX ORDER — FENTANYL CITRATE 50 UG/ML
INJECTION, SOLUTION INTRAMUSCULAR; INTRAVENOUS
Status: DISPENSED
Start: 2019-05-22

## (undated) RX ORDER — CEFAZOLIN SODIUM 1 G/3ML
INJECTION, POWDER, FOR SOLUTION INTRAMUSCULAR; INTRAVENOUS
Status: DISPENSED
Start: 2017-10-16

## (undated) RX ORDER — EPHEDRINE SULFATE 50 MG/ML
INJECTION, SOLUTION INTRAMUSCULAR; INTRAVENOUS; SUBCUTANEOUS
Status: DISPENSED
Start: 2017-10-16